# Patient Record
Sex: MALE | Race: BLACK OR AFRICAN AMERICAN | NOT HISPANIC OR LATINO | Employment: OTHER | URBAN - METROPOLITAN AREA
[De-identification: names, ages, dates, MRNs, and addresses within clinical notes are randomized per-mention and may not be internally consistent; named-entity substitution may affect disease eponyms.]

---

## 2017-01-04 ENCOUNTER — OFFICE VISIT (OUTPATIENT)
Dept: FAMILY MEDICINE | Facility: CLINIC | Age: 57
End: 2017-01-04
Payer: MEDICARE

## 2017-01-04 VITALS
OXYGEN SATURATION: 99 % | HEIGHT: 70 IN | DIASTOLIC BLOOD PRESSURE: 80 MMHG | SYSTOLIC BLOOD PRESSURE: 132 MMHG | HEART RATE: 68 BPM | WEIGHT: 166.69 LBS | TEMPERATURE: 99 F | BODY MASS INDEX: 23.86 KG/M2

## 2017-01-04 DIAGNOSIS — K21.9 GASTROESOPHAGEAL REFLUX DISEASE, ESOPHAGITIS PRESENCE NOT SPECIFIED: ICD-10-CM

## 2017-01-04 DIAGNOSIS — N18.6 ESRD ON HEMODIALYSIS: ICD-10-CM

## 2017-01-04 DIAGNOSIS — I70.0 ATHEROSCLEROSIS OF AORTA: ICD-10-CM

## 2017-01-04 DIAGNOSIS — J98.8 CONGESTION OF UPPER AIRWAY: ICD-10-CM

## 2017-01-04 DIAGNOSIS — C61 PROSTATE CANCER: ICD-10-CM

## 2017-01-04 DIAGNOSIS — E78.5 HYPERLIPIDEMIA LDL GOAL <100: Chronic | ICD-10-CM

## 2017-01-04 DIAGNOSIS — N25.81 SECONDARY RENAL HYPERPARATHYROIDISM: ICD-10-CM

## 2017-01-04 DIAGNOSIS — Z99.2 ESRD ON HEMODIALYSIS: ICD-10-CM

## 2017-01-04 DIAGNOSIS — I50.22 CHRONIC SYSTOLIC CONGESTIVE HEART FAILURE: ICD-10-CM

## 2017-01-04 DIAGNOSIS — I10 ESSENTIAL HYPERTENSION: Chronic | ICD-10-CM

## 2017-01-04 DIAGNOSIS — G40.909 SEIZURE DISORDER: Chronic | ICD-10-CM

## 2017-01-04 PROCEDURE — 1159F MED LIST DOCD IN RCRD: CPT | Mod: S$GLB,,, | Performed by: INTERNAL MEDICINE

## 2017-01-04 PROCEDURE — 99214 OFFICE O/P EST MOD 30 MIN: CPT | Mod: S$GLB,,, | Performed by: INTERNAL MEDICINE

## 2017-01-04 PROCEDURE — 99999 PR PBB SHADOW E&M-EST. PATIENT-LVL III: CPT | Mod: PBBFAC,,, | Performed by: INTERNAL MEDICINE

## 2017-01-04 PROCEDURE — 4010F ACE/ARB THERAPY RXD/TAKEN: CPT | Mod: S$GLB,,, | Performed by: INTERNAL MEDICINE

## 2017-01-04 PROCEDURE — 3046F HEMOGLOBIN A1C LEVEL >9.0%: CPT | Mod: S$GLB,,, | Performed by: INTERNAL MEDICINE

## 2017-01-04 RX ORDER — PANTOPRAZOLE SODIUM 40 MG/1
40 TABLET, DELAYED RELEASE ORAL DAILY
Qty: 30 TABLET | Refills: 5 | Status: SHIPPED | OUTPATIENT
Start: 2017-01-04 | End: 2017-05-29 | Stop reason: SDUPTHER

## 2017-01-04 NOTE — MR AVS SNAPSHOT
[facility]  [address]  [place] [address]  Phone: [phone]  Fax: [phone]                  Kodi Ibanez   2017 3:00 PM   Office Visit    Description:  Male : 1960   Provider:  Melonie Elias MD   Department:  [facility]           Reason for Visit     Cough     Follow-up           Diagnoses this Visit        Comments    Uncontrolled type 2 diabetes mellitus with proliferative retinopathy without macular edema, with long-term current use of insulin    -  Primary     Type 2 diabetes, uncontrolled, with neuropathy         Uncontrolled type 2 diabetes mellitus with end-stage renal disease         ESRD on hemodialysis         Seizure disorder         Hyperlipidemia LDL goal <100         Essential hypertension         Chronic systolic congestive heart failure         Secondary renal hyperparathyroidism         Atherosclerosis of aorta         Prostate cancer         Congestion of upper airway         Gastroesophageal reflux disease, esophagitis presence not specified                To Do List           Future Appointments        Provider Department Dept Phone    2017 11:15 AM LISA Catherine MD [facility] [phone]    3/9/2017 2:45 PM LISA Catherine MD [facility] [phone]      Goals (5 Years of Data)              16    HEMOGLOBIN A1C < 7.0   9.7  8.2  9.5    Related Problems    Uncontrolled type 2 diabetes mellitus with end-stage renal disease      Follow-Up and Disposition     Return in about 3 months (around 2017), or if symptoms worsen or fail to improve, for follow up chronic medical conditions..       These Medications        Disp Refills Start End    pantoprazole (PROTONIX) 40 MG tablet 30 tablet 5 2017     Take 1 tablet (40 mg total) by mouth once daily. - Oral    Pharmacy: [id number] - [place], [place] - [address] Ph #:  [phone]         [facility]     [facility] On Call Nurse Care Line - 24/7 Assistance  Registered nurses in the [facility] provide clinical advisement, health education, appointment booking, and other advisory services.  Call for this free service at [phone].             Medications           Message regarding Medications     Verify the changes and/or additions to your medication regime listed below are the same as discussed with your clinician today.  If any of these changes or additions are incorrect, please notify your healthcare provider.        CHANGE how you are taking these medications     Start Taking Instead of    pantoprazole (PROTONIX) 40 MG tablet pantoprazole (PROTONIX) 40 MG tablet    Dosage:  Take 1 tablet (40 mg total) by mouth once daily. Dosage:  TAKE 1 TABLET BY MOUTH DAILY    Reason for Change:  Reorder            Verify that the below list of medications is an accurate representation of the medications you are currently taking.  If none reported, the list may be blank. If incorrect, please contact your healthcare provider. Carry this list with you in case of emergency.           Current Medications     alcohol swabs PadM Apply 1 each topically as needed.    amlodipine (NORVASC) 10 MG tablet Take 1 tablet (10 mg total) by mouth once daily.    aspirin 81 MG chewable tablet Take 81 mg by mouth. 1 Tablet, Chewable Oral Every day    atorvastatin (LIPITOR) 40 MG tablet TAKE 1 TABLET BY MOUTH EVERY DAY    blood glucose control, normal Soln 1 drop by Misc.(Non-Drug; Combo Route) route as directed.    blood sugar diagnostic (TRUE METRIX GLUCOSE TEST STRIP) Strp 1 strip by Misc.(Non-Drug; Combo Route) route 2 (two) times daily.    carvedilol (COREG) 25 MG tablet TAKE 1 TABLET BY MOUTH TWICE DAILY WITH MEALS    divalproex (DEPAKOTE) 250 MG 24 hr tablet Take 1,000 mg by mouth once daily.     furosemide (LASIX) 40 MG tablet TAKE 1 TABLET BY MOUTH EVERY DAY    gabapentin (NEURONTIN)  "100 MG capsule TAKE ONE CAPSULE BY MOUTH DAILY    insulin aspart (NOVOLOG FLEXPEN) 100 unit/mL InPn pen Inject 5-10 units with meals and hs depending on blood sugar    insulin glargine (LANTUS SOLOSTAR) 100 unit/mL (3 mL) InPn pen INJECT 20 UNITS UNDER THE SKIN NIGHTLY    insulin glargine (LANTUS SOLOSTAR) 100 unit/mL (3 mL) InPn pen 15 or 30 units every night depending on blood sugar    lancets (TRUEPLUS LANCETS) 33 gauge Misc 1 lancet by Misc.(Non-Drug; Combo Route) route 2 (two) times daily.    levetiracetam (KEPPRA) 500 MG Tab Take 1 tablet (500 mg total) by mouth 2 (two) times daily.    losartan (COZAAR) 50 MG tablet TAKE 1 TABLET BY MOUTH EVERY DAY    multivitamin (THERAGRAN) per tablet Take 1 tablet by mouth once daily. ALIVE       yybth-dawep-utnisxy-pramoxine 3.5-500-10,000 mg-unit-unit/g Oint     NOVOFINE 30 30 x 1/3 " Ndle USE AS DIRECTED WITH LANTUS SOLARSTAR    omega-3 acid ethyl esters (LOVAZA) 1 gram capsule Take 2 capsules (2 g total) by mouth 2 (two) times daily.    pantoprazole (PROTONIX) 40 MG tablet Take 1 tablet (40 mg total) by mouth once daily.    pen needle, diabetic 29 gauge x 1/2" Ndle 1 each by Misc.(Non-Drug; Combo Route) route every evening.    promethazine (PHENERGAN) 25 MG tablet Take 1 tablet (25 mg total) by mouth every 6 (six) hours as needed for Nausea.    promethazine (PHENERGAN) 6.25 mg/5 mL syrup TAKE 20 MLS BY MOUTH EVERY 6 HOURS AS NEEDED FOR NAUSEA    RENVELA 800 mg Tab TAKE 1 TABLET BY MOUTH THREE TIMES DAILY WITH MEALS    TRUE METRIX AIR GLUCOSE METER kit     nitroGLYCERIN (NITROSTAT) 0.4 MG SL tablet Place 1 tablet (0.4 mg total) under the tongue every 5 (five) minutes as needed for Chest pain.           Clinical Reference Information           Vital Signs - Last Recorded  Most recent update: 1/4/2017  3:22 PM by Ivan Holder MA    BP Pulse Temp Ht Wt SpO2    132/80 68 98.5 °F (36.9 °C) (Oral) 5' 10" (1.778 m) 75.6 kg (166 lb 10.7 oz) 99%    BMI                23.91 " kg/m2          Blood Pressure          Most Recent Value    BP  132/80      Allergies as of 1/4/2017     Reglan [Metoclopramide Hcl]    Lorazepam      Immunizations Administered on Date of Encounter - 1/4/2017     None      Maintenance Dialysis History     Start End Type Comments Center    1/8/1888  [place]  [facility]            Current Dialysis Center Information     [facility] [address] Phone #:  [phone]    Contact:  N/A DAVID COX  [id number] Fax #:  [phone]

## 2017-01-04 NOTE — PROGRESS NOTES
HISTORY OF PRESENT ILLNESS:  Kodi Ibanez is a 56 y.o. male who presents to the clinic today for Cough (2 weeks) and Follow-up  .  The patient presents to clinic today for follow-up of his type 2 diabetes mellitus, located by retinopathy, neuropathy, and end-stage renal disease, hypertension/CHF, and hyperlipidemia.  He denies cardiac chest pain or shortness of breath.  He is mostly sedentary.  He has fair to poor dietary habits.  His blood sugars tend to trend in the upper 100s to low 200s.  His last hemoglobin A1c in October of last year was 8.6.  He denies any problems with low blood sugars.  He has prostate cancer for which she has opted for watchful waiting, but he may reconsider and see urology in the near future for rediscussion of his treatment options.  He is mostly asymptomatic.  He has reflux for which she needs a refill medication.  Symptoms well controlled.  Primary complaint today is persistent cough.  He is a cough about 2 or 3 weeks now.  No fever.  Cough sounds wet.  His wife has been giving him Robitussin-DM expectorant but no other prescription medication.  He has a lot of nasal congestion.  He is unable to cough up anything.  Discharge has been clear.      PAST MEDICAL HISTORY:  Past Medical History   Diagnosis Date    Atherosclerosis of aorta      noted on VIKTORIA 2011    Blood transfusion     CHF (congestive heart failure)     Chronic kidney disease     Clotting disorder     Coronary artery disease     Decreased appetite     Diabetic retinopathy     DM (diabetes mellitus), type 2 with renal complications     ESRD on hemodialysis      TUESDAY/ THURSDAY/SATURDAY    Hyperlipidemia     Hyperparathyroidism, unspecified     Hypertension     Myocardial infarction     Prostate cancer     Seizures     Stroke      PT HAS SHAKES        PAST SURGICAL HISTORY:  Past Surgical History   Procedure Laterality Date    Coronary artery bypass graft  2009     bypass    Left hernandez avf  1/11/2012     Rotator cuff repair  right, 2005    Retinal laser      Cataract extraction, bilateral      Vascular surgery      Eye surgery      Cardiac surgery       CABG    Cataract extraction       ou       SOCIAL HISTORY:  Social History     Social History    Marital status:      Spouse name: N/A    Number of children: N/A    Years of education: N/A     Occupational History    Not on file.     Social History Main Topics    Smoking status: Never Smoker    Smokeless tobacco: Never Used    Alcohol use No    Drug use: No    Sexual activity: Not Currently     Other Topics Concern    Not on file     Social History Narrative       FAMILY HISTORY:  Family History   Problem Relation Age of Onset    Diabetes Mother     Glaucoma Mother     Cancer Mother     Cataracts Mother     Heart disease Father     Cancer Paternal Grandmother     Heart disease Maternal Grandmother     No Known Problems Sister     Prostate cancer Brother     No Known Problems Maternal Grandfather     No Known Problems Paternal Grandfather     No Known Problems Sister     No Known Problems Sister     Hypertension Brother     Amblyopia Neg Hx     Blindness Neg Hx     Macular degeneration Neg Hx     Retinal detachment Neg Hx     Strabismus Neg Hx     Stroke Neg Hx     Thyroid disease Neg Hx     Anesthesia problems Neg Hx     Clotting disorder Neg Hx      problems Neg Hx     Kidney cancer Neg Hx     Kidney disease Neg Hx     Malignant hyperthermia Neg Hx     Sickle cell trait Neg Hx     Lupus Neg Hx     Urolithiasis Neg Hx     Sudden death Neg Hx        ALLERGIES AND MEDICATIONS: updated and reviewed.  Review of patient's allergies indicates:   Allergen Reactions    Reglan [metoclopramide hcl] Diarrhea    Lorazepam      Other reaction(s): heavy sedation     Medication List with Changes/Refills   Current Medications    ALCOHOL SWABS PADM    Apply 1 each topically as needed.    AMLODIPINE (NORVASC) 10 MG TABLET     "Take 1 tablet (10 mg total) by mouth once daily.    ASPIRIN 81 MG CHEWABLE TABLET    Take 81 mg by mouth. 1 Tablet, Chewable Oral Every day    ATORVASTATIN (LIPITOR) 40 MG TABLET    TAKE 1 TABLET BY MOUTH EVERY DAY    BLOOD GLUCOSE CONTROL, NORMAL SOLN    1 drop by Misc.(Non-Drug; Combo Route) route as directed.    BLOOD SUGAR DIAGNOSTIC (TRUE METRIX GLUCOSE TEST STRIP) STRP    1 strip by Misc.(Non-Drug; Combo Route) route 2 (two) times daily.    CARVEDILOL (COREG) 25 MG TABLET    TAKE 1 TABLET BY MOUTH TWICE DAILY WITH MEALS    DIVALPROEX (DEPAKOTE) 250 MG 24 HR TABLET    Take 1,000 mg by mouth once daily.     FUROSEMIDE (LASIX) 40 MG TABLET    TAKE 1 TABLET BY MOUTH EVERY DAY    GABAPENTIN (NEURONTIN) 100 MG CAPSULE    TAKE ONE CAPSULE BY MOUTH DAILY    INSULIN ASPART (NOVOLOG FLEXPEN) 100 UNIT/ML INPN PEN    Inject 5-10 units with meals and hs depending on blood sugar    INSULIN GLARGINE (LANTUS SOLOSTAR) 100 UNIT/ML (3 ML) INPN PEN    INJECT 20 UNITS UNDER THE SKIN NIGHTLY    INSULIN GLARGINE (LANTUS SOLOSTAR) 100 UNIT/ML (3 ML) INPN PEN    15 or 30 units every night depending on blood sugar    LANCETS (TRUEPLUS LANCETS) 33 GAUGE MISC    1 lancet by Misc.(Non-Drug; Combo Route) route 2 (two) times daily.    LEVETIRACETAM (KEPPRA) 500 MG TAB    Take 1 tablet (500 mg total) by mouth 2 (two) times daily.    LOSARTAN (COZAAR) 50 MG TABLET    TAKE 1 TABLET BY MOUTH EVERY DAY    MULTIVITAMIN (THERAGRAN) PER TABLET    Take 1 tablet by mouth once daily. ALIVE       CMPJP-DBBVV-DNFIHIE-PRAMOXINE 3.5-500-10,000 MG-UNIT-UNIT/G OINT        NITROGLYCERIN (NITROSTAT) 0.4 MG SL TABLET    Place 1 tablet (0.4 mg total) under the tongue every 5 (five) minutes as needed for Chest pain.    NOVOFINE 30 30 X 1/3 " NDLE    USE AS DIRECTED WITH LANTUS SOLARSTAR    OMEGA-3 ACID ETHYL ESTERS (LOVAZA) 1 GRAM CAPSULE    Take 2 capsules (2 g total) by mouth 2 (two) times daily.    PEN NEEDLE, DIABETIC 29 GAUGE X 1/2" NDLE    1 each by " "Misc.(Non-Drug; Combo Route) route every evening.    PROMETHAZINE (PHENERGAN) 25 MG TABLET    Take 1 tablet (25 mg total) by mouth every 6 (six) hours as needed for Nausea.    PROMETHAZINE (PHENERGAN) 6.25 MG/5 ML SYRUP    TAKE 20 MLS BY MOUTH EVERY 6 HOURS AS NEEDED FOR NAUSEA    RENVELA 800 MG TAB    TAKE 1 TABLET BY MOUTH THREE TIMES DAILY WITH MEALS    TRUE METRIX AIR GLUCOSE METER KIT       Changed and/or Refilled Medications    Modified Medication Previous Medication    PANTOPRAZOLE (PROTONIX) 40 MG TABLET pantoprazole (PROTONIX) 40 MG tablet       Take 1 tablet (40 mg total) by mouth once daily.    TAKE 1 TABLET BY MOUTH DAILY            REVIEW OF SYSTEMS:  General ROS: negative for - chills, fever or malaise  Psychological ROS: negative for - obsessive thoughts or suicidal ideation  Ophthalmic ROS: negative for - eye pain  ENT ROS: negative for - epistaxis or oral lesions  Allergy and Immunology ROS: negative for - hives  Hematological and Lymphatic ROS: negative for - swollen lymph nodes  Endocrine ROS: negative for - polydipsia/polyuria or temperature intolerance  Respiratory ROS: negative for - hemoptysis, sputum changes or wheezing  Cardiovascular ROS: negative for - chest pain or palpitations  Gastrointestinal ROS: no abdominal pain, change in bowel habits, or black or bloody stools  Dermatological ROS: negative for mole changes  Musculoskeletal ROS: negative for - joint swelling or muscle pain  Neurological ROS: no TIA or stroke symptoms  Genito-Urinary ROS: no dysuria, trouble voiding, or hematuria        PHYSICAL EXAM:  Vitals:    01/04/17 1522   BP: 132/80   Pulse: 68   Temp: 98.5 °F (36.9 °C)     Weight: 75.6 kg (166 lb 10.7 oz)   Height: 5' 10" (177.8 cm)   Body mass index is 23.91 kg/(m^2).    Physical Examination: General appearance - alert, well appearing, and in no distress and normal appearing weight  Mental status - alert, oriented to person, place, and time, quiet but answers questions " appropriately  Eyes - pupils equal and reactive, extraocular eye movements intact, sclera anicteric  Ears - bilateral TM's and external ear canals normal  Nose - normal nontender sinuses, mucosal congestion, mucosal erythema and clear rhinorrhea  Mouth - mucous membranes moist, pharynx normal without lesions  Neck - supple, no significant adenopathy, carotids upstroke normal bilaterally, no bruits, thyroid exam: thyroid is normal in size without nodules or tenderness  Lymphatics - no palpable lymphadenopathy  Chest - clear to auscultation, no wheezes, rales or rhonchi, symmetric air entry  Heart - normal rate and regular rhythm, no gallops noted  Neurological - alert, oriented, normal speech, no focal findings or movement disorder noted, cranial nerves II through XII intact  Musculoskeletal - no muscular tenderness noted  Extremities - no pedal edema noted  Skin - normal coloration and turgor, no rashes, no suspicious skin lesions noted       ASSESSMENT AND PLAN:  1. Uncontrolled type 2 diabetes mellitus with proliferative retinopathy without macular edema, with long-term current use of insulin/2. Type 2 diabetes, uncontrolled, with neuropathy/3. Uncontrolled type 2 diabetes mellitus with end-stage renal disease/4. ESRD on hemodialysis  Diabetes is not at goal, but difficult this patient is unable to cook himself meals and he cannot give himself insulin shots.  Last A1c 8.6.  Continue current regimen.  He is up-to-date on his eye exam.  Neuropathy pain control.  Continue dialysis.    5. Seizure disorder  The current medical regimen is effective;  continue present plan and medications.     6. Hyperlipidemia LDL goal <100  We discussed low fat diet and regular exercise.The current medical regimen is effective;  continue present plan and medications.      7. Essential hypertension/8. Chronic systolic congestive heart failure  Discussed sodium restriction, maintaining ideal body weight and regular exercise program as  physiologic means to achieve blood pressure control. The patient will strive towards this. The current medical regimen is effective;  continue present plan and medications. Recommended patient to check home readings to monitor and see me for followup as scheduled or sooner as needed. Patient was educated that both decongestant and anti-inflammatory medication may raise blood pressure.     9. Secondary renal hyperparathyroidism  Stable. Asymptomatic. Observe.     10. Atherosclerosis of aorta  Patient with Atherosclerosis of the Aorta.  Stable/asymptomatic. Currently stable on lipid lowering medication and b/p monitoring.     11. Prostate cancer  Mostly asymptomatic.  Watchful waiting.  He will follow-up with urology in the near future for further evaluation and treatment.    12. Congestion of upper airway  I discussed the need to address his congestion.  They will add antihistamine at bedtime and saline nasal rinse.  They may continue with Robitussin-DM as needed.  No need for antibiotics at this time.  His wife will call or bring the patient back to the clinic if symptoms worsen or persist.    13. Gastroesophageal reflux disease, esophagitis presence not specified  Symptoms controlled. Reflux precautions discussed (eliminate tobacco if a smoker; minimize caffeine, chocolate and red/white peppermint intake; avoid heavy and spicy meals; don't lay down within 2-3 hours after eating). Medication as needed. Patient asked to take medication breaks, if possible - discussed chronic use can limit calcium absorption, increases the risk for intestinal infections, and can cause kidney damage.    - pantoprazole (PROTONIX) 40 MG tablet; Take 1 tablet (40 mg total) by mouth once daily.  Dispense: 30 tablet; Refill: 5          Return in about 3 months (around 4/4/2017), or if symptoms worsen or fail to improve, for follow up chronic medical conditions.. or sooner as needed.

## 2017-01-05 ENCOUNTER — TELEPHONE (OUTPATIENT)
Dept: VASCULAR SURGERY | Facility: CLINIC | Age: 57
End: 2017-01-05

## 2017-01-05 NOTE — TELEPHONE ENCOUNTER
Pt called to schedule her  Kodi Ibanez a appt with Dr. Mazariegos. No Ans/Left message for pt wife to give the clinic a call. Appt was made in Feb. On a Friday as she requested  and mailed out .

## 2017-01-12 ENCOUNTER — OFFICE VISIT (OUTPATIENT)
Dept: UROLOGY | Facility: CLINIC | Age: 57
End: 2017-01-12
Payer: MEDICARE

## 2017-01-12 VITALS
BODY MASS INDEX: 23.21 KG/M2 | HEART RATE: 72 BPM | HEIGHT: 71 IN | DIASTOLIC BLOOD PRESSURE: 70 MMHG | SYSTOLIC BLOOD PRESSURE: 138 MMHG | WEIGHT: 165.81 LBS

## 2017-01-12 DIAGNOSIS — Z86.73 HISTORY OF STROKE: ICD-10-CM

## 2017-01-12 DIAGNOSIS — C61 PROSTATE CANCER: Primary | ICD-10-CM

## 2017-01-12 DIAGNOSIS — R53.1 WEAKNESS: Primary | ICD-10-CM

## 2017-01-12 LAB
BILIRUB SERPL-MCNC: NORMAL MG/DL
BLOOD URINE, POC: POSITIVE
COLOR, POC UA: YELLOW
GLUCOSE UR QL STRIP: POSITIVE
KETONES UR QL STRIP: NORMAL
LEUKOCYTE ESTERASE URINE, POC: NORMAL
NITRITE, POC UA: NORMAL
PH, POC UA: 8
PROTEIN, POC: NORMAL
SPECIFIC GRAVITY, POC UA: 1000
UROBILINOGEN, POC UA: NORMAL

## 2017-01-12 PROCEDURE — 81001 URINALYSIS AUTO W/SCOPE: CPT | Mod: S$GLB,,, | Performed by: UROLOGY

## 2017-01-12 PROCEDURE — 99214 OFFICE O/P EST MOD 30 MIN: CPT | Mod: 25,S$GLB,, | Performed by: UROLOGY

## 2017-01-12 PROCEDURE — 1159F MED LIST DOCD IN RCRD: CPT | Mod: S$GLB,,, | Performed by: UROLOGY

## 2017-01-12 PROCEDURE — 99999 PR PBB SHADOW E&M-EST. PATIENT-LVL III: CPT | Mod: PBBFAC,,, | Performed by: UROLOGY

## 2017-01-12 NOTE — PROGRESS NOTES
Subjective:       Patient ID: Kodi Ibanez is a 56 y.o. male who was last seen in this office 10/3/2016    Chief Complaint:   Chief Complaint   Patient presents with    Elevated PSA     3 month f/u     Prostate Cancer       Prostate Cancer    He underwent a prostate needle biopsy on 9/19/2016.  His biopsy was indicated due to: Elevated PSA.  Afterwards he experienced: Gross Hematuria.  These symptoms have resolved.  His PSA prior to biopsy was 7.8.  His prostate size was 50 grams.  The ultrasound did not show a median lobe.  He currently does have erectile dysfunction.  His pathology showed: Prostate Cancer.    We discussed his diagnosis at his last visit.  We settled on active surveillance.  He did not get another PSA.    ACTIVE MEDICAL ISSUES:  Patient Active Problem List   Diagnosis    ESRD on hemodialysis    History of stroke    Cognitive deficits as late effect of cerebrovascular disease    Congestive heart failure    Type 2 diabetes, uncontrolled, with retinopathy    Secondary renal hyperparathyroidism    Other extrapyramidal disease and abnormal movement disorder    Mononeuritis of lower limb, unspecified    Vascular dementia with depressed mood    Type 2 diabetes, uncontrolled, with neuropathy    CAD (coronary artery disease)    Seizure disorder    Hyperlipidemia LDL goal <100    Anemia of chronic disease    Hypertension    Uncontrolled type 2 diabetes mellitus with end-stage renal disease    History of MI (myocardial infarction)    Long-term insulin use    Atherosclerosis of aorta    S/P CABG (coronary artery bypass graft)    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  Review of patient's allergies indicates:   Allergen Reactions    Reglan  "[metoclopramide hcl] Diarrhea    Lorazepam      Other reaction(s): heavy sedation     Current Outpatient Prescriptions   Medication Sig    alcohol swabs PadM Apply 1 each topically as needed.    amlodipine (NORVASC) 10 MG tablet Take 1 tablet (10 mg total) by mouth once daily.    aspirin 81 MG chewable tablet Take 81 mg by mouth. 1 Tablet, Chewable Oral Every day    atorvastatin (LIPITOR) 40 MG tablet TAKE 1 TABLET BY MOUTH EVERY DAY    blood glucose control, normal Soln 1 drop by Misc.(Non-Drug; Combo Route) route as directed.    blood sugar diagnostic (TRUE METRIX GLUCOSE TEST STRIP) Strp 1 strip by Misc.(Non-Drug; Combo Route) route 2 (two) times daily.    carvedilol (COREG) 25 MG tablet TAKE 1 TABLET BY MOUTH TWICE DAILY WITH MEALS    divalproex (DEPAKOTE) 250 MG 24 hr tablet Take 1,000 mg by mouth once daily.     furosemide (LASIX) 40 MG tablet TAKE 1 TABLET BY MOUTH EVERY DAY    gabapentin (NEURONTIN) 100 MG capsule TAKE ONE CAPSULE BY MOUTH DAILY    insulin aspart (NOVOLOG FLEXPEN) 100 unit/mL InPn pen Inject 5-10 units with meals and hs depending on blood sugar    insulin glargine (LANTUS SOLOSTAR) 100 unit/mL (3 mL) InPn pen INJECT 20 UNITS UNDER THE SKIN NIGHTLY    insulin glargine (LANTUS SOLOSTAR) 100 unit/mL (3 mL) InPn pen 15 or 30 units every night depending on blood sugar    lancets (TRUEPLUS LANCETS) 33 gauge Misc 1 lancet by Misc.(Non-Drug; Combo Route) route 2 (two) times daily.    levetiracetam (KEPPRA) 500 MG Tab Take 1 tablet (500 mg total) by mouth 2 (two) times daily.    losartan (COZAAR) 50 MG tablet TAKE 1 TABLET BY MOUTH EVERY DAY    multivitamin (THERAGRAN) per tablet Take 1 tablet by mouth once daily. ALIVE       tsuqd-sdqrf-cryfpgn-pramoxine 3.5-500-10,000 mg-unit-unit/g Oint     NOVOFINE 30 30 x 1/3 " Ndle USE AS DIRECTED WITH LANTUS SOLARSTAR    omega-3 acid ethyl esters (LOVAZA) 1 gram capsule Take 2 capsules (2 g total) by mouth 2 (two) times daily.    " "pantoprazole (PROTONIX) 40 MG tablet Take 1 tablet (40 mg total) by mouth once daily.    pen needle, diabetic 29 gauge x 1/2" Ndle 1 each by Misc.(Non-Drug; Combo Route) route every evening.    promethazine (PHENERGAN) 25 MG tablet Take 1 tablet (25 mg total) by mouth every 6 (six) hours as needed for Nausea.    promethazine (PHENERGAN) 6.25 mg/5 mL syrup TAKE 20 MLS BY MOUTH EVERY 6 HOURS AS NEEDED FOR NAUSEA    RENVELA 800 mg Tab TAKE 1 TABLET BY MOUTH THREE TIMES DAILY WITH MEALS    TRUE METRIX AIR GLUCOSE METER kit     nitroGLYCERIN (NITROSTAT) 0.4 MG SL tablet Place 1 tablet (0.4 mg total) under the tongue every 5 (five) minutes as needed for Chest pain.     No current facility-administered medications for this visit.        Review of Systems   Constitutional: Negative for activity change, fatigue, fever and unexpected weight change.   HENT: Negative for congestion.    Eyes: Negative for redness.   Respiratory: Negative for chest tightness and shortness of breath.    Cardiovascular: Negative for chest pain and leg swelling.   Gastrointestinal: Negative for abdominal pain, constipation, diarrhea, nausea and vomiting.   Genitourinary: Negative for dysuria, flank pain, frequency, hematuria, penile pain, penile swelling, scrotal swelling, testicular pain and urgency.   Musculoskeletal: Negative for arthralgias and back pain.   Neurological: Negative for dizziness and light-headedness.   Psychiatric/Behavioral: Negative for behavioral problems and confusion. The patient is not nervous/anxious.    All other systems reviewed and are negative.      Objective:      Vitals:    01/12/17 1128   BP: 138/70   Pulse: 72   Weight: 75.2 kg (165 lb 12.6 oz)   Height: 5' 11" (1.803 m)     Physical Exam   Nursing note and vitals reviewed.  Constitutional: He is oriented to person, place, and time. He appears well-developed and well-nourished.   HENT:   Head: Normocephalic.   Eyes: Conjunctivae are normal.   Neck: Normal " range of motion. Neck supple. No tracheal deviation present. No thyromegaly present.   Cardiovascular: Normal rate and normal heart sounds.    Pulmonary/Chest: Effort normal and breath sounds normal. No respiratory distress. He has no wheezes.   Abdominal: Soft. Bowel sounds are normal. There is no hepatosplenomegaly. There is no tenderness. There is no rebound and no CVA tenderness. No hernia.   Musculoskeletal: Normal range of motion. He exhibits no edema or tenderness.   Lymphadenopathy:     He has no cervical adenopathy.   Neurological: He is alert and oriented to person, place, and time.   Skin: Skin is warm and dry. No rash noted. No erythema.     Psychiatric: He has a normal mood and affect. His behavior is normal. Judgment and thought content normal.       Urine dipstick shows negative for all components.  Micro exam: negative for WBC's or RBC's.    Assessment:       1. Prostate cancer          Plan:       1. Prostate cancer  He is going to get a PSA soon and in 3 months.  He will need an anniversary biopsy in the Fall 2017.    He is going to talk to Dr. Elias about getting a walker.    - POCT urinalysis, dipstick or tablet reag  - Prostate Specific Antigen, Diagnostic; Future  - Prostate Specific Antigen, Diagnostic; Future            Return in about 3 months (around 4/12/2017) for Review PSA.

## 2017-01-12 NOTE — MR AVS SNAPSHOT
[facility] - Urology  [address]  [place] [address]  Phone: [phone]                  Kodi Ibanez   2017 11:15 AM   Office Visit    Description:  Male : 1960   Provider:  LISA Catherine MD   Department:  [facility] Urolog           Reason for Visit     Elevated PSA     Prostate Cancer           Diagnoses this Visit        Comments    Prostate cancer    -  Primary            To Do List           Future Appointments        Provider Department Dept Phone    2/3/2017 9:00 AM VASCULAR, LAB [facility] - Vascular Laboratory [phone]    2/3/2017 9:45 AM MD [facility] Ag[facility] III [facility] - Vascular Surgery [phone]    3/9/2017 2:45 PM LISA Catherine MD [facility] Urology [phone]      Goals (5 Years of Data)              16    HEMOGLOBIN A1C < 7.0   9.7  8.2  9.5    Related Problems    Uncontrolled type 2 diabetes mellitus with end-stage renal disease      Follow-Up and Disposition     Return in about 3 months (around 2017) for Review PSA.      [facility] On Call     [facility] On Call Nurse Care Line -  Assistance  Registered nurses in the [facility] On Call Center provide clinical advisement, health education, appointment booking, and other advisory services.  Call for this free service at [phone].             Medications           Message regarding Medications     Verify the changes and/or additions to your medication regime listed below are the same as discussed with your clinician today.  If any of these changes or additions are incorrect, please notify your healthcare provider.             Verify that the below list of medications is an accurate representation of the medications you are currently taking.  If none reported, the list may be blank. If incorrect, please contact your healthcare provider. Carry this list with you in case of emergency.           Current Medications     alcohol swabs PadM Apply 1 each topically as  "needed.    amlodipine (NORVASC) 10 MG tablet Take 1 tablet (10 mg total) by mouth once daily.    aspirin 81 MG chewable tablet Take 81 mg by mouth. 1 Tablet, Chewable Oral Every day    atorvastatin (LIPITOR) 40 MG tablet TAKE 1 TABLET BY MOUTH EVERY DAY    blood glucose control, normal Soln 1 drop by Misc.(Non-Drug; Combo Route) route as directed.    blood sugar diagnostic (TRUE METRIX GLUCOSE TEST STRIP) Strp 1 strip by Misc.(Non-Drug; Combo Route) route 2 (two) times daily.    carvedilol (COREG) 25 MG tablet TAKE 1 TABLET BY MOUTH TWICE DAILY WITH MEALS    divalproex (DEPAKOTE) 250 MG 24 hr tablet Take 1,000 mg by mouth once daily.     furosemide (LASIX) 40 MG tablet TAKE 1 TABLET BY MOUTH EVERY DAY    gabapentin (NEURONTIN) 100 MG capsule TAKE ONE CAPSULE BY MOUTH DAILY    insulin aspart (NOVOLOG FLEXPEN) 100 unit/mL InPn pen Inject 5-10 units with meals and hs depending on blood sugar    insulin glargine (LANTUS SOLOSTAR) 100 unit/mL (3 mL) InPn pen INJECT 20 UNITS UNDER THE SKIN NIGHTLY    insulin glargine (LANTUS SOLOSTAR) 100 unit/mL (3 mL) InPn pen 15 or 30 units every night depending on blood sugar    lancets (TRUEPLUS LANCETS) 33 gauge Misc 1 lancet by Misc.(Non-Drug; Combo Route) route 2 (two) times daily.    levetiracetam (KEPPRA) 500 MG Tab Take 1 tablet (500 mg total) by mouth 2 (two) times daily.    losartan (COZAAR) 50 MG tablet TAKE 1 TABLET BY MOUTH EVERY DAY    multivitamin (THERAGRAN) per tablet Take 1 tablet by mouth once daily. ALIVE       nxwka-owhlo-pvwnemf-pramoxine 3.5-500-10,000 mg-unit-unit/g Oint     NOVOFINE 30 30 x 1/3 " Ndle USE AS DIRECTED WITH LANTUS SOLARSTAR    omega-3 acid ethyl esters (LOVAZA) 1 gram capsule Take 2 capsules (2 g total) by mouth 2 (two) times daily.    pantoprazole (PROTONIX) 40 MG tablet Take 1 tablet (40 mg total) by mouth once daily.    pen needle, diabetic 29 gauge x 1/2" Ndle 1 each by Misc.(Non-Drug; Combo Route) route every evening.    promethazine " "(PHENERGAN) 25 MG tablet Take 1 tablet (25 mg total) by mouth every 6 (six) hours as needed for Nausea.    promethazine (PHENERGAN) 6.25 mg/5 mL syrup TAKE 20 MLS BY MOUTH EVERY 6 HOURS AS NEEDED FOR NAUSEA    RENVELA 800 mg Tab TAKE 1 TABLET BY MOUTH THREE TIMES DAILY WITH MEALS    TRUE METRIX AIR GLUCOSE METER kit     nitroGLYCERIN (NITROSTAT) 0.4 MG SL tablet Place 1 tablet (0.4 mg total) under the tongue every 5 (five) minutes as needed for Chest pain.           Clinical Reference Information           Vital Signs - Last Recorded  Most recent update: 1/12/2017 11:28 AM by Florinda Alicia MA    BP Pulse Ht Wt BMI    138/70 72 5' 11" (1.803 m) 75.2 kg (165 lb 12.6 oz) 23.12 kg/m2      Blood Pressure          Most Recent Value    BP  138/70      Allergies as of 1/12/2017     Reglan [Metoclopramide Hcl]    Lorazepam      Immunizations Administered on Date of Encounter - 1/12/2017     None      Orders Placed During Today's Visit      Normal Orders This Visit    POCT urinalysis, dipstick or tablet reag     Future Labs/Procedures Expected by Expires    Prostate Specific Antigen, Diagnostic  1/16/2017 3/13/2018    Prostate Specific Antigen, Diagnostic  4/11/2017 1/12/2018      Maintenance Dialysis History     Start End Type Comments Center    1/8/1888  [place]  [facility]            Current Dialysis Center Information     [facility] [address] Phone #:  [phone]    Contact:  N/A DAVID COX  [address] Fax #:  [phone]            "

## 2017-01-31 RX ORDER — AMLODIPINE BESYLATE 10 MG/1
TABLET ORAL
Qty: 90 TABLET | Refills: 0 | Status: SHIPPED | OUTPATIENT
Start: 2017-01-31 | End: 2017-04-29 | Stop reason: SDUPTHER

## 2017-01-31 RX ORDER — ATORVASTATIN CALCIUM 40 MG/1
TABLET, FILM COATED ORAL
Qty: 90 TABLET | Refills: 0 | Status: SHIPPED | OUTPATIENT
Start: 2017-01-31 | End: 2017-05-29 | Stop reason: SDUPTHER

## 2017-02-03 ENCOUNTER — OFFICE VISIT (OUTPATIENT)
Dept: VASCULAR SURGERY | Facility: CLINIC | Age: 57
End: 2017-02-03
Payer: MEDICARE

## 2017-02-03 ENCOUNTER — HOSPITAL ENCOUNTER (OUTPATIENT)
Dept: VASCULAR SURGERY | Facility: CLINIC | Age: 57
Discharge: HOME | End: 2017-02-03
Attending: SURGERY
Payer: MEDICARE

## 2017-02-03 VITALS
DIASTOLIC BLOOD PRESSURE: 67 MMHG | TEMPERATURE: 98 F | WEIGHT: 165 LBS | HEART RATE: 78 BPM | BODY MASS INDEX: 23.62 KG/M2 | HEIGHT: 70 IN | SYSTOLIC BLOOD PRESSURE: 111 MMHG

## 2017-02-03 DIAGNOSIS — N18.6 END STAGE RENAL DISEASE: ICD-10-CM

## 2017-02-03 DIAGNOSIS — T82.858D STENOSIS OF ARTERIOVENOUS DIALYSIS FISTULA, SUBSEQUENT ENCOUNTER: ICD-10-CM

## 2017-02-03 DIAGNOSIS — Z99.2 ESRD ON DIALYSIS: ICD-10-CM

## 2017-02-03 DIAGNOSIS — N18.6 ESRD ON DIALYSIS: ICD-10-CM

## 2017-02-03 DIAGNOSIS — N18.6 ESRD (END STAGE RENAL DISEASE) ON DIALYSIS: ICD-10-CM

## 2017-02-03 DIAGNOSIS — Z01.818 PRE-OP EVALUATION: Primary | ICD-10-CM

## 2017-02-03 DIAGNOSIS — Z99.2 ESRD (END STAGE RENAL DISEASE) ON DIALYSIS: ICD-10-CM

## 2017-02-03 PROBLEM — T82.858A STENOSIS OF ARTERIOVENOUS DIALYSIS FISTULA: Status: ACTIVE | Noted: 2017-02-03

## 2017-02-03 PROCEDURE — 93990 DOPPLER FLOW TESTING: CPT | Mod: S$GLB,,, | Performed by: SURGERY

## 2017-02-03 PROCEDURE — 99214 OFFICE O/P EST MOD 30 MIN: CPT | Mod: S$GLB,,, | Performed by: SURGERY

## 2017-02-03 PROCEDURE — 99999 PR PBB SHADOW E&M-EST. PATIENT-LVL III: CPT | Mod: PBBFAC,,, | Performed by: SURGERY

## 2017-02-03 NOTE — MR AVS SNAPSHOT
[facility] - Vascular Surgery  [address]  [facility] [address]  Phone: [phone]  Fax: [phone]                  Kodi Ibanez   2/3/2017 9:45 AM   Office Visit    Description:  Male : 1960   Provider:  DAVE Mazariegos III, MD   Department:  [facility]           Reason for Visit     Follow-up           Diagnoses this Visit        Comments    Stenosis of arteriovenous dialysis fistula, subsequent encounter                To Do List           Future Appointments        Provider Department Dept Phone    2017 11:15 AM LISA Catherine MD [facility] Urology [phone]      Goals (5 Years of Data)              16    HEMOGLOBIN A1C < 7.0   9.7  8.2  9.5    Related Problems    Uncontrolled type 2 diabetes mellitus with end-stage renal disease      [facility] On Call     [facility] On Call Nurse Care Line - 24/ Assistance  Registered nurses in the [facility] On Call Center provide clinical advisement, health education, appointment booking, and other advisory services.  Call for this free service at [phone].             Medications           Message regarding Medications     Verify the changes and/or additions to your medication regime listed below are the same as discussed with your clinician today.  If any of these changes or additions are incorrect, please notify your healthcare provider.             Verify that the below list of medications is an accurate representation of the medications you are currently taking.  If none reported, the list may be blank. If incorrect, please contact your healthcare provider. Carry this list with you in case of emergency.           Current Medications     alcohol swabs PadM Apply 1 each topically as needed.    amlodipine (NORVASC) 10 MG tablet TAKE 1 TABLET BY MOUTH EVERY DAY    aspirin 81 MG chewable tablet Take 81 mg by mouth. 1 Tablet, Chewable Oral Every day    atorvastatin (LIPITOR) 40 MG tablet TAKE 1  "TABLET BY MOUTH EVERY DAY    blood glucose control, normal Soln 1 drop by Misc.(Non-Drug; Combo Route) route as directed.    blood sugar diagnostic (TRUE METRIX GLUCOSE TEST STRIP) Strp 1 strip by Misc.(Non-Drug; Combo Route) route 2 (two) times daily.    carvedilol (COREG) 25 MG tablet TAKE 1 TABLET BY MOUTH TWICE DAILY WITH MEALS    divalproex (DEPAKOTE) 250 MG 24 hr tablet Take 1,000 mg by mouth once daily.     furosemide (LASIX) 40 MG tablet TAKE 1 TABLET BY MOUTH EVERY DAY    gabapentin (NEURONTIN) 100 MG capsule TAKE ONE CAPSULE BY MOUTH DAILY    insulin aspart (NOVOLOG FLEXPEN) 100 unit/mL InPn pen Inject 5-10 units with meals and hs depending on blood sugar    insulin glargine (LANTUS SOLOSTAR) 100 unit/mL (3 mL) InPn pen INJECT 20 UNITS UNDER THE SKIN NIGHTLY    insulin glargine (LANTUS SOLOSTAR) 100 unit/mL (3 mL) InPn pen 15 or 30 units every night depending on blood sugar    lancets (TRUEPLUS LANCETS) 33 gauge Misc 1 lancet by Misc.(Non-Drug; Combo Route) route 2 (two) times daily.    levetiracetam (KEPPRA) 500 MG Tab Take 1 tablet (500 mg total) by mouth 2 (two) times daily.    losartan (COZAAR) 50 MG tablet TAKE 1 TABLET BY MOUTH EVERY DAY    multivitamin (THERAGRAN) per tablet Take 1 tablet by mouth once daily. ALIVE       sikoy-ahsge-psrkpjq-pramoxine 3.5-500-10,000 mg-unit-unit/g Oint     nitroGLYCERIN (NITROSTAT) 0.4 MG SL tablet Place 1 tablet (0.4 mg total) under the tongue every 5 (five) minutes as needed for Chest pain.    NOVOFINE 30 30 x 1/3 " Ndle USE AS DIRECTED WITH LANTUS SOLARSTAR    omega-3 acid ethyl esters (LOVAZA) 1 gram capsule Take 2 capsules (2 g total) by mouth 2 (two) times daily.    pantoprazole (PROTONIX) 40 MG tablet Take 1 tablet (40 mg total) by mouth once daily.    pen needle, diabetic 29 gauge x 1/2" Ndle 1 each by Misc.(Non-Drug; Combo Route) route every evening.    promethazine (PHENERGAN) 25 MG tablet Take 1 tablet (25 mg total) by mouth every 6 (six) hours as needed " "for Nausea.    promethazine (PHENERGAN) 6.25 mg/5 mL syrup TAKE 20 MLS BY MOUTH EVERY 6 HOURS AS NEEDED FOR NAUSEA    RENVELA 800 mg Tab TAKE 1 TABLET BY MOUTH THREE TIMES DAILY WITH MEALS    TRUE METRIX AIR GLUCOSE METER kit            Clinical Reference Information           Your Vitals Were     BP Pulse Temp Height Weight BMI    111/67 (BP Location: Right arm, Patient Position: Sitting, BP Method: Automatic) 78 97.8 °F (36.6 °C) (Oral) 5' 10" (1.778 m) 74.8 kg (165 lb) 23.68 kg/m2      Blood Pressure          Most Recent Value    BP  111/67      Allergies as of 2/3/2017     Reglan [Metoclopramide Hcl]    Lorazepam      Immunizations Administered on Date of Encounter - 2/3/2017     None      Maintenance Dialysis History     Start End Type Comments Center    1/8/1888  [place]  [facility]            Current Dialysis Center Information     [facility] [address] Phone #:  [phone]    Contact:  N/A DAVID COX  [address] Fax #:  [phone]            Language Assistance Services     ATTENTION: Language assistance services are available, free of charge. Please call [phone].      ATENCIÓN: Si habla español, tiene a sandoval disposición servicios gratuitos de asistencia lingüística. Llame al [phone].     BRANDON Ý: N?u b?n nói Ti?ng Vi?t, có các d?ch v? h? tr? ngôn ng? mi?n phí dành cho b?n. G?i s? [phone].         [facility] complies with applicable Federal civil rights laws and does not discriminate on the basis of race, color, national origin, age, disability, or sex.        "

## 2017-02-03 NOTE — PROGRESS NOTES
See my prior note; review of systems, family history and social history are   unchanged.    HISTORY OF PRESENT ILLNESS:  A 57-year-old male with end-stage renal disease,   well known to me, status post:  1.  Stent placement, left AV fistula (10 x 40 [place]), 08/03/2016.  2.  Prior angioplasty, left AV fistula, 03/21/2016, 05/12/2014, and September 2013.  3.  Original left brachiocephalic fistula creation, 04/01/2013.    He now returns with several weeks of increased bleeding from this fistula.    PAST MEDICAL HISTORY:  1.  End-stage renal disease, dialyzing Tuesday, Thursday, Saturday.  2.  History of congestive heart failure.  3.  Diabetes mellitus.  4.  Hyperlipidemia.  5.  Hyperparathyroidism.  6.  Hypertension.  7.  History of seizures.  8.  History of stroke.    PAST SURGICAL HISTORY:  1.  CABG.  2.  Right brachiobasilic fistula as above.    FAMILY HISTORY:  Hypertension and heart disease.    SOCIAL HISTORY:  Nonsmoker.    MEDICATIONS:  Include aspirin and a statin.  See Epic for full list.    ALLERGIES:  Reglan and lorazepam.    REVIEW OF SYSTEMS:  Denies postprandial pain or DVT.  All other systems include eyes, ENT, respiratory, musculoskeletal, breast,   psychiatric, lymph, allergy and immune are negative.    PHYSICAL EXAMINATION:  VITAL SIGNS:  See nursing note.  GENERAL:  He is in no acute distress.  RESPIRATORY:  Normal effort.  Clear to auscultation.  CARDIAC:  Regular rate and rhythm, nondisplaced PMI, no murmur.  EXTREMITIES:  Left arm shows a brachiobasilic fistula with moderate pulsatility   in the first half with transition to a pure thrill at the axilla.    IMAGING:  Duplex of the fistula shows significant velocity shift in the middle   AV fistula with a velocity of 457, flow volume is 1.2 L compared to 1.3 prior.    His prior fistulograms were personally reviewed.    ASSESSMENT:  Recurrent mid fistula stenosis causing increased bleeding.    Intervention is warranted.    PLAN:  1.  Left  fistulogram and intervention, 02/06/2017.  2.  Cath Lab case.    The patient understands the risks and rationale of procedure and wishes to   proceed.      DES  dd: 02/03/2017 09:58:30 (CST)  td: 02/03/2017 10:23:20 (CST)  Doc ID   #[id number]  Job ID #[id number]    CC:

## 2017-03-03 ENCOUNTER — TELEPHONE (OUTPATIENT)
Dept: VASCULAR SURGERY | Facility: CLINIC | Age: 57
End: 2017-03-03

## 2017-03-06 ENCOUNTER — HOSPITAL ENCOUNTER (OUTPATIENT)
Facility: HOSPITAL | Age: 57
Discharge: HOME | End: 2017-03-06
Attending: SURGERY | Admitting: SURGERY
Payer: MEDICARE

## 2017-03-06 VITALS
TEMPERATURE: 98 F | DIASTOLIC BLOOD PRESSURE: 67 MMHG | OXYGEN SATURATION: 93 % | RESPIRATION RATE: 18 BRPM | BODY MASS INDEX: 24.05 KG/M2 | WEIGHT: 168 LBS | SYSTOLIC BLOOD PRESSURE: 119 MMHG | HEART RATE: 77 BPM | HEIGHT: 70 IN

## 2017-03-06 DIAGNOSIS — T82.590A DIALYSIS AV FISTULA MALFUNCTION: Primary | ICD-10-CM

## 2017-03-06 DIAGNOSIS — N18.6 ESRD ON HEMODIALYSIS: ICD-10-CM

## 2017-03-06 DIAGNOSIS — Z01.818 ENCOUNTER FOR OTHER PREPROCEDURAL EXAMINATION: ICD-10-CM

## 2017-03-06 DIAGNOSIS — Z99.2 ESRD ON HEMODIALYSIS: ICD-10-CM

## 2017-03-06 LAB — POCT GLUCOSE: 137 MG/DL (ref 70–110)

## 2017-03-06 PROCEDURE — C1894 INTRO/SHEATH, NON-LASER: HCPCS

## 2017-03-06 PROCEDURE — 99152 MOD SED SAME PHYS/QHP 5/>YRS: CPT | Mod: ,,, | Performed by: SURGERY

## 2017-03-06 PROCEDURE — 99153 MOD SED SAME PHYS/QHP EA: CPT

## 2017-03-06 PROCEDURE — 36902 INTRO CATH DIALYSIS CIRCUIT: CPT | Mod: ,,, | Performed by: SURGERY

## 2017-03-06 PROCEDURE — 82962 GLUCOSE BLOOD TEST: CPT

## 2017-03-06 PROCEDURE — 63600175 PHARM REV CODE 636 W HCPCS

## 2017-03-06 PROCEDURE — 25000003 PHARM REV CODE 250

## 2017-03-06 PROCEDURE — 36907 BALO ANGIOP CTR DIALYSIS SEG: CPT | Mod: ,,, | Performed by: SURGERY

## 2017-03-06 RX ORDER — HYDROCODONE BITARTRATE AND ACETAMINOPHEN 5; 325 MG/1; MG/1
1 TABLET ORAL EVERY 4 HOURS PRN
Status: DISCONTINUED | OUTPATIENT
Start: 2017-03-06 | End: 2017-03-06 | Stop reason: HOSPADM

## 2017-03-06 NOTE — BRIEF OP NOTE
[facility]  Brief Operative Note     SUMMARY     Surgery Date: 3/6/2017     Surgeon(s) and Role:     * DAVE Mazariegos III, MD - Primary    Assisting Surgeon: None    Pre-op Diagnosis: stenosis, AVG    Post-op Diagnosis:  same, + subclavian vein stenosis    PROCEDURES:    1. PTA, L AVF (10x40 [place])  2. PTA, L subclavian vein (12x40 [place])  3. L fistulagram  2. Conscious sedation    Anesthesia: Monitor Anesthesia Care    Description of the findings of the procedure: 50% subclavian stenosis, ~10% residual; 75% mid fistula stenosis (seen on balloon waist), no residual    Findings/Key Components: as above    Estimated Blood Loss: <3cc         Specimens:   Specimen     None          Discharge Note    SUMMARY     Admit Date: 3/6/2017    Discharge Date and Time: 3/6/17    Hospital Course (synopsis of major diagnoses, care, treatment, and services provided during the course of the hospital stay): successful outpatient procedure    Final Diagnosis: stenosis, AVF    Disposition: home    Follow Up/Patient Instructions: Diet: renal  Act: ad fatmata  FU: 1 week with AVF duplex     Medications: pre-op

## 2017-03-06 NOTE — PLAN OF CARE
Problem: Patient Care Overview  Goal: Plan of Care Review  Outcome: Ongoing (interventions implemented as appropriate)  Received report from BASHIR Parada. Pt received from Cath lab via stretcher. Patient  AAOx3. VSS, no distress noted. Resp even and unlabored. Left upper arm dressing C/D/I. +pulses, No active bleeding. No hematoma noted. +thrill and bruit.  Post procedure protocol reviewed with patient and patient's family. Understanding verbalized. Family members at bedside. Nurse call bell within reach. Will continue to monitor per post procedure protocol.

## 2017-03-06 NOTE — NURSING
Pt and spouse verbalized an understanding of discharge instructions including diet, s/s to notify md, post procedure care, and follow up. Left upper arm dressing clean, dry and intact. +pulse, no hematoma. +thrill and bruit.  Pt refused wheel chair and ambulated off unit.

## 2017-03-06 NOTE — H&P
HISTORY OF PRESENT ILLNESS: A 57-year-old male with end-stage renal disease,   well known to me, status post:  1. Stent placement, left AV fistula (10 x 40 Bard), 08/03/2016.  2. Prior angioplasty, left AV fistula, 03/21/2016, 05/12/2014, and September 2013.  3. Original left brachiocephalic fistula creation, 04/01/2013.     He now returns with several weeks of increased bleeding from this fistula.      PAST MEDICAL HISTORY:  1. End-stage renal disease, dialyzing Tuesday, Thursday, Saturday.  2. History of congestive heart failure.  3. Diabetes mellitus.  4. Hyperlipidemia.  5. Hyperparathyroidism.  6. Hypertension.  7. History of seizures.  8. History of stroke.     PAST SURGICAL HISTORY:  1. CABG.  2. Right brachiobasilic fistula as above.   MEDICATIONS: Include aspirin. See EPIC for full list.     FAMILY HISTORY: Hypertension and heart disease.     SOCIAL HISTORY: Nonsmoker.     MEDICATIONS: Include aspirin and a statin. See Epic for full list.     REVIEW OF SYSTEMS: Denies postprandial pain or DVT.  All other systems include eyes, ENT, respiratory, musculoskeletal, breast,   psychiatric, lymph, allergy and immune are negative.     PHYSICAL EXAMINATION:  VITAL SIGNS: See nursing note.  GENERAL: He is in no acute distress.  RESPIRATORY: Normal effort. Clear to auscultation.  CARDIAC: Regular rate and rhythm, nondisplaced PMI, no murmur.  EXTREMITIES: Left arm shows a brachiobasilic fistula with moderate pulsatility   in the first half with transition to a pure thrill at the axilla.     IMAGING: Duplex of the fistula shows significant velocity shift in the middle   AV fistula with a velocity of 457, flow volume is 1.2 L compared to 1.3 prior.     His prior fistulograms were personally reviewed.     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   Intervention is warranted.     PLAN:  1. Left fistulogram and intervention, 03/06/2017.  2. Cath Lab case.

## 2017-03-06 NOTE — DISCHARGE SUMMARY
[facility]  Discharge Note  Short Stay    Admit Date: 3/6/2017    Discharge Date and Time: 3/6/2017 10:28 AM     Attending Physician: LISA Mazariegos    Discharge Provider: Blu Lozano    Diagnoses:  Active Hospital Problems    Diagnosis  POA    *Dialysis AV fistula malfunction [T82.590A]  Yes      Resolved Hospital Problems    Diagnosis Date Resolved POA   No resolved problems to display.       Discharged Condition: good    Hospital Course: Patient was admitted for an outpatient procedure and tolerated the procedure well with no complications.    Final Diagnoses: Same as principal problem.    Disposition: Home or Self Care    Follow up/Patient Instructions:    Medications:  Reconciled Home Medications:   Discharge Medication List as of 3/6/2017  9:56 AM      CONTINUE these medications which have NOT CHANGED    Details   alcohol swabs PadM Apply 1 each topically as needed., Starting 4/15/2016, Until Discontinued, Normal      amlodipine (NORVASC) 10 MG tablet TAKE 1 TABLET BY MOUTH EVERY DAY, Normal      aspirin 81 MG chewable tablet Take 81 mg by mouth. 1 Tablet, Chewable Oral Every day, Until Discontinued, Historical Med      atorvastatin (LIPITOR) 40 MG tablet TAKE 1 TABLET BY MOUTH EVERY DAY, Normal      blood glucose control, normal Soln 1 drop by Misc.(Non-Drug; Combo Route) route as directed., Starting 4/15/2016, Until Discontinued, Normal      blood sugar diagnostic (TRUE METRIX GLUCOSE TEST STRIP) Strp 1 strip by Misc.(Non-Drug; Combo Route) route 2 (two) times daily., Starting 9/19/2016, Until Discontinued, Normal      carvedilol (COREG) 25 MG tablet TAKE 1 TABLET BY MOUTH TWICE DAILY WITH MEALS, Normal      divalproex (DEPAKOTE) 250 MG 24 hr tablet Take 1,000 mg by mouth once daily. , Starting 10/23/2012, Until Discontinued, Historical Med      furosemide (LASIX) 40 MG tablet TAKE 1 TABLET BY MOUTH EVERY DAY, Normal      gabapentin (NEURONTIN) 100 MG capsule TAKE ONE CAPSULE BY MOUTH  "DAILY, Normal      insulin aspart (NOVOLOG FLEXPEN) 100 unit/mL InPn pen Inject 5-10 units with meals and hs depending on blood sugar, Normal      !! insulin glargine (LANTUS SOLOSTAR) 100 unit/mL (3 mL) InPn pen INJECT 20 UNITS UNDER THE SKIN NIGHTLY, Normal      !! insulin glargine (LANTUS SOLOSTAR) 100 unit/mL (3 mL) InPn pen 15 or 30 units every night depending on blood sugar, Normal      lancets (TRUEPLUS LANCETS) 33 gauge Misc 1 lancet by Misc.(Non-Drug; Combo Route) route 2 (two) times daily., Starting 9/19/2016, Until Discontinued, Normal      levetiracetam (KEPPRA) 500 MG Tab Take 1 tablet (500 mg total) by mouth 2 (two) times daily., Starting 4/15/2016, Until Discontinued, Normal      losartan (COZAAR) 50 MG tablet TAKE 1 TABLET BY MOUTH EVERY DAY, Normal      multivitamin (THERAGRAN) per tablet Take 1 tablet by mouth once daily. ALIVE   , Until Discontinued, Historical Med      aykir-kfchw-uesozav-pramoxine 3.5-500-10,000 mg-unit-unit/g Oint Starting 3/18/2016, Until Discontinued, Historical Med      nitroGLYCERIN (NITROSTAT) 0.4 MG SL tablet Place 1 tablet (0.4 mg total) under the tongue every 5 (five) minutes as needed for Chest pain., Starting 8/4/2014, Until Tue 8/4/15, Normal      NOVOFINE 30 30 x 1/3 " Ndle USE AS DIRECTED WITH LANTUS SOLARSTAR, Normal      omega-3 acid ethyl esters (LOVAZA) 1 gram capsule Take 2 capsules (2 g total) by mouth 2 (two) times daily., Starting 7/20/2016, Until Thu 7/20/17, Normal      pantoprazole (PROTONIX) 40 MG tablet Take 1 tablet (40 mg total) by mouth once daily., Starting 1/4/2017, Until Discontinued, Normal      pen needle, diabetic 29 gauge x 1/2" Ndle 1 each by Misc.(Non-Drug; Combo Route) route every evening., Starting 4/15/2016, Until Discontinued, Normal      promethazine (PHENERGAN) 25 MG tablet Take 1 tablet (25 mg total) by mouth every 6 (six) hours as needed for Nausea., Starting 4/15/2016, Until Discontinued, Print      promethazine (PHENERGAN) 6.25 " mg/5 mL syrup TAKE 20 MLS BY MOUTH EVERY 6 HOURS AS NEEDED FOR NAUSEA, Normal      RENVELA 800 mg Tab TAKE 1 TABLET BY MOUTH THREE TIMES DAILY WITH MEALS, Normal      TRUE METRIX AIR GLUCOSE METER kit Historical Med       !! - Potential duplicate medications found. Please discuss with provider.          Discharge Procedure Orders  VAS  Hemodialysis Access   Standing Status: Future  Standing Exp. Date: 03/06/18     Diet general     Call MD for:  persistent dizziness or light-headedness     Call MD for:  hives     Call MD for:  redness, tenderness, or signs of infection (pain, swelling, redness, odor or green/yellow discharge around incision site)     Call MD for:  difficulty breathing, headache or visual disturbances     Call MD for:  severe uncontrolled pain     Call MD for:  temperature >100.4     Call MD for:  persistent nausea and vomiting     Remove dressing in 24 hours       Follow-up Information     Follow up with DAVE Mazariegos Iii, MD In 1 week.    Specialty:  Vascular Surgery    Contact information:    [address]  [facility] [id number]  [phone]            Discharge Procedure Orders (must include Diet, Follow-up, Activity):    Discharge Procedure Orders (must include Diet, Follow-up, Activity)  VAS  Hemodialysis Access   Standing Status: Future  Standing Exp. Date: 03/06/18     Diet general     Call MD for:  persistent dizziness or light-headedness     Call MD for:  hives     Call MD for:  redness, tenderness, or signs of infection (pain, swelling, redness, odor or green/yellow discharge around incision site)     Call MD for:  difficulty breathing, headache or visual disturbances     Call MD for:  severe uncontrolled pain     Call MD for:  temperature >100.4     Call MD for:  persistent nausea and vomiting     Remove dressing in 24 hours

## 2017-03-06 NOTE — IP AVS SNAPSHOT
[facility]  [address]  [facility] [address]  Phone: [phone]           Patient Discharge Instructions     Our goal is to set you up for success. This packet includes information on your condition, medications, and your home care. It will help you to care for yourself so you don't get sicker and need to go back to the hospital.     Please ask your nurse if you have any questions.        There are many details to remember when preparing to leave the hospital. Here is what you will need to do:    1. Take your medicine. If you are prescribed medications, review your Medication List in the following pages. You may have new medications to  at the pharmacy and others that you'll need to stop taking. Review the instructions for how and when to take your medications. Talk with your doctor or nurses if you are unsure of what to do.     2. Go to your follow-up appointments. Specific follow-up information is listed in the following pages. Your may be contacted by a transition nurse or clinical provider about future appointments. Be sure we have all of the phone numbers to reach you, if needed. Please contact your provider's office if you are unable to make an appointment.     3. Watch for warning signs. Your doctor or nurse will give you detailed warning signs to watch for and when to call for assistance. These instructions may also include educational information about your condition. If you experience any of warning signs to your health, call your doctor.               [facility]  Unless otherwise directed by your provider, please contact [facility], our nurse care line that is available for 24/7 assistance.     [phone] (toll-free)    Registered nurses in the [facility] provide clinical advisement, health education, appointment booking, and other advisory services.                    ** Verify the list of medication(s) below is accurate and up  to date. Carry this with you in case of emergency. If your medications have changed, please notify your healthcare provider.             Medication List      CONTINUE taking these medications        Additional Info                      alcohol swabs Padm   Quantity:  100 each   Refills:  3   Dose:  1 each    Instructions:  Apply 1 each topically as needed.     Begin Date    AM    Noon    PM    Bedtime       amlodipine 10 MG tablet   Commonly known as:  NORVASC   Quantity:  90 tablet   Refills:  0    Instructions:  TAKE 1 TABLET BY MOUTH EVERY DAY     Begin Date    AM    Noon    PM    Bedtime       atorvastatin 40 MG tablet   Commonly known as:  LIPITOR   Quantity:  90 tablet   Refills:  0    Instructions:  TAKE 1 TABLET BY MOUTH EVERY DAY     Begin Date    AM    Noon    PM    Bedtime       blood glucose control, normal Soln   Quantity:  1 each   Refills:  1   Dose:  1 drop    Instructions:  1 drop by Misc.(Non-Drug; Combo Route) route as directed.     Begin Date    AM    Noon    PM    Bedtime       blood sugar diagnostic Strp   Commonly known as:  TRUE METRIX GLUCOSE TEST STRIP   Quantity:  100 strip   Refills:  3   Dose:  1 strip    Instructions:  1 strip by Misc.(Non-Drug; Combo Route) route 2 (two) times daily.     Begin Date    AM    Noon    PM    Bedtime       carvedilol 25 MG tablet   Commonly known as:  COREG   Quantity:  180 tablet   Refills:  0    Instructions:  TAKE 1 TABLET BY MOUTH TWICE DAILY WITH MEALS     Begin Date    AM    Noon    PM    Bedtime       divalproex  MG 24 hr tablet   Commonly known as:  DEPAKOTE ER   Refills:  0   Dose:  1000 mg    Instructions:  Take 1,000 mg by mouth once daily.     Begin Date    AM    Noon    PM    Bedtime       furosemide 40 MG tablet   Commonly known as:  LASIX   Quantity:  90 tablet   Refills:  0    Instructions:  TAKE 1 TABLET BY MOUTH EVERY DAY     Begin Date    AM    Noon    PM    Bedtime       gabapentin 100 MG capsule   Commonly known as:  NEURONTIN    Quantity:  90 capsule   Refills:  0    Instructions:  TAKE ONE CAPSULE BY MOUTH DAILY     Begin Date    AM    Noon    PM    Bedtime       insulin aspart 100 unit/mL Inpn pen   Commonly known as:  NOVOLOG FLEXPEN   Quantity:  15 mL   Refills:  3    Instructions:  Inject 5-10 units with meals and hs depending on blood sugar     Begin Date    AM    Noon    PM    Bedtime       * insulin glargine 100 unit/mL (3 mL) Inpn pen   Commonly known as:  LANTUS SOLOSTAR   Quantity:  15 mL   Refills:  0    Instructions:  INJECT 20 UNITS UNDER THE SKIN NIGHTLY     Begin Date    AM    Noon    PM    Bedtime       * insulin glargine 100 unit/mL (3 mL) Inpn pen   Commonly known as:  LANTUS SOLOSTAR   Quantity:  15 mL   Refills:  3    Instructions:  15 or 30 units every night depending on blood sugar     Begin Date    AM    Noon    PM    Bedtime       lancets 33 gauge Misc   Commonly known as:  TRUEPLUS LANCETS   Quantity:  100 each   Refills:  3   Dose:  1 lancet    Instructions:  1 lancet by Misc.(Non-Drug; Combo Route) route 2 (two) times daily.     Begin Date    AM    Noon    PM    Bedtime       levetiracetam 500 MG Tab   Commonly known as:  KEPPRA   Quantity:  180 tablet   Refills:  0   Dose:  500 mg    Instructions:  Take 1 tablet (500 mg total) by mouth 2 (two) times daily.     Begin Date    AM    Noon    PM    Bedtime       losartan 50 MG tablet   Commonly known as:  COZAAR   Quantity:  90 tablet   Refills:  0    Instructions:  TAKE 1 TABLET BY MOUTH EVERY DAY     Begin Date    AM    Noon    PM    Bedtime       multivitamin per tablet   Commonly known as:  THERAGRAN   Refills:  0   Dose:  1 tablet    Instructions:  Take 1 tablet by mouth once daily. ALIVE     Begin Date    AM    Noon    PM    Bedtime       gkrcz-ifvik-kzsrtey-pramoxine 3.5-500-10,000 mg-unit-unit/g Oint   Refills:  0      Begin Date    AM    Noon    PM    Bedtime       nitroGLYCERIN 0.4 MG SL tablet   Commonly known as:  NITROSTAT   Quantity:  30 tablet  "  Refills:  0   Dose:  0.4 mg    Instructions:  Place 1 tablet (0.4 mg total) under the tongue every 5 (five) minutes as needed for Chest pain.     Begin Date    AM    Noon    PM    Bedtime       NOVOFINE 30 30 gauge x 1/3" Ndle   Quantity:  100 each   Refills:  3   Generic drug:  pen needle, diabetic    Instructions:  USE AS DIRECTED WITH LANSHIRAZ GREENESTAR     Begin Date    AM    Noon    PM    Bedtime       pen needle, diabetic 29 gauge x 1/2" Ndle   Quantity:  100 each   Refills:  3   Dose:  1 each    Instructions:  1 each by Misc.(Non-Drug; Combo Route) route every evening.     Begin Date    AM    Noon    PM    Bedtime       omega-3 acid ethyl esters 1 gram capsule   Commonly known as:  LOVAZA   Quantity:  360 capsule   Refills:  3   Dose:  2 g    Instructions:  Take 2 capsules (2 g total) by mouth 2 (two) times daily.     Begin Date    AM    Noon    PM    Bedtime       pantoprazole 40 MG tablet   Commonly known as:  PROTONIX   Quantity:  30 tablet   Refills:  5   Dose:  40 mg    Instructions:  Take 1 tablet (40 mg total) by mouth once daily.     Begin Date    AM    Noon    PM    Bedtime       * promethazine 6.25 mg/5 mL syrup   Commonly known as:  PHENERGAN   Quantity:  118 mL   Refills:  0    Instructions:  TAKE 20 MLS BY MOUTH EVERY 6 HOURS AS NEEDED FOR NAUSEA     Begin Date    AM    Noon    PM    Bedtime       * promethazine 25 MG tablet   Commonly known as:  PHENERGAN   Quantity:  30 tablet   Refills:  0   Dose:  25 mg    Instructions:  Take 1 tablet (25 mg total) by mouth every 6 (six) hours as needed for Nausea.     Begin Date    AM    Noon    PM    Bedtime       RENVELA 800 mg Tab   Quantity:  100 tablet   Refills:  0   Generic drug:  sevelamer carbonate    Instructions:  TAKE 1 TABLET BY MOUTH THREE TIMES DAILY WITH MEALS     Begin Date    AM    Noon    PM    Bedtime       TRUE METRIX AIR GLUCOSE METER kit   Refills:  0   Generic drug:  blood-glucose meter      Begin Date    AM    Noon    PM    Bedtime "       * Notice:  This list has 4 medication(s) that are the same as other medications prescribed for you. Read the directions carefully, and ask your doctor or other care provider to review them with you.      ASK your doctor about these medications        Additional Info                      aspirin 81 MG Chew   Refills:  0   Dose:  81 mg    Instructions:  Take 81 mg by mouth. 1 Tablet, Chewable Oral Every day     Begin Date    AM    Noon    PM    Bedtime                  Please bring to all follow up appointments:    1. A copy of your discharge instructions.  2. All medicines you are currently taking in their original bottles.  3. Identification and insurance card.    Please arrive 15 minutes ahead of scheduled appointment time.    Please call 24 hours in advance if you must reschedule your appointment and/or time.        Your Scheduled Appointments     Apr 13, 2017 11:15 AM CDT   Established Patient Visit with LISA Catherine MD   [facility] [address] [facility] [address]  [address]  [facility] [address] [phone]              Follow-up Information     Follow up with DAVE Mazariegos Iii, MD In 1 week.    Specialty:  Vascular Surgery    Contact information:    [facility] [address] [phone]          Discharge Instructions     Future Orders    Call MD for:  difficulty breathing, headache or visual disturbances     Call MD for:  hives     Call MD for:  persistent dizziness or light-headedness     Call MD for:  persistent nausea and vomiting     Call MD for:  redness, tenderness, or signs of infection (pain, swelling, redness, odor or green/yellow discharge around incision site)     Call MD for:  severe uncontrolled pain     Call MD for:  temperature >100.4     Diet general     Questions:    Total calories:      Fat restriction, if any:      Protein restriction, if any:      Na restriction, if any:      Fluid restriction:      Additional restrictions:      Remove  "dressing in 24 hours     VAS US Hemodialysis Access         Primary Diagnosis     Your primary diagnosis was:  Dialysis Av Fistula Malfunction      Admission Information     Date & Time Provider Department CSN    3/6/2017  6:49 AM DAVE M[facility]iegos III, MD [facility] [id number]      Care Providers     Provider Role Specialty Primary office phone    DAVE Mazariegos III, MD Attending Provider Vascular Surgery [phone]      Your Vitals Were     BP Pulse Temp Resp Height Weight    119/67 (BP Location: Right arm, Patient Position: Lying, BP Method: Automatic) 77 97.5 °F (36.4 °C) (Oral) 18 5' 10" (1.778 m) 76.2 kg (168 lb)    SpO2 BMI             93% 24.11 kg/m2         Recent Lab Values        6/7/2011 10/13/2011 1/17/2012 6/20/2012 3/20/2013 8/26/2013 8/4/2014 7/11/2016     11:49 AM  5:03 AM  3:00 AM  6:38 AM  8:28 AM 10:12 AM 12:14 PM  7:09 AM    A1C 13.1 (H) 10.7 (H) 7.4 (H) 9.0 (H) 8.8 (H) 9.5 (H) 8.2 (H) 9.7 (H)    Comment for A1C at  7:09 AM on 7/11/2016:  According to ADA guidelines, hemoglobin A1C <7.0% represents  optimal control in non-pregnant diabetic patients.  Different  metrics may apply to specific populations.   Standards of Medical Care in Diabetes - 2016.  For the purpose of screening for the presence of diabetes:  <5.7%     Consistent with the absence of diabetes  5.7-6.4%  Consistent with increasing risk for diabetes   (prediabetes)  >or=6.5%  Consistent with diabetes  Currently no consensus exists for use of hemoglobin A1C  for diagnosis of diabetes for children.        Allergies as of 3/6/2017        Reactions    Reglan [Metoclopramide Hcl] Diarrhea    Lorazepam     Other reaction(s): heavy sedation      Advance Directives     An advance directive is a document which, in the event you are no longer able to make decisions for yourself, tells your healthcare team what kind of treatment you do or do not want to receive, or who you would like to make those decisions for you.  If " you do not currently have an advance directive, [facility] encourages you to create one.  For more information call:  [phone]), [phone] ([phone]),  or log on to [email or web address].        Language Assistance Services     ATTENTION: Language assistance services are available, free of charge. Please call [phone].      ATENCIÓN: Si habla español, tiene a sandoval disposición servicios gratuitos de asistencia lingüística. Llame al [phone].     CHÚ Ý: N?u b?n nói Ti?ng Vi?t, có các d?ch v? h? tr? ngôn ng? mi?n phí dành cho b?n. G?i s? [phone].        Heart Failure Education       Heart Failure: Being Active  You have a condition called heart failure. Being active doesnt mean that you have to wear yourself out. Even a little movement each day helps to strengthen your heart. If you cant get out to exercise, you can do simple stretching and strengthening exercises at home. These are good ways to keep you well-conditioned and prevent you and your heart from becoming excessively weak.    Ideas to get you started  · Add a little movement to things you do now. Walk to mail letters. Park your car at the far end of the parking lot and walk to the store. Walk up a flight of stairs instead of taking the elevator.  · Choose activities you enjoy. You might walk, swim, or ride an exercise bike. Things like gardening and washing the car count, too. Other possibilities include: washing dishes, walking the dog, walking around the mall, and doing aerobic activities with friends.  · Join a group exercise program at a [facility] or [facility], a senior center, or a community center. Or look into a hospital cardiac rehabilitation program. Ask your doctor if you qualify.  Tips to keep you going  · Get up and get dressed each day. Go to a coffee shop and read a newspaper or go somewhere that you'll be in the presence of other active people. Youll feel more like being active.  · Make a plan. Choose one or  more activities that you enjoy and that you can easily do. Then plan to do at least one each day. You might write your plan on a calendar.  · Go with a friend or a group if you like company. This can help you feel supported and stay motivated, too.  · Plan social events that you enjoy. This will keep you mentally engaged as well as physically motivated to do things you find pleasure in.  For your safety  · Talk with your healthcare provider before starting an exercise program.  · Exercise indoors when its too hot or too cold outside, or when the air quality is poor. Try walking at a shopping mall.  · Wear socks and sturdy shoes to maintain your balance and prevent falls.  · Start slowly. Do a few minutes several times a day at first. Increase your time and speed little by little.  · Stop and rest whenever you feel tired or get short of breath.  · Dont push yourself on days when you dont feel well.  Date Last Reviewed: 3/20/2016  © 8097-4623 [employer]. [address] [place] [address]. All rights reserved. This information is not intended as a substitute for professional medical care. Always follow your healthcare professional's instructions.              Heart Failure: Evaluating Your Heart  You have a condition called heart failure. To evaluate your condition, your doctor will examine you, ask questions, and do some tests. Along with looking for signs of heart failure, the doctor looks for any other health problems that may have led to heart failure. The results of your evaluation will help your doctor form a treatment plan.  Health history and physical exam  Your visit will start with a health history. Tell the doctor about any symptoms youve noticed and about all medicines you take. Then youll have a physical exam. This includes listening to your heartbeat and breathing. Youll also be checked for swelling (edema) in your legs and neck. When you have fluid buildup or fluid in the  lungs, it may be called congestive heart failure.  Diagnosing heart failure     During an echocardiogram, sound waves bounce off the heart. These are converted into a picture on the screen.   The following may be done to help your doctor form a diagnosis:  · X-rays show the size and shape of your heart. These pictures can also show fluid in your lungs.  · An electrocardiogram (ECG or EKG) shows the pattern of your heartbeat. Small pads (electrodes) are placed on your chest, arms, and legs. Wires connect the pads to the ECG machine, which records your hearts electrical signals. This can give the doctor information about heart function.  · An echocardiogram uses ultrasound waves to show the structure and movement of your heart muscle. This shows how well the heart pumps. It also shows the thickness of the heart walls, and if the heart is enlarged. It is one of the most useful, non-invasive tests as it provides information about the heart's general function. This helps your doctor make treatment decisions.  · Lab tests evaluate small amounts of blood or urine for signs of problems. A BNP lab test can help diagnose and evaluate heart failure. BNP stands for B-type natriuretic peptide. The ventricles secrete more BNP when heart failure worsens. Lab tests can also provide information about metabolic dysfunction or heart dysfunction.  Your treatment plan  Based on the results of your evaluation and tests, your doctor will develop a treatment plan. This plan is designed to relieve some of your heart failure symptoms and help make you more comfortable. Your treatment plan may include:  · Medicine to help your heart work better and improve your quality of life  · Changes in what you eat and drink to help prevent fluid from backing up in your body  · Daily monitoring of your weight and heart failure symptoms to see how well your treatment plan is working  · Exercise to help you stay healthy  · Help with quitting  smoking  · Emotional and psychological support to help adjust to the changes  · Referrals to other specialists to make sure you are being treated comprehensively  Date Last Reviewed: 3/21/2016  © 7326-4910 [employer]. [address] [place] [address], [place] [address]. All rights reserved. This information is not intended as a substitute for professional medical care. Always follow your healthcare professional's instructions.              Heart Failure: Making Changes to Your Diet  You have a condition called heart failure. When you have heart failure, excess fluid is more likely to build up in your body because your heart isn't working well. This makes the heart work harder to pump blood. Fluid buildup causes symptoms such as shortness of breath and swelling (edema). This is often referred to as congestive heart failure or CHF. Controlling the amount of salt (sodium) you eat may help stop fluid from building up. Your doctor may also tell you to reduce the amount of fluid you drink.  Reading food labels    Your healthcare provider will tell you how much sodium you can eat each day. Read food labels to keep track. Keep in mind that certain foods are high in salt. These include canned, frozen, and processed foods. Check the amount of sodium in each serving. Watch out for high-sodium ingredients. These include MSG (monosodium glutamate), baking soda, and sodium phosphate.   Eating less salt  Give yourself time to get used to eating less salt. It may take a little while. Here are some tips to help:  · Take the saltshaker off the table. Replace it with salt-free herb mixes and spices.  · Eat fresh or plain frozen vegetables. These have much less salt than canned vegetables.  · Choose low-sodium snacks like sodium-free pretzels, crackers, or air-popped popcorn.  · Dont add salt to your food when youre cooking. Instead, season your foods with pepper, lemon, garlic, or onion.  · When you eat out, ask that your food  be cooked without added salt.  · Avoid eating fried foods as these often have a great deal of salt.  If youre told to limit fluids  You may need to limit how much fluid you have to help prevent swelling. This includes anything that is liquid at room temperature, such as ice cream and soup. If your doctor tells you to limit fluid, try these tips:  · Measure drinks in a measuring cup before you drink them. This will help you meet daily goals.  · Chill drinks to make them more refreshing.  · Suck on frozen lemon wedges to quench thirst.  · Only drink when youre thirsty.  · Chew sugarless gum or suck on hard candy to keep your mouth moist.  · Weigh yourself daily to know if your body's fluid content is rising.  My sodium goal  Your healthcare provider may give you a sodium goal to meet each day. This includes sodium found in food as well as salt that you add. My goal is to eat no more than ___________ mg of sodium per day.     When to call your doctor  Call your doctor right away if you have any symptoms of worsening heart failure. These can include:  · Sudden weight gain  · Increased swelling of your legs or ankles  · Trouble breathing when youre resting or at night  · Increase in the number of pillows you have to sleep on  · Chest pain, pressure, discomfort, or pain in the jaw, neck, or back   Date Last Reviewed: 3/21/2016  © 3666-5696 [employer]. [address] [place] [address], [place] [address]. All rights reserved. This information is not intended as a substitute for professional medical care. Always follow your healthcare professional's instructions.              Heart Failure: Medicines to Help Your Heart    You have a condition called heart failure (also known as congestive heart failure, or CHF). Your doctor will likely prescribe medicines for heart failure and any underlying health problems you have. Most heart failure patients take one or more types of medicinen. Your healthcare provider will work  to find the combination of medicines that works best for you.  Heart failure medicines  Here are the most common heart failure medicines:  · ACE inhibitors lower blood pressure and decrease strain on the heart. This makes it easier for the heart to pump. Angiotensin receptor blockers have similar effects. These are prescribed for some patients instead of ACE inhibitors.  · Beta-blockers relieve stress on the heart. They also improve symptoms. They may also improve the heart's pumping action over time.  · Diuretics (also called water pills) help rid your body of excess water. This can help rid your body of swelling (edema). Having less fluid to pump means your heart doesnt have to work as hard. Some diuretics make your body lose a mineral called potassium. Your doctor will tell you if you need to take supplements or eat more foods high in potassium.  · Digoxin helps your heart pump with more strength. This helps your heart pump more blood with each beat. So, more oxygen-rich blood travels to the rest of the body.  · Aldosterone antagonists help alter hormones and decrease strain on the heart.  · Hydralazine and nitrates are two separate medicines used together to treat heart failure. They may come in one combination pill. They lower blood pressure and decrease how hard the heart has to pump.  Medicines for related conditions  Controlling other heart problems helps keep heart failure under control, too. Depending on other heart problems you have, medicines may be prescribed to:  · Lower blood pressure (antihypertensives).  · Lower cholesterol levels (statins).  · Prevent blood clots (anticoagulants or aspirin).  · Keep the heartbeat steady (antiarrhythmics).  Date Last Reviewed: 3/5/2016  © 7718-2676 [employer]. [address] [place] [address], [place] [address]. All rights reserved. This information is not intended as a substitute for professional medical care. Always follow your healthcare professional's  instructions.              Heart Failure: Procedures That May Help    The heart is a muscle that pumps oxygen-rich blood to all parts of the body. When you have heart failure, the heart is not able to pump as well as it should. Blood and fluid may back up into the lungs (congestive heart failure), and some parts of the body dont get enough oxygen-rich blood to work normally. These problems lead to the symptoms of heart failure.     Certain procedures may help the heart pump better in some cases of heart failure. Some procedures are done to treat health problems that may have caused the heart failure such as coronary artery disease or heart rhythm problems. For more serious heart failure, other options are available.  Treating artery and valve problems  If you have coronary artery disease or valve disease, procedures may be done to improve blood flow. This helps the heart pump better, which can improve heart failure symptoms. First, your doctor may do a cardiac catheterization to help detect clogged blood vessels or valve damage. During this procedure, a  thin tube (catheter) in inserted into a blood vessel and guided to the heart. There a dye is injected and a special type of X-ray (angiogram) is taken of the blood vessels. Procedures to open a blocked artery or fix damaged valves can also be done using catheterization.  · Angioplasty uses a balloon-tipped instrument at the end of the catheter. The balloon is inflated to widen the narrowed artery. In many cases, a stent is expanded to further support the narrowed artery. A stent is a metal mesh tube.  · Valve surgery repairs or replacement of faulty valves can also be done during catheterization so blood can flow properly through the chambers of the heart.  Bypass surgery is another option to help treat blocked arteries. It uses a healthy blood vessel from elsewhere in the body. The healthy blood vessel is attached above and below the blocked area so that blood can  flow around the blocked artery.  Treating heart rhythm problems  A device may be placed in the chest to help a weak heart maintain a healthy, heartbeat so the heart can pump more effectively:  · Pacemaker. A pacemaker is an implanted device that regulates your heartbeat electronically. It monitors your heart's rhythm and generates a painless electric impulse that helps the heart beat in a regular rhythm. A pacemaker is programmed to meet your specific heart rhythm needs.  · Biventricular pacing/cardiac resynchronization therapy. A type of pacemaker that paces both pumping chambers of the heart at the same time to coordinate contractions and to improve the heart's function. Some people with heart failure are candidates for this therapy.  · Implantable cardioverter defibrillator. A device similar to a pacemaker that senses when the heart is beating too fast and delivers an electrical shock to convert the fast rhythm to a normal rhythm. This can be a life saving device.  In severe cases  In more serious cases of heart failure when other treatments no longer work, other options may include:  · Ventricular assist devices (VADs). These are mechanical devices used to take over the pumping function for one or both of the heart's ventricles, or pumping chambers. A VAD may be necessary when heart failure progresses to the point that medicines and other treatments no longer help. In some cases, a VAD may be used as a bridge to transplant.  · Heart transplant. This is replacing the diseased heart with a healthy one from a donor. This is an option for a few people who are very sick. A heart transplant is very serious and not an option for all patients. Your doctor can tell you more.  Date Last Reviewed: 3/20/2016  © 3077-9444 [employer]. [address] [place] [address], [place] [address]. All rights reserved. This information is not intended as a substitute for professional medical care. Always follow your healthcare  professional's instructions.              Heart Failure: Tracking Your Weight  You have a condition called heart failure. When you have heart failure, a sudden weight gain or a steady rise in weight is a warning sign that your body is retaining too much water and salt. This could mean your heart failure is getting worse. If left untreated, it can cause problems for your lungs and result in shortness of breath. Weighing yourself each day is the best way to know if youre retaining water. If your weight goes up quickly, call your doctor. You will be given instructions on how to get rid of the excess water. You will likely need medicines and to avoid salt. This will help your heart work better.  Call your doctor if you gain more than 2 pounds in 1 day, more than 5 pounds in 1 week, or whatever weight gain you were told to report by your doctor. This is often a sign of worsening heart failure and needs to be evaluated and treated. Your doctor will tell you what to do next.   Tips for weighing yourself    · Weigh yourself at the same time each morning, wearing the same clothes. Weigh yourself after urinating and before eating.  · Use the same scale each day. Make sure the numbers are easy to read. Put the scale on a flat, hard surface -- not on a rug or carpet.  · Do not stop weighing yourself. If you forget one day, weigh again the next morning.  How to use your weight chart  · Keep your weight chart near the scale. Write your weight on the chart as soon as you get off the scale.  · Fill in the month and the start date on the chart. Then write down your weight each day. Your chart will look like this:    · If you miss a day, leave the space blank. Weigh yourself the next day and write your weight in the next space.  · Take your weight chart with you when you go to see your doctor.  Date Last Reviewed: 3/20/2016  © 7495-9445 [employer]. [address] [place] [address], [place] [address]. All rights reserved. This  information is not intended as a substitute for professional medical care. Always follow your healthcare professional's instructions.              Heart Failure: Warning Signs of a Flare-Up  You have a condition called heart failure. Once you have heart failure, flare-ups can happen. Below are signs that can mean your heart failure is getting worse. If you notice any of these warning signs, call your healthcare provider.  Swelling    · Your feet, ankles, or lower legs get puffier.  · You notice skin changes on your lower legs.  · Your shoes feel too tight.  · Your clothes are tighter in the waist.  · You have trouble getting rings on or off your fingers.  Shortness of breath  · You have to breathe harder even when youre doing your normal activities or when youre resting.  · You are short of breath walking up stairs or even short distances.  · You wake up at night short of breath or coughing.  · You need to use more pillows or sit up to sleep.  · You wake up tired or restless.  Other warning signs  · You feel weaker, dizzy, or more tired.  · You have chest pain or changes in your heartbeat.  · You have a cough that wont go away.  · You cant remember things or dont feel like eating.  Tracking your weight  Gaining weight is often the first warning sign that heart failure is getting worse. Gaining even a few pounds can be a sign that your body is retaining excess water and salt. Weighing yourself each day in the morning after you urinate and before you eat, is the best way to know if you're retaining water. Get a scale that is easy to read and make sure you wear the same clothes and use the same scale every time you weigh. Your healthcare provider will show you how to track your weight. Call your doctor if you gain more than 2 pounds in 1 day, 5 pounds in 1 week, or whatever weight gain you were told to report by your doctor. This is often a sign of worsening heart failure and needs to be evaluated and treated  before it compromises your breathing. Your doctor will tell you what to do next.    Date Last Reviewed: 3/15/2016  © 2889-6659 [employer]. [address] [place] [address], [place] [address]. All rights reserved. This information is not intended as a substitute for professional medical care. Always follow your healthcare professional's instructions.              Chronic [place] Disease Education             Diabetes Discharge Instructions                                    [facility] complies with applicable Federal civil rights laws and does not discriminate on the basis of race, color, national origin, age, disability, or sex.

## 2017-03-13 RX ORDER — FUROSEMIDE 40 MG/1
TABLET ORAL
Qty: 90 TABLET | Refills: 0 | Status: SHIPPED | OUTPATIENT
Start: 2017-03-13 | End: 2017-05-29 | Stop reason: SDUPTHER

## 2017-03-31 ENCOUNTER — OFFICE VISIT (OUTPATIENT)
Dept: VASCULAR SURGERY | Facility: CLINIC | Age: 57
End: 2017-03-31
Payer: MEDICARE

## 2017-03-31 ENCOUNTER — HOSPITAL ENCOUNTER (OUTPATIENT)
Dept: VASCULAR SURGERY | Facility: CLINIC | Age: 57
Discharge: HOME | End: 2017-03-31
Attending: STUDENT IN AN ORGANIZED HEALTH CARE EDUCATION/TRAINING PROGRAM
Payer: MEDICARE

## 2017-03-31 VITALS
HEART RATE: 95 BPM | HEIGHT: 70 IN | SYSTOLIC BLOOD PRESSURE: 101 MMHG | TEMPERATURE: 98 F | WEIGHT: 158 LBS | DIASTOLIC BLOOD PRESSURE: 60 MMHG | BODY MASS INDEX: 22.62 KG/M2

## 2017-03-31 DIAGNOSIS — Z99.2 ESRD (END STAGE RENAL DISEASE) ON DIALYSIS: ICD-10-CM

## 2017-03-31 DIAGNOSIS — T82.858D STENOSIS OF ARTERIOVENOUS DIALYSIS FISTULA, SUBSEQUENT ENCOUNTER: Primary | ICD-10-CM

## 2017-03-31 DIAGNOSIS — Z99.2 ESRD (END STAGE RENAL DISEASE) ON DIALYSIS: Primary | ICD-10-CM

## 2017-03-31 DIAGNOSIS — T82.590A DIALYSIS AV FISTULA MALFUNCTION: ICD-10-CM

## 2017-03-31 DIAGNOSIS — N18.6 ESRD (END STAGE RENAL DISEASE) ON DIALYSIS: ICD-10-CM

## 2017-03-31 DIAGNOSIS — N18.6 ESRD (END STAGE RENAL DISEASE) ON DIALYSIS: Primary | ICD-10-CM

## 2017-03-31 PROCEDURE — 1160F RVW MEDS BY RX/DR IN RCRD: CPT | Mod: S$GLB,,, | Performed by: SURGERY

## 2017-03-31 PROCEDURE — 93990 DOPPLER FLOW TESTING: CPT | Mod: S$GLB,,, | Performed by: SURGERY

## 2017-03-31 PROCEDURE — 99213 OFFICE O/P EST LOW 20 MIN: CPT | Mod: S$GLB,,, | Performed by: SURGERY

## 2017-03-31 NOTE — PROGRESS NOTES
See my prior note; review of systems, family history and social history are   unchanged.    HISTORY OF PRESENT ILLNESS:  A 57-year-old male with end-stage renal disease,   well known to me, status post:  1.  Angioplasty, left AV fistula (10 x 40 [place] and angioplasty of left   subclavian vein 12 x 40 [place]), 03/06/2017.  2.  Stent placement, left AV fistula (10 x 40 [place]) 08/03/2016.  3.  Prior angioplasty of this fistula, 03/20/2016, 05/20/2014 and 09/20/2013.  4.  Original left brachiobasilic AV fistula creation, 04/01/2013.    He now returns.  He was having increased bleeding, which has now resolved.    PHYSICAL EXAMINATION:  VITAL SIGNS:  See nursing note.  EXTREMITIES:  Left arm shows a nice thrill with no significant pulsatility in   the brachiobasilic fistula.  There is no skin thinning or ulceration.    IMAGING:  Duplex of the fistula shows resolution of the mid stenosis with flow   volume of 1.9 liters, increased from 1.2 prior.    ASSESSMENT:  Successful intervention of mid fistula and central venous stenosis.    PLAN:  Follow up in four months with surveillance duplex, sooner if clinically   indicated.      DEEPTI/HN  dd: 03/31/2017 09:17:36 (CDT)  td: 04/01/2017 02:02:20 (CDT)  Doc ID   #[id number]  Job ID #[id number]    CC:

## 2017-04-04 ENCOUNTER — TELEPHONE (OUTPATIENT)
Dept: UROLOGY | Facility: CLINIC | Age: 57
End: 2017-04-04

## 2017-04-04 NOTE — TELEPHONE ENCOUNTER
----- Message from Jessica Davison sent at 4/4/2017 10:25 AM CDT -----  Contact: spouse - Bibi   Refused to discuss the reason for phone call . She states she has multiple questions & topics to discuss .       [phone]    LL

## 2017-04-04 NOTE — TELEPHONE ENCOUNTER
JUST FYI: Patient wife asked to cancel that patient's apt d/t the costs associated with the co-pay- wife advised that it was ok to cancel if nothing in patient's health has changed- but patient needed to get PSA done is we can monitor- then we could schedule the biopsy accordingly. Wife agreed to those terms and plan to get lab work on Monday.

## 2017-04-10 ENCOUNTER — TELEPHONE (OUTPATIENT)
Dept: UROLOGY | Facility: CLINIC | Age: 57
End: 2017-04-10

## 2017-04-10 ENCOUNTER — LAB VISIT (OUTPATIENT)
Dept: LAB | Facility: HOSPITAL | Age: 57
End: 2017-04-10
Attending: UROLOGY
Payer: MEDICARE

## 2017-04-10 DIAGNOSIS — C61 PROSTATE CANCER: ICD-10-CM

## 2017-04-10 DIAGNOSIS — C61 PROSTATE CANCER: Primary | ICD-10-CM

## 2017-04-10 LAB — COMPLEXED PSA SERPL-MCNC: 8.1 NG/ML

## 2017-04-10 PROCEDURE — 84153 ASSAY OF PSA TOTAL: CPT

## 2017-04-10 PROCEDURE — 36415 COLL VENOUS BLD VENIPUNCTURE: CPT | Mod: PO

## 2017-04-10 NOTE — TELEPHONE ENCOUNTER
----- Message from Silvia Hernadez sent at 4/10/2017 12:57 PM CDT -----  Contact: [phone]  Pt wanted to let the provider know his blood work is done Please call pt at your earliest convenience.  Thanks !

## 2017-04-11 NOTE — TELEPHONE ENCOUNTER
PSA of 8.1 a tad higher than last PSA done- next psa in 3 months- if that is the same or higher biopsy needs to be scheduled. Left VM for patient to call back-

## 2017-04-11 NOTE — TELEPHONE ENCOUNTER
Not right now.  He needs another PSA in 3 months.  He will need a biopsy in the Fall if his next PSA is about the same.

## 2017-04-11 NOTE — TELEPHONE ENCOUNTER
Please advise on most recent blood work and if biopsy is needed to be scheduled at this time. Thank you

## 2017-05-01 RX ORDER — CARVEDILOL 25 MG/1
TABLET ORAL
Qty: 180 TABLET | Refills: 0 | Status: SHIPPED | OUTPATIENT
Start: 2017-05-01 | End: 2017-05-29 | Stop reason: SDUPTHER

## 2017-05-01 RX ORDER — AMLODIPINE BESYLATE 10 MG/1
TABLET ORAL
Qty: 90 TABLET | Refills: 0 | Status: SHIPPED | OUTPATIENT
Start: 2017-05-01 | End: 2017-05-29 | Stop reason: SDUPTHER

## 2017-05-01 RX ORDER — LOSARTAN POTASSIUM 50 MG/1
TABLET ORAL
Qty: 90 TABLET | Refills: 0 | Status: SHIPPED | OUTPATIENT
Start: 2017-05-01 | End: 2017-05-29 | Stop reason: SDUPTHER

## 2017-05-23 RX ORDER — INSULIN GLARGINE 100 [IU]/ML
INJECTION, SOLUTION SUBCUTANEOUS
Qty: 15 ML | Refills: 0 | Status: SHIPPED | OUTPATIENT
Start: 2017-05-23 | End: 2017-05-29 | Stop reason: SDUPTHER

## 2017-05-23 RX ORDER — PEN NEEDLE, DIABETIC 29 G X1/2"
NEEDLE, DISPOSABLE MISCELLANEOUS
Qty: 90 EACH | Refills: 3 | Status: SHIPPED | OUTPATIENT
Start: 2017-05-23 | End: 2019-01-01 | Stop reason: SDUPTHER

## 2017-05-29 ENCOUNTER — OFFICE VISIT (OUTPATIENT)
Dept: FAMILY MEDICINE | Facility: CLINIC | Age: 57
End: 2017-05-29
Payer: MEDICARE

## 2017-05-29 ENCOUNTER — LAB VISIT (OUTPATIENT)
Dept: LAB | Facility: HOSPITAL | Age: 57
End: 2017-05-29
Attending: INTERNAL MEDICINE
Payer: MEDICARE

## 2017-05-29 VITALS
WEIGHT: 167 LBS | TEMPERATURE: 99 F | HEIGHT: 70 IN | SYSTOLIC BLOOD PRESSURE: 115 MMHG | BODY MASS INDEX: 23.91 KG/M2 | DIASTOLIC BLOOD PRESSURE: 65 MMHG

## 2017-05-29 DIAGNOSIS — Z79.4 UNCONTROLLED TYPE 2 DIABETES MELLITUS WITH PROLIFERATIVE RETINOPATHY WITHOUT MACULAR EDEMA, WITH LONG-TERM CURRENT USE OF INSULIN, UNSPECIFIED LATERALITY: Primary | ICD-10-CM

## 2017-05-29 DIAGNOSIS — N25.81 SECONDARY RENAL HYPERPARATHYROIDISM: ICD-10-CM

## 2017-05-29 DIAGNOSIS — Z99.2 ESRD ON HEMODIALYSIS: ICD-10-CM

## 2017-05-29 DIAGNOSIS — E11.65 UNCONTROLLED TYPE 2 DIABETES MELLITUS WITH PROLIFERATIVE RETINOPATHY WITHOUT MACULAR EDEMA, WITH LONG-TERM CURRENT USE OF INSULIN, UNSPECIFIED LATERALITY: Primary | ICD-10-CM

## 2017-05-29 DIAGNOSIS — E11.65 UNCONTROLLED TYPE 2 DIABETES MELLITUS WITH PROLIFERATIVE RETINOPATHY WITHOUT MACULAR EDEMA, WITH LONG-TERM CURRENT USE OF INSULIN, UNSPECIFIED LATERALITY: ICD-10-CM

## 2017-05-29 DIAGNOSIS — E11.3599 UNCONTROLLED TYPE 2 DIABETES MELLITUS WITH PROLIFERATIVE RETINOPATHY WITHOUT MACULAR EDEMA, WITH LONG-TERM CURRENT USE OF INSULIN, UNSPECIFIED LATERALITY: ICD-10-CM

## 2017-05-29 DIAGNOSIS — I70.0 ATHEROSCLEROSIS OF AORTA: ICD-10-CM

## 2017-05-29 DIAGNOSIS — I10 ESSENTIAL HYPERTENSION: Chronic | ICD-10-CM

## 2017-05-29 DIAGNOSIS — N18.6 ESRD ON HEMODIALYSIS: ICD-10-CM

## 2017-05-29 DIAGNOSIS — E78.5 HYPERLIPIDEMIA LDL GOAL <100: Chronic | ICD-10-CM

## 2017-05-29 DIAGNOSIS — Z79.4 UNCONTROLLED TYPE 2 DIABETES MELLITUS WITH PROLIFERATIVE RETINOPATHY WITHOUT MACULAR EDEMA, WITH LONG-TERM CURRENT USE OF INSULIN, UNSPECIFIED LATERALITY: ICD-10-CM

## 2017-05-29 DIAGNOSIS — K21.9 GASTROESOPHAGEAL REFLUX DISEASE, ESOPHAGITIS PRESENCE NOT SPECIFIED: ICD-10-CM

## 2017-05-29 DIAGNOSIS — Z79.4 LONG-TERM INSULIN USE: ICD-10-CM

## 2017-05-29 DIAGNOSIS — I50.22 CHRONIC SYSTOLIC CONGESTIVE HEART FAILURE: ICD-10-CM

## 2017-05-29 DIAGNOSIS — E11.3599 UNCONTROLLED TYPE 2 DIABETES MELLITUS WITH PROLIFERATIVE RETINOPATHY WITHOUT MACULAR EDEMA, WITH LONG-TERM CURRENT USE OF INSULIN, UNSPECIFIED LATERALITY: Primary | ICD-10-CM

## 2017-05-29 DIAGNOSIS — G40.909 SEIZURE DISORDER: Chronic | ICD-10-CM

## 2017-05-29 PROBLEM — T82.590A DIALYSIS AV FISTULA MALFUNCTION: Status: RESOLVED | Noted: 2017-03-06 | Resolved: 2017-05-29

## 2017-05-29 PROBLEM — T82.858A STENOSIS OF ARTERIOVENOUS DIALYSIS FISTULA: Status: RESOLVED | Noted: 2017-02-03 | Resolved: 2017-05-29

## 2017-05-29 LAB
CHOLEST/HDLC SERPL: 5 {RATIO}
HDL/CHOLESTEROL RATIO: 20.1 %
HDLC SERPL-MCNC: 134 MG/DL
HDLC SERPL-MCNC: 27 MG/DL
LDLC SERPL CALC-MCNC: 65.2 MG/DL
NONHDLC SERPL-MCNC: 107 MG/DL
TRIGL SERPL-MCNC: 209 MG/DL

## 2017-05-29 PROCEDURE — 99215 OFFICE O/P EST HI 40 MIN: CPT | Mod: S$GLB,,, | Performed by: INTERNAL MEDICINE

## 2017-05-29 PROCEDURE — 36415 COLL VENOUS BLD VENIPUNCTURE: CPT | Mod: PO

## 2017-05-29 PROCEDURE — 83036 HEMOGLOBIN GLYCOSYLATED A1C: CPT

## 2017-05-29 PROCEDURE — 99999 PR PBB SHADOW E&M-EST. PATIENT-LVL III: CPT | Mod: PBBFAC,,, | Performed by: INTERNAL MEDICINE

## 2017-05-29 PROCEDURE — 3046F HEMOGLOBIN A1C LEVEL >9.0%: CPT | Mod: S$GLB,,, | Performed by: INTERNAL MEDICINE

## 2017-05-29 PROCEDURE — 4010F ACE/ARB THERAPY RXD/TAKEN: CPT | Mod: S$GLB,,, | Performed by: INTERNAL MEDICINE

## 2017-05-29 PROCEDURE — 80061 LIPID PANEL: CPT

## 2017-05-29 RX ORDER — FUROSEMIDE 40 MG/1
40 TABLET ORAL DAILY
Qty: 90 TABLET | Refills: 1 | Status: SHIPPED | OUTPATIENT
Start: 2017-05-29 | End: 2018-11-06 | Stop reason: SDUPTHER

## 2017-05-29 RX ORDER — ATORVASTATIN CALCIUM 40 MG/1
40 TABLET, FILM COATED ORAL DAILY
Qty: 90 TABLET | Refills: 1 | Status: SHIPPED | OUTPATIENT
Start: 2017-05-29 | End: 2018-06-26 | Stop reason: SDUPTHER

## 2017-05-29 RX ORDER — CARVEDILOL 25 MG/1
25 TABLET ORAL 2 TIMES DAILY WITH MEALS
Qty: 180 TABLET | Refills: 1 | Status: SHIPPED | OUTPATIENT
Start: 2017-05-29 | End: 2018-09-08 | Stop reason: SDUPTHER

## 2017-05-29 RX ORDER — LEVETIRACETAM 500 MG/1
500 TABLET ORAL 2 TIMES DAILY
Qty: 180 TABLET | Refills: 1 | Status: SHIPPED | OUTPATIENT
Start: 2017-05-29 | End: 2018-12-13 | Stop reason: ALTCHOICE

## 2017-05-29 RX ORDER — AMLODIPINE BESYLATE 10 MG/1
10 TABLET ORAL DAILY
Qty: 90 TABLET | Refills: 1 | Status: SHIPPED | OUTPATIENT
Start: 2017-05-29 | End: 2018-05-08 | Stop reason: SDUPTHER

## 2017-05-29 RX ORDER — LOSARTAN POTASSIUM 50 MG/1
50 TABLET ORAL DAILY
Qty: 90 TABLET | Refills: 1 | Status: SHIPPED | OUTPATIENT
Start: 2017-05-29 | End: 2019-02-02 | Stop reason: SDUPTHER

## 2017-05-29 RX ORDER — INSULIN GLARGINE 100 [IU]/ML
INJECTION, SOLUTION SUBCUTANEOUS
Qty: 15 ML | Refills: 0 | Status: SHIPPED | OUTPATIENT
Start: 2017-05-29 | End: 2017-12-06 | Stop reason: SDUPTHER

## 2017-05-29 RX ORDER — PANTOPRAZOLE SODIUM 40 MG/1
40 TABLET, DELAYED RELEASE ORAL DAILY PRN
Qty: 90 TABLET | Refills: 0 | Status: SHIPPED | OUTPATIENT
Start: 2017-05-29 | End: 2018-01-14 | Stop reason: SDUPTHER

## 2017-05-29 RX ORDER — GABAPENTIN 100 MG/1
100 CAPSULE ORAL DAILY
Qty: 90 CAPSULE | Refills: 0 | Status: SHIPPED | OUTPATIENT
Start: 2017-05-29 | End: 2019-04-29

## 2017-05-29 RX ORDER — PROMETHAZINE HYDROCHLORIDE 25 MG/1
25 TABLET ORAL EVERY 6 HOURS PRN
Qty: 30 TABLET | Refills: 0 | Status: CANCELLED | OUTPATIENT
Start: 2017-05-29

## 2017-05-29 NOTE — PROGRESS NOTES
HISTORY OF PRESENT ILLNESS:  Kodi Ibanez is a 57 y.o. male who presents to the clinic today for Hypertension and Follow-up  .  The patient presents to clinic today for follow-up of his type 2 diabetes mellitus complicated by retinopathy, neuropathy, and end-stage renal disease on insulin, as well as his hypertension and hyperlipidemia.  He has poor overall dietary habits.  He is currently on insulin once daily.  He takes a Lantus shots every night anywhere from 15-25 units depending on his blood sugar results.  His blood sugars tend to be 170 to mid 200s.  Family reports that at blood sugars less than 150 he gets very shaky.  He has dialysis Tuesday Thursday and Saturday.  His last A1c was 9.0.  He is mostly sedentary.  He no longer exercises.  He denies cardiac chest pain or shortness of breath at this time.  No significant lower extremity edema.  He has not had any recent seizure activity.  He denies any significant problems with heartburn or reflux.      PAST MEDICAL HISTORY:  Past Medical History:   Diagnosis Date    Atherosclerosis of aorta     noted on VIKTORIA 2011    Blood transfusion     CHF (congestive heart failure)     Chronic kidney disease     Clotting disorder     Coronary artery disease     Decreased appetite     Diabetic retinopathy     DM (diabetes mellitus), type 2 with renal complications     ESRD on hemodialysis     TUESDAY/ THURSDAY/SATURDAY    Hyperlipidemia     Hyperparathyroidism, unspecified     Hypertension     Myocardial infarction     Prostate cancer     Seizures     Stroke     PT HAS SHAKES        PAST SURGICAL HISTORY:  Past Surgical History:   Procedure Laterality Date    CARDIAC SURGERY      CABG    CATARACT EXTRACTION      ou    CATARACT EXTRACTION, BILATERAL      CORONARY ARTERY BYPASS GRAFT  2009    bypass    EYE SURGERY      Left Guera MAHONEYF  1/11/2012    retinal laser      ROTATOR CUFF REPAIR  right, 2005    VASCULAR SURGERY         SOCIAL HISTORY:  Social  "History     Social History    Marital status:      Spouse name: N/A    Number of children: N/A    Years of education: N/A     Occupational History    Not on file.     Social History Main Topics    Smoking status: Never Smoker    Smokeless tobacco: Never Used    Alcohol use No    Drug use: No    Sexual activity: Not Currently     Other Topics Concern    Not on file     Social History Narrative    No narrative on file       FAMILY HISTORY:  Family History   Problem Relation Age of Onset    Diabetes Mother     Glaucoma Mother     Cancer Mother     Cataracts Mother     Heart disease Father     Cancer Paternal Grandmother     Heart disease Maternal Grandmother     No Known Problems Sister     Prostate cancer Brother     No Known Problems Maternal Grandfather     No Known Problems Paternal Grandfather     No Known Problems Sister     No Known Problems Sister     Hypertension Brother     Amblyopia Neg Hx     Blindness Neg Hx     Macular degeneration Neg Hx     Retinal detachment Neg Hx     Strabismus Neg Hx     Stroke Neg Hx     Thyroid disease Neg Hx     Anesthesia problems Neg Hx     Clotting disorder Neg Hx      problems Neg Hx     Kidney cancer Neg Hx     Kidney disease Neg Hx     Malignant hyperthermia Neg Hx     Sickle cell trait Neg Hx     Lupus Neg Hx     Urolithiasis Neg Hx     Sudden death Neg Hx        ALLERGIES AND MEDICATIONS: updated and reviewed.  Review of patient's allergies indicates:   Allergen Reactions    Reglan [metoclopramide hcl] Diarrhea    Lorazepam      Other reaction(s): heavy sedation     Medication List with Changes/Refills   Current Medications    ASPIRIN 81 MG CHEWABLE TABLET    Take 81 mg by mouth. 1 Tablet, Chewable Oral Every day    BD INSULIN PEN NEEDLE UF ORIG 29 GAUGE X 1/2" NDLE    USE  1 EVERY EVENING    BLOOD GLUCOSE CONTROL, NORMAL SOLN    1 drop by Misc.(Non-Drug; Combo Route) route as directed.    BLOOD SUGAR DIAGNOSTIC (TRUE " "METRIX GLUCOSE TEST STRIP) STRP    1 strip by Misc.(Non-Drug; Combo Route) route 2 (two) times daily.    DIVALPROEX (DEPAKOTE) 250 MG 24 HR TABLET    Take 1,000 mg by mouth once daily.     INSULIN ASPART (NOVOLOG FLEXPEN) 100 UNIT/ML INPN PEN    Inject 5-10 units with meals and hs depending on blood sugar    LANCETS (TRUEPLUS LANCETS) 33 GAUGE MISC    1 lancet by Misc.(Non-Drug; Combo Route) route 2 (two) times daily.    MULTIVITAMIN (THERAGRAN) PER TABLET    Take 1 tablet by mouth once daily. ALIVE       WELTM-OMBKM-IHCFHBZ-PRAMOXINE 3.5-500-10,000 MG-UNIT-UNIT/G OINT        NITROGLYCERIN (NITROSTAT) 0.4 MG SL TABLET    Place 1 tablet (0.4 mg total) under the tongue every 5 (five) minutes as needed for Chest pain.    NOVOFINE 30 30 X 1/3 " NDLE    USE AS DIRECTED WITH SARA HA    PROMETHAZINE (PHENERGAN) 25 MG TABLET    Take 1 tablet (25 mg total) by mouth every 6 (six) hours as needed for Nausea.    PROMETHAZINE (PHENERGAN) 6.25 MG/5 ML SYRUP    TAKE 20 MLS BY MOUTH EVERY 6 HOURS AS NEEDED FOR NAUSEA    RENVELA 800 MG TAB    TAKE 1 TABLET BY MOUTH THREE TIMES DAILY WITH MEALS    TRUE METRIX AIR GLUCOSE METER KIT       Changed and/or Refilled Medications    Modified Medication Previous Medication    AMLODIPINE (NORVASC) 10 MG TABLET amlodipine (NORVASC) 10 MG tablet       Take 1 tablet (10 mg total) by mouth once daily.    TAKE 1 TABLET BY MOUTH EVERY DAY    ATORVASTATIN (LIPITOR) 40 MG TABLET atorvastatin (LIPITOR) 40 MG tablet       Take 1 tablet (40 mg total) by mouth once daily.    TAKE 1 TABLET BY MOUTH EVERY DAY    CARVEDILOL (COREG) 25 MG TABLET carvedilol (COREG) 25 MG tablet       Take 1 tablet (25 mg total) by mouth 2 (two) times daily with meals.    TAKE 1 TABLET BY MOUTH TWICE DAILY WITH MEALS    FUROSEMIDE (LASIX) 40 MG TABLET furosemide (LASIX) 40 MG tablet       Take 1 tablet (40 mg total) by mouth once daily.    TAKE 1 TABLET BY MOUTH EVERY DAY    GABAPENTIN (NEURONTIN) 100 MG CAPSULE " gabapentin (NEURONTIN) 100 MG capsule       Take 1 capsule (100 mg total) by mouth once daily.    TAKE ONE CAPSULE BY MOUTH DAILY    INSULIN GLARGINE (LANTUS SOLOSTAR) 100 UNIT/ML (3 ML) INPN PEN LANTUS SOLOSTAR 100 unit/mL (3 mL) InPn pen       INJECT 20 UNITS UNDER THE SKIN NIGHTLY    INJECT 20 UNITS UNDER THE SKIN NIGHTLY    LEVETIRACETAM (KEPPRA) 500 MG TAB levetiracetam (KEPPRA) 500 MG Tab       Take 1 tablet (500 mg total) by mouth 2 (two) times daily.    Take 1 tablet (500 mg total) by mouth 2 (two) times daily.    LOSARTAN (COZAAR) 50 MG TABLET losartan (COZAAR) 50 MG tablet       Take 1 tablet (50 mg total) by mouth once daily.    TAKE 1 TABLET BY MOUTH EVERY DAY    PANTOPRAZOLE (PROTONIX) 40 MG TABLET pantoprazole (PROTONIX) 40 MG tablet       Take 1 tablet (40 mg total) by mouth daily as needed (heartburn).    Take 1 tablet (40 mg total) by mouth once daily.   Discontinued Medications    ALCOHOL SWABS PADM    Apply 1 each topically as needed.    INSULIN GLARGINE (LANTUS SOLOSTAR) 100 UNIT/ML (3 ML) INPN PEN    15 or 30 units every night depending on blood sugar    OMEGA-3 ACID ETHYL ESTERS (LOVAZA) 1 GRAM CAPSULE    Take 2 capsules (2 g total) by mouth 2 (two) times daily.            REVIEW OF SYSTEMS:  General ROS: positive for  - chronic fatigue  negative for - chills or fever  Psychological ROS: positive for - memory difficulties  negative for - suicidal ideation  Ophthalmic ROS: positive for - decreased vision  negative for - eye pain  ENT ROS: negative for - epistaxis, oral lesions or sore throat  Allergy and Immunology ROS: negative for - hives  Hematological and Lymphatic ROS: negative for - swollen lymph nodes  Endocrine ROS: negative for - polydipsia/polyuria or temperature intolerance  Respiratory ROS: negative for - hemoptysis, sputum changes or wheezing  Cardiovascular ROS: negative for - chest pain or palpitations  Gastrointestinal ROS: no abdominal pain, change in bowel habits, or black or  "bloody stools  Dermatological ROS: negative for mole changes and rash  Musculoskeletal ROS: negative for - joint swelling or muscle pain  Neurological ROS: no TIA or stroke symptoms  Genito-Urinary ROS: no dysuria, trouble voiding, or hematuria        PHYSICAL EXAM:  Vitals:    05/29/17 1451   BP: 115/65   Temp:      Weight: 75.8 kg (167 lb)   Height: 5' 10" (177.8 cm)   Body mass index is 23.96 kg/m².    Physical Examination: General appearance - alert, well appearing, and in no distress and normal appearing weight  Mental status - alert, oriented to person, place only;  Normal behavior, speech, dress, motor activity; poor memory; poor insight  Eyes - pupils equal and reactive, extraocular eye movements intact, sclera anicteric  Mouth - mucous membranes moist, pharynx normal without lesions  Neck - supple, no significant adenopathy, carotids upstroke normal bilaterally, no bruits, thyroid exam: thyroid is normal in size without nodules or tenderness  Lymphatics - no palpable lymphadenopathy  Chest - clear to auscultation, no wheezes, rales or rhonchi, symmetric air entry  Heart - normal rate and regular rhythm, no gallops noted  Neurological - alert, oriented, normal speech, no focal findings or movement disorder noted, cranial nerves II through XII intact  Musculoskeletal - no muscular tenderness noted, Mild-Moderate osteoarthritic changes noted to both knee joints. No joint effusions noted. He walked with a cane.  Extremities - no pedal edema noted  Skin - normal coloration and turgor, no rashes, no suspicious skin lesions noted       ASSESSMENT AND PLAN:  1. Uncontrolled type 2 diabetes mellitus with proliferative retinopathy without macular edema, with long-term current use of insulin, unspecified laterality/2. Type 2 diabetes, uncontrolled, with neuropathy/3. Uncontrolled type 2 diabetes mellitus with end-stage renal disease/4. Long-term insulin use  I discussed the need for improved dietary habits and likely " some changes in his diabetic medication.  It will be helpful for him to see endocrinology.  Family refuses to pay the high co-pay needed to see a specialist.  I have asked the family to perhaps start giving 10 units of insulin at night and 5 units in the morning.  They will adjust insulin to prevent blood sugars greater than 200.  I will check blood work and address results accordingly.  We will likely need to follow-up with him in 3 months or sooner as needed.  - losartan (COZAAR) 50 MG tablet; Take 1 tablet (50 mg total) by mouth once daily.  Dispense: 90 tablet; Refill: 1  - insulin glargine (LANTUS SOLOSTAR) 100 unit/mL (3 mL) InPn pen; INJECT 20 UNITS UNDER THE SKIN NIGHTLY  Dispense: 15 mL; Refill: 0  - Hemoglobin A1c; Future  - gabapentin (NEURONTIN) 100 MG capsule; Take 1 capsule (100 mg total) by mouth once daily.  Dispense: 90 capsule; Refill: 0    5. Essential hypertension  Discussed sodium restriction, maintaining ideal body weight and regular exercise program as physiologic means to achieve blood pressure control. The patient will strive towards this. The current medical regimen is effective;  continue present plan and medications. Recommended patient to check home readings to monitor and see me for followup as scheduled or sooner as needed. Patient was educated that both decongestant and anti-inflammatory medication may raise blood pressure.   - carvedilol (COREG) 25 MG tablet; Take 1 tablet (25 mg total) by mouth 2 (two) times daily with meals.  Dispense: 180 tablet; Refill: 1  - amlodipine (NORVASC) 10 MG tablet; Take 1 tablet (10 mg total) by mouth once daily.  Dispense: 90 tablet; Refill: 1    6. Chronic systolic congestive heart failure  The current medical regimen is effective;  continue present plan and medications.   - furosemide (LASIX) 40 MG tablet; Take 1 tablet (40 mg total) by mouth once daily.  Dispense: 90 tablet; Refill: 1    7. Hyperlipidemia LDL goal <100  We discussed low fat diet  and regular exercise.The current medical regimen is effective;  continue present plan and medications.    - atorvastatin (LIPITOR) 40 MG tablet; Take 1 tablet (40 mg total) by mouth once daily.  Dispense: 90 tablet; Refill: 1  - Lipid panel; Future    8. Gastroesophageal reflux disease, esophagitis presence not specified  Symptoms controlled. Reflux precautions discussed (eliminate tobacco if a smoker; minimize caffeine, chocolate and red/white peppermint intake; avoid heavy and spicy meals; don't lay down within 2-3 hours after eating). Medication as needed. Patient asked to take medication breaks, if possible - discussed chronic use can limit calcium absorption, increases the risk for intestinal infections, and can cause kidney damage.    - pantoprazole (PROTONIX) 40 MG tablet; Take 1 tablet (40 mg total) by mouth daily as needed (heartburn).  Dispense: 90 tablet; Refill: 0    9. Seizure disorder  The current medical regimen is effective;  continue present plan and medications.   - levetiracetam (KEPPRA) 500 MG Tab; Take 1 tablet (500 mg total) by mouth 2 (two) times daily.  Dispense: 180 tablet; Refill: 1    10. ESRD on hemodialysis  Stable. Continue 3x/wk dialysis. Followed by nephrology.     11. Secondary renal hyperparathyroidism  Stable. Asymptomatic. Observe.     12. Atherosclerosis of aorta  Patient with Atherosclerosis of the Aorta.  Stable/asymptomatic. Currently stable on lipid lowering medication and b/p monitoring.           Return in about 6 months (around 11/29/2017), or if symptoms worsen or fail to improve, for annual exam. or sooner as needed.

## 2017-05-30 LAB
ESTIMATED AVG GLUCOSE: 180 MG/DL
HBA1C MFR BLD HPLC: 7.9 %

## 2017-08-11 ENCOUNTER — HOSPITAL ENCOUNTER (OUTPATIENT)
Dept: VASCULAR SURGERY | Facility: CLINIC | Age: 57
Discharge: HOME | End: 2017-08-11
Attending: SURGERY
Payer: MEDICARE

## 2017-08-11 ENCOUNTER — OFFICE VISIT (OUTPATIENT)
Dept: VASCULAR SURGERY | Facility: CLINIC | Age: 57
End: 2017-08-11
Attending: SURGERY
Payer: MEDICARE

## 2017-08-11 VITALS
DIASTOLIC BLOOD PRESSURE: 84 MMHG | HEART RATE: 94 BPM | HEIGHT: 70 IN | BODY MASS INDEX: 22.95 KG/M2 | WEIGHT: 160.31 LBS | TEMPERATURE: 99 F | SYSTOLIC BLOOD PRESSURE: 141 MMHG

## 2017-08-11 DIAGNOSIS — N18.6 ESRD (END STAGE RENAL DISEASE) ON DIALYSIS: ICD-10-CM

## 2017-08-11 DIAGNOSIS — Z99.2 ESRD (END STAGE RENAL DISEASE) ON DIALYSIS: ICD-10-CM

## 2017-08-11 DIAGNOSIS — T82.858D STENOSIS OF ARTERIOVENOUS DIALYSIS FISTULA, SUBSEQUENT ENCOUNTER: ICD-10-CM

## 2017-08-11 DIAGNOSIS — Z01.818 PRE-OP EVALUATION: Primary | ICD-10-CM

## 2017-08-11 DIAGNOSIS — T82.858D AV FISTULA STENOSIS, SUBSEQUENT ENCOUNTER: Primary | ICD-10-CM

## 2017-08-11 DIAGNOSIS — Z99.2 ESRD (END STAGE RENAL DISEASE) ON DIALYSIS: Primary | ICD-10-CM

## 2017-08-11 DIAGNOSIS — N18.6 ESRD (END STAGE RENAL DISEASE) ON DIALYSIS: Primary | ICD-10-CM

## 2017-08-11 DIAGNOSIS — T82.858A AV FISTULA STENOSIS: ICD-10-CM

## 2017-08-11 PROCEDURE — 99999 PR PBB SHADOW E&M-EST. PATIENT-LVL III: CPT | Mod: PBBFAC,,, | Performed by: SURGERY

## 2017-08-11 PROCEDURE — 93990 DOPPLER FLOW TESTING: CPT | Mod: S$GLB,,, | Performed by: SURGERY

## 2017-08-11 PROCEDURE — 99214 OFFICE O/P EST MOD 30 MIN: CPT | Mod: S$GLB,,, | Performed by: SURGERY

## 2017-08-11 PROCEDURE — 3008F BODY MASS INDEX DOCD: CPT | Mod: S$GLB,,, | Performed by: SURGERY

## 2017-08-11 RX ORDER — LIDOCAINE HYDROCHLORIDE 10 MG/ML
1 INJECTION, SOLUTION EPIDURAL; INFILTRATION; INTRACAUDAL; PERINEURAL ONCE
Status: CANCELLED | OUTPATIENT
Start: 2017-08-11 | End: 2017-08-11

## 2017-08-11 NOTE — PROGRESS NOTES
See my prior note; review of systems, family history and social history are   unchanged.    HISTORY OF PRESENT ILLNESS:  A 57-year-old male with end-stage renal disease,   well known to me, status post:  1.  Angioplasty, left AV fistula (10 x 40 [place]) and left subclavian vein 12 x   40 [place], 03/06/2017.  2.  Stent placement, left AV fistula (10 x 40 [place]), 08/03/2016.  3.  Prior angioplasty of this fistula in 03/20/2016, 2014 and 2013.  4.  Original left brachiobasilic AV fistula creation, 04/01/2013.    He now returns with increased bleeding from the fistula.    PAST MEDICAL HISTORY:  1.  End-stage renal disease, dialyzing Tuesday, Thursday, Saturday.  2.  History of congestive heart failure.  3.  Diabetes mellitus.  4.  Hyperlipidemia.  5.  Hyperparathyroidism.  6.  Hypertension.  7.  History of myocardial infarction.    PAST SURGICAL HISTORY:  1.  CABG in 2009.  2.  AV fistula as above.  3.  Eye surgery.    FAMILY HISTORY:  Positive for hypertension.    SOCIAL HISTORY:  He is a nonsmoker.    MEDICATIONS:  Include aspirin and a statin.  See EPIC for full list.    ALLERGIES:  Lorazepam and Reglan.    REVIEW OF SYSTEMS:  Denies postprandial pain or DVT.  All other systems including eyes, ENT, , musculoskeletal, breast, psychiatric,   lymph, allergy and immune are negative.    PHYSICAL EXAMINATION:  VITAL SIGNS:  See nursing note.  GENERAL:  No acute distress.  LUNGS:  Clear to auscultation.  Normal effort.  CARDIOVASCULAR:  Regular rate and rhythm, nondisplaced PMI, no murmur.  EXTREMITIES:  Left arm shows brachiobasilic fistula, which has reasonably good   thrill but moderate pulsatility, which is unchanged from prior.    IMAGING:  Duplex of the fistula shows velocity elevation of 630 with a focal   narrowing within the distal stent.  Flow volume is maintained at 1.9 liters.    His prior fistulograms were personally re-reviewed.    ASSESSMENT:  Recurrent in-stent stenosis, left AV fistula causing increased    bleeding.  Intervention is warranted.    PLAN:  1.  Left fistulogram and probable covered stent placement, 08/21/2017.  2.  Cath Lab case.    The patient understands the risks and rationale of the procedure and wishes to   proceed.            /luis [id number] daysi(s)        DEEPTI/CYLDE  dd: 08/11/2017 08:53:12 (CDT)  td: 08/11/2017 09:14:04 (CDT)  Doc ID   #[id number]  Job ID #[id number]    CC:

## 2017-08-18 ENCOUNTER — TELEPHONE (OUTPATIENT)
Dept: VASCULAR SURGERY | Facility: CLINIC | Age: 57
End: 2017-08-18

## 2017-08-18 NOTE — TELEPHONE ENCOUNTER
Call patient no answer left message with time of arrival 0600am for surgery on 8/21/2017 with a callback number [phone].

## 2017-08-21 ENCOUNTER — TELEPHONE (OUTPATIENT)
Dept: VASCULAR SURGERY | Facility: CLINIC | Age: 57
End: 2017-08-21

## 2017-08-21 ENCOUNTER — SURGERY (OUTPATIENT)
Age: 57
End: 2017-08-21

## 2017-08-21 ENCOUNTER — HOSPITAL ENCOUNTER (OUTPATIENT)
Facility: HOSPITAL | Age: 57
Discharge: HOME | End: 2017-08-21
Attending: SURGERY | Admitting: SURGERY
Payer: MEDICARE

## 2017-08-21 VITALS
BODY MASS INDEX: 22.9 KG/M2 | RESPIRATION RATE: 16 BRPM | OXYGEN SATURATION: 97 % | TEMPERATURE: 97 F | SYSTOLIC BLOOD PRESSURE: 131 MMHG | DIASTOLIC BLOOD PRESSURE: 74 MMHG | HEIGHT: 70 IN | HEART RATE: 92 BPM | WEIGHT: 160 LBS

## 2017-08-21 DIAGNOSIS — N18.6 END STAGE RENAL DISEASE: ICD-10-CM

## 2017-08-21 DIAGNOSIS — T82.858A AV FISTULA STENOSIS: ICD-10-CM

## 2017-08-21 DIAGNOSIS — Z99.2 DEPENDENCE ON RENAL DIALYSIS: ICD-10-CM

## 2017-08-21 DIAGNOSIS — T82.858D AV FISTULA STENOSIS, SUBSEQUENT ENCOUNTER: ICD-10-CM

## 2017-08-21 LAB
PERIPHERAL STENT: YES
POCT GLUCOSE: 114 MG/DL (ref 70–110)
POCT GLUCOSE: 170 MG/DL (ref 70–110)

## 2017-08-21 PROCEDURE — 63600175 PHARM REV CODE 636 W HCPCS

## 2017-08-21 PROCEDURE — 63600175 PHARM REV CODE 636 W HCPCS: Performed by: STUDENT IN AN ORGANIZED HEALTH CARE EDUCATION/TRAINING PROGRAM

## 2017-08-21 PROCEDURE — 99152 MOD SED SAME PHYS/QHP 5/>YRS: CPT | Mod: ,,, | Performed by: SURGERY

## 2017-08-21 PROCEDURE — C1894 INTRO/SHEATH, NON-LASER: HCPCS

## 2017-08-21 PROCEDURE — 25000003 PHARM REV CODE 250

## 2017-08-21 PROCEDURE — 36907 BALO ANGIOP CTR DIALYSIS SEG: CPT | Mod: ,,, | Performed by: SURGERY

## 2017-08-21 PROCEDURE — 82962 GLUCOSE BLOOD TEST: CPT

## 2017-08-21 PROCEDURE — 36903 INTRO CATH DIALYSIS CIRCUIT: CPT | Mod: ,,, | Performed by: SURGERY

## 2017-08-21 RX ORDER — CEFAZOLIN SODIUM 2 G/50ML
2 SOLUTION INTRAVENOUS
Status: DISCONTINUED | OUTPATIENT
Start: 2017-08-21 | End: 2017-08-21 | Stop reason: HOSPADM

## 2017-08-21 RX ORDER — LIDOCAINE HYDROCHLORIDE 10 MG/ML
1 INJECTION, SOLUTION EPIDURAL; INFILTRATION; INTRACAUDAL; PERINEURAL ONCE
Status: DISCONTINUED | OUTPATIENT
Start: 2017-08-21 | End: 2017-08-21 | Stop reason: HOSPADM

## 2017-08-21 RX ORDER — ONDANSETRON 2 MG/ML
4 INJECTION INTRAMUSCULAR; INTRAVENOUS ONCE
Status: COMPLETED | OUTPATIENT
Start: 2017-08-21 | End: 2017-08-21

## 2017-08-21 RX ORDER — HYDROCODONE BITARTRATE AND ACETAMINOPHEN 5; 325 MG/1; MG/1
1 TABLET ORAL EVERY 4 HOURS PRN
Status: DISCONTINUED | OUTPATIENT
Start: 2017-08-21 | End: 2017-08-21 | Stop reason: HOSPADM

## 2017-08-21 RX ADMIN — ONDANSETRON 4 MG: 2 INJECTION INTRAMUSCULAR; INTRAVENOUS at 09:08

## 2017-08-21 NOTE — INTERVAL H&P NOTE
The patient has been examined and the H&P has been reviewed:    I concur with the findings and no changes have occurred since H&P was written.    Anesthesia/Surgery risks, benefits and alternative options discussed and understood by patient/family.          Active Hospital Problems    Diagnosis  POA    AV fistula stenosis [T82.857L]  Yes      Resolved Hospital Problems    Diagnosis Date Resolved POA   No resolved problems to display.

## 2017-08-21 NOTE — BRIEF OP NOTE
[facility]  Brief Operative Note     SUMMARY     Surgery Date: 8/21/2017     Surgeon(s) and Role:     * DAVE Mazariegos III, MD - Primary    Assisting Surgeon: CHELO White MD    Pre-op Diagnosis:  Stenosis, AVF    Post-op Diagnosis:   Same + central stenosis    PROCEDURES:    1. Covered stent placement, L AVF (10x50 [place])  2.  PTA, L subclavian vein (12x40)  3. COnscious Sedation  4. Fistulagram    Anesthesia: RN IV Sedation    Description of the findings of the procedure: 75% recurrent edge ISR mid fistula, no residual after stent    Findings/Key Components: as aobe    Estimated Blood Loss: <3cc       Specimens:   Specimen (12h ago through future)    None          Discharge Note    SUMMARY     Admit Date: 8/21/2017    Discharge Date and Time: 8/21/17    Hospital Course (synopsis of major diagnoses, care, treatment, and services provided during the course of the hospital stay): successful outpatient procedure    Final Diagnosis: Post-Op Diagnosis Codes:     Stenosis, AVF    Disposition: home    Follow Up/Patient Instructions: Diet: renal  Act: ad fatmata  FU:  4 days with AVF duplex     Medications: pre-op

## 2017-08-21 NOTE — PROGRESS NOTES
Pt experienced some nausea, notified Dr. Castañeda.  He came to bedside and ordered 4mg of IV zofran.  Zofran given.  Will continue to monitor.

## 2017-08-21 NOTE — PLAN OF CARE
Problem: Patient Care Overview  Goal: Plan of Care Review  Outcome: Ongoing (interventions implemented as appropriate)  Received report from BASHIR Gaston. Patient s/p left arm fistulagram, AAOx3. VSS, no c/o pain or discomfort at this time, resp even and unlabored. Left upper arm gauze/tegaderm dressing is CDI. Pulses 2+.  No active bleeding. No hematoma noted. + thrill/bruit.  Post procedure protocol reviewed with patient and patient's family. Understanding verbalized. Family members at bedside. Nurse call bell within reach. Will continue to monitor per post procedure protocol.

## 2017-08-21 NOTE — PROGRESS NOTES
Pt is AAOx3 and in no apparent distress.  Fistula site is c/d/i without redness or swelling. Provided a copy of discharge instructions.  Teaching performed.  Pt verbalized understanding and denied any questions.  Provided pt with prescriptions.  PIV d/c catheter tip intact.  2x2 applied and no active bleeding noted. Provided pt with wheelchair and family member is escorting him out for transport home.

## 2017-08-21 NOTE — TELEPHONE ENCOUNTER
----- Message from Jone Retana sent at 8/21/2017 10:51 AM CDT -----  Contact: Yaima//Wife  Caller states that (s)he needs to speak with nurse in ref to scheduling an appt for the pt to have sutures removed//please call back at [phone]//thank you

## 2017-08-21 NOTE — H&P (VIEW-ONLY)
See my prior note; review of systems, family history and social history are   unchanged.    HISTORY OF PRESENT ILLNESS:  A 57-year-old male with end-stage renal disease,   well known to me, status post:  1.  Angioplasty, left AV fistula (10 x 40 [place]) and left subclavian vein 12 x   40 [place], 03/06/2017.  2.  Stent placement, left AV fistula (10 x 40 [place]), 08/03/2016.  3.  Prior angioplasty of this fistula in 03/20/2016, 2014 and 2013.  4.  Original left brachiobasilic AV fistula creation, 04/01/2013.    He now returns with increased bleeding from the fistula.    PAST MEDICAL HISTORY:  1.  End-stage renal disease, dialyzing Tuesday, Thursday, Saturday.  2.  History of congestive heart failure.  3.  Diabetes mellitus.  4.  Hyperlipidemia.  5.  Hyperparathyroidism.  6.  Hypertension.  7.  History of myocardial infarction.    PAST SURGICAL HISTORY:  1.  CABG in 2009.  2.  AV fistula as above.  3.  Eye surgery.    FAMILY HISTORY:  Positive for hypertension.    SOCIAL HISTORY:  He is a nonsmoker.    MEDICATIONS:  Include aspirin and a statin.  See EPIC for full list.    ALLERGIES:  Lorazepam and Reglan.    REVIEW OF SYSTEMS:  Denies postprandial pain or DVT.  All other systems including eyes, ENT, , musculoskeletal, breast, psychiatric,   lymph, allergy and immune are negative.    PHYSICAL EXAMINATION:  VITAL SIGNS:  See nursing note.  GENERAL:  No acute distress.  LUNGS:  Clear to auscultation.  Normal effort.  CARDIOVASCULAR:  Regular rate and rhythm, nondisplaced PMI, no murmur.  EXTREMITIES:  Left arm shows brachiobasilic fistula, which has reasonably good   thrill but moderate pulsatility, which is unchanged from prior.    IMAGING:  Duplex of the fistula shows velocity elevation of 630 with a focal   narrowing within the distal stent.  Flow volume is maintained at 1.9 liters.    His prior fistulograms were personally re-reviewed.    ASSESSMENT:  Recurrent in-stent stenosis, left AV fistula causing increased    bleeding.  Intervention is warranted.    PLAN:  1.  Left fistulogram and probable covered stent placement, 08/21/2017.  2.  Cath Lab case.    The patient understands the risks and rationale of the procedure and wishes to   proceed.            /luis [id number] daysi(s)        DEEPTI/CLYDE  dd: 08/11/2017 08:53:12 (CDT)  td: 08/11/2017 09:14:04 (CDT)  Doc ID   #[id number]  Job ID #[id number]    CC:

## 2017-08-21 NOTE — OP NOTE
DATE OF PROCEDURE:  08/21/2017.    PREOPERATIVE DIAGNOSIS:  Stenosis, left AV fistula.    POSTOPERATIVE DIAGNOSES:  Stenosis, left AV fistula, plus central venous   stenosis.    PROCEDURES:  1.  Covered stent placement, left AV fistula (10 x 50 Viabahn).  2.  Angioplasty, left subclavian vein (12 x 40).  3.  Fistulogram.  4.  Conscious sedation.    SURGEON:  LISA Mazariegos III, M.D.    ASSISTANT:  Kiran White M.D. (RES).    ANESTHESIA:  RN-directed sedation and local infiltration.    INDICATIONS:  A 57-year-old male with end-stage renal disease with increased   bleeding and high-grade recurrent stenosis of this fistula by duplex.    PROCEDURE IN DETAIL:  The patient was brought in the Cath Lab and placed in the   supine position.  After sterile prep and drape and infiltration with 1%   lidocaine, the left brachiobasilic fistula was accessed with a micropuncture   set.  Fistulogram was then performed which showed a 75+ % stenosis at the edge   of the previous bare-metal stent in the mid fistula, which had been   angioplastied prior.  There was also approximately 50% stenosis of the   subclavian vein with some collateral formation proximal to it, suggesting that   this was hemodynamically significant.    A stiff-angled Glidewire was placed and a 10-Kiswahili sheath placed.  A 12 x 40   [place] balloon was brought in the field, prepped, placed over the subclavian vein   stenosis, and then inflated.  There was significant wasting.  It then fully   effaced at 18 atmospheres.  This was held for 2 minutes.  Completion fistulogram   revealed improvement in the stenosis, but not complete resolution.    Attention was then directed towards the mid fistula stenosis.  This has been   angioplastied in the past with a 10 x 40 Valliant balloon.  Because of the   recurrent nature of this stenosis, it was decided to treat this with a covered   stent in hopes of more durable result.    A 10 x 50 Viabahn covered stent was brought in the  field, prepped, placed over   this area, and deployed without difficulty.  This was post-deployed angioplasty   with a 10 x 40 Beckham balloon.  Note there was severe wasting in the area of   stenosis that then fully effaced.  Final completion fistulogram revealed   resolution of the stenosis and brisk flow.  Clinically, the fistula had   excellent thrill with no significant pulsatility.    All catheters and guidewires were removed and hemostasis was achieved with a   Monocryl suture.    I continuously monitored the patient's cardiopulmonary functions throughout the   case.  See nursing notes for dosing of Versed and fentanyl.  Total conscious   sedation time was 35 minutes.      DES  dd: 08/21/2017 08:41:12 (CDT)  td: 08/21/2017 10:08:40 (CDT)  Doc ID   #[id number]  Job ID #[id number]    CC:

## 2017-08-21 NOTE — PROGRESS NOTES
Pt states he is feeling better and is no longer nauseated.  He is ready to go home.  Paged Dr. Castañeda

## 2017-08-25 ENCOUNTER — HOSPITAL ENCOUNTER (OUTPATIENT)
Dept: VASCULAR SURGERY | Facility: CLINIC | Age: 57
Discharge: HOME | End: 2017-08-25
Attending: STUDENT IN AN ORGANIZED HEALTH CARE EDUCATION/TRAINING PROGRAM
Payer: MEDICARE

## 2017-08-25 ENCOUNTER — OFFICE VISIT (OUTPATIENT)
Dept: VASCULAR SURGERY | Facility: CLINIC | Age: 57
End: 2017-08-25
Payer: MEDICARE

## 2017-08-25 VITALS
WEIGHT: 160 LBS | SYSTOLIC BLOOD PRESSURE: 122 MMHG | BODY MASS INDEX: 22.9 KG/M2 | HEART RATE: 80 BPM | TEMPERATURE: 97 F | HEIGHT: 70 IN | DIASTOLIC BLOOD PRESSURE: 68 MMHG

## 2017-08-25 DIAGNOSIS — T82.858A AV FISTULA STENOSIS: ICD-10-CM

## 2017-08-25 DIAGNOSIS — N18.6 ESRD (END STAGE RENAL DISEASE) ON DIALYSIS: ICD-10-CM

## 2017-08-25 DIAGNOSIS — Z99.2 ESRD ON HEMODIALYSIS: Primary | ICD-10-CM

## 2017-08-25 DIAGNOSIS — T82.858D STENOSIS OF ARTERIOVENOUS DIALYSIS FISTULA, SUBSEQUENT ENCOUNTER: ICD-10-CM

## 2017-08-25 DIAGNOSIS — N18.6 ESRD ON HEMODIALYSIS: Primary | ICD-10-CM

## 2017-08-25 DIAGNOSIS — Z99.2 ESRD (END STAGE RENAL DISEASE) ON DIALYSIS: ICD-10-CM

## 2017-08-25 PROCEDURE — 93990 DOPPLER FLOW TESTING: CPT | Mod: S$GLB,,, | Performed by: SURGERY

## 2017-08-25 PROCEDURE — 99999 PR PBB SHADOW E&M-EST. PATIENT-LVL III: CPT | Mod: PBBFAC,,, | Performed by: SURGERY

## 2017-08-25 PROCEDURE — 99213 OFFICE O/P EST LOW 20 MIN: CPT | Mod: S$GLB,,, | Performed by: SURGERY

## 2017-08-25 PROCEDURE — 3008F BODY MASS INDEX DOCD: CPT | Mod: S$GLB,,, | Performed by: SURGERY

## 2017-08-25 NOTE — PROGRESS NOTES
HISTORY OF PRESENT ILLNESS:  Kodi Ibanez 57 y.o. male with end-stage renal disease, well known to me, s/p covered stent placement Left AVF (10 x 50 Viabahn), PTA Left Subclavian vein (12 x 40) on 8/21/17.  Pt. Dialyzes T, H, S using the Left arm AVF.  He reports he no longer has prolonged bleeding following dialysis.  He denies left hand weakness or numbness.      He is s/p:  1. Covered stent placement Left AVF (10 x 50 Viabahn), PTA Left Subclavian vein (12 x 40), 8/21/17  2.  Angioplasty, left AV fistula (10 x 40 Schuyler) and left subclavian vein 12 x 40 [place], 03/06/2017.  3.  Stent placement, left AV fistula (10 x 40 Bard), 08/03/2016.  4.  Prior angioplasty of this fistula in 03/20/2016, 2014 and 2013.  5.  Original left brachiobasilic AV fistula creation, 04/01/2013.      Past Medical History:   Diagnosis Date    Atherosclerosis of aorta     noted on VIKTORIA 2011    Blood transfusion     CHF (congestive heart failure)     Chronic kidney disease     Clotting disorder     Coronary artery disease     Decreased appetite     Diabetic retinopathy     DM (diabetes mellitus), type 2 with renal complications     ESRD on hemodialysis     TUESDAY/ THURSDAY/SATURDAY    Hyperlipidemia     Hyperparathyroidism, unspecified     Hypertension     Myocardial infarction     Prostate cancer     Seizures     Stroke     PT HAS SHAKES        Past Surgical History:   Procedure Laterality Date    CARDIAC SURGERY      CABG    CATARACT EXTRACTION      ou    CATARACT EXTRACTION, BILATERAL      CORONARY ARTERY BYPASS GRAFT  2009    bypass    EYE SURGERY      Left Guera AVF  1/11/2012    retinal laser      ROTATOR CUFF REPAIR  right, 2005    VASCULAR SURGERY         Family History   Problem Relation Age of Onset    Diabetes Mother     Glaucoma Mother     Cancer Mother     Cataracts Mother     Heart disease Father     Cancer Paternal Grandmother     Heart disease Maternal Grandmother     No Known Problems  "Sister     Prostate cancer Brother     No Known Problems Maternal Grandfather     No Known Problems Paternal Grandfather     No Known Problems Sister     No Known Problems Sister     Hypertension Brother     Amblyopia Neg Hx     Blindness Neg Hx     Macular degeneration Neg Hx     Retinal detachment Neg Hx     Strabismus Neg Hx     Stroke Neg Hx     Thyroid disease Neg Hx     Anesthesia problems Neg Hx     Clotting disorder Neg Hx      problems Neg Hx     Kidney cancer Neg Hx     Kidney disease Neg Hx     Malignant hyperthermia Neg Hx     Sickle cell trait Neg Hx     Lupus Neg Hx     Urolithiasis Neg Hx     Sudden death Neg Hx        Social History     Social History    Marital status:      Spouse name: N/A    Number of children: N/A    Years of education: N/A     Occupational History    Not on file.     Social History Main Topics    Smoking status: Never Smoker    Smokeless tobacco: Never Used    Alcohol use No    Drug use: No    Sexual activity: Not Currently     Other Topics Concern    Not on file     Social History Narrative    No narrative on file       Current Outpatient Prescriptions   Medication Sig Dispense Refill    amlodipine (NORVASC) 10 MG tablet Take 1 tablet (10 mg total) by mouth once daily. 90 tablet 1    aspirin 81 MG chewable tablet Take 81 mg by mouth. 1 Tablet, Chewable Oral Every day      atorvastatin (LIPITOR) 40 MG tablet Take 1 tablet (40 mg total) by mouth once daily. 90 tablet 1    BD INSULIN PEN NEEDLE UF ORIG 29 gauge x 1/2" Ndle USE  1 EVERY EVENING 90 each 3    blood glucose control, normal Soln 1 drop by Misc.(Non-Drug; Combo Route) route as directed. 1 each 1    blood sugar diagnostic (TRUE METRIX GLUCOSE TEST STRIP) Strp 1 strip by Misc.(Non-Drug; Combo Route) route 2 (two) times daily. 100 strip 3    carvedilol (COREG) 25 MG tablet Take 1 tablet (25 mg total) by mouth 2 (two) times daily with meals. 180 tablet 1    divalproex " "(DEPAKOTE) 250 MG 24 hr tablet Take 1,000 mg by mouth once daily.       furosemide (LASIX) 40 MG tablet Take 1 tablet (40 mg total) by mouth once daily. 90 tablet 1    gabapentin (NEURONTIN) 100 MG capsule Take 1 capsule (100 mg total) by mouth once daily. 90 capsule 0    insulin aspart (NOVOLOG FLEXPEN) 100 unit/mL InPn pen Inject 5-10 units with meals and hs depending on blood sugar 15 mL 3    insulin glargine (LANTUS SOLOSTAR) 100 unit/mL (3 mL) InPn pen INJECT 20 UNITS UNDER THE SKIN NIGHTLY 15 mL 0    lancets (TRUEPLUS LANCETS) 33 gauge Misc 1 lancet by Misc.(Non-Drug; Combo Route) route 2 (two) times daily. 100 each 3    levetiracetam (KEPPRA) 500 MG Tab Take 1 tablet (500 mg total) by mouth 2 (two) times daily. 180 tablet 1    losartan (COZAAR) 50 MG tablet Take 1 tablet (50 mg total) by mouth once daily. 90 tablet 1    multivitamin (THERAGRAN) per tablet Take 1 tablet by mouth once daily. ALIVE         orcnl-lbqlw-hqytphu-pramoxine 3.5-500-10,000 mg-unit-unit/g Oint       NOVOFINE 30 30 x 1/3 " Ndle USE AS DIRECTED WITH LANTUS SOLARSTAR 100 each 3    pantoprazole (PROTONIX) 40 MG tablet Take 1 tablet (40 mg total) by mouth daily as needed (heartburn). 90 tablet 0    promethazine (PHENERGAN) 25 MG tablet Take 1 tablet (25 mg total) by mouth every 6 (six) hours as needed for Nausea. 30 tablet 0    promethazine (PHENERGAN) 6.25 mg/5 mL syrup TAKE 20 MLS BY MOUTH EVERY 6 HOURS AS NEEDED FOR NAUSEA 118 mL 0    RENVELA 800 mg Tab TAKE 1 TABLET BY MOUTH THREE TIMES DAILY WITH MEALS 100 tablet 0    TRUE METRIX AIR GLUCOSE METER kit       nitroGLYCERIN (NITROSTAT) 0.4 MG SL tablet Place 1 tablet (0.4 mg total) under the tongue every 5 (five) minutes as needed for Chest pain. 30 tablet 0     No current facility-administered medications for this visit.      MEDICATIONS:  Include aspirin and a statin.  See EPIC for full list.    ALLERGIES:  Lorazepam and Reglan.    REVIEW OF SYSTEMS:  Denies postprandial " pain or DVT.All other systems including eyes, ENT, , musculoskeletal, breast, psychiatric, lymph, allergy and immune are negative.    PHYSICAL EXAMINATION:  Vitals:    08/25/17 1055   BP: 122/68   Pulse: 80   Temp: 97.2 °F (36.2 °C)       GENERAL:  No acute distress.  LUNGS:  Clear to auscultation.  Normal effort.  CARDIOVASCULAR:  Regular rate and rhythm, nondisplaced PMI, no murmur.  EXTREMITIES:  Left arm shows brachiobasilic fistula, which has excellent thrill with mild pulsatility.  No left arm swelling.  Bilateral radial pulses 2+.    IMAGING:   Flow volume: 3777 ml/min (prior 1.9 liters).                         PSV: 337 cm/s  Duplex reveals patent left brachiobasilic AV fistula and distal stent with no evidence of a hemodynamically significant stenosis.      1. ESRD on hemodialysis  [facility]       ASSESSMENT:  57 y.o. with ESRD on HD with well functioning left arm brachiobasilic fistula    PLAN:  1.  F/u in 6 months with HD duplex or sooner if needed      KARIS Loya, APRN, FNP-BC  Nurse Practitioner  Vascular and Endovascular Surgery      Vascular Staff    I have personally taken the history and examined this patient and agree with the resident's note as stated above    LISA Mazariegos III, MD, FACS  Professor and Chief, Vascular and Endovascular Surgery

## 2017-10-10 ENCOUNTER — OFFICE VISIT (OUTPATIENT)
Dept: UROLOGY | Facility: CLINIC | Age: 57
End: 2017-10-10
Payer: MEDICARE

## 2017-10-10 ENCOUNTER — LAB VISIT (OUTPATIENT)
Dept: LAB | Facility: HOSPITAL | Age: 57
End: 2017-10-10
Attending: UROLOGY
Payer: MEDICARE

## 2017-10-10 VITALS
WEIGHT: 151 LBS | DIASTOLIC BLOOD PRESSURE: 87 MMHG | RESPIRATION RATE: 10 BRPM | HEART RATE: 94 BPM | HEIGHT: 70 IN | BODY MASS INDEX: 21.62 KG/M2 | SYSTOLIC BLOOD PRESSURE: 172 MMHG

## 2017-10-10 DIAGNOSIS — C61 PROSTATE CANCER: ICD-10-CM

## 2017-10-10 DIAGNOSIS — E11.3411 SEVERE NONPROLIFERATIVE DIABETIC RETINOPATHY OF RIGHT EYE, WITH MACULAR EDEMA, ASSOCIATED WITH TYPE 2 DIABETES MELLITUS: Primary | ICD-10-CM

## 2017-10-10 DIAGNOSIS — C61 PROSTATE CANCER: Primary | ICD-10-CM

## 2017-10-10 LAB — COMPLEXED PSA SERPL-MCNC: 8.9 NG/ML

## 2017-10-10 PROCEDURE — 36415 COLL VENOUS BLD VENIPUNCTURE: CPT

## 2017-10-10 PROCEDURE — 99999 PR PBB SHADOW E&M-EST. PATIENT-LVL IV: CPT | Mod: PBBFAC,,, | Performed by: UROLOGY

## 2017-10-10 PROCEDURE — 99214 OFFICE O/P EST MOD 30 MIN: CPT | Mod: S$GLB,,, | Performed by: UROLOGY

## 2017-10-10 PROCEDURE — 84153 ASSAY OF PSA TOTAL: CPT

## 2017-10-10 NOTE — LETTER
October 10, 2017        Melonie Elias MD  [address]  [place] [address]             [facility]  [address]  [place] [address]  Phone: [phone]   Patient: Kodi Ibanez   MR Number: [id number]   YOB: 1960   Date of Visit: 10/10/2017       Dear Dr. Elias:    Thank you for referring Kodi Ibanez to me for evaluation. Attached you will find relevant portions of my assessment and plan of care.    If you have questions, please do not hesitate to call me. I look forward to following Kodi Ibanez along with you.    Sincerely,      Fermin Collins MD            CC  No Recipients    Enclosure

## 2017-10-10 NOTE — PROGRESS NOTES
Clinic Note  10/10/2017      Subjective:         Chief Complaint:   HPI  Kodi Ibanez is a 57 y.o. male with a history of prostate cancer. Diagnosed 1 year ago by Dr. Catherine. Has been on surveillance.  Last PSA 4/10/17 was 8.1. Here with his Erin.  Patient has multiple medical comorbidities- dialysis, CHF, MI,stroke x 2.    PSAi- 7.8  Volume- 50 ccs Bx (9/19/2016)- Trish 6 right apex 1/1 20%. Overall 1/12 cores positive  MORE score- 2  Low risk disease      Lab Results   Component Value Date    PSA 7.8 (H) 07/11/2016    PSA 6.1 (H) 08/04/2014    PSA 5.13 (H) 03/20/2013    PSA 4.41 (H) 06/27/2012    PSA 4.5 (H) 06/07/2011    PSA 1.3 06/12/2008    PSA 0.8 04/20/2007    PSADIAG 8.1 (H) 04/10/2017      Past Medical History:   Diagnosis Date    Atherosclerosis of aorta     noted on VIKTORIA 2011    Blood transfusion     CHF (congestive heart failure)     Chronic kidney disease     Clotting disorder     Coronary artery disease     Decreased appetite     Diabetic retinopathy     DM (diabetes mellitus), type 2 with renal complications     ESRD on hemodialysis     TUESDAY/ THURSDAY/SATURDAY    Hyperlipidemia     Hyperparathyroidism, unspecified     Hypertension     Myocardial infarction     Prostate cancer     Seizures     Stroke     PT HAS SHAKES      Family History   Problem Relation Age of Onset    Diabetes Mother     Glaucoma Mother     Cancer Mother     Cataracts Mother     Heart disease Father     Cancer Paternal Grandmother     Heart disease Maternal Grandmother     No Known Problems Sister     Prostate cancer Brother     No Known Problems Maternal Grandfather     No Known Problems Paternal Grandfather     No Known Problems Sister     No Known Problems Sister     Hypertension Brother     Amblyopia Neg Hx     Blindness Neg Hx     Macular degeneration Neg Hx     Retinal detachment Neg Hx     Strabismus Neg Hx     Stroke Neg Hx     Thyroid disease Neg Hx     Anesthesia problems  Neg Hx     Clotting disorder Neg Hx      problems Neg Hx     Kidney cancer Neg Hx     Kidney disease Neg Hx     Malignant hyperthermia Neg Hx     Sickle cell trait Neg Hx     Lupus Neg Hx     Urolithiasis Neg Hx     Sudden death Neg Hx      Social History     Social History    Marital status:      Spouse name: N/A    Number of children: N/A    Years of education: N/A     Occupational History    Not on file.     Social History Main Topics    Smoking status: Never Smoker    Smokeless tobacco: Never Used    Alcohol use No    Drug use: No    Sexual activity: Not Currently     Other Topics Concern    Not on file     Social History Narrative    No narrative on file     Past Surgical History:   Procedure Laterality Date    CARDIAC SURGERY      CABG    CATARACT EXTRACTION      ou    CATARACT EXTRACTION, BILATERAL      CORONARY ARTERY BYPASS GRAFT  2009    bypass    EYE SURGERY      Left Guera AVF  1/11/2012    retinal laser      ROTATOR CUFF REPAIR  right, 2005    VASCULAR SURGERY       Patient Active Problem List   Diagnosis    ESRD on hemodialysis    History of stroke    Cognitive deficits as late effect of cerebrovascular disease    Congestive heart failure    Type 2 diabetes, uncontrolled, with retinopathy    Secondary renal hyperparathyroidism    Other extrapyramidal disease and abnormal movement disorder    Mononeuritis of lower limb, unspecified    Vascular dementia with depressed mood    Type 2 diabetes, uncontrolled, with neuropathy    CAD (coronary artery disease)    Seizure disorder    Hyperlipidemia LDL goal <100    Anemia of chronic disease    Hypertension    Uncontrolled type 2 diabetes mellitus with end-stage renal disease    History of MI (myocardial infarction)    Long-term insulin use    Atherosclerosis of aorta    S/P CABG (coronary artery bypass graft)    Severe nonproliferative diabetic retinopathy of right eye, with macular edema, associated  "with type 2 diabetes mellitus    Hypertensive retinopathy of both eyes    Prostate cancer    Stenosis of arteriovenous dialysis fistula    ESRD (end stage renal disease) on dialysis    AV fistula stenosis     Review of Systems   Constitutional: Positive for appetite change. Negative for chills, fatigue, fever and unexpected weight change.        Poor appetite since May.   HENT: Negative for nosebleeds.    Respiratory: Negative for shortness of breath and wheezing.    Cardiovascular: Negative for chest pain, palpitations and leg swelling.   Gastrointestinal: Positive for diarrhea. Negative for abdominal distention, abdominal pain, constipation, nausea and vomiting.   Genitourinary: Negative for dysuria and hematuria.   Musculoskeletal: Negative for arthralgias and back pain.   Skin: Negative for pallor.   Neurological: Negative for dizziness, seizures and syncope.   Hematological: Negative for adenopathy.   Psychiatric/Behavioral: Negative for dysphoric mood.         Objective:      There were no vitals taken for this visit.  Estimated body mass index is 22.96 kg/m² as calculated from the following:    Height as of 8/25/17: 5' 10" (1.778 m).    Weight as of 8/25/17: 72.6 kg (160 lb).  Physical Exam   Constitutional: He is oriented to person, place, and time. He appears well-developed and well-nourished. No distress.   HENT:   Head: Atraumatic.   Neck: No tracheal deviation present.   Cardiovascular: Normal rate.    Pulmonary/Chest: Effort normal. No respiratory distress. He has no wheezes.   Abdominal: Soft. Bowel sounds are normal. He exhibits no distension and no mass. There is no tenderness. There is no rebound and no guarding.   Genitourinary:   Genitourinary Comments: Rectal deferred due to diarrhea.   Neurological: He is alert and oriented to person, place, and time.   Skin: Skin is warm and dry. He is not diaphoretic.     Psychiatric: He has a normal mood and affect. His behavior is normal. Judgment and " thought content normal.         Assessment and Plan:           Problem List Items Addressed This Visit     Prostate cancer - Primary    Overview     - 9/2016: followed by urology, Dr. Hall  - opted for watchful waiting            Other Visit Diagnoses    None.         Follow up:     PSA today. Discussed that watchful waiting would be an appropriate course with his othe rmedical issues.  6 months with PSA.  Fermin Collins

## 2017-10-17 ENCOUNTER — TELEPHONE (OUTPATIENT)
Dept: UROLOGY | Facility: CLINIC | Age: 57
End: 2017-10-17

## 2017-10-17 NOTE — TELEPHONE ENCOUNTER
I spoke with patient's wife, who wants to know what 's opinion on his rise in psa level, I advised patient's wife that I would send a message to  for his advise. Wife agreed.

## 2017-10-27 ENCOUNTER — TELEPHONE (OUTPATIENT)
Dept: FAMILY MEDICINE | Facility: CLINIC | Age: 57
End: 2017-10-27

## 2017-10-27 DIAGNOSIS — R62.7 FAILURE TO THRIVE IN ADULT: Primary | ICD-10-CM

## 2017-10-27 NOTE — TELEPHONE ENCOUNTER
Spoke with patient's wife - she reports that the patient is not wanting to eat or drink much.  He is having diarrhea.  The wife was at work and did not have much time to talk.  I advised her that I do not think appetite stimulants are great idea.  We discussed that many patients with end-stage renal disease have poor appetites.  All we can do is encouraged him to eat and drink as much as they can.  I offered to make an appointment to further evaluate his diarrhea.  She declined any office visit at this time.  I will try to message the urologist to discuss possible appetite stimulants.

## 2017-10-27 NOTE — TELEPHONE ENCOUNTER
----- Message from Mia Alicia sent at 10/24/2017 12:53 PM CDT -----  Contact: WIFE  [phone]  Wife is requesting to speak to you regarding pt , pt will not drink the shakes you were talking about. Pls call [phone]. Thanks......Tessie

## 2017-10-30 RX ORDER — MEGESTROL ACETATE 20 MG/1
20 TABLET ORAL DAILY
Qty: 30 TABLET | Refills: 0 | Status: SHIPPED | OUTPATIENT
Start: 2017-10-30 | End: 2017-11-13 | Stop reason: SDUPTHER

## 2017-10-30 NOTE — TELEPHONE ENCOUNTER
Please call patient's wife: Dr. Collins is ok to try low dose megace to see if it will help with appetite. Not sure if his insurance will cover, but will send Rx to pharmacy. Please have her call me in 2-3 weeks to let me know if it is helping or not.

## 2017-10-31 NOTE — TELEPHONE ENCOUNTER
Spoke w/patient's spouse informed of message and medication sent to pharmacy. Verbalized understanding.

## 2017-11-06 DIAGNOSIS — N18.6 ESRD ON HEMODIALYSIS: Primary | ICD-10-CM

## 2017-11-06 DIAGNOSIS — Z99.2 ESRD ON HEMODIALYSIS: Primary | ICD-10-CM

## 2017-11-06 RX ORDER — SEVELAMER CARBONATE 800 MG/1
800 TABLET, FILM COATED ORAL
Qty: 100 TABLET | Refills: 0 | Status: SHIPPED | OUTPATIENT
Start: 2017-11-06 | End: 2017-11-13

## 2017-11-06 NOTE — TELEPHONE ENCOUNTER
----- Message from Cirilo Patterson sent at 11/6/2017  8:58 AM CST -----  Contact: [facility]OUSE [phone]  Calling to speak with nurse to get refill on renvela 800 mg . Shauna Spangler /Stephanie

## 2017-11-10 NOTE — PROGRESS NOTES
This note was created by combination of typed  and Dragon dictation.  Transcription errors may be present.  If there are any questions, please contact me.    Assessment & Plan  ESRD on hemodialysis - renvela expensive.  Will rx phos lo, see if that is cheaper.  Copy nephrology on this note.  -     calcium acetate (PHOSLO) 667 mg capsule; Take 1 capsule (667 mg total) by mouth 3 (three) times daily with meals.  Dispense: 90 capsule; Refill: 11    Failure to thrive in adult - finds the megace helpful, ordered refills today.  -     megestrol (MEGACE) 20 MG Tab; Take 1 tablet (20 mg total) by mouth once daily.  Dispense: 30 tablet; Refill: 5    Uncontrolled type 2 diabetes mellitus with proliferative retinopathy without macular edema, with long-term current use of insulin, unspecified laterality - unable to stay for labs, return for a1c.  Has been dosing insulin just at night, depending on glc.  Checking evening glc not AM glc, asked them to start checking AM glucose.  -     Hemoglobin A1c; Future; Expected date: 11/13/2017        Medications Discontinued During This Encounter   Medication Reason    sevelamer carbonate (RENVELA) 800 mg Tab     megestrol (MEGACE) 20 MG Tab Reorder       Follow-up: No Follow-up on file. return for a1c.      =================================================================      Chief Complaint   Patient presents with    Medication Refill       HPI  Kodi is a 57 y.o. male, last appointment with this clinic was 5/29/2017.    Pt of Dr. Elias.  Followed for ESRD/HD; DM2 with retinopathy severe; HTN; hyperlipidemia; GERD; seizure disorder; hyperPTH; aortic atherosclerosis  5/2017 a1c 7.9  5/2017 plan was insulin 10 units nighttime, 5 units AM  Started on megace.    Here with family member/care giver.  Not taking novolog.  Care giver out all day and does not want to risk hypoglycemia. Currently taking lantus at night only when sugar allows it.  B/c of low appetite.  However with  the megace - eating more regularly.  The sugars in the 150 range, at night. Pre-bedtime.  No fasting glucose readings and recommended that they start checking.      renvela is expensive. Needs alternative to that.    Needs megace refill.    Patient Care Team:  Melonie Elias MD as PCP - General (Internal Medicine)  [facility] as Dialysis Nurse  [facility] as Post Acute Care Provider  LISA Catherine MD as Consulting Physician (Urology)  Tyrone Levy MD as Consulting Physician (Nephrology)  Karen Rosen DPM as Consulting Physician (Podiatry)  LACEY Mix MD as Consulting Physician (Ophthalmology)    Patient Active Problem List    Diagnosis Date Noted    Uncontrolled type 2 diabetes mellitus with both eyes affected by proliferative retinopathy and macular edema, with long-term current use of insulin 11/13/2017    Uncontrolled type 2 diabetes mellitus with right eye affected by severe nonproliferative retinopathy and macular edema, with long-term current use of insulin 11/13/2017    AV fistula stenosis 08/21/2017    Stenosis of arteriovenous dialysis fistula 02/03/2017    ESRD (end stage renal disease) on dialysis     Prostate cancer      - 9/2016: followed by urology, Dr. Hall  - opted for watchful waiting       Hypertensive retinopathy of both eyes 09/16/2016    S/P CABG (coronary artery bypass graft) 06/04/2015 2008 EJ      Long-term insulin use 05/15/2015    Atherosclerosis of aorta      noted on VIKTORIA 2011      Uncontrolled type 2 diabetes mellitus with end-stage renal disease 08/04/2014    History of MI (myocardial infarction) 08/04/2014 2008  - followed by cardiology, Dr. Mccoy      Seizure disorder 10/17/2013     Partial complex  Started 2011 prior to CVA  Followed by neurology, Dr. Jacinto      Hyperlipidemia LDL goal <100 10/17/2013    Anemia of chronic disease 10/17/2013    Hypertension 10/17/2013    CAD (coronary artery disease)  08/30/2013    Type 2 diabetes, uncontrolled, with neuropathy 11/06/2012    ESRD on hemodialysis 07/13/2012     Dialysis on Tu, Th, Sa  [facility]  AV graft care by Dr. Garduno      Cognitive deficits as late effect of cerebrovascular disease 06/26/2012    Type 2 diabetes, uncontrolled, with retinopathy 06/18/2012    Secondary renal hyperparathyroidism 06/18/2012    Other extrapyramidal disease and abnormal movement disorder 06/18/2012    Mononeuritis of lower limb, unspecified 04/24/2012    Vascular dementia with depressed mood 01/15/2012    Congestive heart failure 11/17/2011     Followed by cardiology, Dr. Mccoy      History of stroke 11/12/2011 9/2011, 10/2011         PAST MEDICAL HISTORY:  Past Medical History:   Diagnosis Date    Allergy     Atherosclerosis of aorta     noted on VIKTORIA 2011    Blood transfusion     Cataracts, bilateral     CHF (congestive heart failure)     Chronic kidney disease     Clotting disorder     Coronary artery disease     Decreased appetite     Diabetic retinopathy     DM (diabetes mellitus), type 2 with renal complications     ESRD on hemodialysis     TUESDAY/ THURSDAY/SATURDAY    Hyperlipidemia     Hyperparathyroidism, unspecified     Hypertension     Myocardial infarction     Prostate cancer     Seizures     Stroke 2011    PT HAS SHAKES        PAST SURGICAL HISTORY:  Past Surgical History:   Procedure Laterality Date    CARDIAC SURGERY      CABG    CATARACT EXTRACTION      ou    CATARACT EXTRACTION, BILATERAL      cataracts      CORONARY ARTERY BYPASS GRAFT  2009    bypass    EYE SURGERY      Left Geura AVF  1/11/2012    PROSTATE BIOPSY      positive    retinal laser      ROTATOR CUFF REPAIR  right, 2005    VASCULAR SURGERY         SOCIAL HISTORY:  Social History     Social History    Marital status:      Spouse name: N/A    Number of children: N/A    Years of education: N/A     Occupational History    Not on file.  "    Social History Main Topics    Smoking status: Never Smoker    Smokeless tobacco: Never Used    Alcohol use No    Drug use: No    Sexual activity: Not Currently     Other Topics Concern    Not on file     Social History Narrative    No narrative on file       ALLERGIES AND MEDICATIONS: updated and reviewed.  Review of patient's allergies indicates:   Allergen Reactions    Reglan [metoclopramide hcl] Diarrhea    Lorazepam      Other reaction(s): heavy sedation     Current Outpatient Prescriptions   Medication Sig Dispense Refill    amlodipine (NORVASC) 10 MG tablet Take 1 tablet (10 mg total) by mouth once daily. 90 tablet 1    atorvastatin (LIPITOR) 40 MG tablet Take 1 tablet (40 mg total) by mouth once daily. 90 tablet 1    BD INSULIN PEN NEEDLE UF ORIG 29 gauge x 1/2" Ndle USE  1 EVERY EVENING 90 each 3    blood glucose control, normal Soln 1 drop by Misc.(Non-Drug; Combo Route) route as directed. 1 each 1    blood sugar diagnostic (TRUE METRIX GLUCOSE TEST STRIP) Strp 1 strip by Misc.(Non-Drug; Combo Route) route 2 (two) times daily. 100 strip 3    carvedilol (COREG) 25 MG tablet Take 1 tablet (25 mg total) by mouth 2 (two) times daily with meals. 180 tablet 1    furosemide (LASIX) 40 MG tablet Take 1 tablet (40 mg total) by mouth once daily. 90 tablet 1    gabapentin (NEURONTIN) 100 MG capsule Take 1 capsule (100 mg total) by mouth once daily. 90 capsule 0    insulin aspart (NOVOLOG FLEXPEN) 100 unit/mL InPn pen Inject 5-10 units with meals and hs depending on blood sugar 15 mL 3    insulin glargine (LANTUS SOLOSTAR) 100 unit/mL (3 mL) InPn pen INJECT 20 UNITS UNDER THE SKIN NIGHTLY 15 mL 0    lancets (TRUEPLUS LANCETS) 33 gauge Misc 1 lancet by Misc.(Non-Drug; Combo Route) route 2 (two) times daily. 100 each 3    levetiracetam (KEPPRA) 500 MG Tab Take 1 tablet (500 mg total) by mouth 2 (two) times daily. 180 tablet 1    losartan (COZAAR) 50 MG tablet Take 1 tablet (50 mg total) by mouth " "once daily. 90 tablet 1    megestrol (MEGACE) 20 MG Tab Take 1 tablet (20 mg total) by mouth once daily. 30 tablet 0    NOVOFINE 30 30 x 1/3 " Ndle USE AS DIRECTED WITH LANTUS SOLARSTAR 100 each 3    pantoprazole (PROTONIX) 40 MG tablet Take 1 tablet (40 mg total) by mouth daily as needed (heartburn). 90 tablet 0    promethazine (PHENERGAN) 25 MG tablet Take 1 tablet (25 mg total) by mouth every 6 (six) hours as needed for Nausea. 30 tablet 0    promethazine (PHENERGAN) 6.25 mg/5 mL syrup TAKE 20 MLS BY MOUTH EVERY 6 HOURS AS NEEDED FOR NAUSEA 118 mL 0    sevelamer carbonate (RENVELA) 800 mg Tab Take 1 tablet (800 mg total) by mouth 3 (three) times daily with meals. 100 tablet 0    TRUE METRIX AIR GLUCOSE METER kit       aspirin 81 MG chewable tablet Take 81 mg by mouth. 1 Tablet, Chewable Oral Every day      nitroGLYCERIN (NITROSTAT) 0.4 MG SL tablet Place 1 tablet (0.4 mg total) under the tongue every 5 (five) minutes as needed for Chest pain. 30 tablet 0     No current facility-administered medications for this visit.        Review of Systems   Constitutional: Negative for chills and fever.   Respiratory: Negative for shortness of breath.    Cardiovascular: Negative for chest pain and palpitations.       Physical Exam   Vitals:    11/13/17 1339   BP: 101/63   Pulse: 82   Temp: 98.6 °F (37 °C)   SpO2: 96%   Weight: 70.8 kg (156 lb 3.1 oz)   Height: 5' 10" (1.778 m)    Body mass index is 22.41 kg/m².  Weight: 70.8 kg (156 lb 3.1 oz)   Height: 5' 10" (177.8 cm)     Physical Exam   Constitutional: He appears well-developed and well-nourished. No distress.   Eyes: EOM are normal.   Cardiovascular: Normal rate, regular rhythm and normal heart sounds.    No murmur heard.  Pulmonary/Chest: Effort normal and breath sounds normal.   Musculoskeletal: Normal range of motion.   Neurological: He is alert. Coordination normal.   Skin: Skin is warm and dry.   Psychiatric: He has a normal mood and affect. His behavior is " normal. Thought content normal.

## 2017-11-13 ENCOUNTER — INITIAL CONSULT (OUTPATIENT)
Dept: OPHTHALMOLOGY | Facility: CLINIC | Age: 57
End: 2017-11-13
Payer: MEDICARE

## 2017-11-13 ENCOUNTER — OFFICE VISIT (OUTPATIENT)
Dept: FAMILY MEDICINE | Facility: CLINIC | Age: 57
End: 2017-11-13
Payer: MEDICARE

## 2017-11-13 VITALS
TEMPERATURE: 99 F | HEART RATE: 82 BPM | OXYGEN SATURATION: 96 % | DIASTOLIC BLOOD PRESSURE: 63 MMHG | SYSTOLIC BLOOD PRESSURE: 101 MMHG | WEIGHT: 156.19 LBS | HEIGHT: 70 IN | BODY MASS INDEX: 22.36 KG/M2

## 2017-11-13 DIAGNOSIS — Z99.2 ESRD ON HEMODIALYSIS: Primary | ICD-10-CM

## 2017-11-13 DIAGNOSIS — E11.3599 UNCONTROLLED TYPE 2 DIABETES MELLITUS WITH PROLIFERATIVE RETINOPATHY WITHOUT MACULAR EDEMA, WITH LONG-TERM CURRENT USE OF INSULIN, UNSPECIFIED LATERALITY: ICD-10-CM

## 2017-11-13 DIAGNOSIS — R62.7 FAILURE TO THRIVE IN ADULT: ICD-10-CM

## 2017-11-13 DIAGNOSIS — E11.65 UNCONTROLLED TYPE 2 DIABETES MELLITUS WITH PROLIFERATIVE RETINOPATHY WITHOUT MACULAR EDEMA, WITH LONG-TERM CURRENT USE OF INSULIN, UNSPECIFIED LATERALITY: ICD-10-CM

## 2017-11-13 DIAGNOSIS — H35.033 HYPERTENSIVE RETINOPATHY OF BOTH EYES: ICD-10-CM

## 2017-11-13 DIAGNOSIS — N18.6 ESRD ON HEMODIALYSIS: Primary | ICD-10-CM

## 2017-11-13 DIAGNOSIS — Z79.4 UNCONTROLLED TYPE 2 DIABETES MELLITUS WITH PROLIFERATIVE RETINOPATHY WITHOUT MACULAR EDEMA, WITH LONG-TERM CURRENT USE OF INSULIN, UNSPECIFIED LATERALITY: ICD-10-CM

## 2017-11-13 PROCEDURE — 99999 PR PBB SHADOW E&M-EST. PATIENT-LVL III: CPT | Mod: PBBFAC,,, | Performed by: INTERNAL MEDICINE

## 2017-11-13 PROCEDURE — 99214 OFFICE O/P EST MOD 30 MIN: CPT | Mod: S$GLB,,, | Performed by: INTERNAL MEDICINE

## 2017-11-13 PROCEDURE — 99999 PR PBB SHADOW E&M-EST. PATIENT-LVL III: CPT | Mod: PBBFAC,,, | Performed by: OPHTHALMOLOGY

## 2017-11-13 PROCEDURE — 92014 COMPRE OPH EXAM EST PT 1/>: CPT | Mod: S$GLB,,, | Performed by: OPHTHALMOLOGY

## 2017-11-13 PROCEDURE — 92226 PR SPECIAL EYE EXAM, SUBSEQUENT: CPT | Mod: 50,S$GLB,, | Performed by: OPHTHALMOLOGY

## 2017-11-13 RX ORDER — CALCIUM ACETATE 667 MG/1
667 CAPSULE ORAL
Qty: 90 CAPSULE | Refills: 11 | Status: SHIPPED | OUTPATIENT
Start: 2017-11-13 | End: 2019-02-04

## 2017-11-13 RX ORDER — MEGESTROL ACETATE 20 MG/1
20 TABLET ORAL DAILY
Qty: 30 TABLET | Refills: 5 | Status: SHIPPED | OUTPATIENT
Start: 2017-11-13 | End: 2018-01-15 | Stop reason: SDUPTHER

## 2017-11-13 NOTE — Clinical Note
MARC saw this pt today - renvela expensive, changed to phos betina.  Unless there is a better alternative?  Thanks, Eduard

## 2017-11-13 NOTE — LETTER
November 13, 2017      Melonie Elias MD  [address]  [place] [address]           [facility]  [address]  [place] [address]  Phone: [phone]  Fax: [phone]          Patient: Kodi Ibanez   MR Number: [id number]   YOB: 1960   Date of Visit: 11/13/2017       Dear Dr. Melonie Elias:    Thank you for referring Kodi Ibanez to me for evaluation. Attached you will find relevant portions of my assessment and plan of care.    If you have questions, please do not hesitate to call me. I look forward to following Kodi Ibanez along with you.    Sincerely,    LACEY Mix MD    Enclosure  CC:  No Recipients    If you would like to receive this communication electronically, please contact [email or web address] or [phone] to request more information on [employer] Link access.    For providers and/or their staff who would like to refer a patient to [facility], please contact us through our one-stop-shop provider referral line, [facility], at [phone].    If you feel you have received this communication in error or would no longer like to receive these types of communications, please e-mail [email or web address]

## 2017-11-13 NOTE — PROGRESS NOTES
HPI     Diabetic Eye Exam    Additional comments: 1 yr chk           Comments   DLS: 9/16/2016    HPI     Diabetic Eye Exam    Additional comments: Retina follow up           Comments   DLS: 12/4/13 -Padmini. Pt's daughter would like to be seen by Dr. Mix.  Pt seen on 8/10/16 -Dr. Fitzgerald, he suggested an immediate visit with retina.    LBS. x2days ago 150    OCT - No ME OU  Focal OU    A/P    1. Severe NPDR OD  2. PDR OS  S/P PRP OS 7/7/11; 10/2/13   Uncontrolled T2 on insulin    3.DME OU   S/P focal laser OD(6/07; 5/07); OS (2006)    S/P IVK ID (12/08)   Stable    4. HTN Ret OU    5. PCIOL OU      1 yr oct

## 2017-12-04 ENCOUNTER — LAB VISIT (OUTPATIENT)
Dept: LAB | Facility: HOSPITAL | Age: 57
End: 2017-12-04
Attending: INTERNAL MEDICINE
Payer: MEDICARE

## 2017-12-04 DIAGNOSIS — Z79.4 UNCONTROLLED TYPE 2 DIABETES MELLITUS WITH PROLIFERATIVE RETINOPATHY WITHOUT MACULAR EDEMA, WITH LONG-TERM CURRENT USE OF INSULIN, UNSPECIFIED LATERALITY: ICD-10-CM

## 2017-12-04 DIAGNOSIS — E11.3599 UNCONTROLLED TYPE 2 DIABETES MELLITUS WITH PROLIFERATIVE RETINOPATHY WITHOUT MACULAR EDEMA, WITH LONG-TERM CURRENT USE OF INSULIN, UNSPECIFIED LATERALITY: ICD-10-CM

## 2017-12-04 DIAGNOSIS — E11.65 UNCONTROLLED TYPE 2 DIABETES MELLITUS WITH PROLIFERATIVE RETINOPATHY WITHOUT MACULAR EDEMA, WITH LONG-TERM CURRENT USE OF INSULIN, UNSPECIFIED LATERALITY: ICD-10-CM

## 2017-12-04 LAB
ESTIMATED AVG GLUCOSE: 174 MG/DL
HBA1C MFR BLD HPLC: 7.7 %

## 2017-12-04 PROCEDURE — 36415 COLL VENOUS BLD VENIPUNCTURE: CPT | Mod: PO

## 2017-12-04 PROCEDURE — 83036 HEMOGLOBIN GLYCOSYLATED A1C: CPT

## 2017-12-06 ENCOUNTER — OFFICE VISIT (OUTPATIENT)
Dept: PODIATRY | Facility: CLINIC | Age: 57
End: 2017-12-06
Payer: MEDICARE

## 2017-12-06 VITALS
HEART RATE: 87 BPM | DIASTOLIC BLOOD PRESSURE: 74 MMHG | SYSTOLIC BLOOD PRESSURE: 135 MMHG | WEIGHT: 156 LBS | HEIGHT: 70 IN | BODY MASS INDEX: 22.33 KG/M2

## 2017-12-06 DIAGNOSIS — L60.1 ONYCHOLYSIS OF TOENAIL: ICD-10-CM

## 2017-12-06 DIAGNOSIS — M20.5X2 HALLUX LIMITUS, ACQUIRED, LEFT: ICD-10-CM

## 2017-12-06 DIAGNOSIS — Z79.4 UNCONTROLLED TYPE 2 DIABETES MELLITUS WITH PROLIFERATIVE RETINOPATHY WITHOUT MACULAR EDEMA, WITH LONG-TERM CURRENT USE OF INSULIN, UNSPECIFIED LATERALITY: ICD-10-CM

## 2017-12-06 DIAGNOSIS — B35.1 ONYCHOMYCOSIS DUE TO DERMATOPHYTE: ICD-10-CM

## 2017-12-06 DIAGNOSIS — L84 PRE-ULCERATIVE CALLUSES: ICD-10-CM

## 2017-12-06 DIAGNOSIS — I96: ICD-10-CM

## 2017-12-06 DIAGNOSIS — E11.42 DIABETIC POLYNEUROPATHY ASSOCIATED WITH TYPE 2 DIABETES MELLITUS: Primary | ICD-10-CM

## 2017-12-06 DIAGNOSIS — E11.3599 UNCONTROLLED TYPE 2 DIABETES MELLITUS WITH PROLIFERATIVE RETINOPATHY WITHOUT MACULAR EDEMA, WITH LONG-TERM CURRENT USE OF INSULIN, UNSPECIFIED LATERALITY: ICD-10-CM

## 2017-12-06 DIAGNOSIS — N18.6 ESRD ON HEMODIALYSIS: ICD-10-CM

## 2017-12-06 DIAGNOSIS — E11.65 UNCONTROLLED TYPE 2 DIABETES MELLITUS WITH PROLIFERATIVE RETINOPATHY WITHOUT MACULAR EDEMA, WITH LONG-TERM CURRENT USE OF INSULIN, UNSPECIFIED LATERALITY: ICD-10-CM

## 2017-12-06 DIAGNOSIS — M20.5X1 HALLUX LIMITUS, ACQUIRED, RIGHT: ICD-10-CM

## 2017-12-06 DIAGNOSIS — Z99.2 ESRD ON HEMODIALYSIS: ICD-10-CM

## 2017-12-06 DIAGNOSIS — M20.30 HALLUX MALLEUS, UNSPECIFIED LATERALITY: ICD-10-CM

## 2017-12-06 PROCEDURE — 99214 OFFICE O/P EST MOD 30 MIN: CPT | Mod: S$GLB,,, | Performed by: PODIATRIST

## 2017-12-06 PROCEDURE — 99999 PR PBB SHADOW E&M-EST. PATIENT-LVL III: CPT | Mod: PBBFAC,,, | Performed by: PODIATRIST

## 2017-12-06 RX ORDER — SEVELAMER CARBONATE 800 MG/1
TABLET, FILM COATED ORAL
COMMUNITY
Start: 2017-11-09 | End: 2018-05-31 | Stop reason: SDUPTHER

## 2017-12-06 RX ORDER — INSULIN GLARGINE 100 [IU]/ML
INJECTION, SOLUTION SUBCUTANEOUS
Qty: 15 ML | Refills: 0 | Status: SHIPPED | OUTPATIENT
Start: 2017-12-06 | End: 2019-01-08 | Stop reason: SDUPTHER

## 2017-12-06 NOTE — PATIENT INSTRUCTIONS
Wound care Instructions:   · Once a day beginning in 24 hours::   ¨ Clean the toe separate from your body with warm running water and antibacterial soap such as dial for five minutes.  ¨ Clean any remaining crust away with soap and water using a cotton-tipped applicator.  ¨ DRY COMPLETELY  ¨ Apply betadine to the toe.  ¨ Cover with a breathable bandage until there is no more drainage or open flesh.  · Change the dressing daily, or whenever it becomes wet or dirty.  · If you were prescribed antibiotics, take them as directed until they are all gone.  · Wear comfortable shoes with a lot of toe room, or open-toe sandals, while your toe is healing.  · You may use acetaminophen or ibuprofen to control pain, unless another medicine was prescribed. If you have chronic liver or kidney disease or ever had a stomach ulcer or GI bleeding, talk with your doctor before using these medicines.      When to seek medical advice  Call your health care provider right away if any of the following occur:  · Increasing redness, pain or swelling of the toe  · Red streaks in the skin leading away from the wound  · Continued pus or fluid drainage for more than 24 hours  · Fever of 100.4º F (38º C) or higher, or as directed by your health care provider    Recommend lotions: eucerin, aquaphor, A&D ointment, gold bond for diabetics, sween; urea 40 with aloe (found on amazon.com)    Shoe recommendations: (try 6pm.com, [email or web address] , [email or web address], or [email or web address] for discounted prices) you can visit DSW shoes in [employer] as well    Asics (GT 2000 or gel foundations), new balance, saucony (stabil c3),  Vigil (GTS or Beast or transcend), vionic, propet (tennis shoe)    sofft brand, clarks, crocs, aerosoles, naturalizers, SAS, ecco, iza, kendy banegas, rockports (dress shoes)    Vionic, burkenstocks, fitflops, propet (sandals)    Nike comfort thong sandals, crocs, propet (house shoes)    Nail Home remedy:  Vicks Vapor rub to nails for easier  managability    Occasional soaks for 15-20 mins in luke warm water with 1 cup of listerine and 1 cup of apple cider vinegar are ok You may add several drops of oil of oregano or tea tree oil as well      Diabetes: Inspecting Your Feet  Diabetes increases your chances of developing foot problems. So inspect your feet every day. This helps you find small skin irritations before they become serious infections. If you have trouble seeing the bottoms of your feet, use a mirror or ask a family member or friend to help.     Pressure spots on the bottom of the foot are common areas where problems develop.   How to check your feet  Below are tips to help you look for foot problems. Try to check your feet at the same time each day, such as when you get out of bed in the morning:  · Check the top of each foot. The tops of toes, back of the heel, and outer edge of the foot can get a lot of rubbing from poor-fitting shoes.  · Check the bottom of each foot. Daily wear and tear often leads to problems at pressure spots.  · Check the toes and nails. Fungal infections often occur between toes. Toenail problems can also be a sign of fungal infections or lead to breaks in the skin.  · Check your shoes, too. Loose objects inside a shoe can injure the foot. Use your hand to feel inside your shoes for things like rolanda, loose stitching, or rough areas that could irritate your skin.  Warning signs  Look for any color changes in the foot. Redness with streaks can signal a severe infection, which needs immediate medical attention. Tell your doctor right away if you have any of these problems:  · Swelling, sometimes with color changes, may be a sign of poor blood flow or infection. Symptoms include tenderness and an increase in the size of your foot.  · Warm or hot areas on your feet may be signs of infection. A foot that is cold may not be getting enough blood.  · Sensations such as burning, tingling, or pins and needles can be signs of  a problem. Also check for areas that may be numb.  · Hot spots are caused by friction or pressure. Look for hot spots in areas that get a lot of rubbing. Hot spots can turn into blisters, calluses, or sores.  · Cracks and sores are caused by dry or irritated skin. They are a sign that the skin is breaking down, which can lead to infection.  · Toenail problems to watch for include nails growing into the skin (ingrown toenail) and causing redness or pain. Thick, yellow, or discolored nails can signal a fungal infection.  · Drainage and odor can develop from untreated sores and ulcers. Call your doctor right away if you notice white or yellow drainage, bleeding, or unpleasant odor.   © 1475-3072 [employer]. [address] [place] [address]. All rights reserved. This information is not intended as a substitute for professional medical care. Always follow your healthcare professional's instructions.        Step-by-Step:  Inspecting Your Feet (Diabetes)    Date Last Reviewed: 10/1/2016  © 7688-6664 "[employer]. [address] [place] [address]. All rights reserved. This information is not intended as a substitute for professional medical care. Always follow your healthcare professional's instructions.

## 2017-12-07 NOTE — PROGRESS NOTES
a1c higher than ideal but high risk for hypoglycemia - low appetite, skips meals, no one else at home to monitor him.  No change in regimen.  Discussed with pt's PCP and she concurs.

## 2017-12-11 ENCOUNTER — HOSPITAL ENCOUNTER (OUTPATIENT)
Dept: CARDIOLOGY | Facility: HOSPITAL | Age: 57
Discharge: HOME | End: 2017-12-11
Attending: PODIATRIST
Payer: MEDICARE

## 2017-12-11 DIAGNOSIS — L60.1 ONYCHOLYSIS OF TOENAIL: ICD-10-CM

## 2017-12-11 DIAGNOSIS — E11.42 DIABETIC POLYNEUROPATHY ASSOCIATED WITH TYPE 2 DIABETES MELLITUS: ICD-10-CM

## 2017-12-11 DIAGNOSIS — I96: ICD-10-CM

## 2017-12-11 LAB — VASCULAR ANKLE BRACHIAL INDEX (ABI) RIGHT: 0.8 (ref 0.9–1.2)

## 2017-12-11 PROCEDURE — 93922 UPR/L XTREMITY ART 2 LEVELS: CPT | Mod: 26,,, | Performed by: INTERNAL MEDICINE

## 2017-12-11 PROCEDURE — 93922 UPR/L XTREMITY ART 2 LEVELS: CPT

## 2017-12-12 ENCOUNTER — TELEPHONE (OUTPATIENT)
Dept: PODIATRY | Facility: CLINIC | Age: 57
End: 2017-12-12

## 2017-12-12 NOTE — TELEPHONE ENCOUNTER
----- Message from Karen Rosen DPM sent at 12/12/2017 11:07 AM CST -----  Please inform patient of DAX results:    The right ankle brachial index was 0.80 which suggests mild right lower extremity arterial disease.   The left ankle brachial index was 1.14 which is normal.     However The right toe pressure is 0.32. Which indicates significant decreased blood flow to the toes of the right foot    Give patient the option of being seen by a blood , I believe he has a vascular doctor already

## 2017-12-18 ENCOUNTER — TELEPHONE (OUTPATIENT)
Dept: PODIATRY | Facility: CLINIC | Age: 57
End: 2017-12-18

## 2017-12-19 ENCOUNTER — TELEPHONE (OUTPATIENT)
Dept: PODIATRY | Facility: CLINIC | Age: 57
End: 2017-12-19

## 2018-01-05 ENCOUNTER — TELEPHONE (OUTPATIENT)
Dept: PODIATRY | Facility: CLINIC | Age: 58
End: 2018-01-05

## 2018-01-05 NOTE — TELEPHONE ENCOUNTER
----- Message from Silvia Hernadez sent at 1/5/2018  2:22 PM CST -----  Contact: Yaima (wife)[phone]  Pt needs to be scheduled to have vascular labs done on shavon morel Please call  at your earliest convenience.  Thanks !

## 2018-01-05 NOTE — TELEPHONE ENCOUNTER
----- Message from Sharon Schmidt sent at 1/5/2018  3:06 PM CST -----  Contact: Wife  Pt's wife returned call. Wife can be reached @ [phone].

## 2018-01-08 ENCOUNTER — TELEPHONE (OUTPATIENT)
Dept: PODIATRY | Facility: CLINIC | Age: 58
End: 2018-01-08

## 2018-01-08 DIAGNOSIS — I73.9 PVD (PERIPHERAL VASCULAR DISEASE): Primary | ICD-10-CM

## 2018-01-08 NOTE — TELEPHONE ENCOUNTER
----- Message from Chata Borjas sent at 1/8/2018  9:14 AM CST -----  Contact: spouse  Pt wife calling to see if pt has been rescheduled for Vascular Labs. Please call [phone]

## 2018-01-14 DIAGNOSIS — K21.9 GASTROESOPHAGEAL REFLUX DISEASE, ESOPHAGITIS PRESENCE NOT SPECIFIED: ICD-10-CM

## 2018-01-14 RX ORDER — PANTOPRAZOLE SODIUM 40 MG/1
40 TABLET, DELAYED RELEASE ORAL DAILY PRN
Qty: 30 TABLET | Refills: 0 | Status: SHIPPED | OUTPATIENT
Start: 2018-01-14 | End: 2018-01-14 | Stop reason: SDUPTHER

## 2018-01-14 RX ORDER — PANTOPRAZOLE SODIUM 40 MG/1
40 TABLET, DELAYED RELEASE ORAL DAILY PRN
Qty: 90 TABLET | Refills: 0 | Status: SHIPPED | OUTPATIENT
Start: 2018-01-14 | End: 2018-05-21 | Stop reason: SDUPTHER

## 2018-01-15 DIAGNOSIS — R62.7 FAILURE TO THRIVE IN ADULT: ICD-10-CM

## 2018-01-15 RX ORDER — MEGESTROL ACETATE 20 MG/1
TABLET ORAL
Qty: 30 TABLET | Refills: 0 | Status: SHIPPED | OUTPATIENT
Start: 2018-01-15 | End: 2019-05-14 | Stop reason: SDUPTHER

## 2018-01-17 RX ORDER — INSULIN ASPART 100 [IU]/ML
INJECTION, SOLUTION INTRAVENOUS; SUBCUTANEOUS
Qty: 15 ML | Refills: 0 | Status: SHIPPED | OUTPATIENT
Start: 2018-01-17 | End: 2019-01-05 | Stop reason: SDUPTHER

## 2018-02-02 ENCOUNTER — HOSPITAL ENCOUNTER (OUTPATIENT)
Dept: VASCULAR SURGERY | Facility: CLINIC | Age: 58
Discharge: HOME | End: 2018-02-02
Attending: SURGERY
Payer: MEDICARE

## 2018-02-02 ENCOUNTER — OFFICE VISIT (OUTPATIENT)
Dept: VASCULAR SURGERY | Facility: CLINIC | Age: 58
End: 2018-02-02
Payer: MEDICARE

## 2018-02-02 VITALS
HEIGHT: 70 IN | WEIGHT: 155.63 LBS | TEMPERATURE: 98 F | SYSTOLIC BLOOD PRESSURE: 172 MMHG | BODY MASS INDEX: 22.28 KG/M2 | DIASTOLIC BLOOD PRESSURE: 88 MMHG | HEART RATE: 101 BPM

## 2018-02-02 DIAGNOSIS — Z01.818 PREOP EXAMINATION: Primary | ICD-10-CM

## 2018-02-02 DIAGNOSIS — I73.9 PVD (PERIPHERAL VASCULAR DISEASE): Primary | ICD-10-CM

## 2018-02-02 DIAGNOSIS — I73.9 PVD (PERIPHERAL VASCULAR DISEASE): ICD-10-CM

## 2018-02-02 PROCEDURE — 99999 PR PBB SHADOW E&M-EST. PATIENT-LVL IV: CPT | Mod: PBBFAC,,, | Performed by: SURGERY

## 2018-02-02 PROCEDURE — 99214 OFFICE O/P EST MOD 30 MIN: CPT | Mod: S$GLB,,, | Performed by: SURGERY

## 2018-02-02 PROCEDURE — 99499 UNLISTED E&M SERVICE: CPT | Mod: S$GLB,,, | Performed by: SURGERY

## 2018-02-02 PROCEDURE — 93923 UPR/LXTR ART STDY 3+ LVLS: CPT | Mod: S$GLB,,, | Performed by: SURGERY

## 2018-02-02 PROCEDURE — 3008F BODY MASS INDEX DOCD: CPT | Mod: S$GLB,,, | Performed by: SURGERY

## 2018-02-02 NOTE — PROGRESS NOTES
Kodi Ibnaez .  02/02/2018    HPI:  Patient is a 57 y.o. male with a h/o end-stage renal disease, s/p covered stent placement Left AVF (10 x 50 Viabahn), PTA Left Subclavian vein (12 x 40) on 8/21/17.  Pt. Dialyzes T, H, S using the Left arm AVF.    He has been doing well from HD access standpoint. He is here today referred by his podiatrist for evaluation of his bilateral 2nd toe discoloration which was concerning for gangrene.     Patient's wife, who has been in charge of his foot care, has been noticing his bilateral 2nd toe discoloration at the tip since December. He denies other ulcers, ischemic rest pain, or even claudication. He has been wearing diabetic shoes when ambulating.    He is s/p:  1. Covered stent placement Left AVF (10 x 50 Viabahn), PTA Left Subclavian vein (12 x 40), 8/21/17  2.  Angioplasty, left AV fistula (10 x 40 [place]) and left subclavian vein 12 x 40 [place], 03/06/2017.  3.  Stent placement, left AV fistula (10 x 40 [place]), 08/03/2016.  4.  Prior angioplasty of this fistula in 03/20/2016, 2014 and 2013.  5.  Original left brachiobasilic AV fistula creation, 04/01/2013.       Past Medical History:   Diagnosis Date    Allergy     Atherosclerosis of aorta     noted on VIKTORIA 2011    Blood transfusion     Cataracts, bilateral     CHF (congestive heart failure)     Chronic kidney disease     Clotting disorder     Coronary artery disease     Decreased appetite     Diabetic retinopathy     DM (diabetes mellitus), type 2 with renal complications     ESRD on hemodialysis     TUESDAY/ THURSDAY/SATURDAY    Hyperlipidemia     Hyperparathyroidism, unspecified     Hypertension     Myocardial infarction     Prostate cancer     Seizures     Stroke 2011    PT HAS SHAKES      Past Surgical History:   Procedure Laterality Date    CARDIAC SURGERY      CABG    CATARACT EXTRACTION      ou    CATARACT EXTRACTION, BILATERAL      cataracts      CORONARY ARTERY BYPASS GRAFT  2009    bypass     EYE SURGERY      Left Guera AVF  1/11/2012    PROSTATE BIOPSY      positive    retinal laser      ROTATOR CUFF REPAIR  right, 2005    VASCULAR SURGERY       Family History   Problem Relation Age of Onset    Diabetes Mother     Glaucoma Mother     Cancer Mother     Cataracts Mother     Heart disease Father     Cancer Paternal Grandmother     Heart disease Maternal Grandmother     No Known Problems Sister     Prostate cancer Brother     No Known Problems Maternal Grandfather     No Known Problems Paternal Grandfather     No Known Problems Sister     No Known Problems Sister     Hypertension Brother     Cancer Paternal Uncle     Amblyopia Neg Hx     Blindness Neg Hx     Macular degeneration Neg Hx     Retinal detachment Neg Hx     Strabismus Neg Hx     Stroke Neg Hx     Thyroid disease Neg Hx     Anesthesia problems Neg Hx     Clotting disorder Neg Hx      problems Neg Hx     Kidney cancer Neg Hx     Kidney disease Neg Hx     Malignant hyperthermia Neg Hx     Sickle cell trait Neg Hx     Lupus Neg Hx     Urolithiasis Neg Hx     Sudden death Neg Hx      Social History     Social History    Marital status:      Spouse name: N/A    Number of children: N/A    Years of education: N/A     Occupational History    Not on file.     Social History Main Topics    Smoking status: Never Smoker    Smokeless tobacco: Never Used    Alcohol use No    Drug use: No    Sexual activity: Not Currently     Other Topics Concern    Not on file     Social History Narrative    No narrative on file     Review of patient's allergies indicates:   Allergen Reactions    Reglan [metoclopramide hcl] Diarrhea    Lorazepam      Other reaction(s): heavy sedation       Current Outpatient Prescriptions:     amlodipine (NORVASC) 10 MG tablet, Take 1 tablet (10 mg total) by mouth once daily., Disp: 90 tablet, Rfl: 1    aspirin 81 MG chewable tablet, Take 81 mg by mouth. 1 Tablet, Chewable Oral  "Every day, Disp: , Rfl:     atorvastatin (LIPITOR) 40 MG tablet, Take 1 tablet (40 mg total) by mouth once daily., Disp: 90 tablet, Rfl: 1    BD INSULIN PEN NEEDLE UF ORIG 29 gauge x 1/2" Ndle, USE  1 EVERY EVENING, Disp: 90 each, Rfl: 3    blood glucose control, normal Soln, 1 drop by Misc.(Non-Drug; Combo Route) route as directed., Disp: 1 each, Rfl: 1    blood sugar diagnostic (TRUE METRIX GLUCOSE TEST STRIP) Strp, 1 strip by Misc.(Non-Drug; Combo Route) route 2 (two) times daily., Disp: 100 strip, Rfl: 3    calcium acetate (PHOSLO) 667 mg capsule, Take 1 capsule (667 mg total) by mouth 3 (three) times daily with meals., Disp: 90 capsule, Rfl: 11    carvedilol (COREG) 25 MG tablet, Take 1 tablet (25 mg total) by mouth 2 (two) times daily with meals., Disp: 180 tablet, Rfl: 1    furosemide (LASIX) 40 MG tablet, Take 1 tablet (40 mg total) by mouth once daily., Disp: 90 tablet, Rfl: 1    gabapentin (NEURONTIN) 100 MG capsule, Take 1 capsule (100 mg total) by mouth once daily., Disp: 90 capsule, Rfl: 0    insulin glargine (LANTUS SOLOSTAR) 100 unit/mL (3 mL) InPn pen, INJECT 20 UNITS UNDER THE SKIN NIGHTLY, Disp: 15 mL, Rfl: 0    lancets (TRUEPLUS LANCETS) 33 gauge Misc, 1 lancet by Misc.(Non-Drug; Combo Route) route 2 (two) times daily., Disp: 100 each, Rfl: 3    levetiracetam (KEPPRA) 500 MG Tab, Take 1 tablet (500 mg total) by mouth 2 (two) times daily., Disp: 180 tablet, Rfl: 1    losartan (COZAAR) 50 MG tablet, Take 1 tablet (50 mg total) by mouth once daily., Disp: 90 tablet, Rfl: 1    megestrol (MEGACE) 20 MG Tab, TAKE 1 TABLET(20 MG) BY MOUTH EVERY DAY, Disp: 30 tablet, Rfl: 0    NOVOFINE 30 30 x 1/3 " Ndle, USE AS DIRECTED WITH LANTUS SOLARSTAR, Disp: 100 each, Rfl: 3    NOVOLOG FLEXPEN 100 unit/mL InPn pen, INJECT 5 TO 10 UNITS WITH MEALS AND AT BEDTIME DEPENDING ON BLOOD SUGAR, Disp: 15 mL, Rfl: 0    pantoprazole (PROTONIX) 40 MG tablet, Take 1 tablet (40 mg total) by mouth daily as needed " (heartburn). Take only as needed., Disp: 90 tablet, Rfl: 0    promethazine (PHENERGAN) 25 MG tablet, Take 1 tablet (25 mg total) by mouth every 6 (six) hours as needed for Nausea., Disp: 30 tablet, Rfl: 0    promethazine (PHENERGAN) 6.25 mg/5 mL syrup, TAKE 20 MLS BY MOUTH EVERY 6 HOURS AS NEEDED FOR NAUSEA, Disp: 118 mL, Rfl: 0    RENVELA 800 mg Tab, , Disp: , Rfl:     TRUE METRIX AIR GLUCOSE METER kit, , Disp: , Rfl:     nitroGLYCERIN (NITROSTAT) 0.4 MG SL tablet, Place 1 tablet (0.4 mg total) under the tongue every 5 (five) minutes as needed for Chest pain., Disp: 30 tablet, Rfl: 0    REVIEW OF SYSTEMS:  General: negative;   ENT: negative;   Allergy and Immunology: negative;   Hematological and Lymphatic: Negative;   Endocrine: negative;   Respiratory: no cough, shortness of breath, or wheezing;   Cardiovascular: no chest pain or dyspnea on exertion;   Gastrointestinal: no abdominal pain/back, change in bowel habits, or bloody stools; Genito-Urinary: no dysuria, trouble voiding, or hematuria;   Musculoskeletal: negative  Neurological: no TIA or stroke symptoms;   Psychiatric: no nervousness, anxiety or depression.    PHYSICAL EXAM:      Pulse: 101  Temp: 98.4 °F (36.9 °C)      General appearance:  Alert, well-appearing, and in no distress.  Oriented to person, place, and time   Neurological:  Normal speech, no focal findings noted; CN II - XII grossly intact           Musculoskeletal: Digits/nail without cyanosis/clubbing.  Normal muscle strength/tone.                 Neck: Supple, no significant adenopathy; thyroid is not enlarged      No carotid bruit can be auscultated                Chest:  Clear to auscultation, no wheezes, rales or rhonchi, symmetric air entry      No use of accessory muscles             Cardiac: Normal rate and regular rhythm, S1 and S2 normal; PMI non-displaced          Abdomen: Soft, nontender, nondistended, no masses or organomegaly      No rebound tenderness noted; bowel sounds  normal      Pulsatile aortic mass is not palpable.      No groin adenopathy      Extremities:   2+ femoral pulses bilaterally      Left DP palpable.       Symmetrical bilateral distal tip of 2nd toe discoloration likely from footwear. No evidence of gangrene.      Left BVT with great thrill and minimal pulsatility, one area of pseudoaneurysm with no overlying skin changes.     LAB RESULTS:  Lab Results   Component Value Date    K 4.9 08/11/2017    K 4.6 02/03/2017    K 4.7 07/22/2016    CREATININE 6.4 (H) 08/11/2017    CREATININE 7.1 (H) 02/03/2017    CREATININE 6.4 (H) 07/22/2016     Lab Results   Component Value Date    WBC 5.96 08/11/2017    WBC 6.03 02/03/2017    WBC 6.07 07/22/2016    HCT 37.4 (L) 08/11/2017    HCT 35.4 (L) 02/03/2017    HCT 38.1 (L) 07/22/2016     08/11/2017     02/03/2017     07/22/2016     Lab Results   Component Value Date    HGBA1C 7.7 (H) 12/04/2017    HGBA1C 7.9 (H) 05/29/2017    HGBA1C 9.7 (H) 07/11/2016       IMAGING:  Results:  Lower Extremities Segmental Pressure [mmHg]:                    Right             Left  _______________________________________________________________  Brachial          180                 Low Thigh         167               300  Calf              300               204  Posterior Tibial  165               223  Dorsalis Pedis    127               230  DAX (Post. Tib.)  0.92              1.24  DAX (Dors. Ped.)  0.71              1.28    The presence of artifactually elevated pressures in arteries of the lower extremity renders all pressure measurements unreliable due to suspected medial calcinosis.    Report Summary:  Impression:   Right: Segmental pressures are unreliable due to medial calcinosis. PVR waveforms suggest mild peripheral arterial occlusive disease.   Left: Segmental pressures are unreliable due to medial calcinosis. PVR waveforms suggest no evidence of significant peripheral arterial occlusive disease.    IMP/PLAN:  57 y.o.  male with  h/o end-stage renal disease, s/p covered stent placement Left AVF (10 x 50 Viabahn), PTA Left Subclavian vein (12 x 40) on 8/21/17.  Pt. Dialyzes T, H, S using the Left arm AVF.    He has been doing well from HD access standpoint. He is here today referred by his podiatrist for evaluation of his bilateral 2nd toe discoloration which was concerning for gangrene.    This does not appear to be from vascular insuffiency. His ABIs are promising. Con't local wound care.   His AV fistula is working great and has great thrill with minimal pulsatility.     RTC in 6 mo for surveillance of his Left BVT.     Jan Mojica MD  Vascular/Endovascular Surgery Fellow    I have personally taken the history and examined this patient and agree with the resident's note as stated above    LISA Mazariegos III, MD, FACS  Professor and Chief, Vascular and Endovascular Surgery

## 2018-02-05 NOTE — PROGRESS NOTES
Patient Kodi Ibanez , MRN [id number], was dependent on dialysis (ICD10 Z99.2) at the time of this visit on 2/2/18. This addendum is made to the medical record on 02/05/2018.

## 2018-02-19 ENCOUNTER — OFFICE VISIT (OUTPATIENT)
Dept: PODIATRY | Facility: CLINIC | Age: 58
End: 2018-02-19
Payer: MEDICARE

## 2018-02-19 ENCOUNTER — TELEPHONE (OUTPATIENT)
Dept: PODIATRY | Facility: CLINIC | Age: 58
End: 2018-02-19

## 2018-02-19 VITALS
SYSTOLIC BLOOD PRESSURE: 172 MMHG | WEIGHT: 155 LBS | DIASTOLIC BLOOD PRESSURE: 89 MMHG | HEIGHT: 70 IN | HEART RATE: 93 BPM | BODY MASS INDEX: 22.19 KG/M2

## 2018-02-19 DIAGNOSIS — M20.5X1 HALLUX LIMITUS, ACQUIRED, RIGHT: ICD-10-CM

## 2018-02-19 DIAGNOSIS — L84 PRE-ULCERATIVE CALLUSES: ICD-10-CM

## 2018-02-19 DIAGNOSIS — B35.1 ONYCHOMYCOSIS DUE TO DERMATOPHYTE: ICD-10-CM

## 2018-02-19 DIAGNOSIS — Z99.2 ESRD ON HEMODIALYSIS: ICD-10-CM

## 2018-02-19 DIAGNOSIS — M20.5X2 HALLUX LIMITUS, ACQUIRED, LEFT: ICD-10-CM

## 2018-02-19 DIAGNOSIS — E11.42 DIABETIC POLYNEUROPATHY ASSOCIATED WITH TYPE 2 DIABETES MELLITUS: Primary | ICD-10-CM

## 2018-02-19 DIAGNOSIS — M20.30 HALLUX MALLEUS, UNSPECIFIED LATERALITY: ICD-10-CM

## 2018-02-19 DIAGNOSIS — N18.6 ESRD ON HEMODIALYSIS: ICD-10-CM

## 2018-02-19 PROCEDURE — 99999 PR PBB SHADOW E&M-EST. PATIENT-LVL III: CPT | Mod: PBBFAC,,, | Performed by: PODIATRIST

## 2018-02-19 PROCEDURE — 3008F BODY MASS INDEX DOCD: CPT | Mod: S$GLB,,, | Performed by: PODIATRIST

## 2018-02-19 PROCEDURE — 99214 OFFICE O/P EST MOD 30 MIN: CPT | Mod: S$GLB,,, | Performed by: PODIATRIST

## 2018-02-19 NOTE — TELEPHONE ENCOUNTER
----- Message from Cirilo Patterson sent at 2/19/2018 11:13 AM CST -----  Contact: roselyn [phone]  Calling TO get orders for diabetic shoes . [facility] [phone]

## 2018-02-19 NOTE — PATIENT INSTRUCTIONS
Recommend lotions: eucerin, eucerin for diabetics, aquaphor, A&D ointment, gold bond for diabetics, sween, [place]'s Bees all purpose baby ointment,  urea 40 with aloe (found on amazon.com)    Shoe recommendations: (try 6pm.com, zappos.com , nordstromrack.S[employer] Science, or shoes[employer] for discounted prices) you can visit DSW shoes in [place]  or [employer] [facility] in the [facility] (there are also several shoe brand outlets in the [facility])    Asics (GT 2000 or gel foundations), new balance stability type shoes, saucony (stabil c3),  Vigil (GTS or Beast or transcend), vionic, propet (tennis shoe)    sofft brand, clarks, crocs, aerosoles, naturalizers, SAS, ecco, born, kendy banegas, rockports (dress shoes)    Vionic, burkenstocks, fitflops, propet (sandals)  Nike comfort thong sandals, crocs, propet (house shoes)    Nail Home remedy:  Vicks Vapor rub to nails for easier managability    Occasional soaks for 15-20 mins in luke warm water with 1 cup of listerine and 1 cup of apple cider vinegar are ok You may add several drops of oil of oregano or tea tree oil as well        Diabetes: Inspecting Your Feet  Diabetes increases your chances of developing foot problems. So inspect your feet every day. This helps you find small skin irritations before they become serious infections. If you have trouble seeing the bottoms of your feet, use a mirror or ask a family member or friend to help.     Pressure spots on the bottom of the foot are common areas where problems develop.   How to check your feet  Below are tips to help you look for foot problems. Try to check your feet at the same time each day, such as when you get out of bed in the morning:  · Check the top of each foot. The tops of toes, back of the heel, and outer edge of the foot can get a lot of rubbing from poor-fitting shoes.  · Check the bottom of each foot. Daily wear and tear often leads to problems at pressure spots.  · Check the toes and nails. Fungal infections often occur  between toes. Toenail problems can also be a sign of fungal infections or lead to breaks in the skin.  · Check your shoes, too. Loose objects inside a shoe can injure the foot. Use your hand to feel inside your shoes for things like rolanda, loose stitching, or rough areas that could irritate your skin.  Warning signs  Look for any color changes in the foot. Redness with streaks can signal a severe infection, which needs immediate medical attention. Tell your doctor right away if you have any of these problems:  · Swelling, sometimes with color changes, may be a sign of poor blood flow or infection. Symptoms include tenderness and an increase in the size of your foot.  · Warm or hot areas on your feet may be signs of infection. A foot that is cold may not be getting enough blood.  · Sensations such as burning, tingling, or pins and needles can be signs of a problem. Also check for areas that may be numb.  · Hot spots are caused by friction or pressure. Look for hot spots in areas that get a lot of rubbing. Hot spots can turn into blisters, calluses, or sores.  · Cracks and sores are caused by dry or irritated skin. They are a sign that the skin is breaking down, which can lead to infection.  · Toenail problems to watch for include nails growing into the skin (ingrown toenail) and causing redness or pain. Thick, yellow, or discolored nails can signal a fungal infection.  · Drainage and odor can develop from untreated sores and ulcers. Call your doctor right away if you notice white or yellow drainage, bleeding, or unpleasant odor.   © 0029-7676 [employer]. [address] [place] [address]. All rights reserved. This information is not intended as a substitute for professional medical care. Always follow your healthcare professional's instructions.        Step-by-Step:  Inspecting Your Feet (Diabetes)    Date Last Reviewed: 10/1/2016  © 9154-0152 [employer]. [address] [place] [address]  Road, [place] [address]. All rights reserved. This information is not intended as a substitute for professional medical care. Always follow your healthcare professional's instructions.

## 2018-02-19 NOTE — PROGRESS NOTES
"Subjective:      Patient ID: Kodi Ibanez Sr. is a 58 y.o. male.    Chief Complaint: Foot Problem (pcp Dr. Elias 11/13/17)    Kodi is a 58 y.o. male who presents to the clinic for evaluation and treatment of high risk feet. Kodi has a past medical history of Allergy; Atherosclerosis of aorta; Blood transfusion; Cataracts, bilateral; CHF (congestive heart failure); Chronic kidney disease; Clotting disorder; Coronary artery disease; Decreased appetite; Diabetic retinopathy; DM (diabetes mellitus), type 2 with renal complications; ESRD on hemodialysis; Hyperlipidemia; Hyperparathyroidism, unspecified; Hypertension; Myocardial infarction; Prostate cancer; Seizures; and Stroke (2011). The patient and his wife present to discuss hyperpigmentation to the left digit and patients risk for "gangrene". Following our last encounter patient was seen vascular and informed he has adequate perfusion. This patient has documented high risk feet requiring routine maintenance secondary to diabetes mellitis and those secondary complications of diabetes, as mentioned.    PCP: Melonie Elias MD    Date Last Seen by PCP:   Chief Complaint   Patient presents with    Foot Problem     pcp Dr. Elias 11/13/17        Current shoe gear:  Rx diabetic extra depth shoes and custom accommodative insoles    History of Trauma: negative  Sign of Infection: none    Hemoglobin A1C   Date Value Ref Range Status   12/04/2017 7.7 (H) 4.0 - 5.6 % Final     Comment:     According to ADA guidelines, hemoglobin A1c <7.0% represents  optimal control in non-pregnant diabetic patients. Different  metrics may apply to specific patient populations.   Standards of Medical Care in Diabetes-2016.  For the purpose of screening for the presence of diabetes:  <5.7%     Consistent with the absence of diabetes  5.7-6.4%  Consistent with increasing risk for diabetes   (prediabetes)  >or=6.5%  Consistent with diabetes  Currently, no consensus exists for use of " hemoglobin A1c  for diagnosis of diabetes for children.  This Hemoglobin A1c assay has significant interference with fetal   hemoglobin   (HbF). The results are invalid for patients with abnormal amounts of   HbF,   including those with known Hereditary Persistence   of Fetal Hemoglobin. Heterozygous hemoglobin variants (HbAS, HbAC,   HbAD, HbAE, HbA2) do not significantly interfere with this assay;   however, presence of multiple variants in a sample may impact the %   interference.     05/29/2017 7.9 (H) 4.5 - 6.2 % Final     Comment:     According to ADA guidelines, hemoglobin A1C <7.0% represents  optimal control in non-pregnant diabetic patients.  Different  metrics may apply to specific populations.   Standards of Medical Care in Diabetes - 2016.  For the purpose of screening for the presence of diabetes:  <5.7%     Consistent with the absence of diabetes  5.7-6.4%  Consistent with increasing risk for diabetes   (prediabetes)  >or=6.5%  Consistent with diabetes  Currently no consensus exists for use of hemoglobin A1C  for diagnosis of diabetes for children.     07/11/2016 9.7 (H) 4.5 - 6.2 % Final     Comment:     According to ADA guidelines, hemoglobin A1C <7.0% represents  optimal control in non-pregnant diabetic patients.  Different  metrics may apply to specific populations.   Standards of Medical Care in Diabetes - 2016.  For the purpose of screening for the presence of diabetes:  <5.7%     Consistent with the absence of diabetes  5.7-6.4%  Consistent with increasing risk for diabetes   (prediabetes)  >or=6.5%  Consistent with diabetes  Currently no consensus exists for use of hemoglobin A1C  for diagnosis of diabetes for children.       Patient Active Problem List   Diagnosis    ESRD on hemodialysis    History of stroke    Cognitive deficits as late effect of cerebrovascular disease    Congestive heart failure    Type 2 diabetes, uncontrolled, with retinopathy    Secondary renal hyperparathyroidism  "   Other extrapyramidal disease and abnormal movement disorder    Mononeuritis of lower limb, unspecified    Vascular dementia with depressed mood    Type 2 diabetes, uncontrolled, with neuropathy    CAD (coronary artery disease)    Seizure disorder    Hyperlipidemia LDL goal <100    Anemia of chronic disease    Hypertension    Uncontrolled type 2 diabetes mellitus with end-stage renal disease    History of MI (myocardial infarction)    Long-term insulin use    Atherosclerosis of aorta    S/P CABG (coronary artery bypass graft)    Hypertensive retinopathy of both eyes    Prostate cancer    Stenosis of arteriovenous dialysis fistula    ESRD (end stage renal disease) on dialysis    AV fistula stenosis    Uncontrolled type 2 diabetes mellitus with both eyes affected by proliferative retinopathy and macular edema, with long-term current use of insulin    Uncontrolled type 2 diabetes mellitus with right eye affected by severe nonproliferative retinopathy and macular edema, with long-term current use of insulin    PVD (peripheral vascular disease)     Current Outpatient Prescriptions on File Prior to Visit   Medication Sig Dispense Refill    amlodipine (NORVASC) 10 MG tablet Take 1 tablet (10 mg total) by mouth once daily. 90 tablet 1    aspirin 81 MG chewable tablet Take 81 mg by mouth. 1 Tablet, Chewable Oral Every day      atorvastatin (LIPITOR) 40 MG tablet Take 1 tablet (40 mg total) by mouth once daily. 90 tablet 1    BD INSULIN PEN NEEDLE UF ORIG 29 gauge x 1/2" Ndle USE  1 EVERY EVENING 90 each 3    blood glucose control, normal Soln 1 drop by Misc.(Non-Drug; Combo Route) route as directed. 1 each 1    blood sugar diagnostic (TRUE METRIX GLUCOSE TEST STRIP) Strp 1 strip by Misc.(Non-Drug; Combo Route) route 2 (two) times daily. 100 strip 3    calcium acetate (PHOSLO) 667 mg capsule Take 1 capsule (667 mg total) by mouth 3 (three) times daily with meals. 90 capsule 11    carvedilol " "(COREG) 25 MG tablet Take 1 tablet (25 mg total) by mouth 2 (two) times daily with meals. 180 tablet 1    furosemide (LASIX) 40 MG tablet Take 1 tablet (40 mg total) by mouth once daily. 90 tablet 1    gabapentin (NEURONTIN) 100 MG capsule Take 1 capsule (100 mg total) by mouth once daily. 90 capsule 0    insulin glargine (LANTUS SOLOSTAR) 100 unit/mL (3 mL) InPn pen INJECT 20 UNITS UNDER THE SKIN NIGHTLY 15 mL 0    lancets (TRUEPLUS LANCETS) 33 gauge Misc 1 lancet by Misc.(Non-Drug; Combo Route) route 2 (two) times daily. 100 each 3    levetiracetam (KEPPRA) 500 MG Tab Take 1 tablet (500 mg total) by mouth 2 (two) times daily. 180 tablet 1    losartan (COZAAR) 50 MG tablet Take 1 tablet (50 mg total) by mouth once daily. 90 tablet 1    megestrol (MEGACE) 20 MG Tab TAKE 1 TABLET(20 MG) BY MOUTH EVERY DAY 30 tablet 0    nitroGLYCERIN (NITROSTAT) 0.4 MG SL tablet Place 1 tablet (0.4 mg total) under the tongue every 5 (five) minutes as needed for Chest pain. 30 tablet 0    NOVOFINE 30 30 x 1/3 " Ndle USE AS DIRECTED WITH LANTUS SOLARSTAR 100 each 3    NOVOLOG FLEXPEN 100 unit/mL InPn pen INJECT 5 TO 10 UNITS WITH MEALS AND AT BEDTIME DEPENDING ON BLOOD SUGAR 15 mL 0    pantoprazole (PROTONIX) 40 MG tablet Take 1 tablet (40 mg total) by mouth daily as needed (heartburn). Take only as needed. 90 tablet 0    promethazine (PHENERGAN) 25 MG tablet Take 1 tablet (25 mg total) by mouth every 6 (six) hours as needed for Nausea. 30 tablet 0    promethazine (PHENERGAN) 6.25 mg/5 mL syrup TAKE 20 MLS BY MOUTH EVERY 6 HOURS AS NEEDED FOR NAUSEA 118 mL 0    RENVELA 800 mg Tab       TRUE METRIX AIR GLUCOSE METER kit        No current facility-administered medications on file prior to visit.      Allergies   Allergen Reactions    Reglan [Metoclopramide Hcl] Diarrhea    Lorazepam      Other reaction(s): heavy sedation     Past Surgical History:   Procedure Laterality Date    CARDIAC SURGERY      CABG    CATARACT " EXTRACTION      ou    CATARACT EXTRACTION, BILATERAL      cataracts      CORONARY ARTERY BYPASS GRAFT  2009    bypass    EYE SURGERY      Left Guera AVF  1/11/2012    PROSTATE BIOPSY      positive    retinal laser      ROTATOR CUFF REPAIR  right, 2005    VASCULAR SURGERY       Family History   Problem Relation Age of Onset    Diabetes Mother     Glaucoma Mother     Cancer Mother     Cataracts Mother     Heart disease Father     Cancer Paternal Grandmother     Heart disease Maternal Grandmother     No Known Problems Sister     Prostate cancer Brother     No Known Problems Maternal Grandfather     No Known Problems Paternal Grandfather     No Known Problems Sister     No Known Problems Sister     Hypertension Brother     Cancer Paternal Uncle     Amblyopia Neg Hx     Blindness Neg Hx     Macular degeneration Neg Hx     Retinal detachment Neg Hx     Strabismus Neg Hx     Stroke Neg Hx     Thyroid disease Neg Hx     Anesthesia problems Neg Hx     Clotting disorder Neg Hx      problems Neg Hx     Kidney cancer Neg Hx     Kidney disease Neg Hx     Malignant hyperthermia Neg Hx     Sickle cell trait Neg Hx     Lupus Neg Hx     Urolithiasis Neg Hx     Sudden death Neg Hx      Social History     Social History    Marital status:      Spouse name: N/A    Number of children: N/A    Years of education: N/A     Occupational History    Not on file.     Social History Main Topics    Smoking status: Never Smoker    Smokeless tobacco: Never Used    Alcohol use No    Drug use: No    Sexual activity: Not Currently     Other Topics Concern    Not on file     Social History Narrative    No narrative on file       Review of Systems   Constitution: Negative for chills and fever.   Cardiovascular: Negative for leg swelling.   Respiratory: Negative for shortness of breath.    Skin: Positive for nail changes and poor wound healing.   Musculoskeletal: Negative for joint pain and  "joint swelling.   Gastrointestinal: Negative for nausea and vomiting.        Liver disease   Genitourinary:        Kidney disease   Neurological: Positive for focal weakness (history of stroke), numbness (in feet) and paresthesias (in feet).   Psychiatric/Behavioral: The patient is nervous/anxious.          Objective:       Vitals:    02/19/18 0937   BP: (!) 172/89   Pulse: 93   Weight: 70.3 kg (155 lb)   Height: 5' 10" (1.778 m)   PainSc: 0-No pain       Physical Exam   Constitutional: He is oriented to person, place, and time. He appears well-developed and well-nourished. No distress.   Cardiovascular: Normal rate and intact distal pulses.    Dorsalis pedis and posterior tibial pulses are palpable Bilaterally. Toes are cool to touch. Feet are warm proximally.There is decreased digital hair. Skin is atrophic, slightly hyperpigmented, and mildly edematous.     Musculoskeletal: Normal range of motion. He exhibits edema (mild). He exhibits no tenderness.   Decreased stride, station of gait.  apropulsive toe off.  Increased angle and base of gait.    Patient has hammertoes of digits 1-5 bilateral partially reducible without symptom today.    Decreased first MPJ range of motion both weightbearing and nonweightbearing, no crepitus observed the first MP joint, + dorsal flag sign. Mild  bunion deformity is observed .     Neurological: He is alert and oriented to person, place, and time. He exhibits normal muscle tone.   [place] 5.07 monofilamant testing is absent Ralf feet. Sharp/dull sensation absent Bilaterally. Light touch absent Bilaterally.     Skin: Skin is warm, dry and intact. No abrasion, no burn, no laceration, no petechiae and no rash noted. He is not diaphoretic. No pallor.   Hyperpigmentation to bilateral 2nd digit    All nails neatly trimmed    Toenails 1-5 bilaterally  thickened by 2-3 mm, discolored/yellowed, dystrophic, brittle with subungual debris.   Psychiatric: He has a normal mood and affect. " His behavior is normal. Judgment and thought content normal.   Nursing note and vitals reviewed.    The right ankle brachial index was 0.80 which suggests mild right lower extremity arterial disease.   The left ankle brachial index was 1.14 which is normal.   The right TBI is 0.32.   The left TBI is 0.65.     Assessment:       Encounter Diagnoses   Name Primary?    Diabetic polyneuropathy associated with type 2 diabetes mellitus Yes    ESRD on hemodialysis     Pre-ulcerative calluses     Hallux malleus, unspecified laterality     Hallux limitus, acquired, left     Hallux limitus, acquired, right     Onychomycosis due to dermatophyte    Ulcer well healed without signs of infection. This patient has documented high risk feet requiring routine maintenance secondary to diabetes mellitis and those secondary complications of diabetes, as mentioned.        Plan:       Kodi was seen today for foot problem.    Diagnoses and all orders for this visit:    Diabetic polyneuropathy associated with type 2 diabetes mellitus    ESRD on hemodialysis    Pre-ulcerative calluses    Hallux malleus, unspecified laterality    Hallux limitus, acquired, left    Hallux limitus, acquired, right    Onychomycosis due to dermatophyte    - Patient was given written and verbal instructions regarding foot condition.      ~ 50% of this encounter spent discussing condition and answering patient and wife concerns    - Shoe inspection. Diabetic Foot Education. Patient reminded of the importance of good nutrition and blood sugar control to help prevent podiatric complications of diabetes. Patient instructed on proper foot hygeine. We discussed wearing proper shoe gear, daily foot inspections, never walking without protective shoe gear, never putting sharp instruments to feet    Discussed TBI in detail and how it is an indices for healing potential. Currently no signs of gangrene    Healing cycle, skin integrity, ways to care for skin.Counseled  patient on the effects of  PVD  high blood glucose on healing. He verbalizes understanding that it can increase the chances of delayed healing and this prolonged exposure leads to infection or progression of infection which subsequently can result in loss of limb.    Adequate vitamin supplementation, protein intake, and hydration - discussed with patient    Follow-up: 3-5 months but should call [facility] immediately if any signs of infection, such as fever, chills, sweats, increased redness or pain.    Short-term goals include maintaining good offloading and minimizing bioburden, promoting granulation and epithelialization to healing.  Long-term goals include keeping the wound healed by good offloading and medical management under the direction of internist.    Procedures

## 2018-02-21 ENCOUNTER — TELEPHONE (OUTPATIENT)
Dept: PODIATRY | Facility: CLINIC | Age: 58
End: 2018-02-21

## 2018-02-21 NOTE — TELEPHONE ENCOUNTER
Fax received requesting last office notes to be faxed to [phone]    Records faxed as requested. Also sent via EPIC

## 2018-04-05 ENCOUNTER — TELEPHONE (OUTPATIENT)
Dept: UROLOGY | Facility: CLINIC | Age: 58
End: 2018-04-05

## 2018-04-09 ENCOUNTER — LAB VISIT (OUTPATIENT)
Dept: LAB | Facility: HOSPITAL | Age: 58
End: 2018-04-09
Attending: UROLOGY
Payer: MEDICARE

## 2018-04-09 ENCOUNTER — TELEPHONE (OUTPATIENT)
Dept: UROLOGY | Facility: CLINIC | Age: 58
End: 2018-04-09

## 2018-04-09 DIAGNOSIS — C61 PROSTATE CANCER: ICD-10-CM

## 2018-04-09 LAB — COMPLEXED PSA SERPL-MCNC: 7.6 NG/ML

## 2018-04-09 PROCEDURE — 36415 COLL VENOUS BLD VENIPUNCTURE: CPT | Mod: PO

## 2018-04-09 PROCEDURE — 84153 ASSAY OF PSA TOTAL: CPT

## 2018-04-09 NOTE — TELEPHONE ENCOUNTER
----- Message from Holly Prajapati RN sent at 4/6/2018  3:10 PM CDT -----  Contact: Pt      ----- Message -----  From: Jone Hernadez MA  Sent: 4/6/2018   1:35 PM  To: Karina GUTIERREZ Staff    Pt called and would like a call back from the nurse Rad.      Pt can be reached at [phone].    Thanks

## 2018-04-10 ENCOUNTER — OFFICE VISIT (OUTPATIENT)
Dept: UROLOGY | Facility: CLINIC | Age: 58
End: 2018-04-10
Payer: MEDICARE

## 2018-04-10 VITALS
HEART RATE: 91 BPM | DIASTOLIC BLOOD PRESSURE: 76 MMHG | HEIGHT: 70 IN | BODY MASS INDEX: 21.76 KG/M2 | RESPIRATION RATE: 15 BRPM | SYSTOLIC BLOOD PRESSURE: 147 MMHG | WEIGHT: 152 LBS

## 2018-04-10 DIAGNOSIS — C61 PROSTATE CANCER: Primary | ICD-10-CM

## 2018-04-10 PROCEDURE — 99999 PR PBB SHADOW E&M-EST. PATIENT-LVL III: CPT | Mod: PBBFAC,,, | Performed by: UROLOGY

## 2018-04-10 PROCEDURE — 99214 OFFICE O/P EST MOD 30 MIN: CPT | Mod: S$GLB,,, | Performed by: UROLOGY

## 2018-04-10 NOTE — PROGRESS NOTES
Clinic Note  4/10/2018      Subjective:         Chief Complaint:   HPI  Kodi Ibanez Sr. is a 58 y.o. male with a history of prostate cancer. Diagnosed 1 year ago by Dr. Catherine. Has been on surveillance.  Last PSA 4/10/17 was 8.1. Here with his [place].  Patient has multiple medical comorbidities- dialysis, CHF, MI,stroke x 2.  40 years.     PSAi- 7.8  Volume- 50 ccs Bx (9/19/2016)- Trish 6 right apex 1/1 20%. Overall 1/12 cores positive  MORE score- 2  Low risk disease      Lab Results   Component Value Date    PSA 7.8 (H) 07/11/2016    PSA 6.1 (H) 08/04/2014    PSA 5.13 (H) 03/20/2013    PSA 4.41 (H) 06/27/2012    PSA 4.5 (H) 06/07/2011    PSA 1.3 06/12/2008    PSA 0.8 04/20/2007    PSADIAG 7.6 (H) 04/09/2018    PSADIAG 8.9 (H) 10/10/2017    PSADIAG 8.1 (H) 04/10/2017      Past Medical History:   Diagnosis Date    Allergy     Atherosclerosis of aorta     noted on VIKTORIA 2011    Blood transfusion     Cataracts, bilateral     CHF (congestive heart failure)     Chronic kidney disease     Clotting disorder     Coronary artery disease     Decreased appetite     Diabetic retinopathy     DM (diabetes mellitus), type 2 with renal complications     ESRD on hemodialysis     TUESDAY/ THURSDAY/SATURDAY    Hyperlipidemia     Hyperparathyroidism, unspecified     Hypertension     Myocardial infarction     Prostate cancer     Seizures     Stroke 2011    PT HAS SHAKES      Family History   Problem Relation Age of Onset    Diabetes Mother     Glaucoma Mother     Cancer Mother     Cataracts Mother     Heart disease Father     Cancer Paternal Grandmother     Heart disease Maternal Grandmother     No Known Problems Sister     Prostate cancer Brother     No Known Problems Maternal Grandfather     No Known Problems Paternal Grandfather     No Known Problems Sister     No Known Problems Sister     Hypertension Brother     Cancer Paternal Uncle     Amblyopia Neg Hx     Blindness Neg Hx      Macular degeneration Neg Hx     Retinal detachment Neg Hx     Strabismus Neg Hx     Stroke Neg Hx     Thyroid disease Neg Hx     Anesthesia problems Neg Hx     Clotting disorder Neg Hx      problems Neg Hx     Kidney cancer Neg Hx     Kidney disease Neg Hx     Malignant hyperthermia Neg Hx     Sickle cell trait Neg Hx     Lupus Neg Hx     Urolithiasis Neg Hx     Sudden death Neg Hx      Social History     Social History    Marital status:      Spouse name: N/A    Number of children: N/A    Years of education: N/A     Occupational History    Not on file.     Social History Main Topics    Smoking status: Never Smoker    Smokeless tobacco: Never Used    Alcohol use No    Drug use: No    Sexual activity: Not Currently     Other Topics Concern    Not on file     Social History Narrative    No narrative on file     Past Surgical History:   Procedure Laterality Date    CARDIAC SURGERY      CABG    CATARACT EXTRACTION      ou    CATARACT EXTRACTION, BILATERAL      cataracts      CORONARY ARTERY BYPASS GRAFT  2009    bypass    EYE SURGERY      Left Guera AVF  1/11/2012    PROSTATE BIOPSY      positive    retinal laser      ROTATOR CUFF REPAIR  right, 2005    VASCULAR SURGERY       Patient Active Problem List   Diagnosis    ESRD on hemodialysis    History of stroke    Cognitive deficits as late effect of cerebrovascular disease    Congestive heart failure    Type 2 diabetes, uncontrolled, with retinopathy    Secondary renal hyperparathyroidism    Other extrapyramidal disease and abnormal movement disorder    Mononeuritis of lower limb, unspecified    Vascular dementia with depressed mood    Type 2 diabetes, uncontrolled, with neuropathy    CAD (coronary artery disease)    Seizure disorder    Hyperlipidemia LDL goal <100    Anemia of chronic disease    Hypertension    Uncontrolled type 2 diabetes mellitus with end-stage renal disease    History of MI (myocardial  "infarction)    Long-term insulin use    Atherosclerosis of aorta    S/P CABG (coronary artery bypass graft)    Hypertensive retinopathy of both eyes    Prostate cancer    Stenosis of arteriovenous dialysis fistula    ESRD (end stage renal disease) on dialysis    AV fistula stenosis    Uncontrolled type 2 diabetes mellitus with both eyes affected by proliferative retinopathy and macular edema, with long-term current use of insulin    Uncontrolled type 2 diabetes mellitus with right eye affected by severe nonproliferative retinopathy and macular edema, with long-term current use of insulin    PVD (peripheral vascular disease)     Review of Systems   Constitutional: Negative for appetite change, chills, fatigue, fever and unexpected weight change.   HENT: Negative for nosebleeds.    Respiratory: Negative for shortness of breath and wheezing.    Cardiovascular: Negative for chest pain, palpitations and leg swelling.   Gastrointestinal: Negative for abdominal distention, abdominal pain, constipation, diarrhea, nausea and vomiting.   Genitourinary: Negative for dysuria, hematuria and nocturia.   Musculoskeletal: Negative for arthralgias and back pain.   Skin: Negative for pallor.   Neurological: Negative for dizziness, seizures and syncope.   Hematological: Negative for adenopathy.   Psychiatric/Behavioral: Negative for dysphoric mood.         Objective:      There were no vitals taken for this visit.  Estimated body mass index is 22.24 kg/m² as calculated from the following:    Height as of 2/19/18: 5' 10" (1.778 m).    Weight as of 2/19/18: 70.3 kg (155 lb).  Physical Exam   Constitutional: He is oriented to person, place, and time. He appears well-developed and well-nourished. No distress.   HENT:   Head: Atraumatic.   Neck: No tracheal deviation present.   Cardiovascular: Normal rate.    Pulmonary/Chest: Effort normal. No respiratory distress. He has no wheezes.   Abdominal: Soft. Bowel sounds are normal. " He exhibits no distension and no mass. There is no tenderness. There is no rebound and no guarding.   Genitourinary: Rectum normal and prostate normal. Rectal exam shows no external hemorrhoid, no internal hemorrhoid, no fissure, no mass and no tenderness.   Neurological: He is alert and oriented to person, place, and time.   Skin: Skin is warm and dry. He is not diaphoretic.     Psychiatric: He has a normal mood and affect. His behavior is normal. Judgment and thought content normal.         Assessment and Plan:           Problem List Items Addressed This Visit     Prostate cancer - Primary    Overview     - 9/2016: followed by urology, Dr. Hall  - opted for watchful waiting                Follow up:     6 months with PSA.  Fermin Collins

## 2018-04-10 NOTE — LETTER
April 10, 2018        Melonie Elias MD  [address]  [place] [address]             [facility]  [address]  [place] [address]  Phone: [phone]   Patient: Kodi Ibanez Sr.   MR Number: [id number]   YOB: 1960   Date of Visit: 4/10/2018       Dear Dr. Elias:    Thank you for referring Kodi Ibanez to me for evaluation. Attached you will find relevant portions of my assessment and plan of care.    If you have questions, please do not hesitate to call me. I look forward to following Kodi Ibanez along with you.    Sincerely,      Fermin Collins MD            CC  No Recipients    Enclosure

## 2018-05-08 DIAGNOSIS — I10 ESSENTIAL HYPERTENSION: Chronic | ICD-10-CM

## 2018-05-08 RX ORDER — AMLODIPINE BESYLATE 10 MG/1
TABLET ORAL
Qty: 90 TABLET | Refills: 0 | Status: SHIPPED | OUTPATIENT
Start: 2018-05-08 | End: 2018-09-27 | Stop reason: SDUPTHER

## 2018-05-21 DIAGNOSIS — K21.9 GASTROESOPHAGEAL REFLUX DISEASE, ESOPHAGITIS PRESENCE NOT SPECIFIED: ICD-10-CM

## 2018-05-21 RX ORDER — PANTOPRAZOLE SODIUM 40 MG/1
TABLET, DELAYED RELEASE ORAL
Qty: 90 TABLET | Refills: 0 | Status: SHIPPED | OUTPATIENT
Start: 2018-05-21 | End: 2018-09-10 | Stop reason: SDUPTHER

## 2018-05-31 RX ORDER — SEVELAMER CARBONATE 800 MG/1
800 TABLET, FILM COATED ORAL
Qty: 200 TABLET | Refills: 11 | Status: SHIPPED | OUTPATIENT
Start: 2018-05-31 | End: 2019-01-01

## 2018-06-18 ENCOUNTER — TELEPHONE (OUTPATIENT)
Dept: FAMILY MEDICINE | Facility: CLINIC | Age: 58
End: 2018-06-18

## 2018-06-18 DIAGNOSIS — E11.9 DIABETES MELLITUS WITHOUT COMPLICATION: ICD-10-CM

## 2018-06-18 DIAGNOSIS — I10 HYPERTENSION, ESSENTIAL: Primary | ICD-10-CM

## 2018-06-18 DIAGNOSIS — E78.5 HYPERLIPIDEMIA, UNSPECIFIED HYPERLIPIDEMIA TYPE: ICD-10-CM

## 2018-06-18 NOTE — TELEPHONE ENCOUNTER
Spoke w/ patient, overdue Health Maintenance was discussed. An appointment was scheduled for fasting blood work before office visit on 06/25/18. Fasting instructions were given.    Zoster can not be given in the office secondary to the insurance. The patient will receive the vaccine at the local pharmacy.    PS: I have ordered a cmp, hemoglobin a1c, urine microalbumin, and lipid panel. Please order any additional labs and I will link them to the appointment.    Thanks,    Windy

## 2018-06-25 ENCOUNTER — OFFICE VISIT (OUTPATIENT)
Dept: FAMILY MEDICINE | Facility: CLINIC | Age: 58
End: 2018-06-25
Payer: MEDICARE

## 2018-06-25 ENCOUNTER — LAB VISIT (OUTPATIENT)
Dept: LAB | Facility: HOSPITAL | Age: 58
End: 2018-06-25
Attending: INTERNAL MEDICINE
Payer: MEDICARE

## 2018-06-25 ENCOUNTER — TELEPHONE (OUTPATIENT)
Dept: FAMILY MEDICINE | Facility: CLINIC | Age: 58
End: 2018-06-25

## 2018-06-25 VITALS
WEIGHT: 163.81 LBS | OXYGEN SATURATION: 98 % | TEMPERATURE: 99 F | HEART RATE: 78 BPM | DIASTOLIC BLOOD PRESSURE: 80 MMHG | HEIGHT: 70 IN | SYSTOLIC BLOOD PRESSURE: 160 MMHG | BODY MASS INDEX: 23.45 KG/M2

## 2018-06-25 DIAGNOSIS — Z99.2 ESRD ON HEMODIALYSIS: ICD-10-CM

## 2018-06-25 DIAGNOSIS — I50.9 CONGESTIVE HEART FAILURE, UNSPECIFIED HF CHRONICITY, UNSPECIFIED HEART FAILURE TYPE: ICD-10-CM

## 2018-06-25 DIAGNOSIS — Z79.4 LONG-TERM INSULIN USE: ICD-10-CM

## 2018-06-25 DIAGNOSIS — E11.9 DIABETES MELLITUS WITHOUT COMPLICATION: ICD-10-CM

## 2018-06-25 DIAGNOSIS — N18.6 ESRD ON HEMODIALYSIS: ICD-10-CM

## 2018-06-25 DIAGNOSIS — E78.5 HYPERLIPIDEMIA, UNSPECIFIED HYPERLIPIDEMIA TYPE: ICD-10-CM

## 2018-06-25 DIAGNOSIS — G40.909 SEIZURE DISORDER: Chronic | ICD-10-CM

## 2018-06-25 DIAGNOSIS — I10 ESSENTIAL HYPERTENSION: Chronic | ICD-10-CM

## 2018-06-25 DIAGNOSIS — Z00.00 ROUTINE MEDICAL EXAM: Primary | ICD-10-CM

## 2018-06-25 DIAGNOSIS — Z71.89 ADVANCED DIRECTIVES, COUNSELING/DISCUSSION: ICD-10-CM

## 2018-06-25 DIAGNOSIS — I70.0 ATHEROSCLEROSIS OF AORTA: ICD-10-CM

## 2018-06-25 DIAGNOSIS — E78.5 HYPERLIPIDEMIA LDL GOAL <100: Chronic | ICD-10-CM

## 2018-06-25 DIAGNOSIS — C61 PROSTATE CANCER: ICD-10-CM

## 2018-06-25 DIAGNOSIS — R26.81 GAIT INSTABILITY: ICD-10-CM

## 2018-06-25 DIAGNOSIS — N25.81 SECONDARY RENAL HYPERPARATHYROIDISM: ICD-10-CM

## 2018-06-25 DIAGNOSIS — R63.0 POOR APPETITE: ICD-10-CM

## 2018-06-25 DIAGNOSIS — I10 HYPERTENSION, ESSENTIAL: ICD-10-CM

## 2018-06-25 PROBLEM — T82.858A STENOSIS OF ARTERIOVENOUS DIALYSIS FISTULA: Status: RESOLVED | Noted: 2017-02-03 | Resolved: 2018-06-25

## 2018-06-25 PROBLEM — T82.858A AV FISTULA STENOSIS: Status: RESOLVED | Noted: 2017-08-21 | Resolved: 2018-06-25

## 2018-06-25 LAB
ALBUMIN SERPL BCP-MCNC: 3.3 G/DL
ALP SERPL-CCNC: 132 U/L
ALT SERPL W/O P-5'-P-CCNC: 38 U/L
ANION GAP SERPL CALC-SCNC: 13 MMOL/L
AST SERPL-CCNC: 21 U/L
BASOPHILS # BLD AUTO: 0.04 K/UL
BASOPHILS NFR BLD: 0.6 %
BILIRUB SERPL-MCNC: 0.4 MG/DL
BUN SERPL-MCNC: 36 MG/DL
CALCIUM SERPL-MCNC: 9.5 MG/DL
CHLORIDE SERPL-SCNC: 104 MMOL/L
CHOLEST SERPL-MCNC: 239 MG/DL
CHOLEST/HDLC SERPL: 6.3 {RATIO}
CO2 SERPL-SCNC: 25 MMOL/L
CREAT SERPL-MCNC: 8.5 MG/DL
DIFFERENTIAL METHOD: ABNORMAL
EOSINOPHIL # BLD AUTO: 0.2 K/UL
EOSINOPHIL NFR BLD: 2.8 %
ERYTHROCYTE [DISTWIDTH] IN BLOOD BY AUTOMATED COUNT: 16.6 %
EST. GFR  (AFRICAN AMERICAN): 7.1 ML/MIN/1.73 M^2
EST. GFR  (NON AFRICAN AMERICAN): 6.1 ML/MIN/1.73 M^2
ESTIMATED AVG GLUCOSE: 232 MG/DL
GLUCOSE SERPL-MCNC: 188 MG/DL
HBA1C MFR BLD HPLC: 9.7 %
HCT VFR BLD AUTO: 36.7 %
HDLC SERPL-MCNC: 38 MG/DL
HDLC SERPL: 15.9 %
HGB BLD-MCNC: 11.5 G/DL
IMM GRANULOCYTES # BLD AUTO: 0.07 K/UL
IMM GRANULOCYTES NFR BLD AUTO: 1 %
LDLC SERPL CALC-MCNC: 165.2 MG/DL
LYMPHOCYTES # BLD AUTO: 1.3 K/UL
LYMPHOCYTES NFR BLD: 19 %
MCH RBC QN AUTO: 28.7 PG
MCHC RBC AUTO-ENTMCNC: 31.3 G/DL
MCV RBC AUTO: 92 FL
MONOCYTES # BLD AUTO: 0.6 K/UL
MONOCYTES NFR BLD: 9.2 %
NEUTROPHILS # BLD AUTO: 4.6 K/UL
NEUTROPHILS NFR BLD: 67.4 %
NONHDLC SERPL-MCNC: 201 MG/DL
NRBC BLD-RTO: 0 /100 WBC
PLATELET # BLD AUTO: 161 K/UL
PMV BLD AUTO: 12.4 FL
POTASSIUM SERPL-SCNC: 4.9 MMOL/L
PROT SERPL-MCNC: 7.5 G/DL
RBC # BLD AUTO: 4.01 M/UL
SODIUM SERPL-SCNC: 142 MMOL/L
TRIGL SERPL-MCNC: 179 MG/DL
WBC # BLD AUTO: 6.84 K/UL

## 2018-06-25 PROCEDURE — 99396 PREV VISIT EST AGE 40-64: CPT | Mod: S$GLB,,, | Performed by: INTERNAL MEDICINE

## 2018-06-25 PROCEDURE — 80061 LIPID PANEL: CPT

## 2018-06-25 PROCEDURE — 83036 HEMOGLOBIN GLYCOSYLATED A1C: CPT

## 2018-06-25 PROCEDURE — 99999 PR PBB SHADOW E&M-EST. PATIENT-LVL III: CPT | Mod: PBBFAC,,, | Performed by: INTERNAL MEDICINE

## 2018-06-25 PROCEDURE — 85025 COMPLETE CBC W/AUTO DIFF WBC: CPT

## 2018-06-25 PROCEDURE — 3046F HEMOGLOBIN A1C LEVEL >9.0%: CPT | Mod: CPTII,S$GLB,, | Performed by: INTERNAL MEDICINE

## 2018-06-25 PROCEDURE — 36415 COLL VENOUS BLD VENIPUNCTURE: CPT | Mod: PO

## 2018-06-25 PROCEDURE — 80053 COMPREHEN METABOLIC PANEL: CPT

## 2018-06-25 NOTE — PROGRESS NOTES
HISTORY OF PRESENT ILLNESS:  Kodi Ibanez Sr. is a 58 y.o. male who presents to the clinic today for a routine medical physical exam. His last physical exam was approximately 1 years(s) ago.      PAST MEDICAL HISTORY:  Past Medical History:   Diagnosis Date    Allergy     Atherosclerosis of aorta     noted on VIKTORIA 2011    Blood transfusion     Cataracts, bilateral     CHF (congestive heart failure)     Chronic kidney disease     Clotting disorder     Coronary artery disease     Decreased appetite     Diabetic retinopathy     DM (diabetes mellitus), type 2 with renal complications     ESRD on hemodialysis     TUESDAY/ THURSDAY/SATURDAY    Hyperlipidemia     Hyperparathyroidism, unspecified     Hypertension     Myocardial infarction     Prostate cancer     Seizures     Stroke 2011    PT HAS SHAKES        PAST SURGICAL HISTORY:  Past Surgical History:   Procedure Laterality Date    CARDIAC SURGERY      CABG    CATARACT EXTRACTION      ou    CATARACT EXTRACTION, BILATERAL      cataracts      CORONARY ARTERY BYPASS GRAFT  2009    bypass    EYE SURGERY      Left Guera AVF  1/11/2012    PROSTATE BIOPSY      positive    retinal laser      ROTATOR CUFF REPAIR  right, 2005    VASCULAR SURGERY         SOCIAL HISTORY:  Social History     Social History    Marital status:      Spouse name: N/A    Number of children: N/A    Years of education: N/A     Occupational History    Not on file.     Social History Main Topics    Smoking status: Never Smoker    Smokeless tobacco: Never Used    Alcohol use No    Drug use: No    Sexual activity: Not Currently     Other Topics Concern    Not on file     Social History Narrative    No narrative on file       FAMILY HISTORY:  Family History   Problem Relation Age of Onset    Diabetes Mother     Glaucoma Mother     Cancer Mother     Cataracts Mother     Heart disease Father     Cancer Paternal Grandmother     Heart disease Maternal  "Grandmother     No Known Problems Sister     Prostate cancer Brother     No Known Problems Maternal Grandfather     No Known Problems Paternal Grandfather     No Known Problems Sister     No Known Problems Sister     Hypertension Brother     Cancer Paternal Uncle     Amblyopia Neg Hx     Blindness Neg Hx     Macular degeneration Neg Hx     Retinal detachment Neg Hx     Strabismus Neg Hx     Stroke Neg Hx     Thyroid disease Neg Hx     Anesthesia problems Neg Hx     Clotting disorder Neg Hx      problems Neg Hx     Kidney cancer Neg Hx     Kidney disease Neg Hx     Malignant hyperthermia Neg Hx     Sickle cell trait Neg Hx     Lupus Neg Hx     Urolithiasis Neg Hx     Sudden death Neg Hx        ALLERGIES AND MEDICATIONS: updated and reviewed.  Review of patient's allergies indicates:   Allergen Reactions    Reglan [metoclopramide hcl] Diarrhea    Lorazepam      Other reaction(s): heavy sedation     Medication List with Changes/Refills   Current Medications    AMLODIPINE (NORVASC) 10 MG TABLET    TAKE 1 TABLET(10 MG) BY MOUTH EVERY DAY    ASPIRIN 81 MG CHEWABLE TABLET    Take 81 mg by mouth. 1 Tablet, Chewable Oral Every day    ATORVASTATIN (LIPITOR) 40 MG TABLET    Take 1 tablet (40 mg total) by mouth once daily.    BD INSULIN PEN NEEDLE UF ORIG 29 GAUGE X 1/2" NDLE    USE  1 EVERY EVENING    BLOOD GLUCOSE CONTROL, NORMAL SOLN    1 drop by Misc.(Non-Drug; Combo Route) route as directed.    BLOOD SUGAR DIAGNOSTIC (TRUE METRIX GLUCOSE TEST STRIP) STRP    1 strip by Misc.(Non-Drug; Combo Route) route 2 (two) times daily.    CALCIUM ACETATE (PHOSLO) 667 MG CAPSULE    Take 1 capsule (667 mg total) by mouth 3 (three) times daily with meals.    CARVEDILOL (COREG) 25 MG TABLET    Take 1 tablet (25 mg total) by mouth 2 (two) times daily with meals.    FUROSEMIDE (LASIX) 40 MG TABLET    Take 1 tablet (40 mg total) by mouth once daily.    GABAPENTIN (NEURONTIN) 100 MG CAPSULE    Take 1 capsule (100 " "mg total) by mouth once daily.    INSULIN GLARGINE (LANTUS SOLOSTAR) 100 UNIT/ML (3 ML) INPN PEN    INJECT 20 UNITS UNDER THE SKIN NIGHTLY    LANCETS (TRUEPLUS LANCETS) 33 GAUGE MISC    1 lancet by Misc.(Non-Drug; Combo Route) route 2 (two) times daily.    LEVETIRACETAM (KEPPRA) 500 MG TAB    Take 1 tablet (500 mg total) by mouth 2 (two) times daily.    LOSARTAN (COZAAR) 50 MG TABLET    Take 1 tablet (50 mg total) by mouth once daily.    MEGESTROL (MEGACE) 20 MG TAB    TAKE 1 TABLET(20 MG) BY MOUTH EVERY DAY    NITROGLYCERIN (NITROSTAT) 0.4 MG SL TABLET    Place 1 tablet (0.4 mg total) under the tongue every 5 (five) minutes as needed for Chest pain.    NOVOFINE 30 30 X 1/3 " NDLE    USE AS DIRECTED WITH LANTUS SOLARSTAR    NOVOLOG FLEXPEN 100 UNIT/ML INPN PEN    INJECT 5 TO 10 UNITS WITH MEALS AND AT BEDTIME DEPENDING ON BLOOD SUGAR    PANTOPRAZOLE (PROTONIX) 40 MG TABLET    TAKE 1 TABLET BY MOUTH DAILY AS NEEDED FOR HEARTBURN. TAKE ONLY AS NEEDED    PROMETHAZINE (PHENERGAN) 25 MG TABLET    Take 1 tablet (25 mg total) by mouth every 6 (six) hours as needed for Nausea.    RENVELA 800 MG TAB    Take 1 tablet (800 mg total) by mouth 3 (three) times daily with meals.    TRUE METRIX AIR GLUCOSE METER KIT       Discontinued Medications    PROMETHAZINE (PHENERGAN) 6.25 MG/5 ML SYRUP    TAKE 20 MLS BY MOUTH EVERY 6 HOURS AS NEEDED FOR NAUSEA         CARE TEAM:  Patient Care Team:  Melonie Elias MD as PCP - General (Internal Medicine)  [facility] as Dialysis Nurse  [facility] as Post Acute Care Provider  LISA Catherine MD as Consulting Physician (Urology)  Tyrone Levy MD as Consulting Physician (Nephrology)  Karen Rosen DPM as Consulting Physician (Podiatry)  LACEY Mix MD as Consulting Physician (Ophthalmology)           SCREENING HISTORY:  Health Maintenance       Date Due Completion Date    Colonoscopy 10/28/2009 10/28/2008    Zoster Vaccine 02/03/2018 ---    Lipid Panel " 05/29/2018 5/29/2017    Hemoglobin A1c 06/04/2018 12/4/2017    Override on 10/11/2016: Done (8.6 per dialysis)    Override on 7/14/2015: Done (done at dialysis - 9.7)    Override on 1/1/2014: Done (HgbA1c done at dialysis = 8.2)    Influenza Vaccine 08/01/2018 10/5/2017    Override on 2/10/2014: Declined    Eye Exam 11/13/2018 11/13/2017    Foot Exam 12/06/2018 12/6/2017 (Done)    Override on 12/6/2017: Done    Override on 5/29/2017: Done    Override on 7/5/2016: Done (neuropathy noted)    Override on 12/4/2015: Done    Override on 12/11/2014: Done    PROSTATE-SPECIFIC ANTIGEN 04/09/2019 4/9/2018    Override on 1/13/2015: Done (done at dialysis - 6.2)    Pneumococcal PPSV23 (High Risk) (2) 08/04/2019 8/4/2014    TETANUS VACCINE 07/05/2026 7/5/2016            REVIEW OF SYSTEMS:   The patient reports poor dietary habits. He generally has a poor appetite - currently only eating bologna sandwiches.  The patient does not exercise regularly - he is very sedentary.  Review of Systems   Constitutional: Positive for fatigue (- chronic). Negative for chills, fever and unexpected weight change.        - has an overall poor appetite     HENT: Negative for congestion, dental problem, ear discharge, ear pain, nosebleeds, sinus pain, sore throat and trouble swallowing.    Eyes: Negative for pain, discharge, itching and visual disturbance.   Respiratory: Negative for cough, chest tightness, shortness of breath, wheezing and stridor.    Cardiovascular: Negative for chest pain, palpitations and leg swelling.   Gastrointestinal: Positive for diarrhea (- he has to have a bowel movement after every meal and sometimes in between; he eats a low fiber diet; he does not want any testing done). Negative for abdominal distention, abdominal pain, blood in stool, nausea and vomiting.   Endocrine: Negative for cold intolerance, heat intolerance, polydipsia and polyuria.        - he has not yet received his diabetic shoes that podiatry ordered  "for him in January   Genitourinary: Negative for difficulty urinating, dysuria, flank pain, frequency, hematuria and testicular pain.   Musculoskeletal: Negative for back pain, joint swelling and myalgias.   Skin: Negative for rash.   Allergic/Immunologic: Negative for food allergies.   Neurological: Positive for weakness (- generalized; he walks with a cane and then also still wants to hold on to the walls.). Negative for dizziness, tremors, seizures, syncope, numbness and headaches.   Hematological: Negative for adenopathy. Does not bruise/bleed easily.   Psychiatric/Behavioral: Positive for dysphoric mood. Negative for behavioral problems and sleep disturbance. The patient is not nervous/anxious.            PHYSICAL EXAM:  Vitals:    06/25/18 1343   BP: (!) 160/80   Pulse: 78   Temp: 98.5 °F (36.9 °C)     Weight: 74.3 kg (163 lb 12.8 oz)   Height: 5' 10" (177.8 cm)   Body mass index is 23.5 kg/m².    Physical Examination: General appearance - in no distress, normal appearing weight and chronically ill appearing  Mental status - oriented to person, place; drowsy  Eyes - pupils equal and reactive, extraocular eye movements intact, sclera anicteric  Mouth - mucous membranes moist, pharynx normal without lesions  Neck - supple, no significant adenopathy, carotids upstroke normal bilaterally, no bruits, thyroid exam: thyroid is normal in size without nodules or tenderness  Lymphatics - no palpable lymphadenopathy  Chest - clear to auscultation, no wheezes, rales or rhonchi, symmetric air entry; LUE AV graft  Heart - normal rate and regular rhythm, no gallops noted  Abdomen - soft, nontender, nondistended, no masses or organomegaly   Male - not examined  Back exam - full range of motion, no tenderness, palpable spasm or pain on motion  Neurological - alert, oriented, normal speech, no focal findings or movement disorder noted, cranial nerves II through XII intact  Musculoskeletal - no muscular tenderness noted, Mild " osteoarthritic changes noted to both knee joints. No joint effusions noted. Decreased overall muscle mass. Unsteady gait even with cane.  Extremities - no pedal edema noted  Skin - normal coloration and turgor, no rashes, no suspicious skin lesions noted       ASSESSMENT AND PLAN:  1. Routine medical exam  Counseled on age appropriate medical preventative services including age appropriate cancer screenings, age appropriate eye and dental exams, over all nutritional health, need for a consistent exercise regimen, and an over all push towards maintaining a vigorous and active lifestyle.  Counseled on age appropriate vaccines and discussed upcoming health care needs based on age/gender. Discussed good sleep hygiene and stress management.     2. Type 2 diabetes, uncontrolled, with neuropathy/3. Uncontrolled type 2 diabetes mellitus with both eyes affected by proliferative retinopathy and macular edema, with long-term current use of insulin/4. Uncontrolled type 2 diabetes mellitus with end-stage renal disease/5. Long-term insulin use  Diabetes currently is not controlled for age and comorbid conditions. We discussed diabetic diet and regular exercise. We discussed home blood sugar monitoring, if appropriate. He will do better with diabetic diet and adjust insulin as needed. He is up to date on his eye exam. Neuropathy pain controlled.      6. ESRD (end stage renal disease) on hemodialysis  Continue dialysis. Followed by nephrology.    7. Essential hypertension/8. Congestive heart failure, unspecified HF chronicity, unspecified heart failure type  BP not controlled today - generally very labile. Usually ok around dialysis. Followed by cardiology.    9. Hyperlipidemia LDL goal <100  We discussed low fat diet and regular exercise.The current medical regimen is effective;  continue present plan and medications.      10. Secondary renal hyperparathyroidism  Stable. Asymptomatic. Observe.     11. Atherosclerosis of  aorta  Patient with Atherosclerosis of the Aorta.  Stable/asymptomatic. Currently stable on lipid lowering medication and b/p monitoring.     12. Prostate cancer  Stable. Followed by urology.    13. Seizure disorder  The current medical regimen is effective;  continue present plan and medications.     14. Poor appetite  I reviewed weight chart for the last few years.  Weight fluctuates, but is overall stable.  I do not think he is a good candidate for either Periactin or Megace.  I encouraged healthier dietary habits.  Observe for now.    15. Gait instability  We will get him a rolling walker for increased stability with ambulation.  - WALKER FOR HOME USE     I had a discussion today with the patient and spouse regarding advanced directives.  Advanced directives were discussed due to patient's age and/or chronic medical conditions. Prognosis based on current condition: poor. Paperwork was given to complete living will and medical POA. [place] was also discussed and paperwork given to review and complete in the near future. Approximately 10 minute(s) spent on counseling/discussion regarding end of life decision making.       Follow-up in about 6 months (around 12/25/2018), or if symptoms worsen or fail to improve, for follow up chronic medical conditions.. or sooner as needed.

## 2018-06-25 NOTE — TELEPHONE ENCOUNTER
Please call patient/wife - his lab results show diabetes is suddenly very uncontrolled (A1c - 9.7) and his cholesterol is now very elevated (LDL - 165 (should be less than 100)). Has he been taking his lipitor?

## 2018-06-26 RX ORDER — ATORVASTATIN CALCIUM 40 MG/1
40 TABLET, FILM COATED ORAL DAILY
Qty: 90 TABLET | Refills: 1 | Status: SHIPPED | OUTPATIENT
Start: 2018-06-26 | End: 2019-06-04 | Stop reason: SDUPTHER

## 2018-06-26 NOTE — TELEPHONE ENCOUNTER
Spoke with patients wife, informed of rx sent to pharmacy. States she tries to adjust his diet and get him exercise but he doesn't want to. States that if he does not like the food he will not eat it. States she would rather him have something that's bad for him then for him not to eat at all. States she doesn't wany his BS to drop to low.  States she will continue to try but can not force him.

## 2018-06-26 NOTE — TELEPHONE ENCOUNTER
Spoke with patient's wife and advised of below. States that patient does have a sliding scale for insulin and will use it to administer insulin according to readings and diet

## 2018-06-26 NOTE — TELEPHONE ENCOUNTER
Spoke with pts wife,Informed of results. States pt is not taking Liptor at this time. States she didn't relieze that he was out of it. Pt wife is requesting Liptor sent to [facility].

## 2018-07-17 ENCOUNTER — TELEPHONE (OUTPATIENT)
Dept: FAMILY MEDICINE | Facility: CLINIC | Age: 58
End: 2018-07-17

## 2018-07-17 NOTE — TELEPHONE ENCOUNTER
----- Message from Jigna Franco sent at 7/17/2018 12:47 PM CDT -----  Contact: Mrs. Ibanez/ Wife/ [phone]  Mrs. Ibanez calling in regards to status of pt's rollater. Please call to advise. Thank you.

## 2018-07-17 NOTE — TELEPHONE ENCOUNTER
Spoke with the pt's, wife.  I informed her that I will refax the Walker order.  Patient verbalized understandings.

## 2018-09-08 DIAGNOSIS — I10 ESSENTIAL HYPERTENSION: Chronic | ICD-10-CM

## 2018-09-09 RX ORDER — CARVEDILOL 25 MG/1
TABLET ORAL
Qty: 180 TABLET | Refills: 0 | Status: SHIPPED | OUTPATIENT
Start: 2018-09-09 | End: 2019-05-04 | Stop reason: SDUPTHER

## 2018-09-10 DIAGNOSIS — K21.9 GASTROESOPHAGEAL REFLUX DISEASE, ESOPHAGITIS PRESENCE NOT SPECIFIED: ICD-10-CM

## 2018-09-10 RX ORDER — PANTOPRAZOLE SODIUM 40 MG/1
TABLET, DELAYED RELEASE ORAL
Qty: 90 TABLET | Refills: 0 | Status: SHIPPED | OUTPATIENT
Start: 2018-09-10 | End: 2019-01-01

## 2018-09-27 DIAGNOSIS — I10 ESSENTIAL HYPERTENSION: Chronic | ICD-10-CM

## 2018-09-27 RX ORDER — AMLODIPINE BESYLATE 10 MG/1
TABLET ORAL
Qty: 90 TABLET | Refills: 0 | Status: ON HOLD | OUTPATIENT
Start: 2018-09-27 | End: 2019-01-01 | Stop reason: HOSPADM

## 2018-11-06 DIAGNOSIS — I50.22 CHRONIC SYSTOLIC CONGESTIVE HEART FAILURE: ICD-10-CM

## 2018-11-06 RX ORDER — FUROSEMIDE 40 MG/1
TABLET ORAL
Qty: 90 TABLET | Refills: 0 | Status: SHIPPED | OUTPATIENT
Start: 2018-11-06 | End: 2019-05-04 | Stop reason: SDUPTHER

## 2018-11-06 NOTE — TELEPHONE ENCOUNTER
Spoke with the wife and schedule an appt.  Mailed an appt reminder.  Patient verbalized understandings.

## 2018-11-16 ENCOUNTER — TELEPHONE (OUTPATIENT)
Dept: FAMILY MEDICINE | Facility: CLINIC | Age: 58
End: 2018-11-16

## 2018-11-16 NOTE — TELEPHONE ENCOUNTER
----- Message from Jigna Munguia sent at 11/15/2018  2:40 PM CST -----  Contact: pt's wife cole [phone]  Can the clinic reply in [facility]: no      Please refill the medication(s) listed below. The patient can be reached at this phone number (_____) once it is called into the pharmacy.      Medication #1lancets, strips and meter - needs PA. Wife trying to follow up on this      Medication #2      Preferred [facility]

## 2018-11-20 RX ORDER — LANCETS 33 GAUGE
1 EACH MISCELLANEOUS 2 TIMES DAILY
Qty: 100 EACH | Refills: 3 | Status: SHIPPED | OUTPATIENT
Start: 2018-11-20 | End: 2019-02-04 | Stop reason: ALTCHOICE

## 2018-12-13 ENCOUNTER — TELEPHONE (OUTPATIENT)
Dept: OPHTHALMOLOGY | Facility: CLINIC | Age: 58
End: 2018-12-13

## 2018-12-13 ENCOUNTER — OFFICE VISIT (OUTPATIENT)
Dept: FAMILY MEDICINE | Facility: CLINIC | Age: 58
End: 2018-12-13
Payer: MEDICARE

## 2018-12-13 VITALS
TEMPERATURE: 99 F | BODY MASS INDEX: 22.77 KG/M2 | DIASTOLIC BLOOD PRESSURE: 78 MMHG | HEIGHT: 70 IN | OXYGEN SATURATION: 98 % | HEART RATE: 104 BPM | SYSTOLIC BLOOD PRESSURE: 134 MMHG | WEIGHT: 159.06 LBS

## 2018-12-13 DIAGNOSIS — Z79.4 LONG-TERM INSULIN USE: ICD-10-CM

## 2018-12-13 DIAGNOSIS — Z86.73 HISTORY OF STROKE: ICD-10-CM

## 2018-12-13 DIAGNOSIS — I50.9 CONGESTIVE HEART FAILURE, UNSPECIFIED HF CHRONICITY, UNSPECIFIED HEART FAILURE TYPE: ICD-10-CM

## 2018-12-13 DIAGNOSIS — E78.5 HYPERLIPIDEMIA LDL GOAL <100: Chronic | ICD-10-CM

## 2018-12-13 DIAGNOSIS — Z99.2 ESRD ON HEMODIALYSIS: ICD-10-CM

## 2018-12-13 DIAGNOSIS — I25.83 CORONARY ARTERY DISEASE DUE TO LIPID RICH PLAQUE: ICD-10-CM

## 2018-12-13 DIAGNOSIS — I69.919 COGNITIVE DEFICITS AS LATE EFFECT OF CEREBROVASCULAR DISEASE: ICD-10-CM

## 2018-12-13 DIAGNOSIS — G40.909 SEIZURE DISORDER: Chronic | ICD-10-CM

## 2018-12-13 DIAGNOSIS — Z95.1 S/P CABG (CORONARY ARTERY BYPASS GRAFT): ICD-10-CM

## 2018-12-13 DIAGNOSIS — F01.53 VASCULAR DEMENTIA WITH DEPRESSED MOOD: ICD-10-CM

## 2018-12-13 DIAGNOSIS — D63.8 ANEMIA OF CHRONIC DISEASE: Primary | Chronic | ICD-10-CM

## 2018-12-13 DIAGNOSIS — I25.10 CORONARY ARTERY DISEASE DUE TO LIPID RICH PLAQUE: ICD-10-CM

## 2018-12-13 DIAGNOSIS — I70.0 ATHEROSCLEROSIS OF AORTA: ICD-10-CM

## 2018-12-13 DIAGNOSIS — C61 PROSTATE CANCER: ICD-10-CM

## 2018-12-13 DIAGNOSIS — I25.2 HISTORY OF MI (MYOCARDIAL INFARCTION): ICD-10-CM

## 2018-12-13 DIAGNOSIS — H35.033 HYPERTENSIVE RETINOPATHY OF BOTH EYES: ICD-10-CM

## 2018-12-13 DIAGNOSIS — I10 ESSENTIAL HYPERTENSION: Chronic | ICD-10-CM

## 2018-12-13 DIAGNOSIS — N18.6 ESRD ON HEMODIALYSIS: ICD-10-CM

## 2018-12-13 DIAGNOSIS — R11.0 NAUSEA: ICD-10-CM

## 2018-12-13 PROCEDURE — 99214 OFFICE O/P EST MOD 30 MIN: CPT | Mod: S$GLB,,, | Performed by: NURSE PRACTITIONER

## 2018-12-13 PROCEDURE — 3008F BODY MASS INDEX DOCD: CPT | Mod: CPTII,S$GLB,, | Performed by: NURSE PRACTITIONER

## 2018-12-13 PROCEDURE — 99999 PR PBB SHADOW E&M-EST. PATIENT-LVL V: CPT | Mod: PBBFAC,,, | Performed by: NURSE PRACTITIONER

## 2018-12-13 PROCEDURE — 3046F HEMOGLOBIN A1C LEVEL >9.0%: CPT | Mod: CPTII,S$GLB,, | Performed by: NURSE PRACTITIONER

## 2018-12-13 RX ORDER — PROMETHAZINE HYDROCHLORIDE 25 MG/1
25 TABLET ORAL EVERY 6 HOURS PRN
Qty: 30 TABLET | Refills: 0 | Status: SHIPPED | OUTPATIENT
Start: 2018-12-13 | End: 2020-01-01 | Stop reason: SDUPTHER

## 2018-12-13 NOTE — TELEPHONE ENCOUNTER
----- Message from Evelyn Granados sent at 12/13/2018 12:03 PM CST -----  Contact: Gracie Ibanez Sr   Pt spouse Ms Ibanez would speak with  nurse about the appointment  Jan .2019 ,pt can be reached at [phone] please thank you.

## 2018-12-13 NOTE — PROGRESS NOTES
Subjective:       Patient ID: Kodi Ibanez Sr. is a 58 y.o. male.    Chief Complaint: Diabetes and Medication Refill    58-year-old male presents to the clinic today with his wife for checkup on his diabetes.  States he has good dietary habits.  He does do a lot of walking.  He is compliant with all of his medications.  He denies any headaches, dizziness, or blurred vision.  He denies any cardiac chest pain, heart palpitations, shortness breath, or swelling to lower extremities.  His blood sugars run anywhere from 200-300.  His wife states that after the 1st of the year he she will schedule an appointment for an eye exam with his ophthalmologist.  She said that she will call his GI doctor Dr. Sarmiento in February to check on when his colonoscopy is due.  She requests a refill on Phenergan tablets.  He is on dialysis Tuesdays,  Thursdays, and Saturdays.  He will return on Saturday at 9:15 a.m. to get his blood work.  She will discuss the shingles vaccine with her pharmacist. He received his flu vaccine at the dialysis center.       Past Medical History:   Diagnosis Date    Allergy     Atherosclerosis of aorta     noted on VIKTORIA 2011    Blood transfusion     Cataracts, bilateral     CHF (congestive heart failure)     Chronic kidney disease     Clotting disorder     Coronary artery disease     Decreased appetite     Diabetic retinopathy     DM (diabetes mellitus), type 2 with renal complications     ESRD on hemodialysis     TUESDAY/ THURSDAY/SATURDAY    Hyperlipidemia     Hyperparathyroidism, unspecified     Hypertension     Myocardial infarction     Prostate cancer     Seizures     Stroke 2011    PT HAS SHAKES      Past Surgical History:   Procedure Laterality Date    CARDIAC SURGERY      CABG    CATARACT EXTRACTION      ou    CATARACT EXTRACTION, BILATERAL      cataracts      CORONARY ARTERY BYPASS GRAFT  2009    bypass    EYE SURGERY      FISTULOGRAM Left 8/21/2017    Performed by DAVE POTTS  Yair RODRIGUEZ MD at [facility] CATH LAB    FISTULOGRAM Left 8/3/2016    Performed by DAVE Mazariegos III, MD at [facility] CATH LAB    FISTULOGRAM Left 8/13/2014    Performed by DAVE Mazariegos III, MD at [facility] OR 2ND FLR    FISTULOGRAM Left 10/9/2013    Performed by DAVE Mazariegos III, MD at [facility] OR 2ND FLR    FISTULOGRAM, possible intervention - LUE Left 3/21/2016    Performed by DAVE Mazariegos III, MD at [facility] CATH LAB    Left Guera AVF  1/11/2012    PROSTATE BIOPSY      positive    retinal laser      ROTATOR CUFF REPAIR  right, 2005    TRANSPOSITION, VEIN Left 4/1/2013    Performed by DAVE Mazariegos III, MD at [facility] OR [address] [place] [address]    VASCULAR SURGERY        reports that  has never smoked. he has never used smokeless tobacco. He reports that he does not drink alcohol or use drugs.  Review of Systems   Constitutional: Negative for chills, fatigue and fever.   HENT: Negative for congestion, ear discharge, ear pain, postnasal drip, rhinorrhea, sinus pressure, sinus pain and sore throat.    Eyes: Negative for pain, discharge and itching.   Respiratory: Negative for cough, shortness of breath and wheezing.    Cardiovascular: Negative for chest pain, palpitations and leg swelling.   Gastrointestinal: Negative for abdominal pain, blood in stool, constipation, diarrhea, nausea and vomiting.   Musculoskeletal: Negative for back pain, gait problem, neck pain and neck stiffness.   Skin: Negative for rash.   Neurological: Negative for dizziness, light-headedness and headaches.       Objective:      Physical Exam   Constitutional: He is oriented to person, place, and time. He appears well-developed and well-nourished. No distress.   Eyes: Conjunctivae and EOM are normal. Pupils are equal, round, and reactive to light. Right eye exhibits no discharge. Left eye exhibits no discharge. No scleral icterus.   Neck: Normal range of motion. Neck supple. No JVD present.   Cardiovascular: Normal rate, regular rhythm and normal heart  sounds.   No murmur heard.  Pulmonary/Chest: Effort normal and breath sounds normal. No respiratory distress. He has no wheezes. He has no rales.   Abdominal: Soft. Bowel sounds are normal. There is no tenderness.   Musculoskeletal: Normal range of motion. He exhibits no edema.   Protective Sensation (w/ 10 gram monofilament):  Right: Decreased  Left: Intact    Visual Inspection:  Dry Skin -  Bilateral    Pedal Pulses:   Right: Diminshed  Left: Diminshed    Posterior tibialis:   Right:Diminshed  Left: Diminshed     Neurological: He is alert and oriented to person, place, and time.   Skin: Skin is warm and dry. He is not diaphoretic.   Psychiatric: He has a normal mood and affect.       Assessment:       1. Anemia of chronic disease    2. Atherosclerosis of aorta    3. Coronary artery disease due to lipid rich plaque    4. Cognitive deficits as late effect of cerebrovascular disease    5. Congestive heart failure, unspecified HF chronicity, unspecified heart failure type    6. ESRD on hemodialysis    7. History of MI (myocardial infarction)    8. History of stroke    9. Hyperlipidemia LDL goal <100    10. Essential hypertension    11. Hypertensive retinopathy of both eyes    12. Long-term insulin use    13. Prostate cancer    14. S/P CABG (coronary artery bypass graft)    15. Seizure disorder    16. Type 2 diabetes, uncontrolled, with neuropathy    17. Uncontrolled type 2 diabetes mellitus with both eyes affected by proliferative retinopathy and macular edema, with long-term current use of insulin    18. Uncontrolled type 2 diabetes mellitus with end-stage renal disease    19. Vascular dementia with depressed mood    20. Nausea        Plan:       .Anemia of chronic disease  -     CBC auto differential; Future; Expected date: 12/13/2018    Atherosclerosis of aorta  - Stable / Asymptomatic is on blood pressure and cholesterol lowering medications    Coronary artery disease due to lipid rich plaque  - followed by  cardiology Dr. Mccoy    Cognitive deficits as late effect of cerebrovascular disease  - stable able to answer simple questions without any difficulty     Congestive heart failure, unspecified HF chronicity, unspecified heart failure type  - The current medical regimen is effective;  continue present plan and medications.    ESRD on hemodialysis  - on dialysis Tuesdays, Thursdays and Saturdays    History of MI (myocardial infarction)  - Stable / Asymptomatic is on blood pressure and cholesterol lowering medications    History of stroke  - stable     Hyperlipidemia LDL goal <100  -     Comprehensive metabolic panel; Future; Expected date: 12/13/2018  -     Lipid panel; Future; Expected date: 12/13/2018  - The current medical regimen is effective;  continue present plan and medications.    Essential hypertension  - The current medical regimen is effective;  continue present plan and medications.    Hypertensive retinopathy of both eyes  -followed by Ophthalmology     Long-term insulin use  -continue to monitor blood sugars    Prostate cancer  - followed by Urology    S/P CABG (coronary artery bypass graft)  - followed by Cardiology    Seizure disorder  - wife stopped his Keppra due was making him too sleepy no recent seizure    Type 2 diabetes, uncontrolled, with neuropathy  - continue current medication will adjust accordingly await lab results    Uncontrolled type 2 diabetes mellitus with both eyes affected by proliferative retinopathy and macular edema, with long-term current use of insulin  -     Comprehensive metabolic panel; Future; Expected date: 12/13/2018  -     Hemoglobin A1c; Future; Expected date: 12/13/2018  -     Microalbumin/creatinine urine ratio; Future; Expected date: 12/13/2018    Uncontrolled type 2 diabetes mellitus with end-stage renal disease  - continue current medications for now    Vascular dementia with depressed mood  - followed by neurology    Nausea  -     promethazine (PHENERGAN) 25 MG  tablet; Take 1 tablet (25 mg total) by mouth every 6 (six) hours as needed for Nausea.  Dispense: 30 tablet; Refill: 0

## 2018-12-13 NOTE — TELEPHONE ENCOUNTER
Pt wife just wanted to make sure co pay is 50 she is aware if that is the specialist co pay that it is correct

## 2018-12-13 NOTE — PATIENT INSTRUCTIONS
Schedule eye exam after the 1st of the year  Call Dr. Torsten FERMIN and discuss whether he needs a colonoscopy or not   Continue all current medications  Labs on Saturday at 9:15 am

## 2018-12-15 ENCOUNTER — LAB VISIT (OUTPATIENT)
Dept: LAB | Facility: HOSPITAL | Age: 58
End: 2018-12-15
Attending: NURSE PRACTITIONER
Payer: MEDICARE

## 2018-12-15 LAB
ALBUMIN/CREAT UR: 356.2 UG/MG
CREAT UR-MCNC: 73 MG/DL
MICROALBUMIN UR DL<=1MG/L-MCNC: 260 UG/ML

## 2018-12-15 PROCEDURE — 82043 UR ALBUMIN QUANTITATIVE: CPT

## 2018-12-17 ENCOUNTER — TELEPHONE (OUTPATIENT)
Dept: FAMILY MEDICINE | Facility: CLINIC | Age: 58
End: 2018-12-17

## 2018-12-18 NOTE — TELEPHONE ENCOUNTER
I spoke with the patient's wife and explained that her potassium was low and that I was going to fax a copy of his labs to where he get dialysis so they can adjust his potassium tomorrow. He receives dialysis at [facility]. Their number is [phone] and fax is [phone]. I will have my staff fax a copy of his CMP to the center. I also explained that his cholesterol was very high and not at goal She said she felt like the Lipitor made him feel sluggish but she would try him on it again. I explained that his diabetes was very poorly controlled and his HgB A1C was 10.1 and I was going to refer him to Elizabeth Mckeon NP for further evaluation. She said that was fine because his endocrinologist retired. Patient wife verbalized understanding of above.

## 2019-01-01 ENCOUNTER — OFFICE VISIT (OUTPATIENT)
Dept: FAMILY MEDICINE | Facility: CLINIC | Age: 59
End: 2019-01-01
Payer: MEDICARE

## 2019-01-01 ENCOUNTER — HOSPITAL ENCOUNTER (OUTPATIENT)
Facility: HOSPITAL | Age: 59
Discharge: HOME | End: 2019-12-17
Attending: EMERGENCY MEDICINE | Admitting: HOSPITALIST
Payer: MEDICARE

## 2019-01-01 ENCOUNTER — LAB VISIT (OUTPATIENT)
Dept: LAB | Facility: HOSPITAL | Age: 59
End: 2019-01-01
Attending: INTERNAL MEDICINE
Payer: MEDICARE

## 2019-01-01 ENCOUNTER — OFFICE VISIT (OUTPATIENT)
Dept: CARDIOLOGY | Facility: CLINIC | Age: 59
End: 2019-01-01
Payer: MEDICARE

## 2019-01-01 ENCOUNTER — TELEPHONE (OUTPATIENT)
Dept: FAMILY MEDICINE | Facility: CLINIC | Age: 59
End: 2019-01-01

## 2019-01-01 ENCOUNTER — TELEPHONE (OUTPATIENT)
Dept: ELECTROPHYSIOLOGY | Facility: CLINIC | Age: 59
End: 2019-01-01

## 2019-01-01 ENCOUNTER — PATIENT OUTREACH (OUTPATIENT)
Dept: ADMINISTRATIVE | Facility: OTHER | Age: 59
End: 2019-01-01

## 2019-01-01 ENCOUNTER — HOSPITAL ENCOUNTER (OUTPATIENT)
Facility: HOSPITAL | Age: 59
Discharge: HOME | End: 2019-07-17
Attending: EMERGENCY MEDICINE | Admitting: INTERNAL MEDICINE
Payer: MEDICARE

## 2019-01-01 VITALS
RESPIRATION RATE: 18 BRPM | SYSTOLIC BLOOD PRESSURE: 156 MMHG | TEMPERATURE: 99 F | WEIGHT: 151.88 LBS | HEART RATE: 94 BPM | HEIGHT: 71 IN | DIASTOLIC BLOOD PRESSURE: 73 MMHG | OXYGEN SATURATION: 99 % | BODY MASS INDEX: 21.26 KG/M2

## 2019-01-01 VITALS
TEMPERATURE: 99 F | BODY MASS INDEX: 20.62 KG/M2 | WEIGHT: 144 LBS | SYSTOLIC BLOOD PRESSURE: 120 MMHG | HEIGHT: 70 IN | OXYGEN SATURATION: 99 % | DIASTOLIC BLOOD PRESSURE: 60 MMHG | HEART RATE: 88 BPM

## 2019-01-01 VITALS
OXYGEN SATURATION: 98 % | RESPIRATION RATE: 16 BRPM | SYSTOLIC BLOOD PRESSURE: 136 MMHG | BODY MASS INDEX: 20.57 KG/M2 | DIASTOLIC BLOOD PRESSURE: 80 MMHG | WEIGHT: 147.5 LBS | HEART RATE: 84 BPM

## 2019-01-01 VITALS
BODY MASS INDEX: 21.1 KG/M2 | OXYGEN SATURATION: 99 % | HEART RATE: 96 BPM | TEMPERATURE: 98 F | HEIGHT: 70 IN | WEIGHT: 147.38 LBS | DIASTOLIC BLOOD PRESSURE: 70 MMHG | SYSTOLIC BLOOD PRESSURE: 120 MMHG

## 2019-01-01 VITALS
HEART RATE: 84 BPM | HEIGHT: 70 IN | WEIGHT: 140 LBS | TEMPERATURE: 99 F | RESPIRATION RATE: 18 BRPM | DIASTOLIC BLOOD PRESSURE: 79 MMHG | OXYGEN SATURATION: 99 % | SYSTOLIC BLOOD PRESSURE: 133 MMHG | BODY MASS INDEX: 20.04 KG/M2

## 2019-01-01 DIAGNOSIS — E78.5 HYPERLIPIDEMIA LDL GOAL <100: Chronic | ICD-10-CM

## 2019-01-01 DIAGNOSIS — I69.919 COGNITIVE DEFICITS AS LATE EFFECT OF CEREBROVASCULAR DISEASE: Chronic | ICD-10-CM

## 2019-01-01 DIAGNOSIS — I10 ESSENTIAL HYPERTENSION: Chronic | ICD-10-CM

## 2019-01-01 DIAGNOSIS — I25.10 CORONARY ARTERY DISEASE INVOLVING NATIVE CORONARY ARTERY OF NATIVE HEART WITHOUT ANGINA PECTORIS: Chronic | ICD-10-CM

## 2019-01-01 DIAGNOSIS — I73.9 PVD (PERIPHERAL VASCULAR DISEASE): Chronic | ICD-10-CM

## 2019-01-01 DIAGNOSIS — F01.53 VASCULAR DEMENTIA WITH DEPRESSED MOOD: ICD-10-CM

## 2019-01-01 DIAGNOSIS — I50.9 CONGESTIVE HEART FAILURE: Chronic | ICD-10-CM

## 2019-01-01 DIAGNOSIS — Z12.11 COLON CANCER SCREENING: ICD-10-CM

## 2019-01-01 DIAGNOSIS — I50.9 CHRONIC CONGESTIVE HEART FAILURE, UNSPECIFIED HEART FAILURE TYPE: Chronic | ICD-10-CM

## 2019-01-01 DIAGNOSIS — Z95.1 S/P CABG (CORONARY ARTERY BYPASS GRAFT): ICD-10-CM

## 2019-01-01 DIAGNOSIS — Z79.4 LONG-TERM INSULIN USE: ICD-10-CM

## 2019-01-01 DIAGNOSIS — I70.0 ATHEROSCLEROSIS OF AORTA: ICD-10-CM

## 2019-01-01 DIAGNOSIS — D63.8 ANEMIA OF CHRONIC DISEASE: Chronic | ICD-10-CM

## 2019-01-01 DIAGNOSIS — R07.9 CHEST PAIN: Primary | ICD-10-CM

## 2019-01-01 DIAGNOSIS — Z85.46 HISTORY OF PROSTATE CANCER: ICD-10-CM

## 2019-01-01 DIAGNOSIS — H35.033 HYPERTENSIVE RETINOPATHY OF BOTH EYES: ICD-10-CM

## 2019-01-01 DIAGNOSIS — Z00.00 ROUTINE MEDICAL EXAM: Primary | ICD-10-CM

## 2019-01-01 DIAGNOSIS — G40.909 SEIZURE DISORDER: Chronic | ICD-10-CM

## 2019-01-01 DIAGNOSIS — Z86.73 HISTORY OF STROKE: Chronic | ICD-10-CM

## 2019-01-01 DIAGNOSIS — Z99.89 DEPENDENT ON WALKER FOR AMBULATION: Chronic | ICD-10-CM

## 2019-01-01 DIAGNOSIS — N18.6 ESRD ON HEMODIALYSIS: ICD-10-CM

## 2019-01-01 DIAGNOSIS — Z95.1 S/P CABG (CORONARY ARTERY BYPASS GRAFT): Chronic | ICD-10-CM

## 2019-01-01 DIAGNOSIS — I25.119 CORONARY ARTERY DISEASE INVOLVING NATIVE CORONARY ARTERY OF NATIVE HEART WITH ANGINA PECTORIS: Chronic | ICD-10-CM

## 2019-01-01 DIAGNOSIS — I25.2 HISTORY OF MI (MYOCARDIAL INFARCTION): Chronic | ICD-10-CM

## 2019-01-01 DIAGNOSIS — I47.29 NSVT (NONSUSTAINED VENTRICULAR TACHYCARDIA): ICD-10-CM

## 2019-01-01 DIAGNOSIS — Z23 NEED FOR SHINGLES VACCINE: ICD-10-CM

## 2019-01-01 DIAGNOSIS — Z99.2 ESRD ON HEMODIALYSIS: ICD-10-CM

## 2019-01-01 DIAGNOSIS — I49.8 OTHER SPECIFIED CARDIAC ARRHYTHMIAS: Primary | ICD-10-CM

## 2019-01-01 DIAGNOSIS — I21.4 NSTEMI (NON-ST ELEVATED MYOCARDIAL INFARCTION): ICD-10-CM

## 2019-01-01 DIAGNOSIS — R62.7 FAILURE TO THRIVE IN ADULT: ICD-10-CM

## 2019-01-01 DIAGNOSIS — I10 ESSENTIAL HYPERTENSION: Primary | Chronic | ICD-10-CM

## 2019-01-01 DIAGNOSIS — C61 PROSTATE CANCER: ICD-10-CM

## 2019-01-01 DIAGNOSIS — I10 HYPERTENSION: Chronic | ICD-10-CM

## 2019-01-01 DIAGNOSIS — I47.10 SVT (SUPRAVENTRICULAR TACHYCARDIA): ICD-10-CM

## 2019-01-01 DIAGNOSIS — Z23 NEED FOR STREPTOCOCCUS PNEUMONIAE VACCINATION: ICD-10-CM

## 2019-01-01 DIAGNOSIS — R00.0 TACHYCARDIA: ICD-10-CM

## 2019-01-01 DIAGNOSIS — N25.81 SECONDARY RENAL HYPERPARATHYROIDISM: ICD-10-CM

## 2019-01-01 DIAGNOSIS — Z12.5 SCREENING FOR PROSTATE CANCER: ICD-10-CM

## 2019-01-01 DIAGNOSIS — G57.90 MONONEURITIS OF LOWER EXTREMITY, UNSPECIFIED LATERALITY: ICD-10-CM

## 2019-01-01 DIAGNOSIS — D63.8 ANEMIA OF CHRONIC DISEASE: Primary | Chronic | ICD-10-CM

## 2019-01-01 DIAGNOSIS — I25.10 CAD (CORONARY ARTERY DISEASE): Chronic | ICD-10-CM

## 2019-01-01 DIAGNOSIS — R79.89 ELEVATED TROPONIN: Primary | ICD-10-CM

## 2019-01-01 DIAGNOSIS — Z23 FLU VACCINE NEED: ICD-10-CM

## 2019-01-01 LAB
ALBUMIN SERPL BCP-MCNC: 2.9 G/DL (ref 3.5–5.2)
ALBUMIN SERPL BCP-MCNC: 3 G/DL (ref 3.5–5.2)
ALBUMIN SERPL BCP-MCNC: 3.4 G/DL (ref 3.5–5.2)
ALBUMIN SERPL BCP-MCNC: 3.7 G/DL (ref 3.5–5.2)
ALLENS TEST: ABNORMAL
ALP SERPL-CCNC: 116 U/L (ref 55–135)
ALP SERPL-CCNC: 129 U/L (ref 55–135)
ALP SERPL-CCNC: 150 U/L (ref 55–135)
ALP SERPL-CCNC: 192 U/L (ref 55–135)
ALT SERPL W/O P-5'-P-CCNC: 26 U/L (ref 10–44)
ALT SERPL W/O P-5'-P-CCNC: 32 U/L (ref 10–44)
ALT SERPL W/O P-5'-P-CCNC: 36 U/L (ref 10–44)
ALT SERPL W/O P-5'-P-CCNC: 43 U/L (ref 10–44)
ANION GAP SERPL CALC-SCNC: 11 MMOL/L (ref 8–16)
ANION GAP SERPL CALC-SCNC: 12 MMOL/L (ref 8–16)
ANION GAP SERPL CALC-SCNC: 13 MMOL/L (ref 8–16)
ANION GAP SERPL CALC-SCNC: 14 MMOL/L (ref 8–16)
ANION GAP SERPL CALC-SCNC: 14 MMOL/L (ref 8–16)
ANION GAP SERPL CALC-SCNC: 17 MMOL/L (ref 8–16)
ANION GAP SERPL CALC-SCNC: 8 MMOL/L (ref 8–16)
AORTIC ROOT ANNULUS: 3.63 CM
AORTIC ROOT ANNULUS: 3.77 CM
AORTIC VALVE CUSP SEPERATION: 2.08 CM
AORTIC VALVE CUSP SEPERATION: 2.16 CM
ASCENDING AORTA: 2.88 CM
ASCENDING AORTA: 3.17 CM
AST SERPL-CCNC: 18 U/L (ref 10–40)
AST SERPL-CCNC: 20 U/L (ref 10–40)
AST SERPL-CCNC: 22 U/L (ref 10–40)
AST SERPL-CCNC: 28 U/L (ref 10–40)
AV INDEX (PROSTH): 0.81
AV INDEX (PROSTH): 0.84
AV MEAN GRADIENT: 3 MMHG
AV MEAN GRADIENT: 4 MMHG
AV PEAK GRADIENT: 6 MMHG
AV PEAK GRADIENT: 6 MMHG
AV VALVE AREA: 3.03 CM2
AV VALVE AREA: 3.11 CM2
AV VELOCITY RATIO: 0.79
AV VELOCITY RATIO: 0.86
BASOPHILS # BLD AUTO: 0.01 K/UL (ref 0–0.2)
BASOPHILS # BLD AUTO: 0.02 K/UL (ref 0–0.2)
BASOPHILS NFR BLD: 0.2 % (ref 0–1.9)
BASOPHILS NFR BLD: 0.3 % (ref 0–1.9)
BASOPHILS NFR BLD: 0.4 % (ref 0–1.9)
BASOPHILS NFR BLD: 0.4 % (ref 0–1.9)
BASOPHILS NFR BLD: 0.5 % (ref 0–1.9)
BILIRUB SERPL-MCNC: 0.4 MG/DL (ref 0.1–1)
BILIRUB SERPL-MCNC: 0.6 MG/DL (ref 0.1–1)
BILIRUB SERPL-MCNC: 0.6 MG/DL (ref 0.1–1)
BILIRUB SERPL-MCNC: 0.7 MG/DL (ref 0.1–1)
BNP SERPL-MCNC: 289 PG/ML (ref 0–99)
BNP SERPL-MCNC: 617 PG/ML (ref 0–99)
BSA FOR ECHO PROCEDURE: 1.83 M2
BSA FOR ECHO PROCEDURE: 2.01 M2
BUN SERPL-MCNC: 23 MG/DL (ref 8–23)
BUN SERPL-MCNC: 26 MG/DL (ref 8–23)
BUN SERPL-MCNC: 35 MG/DL (ref 8–23)
BUN SERPL-MCNC: 43 MG/DL (ref 6–30)
BUN SERPL-MCNC: 47 MG/DL (ref 8–23)
BUN SERPL-MCNC: 48 MG/DL (ref 8–23)
BUN SERPL-MCNC: 56 MG/DL (ref 8–23)
CALCIUM SERPL-MCNC: 8.3 MG/DL (ref 8.7–10.5)
CALCIUM SERPL-MCNC: 8.4 MG/DL (ref 8.7–10.5)
CALCIUM SERPL-MCNC: 8.5 MG/DL (ref 8.7–10.5)
CALCIUM SERPL-MCNC: 8.8 MG/DL (ref 8.7–10.5)
CALCIUM SERPL-MCNC: 9.2 MG/DL (ref 8.7–10.5)
CALCIUM SERPL-MCNC: 9.7 MG/DL (ref 8.7–10.5)
CHLORIDE SERPL-SCNC: 100 MMOL/L (ref 95–110)
CHLORIDE SERPL-SCNC: 102 MMOL/L (ref 95–110)
CHLORIDE SERPL-SCNC: 102 MMOL/L (ref 95–110)
CHLORIDE SERPL-SCNC: 103 MMOL/L (ref 95–110)
CHLORIDE SERPL-SCNC: 104 MMOL/L (ref 95–110)
CO2 SERPL-SCNC: 18 MMOL/L (ref 23–29)
CO2 SERPL-SCNC: 22 MMOL/L (ref 23–29)
CO2 SERPL-SCNC: 22 MMOL/L (ref 23–29)
CO2 SERPL-SCNC: 23 MMOL/L (ref 23–29)
CO2 SERPL-SCNC: 24 MMOL/L (ref 23–29)
CO2 SERPL-SCNC: 28 MMOL/L (ref 23–29)
COMPLEXED PSA SERPL-MCNC: 6.9 NG/ML (ref 0–4)
CREAT SERPL-MCNC: 10.2 MG/DL (ref 0.5–1.4)
CREAT SERPL-MCNC: 5.2 MG/DL (ref 0.5–1.4)
CREAT SERPL-MCNC: 6.2 MG/DL (ref 0.5–1.4)
CREAT SERPL-MCNC: 7.4 MG/DL (ref 0.5–1.4)
CREAT SERPL-MCNC: 8.3 MG/DL (ref 0.5–1.4)
CREAT SERPL-MCNC: 8.6 MG/DL (ref 0.5–1.4)
CREAT SERPL-MCNC: 8.9 MG/DL (ref 0.5–1.4)
CV ECHO LV RWT: 0.61 CM
CV ECHO LV RWT: 0.64 CM
DIFFERENTIAL METHOD: ABNORMAL
DOP CALC AO PEAK VEL: 1.19 M/S
DOP CALC AO PEAK VEL: 1.25 M/S
DOP CALC AO VTI: 19.88 CM
DOP CALC AO VTI: 28.53 CM
DOP CALC LVOT AREA: 3.6 CM2
DOP CALC LVOT AREA: 3.8 CM2
DOP CALC LVOT DIAMETER: 2.14 CM
DOP CALC LVOT DIAMETER: 2.21 CM
DOP CALC LVOT PEAK VEL: 0.94 M/S
DOP CALC LVOT PEAK VEL: 1.07 M/S
DOP CALC LVOT STROKE VOLUME: 61.88 CM3
DOP CALC LVOT STROKE VOLUME: 86.39 CM3
DOP CALCLVOT PEAK VEL VTI: 16.14 CM
DOP CALCLVOT PEAK VEL VTI: 24.03 CM
E WAVE DECELERATION TIME: 151.18 MSEC
E WAVE DECELERATION TIME: 167.61 MSEC
E/A RATIO: 0.74
E/A RATIO: 0.75
E/E' RATIO: 13.14 M/S
ECHO LV POSTERIOR WALL: 1.36 CM (ref 0.6–1.1)
ECHO LV POSTERIOR WALL: 1.43 CM (ref 0.6–1.1)
EOSINOPHIL # BLD AUTO: 0.1 K/UL (ref 0–0.5)
EOSINOPHIL # BLD AUTO: 0.2 K/UL (ref 0–0.5)
EOSINOPHIL # BLD AUTO: 0.2 K/UL (ref 0–0.5)
EOSINOPHIL NFR BLD: 2 % (ref 0–8)
EOSINOPHIL NFR BLD: 2.7 % (ref 0–8)
EOSINOPHIL NFR BLD: 2.9 % (ref 0–8)
EOSINOPHIL NFR BLD: 3.1 % (ref 0–8)
EOSINOPHIL NFR BLD: 3.7 % (ref 0–8)
ERYTHROCYTE [DISTWIDTH] IN BLOOD BY AUTOMATED COUNT: 15.4 % (ref 11.5–14.5)
ERYTHROCYTE [DISTWIDTH] IN BLOOD BY AUTOMATED COUNT: 15.6 % (ref 11.5–14.5)
ERYTHROCYTE [DISTWIDTH] IN BLOOD BY AUTOMATED COUNT: 15.8 % (ref 11.5–14.5)
ERYTHROCYTE [DISTWIDTH] IN BLOOD BY AUTOMATED COUNT: 17.2 % (ref 11.5–14.5)
ERYTHROCYTE [DISTWIDTH] IN BLOOD BY AUTOMATED COUNT: 17.2 % (ref 11.5–14.5)
EST. GFR  (AFRICAN AMERICAN): 10 ML/MIN/1.73 M^2
EST. GFR  (AFRICAN AMERICAN): 13 ML/MIN/1.73 M^2
EST. GFR  (AFRICAN AMERICAN): 6 ML/MIN/1.73 M^2
EST. GFR  (AFRICAN AMERICAN): 7 ML/MIN/1.73 M^2
EST. GFR  (AFRICAN AMERICAN): 7 ML/MIN/1.73 M^2
EST. GFR  (AFRICAN AMERICAN): 8 ML/MIN/1.73 M^2
EST. GFR  (NON AFRICAN AMERICAN): 11 ML/MIN/1.73 M^2
EST. GFR  (NON AFRICAN AMERICAN): 5 ML/MIN/1.73 M^2
EST. GFR  (NON AFRICAN AMERICAN): 6 ML/MIN/1.73 M^2
EST. GFR  (NON AFRICAN AMERICAN): 6 ML/MIN/1.73 M^2
EST. GFR  (NON AFRICAN AMERICAN): 7 ML/MIN/1.73 M^2
EST. GFR  (NON AFRICAN AMERICAN): 9 ML/MIN/1.73 M^2
ESTIMATED AVG GLUCOSE: 283 MG/DL (ref 68–131)
ESTIMATED AVG GLUCOSE: 315 MG/DL (ref 68–131)
FRACTIONAL SHORTENING: 24 % (ref 28–44)
FRACTIONAL SHORTENING: 31 % (ref 28–44)
GLUCOSE SERPL-MCNC: 189 MG/DL (ref 70–110)
GLUCOSE SERPL-MCNC: 200 MG/DL (ref 70–110)
GLUCOSE SERPL-MCNC: 212 MG/DL (ref 70–110)
GLUCOSE SERPL-MCNC: 215 MG/DL (ref 70–110)
GLUCOSE SERPL-MCNC: 251 MG/DL (ref 70–110)
GLUCOSE SERPL-MCNC: 279 MG/DL (ref 70–110)
GLUCOSE SERPL-MCNC: 369 MG/DL (ref 70–110)
HAV IGM SERPL QL IA: NEGATIVE
HBA1C MFR BLD HPLC: 11.5 % (ref 4–5.6)
HBA1C MFR BLD HPLC: 12.6 % (ref 4–5.6)
HBV CORE IGM SERPL QL IA: NEGATIVE
HBV SURFACE AB SER QL IA: POSITIVE
HBV SURFACE AB SERPL IA-ACNC: NORMAL MIU/ML
HBV SURFACE AG SERPL QL IA: NEGATIVE
HCT VFR BLD AUTO: 28.5 % (ref 40–54)
HCT VFR BLD AUTO: 28.7 % (ref 40–54)
HCT VFR BLD AUTO: 28.9 % (ref 40–54)
HCT VFR BLD AUTO: 31.4 % (ref 40–54)
HCT VFR BLD AUTO: 35.4 % (ref 40–54)
HCT VFR BLD CALC: 35 %PCV (ref 36–54)
HCV AB SERPL QL IA: NEGATIVE
HGB BLD-MCNC: 10.2 G/DL (ref 14–18)
HGB BLD-MCNC: 11.3 G/DL (ref 14–18)
HGB BLD-MCNC: 9.4 G/DL (ref 14–18)
IMM GRANULOCYTES # BLD AUTO: 0.01 K/UL (ref 0–0.04)
IMM GRANULOCYTES # BLD AUTO: 0.01 K/UL (ref 0–0.04)
IMM GRANULOCYTES # BLD AUTO: 0.02 K/UL (ref 0–0.04)
IMM GRANULOCYTES NFR BLD AUTO: 0.2 % (ref 0–0.5)
IMM GRANULOCYTES NFR BLD AUTO: 0.2 % (ref 0–0.5)
IMM GRANULOCYTES NFR BLD AUTO: 0.3 % (ref 0–0.5)
INR PPP: 1 (ref 0.8–1.2)
INTERVENTRICULAR SEPTUM: 1.42 CM (ref 0.6–1.1)
INTERVENTRICULAR SEPTUM: 1.56 CM (ref 0.6–1.1)
IVRT: 0.08 MSEC
IVRT: 0.16 MSEC
LA MAJOR: 4.48 CM
LA MAJOR: 5.58 CM
LA MINOR: 5.4 CM
LA MINOR: 5.71 CM
LA WIDTH: 3.92 CM
LA WIDTH: 4.27 CM
LEFT ATRIUM SIZE: 3.77 CM
LEFT ATRIUM SIZE: 4.25 CM
LEFT ATRIUM VOLUME INDEX: 34.3 ML/M2
LEFT ATRIUM VOLUME INDEX: 47.1 ML/M2
LEFT ATRIUM VOLUME: 61.52 CM3
LEFT ATRIUM VOLUME: 87.06 CM3
LEFT INTERNAL DIMENSION IN SYSTOLE: 3.21 CM (ref 2.1–4)
LEFT INTERNAL DIMENSION IN SYSTOLE: 3.24 CM (ref 2.1–4)
LEFT VENTRICLE DIASTOLIC VOLUME INDEX: 45.82 ML/M2
LEFT VENTRICLE DIASTOLIC VOLUME INDEX: 54.44 ML/M2
LEFT VENTRICLE DIASTOLIC VOLUME: 100.73 ML
LEFT VENTRICLE DIASTOLIC VOLUME: 82.19 ML
LEFT VENTRICLE MASS INDEX: 127 G/M2
LEFT VENTRICLE MASS INDEX: 157 G/M2
LEFT VENTRICLE SYSTOLIC VOLUME INDEX: 22.4 ML/M2
LEFT VENTRICLE SYSTOLIC VOLUME INDEX: 23.5 ML/M2
LEFT VENTRICLE SYSTOLIC VOLUME: 41.43 ML
LEFT VENTRICLE SYSTOLIC VOLUME: 42.24 ML
LEFT VENTRICULAR INTERNAL DIMENSION IN DIASTOLE: 4.28 CM (ref 3.5–6)
LEFT VENTRICULAR INTERNAL DIMENSION IN DIASTOLE: 4.67 CM (ref 3.5–6)
LEFT VENTRICULAR MASS: 228.15 G
LEFT VENTRICULAR MASS: 289.81 G
LV LATERAL E/E' RATIO: 9.2 M/S
LV SEPTAL E/E' RATIO: 23 M/S
LYMPHOCYTES # BLD AUTO: 0.8 K/UL (ref 1–4.8)
LYMPHOCYTES # BLD AUTO: 0.9 K/UL (ref 1–4.8)
LYMPHOCYTES # BLD AUTO: 0.9 K/UL (ref 1–4.8)
LYMPHOCYTES # BLD AUTO: 1.1 K/UL (ref 1–4.8)
LYMPHOCYTES # BLD AUTO: 1.1 K/UL (ref 1–4.8)
LYMPHOCYTES NFR BLD: 15.7 % (ref 18–48)
LYMPHOCYTES NFR BLD: 16 % (ref 18–48)
LYMPHOCYTES NFR BLD: 20 % (ref 18–48)
LYMPHOCYTES NFR BLD: 21.2 % (ref 18–48)
LYMPHOCYTES NFR BLD: 26.5 % (ref 18–48)
MAGNESIUM SERPL-MCNC: 1.9 MG/DL (ref 1.6–2.6)
MAGNESIUM SERPL-MCNC: 2.3 MG/DL (ref 1.6–2.6)
MCH RBC QN AUTO: 28.4 PG (ref 27–31)
MCH RBC QN AUTO: 29.2 PG (ref 27–31)
MCH RBC QN AUTO: 29.4 PG (ref 27–31)
MCH RBC QN AUTO: 29.7 PG (ref 27–31)
MCH RBC QN AUTO: 30 PG (ref 27–31)
MCHC RBC AUTO-ENTMCNC: 31.9 G/DL (ref 32–36)
MCHC RBC AUTO-ENTMCNC: 32.5 G/DL (ref 32–36)
MCHC RBC AUTO-ENTMCNC: 32.5 G/DL (ref 32–36)
MCHC RBC AUTO-ENTMCNC: 32.8 G/DL (ref 32–36)
MCHC RBC AUTO-ENTMCNC: 33 G/DL (ref 32–36)
MCV RBC AUTO: 89 FL (ref 82–98)
MCV RBC AUTO: 89 FL (ref 82–98)
MCV RBC AUTO: 91 FL (ref 82–98)
MCV RBC AUTO: 91 FL (ref 82–98)
MCV RBC AUTO: 92 FL (ref 82–98)
MONOCYTES # BLD AUTO: 0.5 K/UL (ref 0.3–1)
MONOCYTES # BLD AUTO: 0.5 K/UL (ref 0.3–1)
MONOCYTES # BLD AUTO: 0.6 K/UL (ref 0.3–1)
MONOCYTES # BLD AUTO: 0.6 K/UL (ref 0.3–1)
MONOCYTES # BLD AUTO: 0.7 K/UL (ref 0.3–1)
MONOCYTES NFR BLD: 11.1 % (ref 4–15)
MONOCYTES NFR BLD: 11.6 % (ref 4–15)
MONOCYTES NFR BLD: 12.5 % (ref 4–15)
MONOCYTES NFR BLD: 12.9 % (ref 4–15)
MONOCYTES NFR BLD: 8.6 % (ref 4–15)
MV PEAK A VEL: 1.01 M/S
MV PEAK A VEL: 1.23 M/S
MV PEAK E VEL: 0.75 M/S
MV PEAK E VEL: 0.92 M/S
NEUTROPHILS # BLD AUTO: 2.4 K/UL (ref 1.8–7.7)
NEUTROPHILS # BLD AUTO: 3 K/UL (ref 1.8–7.7)
NEUTROPHILS # BLD AUTO: 3.4 K/UL (ref 1.8–7.7)
NEUTROPHILS # BLD AUTO: 3.6 K/UL (ref 1.8–7.7)
NEUTROPHILS # BLD AUTO: 4.3 K/UL (ref 1.8–7.7)
NEUTROPHILS NFR BLD: 57.2 % (ref 38–73)
NEUTROPHILS NFR BLD: 63.8 % (ref 38–73)
NEUTROPHILS NFR BLD: 64.6 % (ref 38–73)
NEUTROPHILS NFR BLD: 68.9 % (ref 38–73)
NEUTROPHILS NFR BLD: 73.1 % (ref 38–73)
NRBC BLD-RTO: 0 /100 WBC
PHOSPHATE SERPL-MCNC: 3 MG/DL (ref 2.7–4.5)
PISA TR MAX VEL: 1.73 M/S
PISA TR MAX VEL: 1.78 M/S
PLATELET # BLD AUTO: 145 K/UL (ref 150–350)
PLATELET # BLD AUTO: 149 K/UL (ref 150–350)
PLATELET # BLD AUTO: 150 K/UL (ref 150–350)
PLATELET # BLD AUTO: 172 K/UL (ref 150–350)
PLATELET # BLD AUTO: ABNORMAL K/UL (ref 150–350)
PMV BLD AUTO: 10.6 FL (ref 9.2–12.9)
PMV BLD AUTO: 10.9 FL (ref 9.2–12.9)
PMV BLD AUTO: 10.9 FL (ref 9.2–12.9)
PMV BLD AUTO: 11.4 FL (ref 9.2–12.9)
PMV BLD AUTO: ABNORMAL FL (ref 9.2–12.9)
POC ACTIVATED CLOTTING TIME K: 235 SEC (ref 74–137)
POC IONIZED CALCIUM: 1.18 MMOL/L (ref 1.06–1.42)
POC TCO2 (MEASURED): 27 MMOL/L (ref 23–29)
POCT GLUCOSE: 143 MG/DL (ref 70–110)
POCT GLUCOSE: 152 MG/DL (ref 70–110)
POCT GLUCOSE: 166 MG/DL (ref 70–110)
POCT GLUCOSE: 215 MG/DL (ref 70–110)
POCT GLUCOSE: 221 MG/DL (ref 70–110)
POCT GLUCOSE: 226 MG/DL (ref 70–110)
POCT GLUCOSE: 238 MG/DL (ref 70–110)
POCT GLUCOSE: 251 MG/DL (ref 70–110)
POCT GLUCOSE: 278 MG/DL (ref 70–110)
POCT GLUCOSE: 291 MG/DL (ref 70–110)
POCT GLUCOSE: 299 MG/DL (ref 70–110)
POCT GLUCOSE: 305 MG/DL (ref 70–110)
POCT GLUCOSE: 315 MG/DL (ref 70–110)
POCT GLUCOSE: 330 MG/DL (ref 70–110)
POCT GLUCOSE: 341 MG/DL (ref 70–110)
POCT GLUCOSE: 353 MG/DL (ref 70–110)
POCT GLUCOSE: 378 MG/DL (ref 70–110)
POCT GLUCOSE: 413 MG/DL (ref 70–110)
POCT GLUCOSE: 429 MG/DL (ref 70–110)
POCT GLUCOSE: 482 MG/DL (ref 70–110)
POTASSIUM BLD-SCNC: 4.5 MMOL/L (ref 3.5–5.1)
POTASSIUM SERPL-SCNC: 4 MMOL/L (ref 3.5–5.1)
POTASSIUM SERPL-SCNC: 4.1 MMOL/L (ref 3.5–5.1)
POTASSIUM SERPL-SCNC: 4.4 MMOL/L (ref 3.5–5.1)
POTASSIUM SERPL-SCNC: 4.5 MMOL/L (ref 3.5–5.1)
POTASSIUM SERPL-SCNC: 4.7 MMOL/L (ref 3.5–5.1)
POTASSIUM SERPL-SCNC: 5 MMOL/L (ref 3.5–5.1)
PROT SERPL-MCNC: 6.4 G/DL (ref 6–8.4)
PROT SERPL-MCNC: 6.6 G/DL (ref 6–8.4)
PROT SERPL-MCNC: 7.3 G/DL (ref 6–8.4)
PROT SERPL-MCNC: 8.1 G/DL (ref 6–8.4)
PROTHROMBIN TIME: 10.1 SEC (ref 9–12.5)
PULM VEIN S/D RATIO: 1.47
PV PEAK D VEL: 0.43 M/S
PV PEAK S VEL: 0.63 M/S
PV PEAK VELOCITY: 0.71 CM/S
PV PEAK VELOCITY: 0.86 CM/S
RA MAJOR: 4.6 CM
RA MAJOR: 5.71 CM
RA PRESSURE: 3 MMHG
RA PRESSURE: 3 MMHG
RA WIDTH: 3.15 CM
RA WIDTH: 4.6 CM
RBC # BLD AUTO: 3.13 M/UL (ref 4.6–6.2)
RBC # BLD AUTO: 3.17 M/UL (ref 4.6–6.2)
RBC # BLD AUTO: 3.22 M/UL (ref 4.6–6.2)
RBC # BLD AUTO: 3.47 M/UL (ref 4.6–6.2)
RBC # BLD AUTO: 3.98 M/UL (ref 4.6–6.2)
RIGHT VENTRICULAR END-DIASTOLIC DIMENSION: 3.12 CM
RIGHT VENTRICULAR END-DIASTOLIC DIMENSION: 3.27 CM
RV TISSUE DOPPLER FREE WALL SYSTOLIC VELOCITY 1 (APICAL 4 CHAMBER VIEW): 5.56 CM/S
RV TISSUE DOPPLER FREE WALL SYSTOLIC VELOCITY 1 (APICAL 4 CHAMBER VIEW): 8.55 CM/S
SAMPLE: ABNORMAL
SAMPLE: ABNORMAL
SINUS: 3.58 CM
SINUS: 3.87 CM
SITE: ABNORMAL
SODIUM BLD-SCNC: 139 MMOL/L (ref 136–145)
SODIUM SERPL-SCNC: 134 MMOL/L (ref 136–145)
SODIUM SERPL-SCNC: 135 MMOL/L (ref 136–145)
SODIUM SERPL-SCNC: 136 MMOL/L (ref 136–145)
SODIUM SERPL-SCNC: 138 MMOL/L (ref 136–145)
SODIUM SERPL-SCNC: 138 MMOL/L (ref 136–145)
SODIUM SERPL-SCNC: 139 MMOL/L (ref 136–145)
STJ: 2.87 CM
STJ: 2.88 CM
TDI LATERAL: 0.1 M/S
TDI SEPTAL: 0.04 M/S
TDI: 0.07 M/S
TR MAX PG: 12 MMHG
TR MAX PG: 13 MMHG
TRICUSPID ANNULAR PLANE SYSTOLIC EXCURSION: 0.98 CM
TROPONIN I SERPL DL<=0.01 NG/ML-MCNC: 0.03 NG/ML (ref 0–0.03)
TROPONIN I SERPL DL<=0.01 NG/ML-MCNC: 0.03 NG/ML (ref 0–0.03)
TROPONIN I SERPL DL<=0.01 NG/ML-MCNC: 0.04 NG/ML (ref 0–0.03)
TROPONIN I SERPL DL<=0.01 NG/ML-MCNC: 0.04 NG/ML (ref 0–0.03)
TROPONIN I SERPL DL<=0.01 NG/ML-MCNC: 0.05 NG/ML (ref 0–0.03)
TV REST PULMONARY ARTERY PRESSURE: 15 MMHG
TV REST PULMONARY ARTERY PRESSURE: 16 MMHG
WBC # BLD AUTO: 4.26 K/UL (ref 3.9–12.7)
WBC # BLD AUTO: 4.59 K/UL (ref 3.9–12.7)
WBC # BLD AUTO: 5.18 K/UL (ref 3.9–12.7)
WBC # BLD AUTO: 5.28 K/UL (ref 3.9–12.7)
WBC # BLD AUTO: 5.94 K/UL (ref 3.9–12.7)

## 2019-01-01 PROCEDURE — 99396 PR PREVENTIVE VISIT,EST,40-64: ICD-10-PCS | Mod: S$GLB,,, | Performed by: INTERNAL MEDICINE

## 2019-01-01 PROCEDURE — 25000003 PHARM REV CODE 250: Performed by: EMERGENCY MEDICINE

## 2019-01-01 PROCEDURE — 3046F HEMOGLOBIN A1C LEVEL >9.0%: CPT | Mod: CPTII,S$GLB,, | Performed by: NURSE PRACTITIONER

## 2019-01-01 PROCEDURE — 25000003 PHARM REV CODE 250: Performed by: PHYSICIAN ASSISTANT

## 2019-01-01 PROCEDURE — G0378 HOSPITAL OBSERVATION PER HR: HCPCS

## 2019-01-01 PROCEDURE — 63600175 PHARM REV CODE 636 W HCPCS: Performed by: INTERNAL MEDICINE

## 2019-01-01 PROCEDURE — 25000003 PHARM REV CODE 250: Performed by: INTERNAL MEDICINE

## 2019-01-01 PROCEDURE — C1874 STENT, COATED/COV W/DEL SYS: HCPCS | Performed by: INTERNAL MEDICINE

## 2019-01-01 PROCEDURE — 25000003 PHARM REV CODE 250: Performed by: HOSPITALIST

## 2019-01-01 PROCEDURE — 99153 MOD SED SAME PHYS/QHP EA: CPT | Performed by: INTERNAL MEDICINE

## 2019-01-01 PROCEDURE — 99999 PR PBB SHADOW E&M-EST. PATIENT-LVL III: CPT | Mod: PBBFAC,,, | Performed by: INTERNAL MEDICINE

## 2019-01-01 PROCEDURE — 63600175 PHARM REV CODE 636 W HCPCS: Performed by: NURSE PRACTITIONER

## 2019-01-01 PROCEDURE — 83036 HEMOGLOBIN GLYCOSYLATED A1C: CPT

## 2019-01-01 PROCEDURE — 93459 L HRT ART/GRFT ANGIO: CPT | Performed by: INTERNAL MEDICINE

## 2019-01-01 PROCEDURE — 27201423 OPTIME MED/SURG SUP & DEVICES STERILE SUPPLY: Performed by: INTERNAL MEDICINE

## 2019-01-01 PROCEDURE — 80048 BASIC METABOLIC PNL TOTAL CA: CPT

## 2019-01-01 PROCEDURE — 93010 ELECTROCARDIOGRAM REPORT: CPT | Mod: ,,, | Performed by: INTERNAL MEDICINE

## 2019-01-01 PROCEDURE — 80074 ACUTE HEPATITIS PANEL: CPT

## 2019-01-01 PROCEDURE — 99226 PR SUBSEQUENT OBSERVATION CARE,LEVEL III: CPT | Mod: 25,,, | Performed by: INTERNAL MEDICINE

## 2019-01-01 PROCEDURE — 99214 OFFICE O/P EST MOD 30 MIN: CPT | Mod: S$GLB,,, | Performed by: NURSE PRACTITIONER

## 2019-01-01 PROCEDURE — 99214 PR OFFICE/OUTPT VISIT, EST, LEVL IV, 30-39 MIN: ICD-10-PCS | Mod: S$GLB,,, | Performed by: NURSE PRACTITIONER

## 2019-01-01 PROCEDURE — 92928 PR STENT: ICD-10-PCS | Mod: LC,,, | Performed by: INTERNAL MEDICINE

## 2019-01-01 PROCEDURE — 99999 PR PBB SHADOW E&M-EST. PATIENT-LVL V: CPT | Mod: PBBFAC,,, | Performed by: NURSE PRACTITIONER

## 2019-01-01 PROCEDURE — 36415 COLL VENOUS BLD VENIPUNCTURE: CPT

## 2019-01-01 PROCEDURE — C1725 CATH, TRANSLUMIN NON-LASER: HCPCS | Performed by: INTERNAL MEDICINE

## 2019-01-01 PROCEDURE — 99152 MOD SED SAME PHYS/QHP 5/>YRS: CPT | Mod: ,,, | Performed by: INTERNAL MEDICINE

## 2019-01-01 PROCEDURE — 63600175 PHARM REV CODE 636 W HCPCS: Performed by: HOSPITALIST

## 2019-01-01 PROCEDURE — 99999 PR PBB SHADOW E&M-EST. PATIENT-LVL V: ICD-10-PCS | Mod: PBBFAC,,, | Performed by: NURSE PRACTITIONER

## 2019-01-01 PROCEDURE — 3008F PR BODY MASS INDEX (BMI) DOCUMENTED: ICD-10-PCS | Mod: CPTII,S$GLB,, | Performed by: INTERNAL MEDICINE

## 2019-01-01 PROCEDURE — 93010 EKG 12-LEAD: ICD-10-PCS | Mod: ,,, | Performed by: INTERNAL MEDICINE

## 2019-01-01 PROCEDURE — 84484 ASSAY OF TROPONIN QUANT: CPT | Mod: 91

## 2019-01-01 PROCEDURE — G0257 UNSCHED DIALYSIS ESRD PT HOS: HCPCS

## 2019-01-01 PROCEDURE — 99220 PR INITIAL OBSERVATION CARE,LEVL III: ICD-10-PCS | Mod: ,,, | Performed by: INTERNAL MEDICINE

## 2019-01-01 PROCEDURE — 84153 ASSAY OF PSA TOTAL: CPT

## 2019-01-01 PROCEDURE — 85347 COAGULATION TIME ACTIVATED: CPT

## 2019-01-01 PROCEDURE — 85025 COMPLETE CBC W/AUTO DIFF WBC: CPT

## 2019-01-01 PROCEDURE — 99900035 HC TECH TIME PER 15 MIN (STAT)

## 2019-01-01 PROCEDURE — 92978 PR IVUS, CORONARY, 1ST VESSEL: ICD-10-PCS | Mod: 26,,, | Performed by: INTERNAL MEDICINE

## 2019-01-01 PROCEDURE — 84484 ASSAY OF TROPONIN QUANT: CPT

## 2019-01-01 PROCEDURE — 96372 THER/PROPH/DIAG INJ SC/IM: CPT

## 2019-01-01 PROCEDURE — C9399 UNCLASSIFIED DRUGS OR BIOLOG: HCPCS | Performed by: NURSE PRACTITIONER

## 2019-01-01 PROCEDURE — 93005 ELECTROCARDIOGRAM TRACING: CPT

## 2019-01-01 PROCEDURE — 85347 COAGULATION TIME ACTIVATED: CPT | Performed by: INTERNAL MEDICINE

## 2019-01-01 PROCEDURE — 99214 OFFICE O/P EST MOD 30 MIN: CPT | Mod: S$GLB,,, | Performed by: INTERNAL MEDICINE

## 2019-01-01 PROCEDURE — 94761 N-INVAS EAR/PLS OXIMETRY MLT: CPT

## 2019-01-01 PROCEDURE — 83735 ASSAY OF MAGNESIUM: CPT

## 2019-01-01 PROCEDURE — C1894 INTRO/SHEATH, NON-LASER: HCPCS | Performed by: INTERNAL MEDICINE

## 2019-01-01 PROCEDURE — 99214 PR OFFICE/OUTPT VISIT, EST, LEVL IV, 30-39 MIN: ICD-10-PCS | Mod: 25,GT,, | Performed by: INTERNAL MEDICINE

## 2019-01-01 PROCEDURE — 80053 COMPREHEN METABOLIC PANEL: CPT

## 2019-01-01 PROCEDURE — 99999 PR PBB SHADOW E&M-EST. PATIENT-LVL III: ICD-10-PCS | Mod: PBBFAC,,, | Performed by: INTERNAL MEDICINE

## 2019-01-01 PROCEDURE — 84132 ASSAY OF SERUM POTASSIUM: CPT

## 2019-01-01 PROCEDURE — 83880 ASSAY OF NATRIURETIC PEPTIDE: CPT

## 2019-01-01 PROCEDURE — 3046F HEMOGLOBIN A1C LEVEL >9.0%: CPT | Mod: CPTII,S$GLB,, | Performed by: INTERNAL MEDICINE

## 2019-01-01 PROCEDURE — C1769 GUIDE WIRE: HCPCS | Performed by: INTERNAL MEDICINE

## 2019-01-01 PROCEDURE — 99220 PR INITIAL OBSERVATION CARE,LEVL III: CPT | Mod: ,,, | Performed by: INTERNAL MEDICINE

## 2019-01-01 PROCEDURE — C1887 CATHETER, GUIDING: HCPCS | Performed by: INTERNAL MEDICINE

## 2019-01-01 PROCEDURE — 99396 PREV VISIT EST AGE 40-64: CPT | Mod: S$GLB,,, | Performed by: INTERNAL MEDICINE

## 2019-01-01 PROCEDURE — 3008F PR BODY MASS INDEX (BMI) DOCUMENTED: ICD-10-PCS | Mod: CPTII,S$GLB,, | Performed by: NURSE PRACTITIONER

## 2019-01-01 PROCEDURE — 92978 ENDOLUMINL IVUS OCT C 1ST: CPT | Performed by: INTERNAL MEDICINE

## 2019-01-01 PROCEDURE — C9600 PERC DRUG-EL COR STENT SING: HCPCS | Mod: LC | Performed by: INTERNAL MEDICINE

## 2019-01-01 PROCEDURE — 99291 CRITICAL CARE FIRST HOUR: CPT | Mod: 25

## 2019-01-01 PROCEDURE — 99285 EMERGENCY DEPT VISIT HI MDM: CPT | Mod: 25

## 2019-01-01 PROCEDURE — 92928 PRQ TCAT PLMT NTRAC ST 1 LES: CPT | Mod: LC,,, | Performed by: INTERNAL MEDICINE

## 2019-01-01 PROCEDURE — 85014 HEMATOCRIT: CPT | Mod: 59

## 2019-01-01 PROCEDURE — 63600175 PHARM REV CODE 636 W HCPCS: Performed by: PHYSICIAN ASSISTANT

## 2019-01-01 PROCEDURE — 25000003 PHARM REV CODE 250: Performed by: NURSE PRACTITIONER

## 2019-01-01 PROCEDURE — 99226 PR SUBSEQUENT OBSERVATION CARE,LEVEL III: ICD-10-PCS | Mod: 25,,, | Performed by: INTERNAL MEDICINE

## 2019-01-01 PROCEDURE — 99214 PR OFFICE/OUTPT VISIT, EST, LEVL IV, 30-39 MIN: ICD-10-PCS | Mod: S$GLB,,, | Performed by: INTERNAL MEDICINE

## 2019-01-01 PROCEDURE — G0009 PNEUMOCOCCAL POLYSACCHARIDE VACCINE 23-VALENT =>2YO SQ IM: ICD-10-PCS | Mod: S$GLB,,, | Performed by: INTERNAL MEDICINE

## 2019-01-01 PROCEDURE — G0009 ADMIN PNEUMOCOCCAL VACCINE: HCPCS | Mod: S$GLB,,, | Performed by: INTERNAL MEDICINE

## 2019-01-01 PROCEDURE — 25500020 PHARM REV CODE 255: Performed by: INTERNAL MEDICINE

## 2019-01-01 PROCEDURE — 84295 ASSAY OF SERUM SODIUM: CPT

## 2019-01-01 PROCEDURE — 93010 EKG 12-LEAD: ICD-10-PCS | Mod: 76,,, | Performed by: INTERNAL MEDICINE

## 2019-01-01 PROCEDURE — 96361 HYDRATE IV INFUSION ADD-ON: CPT

## 2019-01-01 PROCEDURE — 3008F BODY MASS INDEX DOCD: CPT | Mod: CPTII,S$GLB,, | Performed by: NURSE PRACTITIONER

## 2019-01-01 PROCEDURE — 93459 L HRT ART/GRFT ANGIO: CPT | Mod: 26,59,51, | Performed by: INTERNAL MEDICINE

## 2019-01-01 PROCEDURE — 96374 THER/PROPH/DIAG INJ IV PUSH: CPT | Mod: 59

## 2019-01-01 PROCEDURE — 99214 OFFICE O/P EST MOD 30 MIN: CPT | Mod: 25,GT,, | Performed by: INTERNAL MEDICINE

## 2019-01-01 PROCEDURE — C1760 CLOSURE DEV, VASC: HCPCS | Performed by: INTERNAL MEDICINE

## 2019-01-01 PROCEDURE — 99152 PR MOD CONSCIOUS SEDATION, SAME PHYS, 5+ YRS, FIRST 15 MIN: ICD-10-PCS | Mod: ,,, | Performed by: INTERNAL MEDICINE

## 2019-01-01 PROCEDURE — 82565 ASSAY OF CREATININE: CPT | Mod: 59

## 2019-01-01 PROCEDURE — 85610 PROTHROMBIN TIME: CPT

## 2019-01-01 PROCEDURE — 82330 ASSAY OF CALCIUM: CPT

## 2019-01-01 PROCEDURE — 93010 ELECTROCARDIOGRAM REPORT: CPT | Mod: 76,,, | Performed by: INTERNAL MEDICINE

## 2019-01-01 PROCEDURE — 99152 MOD SED SAME PHYS/QHP 5/>YRS: CPT | Performed by: INTERNAL MEDICINE

## 2019-01-01 PROCEDURE — 63600175 PHARM REV CODE 636 W HCPCS: Performed by: EMERGENCY MEDICINE

## 2019-01-01 PROCEDURE — 96372 THER/PROPH/DIAG INJ SC/IM: CPT | Mod: 59

## 2019-01-01 PROCEDURE — 90732 PPSV23 VACC 2 YRS+ SUBQ/IM: CPT | Mod: S$GLB,,, | Performed by: INTERNAL MEDICINE

## 2019-01-01 PROCEDURE — 93459: ICD-10-PCS | Mod: 26,59,51, | Performed by: INTERNAL MEDICINE

## 2019-01-01 PROCEDURE — 86706 HEP B SURFACE ANTIBODY: CPT

## 2019-01-01 PROCEDURE — 3046F PR MOST RECENT HEMOGLOBIN A1C LEVEL > 9.0%: ICD-10-PCS | Mod: CPTII,S$GLB,, | Performed by: INTERNAL MEDICINE

## 2019-01-01 PROCEDURE — 3046F PR MOST RECENT HEMOGLOBIN A1C LEVEL > 9.0%: ICD-10-PCS | Mod: CPTII,S$GLB,, | Performed by: NURSE PRACTITIONER

## 2019-01-01 PROCEDURE — 90732 PNEUMOCOCCAL POLYSACCHARIDE VACCINE 23-VALENT =>2YO SQ IM: ICD-10-PCS | Mod: S$GLB,,, | Performed by: INTERNAL MEDICINE

## 2019-01-01 PROCEDURE — 36415 COLL VENOUS BLD VENIPUNCTURE: CPT | Mod: PO

## 2019-01-01 PROCEDURE — 92978 ENDOLUMINL IVUS OCT C 1ST: CPT | Mod: 26,,, | Performed by: INTERNAL MEDICINE

## 2019-01-01 PROCEDURE — 37799 UNLISTED PX VASCULAR SURGERY: CPT

## 2019-01-01 PROCEDURE — 84100 ASSAY OF PHOSPHORUS: CPT

## 2019-01-01 PROCEDURE — 3008F BODY MASS INDEX DOCD: CPT | Mod: CPTII,S$GLB,, | Performed by: INTERNAL MEDICINE

## 2019-01-01 DEVICE — STENT RONYX30026UX RESOLUTE ONYX 3.00X26
Type: IMPLANTABLE DEVICE | Site: CORONARY | Status: FUNCTIONAL
Brand: RESOLUTE ONYX™

## 2019-01-01 RX ORDER — INSULIN ASPART 100 [IU]/ML
0-5 INJECTION, SOLUTION INTRAVENOUS; SUBCUTANEOUS
Status: DISCONTINUED | OUTPATIENT
Start: 2019-01-01 | End: 2019-01-01 | Stop reason: HOSPADM

## 2019-01-01 RX ORDER — DILTIAZEM HYDROCHLORIDE 180 MG/1
180 CAPSULE, COATED, EXTENDED RELEASE ORAL DAILY
Status: DISCONTINUED | OUTPATIENT
Start: 2019-01-01 | End: 2019-01-01 | Stop reason: HOSPADM

## 2019-01-01 RX ORDER — ACETAMINOPHEN 325 MG/1
650 TABLET ORAL EVERY 6 HOURS PRN
Status: DISCONTINUED | OUTPATIENT
Start: 2019-01-01 | End: 2019-01-01 | Stop reason: HOSPADM

## 2019-01-01 RX ORDER — IBUPROFEN 200 MG
16 TABLET ORAL
Status: DISCONTINUED | OUTPATIENT
Start: 2019-01-01 | End: 2019-01-01 | Stop reason: HOSPADM

## 2019-01-01 RX ORDER — INSULIN GLARGINE 100 [IU]/ML
INJECTION, SOLUTION SUBCUTANEOUS
Qty: 15 ML | Refills: 0 | Status: SHIPPED | OUTPATIENT
Start: 2019-01-01 | End: 2020-01-01 | Stop reason: SDUPTHER

## 2019-01-01 RX ORDER — ONDANSETRON 2 MG/ML
4 INJECTION INTRAMUSCULAR; INTRAVENOUS EVERY 8 HOURS PRN
Status: DISCONTINUED | OUTPATIENT
Start: 2019-01-01 | End: 2019-01-01 | Stop reason: HOSPADM

## 2019-01-01 RX ORDER — SODIUM CHLORIDE 0.9 % (FLUSH) 0.9 %
10 SYRINGE (ML) INJECTION
Status: DISCONTINUED | OUTPATIENT
Start: 2019-01-01 | End: 2019-01-01 | Stop reason: HOSPADM

## 2019-01-01 RX ORDER — MEGESTROL ACETATE 20 MG/1
20 TABLET ORAL DAILY
Qty: 90 TABLET | Refills: 1 | Status: SHIPPED | OUTPATIENT
Start: 2019-01-01

## 2019-01-01 RX ORDER — LIDOCAINE HYDROCHLORIDE AND EPINEPHRINE 20; 10 MG/ML; UG/ML
INJECTION, SOLUTION INFILTRATION; PERINEURAL
Status: DISCONTINUED | OUTPATIENT
Start: 2019-01-01 | End: 2019-01-01 | Stop reason: HOSPADM

## 2019-01-01 RX ORDER — DILTIAZEM HYDROCHLORIDE 180 MG/1
180 CAPSULE, COATED, EXTENDED RELEASE ORAL DAILY
Qty: 30 CAPSULE | Refills: 2 | Status: SHIPPED | OUTPATIENT
Start: 2019-01-01 | End: 2019-01-01 | Stop reason: SDUPTHER

## 2019-01-01 RX ORDER — AMLODIPINE BESYLATE 5 MG/1
10 TABLET ORAL DAILY
Status: DISCONTINUED | OUTPATIENT
Start: 2019-01-01 | End: 2019-01-01

## 2019-01-01 RX ORDER — LOSARTAN POTASSIUM 100 MG/1
100 TABLET ORAL DAILY
Qty: 90 TABLET | Refills: 1 | Status: ON HOLD | OUTPATIENT
Start: 2019-01-01 | End: 2020-01-01 | Stop reason: HOSPADM

## 2019-01-01 RX ORDER — INSULIN ASPART 100 [IU]/ML
3 INJECTION, SOLUTION INTRAVENOUS; SUBCUTANEOUS
Status: DISCONTINUED | OUTPATIENT
Start: 2019-01-01 | End: 2019-01-01

## 2019-01-01 RX ORDER — INSULIN ASPART 100 [IU]/ML
5 INJECTION, SOLUTION INTRAVENOUS; SUBCUTANEOUS
Status: DISCONTINUED | OUTPATIENT
Start: 2019-01-01 | End: 2019-01-01 | Stop reason: HOSPADM

## 2019-01-01 RX ORDER — HYDROXYZINE PAMOATE 25 MG/1
25 CAPSULE ORAL EVERY 8 HOURS PRN
Status: DISCONTINUED | OUTPATIENT
Start: 2019-01-01 | End: 2019-01-01 | Stop reason: HOSPADM

## 2019-01-01 RX ORDER — ASPIRIN 81 MG/1
81 TABLET ORAL DAILY
Status: DISCONTINUED | OUTPATIENT
Start: 2019-01-01 | End: 2019-01-01 | Stop reason: HOSPADM

## 2019-01-01 RX ORDER — SODIUM CHLORIDE 9 MG/ML
500 INJECTION, SOLUTION INTRAVENOUS
Status: COMPLETED | OUTPATIENT
Start: 2019-01-01 | End: 2019-01-01

## 2019-01-01 RX ORDER — HEPARIN SODIUM 1000 [USP'U]/ML
INJECTION, SOLUTION INTRAVENOUS; SUBCUTANEOUS
Status: DISCONTINUED | OUTPATIENT
Start: 2019-01-01 | End: 2019-01-01 | Stop reason: HOSPADM

## 2019-01-01 RX ORDER — GLUCAGON 1 MG
1 KIT INJECTION
Status: DISCONTINUED | OUTPATIENT
Start: 2019-01-01 | End: 2019-01-01 | Stop reason: HOSPADM

## 2019-01-01 RX ORDER — ONDANSETRON 2 MG/ML
4 INJECTION INTRAMUSCULAR; INTRAVENOUS EVERY 12 HOURS PRN
Status: DISCONTINUED | OUTPATIENT
Start: 2019-01-01 | End: 2019-01-01 | Stop reason: HOSPADM

## 2019-01-01 RX ORDER — CARVEDILOL 6.25 MG/1
25 TABLET ORAL 2 TIMES DAILY WITH MEALS
Status: DISCONTINUED | OUTPATIENT
Start: 2019-01-01 | End: 2019-01-01

## 2019-01-01 RX ORDER — ONDANSETRON 2 MG/ML
4 INJECTION INTRAMUSCULAR; INTRAVENOUS EVERY 6 HOURS PRN
Status: DISCONTINUED | OUTPATIENT
Start: 2019-01-01 | End: 2019-01-01 | Stop reason: HOSPADM

## 2019-01-01 RX ORDER — CEFAZOLIN SODIUM 1 G/3ML
INJECTION, POWDER, FOR SOLUTION INTRAMUSCULAR; INTRAVENOUS
Status: DISCONTINUED | OUTPATIENT
Start: 2019-01-01 | End: 2019-01-01 | Stop reason: HOSPADM

## 2019-01-01 RX ORDER — IODIXANOL 320 MG/ML
INJECTION, SOLUTION INTRAVASCULAR
Status: DISCONTINUED | OUTPATIENT
Start: 2019-01-01 | End: 2019-01-01 | Stop reason: HOSPADM

## 2019-01-01 RX ORDER — CLOPIDOGREL BISULFATE 75 MG/1
75 TABLET ORAL DAILY
Qty: 30 TABLET | Refills: 11 | Status: SHIPPED | OUTPATIENT
Start: 2019-01-01 | End: 2020-12-17

## 2019-01-01 RX ORDER — CARVEDILOL 25 MG/1
TABLET ORAL
Qty: 180 TABLET | Refills: 1 | Status: ON HOLD | OUTPATIENT
Start: 2019-01-01 | End: 2020-01-01 | Stop reason: HOSPADM

## 2019-01-01 RX ORDER — ADENOSINE 3 MG/ML
6 INJECTION, SOLUTION INTRAVENOUS
Status: COMPLETED | OUTPATIENT
Start: 2019-01-01 | End: 2019-01-01

## 2019-01-01 RX ORDER — CLOPIDOGREL BISULFATE 75 MG/1
75 TABLET ORAL DAILY
Status: DISCONTINUED | OUTPATIENT
Start: 2019-01-01 | End: 2019-01-01 | Stop reason: HOSPADM

## 2019-01-01 RX ORDER — LOSARTAN POTASSIUM 25 MG/1
100 TABLET ORAL DAILY
Status: DISCONTINUED | OUTPATIENT
Start: 2019-01-01 | End: 2019-01-01 | Stop reason: HOSPADM

## 2019-01-01 RX ORDER — DILTIAZEM HYDROCHLORIDE 180 MG/1
180 CAPSULE, COATED, EXTENDED RELEASE ORAL DAILY
Qty: 90 CAPSULE | Refills: 1 | Status: SHIPPED | OUTPATIENT
Start: 2019-01-01 | End: 2020-01-01 | Stop reason: SDUPTHER

## 2019-01-01 RX ORDER — ATORVASTATIN CALCIUM 40 MG/1
TABLET, FILM COATED ORAL
Qty: 90 TABLET | Refills: 1 | Status: ON HOLD | OUTPATIENT
Start: 2019-01-01 | End: 2020-01-01 | Stop reason: HOSPADM

## 2019-01-01 RX ORDER — INSULIN ASPART 100 [IU]/ML
5 INJECTION, SOLUTION INTRAVENOUS; SUBCUTANEOUS ONCE
Status: COMPLETED | OUTPATIENT
Start: 2019-01-01 | End: 2019-01-01

## 2019-01-01 RX ORDER — HEPARIN SODIUM 5000 [USP'U]/ML
5000 INJECTION, SOLUTION INTRAVENOUS; SUBCUTANEOUS EVERY 8 HOURS
Status: DISCONTINUED | OUTPATIENT
Start: 2019-01-01 | End: 2019-01-01 | Stop reason: HOSPADM

## 2019-01-01 RX ORDER — CARVEDILOL 6.25 MG/1
25 TABLET ORAL 2 TIMES DAILY WITH MEALS
Status: DISCONTINUED | OUTPATIENT
Start: 2019-01-01 | End: 2019-01-01 | Stop reason: HOSPADM

## 2019-01-01 RX ORDER — IBUPROFEN 200 MG
24 TABLET ORAL
Status: DISCONTINUED | OUTPATIENT
Start: 2019-01-01 | End: 2019-01-01 | Stop reason: HOSPADM

## 2019-01-01 RX ORDER — FENTANYL CITRATE 50 UG/ML
INJECTION, SOLUTION INTRAMUSCULAR; INTRAVENOUS
Status: DISCONTINUED | OUTPATIENT
Start: 2019-01-01 | End: 2019-01-01 | Stop reason: HOSPADM

## 2019-01-01 RX ORDER — SODIUM CHLORIDE 9 MG/ML
INJECTION, SOLUTION INTRAVENOUS
Status: DISCONTINUED | OUTPATIENT
Start: 2019-01-01 | End: 2019-01-01 | Stop reason: HOSPADM

## 2019-01-01 RX ORDER — ACETAMINOPHEN 325 MG/1
650 TABLET ORAL EVERY 4 HOURS PRN
Status: DISCONTINUED | OUTPATIENT
Start: 2019-01-01 | End: 2019-01-01 | Stop reason: HOSPADM

## 2019-01-01 RX ORDER — DILTIAZEM HYDROCHLORIDE 180 MG/1
180 CAPSULE, COATED, EXTENDED RELEASE ORAL DAILY
Qty: 30 CAPSULE | Refills: 11 | Status: SHIPPED | OUTPATIENT
Start: 2019-01-01 | End: 2019-01-01 | Stop reason: SDUPTHER

## 2019-01-01 RX ORDER — SODIUM CHLORIDE 9 MG/ML
INJECTION, SOLUTION INTRAVENOUS CONTINUOUS
Status: DISCONTINUED | OUTPATIENT
Start: 2019-01-01 | End: 2019-01-01

## 2019-01-01 RX ORDER — ACETAMINOPHEN 325 MG/1
650 TABLET ORAL EVERY 4 HOURS PRN
Status: DISCONTINUED | OUTPATIENT
Start: 2019-01-01 | End: 2019-01-01

## 2019-01-01 RX ORDER — FUROSEMIDE 40 MG/1
TABLET ORAL
Qty: 90 TABLET | Refills: 1 | Status: ON HOLD | OUTPATIENT
Start: 2019-01-01 | End: 2020-01-01 | Stop reason: HOSPADM

## 2019-01-01 RX ORDER — FUROSEMIDE 40 MG/1
40 TABLET ORAL DAILY
Status: DISCONTINUED | OUTPATIENT
Start: 2019-01-01 | End: 2019-01-01 | Stop reason: HOSPADM

## 2019-01-01 RX ORDER — ACETAMINOPHEN 325 MG/1
650 TABLET ORAL EVERY 8 HOURS PRN
Status: DISCONTINUED | OUTPATIENT
Start: 2019-01-01 | End: 2019-01-01 | Stop reason: HOSPADM

## 2019-01-01 RX ORDER — SODIUM CHLORIDE 9 MG/ML
INJECTION, SOLUTION INTRAVENOUS ONCE
Status: DISCONTINUED | OUTPATIENT
Start: 2019-01-01 | End: 2019-01-01 | Stop reason: HOSPADM

## 2019-01-01 RX ORDER — PEN NEEDLE, DIABETIC 29 G X1/2"
NEEDLE, DISPOSABLE MISCELLANEOUS
Qty: 90 EACH | Refills: 3 | Status: SHIPPED | OUTPATIENT
Start: 2019-01-01 | End: 2020-01-01 | Stop reason: SDUPTHER

## 2019-01-01 RX ORDER — MUPIROCIN 20 MG/G
OINTMENT TOPICAL 2 TIMES DAILY
Status: DISCONTINUED | OUTPATIENT
Start: 2019-01-01 | End: 2019-01-01 | Stop reason: HOSPADM

## 2019-01-01 RX ORDER — CLOPIDOGREL 300 MG/1
300 TABLET, FILM COATED ORAL ONCE
Status: COMPLETED | OUTPATIENT
Start: 2019-01-01 | End: 2019-01-01

## 2019-01-01 RX ORDER — ATORVASTATIN CALCIUM 40 MG/1
40 TABLET, FILM COATED ORAL DAILY
Status: DISCONTINUED | OUTPATIENT
Start: 2019-01-01 | End: 2019-01-01 | Stop reason: HOSPADM

## 2019-01-01 RX ORDER — CARVEDILOL 6.25 MG/1
25 TABLET ORAL 2 TIMES DAILY
Status: DISCONTINUED | OUTPATIENT
Start: 2019-01-01 | End: 2019-01-01 | Stop reason: HOSPADM

## 2019-01-01 RX ORDER — MIDAZOLAM HYDROCHLORIDE 1 MG/ML
INJECTION, SOLUTION INTRAMUSCULAR; INTRAVENOUS
Status: DISCONTINUED | OUTPATIENT
Start: 2019-01-01 | End: 2019-01-01 | Stop reason: HOSPADM

## 2019-01-01 RX ORDER — LIDOCAINE HYDROCHLORIDE 10 MG/ML
INJECTION, SOLUTION EPIDURAL; INFILTRATION; INTRACAUDAL; PERINEURAL
Status: DISCONTINUED | OUTPATIENT
Start: 2019-01-01 | End: 2019-01-01 | Stop reason: HOSPADM

## 2019-01-01 RX ORDER — ASPIRIN 325 MG
325 TABLET ORAL
Status: COMPLETED | OUTPATIENT
Start: 2019-01-01 | End: 2019-01-01

## 2019-01-01 RX ADMIN — SODIUM CHLORIDE 250 ML: 0.9 INJECTION, SOLUTION INTRAVENOUS at 08:07

## 2019-01-01 RX ADMIN — LOSARTAN POTASSIUM 100 MG: 25 TABLET ORAL at 04:12

## 2019-01-01 RX ADMIN — INSULIN DETEMIR 10 UNITS: 100 INJECTION, SOLUTION SUBCUTANEOUS at 05:12

## 2019-01-01 RX ADMIN — CLOPIDOGREL BISULFATE 75 MG: 75 TABLET ORAL at 09:12

## 2019-01-01 RX ADMIN — INSULIN ASPART 5 UNITS: 100 INJECTION, SOLUTION INTRAVENOUS; SUBCUTANEOUS at 12:12

## 2019-01-01 RX ADMIN — ATORVASTATIN CALCIUM 40 MG: 40 TABLET, FILM COATED ORAL at 09:12

## 2019-01-01 RX ADMIN — LOSARTAN POTASSIUM 100 MG: 25 TABLET ORAL at 10:12

## 2019-01-01 RX ADMIN — INSULIN ASPART 3 UNITS: 100 INJECTION, SOLUTION INTRAVENOUS; SUBCUTANEOUS at 05:12

## 2019-01-01 RX ADMIN — CLOPIDOGREL BISULFATE 300 MG: 300 TABLET, FILM COATED ORAL at 11:12

## 2019-01-01 RX ADMIN — CARVEDILOL 25 MG: 6.25 TABLET, FILM COATED ORAL at 09:12

## 2019-01-01 RX ADMIN — HEPARIN SODIUM 5000 UNITS: 5000 INJECTION, SOLUTION INTRAVENOUS; SUBCUTANEOUS at 06:07

## 2019-01-01 RX ADMIN — INSULIN ASPART 5 UNITS: 100 INJECTION, SOLUTION INTRAVENOUS; SUBCUTANEOUS at 06:12

## 2019-01-01 RX ADMIN — INSULIN ASPART 4 UNITS: 100 INJECTION, SOLUTION INTRAVENOUS; SUBCUTANEOUS at 05:07

## 2019-01-01 RX ADMIN — ASPIRIN 81 MG: 81 TABLET, COATED ORAL at 10:12

## 2019-01-01 RX ADMIN — DILTIAZEM HYDROCHLORIDE 180 MG: 180 CAPSULE, COATED, EXTENDED RELEASE ORAL at 09:12

## 2019-01-01 RX ADMIN — ATORVASTATIN CALCIUM 40 MG: 40 TABLET, FILM COATED ORAL at 09:07

## 2019-01-01 RX ADMIN — CARVEDILOL 25 MG: 6.25 TABLET, FILM COATED ORAL at 06:12

## 2019-01-01 RX ADMIN — DILTIAZEM HYDROCHLORIDE 180 MG: 180 CAPSULE, COATED, EXTENDED RELEASE ORAL at 03:07

## 2019-01-01 RX ADMIN — INSULIN ASPART 5 UNITS: 100 INJECTION, SOLUTION INTRAVENOUS; SUBCUTANEOUS at 05:12

## 2019-01-01 RX ADMIN — CARVEDILOL 25 MG: 6.25 TABLET, FILM COATED ORAL at 10:12

## 2019-01-01 RX ADMIN — INSULIN ASPART 5 UNITS: 100 INJECTION, SOLUTION INTRAVENOUS; SUBCUTANEOUS at 09:12

## 2019-01-01 RX ADMIN — ASPIRIN 81 MG: 81 TABLET, COATED ORAL at 09:12

## 2019-01-01 RX ADMIN — INSULIN ASPART 3 UNITS: 100 INJECTION, SOLUTION INTRAVENOUS; SUBCUTANEOUS at 10:12

## 2019-01-01 RX ADMIN — LOSARTAN POTASSIUM 100 MG: 25 TABLET ORAL at 09:12

## 2019-01-01 RX ADMIN — MUPIROCIN: 20 OINTMENT TOPICAL at 09:12

## 2019-01-01 RX ADMIN — ACETAMINOPHEN 650 MG: 325 TABLET ORAL at 01:12

## 2019-01-01 RX ADMIN — FUROSEMIDE 40 MG: 40 TABLET ORAL at 09:12

## 2019-01-01 RX ADMIN — DILTIAZEM HYDROCHLORIDE 180 MG: 180 CAPSULE, COATED, EXTENDED RELEASE ORAL at 09:07

## 2019-01-01 RX ADMIN — CLOPIDOGREL BISULFATE 75 MG: 75 TABLET ORAL at 10:12

## 2019-01-01 RX ADMIN — INSULIN ASPART 1 UNITS: 100 INJECTION, SOLUTION INTRAVENOUS; SUBCUTANEOUS at 12:12

## 2019-01-01 RX ADMIN — CARVEDILOL 25 MG: 6.25 TABLET, FILM COATED ORAL at 10:07

## 2019-01-01 RX ADMIN — INSULIN ASPART 4 UNITS: 100 INJECTION, SOLUTION INTRAVENOUS; SUBCUTANEOUS at 09:12

## 2019-01-01 RX ADMIN — CARVEDILOL 25 MG: 6.25 TABLET, FILM COATED ORAL at 05:12

## 2019-01-01 RX ADMIN — ASPIRIN 325 MG ORAL TABLET 325 MG: 325 PILL ORAL at 07:12

## 2019-01-01 RX ADMIN — INSULIN ASPART 2 UNITS: 100 INJECTION, SOLUTION INTRAVENOUS; SUBCUTANEOUS at 09:12

## 2019-01-01 RX ADMIN — INSULIN ASPART 2 UNITS: 100 INJECTION, SOLUTION INTRAVENOUS; SUBCUTANEOUS at 10:12

## 2019-01-01 RX ADMIN — CARVEDILOL 25 MG: 6.25 TABLET, FILM COATED ORAL at 09:07

## 2019-01-01 RX ADMIN — DILTIAZEM HYDROCHLORIDE 180 MG: 180 CAPSULE, COATED, EXTENDED RELEASE ORAL at 10:12

## 2019-01-01 RX ADMIN — HEPARIN SODIUM 5000 UNITS: 5000 INJECTION, SOLUTION INTRAVENOUS; SUBCUTANEOUS at 03:07

## 2019-01-01 RX ADMIN — HEPARIN SODIUM 5000 UNITS: 5000 INJECTION, SOLUTION INTRAVENOUS; SUBCUTANEOUS at 10:07

## 2019-01-01 RX ADMIN — FUROSEMIDE 40 MG: 40 TABLET ORAL at 11:12

## 2019-01-01 RX ADMIN — INSULIN ASPART 3 UNITS: 100 INJECTION, SOLUTION INTRAVENOUS; SUBCUTANEOUS at 06:12

## 2019-01-01 RX ADMIN — ADENOSINE 6 MG: 3 INJECTION, SOLUTION INTRAVENOUS at 10:07

## 2019-01-01 RX ADMIN — INSULIN ASPART 4 UNITS: 100 INJECTION, SOLUTION INTRAVENOUS; SUBCUTANEOUS at 12:12

## 2019-01-01 RX ADMIN — ATORVASTATIN CALCIUM 40 MG: 40 TABLET, FILM COATED ORAL at 10:12

## 2019-01-01 RX ADMIN — FUROSEMIDE 40 MG: 40 TABLET ORAL at 10:12

## 2019-01-01 RX ADMIN — INSULIN ASPART 3 UNITS: 100 INJECTION, SOLUTION INTRAVENOUS; SUBCUTANEOUS at 12:07

## 2019-01-01 RX ADMIN — CARVEDILOL 25 MG: 6.25 TABLET, FILM COATED ORAL at 04:12

## 2019-01-01 RX ADMIN — ATORVASTATIN CALCIUM 40 MG: 40 TABLET, FILM COATED ORAL at 11:12

## 2019-01-01 RX ADMIN — CARVEDILOL 25 MG: 6.25 TABLET, FILM COATED ORAL at 11:12

## 2019-01-01 RX ADMIN — DILTIAZEM HYDROCHLORIDE 180 MG: 180 CAPSULE, COATED, EXTENDED RELEASE ORAL at 11:12

## 2019-01-01 RX ADMIN — LOSARTAN POTASSIUM 100 MG: 25 TABLET ORAL at 11:12

## 2019-01-01 RX ADMIN — LOSARTAN POTASSIUM 100 MG: 25 TABLET, FILM COATED ORAL at 09:07

## 2019-01-01 RX ADMIN — INSULIN DETEMIR 10 UNITS: 100 INJECTION, SOLUTION SUBCUTANEOUS at 08:12

## 2019-01-07 RX ORDER — INSULIN ASPART 100 [IU]/ML
INJECTION, SOLUTION INTRAVENOUS; SUBCUTANEOUS
Qty: 15 ML | Refills: 0 | Status: SHIPPED | OUTPATIENT
Start: 2019-01-07 | End: 2020-01-01 | Stop reason: SDUPTHER

## 2019-01-08 RX ORDER — INSULIN GLARGINE 100 [IU]/ML
INJECTION, SOLUTION SUBCUTANEOUS
Qty: 15 ML | Refills: 0 | Status: SHIPPED | OUTPATIENT
Start: 2019-01-08 | End: 2019-03-27 | Stop reason: SDUPTHER

## 2019-01-30 ENCOUNTER — HOSPITAL ENCOUNTER (EMERGENCY)
Facility: HOSPITAL | Age: 59
Discharge: HOME | End: 2019-01-30
Attending: EMERGENCY MEDICINE
Payer: MEDICARE

## 2019-01-30 VITALS
SYSTOLIC BLOOD PRESSURE: 183 MMHG | BODY MASS INDEX: 22.12 KG/M2 | TEMPERATURE: 99 F | HEART RATE: 91 BPM | RESPIRATION RATE: 16 BRPM | OXYGEN SATURATION: 100 % | DIASTOLIC BLOOD PRESSURE: 88 MMHG | HEIGHT: 71 IN | WEIGHT: 158 LBS

## 2019-01-30 DIAGNOSIS — M62.830 BACK SPASM: Primary | ICD-10-CM

## 2019-01-30 DIAGNOSIS — I10 HYPERTENSION, UNSPECIFIED TYPE: ICD-10-CM

## 2019-01-30 DIAGNOSIS — R10.9 FLANK PAIN: ICD-10-CM

## 2019-01-30 DIAGNOSIS — M54.9 BACK PAIN: ICD-10-CM

## 2019-01-30 LAB
GLUCOSE SERPL-MCNC: 233 MG/DL (ref 70–110)
POCT GLUCOSE: 233 MG/DL (ref 70–110)

## 2019-01-30 PROCEDURE — 82962 GLUCOSE BLOOD TEST: CPT

## 2019-01-30 PROCEDURE — 99284 EMERGENCY DEPT VISIT MOD MDM: CPT | Mod: 25

## 2019-01-30 PROCEDURE — 25000003 PHARM REV CODE 250: Performed by: EMERGENCY MEDICINE

## 2019-01-30 RX ORDER — ASCORBIC ACID 500 MG
500 TABLET ORAL NIGHTLY
Status: ON HOLD | COMMUNITY
End: 2020-01-01 | Stop reason: HOSPADM

## 2019-01-30 RX ORDER — CYCLOBENZAPRINE HCL 10 MG
10 TABLET ORAL
Status: COMPLETED | OUTPATIENT
Start: 2019-01-30 | End: 2019-01-30

## 2019-01-30 RX ORDER — CYCLOBENZAPRINE HCL 10 MG
10 TABLET ORAL 3 TIMES DAILY PRN
Qty: 30 TABLET | Refills: 0 | Status: SHIPPED | OUTPATIENT
Start: 2019-01-30 | End: 2019-02-04

## 2019-01-30 RX ADMIN — CYCLOBENZAPRINE HYDROCHLORIDE 10 MG: 10 TABLET, FILM COATED ORAL at 07:01

## 2019-01-30 NOTE — ED NOTES
Pt informed of need for urine. Urinal provided and pt instructed to call with call light once urine provided

## 2019-01-30 NOTE — ED TRIAGE NOTES
Pt arrived to ED via personal transport for thoracic and lumbar back pain that radiates to the abdomen x 4 days. Pt denies CP, urinary symptoms. He does report infrequent cough recently.

## 2019-01-30 NOTE — ED PROVIDER NOTES
Encounter Date: 1/30/2019       History     Chief Complaint   Patient presents with    Flank Pain     Reports having right sided flank Pain since Sunday. last dialysis was Tuesday. Denies Fever     58 y.o. male Past Medical History:  No date: Allergy  No date: Atherosclerosis of aorta      Comment:  noted on VIKTORIA 2011  No date: Blood transfusion  No date: Cataracts, bilateral  No date: CHF (congestive heart failure)  No date: Chronic kidney disease  No date: Clotting disorder  No date: Coronary artery disease  No date: Decreased appetite  No date: Diabetic retinopathy  No date: DM (diabetes mellitus), type 2 with renal complications  No date: ESRD on hemodialysis      Comment:  TUESDAY/ THURSDAY/SATURDAY  No date: Hyperlipidemia  No date: Hyperparathyroidism, unspecified  No date: Hypertension  No date: Myocardial infarction  No date: Prostate cancer  No date: Seizures  2011: Stroke      Comment:  PT HAS SHAKES      Poor historian due to prior stroke/medical conditions. On dialysis T, TH, Sat, here for evaluation of R flank pain for the last few days. Improves with movement, no cough, does have hx of prostate cancer. Denies weakness in legs. Pt does not really make urine.          Review of patient's allergies indicates:   Allergen Reactions    Reglan [metoclopramide hcl] Diarrhea    Lorazepam      Other reaction(s): heavy sedation     Past Medical History:   Diagnosis Date    Allergy     Atherosclerosis of aorta     noted on VIKTORIA 2011    Blood transfusion     Cataracts, bilateral     CHF (congestive heart failure)     Chronic kidney disease     Clotting disorder     Coronary artery disease     Decreased appetite     Diabetic retinopathy     DM (diabetes mellitus), type 2 with renal complications     ESRD on hemodialysis     TUESDAY/ THURSDAY/SATURDAY    Hyperlipidemia     Hyperparathyroidism, unspecified     Hypertension     Myocardial infarction     Prostate cancer     Seizures     Stroke 2011     PT HAS SHAKES      Past Surgical History:   Procedure Laterality Date    CARDIAC SURGERY      CABG    CATARACT EXTRACTION      ou    CATARACT EXTRACTION, BILATERAL      cataracts      CORONARY ARTERY BYPASS GRAFT  2009    bypass    EYE SURGERY      FISTULOGRAM Left 8/21/2017    [facility], [facility] CATH LAB    FISTULOGRAM Left 8/3/2016    [facility] CATH LAB    FISTULOGRAM Left 8/13/2014    [facility] at [facility] OR 2ND FLR    FISTULOGRAM Left 10/9/2013    [facility] OR 2ND FLR    FISTULOGRAM, possible intervention - LUE Left 3/21/2016    Per[facility] CATH LAB    Left Guera AVF  1/11/2012    PROSTATE BIOPSY      positive    retinal laser      ROTATOR CUFF REPAIR  right, 2005    TRANSPOSITION, VEIN Left 4/1/2013    Performed by [facility] OR 2ND FLR    VASCULAR SURGERY       Family History   Problem Relation Age of Onset    Diabetes Mother     Glaucoma Mother     Cancer Mother     Cataracts Mother     Heart disease Father     Cancer Paternal Grandmother     Heart disease Maternal Grandmother     No Known Problems Sister     Prostate cancer Brother     No Known Problems Maternal Grandfather     No Known Problems Paternal Grandfather     No Known Problems Sister     No Known Problems Sister     Hypertension Brother     Cancer Paternal Uncle     Amblyopia Neg Hx     Blindness Neg Hx     Macular degeneration Neg Hx     Retinal detachment Neg Hx     Strabismus Neg Hx     Stroke Neg Hx     Thyroid disease Neg Hx     Anesthesia problems Neg Hx     Clotting disorder Neg Hx      problems Neg Hx     Kidney cancer Neg Hx     Kidney disease Neg Hx     Malignant hyperthermia Neg Hx     Sickle cell trait Neg Hx     Lupus Neg Hx     Urolithiasis Neg Hx     Sudden death Neg Hx      Social History     Tobacco Use     Smoking status: Never Smoker    Smokeless tobacco: Never Used   Substance Use Topics    Alcohol use: No    Drug use: No     Review of Systems   Unable to perform ROS: Patient nonverbal   All other systems reviewed and are negative.  Wife notes pt gets very confused due to prior cva and is unreliable historian    Physical Exam     Initial Vitals [01/30/19 0703]   BP Pulse Resp Temp SpO2   (!) 183/88 93 18 98.5 °F (36.9 °C) 99 %      MAP       --         Physical Exam    Nursing note and vitals reviewed.  Constitutional: He appears well-developed and well-nourished.   HENT:   Head: Normocephalic and atraumatic.   Eyes: EOM are normal. Pupils are equal, round, and reactive to light.   Cardiovascular: Normal rate, regular rhythm and normal heart sounds.   Pulmonary/Chest: Effort normal and breath sounds normal. No respiratory distress. He has no wheezes. He has no rales.   Abdominal: He exhibits no distension.   Musculoskeletal: He exhibits no edema or tenderness.   Neurological: He is alert and oriented to person, place, and time. No cranial nerve deficit.   Skin: Skin is warm and dry.   Psychiatric: He has a normal mood and affect.     back: no midline ttp on any spinal body on neck/back  +reproducible R flank spasm    ED Course   Procedures  Labs Reviewed   URINALYSIS, REFLEX TO URINE CULTURE   POCT GLUCOSE MONITORING CONTINUOUS          Imaging Results    None          Medical Decision Making:   Initial Assessment:   Pt here for R flank pain. Given hx of cancer have ordered xray t/l spine, cxr. Pt does make urine, have ordered UA to eval for blood/infection though does not seem to be stone/uti. Will write for flexeril to cover MSK causes.                    Labs Reviewed   POCT GLUCOSE MONITORING CONTINUOUS       X-Ray Thoracic Spine AP Lateral   Final Result      No evidence for acute fracture, bone destruction, or subluxation.      Mild degenerative changes.         Electronically signed by: Stevan Reid  MD   Date:    01/30/2019   Time:    08:21      X-Ray Lumbar Spine Ap And Lateral   Final Result      Four lumbar type vertebra present.      Mild degenerative changes.      No evidence for acute fracture, bone destruction, or subluxation.         Electronically signed by: Stevan Reid MD   Date:    01/30/2019   Time:    08:19      X-Ray Chest AP Portable   Final Result      Surgical changes.      No acute chest disease identified.         Electronically signed by: Stevan Reid MD   Date:    01/30/2019   Time:    08:16           will discharge with flexeril, advised tylenol for added analgesia.  Clinical Impression:   The primary encounter diagnosis was Back spasm. Diagnoses of Flank pain and Back pain were also pertinent to this visit.                             Corrina Cordero MD  01/30/19 0830       Corrina Cordero MD  01/30/19 7848

## 2019-01-30 NOTE — DISCHARGE INSTRUCTIONS

## 2019-02-03 RX ORDER — LOSARTAN POTASSIUM 50 MG/1
TABLET ORAL
Qty: 90 TABLET | Refills: 0 | Status: SHIPPED | OUTPATIENT
Start: 2019-02-03 | End: 2019-03-20 | Stop reason: SDUPTHER

## 2019-02-04 ENCOUNTER — TELEPHONE (OUTPATIENT)
Dept: ENDOCRINOLOGY | Facility: CLINIC | Age: 59
End: 2019-02-04

## 2019-02-04 ENCOUNTER — OFFICE VISIT (OUTPATIENT)
Dept: ENDOCRINOLOGY | Facility: CLINIC | Age: 59
End: 2019-02-04
Payer: MEDICARE

## 2019-02-04 VITALS
HEIGHT: 71 IN | SYSTOLIC BLOOD PRESSURE: 183 MMHG | DIASTOLIC BLOOD PRESSURE: 92 MMHG | HEART RATE: 101 BPM | WEIGHT: 161.81 LBS | BODY MASS INDEX: 22.65 KG/M2

## 2019-02-04 DIAGNOSIS — E11.649 HYPOGLYCEMIA ASSOCIATED WITH DIABETES: ICD-10-CM

## 2019-02-04 DIAGNOSIS — N18.6 ESRD (END STAGE RENAL DISEASE) ON DIALYSIS: ICD-10-CM

## 2019-02-04 DIAGNOSIS — Z99.2 ESRD (END STAGE RENAL DISEASE) ON DIALYSIS: ICD-10-CM

## 2019-02-04 PROCEDURE — 99999 PR PBB SHADOW E&M-EST. PATIENT-LVL V: CPT | Mod: PBBFAC,,, | Performed by: NURSE PRACTITIONER

## 2019-02-04 PROCEDURE — 3008F PR BODY MASS INDEX (BMI) DOCUMENTED: ICD-10-PCS | Mod: CPTII,S$GLB,, | Performed by: NURSE PRACTITIONER

## 2019-02-04 PROCEDURE — 99214 OFFICE O/P EST MOD 30 MIN: CPT | Mod: S$GLB,,, | Performed by: NURSE PRACTITIONER

## 2019-02-04 PROCEDURE — 3046F PR MOST RECENT HEMOGLOBIN A1C LEVEL > 9.0%: ICD-10-PCS | Mod: CPTII,S$GLB,, | Performed by: NURSE PRACTITIONER

## 2019-02-04 PROCEDURE — 99999 PR PBB SHADOW E&M-EST. PATIENT-LVL V: ICD-10-PCS | Mod: PBBFAC,,, | Performed by: NURSE PRACTITIONER

## 2019-02-04 PROCEDURE — 3008F BODY MASS INDEX DOCD: CPT | Mod: CPTII,S$GLB,, | Performed by: NURSE PRACTITIONER

## 2019-02-04 PROCEDURE — 3046F HEMOGLOBIN A1C LEVEL >9.0%: CPT | Mod: CPTII,S$GLB,, | Performed by: NURSE PRACTITIONER

## 2019-02-04 PROCEDURE — 99214 PR OFFICE/OUTPT VISIT, EST, LEVL IV, 30-39 MIN: ICD-10-PCS | Mod: S$GLB,,, | Performed by: NURSE PRACTITIONER

## 2019-02-04 RX ORDER — LANCETS
EACH MISCELLANEOUS
Qty: 200 EACH | Refills: 3 | Status: SHIPPED | OUTPATIENT
Start: 2019-02-04 | End: 2020-01-01 | Stop reason: CLARIF

## 2019-02-04 RX ORDER — INSULIN PUMP SYRINGE, 3 ML
EACH MISCELLANEOUS
Qty: 1 EACH | Refills: 0 | Status: SHIPPED | OUTPATIENT
Start: 2019-02-04 | End: 2020-01-01

## 2019-02-04 NOTE — LETTER
February 4, 2019      Maddy Silva, FNP-C  [address]  [facility] [address]           [facility]  [address] [facility], [address]  [facility] [address]  Phone: [phone]  Fax: [phone]          Patient: Kodi Ibanez Sr.   MR Number: [id number]   YOB: 1960   Date of Visit: 2/4/2019       Dear Maddy Silva:    Thank you for referring Kodi Ibanez to me for evaluation. Attached you will find relevant portions of my assessment and plan of care.    If you have questions, please do not hesitate to call me. I look forward to following Kodi Ibanez along with you.    Sincerely,    Elizabeth Fleming, NATASHA    Enclosure  CC:  No Recipients    If you would like to receive this communication electronically, please contact [email or web address] or [phone] to request more information on [facility] [employer] [facility] access.    For providers and/or their staff who would like to refer a patient to [facility], please contact us through our one-stop-shop provider referral line, [facility] [place], at [phone].    If you feel you have received this communication in error or would no longer like to receive these types of communications, please e-mail [email or web address]

## 2019-02-04 NOTE — TELEPHONE ENCOUNTER
----- Message from Sen Hansen sent at 2/4/2019  1:33 PM CST -----  Contact: Mrs. Ibanez [phone]  The patient's wife is calling to speak to the staff. She wants to know can she come  the patient's glucose meter from the appointment on today. Please call at your earliest convenience.

## 2019-02-04 NOTE — PROGRESS NOTES
CC: This 59 y.o. Black or  male  is here for evaluation of  T2DM along with comorbidities indicated in the Visit Diagnosis section of this encounter.    HPI: Kodi Ibanez Sr. was diagnosed with T2DM in his 40s. His wife states that patient was noncompliant and did not take good care of his health.  He has ESRD reportedly related to uncontrolled DM.     New to Endocrine. He is on HD on T/Th/Sat. Referred by Maddy Silva NP.   He arrives with his wife Erin, who is heavily involved with his care. She is the primary historian as patient does not talk much s/p stroke. She is an EMT.     She has had trouble getting blood sample from his glucometer. She doses Lantus based off of his glucose readings - anywhere from 0-30 units at night. She skips Lantus if she cannot get a BG reading - so, he hasn't really been taking any insulin at all since he hasn't been able to get BG readings.     BG < 150 - 0 units  150-200 - 10 units   200-250 ~12 units   300-250  ~ 20 units   > 350 - ~ 30 units     His BG dropped to 24 after injecting 20 units of Novolog (his BG lloyd to 400s). This was a month ago. Required EMS resuscitation.     He has not really been taking Novolog either d/t hypoglycemia risk. His wife will inject novolog once a day at most.       PMHX: seizures (last episode was 2014), CAD s/p CABG, stroke 2011    LAST DIABETES EDUCATION: years ago     HOSPITALIZED FOR DIABETES  -  Yes - for hyperglycemia in the 1990s.    PRESCRIBED DIABETES MEDICATIONS: Lantus Solostar as above      DM COMPLICATIONS: nephropathy and cardiovascular disease    SELF MONITORING BLOOD GLUCOSE: Checks blood glucose at home - as above   She brings the true metrix meter into clinic today and did not have an issue getting a blood sample. Glucose in clinic was 232, fasting.   HYPOGLYCEMIC EPISODES: patient denies symptoms at BG < 100.      CURRENT DIET: drinks peach soda (about 40 oz/day). Eats 3 meals/day. Breakfast is cheerios  "with almond milk or grits/sausage/eggs.       SOCIAL: he is a [employer]       BP (!) 183/92 (BP Location: Right arm, Patient Position: Sitting, BP Method: Large (Automatic))   Pulse 101   Ht 5' 11" (1.803 m)   Wt 73.4 kg (161 lb 13.1 oz)   BMI 22.57 kg/m²       ROS:   CONSTITUTIONAL: Appetite good, denies fatigue  SKIN: No rash or pruritis   EYES: No visual disturbances  RESPIRATORY: No shortness of breath or cough  CARDIAC: No chest pain or palpitations  GI: + nausea; no vomiting, or diarrhea  : No urinary frequency or dysuria   MS: + back pain; uses rolling walker.   NEURO: No paresthesias; poor short term memory   OTHER: no night sweats       PHYSICAL EXAM:  GENERAL: Well developed, well nourished. No acute distress.   PSYCH: AAOx3, appropriate mood and affect, conversant, well-groomed. Judgement and insight good. Appears chronically ill and older than age.   NEURO: Cranial nerves grossly intact. Speech clear, no tremor.   NECK: Trachea midline, no thyromegaly or lymphadenopathy.   CHEST: Respirations even and unlabored. CTA bilaterally.  CARDIOVASCULAR: Regular rate and rhythm. No bruits. No murmur. No edema.   ABDOMEN: Soft, non-tender, non-distended. Bowel sounds present.   MS: Gait steady. No clubbing.   SKIN: Normal skin turgor. Skin warm and dry. No areas of breakdown. No acanthosis nigricans.        Hemoglobin A1C   Date Value Ref Range Status   12/15/2018 10.5 (H) 4.0 - 5.6 % Final     Comment:     ADA Screening Guidelines:  5.7-6.4%  Consistent with prediabetes  >or=6.5%  Consistent with diabetes  High levels of fetal hemoglobin interfere with the HbA1C  assay. Heterozygous hemoglobin variants (HbS, HgC, etc)do  not significantly interfere with this assay.   However, presence of multiple variants may affect accuracy.     06/25/2018 9.7 (H) 4.0 - 5.6 % Final     Comment:     ADA Screening Guidelines:  5.7-6.4%  Consistent with prediabetes  >or=6.5%  Consistent with diabetes  High levels of " fetal hemoglobin interfere with the HbA1C  assay. Heterozygous hemoglobin variants (HbS, HgC, etc)do  not significantly interfere with this assay.   However, presence of multiple variants may affect accuracy.     12/04/2017 7.7 (H) 4.0 - 5.6 % Final     Comment:     According to ADA guidelines, hemoglobin A1c <7.0% represents  optimal control in non-pregnant diabetic patients. Different  metrics may apply to specific patient populations.   Standards of Medical Care in Diabetes-2016.  For the purpose of screening for the presence of diabetes:  <5.7%     Consistent with the absence of diabetes  5.7-6.4%  Consistent with increasing risk for diabetes   (prediabetes)  >or=6.5%  Consistent with diabetes  Currently, no consensus exists for use of hemoglobin A1c  for diagnosis of diabetes for children.  This Hemoglobin A1c assay has significant interference with fetal   hemoglobin   (HbF). The results are invalid for patients with abnormal amounts of   HbF,   including those with known Hereditary Persistence   of Fetal Hemoglobin. Heterozygous hemoglobin variants (HbAS, HbAC,   HbAD, HbAE, HbA2) do not significantly interfere with this assay;   however, presence of multiple variants in a sample may impact the %   interference.             Chemistry        Component Value Date/Time     12/15/2018 0935    K 3.3 (L) 12/15/2018 0935    CL 94 (L) 12/15/2018 0935    CO2 30 (H) 12/15/2018 0935    BUN 12 12/15/2018 0935    CREATININE 3.8 (H) 12/15/2018 0935     (H) 12/15/2018 0935        Component Value Date/Time    CALCIUM 8.9 12/15/2018 0935    ALKPHOS 131 12/15/2018 0935    AST 52 (H) 12/15/2018 0935    ALT 83 (H) 12/15/2018 0935    BILITOT 0.7 12/15/2018 0935    ESTGFRAFRICA 18.7 (A) 12/15/2018 0935    EGFRNONAA 16.2 (A) 12/15/2018 0935          Lab Results   Component Value Date    LDLCALC 181.0 (H) 12/15/2018        Ref. Range 12/15/2018 09:35   Cholesterol Latest Ref Range: 120 - 199 mg/dL 263 (H)   HDL Latest  Ref Range: 40 - 75 mg/dL 40   HDL/Chol Ratio Latest Ref Range: 20.0 - 50.0 % 15.2 (L)   LDL Cholesterol Latest Ref Range: 63.0 - 159.0 mg/dL 181.0 (H)   Non-HDL Cholesterol Latest Units: mg/dL 223   Total Cholesterol/HDL Ratio Latest Ref Range: 2.0 - 5.0  6.6 (H)   Triglycerides Latest Ref Range: 30 - 150 mg/dL 210 (H)     Lab Results   Component Value Date    MICALBCREAT 356.2 (H) 12/15/2018             STANDARDS of CARE:        ASA:               Last eye exam:       ASSESSMENT and PLAN:    A1C GOAL:     1. Uncontrolled type 2 diabetes mellitus with both eyes affected by proliferative retinopathy and macular edema, with long-term current use of insulin  See Diabetes Educator/Registered Dietician for Medical Nutrition Therapy.     Avoid beverages with sugar.  -- emphasized importance of this.     Inject Lantus 14 units daily. Do not increase/decrease dose based on bedtime readings. lantus should not be taken on sliding scale. Explained long acting MOA of lantus.     Use Novolog with following ss - Use on premeal readings: 180-230=+1, 231-280=+2, 281-330=+3, 331-380=+4, over 380=+5 units    Test blood glucose 2x/day. Send a log in 1-2 weeks.     rtc in 5 mo.        2. ESRD (end stage renal disease) on dialysis  Avoid hypoglycemia.      3. Hypoglycemia associated with diabetes  See low dose sliding scale as above. Will likely need more lantus but we will start slow and titrate according to BG logs.        Orders Placed This Encounter   Procedures    Ambulatory Referral to Diabetes Education     Referral Priority:   Routine     Referral Type:   Consultation     Referral Reason:   Specialty Services Required     Requested Specialty:   Endocrinology     Number of Visits Requested:   1     Expiration Date:   2/4/2020        Follow-up in about 5 months (around 7/4/2019).     Thank you very much for allowing me to participate in Kodi Ibanez Sr.'s care.

## 2019-02-04 NOTE — TELEPHONE ENCOUNTER
Called patient and informed her we no longer had any True Metrix glucometer samples. Patient verbalized understanding.

## 2019-02-13 ENCOUNTER — TELEPHONE (OUTPATIENT)
Dept: ENDOCRINOLOGY | Facility: CLINIC | Age: 59
End: 2019-02-13

## 2019-02-13 NOTE — TELEPHONE ENCOUNTER
Spoke to pt's wife Erin, who was able to obtain a new [employer] glucose meter. She has not been able to test BGs/inject insulin as advised for her  bc she has URI. She states his BG did rise one day to 409 and she injected for him 20 units. Has appt next Monday with diabetes education.     Advised her to test bg 2x/day, inject 14 units lantus and Novolog per ss, drop off log at education visit for further review, and await further instructions. She voiced understanding.

## 2019-02-18 ENCOUNTER — CLINICAL SUPPORT (OUTPATIENT)
Dept: DIABETES | Facility: CLINIC | Age: 59
End: 2019-02-18
Payer: MEDICARE

## 2019-02-18 VITALS — BODY MASS INDEX: 22.32 KG/M2 | WEIGHT: 160 LBS

## 2019-02-18 PROCEDURE — G0108 PR DIAB MANAGE TRN  PER INDIV: ICD-10-PCS | Mod: S$GLB,,, | Performed by: DIETITIAN, REGISTERED

## 2019-02-18 PROCEDURE — G0108 DIAB MANAGE TRN  PER INDIV: HCPCS | Mod: S$GLB,,, | Performed by: DIETITIAN, REGISTERED

## 2019-02-18 NOTE — PROGRESS NOTES
Diabetes Education  Author: Vani Heart RD  Date: 2/18/2019    Diabetes Care Management Summary  Pt visit today focused on attempting to  make pt and wife aware of CHO effect on DM control. reviewed dietary guidelines for CKD Stage 5 and HTN. Discussed meal planning with CHO, Na+, phosphorous restrictions and use of HBV proteins.  Pt was not verbal during instruction but indicated through head motions that he would not consume sugar free or DIET products and disliked many of the non-carb veg recommended. Discussion also included MOA of both rapid and basal insulin and importance of regular BG checks  Diabetes Education Record Assessment/Progress: Initial  Current Diabetes Risk Level: Moderate     Last A1c:   Lab Results   Component Value Date    HGBA1C 10.5 (H) 12/15/2018     Last visit with Diabetes Educator: : 02/18/2019  Diabetes Type  Diabetes Type : Type II  Diabetes History  Diabetes Diagnosis: >10 years  Current Treatment: Diet, Insulin  Reviewed Problem List with Patient: Yes    Health Maintenance was reviewed today with patient. Discussed with patient importance of routine eye exams, foot exams/foot care, blood work (i.e.: A1c, microalbumin, and lipid), dental visits, yearly flu vaccine, and pneumonia vaccine as indicated by PCP. Patient verbalized understanding.     Health Maintenance Topics with due status: Not Due       Topic Last Completion Date    TETANUS VACCINE 07/05/2016    Pneumococcal Vaccine (Highest Risk) 10/10/2016    PROSTATE-SPECIFIC ANTIGEN 04/09/2018    Foot Exam 12/13/2018    Lipid Panel 12/15/2018    Hemoglobin A1c 12/15/2018     Health Maintenance Due   Topic Date Due    Colonoscopy  10/28/2009    Zoster Vaccine  02/03/2018    Eye Exam  11/13/2018     Nutrition  Meal Planning: skipping meals, 3 meals per day; information provided by pt's wife; pt non verbal  What type of sweetener do you use?: sugar  What type of beverages do you drink?: water, regular soda/tea  Meal Plan 24 Hour  Recall - Breakfast: grits, water  Meal Plan 24 Hour Recall - Lunch: sometimes skips  Meal Plan 24 Hour Recall - Dinner: starch, small amt meat, little vegetables  Meal Plan 24 Hour Recall - Snack: fruit    Monitoring   Monitoring: Other  Self Monitoring : none at present b/c wife ill and not able to check  Blood Glucose Logs: No    Exercise   Exercise Type: none    Current Diabetes Treatment   Current Treatment: Diet, Insulin    Social History  Preferred Learning Method: Face to Face  Primary Support: Spouse  Smoking Status: Never a Smoker  Alcohol Use: Never  Barriers to Change: Functional limitation  Learning Challenges : Other   Readiness to Learn : Non Acceptance  Cultural Influences: No    Diabetes Education Assessment/Progress  Diabetes Disease Process (diabetes disease process and treatment options): Individual Session, Written Materials Provided, Instructed, Discussion, Comprehends Key Points  Nutrition (Incorporating nutritional management into one's lifestyle): Individual Session, Written Materials Provided, Instructed, Discussion, Needs Review  -Pt's wife states pt often skips meals, does not like many vegetables , will not consume any sugar free beverages if sees word DIET on packaging or use AS.   -discussed modifying food choices to be CHO controlled and also to be low in phosphorus and sodium  -Pt sees RD at dialysis unit often and she provides him w nutrition advice as well; wt stable  -wife states pt will drink crystal light so will try to use this as an alternative to water  -due to pt's dislike or unwillingness to try different foods, dietary compliance not anticipated.    Physical Activity (incorporating physical activity into one's lifestyle): Individual Session, Written Materials Provided, Discussion  Medications (states correct name, dose, onset, peak, duration, side effects & timing of meds): Individual Session, Written Materials Provided, Instructed, Discussion, Needs Review, Needs  Instruction  -Pt's wife states she continues to give pt 20+ units of lantus if BG elevated and seldom gives pt novolog due to fear of hypo.If Novolog is injected , pt's wife stated dose also around 20 units thus not using the ss as instructed    -Reviewed MAO of both basal and rapid insulin. Advised pt to follow instructions from NP of only taking 14 units of lantus at night and to keep Novolog to 5-10 units at meals containing carbs    Monitoring (monitoring blood glucose/other parameters & using results): Individual Session, Written Materials Provided, Instructed, Discussion, Needs Review  -pt's wife checks Bg for pt but has been ill for the past month and thus has not been checking pt's BG on a regular basis and no logs provided.  --Reviewed A1c goal of 7 % and BGgoals of ;   discussed BG readings and how to use data in DM self management; rec'd continue SMBG 2x daily, fasting and alternating times; keep log/ copy log sheet provided.    Acute Complications (preventing, detecting, and treating acute complications): Individual Session, Written Materials Provided, Instructed, Discussion, Needs Review  -Pt's wife states she treats BG of 150 as low BG and adds lots of sugar in diet/beverages to assist w BG  -Discussed preventing and troubleshooting hyperglycemia and treating hypoglycemia with regard to correct snacks if -150 and if BG lower, using simple CHO (BG less 100 )    Chronic Complications (preventing, detecting, and treating chronic complications): Individual Session, Written Materials Provided, Discussion  Clinical (diabetes, other pertinent medical history, and relevant comorbidities reviewed during visit): Discussion, Individual Session  Cognitive (knowledge of self-management skills, functional health literacy): Individual Session, Written Materials Provided, Discussion  Psychosocial (emotional response to diabetes): Discussion  Diabetes Distress and Support Systems: Discussion  Behavioral  (readiness for change, lifestyle practices, self-care behaviors): Discussion    Goals  Patient has selected/evaluated goals during today's session: No(pt not interested in discussing goals)  Diabetes Care Plan/Intervention  Education Plan/Intervention: Individual Follow-Up DSMT(contact info provided)    Diabetes Meal Plan  Restrictions: Restricted Carbohydrate, Low Potassium, Low Sodium, Other  Carbohydrate Per Meal: 30-45g  Carbohydrate Per Snack : 15-20g    Today's Self-Management Care Plan was developed with the patient's input and is based on barriers identified during today's assessment.    The long and short-term goals in the care plan were written with the patient/caregiver's input. The patient has agreed to work toward these goals to improve his overall diabetes control.      The patient received a copy of today's self-management plan and verbalized understanding of the care plan, goals, and all of today's instructions.      The patient was encouraged to communicate with his physician and care team regarding his condition(s) and treatment.  I provided the patient with my contact information today and encouraged him to contact me via phone or patient portal as needed.     Education Units of Time   Time Spent: 60 min

## 2019-02-19 ENCOUNTER — TELEPHONE (OUTPATIENT)
Dept: FAMILY MEDICINE | Facility: CLINIC | Age: 59
End: 2019-02-19

## 2019-02-19 DIAGNOSIS — R10.9 FLANK PAIN: Primary | ICD-10-CM

## 2019-02-19 NOTE — TELEPHONE ENCOUNTER
Patients wife is requesting a refill for her . He was at the ER on 1/30/2019 [facility]. The medication is flexeril generic brand. 10mg 3 times per day.

## 2019-02-19 NOTE — TELEPHONE ENCOUNTER
Patient's wife was informed of charted recommendation. Wife said that patient has 8 tablets of the Flexeril and she would rather get referral to pain management before he runs out of his medication.

## 2019-02-19 NOTE — TELEPHONE ENCOUNTER
Please advise wife that this medication is bad for the liver. He has already taken 30 pills in 20 days. I am concerned about him taking any more of this medication without further evaluation. Consider him to see an orthopaedist or our pain doctor for further evaluation of the cause of his pain so we can treat him appropriately.

## 2019-02-26 NOTE — OP NOTE
DATE OF PROCEDURE:  03/06/2017    PREOPERATIVE DIAGNOSIS:  Left AV fistula stenosis.    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    PROCEDURES:  1.  Angioplasty, left AV fistula (10 x 40 [place]).  2.  Angioplasty, left subclavian vein stenosis (12 x 40 [place]).  3.  Left fistulogram.  4.  Conscious sedation.    SURGEON:  LISA Mazariegos III, M.D.    ANESTHESIA:  Monitored sedation and local infiltration.    INDICATIONS:  A 57-year-old male with end-stage renal disease, well known to me   with increased bleeding and recurrent AV fistula stenosis by duplex.    PROCEDURE IN DETAIL:  The patient was brought in the Cath Lab and placed in   supine position.  After a sterile prep and drape and infiltration of 1%   lidocaine, micropuncture set was used to access the brachiobasilic fistula near   the arterial anastomosis.  Fistulogram was then performed showing approximately   50% stent edge stenosis in the mid fistula and also an approximately 50% distal   subclavian vein stenosis with retrograde collateral flow of more proximal vessel   suggesting this is hemodynamically significant.    A 7-Swedish sheath was placed and a stiff angled Glidewire placed through the   lesions.  A 12 x 40 [place] balloon was brought in the field, prepped and   placed in the subclavian vein and then inflated to 12 atmospheres and held for   90 seconds.  Completion fistulogram showed resolution of the stenosis and most   importantly resolution of the retrograde filling of the more proximal venous   branches confirming successful treatment of this hemodynamically significant   stenosis.    Attention was then directed towards the mid fistula stenosis.  A 10 x 40 [place]   balloon was brought in the field, prepped, placed over this area and then   inflated.  Of note, there was quite severe waisting at the proximal end of the   stent suggesting that this stenosis was actually much greater than the 50% seen    angiographically.  This did fully efface at 15 atmospheres of pressure and was   held for 2 minutes.  Final completion fistulogram showed complete resolution of   stenosis and brisk flow.    All catheters and guidewires were removed and hemostasis was achieved with a   Monocryl suture.    Dr. Mazariegos monitored the conscious sedation throughout the procedure.  See   nursing notes for dosing of fentanyl and Versed.  Total conscious sedation time   was 28 minutes.      DES  dd: 03/06/2017 09:45:29 (CST)  td: 03/06/2017 12:06:21 (CST)  Doc ID   #[id number]  Job ID #[id number]    CC:    no concerns

## 2019-03-08 ENCOUNTER — TELEPHONE (OUTPATIENT)
Dept: PAIN MEDICINE | Facility: CLINIC | Age: 59
End: 2019-03-08

## 2019-03-08 NOTE — TELEPHONE ENCOUNTER
Message left reminding patient of Pain Management appointment scheduled for Monday at 1.15p with Dr. Sanches.  Location information also included.

## 2019-03-11 ENCOUNTER — OFFICE VISIT (OUTPATIENT)
Dept: PAIN MEDICINE | Facility: CLINIC | Age: 59
End: 2019-03-11
Payer: MEDICARE

## 2019-03-11 ENCOUNTER — TELEPHONE (OUTPATIENT)
Dept: PAIN MEDICINE | Facility: CLINIC | Age: 59
End: 2019-03-11

## 2019-03-11 VITALS
HEART RATE: 100 BPM | RESPIRATION RATE: 18 BRPM | HEIGHT: 71 IN | BODY MASS INDEX: 21.95 KG/M2 | WEIGHT: 156.81 LBS | SYSTOLIC BLOOD PRESSURE: 215 MMHG | OXYGEN SATURATION: 99 % | DIASTOLIC BLOOD PRESSURE: 102 MMHG

## 2019-03-11 DIAGNOSIS — I10 HYPERTENSION, UNSPECIFIED TYPE: Chronic | ICD-10-CM

## 2019-03-11 DIAGNOSIS — Z86.73 HISTORY OF STROKE: ICD-10-CM

## 2019-03-11 DIAGNOSIS — I25.2 HISTORY OF MI (MYOCARDIAL INFARCTION): Primary | ICD-10-CM

## 2019-03-11 DIAGNOSIS — Z99.2 ESRD ON HEMODIALYSIS: ICD-10-CM

## 2019-03-11 DIAGNOSIS — N18.6 ESRD ON HEMODIALYSIS: ICD-10-CM

## 2019-03-11 PROCEDURE — 99499 NO LOS: ICD-10-PCS | Mod: S$GLB,,, | Performed by: PAIN MEDICINE

## 2019-03-11 PROCEDURE — 99499 UNLISTED E&M SERVICE: CPT | Mod: S$GLB,,, | Performed by: PAIN MEDICINE

## 2019-03-11 PROCEDURE — 99999 PR PBB SHADOW E&M-EST. PATIENT-LVL III: ICD-10-PCS | Mod: PBBFAC,,, | Performed by: PAIN MEDICINE

## 2019-03-11 PROCEDURE — 99999 PR PBB SHADOW E&M-EST. PATIENT-LVL III: CPT | Mod: PBBFAC,,, | Performed by: PAIN MEDICINE

## 2019-03-11 NOTE — LETTER
March 11, 2019      Melonie Elias MD  [address] [facility]  [place] [address]           [facility]  [address] [facility]  [place] [address]  Phone: [phone]  Fax: [phone]          Patient: Kodi Ibanez Sr.   MR Number: [id number]   YOB: 1960   Date of Visit: 3/11/2019       Dear Dr. Melonie Elias:    Thank you for referring Kodi Ibanez to me for evaluation. Attached you will find relevant portions of my assessment and plan of care.    If you have questions, please do not hesitate to call me. I look forward to following Kodi Ibanez along with you.    Sincerely,    Sincere Sanches Jr., MD    Enclosure  CC:  No Recipients    If you would like to receive this communication electronically, please contact [email or web address] or [phone] to request more information on [facility] access.    For providers and/or their staff who would like to refer a patient to [facility], please contact us through our one-stop-shop provider referral line, [facility], at [phone].    If you feel you have received this communication in error or would no longer like to receive these types of communications, please e-mail [email or web address]

## 2019-03-11 NOTE — PROGRESS NOTES
59-year-old male with history of hypertension, myocardial infarction, CVA, ESRD on hemodialysis here for evaluation of right flank pain. While room in the patient was noted that his blood pressure was 215/102.  This reading was confirmed with a repeat manual blood pressure reading.  Patient last underwent hemodialysis 2 days ago and is scheduled to undergo hemodialysis again tomorrow.  It is uncommon for his blood pressure to be elevated to this degree.  Patient was advised to present to the emergency department that his blood pressure can be evaluated and treated appropriately.  His appointment was rescheduled to a later date and we will discuss this pain more at that time. Today we did discuss that continued use of Flexeril is not advised given decreased liver function.  We also discussed that NSAIDs are not advisable given chronic renal failure.  I will notify his primary care physician about his elevated blood pressure.

## 2019-03-11 NOTE — TELEPHONE ENCOUNTER
Rescheduled appointment w/ Dr. Sanches @ Pain Management        ----- Message from Jigna Munguia sent at 3/11/2019  3:47 PM CDT -----  Contact: darby wife jessika [phone]  Name of Who is Calling: darby wife jesiska [phone]      What is the request in detail:pt's wife calling to f/u. Pt bp160/90      Can the clinic reply by [facility]: no      What Number to Call Back if not in [facility]: darby wife jessika [phone]

## 2019-03-12 ENCOUNTER — HOSPITAL ENCOUNTER (INPATIENT)
Facility: HOSPITAL | Age: 59
LOS: 1 days | Discharge: HOME | DRG: 280 | End: 2019-03-13
Attending: EMERGENCY MEDICINE | Admitting: INTERNAL MEDICINE
Payer: MEDICARE

## 2019-03-12 DIAGNOSIS — I25.10 CAD (CORONARY ARTERY DISEASE): ICD-10-CM

## 2019-03-12 DIAGNOSIS — R00.0 TACHYCARDIA: ICD-10-CM

## 2019-03-12 DIAGNOSIS — R79.89 TROPONIN I ABOVE REFERENCE RANGE: ICD-10-CM

## 2019-03-12 DIAGNOSIS — I16.1 HYPERTENSIVE EMERGENCY: ICD-10-CM

## 2019-03-12 DIAGNOSIS — I10 HTN (HYPERTENSION): ICD-10-CM

## 2019-03-12 DIAGNOSIS — I21.4 NSTEMI (NON-ST ELEVATED MYOCARDIAL INFARCTION): Primary | ICD-10-CM

## 2019-03-12 PROBLEM — I47.9 PAROXYSMAL TACHYCARDIA: Status: ACTIVE | Noted: 2019-03-12

## 2019-03-12 LAB
ALBUMIN SERPL BCP-MCNC: 3.5 G/DL
ALP SERPL-CCNC: 227 U/L
ALT SERPL W/O P-5'-P-CCNC: 72 U/L
ANION GAP SERPL CALC-SCNC: 10 MMOL/L
AORTIC ROOT ANNULUS: 3.64 CM
AORTIC VALVE CUSP SEPERATION: 2.34 CM
ASCENDING AORTA: 2.95 CM
AST SERPL-CCNC: 47 U/L
AV INDEX (PROSTH): 0.75
AV MEAN GRADIENT: 2.81 MMHG
AV PEAK GRADIENT: 4.41 MMHG
AV VALVE AREA: 2.42 CM2
AV VELOCITY RATIO: 0.75
BASOPHILS # BLD AUTO: 0.02 K/UL
BASOPHILS NFR BLD: 0.5 %
BILIRUB SERPL-MCNC: 0.6 MG/DL
BNP SERPL-MCNC: 814 PG/ML
BSA FOR ECHO PROCEDURE: 1.88 M2
BUN SERPL-MCNC: 39 MG/DL
CALCIUM SERPL-MCNC: 9.5 MG/DL
CHLORIDE SERPL-SCNC: 99 MMOL/L
CHOLEST SERPL-MCNC: 158 MG/DL
CHOLEST/HDLC SERPL: 4.3 {RATIO}
CO2 SERPL-SCNC: 30 MMOL/L
CREAT SERPL-MCNC: 9.8 MG/DL
CV ECHO LV RWT: 0.47 CM
DIFFERENTIAL METHOD: ABNORMAL
DOP CALC AO PEAK VEL: 1.05 M/S
DOP CALC AO VTI: 22.45 CM
DOP CALC LVOT AREA: 3.23 CM2
DOP CALC LVOT DIAMETER: 2.03 CM
DOP CALC LVOT PEAK VEL: 0.79 M/S
DOP CALC LVOT STROKE VOLUME: 54.38 CM3
DOP CALCLVOT PEAK VEL VTI: 16.81 CM
E WAVE DECELERATION TIME: 199.22 MSEC
E/A RATIO: 1.18
E/E' RATIO: 14.71
ECHO LV POSTERIOR WALL: 1.17 CM (ref 0.6–1.1)
EOSINOPHIL # BLD AUTO: 0.1 K/UL
EOSINOPHIL NFR BLD: 3.2 %
ERYTHROCYTE [DISTWIDTH] IN BLOOD BY AUTOMATED COUNT: 14.7 %
EST. GFR  (AFRICAN AMERICAN): 6 ML/MIN/1.73 M^2
EST. GFR  (NON AFRICAN AMERICAN): 5 ML/MIN/1.73 M^2
FRACTIONAL SHORTENING: 21 % (ref 28–44)
GLUCOSE SERPL-MCNC: 246 MG/DL
HCT VFR BLD AUTO: 35.6 %
HDLC SERPL-MCNC: 37 MG/DL
HDLC SERPL: 23.4 %
HGB BLD-MCNC: 11.4 G/DL
INTERVENTRICULAR SEPTUM: 1.16 CM (ref 0.6–1.1)
IVRT: 0.11 MSEC
LA MAJOR: 5.3 CM
LA MINOR: 5.23 CM
LA WIDTH: 4.31 CM
LDLC SERPL CALC-MCNC: 97.4 MG/DL
LEFT ATRIUM SIZE: 4.07 CM
LEFT ATRIUM VOLUME INDEX: 41.4 ML/M2
LEFT ATRIUM VOLUME: 78.5 CM3
LEFT INTERNAL DIMENSION IN SYSTOLE: 3.92 CM (ref 2.1–4)
LEFT VENTRICLE DIASTOLIC VOLUME INDEX: 61.74 ML/M2
LEFT VENTRICLE DIASTOLIC VOLUME: 117.16 ML
LEFT VENTRICLE MASS INDEX: 117.4 G/M2
LEFT VENTRICLE SYSTOLIC VOLUME INDEX: 35.1 ML/M2
LEFT VENTRICLE SYSTOLIC VOLUME: 66.55 ML
LEFT VENTRICULAR INTERNAL DIMENSION IN DIASTOLE: 4.98 CM (ref 3.5–6)
LEFT VENTRICULAR MASS: 222.84 G
LV LATERAL E/E' RATIO: 11.44
LV SEPTAL E/E' RATIO: 20.6
LYMPHOCYTES # BLD AUTO: 1 K/UL
LYMPHOCYTES NFR BLD: 22.5 %
MCH RBC QN AUTO: 29.7 PG
MCHC RBC AUTO-ENTMCNC: 32 G/DL
MCV RBC AUTO: 93 FL
MONOCYTES # BLD AUTO: 0.5 K/UL
MONOCYTES NFR BLD: 10.8 %
MV PEAK A VEL: 0.87 M/S
MV PEAK E VEL: 1.03 M/S
NEUTROPHILS # BLD AUTO: 2.8 K/UL
NEUTROPHILS NFR BLD: 63 %
NONHDLC SERPL-MCNC: 121 MG/DL
PISA TR MAX VEL: 2.37 M/S
PLATELET # BLD AUTO: 135 K/UL
PMV BLD AUTO: 11.2 FL
POCT GLUCOSE: 209 MG/DL (ref 70–110)
POCT GLUCOSE: 241 MG/DL (ref 70–110)
POTASSIUM SERPL-SCNC: 4.7 MMOL/L
PROT SERPL-MCNC: 7.7 G/DL
PULM VEIN S/D RATIO: 1.1
PV PEAK D VEL: 0.5 M/S
PV PEAK S VEL: 0.55 M/S
PV PEAK VELOCITY: 0.56 CM/S
RA MAJOR: 5.01 CM
RA PRESSURE: 3 MMHG
RA WIDTH: 3.94 CM
RBC # BLD AUTO: 3.84 M/UL
RIGHT VENTRICULAR END-DIASTOLIC DIMENSION: 3.71 CM
RV TISSUE DOPPLER FREE WALL SYSTOLIC VELOCITY 1 (APICAL 4 CHAMBER VIEW): 6.94 M/S
SINUS: 3.78 CM
SODIUM SERPL-SCNC: 139 MMOL/L
STJ: 2.78 CM
TDI LATERAL: 0.09
TDI SEPTAL: 0.05
TDI: 0.07
TR MAX PG: 22.47 MMHG
TRICUSPID ANNULAR PLANE SYSTOLIC EXCURSION: 0.98 CM
TRIGL SERPL-MCNC: 118 MG/DL
TROPONIN I SERPL DL<=0.01 NG/ML-MCNC: 0.11 NG/ML
TROPONIN I SERPL DL<=0.01 NG/ML-MCNC: 0.13 NG/ML
TROPONIN I SERPL DL<=0.01 NG/ML-MCNC: 0.15 NG/ML
TROPONIN I SERPL DL<=0.01 NG/ML-MCNC: 0.15 NG/ML
TV REST PULMONARY ARTERY PRESSURE: 25 MMHG
WBC # BLD AUTO: 4.44 K/UL

## 2019-03-12 PROCEDURE — 93010 ELECTROCARDIOGRAM REPORT: CPT | Mod: ,,, | Performed by: INTERNAL MEDICINE

## 2019-03-12 PROCEDURE — 85025 COMPLETE CBC W/AUTO DIFF WBC: CPT

## 2019-03-12 PROCEDURE — 99223 1ST HOSP IP/OBS HIGH 75: CPT | Mod: ,,, | Performed by: INTERNAL MEDICINE

## 2019-03-12 PROCEDURE — 25000003 PHARM REV CODE 250: Performed by: EMERGENCY MEDICINE

## 2019-03-12 PROCEDURE — 21400001 HC TELEMETRY ROOM

## 2019-03-12 PROCEDURE — 36415 COLL VENOUS BLD VENIPUNCTURE: CPT

## 2019-03-12 PROCEDURE — 63600175 PHARM REV CODE 636 W HCPCS: Performed by: EMERGENCY MEDICINE

## 2019-03-12 PROCEDURE — 80053 COMPREHEN METABOLIC PANEL: CPT

## 2019-03-12 PROCEDURE — 99285 EMERGENCY DEPT VISIT HI MDM: CPT

## 2019-03-12 PROCEDURE — 99223 PR INITIAL HOSPITAL CARE,LEVL III: ICD-10-PCS | Mod: ,,, | Performed by: INTERNAL MEDICINE

## 2019-03-12 PROCEDURE — 80061 LIPID PANEL: CPT

## 2019-03-12 PROCEDURE — 83880 ASSAY OF NATRIURETIC PEPTIDE: CPT

## 2019-03-12 PROCEDURE — 93010 EKG 12-LEAD: ICD-10-PCS | Mod: ,,, | Performed by: INTERNAL MEDICINE

## 2019-03-12 PROCEDURE — 86706 HEP B SURFACE ANTIBODY: CPT

## 2019-03-12 PROCEDURE — 80100016 HC MAINTENANCE HEMODIALYSIS

## 2019-03-12 PROCEDURE — 93005 ELECTROCARDIOGRAM TRACING: CPT

## 2019-03-12 PROCEDURE — 84484 ASSAY OF TROPONIN QUANT: CPT | Mod: 91

## 2019-03-12 RX ORDER — GLUCAGON 1 MG
1 KIT INJECTION
Status: DISCONTINUED | OUTPATIENT
Start: 2019-03-12 | End: 2019-03-13 | Stop reason: HOSPADM

## 2019-03-12 RX ORDER — ASPIRIN 325 MG
325 TABLET ORAL
Status: COMPLETED | OUTPATIENT
Start: 2019-03-12 | End: 2019-03-12

## 2019-03-12 RX ORDER — ASPIRIN 325 MG
325 TABLET ORAL DAILY
Status: DISCONTINUED | OUTPATIENT
Start: 2019-03-12 | End: 2019-03-13 | Stop reason: HOSPADM

## 2019-03-12 RX ORDER — SODIUM CHLORIDE 9 MG/ML
INJECTION, SOLUTION INTRAVENOUS
Status: DISCONTINUED | OUTPATIENT
Start: 2019-03-12 | End: 2019-03-13 | Stop reason: HOSPADM

## 2019-03-12 RX ORDER — CARVEDILOL 6.25 MG/1
25 TABLET ORAL
Status: COMPLETED | OUTPATIENT
Start: 2019-03-12 | End: 2019-03-12

## 2019-03-12 RX ORDER — SODIUM CHLORIDE 0.9 % (FLUSH) 0.9 %
3 SYRINGE (ML) INJECTION
Status: DISCONTINUED | OUTPATIENT
Start: 2019-03-12 | End: 2019-03-13 | Stop reason: HOSPADM

## 2019-03-12 RX ORDER — HYDRALAZINE HYDROCHLORIDE 20 MG/ML
10 INJECTION INTRAMUSCULAR; INTRAVENOUS
Status: DISCONTINUED | OUTPATIENT
Start: 2019-03-12 | End: 2019-03-12

## 2019-03-12 RX ORDER — SODIUM CHLORIDE 9 MG/ML
INJECTION, SOLUTION INTRAVENOUS ONCE
Status: DISCONTINUED | OUTPATIENT
Start: 2019-03-12 | End: 2019-03-13 | Stop reason: HOSPADM

## 2019-03-12 RX ORDER — INSULIN ASPART 100 [IU]/ML
0-5 INJECTION, SOLUTION INTRAVENOUS; SUBCUTANEOUS
Status: DISCONTINUED | OUTPATIENT
Start: 2019-03-12 | End: 2019-03-13 | Stop reason: HOSPADM

## 2019-03-12 RX ORDER — ONDANSETRON 2 MG/ML
4 INJECTION INTRAMUSCULAR; INTRAVENOUS EVERY 8 HOURS PRN
Status: DISCONTINUED | OUTPATIENT
Start: 2019-03-12 | End: 2019-03-13 | Stop reason: HOSPADM

## 2019-03-12 RX ORDER — ACETAMINOPHEN 325 MG/1
650 TABLET ORAL EVERY 4 HOURS PRN
Status: DISCONTINUED | OUTPATIENT
Start: 2019-03-12 | End: 2019-03-13 | Stop reason: HOSPADM

## 2019-03-12 RX ORDER — IBUPROFEN 200 MG
16 TABLET ORAL
Status: DISCONTINUED | OUTPATIENT
Start: 2019-03-12 | End: 2019-03-13 | Stop reason: HOSPADM

## 2019-03-12 RX ORDER — ATORVASTATIN CALCIUM 40 MG/1
80 TABLET, FILM COATED ORAL DAILY
Status: DISCONTINUED | OUTPATIENT
Start: 2019-03-12 | End: 2019-03-13 | Stop reason: HOSPADM

## 2019-03-12 RX ORDER — IBUPROFEN 200 MG
24 TABLET ORAL
Status: DISCONTINUED | OUTPATIENT
Start: 2019-03-12 | End: 2019-03-13 | Stop reason: HOSPADM

## 2019-03-12 RX ORDER — HYDROCODONE BITARTRATE AND ACETAMINOPHEN 5; 325 MG/1; MG/1
1 TABLET ORAL EVERY 4 HOURS PRN
Status: DISCONTINUED | OUTPATIENT
Start: 2019-03-12 | End: 2019-03-13 | Stop reason: HOSPADM

## 2019-03-12 RX ADMIN — ATORVASTATIN CALCIUM 80 MG: 40 TABLET, FILM COATED ORAL at 12:03

## 2019-03-12 RX ADMIN — INSULIN ASPART 1 UNITS: 100 INJECTION, SOLUTION INTRAVENOUS; SUBCUTANEOUS at 10:03

## 2019-03-12 RX ADMIN — CARVEDILOL 25 MG: 6.25 TABLET, FILM COATED ORAL at 07:03

## 2019-03-12 RX ADMIN — ASPIRIN 325 MG ORAL TABLET 325 MG: 325 PILL ORAL at 08:03

## 2019-03-12 NOTE — NURSING
Patient arrived to unit from Dialysis. Noted bleeding at left upper arm dialysis port. Held manual pressure, notified BASHIR Cordero of bleed. Consuelo at bedside to access site. Bleeding stopped. Site secured with tape and gauze.    Heart rate noted to increase to 148 Sinus Tach, Sustained approximately 2 minutes and returned to 70 BPM. Patient asymptomatic. Notified MD Prado, new orders given to obtain a STAT EKG if patient has another episode of tachycardia. Will continue to monitor.

## 2019-03-12 NOTE — HPI
Mr. Ibanez is a 60 yo man with ESRD on HD, CAD s/p CABG 4/2009 at [facility], HTN, IDDM (A1c 10.5%), HLD, anemia of chronic disease, h/o CVA, and vascular dementia who presented to the ER after his dialysis unit found him to have a blood pressure of 220/110 and heart rate in the 160s.  The patient had not taken his anti-hypertensives yet that morning because he was going to HD.  This level of hypertension and tachycardia was reported to be abnormal for him however so he was sent to the ER.  The patient denies symptoms of chest pain, shortness of breath, nausea, diaphoresis, headache, or palpitations.  His only complaint is chronic right scapular pain.  He was found to have an elevated troponin to 0.151.  EKG without any ST elevations.  He was admitted for ACS evaluation.  Cardiology consulted as well as nephrology for regularly scheduled HD.

## 2019-03-12 NOTE — ASSESSMENT & PLAN NOTE
Stable.  No evidence of bleeding.  No need for transfusion at this time.  Monitor during admission.

## 2019-03-12 NOTE — ASSESSMENT & PLAN NOTE
Patient presented from his dialysis center with hypertension and was found to have an elevated troponin.  No other evidence of end organ dysfunction.  EKG not consistent with acute ischemia.  Patient denies chest pain or anginal equivalents.  He was admitted to trend troponin due to concern for ACS given his medical history.  Troponin remains stagnant.  Echo with EF 50%, G2DD, but no noted wall motion abnormality.  Appreciate cardiology recs.  NST in the morning if troponin remains stagnant.

## 2019-03-12 NOTE — ED TRIAGE NOTES
Pt presents to ED with wife with c/o hypertension and tachycardia. She reports this morning she brought him to dialysis and about 1 hour into his session HR was 140s and bp 200/120s and was told by dialysis staff to come to ED. Pt denies CP or SOB but reports upper and lower back pain which wife reports is chronic issue. No acute distress is noted. Will continue to be monitored. Family at bedside. Will continue to be monitored.

## 2019-03-12 NOTE — PROGRESS NOTES
[facility]  Cardiology  Progress Note    Patient Name: Kodi Ibanez Sr.  MRN: [id number]  Admission Date: 3/12/2019  Hospital Length of Stay: 0 days  Code Status: Full Code   Attending Physician: Lisy Prado MD   Primary Care Physician: Melonie Elias MD  Expected Discharge Date:   Principal Problem:<principal problem not specified>    Subjective:       CARDIOVASCULAR CONSULTATION      Consult Requested By: Lisy Prado MD  PCP: Melonie Elias MD  Primary Cardiologist: Dr Britt     Reason for Consult: Elevated Troponins    Admitting Diagnosis:  HTN (hypertension) [I10]  NSTEMI (non-ST elevated myocardial infarction) [I21.4]  Troponin I above reference range [R74.8]      SUBJECTIVE:     History of Present Illness:   This 59 y.o male who has a PMH of CAD S/P CABG,  CHF, ESRD-HD, Seizures, old MI, HTN, HLD, DM, PVDand CVA presents to the ED for an evaluation for an elevated heart rate and elevated blood pressure that was noticed at 0515 today.  Patient reports being dropped off at dialysis and reports being informed he could not receive dialysis treatment due to a blood pressure of 170/104.  Patient currently only reports of right upper back pain, which he reports being 3 months ago.  Patient denies chest pain, extremity numbness, extremity weakness, fever, chills, nausea, emesis, diarrhea, abdominal pain, headache, or any other associated symptoms.  Patient's spouse reports the patient having a blood pressure reading of 215/104 yesterday while at a doctor's appointment.  His spouse reports them arriving home yesterday to take his blood pressure medication and reports a repeat blood pressure of 140/90.  Patient has not received any hypertension medications this morning.  No prior tx.  No alleviating factors.    Patient states that he was in clinic yesterday and it was noted that his BP was high and he was tachycardic. He denied feeling any episodes of chest pain at rest or on  exertion, orthopnea, PND or dyspnea. He denies any claudication. States that he walks on the treadmill every week for about a mile and denies any symptoms of chest pain or R[place] while on the treadmill.         Past Medical History:   Diagnosis Date    Allergy     Atherosclerosis of aorta     noted on VIKTORIA 2011    Blood transfusion     Cataracts, bilateral     CHF (congestive heart failure)     Chronic kidney disease     Clotting disorder     Coronary artery disease     Decreased appetite     Diabetic retinopathy     DM (diabetes mellitus), type 2 with renal complications     ESRD on hemodialysis     TUESDAY/ THURSDAY/SATURDAY    Hyperlipidemia     Hyperparathyroidism, unspecified     Hypertension     Myocardial infarction     Prostate cancer     Seizures     Stroke 2011    PT HAS SHAKES      Past Surgical History:   Procedure Laterality Date    CARDIAC SURGERY      CABG    CATARACT EXTRACTION      ou    CATARACT EXTRACTION, BILATERAL      cataracts      CORONARY ARTERY BYPASS GRAFT  2009    bypass    EYE SURGERY      FISTULOGRAM Left 8/21/2017    Performed by [facility]os III, [facility] at [facility] CATH LAB    FISTULOGRAM Left 8/3/2016    Per[facility], [facility] at [facility] CATH LAB    FISTULOGRAM Left 8/13/2014    [facility] OR [facility]    FISTULOGRAM Left 10/9/2013    [facility]    FISTULOGRAM, possible intervention - LUE Left 3/21/2016    Performed by [facility] III, [facility] at [facility] CATH LAB    Left Guera AVF  1/11/2012    PROSTATE BIOPSY      positive    retinal laser      ROTATOR CUFF REPAIR  right, 2005    TRANSPOSITION, VEIN Left 4/1/2013    Performed by [facility] III, [facility] at [facility] OR [address] [place] [address]    VASCULAR SURGERY         Medications Prior to Admission   Medication Sig Dispense Refill Last Dose    amLODIPine (NORVASC) 10 MG tablet TAKE 1 TABLET(10 MG) BY MOUTH EVERY DAY 90 tablet 0 3/11/2019     "ascorbic acid, vitamin C, (VITAMIN C) 500 MG tablet Take 500 mg by mouth once daily.   3/11/2019    atorvastatin (LIPITOR) 40 MG tablet Take 1 tablet (40 mg total) by mouth once daily. 90 tablet 1 3/11/2019    carvedilol (COREG) 25 MG tablet TAKE 1 TABLET(25 MG) BY MOUTH TWICE DAILY WITH MEALS 180 tablet 0 3/11/2019    cyclobenzaprine HCl (FLEXERIL ORAL) Take by mouth.   3/11/2019    furosemide (LASIX) 40 MG tablet TAKE 1 TABLET(40 MG) BY MOUTH EVERY DAY 90 tablet 0 3/11/2019    insulin (LANTUS SOLOSTAR U-100 INSULIN) glargine 100 units/mL (3mL) SubQ pen INJECT 20 UNITS UNDER THE SKIN NIGHTLY 15 mL 0 3/11/2019    losartan (COZAAR) 50 MG tablet TAKE 1 TABLET(50 MG) BY MOUTH EVERY DAY 90 tablet 0 3/11/2019    megestrol (MEGACE) 20 MG Tab TAKE 1 TABLET(20 MG) BY MOUTH EVERY DAY 30 tablet 0 Past Month    megestrol acetate (MEGACE ORAL) Take by mouth.   Past Month    NOVOLOG FLEXPEN U-100 INSULIN 100 unit/mL InPn pen INJECT 5 TO 10 UNITS WITH MEALS AND AT BEDTIME DEPENDING ON BLOOD SUGAR 15 mL 0 3/11/2019    pantoprazole (PROTONIX) 40 MG tablet TAKE 1 TABLET BY MOUTH DAILY AS NEEDED FOR HEARTBURN. TAKE ONLY AS NEEDED 90 tablet 0 3/11/2019    RENVELA 800 mg Tab Take 1 tablet (800 mg total) by mouth 3 (three) times daily with meals. 200 tablet 11 Past Month    BD INSULIN PEN NEEDLE UF ORIG 29 gauge x 1/2" Ndle USE  1 EVERY EVENING 90 each 3 Unknown    blood glucose control, normal Soln 1 drop by Misc.(Non-Drug; Combo Route) route as directed. 1 each 1 Unknown    blood sugar diagnostic Strp To check BG 2 times daily, to use with accuchek casa meter 200 each 3 Unknown    blood-glucose meter kit To check BG 2 times daily, to use with accuchek casa meter 1 each 0 Unknown    gabapentin (NEURONTIN) 100 MG capsule Take 1 capsule (100 mg total) by mouth once daily. 90 capsule 0 More than a month    glucagon, human recombinant, (GLUCAGON EMERGENCY KIT, HUMAN,) 1 mg SolR Inject 1 mg into the muscle as needed. 2 " "each 0 Unknown    lancets Misc To check BG 2 times daily, to use with accuchek casa meter 200 each 3 Unknown    NOVOFINE 30 30 x 1/3 " Ndle USE AS DIRECTED WITH LANTUS SOLARSTAR 100 each 3 Unknown    promethazine (PHENERGAN) 25 MG tablet Take 1 tablet (25 mg total) by mouth every 6 (six) hours as needed for Nausea. 30 tablet 0 Unknown       Current Medications:   sodium chloride 0.9%   Intravenous Once    aspirin  325 mg Oral Daily    atorvastatin  80 mg Oral Daily       sodium chloride 0.9%, acetaminophen, dextrose 50%, dextrose 50%, glucagon (human recombinant), glucose, glucose, HYDROcodone-acetaminophen, insulin aspart U-100, ondansetron, sodium chloride 0.9%    Family History   Problem Relation Age of Onset    Diabetes Mother     Glaucoma Mother     Cancer Mother     Cataracts Mother     Heart disease Father     Cancer Paternal Grandmother     Heart disease Maternal Grandmother     No Known Problems Sister     Prostate cancer Brother     No Known Problems Maternal Grandfather     No Known Problems Paternal Grandfather     No Known Problems Sister     No Known Problems Sister     Hypertension Brother     Cancer Paternal Uncle     Amblyopia Neg Hx     Blindness Neg Hx     Macular degeneration Neg Hx     Retinal detachment Neg Hx     Strabismus Neg Hx     Stroke Neg Hx     Thyroid disease Neg Hx     Anesthesia problems Neg Hx     Clotting disorder Neg Hx      problems Neg Hx     Kidney cancer Neg Hx     Kidney disease Neg Hx     Malignant hyperthermia Neg Hx     Sickle cell trait Neg Hx     Lupus Neg Hx     Urolithiasis Neg Hx     Sudden death Neg Hx        Social History     Tobacco Use    Smoking status: Never Smoker    Smokeless tobacco: Never Used   Substance Use Topics    Alcohol use: No    Drug use: No       Review of patient's allergies indicates:   Allergen Reactions    Reglan [metoclopramide hcl] Diarrhea    Lorazepam      Other reaction(s): heavy sedation    "     Review of Systems   All other systems reviewed and are negative.        OBJECTIVE:     Vital Signs Range (Last 24H):  Temp:  [98 °F (36.7 °C)-98.1 °F (36.7 °C)]   Pulse:  []   Resp:  [12-19]   BP: (122-215)/()   SpO2:  [96 %-100 %]     Body mass index is 21.76 kg/m².    Physical Exam   Constitutional: He is oriented to person, place, and time and well-developed, well-nourished, and in no distress. Vital signs are normal.   HENT:   Head: Normocephalic.   Eyes: Conjunctivae and EOM are normal. Pupils are equal, round, and reactive to light.   Neck: Normal range of motion. Neck supple.   Cardiovascular: Normal rate, regular rhythm and normal heart sounds.   Pedal pulses are diminished   Pulmonary/Chest: Effort normal and breath sounds normal.   Abdominal: Soft. Bowel sounds are normal.   Musculoskeletal: Normal range of motion.   Neurological: He is alert and oriented to person, place, and time.   Skin: Skin is warm.   Psychiatric: Mood, memory, affect and judgment normal.   Nursing note and vitals reviewed.      Laboratory:  CBC:  Recent Labs   Lab 06/25/18  0757 12/15/18  0935 03/12/19  0706   WHITE BLOOD CELL COUNT 6.84 7.06 4.44   HEMOGLOBIN 11.5 L 10.8 L 11.4 L   HEMATOCRIT 36.7 L 33.6 L 35.6 L   PLATELETS 161 168 135 L       CHEMISTRIES:  Recent Labs   Lab 06/25/18  0757 12/15/18  0935 03/12/19  0706   GLUCOSE 188 H 173 H 246 H   SODIUM 142 138 139   POTASSIUM 4.9 3.3 L 4.7   BUN BLD 36 H 12 39 H   CREATININE 8.5 H 3.8 H 9.8 H   EGFR IF  7.1 A 18.7 A 6 A   EGFR IF NON- 6.1 A 16.2 A 5 A   CALCIUM 9.5 8.9 9.5       CARDIAC BIOMARKERS:  Recent Labs   Lab 03/12/19  0706 03/12/19  1004   TROPONIN I 0.151 H 0.146 H       COAGS:        LIPIDS/LFTS:  Recent Labs   Lab 06/25/18  0757 12/15/18  0935 03/12/19  0706 03/12/19  1004   CHOLESTEROL 239 H 263 H  --  158   TRIGLYCERIDES 179 H 210 H  --  118   HDL 38 L 40  --  37 L   LDL CHOLESTEROL 165.2 H 181.0 H  --  97.4   NON-HDL  CHOLESTEROL 201 223  --  121   AST 21 52 H 47 H  --    ALT 38 83 H 72 H  --            Diagnostic Results:  ECG (personally reviewed tracings): NSR, LAE, Prior sander-septal infarct.    Chest X-Ray (personally reviewed image(s)): There are surgical changes with sternal wires and numerous mediastinal clips present.  The heart and mediastinal structures are unremarkable.  Pulmonary vasculature is within normal limits.  The lungs are free of focal consolidations.  There is no evidence for pneumothorax or large pleural effusions.  Bony structures appear intact.        Cardiac testinD ECHO done today (images personally reviewed)    · Low normal left ventricular systolic function. The estimated ejection fraction is 50%  · Concentric left ventricular hypertrophy.  · Grade II (moderate) left ventricular diastolic dysfunction consistent with pseudonormalization.  · Elevated left atrial pressure.  · Mild left atrial enlargement.  · Mild-to-moderate mitral regurgitation.    NUCLEAR PET IN 2013     NORMAL MYOCARDIAL PERFUSION PET STRESS TEST  1. The perfusion scan is free of evidence for myocardial ischemia or injury.   2. Resting wall motion is physiologic. Stress wall motion is physiologic.   3. LV function is normal at rest and stress.  (normal is >= 51%)  4. The ventricular volumes are normal at rest and stress.   5. The extracardiac distribution of radioactivity is normal.   6. There was no previous study available to compare.          Assessment and Plan:     Brief HPI: Patient with CAD s/p CABG and multiple risk factors, presented with mildly elevated troponins.     NSTEMI (non-ST elevated myocardial infarction)    Patient with mildly elevated troponins. Patient is on HD and has known CAD with CABG in the past. His PET scan was negative for ischemia in 2013. He denies any symptoms of chest pain or tightness. His troponin elevation is likely secondary to his elevated BP and tachycardia. However, considering his  multiple risk factors and known history od CAD, it is reasonable to rule out large areas of ischemia with a pharmacological nuclear stress test in the am if his troponins do not elevate any further and the clinical status remains stable.   F/U troponins q 6 hour x 3  Continue aspirin, beta blockers and statins.      CAD (coronary artery disease)    S/P CABG. NST as noted above.      Hypertension    Please resume and continue his outpatient medicines for HTN.      Congestive heart failure    ECHO today demonstrates low normal LVEF.      Anemia of chronic disease    Secondary to ESRD     History of stroke           Hyperlipidemia LDL goal <100    Continue Lipitor.     Type 2 diabetes, uncontrolled, with neuropathy    Being managed by primary team     PVD (peripheral vascular disease)    PVD being managed by vascular surgery.         VTE Risk Mitigation (From admission, onward)        Ordered     IP VTE HIGH RISK PATIENT  Once      03/12/19 1120     Place sequential compression device  Until discontinued      03/12/19 1120          David Villegas MD  Cardiology  [facility]

## 2019-03-12 NOTE — ASSESSMENT & PLAN NOTE
Patient with mildly elevated troponins. Patient is on HD and has known CAD with CABG in the past. His PET scan was negative for ischemia in 2013. He denies any symptoms of chest pain or tightness. His troponin elevation is likely secondary to his elevated BP and tachycardia. However, considering his multiple risk factors and known history od CAD, it is reasonable to rule out large areas of ischemia with a pharmacological nuclear stress test in the am if his troponins do not elevate any further and the clinical status remains stable.   F/U troponins q 6 hour x 3  Continue aspirin, beta blockers and statins.

## 2019-03-12 NOTE — ED PROVIDER NOTES
Encounter Date: 3/12/2019    SCRIBE #1 NOTE: I, Maru Thakkar, am scribing for, and in the presence of,  Ugo Deshpande MD. I have scribed the following portions of the note - Other sections scribed: HPI, ROS, PE.       History     Chief Complaint   Patient presents with    Tachycardia     Sent from dialysis clinic for HTN and tachycardia. Pt denies chest pain, sob, palpitations     CC: Tachycardia    HPI: This 59 y.o male who has CHF, ESRD-HD, Seizures, old MI, HTN, HLD, CAD, DM, and CVA presents to the ED for an evaluation for an elevated heart rate and elevated blood pressure that was noticed at 0515 today.  Patient reports being dropped off at dialysis and reports being informed he could not receive dialysis treatment due to a blood pressure of 170/104.  Patient currently only reports of right upper back pain, which he reports being 3 months ago.  Patient denies chest pain, extremity numbness, extremity weakness, fever, chills, nausea, emesis, diarrhea, abdominal pain, headache, or any other associated symptoms.  Patient's spouse reports the patient having a blood pressure reading of 215/104 yesterday while at a doctor's appointment.  His spouse reports them arriving home yesterday to take his blood pressure medication and reports a repeat blood pressure of 140/90.  Patient has not received any hypertension medications this morning.  No prior tx.  No alleviating factors.      The history is provided by the patient. No  was used.     Review of patient's allergies indicates:   Allergen Reactions    Reglan [metoclopramide hcl] Diarrhea    Lorazepam      Other reaction(s): heavy sedation     Past Medical History:   Diagnosis Date    Allergy     Atherosclerosis of aorta     noted on VIKTORIA 2011    Blood transfusion     Cataracts, bilateral     CHF (congestive heart failure)     Chronic kidney disease     Clotting disorder     Coronary artery disease     Decreased appetite     Diabetic  retinopathy     DM (diabetes mellitus), type 2 with renal complications     ESRD on hemodialysis     TUESDAY/ THURSDAY/SATURDAY    Hyperlipidemia     Hyperparathyroidism, unspecified     Hypertension     Myocardial infarction     Prostate cancer     Seizures     Stroke 2011    PT HAS SHAKES      Past Surgical History:   Procedure Laterality Date    CARDIAC SURGERY      CABG    CATARACT EXTRACTION      ou    CATARACT EXTRACTION, BILATERAL      cataracts      CORONARY ARTERY BYPASS GRAFT  2009    bypass    EYE SURGERY      FISTULOGRAM Left 8/21/2017    [facility] CATH LAB    FISTULOGRAM Left 8/3/2016    Performed by [facility] at [facility] CATH LAB    FISTULOGRAM Left 8/13/2014    Performed by [facility] at [facility] OR [facility]    FISTULOGRAM Left 10/9/2013    Performed by [facility] at [facility] OR [facility]    FISTULOGRAM, possible intervention - LUE Left 3/21/2016    Performed by [facility] at [facility] CATH LAB    Left Guera AVF  1/11/2012    PROSTATE BIOPSY      positive    retinal laser      ROTATOR CUFF REPAIR  right, 2005    TRANSPOSITION, VEIN Left 4/1/2013    [facility]    VASCULAR SURGERY       Family History   Problem Relation Age of Onset    Diabetes Mother     Glaucoma Mother     Cancer Mother     Cataracts Mother     Heart disease Father     Cancer Paternal Grandmother     Heart disease Maternal Grandmother     No Known Problems Sister     Prostate cancer Brother     No Known Problems Maternal Grandfather     No Known Problems Paternal Grandfather     No Known Problems Sister     No Known Problems Sister     Hypertension Brother     Cancer Paternal Uncle     Amblyopia Neg Hx     Blindness Neg Hx     Macular degeneration Neg Hx     Retinal detachment Neg Hx     Strabismus Neg Hx     Stroke Neg Hx     Thyroid disease Neg Hx     Anesthesia problems  Neg Hx     Clotting disorder Neg Hx      problems Neg Hx     Kidney cancer Neg Hx     Kidney disease Neg Hx     Malignant hyperthermia Neg Hx     Sickle cell trait Neg Hx     Lupus Neg Hx     Urolithiasis Neg Hx     Sudden death Neg Hx      Social History     Tobacco Use    Smoking status: Never Smoker    Smokeless tobacco: Never Used   Substance Use Topics    Alcohol use: No    Drug use: No     Review of Systems   Constitutional: Negative for chills and fever.   HENT: Negative for congestion, ear pain, rhinorrhea and sore throat.    Eyes: Negative for pain.   Respiratory: Negative for cough and shortness of breath.    Cardiovascular: Negative for chest pain.   Gastrointestinal: Negative for abdominal pain, diarrhea, nausea and vomiting.   Genitourinary: Negative for difficulty urinating, dysuria, frequency, hematuria and urgency.   Musculoskeletal: Positive for back pain. Negative for neck pain.   Skin: Negative for rash.   Neurological: Negative for dizziness, weakness, light-headedness, numbness and headaches.       Physical Exam     Initial Vitals [03/12/19 0612]   BP Pulse Resp Temp SpO2   (!) 176/110 (!) 140 17 98 °F (36.7 °C) 98 %      MAP       --         Physical Exam    Nursing note and vitals reviewed.  Constitutional: He is not diaphoretic. No distress.   HENT:   Head: Normocephalic and atraumatic.   Mouth/Throat: Oropharynx is clear and moist.   Eyes: Conjunctivae and EOM are normal. Pupils are equal, round, and reactive to light. No scleral icterus.   Neck: Normal range of motion. Neck supple. JVD (mild) present.   Cardiovascular: Normal rate, regular rhythm and intact distal pulses.   Pulmonary/Chest: Breath sounds normal. No stridor. No respiratory distress.   Abdominal: Soft. Bowel sounds are normal. He exhibits no distension. There is no tenderness.   Musculoskeletal: Normal range of motion. He exhibits no edema or tenderness.   Patient has a left upper extremity fistula with a  positive thrill.  Patient has right paraspinal tenderness.  No midline tenderness.     Neurological: He is alert and oriented to person, place, and time. He has normal strength. No cranial nerve deficit.   Skin: Skin is warm and dry. No rash noted.   Psychiatric: He has a normal mood and affect.         ED Course   Procedures  Labs Reviewed   CBC W/ AUTO DIFFERENTIAL - Abnormal; Notable for the following components:       Result Value    RBC 3.84 (*)     Hemoglobin 11.4 (*)     Hematocrit 35.6 (*)     RDW 14.7 (*)     Platelets 135 (*)     All other components within normal limits   COMPREHENSIVE METABOLIC PANEL - Abnormal; Notable for the following components:    CO2 30 (*)     Glucose 246 (*)     BUN, Bld 39 (*)     Creatinine 9.8 (*)     Alkaline Phosphatase 227 (*)     AST 47 (*)     ALT 72 (*)     eGFR if  6 (*)     eGFR if non  5 (*)     All other components within normal limits   TROPONIN I - Abnormal; Notable for the following components:    Troponin I 0.151 (*)     All other components within normal limits   B-TYPE NATRIURETIC PEPTIDE - Abnormal; Notable for the following components:     (*)     All other components within normal limits   TROPONIN I     EKG Readings: (Independently Interpreted)   Initial Reading: No STEMI. Rhythm: Normal Sinus Rhythm. Heart Rate: 84. Ectopy: No Ectopy. ST Segments: Normal ST Segments. T Waves Flipped: III and AVF.     ECG Results          EKG 12-lead (In process)  Result time 03/12/19 07:41:49    In process by Interface, Lab In [facility] (03/12/19 07:41:49)                 Narrative:    Test Reason : I10,    Vent. Rate : 084 BPM     Atrial Rate : 084 BPM     P-R Int : 144 ms          QRS Dur : 082 ms      QT Int : 388 ms       P-R-T Axes : 054 -56 021 degrees     QTc Int : 458 ms    Normal sinus rhythm  Possible Left atrial enlargement  Left axis deviation  Anteroseptal infarct (cited on or before 17-OCT-2013)  Abnormal ECG  When  compared with ECG of 04-JUN-2015 13:57,  Significant changes have occurred    Referred By: [email or web address]   SELF           Confirmed By:                   In process by Interface, Lab In [facility] (03/12/19 07:40:01)                 Narrative:    Test Reason : I10,    Vent. Rate : 084 BPM     Atrial Rate : 084 BPM     P-R Int : 144 ms          QRS Dur : 082 ms      QT Int : 388 ms       P-R-T Axes : 054 -56 021 degrees     QTc Int : 458 ms    Normal sinus rhythm  Possible Left atrial enlargement  Left axis deviation  Anteroseptal infarct (cited on or before 17-OCT-2013)  Abnormal ECG  When compared with ECG of 04-JUN-2015 13:57,  Significant changes have occurred    Referred By: AAAREFERR   SELF           Confirmed By:                             Imaging Results          X-Ray Chest AP Portable (In process)                  Medical Decision Making:   History:   Old Medical Records: I decided to obtain old medical records.  Old Records Summarized: records from clinic visits.       <> Summary of Records: Seen implant clinic yesterday, was noted to be hypertensive  Differential Diagnosis:   End-stage renal disease on dialysis  Dietary noncompliance  Medication noncompliance  Electrolyte abnormality  CHF  ACS  Independently Interpreted Test(s):   I have ordered and independently interpreted X-rays - see prior notes.  Clinical Tests:   Lab Tests: Ordered and Reviewed  Radiological Study: Ordered and Reviewed  Medical Tests: Ordered and Reviewed  ED Management:  Patient is afebrile and in no acute distress at time history and physical.  EKG is comparable to prior.  Patient family member reports compliance with blood pressure medication regiment.  Patient family member deny dietary changes or increased salt intake recently.  Patient complains of right-sided back pain that is not radiating and not exertional.  Labs consistent with end-stage renal disease on dialysis.  Potassium is within normal limits. Troponin significantly  elevated at 0.151.  Past troponins have been within normal ranges.  Patient's family member reports that patient did not have any complaints of pain when he and his past heart attack.  Patient given aspirin emergency department he is to be admitted to Medicine for further management of NSTEMI.    I spent a total of 30 minutes caring for and overseeing the critical care of this patient. Critical care time was spent treating or preventing major adverse outcome resulting from NSTEMI.  Patient was specifically observed and treated with EKG, interpretation of EKG, antihypertensive, lab testing, interpretation lab testing, followed by discussion with hospitalist and admission to the hospital.      This chart was completed using dictation software, as a result there may be some typographical errors.             Scribe Attestation:   Scribe #1: I performed the above scribed service and the documentation accurately describes the services I performed. I attest to the accuracy of the note.    Attending Attestation:           Physician Attestation for Scribe:  Physician Attestation Statement for Scribe #1: I, Ugo Deshpande MD, reviewed documentation, as scribed by Maru Thakkar in my presence, and it is both accurate and complete.                    Clinical Impression:       ICD-10-CM ICD-9-CM   1. NSTEMI (non-ST elevated myocardial infarction) I21.4 410.70   2. HTN (hypertension) I10 401.9   3. Troponin I above reference range R74.8 790.6         Disposition:   Disposition: Admitted  Condition: Stable                        Ugo Deshpande MD  03/12/19 0941

## 2019-03-12 NOTE — PLAN OF CARE
Problem: Fall Injury Risk  Goal: Absence of Fall and Fall-Related Injury  Outcome: Ongoing (interventions implemented as appropriate)  Intervention: Identify and Manage Contributors to Fall Injury Risk   03/12/19 1805   Manage Acute Allergic Reaction   Medication Review/Management medications reviewed   Identify and Manage Contributors to Fall Injury Risk   Self-Care Promotion independence encouraged;BADL personal routines maintained     Intervention: Promote Injury-Free Environment   03/12/19 1805   Optimize [place] and Functional Mobility   Environmental Safety Modification assistive device/personal items within reach;clutter free environment maintained;lighting adjusted;room near unit station;room organization consistent   Optimize Balance and Safe Activity   Safety Promotion/Fall Prevention assistive device/personal item within reach;Fall Risk reviewed with patient/family;room near unit station;lighting adjusted;nonskid shoes/socks when out of bed;instructed to call staff for mobility

## 2019-03-12 NOTE — ASSESSMENT & PLAN NOTE
Resumed home meds with improvement in blood pressure.  Suspect an element of non-compliance given multiple elevated readings recently.  Continue to monitor during admission.  Encourage the patient and his wife to continue home monitoring and follow up in clinic.

## 2019-03-12 NOTE — ASSESSMENT & PLAN NOTE
Patient noted to be tachycardic at his HD center.  Monitor on telemetry.  Given his home dose of carvedilol in the ER and no further issues with tachycardia.  Consider Holter monitor or event monitor to evaluate for paroxysmal A fib with his cardiologist after discharge.

## 2019-03-12 NOTE — SUBJECTIVE & OBJECTIVE
Past Medical History:   Diagnosis Date    Allergy     Atherosclerosis of aorta     noted on VIKTORIA 2011    Blood transfusion     Cataracts, bilateral     CHF (congestive heart failure)     Chronic kidney disease     Clotting disorder     Coronary artery disease     Decreased appetite     Diabetic retinopathy     DM (diabetes mellitus), type 2 with renal complications     ESRD on hemodialysis     TUESDAY/ THURSDAY/SATURDAY    Hyperlipidemia     Hyperparathyroidism, unspecified     Hypertension     Myocardial infarction     Prostate cancer     Seizures     Stroke 2011    PT HAS SHAKES        Past Surgical History:   Procedure Laterality Date    CARDIAC SURGERY      CABG    CATARACT EXTRACTION      ou    CATARACT EXTRACTION, BILATERAL      cataracts      CORONARY ARTERY BYPASS GRAFT  2009    bypass    EYE SURGERY      FISTULOGRAM Left 8/21/2017    [facility]formed [facility] CATH LAB    FISTULOGRAM Left 8/3/2016    Performed by [facility], [facility] at [facility] CATH LAB    FISTULOGRAM Left 8/13/2014    [facility]    FISTULOGRAM Left 10/9/2013    [facility]    FISTULOGRAM, possible intervention - LUE Left 3/21/2016    Performed by [facility] III, [facility] at [facility] CATH LAB    Left Guera AVF  1/11/2012    PROSTATE BIOPSY      positive    retinal laser      ROTATOR CUFF REPAIR  right, 2005    TRANSPOSITION, VEIN Left 4/1/2013    Performed by DAVE [facility]os III, MD at [facility] OR [address] [place] [address]    VASCULAR SURGERY         Review of patient's allergies indicates:   Allergen Reactions    Reglan [metoclopramide hcl] Diarrhea    Lorazepam      Other reaction(s): heavy sedation       No current facility-administered medications on file prior to encounter.      Current Outpatient Medications on File Prior to Encounter   Medication Sig    amLODIPine (NORVASC) 10 MG tablet TAKE 1 TABLET(10 MG) BY MOUTH  "EVERY DAY    ascorbic acid, vitamin C, (VITAMIN C) 500 MG tablet Take 500 mg by mouth once daily.    atorvastatin (LIPITOR) 40 MG tablet Take 1 tablet (40 mg total) by mouth once daily.    carvedilol (COREG) 25 MG tablet TAKE 1 TABLET(25 MG) BY MOUTH TWICE DAILY WITH MEALS    cyclobenzaprine HCl (FLEXERIL ORAL) Take by mouth.    furosemide (LASIX) 40 MG tablet TAKE 1 TABLET(40 MG) BY MOUTH EVERY DAY    insulin (LANTUS SOLOSTAR U-100 INSULIN) glargine 100 units/mL (3mL) SubQ pen INJECT 20 UNITS UNDER THE SKIN NIGHTLY    losartan (COZAAR) 50 MG tablet TAKE 1 TABLET(50 MG) BY MOUTH EVERY DAY    megestrol (MEGACE) 20 MG Tab TAKE 1 TABLET(20 MG) BY MOUTH EVERY DAY    megestrol acetate (MEGACE ORAL) Take by mouth.    NOVOLOG FLEXPEN U-100 INSULIN 100 unit/mL InPn pen INJECT 5 TO 10 UNITS WITH MEALS AND AT BEDTIME DEPENDING ON BLOOD SUGAR    pantoprazole (PROTONIX) 40 MG tablet TAKE 1 TABLET BY MOUTH DAILY AS NEEDED FOR HEARTBURN. TAKE ONLY AS NEEDED    RENVELA 800 mg Tab Take 1 tablet (800 mg total) by mouth 3 (three) times daily with meals.    BD INSULIN PEN NEEDLE UF ORIG 29 gauge x 1/2" Ndle USE  1 EVERY EVENING    blood glucose control, normal Soln 1 drop by Misc.(Non-Drug; Combo Route) route as directed.    blood sugar diagnostic Strp To check BG 2 times daily, to use with accuchek casa meter    blood-glucose meter kit To check BG 2 times daily, to use with accuchek casa meter    gabapentin (NEURONTIN) 100 MG capsule Take 1 capsule (100 mg total) by mouth once daily.    glucagon, human recombinant, (GLUCAGON EMERGENCY KIT, HUMAN,) 1 mg SolR Inject 1 mg into the muscle as needed.    lancets Misc To check BG 2 times daily, to use with accuchek casa meter    NOVOFINE 30 30 x 1/3 " Ndle USE AS DIRECTED WITH LANTUS SOLARSTAR    promethazine (PHENERGAN) 25 MG tablet Take 1 tablet (25 mg total) by mouth every 6 (six) hours as needed for Nausea.     Family History     Problem Relation (Age of Onset) "    Cancer Mother, Paternal Grandmother, Paternal Uncle    Cataracts Mother    Diabetes Mother    Glaucoma Mother    Heart disease Father, Maternal Grandmother    Hypertension Brother    No Known Problems Sister, Maternal Grandfather, Paternal Grandfather, Sister, Sister    Prostate cancer Brother        Tobacco Use    Smoking status: Never Smoker    Smokeless tobacco: Never Used   Substance and Sexual Activity    Alcohol use: No    Drug use: No    Sexual activity: Not Currently     Review of Systems   Constitutional: Negative for chills and fever.   HENT: Negative for congestion and rhinorrhea.    Eyes: Negative for photophobia and visual disturbance.   Respiratory: Negative for cough and shortness of breath.    Cardiovascular: Negative for chest pain and leg swelling.   Gastrointestinal: Negative for abdominal pain, constipation, diarrhea and nausea.   Genitourinary: Negative for difficulty urinating and dysuria.   Musculoskeletal: Positive for back pain. Negative for gait problem and joint swelling.   Neurological: Negative for dizziness and light-headedness.   Psychiatric/Behavioral: Negative for confusion and dysphoric mood.     Objective:     Vital Signs (Most Recent):  Temp: 98.1 °F (36.7 °C) (03/12/19 1004)  Pulse: 73 (03/12/19 1003)  Resp: 12 (03/12/19 1003)  BP: (!) 150/83 (03/12/19 1111)  SpO2: 96 % (03/12/19 1003) Vital Signs (24h Range):  Temp:  [98 °F (36.7 °C)-98.1 °F (36.7 °C)] 98.1 °F (36.7 °C)  Pulse:  [] 73  Resp:  [12-19] 12  SpO2:  [96 %-100 %] 96 %  BP: (122-181)/() 150/83     Weight: 70.8 kg (156 lb)  Body mass index is 21.76 kg/m².    Physical Exam   Constitutional: He is oriented to person, place, and time. He appears well-developed and well-nourished. No distress.   HENT:   Right Ear: External ear normal.   Left Ear: External ear normal.   Nose: Nose normal.   Eyes: EOM are normal. Pupils are equal, round, and reactive to light. No scleral icterus.   Neck: Normal range of  motion. Neck supple. No thyromegaly present.   Cardiovascular: Normal rate and normal heart sounds. Exam reveals no friction rub.   No murmur heard.  Pulmonary/Chest: Effort normal and breath sounds normal. No respiratory distress. He has no wheezes. He has no rales.   Abdominal: Soft. Bowel sounds are normal. He exhibits no mass. There is no tenderness.   Musculoskeletal: Normal range of motion. He exhibits no edema or deformity.   LUE with AV fistula, palpable thrill   Neurological: He is alert and oriented to person, place, and time. No cranial nerve deficit.   Skin: Skin is warm and dry. No pallor.   Psychiatric: He has a normal mood and affect. His behavior is normal. Thought content normal.         CRANIAL NERVES     CN III, IV, VI   Pupils are equal, round, and reactive to light.  Extraocular motions are normal.        Significant Labs: All pertinent labs within the past 24 hours have been reviewed.    Significant Imaging: I have reviewed and interpreted all pertinent imaging results/findings within the past 24 hours.

## 2019-03-12 NOTE — NURSING
Patient arrived to unit via wheelchair. No complaints, no acute distress noted. Vitals and telemetry monitoring in progress.

## 2019-03-12 NOTE — NURSING
Patient transported to Dialysis via bed, No complaints, no acute distress noted. Will monitor upon return to unit.

## 2019-03-12 NOTE — HPI
This 59 y.o male who has a PMH of CAD S/P CABG,  CHF, ESRD-HD, Seizures, old MI, HTN, HLD, DM, PVDand CVA presents to the ED for an evaluation for an elevated heart rate and elevated blood pressure that was noticed at 0515 today.  Patient reports being dropped off at dialysis and reports being informed he could not receive dialysis treatment due to a blood pressure of 170/104.  Patient currently only reports of right upper back pain, which he reports being 3 months ago.  Patient denies chest pain, extremity numbness, extremity weakness, fever, chills, nausea, emesis, diarrhea, abdominal pain, headache, or any other associated symptoms.  Patient's spouse reports the patient having a blood pressure reading of 215/104 yesterday while at a doctor's appointment.  His spouse reports them arriving home yesterday to take his blood pressure medication and reports a repeat blood pressure of 140/90.  Patient has not received any hypertension medications this morning.  No prior tx.  No alleviating factors.     Patient states that he was in clinic yesterday and it was noted that his BP was high and he was tachycardic. He denied feeling any episodes of chest pain at rest or on exertion, orthopnea, PND or dyspnea. He denies any claudication. States that he walks on the treadmill every week for about a mile and denies any symptoms of chest pain or [place] while on the treadmill.

## 2019-03-12 NOTE — CONSULTS
[facility]  Cardiology  Consult Note    Patient Name: Kodi Ibanez Sr.  MRN: [id number]  Admission Date: 3/12/2019  Hospital Length of Stay: 0 days  Code Status: Full Code   Attending Provider: Lisy Prado MD   Consulting Provider: David Villegas MD  Primary Care Physician: Melonie Elias MD  Principal Problem:Hypertensive emergency    Patient information was obtained from patient and ER records.     Inpatient consult to Cardiology  Consult performed by: David Villegas MD  Consult ordered by: Ugo Deshpande MD        Subjective:     Chief Complaint:  High BP, elevated troponins     HPI:   This 59 y.o male who has a PMH of CAD S/P CABG,  CHF, ESRD-HD, Seizures, old MI, HTN, HLD, DM, PVDand CVA presents to the ED for an evaluation for an elevated heart rate and elevated blood pressure that was noticed at 0515 today.  Patient reports being dropped off at dialysis and reports being informed he could not receive dialysis treatment due to a blood pressure of 170/104.  Patient currently only reports of right upper back pain, which he reports being 3 months ago.  Patient denies chest pain, extremity numbness, extremity weakness, fever, chills, nausea, emesis, diarrhea, abdominal pain, headache, or any other associated symptoms.  Patient's spouse reports the patient having a blood pressure reading of 215/104 yesterday while at a doctor's appointment.  His spouse reports them arriving home yesterday to take his blood pressure medication and reports a repeat blood pressure of 140/90.  Patient has not received any hypertension medications this morning.  No prior tx.  No alleviating factors.     Patient states that he was in clinic yesterday and it was noted that his BP was high and he was tachycardic. He denied feeling any episodes of chest pain at rest or on exertion, orthopnea, PND or dyspnea. He denies any claudication. States that he walks on the treadmill every week for about a mile and denies  any symptoms of chest pain or [place] while on the treadmill.            Past Medical History:   Diagnosis Date    Allergy     Atherosclerosis of aorta     noted on VIKTORIA 2011    Blood transfusion     Cataracts, bilateral     CHF (congestive heart failure)     Chronic kidney disease     Clotting disorder     Coronary artery disease     Decreased appetite     Diabetic retinopathy     DM (diabetes mellitus), type 2 with renal complications     ESRD on hemodialysis     TUESDAY/ THURSDAY/SATURDAY    Hyperlipidemia     Hyperparathyroidism, unspecified     Hypertension     Myocardial infarction     Prostate cancer     Seizures     Stroke 2011    PT HAS SHAKES        Past Surgical History:   Procedure Laterality Date    CARDIAC SURGERY      CABG    CATARACT EXTRACTION      ou    CATARACT EXTRACTION, BILATERAL      cataracts      CORONARY ARTERY BYPASS GRAFT  2009    bypass    EYE SURGERY      FISTULOGRAM Left 8/21/2017    [facility]formed [facility], [facility] CATH LAB    FISTULOGRAM Left 8/3/2016    Performed by [facility], [facility] at [facility] CATH LAB    FISTULOGRAM Left 8/13/2014    Performed by [facility] OR [facility]    FISTULOGRAM Left 10/9/2013    [facility]    FISTULOGRAM, possible intervention - LUE Left 3/21/2016    Performed [facility], [facility] at [facility] CATH LAB    Left Guera AVF  1/11/2012    PROSTATE BIOPSY      positive    retinal laser      ROTATOR CUFF REPAIR  right, 2005    TRANSPOSITION, VEIN Left 4/1/2013    Performed by DAVE [facility]os III, MD at [facility] OR [address] [place] [address]    VASCULAR SURGERY         Review of patient's allergies indicates:   Allergen Reactions    Reglan [metoclopramide hcl] Diarrhea    Lorazepam      Other reaction(s): heavy sedation       No current facility-administered medications on file prior to encounter.      Current Outpatient Medications on File Prior to Encounter   Medication Sig  "   amLODIPine (NORVASC) 10 MG tablet TAKE 1 TABLET(10 MG) BY MOUTH EVERY DAY    ascorbic acid, vitamin C, (VITAMIN C) 500 MG tablet Take 500 mg by mouth once daily.    atorvastatin (LIPITOR) 40 MG tablet Take 1 tablet (40 mg total) by mouth once daily.    carvedilol (COREG) 25 MG tablet TAKE 1 TABLET(25 MG) BY MOUTH TWICE DAILY WITH MEALS    cyclobenzaprine HCl (FLEXERIL ORAL) Take by mouth.    furosemide (LASIX) 40 MG tablet TAKE 1 TABLET(40 MG) BY MOUTH EVERY DAY    insulin (LANTUS SOLOSTAR U-100 INSULIN) glargine 100 units/mL (3mL) SubQ pen INJECT 20 UNITS UNDER THE SKIN NIGHTLY    losartan (COZAAR) 50 MG tablet TAKE 1 TABLET(50 MG) BY MOUTH EVERY DAY    megestrol (MEGACE) 20 MG Tab TAKE 1 TABLET(20 MG) BY MOUTH EVERY DAY    megestrol acetate (MEGACE ORAL) Take by mouth.    NOVOLOG FLEXPEN U-100 INSULIN 100 unit/mL InPn pen INJECT 5 TO 10 UNITS WITH MEALS AND AT BEDTIME DEPENDING ON BLOOD SUGAR    pantoprazole (PROTONIX) 40 MG tablet TAKE 1 TABLET BY MOUTH DAILY AS NEEDED FOR HEARTBURN. TAKE ONLY AS NEEDED    RENVELA 800 mg Tab Take 1 tablet (800 mg total) by mouth 3 (three) times daily with meals.    BD INSULIN PEN NEEDLE UF ORIG 29 gauge x 1/2" Ndle USE  1 EVERY EVENING    blood glucose control, normal Soln 1 drop by Misc.(Non-Drug; Combo Route) route as directed.    blood sugar diagnostic Strp To check BG 2 times daily, to use with accuchek casa meter    blood-glucose meter kit To check BG 2 times daily, to use with accuchek casa meter    gabapentin (NEURONTIN) 100 MG capsule Take 1 capsule (100 mg total) by mouth once daily.    glucagon, human recombinant, (GLUCAGON EMERGENCY KIT, HUMAN,) 1 mg SolR Inject 1 mg into the muscle as needed.    lancets Misc To check BG 2 times daily, to use with accuchek casa meter    NOVOFINE 30 30 x 1/3 " Ndle USE AS DIRECTED WITH LANTUS SOLARSTAR    promethazine (PHENERGAN) 25 MG tablet Take 1 tablet (25 mg total) by mouth every 6 (six) hours as needed " for Nausea.     Family History     Problem Relation (Age of Onset)    Cancer Mother, Paternal Grandmother, Paternal Uncle    Cataracts Mother    Diabetes Mother    Glaucoma Mother    Heart disease Father, Maternal Grandmother    Hypertension Brother    No Known Problems Sister, Maternal Grandfather, Paternal Grandfather, Sister, Sister    Prostate cancer Brother        Tobacco Use    Smoking status: Never Smoker    Smokeless tobacco: Never Used   Substance and Sexual Activity    Alcohol use: No    Drug use: No    Sexual activity: Not Currently     Review of Systems   All other systems reviewed and are negative.    Objective:     Vital Signs (Most Recent):  Temp: 98 °F (36.7 °C) (03/12/19 1111)  Pulse: 75 (03/12/19 1111)  Resp: 16 (03/12/19 1111)  BP: (!) 150/83 (03/12/19 1111)  SpO2: 98 % (03/12/19 1111) Vital Signs (24h Range):  Temp:  [98 °F (36.7 °C)-98.1 °F (36.7 °C)] 98 °F (36.7 °C)  Pulse:  [] 75  Resp:  [12-19] 16  SpO2:  [96 %-100 %] 98 %  BP: (122-181)/() 150/83     Weight: 70.8 kg (156 lb)  Body mass index is 21.76 kg/m².    SpO2: 98 %  O2 Device (Oxygen Therapy): room air    No intake or output data in the 24 hours ending 03/12/19 1506    Lines/Drains/Airways     Drain                 Hemodialysis AV Fistula 04/01/17 0800 710 days          Peripheral Intravenous Line                 Peripheral IV - Single Lumen 03/12/19 0705 Right Forearm less than 1 day                Physical Exam   Constitutional: He is oriented to person, place, and time. He appears well-developed and well-nourished.   HENT:   Head: Normocephalic.   Eyes: Conjunctivae are normal. Pupils are equal, round, and reactive to light.   Neck: Normal range of motion. Neck supple.   Cardiovascular: Normal rate, regular rhythm and normal heart sounds. Exam reveals no decreased pulses.   AV fistula noted     Pulmonary/Chest: Effort normal and breath sounds normal.   Abdominal: Soft. Bowel sounds are normal.   Neurological: He is  alert and oriented to person, place, and time.   Skin: Skin is warm.   Vitals reviewed.      Significant Labs:   CMP   Recent Labs   Lab 19  0706      K 4.7   CL 99   CO2 30*   *   BUN 39*   CREATININE 9.8*   CALCIUM 9.5   PROT 7.7   ALBUMIN 3.5   BILITOT 0.6   ALKPHOS 227*   AST 47*   ALT 72*   ANIONGAP 10   ESTGFRAFRICA 6*   EGFRNONAA 5*   , CBC   Recent Labs   Lab 19  0706   WBC 4.44   HGB 11.4*   HCT 35.6*   *   , INR No results for input(s): INR, PROTIME in the last 48 hours., Lipid Panel   Recent Labs   Lab 19  1004   CHOL 158   HDL 37*   LDLCALC 97.4   TRIG 118   CHOLHDL 23.4   , Troponin   Recent Labs   Lab 19  0706 19  1004   TROPONINI 0.151* 0.146*    and All pertinent lab results from the last 24 hours have been reviewed.    Diagnostic Results:  ECG (personally reviewed tracings): NSR, LAE, Prior sander-septal infarct.     Chest X-Ray (personally reviewed image(s)): There are surgical changes with sternal wires and numerous mediastinal clips present.  The heart and mediastinal structures are unremarkable.  Pulmonary vasculature is within normal limits.  The lungs are free of focal consolidations.  There is no evidence for pneumothorax or large pleural effusions.  Bony structures appear intact.           Cardiac testinD ECHO done today (images personally reviewed)     · Low normal left ventricular systolic function. The estimated ejection fraction is 50%  · Concentric left ventricular hypertrophy.  · Grade II (moderate) left ventricular diastolic dysfunction consistent with pseudonormalization.  · Elevated left atrial pressure.  · Mild left atrial enlargement.  · Mild-to-moderate mitral regurgitation.     NUCLEAR PET IN 2013      NORMAL MYOCARDIAL PERFUSION PET STRESS TEST  1. The perfusion scan is free of evidence for myocardial ischemia or injury.   2. Resting wall motion is physiologic. Stress wall motion is physiologic.   3. LV function is  normal at rest and stress.  (normal is >= 51%)  4. The ventricular volumes are normal at rest and stress.   5. The extracardiac distribution of radioactivity is normal.   6. There was no previous study available to compare.        Assessment and Plan:     NSTEMI (non-ST elevated myocardial infarction)    Patient with mildly elevated troponins. Patient is on HD and has known CAD with CABG in the past. His PET scan was negative for ischemia in 2013. He denies any symptoms of chest pain or tightness. His troponin elevation is likely secondary to his elevated BP and tachycardia. However, considering his multiple risk factors and known history od CAD, it is reasonable to rule out large areas of ischemia with a pharmacological nuclear stress test in the am if his troponins do not elevate any further and the clinical status remains stable.   F/U troponins q 6 hour x 3  Continue aspirin, beta blockers and statins.      CAD (coronary artery disease)    S/P CABG. NST as noted above.      Hypertension    Please resume and continue his outpatient medicines for HTN.      Congestive heart failure    ECHO today demonstrates low normal LVEF.      Anemia of chronic disease    Secondary to ESRD     History of stroke           Hyperlipidemia LDL goal <100    Continue Lipitor.     Type 2 diabetes, uncontrolled, with neuropathy    Being managed by primary team     PVD (peripheral vascular disease)    PVD being managed by vascular surgery.         VTE Risk Mitigation (From admission, onward)        Ordered     IP VTE HIGH RISK PATIENT  Once      03/12/19 1120     Place sequential compression device  Until discontinued      03/12/19 1120          Thank you for your consult. I will follow-up with patient. Please contact us if you have any additional questions.    David Villegas MD  Cardiology   [facility]

## 2019-03-12 NOTE — SUBJECTIVE & OBJECTIVE
Past Medical History:   Diagnosis Date    Allergy     Atherosclerosis of aorta     noted on VIKTORIA 2011    Blood transfusion     Cataracts, bilateral     CHF (congestive heart failure)     Chronic kidney disease     Clotting disorder     Coronary artery disease     Decreased appetite     Diabetic retinopathy     DM (diabetes mellitus), type 2 with renal complications     ESRD on hemodialysis     TUESDAY/ THURSDAY/SATURDAY    Hyperlipidemia     Hyperparathyroidism, unspecified     Hypertension     Myocardial infarction     Prostate cancer     Seizures     Stroke 2011    PT HAS SHAKES        Past Surgical History:   Procedure Laterality Date    CARDIAC SURGERY      CABG    CATARACT EXTRACTION      ou    CATARACT EXTRACTION, BILATERAL      cataracts      CORONARY ARTERY BYPASS GRAFT  2009    bypass    EYE SURGERY      FISTULOGRAM Left 8/21/2017    [facility] CATH LAB    FISTULOGRAM Left 8/3/2016    Per[facility], [facility] at [facility] CATH LAB    FISTULOGRAM Left 8/13/2014    Performed by [facility] III, [facility] at [facility] OR [facility]    FISTULOGRAM Left 10/9/2013    [facility]    FISTULOGRAM, possible intervention - LUE Left 3/21/2016    Performed by [facility] III, [facility] at [facility] CATH LAB    Left Guera AVF  1/11/2012    PROSTATE BIOPSY      positive    retinal laser      ROTATOR CUFF REPAIR  right, 2005    TRANSPOSITION, VEIN Left 4/1/2013    Performed by [facility] III, MD at [facility] OR [address] [place] [address]    VASCULAR SURGERY         Review of patient's allergies indicates:   Allergen Reactions    Reglan [metoclopramide hcl] Diarrhea    Lorazepam      Other reaction(s): heavy sedation       No current facility-administered medications on file prior to encounter.      Current Outpatient Medications on File Prior to Encounter   Medication Sig    amLODIPine (NORVASC) 10 MG tablet TAKE 1 TABLET(10 MG) BY MOUTH  "EVERY DAY    ascorbic acid, vitamin C, (VITAMIN C) 500 MG tablet Take 500 mg by mouth once daily.    atorvastatin (LIPITOR) 40 MG tablet Take 1 tablet (40 mg total) by mouth once daily.    carvedilol (COREG) 25 MG tablet TAKE 1 TABLET(25 MG) BY MOUTH TWICE DAILY WITH MEALS    cyclobenzaprine HCl (FLEXERIL ORAL) Take by mouth.    furosemide (LASIX) 40 MG tablet TAKE 1 TABLET(40 MG) BY MOUTH EVERY DAY    insulin (LANTUS SOLOSTAR U-100 INSULIN) glargine 100 units/mL (3mL) SubQ pen INJECT 20 UNITS UNDER THE SKIN NIGHTLY    losartan (COZAAR) 50 MG tablet TAKE 1 TABLET(50 MG) BY MOUTH EVERY DAY    megestrol (MEGACE) 20 MG Tab TAKE 1 TABLET(20 MG) BY MOUTH EVERY DAY    megestrol acetate (MEGACE ORAL) Take by mouth.    NOVOLOG FLEXPEN U-100 INSULIN 100 unit/mL InPn pen INJECT 5 TO 10 UNITS WITH MEALS AND AT BEDTIME DEPENDING ON BLOOD SUGAR    pantoprazole (PROTONIX) 40 MG tablet TAKE 1 TABLET BY MOUTH DAILY AS NEEDED FOR HEARTBURN. TAKE ONLY AS NEEDED    RENVELA 800 mg Tab Take 1 tablet (800 mg total) by mouth 3 (three) times daily with meals.    BD INSULIN PEN NEEDLE UF ORIG 29 gauge x 1/2" Ndle USE  1 EVERY EVENING    blood glucose control, normal Soln 1 drop by Misc.(Non-Drug; Combo Route) route as directed.    blood sugar diagnostic Strp To check BG 2 times daily, to use with accuchek casa meter    blood-glucose meter kit To check BG 2 times daily, to use with accuchek casa meter    gabapentin (NEURONTIN) 100 MG capsule Take 1 capsule (100 mg total) by mouth once daily.    glucagon, human recombinant, (GLUCAGON EMERGENCY KIT, HUMAN,) 1 mg SolR Inject 1 mg into the muscle as needed.    lancets Misc To check BG 2 times daily, to use with accuchek casa meter    NOVOFINE 30 30 x 1/3 " Ndle USE AS DIRECTED WITH LANTUS SOLARSTAR    promethazine (PHENERGAN) 25 MG tablet Take 1 tablet (25 mg total) by mouth every 6 (six) hours as needed for Nausea.     Family History     Problem Relation (Age of Onset) "    Cancer Mother, Paternal Grandmother, Paternal Uncle    Cataracts Mother    Diabetes Mother    Glaucoma Mother    Heart disease Father, Maternal Grandmother    Hypertension Brother    No Known Problems Sister, Maternal Grandfather, Paternal Grandfather, Sister, Sister    Prostate cancer Brother        Tobacco Use    Smoking status: Never Smoker    Smokeless tobacco: Never Used   Substance and Sexual Activity    Alcohol use: No    Drug use: No    Sexual activity: Not Currently     Review of Systems   All other systems reviewed and are negative.    Objective:     Vital Signs (Most Recent):  Temp: 98 °F (36.7 °C) (03/12/19 1111)  Pulse: 75 (03/12/19 1111)  Resp: 16 (03/12/19 1111)  BP: (!) 150/83 (03/12/19 1111)  SpO2: 98 % (03/12/19 1111) Vital Signs (24h Range):  Temp:  [98 °F (36.7 °C)-98.1 °F (36.7 °C)] 98 °F (36.7 °C)  Pulse:  [] 75  Resp:  [12-19] 16  SpO2:  [96 %-100 %] 98 %  BP: (122-181)/() 150/83     Weight: 70.8 kg (156 lb)  Body mass index is 21.76 kg/m².    SpO2: 98 %  O2 Device (Oxygen Therapy): room air    No intake or output data in the 24 hours ending 03/12/19 1506    Lines/Drains/Airways     Drain                 Hemodialysis AV Fistula 04/01/17 0800 710 days          Peripheral Intravenous Line                 Peripheral IV - Single Lumen 03/12/19 0705 Right Forearm less than 1 day                Physical Exam   Constitutional: He is oriented to person, place, and time. He appears well-developed and well-nourished.   HENT:   Head: Normocephalic.   Eyes: Conjunctivae are normal. Pupils are equal, round, and reactive to light.   Neck: Normal range of motion. Neck supple.   Cardiovascular: Normal rate, regular rhythm and normal heart sounds. Exam reveals no decreased pulses.   AV fistula noted     Pulmonary/Chest: Effort normal and breath sounds normal.   Abdominal: Soft. Bowel sounds are normal.   Neurological: He is alert and oriented to person, place, and time.   Skin: Skin is  warm.   Vitals reviewed.      Significant Labs:   CMP   Recent Labs   Lab 19  0706      K 4.7   CL 99   CO2 30*   *   BUN 39*   CREATININE 9.8*   CALCIUM 9.5   PROT 7.7   ALBUMIN 3.5   BILITOT 0.6   ALKPHOS 227*   AST 47*   ALT 72*   ANIONGAP 10   ESTGFRAFRICA 6*   EGFRNONAA 5*   , CBC   Recent Labs   Lab 19  0706   WBC 4.44   HGB 11.4*   HCT 35.6*   *   , INR No results for input(s): INR, PROTIME in the last 48 hours., Lipid Panel   Recent Labs   Lab 19  1004   CHOL 158   HDL 37*   LDLCALC 97.4   TRIG 118   CHOLHDL 23.4   , Troponin   Recent Labs   Lab 19  0706 19  1004   TROPONINI 0.151* 0.146*    and All pertinent lab results from the last 24 hours have been reviewed.    Diagnostic Results:  ECG (personally reviewed tracings): NSR, LAE, Prior sander-septal infarct.     Chest X-Ray (personally reviewed image(s)): There are surgical changes with sternal wires and numerous mediastinal clips present.  The heart and mediastinal structures are unremarkable.  Pulmonary vasculature is within normal limits.  The lungs are free of focal consolidations.  There is no evidence for pneumothorax or large pleural effusions.  Bony structures appear intact.           Cardiac testinD ECHO done today (images personally reviewed)     · Low normal left ventricular systolic function. The estimated ejection fraction is 50%  · Concentric left ventricular hypertrophy.  · Grade II (moderate) left ventricular diastolic dysfunction consistent with pseudonormalization.  · Elevated left atrial pressure.  · Mild left atrial enlargement.  · Mild-to-moderate mitral regurgitation.     NUCLEAR PET IN 2013      NORMAL MYOCARDIAL PERFUSION PET STRESS TEST  1. The perfusion scan is free of evidence for myocardial ischemia or injury.   2. Resting wall motion is physiologic. Stress wall motion is physiologic.   3. LV function is normal at rest and stress.  (normal is >= 51%)  4. The  ventricular volumes are normal at rest and stress.   5. The extracardiac distribution of radioactivity is normal.   6. There was no previous study available to compare.

## 2019-03-12 NOTE — CONSULTS
Date of Admit: 3/12/2019  Length of Stay: 0   Days  Requesting consult: MD Jeramy  Date of Consult: 3/12/2019    Reason for Consultation     Due for dialysis, admitted for elevated troponin    Subjective:      History of Present Illness:  Kodi Ibanez Sr. is a 59 y.o. year old male with a past medical history significant for esrd presented to his hd unit this am with tachycardia. Pt is seen by  at [facility].  Pt notes no prior history of heart arrhythmias.  Pt noted with elevated troponin      Past Medical History:  Past Medical History:   Diagnosis Date    Allergy     Atherosclerosis of aorta     noted on VIKTORIA 2011    Blood transfusion     Cataracts, bilateral     CHF (congestive heart failure)     Chronic kidney disease     Clotting disorder     Coronary artery disease     Decreased appetite     Diabetic retinopathy     DM (diabetes mellitus), type 2 with renal complications     ESRD on hemodialysis     TUESDAY/ THURSDAY/SATURDAY    Hyperlipidemia     Hyperparathyroidism, unspecified     Hypertension     Myocardial infarction     Prostate cancer     Seizures     Stroke 2011    PT HAS SHAKES        Past Surgical History:  Past Surgical History:   Procedure Laterality Date    CARDIAC SURGERY      CABG    CATARACT EXTRACTION      ou    CATARACT EXTRACTION, BILATERAL      cataracts      CORONARY ARTERY BYPASS GRAFT  2009    bypass    EYE SURGERY      FISTULOGRAM Left 8/21/2017    Performed by [facility], [facility] at [facility] CATH LAB    FISTULOGRAM Left 8/3/2016    Performed by [facility], [facility] at [facility] CATH LAB    FISTULOGRAM Left 8/13/2014    Performed by [facility] III, MD at [facility] OR [facility]    FISTULOGRAM Left 10/9/2013    Performed by [facility], [facility] at [facility] OR [facility]    FISTULOGRAM, possible intervention - LUE Left 3/21/2016    Performed by [facility]os III, MD at [facility] CATH LAB    Left Guera AVF  1/11/2012    PROSTATE BIOPSY      positive  "   retinal laser      ROTATOR CUFF REPAIR  right, 2005    TRANSPOSITION, VEIN Left 4/1/2013    Per[facility] by [facility] at [facility] OR [address] [place] [address]    VASCULAR SURGERY         Allergies:  Review of patient's allergies indicates:   Allergen Reactions    Reglan [metoclopramide hcl] Diarrhea    Lorazepam      Other reaction(s): heavy sedation       Home Medications:    No current facility-administered medications on file prior to encounter.      Current Outpatient Medications on File Prior to Encounter   Medication Sig Dispense Refill    amLODIPine (NORVASC) 10 MG tablet TAKE 1 TABLET(10 MG) BY MOUTH EVERY DAY 90 tablet 0    ascorbic acid, vitamin C, (VITAMIN C) 500 MG tablet Take 500 mg by mouth once daily.      atorvastatin (LIPITOR) 40 MG tablet Take 1 tablet (40 mg total) by mouth once daily. 90 tablet 1    carvedilol (COREG) 25 MG tablet TAKE 1 TABLET(25 MG) BY MOUTH TWICE DAILY WITH MEALS 180 tablet 0    cyclobenzaprine HCl (FLEXERIL ORAL) Take by mouth.      furosemide (LASIX) 40 MG tablet TAKE 1 TABLET(40 MG) BY MOUTH EVERY DAY 90 tablet 0    insulin (LANTUS SOLOSTAR U-100 INSULIN) glargine 100 units/mL (3mL) SubQ pen INJECT 20 UNITS UNDER THE SKIN NIGHTLY 15 mL 0    losartan (COZAAR) 50 MG tablet TAKE 1 TABLET(50 MG) BY MOUTH EVERY DAY 90 tablet 0    megestrol (MEGACE) 20 MG Tab TAKE 1 TABLET(20 MG) BY MOUTH EVERY DAY 30 tablet 0    megestrol acetate (MEGACE ORAL) Take by mouth.      NOVOLOG FLEXPEN U-100 INSULIN 100 unit/mL InPn pen INJECT 5 TO 10 UNITS WITH MEALS AND AT BEDTIME DEPENDING ON BLOOD SUGAR 15 mL 0    pantoprazole (PROTONIX) 40 MG tablet TAKE 1 TABLET BY MOUTH DAILY AS NEEDED FOR HEARTBURN. TAKE ONLY AS NEEDED 90 tablet 0    RENVELA 800 mg Tab Take 1 tablet (800 mg total) by mouth 3 (three) times daily with meals. 200 tablet 11    BD INSULIN PEN NEEDLE UF ORIG 29 gauge x 1/2" Ndle USE  1 EVERY EVENING 90 each 3    blood glucose control, normal Soln 1 drop by " "Misc.(Non-Drug; Combo Route) route as directed. 1 each 1    blood sugar diagnostic Strp To check BG 2 times daily, to use with accuchek casa meter 200 each 3    blood-glucose meter kit To check BG 2 times daily, to use with accuchek casa meter 1 each 0    gabapentin (NEURONTIN) 100 MG capsule Take 1 capsule (100 mg total) by mouth once daily. 90 capsule 0    glucagon, human recombinant, (GLUCAGON EMERGENCY KIT, HUMAN,) 1 mg SolR Inject 1 mg into the muscle as needed. 2 each 0    lancets Misc To check BG 2 times daily, to use with accuchek casa meter 200 each 3    NOVOFINE 30 30 x 1/3 " Ndle USE AS DIRECTED WITH LANTUS SOLARSTAR 100 each 3    promethazine (PHENERGAN) 25 MG tablet Take 1 tablet (25 mg total) by mouth every 6 (six) hours as needed for Nausea. 30 tablet 0       Family History:  Family History   Problem Relation Age of Onset    Diabetes Mother     Glaucoma Mother     Cancer Mother     Cataracts Mother     Heart disease Father     Cancer Paternal Grandmother     Heart disease Maternal Grandmother     No Known Problems Sister     Prostate cancer Brother     No Known Problems Maternal Grandfather     No Known Problems Paternal Grandfather     No Known Problems Sister     No Known Problems Sister     Hypertension Brother     Cancer Paternal Uncle     Amblyopia Neg Hx     Blindness Neg Hx     Macular degeneration Neg Hx     Retinal detachment Neg Hx     Strabismus Neg Hx     Stroke Neg Hx     Thyroid disease Neg Hx     Anesthesia problems Neg Hx     Clotting disorder Neg Hx      problems Neg Hx     Kidney cancer Neg Hx     Kidney disease Neg Hx     Malignant hyperthermia Neg Hx     Sickle cell trait Neg Hx     Lupus Neg Hx     Urolithiasis Neg Hx     Sudden death Neg Hx        Social History:  Social History     Tobacco Use    Smoking status: Never Smoker    Smokeless tobacco: Never Used   Substance Use Topics    Alcohol use: No    Drug use: No       Review of " "Systems:    Constitutional: Denies fevers, chills, night sweats, anorexia, unexplained weight loss, malaise, fatigue  HEENT: n/a  Respiratory:  Denies shortness of breath, cough  Cardiovascular:  Denies chest pain, palpitations, syncope  Gastrointestinal:  Denies dysphagia, nausea, vomiting, diarrhea, constipation, abdominal pain, melena, BRBPR, hematemesis, constipation, ascites  Genitourinary:  Denies dysuria, discharge, incontinence, hematuria, polyuria  Musculoskeletal:  Denies pain, stiffness, decreased ROM  Neurological: Denies any changes in sensation, weakness, seizures, headache,  parasthesias, confusion   Skin: Denies rash, jaundice, pruritis, wounds, lesions       Objective:     Scheduled Meds:   sodium chloride 0.9%   Intravenous Once    aspirin  325 mg Oral Daily    atorvastatin  80 mg Oral Daily     Continuous Infusions:  PRN Meds:.sodium chloride 0.9%, acetaminophen, dextrose 50%, dextrose 50%, glucagon (human recombinant), glucose, glucose, HYDROcodone-acetaminophen, insulin aspart U-100, ondansetron, sodium chloride 0.9%      Physical Examination:    Vitals: BP (!) 150/83   Pulse 73   Temp 98.1 °F (36.7 °C) (Oral)   Resp 12   Ht 5' 11" (1.803 m)   Wt 70.8 kg (156 lb)   SpO2 96%   BMI 21.76 kg/m²    No intake/output data recorded.  No intake/output data recorded.      General: No acute distress   Head: Normocephalic, atraumatic, mmm  Eyes: No jaundice  Neck: Supple  Cardiovascular: s1s2 regular  Chest: CTAB  Abdomen: Soft, nondistended, nontender  Extremities: No edema of legs, avf of arm  Neurologic: AOx3    Laboratory:  Recent Results (from the past 24 hour(s))   CBC auto differential    Collection Time: 03/12/19  7:06 AM   Result Value Ref Range    WBC 4.44 3.90 - 12.70 K/uL    RBC 3.84 (L) 4.60 - 6.20 M/uL    Hemoglobin 11.4 (L) 14.0 - 18.0 g/dL    Hematocrit 35.6 (L) 40.0 - 54.0 %    MCV 93 82 - 98 fL    MCH 29.7 27.0 - 31.0 pg    MCHC 32.0 32.0 - 36.0 g/dL    RDW 14.7 (H) 11.5 - 14.5 " %    Platelets 135 (L) 150 - 350 K/uL    MPV 11.2 9.2 - 12.9 fL    Gran # (ANC) 2.8 1.8 - 7.7 K/uL    Lymph # 1.0 1.0 - 4.8 K/uL    Mono # 0.5 0.3 - 1.0 K/uL    Eos # 0.1 0.0 - 0.5 K/uL    Baso # 0.02 0.00 - 0.20 K/uL    Gran% 63.0 38.0 - 73.0 %    Lymph% 22.5 18.0 - 48.0 %    Mono% 10.8 4.0 - 15.0 %    Eosinophil% 3.2 0.0 - 8.0 %    Basophil% 0.5 0.0 - 1.9 %    Differential Method Automated    Comprehensive metabolic panel    Collection Time: 03/12/19  7:06 AM   Result Value Ref Range    Sodium 139 136 - 145 mmol/L    Potassium 4.7 3.5 - 5.1 mmol/L    Chloride 99 95 - 110 mmol/L    CO2 30 (H) 23 - 29 mmol/L    Glucose 246 (H) 70 - 110 mg/dL    BUN, Bld 39 (H) 8 - 23 mg/dL    Creatinine 9.8 (H) 0.5 - 1.4 mg/dL    Calcium 9.5 8.7 - 10.5 mg/dL    Total Protein 7.7 6.0 - 8.4 g/dL    Albumin 3.5 3.5 - 5.2 g/dL    Total Bilirubin 0.6 0.1 - 1.0 mg/dL    Alkaline Phosphatase 227 (H) 55 - 135 U/L    AST 47 (H) 10 - 40 U/L    ALT 72 (H) 10 - 44 U/L    Anion Gap 10 8 - 16 mmol/L    eGFR if African American 6 (A) >60 mL/min/1.73 m^2    eGFR if non African American 5 (A) >60 mL/min/1.73 m^2   Troponin I #1    Collection Time: 03/12/19  7:06 AM   Result Value Ref Range    Troponin I 0.151 (H) 0.000 - 0.026 ng/mL   B-Type natriuretic peptide (BNP)    Collection Time: 03/12/19  7:06 AM   Result Value Ref Range     (H) 0 - 99 pg/mL   Troponin I #2    Collection Time: 03/12/19 10:04 AM   Result Value Ref Range    Troponin I 0.146 (H) 0.000 - 0.026 ng/mL   Lipid panel - if not done in ED    Collection Time: 03/12/19 10:04 AM   Result Value Ref Range    Cholesterol 158 120 - 199 mg/dL    Triglycerides 118 30 - 150 mg/dL    HDL 37 (L) 40 - 75 mg/dL    LDL Cholesterol 97.4 63.0 - 159.0 mg/dL    HDL/Chol Ratio 23.4 20.0 - 50.0 %    Total Cholesterol/HDL Ratio 4.3 2.0 - 5.0    Non-HDL Cholesterol 121 mg/dL   Transthoracic echo (TTE) complete (Cupid Only)    Collection Time: 03/12/19 11:40 AM   Result Value Ref Range    BSA 1.88 m2     TDI SEPTAL 0.05     LV LATERAL E/E' RATIO 11.44     LV SEPTAL E/E' RATIO 20.60     LA WIDTH 4.31 cm    AORTIC VALVE CUSP SEPERATION 2.34 cm    TDI LATERAL 0.09     PV PEAK VELOCITY 0.56 cm/s    LVIDD 4.98 3.5 - 6.0 cm    IVS 1.16 (A) 0.6 - 1.1 cm    PW 1.17 (A) 0.6 - 1.1 cm    Ao root annulus 3.64 cm    LVIDS 3.92 2.1 - 4.0 cm    FS 21 28 - 44 %    LA volume 78.50 cm3    Sinus 3.78 cm    STJ 2.78 cm    Ascending aorta 2.95 cm    LV mass 222.84 g    LA size 4.07 cm    RVDD 3.71 cm    TAPSE 0.98 cm    RV S' 6.94 m/s    Left Ventricle Relative Wall Thickness 0.47 cm    AV mean gradient 2.81 mmHg    AV valve area 2.42 cm2    AV Velocity Ratio 0.75     AV index (prosthetic) 0.75     E/A ratio 1.18     Mean e' 0.07     E wave decelartion time 199.22 msec    IVRT 0.11 msec    Pulm vein S/D ratio 1.10     LVOT diameter 2.03 cm    LVOT area 3.23 cm2    LVOT peak tee [id number] m/s    LVOT peak VTI 16.81 cm    Ao peak tee 1.05 m/s    Ao VTI 22.45 cm    LVOT stroke volume 54.38 cm3    AV peak gradient 4.41 mmHg    E/E' ratio 14.71     MV Peak E Tee 1.03 m/s    TR Max Tee 2.37 m/s    MV Peak A Tee 0.87 m/s    PV Peak S Tee 0.55 m/s    PV Peak D Tee 0.50 m/s    LV Systolic Volume 66.55 mL    LV Systolic Volume Index 35.1 mL/m2    LV Diastolic Volume 117.16 mL    LV Diastolic Volume Index 61.74 mL/m2    LA Volume Index 41.4 mL/m2    LV Mass Index 117.4 g/m2    RA Major Axis 5.01 cm    Left Atrium Minor Axis 5.23 cm    Left Atrium Major Axis 5.30 cm    Triscuspid Valve Regurgitation Peak Gradient 22.47 mmHg    RA Width 3.94 cm    Right Atrial Pressure (from IVC) 3 mmHg    TV rest pulmonary artery pressure 25 mmHg   POCT glucose    Collection Time: 03/12/19 11:42 AM   Result Value Ref Range    POCT Glucose 209 (H) 70 - 110 mg/dL           Diagnostic Tests:     cxr-no pna         Assessment/Plan:     Kodi Ibanez  is a 59 y.o. male admitted with:  1.ESRD. Plan resume hd today.  2.elevated troponin.  Negative climb. Plan hd  today.  3.tachycardia. Better. No afib. Following on tele. Ck tsh.  4.HTN. uf to edw.  5.2nd hyperparathyroidism. Ck phos.  Lisa Bowers

## 2019-03-12 NOTE — NURSING
Patient arrived back to unit via wheelchair, No complaints, no acute distress noted. MD Bowers at bedside. Notified Dialysis call center.     Spoke with [facility] states she will be here within the hour.

## 2019-03-12 NOTE — SUBJECTIVE & OBJECTIVE
CARDIOVASCULAR CONSULTATION      Consult Requested By: Lisy Prado MD  PCP: Melonie Elias MD  Primary Cardiologist: Dr Britt     Reason for Consult: Elevated Troponins    Admitting Diagnosis:  HTN (hypertension) [I10]  NSTEMI (non-ST elevated myocardial infarction) [I21.4]  Troponin I above reference range [R74.8]      SUBJECTIVE:     History of Present Illness:   This 59 y.o male who has a PMH of CAD S/P CABG,  CHF, ESRD-HD, Seizures, old MI, HTN, HLD, DM, PVDand CVA presents to the ED for an evaluation for an elevated heart rate and elevated blood pressure that was noticed at 0515 today.  Patient reports being dropped off at dialysis and reports being informed he could not receive dialysis treatment due to a blood pressure of 170/104.  Patient currently only reports of right upper back pain, which he reports being 3 months ago.  Patient denies chest pain, extremity numbness, extremity weakness, fever, chills, nausea, emesis, diarrhea, abdominal pain, headache, or any other associated symptoms.  Patient's spouse reports the patient having a blood pressure reading of 215/104 yesterday while at a doctor's appointment.  His spouse reports them arriving home yesterday to take his blood pressure medication and reports a repeat blood pressure of 140/90.  Patient has not received any hypertension medications this morning.  No prior tx.  No alleviating factors.    Patient states that he was in clinic yesterday and it was noted that his BP was high and he was tachycardic. He denied feeling any episodes of chest pain at rest or on exertion, orthopnea, PND or dyspnea. He denies any claudication. States that he walks on the treadmill every week for about a mile and denies any symptoms of chest pain or R[place] while on the treadmill.         Past Medical History:   Diagnosis Date    Allergy     Atherosclerosis of aorta     noted on VIKTORIA 2011    Blood transfusion     Cataracts, bilateral     CHF (congestive heart  failure)     Chronic kidney disease     Clotting disorder     Coronary artery disease     Decreased appetite     Diabetic retinopathy     DM (diabetes mellitus), type 2 with renal complications     ESRD on hemodialysis     TUESDAY/ THURSDAY/SATURDAY    Hyperlipidemia     Hyperparathyroidism, unspecified     Hypertension     Myocardial infarction     Prostate cancer     Seizures     Stroke 2011    PT HAS SHAKES      Past Surgical History:   Procedure Laterality Date    CARDIAC SURGERY      CABG    CATARACT EXTRACTION      ou    CATARACT EXTRACTION, BILATERAL      cataracts      CORONARY ARTERY BYPASS GRAFT  2009    bypass    EYE SURGERY      FISTULOGRAM Left 8/21/2017    Performed by [facility] III, MD at [facility] CATH LAB    FISTULOGRAM Left 8/3/2016    Performed by [facility] III, MD at [facility] CATH LAB    FISTULOGRAM Left 8/13/2014    Performed by [facility]iegos III, [facility] at [facility] OR [facility]    FISTULOGRAM Left 10/9/2013    [facility]    FISTULOGRAM, possible intervention - LUE Left 3/21/2016    Performed by [facility] III, MD at [facility] CATH LAB    Left Guera AVF  1/11/2012    PROSTATE BIOPSY      positive    retinal laser      ROTATOR CUFF REPAIR  right, 2005    TRANSPOSITION, VEIN Left 4/1/2013    Performed by DAVE [facility]os III, MD at [facility] OR [address] [place] [address]    VASCULAR SURGERY         Medications Prior to Admission   Medication Sig Dispense Refill Last Dose    amLODIPine (NORVASC) 10 MG tablet TAKE 1 TABLET(10 MG) BY MOUTH EVERY DAY 90 tablet 0 3/11/2019    ascorbic acid, vitamin C, (VITAMIN C) 500 MG tablet Take 500 mg by mouth once daily.   3/11/2019    atorvastatin (LIPITOR) 40 MG tablet Take 1 tablet (40 mg total) by mouth once daily. 90 tablet 1 3/11/2019    carvedilol (COREG) 25 MG tablet TAKE 1 TABLET(25 MG) BY MOUTH TWICE DAILY WITH MEALS 180 tablet 0 3/11/2019    cyclobenzaprine HCl (FLEXERIL ORAL) Take by mouth.    "3/11/2019    furosemide (LASIX) 40 MG tablet TAKE 1 TABLET(40 MG) BY MOUTH EVERY DAY 90 tablet 0 3/11/2019    insulin (LANTUS SOLOSTAR U-100 INSULIN) glargine 100 units/mL (3mL) SubQ pen INJECT 20 UNITS UNDER THE SKIN NIGHTLY 15 mL 0 3/11/2019    losartan (COZAAR) 50 MG tablet TAKE 1 TABLET(50 MG) BY MOUTH EVERY DAY 90 tablet 0 3/11/2019    megestrol (MEGACE) 20 MG Tab TAKE 1 TABLET(20 MG) BY MOUTH EVERY DAY 30 tablet 0 Past Month    megestrol acetate (MEGACE ORAL) Take by mouth.   Past Month    NOVOLOG FLEXPEN U-100 INSULIN 100 unit/mL InPn pen INJECT 5 TO 10 UNITS WITH MEALS AND AT BEDTIME DEPENDING ON BLOOD SUGAR 15 mL 0 3/11/2019    pantoprazole (PROTONIX) 40 MG tablet TAKE 1 TABLET BY MOUTH DAILY AS NEEDED FOR HEARTBURN. TAKE ONLY AS NEEDED 90 tablet 0 3/11/2019    RENVELA 800 mg Tab Take 1 tablet (800 mg total) by mouth 3 (three) times daily with meals. 200 tablet 11 Past Month    BD INSULIN PEN NEEDLE UF ORIG 29 gauge x 1/2" Ndle USE  1 EVERY EVENING 90 each 3 Unknown    blood glucose control, normal Soln 1 drop by Misc.(Non-Drug; Combo Route) route as directed. 1 each 1 Unknown    blood sugar diagnostic Strp To check BG 2 times daily, to use with accuchek casa meter 200 each 3 Unknown    blood-glucose meter kit To check BG 2 times daily, to use with accuchek casa meter 1 each 0 Unknown    gabapentin (NEURONTIN) 100 MG capsule Take 1 capsule (100 mg total) by mouth once daily. 90 capsule 0 More than a month    glucagon, human recombinant, (GLUCAGON EMERGENCY KIT, HUMAN,) 1 mg SolR Inject 1 mg into the muscle as needed. 2 each 0 Unknown    lancets Misc To check BG 2 times daily, to use with accuchek casa meter 200 each 3 Unknown    NOVOFINE 30 30 x 1/3 " Ndle USE AS DIRECTED WITH LANTUS SOLARSTAR 100 each 3 Unknown    promethazine (PHENERGAN) 25 MG tablet Take 1 tablet (25 mg total) by mouth every 6 (six) hours as needed for Nausea. 30 tablet 0 Unknown       Current Medications:   sodium " chloride 0.9%   Intravenous Once    aspirin  325 mg Oral Daily    atorvastatin  80 mg Oral Daily       sodium chloride 0.9%, acetaminophen, dextrose 50%, dextrose 50%, glucagon (human recombinant), glucose, glucose, HYDROcodone-acetaminophen, insulin aspart U-100, ondansetron, sodium chloride 0.9%    Family History   Problem Relation Age of Onset    Diabetes Mother     Glaucoma Mother     Cancer Mother     Cataracts Mother     Heart disease Father     Cancer Paternal Grandmother     Heart disease Maternal Grandmother     No Known Problems Sister     Prostate cancer Brother     No Known Problems Maternal Grandfather     No Known Problems Paternal Grandfather     No Known Problems Sister     No Known Problems Sister     Hypertension Brother     Cancer Paternal Uncle     Amblyopia Neg Hx     Blindness Neg Hx     Macular degeneration Neg Hx     Retinal detachment Neg Hx     Strabismus Neg Hx     Stroke Neg Hx     Thyroid disease Neg Hx     Anesthesia problems Neg Hx     Clotting disorder Neg Hx      problems Neg Hx     Kidney cancer Neg Hx     Kidney disease Neg Hx     Malignant hyperthermia Neg Hx     Sickle cell trait Neg Hx     Lupus Neg Hx     Urolithiasis Neg Hx     Sudden death Neg Hx        Social History     Tobacco Use    Smoking status: Never Smoker    Smokeless tobacco: Never Used   Substance Use Topics    Alcohol use: No    Drug use: No       Review of patient's allergies indicates:   Allergen Reactions    Reglan [metoclopramide hcl] Diarrhea    Lorazepam      Other reaction(s): heavy sedation        Review of Systems   All other systems reviewed and are negative.        OBJECTIVE:     Vital Signs Range (Last 24H):  Temp:  [98 °F (36.7 °C)-98.1 °F (36.7 °C)]   Pulse:  []   Resp:  [12-19]   BP: (122-215)/()   SpO2:  [96 %-100 %]     Body mass index is 21.76 kg/m².    Physical Exam   Constitutional: He is oriented to person, place, and time and  well-developed, well-nourished, and in no distress. Vital signs are normal.   HENT:   Head: Normocephalic.   Eyes: Conjunctivae and EOM are normal. Pupils are equal, round, and reactive to light.   Neck: Normal range of motion. Neck supple.   Cardiovascular: Normal rate, regular rhythm and normal heart sounds.   Pedal pulses are diminished   Pulmonary/Chest: Effort normal and breath sounds normal.   Abdominal: Soft. Bowel sounds are normal.   Musculoskeletal: Normal range of motion.   Neurological: He is alert and oriented to person, place, and time.   Skin: Skin is warm.   Psychiatric: Mood, memory, affect and judgment normal.   Nursing note and vitals reviewed.      Laboratory:  CBC:  Recent Labs   Lab 06/25/18  0757 12/15/18  0935 03/12/19  0706   WHITE BLOOD CELL COUNT 6.84 7.06 4.44   HEMOGLOBIN 11.5 L 10.8 L 11.4 L   HEMATOCRIT 36.7 L 33.6 L 35.6 L   PLATELETS 161 168 135 L       CHEMISTRIES:  Recent Labs   Lab 06/25/18  0757 12/15/18  0935 03/12/19  0706   GLUCOSE 188 H 173 H 246 H   SODIUM 142 138 139   POTASSIUM 4.9 3.3 L 4.7   BUN BLD 36 H 12 39 H   CREATININE 8.5 H 3.8 H 9.8 H   EGFR IF  7.1 A 18.7 A 6 A   EGFR IF NON- 6.1 A 16.2 A 5 A   CALCIUM 9.5 8.9 9.5       CARDIAC BIOMARKERS:  Recent Labs   Lab 03/12/19  0706 03/12/19  1004   TROPONIN I 0.151 H 0.146 H       COAGS:        LIPIDS/LFTS:  Recent Labs   Lab 06/25/18 0757 12/15/18  0935 03/12/19  0706 03/12/19  1004   CHOLESTEROL 239 H 263 H  --  158   TRIGLYCERIDES 179 H 210 H  --  118   HDL 38 L 40  --  37 L   LDL CHOLESTEROL 165.2 H 181.0 H  --  97.4   NON-HDL CHOLESTEROL 201 223  --  121   AST 21 52 H 47 H  --    ALT 38 83 H 72 H  --            Diagnostic Results:  ECG (personally reviewed tracings): NSR, LAE, Prior sander-septal infarct.    Chest X-Ray (personally reviewed image(s)): There are surgical changes with sternal wires and numerous mediastinal clips present.  The heart and mediastinal structures are  unremarkable.  Pulmonary vasculature is within normal limits.  The lungs are free of focal consolidations.  There is no evidence for pneumothorax or large pleural effusions.  Bony structures appear intact.        Cardiac testinD ECHO done today (images personally reviewed)    · Low normal left ventricular systolic function. The estimated ejection fraction is 50%  · Concentric left ventricular hypertrophy.  · Grade II (moderate) left ventricular diastolic dysfunction consistent with pseudonormalization.  · Elevated left atrial pressure.  · Mild left atrial enlargement.  · Mild-to-moderate mitral regurgitation.    NUCLEAR PET IN      NORMAL MYOCARDIAL PERFUSION PET STRESS TEST  1. The perfusion scan is free of evidence for myocardial ischemia or injury.   2. Resting wall motion is physiologic. Stress wall motion is physiologic.   3. LV function is normal at rest and stress.  (normal is >= 51%)  4. The ventricular volumes are normal at rest and stress.   5. The extracardiac distribution of radioactivity is normal.   6. There was no previous study available to compare.

## 2019-03-12 NOTE — H&P
[facility] Hospital Medicine  History & Physical    Patient Name: Kodi Ibanez Sr.  MRN: [id number]  Admission Date: 3/12/2019  Attending Physician: Lisy Prado MD  Primary Care Provider: Melonie Elias MD         Patient information was obtained from patient, past medical records and ER records.     Subjective:     Principal Problem:Hypertensive emergency    Chief Complaint:   Chief Complaint   Patient presents with    Tachycardia     Sent from dialysis clinic for HTN and tachycardia. Pt denies chest pain, sob, palpitations        HPI: Mr. Ibanez is a 60 yo man with ESRD on HD, CAD s/p CABG 4/2009 at [facility], HTN, IDDM (A1c 10.5%), HLD, anemia of chronic disease, h/o CVA, and vascular dementia who presented to the ER after his dialysis unit found him to have a blood pressure of 220/110 and heart rate in the 160s.  The patient had not taken his anti-hypertensives yet that morning because he was going to HD.  This level of hypertension and tachycardia was reported to be abnormal for him however so he was sent to the ER.  The patient denies symptoms of chest pain, shortness of breath, nausea, diaphoresis, headache, or palpitations.  His only complaint is chronic right scapular pain.  He was found to have an elevated troponin to 0.151.  EKG without any ST elevations.  He was admitted for ACS evaluation.  Cardiology consulted as well as nephrology for regularly scheduled HD.    Past Medical History:   Diagnosis Date    Allergy     Atherosclerosis of aorta     noted on VIKTORIA 2011    Blood transfusion     Cataracts, bilateral     CHF (congestive heart failure)     Chronic kidney disease     Clotting disorder     Coronary artery disease     Decreased appetite     Diabetic retinopathy     DM (diabetes mellitus), type 2 with renal complications     ESRD on hemodialysis     TUESDAY/ THURSDAY/SATURDAY    Hyperlipidemia     Hyperparathyroidism, unspecified     Hypertension      Myocardial infarction     Prostate cancer     Seizures     Stroke 2011    PT HAS SHAKES        Past Surgical History:   Procedure Laterality Date    CARDIAC SURGERY      CABG    CATARACT EXTRACTION      ou    CATARACT EXTRACTION, BILATERAL      cataracts      CORONARY ARTERY BYPASS GRAFT  2009    bypass    EYE SURGERY      FISTULOGRAM Left 8/21/2017    Performed by D[facility]iegos III, [facility] at [facility] CATH LAB    FISTULOGRAM Left 8/3/2016    Performed by [facility] III, [facility] at [facility] CATH LAB    FISTULOGRAM Left 8/13/2014    Performed by [facility] at [facility] OR [facility] F[facility]    FISTULOGRAM Left 10/9/2013    Performed by DAVE [facility]os III, MD at [facility] OR [address] [place] [address]    FISTULOGRAM, possible intervention - LUE Left 3/21/2016    Performed by [facility] III, MD at [facility] CATH LAB    Left Guera AVF  1/11/2012    PROSTATE BIOPSY      positive    retinal laser      ROTATOR CUFF REPAIR  right, 2005    TRANSPOSITION, VEIN Left 4/1/2013    Performed by DAVE M[facility]os III, MD at [facility] OR [address] [place] [address]    VASCULAR SURGERY         Review of patient's allergies indicates:   Allergen Reactions    Reglan [metoclopramide hcl] Diarrhea    Lorazepam      Other reaction(s): heavy sedation       No current facility-administered medications on file prior to encounter.      Current Outpatient Medications on File Prior to Encounter   Medication Sig    amLODIPine (NORVASC) 10 MG tablet TAKE 1 TABLET(10 MG) BY MOUTH EVERY DAY    ascorbic acid, vitamin C, (VITAMIN C) 500 MG tablet Take 500 mg by mouth once daily.    atorvastatin (LIPITOR) 40 MG tablet Take 1 tablet (40 mg total) by mouth once daily.    carvedilol (COREG) 25 MG tablet TAKE 1 TABLET(25 MG) BY MOUTH TWICE DAILY WITH MEALS    cyclobenzaprine HCl (FLEXERIL ORAL) Take by mouth.    furosemide (LASIX) 40 MG tablet TAKE 1 TABLET(40 MG) BY MOUTH EVERY DAY    insulin (LANTUS SOLOSTAR U-100 INSULIN) glargine 100 units/mL (3mL) SubQ pen INJECT 20 UNITS UNDER THE  "SKIN NIGHTLY    losartan (COZAAR) 50 MG tablet TAKE 1 TABLET(50 MG) BY MOUTH EVERY DAY    megestrol (MEGACE) 20 MG Tab TAKE 1 TABLET(20 MG) BY MOUTH EVERY DAY    megestrol acetate (MEGACE ORAL) Take by mouth.    NOVOLOG FLEXPEN U-100 INSULIN 100 unit/mL InPn pen INJECT 5 TO 10 UNITS WITH MEALS AND AT BEDTIME DEPENDING ON BLOOD SUGAR    pantoprazole (PROTONIX) 40 MG tablet TAKE 1 TABLET BY MOUTH DAILY AS NEEDED FOR HEARTBURN. TAKE ONLY AS NEEDED    RENVELA 800 mg Tab Take 1 tablet (800 mg total) by mouth 3 (three) times daily with meals.    BD INSULIN PEN NEEDLE UF ORIG 29 gauge x 1/2" Ndle USE  1 EVERY EVENING    blood glucose control, normal Soln 1 drop by Misc.(Non-Drug; Combo Route) route as directed.    blood sugar diagnostic Strp To check BG 2 times daily, to use with accuchek casa meter    blood-glucose meter kit To check BG 2 times daily, to use with accuchek casa meter    gabapentin (NEURONTIN) 100 MG capsule Take 1 capsule (100 mg total) by mouth once daily.    glucagon, human recombinant, (GLUCAGON EMERGENCY KIT, HUMAN,) 1 mg SolR Inject 1 mg into the muscle as needed.    lancets Misc To check BG 2 times daily, to use with accuchek casa meter    NOVOFINE 30 30 x 1/3 " Ndle USE AS DIRECTED WITH LANTUS SOLARSTAR    promethazine (PHENERGAN) 25 MG tablet Take 1 tablet (25 mg total) by mouth every 6 (six) hours as needed for Nausea.     Family History     Problem Relation (Age of Onset)    Cancer Mother, Paternal Grandmother, Paternal Uncle    Cataracts Mother    Diabetes Mother    Glaucoma Mother    Heart disease Father, Maternal Grandmother    Hypertension Brother    No Known Problems Sister, Maternal Grandfather, Paternal Grandfather, Sister, Sister    Prostate cancer Brother        Tobacco Use    Smoking status: Never Smoker    Smokeless tobacco: Never Used   Substance and Sexual Activity    Alcohol use: No    Drug use: No    Sexual activity: Not Currently     Review of Systems "   Constitutional: Negative for chills and fever.   HENT: Negative for congestion and rhinorrhea.    Eyes: Negative for photophobia and visual disturbance.   Respiratory: Negative for cough and shortness of breath.    Cardiovascular: Negative for chest pain and leg swelling.   Gastrointestinal: Negative for abdominal pain, constipation, diarrhea and nausea.   Genitourinary: Negative for difficulty urinating and dysuria.   Musculoskeletal: Positive for back pain. Negative for gait problem and joint swelling.   Neurological: Negative for dizziness and light-headedness.   Psychiatric/Behavioral: Negative for confusion and dysphoric mood.     Objective:     Vital Signs (Most Recent):  Temp: 98.1 °F (36.7 °C) (03/12/19 1004)  Pulse: 73 (03/12/19 1003)  Resp: 12 (03/12/19 1003)  BP: (!) 150/83 (03/12/19 1111)  SpO2: 96 % (03/12/19 1003) Vital Signs (24h Range):  Temp:  [98 °F (36.7 °C)-98.1 °F (36.7 °C)] 98.1 °F (36.7 °C)  Pulse:  [] 73  Resp:  [12-19] 12  SpO2:  [96 %-100 %] 96 %  BP: (122-181)/() 150/83     Weight: 70.8 kg (156 lb)  Body mass index is 21.76 kg/m².    Physical Exam   Constitutional: He is oriented to person, place, and time. He appears well-developed and well-nourished. No distress.   HENT:   Right Ear: External ear normal.   Left Ear: External ear normal.   Nose: Nose normal.   Eyes: EOM are normal. Pupils are equal, round, and reactive to light. No scleral icterus.   Neck: Normal range of motion. Neck supple. No thyromegaly present.   Cardiovascular: Normal rate and normal heart sounds. Exam reveals no friction rub.   No murmur heard.  Pulmonary/Chest: Effort normal and breath sounds normal. No respiratory distress. He has no wheezes. He has no rales.   Abdominal: Soft. Bowel sounds are normal. He exhibits no mass. There is no tenderness.   Musculoskeletal: Normal range of motion. He exhibits no edema or deformity.   LUE with AV fistula, palpable thrill   Neurological: He is alert and  oriented to person, place, and time. No cranial nerve deficit.   Skin: Skin is warm and dry. No pallor.   Psychiatric: He has a normal mood and affect. His behavior is normal. Thought content normal.         CRANIAL NERVES     CN III, IV, VI   Pupils are equal, round, and reactive to light.  Extraocular motions are normal.        Significant Labs: All pertinent labs within the past 24 hours have been reviewed.    Significant Imaging: I have reviewed and interpreted all pertinent imaging results/findings within the past 24 hours.    Assessment/Plan:     * Hypertensive emergency    Patient presented from his dialysis center with hypertension and was found to have an elevated troponin.  No other evidence of end organ dysfunction.  EKG not consistent with acute ischemia.  Patient denies chest pain or anginal equivalents.  He was admitted to trend troponin due to concern for ACS given his medical history.  Troponin remains stagnant.  Echo with EF 50%, G2DD, but no noted wall motion abnormality.  Appreciate cardiology recs.  NST in the morning if troponin remains stagnant.     Paroxysmal tachycardia    Patient noted to be tachycardic at his HD center.  Monitor on telemetry.  Given his home dose of carvedilol in the ER and no further issues with tachycardia.  Consider Holter monitor or event monitor to evaluate for paroxysmal A fib with his cardiologist after discharge.     History of MI (myocardial infarction)    Followed by Dr. Mccoy.  See HTN emergency.     Hypertension    Resumed home meds with improvement in blood pressure.  Suspect an element of non-compliance given multiple elevated readings recently.  Continue to monitor during admission.  Encourage the patient and his wife to continue home monitoring and follow up in clinic.     Anemia of chronic disease    Stable.  No evidence of bleeding.  No need for transfusion at this time.  Monitor during admission.     Hyperlipidemia LDL goal <100    Cont home meds.      CAD (coronary artery disease)    See HTN emergency.  Cont home meds.  Is followed by Dr. Mccyo.     Congestive heart failure    EF 50% with G2DD.  No evidence of acute decompensation.  Cont home meds.     Cognitive deficits as late effect of cerebrovascular disease    No acute issue.     History of stroke    No acute issue.         VTE Risk Mitigation (From admission, onward)        Ordered     IP VTE HIGH RISK PATIENT  Once      03/12/19 1120     Place sequential compression device  Until discontinued      03/12/19 1120             Lisy Prado MD  Department of Hospital Medicine   [facility]

## 2019-03-13 VITALS
SYSTOLIC BLOOD PRESSURE: 152 MMHG | HEART RATE: 79 BPM | HEIGHT: 71 IN | RESPIRATION RATE: 17 BRPM | OXYGEN SATURATION: 99 % | BODY MASS INDEX: 21.73 KG/M2 | DIASTOLIC BLOOD PRESSURE: 77 MMHG | WEIGHT: 155.19 LBS | TEMPERATURE: 98 F

## 2019-03-13 LAB
CV STRESS BASE HR: 79 BPM
DIASTOLIC BLOOD PRESSURE: 77 MMHG
NUC STRESS EJECTION FRACTION: 61 %
OHS CV CPX 85 PERCENT MAX PREDICTED HEART RATE MALE: 135
OHS CV CPX MAX PREDICTED HEART RATE: 159
OHS CV CPX PATIENT IS FEMALE: 0
OHS CV CPX PATIENT IS MALE: 1
OHS CV CPX PEAK DIASTOLIC BLOOD PRESSURE: 60 MMHG
OHS CV CPX PEAK HEAR RATE: 90 BPM
OHS CV CPX PEAK RATE PRESSURE PRODUCT: NORMAL
OHS CV CPX PEAK SYSTOLIC BLOOD PRESSURE: 114 MMHG
OHS CV CPX PERCENT MAX PREDICTED HEART RATE ACHIEVED: 57
OHS CV CPX RATE PRESSURE PRODUCT PRESENTING: NORMAL
PHOSPHATE SERPL-MCNC: 2.8 MG/DL
POCT GLUCOSE: 124 MG/DL (ref 70–110)
POCT GLUCOSE: 172 MG/DL (ref 70–110)
SYSTOLIC BLOOD PRESSURE: 146 MMHG
TSH SERPL DL<=0.005 MIU/L-ACNC: 2.13 UIU/ML

## 2019-03-13 PROCEDURE — 99232 PR SUBSEQUENT HOSPITAL CARE,LEVL II: ICD-10-PCS | Mod: 25,,, | Performed by: INTERNAL MEDICINE

## 2019-03-13 PROCEDURE — 36415 COLL VENOUS BLD VENIPUNCTURE: CPT

## 2019-03-13 PROCEDURE — 87340 HEPATITIS B SURFACE AG IA: CPT

## 2019-03-13 PROCEDURE — 84100 ASSAY OF PHOSPHORUS: CPT

## 2019-03-13 PROCEDURE — 25000003 PHARM REV CODE 250: Performed by: EMERGENCY MEDICINE

## 2019-03-13 PROCEDURE — 63600175 PHARM REV CODE 636 W HCPCS: Performed by: INTERNAL MEDICINE

## 2019-03-13 PROCEDURE — 25000003 PHARM REV CODE 250: Performed by: INTERNAL MEDICINE

## 2019-03-13 PROCEDURE — 84443 ASSAY THYROID STIM HORMONE: CPT

## 2019-03-13 PROCEDURE — 99232 SBSQ HOSP IP/OBS MODERATE 35: CPT | Mod: 25,,, | Performed by: INTERNAL MEDICINE

## 2019-03-13 RX ORDER — LOSARTAN POTASSIUM 25 MG/1
50 TABLET ORAL DAILY
Status: DISCONTINUED | OUTPATIENT
Start: 2019-03-13 | End: 2019-03-13 | Stop reason: HOSPADM

## 2019-03-13 RX ORDER — REGADENOSON 0.08 MG/ML
0.4 INJECTION, SOLUTION INTRAVENOUS ONCE
Status: COMPLETED | OUTPATIENT
Start: 2019-03-13 | End: 2019-03-13

## 2019-03-13 RX ORDER — AMLODIPINE BESYLATE 5 MG/1
10 TABLET ORAL DAILY
Status: DISCONTINUED | OUTPATIENT
Start: 2019-03-13 | End: 2019-03-13 | Stop reason: HOSPADM

## 2019-03-13 RX ORDER — CARVEDILOL 6.25 MG/1
25 TABLET ORAL 2 TIMES DAILY
Status: DISCONTINUED | OUTPATIENT
Start: 2019-03-13 | End: 2019-03-13 | Stop reason: HOSPADM

## 2019-03-13 RX ADMIN — ATORVASTATIN CALCIUM 80 MG: 40 TABLET, FILM COATED ORAL at 09:03

## 2019-03-13 RX ADMIN — CARVEDILOL 25 MG: 6.25 TABLET, FILM COATED ORAL at 09:03

## 2019-03-13 RX ADMIN — ASPIRIN 325 MG ORAL TABLET 325 MG: 325 PILL ORAL at 09:03

## 2019-03-13 RX ADMIN — AMLODIPINE BESYLATE 10 MG: 5 TABLET ORAL at 09:03

## 2019-03-13 RX ADMIN — HYDROCODONE BITARTRATE AND ACETAMINOPHEN 1 TABLET: 5; 325 TABLET ORAL at 09:03

## 2019-03-13 RX ADMIN — REGADENOSON 0.4 MG: 0.08 INJECTION, SOLUTION INTRAVENOUS at 11:03

## 2019-03-13 RX ADMIN — LOSARTAN POTASSIUM 50 MG: 25 TABLET, FILM COATED ORAL at 09:03

## 2019-03-13 NOTE — NURSING
Patient returned to room from NM via . Patient awake, alert, oriented. Tele monitoring in progress. No apparent distress noted at this time. Diet order placed, kitchen notified.

## 2019-03-13 NOTE — SUBJECTIVE & OBJECTIVE
Interval History:  Patient underwent a nuclear stress test today which did not reveal any large areas of ischemia.  Overnight patient has continued to be asymptomatic and denies any chest pains, orthopnea, dyspnea, PND or swelling of feet.    Review of Systems   Constitution: Negative.   HENT: Negative.    Eyes: Negative.    Cardiovascular: Negative.    Respiratory: Negative.    Endocrine: Negative.    Hematologic/Lymphatic: Negative.    Skin: Negative.    Musculoskeletal: Negative.    Gastrointestinal: Negative.    Genitourinary: Negative.    Neurological: Negative.    Psychiatric/Behavioral: Negative.    Allergic/Immunologic: Negative.    All other systems reviewed and are negative.    Objective:     Vital Signs (Most Recent):  Temp: 98.1 °F (36.7 °C) (03/13/19 1217)  Pulse: 79 (03/13/19 1217)  Resp: 17 (03/13/19 1217)  BP: (!) 152/77 (03/13/19 1217)  SpO2: 99 % (03/13/19 1217) Vital Signs (24h Range):  Temp:  [97.8 °F (36.6 °C)-98.4 °F (36.9 °C)] 98.1 °F (36.7 °C)  Pulse:  [79-87] 79  Resp:  [17-19] 17  SpO2:  [98 %-100 %] 99 %  BP: (126-188)/(65-91) 152/77     Weight: 70.4 kg (155 lb 3.3 oz)  Body mass index is 21.65 kg/m².     SpO2: 99 %  O2 Device (Oxygen Therapy): room air      Intake/Output Summary (Last 24 hours) at 3/13/2019 1538  Last data filed at 3/12/2019 1930  Gross per 24 hour   Intake 620 ml   Output 1500 ml   Net -880 ml       Lines/Drains/Airways     Drain                 Hemodialysis AV Fistula 04/01/17 0800 711 days          Peripheral Intravenous Line                 Peripheral IV - Single Lumen 03/12/19 0705 Right Forearm 1 day                Physical Exam   Constitutional: He is oriented to person, place, and time. He appears well-developed and well-nourished.   HENT:   Head: Normocephalic.   Eyes: Conjunctivae are normal. Pupils are equal, round, and reactive to light.   Neck: Normal range of motion. Neck supple.   Cardiovascular: Normal rate, regular rhythm and normal heart sounds. Exam  reveals no decreased pulses.   AV fistula noted     Pulmonary/Chest: Effort normal and breath sounds normal.   Abdominal: Soft. Bowel sounds are normal.   Neurological: He is alert and oriented to person, place, and time.   Skin: Skin is warm.   Nursing note and vitals reviewed.      Significant Labs:   BMP:   Recent Labs   Lab 03/12/19  0706   *      K 4.7   CL 99   CO2 30*   BUN 39*   CREATININE 9.8*   CALCIUM 9.5   , CMP   Recent Labs   Lab 03/12/19  0706      K 4.7   CL 99   CO2 30*   *   BUN 39*   CREATININE 9.8*   CALCIUM 9.5   PROT 7.7   ALBUMIN 3.5   BILITOT 0.6   ALKPHOS 227*   AST 47*   ALT 72*   ANIONGAP 10   ESTGFRAFRICA 6*   EGFRNONAA 5*   , CBC   Recent Labs   Lab 03/12/19  0706   WBC 4.44   HGB 11.4*   HCT 35.6*   *   , Lipid Panel   Recent Labs   Lab 03/12/19  1004   CHOL 158   HDL 37*   LDLCALC 97.4   TRIG 118   CHOLHDL 23.4    and All pertinent lab results from the last 24 hours have been reviewed.    Significant Imaging:     EKG: Normal sinus rhythm  Possible Left atrial enlargement  Left axis deviation  Anteroseptal infarct (cited on or before 17-OCT-2013)  Abnormal ECG    NUCLEAR STRESS TEST: MAR 13 2019    · Normal myocardial perfusion scan  · There were no arrhythmias during stress.  · There was no ST segment deviation noted during stress.  · The patient reported dizziness, nausea and light headedness during the stress test.  · The perfusion scan is free of evidence from myocardial ischemia or injury.  · LVEF 61% post stress  · The EKG portion of this study is negative for myocardial ischemia.    2D ECHO MARCH 2019    · Low normal left ventricular systolic function. The estimated ejection fraction is 50%  · Concentric left ventricular hypertrophy.  · Grade II (moderate) left ventricular diastolic dysfunction consistent with pseudonormalization.  · Elevated left atrial pressure.  · Mild left atrial enlargement.  · Mild-to-moderate mitral  regurgitation.

## 2019-03-13 NOTE — ASSESSMENT & PLAN NOTE
Patient with mildly elevated troponins.  HIS NUCLEAR STRESS TEST DID NOT REVEAL ANY SIGNIFICANT ISCHEMIA.  I will follow up with the patient in the cardiology clinic.  We will also do an event monitor on the patient as an outpatient to rule out any significant tachyarrhythmias.  Continue aspirin, beta blockers and statins.

## 2019-03-13 NOTE — PROGRESS NOTES
WRITTEN HEALTHCARE DISCHARGE INFORMATION      Things that YOU are responsible for to Manage Your Care At Home:  1. Getting your prescriptions filled.  2. Taking you medications as directed. DO NOT MISS ANY DOSES!  3. Going to your follow-up doctor appointments. This is important because it allows the doctor to monitor your progress and to determine if any changes need to be made to your treatment plan.     If you are unable to make your follow up appointments, please call the number listed and reschedule this appointment.      After discharge, if you need assistance, you can call [facility] for 24/7 assistance at [phone]     If you are experience any signs or symptoms, Call your doctor or Call 911 and come to your nearest Emergency Room.     Thank you for choosing [facility] and allowing us to care for you.        You should receive a call from [facility] within 48-72 hours to help manage your care after discharge. Please try to make sure that you answer your phone for this important phone call.     Follow-up Information     Melonie Elias MD On 3/27/2019.    Specialties:  Internal Medicine, Pediatrics  Why:  Appointment scheduled for Wednesday March 27st at 10am.  can follow up with NP/PA. Would recommend BP assessment. Also consider loop recorder as patient with intermittent bouts of tachycardia.  Contact information:  [address] [facility]  [place] [address] [phone]             David Villegas MD On 3/20/2019.    Specialties:  Cardiology, INTERVENTIONAL CARDIOLOGY  Why:  Appointment scheduled for Wednesday March 20th at 1pm  Contact information:  [address]  [address]  [place] [address]  [phone]

## 2019-03-13 NOTE — NURSING
Patient to NM via wc. Patient awake, alert, oriented with mild back discomfort at this time( prn for pain given). No apparent distress noted.

## 2019-03-13 NOTE — PROGRESS NOTES
[facility]  Cardiology  Progress Note    Patient Name: Kodi Ibanez Sr.  MRN: [id number]  Admission Date: 3/12/2019  Hospital Length of Stay: 1 days  Code Status: Full Code   Attending Physician: Lisy Prado MD   Primary Care Physician: Melonie Elias MD  Expected Discharge Date: 3/13/2019  Principal Problem:Hypertensive emergency    Subjective:     Hospital Course:   3/12: Admitted for troponemia       Interval History:  Patient underwent a nuclear stress test today which did not reveal any large areas of ischemia.  Overnight patient has continued to be asymptomatic and denies any chest pains, orthopnea, dyspnea, PND or swelling of feet.    Review of Systems   Constitution: Negative.   HENT: Negative.    Eyes: Negative.    Cardiovascular: Negative.    Respiratory: Negative.    Endocrine: Negative.    Hematologic/Lymphatic: Negative.    Skin: Negative.    Musculoskeletal: Negative.    Gastrointestinal: Negative.    Genitourinary: Negative.    Neurological: Negative.    Psychiatric/Behavioral: Negative.    Allergic/Immunologic: Negative.    All other systems reviewed and are negative.    Objective:     Vital Signs (Most Recent):  Temp: 98.1 °F (36.7 °C) (03/13/19 1217)  Pulse: 79 (03/13/19 1217)  Resp: 17 (03/13/19 1217)  BP: (!) 152/77 (03/13/19 1217)  SpO2: 99 % (03/13/19 1217) Vital Signs (24h Range):  Temp:  [97.8 °F (36.6 °C)-98.4 °F (36.9 °C)] 98.1 °F (36.7 °C)  Pulse:  [79-87] 79  Resp:  [17-19] 17  SpO2:  [98 %-100 %] 99 %  BP: (126-188)/(65-91) 152/77     Weight: 70.4 kg (155 lb 3.3 oz)  Body mass index is 21.65 kg/m².     SpO2: 99 %  O2 Device (Oxygen Therapy): room air      Intake/Output Summary (Last 24 hours) at 3/13/2019 1538  Last data filed at 3/12/2019 1930  Gross per 24 hour   Intake 620 ml   Output 1500 ml   Net -880 ml       Lines/Drains/Airways     Drain                 Hemodialysis AV Fistula 04/01/17 0800 711 days          Peripheral Intravenous Line                  Peripheral IV - Single Lumen 03/12/19 0705 Right Forearm 1 day                Physical Exam   Constitutional: He is oriented to person, place, and time. He appears well-developed and well-nourished.   HENT:   Head: Normocephalic.   Eyes: Conjunctivae are normal. Pupils are equal, round, and reactive to light.   Neck: Normal range of motion. Neck supple.   Cardiovascular: Normal rate, regular rhythm and normal heart sounds. Exam reveals no decreased pulses.   AV fistula noted     Pulmonary/Chest: Effort normal and breath sounds normal.   Abdominal: Soft. Bowel sounds are normal.   Neurological: He is alert and oriented to person, place, and time.   Skin: Skin is warm.   Nursing note and vitals reviewed.      Significant Labs:   BMP:   Recent Labs   Lab 03/12/19  0706   *      K 4.7   CL 99   CO2 30*   BUN 39*   CREATININE 9.8*   CALCIUM 9.5   , CMP   Recent Labs   Lab 03/12/19  0706      K 4.7   CL 99   CO2 30*   *   BUN 39*   CREATININE 9.8*   CALCIUM 9.5   PROT 7.7   ALBUMIN 3.5   BILITOT 0.6   ALKPHOS 227*   AST 47*   ALT 72*   ANIONGAP 10   ESTGFRAFRICA 6*   EGFRNONAA 5*   , CBC   Recent Labs   Lab 03/12/19  0706   WBC 4.44   HGB 11.4*   HCT 35.6*   *   , Lipid Panel   Recent Labs   Lab 03/12/19  1004   CHOL 158   HDL 37*   LDLCALC 97.4   TRIG 118   CHOLHDL 23.4    and All pertinent lab results from the last 24 hours have been reviewed.    Significant Imaging:     EKG: Normal sinus rhythm  Possible Left atrial enlargement  Left axis deviation  Anteroseptal infarct (cited on or before 17-OCT-2013)  Abnormal ECG    NUCLEAR STRESS TEST: MAR 13 2019    · Normal myocardial perfusion scan  · There were no arrhythmias during stress.  · There was no ST segment deviation noted during stress.  · The patient reported dizziness, nausea and light headedness during the stress test.  · The perfusion scan is free of evidence from myocardial ischemia or injury.  · LVEF 61% post stress  · The EKG  portion of this study is negative for myocardial ischemia.    2D ECHO MARCH 2019    · Low normal left ventricular systolic function. The estimated ejection fraction is 50%  · Concentric left ventricular hypertrophy.  · Grade II (moderate) left ventricular diastolic dysfunction consistent with pseudonormalization.  · Elevated left atrial pressure.  · Mild left atrial enlargement.  · Mild-to-moderate mitral regurgitation.              Assessment and Plan:     Brief HPI:  Patient came in with mild troponin elevation.  Nuclear stress test done earlier today did not reveal any significant perfusion abnormality.    NSTEMI (non-ST elevated myocardial infarction)    Patient with mildly elevated troponins.  HIS NUCLEAR STRESS TEST DID NOT REVEAL ANY SIGNIFICANT ISCHEMIA.  I will follow up with the patient in the cardiology clinic.  We will also do an event monitor on the patient as an outpatient to rule out any significant tachyarrhythmias.  Continue aspirin, beta blockers and statins.      CAD (coronary artery disease)    S/P CABG. NST as noted above.      Hypertension    Please resume and continue his outpatient medicines for HTN.      Congestive heart failure    ECHO  demonstrates low normal LVEF.      Anemia of chronic disease    Secondary to ESRD     History of stroke           Hyperlipidemia LDL goal <100    Continue Lipitor.     Type 2 diabetes, uncontrolled, with neuropathy    Being managed by primary team     PVD (peripheral vascular disease)    PVD being managed by vascular surgery.         VTE Risk Mitigation (From admission, onward)        Ordered     IP VTE HIGH RISK PATIENT  Once      03/12/19 1120     Place sequential compression device  Until discontinued      03/12/19 1120        F/U in cardiology clinic in 1-2 weeks    David Villegas MD  Cardiology  [facility]

## 2019-03-13 NOTE — PLAN OF CARE
Problem: Adult Inpatient Plan of Care  Goal: Absence of Hospital-Acquired Illness or Injury    Intervention: Identify and Manage Fall Risk   03/13/19 0600   Optimize Balance and Safe Activity   Safety Promotion/Fall Prevention assistive device/personal item within reach;side rails raised x 2

## 2019-03-13 NOTE — NURSING
Discharge instructions given to patient at bedside. Patient verbalized understanding of instructions. Patient states willingness to comply. Saline lock removed. Tele monitoring removed. Transport requested (wife in garage

## 2019-03-13 NOTE — NURSING
Report given to JUDITH Vasquez. Patient sitting up in chair, no complaints, no acute distress noted. Site remains clean, dry and intact. Call light in reach.   12 hour chart check completed.

## 2019-03-13 NOTE — NURSING
Patient escorted by transport to family vehicle for discharge home. Patient accompanied by spouse. No apparent distress noted.

## 2019-03-13 NOTE — PLAN OF CARE
"   03/13/19 1535   Final Note   Assessment Type Final Discharge Note   Anticipated Discharge Disposition Home   Hospital Follow Up  Appt(s) scheduled? Yes   Discharge plans and expectations educations in teach back method with documentation complete? Yes   Right Care Referral Info   Post Acute Recommendation No Care   pts nurse Amina notified that all CM needs have been addressed and can d/c from CM standpoint    EDUCATION:  educational information on NSTEMI.  Information reviewed and placed in :My Healthcare Packet" to be brought home for to use as resource after discharge.  Information included:  signs and symptoms to look for and call the doctor if experiencing, and symptoms that may indicate a medical emergency: CALL 911.      All questions answered.  Teach back method used.           "

## 2019-03-13 NOTE — PLAN OF CARE
03/13/19 1445   Discharge Assessment   Assessment Type Discharge Planning Assessment   Assessment information obtained from? Medical Record   Prior to hospitilization cognitive status: Alert/Oriented   Prior to hospitalization functional status: Independent   Current cognitive status: Alert/Oriented   Current Functional Status: Independent   Facility Arrived From: dialysis   Lives With spouse   Able to Return to Prior Arrangements yes   Is patient able to care for self after discharge? Yes   Who are your caregiver(s) and their phone number(s)? [place] [phone]   Patient's perception of discharge disposition home or selfcare   Readmission Within the Last 30 Days no previous admission in last 30 days   Patient currently being followed by outpatient case management? No   Patient currently receives any other outside agency services? No   Equipment Currently Used at Home cane, straight   Do you have any problems affording any of your prescribed medications? No   Is the patient taking medications as prescribed? yes   Does the patient have transportation home? Yes   Transportation Anticipated family or friend will provide   Does the patient receive services at the [facility]mad[facility] Clinic? No   Discharge Plan A Home with family   Discharge Plan B Home with family   DME Needed Upon Discharge  (TBD)   Patient/Family in Agreement with Plan yes     [facility] [address] [place], [place] [address] - [address] AT [facility]  [address]  [place] [address]  Phone: [phone] Fax: [phone]    [facility] - [place], [place] - [address] [facility]  [address]  [facility] [address]  Phone: [phone] Fax: [phone]

## 2019-03-13 NOTE — DISCHARGE SUMMARY
[facility] Hospital Medicine  Discharge Summary      Patient Name: Kodi Ibanez Sr.  MRN: [id number]  Admission Date: 3/12/2019  Hospital Length of Stay: 1 days  Discharge Date and Time:  03/13/2019  Attending Physician: Lisy Prado MD   Discharging Provider: Lisy Prado MD  Primary Care Provider: Melonie Elias MD      HPI:   Mr. Ibanez is a 60 yo man with ESRD on HD, CAD s/p CABG 4/2009 at [facility], HTN, IDDM (A1c 10.5%), HLD, anemia of chronic disease, h/o CVA, and vascular dementia who presented to the ER after his dialysis unit found him to have a blood pressure of 220/110 and heart rate in the 160s.  The patient had not taken his anti-hypertensives yet that morning because he was going to HD.  This level of hypertension and tachycardia was reported to be abnormal for him however so he was sent to the ER.  The patient denies symptoms of chest pain, shortness of breath, nausea, diaphoresis, headache, or palpitations.  His only complaint is chronic right scapular pain.  He was found to have an elevated troponin to 0.151.  EKG without any ST elevations.  He was admitted for ACS evaluation.  Cardiology consulted as well as nephrology for regularly scheduled HD.    Consults:   Consults (From admission, onward)        Status Ordering Provider     Inpatient consult to Cardiology  Once     Provider:  Tim Aguilar MD    Completed HERVE BURK.     Inpatient consult to Nephrology  Once     Provider:  Lisa Bowers MD    Completed HERVE BURK.          * Hypertensive emergency    Patient presented from his dialysis center with hypertension and was found to have an elevated troponin.  No other evidence of end organ dysfunction.  EKG not consistent with acute ischemia.  Patient denies chest pain or anginal equivalents.  He was admitted to trend troponin due to concern for ACS given his medical history.  Troponin remains stagnant.  Echo with EF 50%, G2DD, but no noted wall motion  abnormality.  Appreciate cardiology recs.  NST with no inducible ischemia.      Paroxysmal tachycardia    Patient noted to be tachycardic at his HD center.  Monitored on telemetry.  Given his home dose of carvedilol in the ER.  He did have one other episode of tachycardia noted on telemetry during hospitalization.  It was brief and EKG was unable to be obtained during the episode, but he did NOT appear to be in A fib during the episode.  Consider Holter monitor or event monitor to evaluate for paroxysmal A fib with his cardiologist after discharge.     NSTEMI (non-ST elevated myocardial infarction)    As above.     PVD (peripheral vascular disease)    No acute.      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     S/P CABG (coronary artery bypass graft)    As above.     History of MI (myocardial infarction)    Followed by Dr. Mccoy.  See HTN emergency.     Hypertension    Resumed home meds with improvement in blood pressure.  Suspect an element of non-compliance given multiple elevated readings recently.  Continue to monitor during admission.  Encourage the patient and his wife to continue home monitoring and follow up in clinic.     Anemia of chronic disease    Stable.  No evidence of bleeding.  No need for transfusion at this time.     Hyperlipidemia LDL goal <100    Cont home meds.     CAD (coronary artery disease)    See HTN emergency.  Cont home meds.  Is followed by Dr. Mccoy.     Type 2 diabetes, uncontrolled, with neuropathy    Continue home meds on discharge. Follow up with PCP.     Congestive heart failure    EF 50% with G2DD.  No evidence of acute decompensation.  Cont home meds.     Cognitive deficits as late effect of cerebrovascular disease    No acute issue.     History of stroke    No acute issue.         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, with long-term current use of insulin [E11.3513, E11.65, Z79.4] 11/13/2017 Yes     Chronic    S/P CABG (coronary artery bypass graft) [Z95.1] 06/04/2015 Not Applicable     Chronic    History of MI (myocardial infarction) [I25.2] 08/04/2014 Not Applicable     Chronic    Hyperlipidemia LDL goal <100 [E78.5] 10/17/2013 Yes     Chronic    Hypertension [I10] 10/17/2013 Yes     Chronic    Anemia of chronic disease [D63.8] 10/17/2013 Yes     Chronic    CAD (coronary artery disease) [I25.10] 08/30/2013 Yes     Chronic    Type 2 diabetes, uncontrolled, with neuropathy [E11.40, E11.65] 11/06/2012 Yes     Chronic    Cognitive deficits as late effect of cerebrovascular disease [I69.919] 06/26/2012 Not Applicable     Chronic    Congestive heart failure [I50.9] 11/17/2011 Yes     Chronic    History of stroke [Z86.73] 11/12/2011 Not Applicable     Chronic      Problems Resolved During this Admission:       Discharged Condition: stable    Disposition: Home or Self Care    Follow Up:  Follow-up Information     Melonie Elias MD In 1 week.    Specialties:  Internal Medicine, Pediatrics  Why:  can follow up with NP/PA. Would recommend BP assessment. Also consider loop recorder as patient with intermittent bouts of tachycardia.  Contact information:  [address] [place] [address] [phone]             David Villegas MD.    Specialties:  Cardiology, INTERVENTIONAL CARDIOLOGY  Contact information:  [address]  [address]  [place] [address] [phone]                 Patient Instructions:      Diet renal     Diet Cardiac     Notify your  "health care provider if you experience any of the following:  increased confusion or weakness     Notify your health care provider if you experience any of the following:  persistent dizziness, light-headedness, or visual disturbances     Notify your health care provider if you experience any of the following:  worsening rash     Notify your health care provider if you experience any of the following:  severe persistent headache     Notify your health care provider if you experience any of the following:  difficulty breathing or increased cough     Notify your health care provider if you experience any of the following:  redness, tenderness, or signs of infection (pain, swelling, redness, odor or green/yellow discharge around incision site)     Notify your health care provider if you experience any of the following:  severe uncontrolled pain     Notify your health care provider if you experience any of the following:  persistent nausea and vomiting or diarrhea     Notify your health care provider if you experience any of the following:  temperature >100.4     Activity as tolerated       Medications:  Reconciled Home Medications:      Medication List      CONTINUE taking these medications    amLODIPine 10 MG tablet  Commonly known as:  NORVASC  TAKE 1 TABLET(10 MG) BY MOUTH EVERY DAY     atorvastatin 40 MG tablet  Commonly known as:  LIPITOR  Take 1 tablet (40 mg total) by mouth once daily.     BD ULTRA-FINE ORIG PEN NEEDLE 29 gauge x 1/2" Ndle  Generic drug:  pen needle, diabetic  USE  1 EVERY EVENING     blood glucose control, normal Soln  1 drop by Misc.(Non-Drug; Combo Route) route as directed.     blood sugar diagnostic Strp  To check BG 2 times daily, to use with accuchek casa meter     blood-glucose meter kit  To check BG 2 times daily, to use with accuchek casa meter     carvedilol 25 MG tablet  Commonly known as:  COREG  TAKE 1 TABLET(25 MG) BY MOUTH TWICE DAILY WITH MEALS     FLEXERIL ORAL  Take by " "mouth.     furosemide 40 MG tablet  Commonly known as:  LASIX  TAKE 1 TABLET(40 MG) BY MOUTH EVERY DAY     gabapentin 100 MG capsule  Commonly known as:  NEURONTIN  Take 1 capsule (100 mg total) by mouth once daily.     glucagon (human recombinant) 1 mg Solr  Commonly known as:  GLUCAGON EMERGENCY KIT (HUMAN)  Inject 1 mg into the muscle as needed.     insulin glargine 100 units/mL (3mL) SubQ pen  Commonly known as:  LANTUS SOLOSTAR U-100 INSULIN  INJECT 20 UNITS UNDER THE SKIN NIGHTLY     lancets Misc  To check BG 2 times daily, to use with accuchek casa meter     losartan 50 MG tablet  Commonly known as:  COZAAR  TAKE 1 TABLET(50 MG) BY MOUTH EVERY DAY     * MEGACE ORAL  Take by mouth.     * megestrol 20 MG Tab  Commonly known as:  MEGACE  TAKE 1 TABLET(20 MG) BY MOUTH EVERY DAY     NOVOFINE 30 30 gauge x 1/3" Ndle  Generic drug:  pen needle, diabetic  USE AS DIRECTED WITH LANTUS SOLARSTAR     NovoLOG Flexpen U-100 Insulin 100 unit/mL Inpn pen  Generic drug:  insulin aspart U-100  INJECT 5 TO 10 UNITS WITH MEALS AND AT BEDTIME DEPENDING ON BLOOD SUGAR     pantoprazole 40 MG tablet  Commonly known as:  PROTONIX  TAKE 1 TABLET BY MOUTH DAILY AS NEEDED FOR HEARTBURN. TAKE ONLY AS NEEDED     promethazine 25 MG tablet  Commonly known as:  PHENERGAN  Take 1 tablet (25 mg total) by mouth every 6 (six) hours as needed for Nausea.     RENVELA 800 mg Tab  Generic drug:  sevelamer carbonate  Take 1 tablet (800 mg total) by mouth 3 (three) times daily with meals.     VITAMIN C 500 MG tablet  Generic drug:  ascorbic acid (vitamin C)  Take 500 mg by mouth once daily.         * This list has 2 medication(s) that are the same as other medications prescribed for you. Read the directions carefully, and ask your doctor or other care provider to review them with you.                Indwelling Lines/Drains at time of discharge:   Lines/Drains/Airways     Drain                 Hemodialysis AV Fistula 04/01/17 0800 711 days          "       Time spent on the discharge of patient: 35 minutes  Patient was seen and examined on the date of discharge and determined to be suitable for discharge.         Lisy Prado MD  Department of Hospital Medicine  [facility]

## 2019-03-13 NOTE — ASSESSMENT & PLAN NOTE
Patient presented from his dialysis center with hypertension and was found to have an elevated troponin.  No other evidence of end organ dysfunction.  EKG not consistent with acute ischemia.  Patient denies chest pain or anginal equivalents.  He was admitted to trend troponin due to concern for ACS given his medical history.  Troponin remains stagnant.  Echo with EF 50%, G2DD, but no noted wall motion abnormality.  Appreciate cardiology recs.  NST with no inducible ischemia.

## 2019-03-13 NOTE — ASSESSMENT & PLAN NOTE
Patient noted to be tachycardic at his HD center.  Monitored on telemetry.  Given his home dose of carvedilol in the ER.  He did have one other episode of tachycardia noted on telemetry during hospitalization.  It was brief and EKG was unable to be obtained during the episode, but he did NOT appear to be in A fib during the episode.  Consider Holter monitor or event monitor to evaluate for paroxysmal A fib with his cardiologist after discharge.

## 2019-03-14 ENCOUNTER — PATIENT OUTREACH (OUTPATIENT)
Dept: ADMINISTRATIVE | Facility: CLINIC | Age: 59
End: 2019-03-14

## 2019-03-14 LAB
HBV SURFACE AB SER QL IA: POSITIVE
HBV SURFACE AB SERPL IA-ACNC: NORMAL MIU/ML
HBV SURFACE AG SERPL QL IA: NEGATIVE

## 2019-03-15 NOTE — PATIENT INSTRUCTIONS
Sepsis  Sepsis occurs when your body responds to bacteremia - the presence of bacteria in your bloodstream. Sepsis can be deadly. Blood pressure may drop and the lungs and kidneys may start to fail. Emergency care for sepsis is crucial.  Risk Factors  Those most at risk for sepsis are:  Infants or older adults  People who have an illness such as cancer, AIDS, or diabetes  People being treated with chemotherapy medications or radiation  People who have had a transplant  People with an infection such as pneumonia, meningitis, or a urinary tract infection  When to Go to the Emergency Department (ED)  Sepsis is a medical emergency. Go to the nearest emergency department if a fever is present with any of these symptoms:  Chills and shaking  Fever or low body temperature (hypothermia)  Rapid heartbeat and breathing; shortness of breath  Nausea or vomiting  Confusion, dizziness  Skin rash  Decreased urination  What to Expect in the ED  Blood and urine tests are done to look for the presence of bacteria.  A blood culture may be done. In this test, a blood sample is sent to a lab, where its placed in a special container. Any bacteria in the blood should grow in 24 hours.  X-rays may be taken or other imaging tests may be done.  A person with sepsis will be admitted to the hospital and treated with antibiotics. Treatment may also include oxygen and intravenous fluids.  © 2111-0148 [employer] \[employer] [place] [facility]\"", [address] [place] [address], [place] [address]. All rights reserved. This information is not intended as a substitute for professional medical care. Always follow your healthcare professional's instructions.

## 2019-03-20 ENCOUNTER — OFFICE VISIT (OUTPATIENT)
Dept: CARDIOLOGY | Facility: CLINIC | Age: 59
End: 2019-03-20
Payer: MEDICARE

## 2019-03-20 VITALS
HEART RATE: 71 BPM | WEIGHT: 158.75 LBS | SYSTOLIC BLOOD PRESSURE: 134 MMHG | HEIGHT: 71 IN | OXYGEN SATURATION: 97 % | BODY MASS INDEX: 22.23 KG/M2 | DIASTOLIC BLOOD PRESSURE: 66 MMHG

## 2019-03-20 DIAGNOSIS — Z99.2 ESRD ON HEMODIALYSIS: ICD-10-CM

## 2019-03-20 DIAGNOSIS — N18.6 ESRD ON HEMODIALYSIS: ICD-10-CM

## 2019-03-20 DIAGNOSIS — I25.10 CORONARY ARTERY DISEASE INVOLVING NATIVE CORONARY ARTERY OF NATIVE HEART WITHOUT ANGINA PECTORIS: Chronic | ICD-10-CM

## 2019-03-20 DIAGNOSIS — I73.9 PVD (PERIPHERAL VASCULAR DISEASE): Chronic | ICD-10-CM

## 2019-03-20 DIAGNOSIS — I47.9 PAROXYSMAL TACHYCARDIA: ICD-10-CM

## 2019-03-20 DIAGNOSIS — Z95.1 S/P CABG (CORONARY ARTERY BYPASS GRAFT): Chronic | ICD-10-CM

## 2019-03-20 DIAGNOSIS — I10 ESSENTIAL HYPERTENSION: Chronic | ICD-10-CM

## 2019-03-20 DIAGNOSIS — I21.4 NSTEMI (NON-ST ELEVATED MYOCARDIAL INFARCTION): Chronic | ICD-10-CM

## 2019-03-20 DIAGNOSIS — Z86.73 HISTORY OF STROKE: Primary | Chronic | ICD-10-CM

## 2019-03-20 DIAGNOSIS — E78.5 HYPERLIPIDEMIA LDL GOAL <100: Chronic | ICD-10-CM

## 2019-03-20 PROCEDURE — 3046F PR MOST RECENT HEMOGLOBIN A1C LEVEL > 9.0%: ICD-10-PCS | Mod: CPTII,S$GLB,, | Performed by: INTERNAL MEDICINE

## 2019-03-20 PROCEDURE — 99999 PR PBB SHADOW E&M-EST. PATIENT-LVL III: ICD-10-PCS | Mod: PBBFAC,,, | Performed by: INTERNAL MEDICINE

## 2019-03-20 PROCEDURE — 3008F BODY MASS INDEX DOCD: CPT | Mod: CPTII,S$GLB,, | Performed by: INTERNAL MEDICINE

## 2019-03-20 PROCEDURE — 99214 PR OFFICE/OUTPT VISIT, EST, LEVL IV, 30-39 MIN: ICD-10-PCS | Mod: S$GLB,,, | Performed by: INTERNAL MEDICINE

## 2019-03-20 PROCEDURE — 99214 OFFICE O/P EST MOD 30 MIN: CPT | Mod: S$GLB,,, | Performed by: INTERNAL MEDICINE

## 2019-03-20 PROCEDURE — 3046F HEMOGLOBIN A1C LEVEL >9.0%: CPT | Mod: CPTII,S$GLB,, | Performed by: INTERNAL MEDICINE

## 2019-03-20 PROCEDURE — 99999 PR PBB SHADOW E&M-EST. PATIENT-LVL III: CPT | Mod: PBBFAC,,, | Performed by: INTERNAL MEDICINE

## 2019-03-20 PROCEDURE — 3008F PR BODY MASS INDEX (BMI) DOCUMENTED: ICD-10-PCS | Mod: CPTII,S$GLB,, | Performed by: INTERNAL MEDICINE

## 2019-03-20 RX ORDER — LOSARTAN POTASSIUM 100 MG/1
100 TABLET ORAL DAILY
Qty: 90 TABLET | Refills: 3 | Status: SHIPPED | OUTPATIENT
Start: 2019-03-20 | End: 2019-05-14 | Stop reason: SDUPTHER

## 2019-03-20 NOTE — PROGRESS NOTES
Subjective:    Patient ID:  Kodi Ibanez Sr. is a 59 y.o. male who presents for follow-up of Congestive Heart Failure      HPI     Saw Dr Villegas during admission 3/12/19    This 59 y.o male who has a PMH of CAD S/P CABG,  CHF, ESRD-HD, Seizures, old MI, HTN, HLD, DM, PVDand CVA presents to the ED for an evaluation for an elevated heart rate and elevated blood pressure that was noticed at 0515 today.  Patient reports being dropped off at dialysis and reports being informed he could not receive dialysis treatment due to a blood pressure of 170/104.  Patient currently only reports of right upper back pain, which he reports being 3 months ago.  Patient denies chest pain, extremity numbness, extremity weakness, fever, chills, nausea, emesis, diarrhea, abdominal pain, headache, or any other associated symptoms.  Patient's spouse reports the patient having a blood pressure reading of 215/104 yesterday while at a doctor's appointment.  His spouse reports them arriving home yesterday to take his blood pressure medication and reports a repeat blood pressure of 140/90.  Patient has not received any hypertension medications this morning.  No prior tx.  No alleviating factors.     Patient states that he was in clinic yesterday and it was noted that his BP was high and he was tachycardic. He denied feeling any episodes of chest pain at rest or on exertion, orthopnea, PND or dyspnea. He denies any claudication. States that he walks on the treadmill every week for about a mile and denies any symptoms of chest pain or [place] while on the treadmill.     Stress test 3/13/19  · Normal myocardial perfusion scan  · There were no arrhythmias during stress.  · There was no ST segment deviation noted during stress.  · The patient reported dizziness, nausea and light headedness during the stress test.  · The perfusion scan is free of evidence from myocardial ischemia or injury.  · LVEF 61% post stress  · The EKG portion of this study is negative  for myocardial ischemia.      Echo 3/12/19  · Low normal left ventricular systolic function. The estimated ejection fraction is 50%  · Concentric left ventricular hypertrophy.  · Grade II (moderate) left ventricular diastolic dysfunction consistent with pseudonormalization.  · Elevated left atrial pressure.  · Mild left atrial enlargement.  · Mild-to-moderate mitral regurgitation.     NUCLEAR PET IN 2013      NORMAL MYOCARDIAL PERFUSION PET STRESS TEST  1. The perfusion scan is free of evidence for myocardial ischemia or injury.   2. Resting wall motion is physiologic. Stress wall motion is physiologic.   3. LV function is normal at rest and stress.  (normal is >= 51%)  4. The ventricular volumes are normal at rest and stress.   5. The extracardiac distribution of radioactivity is normal.   6. There was no previous study available to compare.     Denies CP or SOB since discharge  BP still running high at HD          Review of Systems   Constitution: Negative for decreased appetite.   HENT: Negative for ear discharge.    Eyes: Negative for blurred vision.   Endocrine: Negative for polyphagia.   Skin: Negative for nail changes.   Genitourinary: Negative for bladder incontinence.   Neurological: Negative for aphonia.   Psychiatric/Behavioral: Negative for hallucinations.   Allergic/Immunologic: Negative for hives.        Objective:    Physical Exam   Constitutional: He is oriented to person, place, and time. He appears well-developed and well-nourished.   HENT:   Head: Normocephalic and atraumatic.   Eyes: Conjunctivae are normal. Pupils are equal, round, and reactive to light.   Neck: Normal range of motion. Neck supple.   Cardiovascular: Normal rate, normal heart sounds and intact distal pulses.   Pulmonary/Chest: Effort normal and breath sounds normal.   Abdominal: Soft. Bowel sounds are normal.   Musculoskeletal: Normal range of motion.   Neurological: He is alert and oriented to person, place, and time.   Skin:  Skin is warm and dry.         Assessment:       1. History of stroke    2. 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 Coronary artery disease involving native coronary artery of native heart without angina pectoris    4. S/P CABG (coronary artery bypass graft)    5. PVD (peripheral vascular disease)    6. NSTEMI (non-ST elevated myocardial infarction)    7. Essential hypertension    8. Hyperlipidemia LDL goal <100    9. Paroxysmal tachycardia    10. ESRD on hemodialysis         Plan:        Increase losartan 100 qd  OV 3 months with Dr Villegas

## 2019-03-22 ENCOUNTER — TELEPHONE (OUTPATIENT)
Dept: FAMILY MEDICINE | Facility: CLINIC | Age: 59
End: 2019-03-22

## 2019-03-22 DIAGNOSIS — R10.9 FLANK PAIN: Primary | ICD-10-CM

## 2019-03-22 NOTE — TELEPHONE ENCOUNTER
Pt needs a new referral for pain management. Previous referral has been closed which prevents scheduling.  Please advise.

## 2019-03-22 NOTE — TELEPHONE ENCOUNTER
----- Message from Jigna Munguia sent at 3/22/2019 10:25 AM CDT -----  Contact: pt  Name of Who is Calling: pt      What is the request in detail: pt is requesting a referral to pain management dr montes. The referral in the system is closed. Please call pt      Can the clinic reply by [facility]: no      What Number to Call Back if not in [facility]: [phone]

## 2019-03-25 NOTE — TELEPHONE ENCOUNTER
Message left asking  Or Mrs. Ibanez to call the clinic to get rescheduled with Dr. Sanches- phone number included.

## 2019-03-27 ENCOUNTER — OFFICE VISIT (OUTPATIENT)
Dept: FAMILY MEDICINE | Facility: CLINIC | Age: 59
End: 2019-03-27
Payer: MEDICARE

## 2019-03-27 ENCOUNTER — LAB VISIT (OUTPATIENT)
Dept: LAB | Facility: HOSPITAL | Age: 59
End: 2019-03-27
Payer: MEDICARE

## 2019-03-27 VITALS
OXYGEN SATURATION: 97 % | SYSTOLIC BLOOD PRESSURE: 110 MMHG | TEMPERATURE: 98 F | HEIGHT: 71 IN | WEIGHT: 154.75 LBS | BODY MASS INDEX: 21.66 KG/M2 | DIASTOLIC BLOOD PRESSURE: 62 MMHG | HEART RATE: 70 BPM

## 2019-03-27 DIAGNOSIS — I70.0 ATHEROSCLEROSIS OF AORTA: ICD-10-CM

## 2019-03-27 DIAGNOSIS — C61 PROSTATE CANCER: ICD-10-CM

## 2019-03-27 DIAGNOSIS — I73.9 PVD (PERIPHERAL VASCULAR DISEASE): Chronic | ICD-10-CM

## 2019-03-27 DIAGNOSIS — Z99.2 ESRD ON HEMODIALYSIS: ICD-10-CM

## 2019-03-27 DIAGNOSIS — I10 ESSENTIAL HYPERTENSION: Chronic | ICD-10-CM

## 2019-03-27 DIAGNOSIS — I50.9 CONGESTIVE HEART FAILURE, UNSPECIFIED HF CHRONICITY, UNSPECIFIED HEART FAILURE TYPE: Chronic | ICD-10-CM

## 2019-03-27 DIAGNOSIS — Z79.4 LONG-TERM INSULIN USE: ICD-10-CM

## 2019-03-27 DIAGNOSIS — G40.909 SEIZURE DISORDER: Chronic | ICD-10-CM

## 2019-03-27 DIAGNOSIS — N18.6 ESRD ON HEMODIALYSIS: ICD-10-CM

## 2019-03-27 DIAGNOSIS — Z99.89 DEPENDENT ON WALKER FOR AMBULATION: ICD-10-CM

## 2019-03-27 DIAGNOSIS — E78.5 HYPERLIPIDEMIA LDL GOAL <100: Chronic | ICD-10-CM

## 2019-03-27 DIAGNOSIS — N25.81 SECONDARY RENAL HYPERPARATHYROIDISM: ICD-10-CM

## 2019-03-27 PROBLEM — I16.1 HYPERTENSIVE EMERGENCY: Status: RESOLVED | Noted: 2019-03-12 | Resolved: 2019-03-27

## 2019-03-27 PROBLEM — I21.4 NSTEMI (NON-ST ELEVATED MYOCARDIAL INFARCTION): Chronic | Status: RESOLVED | Noted: 2019-03-12 | Resolved: 2019-03-27

## 2019-03-27 LAB
ESTIMATED AVG GLUCOSE: 217 MG/DL (ref 68–131)
HBA1C MFR BLD HPLC: 9.2 % (ref 4–5.6)

## 2019-03-27 PROCEDURE — 99999 PR PBB SHADOW E&M-EST. PATIENT-LVL III: ICD-10-PCS | Mod: PBBFAC,,, | Performed by: INTERNAL MEDICINE

## 2019-03-27 PROCEDURE — 99214 OFFICE O/P EST MOD 30 MIN: CPT | Mod: S$GLB,,, | Performed by: INTERNAL MEDICINE

## 2019-03-27 PROCEDURE — 83036 HEMOGLOBIN GLYCOSYLATED A1C: CPT

## 2019-03-27 PROCEDURE — 36415 COLL VENOUS BLD VENIPUNCTURE: CPT | Mod: PO

## 2019-03-27 PROCEDURE — 99999 PR PBB SHADOW E&M-EST. PATIENT-LVL III: CPT | Mod: PBBFAC,,, | Performed by: INTERNAL MEDICINE

## 2019-03-27 PROCEDURE — 99214 PR OFFICE/OUTPT VISIT, EST, LEVL IV, 30-39 MIN: ICD-10-PCS | Mod: S$GLB,,, | Performed by: INTERNAL MEDICINE

## 2019-03-27 RX ORDER — INSULIN GLARGINE 100 [IU]/ML
INJECTION, SOLUTION SUBCUTANEOUS
Qty: 15 ML | Refills: 0 | Status: SHIPPED | OUTPATIENT
Start: 2019-03-27 | End: 2019-01-01

## 2019-03-27 NOTE — PROGRESS NOTES
HISTORY OF PRESENT ILLNESS:  Kodi Ibanez Sr. is a 59 y.o. male who presents to the clinic today for Hospital Follow Up (HTN follow up)  .   Transitional Care Note    Family and/or Caretaker present at visit?  Yes.  Diagnostic tests reviewed/disposition: No diagnosic tests pending after this hospitalization.  Disease/illness education: yes  Home health/community services discussion/referrals: Patient does not have home health established from hospital visit.  They do not need home health.  If needed, we will set up home health for the patient.   Establishment or re-establishment of referral orders for community resources: No other necessary community resources.   Discussion with other health care providers: No discussion with other health care providers necessary.        The patient had recently called for referral to pain management for back pain.  While at their office he had significantly elevated blood pressure.  He was referred to the emergency room.  He was found to have possible MI and hypertensive emergency.  He was treated.  Blood pressures since have been well controlled.  He has followed up with Cardiology as an outpatient.  He still has some back pain and is scheduled for follow-up with the pain specialist in the near future.    The patient has type 2 diabetes mellitus which has been very uncontrolled in the past complicated by neuropathy, retinopathy, and end-stage renal disease on hemodialysis.  He has hypertension and CHF as well as hyperlipidemia, seizure disorder, and peripheral vascular disease. He also has prostate cancer which is followed by Urology with watchful waiting.  His wife reports that she has significantly cut out blood sugars in his diet.  His blood sugars recently have been 130-150.  Unfortunately, she advises me that in the near future they will no longer be able to afford his insulin medication because it is too expensive.    PAST MEDICAL HISTORY:  Past Medical History:   Diagnosis  Date    Allergy     Atherosclerosis of aorta     noted on VIKTORIA 2011    Blood transfusion     Cataracts, bilateral     CHF (congestive heart failure)     Chronic kidney disease     Clotting disorder     Coronary artery disease     Decreased appetite     Diabetic retinopathy     DM (diabetes mellitus), type 2 with renal complications     ESRD on hemodialysis     TUESDAY/ THURSDAY/SATURDAY    Hyperlipidemia     Hyperparathyroidism, unspecified     Hypertension     Myocardial infarction     Prostate cancer     Seizures     Stroke 2011    PT HAS SHAKES        PAST SURGICAL HISTORY:  Past Surgical History:   Procedure Laterality Date    CARDIAC SURGERY      CABG    CATARACT EXTRACTION      ou    CATARACT EXTRACTION, BILATERAL      cataracts      CORONARY ARTERY BYPASS GRAFT  2009    bypass    EYE SURGERY      FISTULOGRAM Left 8/21/2017    [facility] at [facility] CATH LAB    FISTULOGRAM Left 8/3/2016    [facility] at [facility] CATH LAB    FISTULOGRAM Left 8/13/2014    [facility] OR [facility] F[facility]    FISTULOGRAM Left 10/9/2013    Performed by [facility], [facility] at [facility] OR 2ND FLR    FISTULOGRAM, possible intervention - LUE Left 3/21/2016    Per[facility] by [facility], [facility] at [facility] CATH LAB    Left Guera AVF  1/11/2012    PROSTATE BIOPSY      positive    retinal laser      ROTATOR CUFF REPAIR  right, 2005    TRANSPOSITION, VEIN Left 4/1/2013    Performed by D[facility] III, [facility] at [facility] OR 2ND [facility]    VASCULAR SURGERY         SOCIAL HISTORY:  Social History     Socioeconomic History    Marital status:      Spouse name: Not on file    Number of children: Not on file    Years of education: Not on file    Highest education level: Not on file   Occupational History    Not on file   Social Needs    Financial resource strain: Not on file    Food insecurity:     Worry: Not on file     Inability: Not on  file    Transportation needs:     Medical: Not on file     Non-medical: Not on file   Tobacco Use    Smoking status: Never Smoker    Smokeless tobacco: Never Used   Substance and Sexual Activity    Alcohol use: No    Drug use: No    Sexual activity: Not Currently   Lifestyle    Physical activity:     Days per week: Not on file     Minutes per session: Not on file    Stress: Not on file   Relationships    Social connections:     Talks on phone: Not on file     Gets together: Not on file     Attends [employer] service: Not on file     Active member of club or organization: Not on file     Attends meetings of clubs or organizations: Not on file     Relationship status: Not on file    Intimate partner violence:     Fear of current or ex partner: Not on file     Emotionally abused: Not on file     Physically abused: Not on file     Forced sexual activity: Not on file   Other Topics Concern    Not on file   Social History Narrative    Not on file       FAMILY HISTORY:  Family History   Problem Relation Age of Onset    Diabetes Mother     Glaucoma Mother     Cancer Mother     Cataracts Mother     Heart disease Father     Cancer Paternal Grandmother     Heart disease Maternal Grandmother     No Known Problems Sister     Prostate cancer Brother     No Known Problems Maternal Grandfather     No Known Problems Paternal Grandfather     No Known Problems Sister     No Known Problems Sister     Hypertension Brother     Cancer Paternal Uncle     Amblyopia Neg Hx     Blindness Neg Hx     Macular degeneration Neg Hx     Retinal detachment Neg Hx     Strabismus Neg Hx     Stroke Neg Hx     Thyroid disease Neg Hx     Anesthesia problems Neg Hx     Clotting disorder Neg Hx      problems Neg Hx     Kidney cancer Neg Hx     Kidney disease Neg Hx     Malignant hyperthermia Neg Hx     Sickle cell trait Neg Hx     Lupus Neg Hx     Urolithiasis Neg Hx     Sudden death Neg Hx        ALLERGIES AND  "MEDICATIONS: updated and reviewed.  Review of patient's allergies indicates:   Allergen Reactions    Reglan [metoclopramide hcl] Diarrhea    Lorazepam      Other reaction(s): heavy sedation     Medication List with Changes/Refills   Current Medications    AMLODIPINE (NORVASC) 10 MG TABLET    TAKE 1 TABLET(10 MG) BY MOUTH EVERY DAY    ASCORBIC ACID, VITAMIN C, (VITAMIN C) 500 MG TABLET    Take 500 mg by mouth once daily.    ATORVASTATIN (LIPITOR) 40 MG TABLET    Take 1 tablet (40 mg total) by mouth once daily.    BD INSULIN PEN NEEDLE UF ORIG 29 GAUGE X 1/2" NDLE    USE  1 EVERY EVENING    BLOOD GLUCOSE CONTROL, NORMAL SOLN    1 drop by Misc.(Non-Drug; Combo Route) route as directed.    BLOOD SUGAR DIAGNOSTIC STRP    To check BG 2 times daily, to use with accuchek casa meter    BLOOD-GLUCOSE METER KIT    To check BG 2 times daily, to use with accuchek casa meter    CARVEDILOL (COREG) 25 MG TABLET    TAKE 1 TABLET(25 MG) BY MOUTH TWICE DAILY WITH MEALS    CYCLOBENZAPRINE HCL (FLEXERIL ORAL)    Take by mouth.    FUROSEMIDE (LASIX) 40 MG TABLET    TAKE 1 TABLET(40 MG) BY MOUTH EVERY DAY    GABAPENTIN (NEURONTIN) 100 MG CAPSULE    Take 1 capsule (100 mg total) by mouth once daily.    GLUCAGON, HUMAN RECOMBINANT, (GLUCAGON EMERGENCY KIT, HUMAN,) 1 MG SOLR    Inject 1 mg into the muscle as needed.    LANCETS MISC    To check BG 2 times daily, to use with accuchek casa meter    LOSARTAN (COZAAR) 100 MG TABLET    Take 1 tablet (100 mg total) by mouth once daily.    MEGESTROL (MEGACE) 20 MG TAB    TAKE 1 TABLET(20 MG) BY MOUTH EVERY DAY    MEGESTROL ACETATE (MEGACE ORAL)    Take by mouth.    NOVOFINE 30 30 X 1/3 " NDLE    USE AS DIRECTED WITH [employer]    NOVOLOG FLEXPEN U-100 INSULIN 100 UNIT/ML INPN PEN    INJECT 5 TO 10 UNITS WITH MEALS AND AT BEDTIME DEPENDING ON BLOOD SUGAR    PANTOPRAZOLE (PROTONIX) 40 MG TABLET    TAKE 1 TABLET BY MOUTH DAILY AS NEEDED FOR HEARTBURN. TAKE ONLY AS NEEDED    PROMETHAZINE " (PHENERGAN) 25 MG TABLET    Take 1 tablet (25 mg total) by mouth every 6 (six) hours as needed for Nausea.    RENVELA 800 MG TAB    Take 1 tablet (800 mg total) by mouth 3 (three) times daily with meals.   Changed and/or Refilled Medications    Modified Medication Previous Medication    INSULIN (LANTUS SOLOSTAR U-100 INSULIN) GLARGINE 100 UNITS/ML (3ML) SUBQ PEN insulin (LANTUS SOLOSTAR U-100 INSULIN) glargine 100 units/mL (3mL) SubQ pen       INJECT 20 UNITS UNDER THE SKIN NIGHTLY    INJECT 20 UNITS UNDER THE SKIN NIGHTLY          CARE TEAM:  Patient Care Team:  Melonie Elias MD as PCP - General (Internal Medicine)  [facility] as Dialysis Nurse  [facility] as Post Acute Care Provider  LISA Catherine MD as Consulting Physician (Urology)  Tyrone Levy MD as Consulting Physician (Nephrology)  Karen Rosen DPM as Consulting Physician (Podiatry)  LACEY Mix MD as Consulting Physician (Ophthalmology)         REVIEW OF SYSTEMS:  Review of Systems   Constitutional: Positive for fatigue. Negative for chills, fever and unexpected weight change.   HENT: Negative for congestion, ear pain, sore throat and voice change.    Eyes: Negative for photophobia, pain, discharge and visual disturbance.   Respiratory: Negative for cough, shortness of breath and wheezing.    Cardiovascular: Negative for chest pain, palpitations and leg swelling.   Gastrointestinal: Negative for abdominal pain, blood in stool, constipation, diarrhea, nausea and vomiting.   Genitourinary: Negative for dysuria and frequency.   Musculoskeletal: Negative for gait problem, joint swelling and neck stiffness.   Skin: Negative for color change and rash.   Neurological: Positive for weakness (- mild; generalized, chronic). Negative for headaches.   Hematological: Negative for adenopathy. Does not bruise/bleed easily.   Psychiatric/Behavioral: Negative for behavioral problems and dysphoric mood. The patient is not  "nervous/anxious.          PHYSICAL EXAM:  Vitals:    03/27/19 1016   BP: 110/62   Pulse:    Temp:      Weight: 70.2 kg (154 lb 12.2 oz)   Height: 5' 11" (180.3 cm)   Body mass index is 21.59 kg/m².    Physical Examination: General appearance - alert, well appearing, and in no distress and normal appearing weight  Mental status - alert, oriented to person, place; fair memory; normal mood, behavior, speech, dress, motor activity, and fair insight  Eyes - pupils equal and reactive, extraocular eye movements intact, sclera anicteric  Mouth - mucous membranes moist, pharynx normal without lesions  Lymphatics - no palpable lymphadenopathy  Chest - clear to auscultation, no wheezes, rales or rhonchi, symmetric air entry  Heart - normal rate and regular rhythm, no gallops noted  Neurological - alert, oriented, normal speech, no focal findings or movement disorder noted, cranial nerves II through XII intact  Musculoskeletal - no muscular tenderness noted, Mild-Moderate osteoarthritic changes noted to both knee joints. No joint effusions noted. Generalized decreased muscle mass noted. He walked with a rolling walker.  Extremities - no pedal edema noted  Skin - normal coloration and turgor, no rashes, no suspicious skin lesions noted       ASSESSMENT AND PLAN:  1. Type 2 diabetes, uncontrolled, with neuropathy/2. Uncontrolled type 2 diabetes mellitus with both eyes affected by proliferative retinopathy and macular edema, with long-term current use of insulin/3. Uncontrolled type 2 diabetes mellitus with end-stage renal disease/4. Long-term insulin use    Diabetes currently is not controlled for age and comorbid conditions. We discussed diabetic diet and regular exercise. We discussed home blood sugar monitoring, if appropriate. Followed by endocrinology. and His wife reports blood sugars much improved since making improvements in dietary habits. I will send a prescription for his insulin to the [facility] to " see if they can help the family get his insulin going forward as his wife reports it is getting to be too expensive.  Diabetic complications addressed: Neuropathy pain controlled.  Patient was counseled on the need for yearly diabetic retinopathy exam and yearly diabetic foot exam.  He is overdue for his eye exam but his wife reports that they do not have money for co-pay at this time.  - Hemoglobin A1c; Future  - insulin (LANTUS SOLOSTAR U-100 INSULIN) glargine 100 units/mL (3mL) SubQ pen; INJECT 20 UNITS UNDER THE SKIN NIGHTLY  Dispense: 15 mL; Refill: 0    5. Essential hypertension/6. Congestive heart failure, unspecified HF chronicity, unspecified heart failure type  Discussed sodium restriction, maintaining ideal body weight and regular exercise program as physiologic means to achieve blood pressure control. The patient will strive towards this. The current medical regimen is effective;  continue present plan and medications. Recommended patient to check home readings to monitor and see me for followup as scheduled or sooner as needed. Patient was educated that both decongestant and anti-inflammatory medication may raise blood pressure.  Followed by cardiology.    7. Hyperlipidemia LDL goal <100  We discussed low fat diet and regular exercise.The current medical regimen is effective;  continue present plan and medications.      8. Seizure disorder  The current medical regimen is effective;  continue present plan and medications.     9. PVD (peripheral vascular disease)  Stable. Observe.     10. ESRD on hemodialysis/11. Secondary renal hyperparathyroidism  Continue dialysis. Followed by nephrology.    12. Atherosclerosis of aorta  Patient with Atherosclerosis of the Aorta.  Stable/asymptomatic. Currently stable on lipid lowering medication and b/p monitoring.     13. Prostate cancer  Followed by urology.    14. Dependent on walker for ambulation             Follow up in about 6 months (around 9/27/2019), or if  symptoms worsen or fail to improve, for annual exam. or sooner as needed.

## 2019-03-29 ENCOUNTER — TELEPHONE (OUTPATIENT)
Dept: FAMILY MEDICINE | Facility: CLINIC | Age: 59
End: 2019-03-29

## 2019-03-29 NOTE — TELEPHONE ENCOUNTER
Spoke with pt informed of information.  States she spoke with [facility] and will purchase medication on the April 4th.

## 2019-03-29 NOTE — TELEPHONE ENCOUNTER
----- Message from Melonie Elias MD sent at 3/28/2019  2:56 PM CDT -----  Please advise patient/wife that diabetes has improved some but A1c not at goal (down to 9.2; goal <8). Continue current regimen. She needs to let me know by next week if she is not contacted by [facility] regarding the insulin.

## 2019-04-25 DIAGNOSIS — Z12.11 ENCOUNTER FOR FIT (FECAL IMMUNOCHEMICAL TEST) SCREENING: Primary | ICD-10-CM

## 2019-04-29 ENCOUNTER — OFFICE VISIT (OUTPATIENT)
Dept: PAIN MEDICINE | Facility: CLINIC | Age: 59
End: 2019-04-29
Payer: MEDICARE

## 2019-04-29 VITALS
BODY MASS INDEX: 21.72 KG/M2 | RESPIRATION RATE: 18 BRPM | HEART RATE: 76 BPM | SYSTOLIC BLOOD PRESSURE: 168 MMHG | DIASTOLIC BLOOD PRESSURE: 88 MMHG | WEIGHT: 155.13 LBS | OXYGEN SATURATION: 100 % | HEIGHT: 71 IN

## 2019-04-29 DIAGNOSIS — R10.9 RIGHT FLANK PAIN: Primary | ICD-10-CM

## 2019-04-29 PROCEDURE — 99999 PR PBB SHADOW E&M-EST. PATIENT-LVL III: CPT | Mod: PBBFAC,,, | Performed by: PAIN MEDICINE

## 2019-04-29 PROCEDURE — 3008F BODY MASS INDEX DOCD: CPT | Mod: CPTII,S$GLB,, | Performed by: PAIN MEDICINE

## 2019-04-29 PROCEDURE — 99214 OFFICE O/P EST MOD 30 MIN: CPT | Mod: S$GLB,,, | Performed by: PAIN MEDICINE

## 2019-04-29 PROCEDURE — 99999 PR PBB SHADOW E&M-EST. PATIENT-LVL III: ICD-10-PCS | Mod: PBBFAC,,, | Performed by: PAIN MEDICINE

## 2019-04-29 PROCEDURE — 99214 PR OFFICE/OUTPT VISIT, EST, LEVL IV, 30-39 MIN: ICD-10-PCS | Mod: S$GLB,,, | Performed by: PAIN MEDICINE

## 2019-04-29 PROCEDURE — 3008F PR BODY MASS INDEX (BMI) DOCUMENTED: ICD-10-PCS | Mod: CPTII,S$GLB,, | Performed by: PAIN MEDICINE

## 2019-04-29 NOTE — PROGRESS NOTES
Subjective:     Patient ID: Kodi Ibanez Sr. is a 59 y.o. male    Chief Complaint: Back Pain (patient experiences flank pain- patient experiences lower back pain- patient experiences sharp pain- treatment w/ medication)      Referred by: Self, Aaareferral      HPI:    Initial Encounter (4/29/19):  Kodi Ibanez Sr. is a 59 y.o. male who presents today with chronic intermittent right flank pain. Today, patient states that his pain is not very bothersome and that he has not had any significant pain recently.  He is here mainly to discuss potential medication options indicates that he has pain in the future.  Patient does have significant comorbid medical conditions including end-stage renal disease on dialysis.  He is also noted to have elevated liver enzymes on most recent chemistry panels.   This pain is described in detail below.    Physical Therapy:  Not applicable    Non-pharmacologic Treatment:  Rest helps         · TENS?  Not applicable    Pain Medications:         · Currently taking:  Flexeril p.r.n.    · Has tried in the past:  Gabapentin, NSAIDs, Tylenol    · Has not tried:  Opioids, TCAs, SNRIs, topical creams    Blood thinners:  None    Interventional Therapies:  None    Relevant Surgeries:  None    Affecting sleep?  No    Affecting daily activities? no    Depressive symptoms? no          · SI/HI? No    Work status:Disabled    Pain Scores:    Best:       0/10  Worst:     1/10  Usually:   0/10  Today:    1/10    Review of Systems   Constitutional: Negative for activity change, appetite change, chills, fatigue, fever and unexpected weight change.   HENT: Negative for hearing loss.    Eyes: Negative for visual disturbance.   Respiratory: Negative for chest tightness and shortness of breath.    Cardiovascular: Negative for chest pain.   Gastrointestinal: Negative for abdominal pain, constipation, diarrhea, nausea and vomiting.   Genitourinary: Negative for difficulty urinating.   Musculoskeletal: Positive for  gait problem. Negative for back pain and neck pain.   Skin: Negative for rash.   Neurological: Positive for weakness. Negative for dizziness, light-headedness, numbness and headaches.   Psychiatric/Behavioral: Negative for hallucinations, sleep disturbance and suicidal ideas. The patient is not nervous/anxious.        Past Medical History:   Diagnosis Date    Allergy     Atherosclerosis of aorta     noted on VIKTORIA 2011    Blood transfusion     Cataracts, bilateral     CHF (congestive heart failure)     Chronic kidney disease     Clotting disorder     Coronary artery disease     Decreased appetite     Diabetic retinopathy     DM (diabetes mellitus), type 2 with renal complications     ESRD on hemodialysis     TUESDAY/ THURSDAY/SATURDAY    Hyperlipidemia     Hyperparathyroidism, unspecified     Hypertension     Myocardial infarction     Prostate cancer     Seizures     Stroke 2011    PT HAS SHAKES        Past Surgical History:   Procedure Laterality Date    CARDIAC SURGERY      CABG    CATARACT EXTRACTION      ou    CATARACT EXTRACTION, BILATERAL      cataracts      CORONARY ARTERY BYPASS GRAFT  2009    bypass    EYE SURGERY      FISTULOGRAM Left 8/21/2017    Performed [facility], [facility] at [facility] CATH LAB    FISTULOGRAM Left 8/3/2016    [facility] CATH [facility]    FISTULOGRAM Left 8/13/2014    Performed [facility] OR 2ND [facility]    FISTULOGRAM Left 10/9/2013    Performed [facility] OR 2ND [facility]    FISTULOGRAM, possible intervention - LUE Left 3/21/2016    [facility] CATH LAB    Left Guera AVF  1/11/2012    PROSTATE BIOPSY      positive    retinal laser      ROTATOR CUFF REPAIR  right, 2005    TRANSPOSITION, VEIN Left 4/1/2013    [facility] OR 2ND [facility]    VASCULAR SURGERY         Social History     Socioeconomic History    Marital status:  "     Spouse name: Not on file    Number of children: Not on file    Years of education: Not on file    Highest education level: Not on file   Occupational History    Not on file   Social Needs    Financial resource strain: Not on file    Food insecurity:     Worry: Not on file     Inability: Not on file    Transportation needs:     Medical: Not on file     Non-medical: Not on file   Tobacco Use    Smoking status: Never Smoker    Smokeless tobacco: Never Used   Substance and Sexual Activity    Alcohol use: No    Drug use: No    Sexual activity: Not Currently   Lifestyle    Physical activity:     Days per week: Not on file     Minutes per session: Not on file    Stress: Not on file   Relationships    Social connections:     Talks on phone: Not on file     Gets together: Not on file     Attends Worship service: Not on file     Active member of club or organization: Not on file     Attends meetings of clubs or organizations: Not on file     Relationship status: Not on file   Other Topics Concern    Not on file   Social History Narrative    Not on file       Review of patient's allergies indicates:   Allergen Reactions    Reglan [metoclopramide hcl] Diarrhea    Lorazepam      Other reaction(s): heavy sedation       Current Outpatient Medications on File Prior to Visit   Medication Sig Dispense Refill    amLODIPine (NORVASC) 10 MG tablet TAKE 1 TABLET(10 MG) BY MOUTH EVERY DAY 90 tablet 0    ascorbic acid, vitamin C, (VITAMIN C) 500 MG tablet Take 500 mg by mouth once daily.      atorvastatin (LIPITOR) 40 MG tablet Take 1 tablet (40 mg total) by mouth once daily. 90 tablet 1    BD INSULIN PEN NEEDLE UF ORIG 29 gauge x 1/2" Ndle USE  1 EVERY EVENING 90 each 3    blood glucose control, normal Soln 1 drop by Misc.(Non-Drug; Combo Route) route as directed. 1 each 1    blood sugar diagnostic Strp To check BG 2 times daily, to use with accuchek casa meter 200 each 3    blood-glucose meter kit " "To check BG 2 times daily, to use with accuchek casa meter 1 each 0    carvedilol (COREG) 25 MG tablet TAKE 1 TABLET(25 MG) BY MOUTH TWICE DAILY WITH MEALS 180 tablet 0    cyclobenzaprine HCl (FLEXERIL ORAL) Take by mouth.      furosemide (LASIX) 40 MG tablet TAKE 1 TABLET(40 MG) BY MOUTH EVERY DAY 90 tablet 0    glucagon, human recombinant, (GLUCAGON EMERGENCY KIT, HUMAN,) 1 mg SolR Inject 1 mg into the muscle as needed. 2 each 0    insulin (LANTUS SOLOSTAR U-100 INSULIN) glargine 100 units/mL (3mL) SubQ pen INJECT 20 UNITS UNDER THE SKIN NIGHTLY 15 mL 0    lancets Misc To check BG 2 times daily, to use with accuchek casa meter 200 each 3    losartan (COZAAR) 100 MG tablet Take 1 tablet (100 mg total) by mouth once daily. 90 tablet 3    megestrol (MEGACE) 20 MG Tab TAKE 1 TABLET(20 MG) BY MOUTH EVERY DAY 30 tablet 0    megestrol acetate (MEGACE ORAL) Take by mouth.      NOVOFINE 30 30 x 1/3 " Ndle USE AS DIRECTED WITH LANTUS SOLARSTAR 100 each 3    NOVOLOG FLEXPEN U-100 INSULIN 100 unit/mL InPn pen INJECT 5 TO 10 UNITS WITH MEALS AND AT BEDTIME DEPENDING ON BLOOD SUGAR 15 mL 0    pantoprazole (PROTONIX) 40 MG tablet TAKE 1 TABLET BY MOUTH DAILY AS NEEDED FOR HEARTBURN. TAKE ONLY AS NEEDED 90 tablet 0    promethazine (PHENERGAN) 25 MG tablet Take 1 tablet (25 mg total) by mouth every 6 (six) hours as needed for Nausea. 30 tablet 0    RENVELA 800 mg Tab Take 1 tablet (800 mg total) by mouth 3 (three) times daily with meals. 200 tablet 11    [DISCONTINUED] gabapentin (NEURONTIN) 100 MG capsule Take 1 capsule (100 mg total) by mouth once daily. 90 capsule 0     No current facility-administered medications on file prior to visit.        Objective:      BP (!) 194/92   Pulse 76   Resp 18   Ht 5' 11" (1.803 m)   Wt 70.4 kg (155 lb 1.6 oz)   SpO2 100%   BMI 21.63 kg/m²     Exam:  GEN:  Well developed, well nourished.  No acute distress.   HEENT:  No trauma.  Mucous membranes moist.  Nares patent " bilaterally.  PSYCH: Normal affect. Thought content appropriate.  CHEST:  Breathing symmetric.  No audible wheezing.  ABD: Soft, non-distended.  SKIN:  Warm, pink, dry.  No rash on exposed areas.    EXT:  No cyanosis, clubbing, or edema.  No color change or changes in nail or hair growth.  NEURO/MUSCULOSKELETAL:  Fully alert, oriented, and appropriate. Speech normal christie. No cranial nerve deficits.   Gait:  Uses rolling walker for ambulation.  No focal motor deficits.       Imaging:  Narrative     EXAMINATION:  XR THORACIC SPINE AP LATERAL    CLINICAL HISTORY:  Dorsalgia, unspecified    TECHNIQUE:  AP and lateral views of the thoracic spine were performed.    COMPARISON:  None.    FINDINGS:  Posterior vertebral alignment is satisfactory.  Vertebral body heights are well maintained.  There is no evidence for acute fracture or bone destruction.  Mild degenerative changes are identified.  No abnormal paraspinal masses are identified.   Impression       No evidence for acute fracture, bone destruction, or subluxation.    Mild degenerative changes.      Electronically signed by: Stevan Reid MD  Date: 01/30/2019  Time: 08:21       Narrative     EXAMINATION:  XR LUMBAR SPINE AP AND LATERAL    CLINICAL HISTORY:  back pain;Dorsalgia, unspecified    TECHNIQUE:  AP, lateral and spot images were performed of the lumbar spine.    COMPARISON:  None    FINDINGS:  There are 4 non rib-bearing lumbar type vertebra present.  Posterior vertebral alignment is satisfactory.  Vertebral body heights are well maintained.  There are mild degenerative changes with small anterior osteophytes present.  Mild facet arthropathy is also noted at the lumbosacral junction.  No abnormal paraspinal masses are identified.  Sacroiliac joints appear grossly unremarkable.  Atherosclerotic calcification is present within the abdominal aorta.   Impression       Four lumbar type vertebra present.    Mild degenerative changes.    No evidence for acute  fracture, bone destruction, or subluxation.      Electronically signed by: Stevan Reid MD  Date: 01/30/2019  Time: 08:19           Assessment:       Encounter Diagnosis   Name Primary?    Right flank pain Yes         Plan:       Kodi was seen today for back pain.    Diagnoses and all orders for this visit:    Right flank pain        Kodi Ibanez Sr. is a 59 y.o. male with chronic intermittent right flank pain. This pain is not very bothersome at this time.  Patient here mainly to discuss potential medication options for the future in case the pain returns..    1.  Pertinent imaging studies reviewed by me. Imaging results were discussed with patient.  2.  Okay to take Tylenol as needed for pain.  Patient does have slightly elevated liver enzymes, but I do not feel as though infrequent use of Tylenol as likely to impact this.  3.  We discussed that he should avoid the use of NSAIDs given end-stage renal disease.  4.  Okay to continue Flexeril 10 mg as needed for pain. We did discuss that he should use this sparingly as possible.  Patient has caregiver expressed that he does not like taking pain medications and takes his medication very infrequently.  5.  Okay to utilize topical medications as these are not likely to have systemic effects that may lead to end-organ damage.  6.  During today's visit blood pressure was noted to be elevated at 192/94.  Manual recheck of blood pressure showed a blood pressure 160s over 80s.  7.  Return to clinic as needed.

## 2019-05-04 DIAGNOSIS — I10 ESSENTIAL HYPERTENSION: Chronic | ICD-10-CM

## 2019-05-04 DIAGNOSIS — I50.22 CHRONIC SYSTOLIC CONGESTIVE HEART FAILURE: ICD-10-CM

## 2019-05-06 RX ORDER — CARVEDILOL 25 MG/1
TABLET ORAL
Qty: 180 TABLET | Refills: 1 | Status: SHIPPED | OUTPATIENT
Start: 2019-05-06 | End: 2019-01-01 | Stop reason: SDUPTHER

## 2019-05-06 RX ORDER — FUROSEMIDE 40 MG/1
TABLET ORAL
Qty: 90 TABLET | Refills: 1 | Status: SHIPPED | OUTPATIENT
Start: 2019-05-06 | End: 2019-01-01 | Stop reason: SDUPTHER

## 2019-05-14 ENCOUNTER — OFFICE VISIT (OUTPATIENT)
Dept: FAMILY MEDICINE | Facility: CLINIC | Age: 59
End: 2019-05-14
Payer: MEDICARE

## 2019-05-14 VITALS
BODY MASS INDEX: 20.91 KG/M2 | HEIGHT: 71 IN | WEIGHT: 149.38 LBS | TEMPERATURE: 99 F | HEART RATE: 87 BPM | SYSTOLIC BLOOD PRESSURE: 140 MMHG | DIASTOLIC BLOOD PRESSURE: 60 MMHG | OXYGEN SATURATION: 99 %

## 2019-05-14 DIAGNOSIS — J40 BRONCHITIS: ICD-10-CM

## 2019-05-14 DIAGNOSIS — J32.9 SINUSITIS, UNSPECIFIED CHRONICITY, UNSPECIFIED LOCATION: Primary | ICD-10-CM

## 2019-05-14 DIAGNOSIS — R62.7 FAILURE TO THRIVE IN ADULT: ICD-10-CM

## 2019-05-14 PROCEDURE — 3008F PR BODY MASS INDEX (BMI) DOCUMENTED: ICD-10-PCS | Mod: CPTII,S$GLB,, | Performed by: NURSE PRACTITIONER

## 2019-05-14 PROCEDURE — 99214 PR OFFICE/OUTPT VISIT, EST, LEVL IV, 30-39 MIN: ICD-10-PCS | Mod: S$GLB,,, | Performed by: NURSE PRACTITIONER

## 2019-05-14 PROCEDURE — 99999 PR PBB SHADOW E&M-EST. PATIENT-LVL V: CPT | Mod: PBBFAC,,, | Performed by: NURSE PRACTITIONER

## 2019-05-14 PROCEDURE — 99214 OFFICE O/P EST MOD 30 MIN: CPT | Mod: S$GLB,,, | Performed by: NURSE PRACTITIONER

## 2019-05-14 PROCEDURE — 3046F PR MOST RECENT HEMOGLOBIN A1C LEVEL > 9.0%: ICD-10-PCS | Mod: CPTII,S$GLB,, | Performed by: NURSE PRACTITIONER

## 2019-05-14 PROCEDURE — 3046F HEMOGLOBIN A1C LEVEL >9.0%: CPT | Mod: CPTII,S$GLB,, | Performed by: NURSE PRACTITIONER

## 2019-05-14 PROCEDURE — 99999 PR PBB SHADOW E&M-EST. PATIENT-LVL V: ICD-10-PCS | Mod: PBBFAC,,, | Performed by: NURSE PRACTITIONER

## 2019-05-14 PROCEDURE — 3008F BODY MASS INDEX DOCD: CPT | Mod: CPTII,S$GLB,, | Performed by: NURSE PRACTITIONER

## 2019-05-14 RX ORDER — LOSARTAN POTASSIUM 100 MG/1
100 TABLET ORAL DAILY
Qty: 90 TABLET | Refills: 3 | Status: SHIPPED | OUTPATIENT
Start: 2019-05-14 | End: 2019-01-01 | Stop reason: SDUPTHER

## 2019-05-14 RX ORDER — AMOXICILLIN 875 MG/1
875 TABLET, FILM COATED ORAL EVERY 12 HOURS
Qty: 20 TABLET | Refills: 0 | Status: SHIPPED | OUTPATIENT
Start: 2019-05-14 | End: 2019-01-01

## 2019-05-14 RX ORDER — MEGESTROL ACETATE 20 MG/1
20 TABLET ORAL DAILY
Qty: 90 TABLET | Refills: 3 | Status: SHIPPED | OUTPATIENT
Start: 2019-05-14 | End: 2019-01-01 | Stop reason: SDUPTHER

## 2019-05-14 RX ORDER — BENZONATATE 200 MG/1
200 CAPSULE ORAL 3 TIMES DAILY PRN
Qty: 30 CAPSULE | Refills: 0 | Status: SHIPPED | OUTPATIENT
Start: 2019-05-14 | End: 2019-05-24

## 2019-05-14 NOTE — PATIENT INSTRUCTIONS
Amoxil 875 mg one twice a day for the next 10 days  Tessalon 200 mg one every 8 hours as needed for cough  Ok to continue Mucinex   Corcidin HPB for congestion   Mail back fit kit  Make appointment with eye doctor when able too

## 2019-05-14 NOTE — PROGRESS NOTES
Subjective:       Patient ID: Kodi Ibanez Sr. is a 59 y.o. male.    Chief Complaint: Cough (6 days); Chest Congestion (6 days); and Nasal Congestion (6 days)    59-year-old male presents to the clinic today with his wife with complaint of low-grade fever, chills, sinus congestion, coughing, chest congestion, and decreased appetite x6 days.  He denies any sore throat, ear pain, wheezing, shortness of breath, abdominal pain, nausea, vomiting, or diarrhea.  He denies any headaches, dizziness, or blurred vision.  He denies any cardiac chest pain, heart palpitations, shortness of breath, or swelling to lower extremities.  He had dialysis today.  His wife says his blood sugars have been running good because he has not been eating very good the last week.    Past Medical History:   Diagnosis Date    Allergy     Atherosclerosis of aorta     noted on VIKTORIA 2011    Blood transfusion     Cataracts, bilateral     CHF (congestive heart failure)     Chronic kidney disease     Clotting disorder     Coronary artery disease     Decreased appetite     Diabetic retinopathy     DM (diabetes mellitus), type 2 with renal complications     ESRD on hemodialysis     TUESDAY/ THURSDAY/SATURDAY    Hyperlipidemia     Hyperparathyroidism, unspecified     Hypertension     Myocardial infarction     Prostate cancer     Seizures     Stroke 2011    PT HAS SHAKES      Past Surgical History:   Procedure Laterality Date    CARDIAC SURGERY      CABG    CATARACT EXTRACTION      ou    CATARACT EXTRACTION, BILATERAL      cataracts      CORONARY ARTERY BYPASS GRAFT  2009    bypass    EYE SURGERY      FISTULOGRAM Left 8/21/2017    [facility]    FISTULOGRAM Left 8/3/2016    Per[facility] CATH LAB    FISTULOGRAM Left 8/13/2014    [facility] at [facility] OR 2ND FLR    FISTULOGRAM Left 10/9/2013    [facility]formed [facility] at  [facility] OR 2ND FLR    FISTULOGRAM, possible intervention - LUE Left 3/21/2016    [facility] at [facility] CATH LAB    Left Guera AVF  1/11/2012    PROSTATE BIOPSY      positive    retinal laser      ROTATOR CUFF REPAIR  right, 2005    TRANSPOSITION, VEIN Left 4/1/2013    Performed [facility] at [facility] OR 2ND [facility]    VASCULAR SURGERY        reports that he has never smoked. He has never used smokeless tobacco. He reports that he does not drink alcohol or use drugs.  Review of Systems   Constitutional: Positive for appetite change, chills and fever. Negative for fatigue.   HENT: Positive for congestion. Negative for ear discharge, ear pain, nosebleeds, postnasal drip, rhinorrhea, sinus pressure, sneezing and sore throat.    Respiratory: Positive for cough. Negative for shortness of breath and wheezing.    Cardiovascular: Negative for chest pain, palpitations and leg swelling.   Gastrointestinal: Negative for abdominal pain, constipation, diarrhea, nausea and vomiting.   Musculoskeletal: Positive for gait problem. Negative for back pain and neck pain.        Uses rolator    Skin: Negative for color change and rash.   Neurological: Negative for dizziness, light-headedness and headaches.       Objective:      Physical Exam    Assessment:       1. Sinusitis, unspecified chronicity, unspecified location    2. Bronchitis    3. Failure to thrive in adult    4. Uncontrolled type 2 diabetes mellitus with proliferative retinopathy without macular edema, with long-term current use of insulin        Plan:         Sinusitis, unspecified chronicity, unspecified location  -     amoxicillin (AMOXIL) 875 MG tablet; Take 1 tablet (875 mg total) by mouth every 12 (twelve) hours.  Dispense: 20 tablet; Refill: 0    Bronchitis  -     benzonatate (TESSALON) 200 MG capsule; Take 1 capsule (200 mg total) by mouth 3 (three) times daily as needed for Cough.  Dispense: 30 capsule; Refill: 0    Failure to  thrive in adult  -     megestrol (MEGACE) 20 MG Tab; Take 1 tablet (20 mg total) by mouth once daily.  Dispense: 90 tablet; Refill: 3    Uncontrolled type 2 diabetes mellitus with proliferative retinopathy without macular edema, with long-term current use of insulin  -     losartan (COZAAR) 100 MG tablet; Take 1 tablet (100 mg total) by mouth once daily.  Dispense: 90 tablet; Refill: 3

## 2019-06-04 DIAGNOSIS — E78.5 HYPERLIPIDEMIA LDL GOAL <100: Chronic | ICD-10-CM

## 2019-06-04 RX ORDER — ATORVASTATIN CALCIUM 40 MG/1
TABLET, FILM COATED ORAL
Qty: 90 TABLET | Refills: 1 | Status: SHIPPED | OUTPATIENT
Start: 2019-06-04 | End: 2019-01-01 | Stop reason: SDUPTHER

## 2019-07-16 PROBLEM — R00.0 TACHYCARDIA: Status: ACTIVE | Noted: 2019-01-01

## 2019-07-16 PROBLEM — Z99.89 DEPENDENT ON WALKER FOR AMBULATION: Chronic | Status: ACTIVE | Noted: 2019-03-27

## 2019-07-16 PROBLEM — R79.89 ELEVATED TROPONIN: Status: ACTIVE | Noted: 2019-01-01

## 2019-07-16 PROBLEM — I47.10 SVT (SUPRAVENTRICULAR TACHYCARDIA): Status: ACTIVE | Noted: 2019-01-01

## 2019-07-16 NOTE — ED PROVIDER NOTES
Encounter Date: 7/16/2019    SCRIBE #1 NOTE: I, Hector Mason, am scribing for, and in the presence of,  Ugo Deshpande MD. I have scribed the following portions of the note - Other sections scribed: HPI, ROS, PE, DD.       History     Chief Complaint   Patient presents with    Tachycardia     Patient presents to the ER with wife via personal vehicle. Patient presents with tachycardia. report being at dialysis and was told his heart rate was elevated. Patient denies chest pain, n/v/d.      CC: Tachycardia     HPI: This 59 y.o M presents to the ED accompanied by his wife c/o tachycardia this AM. The pt was seen at dialysis this AM, where he was told that he was tachycardic. The pt was last dialyzed 7/14 (Sun), but usually receives dialysis (Tues, Thurs, Sat). The pt was last seen in this ED and admitted into the hospital 3/12 for a NSTEMI after presenting with tachycardia. He has not taken any of his Rx'ed medications this AM. He denies fever, chills, diaphoresis, nausea, emesis, diarrhea, abdominal pain, chest pain, palpitations, dysuria and hematuria. No prior tx.      PMHx: Atherosclerosis of aorta, Blood transfusion, Cataracts, bilateral, CHF, CKD, Clotting disorder, CAD, Decreased appetite, Diabetic retinopathy, DM type 2 with renal complications, ESRD on hemodialysis, HLD, Hyperparathyroidism, unspecified, HTN, MI, Prostate cancer, Seizures, and Stroke.        Review of patient's allergies indicates:   Allergen Reactions    Reglan [metoclopramide hcl] Diarrhea    Lorazepam      Other reaction(s): heavy sedation     Past Medical History:   Diagnosis Date    Allergy     Atherosclerosis of aorta     noted on VIKTORIA 2011    Blood transfusion     Cataracts, bilateral     CHF (congestive heart failure)     Chronic kidney disease     Clotting disorder     Coronary artery disease     Decreased appetite     Diabetic retinopathy     DM (diabetes mellitus), type 2 with renal complications     ESRD on  hemodialysis     TUESDAY/ THURSDAY/SATURDAY    Hyperlipidemia     Hyperparathyroidism, unspecified     Hypertension     Myocardial infarction     Prostate cancer     Seizures     Stroke 2011    PT HAS SHAKES      Past Surgical History:   Procedure Laterality Date    CARDIAC SURGERY      CABG    CATARACT EXTRACTION      ou    CATARACT EXTRACTION, BILATERAL      cataracts      CORONARY ARTERY BYPASS GRAFT  2009    bypass    EYE SURGERY      FISTULOGRAM Left 8/21/2017    [facility]    FISTULOGRAM Left 8/3/2016    Per[facility] CATH LAB    FISTULOGRAM Left 8/13/2014    Per[facility] by [facility] OR 2ND FLR    FISTULOGRAM Left 10/9/2013    Per[facility] OR 2ND FLR    FISTULOGRAM, possible intervention - LUE Left 3/21/2016    Per[facility] CATH LAB    Left Guera AVF  1/11/2012    PROSTATE BIOPSY      positive    retinal laser      ROTATOR CUFF REPAIR  right, 2005    TRANSPOSITION, VEIN Left 4/1/2013    Performed by [facility] OR [facility] FLR    VASCULAR SURGERY       Family History   Problem Relation Age of Onset    Diabetes Mother     Glaucoma Mother     Cancer Mother     Cataracts Mother     Heart disease Father     Cancer Paternal Grandmother     Heart disease Maternal Grandmother     No Known Problems Sister     Prostate cancer Brother     No Known Problems Maternal Grandfather     No Known Problems Paternal Grandfather     No Known Problems Sister     No Known Problems Sister     Hypertension Brother     Cancer Paternal Uncle     Amblyopia Neg Hx     Blindness Neg Hx     Macular degeneration Neg Hx     Retinal detachment Neg Hx     Strabismus Neg Hx     Stroke Neg Hx     Thyroid disease Neg Hx     Anesthesia problems Neg Hx     Clotting disorder Neg Hx      problems Neg Hx     Kidney cancer Neg Hx      Kidney disease Neg Hx     Malignant hyperthermia Neg Hx     Sickle cell trait Neg Hx     Lupus Neg Hx     Urolithiasis Neg Hx     Sudden death Neg Hx      Social History     Tobacco Use    Smoking status: Never Smoker    Smokeless tobacco: Never Used   Substance Use Topics    Alcohol use: No    Drug use: No     Review of Systems   Constitutional: Negative for chills, diaphoresis and fever.   HENT: Negative for rhinorrhea and sore throat.    Eyes: Negative for redness.   Respiratory: Negative for cough and shortness of breath.    Cardiovascular: Negative for chest pain and palpitations.   Gastrointestinal: Negative for abdominal pain, diarrhea, nausea and vomiting.   Genitourinary: Negative for dysuria.   Musculoskeletal: Negative for back pain.   Skin: Negative for color change and rash.   Neurological: Negative for syncope and weakness.   Psychiatric/Behavioral: The patient is not nervous/anxious.        Physical Exam     Initial Vitals [07/16/19 0634]   BP Pulse Resp Temp SpO2   (!) 171/98 (!) 144 18 98.9 °F (37.2 °C) 100 %      MAP       --         Physical Exam    Nursing note and vitals reviewed.  Constitutional: He is not diaphoretic. No distress.   HENT:   Head: Normocephalic and atraumatic.   Mouth/Throat: Oropharynx is clear and moist.   Eyes: EOM are normal. Pupils are equal, round, and reactive to light. No scleral icterus.   Neck: Normal range of motion. Neck supple. No JVD present.   Cardiovascular: Regular rhythm and intact distal pulses. Tachycardia present.    Pulmonary/Chest: Breath sounds normal. No stridor. No respiratory distress.   Abdominal: Soft. Bowel sounds are normal. He exhibits no distension. There is no tenderness.   Musculoskeletal: Normal range of motion. He exhibits no edema or tenderness.   Left upper extremity AV fistula with palpable thrill   Neurological: He is alert and oriented to person, place, and time. He has normal strength. No cranial nerve deficit or sensory  deficit. GCS score is 15. GCS eye subscore is 4. GCS verbal subscore is 5. GCS motor subscore is 6.   Skin: Skin is warm and dry. No rash noted.   Psychiatric: He has a normal mood and affect.         ED Course   Procedures  Labs Reviewed   CBC W/ AUTO DIFFERENTIAL - Abnormal; Notable for the following components:       Result Value    RBC 3.98 (*)     Hemoglobin 11.3 (*)     Hematocrit 35.4 (*)     Mean Corpuscular Hemoglobin Conc 31.9 (*)     RDW 17.2 (*)     All other components within normal limits   COMPREHENSIVE METABOLIC PANEL - Abnormal; Notable for the following components:    Glucose 212 (*)     BUN, Bld 48 (*)     Creatinine 8.3 (*)     Alkaline Phosphatase 192 (*)     eGFR if  7 (*)     eGFR if non  6 (*)     All other components within normal limits   TROPONIN I - Abnormal; Notable for the following components:    Troponin I 0.040 (*)     All other components within normal limits   B-TYPE NATRIURETIC PEPTIDE - Abnormal; Notable for the following components:     (*)     All other components within normal limits   TROPONIN I - Abnormal; Notable for the following components:    Troponin I 0.043 (*)     All other components within normal limits   ISTAT PROCEDURE - Abnormal; Notable for the following components:    POC Glucose 200 (*)     POC BUN 43 (*)     POC Creatinine 8.6 (*)     POC Anion Gap 17 (*)     POC Hematocrit 35 (*)     All other components within normal limits   PROTIME-INR   MAGNESIUM   ISTAT CHEM8     EKG Readings: (Independently Interpreted)   Initial Reading: No STEMI. Rhythm: Sinus Tachycardia. Heart Rate: 144. Ectopy: No Ectopy. Conduction: Normal. ST Segments: Normal ST Segments. T Waves: Normal.     ECG Results          EKG 12-lead (In process)  Result time 07/16/19 11:30:43    In process by Interface, Lab In [facility] (07/16/19 11:30:43)                 Narrative:    Test Reason : R00.0,    Vent. Rate : 082 BPM     Atrial Rate : 082 BPM     P-R Int  : 154 ms          QRS Dur : 080 ms      QT Int : 370 ms       P-R-T Axes : 049 015 059 degrees     QTc Int : 432 ms    Normal sinus rhythm  Low voltage QRS  Cannot rule out Anteroseptal infarct (cited on or before 16-JUL-2019)  Abnormal ECG  When compared with ECG of 16-JUL-2019 10:48,  Significant changes have occurred    Referred By: [email or web address]   SELF           Confirmed By:                   In process by Interface, Lab In [facility] (07/16/19 11:25:16)                 Narrative:    Test Reason : R00.0,    Vent. Rate : 082 BPM     Atrial Rate : 082 BPM     P-R Int : 154 ms          QRS Dur : 080 ms      QT Int : 370 ms       P-R-T Axes : 049 015 059 degrees     QTc Int : 432 ms    Normal sinus rhythm  Low voltage QRS  Cannot rule out Anteroseptal infarct (cited on or before 16-JUL-2019)  Abnormal ECG  When compared with ECG of 16-JUL-2019 10:48,  Significant changes have occurred    Referred By: AAAREFERR   SELF           Confirmed By:                              EKG 12-lead (In process)  Result time 07/16/19 11:30:41    In process by Interface, Lab In [facility] (07/16/19 11:30:41)                 Narrative:    Test Reason : R00.0,    Vent. Rate : 141 BPM     Atrial Rate : 141 BPM     P-R Int : 150 ms          QRS Dur : 080 ms      QT Int : 274 ms       P-R-T Axes : 054 026 048 degrees     QTc Int : 419 ms    Sinus tachycardia  Low voltage QRS  Cannot rule out Anteroseptal infarct (cited on or before 16-JUL-2019)  Abnormal ECG  When compared with ECG of 16-JUL-2019 10:10,  Significant changes have occurred    Referred By: AAAREFERR   SELF           Confirmed By:                   In process by Interface, Lab In [facility] (07/16/19 11:25:23)                 Narrative:    Test Reason : R00.0,    Vent. Rate : 141 BPM     Atrial Rate : 141 BPM     P-R Int : 150 ms          QRS Dur : 080 ms      QT Int : 274 ms       P-R-T Axes : 054 026 048 degrees     QTc Int : 419 ms    Sinus tachycardia  Low voltage QRS  Cannot rule out  Anteroseptal infarct (cited on or before 16-JUL-2019)  Abnormal ECG  When compared with ECG of 16-JUL-2019 10:10,  Significant changes have occurred    Referred By: AAAREFERR   SELF           Confirmed By:                              Rhythm strip (In process)  Result time 07/16/19 11:30:41    In process by Interface, Lab In [facility] (07/16/19 11:30:41)                 Narrative:    Test Reason : R00.0,    Vent. Rate : 145 BPM     Atrial Rate : 182 BPM     P-R Int : 000 ms          QRS Dur : 078 ms      QT Int : 344 ms       P-R-T Axes : 000 176 040 degrees     QTc Int : 534 ms    Supraventricular tachycardia  Right axis deviation  Low voltage QRS  Cannot rule out Anteroseptal infarct (cited on or before 16-JUL-2019)  Abnormal ECG  When compared with ECG of 13-MAR-2019 11:04,  Significant changes have occurred    Referred By: AAAREFERR   SELF           Confirmed By:                   In process by Interface, Lab In [facility] (07/16/19 11:26:32)                 Narrative:    Test Reason : R00.0,    Vent. Rate : 145 BPM     Atrial Rate : 182 BPM     P-R Int : 000 ms          QRS Dur : 078 ms      QT Int : 344 ms       P-R-T Axes : 000 176 040 degrees     QTc Int : 534 ms    Supraventricular tachycardia  Right axis deviation  Low voltage QRS  Cannot rule out Anteroseptal infarct (cited on or before 16-JUL-2019)  Abnormal ECG  When compared with ECG of 16-JUL-2019 06:42,  Significant changes have occurred    Referred By: AAAREFERR   SELF           Confirmed By:                             Imaging Results          X-Ray Chest AP Portable (Final result)  Result time 07/16/19 09:12:22    Final result by Moncho Gray MD (07/16/19 09:12:22)                 Impression:      No acute cardiopulmonary processes.      Electronically signed by: Moncho Gray MD  Date:    07/16/2019  Time:    09:12             Narrative:    EXAMINATION:  XR CHEST AP PORTABLE    CLINICAL HISTORY:  tachycardia;    TECHNIQUE:  Single frontal view of the  chest was performed.    COMPARISON:  Chest 03/12/2019    FINDINGS:  Again postoperative changes from median sternotomy for cardiac surgery remain without significant change.  Heart size is within normal limits.  Lungs are well inflated.  There is no lobar consolidations or pneumothorax or pulmonary vascular congestion or pleural effusions.  There are cardiac monitoring leads over the chest.    There is a vascular stent along the medial aspect of the left arm.                              X-Rays:   Independently Interpreted Readings:   Chest X-Ray: No infiltrates.  No acute abnormalities.     Medical Decision Making:   History:   Old Medical Records: I decided to obtain old medical records.  Old Records Summarized: records from previous admission(s).       <> Summary of Records: Past admission for NSTEMI  Differential Diagnosis:   Electrolyte abnormality, occult infection, arrhythmia, ACS, fluid overload, CHF exacerbation, need for dialysis  Independently Interpreted Test(s):   I have ordered and independently interpreted X-rays - see prior notes.  I have ordered and independently interpreted EKG Reading(s) - see prior notes  Clinical Tests:   Lab Tests: Ordered and Reviewed  Radiological Study: Ordered and Reviewed  Medical Tests: Ordered and Reviewed  ED Management:  Patient is afebrile and in no acute distress at time history and physical.  EKG appears to be sinus tachycardia.  Patient is not hypotensive, tachypneic or hypoxic.  I-STAT Chem 8 with normal potassium.  Labs notable for mild elevation in BNP.  Chest x-ray does not appear to to show pulmonary edema. Patient remained tachycardic emergency department.  Patient given 500 cc fluid bolus due to dialysis test.  Patient has had no change in heart rate.  Attempted vagal maneuvers patient has had no change in heart rate.  Patient is without significant electrolyte abnormality.  Troponin is mildly elevated at 0.04.    1030h: Case discussed with Cardiology  (Lamont Britt MD) who recommended a trial of adenosine to see if there is any flutter. Starting anio drip if there is flutter. Otherwise, administer beta blocker and admit pt.    1100h:  The patient administered adenosine 6 mg IV after which heart rate decreased into the 80s.  Repeat EKG demonstrates sinus rhythm rate of 82 beats per minute. Patient reports no change in symptoms. Patient is to be placed in observation for serial troponins, cardiac monitoring, cardiology evaluation    I spent a total of 60 minutes caring for and overseeing the critical care of this patient. Critical care time was spent treating or preventing major adverse outcome resulting from tachycardia.  Patient was specifically observed and treated with history and physical examination, i-STAT Chem 8, EKG, interpretation of EKG, chest x-ray, interpretation of chest x-ray, IV hydration, reassessment, cardiology consultation, adenosine, reassess, discussion of findings and plan of care with patient and family followed by discussion with hospitalist and placement in observation  Other:   I have discussed this case with another health care provider.       <> Summary of the Discussion: I have discussed the patient's presentation and workup with the hospitalist who agrees with placing the patient in the hospital.  I have placed orders for the hospitalist.     This chart was completed using dictation software, as a result there may be some transcription errors.             Scribe Attestation:   Scribe #1: I performed the above scribed service and the documentation accurately describes the services I performed. I attest to the accuracy of the note.    Attending Attestation:           Physician Attestation for Scribe:  Physician Attestation Statement for Scribe #1: I, Ugo Deshpande MD, reviewed documentation, as scribed by Hector Mason in my presence, and it is both accurate and complete.                    Clinical Impression:       ICD-10-CM  ICD-9-CM   1. Elevated troponin R74.8 790.6   2. Tachycardia R00.0 785.0         Disposition:   Disposition: Placed in Observation  Condition: Fair                        Ugo Deshpande MD  07/16/19 1136       Ugo Deshpande MD  07/16/19 1248

## 2019-07-16 NOTE — ED TRIAGE NOTES
Patient presents with tachycardia HR's 140's report being at dialysis and was told his heart rate was elevated. Patient denies chest pain, n/v/d

## 2019-07-16 NOTE — HOSPITAL COURSE
Mr. Ibanez was placed in observation for further evaluation treatment of the presenting with tachycardia.  He was found to be tachycardic and outpatient hemodialysis appointment this morning and was not dialyzed but rather directed to the ED.  Tachycardic in ED that did not respond to small IV fluid bolus and vagal maneuvers.  Received 6 mg adenosine x1 with resolution.  He was seen and evaluated by Cardiology who recommend changing amlodipine to diltiazem and referring for outpatient electro physiology evaluation.  Since he missed dialysis today, Nephrology was also consulted and wrote orders for dialysis.  He was dialyzed without issue.  All findings and plan discussed with patient, all questions answered, he verbalizes understanding and agreement.  Discharged home in stable condition to follow up with electrophysiology.

## 2019-07-16 NOTE — ASSESSMENT & PLAN NOTE
Suspect demand ischemia from tachycardia and not ACS. Repeat echo. Will not repeat ischemic evaluation at this time

## 2019-07-16 NOTE — ASSESSMENT & PLAN NOTE
Patient H/H stable and consistent with baseline. No evidence acute bleeding or indication for transfusion at this time.

## 2019-07-16 NOTE — ASSESSMENT & PLAN NOTE
Mild elevation with so far flat trend in setting of ESRD, no complaint of chest pain, suspect demand from tachycardia and impaired renal clearance, cardiology consulted, appreciate recs.

## 2019-07-16 NOTE — DISCHARGE INSTRUCTIONS
Start taking cardizem its antiarrhythmic medication to help control heart rate and rhythm . Follow up with cardiology soon as possible . Take your medications do not skip does. Stop taking norvasc.    Complete medications as ordered  Follow all discharge instructions.  Please schedule follow up appointments as necessary      When to Call Your Doctor  Call your doctor immediately if you have any of the following:  · Severe headache  · Severe dizziness, or fainting  · Nausea or vomiting  · Fast heartbeat (higher than 100 beats per minute)  · Fever or chills  · Swollen ankles  · Weakness  · Chest Pain attacks that last longer, occur more often, or are more severe than in the past

## 2019-07-16 NOTE — HPI
59 y.o. male with CAD, ESRD on HD (TTS), hypertension, hyperlipidemia, chronic heart failure, seizures, DM 2, impaired mobility ambulates with a cane, and history of CVA presents via personal vehicle from outpatient dialysis treatment where he was told that he had an elevated heart rate and should go to the emergency room.  He denies fever, chills, SOB, chest pain, orthopnea, PND, lower extremity edema, palpitations, dizziness, syncope, or any other associated complaints.  States he has been taking his carvedilol only at night, not b.i.d..  In the ED, EKG appeared to be sinus tachycardia.  Initially received a 500 mL normal saline bolus and vagal maneuvers without improvement.  Cardiology recommended trial of adenosine.  One 6 mg dose resolve the tachycardia.  Routine labs revealed a minimal elevation of troponin and BNP.  He did not receive outpatient dialysis this morning.  Placed in observation for hemodialysis and cardiac monitoring.

## 2019-07-16 NOTE — NURSING
1307- patient arrived from emergency room by stretcher assisted to bed assessment completed plan of care reviewed with patient and plan of care for today written down on the board . Patient responses are slow his affect is flat . Reviewed medications he takes from home . Bed alarm on call light in reach head of bed up rails up x 3 . Telemetry box 0249 placed on patient , scoping sinus rhythm 83 .       1719 - 1713 tele monitor na reports patient had 12 beats of vt on monitor I went to his room he was eating supper meal denies feeling short of breathing or palpitations  , I reported findings on monitor strip to ted rocha np  No new orders received . Patient has orders to go to dialysis , both consults have been notified .       1742- patient scoping sinus rhythm on monitor now going by bed to dialysis I have already given report to August CAMEJO in dialysis full report on patient prior to patients arrival to [facility] . Continue to monitor. Patient ate all of supper meal and covered him with novolog insulin per low correction dose for his blood sugar of 305 mg per dl .

## 2019-07-16 NOTE — ASSESSMENT & PLAN NOTE
Resolved with 6 mg adenosine x1, continue carvedilol and monitor on tele, cardiology consult, appreciate recs.

## 2019-07-16 NOTE — H&P
[facility] Medicine  History & Physical    Patient Name: Kodi Ibanez Sr.  MRN: [id number]  Admission Date: 7/16/2019  Attending Physician: Faustino Quiñones MD   Primary Care Provider: Melonie Elias MD         Patient information was obtained from patient, past medical records and ER records.     Subjective:     Principal Problem:Tachycardia    Chief Complaint:   Chief Complaint   Patient presents with    Tachycardia     Patient presents to the ER with wife via personal vehicle. Patient presents with tachycardia. report being at dialysis and was told his heart rate was elevated. Patient denies chest pain, n/v/d.         HPI: 59 y.o. male with CAD, ESRD on HD (TTS), hypertension, hyperlipidemia, chronic heart failure, seizures, DM 2, impaired mobility ambulates with a cane, and history of CVA presents via personal vehicle from outpatient dialysis treatment where he was told that he had an elevated heart rate and should go to the emergency room.  He denies fever, chills, SOB, chest pain, orthopnea, PND, lower extremity edema, palpitations, dizziness, syncope, or any other associated complaints.  States he has been taking his carvedilol only at night, not b.i.d..  In the ED, EKG appeared to be sinus tachycardia.  Initially received a 500 mL normal saline bolus and vagal maneuvers without improvement.  Cardiology recommended trial of adenosine.  One 6 mg dose resolve the tachycardia.  Routine labs revealed a minimal elevation of troponin and BNP.  He did not receive outpatient dialysis this morning.  Placed in observation for hemodialysis and cardiac monitoring.    Past Medical History:   Diagnosis Date    Allergy     Ambulates with cane     Atherosclerosis of aorta     noted on VIKTORIA 2011    Blood transfusion     Cataracts, bilateral     CHF (congestive heart failure)     Chronic kidney disease     Clotting disorder     Coronary artery disease     Decreased appetite      "Diabetic retinopathy     DM (diabetes mellitus), type 2 with renal complications     ESRD on hemodialysis     TUESDAY/ THURSDAY/SATURDAY    Hyperlipidemia     Hyperparathyroidism, unspecified     Hypertension     Myocardial infarction     Prostate cancer 2016    7/16/19:  wife states "he doesn't have it anymore"    Seizures     Stroke 2011    PT HAS SHAKES     Unsteady gait     Uses roller walker        Past Surgical History:   Procedure Laterality Date    CARDIAC SURGERY      CABG    CATARACT EXTRACTION      ou    CATARACT EXTRACTION, BILATERAL      cataracts      CORONARY ARTERY BYPASS GRAFT  2009    bypass    EYE SURGERY      FISTULOGRAM Left 8/21/2017    [facility] CATH [facility]    FISTULOGRAM Left 8/3/2016    [facility] CATH [facility]    FISTULOGRAM Left 8/13/2014    [facility]    FISTULOGRAM Left 10/9/2013    [facility]    FISTULOGRAM, possible intervention - LUE Left 3/21/2016    [facility] at [facility] CATH LAB    Left Guera AVF  1/11/2012    PROSTATE BIOPSY      positive    retinal laser      ROTATOR CUFF REPAIR  right, 2005    TRANSPOSITION, VEIN Left 4/1/2013    Performed by [facility], [facility] at [facility] OR [address] [place] [address]    VASCULAR SURGERY         Review of patient's allergies indicates:   Allergen Reactions    Reglan [metoclopramide hcl] Diarrhea    Lorazepam      Other reaction(s): heavy sedation       No current facility-administered medications on file prior to encounter.      Current Outpatient Medications on File Prior to Encounter   Medication Sig    amLODIPine (NORVASC) 10 MG tablet TAKE 1 TABLET(10 MG) BY MOUTH EVERY DAY    ascorbic acid, vitamin C, (VITAMIN C) 500 MG tablet Take 500 mg by mouth once daily.    atorvastatin (LIPITOR) 40 MG tablet TAKE 1 TABLET(40 MG) BY MOUTH EVERY DAY    carvedilol " "(COREG) 25 MG tablet TAKE 1 TABLET(25 MG) BY MOUTH TWICE DAILY WITH MEALS    furosemide (LASIX) 40 MG tablet TAKE 1 TABLET(40 MG) BY MOUTH EVERY DAY    megestrol (MEGACE) 20 MG Tab Take 1 tablet (20 mg total) by mouth once daily.    promethazine (PHENERGAN) 25 MG tablet Take 1 tablet (25 mg total) by mouth every 6 (six) hours as needed for Nausea.    BD INSULIN PEN NEEDLE UF ORIG 29 gauge x 1/2" Ndle USE  1 EVERY EVENING    blood glucose control, normal Soln 1 drop by Misc.(Non-Drug; Combo Route) route as directed.    blood sugar diagnostic Strp To check BG 2 times daily, to use with accuchek casa meter    blood-glucose meter kit To check BG 2 times daily, to use with accuchek casa meter    insulin (LANTUS SOLOSTAR U-100 INSULIN) glargine 100 units/mL (3mL) SubQ pen INJECT 20 UNITS UNDER THE SKIN NIGHTLY    lancets [facility] To check BG 2 times daily, to use with accuchek casa meter    losartan (COZAAR) 100 MG tablet Take 1 tablet (100 mg total) by mouth once daily.    NOVOFINE 30 30 x 1/3 " Ndle USE AS DIRECTED WITH LANTUS SOLARSTAR    NOVOLOG FLEXPEN U-100 INSULIN 100 unit/mL InPn pen INJECT 5 TO 10 UNITS WITH MEALS AND AT BEDTIME DEPENDING ON BLOOD SUGAR    [DISCONTINUED] amoxicillin (AMOXIL) 875 MG tablet Take 1 tablet (875 mg total) by mouth every 12 (twelve) hours.    [DISCONTINUED] pantoprazole (PROTONIX) 40 MG tablet TAKE 1 TABLET BY MOUTH DAILY AS NEEDED FOR HEARTBURN. TAKE ONLY AS NEEDED    [DISCONTINUED] RENVELA 800 mg Tab Take 1 tablet (800 mg total) by mouth 3 (three) times daily with meals.     Family History     Problem Relation (Age of Onset)    Cancer Mother, Paternal Grandmother, Paternal Uncle    Cataracts Mother    Diabetes Mother    Glaucoma Mother    Heart disease Father, Maternal Grandmother    Hypertension Brother    No Known Problems Sister, Maternal Grandfather, Paternal Grandfather, Sister, Sister    Prostate cancer Brother        Tobacco Use    Smoking status: Never Smoker "    Smokeless tobacco: Never Used   Substance and Sexual Activity    Alcohol use: No    Drug use: No    Sexual activity: Not Currently     Review of Systems   Constitutional: Negative for chills and fever.   Eyes: Negative for photophobia and visual disturbance.   Respiratory: Negative for cough and shortness of breath.    Cardiovascular: Negative for chest pain, palpitations and leg swelling.        Elevated heart rate at dialysis   Gastrointestinal: Negative for abdominal pain, diarrhea, nausea and vomiting.   Genitourinary: Negative for frequency, hematuria and urgency.   Skin: Negative for pallor, rash and wound.   Neurological: Negative for light-headedness and headaches.   Psychiatric/Behavioral: Negative for confusion and decreased concentration.     Objective:     Vital Signs (Most Recent):  Temp: 98.9 °F (37.2 °C) (07/16/19 0634)  Pulse: 80 (07/16/19 1115)  Resp: 14 (07/16/19 0646)  BP: 121/61 (07/16/19 1056)  SpO2: 100 % (07/16/19 1115) Vital Signs (24h Range):  Temp:  [98.9 °F (37.2 °C)] 98.9 °F (37.2 °C)  Pulse:  [] 80  Resp:  [14-18] 14  SpO2:  [96 %-100 %] 100 %  BP: (103-177)/() 121/61     Weight: 82.1 kg (181 lb)  Body mass index is 25.97 kg/m².    Physical Exam   Constitutional: He is oriented to person, place, and time. He appears well-developed and well-nourished. No distress.   HENT:   Head: Normocephalic and atraumatic.   Right Ear: External ear normal.   Left Ear: External ear normal.   Nose: Nose normal.   Mouth/Throat: Oropharynx is clear and moist.   Eyes: Pupils are equal, round, and reactive to light. Conjunctivae and EOM are normal.   Neck: Normal range of motion. Neck supple.   Cardiovascular: Normal rate, regular rhythm and intact distal pulses.   Pulmonary/Chest: Effort normal and breath sounds normal. No respiratory distress. He has no wheezes. He has no rales.   Abdominal: Soft. Bowel sounds are normal. He exhibits no distension. There is no tenderness.   No palpable  hepatomegaly or splenomegaly   Musculoskeletal: Normal range of motion. He exhibits no edema or tenderness.   Neurological: He is alert and oriented to person, place, and time.   Skin: Skin is warm and dry.   Psychiatric: He has a normal mood and affect. Thought content normal.   Nursing note and vitals reviewed.        CRANIAL NERVES     CN III, IV, VI   Pupils are equal, round, and reactive to light.  Extraocular motions are normal.        Significant Labs: All pertinent labs within the past 24 hours have been reviewed.    Significant Imaging: I have reviewed all pertinent imaging results/findings within the past 24 hours.    Assessment/Plan:     * Tachycardia  Resolved with 6 mg adenosine x1, continue carvedilol and monitor on tele, cardiology consult, appreciate recs.    Elevated troponin  Mild elevation with so far flat trend in setting of ESRD, no complaint of chest pain, suspect demand from tachycardia and impaired renal clearance, cardiology consulted, appreciate recs.    Dependent on walker for ambulation  No acute issue    Uncontrolled type 2 diabetes mellitus with both eyes affected by proliferative retinopathy and macular edema, with long-term current use of insulin  Last HgbA1c   Lab Results   Component Value Date    HGBA1C 9.2 (H) 03/27/2019     Hold oral antihyperglycemics while inpatient  PRN sliding scale insulin  ACHS glucose monitoring   ADA diet     Hypertension  Well controlled, continue home medications and monitor blood pressure, adjust as needed.    Anemia of chronic disease  Patient H/H stable and consistent with baseline. No evidence acute bleeding or indication for transfusion at this time.      Hyperlipidemia LDL goal <100  Continue statin    CAD (coronary artery disease)  Obtained serial markers, continue home regimen, cardiology consult    Congestive heart failure  No evidence of acute decompensation or fluid overload, continue home regimen.    Cognitive deficits as late effect of  cerebrovascular disease  At baseline, no acute issue    History of stroke  Continue statin      VTE Risk Mitigation (From admission, onward)        Ordered     heparin (porcine) injection 5,000 Units  Every 8 hours      07/16/19 1328     IP VTE HIGH RISK PATIENT  Once      07/16/19 1328     Place sequential compression device  Until discontinued      07/16/19 1328        Jesus Orr Jr., APRN, AGAFranciscan Children[facility]  Hospitalist - Department of Hospital Medicine  [facility] [address] [place] [id number]  Office #: [phone]; Pager #: [phone]

## 2019-07-16 NOTE — ASSESSMENT & PLAN NOTE
Broke with adenosine. On good dose of coreg. Change norvasc to cardizem. Would recommend out patient EP evaluation for SVT ablation. Will f/u prn

## 2019-07-16 NOTE — CONSULTS
"                                    Renal Consult    Date of Admission:  7/16/2019  6:37 AM        Chief Complaint:   Chief Complaint   Patient presents with    Tachycardia     Patient presents to the ER with wife via personal vehicle. Patient presents with tachycardia. report being at dialysis and was told his heart rate was elevated. Patient denies chest pain, n/v/d.        HPI: 59 y.o. male with a PMHx. Significant for: CAD, ESRD on HD (TTS) followed by Dr. Levy at [facility],  hypertension, hyperlipidemia, chronic heart failure, seizures, DM 2, impaired mobility and history of CVA.  Pte. presents to the ED today from the outpatient dialysis clinic due to an elevated heart rate.  He denies fever, chills, SOB, chest pain, orthopnea, PND, lower extremity edema, palpitations, dizziness, syncope, or any other associated complaints.  States he has been taking his carvedilol only at night, not b.i.d..  In the ED, EKG appeared to be sinus tachycardia.   Resolved following 6mg of denosine.   Pte. did not receive outpatient dialysis this morning therefore I am being Consulted to provide in-pte. HD.    PMH:  Past Medical History:   Diagnosis Date    Allergy     Ambulates with cane     Atherosclerosis of aorta     noted on VIKTORIA 2011    Blood transfusion     Cataracts, bilateral     CHF (congestive heart failure)     Chronic kidney disease     Clotting disorder     Coronary artery disease     Decreased appetite     Diabetic retinopathy     DM (diabetes mellitus), type 2 with renal complications     ESRD on hemodialysis     TUESDAY/ THURSDAY/SATURDAY    Hyperlipidemia     Hyperparathyroidism, unspecified     Hypertension     Myocardial infarction     Prostate cancer 2016 7/16/19:  wife states "he doesn't have it anymore"    Seizures     Stroke 2011    PT HAS SHAKES     Unsteady gait     Uses roller walker        PSH:  Past Surgical History:   Procedure Laterality Date    CARDIAC SURGERY      CABG    " "CATARACT EXTRACTION      ou    CATARACT EXTRACTION, BILATERAL      cataracts      CORONARY ARTERY BYPASS GRAFT  2009    bypass    EYE SURGERY      FISTULOGRAM Left 8/21/2017    [facility] LAB    FISTULOGRAM Left 8/3/2016    [facility]    FISTULOGRAM Left 8/13/2014    Per[facility]    FISTULOGRAM Left 10/9/2013    Performed [facility]    FISTULOGRAM, possible intervention - LUE Left 3/21/2016    [facility] CATH LAB    Left Guera AVF  1/11/2012    PROSTATE BIOPSY      positive    retinal laser      ROTATOR CUFF REPAIR  right, 2005    TRANSPOSITION, VEIN Left 4/1/2013    Performed by [facility] at [facility] OR [address] [place] [address]    VASCULAR SURGERY         Allergies:  Review of patient's allergies indicates:   Allergen Reactions    Reglan [metoclopramide hcl] Diarrhea    Lorazepam      Other reaction(s): heavy sedation       No current facility-administered medications on file prior to encounter.      Current Outpatient Medications on File Prior to Encounter   Medication Sig Dispense Refill    amLODIPine (NORVASC) 10 MG tablet TAKE 1 TABLET(10 MG) BY MOUTH EVERY DAY 90 tablet 0    ascorbic acid, vitamin C, (VITAMIN C) 500 MG tablet Take 500 mg by mouth every evening.       atorvastatin (LIPITOR) 40 MG tablet TAKE 1 TABLET(40 MG) BY MOUTH EVERY DAY 90 tablet 1    BD INSULIN PEN NEEDLE UF ORIG 29 gauge x 1/2" Ndle USE  1 EVERY EVENING 90 each 3    blood sugar diagnostic Strp To check BG 2 times daily, to use with accuchek casa meter 200 each 3    blood-glucose meter kit To check BG 2 times daily, to use with accuchek casa meter 1 each 0    carvedilol (COREG) 25 MG tablet TAKE 1 TABLET(25 MG) BY MOUTH TWICE DAILY WITH MEALS 180 tablet 1    furosemide (LASIX) 40 MG tablet TAKE 1 TABLET(40 MG) BY MOUTH EVERY " "DAY 90 tablet 1    insulin (LANTUS SOLOSTAR U-100 INSULIN) glargine 100 units/mL (3mL) SubQ pen INJECT 20 UNITS UNDER THE SKIN NIGHTLY (Patient taking differently: INJECT 20 UNITS UNDER THE SKIN NIGHTLY) 15 mL 0    lancets Misc To check BG 2 times daily, to use with accuchek casa meter 200 each 3    losartan (COZAAR) 100 MG tablet Take 1 tablet (100 mg total) by mouth once daily. 90 tablet 3    megestrol (MEGACE) 20 MG Tab Take 1 tablet (20 mg total) by mouth once daily. 90 tablet 3    NOVOFINE 30 30 x 1/3 " Ndle USE AS DIRECTED WITH LANTUS SOLARSTAR 100 each 3    NOVOLOG FLEXPEN U-100 INSULIN 100 unit/mL InPn pen INJECT 5 TO 10 UNITS WITH MEALS AND AT BEDTIME DEPENDING ON BLOOD SUGAR 15 mL 0    promethazine (PHENERGAN) 25 MG tablet Take 1 tablet (25 mg total) by mouth every 6 (six) hours as needed for Nausea. 30 tablet 0    blood glucose control, normal Soln 1 drop by Misc.(Non-Drug; Combo Route) route as directed. 1 each 1    [DISCONTINUED] amoxicillin (AMOXIL) 875 MG tablet Take 1 tablet (875 mg total) by mouth every 12 (twelve) hours. 20 tablet 0    [DISCONTINUED] pantoprazole (PROTONIX) 40 MG tablet TAKE 1 TABLET BY MOUTH DAILY AS NEEDED FOR HEARTBURN. TAKE ONLY AS NEEDED 90 tablet 0    [DISCONTINUED] RENVELA 800 mg Tab Take 1 tablet (800 mg total) by mouth 3 (three) times daily with meals. 200 tablet 11       Medications:  Current Facility-Administered Medications   Medication Dose Route Frequency Provider Last Rate Last Dose    0.9%  NaCl infusion   Intravenous PRN Filippo Hendrix MD        acetaminophen tablet 650 mg  650 mg Oral Q6H PRN Jesus Orr Jr., NP        [START ON 7/17/2019] atorvastatin tablet 40 mg  40 mg Oral Daily Jesus Orr Jr., NP        carvedilol tablet 25 mg  25 mg Oral BID Jesus Orr Jr., NP        dextrose 50% injection 12.5 g  12.5 g Intravenous PRN Ugo Deshpande MD        dextrose 50% injection 25 g  25 g Intravenous PRN Ugo Deshpande MD     "    diltiaZEM 24 hr capsule 180 mg  180 mg Oral Daily Lamont Britt MD   180 mg at 07/16/19 1542    glucagon (human recombinant) injection 1 mg  1 mg Intramuscular PRN Ugo Deshpande MD        glucose chewable tablet 16 g  16 g Oral PRN Ugo Deshpande MD        glucose chewable tablet 24 g  24 g Oral PRN Ugo Deshpande MD        heparin (porcine) injection 5,000 Units  5,000 Units Subcutaneous Q8H Jesus Orr Jr., NP   5,000 Units at 07/16/19 1511    hydrOXYzine pamoate capsule 25 mg  25 mg Oral Q8H PRN Jesus Orr Jr., NP        insulin aspart U-100 pen 0-5 Units  0-5 Units Subcutaneous QID (AC + HS) PRN Ugo Deshpande MD        [START ON 7/17/2019] losartan tablet 100 mg  100 mg Oral Daily Jesus Orr Jr., NP        ondansetron injection 4 mg  4 mg Intravenous Q6H PRN Jesus Orr Jr., NP        sodium chloride 0.9% flush 10 mL  10 mL Intravenous PRN Ugo Deshpande MD           FamHx:  Family History   Problem Relation Age of Onset    Diabetes Mother     Glaucoma Mother     Cancer Mother     Cataracts Mother     Heart disease Father     Cancer Paternal Grandmother     Heart disease Maternal Grandmother     No Known Problems Sister     Prostate cancer Brother     No Known Problems Maternal Grandfather     No Known Problems Paternal Grandfather     No Known Problems Sister     No Known Problems Sister     Hypertension Brother     Cancer Paternal Uncle     Amblyopia Neg Hx     Blindness Neg Hx     Macular degeneration Neg Hx     Retinal detachment Neg Hx     Strabismus Neg Hx     Stroke Neg Hx     Thyroid disease Neg Hx     Anesthesia problems Neg Hx     Clotting disorder Neg Hx      problems Neg Hx     Kidney cancer Neg Hx     Kidney disease Neg Hx     Malignant hyperthermia Neg Hx     Sickle cell trait Neg Hx     Lupus Neg Hx     Urolithiasis Neg Hx     Sudden death Neg Hx        SocHx:  Social History     Socioeconomic History    Marital status:       Spouse name: Not on file    Number of children: Not on file    Years of education: Not on file    Highest education level: Not on file   Occupational History    Not on file   Social Needs    Financial resource strain: Not on file    Food insecurity:     Worry: Not on file     Inability: Not on file    Transportation needs:     Medical: Not on file     Non-medical: Not on file   Tobacco Use    Smoking status: Never Smoker    Smokeless tobacco: Never Used   Substance and Sexual Activity    Alcohol use: No    Drug use: No    Sexual activity: Not Currently   Lifestyle    Physical activity:     Days per week: Not on file     Minutes per session: Not on file    Stress: Not on file   Relationships    Social connections:     Talks on phone: Not on file     Gets together: Not on file     Attends [employer] service: Not on file     Active member of club or organization: Not on file     Attends meetings of clubs or organizations: Not on file     Relationship status: Not on file   Other Topics Concern    Not on file   Social History Narrative    Not on file           Review of Systems: see H&P       Physical Exam:  Vitals:   Vitals:    07/16/19 1513   BP: (!) 159/74   Pulse: 88   Resp: 18   Temp: 98.1 °F (36.7 °C)       No intake/output data recorded.  No intake/output data recorded.    Pte. Under the bed covers.    Laboratories:    Recent Labs   Lab 07/16/19 0719 07/16/19 0728   WBC 5.28  --    RBC 3.98*  --    HGB 11.3*  --    HCT 35.4* 35*     --    MCV 89  --    MCH 28.4  --    MCHC 31.9*  --        Recent Labs   Lab 07/16/19 0719   CALCIUM 9.7   PROT 8.1      K 4.5   CO2 24      BUN 48*   CREATININE 8.3*   ALKPHOS 192*   ALT 43   AST 28   BILITOT 0.7       No results for input(s): COLORU, CLARITYU, SPECGRAV, PHUR, PROTEINUA, GLUCOSEU, BLOODU, WBCU, RBCU, BACTERIA, MUCUS in the last 24 hours.    Invalid input(s):  BILIRUBINCON    Microbiology Results (last 7 days)     ** No  results found for the last 168 hours. **            Diagnostic Tests:  X-Ray Chest AP Portable [[id number]] Resulted: 07/16/19 0912   Order Status: Completed      Impression:       No acute cardiopulmonary processes.             Assessment:    60 y/o AAM with ESRD on HD (TTS) admitted with:    - s/p SVT  - DM-2 with hyperglycemia  - Stable anemia of CKD        Plan:    - Dialysis today  - No need for Epogen at present Hgb level  - Renal ADA diet  - Glycemic control per admitting  - B-blocker dosing per Cardiology  - Will f/u on Dialysis days only if still admitted beyond tomorrow

## 2019-07-16 NOTE — SUBJECTIVE & OBJECTIVE
"Past Medical History:   Diagnosis Date    Allergy     Ambulates with cane     Atherosclerosis of aorta     noted on VIKTORIA 2011    Blood transfusion     Cataracts, bilateral     CHF (congestive heart failure)     Chronic kidney disease     Clotting disorder     Coronary artery disease     Decreased appetite     Diabetic retinopathy     DM (diabetes mellitus), type 2 with renal complications     ESRD on hemodialysis     TUESDAY/ THURSDAY/SATURDAY    Hyperlipidemia     Hyperparathyroidism, unspecified     Hypertension     Myocardial infarction     Prostate cancer 2016 7/16/19:  wife states "he doesn't have it anymore"    Seizures     Stroke 2011    PT HAS SHAKES     Unsteady gait     Uses roller walker        Past Surgical History:   Procedure Laterality Date    CARDIAC SURGERY      CABG    CATARACT EXTRACTION      ou    CATARACT EXTRACTION, BILATERAL      cataracts      CORONARY ARTERY BYPASS GRAFT  2009    bypass    EYE SURGERY      FISTULOGRAM Left 8/21/2017    [facility], [facility] CATH LAB    FISTULOGRAM Left 8/3/2016    [facility] CATH [facility]    FISTULOGRAM Left 8/13/2014    [facility] OR [facility] F[facility]    FISTULOGRAM Left 10/9/2013    [facility]    FISTULOGRAM, possible intervention - LUE Left 3/21/2016    [facility] CATH LAB    Left Guera AVF  1/11/2012    PROSTATE BIOPSY      positive    retinal laser      ROTATOR CUFF REPAIR  right, 2005    TRANSPOSITION, VEIN Left 4/1/2013    [facility] at [facility] OR [address] [place] [address]    VASCULAR SURGERY         Review of patient's allergies indicates:   Allergen Reactions    Reglan [metoclopramide hcl] Diarrhea    Lorazepam      Other reaction(s): heavy sedation       No current facility-administered medications on file prior to encounter.      Current " "Outpatient Medications on File Prior to Encounter   Medication Sig    amLODIPine (NORVASC) 10 MG tablet TAKE 1 TABLET(10 MG) BY MOUTH EVERY DAY    ascorbic acid, vitamin C, (VITAMIN C) 500 MG tablet Take 500 mg by mouth once daily.    atorvastatin (LIPITOR) 40 MG tablet TAKE 1 TABLET(40 MG) BY MOUTH EVERY DAY    carvedilol (COREG) 25 MG tablet TAKE 1 TABLET(25 MG) BY MOUTH TWICE DAILY WITH MEALS    furosemide (LASIX) 40 MG tablet TAKE 1 TABLET(40 MG) BY MOUTH EVERY DAY    megestrol (MEGACE) 20 MG Tab Take 1 tablet (20 mg total) by mouth once daily.    promethazine (PHENERGAN) 25 MG tablet Take 1 tablet (25 mg total) by mouth every 6 (six) hours as needed for Nausea.    BD INSULIN PEN NEEDLE UF ORIG 29 gauge x 1/2" Ndle USE  1 EVERY EVENING    blood glucose control, normal Soln 1 drop by Misc.(Non-Drug; Combo Route) route as directed.    blood sugar diagnostic Strp To check BG 2 times daily, to use with accuchek casa meter    blood-glucose meter kit To check BG 2 times daily, to use with accuchek casa meter    insulin (LANTUS SOLOSTAR U-100 INSULIN) glargine 100 units/mL (3mL) SubQ pen INJECT 20 UNITS UNDER THE SKIN NIGHTLY    lancets [facility] [place] [facility] To check BG 2 times daily, to use with accuchek casa meter    losartan (COZAAR) 100 MG tablet Take 1 tablet (100 mg total) by mouth once daily.    NOVOFINE 30 30 x 1/3 " Ndle USE AS DIRECTED WITH LANTUS SOLARSTAR    NOVOLOG FLEXPEN U-100 INSULIN 100 unit/mL InPn pen INJECT 5 TO 10 UNITS WITH MEALS AND AT BEDTIME DEPENDING ON BLOOD SUGAR    [DISCONTINUED] amoxicillin (AMOXIL) 875 MG tablet Take 1 tablet (875 mg total) by mouth every 12 (twelve) hours.    [DISCONTINUED] pantoprazole (PROTONIX) 40 MG tablet TAKE 1 TABLET BY MOUTH DAILY AS NEEDED FOR HEARTBURN. TAKE ONLY AS NEEDED    [DISCONTINUED] RENVELA 800 mg Tab Take 1 tablet (800 mg total) by mouth 3 (three) times daily with meals.     Family History     Problem Relation (Age of Onset)    Cancer Mother, " Paternal Grandmother, Paternal Uncle    Cataracts Mother    Diabetes Mother    Glaucoma Mother    Heart disease Father, Maternal Grandmother    Hypertension Brother    No Known Problems Sister, Maternal Grandfather, Paternal Grandfather, Sister, Sister    Prostate cancer Brother        Tobacco Use    Smoking status: Never Smoker    Smokeless tobacco: Never Used   Substance and Sexual Activity    Alcohol use: No    Drug use: No    Sexual activity: Not Currently     Review of Systems   Constitutional: Negative for chills and fever.   Eyes: Negative for photophobia and visual disturbance.   Respiratory: Negative for cough and shortness of breath.    Cardiovascular: Negative for chest pain, palpitations and leg swelling.        Elevated heart rate at dialysis   Gastrointestinal: Negative for abdominal pain, diarrhea, nausea and vomiting.   Genitourinary: Negative for frequency, hematuria and urgency.   Skin: Negative for pallor, rash and wound.   Neurological: Negative for light-headedness and headaches.   Psychiatric/Behavioral: Negative for confusion and decreased concentration.     Objective:     Vital Signs (Most Recent):  Temp: 98.9 °F (37.2 °C) (07/16/19 0634)  Pulse: 80 (07/16/19 1115)  Resp: 14 (07/16/19 0646)  BP: 121/61 (07/16/19 1056)  SpO2: 100 % (07/16/19 1115) Vital Signs (24h Range):  Temp:  [98.9 °F (37.2 °C)] 98.9 °F (37.2 °C)  Pulse:  [] 80  Resp:  [14-18] 14  SpO2:  [96 %-100 %] 100 %  BP: (103-177)/() 121/61     Weight: 82.1 kg (181 lb)  Body mass index is 25.97 kg/m².    Physical Exam   Constitutional: He is oriented to person, place, and time. He appears well-developed and well-nourished. No distress.   HENT:   Head: Normocephalic and atraumatic.   Right Ear: External ear normal.   Left Ear: External ear normal.   Nose: Nose normal.   Mouth/Throat: Oropharynx is clear and moist.   Eyes: Pupils are equal, round, and reactive to light. Conjunctivae and EOM are normal.   Neck: Normal  range of motion. Neck supple.   Cardiovascular: Normal rate, regular rhythm and intact distal pulses.   Pulmonary/Chest: Effort normal and breath sounds normal. No respiratory distress. He has no wheezes. He has no rales.   Abdominal: Soft. Bowel sounds are normal. He exhibits no distension. There is no tenderness.   No palpable hepatomegaly or splenomegaly   Musculoskeletal: Normal range of motion. He exhibits no edema or tenderness.   Neurological: He is alert and oriented to person, place, and time.   Skin: Skin is warm and dry.   Psychiatric: He has a normal mood and affect. Thought content normal.   Nursing note and vitals reviewed.        CRANIAL NERVES     CN III, IV, VI   Pupils are equal, round, and reactive to light.  Extraocular motions are normal.        Significant Labs: All pertinent labs within the past 24 hours have been reviewed.    Significant Imaging: I have reviewed all pertinent imaging results/findings within the past 24 hours.

## 2019-07-16 NOTE — HPI
HPI: 59 y.o. male with CAD, ESRD on HD (TTS), hypertension, hyperlipidemia, chronic heart failure, seizures, DM 2, impaired mobility ambulates with a cane, and history of CVA presents via personal vehicle from outpatient dialysis treatment where he was told that he had an elevated heart rate and should go to the emergency room.  He denies fever, chills, SOB, chest pain, orthopnea, PND, lower extremity edema, palpitations, dizziness, syncope, or any other associated complaints.  States he has been taking his carvedilol only at night, not b.i.d..  In the ED, EKG appeared to be sinus tachycardia.  Initially received a 500 mL normal saline bolus and vagal maneuvers without improvement.  Cardiology recommended trial of adenosine.  One 6 mg dose resolve the tachycardia.  Routine labs revealed a minimal elevation of troponin and BNP.  He did not receive outpatient dialysis this morning.  Placed in observation for hemodialysis and cardiac monitoring.    Initially tachycardic 140 - EKG showed SVT - this broke to NSR after 6mg IV adenosine  EKG NSR PRWP  Denies CP or SOB  Troponin 0.04    Saw Dr Villegas during admission 3/12/19     This 59 y.o male who has a PMH of CAD S/P CABG,  CHF, ESRD-HD, Seizures, old MI, HTN, HLD, DM, PVDand CVA presents to the ED for an evaluation for an elevated heart rate and elevated blood pressure that was noticed at 0515 today.  Patient reports being dropped off at dialysis and reports being informed he could not receive dialysis treatment due to a blood pressure of 170/104.  Patient currently only reports of right upper back pain, which he reports being 3 months ago.  Patient denies chest pain, extremity numbness, extremity weakness, fever, chills, nausea, emesis, diarrhea, abdominal pain, headache, or any other associated symptoms.  Patient's spouse reports the patient having a blood pressure reading of 215/104 yesterday while at a doctor's appointment.  His spouse reports them arriving home  yesterday to take his blood pressure medication and reports a repeat blood pressure of 140/90.  Patient has not received any hypertension medications this morning.  No prior tx.  No alleviating factors.     Patient states that he was in clinic yesterday and it was noted that his BP was high and he was tachycardic. He denied feeling any episodes of chest pain at rest or on exertion, orthopnea, PND or dyspnea. He denies any claudication. States that he walks on the treadmill every week for about a mile and denies any symptoms of chest pain or R[place] while on the treadmill.      Stress test 3/13/19  · Normal myocardial perfusion scan  · There were no arrhythmias during stress.  · There was no ST segment deviation noted during stress.  · The patient reported dizziness, nausea and light headedness during the stress test.  · The perfusion scan is free of evidence from myocardial ischemia or injury.  · LVEF 61% post stress  · The EKG portion of this study is negative for myocardial ischemia.      Echo 3/12/19  · Low normal left ventricular systolic function. The estimated ejection fraction is 50%  · Concentric left ventricular hypertrophy.  · Grade II (moderate) left ventricular diastolic dysfunction consistent with pseudonormalization.  · Elevated left atrial pressure.  · Mild left atrial enlargement.  · Mild-to-moderate mitral regurgitation.     NUCLEAR PET IN 2013      NORMAL MYOCARDIAL PERFUSION PET STRESS TEST  1. The perfusion scan is free of evidence for myocardial ischemia or injury.   2. Resting wall motion is physiologic. Stress wall motion is physiologic.   3. LV function is normal at rest and stress.  (normal is >= 51%)  4. The ventricular volumes are normal at rest and stress.   5. The extracardiac distribution of radioactivity is normal.   6. There was no previous study available to compare.

## 2019-07-16 NOTE — CONSULTS
[facility]  Cardiology  Consult Note    Patient Name: Kodi Ibanez Sr.  MRN: [id number]  Admission Date: 7/16/2019  Hospital Length of Stay: 0 days  Code Status: Full Code   Attending Provider: Faustino Quiñones MD   Consulting Provider: Lamont Britt MD  Primary Care Physician: Melonie Elias MD  Principal Problem:Tachycardia    Patient information was obtained from patient and ER records.     Consults  Subjective:     Chief Complaint:  SVT     HPI: 59 y.o. male with CAD, ESRD on HD (TTS), hypertension, hyperlipidemia, chronic heart failure, seizures, DM 2, impaired mobility ambulates with a cane, and history of CVA presents via personal vehicle from outpatient dialysis treatment where he was told that he had an elevated heart rate and should go to the emergency room.  He denies fever, chills, SOB, chest pain, orthopnea, PND, lower extremity edema, palpitations, dizziness, syncope, or any other associated complaints.  States he has been taking his carvedilol only at night, not b.i.d..  In the ED, EKG appeared to be sinus tachycardia.  Initially received a 500 mL normal saline bolus and vagal maneuvers without improvement.  Cardiology recommended trial of adenosine.  One 6 mg dose resolve the tachycardia.  Routine labs revealed a minimal elevation of troponin and BNP.  He did not receive outpatient dialysis this morning.  Placed in observation for hemodialysis and cardiac monitoring.    Initially tachycardic 140 - EKG showed SVT - this broke to NSR after 6mg IV adenosine  EKG NSR PRWP  Denies CP or SOB  Troponin 0.04    Saw Dr Villegas during admission 3/12/19     This 59 y.o male who has a PMH of CAD S/P CABG,  CHF, ESRD-HD, Seizures, old MI, HTN, HLD, DM, PVDand CVA presents to the ED for an evaluation for an elevated heart rate and elevated blood pressure that was noticed at 0515 today.  Patient reports being dropped off at dialysis and reports being informed he could not receive dialysis  treatment due to a blood pressure of 170/104.  Patient currently only reports of right upper back pain, which he reports being 3 months ago.  Patient denies chest pain, extremity numbness, extremity weakness, fever, chills, nausea, emesis, diarrhea, abdominal pain, headache, or any other associated symptoms.  Patient's spouse reports the patient having a blood pressure reading of 215/104 yesterday while at a doctor's appointment.  His spouse reports them arriving home yesterday to take his blood pressure medication and reports a repeat blood pressure of 140/90.  Patient has not received any hypertension medications this morning.  No prior tx.  No alleviating factors.     Patient states that he was in clinic yesterday and it was noted that his BP was high and he was tachycardic. He denied feeling any episodes of chest pain at rest or on exertion, orthopnea, PND or dyspnea. He denies any claudication. States that he walks on the treadmill every week for about a mile and denies any symptoms of chest pain or [place] while on the treadmill.      Stress test 3/13/19  · Normal myocardial perfusion scan  · There were no arrhythmias during stress.  · There was no ST segment deviation noted during stress.  · The patient reported dizziness, nausea and light headedness during the stress test.  · The perfusion scan is free of evidence from myocardial ischemia or injury.  · LVEF 61% post stress  · The EKG portion of this study is negative for myocardial ischemia.      Echo 3/12/19  · Low normal left ventricular systolic function. The estimated ejection fraction is 50%  · Concentric left ventricular hypertrophy.  · Grade II (moderate) left ventricular diastolic dysfunction consistent with pseudonormalization.  · Elevated left atrial pressure.  · Mild left atrial enlargement.  · Mild-to-moderate mitral regurgitation.     NUCLEAR PET IN 2013      NORMAL MYOCARDIAL PERFUSION PET STRESS TEST  1. The perfusion scan is free of evidence for  "myocardial ischemia or injury.   2. Resting wall motion is physiologic. Stress wall motion is physiologic.   3. LV function is normal at rest and stress.  (normal is >= 51%)  4. The ventricular volumes are normal at rest and stress.   5. The extracardiac distribution of radioactivity is normal.   6. There was no previous study available to compare.    Past Medical History:   Diagnosis Date    Allergy     Ambulates with cane     Atherosclerosis of aorta     noted on VIKTORIA 2011    Blood transfusion     Cataracts, bilateral     CHF (congestive heart failure)     Chronic kidney disease     Clotting disorder     Coronary artery disease     Decreased appetite     Diabetic retinopathy     DM (diabetes mellitus), type 2 with renal complications     ESRD on hemodialysis     TUESDAY/ THURSDAY/SATURDAY    Hyperlipidemia     Hyperparathyroidism, unspecified     Hypertension     Myocardial infarction     Prostate cancer 2016 7/16/19:  wife states "he doesn't have it anymore"    Seizures     Stroke 2011    PT HAS SHAKES     Unsteady gait     Uses roller walker        Past Surgical History:   Procedure Laterality Date    CARDIAC SURGERY      CABG    CATARACT EXTRACTION      ou    CATARACT EXTRACTION, BILATERAL      cataracts      CORONARY ARTERY BYPASS GRAFT  2009    bypass    EYE SURGERY      FISTULOGRAM Left 8/21/2017    [facility] CATH LAB    FISTULOGRAM Left 8/3/2016    [facility] CATH LAB    FISTULOGRAM Left 8/13/2014    Performed by [facility] III, [facility] at [facility] OR 2ND FLR    FISTULOGRAM Left 10/9/2013    Performed [facility] at [facility] OR 2ND FLR    FISTULOGRAM, possible intervention - LUE Left 3/21/2016    [facility] CATH LAB    Left Guera AVF  1/11/2012    PROSTATE BIOPSY      positive    retinal laser      ROTATOR CUFF REPAIR  right, 2005    TRANSPOSITION, VEIN Left " "4/1/2013    [facility] OR [address] [place] [address]    VASCULAR SURGERY         Review of patient's allergies indicates:   Allergen Reactions    Reglan [metoclopramide hcl] Diarrhea    Lorazepam      Other reaction(s): heavy sedation       No current facility-administered medications on file prior to encounter.      Current Outpatient Medications on File Prior to Encounter   Medication Sig    amLODIPine (NORVASC) 10 MG tablet TAKE 1 TABLET(10 MG) BY MOUTH EVERY DAY    ascorbic acid, vitamin C, (VITAMIN C) 500 MG tablet Take 500 mg by mouth every evening.     atorvastatin (LIPITOR) 40 MG tablet TAKE 1 TABLET(40 MG) BY MOUTH EVERY DAY    BD INSULIN PEN NEEDLE UF ORIG 29 gauge x 1/2" Ndle USE  1 EVERY EVENING    blood sugar diagnostic Strp To check BG 2 times daily, to use with accuchek casa meter    blood-glucose meter kit To check BG 2 times daily, to use with accuchek casa meter    carvedilol (COREG) 25 MG tablet TAKE 1 TABLET(25 MG) BY MOUTH TWICE DAILY WITH MEALS    furosemide (LASIX) 40 MG tablet TAKE 1 TABLET(40 MG) BY MOUTH EVERY DAY    insulin (LANTUS SOLOSTAR U-100 INSULIN) glargine 100 units/mL (3mL) SubQ pen INJECT 20 UNITS UNDER THE SKIN NIGHTLY (Patient taking differently: INJECT 20 UNITS UNDER THE SKIN NIGHTLY)    lancets Misc To check BG 2 times daily, to use with accuchek casa meter    losartan (COZAAR) 100 MG tablet Take 1 tablet (100 mg total) by mouth once daily.    megestrol (MEGACE) 20 MG Tab Take 1 tablet (20 mg total) by mouth once daily.    NOVOFINE 30 30 x 1/3 " Ndle USE AS DIRECTED WITH LANTUS SOLARSTAR    NOVOLOG FLEXPEN U-100 INSULIN 100 unit/mL InPn pen INJECT 5 TO 10 UNITS WITH MEALS AND AT BEDTIME DEPENDING ON BLOOD SUGAR    promethazine (PHENERGAN) 25 MG tablet Take 1 tablet (25 mg total) by mouth every 6 (six) hours as needed for Nausea.    blood glucose control, normal Soln 1 drop by Misc.(Non-Drug; Combo Route) route as directed.    " [DISCONTINUED] amoxicillin (AMOXIL) 875 MG tablet Take 1 tablet (875 mg total) by mouth every 12 (twelve) hours.    [DISCONTINUED] pantoprazole (PROTONIX) 40 MG tablet TAKE 1 TABLET BY MOUTH DAILY AS NEEDED FOR HEARTBURN. TAKE ONLY AS NEEDED    [DISCONTINUED] RENVELA 800 mg Tab Take 1 tablet (800 mg total) by mouth 3 (three) times daily with meals.     Family History     Problem Relation (Age of Onset)    Cancer Mother, Paternal Grandmother, Paternal Uncle    Cataracts Mother    Diabetes Mother    Glaucoma Mother    Heart disease Father, Maternal Grandmother    Hypertension Brother    No Known Problems Sister, Maternal Grandfather, Paternal Grandfather, Sister, Sister    Prostate cancer Brother        Tobacco Use    Smoking status: Never Smoker    Smokeless tobacco: Never Used   Substance and Sexual Activity    Alcohol use: No    Drug use: No    Sexual activity: Not Currently     Review of Systems   Constitution: Negative for decreased appetite.   HENT: Negative for ear discharge.    Eyes: Negative for blurred vision.   Endocrine: Negative for polyphagia.   Skin: Negative for nail changes.   Genitourinary: Negative for bladder incontinence.   Neurological: Negative for aphonia.   Psychiatric/Behavioral: Negative for hallucinations.   Allergic/Immunologic: Negative for hives.     Objective:     Vital Signs (Most Recent):  Temp: 98.1 °F (36.7 °C) (07/16/19 1513)  Pulse: 88 (07/16/19 1513)  Resp: 18 (07/16/19 1513)  BP: (!) 159/74 (07/16/19 1513)  SpO2: 98 % (07/16/19 1513) Vital Signs (24h Range):  Temp:  [98.1 °F (36.7 °C)-98.9 °F (37.2 °C)] 98.1 °F (36.7 °C)  Pulse:  [] 88  Resp:  [14-18] 18  SpO2:  [96 %-100 %] 98 %  BP: (103-177)/() 159/74     Weight: 82.1 kg (181 lb)  Body mass index is 25.97 kg/m².    SpO2: 98 %  O2 Device (Oxygen Therapy): room air    No intake or output data in the 24 hours ending 07/16/19 1520    Lines/Drains/Airways     Drain                 Hemodialysis AV Fistula  04/01/17 0800 836 days          Peripheral Intravenous Line                 Peripheral IV - Single Lumen 07/16/19 0725 20 G Right Hand less than 1 day                Physical Exam   Constitutional: He is oriented to person, place, and time. He appears well-developed and well-nourished.   HENT:   Head: Normocephalic and atraumatic.   Eyes: Pupils are equal, round, and reactive to light. Conjunctivae are normal.   Neck: Normal range of motion. Neck supple.   Cardiovascular: Normal rate, normal heart sounds and intact distal pulses.   Pulmonary/Chest: Effort normal and breath sounds normal.   Abdominal: Soft. Bowel sounds are normal.   Musculoskeletal: Normal range of motion.   Neurological: He is alert and oriented to person, place, and time.   Skin: Skin is warm and dry.       Significant Labs: All pertinent lab results from the last 24 hours have been reviewed.    Significant Imaging: Echocardiogram: 2D echo with color flow doppler: No results found for this or any previous visit.    Assessment and Plan:     SVT (supraventricular tachycardia)  Broke with adenosine. On good dose of coreg. Change norvasc to cardizem. Would recommend out patient EP evaluation for SVT ablation. Will f/u prn    Elevated troponin  Suspect demand ischemia from tachycardia and not ACS. Repeat echo. Will not repeat ischemic evaluation at this time    S/P CABG (coronary artery bypass graft)  Denies CP    Hypertension  stable    Hyperlipidemia LDL goal <100  On statin    Type 2 diabetes, uncontrolled, with neuropathy  Per primary    ESRD on hemodialysis  Per renal        VTE Risk Mitigation (From admission, onward)        Ordered     heparin (porcine) injection 5,000 Units  Every 8 hours      07/16/19 1328     IP VTE HIGH RISK PATIENT  Once      07/16/19 1328     Place sequential compression device  Until discontinued      07/16/19 1328          Thank you for your consult. I will sign off. Please contact us if you have any additional  questions.    Lamont Britt MD  Cardiology   [facility]

## 2019-07-16 NOTE — SUBJECTIVE & OBJECTIVE
"Past Medical History:   Diagnosis Date    Allergy     Ambulates with cane     Atherosclerosis of aorta     noted on VIKTORIA 2011    Blood transfusion     Cataracts, bilateral     CHF (congestive heart failure)     Chronic kidney disease     Clotting disorder     Coronary artery disease     Decreased appetite     Diabetic retinopathy     DM (diabetes mellitus), type 2 with renal complications     ESRD on hemodialysis     TUESDAY/ THURSDAY/SATURDAY    Hyperlipidemia     Hyperparathyroidism, unspecified     Hypertension     Myocardial infarction     Prostate cancer 2016 7/16/19:  wife states "he doesn't have it anymore"    Seizures     Stroke 2011    PT HAS SHAKES     Unsteady gait     Uses roller walker        Past Surgical History:   Procedure Laterality Date    CARDIAC SURGERY      CABG    CATARACT EXTRACTION      ou    CATARACT EXTRACTION, BILATERAL      cataracts      CORONARY ARTERY BYPASS GRAFT  2009    bypass    EYE SURGERY      FISTULOGRAM Left 8/21/2017    Performed by D[facility]os III, [facility] at [facility] CATH LAB    FISTULOGRAM Left 8/3/2016    Per[facility]os III, [facility] at [facility] CATH LAB    FISTULOGRAM Left 8/13/2014    Performed by DAVE M[facility]os III, [facility] at [facility] OR [facility]    FISTULOGRAM Left 10/9/2013    [facility]    FISTULOGRAM, possible intervention - LUE Left 3/21/2016    Performed by D[facility]os III, [facility] at [facility] CATH LAB    Left Guera AVF  1/11/2012    PROSTATE BIOPSY      positive    retinal laser      ROTATOR CUFF REPAIR  right, 2005    TRANSPOSITION, VEIN Left 4/1/2013    Performed by DAVE Mazariegos III, MD at [facility] OR [address] [place] [address]    VASCULAR SURGERY         Review of patient's allergies indicates:   Allergen Reactions    Reglan [metoclopramide hcl] Diarrhea    Lorazepam      Other reaction(s): heavy sedation       No current facility-administered medications on file prior to encounter.      Current " "Outpatient Medications on File Prior to Encounter   Medication Sig    amLODIPine (NORVASC) 10 MG tablet TAKE 1 TABLET(10 MG) BY MOUTH EVERY DAY    ascorbic acid, vitamin C, (VITAMIN C) 500 MG tablet Take 500 mg by mouth every evening.     atorvastatin (LIPITOR) 40 MG tablet TAKE 1 TABLET(40 MG) BY MOUTH EVERY DAY    BD INSULIN PEN NEEDLE UF ORIG 29 gauge x 1/2" Ndle USE  1 EVERY EVENING    blood sugar diagnostic Strp To check BG 2 times daily, to use with accuchek casa meter    blood-glucose meter kit To check BG 2 times daily, to use with accuchek casa meter    carvedilol (COREG) 25 MG tablet TAKE 1 TABLET(25 MG) BY MOUTH TWICE DAILY WITH MEALS    furosemide (LASIX) 40 MG tablet TAKE 1 TABLET(40 MG) BY MOUTH EVERY DAY    insulin (LANTUS SOLOSTAR U-100 INSULIN) glargine 100 units/mL (3mL) SubQ pen INJECT 20 UNITS UNDER THE SKIN NIGHTLY (Patient taking differently: INJECT 20 UNITS UNDER THE SKIN NIGHTLY)    lancets Misc To check BG 2 times daily, to use with accuchek casa meter    losartan (COZAAR) 100 MG tablet Take 1 tablet (100 mg total) by mouth once daily.    megestrol (MEGACE) 20 MG Tab Take 1 tablet (20 mg total) by mouth once daily.    NOVOFINE 30 30 x 1/3 " Ndle USE AS DIRECTED WITH LANTUS SOLARSTAR    NOVOLOG FLEXPEN U-100 INSULIN 100 unit/mL InPn pen INJECT 5 TO 10 UNITS WITH MEALS AND AT BEDTIME DEPENDING ON BLOOD SUGAR    promethazine (PHENERGAN) 25 MG tablet Take 1 tablet (25 mg total) by mouth every 6 (six) hours as needed for Nausea.    blood glucose control, normal Soln 1 drop by Misc.(Non-Drug; Combo Route) route as directed.    [DISCONTINUED] amoxicillin (AMOXIL) 875 MG tablet Take 1 tablet (875 mg total) by mouth every 12 (twelve) hours.    [DISCONTINUED] pantoprazole (PROTONIX) 40 MG tablet TAKE 1 TABLET BY MOUTH DAILY AS NEEDED FOR HEARTBURN. TAKE ONLY AS NEEDED    [DISCONTINUED] RENVELA 800 mg Tab Take 1 tablet (800 mg total) by mouth 3 (three) times daily with meals. "     Family History     Problem Relation (Age of Onset)    Cancer Mother, Paternal Grandmother, Paternal Uncle    Cataracts Mother    Diabetes Mother    Glaucoma Mother    Heart disease Father, Maternal Grandmother    Hypertension Brother    No Known Problems Sister, Maternal Grandfather, Paternal Grandfather, Sister, Sister    Prostate cancer Brother        Tobacco Use    Smoking status: Never Smoker    Smokeless tobacco: Never Used   Substance and Sexual Activity    Alcohol use: No    Drug use: No    Sexual activity: Not Currently     Review of Systems   Constitution: Negative for decreased appetite.   HENT: Negative for ear discharge.    Eyes: Negative for blurred vision.   Endocrine: Negative for polyphagia.   Skin: Negative for nail changes.   Genitourinary: Negative for bladder incontinence.   Neurological: Negative for aphonia.   Psychiatric/Behavioral: Negative for hallucinations.   Allergic/Immunologic: Negative for hives.     Objective:     Vital Signs (Most Recent):  Temp: 98.1 °F (36.7 °C) (07/16/19 1513)  Pulse: 88 (07/16/19 1513)  Resp: 18 (07/16/19 1513)  BP: (!) 159/74 (07/16/19 1513)  SpO2: 98 % (07/16/19 1513) Vital Signs (24h Range):  Temp:  [98.1 °F (36.7 °C)-98.9 °F (37.2 °C)] 98.1 °F (36.7 °C)  Pulse:  [] 88  Resp:  [14-18] 18  SpO2:  [96 %-100 %] 98 %  BP: (103-177)/() 159/74     Weight: 82.1 kg (181 lb)  Body mass index is 25.97 kg/m².    SpO2: 98 %  O2 Device (Oxygen Therapy): room air    No intake or output data in the 24 hours ending 07/16/19 1520    Lines/Drains/Airways     Drain                 Hemodialysis AV Fistula 04/01/17 0800 836 days          Peripheral Intravenous Line                 Peripheral IV - Single Lumen 07/16/19 0725 20 G Right Hand less than 1 day                Physical Exam   Constitutional: He is oriented to person, place, and time. He appears well-developed and well-nourished.   HENT:   Head: Normocephalic and atraumatic.   Eyes: Pupils are equal,  round, and reactive to light. Conjunctivae are normal.   Neck: Normal range of motion. Neck supple.   Cardiovascular: Normal rate, normal heart sounds and intact distal pulses.   Pulmonary/Chest: Effort normal and breath sounds normal.   Abdominal: Soft. Bowel sounds are normal.   Musculoskeletal: Normal range of motion.   Neurological: He is alert and oriented to person, place, and time.   Skin: Skin is warm and dry.       Significant Labs: All pertinent lab results from the last 24 hours have been reviewed.    Significant Imaging: Echocardiogram: 2D echo with color flow doppler: No results found for this or any previous visit.

## 2019-07-16 NOTE — ASSESSMENT & PLAN NOTE
Last HgbA1c   Lab Results   Component Value Date    HGBA1C 9.2 (H) 03/27/2019     Hold oral antihyperglycemics while inpatient  PRN sliding scale insulin  ACHS glucose monitoring   ADA diet

## 2019-07-17 PROBLEM — I47.29 NSVT (NONSUSTAINED VENTRICULAR TACHYCARDIA): Status: ACTIVE | Noted: 2019-01-01

## 2019-07-17 NOTE — NURSING
19:00 Received report from BASHIR Maldonado. Patient is still in dialysis. Plan for D/C after Dialysis and ECHO.      19:40 Called Jesus to review Discharge plan since patient did not have ECHO yet, to stay for the night till he have ECHO.

## 2019-07-17 NOTE — PROGRESS NOTES
[facility]  Cardiology  Progress Note    Patient Name: Kodi Ibanez Sr.  MRN: [id number]  Admission Date: 7/16/2019  Hospital Length of Stay: 0 days  Code Status: Full Code   Attending Physician: Rahul Santoyo MD   Primary Care Physician: Melonie Elias MD  Expected Discharge Date: 7/16/2019  Principal Problem:Tachycardia    Subjective:     Hospital Course:   7-17 Several short 10-12 beat runs of VT - asymptomatic. No further SVT    Echo pending    Interval History:     Review of Systems   Constitution: Negative for decreased appetite.   HENT: Negative for ear discharge.    Eyes: Negative for blurred vision.   Endocrine: Negative for polyphagia.   Skin: Negative for nail changes.   Genitourinary: Negative for bladder incontinence.   Neurological: Negative for aphonia.   Psychiatric/Behavioral: Negative for hallucinations.   Allergic/Immunologic: Negative for hives.     Objective:     Vital Signs (Most Recent):  Temp: 99.1 °F (37.3 °C) (07/17/19 0718)  Pulse: 87 (07/17/19 0718)  Resp: 18 (07/17/19 0718)  BP: (!) 118/58 (07/17/19 0718)  SpO2: 98 % (07/17/19 0718) Vital Signs (24h Range):  Temp:  [97.9 °F (36.6 °C)-99.5 °F (37.5 °C)] 99.1 °F (37.3 °C)  Pulse:  [] 87  Resp:  [18] 18  SpO2:  [97 %-100 %] 98 %  BP: (103-159)/(58-99) 118/58     Weight: 63.5 kg (139 lb 15.9 oz)  Body mass index is 20.09 kg/m².     SpO2: 98 %  O2 Device (Oxygen Therapy): room air      Intake/Output Summary (Last 24 hours) at 7/17/2019 1029  Last data filed at 7/17/2019 0500  Gross per 24 hour   Intake 1163 ml   Output 2903 ml   Net -1740 ml       Lines/Drains/Airways     Drain                 Hemodialysis AV Fistula 04/01/17 0800 Left upper arm 837 days          Peripheral Intravenous Line                 Peripheral IV - Single Lumen 07/16/19 0725 20 G Right Hand 1 day                Physical Exam   Constitutional: He is oriented to person, place, and time. He appears well-developed and well-nourished.    HENT:   Head: Normocephalic and atraumatic.   Eyes: Pupils are equal, round, and reactive to light. Conjunctivae are normal.   Neck: Normal range of motion. Neck supple.   Cardiovascular: Normal rate, normal heart sounds and intact distal pulses.   Pulmonary/Chest: Effort normal and breath sounds normal.   Abdominal: Soft. Bowel sounds are normal.   Musculoskeletal: Normal range of motion.   Neurological: He is alert and oriented to person, place, and time.   Skin: Skin is warm and dry.       Significant Labs: All pertinent lab results from the last 24 hours have been reviewed.    Significant Imaging: Echocardiogram: 2D echo with color flow doppler: No results found for this or any previous visit.    Assessment and Plan:     Brief HPI:     NSVT (nonsustained ventricular tachycardia)  Asymptomatic - out patient EP evaluation. Ok for d/c today if echo stable    SVT (supraventricular tachycardia)  Broke with adenosine. On good dose of coreg. Change norvasc to cardizem. Would recommend out patient EP evaluation for SVT ablation. Will f/u prn    Elevated troponin  Suspect demand ischemia from tachycardia and not ACS. Repeat echo. Will not repeat ischemic evaluation at this time    S/P CABG (coronary artery bypass graft)  Denies CP    Hypertension  stable    Hyperlipidemia LDL goal <100  On statin    Type 2 diabetes, uncontrolled, with neuropathy  Per primary    ESRD on hemodialysis  Per renal        VTE Risk Mitigation (From admission, onward)        Ordered     heparin (porcine) injection 5,000 Units  Every 8 hours      07/16/19 1328     IP VTE HIGH RISK PATIENT  Once      07/16/19 1328     Place sequential compression device  Until discontinued      07/16/19 1328          Lamont Britt MD  Cardiology  [facility]

## 2019-07-17 NOTE — PLAN OF CARE
Problem: Fall Injury Risk  Goal: Absence of Fall and Fall-Related Injury  Outcome: Ongoing (interventions implemented as appropriate)  Intervention: Identify and Manage Contributors to Fall Injury Risk     07/17/19 0432   Manage Acute Allergic Reaction   Medication Review/Management medications reviewed   Identify and Manage Contributors to Fall Injury Risk   Self-Care Promotion independence encouraged;BADL personal objects within reach     Intervention: Promote Injury-Free Environment     07/17/19 0432   Optimize Divide and Functional Mobility   Environmental Safety Modification assistive device/personal items within reach;clutter free environment maintained;lighting adjusted;mobility aid in reach;mattress on floor;room near unit station;room organization consistent   Optimize Balance and Safe Activity   Safety Promotion/Fall Prevention assistive device/personal item within reach;bed alarm set;side rails raised x 2;instructed to call staff for mobility

## 2019-07-17 NOTE — PLAN OF CARE
07/17/19 1744   Discharge Assessment   Assessment Type Discharge Planning Assessment   Confirmed/corrected address and phone number on facesheet? Yes   Assessment information obtained from? Patient   Expected Length of Stay (days)   (discharged)   Communicated expected length of stay with patient/caregiver yes   Prior to hospitilization cognitive status: Alert/Oriented   Prior to hospitalization functional status: Independent   Current cognitive status: Alert/Oriented   Current Functional Status: Independent   Lives With spouse   Able to Return to Prior Arrangements yes   Is patient able to care for self after discharge? Yes   Patient's perception of discharge disposition home or selfcare   Readmission Within the Last 30 Days no previous admission in last 30 days   Patient currently being followed by outpatient case management? No   Patient currently receives any other outside agency services? No   Do you have any problems affording any of your prescribed medications? No   Is the patient taking medications as prescribed? yes   Does the patient have transportation home? Yes   Transportation Anticipated family or friend will provide   Does the patient receive services at the [facility]madin Clinic? No   Discharge Plan A Home with family   DME Needed Upon Discharge  none   Patient/Family in Agreement with Plan yes

## 2019-07-17 NOTE — NURSING
Patient had 2 episode of VTACH, 3:38:36 12 beats and 3:38:51 22 beats, asymptomatic. /72, latest HR 88BPM SR. Dr. Villalta informed with no further order.

## 2019-07-17 NOTE — SUBJECTIVE & OBJECTIVE
Interval History:     Review of Systems   Constitution: Negative for decreased appetite.   HENT: Negative for ear discharge.    Eyes: Negative for blurred vision.   Endocrine: Negative for polyphagia.   Skin: Negative for nail changes.   Genitourinary: Negative for bladder incontinence.   Neurological: Negative for aphonia.   Psychiatric/Behavioral: Negative for hallucinations.   Allergic/Immunologic: Negative for hives.     Objective:     Vital Signs (Most Recent):  Temp: 99.1 °F (37.3 °C) (07/17/19 0718)  Pulse: 87 (07/17/19 0718)  Resp: 18 (07/17/19 0718)  BP: (!) 118/58 (07/17/19 0718)  SpO2: 98 % (07/17/19 0718) Vital Signs (24h Range):  Temp:  [97.9 °F (36.6 °C)-99.5 °F (37.5 °C)] 99.1 °F (37.3 °C)  Pulse:  [] 87  Resp:  [18] 18  SpO2:  [97 %-100 %] 98 %  BP: (103-159)/(58-99) 118/58     Weight: 63.5 kg (139 lb 15.9 oz)  Body mass index is 20.09 kg/m².     SpO2: 98 %  O2 Device (Oxygen Therapy): room air      Intake/Output Summary (Last 24 hours) at 7/17/2019 1029  Last data filed at 7/17/2019 0500  Gross per 24 hour   Intake 1163 ml   Output 2903 ml   Net -1740 ml       Lines/Drains/Airways     Drain                 Hemodialysis AV Fistula 04/01/17 0800 Left upper arm 837 days          Peripheral Intravenous Line                 Peripheral IV - Single Lumen 07/16/19 0725 20 G Right Hand 1 day                Physical Exam   Constitutional: He is oriented to person, place, and time. He appears well-developed and well-nourished.   HENT:   Head: Normocephalic and atraumatic.   Eyes: Pupils are equal, round, and reactive to light. Conjunctivae are normal.   Neck: Normal range of motion. Neck supple.   Cardiovascular: Normal rate, normal heart sounds and intact distal pulses.   Pulmonary/Chest: Effort normal and breath sounds normal.   Abdominal: Soft. Bowel sounds are normal.   Musculoskeletal: Normal range of motion.   Neurological: He is alert and oriented to person, place, and time.   Skin: Skin is warm  and dry.       Significant Labs: All pertinent lab results from the last 24 hours have been reviewed.    Significant Imaging: Echocardiogram: 2D echo with color flow doppler: No results found for this or any previous visit.

## 2019-07-17 NOTE — NURSING
22:30 Patient returned from Dialysis. Patient alert and oriented. No pain. No sign of distress. Vital signs done - stable. Nights meds given. [place] given as requested. IV line intact over Rt FA - saline locked.  Fistula over Left arm - bruits felt. Informed patient wife called earlier. Call bell given on his reach, instructed to call for assistance all the time. Bed alarm on. Bed on lowest position. Safety maintained.

## 2019-07-17 NOTE — PROGRESS NOTES
[facility] CASE MANAGEMENT                  WRITTEN DISCHARGE INFORMATION      APPOINTMENTS AND RESOURCES TO HELP YOU MANAGE YOUR CARE AT HOME BASED ON YOUR PREFERENCES:  (If an appointment is not scheduled for you when you leave the hospital, call your doctor to schedule a follow up visit within a week)    Follow-up Information     Zane Guillen MD On 7/19/2019.    Specialties:  Electrophysiology, Cardiology  Why:  @4:00pm, for Cardiology follow up  Contact information:  [address]  [facility] [address]  [phone]                   Healthy Living Instructions to HELP MANAGE YOUR CARE AT HOME:  Things You are responsible for:  1.    Getting your prescriptions filled   2.    Taking your medications as directed, DO NOT MISS ANY DOSES!  3.    Following the diet and exercise recommended by your doctor  4.    Going to your follow-up doctor appointment. This is important because it allows the doctor to monitor your progress and determine if any changes need to made to your treatment plan.  5. If you have any questions about MANAGING YOUR CARE AT HOME Call the Nurse Care Line for 24/7 Assistance [phone]       Please answer any calls you may receive from [facility]. We want to continue to support you as you manage your healthcare needs. [facility] is happy to have the opportunity to serve you.      Thank you for choosing [facility] for your healthcare needs!  Your [facility] Case Management Team,

## 2019-07-17 NOTE — NURSING
Report given to BSAHIR Wheat. Patient alert. No complaints of anything. On Room air. On continuous cardiac monitoring.

## 2019-07-17 NOTE — PLAN OF CARE
Problem: Device-Related Complication Risk (Hemodialysis)  Goal: Safe, Effective Therapy Delivery  Outcome: Ongoing (interventions implemented as appropriate)  Intervention: Optimize Device Care and Function     07/16/19 2022   Optimize Blood Flow   Circuit Management air detection alarms on;circuit line warming device in use;tubing repositioned;tubing/circuit/filter adjusted;other (see comments)   Manage Acute Allergic Reaction   Medication Review/Management medications reviewed;high risk medications identified         Comments: 3.5  Hour hd tx in progress as ordered.

## 2019-07-17 NOTE — PLAN OF CARE
07/17/19 1749   Final Note   Assessment Type Final Discharge Note   Anticipated Discharge Disposition Home   Hospital Follow Up  Appt(s) scheduled? Yes   Discharge plans and expectations educations in teach back method with documentation complete? Yes   Right Care Referral Info   Post Acute Recommendation No Care

## 2019-07-17 NOTE — NURSING
1902 report on patient given to bhupinder abdul rn on patients progress and updated handoff report sheet . Patient remains in dialysis no changes on telemetry .

## 2019-07-17 NOTE — PLAN OF CARE
"   07/17/19 1025   Discharge Assessment   Assessment Type Discharge Planning Assessment   TN to patient's room to complete DC needs assessment, patient off unit.   TN to follow up at a later time.    EDUCATION:  Discussed educational information on arrhythmias .  Information included:  signs and symptoms to look for and call the doctor if experiencing, and symptoms that may indicate a medical emergency: CALL 911.      All questions answered.  Teach back method used.    Patient stated, "I will call 911 if I cant cath my breath or I have CP".  Also discussed:  Things You are responsible For To Manage Your Care At Home:  1.    Getting your prescriptions filled   2.    Taking your medications as directed, DO NOT MISS ANY DOSES!  3.    Going to your follow-up doctor appointment. This is important because it  allow the doctor to monitor your progress and determine if  any changes need to made to your treatment plan.  Call [facility] number for new or repeated problems / symptoms   Patient agreed to all above           "

## 2019-07-17 NOTE — TELEPHONE ENCOUNTER
Spoke w/ pts wife regarding pts appt on 7/19. She informed me that they would not have copay to make appt, so she will call back when they have it to pay to make appt.   [facility]

## 2019-07-18 NOTE — DISCHARGE SUMMARY
[facility] Hospital Medicine  Discharge Summary      Patient Name: Kodi Ibanez Sr.  MRN: [id number]  Admission Date: 7/16/2019  Hospital Length of Stay: 0 days  Discharge Date and Time:  07/18/2019 6:17 PM  Attending Physician: No att. providers found   Discharging Provider: DARRIAN Alvarez  Primary Care Provider: Melonie Elias MD      HPI:   59 y.o. male with CAD, ESRD on HD (TTS), hypertension, hyperlipidemia, chronic heart failure, seizures, DM 2, impaired mobility ambulates with a cane, and history of CVA presents via personal vehicle from outpatient dialysis treatment where he was told that he had an elevated heart rate and should go to the emergency room.  He denies fever, chills, SOB, chest pain, orthopnea, PND, lower extremity edema, palpitations, dizziness, syncope, or any other associated complaints.  States he has been taking his carvedilol only at night, not b.i.d..  In the ED, EKG appeared to be sinus tachycardia.  Initially received a 500 mL normal saline bolus and vagal maneuvers without improvement.  Cardiology recommended trial of adenosine.  One 6 mg dose resolve the tachycardia.  Routine labs revealed a minimal elevation of troponin and BNP.  He did not receive outpatient dialysis this morning.  Placed in observation for hemodialysis and cardiac monitoring.    * No surgery found *      Hospital Course:   Mr. Ibanez was placed in observation for further evaluation treatment of the presenting with tachycardia.  He was found to be tachycardic and outpatient hemodialysis appointment this morning and was not dialyzed but rather directed to the ED.  Tachycardic in ED that did not respond to small IV fluid bolus and vagal maneuvers.  Received 6 mg adenosine x1 with resolution.  He was seen and evaluated by Cardiology who recommend changing amlodipine to diltiazem and referring for outpatient electro physiology evaluation.  Since he missed dialysis today, Nephrology was also  consulted and wrote orders for dialysis.  He was dialyzed without issue.  All findings and plan discussed with patient, all questions answered, he verbalizes understanding and agreement.  Discharged home in stable condition to follow up with electrophysiology.     Consults:     No new Assessment & Plan notes have been filed under this hospital service since the last note was generated.  Service: Hospital Medicine    Final Active Diagnoses:    Diagnosis Date Noted POA    PRINCIPAL PROBLEM:  Tachycardia [R00.0] 07/16/2019 Yes    NSVT (nonsustained ventricular tachycardia) [I47.2] 07/17/2019 Yes    Elevated troponin [R74.8] 07/16/2019 Yes    SVT (supraventricular tachycardia) [I47.1] 07/16/2019 Yes    Dependent on walker for ambulation [Z99.89] 03/27/2019 Not Applicable     Chronic    Uncontrolled type 2 diabetes mellitus with both eyes affected by proliferative retinopathy and macular edema, with long-term current use of insulin [E11.3513, E11.65, Z79.4] 11/13/2017 Yes     Chronic    S/P CABG (coronary artery bypass graft) [Z95.1] 06/04/2015 Not Applicable     Chronic    Hyperlipidemia LDL goal <100 [E78.5] 10/17/2013 Yes     Chronic    Anemia of chronic disease [D63.8] 10/17/2013 Yes     Chronic    Hypertension [I10] 10/17/2013 Yes     Chronic    CAD (coronary artery disease) [I25.10] 08/30/2013 Yes     Chronic    Type 2 diabetes, uncontrolled, with neuropathy [E11.40, E11.65] 11/06/2012 Yes     Chronic    ESRD on hemodialysis [N18.6, Z99.2] 07/13/2012 Not Applicable    Cognitive deficits as late effect of cerebrovascular disease [I69.919] 06/26/2012 Not Applicable     Chronic    Congestive heart failure [I50.9] 11/17/2011 Yes     Chronic    History of stroke [Z86.73] 11/12/2011 Not Applicable     Chronic      Problems Resolved During this Admission:       Discharged Condition: stable    Disposition: Home or Self Care    Follow Up:  Follow-up Information     Zane Guillen MD On 7/19/2019.     Specialties:  Electrophysiology, Cardiology  Why:  @4:00pm, for Cardiology follow up  Contact information:  Jessica Brink  [facility] [id number]  [phone]                 Patient Instructions:      Diet diabetic     Diet Cardiac     Diet renal     Diet Cardiac     Notify your health care provider if you experience any of the following:  temperature >100.4     Notify your health care provider if you experience any of the following:  persistent nausea and vomiting or diarrhea     Notify your health care provider if you experience any of the following:  difficulty breathing or increased cough     Notify your health care provider if you experience any of the following:  persistent dizziness, light-headedness, or visual disturbances     Notify your health care provider if you experience any of the following:  increased confusion or weakness     Activity as tolerated     Activity as tolerated       Significant Diagnostic Studies: Labs:   CMP   Recent Labs   Lab 07/17/19  0455      K 4.4      CO2 28   *   BUN 23   CREATININE 5.2*   CALCIUM 8.8   PROT 6.6   ALBUMIN 3.0*   BILITOT 0.6   ALKPHOS 116   AST 22   ALT 32   ANIONGAP 8   ESTGFRAFRICA 13*   EGFRNONAA 11*   , CBC   Recent Labs   Lab 07/17/19  0455   WBC 4.26   HGB 9.4*   HCT 28.5*   *   , INR   Lab Results   Component Value Date    INR 1.0 07/16/2019    INR 1.0 10/17/2013    INR 1.0 10/17/2013   , Lipid Panel   Lab Results   Component Value Date    CHOL 158 03/12/2019    HDL 37 (L) 03/12/2019    LDLCALC 97.4 03/12/2019    TRIG 118 03/12/2019    CHOLHDL 23.4 03/12/2019   , Troponin   Recent Labs   Lab 07/16/19 2117   TROPONINI 0.034*    and A1C:   Recent Labs   Lab 03/27/19  1100 07/16/19  1529   HGBA1C 9.2* 11.5*       Pending Diagnostic Studies:     Procedure Component Value Units Date/Time    Hepatitis B Surface Antibody, Qual/Quant [[id number]] Collected:  07/16/19 2117    Order Status:  Sent Lab Status:  In process Updated:   "07/16/19 2125    Specimen:  Blood          Medications:  Reconciled Home Medications:      Medication List      START taking these medications    diltiaZEM 180 MG 24 hr capsule  Commonly known as:  CARDIZEM CD  Take 1 capsule (180 mg total) by mouth once daily.        CHANGE how you take these medications    LANTUS SOLOSTAR U-100 INSULIN glargine 100 units/mL (3mL) SubQ pen  Generic drug:  insulin  INJECT 20 UNITS UNDER THE SKIN NIGHTLY  What changed:  additional instructions        CONTINUE taking these medications    atorvastatin 40 MG tablet  Commonly known as:  LIPITOR  TAKE 1 TABLET(40 MG) BY MOUTH EVERY DAY     BD ULTRA-FINE ORIG PEN NEEDLE 29 gauge x 1/2" Ndle  Generic drug:  pen needle, diabetic  USE  1 EVERY EVENING     blood glucose control, normal Soln  1 drop by Misc.(Non-Drug; Combo Route) route as directed.     blood sugar diagnostic Strp  To check BG 2 times daily, to use with accuchek casa meter     blood-glucose meter kit  To check BG 2 times daily, to use with accuchek casa meter     carvedilol 25 MG tablet  Commonly known as:  COREG  TAKE 1 TABLET(25 MG) BY MOUTH TWICE DAILY WITH MEALS     furosemide 40 MG tablet  Commonly known as:  LASIX  TAKE 1 TABLET(40 MG) BY MOUTH EVERY DAY     lancets Misc  To check BG 2 times daily, to use with accuchek casa meter     losartan 100 MG tablet  Commonly known as:  COZAAR  Take 1 tablet (100 mg total) by mouth once daily.     megestrol 20 MG Tab  Commonly known as:  MEGACE  Take 1 tablet (20 mg total) by mouth once daily.     NOVOFINE 30 30 gauge x 1/3" Ndle  Generic drug:  pen needle, diabetic  USE AS DIRECTED WITH LANTUS SOLARSTAR     NovoLOG Flexpen U-100 Insulin 100 unit/mL (3 mL) Inpn pen  Generic drug:  insulin aspart U-100  INJECT 5 TO 10 UNITS WITH MEALS AND AT BEDTIME DEPENDING ON BLOOD SUGAR     promethazine 25 MG tablet  Commonly known as:  PHENERGAN  Take 1 tablet (25 mg total) by mouth every 6 (six) hours as needed for Nausea.     VITAMIN C 500 " MG tablet  Generic drug:  ascorbic acid (vitamin C)  Take 500 mg by mouth every evening.        STOP taking these medications    amLODIPine 10 MG tablet  Commonly known as:  NORVASC     amoxicillin 875 MG tablet  Commonly known as:  AMOXIL     pantoprazole 40 MG tablet  Commonly known as:  PROTONIX     RENVELA 800 mg Tab  Generic drug:  sevelamer carbonate            Indwelling Lines/Drains at time of discharge:   Lines/Drains/Airways     Drain                 Hemodialysis AV Fistula 04/01/17 0800 Left upper arm 838 days                Time spent on the discharge of patient: 35 minutes  Patient was seen and examined on the date of discharge and determined to be suitable for discharge.         CAIN Delgadillo, FNP-C  Hospitalist - Department of Hospital Medicine  [address] [place] [address]  Office [phone]; Pager [phone]

## 2019-07-29 NOTE — TELEPHONE ENCOUNTER
----- Message from Sen Hansen sent at 7/29/2019  3:04 PM CDT -----  Contact: Kodi [phone]  Type: RX Refill Request    Who Called: Kodi    Refill or New Rx:Refill     RX Name and Strength:diltiaZEM (CARDIZEM CD) 180 MG 24 hr capsule    Is this a 30 day or 90 day RX:30 day    Preferred Pharmacy with phone number:[id number] [place], [place] - [address] [facility]    Would the patient rather a call back or a response via [facility]? Call back    Best Call Back Number:[phone]

## 2019-08-07 PROBLEM — Z85.46 HISTORY OF PROSTATE CANCER: Status: ACTIVE | Noted: 2019-01-01

## 2019-08-07 NOTE — PROGRESS NOTES
Subjective:       Patient ID: Kodi Ibanez Sr. is a 59 y.o. male.    Chief Complaint: Follow-up (hospital)    59-year-old male presents to the clinic today with his wife for hospital follow-up.  He was placed in observation for evaluation treatment of presenting with tachycardia to the emergency room.  He was found to be tachycardia at outpatient hemodialysis appointment on the morning of 07/16/2019 was not dialyzed rather directed to the emergency department.  Tachycardia in the emergency department the did not respond to small IV fluid bolus and vagal maneuvers.  He received 6 mg of adenosine x1 with resolution.  He was seen and evaluated by cardiologist who recommended amlodipine be changed to Diltiazem referring for outpatient electrophysiology evaluation.  Since he missed dialysis on 07/16/2019, nephrology was also consult in wrote orders for dialysis.  He was dialyzed without any issues.  All findings and plans discussed with the patient all questions were answered and he and his wife verbalized understanding of agreement was discharged home in stable condition to follow up with electrophysiology.  Presently, he denies any chest pain, heart palpitations, shortness of breath, or swelling to lower extremities.  His wife states his heart rate has been running in the 80s.  She was not happy with cardiologist and does not have the money to pay the co-payment so she is going to hold off for seeing cardiologist at this time.  His wife states his home blood pressure earlier today was 120/60.  He denies any headaches, dizziness, blurred vision, or weakness.    Past Medical History:   Diagnosis Date    Allergy     Ambulates with cane     Atherosclerosis of aorta     noted on VIKTORIA 2011    Blood transfusion     Cataracts, bilateral     CHF (congestive heart failure)     Chronic kidney disease     Clotting disorder     Coronary artery disease     Decreased appetite     Diabetic retinopathy     DM (diabetes  "mellitus), type 2 with renal complications     ESRD on hemodialysis     TUESDAY/ THURSDAY/SATURDAY    Hyperlipidemia     Hyperparathyroidism, unspecified     Hypertension     Myocardial infarction     Prostate cancer 2016    7/16/19:  wife states "he doesn't have it anymore"    Seizures     Stroke 2011    PT HAS SHAKES     Unsteady gait     Uses roller walker      Past Surgical History:   Procedure Laterality Date    CARDIAC SURGERY      CABG    CATARACT EXTRACTION      ou    CATARACT EXTRACTION, BILATERAL      cataracts      CORONARY ARTERY BYPASS GRAFT  2009    bypass    EYE SURGERY      FISTULOGRAM Left 8/21/2017    [facility], [facility] CATH [facility]    FISTULOGRAM Left 8/3/2016    [facility] C[facility]    FISTULOGRAM Left 8/13/2014    [facility] OR [facility]    FISTULOGRAM Left 10/9/2013    [facility]    FISTULOGRAM, possible intervention - LUE Left 3/21/2016    Per[facility], [facility] CATH [facility]    Left Guera AVF  1/11/2012    PROSTATE BIOPSY      positive    retinal laser      ROTATOR CUFF REPAIR  right, 2005    TRANSPOSITION, VEIN Left 4/1/2013    Performed by [facility] III, [facility] at [facility] OR [address] [place] [address]    VASCULAR SURGERY        reports that he has never smoked. He has never used smokeless tobacco. He reports that he does not drink alcohol or use drugs.  Review of Systems   Respiratory: Negative for cough, shortness of breath and wheezing.    Cardiovascular: Negative for chest pain, palpitations and leg swelling.   Gastrointestinal: Negative for abdominal pain, diarrhea, nausea and vomiting.   Musculoskeletal: Positive for gait problem.        Uses rolator    Neurological: Negative for dizziness, light-headedness and headaches.       Objective:      Physical Exam   Constitutional: No distress.   Thin pleasant male in NAD  "   Cardiovascular: Normal rate, regular rhythm and normal heart sounds. Exam reveals no gallop and no friction rub.   No murmur heard.  Pulmonary/Chest: Effort normal and breath sounds normal. No respiratory distress. He has no wheezes. He has no rales.   Abdominal: Soft. Bowel sounds are normal. There is no tenderness.   Musculoskeletal: Normal range of motion. He exhibits no edema.   Neurological: He is alert.   Skin: Skin is warm and dry. He is not diaphoretic.       Assessment:       1. Anemia of chronic disease    2. Atherosclerosis of aorta    3. Coronary artery disease involving native coronary artery of native heart without angina pectoris    4. Chronic congestive heart failure, unspecified heart failure type    5. Dependent on walker for ambulation    6. ESRD on hemodialysis    7. History of MI (myocardial infarction)    8. History of stroke    9. Hyperlipidemia LDL goal <100    10. Essential hypertension    11. Hypertensive retinopathy of both eyes    12. NSVT (nonsustained ventricular tachycardia)    13. History of prostate cancer    14. Uncontrolled type 2 diabetes mellitus with both eyes affected by proliferative retinopathy and macular edema, with long-term current use of insulin    15. Type 2 diabetes, uncontrolled, with neuropathy    16. SVT (supraventricular tachycardia)    17. Seizure disorder        Plan:         Anemia of chronic disease  - stable continue to monitor     Atherosclerosis of aorta  - Stable / Asymptomatic is on blood pressure and cholesterol lowering medications    Coronary artery disease involving native coronary artery of native heart without angina pectoris  - Stable / Asymptomatic is on blood pressure and cholesterol lowering medications    Chronic congestive heart failure, unspecified heart failure type  - The current medical regimen is effective;  continue present plan and medications.    Dependent on walker for ambulation  - uses Rolator    ESRD on hemodialysis  -has dialysis  Tuesdays, Thursdays, and Saturdays    History of MI (myocardial infarction)  - Stable / Asymptomatic is on blood pressure and cholesterol lowering medications    History of stroke  - some cognitive deficits as late effect of stroke    Hyperlipidemia LDL goal <100  - The current medical regimen is effective;  continue present plan and medications.    Essential hypertension  - The current medical regimen is effective;  continue present plan and medications.    Hypertensive retinopathy of both eyes  - will call and schedule a appt with     NSVT (nonsustained ventricular tachycardia)  - on Diltiazem     History of prostate cancer  - followed by urology    Uncontrolled type 2 diabetes mellitus with both eyes affected by proliferative retinopathy and macular edema, with long-term current use of insulin  - continue current medications    Type 2 diabetes, uncontrolled, with neuropathy  - The current medical regimen is effective;  continue present plan and medications.    SVT (supraventricular tachycardia)  - on Diltiazem    Seizure disorder  - last seizure 2014 so stopped his medication 2016

## 2019-08-07 NOTE — PATIENT INSTRUCTIONS
Schedule appt for his eye exam  Continue all current medications  Continue to monitor heart rate and blood pressure closely  Watch blood sugars closely  Bring back advance directives   Follow up with Dr. Elias 10/29

## 2019-10-29 PROBLEM — I47.9 PAROXYSMAL TACHYCARDIA: Status: RESOLVED | Noted: 2019-03-12 | Resolved: 2019-01-01

## 2019-10-29 PROBLEM — R00.0 TACHYCARDIA: Status: RESOLVED | Noted: 2019-01-01 | Resolved: 2019-01-01

## 2019-10-29 PROBLEM — Z85.46 HISTORY OF PROSTATE CANCER: Status: RESOLVED | Noted: 2019-01-01 | Resolved: 2019-01-01

## 2019-10-29 PROBLEM — R79.89 ELEVATED TROPONIN: Status: RESOLVED | Noted: 2019-01-01 | Resolved: 2019-01-01

## 2019-10-30 NOTE — PROGRESS NOTES
"HISTORY OF PRESENT ILLNESS:  Kodi Ibanez Sr. is a 59 y.o. male who presents to the clinic today for a routine medical physical exam. His last physical exam was approximately 1 years(s) ago.  Presents to the clinic today with his wife.  She is the primary historian.      PAST MEDICAL HISTORY:  Past Medical History:   Diagnosis Date    Allergy     Ambulates with cane     Atherosclerosis of aorta     noted on VIKTORIA 2011    Blood transfusion     Cataracts, bilateral     CHF (congestive heart failure)     Chronic kidney disease     Clotting disorder     Coronary artery disease     Decreased appetite     Diabetic retinopathy     DM (diabetes mellitus), type 2 with renal complications     ESRD on hemodialysis     TUESDAY/ THURSDAY/SATURDAY    Hyperlipidemia     Hyperparathyroidism, unspecified     Hypertension     Myocardial infarction     Prostate cancer 2016 7/16/19:  wife states "he doesn't have it anymore"    Seizures     Stroke 2011    PT HAS SHAKES     Unsteady gait     Uses roller walker        PAST SURGICAL HISTORY:  Past Surgical History:   Procedure Laterality Date    CARDIAC SURGERY      CABG    CATARACT EXTRACTION      ou    CATARACT EXTRACTION, BILATERAL      cataracts      CORONARY ARTERY BYPASS GRAFT  2009    bypass    EYE SURGERY      Left Guera AVF  1/11/2012    PROSTATE BIOPSY      positive    retinal laser      ROTATOR CUFF REPAIR  right, 2005    VASCULAR SURGERY         SOCIAL HISTORY:  Social History     Socioeconomic History    Marital status:      Spouse name: Not on file    Number of children: Not on file    Years of education: Not on file    Highest education level: Not on file   Occupational History    Not on file   Social Needs    Financial resource strain: Not on file    Food insecurity:     Worry: Not on file     Inability: Not on file    Transportation needs:     Medical: Not on file     Non-medical: Not on file   Tobacco Use    Smoking " status: Never Smoker    Smokeless tobacco: Never Used   Substance and Sexual Activity    Alcohol use: No    Drug use: No    Sexual activity: Not Currently   Lifestyle    Physical activity:     Days per week: Not on file     Minutes per session: Not on file    Stress: Not on file   Relationships    Social connections:     Talks on phone: Not on file     Gets together: Not on file     Attends Latter day service: Not on file     Active member of club or organization: Not on file     Attends meetings of clubs or organizations: Not on file     Relationship status: Not on file   Other Topics Concern    Not on file   Social History Narrative    Not on file       FAMILY HISTORY:  Family History   Problem Relation Age of Onset    Diabetes Mother     Glaucoma Mother     Cancer Mother     Cataracts Mother     Heart disease Father     Cancer Paternal Grandmother     Heart disease Maternal Grandmother     No Known Problems Sister     Prostate cancer Brother     No Known Problems Maternal Grandfather     No Known Problems Paternal Grandfather     No Known Problems Sister     No Known Problems Sister     Hypertension Brother     Cancer Paternal Uncle     Amblyopia Neg Hx     Blindness Neg Hx     Macular degeneration Neg Hx     Retinal detachment Neg Hx     Strabismus Neg Hx     Stroke Neg Hx     Thyroid disease Neg Hx     Anesthesia problems Neg Hx     Clotting disorder Neg Hx      problems Neg Hx     Kidney cancer Neg Hx     Kidney disease Neg Hx     Malignant hyperthermia Neg Hx     Sickle cell trait Neg Hx     Lupus Neg Hx     Urolithiasis Neg Hx     Sudden death Neg Hx        ALLERGIES AND MEDICATIONS: updated and reviewed.  Review of patient's allergies indicates:   Allergen Reactions    Reglan [metoclopramide hcl] Diarrhea    Lorazepam      Other reaction(s): heavy sedation     Medication List with Changes/Refills   Current Medications    ASCORBIC ACID, VITAMIN C, (VITAMIN C) 500  "MG TABLET    Take 500 mg by mouth every evening.     BD INSULIN PEN NEEDLE UF ORIG 29 GAUGE X 1/2" NDLE    USE  1 EVERY EVENING    BLOOD GLUCOSE CONTROL, NORMAL SOLN    1 drop by Misc.(Non-Drug; Combo Route) route as directed.    BLOOD SUGAR DIAGNOSTIC STRP    To check BG 2 times daily, to use with accuchek casa meter    BLOOD-GLUCOSE METER KIT    To check BG 2 times daily, to use with accuchek casa meter    INSULIN (LANTUS SOLOSTAR U-100 INSULIN) GLARGINE 100 UNITS/ML (3ML) SUBQ PEN    INJECT 20 UNITS UNDER THE SKIN NIGHTLY    LANCETS [facility]    To check BG 2 times daily, to use with accuchek casa meter    NOVOFINE 30 30 X 1/3 " NDLE    USE AS DIRECTED WITH LANTUS SOLARSTAR    NOVOLOG FLEXPEN U-100 INSULIN 100 UNIT/ML INPN PEN    INJECT 5 TO 10 UNITS WITH MEALS AND AT BEDTIME DEPENDING ON BLOOD SUGAR    PROMETHAZINE (PHENERGAN) 25 MG TABLET    Take 1 tablet (25 mg total) by mouth every 6 (six) hours as needed for Nausea.   Changed and/or Refilled Medications    Modified Medication Previous Medication    ATORVASTATIN (LIPITOR) 40 MG TABLET atorvastatin (LIPITOR) 40 MG tablet       TAKE 1 TABLET(40 MG) BY MOUTH EVERY DAY    TAKE 1 TABLET(40 MG) BY MOUTH EVERY DAY    CARVEDILOL (COREG) 25 MG TABLET carvedilol (COREG) 25 MG tablet       TAKE 1 TABLET(25 MG) BY MOUTH TWICE DAILY WITH MEALS    TAKE 1 TABLET(25 MG) BY MOUTH TWICE DAILY WITH MEALS    DILTIAZEM (CARDIZEM CD) 180 MG 24 HR CAPSULE diltiaZEM (CARDIZEM CD) 180 MG 24 hr capsule       Take 1 capsule (180 mg total) by mouth once daily.    Take 1 capsule (180 mg total) by mouth once daily.    FUROSEMIDE (LASIX) 40 MG TABLET furosemide (LASIX) 40 MG tablet       TAKE 1 TABLET(40 MG) BY MOUTH EVERY DAY    TAKE 1 TABLET(40 MG) BY MOUTH EVERY DAY    LOSARTAN (COZAAR) 100 MG TABLET losartan (COZAAR) 100 MG tablet       Take 1 tablet (100 mg total) by mouth once daily.    Take 1 tablet (100 mg total) by mouth once daily.    MEGESTROL (MEGACE) 20 MG TAB megestrol (MEGACE) " 20 MG Tab       Take 1 tablet (20 mg total) by mouth once daily.    Take 1 tablet (20 mg total) by mouth once daily.         CARE TEAM:  Patient Care Team:  Melonie Elias MD as PCP - General (Internal Medicine)  [facility] as Dialysis Nurse  [facility] as [facility] Provider  LISA Catherine MD as Consulting Physician (Urology)  Tyrone Levy MD as Consulting Physician (Nephrology)  Karen Rosen DPM as Consulting Physician (Podiatry)  LACEY Mix MD as Consulting Physician (Ophthalmology)           SCREENING HISTORY:  Health Maintenance       Date Due Completion Date    Shingles Vaccine (1 of 2) 02/03/2008 ---    Colonoscopy 10/28/2009 10/28/2008    Eye Exam 11/13/2018 11/13/2017    PROSTATE-SPECIFIC ANTIGEN 04/09/2019 4/9/2018    Override on 1/13/2015: Done (done at dialysis - 6.2)    Pneumococcal Vaccine (Highest Risk) (3 of 3 - PPSV23) 08/04/2019 10/10/2016    Hemoglobin A1c 10/16/2019 7/16/2019    Override on 10/11/2016: Done (8.6 per dialysis)    Override on 7/14/2015: Done (done at dialysis - 9.7)    Override on 1/1/2014: Done (HgbA1c done at dialysis = 8.2)    Foot Exam 12/13/2019 12/13/2018    Override on 12/6/2017: Done    Override on 5/29/2017: Done    Override on 7/5/2016: Done (neuropathy noted)    Override on 12/4/2015: Done    Override on 12/11/2014: Done    Lipid Panel 03/12/2020 3/12/2019    TETANUS VACCINE 07/05/2026 7/5/2016            REVIEW OF SYSTEMS:   The patient reports: fair dietary habits. His wife allows him to eat what he wants; he is a picky eater; he is on megace for appetite.  The patient reports : that they do not exercise.  Review of Systems   Constitutional: Positive for fatigue. Negative for chills and fever.   HENT: Negative for congestion, ear pain, sore throat and voice change.    Eyes: Negative for photophobia, pain, discharge and visual disturbance.   Respiratory: Negative for cough, shortness of breath and wheezing.   "  Cardiovascular: Negative for chest pain, palpitations and leg swelling.   Gastrointestinal: Negative for abdominal pain, blood in stool, constipation, diarrhea, nausea and vomiting.   Musculoskeletal: Negative for gait problem, joint swelling and neck stiffness.   Skin: Negative for color change and rash.   Neurological: Positive for weakness (- generalized; stable). Negative for seizures and headaches.   Hematological: Negative for adenopathy. Does not bruise/bleed easily.   Psychiatric/Behavioral: Positive for dysphoric mood. Negative for behavioral problems. The patient is not nervous/anxious.         He has fair to poor memory.             PHYSICAL EXAM:  Vitals:    10/30/19 1351   BP: 120/70   Pulse:    Temp:      Weight: 66.8 kg (147 lb 6 oz)   Height: 5' 10" (177.8 cm)   Body mass index is 21.15 kg/m².    General appearance - thin, chronically ill appearing; in no distress  Psychiatric - alert, oriented to person and place only, normal behavior, speech, dress, motor activity, quiet  Eyes - pupils equal and reactive, extraocular eye movements intact, sclera anicteric  Mouth - mucous membranes moist, pharynx normal without lesions  Neck - supple, no significant adenopathy, carotids upstroke normal bilaterally, no bruits, thyroid exam: thyroid is normal in size without nodules or tenderness  Lymphatics - no palpable cervical lymphadenopathy  Chest - clear to auscultation, no wheezes, rales or rhonchi, symmetric air entry  Heart - normal rate and regular rhythm, no gallops noted, systolic murmur 2-3/6   Abdomen - soft, nontender, nondistended, no masses or organomegaly   Male - not examined  Back exam - mild limit in range of motion noted on exam, no significant pain with motion noted during exam, in depth exam deferred  Neurological - alert, normal speech, no focal findings or movement disorder noted, cranial nerves II through XII intact  Musculoskeletal - patient noted to have Mild osteoarthritic changes to " both knee joints. No joint effusions noted., no muscular tenderness noted; overall poor muscle mass; weak with ambulation; walked with a rolling walker  Extremities - no pedal edema noted  Skin - normal coloration, no suspicious skin lesions  Diabetic Foot Exam -   Protective Sensation (w/ 10 gram monofilament):  Right: Intact  Left: Intact    Visual Inspection:  flat feet with mild onychomycosis and dry skin    Pedal Pulses:   Right: Diminshed  Left: Diminshed    Posterior tibialis:   Right:Diminshed  Left: Diminshed        Labs:  Pre-visit Labs - not done        ASSESSMENT AND PLAN:  1. Routine medical exam  Counseled on age appropriate medical preventative services including age appropriate cancer screenings, age appropriate eye and dental exams, over all nutritional health, need for a consistent exercise regimen, and an over all push towards maintaining a vigorous and active lifestyle.  Counseled on age appropriate vaccines and discussed upcoming health care needs based on age/gender. Discussed good sleep hygiene and stress management.     2. Uncontrolled type 2 diabetes mellitus with both eyes affected by proliferative retinopathy and macular edema, with long-term current use of insulin/3. Type 2 diabetes, uncontrolled, with neuropathy/3. Type 2 diabetes, uncontrolled, with neuropathy/5. Long-term insulin use  Diabetes has not been well controlled.  His wife is allowing him to eat whatever he wants because of his overall poor appetite.  I asked her to adjust insulin as able to better control his blood sugars to prevent dehydration without causing low blood sugars.  Unfortunately, he has at home by himself often and cannot give himself insulin injections.  - Hemoglobin A1c; Future    6. Essential hypertension  Blood pressure fluctuates, but is usually well controlled.  Continue current regimen.  - losartan (COZAAR) 100 MG tablet; Take 1 tablet (100 mg total) by mouth once daily.  Dispense: 90 tablet; Refill:  1  - diltiaZEM (CARDIZEM CD) 180 MG 24 hr capsule; Take 1 capsule (180 mg total) by mouth once daily.  Dispense: 90 capsule; Refill: 1  - carvedilol (COREG) 25 MG tablet; TAKE 1 TABLET(25 MG) BY MOUTH TWICE DAILY WITH MEALS  Dispense: 180 tablet; Refill: 1    7. Chronic congestive heart failure, unspecified heart failure type  The current medical regimen is effective;  continue present plan and medications.   - furosemide (LASIX) 40 MG tablet; TAKE 1 TABLET(40 MG) BY MOUTH EVERY DAY  Dispense: 90 tablet; Refill: 1    8. Hyperlipidemia LDL goal <100  We discussed low fat diet and regular exercise.The current medical regimen is effective;  continue present plan and medications.    - atorvastatin (LIPITOR) 40 MG tablet; TAKE 1 TABLET(40 MG) BY MOUTH EVERY DAY  Dispense: 90 tablet; Refill: 1    9. Cognitive deficits as late effect of cerebrovascular disease  Stable. Observe.    10. Seizure disorder  Stable. Observe.     11. Anemia of chronic disease  Stable. Asymptomatic. Observe.     12. PVD (peripheral vascular disease)  Jax denies claudication symptoms.  Observe.    13. ESRD on hemodialysis  Continue dialysis.  Followed by Nephrology.    14. Secondary renal hyperparathyroidism  Stable. Asymptomatic. Observe.     15. Atherosclerosis of aorta  Patient with Atherosclerosis of the Aorta.  Stable/asymptomatic. Currently stable on lipid lowering medication and b/p monitoring.     16. Prostate cancer  Followed by Urology.  Stable.    17. Dependent on walker for ambulation      18. Flu vaccine need  Completed at dialysis. Chart updated.    19. Need for Streptococcus pneumoniae vaccination  Completed today.    20. Need for shingles vaccine  Patient was advised to get immunization at the pharmacy.     21. Colon cancer screening  Based on patient's current medical condition we have decided not to pursue colon cancer screening at this time.    22. Screening for prostate cancer    - PSA, Screening; Future    23. Failure to  thrive in adult  The current medical regimen is effective;  continue present plan and medications.  We discussed the risks and benefits of Megace for appetite treatment.  - megestrol (MEGACE) 20 MG Tab; Take 1 tablet (20 mg total) by mouth once daily.  Dispense: 90 tablet; Refill: 1           Follow up in about 6 months (around 4/30/2020) for follow up chronic medical conditions.. or sooner as needed.

## 2019-10-31 NOTE — TELEPHONE ENCOUNTER
----- Message from Selene Munoz sent at 10/31/2019  8:56 AM CDT -----  Contact: Self   Type: Patient Call Back    What is the request in detail: Pt calling to speak to a nurse regarding arm being sore after injection.     Can the clinic reply by [facility]? No    Would the patient rather a call back or a response via [facility]? Call back     Best call back number: [phone]

## 2019-10-31 NOTE — TELEPHONE ENCOUNTER
Spoke with patient's wife and she informed me that patient received the pneumonia vaccine and is complaining of arm soreness. Decline any warmth, redness, are tenderness to the arm. Informed her that arm soreness after vaccine is common. Patient can take Tylenol if allow are use warm compress for comfort. She informed me that he refuse these comfort measures.

## 2019-10-31 NOTE — TELEPHONE ENCOUNTER
Please call patient/wife:  The PSA level was elevated, but stable from previous.  He can follow up with Urology as per their recommendations.  His diabetes control has worsened significantly.  His A1c is now 12.6.  This is dangerously high and could lead to dehydration as well as diabetic ketoacidosis.  I know she is letting him eat whatever he wants, but we need to start minimizing starches and sugars.  I recommend we get him in with endocrinology to help adjust insulin.  Consult order placed.

## 2019-11-01 NOTE — TELEPHONE ENCOUNTER
----- Message from Peg Drummond sent at 11/1/2019  2:32 PM CDT -----  Contact: Yaima-Spouse  Type: Patient Call Back    Who called:Yaima    What is the request in detail:Yaima returned call to Ms Agosto. Please call    Can the clinic reply by [facility]?    Would the patient rather a call back or a response via [facility]? Call    Best call back number:[phone]

## 2019-11-01 NOTE — TELEPHONE ENCOUNTER
Please call patient/wife:  The PSA level was elevated, but stable from previous.  He can follow up with Urology as per their recommendations.  His diabetes control has worsened significantly.  His A1c is now 12.6.  This is dangerously high and could lead to dehydration as well as diabetic ketoacidosis.  I know she is letting him eat whatever he wants, but we need to start minimizing starches and sugars.  I recommend we get him in with endocrinology to help adjust insulin.  Consult order placed.      Spoke with the pt's wife and gave the recommendations.  Patient verbalized understandings.

## 2019-12-10 NOTE — TELEPHONE ENCOUNTER
"----- Message from Veena Nolascoaux sent at 12/10/2019 11:41 AM CST -----  Contact: Self  Type: RX Refill Request    Who Called: self    Have you contacted your pharmacy: no     Refill or New Rx: refill     RX Name and Strength: BD INSULIN PEN NEEDLE UF ORIG 29 gauge x 1/2" Ndle      Preferred Pharmacy with phone number: [id number] - [place], [place] - [address] AT [facility] [phone] (Phone)  [phone] (Fax)      Local or Mail Order: local     Ordering Provider: tabatha    Would the patient rather a call back or a response via [facility]? Call     Best Call Back Number: [phone]          "

## 2019-12-14 PROBLEM — R07.9 CHEST PAIN: Status: ACTIVE | Noted: 2019-01-01

## 2019-12-14 NOTE — SUBJECTIVE & OBJECTIVE
"Past Medical History:   Diagnosis Date    Allergy     Ambulates with cane     Atherosclerosis of aorta     noted on VIKTORIA 2011    Blood transfusion     Cataracts, bilateral     CHF (congestive heart failure)     Chronic kidney disease     Clotting disorder     Coronary artery disease     Decreased appetite     Diabetic retinopathy     DM (diabetes mellitus), type 2 with renal complications     ESRD on hemodialysis     TUESDAY/ THURSDAY/SATURDAY    Hyperlipidemia     Hyperparathyroidism, unspecified     Hypertension     Myocardial infarction     Prostate cancer 2016    7/16/19:  wife states "he doesn't have it anymore"    Seizures     Stroke 2011    PT HAS SHAKES     Unsteady gait     Uses roller walker        Past Surgical History:   Procedure Laterality Date    CARDIAC SURGERY      CABG    CATARACT EXTRACTION      ou    CATARACT EXTRACTION, BILATERAL      cataracts      CORONARY ARTERY BYPASS GRAFT  2009    bypass    EYE SURGERY      Left Guera AVF  1/11/2012    PROSTATE BIOPSY      positive    retinal laser      ROTATOR CUFF REPAIR  right, 2005    VASCULAR SURGERY         Review of patient's allergies indicates:   Allergen Reactions    Reglan [metoclopramide hcl] Diarrhea    Lorazepam      Other reaction(s): heavy sedation       No current facility-administered medications on file prior to encounter.      Current Outpatient Medications on File Prior to Encounter   Medication Sig    ascorbic acid, vitamin C, (VITAMIN C) 500 MG tablet Take 500 mg by mouth every evening.     atorvastatin (LIPITOR) 40 MG tablet TAKE 1 TABLET(40 MG) BY MOUTH EVERY DAY    blood glucose control, normal Soln 1 drop by Misc.(Non-Drug; Combo Route) route as directed.    blood sugar diagnostic Strp To check BG 2 times daily, to use with accuchek casa meter    blood-glucose meter kit To check BG 2 times daily, to use with accuchek casa meter    carvedilol (COREG) 25 MG tablet TAKE 1 TABLET(25 MG) BY " "MOUTH TWICE DAILY WITH MEALS    diltiaZEM (CARDIZEM CD) 180 MG 24 hr capsule Take 1 capsule (180 mg total) by mouth once daily.    furosemide (LASIX) 40 MG tablet TAKE 1 TABLET(40 MG) BY MOUTH EVERY DAY    insulin (LANTUS SOLOSTAR U-100 INSULIN) glargine 100 units/mL (3mL) SubQ pen INJECT 20 UNITS UNDER THE SKIN NIGHTLY    lancets Misc To check BG 2 times daily, to use with accuchek casa meter    losartan (COZAAR) 100 MG tablet Take 1 tablet (100 mg total) by mouth once daily.    megestrol (MEGACE) 20 MG Tab Take 1 tablet (20 mg total) by mouth once daily.    NOVOFINE 30 30 x 1/3 " Ndle USE AS DIRECTED WITH LANTUS SOLARSTAR    NOVOLOG FLEXPEN U-100 INSULIN 100 unit/mL InPn pen INJECT 5 TO 10 UNITS WITH MEALS AND AT BEDTIME DEPENDING ON BLOOD SUGAR    pen needle, diabetic (BD ULTRA-FINE ORIG PEN NEEDLE) 29 gauge x 1/2" Ndle USE  1 EVERY EVENING    promethazine (PHENERGAN) 25 MG tablet Take 1 tablet (25 mg total) by mouth every 6 (six) hours as needed for Nausea.     Family History     Problem Relation (Age of Onset)    Cancer Mother, Paternal Grandmother, Paternal Uncle    Cataracts Mother    Diabetes Mother    Glaucoma Mother    Heart disease Father, Maternal Grandmother    Hypertension Brother    No Known Problems Sister, Maternal Grandfather, Paternal Grandfather, Sister, Sister    Prostate cancer Brother        Tobacco Use    Smoking status: Never Smoker    Smokeless tobacco: Never Used   Substance and Sexual Activity    Alcohol use: No    Drug use: No    Sexual activity: Not Currently     Review of Systems   Constitution: Negative.   HENT: Negative.    Eyes: Negative.    Cardiovascular: Positive for chest pain.   Respiratory: Negative.    Endocrine: Negative.    Hematologic/Lymphatic: Negative.    Skin: Negative.    Musculoskeletal: Negative.    Gastrointestinal: Negative.    Genitourinary: Negative.    Neurological: Negative.    Psychiatric/Behavioral: Negative.    Allergic/Immunologic: " Negative.      Objective:     Vital Signs (Most Recent):  Temp: 98.9 °F (37.2 °C) (12/14/19 0809)  Pulse: 85 (12/14/19 1001)  Resp: (!) 21 (12/14/19 0942)  BP: 135/72 (12/14/19 1001)  SpO2: 100 % (12/14/19 1001) Vital Signs (24h Range):  Temp:  [98 °F (36.7 °C)-98.9 °F (37.2 °C)] 98.9 °F (37.2 °C)  Pulse:  [82-93] 85  Resp:  [16-22] 21  SpO2:  [99 %-100 %] 100 %  BP: (134-176)/(72-92) 135/72     Weight: 81.6 kg (180 lb)  Body mass index is 25.1 kg/m².    SpO2: 100 %  O2 Device (Oxygen Therapy): room air    No intake or output data in the 24 hours ending 12/14/19 1125    Lines/Drains/Airways     Drain                 Hemodialysis AV Fistula 04/01/17 0800 Left upper arm 987 days          Peripheral Intravenous Line                 Peripheral IV - Single Lumen 07/16/19 0725 20 G Right Hand 151 days         Peripheral IV - Single Lumen 12/14/19 0815 22 G Right Hand less than 1 day                Physical Exam   Constitutional: He is oriented to person, place, and time. He appears well-developed and well-nourished.   HENT:   Head: Normocephalic.   Eyes: Pupils are equal, round, and reactive to light. Conjunctivae are normal.   Neck: Normal range of motion. Neck supple.   Cardiovascular: Normal rate, regular rhythm and normal heart sounds.   Pulmonary/Chest: Effort normal and breath sounds normal.   Abdominal: Soft. Bowel sounds are normal.   Neurological: He is alert and oriented to person, place, and time.   Skin: Skin is warm.   Vitals reviewed.      Significant Labs:   BMP:   Recent Labs   Lab 12/14/19  0817   *      K 4.1      CO2 22*   BUN 26*   CREATININE 6.2*   CALCIUM 9.2   , CMP   Recent Labs   Lab 12/14/19  0817      K 4.1      CO2 22*   *   BUN 26*   CREATININE 6.2*   CALCIUM 9.2   PROT 7.3   ALBUMIN 3.4*   BILITOT 0.6   ALKPHOS 150*   AST 20   ALT 36   ANIONGAP 12   ESTGFRAFRICA 10*   EGFRNONAA 9*   , CBC   Recent Labs   Lab 12/14/19  0817   WBC 5.94   HGB 10.2*   HCT  31.4*      , INR No results for input(s): INR, PROTIME in the last 48 hours., Lipid Panel No results for input(s): CHOL, HDL, LDLCALC, TRIG, CHOLHDL in the last 48 hours., Troponin   Recent Labs   Lab 12/14/19  0817   TROPONINI 0.050*    and All pertinent lab results from the last 24 hours have been reviewed.    Significant Imaging: Echocardiogram:   Transthoracic echo (TTE) complete (Cupid Only):   Results for orders placed or performed during the hospital encounter of 07/16/19   Transthoracic echo (TTE) 2D with Color Flow   Result Value Ref Range    BSA 2.01 m2    LA WIDTH 3.92 cm    AORTIC VALVE CUSP SEPERATION 2.08 cm    PV PEAK VELOCITY 0.71 cm/s    LVIDD 4.28 3.5 - 6.0 cm    IVS 1.42 (A) 0.6 - 1.1 cm    PW 1.36 (A) 0.6 - 1.1 cm    Ao root annulus 3.63 cm    LVIDS 3.24 2.1 - 4.0 cm    FS 24 28 - 44 %    LA volume 61.52 cm3    Sinus 3.87 cm    STJ 2.87 cm    Ascending aorta 2.88 cm    LV mass 228.15 g    LA size 3.77 cm    RVDD 3.12 cm    RV S' 5.56 cm/s    Left Ventricle Relative Wall Thickness 0.64 cm    AV mean gradient 3 mmHg    AV valve area 3.11 cm2    AV Velocity Ratio 0.79     AV index (prosthetic) 0.81     E/A ratio 0.74     E wave decelartion time 151.18 msec    IVRT 0.08 msec    LVOT diameter 2.21 cm    LVOT area 3.8 cm2    LVOT peak tee 0.94 m/s    LVOT peak VTI 16.14 cm    Ao peak tee 1.19 m/s    Ao VTI 19.88 cm    LVOT stroke volume 61.88 cm3    AV peak gradient 6 mmHg    MV Peak E Tee 0.75 m/s    TR Max Tee 1.78 m/s    MV Peak A Tee 1.01 m/s    LV Systolic Volume 42.24 mL    LV Systolic Volume Index 23.5 mL/m2    LV Diastolic Volume 82.19 mL    LV Diastolic Volume Index 45.82 mL/m2    LA Volume Index 34.3 mL/m2    LV Mass Index 127 g/m2    RA Major Axis 4.60 cm    Left Atrium Minor Axis 5.40 cm    Left Atrium Major Axis 4.48 cm    Triscuspid Valve Regurgitation Peak Gradient 13 mmHg    RA Width 3.15 cm    Right Atrial Pressure (from IVC) 3 mmHg    TV rest pulmonary artery pressure 16 mmHg     Narrative    · Low normal left ventricular systolic function. The estimated ejection   fraction is 50%  · Concentric left ventricular hypertrophy.  · Normal LV diastolic function.  · Normal right ventricular systolic function.

## 2019-12-14 NOTE — CONSULTS
[facility]  Cardiology  Consult Note    Patient Name: Kodi Ibanez Sr.  MRN: [id number]  Admission Date: 12/14/2019  Hospital Length of Stay: 0 days  Code Status: Full Code   Attending Provider: Ania Barber MD   Consulting Provider: David Villegas MD  Primary Care Physician: Melonie Elias MD  Principal Problem:<principal problem not specified>    Patient information was obtained from patient and ER records.     Inpatient consult to Cardiology  Consult performed by: David Villegas MD  Consult ordered by: Sohail Chan PA-C        Subjective:     Chief Complaint:  Chest pain     HPI:   59-year-old male with complex medical history including end-stage renal disease presents to the ED for evaluation of an episode of chest pain this morning while at dialysis.  He explains he was about [facility] through his dialysis session when he experienced left-sided chest pain and palpitations.  He reports the pain lasted approximately 30 min and was relieved by 1 sublingual nitro given at dialysis center.  He currently denies chest pain or palpitations.  He denies experiencing shortness of breath, cough, fever, leg swelling or calf pain. He has been compliant with dialysis and medications.  Per EMS, patient's heart rate was 138 prior to arrival, and seemed to abruptly normalize in route.    I saw him in hospital a few months ago when he had presented with tachycardia and elevated blood pressure:    This 59 y.o male who has a PMH of CAD S/P CABG,  CHF, ESRD-HD, Seizures, old MI, HTN, HLD, DM, PVDand CVA presents to the ED for an evaluation for an elevated heart rate and elevated blood pressure that was noticed at 0515 today.  Patient reports being dropped off at dialysis and reports being informed he could not receive dialysis treatment due to a blood pressure of 170/104.  Patient currently only reports of right upper back pain, which he reports being 3 months ago.  Patient denies chest pain, extremity  numbness, extremity weakness, fever, chills, nausea, emesis, diarrhea, abdominal pain, headache, or any other associated symptoms.  Patient's spouse reports the patient having a blood pressure reading of 215/104 yesterday while at a doctor's appointment.  His spouse reports them arriving home yesterday to take his blood pressure medication and reports a repeat blood pressure of 140/90.  Patient has not received any hypertension medications this morning.  No prior tx.  No alleviating factors.     Patient states that he was in clinic yesterday and it was noted that his BP was high and he was tachycardic. He denied feeling any episodes of chest pain at rest or on exertion, orthopnea, PND or dyspnea. He denies any claudication. States that he walks on the treadmill every week for about a mile and denies any symptoms of chest pain or [place] while on the treadmill.     Subsequently he was seen by Dr. Britt in the hospital when he had presented with supraventricular tachycardia.    2D ECHO 2019:    · Low normal left ventricular systolic function. The estimated ejection fraction is 50%  · Concentric left ventricular hypertrophy.  · Normal LV diastolic function.  · Normal right ventricular systolic function.    STRESS TEST 07/2019:    · Normal myocardial perfusion scan  · There were no arrhythmias during stress.  · There was no ST segment deviation noted during stress.  · The patient reported dizziness, nausea and light headedness during the stress test.  · The perfusion scan is free of evidence from myocardial ischemia or injury.  · LVEF 61% post stress  · The EKG portion of this study is negative for myocardial ischemia.      EKG was personally reviewed and demonstrated normal sinus rhythm with septal infarct.      States that was sitting in dialysis and had chest pressure and tightness.  Resolved on its own.  Felt like severe pressure.  Had CABG done in 2008 at [facility].  Does not remember what grafts he had  "placed.    Past Medical History:   Diagnosis Date    Allergy     Ambulates with cane     Atherosclerosis of aorta     noted on VIKTORIA 2011    Blood transfusion     Cataracts, bilateral     CHF (congestive heart failure)     Chronic kidney disease     Clotting disorder     Coronary artery disease     Decreased appetite     Diabetic retinopathy     DM (diabetes mellitus), type 2 with renal complications     ESRD on hemodialysis     TUESDAY/ THURSDAY/SATURDAY    Hyperlipidemia     Hyperparathyroidism, unspecified     Hypertension     Myocardial infarction     Prostate cancer 2016 7/16/19:  wife states "he doesn't have it anymore"    Seizures     Stroke 2011    PT HAS SHAKES     Unsteady gait     Uses roller walker        Past Surgical History:   Procedure Laterality Date    CARDIAC SURGERY      CABG    CATARACT EXTRACTION      ou    CATARACT EXTRACTION, BILATERAL      cataracts      CORONARY ARTERY BYPASS GRAFT  2009    bypass    EYE SURGERY      Left Guera AVF  1/11/2012    PROSTATE BIOPSY      positive    retinal laser      ROTATOR CUFF REPAIR  right, 2005    VASCULAR SURGERY         Review of patient's allergies indicates:   Allergen Reactions    Reglan [metoclopramide hcl] Diarrhea    Lorazepam      Other reaction(s): heavy sedation       No current facility-administered medications on file prior to encounter.      Current Outpatient Medications on File Prior to Encounter   Medication Sig    ascorbic acid, vitamin C, (VITAMIN C) 500 MG tablet Take 500 mg by mouth every evening.     atorvastatin (LIPITOR) 40 MG tablet TAKE 1 TABLET(40 MG) BY MOUTH EVERY DAY    blood glucose control, normal Soln 1 drop by Misc.(Non-Drug; Combo Route) route as directed.    blood sugar diagnostic Strp To check BG 2 times daily, to use with accuchek casa meter    blood-glucose meter kit To check BG 2 times daily, to use with accuchek casa meter    carvedilol (COREG) 25 MG tablet TAKE 1 " "TABLET(25 MG) BY MOUTH TWICE DAILY WITH MEALS    diltiaZEM (CARDIZEM CD) 180 MG 24 hr capsule Take 1 capsule (180 mg total) by mouth once daily.    furosemide (LASIX) 40 MG tablet TAKE 1 TABLET(40 MG) BY MOUTH EVERY DAY    insulin (LANTUS SOLOSTAR U-100 INSULIN) glargine 100 units/mL (3mL) SubQ pen INJECT 20 UNITS UNDER THE SKIN NIGHTLY    lancets Misc To check BG 2 times daily, to use with accuchek casa meter    losartan (COZAAR) 100 MG tablet Take 1 tablet (100 mg total) by mouth once daily.    megestrol (MEGACE) 20 MG Tab Take 1 tablet (20 mg total) by mouth once daily.    NOVOFINE 30 30 x 1/3 " Ndle USE AS DIRECTED WITH LANTUS SOLARSTAR    NOVOLOG FLEXPEN U-100 INSULIN 100 unit/mL InPn pen INJECT 5 TO 10 UNITS WITH MEALS AND AT BEDTIME DEPENDING ON BLOOD SUGAR    pen needle, diabetic (BD ULTRA-FINE ORIG PEN NEEDLE) 29 gauge x 1/2" Ndle USE  1 EVERY EVENING    promethazine (PHENERGAN) 25 MG tablet Take 1 tablet (25 mg total) by mouth every 6 (six) hours as needed for Nausea.     Family History     Problem Relation (Age of Onset)    Cancer Mother, Paternal Grandmother, Paternal Uncle    Cataracts Mother    Diabetes Mother    Glaucoma Mother    Heart disease Father, Maternal Grandmother    Hypertension Brother    No Known Problems Sister, Maternal Grandfather, Paternal Grandfather, Sister, Sister    Prostate cancer Brother        Tobacco Use    Smoking status: Never Smoker    Smokeless tobacco: Never Used   Substance and Sexual Activity    Alcohol use: No    Drug use: No    Sexual activity: Not Currently     Review of Systems   Constitution: Negative.   HENT: Negative.    Eyes: Negative.    Cardiovascular: Positive for chest pain.   Respiratory: Negative.    Endocrine: Negative.    Hematologic/Lymphatic: Negative.    Skin: Negative.    Musculoskeletal: Negative.    Gastrointestinal: Negative.    Genitourinary: Negative.    Neurological: Negative.    Psychiatric/Behavioral: Negative.  "   Allergic/Immunologic: Negative.      Objective:     Vital Signs (Most Recent):  Temp: 98.9 °F (37.2 °C) (12/14/19 0809)  Pulse: 85 (12/14/19 1001)  Resp: (!) 21 (12/14/19 0942)  BP: 135/72 (12/14/19 1001)  SpO2: 100 % (12/14/19 1001) Vital Signs (24h Range):  Temp:  [98 °F (36.7 °C)-98.9 °F (37.2 °C)] 98.9 °F (37.2 °C)  Pulse:  [82-93] 85  Resp:  [16-22] 21  SpO2:  [99 %-100 %] 100 %  BP: (134-176)/(72-92) 135/72     Weight: 81.6 kg (180 lb)  Body mass index is 25.1 kg/m².    SpO2: 100 %  O2 Device (Oxygen Therapy): room air    No intake or output data in the 24 hours ending 12/14/19 1125    Lines/Drains/Airways     Drain                 Hemodialysis AV Fistula 04/01/17 0800 Left upper arm 987 days          Peripheral Intravenous Line                 Peripheral IV - Single Lumen 07/16/19 0725 20 G Right Hand 151 days         Peripheral IV - Single Lumen 12/14/19 0815 22 G Right Hand less than 1 day                Physical Exam   Constitutional: He is oriented to person, place, and time. He appears well-developed and well-nourished.   HENT:   Head: Normocephalic.   Eyes: Pupils are equal, round, and reactive to light. Conjunctivae are normal.   Neck: Normal range of motion. Neck supple.   Cardiovascular: Normal rate, regular rhythm and normal heart sounds.   Pulmonary/Chest: Effort normal and breath sounds normal.   Abdominal: Soft. Bowel sounds are normal.   Neurological: He is alert and oriented to person, place, and time.   Skin: Skin is warm.   Vitals reviewed.      Significant Labs:   BMP:   Recent Labs   Lab 12/14/19  0817   *      K 4.1      CO2 22*   BUN 26*   CREATININE 6.2*   CALCIUM 9.2   , CMP   Recent Labs   Lab 12/14/19  0817      K 4.1      CO2 22*   *   BUN 26*   CREATININE 6.2*   CALCIUM 9.2   PROT 7.3   ALBUMIN 3.4*   BILITOT 0.6   ALKPHOS 150*   AST 20   ALT 36   ANIONGAP 12   ESTGFRAFRICA 10*   EGFRNONAA 9*   , CBC   Recent Labs   Lab 12/14/19  0817   WBC  5.94   HGB 10.2*   HCT 31.4*      , INR No results for input(s): INR, PROTIME in the last 48 hours., Lipid Panel No results for input(s): CHOL, HDL, LDLCALC, TRIG, CHOLHDL in the last 48 hours., Troponin   Recent Labs   Lab 12/14/19  0817   TROPONINI 0.050*    and All pertinent lab results from the last 24 hours have been reviewed.    Significant Imaging: Echocardiogram:   Transthoracic echo (TTE) complete (Cupid Only):   Results for orders placed or performed during the hospital encounter of 07/16/19   Transthoracic echo (TTE) 2D with Color Flow   Result Value Ref Range    BSA 2.01 m2    LA WIDTH 3.92 cm    AORTIC VALVE CUSP SEPERATION 2.08 cm    PV PEAK VELOCITY 0.71 cm/s    LVIDD 4.28 3.5 - 6.0 cm    IVS 1.42 (A) 0.6 - 1.1 cm    PW 1.36 (A) 0.6 - 1.1 cm    Ao root annulus 3.63 cm    LVIDS 3.24 2.1 - 4.0 cm    FS 24 28 - 44 %    LA volume 61.52 cm3    Sinus 3.87 cm    STJ 2.87 cm    Ascending aorta 2.88 cm    LV mass 228.15 g    LA size 3.77 cm    RVDD 3.12 cm    RV S' 5.56 cm/s    Left Ventricle Relative Wall Thickness 0.64 cm    AV mean gradient 3 mmHg    AV valve area 3.11 cm2    AV Velocity Ratio 0.79     AV index (prosthetic) 0.81     E/A ratio 0.74     E wave decelartion time 151.18 msec    IVRT 0.08 msec    LVOT diameter 2.21 cm    LVOT area 3.8 cm2    LVOT peak tee 0.94 m/s    LVOT peak VTI 16.14 cm    Ao peak tee 1.19 m/s    Ao VTI 19.88 cm    LVOT stroke volume 61.88 cm3    AV peak gradient 6 mmHg    MV Peak E Tee 0.75 m/s    TR Max Tee 1.78 m/s    MV Peak A Tee 1.01 m/s    LV Systolic Volume 42.24 mL    LV Systolic Volume Index 23.5 mL/m2    LV Diastolic Volume 82.19 mL    LV Diastolic Volume Index 45.82 mL/m2    LA Volume Index 34.3 mL/m2    LV Mass Index 127 g/m2    RA Major Axis 4.60 cm    Left Atrium Minor Axis 5.40 cm    Left Atrium Major Axis 4.48 cm    Triscuspid Valve Regurgitation Peak Gradient 13 mmHg    RA Width 3.15 cm    Right Atrial Pressure (from IVC) 3 mmHg    TV rest pulmonary  artery pressure 16 mmHg    Narrative    · Low normal left ventricular systolic function. The estimated ejection   fraction is 50%  · Concentric left ventricular hypertrophy.  · Normal LV diastolic function.  · Normal right ventricular systolic function.        Assessment and Plan:     CAD (coronary artery disease)  Status post CABG at [facility].  Unknown grafts.    S/P CABG (coronary artery bypass graft)        PVD (peripheral vascular disease)  Being managed by vascular surgery.    Chest pain  Patient with chest tightness.  States similar to the chest tightness he had prior to his CABG.  Troponins minimally elevated at 0.05.  First set.  Trend troponins.  Recent stress test earlier this year did not show any significant ischemia.  Further ischemic workup to be determined by clinical course.  Continue aspirin Plavix statins.    SVT (supraventricular tachycardia)  Monitor on tele monitoring.        VTE Risk Mitigation (From admission, onward)         Ordered     IP VTE HIGH RISK PATIENT  Once      12/14/19 1055     Place sequential compression device  Until discontinued      12/14/19 1055     Place BILLIE hose  Until discontinued      12/14/19 1055                Thank you for your consult. I will follow-up with patient. Please contact us if you have any additional questions.    David Villegas MD  Cardiology   [facility]

## 2019-12-14 NOTE — CONSULTS
"                                         Renal Consult    Date of Admission:  12/14/2019  7:26 AM        Chief Complaint:   Chief Complaint   Patient presents with    Chest Pain     EMS reports chest pain starting today during dialysis after 30 minutes. one nitro given at dialysis. patient is pain free at this time. denies SOB.       HPI: 59 y.o. male with a PMHx. Significant for: CAD, ESRD on HD (TTS) followed by Dr. Levy at [facility],  hypertension, hyperlipidemia, chronic heart failure, seizures, DM 2, impaired mobility and history of CVA.  Pte. presents to the ED today for evaluation of an episode of chest pain this morning while at dialysis.  Reportedly Pte. was about prison through his scheduled dialysis session earlier today when he experienced left-sided chest pain and palpitations.  He reports the pain lasted approximately 30 min and was relieved by 1 sublingual nitro given at dialysis center.  He currently denies chest pain or palpitations.  He denies experiencing shortness of breath, cough, fever, leg swelling or calf pain. He has been compliant with dialysis and medications.  Per EMS, patient's heart rate was 138 prior to arrival, and seemed to abruptly normalize in route.       PMH:  Past Medical History:   Diagnosis Date    Allergy     Ambulates with cane     Atherosclerosis of aorta     noted on VIKTORIA 2011    Blood transfusion     Cataracts, bilateral     CHF (congestive heart failure)     Chronic kidney disease     Clotting disorder     Coronary artery disease     Decreased appetite     Diabetic retinopathy     DM (diabetes mellitus), type 2 with renal complications     ESRD on hemodialysis     TUESDAY/ THURSDAY/SATURDAY    Hyperlipidemia     Hyperparathyroidism, unspecified     Hypertension     Myocardial infarction     Prostate cancer 2016 7/16/19:  wife states "he doesn't have it anymore"    Seizures     Stroke 2011    PT HAS SHAKES     Unsteady gait     Uses roller walker  "       PSH:  Past Surgical History:   Procedure Laterality Date    CARDIAC SURGERY      CABG    CATARACT EXTRACTION      ou    CATARACT EXTRACTION, BILATERAL      cataracts      CORONARY ARTERY BYPASS GRAFT  2009    bypass    EYE SURGERY      Left Guera AVF  1/11/2012    PROSTATE BIOPSY      positive    retinal laser      ROTATOR CUFF REPAIR  right, 2005    VASCULAR SURGERY         Allergies:  Review of patient's allergies indicates:   Allergen Reactions    Reglan [metoclopramide hcl] Diarrhea    Lorazepam      Other reaction(s): heavy sedation       No current facility-administered medications on file prior to encounter.      Current Outpatient Medications on File Prior to Encounter   Medication Sig Dispense Refill    ascorbic acid, vitamin C, (VITAMIN C) 500 MG tablet Take 500 mg by mouth every evening.       atorvastatin (LIPITOR) 40 MG tablet TAKE 1 TABLET(40 MG) BY MOUTH EVERY DAY 90 tablet 1    blood glucose control, normal Soln 1 drop by Misc.(Non-Drug; Combo Route) route as directed. 1 each 1    blood sugar diagnostic Strp To check BG 2 times daily, to use with accuchek casa meter 200 each 3    blood-glucose meter kit To check BG 2 times daily, to use with accuchek casa meter 1 each 0    carvedilol (COREG) 25 MG tablet TAKE 1 TABLET(25 MG) BY MOUTH TWICE DAILY WITH MEALS 180 tablet 1    diltiaZEM (CARDIZEM CD) 180 MG 24 hr capsule Take 1 capsule (180 mg total) by mouth once daily. 90 capsule 1    furosemide (LASIX) 40 MG tablet TAKE 1 TABLET(40 MG) BY MOUTH EVERY DAY 90 tablet 1    insulin (LANTUS SOLOSTAR U-100 INSULIN) glargine 100 units/mL (3mL) SubQ pen INJECT 20 UNITS UNDER THE SKIN NIGHTLY 15 mL 0    lancets Misc To check BG 2 times daily, to use with accuchek casa meter 200 each 3    losartan (COZAAR) 100 MG tablet Take 1 tablet (100 mg total) by mouth once daily. 90 tablet 1    megestrol (MEGACE) 20 MG Tab Take 1 tablet (20 mg total) by mouth once daily. 90 tablet 1     "NOVOFINE 30 30 x 1/3 " Ndle USE AS DIRECTED WITH LANTUS SOLARSTAR 100 each 3    NOVOLOG FLEXPEN U-100 INSULIN 100 unit/mL InPn pen INJECT 5 TO 10 UNITS WITH MEALS AND AT BEDTIME DEPENDING ON BLOOD SUGAR 15 mL 0    pen needle, diabetic (BD ULTRA-FINE ORIG PEN NEEDLE) 29 gauge x 1/2" Ndle USE  1 EVERY EVENING 90 each 3    promethazine (PHENERGAN) 25 MG tablet Take 1 tablet (25 mg total) by mouth every 6 (six) hours as needed for Nausea. 30 tablet 0       Medications:  Current Facility-Administered Medications   Medication Dose Route Frequency Provider Last Rate Last Dose    acetaminophen tablet 650 mg  650 mg Oral Q8H PRN Sohail Chan PA-C        atorvastatin tablet 40 mg  40 mg Oral Daily Sohail Chan PA-C   40 mg at 12/14/19 1157    carvedilol tablet 25 mg  25 mg Oral BID WM Sohail Chan PA-C   25 mg at 12/14/19 1156    [START ON 12/15/2019] clopidogrel tablet 75 mg  75 mg Oral Daily David Villegas MD        dextrose 50% injection 12.5 g  12.5 g Intravenous PRN [facility] Dean Bernadette, NP        dextrose 50% injection 25 g  25 g Intravenous PRN [facility] Fleming, NATASHA        diltiaZEM 24 hr capsule 180 mg  180 mg Oral Daily Sohail Chan PA-C   180 mg at 12/14/19 1157    furosemide tablet 40 mg  40 mg Oral Daily PRATIK CuevasC   40 mg at 12/14/19 1157    glucagon (human recombinant) injection 1 mg  1 mg Intramuscular PRN Surekhaerlinda Murilloen, NP        glucose chewable tablet 16 g  16 g Oral PRN [facility] Dean Fleming, NP        glucose chewable tablet 24 g  24 g Oral PRN [facility] Dean Fleming, NP        insulin aspart U-100 pen 0-5 Units  0-5 Units Subcutaneous QID (AC + HS) PRN Surekha Dean Fleming, NP        losartan tablet 100 mg  100 mg Oral Daily Sohail Chan PA-C   100 mg at 12/14/19 1157    ondansetron injection 4 mg  4 mg Intravenous Q8H PRN Sohail Chan PA-C        sodium chloride 0.9% flush 10 mL  10 mL Intravenous PRN Sohail Chan PA-C           FamHx:  Family History   Problem Relation Age " of Onset    Diabetes Mother     Glaucoma Mother     Cancer Mother     Cataracts Mother     Heart disease Father     Cancer Paternal Grandmother     Heart disease Maternal Grandmother     No Known Problems Sister     Prostate cancer Brother     No Known Problems Maternal Grandfather     No Known Problems Paternal Grandfather     No Known Problems Sister     No Known Problems Sister     Hypertension Brother     Cancer Paternal Uncle     Amblyopia Neg Hx     Blindness Neg Hx     Macular degeneration Neg Hx     Retinal detachment Neg Hx     Strabismus Neg Hx     Stroke Neg Hx     Thyroid disease Neg Hx     Anesthesia problems Neg Hx     Clotting disorder Neg Hx      problems Neg Hx     Kidney cancer Neg Hx     Kidney disease Neg Hx     Malignant hyperthermia Neg Hx     Sickle cell trait Neg Hx     Lupus Neg Hx     Urolithiasis Neg Hx     Sudden death Neg Hx        SocHx:  Social History     Socioeconomic History    Marital status:      Spouse name: Not on file    Number of children: Not on file    Years of education: Not on file    Highest education level: Not on file   Occupational History    Not on file   Social Needs    Financial resource strain: Not on file    Food insecurity:     Worry: Not on file     Inability: Not on file    Transportation needs:     Medical: Not on file     Non-medical: Not on file   Tobacco Use    Smoking status: Never Smoker    Smokeless tobacco: Never Used   Substance and Sexual Activity    Alcohol use: No    Drug use: No    Sexual activity: Not Currently   Lifestyle    Physical activity:     Days per week: Not on file     Minutes per session: Not on file    Stress: Not on file   Relationships    Social connections:     Talks on phone: Not on file     Gets together: Not on file     Attends Buddhist service: Not on file     Active member of club or organization: Not on file     Attends meetings of clubs or organizations: Not on file      Relationship status: Not on file   Other Topics Concern    Not on file   Social History Narrative    Not on file           Review of Systems: see H&P       Physical Exam:  Vitals:   Vitals:    12/14/19 1139   BP: (!) 166/81   Pulse: 86   Resp: 18   Temp: 98.2 °F (36.8 °C)       No intake/output data recorded.  No intake/output data recorded.    General: A&Ox3. No apparent distress.   Neck: supple no JVD  Lungs: CTA bilateral  Heart: RRR, S1-S2  Abdomen: Soft. NT.  : n/a  Ext: No edema  Skin: The skin is warm and dry. No jaundice.  Neurologic: Awake and alert, oriented x 3, no focal deficits      Laboratories:    Recent Labs   Lab 12/14/19  0817   WBC 5.94   RBC 3.47*   HGB 10.2*   HCT 31.4*      MCV 91   MCH 29.4   MCHC 32.5       Recent Labs   Lab 12/14/19  0817   CALCIUM 9.2   PROT 7.3      K 4.1   CO2 22*      BUN 26*   CREATININE 6.2*   ALKPHOS 150*   ALT 36   AST 20   BILITOT 0.6       No results for input(s): COLORU, CLARITYU, SPECGRAV, PHUR, PROTEINUA, GLUCOSEU, BLOODU, WBCU, RBCU, BACTERIA, MUCUS in the last 24 hours.    Invalid input(s):  BILIRUBINCON    Microbiology Results (last 7 days)     ** No results found for the last 168 hours. **            Diagnostic Tests:    CXR:  FINDINGS:  Cardiac silhouette and mediastinal structures are stable.  Lungs demonstrate no acute opacity.  Nasi structures unchanged.    Assessment:    58 y/o male with ESRD on HD followed by Dr. Levy admitted to [facility] due to:    - s/p CP  - Hx. CAD s/p CABG at [facility]  - PVD  - Hx. Recent SVT  - Anemia of CKD  - Hx. DM-2  - Hx. LVH          Plan:    - Based on Labs and CXR no need to resume Dialysis today  - f/u BMP and Dialyze in a.m. PRN  - Epogen Q TTS  - Renal ADA diet  - Cardiology following  - Other problems per admitting

## 2019-12-14 NOTE — SUBJECTIVE & OBJECTIVE
"Past Medical History:   Diagnosis Date    Allergy     Ambulates with cane     Atherosclerosis of aorta     noted on VIKTORIA 2011    Blood transfusion     Cataracts, bilateral     CHF (congestive heart failure)     Chronic kidney disease     Clotting disorder     Coronary artery disease     Decreased appetite     Diabetic retinopathy     DM (diabetes mellitus), type 2 with renal complications     ESRD on hemodialysis     TUESDAY/ THURSDAY/SATURDAY    Hyperlipidemia     Hyperparathyroidism, unspecified     Hypertension     Myocardial infarction     Prostate cancer 2016    7/16/19:  wife states "he doesn't have it anymore"    Seizures     Stroke 2011    PT HAS SHAKES     Unsteady gait     Uses roller walker        Past Surgical History:   Procedure Laterality Date    CARDIAC SURGERY      CABG    CATARACT EXTRACTION      ou    CATARACT EXTRACTION, BILATERAL      cataracts      CORONARY ARTERY BYPASS GRAFT  2009    bypass    EYE SURGERY      Left Guera AVF  1/11/2012    PROSTATE BIOPSY      positive    retinal laser      ROTATOR CUFF REPAIR  right, 2005    VASCULAR SURGERY         Review of patient's allergies indicates:   Allergen Reactions    Reglan [metoclopramide hcl] Diarrhea    Lorazepam      Other reaction(s): heavy sedation       No current facility-administered medications on file prior to encounter.      Current Outpatient Medications on File Prior to Encounter   Medication Sig    ascorbic acid, vitamin C, (VITAMIN C) 500 MG tablet Take 500 mg by mouth every evening.     atorvastatin (LIPITOR) 40 MG tablet TAKE 1 TABLET(40 MG) BY MOUTH EVERY DAY    blood glucose control, normal Soln 1 drop by Misc.(Non-Drug; Combo Route) route as directed.    blood sugar diagnostic Strp To check BG 2 times daily, to use with accuchek casa meter    blood-glucose meter kit To check BG 2 times daily, to use with accuchek casa meter    carvedilol (COREG) 25 MG tablet TAKE 1 TABLET(25 MG) BY " "MOUTH TWICE DAILY WITH MEALS    diltiaZEM (CARDIZEM CD) 180 MG 24 hr capsule Take 1 capsule (180 mg total) by mouth once daily.    furosemide (LASIX) 40 MG tablet TAKE 1 TABLET(40 MG) BY MOUTH EVERY DAY    insulin (LANTUS SOLOSTAR U-100 INSULIN) glargine 100 units/mL (3mL) SubQ pen INJECT 20 UNITS UNDER THE SKIN NIGHTLY    lancets Misc To check BG 2 times daily, to use with accuchek casa meter    losartan (COZAAR) 100 MG tablet Take 1 tablet (100 mg total) by mouth once daily.    megestrol (MEGACE) 20 MG Tab Take 1 tablet (20 mg total) by mouth once daily.    NOVOFINE 30 30 x 1/3 " Ndle USE AS DIRECTED WITH LANTUS SOLARSTAR    NOVOLOG FLEXPEN U-100 INSULIN 100 unit/mL InPn pen INJECT 5 TO 10 UNITS WITH MEALS AND AT BEDTIME DEPENDING ON BLOOD SUGAR    pen needle, diabetic (BD ULTRA-FINE ORIG PEN NEEDLE) 29 gauge x 1/2" Ndle USE  1 EVERY EVENING    promethazine (PHENERGAN) 25 MG tablet Take 1 tablet (25 mg total) by mouth every 6 (six) hours as needed for Nausea.     Family History     Problem Relation (Age of Onset)    Cancer Mother, Paternal Grandmother, Paternal Uncle    Cataracts Mother    Diabetes Mother    Glaucoma Mother    Heart disease Father, Maternal Grandmother    Hypertension Brother    No Known Problems Sister, Maternal Grandfather, Paternal Grandfather, Sister, Sister    Prostate cancer Brother        Tobacco Use    Smoking status: Never Smoker    Smokeless tobacco: Never Used   Substance and Sexual Activity    Alcohol use: No    Drug use: No    Sexual activity: Not Currently     Review of Systems   Constitutional: Positive for activity change, fatigue and fever.   HENT: Negative.    Eyes: Negative.    Respiratory: Positive for chest tightness. Negative for shortness of breath.    Cardiovascular: Negative.    Gastrointestinal: Negative.    Endocrine: Negative.    Genitourinary:        Patient reports 2 cups of residual urine.    Musculoskeletal: Positive for arthralgias and gait problem. "   Neurological: Positive for weakness (right side weakness from previous stroke). Negative for dizziness, tremors, syncope, facial asymmetry, speech difficulty, light-headedness and numbness.   Hematological: Negative.    Psychiatric/Behavioral: Negative.      Objective:     Vital Signs (Most Recent):  Temp: 98.2 °F (36.8 °C) (12/14/19 1139)  Pulse: 86 (12/14/19 1139)  Resp: 18 (12/14/19 1139)  BP: (!) 166/81 (12/14/19 1139)  SpO2: 100 % (12/14/19 1139) Vital Signs (24h Range):  Temp:  [98 °F (36.7 °C)-98.9 °F (37.2 °C)] 98.2 °F (36.8 °C)  Pulse:  [82-93] 86  Resp:  [16-22] 18  SpO2:  [99 %-100 %] 100 %  BP: (134-176)/(72-92) 166/81     Weight: 81.6 kg (180 lb)  Body mass index is 25.1 kg/m².    Physical Exam   Constitutional: He is oriented to person, place, and time. He appears well-developed and well-nourished. No distress.   HENT:   Head: Atraumatic.   Eyes: EOM are normal.   Neck: Neck supple.   Cardiovascular: Normal rate and regular rhythm.   Pulmonary/Chest: Effort normal and breath sounds normal. No respiratory distress.   Abdominal: Soft. Bowel sounds are normal. He exhibits no distension.   Musculoskeletal: Normal range of motion. He exhibits no edema.   Neurological: He is alert and oriented to person, place, and time. No sensory deficit.   Right side weakness from previous stroke   Skin: Skin is warm.   LUE AVF good bruit and thrill. Did not remove dressing however intact.    Psychiatric: He has a normal mood and affect.        Significant Labs: All pertinent labs within the past 24 hours have been reviewed.    Significant Imaging: I have reviewed and interpreted all pertinent imaging results/findings within the past 24 hours.

## 2019-12-14 NOTE — Clinical Note
179 ml injected throughout the case. 121 mL total wasted during the case. 300 mL total used in the case.

## 2019-12-14 NOTE — NURSING
Received patient from ER to room via WC. Patient accompanied by transport and son. Pt stood and took a few steps to the bed. Evaluated general patient appearance and condition. Admit assessment initiated. Tele monitoring initiated. Saline lock at R hand Intact. No CP at this time, pt asking for coffee. No apparent distress noted at this time.

## 2019-12-14 NOTE — HPI
"Mr. Ibanez is a 60 yo male with significant history hypertension, hyperlipidemia, hx seizure 5 yrs ago?, diabetes type 2, stroke in 2011 with residual right-sided weakness, MI status post CABG in 2008 at [facility], short term memory lost and history of SVT in July 2019 cardioverted with adenosine in ED at that time who presents to hospital after experienced left chest pain "punching pain" after approximated 1.5 hrs into his 3.5 hemodialysis treatment. Chest pain resolved with nitro SL at dialysis unit. Patient denies nausea, vomiting or diaphoresis. Patient reports similar symptoms in July while on dialysis. Heart rate was noted to be in the 140 during episode but reports pre HD tx normal heart rate. Heart rate came down to normal sinus rhythm after EMS went over a bump en route to hospital.     EKG showed NSR 90. Troponin 0.050 (slightly bump from baseline). CXR clear.   "

## 2019-12-14 NOTE — ASSESSMENT & PLAN NOTE
ESRD on HD TTS x5 yrs via ROSALIO AVF at [facility] under direction of Dr. Levy.   Last HD today 1.5 hrs into 3.5 hr HD tx  Lytes and acid base okay  And no signs of volume excess.   Lab Results   Component Value Date     (H) 08/16/2011    CALCIUM 9.2 12/14/2019    PHOS 3.0 07/17/2019   Nephrology consult

## 2019-12-14 NOTE — ED PROVIDER NOTES
"Encounter Date: 12/14/2019       History     Chief Complaint   Patient presents with    Chest Pain     EMS reports chest pain starting today during dialysis after 30 minutes. one nitro given at dialysis. patient is pain free at this time. denies SOB.     59-year-old male with complex medical history including end-stage renal disease presents to the ED for evaluation of an episode of chest pain this morning while at dialysis.  He explains he was about prison through his dialysis session when he experienced left-sided chest pain and palpitations.  He reports the pain lasted approximately 30 min and was relieved by 1 sublingual nitro given at dialysis center.  He currently denies chest pain or palpitations.  He denies experiencing shortness of breath, cough, fever, leg swelling or calf pain. He has been compliant with dialysis and medications.  Per EMS, patient's heart rate was 138 prior to arrival, and seemed to abruptly normalize in route.        Review of patient's allergies indicates:   Allergen Reactions    Reglan [metoclopramide hcl] Diarrhea    Lorazepam      Other reaction(s): heavy sedation     Past Medical History:   Diagnosis Date    Allergy     Ambulates with cane     Atherosclerosis of aorta     noted on VIKTORIA 2011    Blood transfusion     Cataracts, bilateral     CHF (congestive heart failure)     Chronic kidney disease     Clotting disorder     Coronary artery disease     Decreased appetite     Diabetic retinopathy     DM (diabetes mellitus), type 2 with renal complications     ESRD on hemodialysis     TUESDAY/ THURSDAY/SATURDAY    Hyperlipidemia     Hyperparathyroidism, unspecified     Hypertension     Myocardial infarction     Prostate cancer 2016    7/16/19:  wife states "he doesn't have it anymore"    Seizures     Stroke 2011    PT HAS SHAKES     Unsteady gait     Uses roller walker      Past Surgical History:   Procedure Laterality Date    CARDIAC SURGERY      CABG    " CATARACT EXTRACTION      ou    CATARACT EXTRACTION, BILATERAL      cataracts      CORONARY ARTERY BYPASS GRAFT  2009    bypass    EYE SURGERY      Left Guera AVF  1/11/2012    PROSTATE BIOPSY      positive    retinal laser      ROTATOR CUFF REPAIR  right, 2005    VASCULAR SURGERY       Family History   Problem Relation Age of Onset    Diabetes Mother     Glaucoma Mother     Cancer Mother     Cataracts Mother     Heart disease Father     Cancer Paternal Grandmother     Heart disease Maternal Grandmother     No Known Problems Sister     Prostate cancer Brother     No Known Problems Maternal Grandfather     No Known Problems Paternal Grandfather     No Known Problems Sister     No Known Problems Sister     Hypertension Brother     Cancer Paternal Uncle     Amblyopia Neg Hx     Blindness Neg Hx     Macular degeneration Neg Hx     Retinal detachment Neg Hx     Strabismus Neg Hx     Stroke Neg Hx     Thyroid disease Neg Hx     Anesthesia problems Neg Hx     Clotting disorder Neg Hx      problems Neg Hx     Kidney cancer Neg Hx     Kidney disease Neg Hx     Malignant hyperthermia Neg Hx     Sickle cell trait Neg Hx     Lupus Neg Hx     Urolithiasis Neg Hx     Sudden death Neg Hx      Social History     Tobacco Use    Smoking status: Never Smoker    Smokeless tobacco: Never Used   Substance Use Topics    Alcohol use: No    Drug use: No     Review of Systems   Constitutional: Negative for chills and fever.   HENT: Negative for sore throat.    Respiratory: Negative for cough and shortness of breath.    Cardiovascular: Positive for chest pain and palpitations. Negative for leg swelling.   Gastrointestinal: Negative for abdominal pain and nausea.   Musculoskeletal: Negative for back pain and neck pain.   Skin: Negative for rash.   Neurological: Negative for syncope, weakness, numbness and headaches.       Physical Exam     Initial Vitals [12/14/19 0731]   BP Pulse Resp Temp SpO2    (!) 176/85 92 16 98 °F (36.7 °C) 100 %      MAP       --         Physical Exam    Vitals reviewed.  Constitutional: He appears well-developed and well-nourished. He is not diaphoretic. No distress.   HENT:   Head: Normocephalic and atraumatic.   Right Ear: External ear normal.   Left Ear: External ear normal.   Nose: Nose normal.   Eyes: Conjunctivae are normal. No scleral icterus.   Neck: Normal range of motion. Neck supple. No JVD present.   Cardiovascular: Normal rate, regular rhythm, normal heart sounds and intact distal pulses.      No systolic murmur is present.  Pulmonary/Chest: Breath sounds normal. No respiratory distress. He has no wheezes. He has no rhonchi. He has no rales. He exhibits no tenderness.   Abdominal: Soft. He exhibits no distension. There is no tenderness.   Musculoskeletal: Normal range of motion. He exhibits no edema or tenderness.   Neurological: He is alert and oriented to person, place, and time.   Skin: Skin is warm and dry. No rash noted. No erythema.         ED Course   Procedures  Labs Reviewed   CBC W/ AUTO DIFFERENTIAL - Abnormal; Notable for the following components:       Result Value    RBC 3.47 (*)     Hemoglobin 10.2 (*)     Hematocrit 31.4 (*)     RDW 15.4 (*)     Lymph # 0.9 (*)     Gran% 73.1 (*)     Lymph% 15.7 (*)     All other components within normal limits   COMPREHENSIVE METABOLIC PANEL - Abnormal; Notable for the following components:    CO2 22 (*)     Glucose 279 (*)     BUN, Bld 26 (*)     Creatinine 6.2 (*)     Albumin 3.4 (*)     Alkaline Phosphatase 150 (*)     eGFR if  10 (*)     eGFR if non  9 (*)     All other components within normal limits   TROPONIN I - Abnormal; Notable for the following components:    Troponin I 0.050 (*)     All other components within normal limits   B-TYPE NATRIURETIC PEPTIDE - Abnormal; Notable for the following components:     (*)     All other components within normal limits     EKG  Readings: (Independently Interpreted)   Initial Reading: No STEMI. Previous EKG: Compared with most recent EKG Previous EKG Date: 7/16/2019. Rhythm: Normal Sinus Rhythm. Heart Rate: 90. Ectopy: No Ectopy. Axis: Normal.   .  Subtle change in R-wave progression since July       Imaging Results          X-Ray Chest AP Portable (Final result)  Result time 12/14/19 08:47:50    Final result by Tex Olivo MD (12/14/19 08:47:50)                 Impression:      No acute process      Electronically signed by: Tex Olivo MD  Date:    12/14/2019  Time:    08:47             Narrative:    EXAMINATION:  XR CHEST AP PORTABLE    CLINICAL HISTORY:  Chest pain, unspecified    TECHNIQUE:  Single frontal view of the chest was performed.    COMPARISON:  07/16/2019    FINDINGS:  Cardiac silhouette and mediastinal structures are stable.  Lungs demonstrate no acute opacity.  Nasi structures unchanged.                                 Medical Decision Making:   ED Management:  59-year-old male presents for evaluation of episode of chest pain relieved by nitro while at dialysis today.  EMS reported heart rate of 138, patient with normal sinus rhythm with a normal rate throughout ED course.  EKG without evidence of acute ischemia, however, subtle R-wave progression changes since July.  Patient currently asymptomatic.  Troponin 0.05, which is just slightly above his baseline.  Chest x-ray without infiltrate or pneumothorax. Low suspicion for pericarditis, dissection, PE.  Will place in observation to trend troponins, with inpatient consult Cardiology.  Case discussed with ED attending Dr. Deshpande.  Contacted nurse practitioner Surekha on observation unit who will see patient.  Admit orders placed on behalf of hospital medicine.  Patient is amenable to observation unit.    Additional MDM:   Heart Score:    History:          Moderately suspicious.  ECG:             Normal  Age:               45-65 years  Risk factors: >= 3  risk factors or history of atherosclerotic disease  Troponin:       Less than or equal to normal limit  Final Score: 4               Attending Attestation:             Attending ED Notes:   I have personally evaluated this patient and discussed the case with the midlevel provider; agree with the assessment and plan.    59-year-old male with history of CAD and end-stage renal disease on dialysis presents for evaluation of left-sided chest pain beginning during dialysis today.  He is afebrile and in no acute distress at time history and physical he is resting comfortably in stretcher.  He has symmetrical distal pulses. He has a benign abdominal exam.  Labs notable for troponin elevated at 0.05.  Patient has a heart score for.  Given his comorbidities he is to be placed in observation to rule out ACS.  This chart was completed using dictation software, as a result there may be some transcription errors.                          Clinical Impression:       ICD-10-CM ICD-9-CM   1. Chest pain R07.9 786.50         Disposition:   Disposition: Placed in Observation  Condition: Ceci Chan PA-C  12/14/19 0941       Ugo Deshpande MD  12/14/19 1014

## 2019-12-14 NOTE — ED TRIAGE NOTES
Pt arrived to ED via EMS for chest pain beginning this morning while at dialysis. Pt denies SOB, N/V. He is AAO x 4 and in no apparent distress at this time.

## 2019-12-14 NOTE — ASSESSMENT & PLAN NOTE
Lab Results   Component Value Date    HGBA1C 12.6 (H) 10/30/2019   SSI  And add prandal basal accordingly

## 2019-12-14 NOTE — HPI
59-year-old male with complex medical history including end-stage renal disease presents to the ED for evaluation of an episode of chest pain this morning while at dialysis.  He explains he was about jail through his dialysis session when he experienced left-sided chest pain and palpitations.  He reports the pain lasted approximately 30 min and was relieved by 1 sublingual nitro given at dialysis center.  He currently denies chest pain or palpitations.  He denies experiencing shortness of breath, cough, fever, leg swelling or calf pain. He has been compliant with dialysis and medications.  Per EMS, patient's heart rate was 138 prior to arrival, and seemed to abruptly normalize in route.    I saw him in hospital a few months ago when he had presented with tachycardia and elevated blood pressure:    This 59 y.o male who has a PMH of CAD S/P CABG,  CHF, ESRD-HD, Seizures, old MI, HTN, HLD, DM, PVDand CVA presents to the ED for an evaluation for an elevated heart rate and elevated blood pressure that was noticed at 0515 today.  Patient reports being dropped off at dialysis and reports being informed he could not receive dialysis treatment due to a blood pressure of 170/104.  Patient currently only reports of right upper back pain, which he reports being 3 months ago.  Patient denies chest pain, extremity numbness, extremity weakness, fever, chills, nausea, emesis, diarrhea, abdominal pain, headache, or any other associated symptoms.  Patient's spouse reports the patient having a blood pressure reading of 215/104 yesterday while at a doctor's appointment.  His spouse reports them arriving home yesterday to take his blood pressure medication and reports a repeat blood pressure of 140/90.  Patient has not received any hypertension medications this morning.  No prior tx.  No alleviating factors.     Patient states that he was in clinic yesterday and it was noted that his BP was high and he was tachycardic. He denied  feeling any episodes of chest pain at rest or on exertion, orthopnea, PND or dyspnea. He denies any claudication. States that he walks on the treadmill every week for about a mile and denies any symptoms of chest pain or [place] while on the treadmill.     Subsequently he was seen by Dr. Britt in the hospital when he had presented with supraventricular tachycardia.    2D ECHO 2019:    · Low normal left ventricular systolic function. The estimated ejection fraction is 50%  · Concentric left ventricular hypertrophy.  · Normal LV diastolic function.  · Normal right ventricular systolic function.    STRESS TEST 07/2019:    · Normal myocardial perfusion scan  · There were no arrhythmias during stress.  · There was no ST segment deviation noted during stress.  · The patient reported dizziness, nausea and light headedness during the stress test.  · The perfusion scan is free of evidence from myocardial ischemia or injury.  · LVEF 61% post stress  · The EKG portion of this study is negative for myocardial ischemia.      EKG was personally reviewed and demonstrated normal sinus rhythm with septal infarct.      States that was sitting in dialysis and had chest pressure and tightness.  Resolved on its own.  Felt like severe pressure.  Had CABG done in 2008 at [facility].  Does not remember what grafts he had placed.

## 2019-12-14 NOTE — ASSESSMENT & PLAN NOTE
Patient with chest tightness.  States similar to the chest tightness he had prior to his CABG.  Troponins minimally elevated at 0.05.  First set.  Trend troponins.  Recent stress test earlier this year did not show any significant ischemia.  Further ischemic workup to be determined by clinical course.  Continue aspirin Plavix statins.

## 2019-12-14 NOTE — H&P
"[facility] Hospital Medicine  History & Physical    Patient Name: Kodi Ibanze Sr.  MRN: [id number]  Admission Date: 12/14/2019  Attending Physician: Ania Barber MD   Primary Care Provider: Melonie Elias MD         Patient information was obtained from patient and ER records.     Subjective:     Principal Problem:Chest pain    Chief Complaint:   Chief Complaint   Patient presents with    Chest Pain     EMS reports chest pain starting today during dialysis after 30 minutes. one nitro given at dialysis. patient is pain free at this time. denies SOB.        HPI: Mr. Ibanez is a 58 yo male with significant history hypertension, hyperlipidemia, hx seizure 5 yrs ago?, diabetes type 2, stroke in 2011 with residual right-sided weakness, MI status post CABG in 2008 at [facility], short term memory lost and history of SVT in July 2019 cardioverted with adenosine in ED at that time who presents to hospital after experienced left chest pain "punching pain" after approximated 1.5 hrs into his 3.5 hemodialysis treatment. Chest pain resolved with nitro SL at dialysis unit. Patient denies nausea, vomiting or diaphoresis. Patient reports similar symptoms in July while on dialysis. Heart rate was noted to be in the 140 during episode but reports pre HD tx normal heart rate. Heart rate came down to normal sinus rhythm after EMS went over a bump en route to hospital.     EKG showed NSR 90. Troponin 0.050 (slightly bump from baseline). CXR clear.     Past Medical History:   Diagnosis Date    Allergy     Ambulates with cane     Atherosclerosis of aorta     noted on VIKTORIA 2011    Blood transfusion     Cataracts, bilateral     CHF (congestive heart failure)     Chronic kidney disease     Clotting disorder     Coronary artery disease     Decreased appetite     Diabetic retinopathy     DM (diabetes mellitus), type 2 with renal complications     ESRD on hemodialysis     TUESDAY/ " "THURSDAY/SATURDAY    Hyperlipidemia     Hyperparathyroidism, unspecified     Hypertension     Myocardial infarction     Prostate cancer 2016    7/16/19:  wife states "he doesn't have it anymore"    Seizures     Stroke 2011    PT HAS SHAKES     Unsteady gait     Uses roller walker        Past Surgical History:   Procedure Laterality Date    CARDIAC SURGERY      CABG    CATARACT EXTRACTION      ou    CATARACT EXTRACTION, BILATERAL      cataracts      CORONARY ARTERY BYPASS GRAFT  2009    bypass    EYE SURGERY      Left Guera AVF  1/11/2012    PROSTATE BIOPSY      positive    retinal laser      ROTATOR CUFF REPAIR  right, 2005    VASCULAR SURGERY         Review of patient's allergies indicates:   Allergen Reactions    Reglan [metoclopramide hcl] Diarrhea    Lorazepam      Other reaction(s): heavy sedation       No current facility-administered medications on file prior to encounter.      Current Outpatient Medications on File Prior to Encounter   Medication Sig    ascorbic acid, vitamin C, (VITAMIN C) 500 MG tablet Take 500 mg by mouth every evening.     atorvastatin (LIPITOR) 40 MG tablet TAKE 1 TABLET(40 MG) BY MOUTH EVERY DAY    blood glucose control, normal Soln 1 drop by Misc.(Non-Drug; Combo Route) route as directed.    blood sugar diagnostic Strp To check BG 2 times daily, to use with accuchek casa meter    blood-glucose meter kit To check BG 2 times daily, to use with accuchek casa meter    carvedilol (COREG) 25 MG tablet TAKE 1 TABLET(25 MG) BY MOUTH TWICE DAILY WITH MEALS    diltiaZEM (CARDIZEM CD) 180 MG 24 hr capsule Take 1 capsule (180 mg total) by mouth once daily.    furosemide (LASIX) 40 MG tablet TAKE 1 TABLET(40 MG) BY MOUTH EVERY DAY    insulin (LANTUS SOLOSTAR U-100 INSULIN) glargine 100 units/mL (3mL) SubQ pen INJECT 20 UNITS UNDER THE SKIN NIGHTLY    lancets [facility] To check BG 2 times daily, to use with accuchek casa meter    losartan (COZAAR) 100 MG tablet Take " "1 tablet (100 mg total) by mouth once daily.    megestrol (MEGACE) 20 MG Tab Take 1 tablet (20 mg total) by mouth once daily.    NOVOFINE 30 30 x 1/3 " Ndle USE AS DIRECTED WITH SARA BURNSAR    NOVOLOG FLEXPEN U-100 INSULIN 100 unit/mL InPn pen INJECT 5 TO 10 UNITS WITH MEALS AND AT BEDTIME DEPENDING ON BLOOD SUGAR    pen needle, diabetic (BD ULTRA-FINE ORIG PEN NEEDLE) 29 gauge x 1/2" Ndle USE  1 EVERY EVENING    promethazine (PHENERGAN) 25 MG tablet Take 1 tablet (25 mg total) by mouth every 6 (six) hours as needed for Nausea.     Family History     Problem Relation (Age of Onset)    Cancer Mother, Paternal Grandmother, Paternal Uncle    Cataracts Mother    Diabetes Mother    Glaucoma Mother    Heart disease Father, Maternal Grandmother    Hypertension Brother    No Known Problems Sister, Maternal Grandfather, Paternal Grandfather, Sister, Sister    Prostate cancer Brother        Tobacco Use    Smoking status: Never Smoker    Smokeless tobacco: Never Used   Substance and Sexual Activity    Alcohol use: No    Drug use: No    Sexual activity: Not Currently     Review of Systems   Constitutional: Positive for activity change, fatigue and fever.   HENT: Negative.    Eyes: Negative.    Respiratory: Positive for chest tightness. Negative for shortness of breath.    Cardiovascular: Negative.    Gastrointestinal: Negative.    Endocrine: Negative.    Genitourinary:        Patient reports 2 cups of residual urine.    Musculoskeletal: Positive for arthralgias and gait problem.   Neurological: Positive for weakness (right side weakness from previous stroke). Negative for dizziness, tremors, syncope, facial asymmetry, speech difficulty, light-headedness and numbness.   Hematological: Negative.    Psychiatric/Behavioral: Negative.      Objective:     Vital Signs (Most Recent):  Temp: 98.2 °F (36.8 °C) (12/14/19 1139)  Pulse: 86 (12/14/19 1139)  Resp: 18 (12/14/19 1139)  BP: (!) 166/81 (12/14/19 1139)  SpO2: 100 % " "(12/14/19 9309) Vital Signs (24h Range):  Temp:  [98 °F (36.7 °C)-98.9 °F (37.2 °C)] 98.2 °F (36.8 °C)  Pulse:  [82-93] 86  Resp:  [16-22] 18  SpO2:  [99 %-100 %] 100 %  BP: (134-176)/(72-92) 166/81     Weight: 81.6 kg (180 lb)  Body mass index is 25.1 kg/m².    Physical Exam   Constitutional: He is oriented to person, place, and time. He appears well-developed and well-nourished. No distress.   HENT:   Head: Atraumatic.   Eyes: EOM are normal.   Neck: Neck supple.   Cardiovascular: Normal rate and regular rhythm.   Pulmonary/Chest: Effort normal and breath sounds normal. No respiratory distress.   Abdominal: Soft. Bowel sounds are normal. He exhibits no distension.   Musculoskeletal: Normal range of motion. He exhibits no edema.   Neurological: He is alert and oriented to person, place, and time. No sensory deficit.   Right side weakness from previous stroke   Skin: Skin is warm.   LUE AVF good bruit and thrill. Did not remove dressing however intact.    Psychiatric: He has a normal mood and affect.        Significant Labs: All pertinent labs within the past 24 hours have been reviewed.    Significant Imaging: I have reviewed and interpreted all pertinent imaging results/findings within the past 24 hours.    Assessment/Plan:     * Chest pain  Hx of MI sp CABG at 2008 at [facility] and did not have any symptoms at that time.  Previous symptoms in July 2019 and found SVT then cardioverted with adenosine at that time  Per ED staff, chest pain resolved after nitro given at HD unit despite still have tachycardia "140". Tachycardia HR "138" that converted after NSR what sound like vagal when EMS went over a bump.   EKG NSR 90. Bump troponin 0.050.   Cardiology following-trend troponin, monitor tele, continue BB, CCB, ARBS, ASA (this is new), Statin.         SVT (supraventricular tachycardia)  Currently NSR.   See above #1.       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       Hypertension  Uncontrolled. Continue home CCC, BB, ARBS. Hydralazine prn.       Seizure disorder  Currently not on AED. Last seizure 5 yrs ago.       CAD (coronary artery disease)  See above #1.       Cognitive deficits as late effect of cerebrovascular disease  Short term memory . Stable.       History of stroke  CVA 2011 x 2 with right sided residual deficit  Start ASA. Continue statin.       ESRD on hemodialysis  ESRD on HD TTS x5 yrs via ROSALIO AVF at [facility] under direction of Dr. Levy.   Last HD today 1.5 hrs into 3.5 hr HD tx  Lytes and acid base okay  And no signs of volume excess.   Lab Results   Component Value Date     (H) 08/16/2011    CALCIUM 9.2 12/14/2019    PHOS 3.0 07/17/2019   Nephrology consult              VTE Risk Mitigation (From admission, onward)         Ordered     IP VTE HIGH RISK PATIENT  Once      12/14/19 1055     Place sequential compression device  Until discontinued      12/14/19 1055     Place BILLIE hose  Until discontinued      12/14/19 1055                   Surekha Fleming NP  Department of Hospital Medicine   [facility]

## 2019-12-14 NOTE — ASSESSMENT & PLAN NOTE
"Hx of MI sp CABG at 2008 at [facility] and did not have any symptoms at that time.  Previous symptoms in July 2019 and found SVT then cardioverted with adenosine at that time  Per ED staff, chest pain resolved after nitro given at HD unit despite still have tachycardia "140". Tachycardia HR "138" that converted after NSR what sound like vagal when EMS went over a bump.   EKG NSR 90. Bump troponin 0.050.   Cardiology following-trend troponin, monitor tele, continue BB, CCB, ARBS, ASA (this is new), Statin.       "

## 2019-12-15 NOTE — PLAN OF CARE
Problem: Fall Injury Risk  Goal: Absence of Fall and Fall-Related Injury  Outcome: Ongoing, Progressing     Problem: Adult Inpatient Plan of Care  Goal: Plan of Care Review  Outcome: Ongoing, Progressing  Goal: Patient-Specific Goal (Individualization)  Outcome: Ongoing, Progressing  Goal: Absence of Hospital-Acquired Illness or Injury  Outcome: Ongoing, Progressing  Goal: Optimal Comfort and Wellbeing  Outcome: Ongoing, Progressing  Goal: Readiness for Transition of Care  Outcome: Ongoing, Progressing  Goal: Rounds/Family Conference  Outcome: Ongoing, Progressing     Problem: Diabetes Comorbidity  Goal: Blood Glucose Level Within Desired Range  Outcome: Ongoing, Progressing  Intervention: Maintain Glycemic Control  Flowsheets (Taken 12/14/2019 2001)  Glycemic Management: blood glucose monitoring; supplemental insulin given     Problem: Pain (Acute Coronary Syndrome)  Goal: Absence of Cardiac-Related Pain  Outcome: Ongoing, Progressing  Intervention: Manage Cardiac Pain Symptoms  Flowsheets (Taken 12/14/2019 2001)  Chest Pain Intervention: cardiac biomarkers drawn; cardiac monitoring continued; activity minimized   No falls this shift, pt with no CP today. Troponins trend, cardiology consulted along with nephrology. 2Decho ordered also.

## 2019-12-15 NOTE — PROGRESS NOTES
Renal Progress Note    Date of Admission:  12/14/2019  7:26 AM    Length of Stay: 0  Days    Subjective: no more CP no SOB    Objective:    Current Facility-Administered Medications   Medication    acetaminophen tablet 650 mg    aspirin EC tablet 81 mg    atorvastatin tablet 40 mg    carvedilol tablet 25 mg    clopidogrel tablet 75 mg    dextrose 50% injection 12.5 g    dextrose 50% injection 25 g    diltiaZEM 24 hr capsule 180 mg    furosemide tablet 40 mg    glucagon (human recombinant) injection 1 mg    glucose chewable tablet 16 g    glucose chewable tablet 24 g    insulin aspart U-100 pen 0-5 Units    losartan tablet 100 mg    ondansetron injection 4 mg    sodium chloride 0.9% flush 10 mL       Vitals:    12/14/19 2050 12/14/19 2301 12/15/19 0415 12/15/19 0739   BP: (!) 168/68 135/66 121/69 139/77   BP Location: Right arm Right arm Right arm Right arm   Patient Position: Lying Lying Lying Lying   Pulse: 84 81 80 80   Resp: 18 18 17 16   Temp: 98 °F (36.7 °C) 98.4 °F (36.9 °C) 98.5 °F (36.9 °C) 98.7 °F (37.1 °C)   TempSrc: Oral Oral Oral Oral   SpO2: 99% 98% 96% 99%   Weight:       Height:           I/O last 3 completed shifts:  In: 480 [P.O.:480]  Out: -   No intake/output data recorded.      Physical Exam:    NAD  Neck: supple  Heart: RRR   Lungs: Unlabored breathing  Abdomen: n/a  : n/a  Extremities:  n/a  Neurologic: Awake, alert, oriented x 3      Laboratories:    No results for input(s): WBC, RBC, HGB, HCT, PLT, MCV, MCH, MCHC in the last 24 hours.    Recent Labs   Lab 12/15/19  0451   CALCIUM 8.5*      K 4.0   CO2 23      BUN 35*   CREATININE 7.4*       No results for input(s): COLORU, CLARITYU, SPECGRAV, PHUR, PROTEINUA, GLUCOSEU, BLOODU, WBCU, RBCU, BACTERIA, MUCUS in the last 24 hours.    Invalid input(s):  BILIRUBINCON    Microbiology Results (last 7 days)     ** No results found for the last 168 hours. **            Diagnostic Tests:  n/a      Assessment:       60 y/o male with ESRD on HD followed by Dr. Levy admitted to [facility] due to:     - s/p CP  - Hx. CAD s/p CABG at [facility]  - Hx. PVD  - Hx. Recent SVT  - Anemia of CKD  - Hx. DM-2  - Hx. LVH              Plan:     - Based on today's Labs no need for Dialysis  - f/u BMP and Dialyze in a.m. PRN  - Epogen Q TTS  - Renal ADA diet  - Cardiology following  - Other problems per admitting    Home??

## 2019-12-15 NOTE — SUBJECTIVE & OBJECTIVE
Interval History: Denies chest pain     Review of Systems   Constitutional: Positive for activity change, fatigue and fever.   HENT: Negative.    Eyes: Negative.    Respiratory: Positive for chest tightness. Negative for shortness of breath.    Cardiovascular: Negative.    Gastrointestinal: Negative.    Endocrine: Negative.    Genitourinary:        Patient reports 2 cups of residual urine.    Musculoskeletal: Positive for arthralgias and gait problem.   Neurological: Positive for weakness (right side weakness from previous stroke). Negative for dizziness, tremors, syncope, facial asymmetry, speech difficulty, light-headedness and numbness.   Hematological: Negative.    Psychiatric/Behavioral: Negative.      Objective:     Vital Signs (Most Recent):  Temp: 98.5 °F (36.9 °C) (12/15/19 1132)  Pulse: 88 (12/15/19 1132)  Resp: 18 (12/15/19 1132)  BP: (!) 150/76 (12/15/19 1132)  SpO2: 100 % (12/15/19 1132) Vital Signs (24h Range):  Temp:  [98 °F (36.7 °C)-98.7 °F (37.1 °C)] 98.5 °F (36.9 °C)  Pulse:  [80-88] 88  Resp:  [16-18] 18  SpO2:  [96 %-100 %] 100 %  BP: (114-168)/(66-77) 150/76     Weight: 66.7 kg (147 lb)  Body mass index is 20.5 kg/m².    Intake/Output Summary (Last 24 hours) at 12/15/2019 1521  Last data filed at 12/15/2019 0330  Gross per 24 hour   Intake 480 ml   Output --   Net 480 ml      Physical Exam   Constitutional: He is oriented to person, place, and time. He appears well-developed and well-nourished. No distress.   HENT:   Head: Atraumatic.   Eyes: EOM are normal.   Neck: Neck supple.   Cardiovascular: Normal rate and regular rhythm.   Pulmonary/Chest: Effort normal and breath sounds normal. No respiratory distress.   Abdominal: Soft. Bowel sounds are normal. He exhibits no distension.   Musculoskeletal: Normal range of motion. He exhibits no edema.   Neurological: He is alert and oriented to person, place, and time. No sensory deficit.   Right side weakness from previous stroke   Skin: Skin is warm.    FELTON LANDIN good bruit and thrill.   Psychiatric: He has a normal mood and affect.       Significant Labs: All pertinent labs within the past 24 hours have been reviewed.    Significant Imaging: EKG: I have reviewed all pertinent results/findings within the past 24 hours and my personal findings are:   I have reviewed and interpreted all pertinent imaging results/findings within the past 24 hours.

## 2019-12-15 NOTE — PROGRESS NOTES
[facility]  Cardiology  Progress Note    Patient Name: Kodi Ibanez Sr.  MRN: [id number]  Admission Date: 12/14/2019  Hospital Length of Stay: 0 days  Code Status: Full Code   Attending Physician: Ania Barber MD   Primary Care Physician: Melonie Elias MD  Expected Discharge Date:   Principal Problem:Chest pain    Subjective:       Interval History:  No episodes of chest pain overnight.    Review of Systems   Constitution: Negative.   HENT: Negative.    Eyes: Negative.    Cardiovascular: Negative.    Respiratory: Negative.    Endocrine: Negative.    Hematologic/Lymphatic: Negative.    Skin: Negative.    Musculoskeletal: Negative.    Gastrointestinal: Negative.    Genitourinary: Negative.    Neurological: Negative.    Psychiatric/Behavioral: Negative.    Allergic/Immunologic: Negative.      Objective:     Vital Signs (Most Recent):  Temp: 98.5 °F (36.9 °C) (12/15/19 1132)  Pulse: 88 (12/15/19 1132)  Resp: 18 (12/15/19 1132)  BP: (!) 150/76 (12/15/19 1132)  SpO2: 100 % (12/15/19 1132) Vital Signs (24h Range):  Temp:  [98 °F (36.7 °C)-98.7 °F (37.1 °C)] 98.5 °F (36.9 °C)  Pulse:  [80-88] 88  Resp:  [16-18] 18  SpO2:  [96 %-100 %] 100 %  BP: (114-168)/(66-77) 150/76     Weight: 66.7 kg (147 lb)  Body mass index is 20.5 kg/m².     SpO2: 100 %  O2 Device (Oxygen Therapy): room air      Intake/Output Summary (Last 24 hours) at 12/15/2019 1430  Last data filed at 12/15/2019 0330  Gross per 24 hour   Intake 480 ml   Output --   Net 480 ml       Lines/Drains/Airways     Drain                 Hemodialysis AV Fistula 04/01/17 0800 Left upper arm 988 days          Peripheral Intravenous Line                 Peripheral IV - Single Lumen 07/16/19 0725 20 G Right Hand 152 days         Peripheral IV - Single Lumen 12/14/19 0815 22 G Right Hand 1 day                Physical Exam   Constitutional: He is oriented to person, place, and time. He appears well-developed and well-nourished.   HENT:   Head:  Normocephalic.   Eyes: Pupils are equal, round, and reactive to light. Conjunctivae are normal.   Neck: Normal range of motion. Neck supple.   Cardiovascular: Normal rate, regular rhythm and normal heart sounds.   Pulmonary/Chest: Effort normal and breath sounds normal.   Abdominal: Soft. Bowel sounds are normal.   Neurological: He is alert and oriented to person, place, and time.   Skin: Skin is warm.   Vitals reviewed.      Significant Labs:   BMP:   Recent Labs   Lab 12/14/19  0817 12/15/19  0451   * 215*    138   K 4.1 4.0    104   CO2 22* 23   BUN 26* 35*   CREATININE 6.2* 7.4*   CALCIUM 9.2 8.5*   , CMP   Recent Labs   Lab 12/14/19  0817 12/15/19  0451    138   K 4.1 4.0    104   CO2 22* 23   * 215*   BUN 26* 35*   CREATININE 6.2* 7.4*   CALCIUM 9.2 8.5*   PROT 7.3  --    ALBUMIN 3.4*  --    BILITOT 0.6  --    ALKPHOS 150*  --    AST 20  --    ALT 36  --    ANIONGAP 12 11   ESTGFRAFRICA 10* 8*   EGFRNONAA 9* 7*   , CBC   Recent Labs   Lab 12/14/19  0817   WBC 5.94   HGB 10.2*   HCT 31.4*      , INR No results for input(s): INR, PROTIME in the last 48 hours., Lipid Panel No results for input(s): CHOL, HDL, LDLCALC, TRIG, CHOLHDL in the last 48 hours., Troponin   Recent Labs   Lab 12/14/19  0817 12/14/19  1414 12/14/19 2043   TROPONINI 0.050* 0.050* 0.051*    and All pertinent lab results from the last 24 hours have been reviewed.    Significant Imaging: Echocardiogram:   Transthoracic echo (TTE) complete (Cupid Only):   Results for orders placed or performed during the hospital encounter of 12/14/19   Echo Color Flow Doppler? Yes   Result Value Ref Range    BSA 1.83 m2    TDI SEPTAL 0.04 m/s    LV LATERAL E/E' RATIO 9.20 m/s    LV SEPTAL E/E' RATIO 23.00 m/s    LA WIDTH 4.27 cm    AORTIC VALVE CUSP SEPERATION 2.16 cm    TDI LATERAL 0.10 m/s    PV PEAK VELOCITY 0.86 cm/s    LVIDD 4.67 3.5 - 6.0 cm    IVS 1.56 (A) 0.6 - 1.1 cm    PW 1.43 (A) 0.6 - 1.1 cm    Ao root  annulus 3.77 cm    LVIDS 3.21 2.1 - 4.0 cm    FS 31 28 - 44 %    LA volume 87.06 cm3    Sinus 3.58 cm    STJ 2.88 cm    Ascending aorta 3.17 cm    LV mass 289.81 g    LA size 4.25 cm    RVDD 3.27 cm    TAPSE 0.98 cm    RV S' 8.55 cm/s    Left Ventricle Relative Wall Thickness 0.61 cm    AV mean gradient 4 mmHg    AV valve area 3.03 cm2    AV Velocity Ratio 0.86     AV index (prosthetic) 0.84     E/A ratio 0.75     Mean e' 0.07 m/s    E wave decelartion time 167.61 msec    IVRT 0.16 msec    Pulm vein S/D ratio 1.47     LVOT diameter 2.14 cm    LVOT area 3.6 cm2    LVOT peak tee 1.07 m/s    LVOT peak VTI 24.03 cm    Ao peak tee 1.25 m/s    Ao VTI 28.53 cm    LVOT stroke volume 86.39 cm3    AV peak gradient 6 mmHg    E/E' ratio 13.14 m/s    MV Peak E Tee 0.92 m/s    TR Max Tee 1.73 m/s    MV Peak A Tee 1.23 m/s    PV Peak S Tee 0.63 m/s    PV Peak D Tee 0.43 m/s    LV Systolic Volume 41.43 mL    LV Systolic Volume Index 22.4 mL/m2    LV Diastolic Volume 100.73 mL    LV Diastolic Volume Index 54.44 mL/m2    LA Volume Index 47.1 mL/m2    LV Mass Index 157 g/m2    RA Major Axis 5.71 cm    Left Atrium Minor Axis 5.71 cm    Left Atrium Major Axis 5.58 cm    Triscuspid Valve Regurgitation Peak Gradient 12 mmHg    RA Width 4.60 cm     Assessment and Plan:         * Chest pain  Patient with chest tightness.  States similar to the chest tightness he had prior to his CABG.  Troponins minimally elevated at 0.05.  First set.  Trend troponins.  Recent stress test earlier this year did not show any significant ischemia.  Further ischemic workup to be determined by clinical course.  Continue aspirin Plavix statins.      12/15/2019:  TROPONINS TRENDED.  FLAT AND MINIMALLY elevated.  Had a detailed discussion with the patient and his wife.  Has had multiple episodes of chest pains.  Discussed various options including continued medical management, versus angiogram versus stress testing.  Patient had a stress test earlier this year  which did not show significant ischemia.  After detailed discussion decided to proceed with a coronary angiogram.    Risks, benefits and alternatives of the catheterization procedure were discussed with the patient.The risks of coronary angiography include but are not limited to: bleeding, infection, death, heart attack, arrhythmia, kidney injury or failure, potential need for dialysis, allergic reactions, stroke, need for emergency surgery, hematoma, pseudoaneurysm etc.  Should stenting be indicated, the patient has agreed to dual anti-platelet therapy for 1-consecutive year with a drug-eluting stent and a minimum of 1-month with the use of a bare metal stent. Additionally, pt is aware that non-compliance is likely to result in stent clotting with heart attack, heart failure, and/or death  The risks of moderate sedation include hypotension, respiratory depression, arrhythmias, bronchospasm, and death. Informed consent was obtained and the  patient is agreeable to proceed with the procedure. Consent was placed on the chart.      CAD (coronary artery disease)  Status post CABG at [facility].  Unknown grafts.    S/P CABG (coronary artery bypass graft)        PVD (peripheral vascular disease)  Being managed by vascular surgery.    SVT (supraventricular tachycardia)  Monitor on tele monitoring.        VTE Risk Mitigation (From admission, onward)         Ordered     IP VTE HIGH RISK PATIENT  Once      12/14/19 1055     Place sequential compression device  Until discontinued      12/14/19 1055     Place BILLIE hose  Until discontinued      12/14/19 1055                David Villegas MD  Cardiology  [facility]

## 2019-12-15 NOTE — SUBJECTIVE & OBJECTIVE
Interval History:  No episodes of chest pain overnight.    Review of Systems   Constitution: Negative.   HENT: Negative.    Eyes: Negative.    Cardiovascular: Negative.    Respiratory: Negative.    Endocrine: Negative.    Hematologic/Lymphatic: Negative.    Skin: Negative.    Musculoskeletal: Negative.    Gastrointestinal: Negative.    Genitourinary: Negative.    Neurological: Negative.    Psychiatric/Behavioral: Negative.    Allergic/Immunologic: Negative.      Objective:     Vital Signs (Most Recent):  Temp: 98.5 °F (36.9 °C) (12/15/19 1132)  Pulse: 88 (12/15/19 1132)  Resp: 18 (12/15/19 1132)  BP: (!) 150/76 (12/15/19 1132)  SpO2: 100 % (12/15/19 1132) Vital Signs (24h Range):  Temp:  [98 °F (36.7 °C)-98.7 °F (37.1 °C)] 98.5 °F (36.9 °C)  Pulse:  [80-88] 88  Resp:  [16-18] 18  SpO2:  [96 %-100 %] 100 %  BP: (114-168)/(66-77) 150/76     Weight: 66.7 kg (147 lb)  Body mass index is 20.5 kg/m².     SpO2: 100 %  O2 Device (Oxygen Therapy): room air      Intake/Output Summary (Last 24 hours) at 12/15/2019 1430  Last data filed at 12/15/2019 0330  Gross per 24 hour   Intake 480 ml   Output --   Net 480 ml       Lines/Drains/Airways     Drain                 Hemodialysis AV Fistula 04/01/17 0800 Left upper arm 988 days          Peripheral Intravenous Line                 Peripheral IV - Single Lumen 07/16/19 0725 20 G Right Hand 152 days         Peripheral IV - Single Lumen 12/14/19 0815 22 G Right Hand 1 day                Physical Exam   Constitutional: He is oriented to person, place, and time. He appears well-developed and well-nourished.   HENT:   Head: Normocephalic.   Eyes: Pupils are equal, round, and reactive to light. Conjunctivae are normal.   Neck: Normal range of motion. Neck supple.   Cardiovascular: Normal rate, regular rhythm and normal heart sounds.   Pulmonary/Chest: Effort normal and breath sounds normal.   Abdominal: Soft. Bowel sounds are normal.   Neurological: He is alert and oriented to person,  place, and time.   Skin: Skin is warm.   Vitals reviewed.      Significant Labs:   BMP:   Recent Labs   Lab 12/14/19  0817 12/15/19  0451   * 215*    138   K 4.1 4.0    104   CO2 22* 23   BUN 26* 35*   CREATININE 6.2* 7.4*   CALCIUM 9.2 8.5*   , CMP   Recent Labs   Lab 12/14/19  0817 12/15/19  0451    138   K 4.1 4.0    104   CO2 22* 23   * 215*   BUN 26* 35*   CREATININE 6.2* 7.4*   CALCIUM 9.2 8.5*   PROT 7.3  --    ALBUMIN 3.4*  --    BILITOT 0.6  --    ALKPHOS 150*  --    AST 20  --    ALT 36  --    ANIONGAP 12 11   ESTGFRAFRICA 10* 8*   EGFRNONAA 9* 7*   , CBC   Recent Labs   Lab 12/14/19  0817   WBC 5.94   HGB 10.2*   HCT 31.4*      , INR No results for input(s): INR, PROTIME in the last 48 hours., Lipid Panel No results for input(s): CHOL, HDL, LDLCALC, TRIG, CHOLHDL in the last 48 hours., Troponin   Recent Labs   Lab 12/14/19  0817 12/14/19  1414 12/14/19  2043   TROPONINI 0.050* 0.050* 0.051*    and All pertinent lab results from the last 24 hours have been reviewed.    Significant Imaging: Echocardiogram:   Transthoracic echo (TTE) complete (Cupid Only):   Results for orders placed or performed during the hospital encounter of 12/14/19   Echo Color Flow Doppler? Yes   Result Value Ref Range    BSA 1.83 m2    TDI SEPTAL 0.04 m/s    LV LATERAL E/E' RATIO 9.20 m/s    LV SEPTAL E/E' RATIO 23.00 m/s    LA WIDTH 4.27 cm    AORTIC VALVE CUSP SEPERATION 2.16 cm    TDI LATERAL 0.10 m/s    PV PEAK VELOCITY 0.86 cm/s    LVIDD 4.67 3.5 - 6.0 cm    IVS 1.56 (A) 0.6 - 1.1 cm    PW 1.43 (A) 0.6 - 1.1 cm    Ao root annulus 3.77 cm    LVIDS 3.21 2.1 - 4.0 cm    FS 31 28 - 44 %    LA volume 87.06 cm3    Sinus 3.58 cm    STJ 2.88 cm    Ascending aorta 3.17 cm    LV mass 289.81 g    LA size 4.25 cm    RVDD 3.27 cm    TAPSE 0.98 cm    RV S' 8.55 cm/s    Left Ventricle Relative Wall Thickness 0.61 cm    AV mean gradient 4 mmHg    AV valve area 3.03 cm2    AV Velocity Ratio 0.86      AV index (prosthetic) 0.84     E/A ratio 0.75     Mean e' 0.07 m/s    E wave decelartion time 167.61 msec    IVRT 0.16 msec    Pulm vein S/D ratio 1.47     LVOT diameter 2.14 cm    LVOT area 3.6 cm2    LVOT peak tee 1.07 m/s    LVOT peak VTI 24.03 cm    Ao peak ete 1.25 m/s    Ao VTI 28.53 cm    LVOT stroke volume 86.39 cm3    AV peak gradient 6 mmHg    E/E' ratio 13.14 m/s    MV Peak E Tee 0.92 m/s    TR Max Tee 1.73 m/s    MV Peak A Tee 1.23 m/s    PV Peak S Tee 0.63 m/s    PV Peak D Tee 0.43 m/s    LV Systolic Volume 41.43 mL    LV Systolic Volume Index 22.4 mL/m2    LV Diastolic Volume 100.73 mL    LV Diastolic Volume Index 54.44 mL/m2    LA Volume Index 47.1 mL/m2    LV Mass Index 157 g/m2    RA Major Axis 5.71 cm    Left Atrium Minor Axis 5.71 cm    Left Atrium Major Axis 5.58 cm    Triscuspid Valve Regurgitation Peak Gradient 12 mmHg    RA Width 4.60 cm

## 2019-12-15 NOTE — ASSESSMENT & PLAN NOTE
Lab Results   Component Value Date    HGBA1C 12.6 (H) 10/30/2019   Add prandal basal and SSI.

## 2019-12-15 NOTE — ASSESSMENT & PLAN NOTE
Patient with chest tightness.  States similar to the chest tightness he had prior to his CABG.  Troponins minimally elevated at 0.05.  First set.  Trend troponins.  Recent stress test earlier this year did not show any significant ischemia.  Further ischemic workup to be determined by clinical course.  Continue aspirin Plavix statins.      12/15/2019:  TROPONINS TRENDED.  FLAT AND MINIMALLY elevated.  Had a detailed discussion with the patient and his wife.  Has had multiple episodes of chest pains.  Discussed various options including continued medical management, versus angiogram versus stress testing.  Patient had a stress test earlier this year which did not show significant ischemia.  After detailed discussion decided to proceed with a coronary angiogram.    Risks, benefits and alternatives of the catheterization procedure were discussed with the patient.The risks of coronary angiography include but are not limited to: bleeding, infection, death, heart attack, arrhythmia, kidney injury or failure, potential need for dialysis, allergic reactions, stroke, need for emergency surgery, hematoma, pseudoaneurysm etc.  Should stenting be indicated, the patient has agreed to dual anti-platelet therapy for 1-consecutive year with a drug-eluting stent and a minimum of 1-month with the use of a bare metal stent. Additionally, pt is aware that non-compliance is likely to result in stent clotting with heart attack, heart failure, and/or death  The risks of moderate sedation include hypotension, respiratory depression, arrhythmias, bronchospasm, and death. Informed consent was obtained and the  patient is agreeable to proceed with the procedure. Consent was placed on the chart.

## 2019-12-15 NOTE — PLAN OF CARE
[id number] - [place] - [address] [facility] [address]  [address]  [place] [address]  Phone: [phone] Fax: [phone]    [facility] - [place], [place] - [address]  [address]  [facility] [address]  Phone: [phone] Fax: [phone]    [facility]  [address]  [place] [address]  Phone: [phone] Fax: [phone]       12/15/19 1550   Discharge Assessment   Assessment Type Discharge Planning Assessment   Confirmed/corrected address and phone number on facesheet? Yes   Assessment information obtained from? Patient   Expected Length of Stay (days) 2   Communicated expected length of stay with patient/caregiver yes   Prior to hospitilization cognitive status: Alert/Oriented   Prior to hospitalization functional status: Independent   Current cognitive status: Alert/Oriented   Current Functional Status: Independent   Lives With spouse   Able to Return to Prior Arrangements yes   Is patient able to care for self after discharge? Yes   Patient's perception of discharge disposition long-term acute care facility (LTAC)   Readmission Within the Last 30 Days no previous admission in last 30 days   Patient currently being followed by outpatient case management? No   Patient currently receives any other outside agency services? No   Equipment Currently Used at Home rollator;cane, straight   Do you have any problems affording any of your prescribed medications? No   Is the patient taking medications as prescribed? yes   Does the patient have transportation home? Yes   Transportation Anticipated agency   Does the patient receive services at the [facility]? No   Discharge Plan A Home with family   DME Needed Upon Discharge  none   Patient/Family in Agreement with Plan yes

## 2019-12-15 NOTE — ASSESSMENT & PLAN NOTE
ESRD on HD TTS x5 yrs via ROSALIO AVF at [facility] under direction of Dr. Levy.   Last HD today 1.5 hrs into 3.5 hr HD tx  Lytes and acid base okay  And no signs of volume excess.   Lab Results   Component Value Date     (H) 08/16/2011    CALCIUM 8.5 (L) 12/15/2019    PHOS 3.0 07/17/2019   Nephrology following-TTS

## 2019-12-15 NOTE — PROGRESS NOTES
"[facility] Hospital Medicine  Progress Note    Patient Name: Kodi Ibanez Sr.  MRN: [id number]  Patient Class: OP- Observation   Admission Date: 12/14/2019  Length of Stay: 0 days  Attending Physician: Ania Barber MD  Primary Care Provider: Melonie Elias MD        Subjective:     Principal Problem:Chest pain        HPI:  Mr. Ibanez is a 60 yo male with significant history hypertension, hyperlipidemia, hx seizure 5 yrs ago?, diabetes type 2, stroke in 2011 with residual right-sided weakness, MI status post CABG in 2008 at [facility], short term memory lost and history of SVT in July 2019 cardioverted with adenosine in ED at that time who presents to hospital after experienced left chest pain "punching pain" after approximated 1.5 hrs into his 3.5 hemodialysis treatment. Chest pain resolved with nitro SL at dialysis unit. Patient denies nausea, vomiting or diaphoresis. Patient reports similar symptoms in July while on dialysis. Heart rate was noted to be in the 140 during episode but reports pre HD tx normal heart rate. Heart rate came down to normal sinus rhythm after EMS went over a bump en route to hospital.     EKG showed NSR 90. Troponin 0.050 (slightly bump from baseline). CXR clear.     Overview/Hospital Course:  No notes on file    Interval History: Denies chest pain     Review of Systems   Constitutional: Positive for activity change, fatigue and fever.   HENT: Negative.    Eyes: Negative.    Respiratory: Positive for chest tightness. Negative for shortness of breath.    Cardiovascular: Negative.    Gastrointestinal: Negative.    Endocrine: Negative.    Genitourinary:        Patient reports 2 cups of residual urine.    Musculoskeletal: Positive for arthralgias and gait problem.   Neurological: Positive for weakness (right side weakness from previous stroke). Negative for dizziness, tremors, syncope, facial asymmetry, speech difficulty, light-headedness and " numbness.   Hematological: Negative.    Psychiatric/Behavioral: Negative.      Objective:     Vital Signs (Most Recent):  Temp: 98.5 °F (36.9 °C) (12/15/19 1132)  Pulse: 88 (12/15/19 1132)  Resp: 18 (12/15/19 1132)  BP: (!) 150/76 (12/15/19 1132)  SpO2: 100 % (12/15/19 1132) Vital Signs (24h Range):  Temp:  [98 °F (36.7 °C)-98.7 °F (37.1 °C)] 98.5 °F (36.9 °C)  Pulse:  [80-88] 88  Resp:  [16-18] 18  SpO2:  [96 %-100 %] 100 %  BP: (114-168)/(66-77) 150/76     Weight: 66.7 kg (147 lb)  Body mass index is 20.5 kg/m².    Intake/Output Summary (Last 24 hours) at 12/15/2019 1521  Last data filed at 12/15/2019 0330  Gross per 24 hour   Intake 480 ml   Output --   Net 480 ml      Physical Exam   Constitutional: He is oriented to person, place, and time. He appears well-developed and well-nourished. No distress.   HENT:   Head: Atraumatic.   Eyes: EOM are normal.   Neck: Neck supple.   Cardiovascular: Normal rate and regular rhythm.   Pulmonary/Chest: Effort normal and breath sounds normal. No respiratory distress.   Abdominal: Soft. Bowel sounds are normal. He exhibits no distension.   Musculoskeletal: Normal range of motion. He exhibits no edema.   Neurological: He is alert and oriented to person, place, and time. No sensory deficit.   Right side weakness from previous stroke   Skin: Skin is warm.   LUE AVF good bruit and thrill.   Psychiatric: He has a normal mood and affect.       Significant Labs: All pertinent labs within the past 24 hours have been reviewed.    Significant Imaging: EKG: I have reviewed all pertinent results/findings within the past 24 hours and my personal findings are:   I have reviewed and interpreted all pertinent imaging results/findings within the past 24 hours.      Assessment/Plan:      * Chest pain  Hx of MI sp CABG at 2008 at [facility] and did not have any symptoms at that time.  Previous symptoms in July 2019 and found SVT then cardioverted with adenosine at that time  Per ED staff, chest pain  "resolved after nitro given at HD unit despite still have tachycardia "140". Tachycardia HR "138" that converted after NSR what sound like vagal when EMS went over a bump.   EKG NSR 90. Bump troponin 0.050.   Cardiology following-trend troponin flat pattern, monitor tele, continue BB, CCB, ARBS, ASA (this is new), Statin.     12/15/19   Obtain [facility] records  Continue plavix, ASA (both new), statin, BB, ARBS, CCB  Plan for [facility] 12/16/19      SVT (supraventricular tachycardia)  Currently NSR.   See above #1.       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         S/P CABG (coronary artery bypass graft)  See above #1.       Hypertension  Uncontrolled. Continue home CCC, BB, ARBS. Hydralazine prn.       Seizure disorder  Currently not on AED. Last seizure 5 yrs ago.       CAD (coronary artery disease)  See above #1.       Cognitive deficits as late effect of cerebrovascular disease  Short term memory . Stable.       History of stroke  CVA 2011 x 2 with right sided residual deficit  Start ASA. Continue statin.       ESRD on hemodialysis  ESRD on HD TTS x5 yrs via ROSALIO AVF at [facility] under direction of Dr. Levy.   Last HD today 1.5 hrs into 3.5 hr HD tx  Lytes and acid base okay  And no signs of volume excess.   Lab Results   Component Value Date     (H) 08/16/2011    CALCIUM 8.5 (L) 12/15/2019    PHOS 3.0 07/17/2019   Nephrology following-TTS             VTE Risk Mitigation (From admission, onward)         Ordered     IP VTE HIGH RISK PATIENT  Once      12/14/19 1055     Place sequential compression device  Until discontinued      12/14/19 1055     Place BILLIE hose  Until discontinued      12/14/19 1055                      Surekha Fleming NP  Department of Hospital " Medicine   [facility]

## 2019-12-15 NOTE — PLAN OF CARE
Problem: Fall Injury Risk  Goal: Absence of Fall and Fall-Related Injury  Intervention: Identify and Manage Contributors to Fall Injury Risk  Flowsheets (Taken 12/15/2019 0405)  Self-Care Promotion: independence encouraged  Medication Review/Management: medications reviewed  Intervention: Promote Injury-Free Environment  Flowsheets (Taken 12/15/2019 0405)  Safety Promotion/Fall Prevention: bed alarm set; medications reviewed; lighting adjusted; nonskid shoes/socks when out of bed; side rails raised x 2  Environmental Safety Modification: clutter free environment maintained; lighting adjusted; room organization consistent; room near unit station     Problem: Adult Inpatient Plan of Care  Goal: Plan of Care Review  Flowsheets (Taken 12/15/2019 0405)  Plan of Care Reviewed With: patient; spouse  Goal: Absence of Hospital-Acquired Illness or Injury  Intervention: Identify and Manage Fall Risk  Flowsheets (Taken 12/15/2019 0405)  Safety Promotion/Fall Prevention: bed alarm set; medications reviewed; lighting adjusted; nonskid shoes/socks when out of bed; side rails raised x 2  Intervention: Prevent VTE (venous thromboembolism)  Flowsheets (Taken 12/15/2019 0405)  VTE Prevention/Management: ROM (active) performed  Goal: Optimal Comfort and Wellbeing  Intervention: Provide Person-Centered Care  Flowsheets (Taken 12/15/2019 0405)  Trust Relationship/Rapport: care explained; questions answered; questions encouraged; thoughts/feelings acknowledged     Problem: Diabetes Comorbidity  Goal: Blood Glucose Level Within Desired Range  Intervention: Maintain Glycemic Control  Flowsheets (Taken 12/15/2019 0405)  Glycemic Management: blood glucose monitoring; supplemental insulin given     Problem: Pain (Acute Coronary Syndrome)  Goal: Absence of Cardiac-Related Pain  Intervention: Manage Cardiac Pain Symptoms  Flowsheets (Taken 12/15/2019 0405)  Chest Pain Intervention: cardiac biomarkers drawn; cardiac monitoring continued;  cardiac monitor placed; 12-lead ECG obtained     Patient remained free of injury throughout the shift. Vital signs remained WNL. Complaints of back pain noted and treated with prn tylenol with relief noted. Cardiology and Nephrology consulted. Plan to trend troponins, monitor and manage pain, and await further recommendations from the consulted providers. Patient and spouse updated on plan of care and verbalized understanding. Call light in reach and patient instructed to inform the nurse if anything is needed. Patient stable and will continue to be monitored.

## 2019-12-15 NOTE — ASSESSMENT & PLAN NOTE
"Hx of MI sp CABG at 2008 at [facility] and did not have any symptoms at that time.  Previous symptoms in July 2019 and found SVT then cardioverted with adenosine at that time  Per ED staff, chest pain resolved after nitro given at HD unit despite still have tachycardia "140". Tachycardia HR "138" that converted after NSR what sound like vagal when EMS went over a bump.   EKG NSR 90. Bump troponin 0.050.   Cardiology following-trend troponin flat pattern, monitor tele, continue BB, CCB, ARBS, ASA (this is new), Statin.     12/15/19   Obtain [facility] records  Continue plavix, ASA (both new), statin, BB, ARBS, CCB  Plan for [facility] 12/16/19    "

## 2019-12-16 NOTE — NURSING
Patient arrived to floor in stable condition from cath lab. Visualized patient and assessed patient's overall condition and appearance. No complaints. NAD noted. Frequent checks and VS in progress. Right groin Angiogram site, clean, dry, intact, no redness, swelling or hematoma. Will continue to monitor.

## 2019-12-16 NOTE — ASSESSMENT & PLAN NOTE
"Hx of MI sp CABG at 2008 at [facility] and did not have any symptoms at that time.  Previous symptoms in July 2019 and found SVT then cardioverted with adenosine at that time  Per ED staff, chest pain resolved after nitro given at HD unit despite still have tachycardia "140". Tachycardia HR "138" that converted after NSR what sound like vagal when EMS went over a bump.   EKG NSR 90. Bump troponin 0.050.   Cardiology following-trend troponin flat pattern, monitor tele, continue BB, CCB, ARBS, ASA (this is new), Statin.     12/15/19   Obtain [facility] records  Continue plavix, ASA (both new), statin, BB, ARBS, CCB  Plan for [facility] 12/16/19 12/16/19  No chest pain   [facility] showed severe stenosis in proximal to mid circumflex intervened with drug-eluting stent x1.  Continue ASA, plavix (new), statin    [facility] 12/16/2019  Lad:  Proximal chronic total occlusion.  Fills via LIMA to LAD.  No stenosis in LIMA to LAD.  No significant stenosis in LAD after LIMA touchdown.  Circumflex:  High OM1 is a small 1.5 mm vessel with proximal 70% stenosis.  Mid circumflex has a severe 80-85% stenosis.  OM3 is a small 1.5 to 2 mm vessel with severe 70% stenosis proximally.  RCA chronic total occlusion proximally.  Distal RCA fills via collaterals from LAD.   Grafts:  SVG to RCA is occluded  LIMA to LAD is patent with no stenosis    "

## 2019-12-16 NOTE — PLAN OF CARE
Problem: Adult Inpatient Plan of Care  Goal: Plan of Care Review  Flowsheets (Taken 12/16/2019 0358)  Plan of Care Reviewed With: patient     Patient remained free of injury throughout the shift. Vital signs remained WNL. No complaints of pain or signs of respiratory distress noted. Patients blood glucose noted elevated in which CELSO Reilly notified and new orders placed. Cardiology and Nephrology consulted. Plan to maintain npo status after midnight for LHC in the am. Patient and spouse updated on plan of care and verbalized understanding. Call light in reach and bed alarm maintained for patient safety.

## 2019-12-16 NOTE — PROGRESS NOTES
"[facility] Hospital Medicine  Progress Note    Patient Name: Kodi Ibanez Sr.  MRN: [id number]  Patient Class: OP- Observation   Admission Date: 12/14/2019  Length of Stay: 0 days  Attending Physician: Ania Barber MD  Primary Care Provider: Melonie Elias MD        Subjective:     Principal Problem:Chest pain        HPI:  Mr. Ibanez is a 60 yo male with significant history hypertension, hyperlipidemia, hx seizure 5 yrs ago?, diabetes type 2, stroke in 2011 with residual right-sided weakness, MI status post CABG in 2008 at [facility], short term memory lost and history of SVT in July 2019 cardioverted with adenosine in ED at that time who presents to hospital after experienced left chest pain "punching pain" after approximated 1.5 hrs into his 3.5 hemodialysis treatment. Chest pain resolved with nitro SL at dialysis unit. Patient denies nausea, vomiting or diaphoresis. Patient reports similar symptoms in July while on dialysis. Heart rate was noted to be in the 140 during episode but reports pre HD tx normal heart rate. Heart rate came down to normal sinus rhythm after EMS went over a bump en route to hospital.     EKG showed NSR 90. Troponin 0.050 (slightly bump from baseline). CXR clear.     Overview/Hospital Course:  Mr. Ibanez is a 60 yo male who was placed in observation for unstable angina.  No further chest pain during observation stay. On 12/16/19, [facility] showed severe stenosis in proximal to mid circumflex intervened with drug-eluting stent x1.  Continue ASA, plavix (new), statin. Hopefully home after HD tomorrow.       [facility] 12/16/2019  Lad:  Proximal chronic total occlusion.  Fills via LIMA to LAD.  No stenosis in LIMA to LAD.  No significant stenosis in LAD after LIMA touchdown.  Circumflex:  High OM1 is a small 1.5 mm vessel with proximal 70% stenosis.  Mid circumflex has a severe 80-85% stenosis.  OM3 is a small 1.5 to 2 mm vessel with severe 70% stenosis " proximally.  RCA chronic total occlusion proximally.  Distal RCA fills via collaterals from LAD.   Grafts:  SVG to RCA is occluded  LIMA to LAD is patent with no stenosis             Interval History: no chest pain. Wants to go home    Review of Systems   Constitutional: Positive for activity change. Negative for fatigue and fever.   HENT: Negative.    Eyes: Negative.    Respiratory: Negative for chest tightness and shortness of breath.    Cardiovascular: Negative.    Gastrointestinal: Negative.    Endocrine: Negative.    Genitourinary:        Patient reports 2 cups of residual urine.    Musculoskeletal: Positive for arthralgias and gait problem.   Neurological: Positive for weakness (right side weakness from previous stroke). Negative for dizziness, tremors, syncope, facial asymmetry, speech difficulty, light-headedness and numbness.   Hematological: Negative.    Psychiatric/Behavioral: Negative.      Objective:     Vital Signs (Most Recent):  Temp: 97.8 °F (36.6 °C) (12/16/19 1635)  Pulse: 74 (12/16/19 1635)  Resp: 16 (12/16/19 1635)  BP: 132/68 (12/16/19 1635)  SpO2: 100 % (12/16/19 1635) Vital Signs (24h Range):  Temp:  [97.8 °F (36.6 °C)-99.2 °F (37.3 °C)] 97.8 °F (36.6 °C)  Pulse:  [74-93] 74  Resp:  [16-18] 16  SpO2:  [94 %-100 %] 100 %  BP: (132-158)/(63-77) 132/68     Weight: 70.1 kg (154 lb 8.7 oz)  Body mass index is 21.55 kg/m².    Intake/Output Summary (Last 24 hours) at 12/16/2019 1653  Last data filed at 12/15/2019 2336  Gross per 24 hour   Intake 480 ml   Output --   Net 480 ml      Physical Exam   Constitutional: He is oriented to person, place, and time. He appears well-developed and well-nourished. No distress.   HENT:   Head: Atraumatic.   Eyes: EOM are normal.   Neck: Neck supple.   Cardiovascular: Normal rate and regular rhythm.   Pulmonary/Chest: Effort normal and breath sounds normal. No respiratory distress.   Abdominal: Soft. Bowel sounds are normal. He exhibits no distension.  "  Musculoskeletal: Normal range of motion. He exhibits no edema.   Neurological: He is alert and oriented to person, place, and time. No sensory deficit.   Right side weakness from previous stroke   Skin: Skin is warm.   LUE AVF good bruit and thrill.   Psychiatric: He has a normal mood and affect.       Significant Labs: All pertinent labs within the past 24 hours have been reviewed.    Significant Imaging: I have reviewed and interpreted all pertinent imaging results/findings within the past 24 hours.      Assessment/Plan:      * Chest pain  Hx of MI sp CABG at 2008 at [facility] and did not have any symptoms at that time.  Previous symptoms in July 2019 and found SVT then cardioverted with adenosine at that time  Per ED staff, chest pain resolved after nitro given at HD unit despite still have tachycardia "140". Tachycardia HR "138" that converted after NSR what sound like vagal when EMS went over a bump.   EKG NSR 90. Bump troponin 0.050.   Cardiology following-trend troponin flat pattern, monitor tele, continue BB, CCB, ARBS, ASA (this is new), Statin.     12/15/19   Obtain [facility] records  Continue plavix, ASA (both new), statin, BB, ARBS, CCB  Plan for [facility] 12/16/19 12/16/19  No chest pain   [facility] showed severe stenosis in proximal to mid circumflex intervened with drug-eluting stent x1.  Continue ASA, plavix (new), statin    [facility] 12/16/2019  Lad:  Proximal chronic total occlusion.  Fills via LIMA to LAD.  No stenosis in LIMA to LAD.  No significant stenosis in LAD after LIMA touchdown.  Circumflex:  High OM1 is a small 1.5 mm vessel with proximal 70% stenosis.  Mid circumflex has a severe 80-85% stenosis.  OM3 is a small 1.5 to 2 mm vessel with severe 70% stenosis proximally.  RCA chronic total occlusion proximally.  Distal RCA fills via collaterals from LAD.   Grafts:  SVG to RCA is occluded  LIMA to LAD is patent with no stenosis      SVT (supraventricular tachycardia)  Currently NSR.   See above #1.       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         S/P CABG (coronary artery bypass graft)  See above #1.       Hypertension  Uncontrolled. Continue home CCC, BB, ARBS. Hydralazine prn.       Seizure disorder  Currently not on AED. Last seizure 5 yrs ago.       CAD (coronary artery disease)  See above #1.       Cognitive deficits as late effect of cerebrovascular disease  Short term memory . Stable.       History of stroke  CVA 2011 x 2 with right sided residual deficit  Start ASA. Continue statin.       ESRD on hemodialysis  ESRD on HD TTS x5 yrs via ROSALIO AVF at [facility] under direction of Dr. Levy.   Last HD today 1.5 hrs into 3.5 hr HD tx  Lytes and acid base okay  And no signs of volume excess.   Lab Results   Component Value Date     (H) 08/16/2011    CALCIUM 8.4 (L) 12/16/2019    PHOS 3.0 07/17/2019   Nephrology following-TTS -Plan for HD tomorrow 12/17/19            VTE Risk Mitigation (From admission, onward)         Ordered     IP VTE HIGH RISK PATIENT  Once      12/14/19 1055     Place sequential compression device  Until discontinued      12/14/19 1055     Place BILLIE hose  Until discontinued      12/14/19 1055                      Surekha Fleming NP  Department of Hospital Medicine   [facility]

## 2019-12-16 NOTE — SUBJECTIVE & OBJECTIVE
Interval History: no chest pain. Wants to go home    Review of Systems   Constitutional: Positive for activity change. Negative for fatigue and fever.   HENT: Negative.    Eyes: Negative.    Respiratory: Negative for chest tightness and shortness of breath.    Cardiovascular: Negative.    Gastrointestinal: Negative.    Endocrine: Negative.    Genitourinary:        Patient reports 2 cups of residual urine.    Musculoskeletal: Positive for arthralgias and gait problem.   Neurological: Positive for weakness (right side weakness from previous stroke). Negative for dizziness, tremors, syncope, facial asymmetry, speech difficulty, light-headedness and numbness.   Hematological: Negative.    Psychiatric/Behavioral: Negative.      Objective:     Vital Signs (Most Recent):  Temp: 97.8 °F (36.6 °C) (12/16/19 1635)  Pulse: 74 (12/16/19 1635)  Resp: 16 (12/16/19 1635)  BP: 132/68 (12/16/19 1635)  SpO2: 100 % (12/16/19 1635) Vital Signs (24h Range):  Temp:  [97.8 °F (36.6 °C)-99.2 °F (37.3 °C)] 97.8 °F (36.6 °C)  Pulse:  [74-93] 74  Resp:  [16-18] 16  SpO2:  [94 %-100 %] 100 %  BP: (132-158)/(63-77) 132/68     Weight: 70.1 kg (154 lb 8.7 oz)  Body mass index is 21.55 kg/m².    Intake/Output Summary (Last 24 hours) at 12/16/2019 1653  Last data filed at 12/15/2019 2336  Gross per 24 hour   Intake 480 ml   Output --   Net 480 ml      Physical Exam   Constitutional: He is oriented to person, place, and time. He appears well-developed and well-nourished. No distress.   HENT:   Head: Atraumatic.   Eyes: EOM are normal.   Neck: Neck supple.   Cardiovascular: Normal rate and regular rhythm.   Pulmonary/Chest: Effort normal and breath sounds normal. No respiratory distress.   Abdominal: Soft. Bowel sounds are normal. He exhibits no distension.   Musculoskeletal: Normal range of motion. He exhibits no edema.   Neurological: He is alert and oriented to person, place, and time. No sensory deficit.   Right side weakness from previous stroke    Skin: Skin is warm.   LUE AVF good bruit and thrill.   Psychiatric: He has a normal mood and affect.       Significant Labs: All pertinent labs within the past 24 hours have been reviewed.    Significant Imaging: I have reviewed and interpreted all pertinent imaging results/findings within the past 24 hours.

## 2019-12-16 NOTE — PROGRESS NOTES
WRITTEN HEALTHCARE DISCHARGE INFORMATION      Things that YOU are RESPONSIBLE for to Manage Your Care At Home:     1. Getting your prescriptions filled.  2. Taking you medications as directed. DO NOT MISS ANY DOSES!  3. Going to your follow-up doctor appointments. This is important because it allows the doctor to monitor your progress and to determine if any changes need to be made to your treatment plan.     If you are unable to make your follow up appointments, please call the number listed and reschedule this appointment.      ____________HELP AT HOME____________________     Experiencing any SIGNS or SYMPTOMS: YOU CAN     Schedule a same day appopintment with your Primary Care Doctor or  you can call [facility] On Call Nurse Care Line for 24/7 assistance at [phone]     If you are experience any signs or symptoms that have become severe, Call 911 and come to your nearest Emergency Room.     Thank you for choosing [facility] and allowing us to care for you.   From your care management team:      You should receive a call from [facility] within 48-72 hours to help manage your care after discharge. Please try to make sure that you answer your phone for this important phone call.    Follow-up Information     David Villegas MD On 12/27/2019.    Specialties:  Cardiology, INTERVENTIONAL CARDIOLOGY  Why:  Appointment scheduled for December 27th at 10:40am  Contact information:  [address]  [address]  [place] [address]  [phone]             Melonie Elias MD On 1/20/2020.    Specialties:  Internal Medicine, Pediatrics  Why:  Appointment scheduled for January 20th at 1:20pm  Contact information:  [address] [place] [address]  [phone]

## 2019-12-16 NOTE — HOSPITAL COURSE
Mr. Ibanez is a 58 yo male who was placed in observation for unstable angina.  No further chest pain during observation stay. On 12/16/19, [facility] showed severe stenosis in proximal to mid circumflex intervened with drug-eluting stent x1.  Continue ASA, plavix (new), statin. He was monitored overnight and remained stable. He will discharge after dialysis today. Rolling walker. Post cath instructions given.    [facility] 12/16/2019  Lad:  Proximal chronic total occlusion.  Fills via LIMA to LAD.  No stenosis in LIMA to LAD.  No significant stenosis in LAD after LIMA touchdown.  Circumflex:  High OM1 is a small 1.5 mm vessel with proximal 70% stenosis.  Mid circumflex has a severe 80-85% stenosis.  OM3 is a small 1.5 to 2 mm vessel with severe 70% stenosis proximally.  RCA chronic total occlusion proximally.  Distal RCA fills via collaterals from LAD.   Grafts:  SVG to RCA is occluded  LIMA to LAD is patent with no stenosis

## 2019-12-16 NOTE — PLAN OF CARE
Problem: Fall Injury Risk  Goal: Absence of Fall and Fall-Related Injury  Outcome: Ongoing, Progressing  Intervention: Promote Injury-Free Environment  Flowsheets (Taken 12/15/2019 2017)  Safety Promotion/Fall Prevention: assistive device/personal item within reach; bed alarm set; Fall Risk reviewed with patient/family; medications reviewed; muscle strengthening facilitated; nonskid shoes/socks when out of bed; side rails raised x 2; instructed to call staff for mobility  Environmental Safety Modification: assistive device/personal items within reach; clutter free environment maintained; lighting adjusted; room organization consistent     Problem: Adult Inpatient Plan of Care  Goal: Plan of Care Review  Outcome: Ongoing, Progressing  Goal: Patient-Specific Goal (Individualization)  Outcome: Ongoing, Progressing  Goal: Absence of Hospital-Acquired Illness or Injury  Outcome: Ongoing, Progressing  Goal: Optimal Comfort and Wellbeing  Outcome: Ongoing, Progressing  Goal: Readiness for Transition of Care  Outcome: Ongoing, Progressing  Goal: Rounds/Family Conference  Outcome: Ongoing, Progressing     Problem: Diabetes Comorbidity  Goal: Blood Glucose Level Within Desired Range  Outcome: Ongoing, Progressing  Intervention: Maintain Glycemic Control  Flowsheets (Taken 12/15/2019 2017)  Glycemic Management: blood glucose monitoring; carbohydrate replacement provided; supplemental insulin given     Problem: Pain (Acute Coronary Syndrome)  Goal: Absence of Cardiac-Related Pain  Outcome: Ongoing, Progressing  Intervention: Manage Cardiac Pain Symptoms  Flowsheets (Taken 12/15/2019 2017)  Chest Pain Intervention: cardiac monitoring continued; activity minimized   No falls or new events this shift, no c/o cp today. BG elevated, scheduled novolog and levemir added. Plans for angiogram tomorrow 12/16, consent completed with MD at bedside with pt and wife. Dialysis possible for tomorrow.

## 2019-12-16 NOTE — PLAN OF CARE
Problem: Fall Injury Risk  Goal: Absence of Fall and Fall-Related Injury  Outcome: Ongoing, Progressing     Problem: Adult Inpatient Plan of Care  Goal: Plan of Care Review  Outcome: Ongoing, Progressing  Goal: Patient-Specific Goal (Individualization)  Outcome: Ongoing, Progressing  Goal: Absence of Hospital-Acquired Illness or Injury  Outcome: Ongoing, Progressing  Goal: Optimal Comfort and Wellbeing  Outcome: Ongoing, Progressing  Goal: Readiness for Transition of Care  Outcome: Ongoing, Progressing  Goal: Rounds/Family Conference  Outcome: Ongoing, Progressing     Problem: Diabetes Comorbidity  Goal: Blood Glucose Level Within Desired Range  Outcome: Ongoing, Progressing     Problem: Pain (Acute Coronary Syndrome)  Goal: Absence of Cardiac-Related Pain  Outcome: Ongoing, Progressing

## 2019-12-16 NOTE — NURSING
Report received from Amina CRUMP RN. Patient care assumed. Patient is AAOx4 and observed lying in bed sleeping between care with cardiac monitoring in progress. Vital signs stable and found in flow sheets with complete patient assessment. Skin dry and intact with a ROSALIO fistula and 22g RH PIV noted saline locked. No complaints of pain and no signs of respiratory distress noted. Cardiology and Nephrology consulted. Plan for LHC in the am. Patient and spouse updated on plan of care and verbalized understanding. Call light in reach and bed alarm maintained for patient safety. Patient stable and will continue to be monitored.

## 2019-12-16 NOTE — NURSING
Report received from night nurse. Visualized patient and assessed patient's overall condition and appearance. Pt lying in bed with covers over head, respirations even and unlabored. NAD noted. Will continue to monitor.

## 2019-12-17 NOTE — PROGRESS NOTES
WRITTEN HEALTHCARE DISCHARGE INFORMATION      Things that YOU are RESPONSIBLE for to Manage Your Care At Home:     1. Getting your prescriptions filled.  2. Taking you medications as directed. DO NOT MISS ANY DOSES!  3. Going to your follow-up doctor appointments. This is important because it allows the doctor to monitor your progress and to determine if any changes need to be made to your treatment plan.     If you are unable to make your follow up appointments, please call the number listed and reschedule this appointment.      ____________HELP AT HOME____________________     Experiencing any SIGNS or SYMPTOMS: YOU CAN     Schedule a same day appopintment with your Primary Care Doctor or  you can call [facility] On Call Nurse Care Line for 24/7 assistance at [phone]     If you are experience any signs or symptoms that have become severe, Call 911 and come to your nearest Emergency Room.     Thank you for choosing [facility] and allowing us to care for you.   From your care management team:      You should receive a call from [facility] Discharge Department within 48-72 hours to help manage your care after discharge. Please try to make sure that you answer your phone for this important phone call.    Follow-up Information     David Villegas MD On 12/27/2019.    Specialties:  Cardiology, INTERVENTIONAL CARDIOLOGY  Why:  Appointment scheduled for December 27th at 10:40am  Contact information:  [address]  [address]  [place] [address]  [phone]             Melonie Elias MD On 1/20/2020.    Specialties:  Internal Medicine, Pediatrics  Why:  Appointment scheduled for January 20th at 1:20pm  Contact information:  [address] [facility], [place] [address]  [phone]

## 2019-12-17 NOTE — PROGRESS NOTES
Date of Admission:12/14/2019    SUBJECTIVE:denies cp on hd    Current Facility-Administered Medications   Medication    0.9%  NaCl infusion    0.9%  NaCl infusion    acetaminophen tablet 650 mg    acetaminophen tablet 650 mg    aspirin EC tablet 81 mg    atorvastatin tablet 40 mg    carvedilol tablet 25 mg    clopidogrel tablet 75 mg    dextrose 50% injection 12.5 g    dextrose 50% injection 25 g    diltiaZEM 24 hr capsule 180 mg    furosemide tablet 40 mg    glucagon (human recombinant) injection 1 mg    glucose chewable tablet 16 g    glucose chewable tablet 24 g    insulin aspart U-100 pen 0-5 Units    insulin aspart U-100 pen 5 Units    insulin detemir U-100 pen 15 Units    losartan tablet 100 mg    mupirocin 2 % ointment    ondansetron injection 4 mg    ondansetron injection 4 mg    sodium chloride 0.9% flush 10 mL       Wt Readings from Last 3 Encounters:   12/17/19 68.9 kg (151 lb 14.4 oz)   10/30/19 66.8 kg (147 lb 6 oz)   08/07/19 65.3 kg (144 lb)     Temp Readings from Last 3 Encounters:   12/17/19 99.3 °F (37.4 °C) (Oral)   10/30/19 98.1 °F (36.7 °C) (Oral)   08/07/19 98.7 °F (37.1 °C) (Oral)     BP Readings from Last 3 Encounters:   12/17/19 (!) 168/89   10/30/19 120/70   08/07/19 120/60     Pulse Readings from Last 3 Encounters:   12/17/19 92   10/30/19 96   08/07/19 88       Intake/Output Summary (Last 24 hours) at 12/17/2019 1150  Last data filed at 12/17/2019 0900  Gross per 24 hour   Intake 590 ml   Output 400 ml   Net 190 ml       PE:  GEN:wd male in nad  HEENT:ncat,eomi,mm  CVS:s1s2 regular  PULM:ctab  ABD:+bs,soft,nd  EXT:no leg edema   avf arm  NEURO:awake    Recent Labs   Lab 12/17/19  0458   *   *   K 5.0      CO2 18*   BUN 56*   CREATININE 10.2*   CALCIUM 8.3*       Lab Results   Component Value Date     (H) 08/16/2011    CALCIUM 8.3 (L) 12/17/2019    PHOS 3.0 07/17/2019       Recent Labs   Lab 12/17/19  0559   WBC 5.18   RBC 3.13*   HGB 9.4*    HCT 28.7*   PLT SEE COMMENT   MCV 92   MCH 30.0   MCHC 32.8         A/P:  1.esrd. Seen on hd. Cont tx TTS.  2.cad. S/p stent. Following. No cp now.   3.anemia 2nd to esrd. Resume micera at his unit.  4.htn. uf with hd.  5.2nd hyperparathyroidism. No binders notes.

## 2019-12-17 NOTE — DISCHARGE SUMMARY
"[facility] Hospital Medicine  Discharge Summary      Patient Name: Kodi Ibanez Sr.  MRN: [id number]  Admission Date: 12/14/2019  Hospital Length of Stay: 0 days  Discharge Date and Time:  12/17/2019 10:50 AM  Attending Physician: Deyanira Amezcua MD   Discharging Provider: DARRIAN Alvarez  Primary Care Provider: Melonie Elias MD      HPI:   Mr. Ibanez is a 58 yo male with significant history hypertension, hyperlipidemia, hx seizure 5 yrs ago?, diabetes type 2, stroke in 2011 with residual right-sided weakness, MI status post CABG in 2008 at [facility], short term memory lost and history of SVT in July 2019 cardioverted with adenosine in ED at that time who presents to hospital after experienced left chest pain "punching pain" after approximated 1.5 hrs into his 3.5 hemodialysis treatment. Chest pain resolved with nitro SL at dialysis unit. Patient denies nausea, vomiting or diaphoresis. Patient reports similar symptoms in July while on dialysis. Heart rate was noted to be in the 140 during episode but reports pre HD tx normal heart rate. Heart rate came down to normal sinus rhythm after EMS went over a bump en route to hospital.     EKG showed NSR 90. Troponin 0.050 (slightly bump from baseline). CXR clear.     Procedure(s) (LRB):  ANGIOGRAM, CORONARY, INCLUDING BYPASS GRAFT, WITH LEFT HEART CATHETERIZATION, rcfa, 5fr (N/A)  Ultrasound Guidance  Percutaneous coronary intervention (N/A)  Stent, Drug Eluting, Single Vessel, Coronary      Hospital Course:   Mr. Ibanez is a 58 yo male who was placed in observation for unstable angina.  No further chest pain during observation stay. On 12/16/19, [facility] showed severe stenosis in proximal to mid circumflex intervened with drug-eluting stent x1.  Continue ASA, plavix (new), statin. He was monitored overnight and remained stable. He will discharge after dialysis today. Rolling walker. Post cath instructions given.    [facility] " 12/16/2019  Lad:  Proximal chronic total occlusion.  Fills via LIMA to LAD.  No stenosis in LIMA to LAD.  No significant stenosis in LAD after LIMA touchdown.  Circumflex:  High OM1 is a small 1.5 mm vessel with proximal 70% stenosis.  Mid circumflex has a severe 80-85% stenosis.  OM3 is a small 1.5 to 2 mm vessel with severe 70% stenosis proximally.  RCA chronic total occlusion proximally.  Distal RCA fills via collaterals from LAD.   Grafts:  SVG to RCA is occluded  LIMA to LAD is patent with no stenosis              Consults:   Consults (From admission, onward)        Status Ordering Provider     Inpatient consult to Cardiology  Once     Provider:  David Mauricio MD    Completed JAQUELINE PACE     Inpatient consult to Nephrology  Once     Provider:  Filippo Hendrix MD    Acknowledged SAVANNAH DUNHAM     Inpatient consult to Social Work  Once     Provider:  (Not yet assigned)    Acknowledged DAVID MAURICIO     IP consult to case management  Once     Provider:  (Not yet assigned)    Acknowledged EL FERNANDES          No new Assessment & Plan notes have been filed under this hospital service since the last note was generated.  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, with long-term current use of insulin [E11.3513, E11.65, Z79.4] 11/13/2017 Yes     Chronic    S/P CABG (coronary artery bypass graft) [Z95.1] 06/04/2015 Not Applicable     Chronic    Hypertension [I10] 10/17/2013 Yes     Chronic    Seizure disorder [G40.909] 10/17/2013 Yes     Chronic    CAD (coronary artery disease) [I25.10] 08/30/2013 Yes     Chronic    Type 2 diabetes, uncontrolled, with neuropathy [E11.40, E11.65] 11/06/2012 Yes     Chronic    ESRD on  hemodialysis [N18.6, Z99.2] 07/13/2012 Not Applicable    Cognitive deficits as late effect of cerebrovascular disease [I69.919] 06/26/2012 Not Applicable     Chronic    History of stroke [Z86.73] 11/12/2011 Not Applicable     Chronic      Problems Resolved During this Admission:       Discharged Condition: stable    Disposition: Home or Self Care    Follow Up:  Follow-up Information     David Villegas MD On 12/27/2019.    Specialties:  Cardiology, INTERVENTIONAL CARDIOLOGY  Why:  Appointment scheduled for December 27th at 10:40am  Contact information:  [address]  [address]  [place] [address]  [phone]             Melonie Elias MD On 1/20/2020.    Specialties:  Internal Medicine, Pediatrics  Why:  Appointment scheduled for January 20th at 1:20pm  Contact information:  [address] [facility]  [place] [id number] [phone]                 Patient Instructions:      Diet Cardiac     Activity as tolerated       Significant Diagnostic Studies: Labs:   CMP   Recent Labs   Lab 12/16/19  0434 12/17/19  0458   * 134*   K 4.7 5.0    102   CO2 22* 18*   * 251*   BUN 47* 56*   CREATININE 8.9* 10.2*   CALCIUM 8.4* 8.3*   PROT  --  6.4   ALBUMIN  --  2.9*   BILITOT  --  0.4   ALKPHOS  --  129   AST  --  18   ALT  --  26   ANIONGAP 13 14   ESTGFRAFRICA 7* 6*   EGFRNONAA 6* 5*   , CBC   Recent Labs   Lab 12/16/19  0435 12/17/19  0559   WBC 4.59 5.18   HGB 9.4* 9.4*   HCT 28.9* 28.7*   * SEE COMMENT   , INR   Lab Results   Component Value Date    INR 1.0 07/16/2019    INR 1.0 10/17/2013    INR 1.0 10/17/2013   , Lipid Panel   Lab Results   Component Value Date    CHOL 158 03/12/2019    HDL 37 (L) 03/12/2019    LDLCALC 97.4 03/12/2019    TRIG 118 03/12/2019    CHOLHDL 23.4 03/12/2019   , Troponin   Recent Labs   Lab 12/14/19  2043   TROPONINI 0.051*    and A1C:   Recent Labs   Lab 07/16/19  1529 10/30/19  1455   HGBA1C 11.5* 12.6*       Pending Diagnostic Studies:     None        "  Medications:  Reconciled Home Medications:      Medication List      START taking these medications    clopidogrel 75 mg tablet  Commonly known as:  PLAVIX  Take 1 tablet (75 mg total) by mouth once daily.  Start taking on:  December 18, 2019     walker Misc  1 each by Misc.(Non-Drug; Combo Route) route continuous.        CONTINUE taking these medications    atorvastatin 40 MG tablet  Commonly known as:  LIPITOR  TAKE 1 TABLET(40 MG) BY MOUTH EVERY DAY     blood glucose control, normal Soln  1 drop by Misc.(Non-Drug; Combo Route) route as directed.     blood sugar diagnostic Strp  To check BG 2 times daily, to use with accuchek casa meter     blood-glucose meter kit  To check BG 2 times daily, to use with accuchek casa meter     carvedilol 25 MG tablet  Commonly known as:  COREG  TAKE 1 TABLET(25 MG) BY MOUTH TWICE DAILY WITH MEALS     diltiaZEM 180 MG 24 hr capsule  Commonly known as:  CARDIZEM CD  Take 1 capsule (180 mg total) by mouth once daily.     furosemide 40 MG tablet  Commonly known as:  LASIX  TAKE 1 TABLET(40 MG) BY MOUTH EVERY DAY     lancets Misc  To check BG 2 times daily, to use with accuchek casa meter     Lantus Solostar U-100 Insulin glargine 100 units/mL (3mL) SubQ pen  Generic drug:  insulin  INJECT 20 UNITS UNDER THE SKIN NIGHTLY     losartan 100 MG tablet  Commonly known as:  COZAAR  Take 1 tablet (100 mg total) by mouth once daily.     megestrol 20 MG Tab  Commonly known as:  MEGACE  Take 1 tablet (20 mg total) by mouth once daily.     NovoFine 30 30 gauge x 1/3" Ndle  Generic drug:  pen needle, diabetic  USE AS DIRECTED WITH LANSHIRAZ SOLARSTAR     NovoLOG Flexpen U-100 Insulin 100 unit/mL (3 mL) Inpn pen  Generic drug:  insulin aspart U-100  INJECT 5 TO 10 UNITS WITH MEALS AND AT BEDTIME DEPENDING ON BLOOD SUGAR     pen needle, diabetic 29 gauge x 1/2" Ndle  Commonly known as:  BD Ultra-Fine Orig Pen Needle  USE  1 EVERY EVENING     promethazine 25 MG tablet  Commonly known as:  " PHENERGAN  Take 1 tablet (25 mg total) by mouth every 6 (six) hours as needed for Nausea.     Vitamin C 500 MG tablet  Generic drug:  ascorbic acid (vitamin C)  Take 500 mg by mouth every evening.            Indwelling Lines/Drains at time of discharge:   Lines/Drains/Airways     Drain                 Hemodialysis AV Fistula 04/01/17 0800 Left upper arm 990 days                Time spent on the discharge of patient: 35 minutes  Patient was seen and examined on the date of discharge and determined to be suitable for discharge.       CAIN Delgadillo, FNP-C  Hospitalist - Department of Hospital Medicine  [address] [place] [address]  Office [phone]; Pager [phone]

## 2019-12-17 NOTE — NURSING
Presented to the Dialysis Acutes Dept for dialysis tx prior to d/c. Pt is noted awake , alert and in NAD , denies any c/c and/or somatic complaints at this time. Noted on room air and in control of his own airway. Will prepare for dialysis .

## 2019-12-17 NOTE — PLAN OF CARE
Problem: Adult Inpatient Plan of Care  Goal: Plan of Care Review  12/17/2019 0616 by Angela Ruiz RN  Outcome: Ongoing, Progressing  12/17/2019 0616 by Angela Ruiz RN  Flowsheets (Taken 12/17/2019 0616)  Plan of Care Reviewed With: patient; spouse  Goal: Patient-Specific Goal (Individualization)  Outcome: Ongoing, Progressing  Goal: Absence of Hospital-Acquired Illness or Injury  Outcome: Ongoing, Progressing  Goal: Optimal Comfort and Wellbeing  Outcome: Ongoing, Progressing  Goal: Readiness for Transition of Care  Outcome: Ongoing, Progressing  Goal: Rounds/Family Conference  Outcome: Ongoing, Progressing      "Requested Prescriptions   Pending Prescriptions Disp Refills     Multiple Vitamins-Minerals (RA ONE DAILY GUMMY VITES) CHEW  Last Written Prescription Date:  02/21/2018  Last Fill Quantity: 70 tablet,  # refills: 3   Last office visit: 5/1/2018 with prescribing provider:  Jeevan Obando MD    Future Office Visit:     70 tablet 3     Sig: Take 1 chew tab by mouth daily    Vitamin Supplements (Adult) Protocol Passed    10/4/2018 11:40 AM       Passed - High dose Vitamin D not ordered       Passed - Recent (12 mo) or future (30 days) visit within the authorizing provider's specialty    Patient had office visit in the last 12 months or has a visit in the next 30 days with authorizing provider or within the authorizing provider's specialty.  See \"Patient Info\" tab in inbasket, or \"Choose Columns\" in Meds & Orders section of the refill encounter.              "

## 2019-12-17 NOTE — NURSING
Patient dressing noted saturated with blood as patient is noted lying on his stomach. Nurse and wife continually instruct patient to lye flat on his back. Dressing changed and gauze and tegaderm reapplied.

## 2019-12-17 NOTE — NURSING
Pt's bedrest completed. Pt tolerated well. Pt's right groin site without redness, swelling, or hematoma, VS stable. No complaints. Will report off to night nurse.

## 2019-12-17 NOTE — NURSING
4 hour intermittent hemodialysis tx initiated, left UE AVF easily cannulated with 15 ga dialysis angio's x 2, good blood return, and easily flushed, all lines connected and secured. All dialysis tx pressures wnl. Will continue to monitor for changes and trends. Planned UF = 2.5L.

## 2019-12-17 NOTE — PLAN OF CARE
12/17/19 1310   Final Note   Assessment Type Final Discharge Note   Anticipated Discharge Disposition Home   Hospital Follow Up  Appt(s) scheduled? Yes   Discharge plans and expectations educations in teach back method with documentation complete? Yes   Right Care Referral Info   Post Acute Recommendation No Care   pts nurse Pallavi notified pt can d/c from CM standpoint.

## 2019-12-17 NOTE — DISCHARGE INSTRUCTIONS
Complete medications as ordered  Follow all discharge instructions.  Please schedule follow up appointments as necessary      When to Call Your Doctor  Call your doctor immediately if you have any of the following:  · Severe headache  · Severe dizziness, or fainting  · Nausea or vomiting  · Fast heartbeat (higher than 100 beats per minute)  · Fever or chills  · Swollen ankles  · Weakness  · Chest Pain attacks that last longer, occur more often, or are more severe than in the past     POST CATH    Take over-the-counter acetaminophen, as directed by your doctor to relieve any pain you experience at the tube insertion site. If over-the-counter pain-relief medications are inadequate, alert your doctor.    Resume taking any prescription or over-the-counter medications, excluding certain diabetes medications, as you normally would. Alert your doctor to all medications you take on a regular basis, and unless he directs you otherwise, it is safe to resume them after the catheterization.  Abstain from driving. You should not drive a car for at least 48 hours after your cardiac catheterization.     Avoid immersing the puncture site, such as during bathing or swimming, until you are completely healed. You may shower the day after the procedure, backwards to the site, Do not remove bandage, allow to fall off.    Abstain from pushing, pulling or lifting objects greater than 10 pounds for at least three days post-procedure. Also, avoid exercises or any other activities that require you to engage your abdominal muscles, hold your breath or bear down during this time    Continue your regular activities, such as work and other physical activities, approximately one week after cardiac catheterization, unless your doctor advises a longer rest period.

## 2019-12-17 NOTE — NURSING
Patient dressing noted saturated with blood as patient is noted lying on his stomach. Nurse and wife again continually instructing patient to lye flat on his back. Dressing changed and gauze and tegaderm reapplied. Silk tape placed over dressing for added pressure. Patient stable and will continue to be monitored.

## 2019-12-17 NOTE — NURSING
Dressing noted clean, dry, and intact with no drainage. Patient stable and will continue to be monitored.

## 2019-12-17 NOTE — NURSING
Patients dressing to angiogram site noted covered in blood. Dressing reinforced with gauze and tegaderm. Patient stable and instructed to lay on back for a little while in which patient noted twisting and turning in bed.

## 2019-12-25 NOTE — NURSING
Report received from Amina VÁZQUEZ RN. Patient care assumed. Patient is AAOx4 and observed lying in bed with cardiac monitoring in progress. Vital signs stable and found in flow sheets with complete patient assessment. Skin dry and intact with a 22g RH PIV noted saline locked and a ROSALIO fistula with present thrill and bruit. No complaints of pain and no signs of respiratory distress noted. Patient stable and will continue to be monitored.    Home

## 2019-12-27 NOTE — PROGRESS NOTES
CARDIOVASCULAR CONSULTATION    REASON FOR CONSULT:   Kodi Ibanez Sr. is a 59 y.o. male who presents for FOLLOW-UP AFTER RECENT REVASCULARIZATION OF HIS CIRCUMFLEX.      HISTORY OF PRESENT ILLNESS:     Patient is a pleasant 59-year-old man who was recently hospitalized because of chest pains during dialysis.  Troponins were minimally elevated.  Subsequent angiogram demonstrated severe stenosis in his circumflex.  It was also noted that his SVG to RCA was occluded and his LIMA to LAD was patent and supplied the LAD.  Lad was proximal .  See detailed cath report below.  He had severe stenosis in his circumflex and underwent PCI.  Here with his wife.  The wife states that she feels he has more and she as well as feels better after the PCI.  He has not had any further episodes of chest pain since the PCI.  Denies any orthopnea, PND, swelling of feet.  No complications from cardiac catheterization.        Cath 12/2019:    1.  Successful PCI of severe stenosis in proximal to mid circumflex with drug-eluting stent x1.  A 3 into 26 mm drug-eluting stent, resolute farhad was implanted with excellent angiographic results.  OMARI 3 flow post PCI.     Coronary anatomy:     Lad:  Proximal chronic total occlusion.  Fills via LIMA to LAD.  No stenosis in LIMA to LAD.  No significant stenosis in LAD after LIMA touchdown.  Circumflex:  High OM1 is a small 1.5 mm vessel with proximal 70% stenosis.  Mid circumflex has a severe 80-85% stenosis.  OM3 is a small 1.5 to 2 mm vessel with severe 70% stenosis proximally.  RCA chronic total occlusion proximally.  Distal RCA fills via collaterals from LAD.     Grafts:  SVG to RCA is occluded  LIMA to LAD is patent with no stenosis     Access and hemostasis:  Ultrasound-guided access of right common femoral artery was obtained.  After angioplasty, hemostasis was achieved with Perclose.     Assessment and plan     Aspirin 81 mg daily indefinitely  Plavix 75 mg daily uninterrupted for at least 1  year  Statins  Aggressive risk factor modification  Follow-up in Cardiology Clinic      ECHO 12/2019:    · Normal left ventricular systolic function. The estimated ejection fraction is 60%  · Moderate concentric left ventricular hypertrophy.  · Moderate left atrial enlargement.  · Indeterminate left ventricular diastolic function.  · Normal right ventricular systolic function.  · Mild right atrial enlargement.  · Mild aortic regurgitation.  · Mild mitral regurgitation.  · Mild tricuspid regurgitation.  · Normal central venous pressure (3 mm Hg).  · The estimated PA systolic pressure is 15 mm Hg  · Septal wall has abnormal motion consistent with post-operative status.        CARDIOLOGY CONSULT, HOSPITALIZATION FOR CHEST PAIN / NSTEMI 12/2019:    HPI:   59-year-old male with complex medical history including end-stage renal disease presents to the ED for evaluation of an episode of chest pain this morning while at dialysis.  He explains he was about prison through his dialysis session when he experienced left-sided chest pain and palpitations.  He reports the pain lasted approximately 30 min and was relieved by 1 sublingual nitro given at dialysis center.  He currently denies chest pain or palpitations.  He denies experiencing shortness of breath, cough, fever, leg swelling or calf pain. He has been compliant with dialysis and medications.  Per EMS, patient's heart rate was 138 prior to arrival, and seemed to abruptly normalize in route.     I saw him in hospital a few months ago when he had presented with tachycardia and elevated blood pressure:     This 59 y.o male who has a PMH of CAD S/P CABG,  CHF, ESRD-HD, Seizures, old MI, HTN, HLD, DM, PVDand CVA presents to the ED for an evaluation for an elevated heart rate and elevated blood pressure that was noticed at 0515 today.  Patient reports being dropped off at dialysis and reports being informed he could not receive dialysis treatment due to a blood pressure of  170/104.  Patient currently only reports of right upper back pain, which he reports being 3 months ago.  Patient denies chest pain, extremity numbness, extremity weakness, fever, chills, nausea, emesis, diarrhea, abdominal pain, headache, or any other associated symptoms.  Patient's spouse reports the patient having a blood pressure reading of 215/104 yesterday while at a doctor's appointment.  His spouse reports them arriving home yesterday to take his blood pressure medication and reports a repeat blood pressure of 140/90.  Patient has not received any hypertension medications this morning.  No prior tx.  No alleviating factors.     Patient states that he was in clinic yesterday and it was noted that his BP was high and he was tachycardic. He denied feeling any episodes of chest pain at rest or on exertion, orthopnea, PND or dyspnea. He denies any claudication. States that he walks on the treadmill every week for about a mile and denies any symptoms of chest pain or [place] while on the treadmill.      Subsequently he was seen by Dr. Britt in the hospital when he had presented with supraventricular tachycardia.     2D ECHO 2019:     · Low normal left ventricular systolic function. The estimated ejection fraction is 50%  · Concentric left ventricular hypertrophy.  · Normal LV diastolic function.  · Normal right ventricular systolic function.     STRESS TEST 07/2019:     · Normal myocardial perfusion scan  · There were no arrhythmias during stress.  · There was no ST segment deviation noted during stress.  · The patient reported dizziness, nausea and light headedness during the stress test.  · The perfusion scan is free of evidence from myocardial ischemia or injury.  · LVEF 61% post stress  · The EKG portion of this study is negative for myocardial ischemia.        EKG was personally reviewed and demonstrated normal sinus rhythm with septal infarct.        States that was sitting in dialysis and had chest pressure and  "tightness.  Resolved on its own.  Felt like severe pressure.  Had CABG done in 2008 at [facility].  Does not remember what grafts he had placed.    PAST MEDICAL HISTORY:     Past Medical History:   Diagnosis Date    Allergy     Ambulates with cane     Atherosclerosis of aorta     noted on VIKTORIA 2011    Blood transfusion     Cataracts, bilateral     CHF (congestive heart failure)     Chronic kidney disease     Clotting disorder     Coronary artery disease     Decreased appetite     Diabetic retinopathy     DM (diabetes mellitus), type 2 with renal complications     ESRD on hemodialysis     TUESDAY/ THURSDAY/SATURDAY    Hyperlipidemia     Hyperparathyroidism, unspecified     Hypertension     Myocardial infarction     Prostate cancer 2016 7/16/19:  wife states "he doesn't have it anymore"    Retinopathy due to secondary diabetes     Seizures     Stroke 2011    PT HAS SHAKES     Unsteady gait     Uses roller walker        PAST SURGICAL HISTORY:     Past Surgical History:   Procedure Laterality Date    CARDIAC SURGERY      CABG    CATARACT EXTRACTION      ou    CATARACT EXTRACTION, BILATERAL      cataracts      CORONARY ANGIOGRAPHY INCLUDING BYPASS GRAFTS WITH CATHETERIZATION OF LEFT HEART N/A 12/16/2019    Procedure: ANGIOGRAM, CORONARY, INCLUDING BYPASS GRAFT, WITH LEFT HEART CATHETERIZATION, rcfa, 5fr;  Surgeon: David Villegas MD;  Location: [facility] CATH LAB;  Service: Cardiology;  Laterality: N/A;    CORONARY ARTERY BYPASS GRAFT  2009    bypass    EYE SURGERY      Left Guera AVF  1/11/2012    PROSTATE BIOPSY      positive    retinal laser      ROTATOR CUFF REPAIR  right, 2005    ULTRASOUND GUIDANCE  12/16/2019    Procedure: Ultrasound Guidance;  Surgeon: David Villegas MD;  Location: [facility] CATH LAB;  Service: Cardiology;;    VASCULAR SURGERY         ALLERGIES AND MEDICATION:     Review of patient's allergies indicates:   Allergen Reactions    Reglan [metoclopramide " "hcl] Diarrhea    Lorazepam      Other reaction(s): heavy sedation        Medication List           Accurate as of December 27, 2019 10:41 AM. If you have any questions, ask your nurse or doctor.               CONTINUE taking these medications    atorvastatin 40 MG tablet  Commonly known as:  LIPITOR  TAKE 1 TABLET(40 MG) BY MOUTH EVERY DAY     blood glucose control, normal Soln  1 drop by Misc.(Non-Drug; Combo Route) route as directed.     blood sugar diagnostic Strp  To check BG 2 times daily, to use with accuchek casa meter     blood-glucose meter kit  To check BG 2 times daily, to use with accuchek casa meter     carvedilol 25 MG tablet  Commonly known as:  COREG  TAKE 1 TABLET(25 MG) BY MOUTH TWICE DAILY WITH MEALS     clopidogrel 75 mg tablet  Commonly known as:  PLAVIX  Take 1 tablet (75 mg total) by mouth once daily.     diltiaZEM 180 MG 24 hr capsule  Commonly known as:  CARDIZEM CD  Take 1 capsule (180 mg total) by mouth once daily.     furosemide 40 MG tablet  Commonly known as:  LASIX  TAKE 1 TABLET(40 MG) BY MOUTH EVERY DAY     lancets Misc  To check BG 2 times daily, to use with accuchek casa meter     Lantus Solostar U-100 Insulin glargine 100 units/mL (3mL) SubQ pen  Generic drug:  insulin  INJECT 20 UNITS UNDER THE SKIN NIGHTLY     losartan 100 MG tablet  Commonly known as:  COZAAR  Take 1 tablet (100 mg total) by mouth once daily.     megestrol 20 MG Tab  Commonly known as:  MEGACE  Take 1 tablet (20 mg total) by mouth once daily.     NovoFine 30 30 gauge x 1/3" Ndle  Generic drug:  pen needle, diabetic  USE AS DIRECTED WITH LANTUS SOLARSTAR     NovoLOG Flexpen U-100 Insulin 100 unit/mL (3 mL) Inpn pen  Generic drug:  insulin aspart U-100  INJECT 5 TO 10 UNITS WITH MEALS AND AT BEDTIME DEPENDING ON BLOOD SUGAR     pen needle, diabetic 29 gauge x 1/2" Ndle  Commonly known as:  BD Ultra-Fine Orig Pen Needle  USE  1 EVERY EVENING     promethazine 25 MG tablet  Commonly known as:  PHENERGAN  Take 1 " tablet (25 mg total) by mouth every 6 (six) hours as needed for Nausea.     Vitamin C 500 MG tablet  Generic drug:  ascorbic acid (vitamin C)     walker Misc  1 each by Misc.(Non-Drug; Combo Route) route continuous.            SOCIAL HISTORY:     Social History     Socioeconomic History    Marital status:      Spouse name: Not on file    Number of children: Not on file    Years of education: Not on file    Highest education level: Not on file   Occupational History    Not on file   Social Needs    Financial resource strain: Not on file    Food insecurity:     Worry: Not on file     Inability: Not on file    Transportation needs:     Medical: Not on file     Non-medical: Not on file   Tobacco Use    Smoking status: Never Smoker    Smokeless tobacco: Never Used   Substance and Sexual Activity    Alcohol use: No    Drug use: No    Sexual activity: Not Currently   Lifestyle    Physical activity:     Days per week: Not on file     Minutes per session: Not on file    Stress: Not on file   Relationships    Social connections:     Talks on phone: Not on file     Gets together: Not on file     Attends [employer] service: Not on file     Active member of club or organization: Not on file     Attends meetings of clubs or organizations: Not on file     Relationship status: Not on file   Other Topics Concern    Not on file   Social History Narrative    Not on file       FAMILY HISTORY:     Family History   Problem Relation Age of Onset    Diabetes Mother     Glaucoma Mother     Cancer Mother     Cataracts Mother     Heart disease Father     Cancer Paternal Grandmother     Heart disease Maternal Grandmother     No Known Problems Sister     Prostate cancer Brother     No Known Problems Maternal Grandfather     No Known Problems Paternal Grandfather     No Known Problems Sister     No Known Problems Sister     Hypertension Brother     Cancer Paternal Uncle     Amblyopia Neg Hx     Blindness  Neg Hx     Macular degeneration Neg Hx     Retinal detachment Neg Hx     Strabismus Neg Hx     Stroke Neg Hx     Thyroid disease Neg Hx     Anesthesia problems Neg Hx     Clotting disorder Neg Hx      problems Neg Hx     Kidney cancer Neg Hx     Kidney disease Neg Hx     Malignant hyperthermia Neg Hx     Sickle cell trait Neg Hx     Lupus Neg Hx     Urolithiasis Neg Hx     Sudden death Neg Hx        REVIEW OF SYSTEMS:   Review of Systems   Constitution: Negative.   HENT: Negative.    Eyes: Negative.    Cardiovascular: Negative.    Respiratory: Negative.    Endocrine: Negative.    Hematologic/Lymphatic: Negative.    Skin: Negative.    Musculoskeletal: Negative.    Gastrointestinal: Negative.    Genitourinary: Negative.    Psychiatric/Behavioral: Negative.    Allergic/Immunologic: Negative.        A 10 point review of systems was performed and all the pertinent positives have been mentioned. Rest of review of systems was negative.        PHYSICAL EXAM:     Vitals:    12/27/19 1031   BP: 136/80   Pulse: 84   Resp: 16    Body mass index is 20.57 kg/m².  Weight: 66.9 kg (147 lb 7.8 oz)         Physical Exam   Constitutional: He is oriented to person, place, and time. He appears well-developed and well-nourished.   HENT:   Head: Normocephalic.   Eyes: Pupils are equal, round, and reactive to light. Conjunctivae are normal.   Neck: Normal range of motion. Neck supple.   Cardiovascular: Normal rate, regular rhythm and normal heart sounds.   Pulmonary/Chest: Effort normal and breath sounds normal.   Abdominal: Soft. Bowel sounds are normal.   Neurological: He is alert and oriented to person, place, and time.   Skin: Skin is warm.   Vitals reviewed.        DATA:     Laboratory:  CBC:  Recent Labs   Lab 12/14/19  0817 12/16/19  0435 12/17/19  0559   WBC 5.94 4.59 5.18   Hemoglobin 10.2 L 9.4 L 9.4 L   Hematocrit 31.4 L 28.9 L 28.7 L   Platelets 150 145 L SEE COMMENT       CHEMISTRIES:  Recent Labs   Lab  07/16/19  0719 07/17/19  0455  12/15/19  0451 12/16/19  0434 12/17/19  0458   Glucose 212 H 189 H   < > 215 H 369 H 251 H   Sodium 138 139   < > 138 135 L 134 L   Potassium 4.5 4.4   < > 4.0 4.7 5.0   BUN, Bld 48 H 23   < > 35 H 47 H 56 H   Creatinine 8.3 H 5.2 H   < > 7.4 H 8.9 H 10.2 H   eGFR if  7 A 13 A   < > 8 A 7 A 6 A   eGFR if non  6 A 11 A   < > 7 A 6 A 5 A   Calcium 9.7 8.8   < > 8.5 L 8.4 L 8.3 L   Magnesium 2.3 1.9  --   --   --   --     < > = values in this interval not displayed.       CARDIAC BIOMARKERS:  Recent Labs   Lab 12/14/19  0817 12/14/19  1414 12/14/19  2043   Troponin I 0.050 H 0.050 H 0.051 H       COAGS:  Recent Labs   Lab 07/16/19  0719   INR 1.0       LIPIDS/LFTS:  Recent Labs   Lab 06/25/18  0757 12/15/18  0935  03/12/19  1004  07/17/19  0455 12/14/19  0817 12/17/19  0458   Cholesterol 239 H 263 H  --  158  --   --   --   --    Triglycerides 179 H 210 H  --  118  --   --   --   --    HDL 38 L 40  --  37 L  --   --   --   --    LDL Cholesterol 165.2 H 181.0 H  --  97.4  --   --   --   --    Non-HDL Cholesterol 201 223  --  121  --   --   --   --    AST 21 52 H   < >  --    < > 22 20 18   ALT 38 83 H   < >  --    < > 32 36 26    < > = values in this interval not displayed.       Hemoglobin A1C   Date Value Ref Range Status   10/30/2019 12.6 (H) 4.0 - 5.6 % Final     Comment:     ADA Screening Guidelines:  5.7-6.4%  Consistent with prediabetes  >or=6.5%  Consistent with diabetes  High levels of fetal hemoglobin interfere with the HbA1C  assay. Heterozygous hemoglobin variants (HbS, HgC, etc)do  not significantly interfere with this assay.   However, presence of multiple variants may affect accuracy.     07/16/2019 11.5 (H) 4.0 - 5.6 % Final     Comment:     ADA Screening Guidelines:  5.7-6.4%  Consistent with prediabetes  >or=6.5%  Consistent with diabetes  High levels of fetal hemoglobin interfere with the HbA1C  assay. Heterozygous hemoglobin variants  (HbS, HgC, etc)do  not significantly interfere with this assay.   However, presence of multiple variants may affect accuracy.     03/27/2019 9.2 (H) 4.0 - 5.6 % Final     Comment:     ADA Screening Guidelines:  5.7-6.4%  Consistent with prediabetes  >or=6.5%  Consistent with diabetes  High levels of fetal hemoglobin interfere with the HbA1C  assay. Heterozygous hemoglobin variants (HbS, HgC, etc)do  not significantly interfere with this assay.   However, presence of multiple variants may affect accuracy.         TSH  Recent Labs   Lab 03/13/19  0532   TSH 2.133       The ASCVD Risk score (Ashutosh GUTIERREZ Jr., et al., 2013) failed to calculate for the following reasons:    The patient has a prior MI or stroke diagnosis           C      ASSESSMENT AND PLAN     Patient Active Problem List   Diagnosis    ESRD on hemodialysis    History of stroke    Cognitive deficits as late effect of cerebrovascular disease    Congestive heart failure    Secondary renal hyperparathyroidism    Other extrapyramidal disease and abnormal movement disorder    Mononeuritis of lower limb, unspecified    Vascular dementia with depressed mood    Type 2 diabetes, uncontrolled, with neuropathy    CAD (coronary artery disease)    Seizure disorder    Hyperlipidemia LDL goal <100    Anemia of chronic disease    Hypertension    Uncontrolled type 2 diabetes mellitus with end-stage renal disease    History of MI (myocardial infarction)    Long-term insulin use    Atherosclerosis of aorta    S/P CABG (coronary artery bypass graft)    Hypertensive retinopathy of both eyes    Prostate cancer    Uncontrolled type 2 diabetes mellitus with both eyes affected by proliferative retinopathy and macular edema, with long-term current use of insulin    PVD (peripheral vascular disease)    Dependent on walker for ambulation    SVT (supraventricular tachycardia)    NSVT (nonsustained ventricular tachycardia)    Chest pain       1.  Chest pain:   Resolved since PCI of circumflex.  Continue aspirin 81 mg daily indefinitely, Plavix 75 mg daily for at least 1 year from the date of PCI which was in December 2019.  Continue statins.    2.  Hypertension:  Well controlled on current therapy.    3.  Continue Lipitor    4.  No further episodes of palpitations.  If has recurrent palpitations, consider event monitoring.    5.  Peripheral vascular disease being managed by vascular surgery.          Thank you very much for involving me in the care of your patient.  Please do not hesitate to contact me if there are any questions.      David Vilelgas MD, FACC, [facility]  Interventional Cardiologist, [facility].           This note was dictated with the help of speech recognition software.  There might be un-intended errors and/or substitutions.

## 2020-01-01 ENCOUNTER — TELEPHONE (OUTPATIENT)
Dept: VASCULAR SURGERY | Facility: CLINIC | Age: 60
End: 2020-01-01

## 2020-01-01 ENCOUNTER — DOCUMENT SCAN (OUTPATIENT)
Dept: HOME HEALTH SERVICES | Facility: HOSPITAL | Age: 60
End: 2020-01-01
Payer: MEDICARE

## 2020-01-01 ENCOUNTER — PATIENT OUTREACH (OUTPATIENT)
Dept: ADMINISTRATIVE | Facility: OTHER | Age: 60
End: 2020-01-01

## 2020-01-01 ENCOUNTER — PATIENT OUTREACH (OUTPATIENT)
Dept: ADMINISTRATIVE | Facility: CLINIC | Age: 60
End: 2020-01-01

## 2020-01-01 ENCOUNTER — HOSPITAL ENCOUNTER (INPATIENT)
Facility: HOSPITAL | Age: 60
LOS: 5 days | Discharge: HOME HEALTH | DRG: 252 | End: 2020-03-03
Attending: EMERGENCY MEDICINE | Admitting: EMERGENCY MEDICINE
Payer: MEDICARE

## 2020-01-01 ENCOUNTER — PATIENT MESSAGE (OUTPATIENT)
Dept: ADMINISTRATIVE | Facility: OTHER | Age: 60
End: 2020-01-01

## 2020-01-01 ENCOUNTER — TELEPHONE (OUTPATIENT)
Dept: FAMILY MEDICINE | Facility: CLINIC | Age: 60
End: 2020-01-01

## 2020-01-01 ENCOUNTER — OFFICE VISIT (OUTPATIENT)
Dept: INFECTIOUS DISEASES | Facility: CLINIC | Age: 60
End: 2020-01-01
Payer: MEDICARE

## 2020-01-01 ENCOUNTER — NURSE TRIAGE (OUTPATIENT)
Dept: ADMINISTRATIVE | Facility: CLINIC | Age: 60
End: 2020-01-01

## 2020-01-01 ENCOUNTER — ANESTHESIA (OUTPATIENT)
Dept: SURGERY | Facility: HOSPITAL | Age: 60
DRG: 314 | End: 2020-01-01
Payer: MEDICARE

## 2020-01-01 ENCOUNTER — ANESTHESIA (OUTPATIENT)
Dept: SURGERY | Facility: HOSPITAL | Age: 60
DRG: 264 | End: 2020-01-01
Payer: MEDICARE

## 2020-01-01 ENCOUNTER — ANESTHESIA EVENT (OUTPATIENT)
Dept: SURGERY | Facility: HOSPITAL | Age: 60
DRG: 314 | End: 2020-01-01
Payer: MEDICARE

## 2020-01-01 ENCOUNTER — HOSPITAL ENCOUNTER (INPATIENT)
Facility: HOSPITAL | Age: 60
LOS: 1 days | Discharge: HOME HEALTH | DRG: 264 | End: 2020-04-10
Attending: EMERGENCY MEDICINE | Admitting: HOSPITALIST
Payer: MEDICARE

## 2020-01-01 ENCOUNTER — PATIENT OUTREACH (OUTPATIENT)
Dept: ADMINISTRATIVE | Facility: HOSPITAL | Age: 60
End: 2020-01-01

## 2020-01-01 ENCOUNTER — HOSPITAL ENCOUNTER (EMERGENCY)
Facility: HOSPITAL | Age: 60
Discharge: HOME | End: 2020-03-05
Attending: EMERGENCY MEDICINE
Payer: MEDICARE

## 2020-01-01 ENCOUNTER — ANESTHESIA (OUTPATIENT)
Dept: SURGERY | Facility: HOSPITAL | Age: 60
DRG: 252 | End: 2020-01-01
Payer: MEDICARE

## 2020-01-01 ENCOUNTER — HOSPITAL ENCOUNTER (INPATIENT)
Facility: HOSPITAL | Age: 60
LOS: 3 days | Discharge: HOME HEALTH | DRG: 250 | End: 2020-05-02
Attending: EMERGENCY MEDICINE | Admitting: EMERGENCY MEDICINE
Payer: MEDICARE

## 2020-01-01 ENCOUNTER — HOSPITAL ENCOUNTER (INPATIENT)
Facility: HOSPITAL | Age: 60
LOS: 19 days | Discharge: HOSPICE | DRG: 314 | End: 2020-06-01
Attending: EMERGENCY MEDICINE | Admitting: INTERNAL MEDICINE
Payer: MEDICARE

## 2020-01-01 ENCOUNTER — EXTERNAL HOME HEALTH (OUTPATIENT)
Dept: HOME HEALTH SERVICES | Facility: HOSPITAL | Age: 60
End: 2020-01-01
Payer: MEDICARE

## 2020-01-01 ENCOUNTER — OFFICE VISIT (OUTPATIENT)
Dept: FAMILY MEDICINE | Facility: CLINIC | Age: 60
End: 2020-01-01
Payer: MEDICARE

## 2020-01-01 ENCOUNTER — TELEPHONE (OUTPATIENT)
Dept: INFECTIOUS DISEASES | Facility: CLINIC | Age: 60
End: 2020-01-01

## 2020-01-01 ENCOUNTER — TELEPHONE (OUTPATIENT)
Dept: PHARMACY | Facility: AMBULARY SURGERY CENTER | Age: 60
End: 2020-01-01

## 2020-01-01 ENCOUNTER — ANESTHESIA EVENT (OUTPATIENT)
Dept: SURGERY | Facility: HOSPITAL | Age: 60
DRG: 264 | End: 2020-01-01
Payer: MEDICARE

## 2020-01-01 ENCOUNTER — ANESTHESIA EVENT (OUTPATIENT)
Dept: SURGERY | Facility: HOSPITAL | Age: 60
DRG: 252 | End: 2020-01-01
Payer: MEDICARE

## 2020-01-01 ENCOUNTER — OFFICE VISIT (OUTPATIENT)
Dept: VASCULAR SURGERY | Facility: CLINIC | Age: 60
End: 2020-01-01
Payer: MEDICARE

## 2020-01-01 ENCOUNTER — DOCUMENT SCAN (OUTPATIENT)
Dept: HOME HEALTH SERVICES | Facility: HOSPITAL | Age: 60
End: 2020-01-01

## 2020-01-01 VITALS
OXYGEN SATURATION: 99 % | TEMPERATURE: 98 F | BODY MASS INDEX: 20.86 KG/M2 | SYSTOLIC BLOOD PRESSURE: 81 MMHG | WEIGHT: 149 LBS | HEIGHT: 71 IN | HEART RATE: 75 BPM | DIASTOLIC BLOOD PRESSURE: 51 MMHG | RESPIRATION RATE: 16 BRPM

## 2020-01-01 VITALS
DIASTOLIC BLOOD PRESSURE: 86 MMHG | OXYGEN SATURATION: 100 % | TEMPERATURE: 100 F | SYSTOLIC BLOOD PRESSURE: 180 MMHG | HEART RATE: 99 BPM | BODY MASS INDEX: 20 KG/M2 | WEIGHT: 142.88 LBS | RESPIRATION RATE: 17 BRPM | HEIGHT: 71 IN

## 2020-01-01 VITALS
HEIGHT: 71 IN | SYSTOLIC BLOOD PRESSURE: 91 MMHG | TEMPERATURE: 99 F | BODY MASS INDEX: 19.6 KG/M2 | DIASTOLIC BLOOD PRESSURE: 72 MMHG | WEIGHT: 140 LBS | RESPIRATION RATE: 17 BRPM | OXYGEN SATURATION: 98 % | HEART RATE: 106 BPM

## 2020-01-01 VITALS
TEMPERATURE: 98 F | DIASTOLIC BLOOD PRESSURE: 70 MMHG | OXYGEN SATURATION: 99 % | HEART RATE: 92 BPM | SYSTOLIC BLOOD PRESSURE: 172 MMHG | HEIGHT: 71 IN | BODY MASS INDEX: 20.65 KG/M2 | WEIGHT: 147.5 LBS

## 2020-01-01 VITALS
HEIGHT: 70 IN | TEMPERATURE: 99 F | WEIGHT: 142 LBS | HEIGHT: 70 IN | OXYGEN SATURATION: 97 % | DIASTOLIC BLOOD PRESSURE: 86 MMHG | BODY MASS INDEX: 21.24 KG/M2 | HEART RATE: 84 BPM | SYSTOLIC BLOOD PRESSURE: 148 MMHG | DIASTOLIC BLOOD PRESSURE: 86 MMHG | BODY MASS INDEX: 20.33 KG/M2 | WEIGHT: 148.38 LBS | SYSTOLIC BLOOD PRESSURE: 156 MMHG | RESPIRATION RATE: 19 BRPM

## 2020-01-01 VITALS — SYSTOLIC BLOOD PRESSURE: 116 MMHG | BODY MASS INDEX: 19.53 KG/M2 | HEIGHT: 71 IN | DIASTOLIC BLOOD PRESSURE: 80 MMHG

## 2020-01-01 VITALS
HEIGHT: 71 IN | TEMPERATURE: 98 F | SYSTOLIC BLOOD PRESSURE: 123 MMHG | RESPIRATION RATE: 16 BRPM | HEART RATE: 85 BPM | DIASTOLIC BLOOD PRESSURE: 70 MMHG | OXYGEN SATURATION: 97 % | BODY MASS INDEX: 20.77 KG/M2 | WEIGHT: 148.38 LBS

## 2020-01-01 VITALS
DIASTOLIC BLOOD PRESSURE: 72 MMHG | SYSTOLIC BLOOD PRESSURE: 116 MMHG | HEIGHT: 70 IN | BODY MASS INDEX: 21.24 KG/M2 | WEIGHT: 148.38 LBS

## 2020-01-01 VITALS
RESPIRATION RATE: 14 BRPM | WEIGHT: 140 LBS | TEMPERATURE: 99 F | OXYGEN SATURATION: 97 % | SYSTOLIC BLOOD PRESSURE: 132 MMHG | HEART RATE: 87 BPM | BODY MASS INDEX: 19.6 KG/M2 | HEIGHT: 71 IN | DIASTOLIC BLOOD PRESSURE: 84 MMHG

## 2020-01-01 DIAGNOSIS — E78.5 HYPERLIPIDEMIA LDL GOAL <100: Chronic | ICD-10-CM

## 2020-01-01 DIAGNOSIS — I10 ESSENTIAL HYPERTENSION: ICD-10-CM

## 2020-01-01 DIAGNOSIS — R57.9 SHOCK: ICD-10-CM

## 2020-01-01 DIAGNOSIS — N18.6 ESRD ON HEMODIALYSIS: ICD-10-CM

## 2020-01-01 DIAGNOSIS — I49.9 ARRHYTHMIA: ICD-10-CM

## 2020-01-01 DIAGNOSIS — R06.83 SNORING: ICD-10-CM

## 2020-01-01 DIAGNOSIS — C61 PROSTATE CANCER: ICD-10-CM

## 2020-01-01 DIAGNOSIS — I70.0 ATHEROSCLEROSIS OF AORTA: ICD-10-CM

## 2020-01-01 DIAGNOSIS — T82.7XXS INFECTION OF ARTERIOVENOUS FISTULA, SEQUELA: Primary | ICD-10-CM

## 2020-01-01 DIAGNOSIS — N39.0 URINARY TRACT INFECTION WITHOUT HEMATURIA, SITE UNSPECIFIED: ICD-10-CM

## 2020-01-01 DIAGNOSIS — T82.9XXA COMPLICATION OF ARTERIOVENOUS DIALYSIS FISTULA: ICD-10-CM

## 2020-01-01 DIAGNOSIS — Z99.2 ESRD ON HEMODIALYSIS: ICD-10-CM

## 2020-01-01 DIAGNOSIS — R73.9 HYPERGLYCEMIA: ICD-10-CM

## 2020-01-01 DIAGNOSIS — N25.81 SECONDARY RENAL HYPERPARATHYROIDISM: ICD-10-CM

## 2020-01-01 DIAGNOSIS — I95.9 HYPOTENSION, UNSPECIFIED HYPOTENSION TYPE: ICD-10-CM

## 2020-01-01 DIAGNOSIS — T82.7XXS INFECTION OF ARTERIOVENOUS FISTULA, SEQUELA: ICD-10-CM

## 2020-01-01 DIAGNOSIS — Z95.1 S/P CABG (CORONARY ARTERY BYPASS GRAFT): Chronic | ICD-10-CM

## 2020-01-01 DIAGNOSIS — Z78.9 PROBLEM WITH VASCULAR ACCESS: ICD-10-CM

## 2020-01-01 DIAGNOSIS — I15.0 RENOVASCULAR HYPERTENSION: ICD-10-CM

## 2020-01-01 DIAGNOSIS — Z86.73 HISTORY OF STROKE: Chronic | ICD-10-CM

## 2020-01-01 DIAGNOSIS — T82.898S PROBLEM WITH DIALYSIS ACCESS, SEQUELA: Primary | ICD-10-CM

## 2020-01-01 DIAGNOSIS — Z51.81 ENCOUNTER FOR MEDICATION MONITORING: ICD-10-CM

## 2020-01-01 DIAGNOSIS — D63.1 ANEMIA IN ESRD (END-STAGE RENAL DISEASE): ICD-10-CM

## 2020-01-01 DIAGNOSIS — I50.9 CHRONIC CONGESTIVE HEART FAILURE, UNSPECIFIED HEART FAILURE TYPE: Chronic | ICD-10-CM

## 2020-01-01 DIAGNOSIS — N18.6 ANEMIA IN ESRD (END-STAGE RENAL DISEASE): ICD-10-CM

## 2020-01-01 DIAGNOSIS — Z79.4 LONG-TERM INSULIN USE: ICD-10-CM

## 2020-01-01 DIAGNOSIS — T82.9XXA COMPLICATION OF ARTERIOVENOUS DIALYSIS FISTULA, INITIAL ENCOUNTER: ICD-10-CM

## 2020-01-01 DIAGNOSIS — N18.6 ESRD (END STAGE RENAL DISEASE): ICD-10-CM

## 2020-01-01 DIAGNOSIS — E87.5 HYPERKALEMIA: ICD-10-CM

## 2020-01-01 DIAGNOSIS — I50.32 CHRONIC DIASTOLIC CONGESTIVE HEART FAILURE: ICD-10-CM

## 2020-01-01 DIAGNOSIS — R07.9 CHEST PAIN: ICD-10-CM

## 2020-01-01 DIAGNOSIS — R53.81 DEBILITY: ICD-10-CM

## 2020-01-01 DIAGNOSIS — I25.2 HISTORY OF MI (MYOCARDIAL INFARCTION): Chronic | ICD-10-CM

## 2020-01-01 DIAGNOSIS — I25.119 CORONARY ARTERY DISEASE INVOLVING NATIVE CORONARY ARTERY OF NATIVE HEART WITH ANGINA PECTORIS: Chronic | ICD-10-CM

## 2020-01-01 DIAGNOSIS — I42.9 CARDIOMYOPATHY, UNSPECIFIED TYPE: ICD-10-CM

## 2020-01-01 DIAGNOSIS — T82.7XXD INFECTION OF ARTERIOVENOUS FISTULA, SUBSEQUENT ENCOUNTER: Primary | ICD-10-CM

## 2020-01-01 DIAGNOSIS — Z99.89 DEPENDENT ON WALKER FOR AMBULATION: Chronic | ICD-10-CM

## 2020-01-01 DIAGNOSIS — R62.7 FAILURE TO THRIVE IN ADULT: ICD-10-CM

## 2020-01-01 DIAGNOSIS — I69.919 COGNITIVE DEFICITS AS LATE EFFECT OF CEREBROVASCULAR DISEASE: Chronic | ICD-10-CM

## 2020-01-01 DIAGNOSIS — Z99.2 ESRD ON HEMODIALYSIS: Primary | ICD-10-CM

## 2020-01-01 DIAGNOSIS — T82.7XXA INFECTION OF PROSTHETIC VASCULAR GRAFT, INITIAL ENCOUNTER: ICD-10-CM

## 2020-01-01 DIAGNOSIS — I25.2 HISTORY OF MI (MYOCARDIAL INFARCTION): ICD-10-CM

## 2020-01-01 DIAGNOSIS — D63.8 ANEMIA OF CHRONIC DISEASE: Chronic | ICD-10-CM

## 2020-01-01 DIAGNOSIS — F01.53 VASCULAR DEMENTIA WITH DEPRESSED MOOD: ICD-10-CM

## 2020-01-01 DIAGNOSIS — H35.033 HYPERTENSIVE RETINOPATHY OF BOTH EYES: ICD-10-CM

## 2020-01-01 DIAGNOSIS — R53.1 WEAKNESS: ICD-10-CM

## 2020-01-01 DIAGNOSIS — I47.10 SVT (SUPRAVENTRICULAR TACHYCARDIA): ICD-10-CM

## 2020-01-01 DIAGNOSIS — R00.0 TACHYCARDIA: ICD-10-CM

## 2020-01-01 DIAGNOSIS — J96.01 ACUTE RESPIRATORY FAILURE WITH HYPOXIA: ICD-10-CM

## 2020-01-01 DIAGNOSIS — D72.829 LEUKOCYTOSIS, UNSPECIFIED TYPE: ICD-10-CM

## 2020-01-01 DIAGNOSIS — R94.31 PROLONGED QT INTERVAL: ICD-10-CM

## 2020-01-01 DIAGNOSIS — I21.4 NSTEMI (NON-ST ELEVATED MYOCARDIAL INFARCTION): Primary | ICD-10-CM

## 2020-01-01 DIAGNOSIS — Z99.2 ESRD (END STAGE RENAL DISEASE) ON DIALYSIS: ICD-10-CM

## 2020-01-01 DIAGNOSIS — Z09 HOSPITAL DISCHARGE FOLLOW-UP: Primary | ICD-10-CM

## 2020-01-01 DIAGNOSIS — T82.7XXA INFECTION OF ARTERIOVENOUS FISTULA, INITIAL ENCOUNTER: Primary | ICD-10-CM

## 2020-01-01 DIAGNOSIS — G40.909 SEIZURE DISORDER: Chronic | ICD-10-CM

## 2020-01-01 DIAGNOSIS — I10 HYPERTENSION, UNSPECIFIED TYPE: ICD-10-CM

## 2020-01-01 DIAGNOSIS — G57.90 MONONEURITIS OF LOWER EXTREMITY, UNSPECIFIED LATERALITY: ICD-10-CM

## 2020-01-01 DIAGNOSIS — R00.0 SINUS TACHYCARDIA: ICD-10-CM

## 2020-01-01 DIAGNOSIS — I73.9 PVD (PERIPHERAL VASCULAR DISEASE): Primary | Chronic | ICD-10-CM

## 2020-01-01 DIAGNOSIS — I73.9 PVD (PERIPHERAL VASCULAR DISEASE): Chronic | ICD-10-CM

## 2020-01-01 DIAGNOSIS — R07.89 CHEST WALL PAIN: Primary | ICD-10-CM

## 2020-01-01 DIAGNOSIS — I47.29 NSVT (NONSUSTAINED VENTRICULAR TACHYCARDIA): ICD-10-CM

## 2020-01-01 DIAGNOSIS — N18.6 ESRD ON HEMODIALYSIS: Primary | ICD-10-CM

## 2020-01-01 DIAGNOSIS — T82.7XXA INFECTION OF PROSTHETIC VASCULAR GRAFT, INITIAL ENCOUNTER: Primary | ICD-10-CM

## 2020-01-01 DIAGNOSIS — I50.30 (HFPEF) HEART FAILURE WITH PRESERVED EJECTION FRACTION: ICD-10-CM

## 2020-01-01 DIAGNOSIS — Z45.2 ENCOUNTER FOR CENTRAL LINE PLACEMENT: ICD-10-CM

## 2020-01-01 DIAGNOSIS — I16.0 HYPERTENSIVE URGENCY: ICD-10-CM

## 2020-01-01 DIAGNOSIS — A41.9 SEPSIS: ICD-10-CM

## 2020-01-01 DIAGNOSIS — R41.82 ALTERED MENTAL STATUS, UNSPECIFIED ALTERED MENTAL STATUS TYPE: ICD-10-CM

## 2020-01-01 DIAGNOSIS — R00.0 TACHYCARDIA, UNSPECIFIED: ICD-10-CM

## 2020-01-01 DIAGNOSIS — I47.9 PAROXYSMAL TACHYCARDIA: ICD-10-CM

## 2020-01-01 DIAGNOSIS — I10 ESSENTIAL HYPERTENSION: Chronic | ICD-10-CM

## 2020-01-01 DIAGNOSIS — A41.9 SEPSIS, DUE TO UNSPECIFIED ORGANISM, UNSPECIFIED WHETHER ACUTE ORGAN DYSFUNCTION PRESENT: Primary | ICD-10-CM

## 2020-01-01 DIAGNOSIS — R41.82 ALTERED MENTAL STATUS: ICD-10-CM

## 2020-01-01 DIAGNOSIS — G93.41 ENCEPHALOPATHY, METABOLIC: ICD-10-CM

## 2020-01-01 DIAGNOSIS — R11.0 NAUSEA: ICD-10-CM

## 2020-01-01 DIAGNOSIS — I48.0 PAROXYSMAL A-FIB: ICD-10-CM

## 2020-01-01 DIAGNOSIS — N18.6 ESRD (END STAGE RENAL DISEASE) ON DIALYSIS: ICD-10-CM

## 2020-01-01 LAB
ADENOVIRUS: NOT DETECTED
ALBUMIN SERPL BCP-MCNC: 1.4 G/DL (ref 3.5–5.2)
ALBUMIN SERPL BCP-MCNC: 1.5 G/DL (ref 3.5–5.2)
ALBUMIN SERPL BCP-MCNC: 1.6 G/DL (ref 3.5–5.2)
ALBUMIN SERPL BCP-MCNC: 1.6 G/DL (ref 3.5–5.2)
ALBUMIN SERPL BCP-MCNC: 1.7 G/DL (ref 3.5–5.2)
ALBUMIN SERPL BCP-MCNC: 1.8 G/DL (ref 3.5–5.2)
ALBUMIN SERPL BCP-MCNC: 1.8 G/DL (ref 3.5–5.2)
ALBUMIN SERPL BCP-MCNC: 1.9 G/DL (ref 3.5–5.2)
ALBUMIN SERPL BCP-MCNC: 2 G/DL (ref 3.5–5.2)
ALBUMIN SERPL BCP-MCNC: 2.1 G/DL (ref 3.5–5.2)
ALBUMIN SERPL BCP-MCNC: 2.7 G/DL (ref 3.5–5.2)
ALBUMIN SERPL BCP-MCNC: 2.7 G/DL (ref 3.5–5.2)
ALBUMIN SERPL BCP-MCNC: 2.8 G/DL (ref 3.5–5.2)
ALBUMIN SERPL BCP-MCNC: 3 G/DL (ref 3.5–5.2)
ALBUMIN SERPL BCP-MCNC: 3 G/DL (ref 3.5–5.2)
ALBUMIN SERPL BCP-MCNC: 3.5 G/DL (ref 3.5–5.2)
ALLENS TEST: ABNORMAL
ALP SERPL-CCNC: 113 U/L (ref 55–135)
ALP SERPL-CCNC: 114 U/L (ref 55–135)
ALP SERPL-CCNC: 123 U/L (ref 55–135)
ALP SERPL-CCNC: 140 U/L (ref 55–135)
ALP SERPL-CCNC: 155 U/L (ref 55–135)
ALP SERPL-CCNC: 219 U/L (ref 55–135)
ALP SERPL-CCNC: 74 U/L (ref 55–135)
ALT SERPL W/O P-5'-P-CCNC: 17 U/L (ref 10–44)
ALT SERPL W/O P-5'-P-CCNC: 21 U/L (ref 10–44)
ALT SERPL W/O P-5'-P-CCNC: 25 U/L (ref 10–44)
ALT SERPL W/O P-5'-P-CCNC: 28 U/L (ref 10–44)
ALT SERPL W/O P-5'-P-CCNC: 5 U/L (ref 10–44)
ALT SERPL W/O P-5'-P-CCNC: <5 U/L (ref 10–44)
ALT SERPL W/O P-5'-P-CCNC: <5 U/L (ref 10–44)
ANION GAP SERPL CALC-SCNC: 10 MMOL/L (ref 8–16)
ANION GAP SERPL CALC-SCNC: 11 MMOL/L (ref 8–16)
ANION GAP SERPL CALC-SCNC: 12 MMOL/L (ref 8–16)
ANION GAP SERPL CALC-SCNC: 13 MMOL/L (ref 8–16)
ANION GAP SERPL CALC-SCNC: 14 MMOL/L (ref 8–16)
ANION GAP SERPL CALC-SCNC: 15 MMOL/L (ref 8–16)
ANION GAP SERPL CALC-SCNC: 16 MMOL/L (ref 8–16)
ANION GAP SERPL CALC-SCNC: 17 MMOL/L (ref 8–16)
ANION GAP SERPL CALC-SCNC: 19 MMOL/L (ref 8–16)
ANION GAP SERPL CALC-SCNC: 23 MMOL/L (ref 8–16)
ANION GAP SERPL CALC-SCNC: 8 MMOL/L (ref 8–16)
ANION GAP SERPL CALC-SCNC: 9 MMOL/L (ref 8–16)
ANISOCYTOSIS BLD QL SMEAR: SLIGHT
AORTIC ROOT ANNULUS: 3.81 CM
AORTIC VALVE CUSP SEPERATION: 1.89 CM
APTT BLDCRRT: 26.1 SEC (ref 21–32)
APTT BLDCRRT: 26.8 SEC (ref 21–32)
APTT BLDCRRT: 27.5 SEC (ref 21–32)
APTT BLDCRRT: 30 SEC (ref 21–32)
APTT BLDCRRT: 32.7 SEC (ref 21–32)
APTT BLDCRRT: 39.2 SEC (ref 21–32)
APTT BLDCRRT: 40.8 SEC (ref 21–32)
APTT BLDCRRT: 41.4 SEC (ref 21–32)
APTT BLDCRRT: 47.6 SEC (ref 21–32)
APTT BLDCRRT: 48.8 SEC (ref 21–32)
APTT BLDCRRT: 48.8 SEC (ref 21–32)
APTT BLDCRRT: 49.1 SEC (ref 21–32)
APTT BLDCRRT: 49.1 SEC (ref 21–32)
APTT BLDCRRT: 49.3 SEC (ref 21–32)
APTT BLDCRRT: 52 SEC (ref 21–32)
APTT BLDCRRT: 52.4 SEC (ref 21–32)
APTT BLDCRRT: 55 SEC (ref 21–32)
APTT BLDCRRT: 75.3 SEC (ref 21–32)
APTT BLDCRRT: 78.3 SEC (ref 21–32)
APTT BLDCRRT: 78.7 SEC (ref 21–32)
APTT BLDCRRT: >150 SEC (ref 21–32)
APTT BLDCRRT: >150 SEC (ref 21–32)
ASCENDING AORTA: 3.02 CM
AST SERPL-CCNC: 23 U/L (ref 10–40)
AST SERPL-CCNC: 26 U/L (ref 10–40)
AST SERPL-CCNC: 26 U/L (ref 10–40)
AST SERPL-CCNC: 37 U/L (ref 10–40)
AST SERPL-CCNC: 38 U/L (ref 10–40)
AST SERPL-CCNC: 43 U/L (ref 10–40)
AST SERPL-CCNC: 47 U/L (ref 10–40)
AV INDEX (PROSTH): 0.53
AV INDEX (PROSTH): 0.79
AV MEAN GRADIENT: 2 MMHG
AV MEAN GRADIENT: 3 MMHG
AV PEAK GRADIENT: 3 MMHG
AV PEAK GRADIENT: 4 MMHG
AV VALVE AREA: 2.15 CM2
AV VALVE AREA: 3.33 CM2
AV VELOCITY RATIO: 0.7
AV VELOCITY RATIO: 0.75
B-OH-BUTYR BLD STRIP-SCNC: 0.2 MMOL/L (ref 0–0.5)
B-OH-BUTYR BLD STRIP-SCNC: 0.7 MMOL/L (ref 0–0.5)
BACTERIA #/AREA URNS AUTO: ABNORMAL /HPF
BACTERIA #/AREA URNS HPF: ABNORMAL /HPF
BACTERIA BLD CULT: ABNORMAL
BACTERIA BLD CULT: NORMAL
BACTERIA SPEC AEROBE CULT: ABNORMAL
BACTERIA SPEC AEROBE CULT: NORMAL
BACTERIA SPEC ANAEROBE CULT: ABNORMAL
BACTERIA UR CULT: NORMAL
BASOPHILS # BLD AUTO: 0.01 K/UL (ref 0–0.2)
BASOPHILS # BLD AUTO: 0.02 K/UL (ref 0–0.2)
BASOPHILS # BLD AUTO: 0.03 K/UL (ref 0–0.2)
BASOPHILS # BLD AUTO: 0.04 K/UL (ref 0–0.2)
BASOPHILS # BLD AUTO: 0.05 K/UL (ref 0–0.2)
BASOPHILS # BLD AUTO: 0.06 K/UL (ref 0–0.2)
BASOPHILS # BLD AUTO: 0.06 K/UL (ref 0–0.2)
BASOPHILS NFR BLD: 0 % (ref 0–1.9)
BASOPHILS NFR BLD: 0.1 % (ref 0–1.9)
BASOPHILS NFR BLD: 0.2 % (ref 0–1.9)
BASOPHILS NFR BLD: 0.3 % (ref 0–1.9)
BASOPHILS NFR BLD: 0.4 % (ref 0–1.9)
BASOPHILS NFR BLD: 0.5 % (ref 0–1.9)
BASOPHILS NFR BLD: 0.6 % (ref 0–1.9)
BASOPHILS NFR BLD: 0.7 % (ref 0–1.9)
BASOPHILS NFR BLD: 0.8 % (ref 0–1.9)
BASOPHILS NFR BLD: 0.8 % (ref 0–1.9)
BILIRUB DIRECT SERPL-MCNC: 0.3 MG/DL (ref 0.1–0.3)
BILIRUB SERPL-MCNC: 0.2 MG/DL (ref 0.1–1)
BILIRUB SERPL-MCNC: 0.3 MG/DL (ref 0.1–1)
BILIRUB SERPL-MCNC: 0.3 MG/DL (ref 0.1–1)
BILIRUB SERPL-MCNC: 0.4 MG/DL (ref 0.1–1)
BILIRUB SERPL-MCNC: 0.5 MG/DL (ref 0.1–1)
BILIRUB UR QL STRIP: NEGATIVE
BILIRUB UR QL STRIP: NEGATIVE
BNP SERPL-MCNC: 2368 PG/ML (ref 0–99)
BNP SERPL-MCNC: 4203 PG/ML (ref 0–99)
BNP SERPL-MCNC: 501 PG/ML (ref 0–99)
BNP SERPL-MCNC: 652 PG/ML (ref 0–99)
BORDETELLA PARAPERTUSSIS (IS1001): NOT DETECTED
BORDETELLA PERTUSSIS (PTXP): NOT DETECTED
BSA FOR ECHO PROCEDURE: 1.78 M2
BSA FOR ECHO PROCEDURE: 1.84 M2
BUN SERPL-MCNC: 11 MG/DL (ref 8–23)
BUN SERPL-MCNC: 13 MG/DL (ref 8–23)
BUN SERPL-MCNC: 15 MG/DL (ref 8–23)
BUN SERPL-MCNC: 16 MG/DL (ref 8–23)
BUN SERPL-MCNC: 16 MG/DL (ref 8–23)
BUN SERPL-MCNC: 17 MG/DL (ref 8–23)
BUN SERPL-MCNC: 19 MG/DL (ref 8–23)
BUN SERPL-MCNC: 19 MG/DL (ref 8–23)
BUN SERPL-MCNC: 20 MG/DL (ref 8–23)
BUN SERPL-MCNC: 21 MG/DL (ref 8–23)
BUN SERPL-MCNC: 22 MG/DL (ref 8–23)
BUN SERPL-MCNC: 23 MG/DL (ref 8–23)
BUN SERPL-MCNC: 24 MG/DL (ref 8–23)
BUN SERPL-MCNC: 25 MG/DL (ref 8–23)
BUN SERPL-MCNC: 25 MG/DL (ref 8–23)
BUN SERPL-MCNC: 26 MG/DL (ref 8–23)
BUN SERPL-MCNC: 27 MG/DL (ref 8–23)
BUN SERPL-MCNC: 27 MG/DL (ref 8–23)
BUN SERPL-MCNC: 28 MG/DL (ref 8–23)
BUN SERPL-MCNC: 30 MG/DL (ref 8–23)
BUN SERPL-MCNC: 31 MG/DL (ref 8–23)
BUN SERPL-MCNC: 32 MG/DL (ref 8–23)
BUN SERPL-MCNC: 33 MG/DL (ref 8–23)
BUN SERPL-MCNC: 34 MG/DL (ref 8–23)
BUN SERPL-MCNC: 35 MG/DL (ref 8–23)
BUN SERPL-MCNC: 36 MG/DL (ref 8–23)
BUN SERPL-MCNC: 37 MG/DL (ref 8–23)
BUN SERPL-MCNC: 38 MG/DL (ref 8–23)
BUN SERPL-MCNC: 38 MG/DL (ref 8–23)
BUN SERPL-MCNC: 39 MG/DL (ref 8–23)
BUN SERPL-MCNC: 40 MG/DL (ref 8–23)
BUN SERPL-MCNC: 40 MG/DL (ref 8–23)
BUN SERPL-MCNC: 41 MG/DL (ref 6–30)
BUN SERPL-MCNC: 41 MG/DL (ref 8–23)
BUN SERPL-MCNC: 44 MG/DL (ref 8–23)
BUN SERPL-MCNC: 44 MG/DL (ref 8–23)
BUN SERPL-MCNC: 46 MG/DL (ref 8–23)
BUN SERPL-MCNC: 47 MG/DL (ref 8–23)
BUN SERPL-MCNC: 49 MG/DL (ref 8–23)
BUN SERPL-MCNC: 49 MG/DL (ref 8–23)
BUN SERPL-MCNC: 52 MG/DL (ref 8–23)
BUN SERPL-MCNC: 53 MG/DL (ref 8–23)
BUN SERPL-MCNC: 53 MG/DL (ref 8–23)
BUN SERPL-MCNC: 54 MG/DL (ref 8–23)
BURR CELLS BLD QL SMEAR: ABNORMAL
BURR CELLS BLD QL SMEAR: ABNORMAL
C DIFF GDH STL QL: NEGATIVE
C DIFF TOX A+B STL QL IA: NEGATIVE
CALCIUM SERPL-MCNC: 6.7 MG/DL (ref 8.7–10.5)
CALCIUM SERPL-MCNC: 6.9 MG/DL (ref 8.7–10.5)
CALCIUM SERPL-MCNC: 7.2 MG/DL (ref 8.7–10.5)
CALCIUM SERPL-MCNC: 7.2 MG/DL (ref 8.7–10.5)
CALCIUM SERPL-MCNC: 7.3 MG/DL (ref 8.7–10.5)
CALCIUM SERPL-MCNC: 7.4 MG/DL (ref 8.7–10.5)
CALCIUM SERPL-MCNC: 7.5 MG/DL (ref 8.7–10.5)
CALCIUM SERPL-MCNC: 7.6 MG/DL (ref 8.7–10.5)
CALCIUM SERPL-MCNC: 7.7 MG/DL (ref 8.7–10.5)
CALCIUM SERPL-MCNC: 7.8 MG/DL (ref 8.7–10.5)
CALCIUM SERPL-MCNC: 7.9 MG/DL (ref 8.7–10.5)
CALCIUM SERPL-MCNC: 8 MG/DL (ref 8.7–10.5)
CALCIUM SERPL-MCNC: 8.1 MG/DL (ref 8.7–10.5)
CALCIUM SERPL-MCNC: 8.3 MG/DL (ref 8.7–10.5)
CALCIUM SERPL-MCNC: 8.4 MG/DL (ref 8.7–10.5)
CALCIUM SERPL-MCNC: 8.5 MG/DL (ref 8.7–10.5)
CALCIUM SERPL-MCNC: 8.8 MG/DL (ref 8.7–10.5)
CALCIUM SERPL-MCNC: 8.9 MG/DL (ref 8.7–10.5)
CALCIUM SERPL-MCNC: 9.1 MG/DL (ref 8.7–10.5)
CALCIUM SERPL-MCNC: 9.2 MG/DL (ref 8.7–10.5)
CALCIUM SERPL-MCNC: 9.2 MG/DL (ref 8.7–10.5)
CALCIUM SERPL-MCNC: 9.4 MG/DL (ref 8.7–10.5)
CALCIUM SERPL-MCNC: 9.5 MG/DL (ref 8.7–10.5)
CALCIUM SERPL-MCNC: 9.7 MG/DL (ref 8.7–10.5)
CHLAMYDIA PNEUMONIAE: NOT DETECTED
CHLORIDE SERPL-SCNC: 100 MMOL/L (ref 95–110)
CHLORIDE SERPL-SCNC: 100 MMOL/L (ref 95–110)
CHLORIDE SERPL-SCNC: 101 MMOL/L (ref 95–110)
CHLORIDE SERPL-SCNC: 102 MMOL/L (ref 95–110)
CHLORIDE SERPL-SCNC: 103 MMOL/L (ref 95–110)
CHLORIDE SERPL-SCNC: 104 MMOL/L (ref 95–110)
CHLORIDE SERPL-SCNC: 105 MMOL/L (ref 95–110)
CHLORIDE SERPL-SCNC: 106 MMOL/L (ref 95–110)
CHLORIDE SERPL-SCNC: 107 MMOL/L (ref 95–110)
CHLORIDE SERPL-SCNC: 109 MMOL/L (ref 95–110)
CHLORIDE SERPL-SCNC: 98 MMOL/L (ref 95–110)
CHLORIDE SERPL-SCNC: 99 MMOL/L (ref 95–110)
CLARITY UR REFRACT.AUTO: ABNORMAL
CLARITY UR: ABNORMAL
CO2 SERPL-SCNC: 12 MMOL/L (ref 23–29)
CO2 SERPL-SCNC: 14 MMOL/L (ref 23–29)
CO2 SERPL-SCNC: 15 MMOL/L (ref 23–29)
CO2 SERPL-SCNC: 15 MMOL/L (ref 23–29)
CO2 SERPL-SCNC: 16 MMOL/L (ref 23–29)
CO2 SERPL-SCNC: 17 MMOL/L (ref 23–29)
CO2 SERPL-SCNC: 18 MMOL/L (ref 23–29)
CO2 SERPL-SCNC: 19 MMOL/L (ref 23–29)
CO2 SERPL-SCNC: 20 MMOL/L (ref 23–29)
CO2 SERPL-SCNC: 21 MMOL/L (ref 23–29)
CO2 SERPL-SCNC: 22 MMOL/L (ref 23–29)
CO2 SERPL-SCNC: 23 MMOL/L (ref 23–29)
CO2 SERPL-SCNC: 24 MMOL/L (ref 23–29)
CO2 SERPL-SCNC: 25 MMOL/L (ref 23–29)
CO2 SERPL-SCNC: 26 MMOL/L (ref 23–29)
CO2 SERPL-SCNC: 27 MMOL/L (ref 23–29)
CO2 SERPL-SCNC: 27 MMOL/L (ref 23–29)
CO2 SERPL-SCNC: 28 MMOL/L (ref 23–29)
COLOR UR AUTO: ABNORMAL
COLOR UR: YELLOW
CORONAVIRUS 229E, COMMON COLD VIRUS: NOT DETECTED
CORONAVIRUS HKU1, COMMON COLD VIRUS: NOT DETECTED
CORONAVIRUS NL63, COMMON COLD VIRUS: NOT DETECTED
CORONAVIRUS OC43, COMMON COLD VIRUS: NOT DETECTED
CREAT SERPL-MCNC: 11 MG/DL (ref 0.5–1.4)
CREAT SERPL-MCNC: 2.4 MG/DL (ref 0.5–1.4)
CREAT SERPL-MCNC: 2.7 MG/DL (ref 0.5–1.4)
CREAT SERPL-MCNC: 3.2 MG/DL (ref 0.5–1.4)
CREAT SERPL-MCNC: 3.2 MG/DL (ref 0.5–1.4)
CREAT SERPL-MCNC: 3.4 MG/DL (ref 0.5–1.4)
CREAT SERPL-MCNC: 3.6 MG/DL (ref 0.5–1.4)
CREAT SERPL-MCNC: 3.8 MG/DL (ref 0.5–1.4)
CREAT SERPL-MCNC: 3.8 MG/DL (ref 0.5–1.4)
CREAT SERPL-MCNC: 3.9 MG/DL (ref 0.5–1.4)
CREAT SERPL-MCNC: 4 MG/DL (ref 0.5–1.4)
CREAT SERPL-MCNC: 4.1 MG/DL (ref 0.5–1.4)
CREAT SERPL-MCNC: 4.2 MG/DL (ref 0.5–1.4)
CREAT SERPL-MCNC: 4.4 MG/DL (ref 0.5–1.4)
CREAT SERPL-MCNC: 4.6 MG/DL (ref 0.5–1.4)
CREAT SERPL-MCNC: 4.6 MG/DL (ref 0.5–1.4)
CREAT SERPL-MCNC: 4.7 MG/DL (ref 0.5–1.4)
CREAT SERPL-MCNC: 4.9 MG/DL (ref 0.5–1.4)
CREAT SERPL-MCNC: 4.9 MG/DL (ref 0.5–1.4)
CREAT SERPL-MCNC: 5 MG/DL (ref 0.5–1.4)
CREAT SERPL-MCNC: 5 MG/DL (ref 0.5–1.4)
CREAT SERPL-MCNC: 5.2 MG/DL (ref 0.5–1.4)
CREAT SERPL-MCNC: 5.2 MG/DL (ref 0.5–1.4)
CREAT SERPL-MCNC: 5.3 MG/DL (ref 0.5–1.4)
CREAT SERPL-MCNC: 5.4 MG/DL (ref 0.5–1.4)
CREAT SERPL-MCNC: 5.4 MG/DL (ref 0.5–1.4)
CREAT SERPL-MCNC: 5.5 MG/DL (ref 0.5–1.4)
CREAT SERPL-MCNC: 5.6 MG/DL (ref 0.5–1.4)
CREAT SERPL-MCNC: 5.9 MG/DL (ref 0.5–1.4)
CREAT SERPL-MCNC: 6 MG/DL (ref 0.5–1.4)
CREAT SERPL-MCNC: 6.2 MG/DL (ref 0.5–1.4)
CREAT SERPL-MCNC: 6.2 MG/DL (ref 0.5–1.4)
CREAT SERPL-MCNC: 6.4 MG/DL (ref 0.5–1.4)
CREAT SERPL-MCNC: 6.5 MG/DL (ref 0.5–1.4)
CREAT SERPL-MCNC: 6.6 MG/DL (ref 0.5–1.4)
CREAT SERPL-MCNC: 6.7 MG/DL (ref 0.5–1.4)
CREAT SERPL-MCNC: 6.7 MG/DL (ref 0.5–1.4)
CREAT SERPL-MCNC: 7 MG/DL (ref 0.5–1.4)
CREAT SERPL-MCNC: 7.1 MG/DL (ref 0.5–1.4)
CREAT SERPL-MCNC: 7.2 MG/DL (ref 0.5–1.4)
CREAT SERPL-MCNC: 7.4 MG/DL (ref 0.5–1.4)
CREAT SERPL-MCNC: 7.4 MG/DL (ref 0.5–1.4)
CREAT SERPL-MCNC: 7.5 MG/DL (ref 0.5–1.4)
CREAT SERPL-MCNC: 8.3 MG/DL (ref 0.5–1.4)
CREAT SERPL-MCNC: 8.6 MG/DL (ref 0.5–1.4)
CREAT SERPL-MCNC: 9.9 MG/DL (ref 0.5–1.4)
CRP SERPL-MCNC: 119.03 MG/L (ref 0–3.19)
CRP SERPL-MCNC: 74 MG/L (ref 0–8.2)
CV ECHO LV RWT: 0.4 CM
CV ECHO LV RWT: 0.51 CM
DELSYS: ABNORMAL
DIFFERENTIAL METHOD: ABNORMAL
DOP CALC AO PEAK VEL: 0.93 M/S
DOP CALC AO PEAK VEL: 1.05 M/S
DOP CALC AO VTI: 17.23 CM
DOP CALC AO VTI: 19.57 CM
DOP CALC LVOT AREA: 4 CM2
DOP CALC LVOT AREA: 4.2 CM2
DOP CALC LVOT DIAMETER: 2.27 CM
DOP CALC LVOT DIAMETER: 2.32 CM
DOP CALC LVOT PEAK VEL: 0.65 M/S
DOP CALC LVOT PEAK VEL: 0.79 M/S
DOP CALC LVOT STROKE VOLUME: 37.09 CM3
DOP CALC LVOT STROKE VOLUME: 65.24 CM3
DOP CALCLVOT PEAK VEL VTI: 15.44 CM
DOP CALCLVOT PEAK VEL VTI: 9.17 CM
E WAVE DECELERATION TIME: 201.13 MSEC
E WAVE DECELERATION TIME: 228.83 MSEC
E/A RATIO: 0.51
E/A RATIO: 0.85
E/E' RATIO: 12.57 M/S
ECHO LV POSTERIOR WALL: 1 CM (ref 0.6–1.1)
ECHO LV POSTERIOR WALL: 1.2 CM (ref 0.6–1.1)
EOSINOPHIL # BLD AUTO: 0 K/UL (ref 0–0.5)
EOSINOPHIL # BLD AUTO: 0.1 K/UL (ref 0–0.5)
EOSINOPHIL # BLD AUTO: 0.3 K/UL (ref 0–0.5)
EOSINOPHIL NFR BLD: 0 % (ref 0–8)
EOSINOPHIL NFR BLD: 0.1 % (ref 0–8)
EOSINOPHIL NFR BLD: 0.2 % (ref 0–8)
EOSINOPHIL NFR BLD: 0.2 % (ref 0–8)
EOSINOPHIL NFR BLD: 0.3 % (ref 0–8)
EOSINOPHIL NFR BLD: 0.5 % (ref 0–8)
EOSINOPHIL NFR BLD: 0.6 % (ref 0–8)
EOSINOPHIL NFR BLD: 0.7 % (ref 0–8)
EOSINOPHIL NFR BLD: 0.7 % (ref 0–8)
EOSINOPHIL NFR BLD: 0.8 % (ref 0–8)
EOSINOPHIL NFR BLD: 0.8 % (ref 0–8)
EOSINOPHIL NFR BLD: 0.9 % (ref 0–8)
EOSINOPHIL NFR BLD: 0.9 % (ref 0–8)
EOSINOPHIL NFR BLD: 1 % (ref 0–8)
EOSINOPHIL NFR BLD: 1.4 % (ref 0–8)
EOSINOPHIL NFR BLD: 1.5 % (ref 0–8)
EOSINOPHIL NFR BLD: 1.9 % (ref 0–8)
EOSINOPHIL NFR BLD: 2 % (ref 0–8)
EOSINOPHIL NFR BLD: 2.1 % (ref 0–8)
EOSINOPHIL NFR BLD: 2.2 % (ref 0–8)
EOSINOPHIL NFR BLD: 2.4 % (ref 0–8)
EOSINOPHIL NFR BLD: 2.7 % (ref 0–8)
EOSINOPHIL NFR BLD: 3 % (ref 0–8)
EOSINOPHIL NFR BLD: 3.2 % (ref 0–8)
EOSINOPHIL NFR BLD: 3.4 % (ref 0–8)
EOSINOPHIL NFR BLD: 3.8 % (ref 0–8)
EP: 7
ERYTHROCYTE [DISTWIDTH] IN BLOOD BY AUTOMATED COUNT: 15.2 % (ref 11.5–14.5)
ERYTHROCYTE [DISTWIDTH] IN BLOOD BY AUTOMATED COUNT: 15.4 % (ref 11.5–14.5)
ERYTHROCYTE [DISTWIDTH] IN BLOOD BY AUTOMATED COUNT: 15.5 % (ref 11.5–14.5)
ERYTHROCYTE [DISTWIDTH] IN BLOOD BY AUTOMATED COUNT: 15.6 % (ref 11.5–14.5)
ERYTHROCYTE [DISTWIDTH] IN BLOOD BY AUTOMATED COUNT: 15.6 % (ref 11.5–14.5)
ERYTHROCYTE [DISTWIDTH] IN BLOOD BY AUTOMATED COUNT: 15.8 % (ref 11.5–14.5)
ERYTHROCYTE [DISTWIDTH] IN BLOOD BY AUTOMATED COUNT: 15.9 % (ref 11.5–14.5)
ERYTHROCYTE [DISTWIDTH] IN BLOOD BY AUTOMATED COUNT: 16 % (ref 11.5–14.5)
ERYTHROCYTE [DISTWIDTH] IN BLOOD BY AUTOMATED COUNT: 16 % (ref 11.5–14.5)
ERYTHROCYTE [DISTWIDTH] IN BLOOD BY AUTOMATED COUNT: 16.1 % (ref 11.5–14.5)
ERYTHROCYTE [DISTWIDTH] IN BLOOD BY AUTOMATED COUNT: 16.6 % (ref 11.5–14.5)
ERYTHROCYTE [DISTWIDTH] IN BLOOD BY AUTOMATED COUNT: 16.8 % (ref 11.5–14.5)
ERYTHROCYTE [DISTWIDTH] IN BLOOD BY AUTOMATED COUNT: 16.9 % (ref 11.5–14.5)
ERYTHROCYTE [DISTWIDTH] IN BLOOD BY AUTOMATED COUNT: 16.9 % (ref 11.5–14.5)
ERYTHROCYTE [DISTWIDTH] IN BLOOD BY AUTOMATED COUNT: 17 % (ref 11.5–14.5)
ERYTHROCYTE [DISTWIDTH] IN BLOOD BY AUTOMATED COUNT: 17.1 % (ref 11.5–14.5)
ERYTHROCYTE [DISTWIDTH] IN BLOOD BY AUTOMATED COUNT: 17.1 % (ref 11.5–14.5)
ERYTHROCYTE [DISTWIDTH] IN BLOOD BY AUTOMATED COUNT: 17.2 % (ref 11.5–14.5)
ERYTHROCYTE [DISTWIDTH] IN BLOOD BY AUTOMATED COUNT: 17.3 % (ref 11.5–14.5)
ERYTHROCYTE [DISTWIDTH] IN BLOOD BY AUTOMATED COUNT: 17.4 % (ref 11.5–14.5)
ERYTHROCYTE [DISTWIDTH] IN BLOOD BY AUTOMATED COUNT: 17.6 % (ref 11.5–14.5)
ERYTHROCYTE [DISTWIDTH] IN BLOOD BY AUTOMATED COUNT: 18.8 % (ref 11.5–14.5)
ERYTHROCYTE [SEDIMENTATION RATE] IN BLOOD BY WESTERGREN METHOD: 16 MM/H
ERYTHROCYTE [SEDIMENTATION RATE] IN BLOOD BY WESTERGREN METHOD: 18 MM/H
ERYTHROCYTE [SEDIMENTATION RATE] IN BLOOD BY WESTERGREN METHOD: 18 MM/H
ERYTHROCYTE [SEDIMENTATION RATE] IN BLOOD BY WESTERGREN METHOD: 19 MM/H
ERYTHROCYTE [SEDIMENTATION RATE] IN BLOOD BY WESTERGREN METHOD: >120 MM/HR (ref 0–23)
ERYTHROCYTE [SEDIMENTATION RATE] IN BLOOD BY WESTERGREN METHOD: >120 MM/HR (ref 0–23)
EST. GFR  (AFRICAN AMERICAN): 10.2 ML/MIN/1.73 M^2
EST. GFR  (AFRICAN AMERICAN): 10.2 ML/MIN/1.73 M^2
EST. GFR  (AFRICAN AMERICAN): 10.6 ML/MIN/1.73 M^2
EST. GFR  (AFRICAN AMERICAN): 11 ML/MIN/1.73 M^2
EST. GFR  (AFRICAN AMERICAN): 11.6 ML/MIN/1.73 M^2
EST. GFR  (AFRICAN AMERICAN): 11.8 ML/MIN/1.73 M^2
EST. GFR  (AFRICAN AMERICAN): 12.1 ML/MIN/1.73 M^2
EST. GFR  (AFRICAN AMERICAN): 12.1 ML/MIN/1.73 M^2
EST. GFR  (AFRICAN AMERICAN): 12.4 ML/MIN/1.73 M^2
EST. GFR  (AFRICAN AMERICAN): 12.6 ML/MIN/1.73 M^2
EST. GFR  (AFRICAN AMERICAN): 12.6 ML/MIN/1.73 M^2
EST. GFR  (AFRICAN AMERICAN): 13.3 ML/MIN/1.73 M^2
EST. GFR  (AFRICAN AMERICAN): 13.3 ML/MIN/1.73 M^2
EST. GFR  (AFRICAN AMERICAN): 13.6 ML/MIN/1.73 M^2
EST. GFR  (AFRICAN AMERICAN): 13.6 ML/MIN/1.73 M^2
EST. GFR  (AFRICAN AMERICAN): 14.3 ML/MIN/1.73 M^2
EST. GFR  (AFRICAN AMERICAN): 14.7 ML/MIN/1.73 M^2
EST. GFR  (AFRICAN AMERICAN): 15 ML/MIN/1.73 M^2
EST. GFR  (AFRICAN AMERICAN): 15.5 ML/MIN/1.73 M^2
EST. GFR  (AFRICAN AMERICAN): 16 ML/MIN/1.73 M^2
EST. GFR  (AFRICAN AMERICAN): 16.4 ML/MIN/1.73 M^2
EST. GFR  (AFRICAN AMERICAN): 16.9 ML/MIN/1.73 M^2
EST. GFR  (AFRICAN AMERICAN): 17.4 ML/MIN/1.73 M^2
EST. GFR  (AFRICAN AMERICAN): 17.9 ML/MIN/1.73 M^2
EST. GFR  (AFRICAN AMERICAN): 18.5 ML/MIN/1.73 M^2
EST. GFR  (AFRICAN AMERICAN): 18.5 ML/MIN/1.73 M^2
EST. GFR  (AFRICAN AMERICAN): 19.7 ML/MIN/1.73 M^2
EST. GFR  (AFRICAN AMERICAN): 21 ML/MIN/1.73 M^2
EST. GFR  (AFRICAN AMERICAN): 22.8 ML/MIN/1.73 M^2
EST. GFR  (AFRICAN AMERICAN): 22.8 ML/MIN/1.73 M^2
EST. GFR  (AFRICAN AMERICAN): 27.9 ML/MIN/1.73 M^2
EST. GFR  (AFRICAN AMERICAN): 32.2 ML/MIN/1.73 M^2
EST. GFR  (AFRICAN AMERICAN): 5 ML/MIN/1.73 M^2
EST. GFR  (AFRICAN AMERICAN): 6 ML/MIN/1.73 M^2
EST. GFR  (AFRICAN AMERICAN): 7 ML/MIN/1.73 M^2
EST. GFR  (AFRICAN AMERICAN): 7 ML/MIN/1.73 M^2
EST. GFR  (AFRICAN AMERICAN): 8 ML/MIN/1.73 M^2
EST. GFR  (AFRICAN AMERICAN): 8.1 ML/MIN/1.73 M^2
EST. GFR  (AFRICAN AMERICAN): 8.3 ML/MIN/1.73 M^2
EST. GFR  (AFRICAN AMERICAN): 8.7 ML/MIN/1.73 M^2
EST. GFR  (AFRICAN AMERICAN): 9 ML/MIN/1.73 M^2
EST. GFR  (AFRICAN AMERICAN): 9.3 ML/MIN/1.73 M^2
EST. GFR  (AFRICAN AMERICAN): 9.5 ML/MIN/1.73 M^2
EST. GFR  (AFRICAN AMERICAN): 9.8 ML/MIN/1.73 M^2
EST. GFR  (NON AFRICAN AMERICAN): 10 ML/MIN/1.73 M^2
EST. GFR  (NON AFRICAN AMERICAN): 10.2 ML/MIN/1.73 M^2
EST. GFR  (NON AFRICAN AMERICAN): 10.5 ML/MIN/1.73 M^2
EST. GFR  (NON AFRICAN AMERICAN): 10.5 ML/MIN/1.73 M^2
EST. GFR  (NON AFRICAN AMERICAN): 10.7 ML/MIN/1.73 M^2
EST. GFR  (NON AFRICAN AMERICAN): 10.9 ML/MIN/1.73 M^2
EST. GFR  (NON AFRICAN AMERICAN): 10.9 ML/MIN/1.73 M^2
EST. GFR  (NON AFRICAN AMERICAN): 11.5 ML/MIN/1.73 M^2
EST. GFR  (NON AFRICAN AMERICAN): 11.5 ML/MIN/1.73 M^2
EST. GFR  (NON AFRICAN AMERICAN): 11.8 ML/MIN/1.73 M^2
EST. GFR  (NON AFRICAN AMERICAN): 11.8 ML/MIN/1.73 M^2
EST. GFR  (NON AFRICAN AMERICAN): 12.4 ML/MIN/1.73 M^2
EST. GFR  (NON AFRICAN AMERICAN): 12.7 ML/MIN/1.73 M^2
EST. GFR  (NON AFRICAN AMERICAN): 13 ML/MIN/1.73 M^2
EST. GFR  (NON AFRICAN AMERICAN): 13.4 ML/MIN/1.73 M^2
EST. GFR  (NON AFRICAN AMERICAN): 14 ML/MIN/1.73 M^2
EST. GFR  (NON AFRICAN AMERICAN): 14.2 ML/MIN/1.73 M^2
EST. GFR  (NON AFRICAN AMERICAN): 14.6 ML/MIN/1.73 M^2
EST. GFR  (NON AFRICAN AMERICAN): 15 ML/MIN/1.73 M^2
EST. GFR  (NON AFRICAN AMERICAN): 15.5 ML/MIN/1.73 M^2
EST. GFR  (NON AFRICAN AMERICAN): 16 ML/MIN/1.73 M^2
EST. GFR  (NON AFRICAN AMERICAN): 16 ML/MIN/1.73 M^2
EST. GFR  (NON AFRICAN AMERICAN): 17.1 ML/MIN/1.73 M^2
EST. GFR  (NON AFRICAN AMERICAN): 18 ML/MIN/1.73 M^2
EST. GFR  (NON AFRICAN AMERICAN): 19.7 ML/MIN/1.73 M^2
EST. GFR  (NON AFRICAN AMERICAN): 19.7 ML/MIN/1.73 M^2
EST. GFR  (NON AFRICAN AMERICAN): 24.2 ML/MIN/1.73 M^2
EST. GFR  (NON AFRICAN AMERICAN): 27.9 ML/MIN/1.73 M^2
EST. GFR  (NON AFRICAN AMERICAN): 4 ML/MIN/1.73 M^2
EST. GFR  (NON AFRICAN AMERICAN): 5 ML/MIN/1.73 M^2
EST. GFR  (NON AFRICAN AMERICAN): 6 ML/MIN/1.73 M^2
EST. GFR  (NON AFRICAN AMERICAN): 6 ML/MIN/1.73 M^2
EST. GFR  (NON AFRICAN AMERICAN): 7 ML/MIN/1.73 M^2
EST. GFR  (NON AFRICAN AMERICAN): 7.1 ML/MIN/1.73 M^2
EST. GFR  (NON AFRICAN AMERICAN): 7.5 ML/MIN/1.73 M^2
EST. GFR  (NON AFRICAN AMERICAN): 8 ML/MIN/1.73 M^2
EST. GFR  (NON AFRICAN AMERICAN): 8 ML/MIN/1.73 M^2
EST. GFR  (NON AFRICAN AMERICAN): 8.1 ML/MIN/1.73 M^2
EST. GFR  (NON AFRICAN AMERICAN): 8.2 ML/MIN/1.73 M^2
EST. GFR  (NON AFRICAN AMERICAN): 8.5 ML/MIN/1.73 M^2
EST. GFR  (NON AFRICAN AMERICAN): 8.8 ML/MIN/1.73 M^2
EST. GFR  (NON AFRICAN AMERICAN): 8.8 ML/MIN/1.73 M^2
EST. GFR  (NON AFRICAN AMERICAN): 9 ML/MIN/1.73 M^2
EST. GFR  (NON AFRICAN AMERICAN): 9.2 ML/MIN/1.73 M^2
ESTIMATED AVG GLUCOSE: 249 MG/DL (ref 68–131)
ESTIMATED AVG GLUCOSE: 249 MG/DL (ref 68–131)
FERRITIN SERPL-MCNC: 1230 NG/ML (ref 20–300)
FINAL PATHOLOGIC DIAGNOSIS: NORMAL
FIO2: 21
FIO2: 50
FLOW: 6
FLUBV RNA NPH QL NAA+NON-PROBE: NOT DETECTED
FRACTIONAL SHORTENING: 17 % (ref 28–44)
FRACTIONAL SHORTENING: 20 % (ref 28–44)
FUNGAL SUSC PNL ISLT: ABNORMAL
FUNGUS SPEC CULT: NORMAL
FUNGUS SPEC CULT: NORMAL
GIANT PLATELETS BLD QL SMEAR: PRESENT
GIANT PLATELETS BLD QL SMEAR: PRESENT
GLUCOSE SERPL-MCNC: 103 MG/DL (ref 70–110)
GLUCOSE SERPL-MCNC: 111 MG/DL (ref 70–110)
GLUCOSE SERPL-MCNC: 112 MG/DL (ref 70–110)
GLUCOSE SERPL-MCNC: 146 MG/DL (ref 70–110)
GLUCOSE SERPL-MCNC: 147 MG/DL (ref 70–110)
GLUCOSE SERPL-MCNC: 149 MG/DL (ref 70–110)
GLUCOSE SERPL-MCNC: 155 MG/DL (ref 70–110)
GLUCOSE SERPL-MCNC: 159 MG/DL (ref 70–110)
GLUCOSE SERPL-MCNC: 181 MG/DL (ref 70–110)
GLUCOSE SERPL-MCNC: 184 MG/DL (ref 70–110)
GLUCOSE SERPL-MCNC: 189 MG/DL (ref 70–110)
GLUCOSE SERPL-MCNC: 191 MG/DL (ref 70–110)
GLUCOSE SERPL-MCNC: 195 MG/DL (ref 70–110)
GLUCOSE SERPL-MCNC: 195 MG/DL (ref 70–110)
GLUCOSE SERPL-MCNC: 197 MG/DL (ref 70–110)
GLUCOSE SERPL-MCNC: 197 MG/DL (ref 70–110)
GLUCOSE SERPL-MCNC: 199 MG/DL (ref 70–110)
GLUCOSE SERPL-MCNC: 201 MG/DL (ref 70–110)
GLUCOSE SERPL-MCNC: 208 MG/DL (ref 70–110)
GLUCOSE SERPL-MCNC: 218 MG/DL (ref 70–110)
GLUCOSE SERPL-MCNC: 219 MG/DL (ref 70–110)
GLUCOSE SERPL-MCNC: 221 MG/DL (ref 70–110)
GLUCOSE SERPL-MCNC: 221 MG/DL (ref 70–110)
GLUCOSE SERPL-MCNC: 222 MG/DL (ref 70–110)
GLUCOSE SERPL-MCNC: 224 MG/DL (ref 70–110)
GLUCOSE SERPL-MCNC: 239 MG/DL (ref 70–110)
GLUCOSE SERPL-MCNC: 244 MG/DL (ref 70–110)
GLUCOSE SERPL-MCNC: 249 MG/DL (ref 70–110)
GLUCOSE SERPL-MCNC: 256 MG/DL (ref 70–110)
GLUCOSE SERPL-MCNC: 257 MG/DL (ref 70–110)
GLUCOSE SERPL-MCNC: 266 MG/DL (ref 70–110)
GLUCOSE SERPL-MCNC: 271 MG/DL (ref 70–110)
GLUCOSE SERPL-MCNC: 278 MG/DL (ref 70–110)
GLUCOSE SERPL-MCNC: 280 MG/DL (ref 70–110)
GLUCOSE SERPL-MCNC: 283 MG/DL (ref 70–110)
GLUCOSE SERPL-MCNC: 289 MG/DL (ref 70–110)
GLUCOSE SERPL-MCNC: 296 MG/DL (ref 70–110)
GLUCOSE SERPL-MCNC: 307 MG/DL (ref 70–110)
GLUCOSE SERPL-MCNC: 316 MG/DL (ref 70–110)
GLUCOSE SERPL-MCNC: 319 MG/DL (ref 70–110)
GLUCOSE SERPL-MCNC: 337 MG/DL (ref 70–110)
GLUCOSE SERPL-MCNC: 344 MG/DL (ref 70–110)
GLUCOSE SERPL-MCNC: 354 MG/DL (ref 70–110)
GLUCOSE SERPL-MCNC: 383 MG/DL (ref 70–110)
GLUCOSE SERPL-MCNC: 388 MG/DL (ref 70–110)
GLUCOSE SERPL-MCNC: 392 MG/DL (ref 70–110)
GLUCOSE SERPL-MCNC: 402 MG/DL (ref 70–110)
GLUCOSE SERPL-MCNC: 41 MG/DL (ref 70–110)
GLUCOSE SERPL-MCNC: 435 MG/DL (ref 70–110)
GLUCOSE SERPL-MCNC: 473 MG/DL (ref 70–110)
GLUCOSE SERPL-MCNC: 488 MG/DL (ref 70–110)
GLUCOSE SERPL-MCNC: 506 MG/DL (ref 70–110)
GLUCOSE SERPL-MCNC: 516 MG/DL (ref 70–110)
GLUCOSE SERPL-MCNC: 576 MG/DL (ref 70–110)
GLUCOSE SERPL-MCNC: 666 MG/DL (ref 70–110)
GLUCOSE SERPL-MCNC: 697 MG/DL (ref 70–110)
GLUCOSE SERPL-MCNC: 74 MG/DL (ref 70–110)
GLUCOSE SERPL-MCNC: 87 MG/DL (ref 70–110)
GLUCOSE SERPL-MCNC: 90 MG/DL (ref 70–110)
GLUCOSE UR QL STRIP: ABNORMAL
GLUCOSE UR QL STRIP: ABNORMAL
GROSS: NORMAL
HAV IGM SERPL QL IA: NEGATIVE
HBA1C MFR BLD HPLC: 10.3 % (ref 4–5.6)
HBA1C MFR BLD HPLC: 10.3 % (ref 4–5.6)
HBA1C MFR BLD: 11.7 % (ref 4–5.6)
HBV CORE IGM SERPL QL IA: NEGATIVE
HBV SURFACE AG SERPL QL IA: NEGATIVE
HCO3 UR-SCNC: 11.9 MMOL/L (ref 24–28)
HCO3 UR-SCNC: 17 MMOL/L (ref 24–28)
HCO3 UR-SCNC: 19.1 MMOL/L (ref 24–28)
HCO3 UR-SCNC: 22.8 MMOL/L (ref 24–28)
HCO3 UR-SCNC: 23.1 MMOL/L (ref 24–28)
HCT VFR BLD AUTO: 17.3 % (ref 40–54)
HCT VFR BLD AUTO: 17.8 % (ref 40–54)
HCT VFR BLD AUTO: 18.7 % (ref 40–54)
HCT VFR BLD AUTO: 19.6 % (ref 40–54)
HCT VFR BLD AUTO: 20.7 % (ref 40–54)
HCT VFR BLD AUTO: 21.4 % (ref 40–54)
HCT VFR BLD AUTO: 22.5 % (ref 40–54)
HCT VFR BLD AUTO: 23.2 % (ref 40–54)
HCT VFR BLD AUTO: 23.3 % (ref 40–54)
HCT VFR BLD AUTO: 23.7 % (ref 40–54)
HCT VFR BLD AUTO: 24.2 % (ref 40–54)
HCT VFR BLD AUTO: 24.7 % (ref 40–54)
HCT VFR BLD AUTO: 24.9 % (ref 40–54)
HCT VFR BLD AUTO: 25 % (ref 40–54)
HCT VFR BLD AUTO: 25.6 % (ref 40–54)
HCT VFR BLD AUTO: 26.3 % (ref 40–54)
HCT VFR BLD AUTO: 26.6 % (ref 40–54)
HCT VFR BLD AUTO: 26.9 % (ref 40–54)
HCT VFR BLD AUTO: 28.3 % (ref 40–54)
HCT VFR BLD AUTO: 28.5 % (ref 40–54)
HCT VFR BLD AUTO: 28.9 % (ref 40–54)
HCT VFR BLD AUTO: 29.2 % (ref 40–54)
HCT VFR BLD AUTO: 29.8 % (ref 40–54)
HCT VFR BLD AUTO: 30 % (ref 40–54)
HCT VFR BLD AUTO: 30.1 % (ref 40–54)
HCT VFR BLD AUTO: 30.5 % (ref 40–54)
HCT VFR BLD AUTO: 31.2 % (ref 40–54)
HCT VFR BLD AUTO: 31.2 % (ref 40–54)
HCT VFR BLD AUTO: 32.5 % (ref 40–54)
HCT VFR BLD AUTO: 33 % (ref 40–54)
HCT VFR BLD AUTO: 34.1 % (ref 40–54)
HCT VFR BLD AUTO: 34.8 % (ref 40–54)
HCT VFR BLD AUTO: 38.1 % (ref 40–54)
HCT VFR BLD AUTO: 42.7 % (ref 40–54)
HCT VFR BLD AUTO: 45 % (ref 40–54)
HCT VFR BLD CALC: 29 %PCV (ref 36–54)
HCV AB SERPL QL IA: NEGATIVE
HGB BLD-MCNC: 10.3 G/DL (ref 14–18)
HGB BLD-MCNC: 10.5 G/DL (ref 14–18)
HGB BLD-MCNC: 10.7 G/DL (ref 14–18)
HGB BLD-MCNC: 10.9 G/DL (ref 14–18)
HGB BLD-MCNC: 11.9 G/DL (ref 14–18)
HGB BLD-MCNC: 14 G/DL (ref 14–18)
HGB BLD-MCNC: 14.7 G/DL (ref 14–18)
HGB BLD-MCNC: 5.3 G/DL (ref 14–18)
HGB BLD-MCNC: 5.5 G/DL (ref 14–18)
HGB BLD-MCNC: 5.6 G/DL (ref 14–18)
HGB BLD-MCNC: 6 G/DL (ref 14–18)
HGB BLD-MCNC: 6.2 G/DL (ref 14–18)
HGB BLD-MCNC: 6.5 G/DL (ref 14–18)
HGB BLD-MCNC: 7 G/DL (ref 14–18)
HGB BLD-MCNC: 7.3 G/DL (ref 14–18)
HGB BLD-MCNC: 7.4 G/DL (ref 14–18)
HGB BLD-MCNC: 7.5 G/DL (ref 14–18)
HGB BLD-MCNC: 7.7 G/DL (ref 14–18)
HGB BLD-MCNC: 7.8 G/DL (ref 14–18)
HGB BLD-MCNC: 7.9 G/DL (ref 14–18)
HGB BLD-MCNC: 7.9 G/DL (ref 14–18)
HGB BLD-MCNC: 8 G/DL (ref 14–18)
HGB BLD-MCNC: 8.2 G/DL (ref 14–18)
HGB BLD-MCNC: 8.7 G/DL (ref 14–18)
HGB BLD-MCNC: 8.9 G/DL (ref 14–18)
HGB BLD-MCNC: 9.1 G/DL (ref 14–18)
HGB BLD-MCNC: 9.2 G/DL (ref 14–18)
HGB BLD-MCNC: 9.4 G/DL (ref 14–18)
HGB BLD-MCNC: 9.4 G/DL (ref 14–18)
HGB BLD-MCNC: 9.5 G/DL (ref 14–18)
HGB BLD-MCNC: 9.6 G/DL (ref 14–18)
HGB UR QL STRIP: ABNORMAL
HGB UR QL STRIP: ABNORMAL
HPIV1 RNA NPH QL NAA+NON-PROBE: NOT DETECTED
HPIV2 RNA NPH QL NAA+NON-PROBE: NOT DETECTED
HPIV3 RNA NPH QL NAA+NON-PROBE: NOT DETECTED
HPIV4 RNA NPH QL NAA+NON-PROBE: NOT DETECTED
HUMAN METAPNEUMOVIRUS: NOT DETECTED
HYALINE CASTS #/AREA URNS LPF: 0 /LPF
HYALINE CASTS UR QL AUTO: 0 /LPF
HYPOCHROMIA BLD QL SMEAR: ABNORMAL
IMM GRANULOCYTES # BLD AUTO: 0.01 K/UL (ref 0–0.04)
IMM GRANULOCYTES # BLD AUTO: 0.02 K/UL (ref 0–0.04)
IMM GRANULOCYTES # BLD AUTO: 0.02 K/UL (ref 0–0.04)
IMM GRANULOCYTES # BLD AUTO: 0.03 K/UL (ref 0–0.04)
IMM GRANULOCYTES # BLD AUTO: 0.03 K/UL (ref 0–0.04)
IMM GRANULOCYTES # BLD AUTO: 0.04 K/UL (ref 0–0.04)
IMM GRANULOCYTES # BLD AUTO: 0.05 K/UL (ref 0–0.04)
IMM GRANULOCYTES # BLD AUTO: 0.06 K/UL (ref 0–0.04)
IMM GRANULOCYTES # BLD AUTO: 0.07 K/UL (ref 0–0.04)
IMM GRANULOCYTES # BLD AUTO: 0.08 K/UL (ref 0–0.04)
IMM GRANULOCYTES # BLD AUTO: 0.09 K/UL (ref 0–0.04)
IMM GRANULOCYTES # BLD AUTO: 0.1 K/UL (ref 0–0.04)
IMM GRANULOCYTES # BLD AUTO: 0.1 K/UL (ref 0–0.04)
IMM GRANULOCYTES # BLD AUTO: 0.11 K/UL (ref 0–0.04)
IMM GRANULOCYTES # BLD AUTO: 0.11 K/UL (ref 0–0.04)
IMM GRANULOCYTES # BLD AUTO: 0.17 K/UL (ref 0–0.04)
IMM GRANULOCYTES # BLD AUTO: 0.25 K/UL (ref 0–0.04)
IMM GRANULOCYTES # BLD AUTO: 0.28 K/UL (ref 0–0.04)
IMM GRANULOCYTES # BLD AUTO: ABNORMAL K/UL (ref 0–0.04)
IMM GRANULOCYTES NFR BLD AUTO: 0.1 % (ref 0–0.5)
IMM GRANULOCYTES NFR BLD AUTO: 0.2 % (ref 0–0.5)
IMM GRANULOCYTES NFR BLD AUTO: 0.3 % (ref 0–0.5)
IMM GRANULOCYTES NFR BLD AUTO: 0.4 % (ref 0–0.5)
IMM GRANULOCYTES NFR BLD AUTO: 0.4 % (ref 0–0.5)
IMM GRANULOCYTES NFR BLD AUTO: 0.5 % (ref 0–0.5)
IMM GRANULOCYTES NFR BLD AUTO: 0.6 % (ref 0–0.5)
IMM GRANULOCYTES NFR BLD AUTO: 0.7 % (ref 0–0.5)
IMM GRANULOCYTES NFR BLD AUTO: 0.8 % (ref 0–0.5)
IMM GRANULOCYTES NFR BLD AUTO: 0.9 % (ref 0–0.5)
IMM GRANULOCYTES NFR BLD AUTO: 1.1 % (ref 0–0.5)
IMM GRANULOCYTES NFR BLD AUTO: 1.3 % (ref 0–0.5)
IMM GRANULOCYTES NFR BLD AUTO: 1.7 % (ref 0–0.5)
IMM GRANULOCYTES NFR BLD AUTO: 1.9 % (ref 0–0.5)
IMM GRANULOCYTES NFR BLD AUTO: 2.1 % (ref 0–0.5)
IMM GRANULOCYTES NFR BLD AUTO: ABNORMAL % (ref 0–0.5)
INFLUENZA A (SUBTYPES H1,H1-2009,H3): NOT DETECTED
INR PPP: 0.9 (ref 0.8–1.2)
INR PPP: 0.9 (ref 0.8–1.2)
INR PPP: 1 (ref 0.8–1.2)
INR PPP: 1.2 (ref 0.8–1.2)
INR PPP: 1.3 (ref 0.8–1.2)
INTERVENTRICULAR SEPTUM: 1 CM (ref 0.6–1.1)
INTERVENTRICULAR SEPTUM: 1.28 CM (ref 0.6–1.1)
IP: 12
IRON SERPL-MCNC: 30 UG/DL (ref 45–160)
IVRT: 117.65 MSEC
KETONES UR QL STRIP: NEGATIVE
KETONES UR QL STRIP: NEGATIVE
LA MAJOR: 4.83 CM
LA MAJOR: 4.9 CM
LA MINOR: 4.29 CM
LA MINOR: 5.58 CM
LA WIDTH: 3.86 CM
LA WIDTH: 4 CM
LACTATE SERPL-SCNC: 1 MMOL/L (ref 0.5–2.2)
LACTATE SERPL-SCNC: 1.3 MMOL/L (ref 0.5–2.2)
LACTATE SERPL-SCNC: 1.8 MMOL/L (ref 0.5–2.2)
LACTATE SERPL-SCNC: 2.1 MMOL/L (ref 0.5–2.2)
LACTATE SERPL-SCNC: 2.7 MMOL/L (ref 0.5–2.2)
LACTATE SERPL-SCNC: 2.7 MMOL/L (ref 0.5–2.2)
LACTATE SERPL-SCNC: 3.1 MMOL/L (ref 0.5–2.2)
LACTATE SERPL-SCNC: 3.4 MMOL/L (ref 0.5–2.2)
LACTATE SERPL-SCNC: 3.6 MMOL/L (ref 0.5–2.2)
LEFT ATRIUM SIZE: 2.92 CM
LEFT ATRIUM SIZE: 3.12 CM
LEFT ATRIUM VOLUME INDEX: 25 ML/M2
LEFT ATRIUM VOLUME INDEX: 28.6 ML/M2
LEFT ATRIUM VOLUME: 46.52 CM3
LEFT ATRIUM VOLUME: 51.8 CM3
LEFT INTERNAL DIMENSION IN SYSTOLE: 3.88 CM (ref 2.1–4)
LEFT INTERNAL DIMENSION IN SYSTOLE: 4 CM (ref 2.1–4)
LEFT VENTRICLE DIASTOLIC VOLUME INDEX: 46.81 ML/M2
LEFT VENTRICLE DIASTOLIC VOLUME INDEX: 56.08 ML/M2
LEFT VENTRICLE DIASTOLIC VOLUME: 101.63 ML
LEFT VENTRICLE DIASTOLIC VOLUME: 87.11 ML
LEFT VENTRICLE MASS INDEX: 122 G/M2
LEFT VENTRICLE MASS INDEX: 98 G/M2
LEFT VENTRICLE SYSTOLIC VOLUME INDEX: 22.3 ML/M2
LEFT VENTRICLE SYSTOLIC VOLUME INDEX: 35.8 ML/M2
LEFT VENTRICLE SYSTOLIC VOLUME: 41.44 ML
LEFT VENTRICLE SYSTOLIC VOLUME: 64.94 ML
LEFT VENTRICULAR INTERNAL DIMENSION IN DIASTOLE: 4.69 CM (ref 3.5–6)
LEFT VENTRICULAR INTERNAL DIMENSION IN DIASTOLE: 5 CM (ref 3.5–6)
LEFT VENTRICULAR MASS: 181.98 G
LEFT VENTRICULAR MASS: 221.45 G
LEUKOCYTE ESTERASE UR QL STRIP: ABNORMAL
LEUKOCYTE ESTERASE UR QL STRIP: ABNORMAL
LV LATERAL E/E' RATIO: 9.78 M/S
LV SEPTAL E/E' RATIO: 17.6 M/S
LYMPHOCYTES # BLD AUTO: 0.2 K/UL (ref 1–4.8)
LYMPHOCYTES # BLD AUTO: 0.3 K/UL (ref 1–4.8)
LYMPHOCYTES # BLD AUTO: 0.4 K/UL (ref 1–4.8)
LYMPHOCYTES # BLD AUTO: 0.4 K/UL (ref 1–4.8)
LYMPHOCYTES # BLD AUTO: 0.5 K/UL (ref 1–4.8)
LYMPHOCYTES # BLD AUTO: 0.6 K/UL (ref 1–4.8)
LYMPHOCYTES # BLD AUTO: 0.7 K/UL (ref 1–4.8)
LYMPHOCYTES # BLD AUTO: 0.8 K/UL (ref 1–4.8)
LYMPHOCYTES # BLD AUTO: 0.9 K/UL (ref 1–4.8)
LYMPHOCYTES # BLD AUTO: 1 K/UL (ref 1–4.8)
LYMPHOCYTES # BLD AUTO: 1.1 K/UL (ref 1–4.8)
LYMPHOCYTES # BLD AUTO: 1.1 K/UL (ref 1–4.8)
LYMPHOCYTES # BLD AUTO: 1.2 K/UL (ref 1–4.8)
LYMPHOCYTES NFR BLD: 10.3 % (ref 18–48)
LYMPHOCYTES NFR BLD: 10.4 % (ref 18–48)
LYMPHOCYTES NFR BLD: 10.6 % (ref 18–48)
LYMPHOCYTES NFR BLD: 10.8 % (ref 18–48)
LYMPHOCYTES NFR BLD: 11.6 % (ref 18–48)
LYMPHOCYTES NFR BLD: 11.8 % (ref 18–48)
LYMPHOCYTES NFR BLD: 12.3 % (ref 18–48)
LYMPHOCYTES NFR BLD: 12.3 % (ref 18–48)
LYMPHOCYTES NFR BLD: 12.4 % (ref 18–48)
LYMPHOCYTES NFR BLD: 12.4 % (ref 18–48)
LYMPHOCYTES NFR BLD: 13.7 % (ref 18–48)
LYMPHOCYTES NFR BLD: 14.5 % (ref 18–48)
LYMPHOCYTES NFR BLD: 15.5 % (ref 18–48)
LYMPHOCYTES NFR BLD: 16.1 % (ref 18–48)
LYMPHOCYTES NFR BLD: 16.4 % (ref 18–48)
LYMPHOCYTES NFR BLD: 2.1 % (ref 18–48)
LYMPHOCYTES NFR BLD: 21 % (ref 18–48)
LYMPHOCYTES NFR BLD: 3.9 % (ref 18–48)
LYMPHOCYTES NFR BLD: 3.9 % (ref 18–48)
LYMPHOCYTES NFR BLD: 4 % (ref 18–48)
LYMPHOCYTES NFR BLD: 4.4 % (ref 18–48)
LYMPHOCYTES NFR BLD: 4.6 % (ref 18–48)
LYMPHOCYTES NFR BLD: 4.7 % (ref 18–48)
LYMPHOCYTES NFR BLD: 5 % (ref 18–48)
LYMPHOCYTES NFR BLD: 5.6 % (ref 18–48)
LYMPHOCYTES NFR BLD: 5.6 % (ref 18–48)
LYMPHOCYTES NFR BLD: 6 % (ref 18–48)
LYMPHOCYTES NFR BLD: 6.3 % (ref 18–48)
LYMPHOCYTES NFR BLD: 6.3 % (ref 18–48)
LYMPHOCYTES NFR BLD: 6.9 % (ref 18–48)
LYMPHOCYTES NFR BLD: 7.1 % (ref 18–48)
LYMPHOCYTES NFR BLD: 7.3 % (ref 18–48)
LYMPHOCYTES NFR BLD: 7.6 % (ref 18–48)
LYMPHOCYTES NFR BLD: 9.7 % (ref 18–48)
LYMPHOCYTES NFR BLD: 9.9 % (ref 18–48)
MAGNESIUM SERPL-MCNC: 1.9 MG/DL (ref 1.6–2.6)
MAGNESIUM SERPL-MCNC: 2 MG/DL (ref 1.6–2.6)
MAGNESIUM SERPL-MCNC: 2.1 MG/DL (ref 1.6–2.6)
MAGNESIUM SERPL-MCNC: 2.2 MG/DL (ref 1.6–2.6)
MAGNESIUM SERPL-MCNC: 2.3 MG/DL (ref 1.6–2.6)
MAGNESIUM SERPL-MCNC: 2.3 MG/DL (ref 1.6–2.6)
MAGNESIUM SERPL-MCNC: 2.4 MG/DL (ref 1.6–2.6)
MAGNESIUM SERPL-MCNC: 2.4 MG/DL (ref 1.6–2.6)
MAGNESIUM SERPL-MCNC: 2.6 MG/DL (ref 1.6–2.6)
MAGNESIUM SERPL-MCNC: 2.7 MG/DL (ref 1.6–2.6)
MCH RBC QN AUTO: 26.5 PG (ref 27–31)
MCH RBC QN AUTO: 26.6 PG (ref 27–31)
MCH RBC QN AUTO: 26.7 PG (ref 27–31)
MCH RBC QN AUTO: 26.7 PG (ref 27–31)
MCH RBC QN AUTO: 27 PG (ref 27–31)
MCH RBC QN AUTO: 27 PG (ref 27–31)
MCH RBC QN AUTO: 27.1 PG (ref 27–31)
MCH RBC QN AUTO: 27.1 PG (ref 27–31)
MCH RBC QN AUTO: 27.2 PG (ref 27–31)
MCH RBC QN AUTO: 27.3 PG (ref 27–31)
MCH RBC QN AUTO: 27.5 PG (ref 27–31)
MCH RBC QN AUTO: 27.5 PG (ref 27–31)
MCH RBC QN AUTO: 27.7 PG (ref 27–31)
MCH RBC QN AUTO: 27.8 PG (ref 27–31)
MCH RBC QN AUTO: 27.9 PG (ref 27–31)
MCH RBC QN AUTO: 27.9 PG (ref 27–31)
MCH RBC QN AUTO: 28 PG (ref 27–31)
MCH RBC QN AUTO: 28.1 PG (ref 27–31)
MCH RBC QN AUTO: 28.4 PG (ref 27–31)
MCH RBC QN AUTO: 28.5 PG (ref 27–31)
MCH RBC QN AUTO: 28.7 PG (ref 27–31)
MCH RBC QN AUTO: 28.9 PG (ref 27–31)
MCH RBC QN AUTO: 29 PG (ref 27–31)
MCH RBC QN AUTO: 29.1 PG (ref 27–31)
MCH RBC QN AUTO: 29.2 PG (ref 27–31)
MCH RBC QN AUTO: 29.3 PG (ref 27–31)
MCH RBC QN AUTO: 29.4 PG (ref 27–31)
MCH RBC QN AUTO: 29.4 PG (ref 27–31)
MCHC RBC AUTO-ENTMCNC: 29 G/DL (ref 32–36)
MCHC RBC AUTO-ENTMCNC: 29.9 G/DL (ref 32–36)
MCHC RBC AUTO-ENTMCNC: 30.4 G/DL (ref 32–36)
MCHC RBC AUTO-ENTMCNC: 30.6 G/DL (ref 32–36)
MCHC RBC AUTO-ENTMCNC: 30.6 G/DL (ref 32–36)
MCHC RBC AUTO-ENTMCNC: 30.7 G/DL (ref 32–36)
MCHC RBC AUTO-ENTMCNC: 30.8 G/DL (ref 32–36)
MCHC RBC AUTO-ENTMCNC: 30.9 G/DL (ref 32–36)
MCHC RBC AUTO-ENTMCNC: 30.9 G/DL (ref 32–36)
MCHC RBC AUTO-ENTMCNC: 31.1 G/DL (ref 32–36)
MCHC RBC AUTO-ENTMCNC: 31.2 G/DL (ref 32–36)
MCHC RBC AUTO-ENTMCNC: 31.3 G/DL (ref 32–36)
MCHC RBC AUTO-ENTMCNC: 31.4 G/DL (ref 32–36)
MCHC RBC AUTO-ENTMCNC: 31.5 G/DL (ref 32–36)
MCHC RBC AUTO-ENTMCNC: 31.5 G/DL (ref 32–36)
MCHC RBC AUTO-ENTMCNC: 31.6 G/DL (ref 32–36)
MCHC RBC AUTO-ENTMCNC: 31.7 G/DL (ref 32–36)
MCHC RBC AUTO-ENTMCNC: 31.7 G/DL (ref 32–36)
MCHC RBC AUTO-ENTMCNC: 31.8 G/DL (ref 32–36)
MCHC RBC AUTO-ENTMCNC: 31.8 G/DL (ref 32–36)
MCHC RBC AUTO-ENTMCNC: 31.9 G/DL (ref 32–36)
MCHC RBC AUTO-ENTMCNC: 31.9 G/DL (ref 32–36)
MCHC RBC AUTO-ENTMCNC: 32 G/DL (ref 32–36)
MCHC RBC AUTO-ENTMCNC: 32.3 G/DL (ref 32–36)
MCHC RBC AUTO-ENTMCNC: 32.5 G/DL (ref 32–36)
MCHC RBC AUTO-ENTMCNC: 32.7 G/DL (ref 32–36)
MCHC RBC AUTO-ENTMCNC: 32.7 G/DL (ref 32–36)
MCHC RBC AUTO-ENTMCNC: 32.8 G/DL (ref 32–36)
MCHC RBC AUTO-ENTMCNC: 33.2 G/DL (ref 32–36)
MCV RBC AUTO: 84 FL (ref 82–98)
MCV RBC AUTO: 84 FL (ref 82–98)
MCV RBC AUTO: 85 FL (ref 82–98)
MCV RBC AUTO: 86 FL (ref 82–98)
MCV RBC AUTO: 87 FL (ref 82–98)
MCV RBC AUTO: 88 FL (ref 82–98)
MCV RBC AUTO: 88 FL (ref 82–98)
MCV RBC AUTO: 89 FL (ref 82–98)
MCV RBC AUTO: 90 FL (ref 82–98)
MCV RBC AUTO: 91 FL (ref 82–98)
MCV RBC AUTO: 92 FL (ref 82–98)
MCV RBC AUTO: 93 FL (ref 82–98)
MCV RBC AUTO: 93 FL (ref 82–98)
MCV RBC AUTO: 94 FL (ref 82–98)
MCV RBC AUTO: 95 FL (ref 82–98)
METAMYELOCYTES NFR BLD MANUAL: 1 %
MICROSCOPIC COMMENT: ABNORMAL
MICROSCOPIC COMMENT: ABNORMAL
MIN VOL: 17.1
MODE: ABNORMAL
MONOCYTES # BLD AUTO: 0.4 K/UL (ref 0.3–1)
MONOCYTES # BLD AUTO: 0.5 K/UL (ref 0.3–1)
MONOCYTES # BLD AUTO: 0.6 K/UL (ref 0.3–1)
MONOCYTES # BLD AUTO: 0.7 K/UL (ref 0.3–1)
MONOCYTES # BLD AUTO: 0.7 K/UL (ref 0.3–1)
MONOCYTES # BLD AUTO: 0.8 K/UL (ref 0.3–1)
MONOCYTES # BLD AUTO: 0.9 K/UL (ref 0.3–1)
MONOCYTES # BLD AUTO: 1 K/UL (ref 0.3–1)
MONOCYTES # BLD AUTO: 1.1 K/UL (ref 0.3–1)
MONOCYTES # BLD AUTO: 1.2 K/UL (ref 0.3–1)
MONOCYTES # BLD AUTO: 1.3 K/UL (ref 0.3–1)
MONOCYTES # BLD AUTO: 1.5 K/UL (ref 0.3–1)
MONOCYTES NFR BLD: 10 % (ref 4–15)
MONOCYTES NFR BLD: 10 % (ref 4–15)
MONOCYTES NFR BLD: 10.1 % (ref 4–15)
MONOCYTES NFR BLD: 10.4 % (ref 4–15)
MONOCYTES NFR BLD: 10.4 % (ref 4–15)
MONOCYTES NFR BLD: 10.8 % (ref 4–15)
MONOCYTES NFR BLD: 11 % (ref 4–15)
MONOCYTES NFR BLD: 11.2 % (ref 4–15)
MONOCYTES NFR BLD: 11.2 % (ref 4–15)
MONOCYTES NFR BLD: 11.5 % (ref 4–15)
MONOCYTES NFR BLD: 11.7 % (ref 4–15)
MONOCYTES NFR BLD: 12.2 % (ref 4–15)
MONOCYTES NFR BLD: 13.1 % (ref 4–15)
MONOCYTES NFR BLD: 3.6 % (ref 4–15)
MONOCYTES NFR BLD: 3.6 % (ref 4–15)
MONOCYTES NFR BLD: 3.7 % (ref 4–15)
MONOCYTES NFR BLD: 5.2 % (ref 4–15)
MONOCYTES NFR BLD: 5.8 % (ref 4–15)
MONOCYTES NFR BLD: 6.5 % (ref 4–15)
MONOCYTES NFR BLD: 7.4 % (ref 4–15)
MONOCYTES NFR BLD: 8.1 % (ref 4–15)
MONOCYTES NFR BLD: 8.3 % (ref 4–15)
MONOCYTES NFR BLD: 8.5 % (ref 4–15)
MONOCYTES NFR BLD: 8.6 % (ref 4–15)
MONOCYTES NFR BLD: 8.9 % (ref 4–15)
MONOCYTES NFR BLD: 9 % (ref 4–15)
MONOCYTES NFR BLD: 9.1 % (ref 4–15)
MONOCYTES NFR BLD: 9.2 % (ref 4–15)
MONOCYTES NFR BLD: 9.9 % (ref 4–15)
MV PEAK A VEL: 1.04 M/S
MV PEAK A VEL: 1.08 M/S
MV PEAK E VEL: 0.55 M/S
MV PEAK E VEL: 0.88 M/S
MYCOPLASMA PNEUMONIAE: NOT DETECTED
NEUTROPHILS # BLD AUTO: 10.4 K/UL (ref 1.8–7.7)
NEUTROPHILS # BLD AUTO: 10.6 K/UL (ref 1.8–7.7)
NEUTROPHILS # BLD AUTO: 10.6 K/UL (ref 1.8–7.7)
NEUTROPHILS # BLD AUTO: 11.7 K/UL (ref 1.8–7.7)
NEUTROPHILS # BLD AUTO: 11.7 K/UL (ref 1.8–7.7)
NEUTROPHILS # BLD AUTO: 12 K/UL (ref 1.8–7.7)
NEUTROPHILS # BLD AUTO: 13.4 K/UL (ref 1.8–7.7)
NEUTROPHILS # BLD AUTO: 13.8 K/UL (ref 1.8–7.7)
NEUTROPHILS # BLD AUTO: 2.6 K/UL (ref 1.8–7.7)
NEUTROPHILS # BLD AUTO: 3.1 K/UL (ref 1.8–7.7)
NEUTROPHILS # BLD AUTO: 3.2 K/UL (ref 1.8–7.7)
NEUTROPHILS # BLD AUTO: 3.3 K/UL (ref 1.8–7.7)
NEUTROPHILS # BLD AUTO: 3.3 K/UL (ref 1.8–7.7)
NEUTROPHILS # BLD AUTO: 3.4 K/UL (ref 1.8–7.7)
NEUTROPHILS # BLD AUTO: 4 K/UL (ref 1.8–7.7)
NEUTROPHILS # BLD AUTO: 4.5 K/UL (ref 1.8–7.7)
NEUTROPHILS # BLD AUTO: 4.8 K/UL (ref 1.8–7.7)
NEUTROPHILS # BLD AUTO: 5 K/UL (ref 1.8–7.7)
NEUTROPHILS # BLD AUTO: 5.1 K/UL (ref 1.8–7.7)
NEUTROPHILS # BLD AUTO: 5.4 K/UL (ref 1.8–7.7)
NEUTROPHILS # BLD AUTO: 5.9 K/UL (ref 1.8–7.7)
NEUTROPHILS # BLD AUTO: 6 K/UL (ref 1.8–7.7)
NEUTROPHILS # BLD AUTO: 6.2 K/UL (ref 1.8–7.7)
NEUTROPHILS # BLD AUTO: 6.3 K/UL (ref 1.8–7.7)
NEUTROPHILS # BLD AUTO: 7 K/UL (ref 1.8–7.7)
NEUTROPHILS # BLD AUTO: 7.1 K/UL (ref 1.8–7.7)
NEUTROPHILS # BLD AUTO: 7.4 K/UL (ref 1.8–7.7)
NEUTROPHILS # BLD AUTO: 7.4 K/UL (ref 1.8–7.7)
NEUTROPHILS # BLD AUTO: 7.7 K/UL (ref 1.8–7.7)
NEUTROPHILS # BLD AUTO: 8 K/UL (ref 1.8–7.7)
NEUTROPHILS # BLD AUTO: 8.9 K/UL (ref 1.8–7.7)
NEUTROPHILS # BLD AUTO: 9.3 K/UL (ref 1.8–7.7)
NEUTROPHILS # BLD AUTO: 9.8 K/UL (ref 1.8–7.7)
NEUTROPHILS # BLD AUTO: 9.9 K/UL (ref 1.8–7.7)
NEUTROPHILS NFR BLD: 67.7 % (ref 38–73)
NEUTROPHILS NFR BLD: 68.2 % (ref 38–73)
NEUTROPHILS NFR BLD: 69.1 % (ref 38–73)
NEUTROPHILS NFR BLD: 69.5 % (ref 38–73)
NEUTROPHILS NFR BLD: 70.1 % (ref 38–73)
NEUTROPHILS NFR BLD: 71 % (ref 38–73)
NEUTROPHILS NFR BLD: 71.5 % (ref 38–73)
NEUTROPHILS NFR BLD: 74 % (ref 38–73)
NEUTROPHILS NFR BLD: 74.9 % (ref 38–73)
NEUTROPHILS NFR BLD: 75 % (ref 38–73)
NEUTROPHILS NFR BLD: 75.8 % (ref 38–73)
NEUTROPHILS NFR BLD: 76.4 % (ref 38–73)
NEUTROPHILS NFR BLD: 76.4 % (ref 38–73)
NEUTROPHILS NFR BLD: 77.4 % (ref 38–73)
NEUTROPHILS NFR BLD: 77.6 % (ref 38–73)
NEUTROPHILS NFR BLD: 77.9 % (ref 38–73)
NEUTROPHILS NFR BLD: 78.5 % (ref 38–73)
NEUTROPHILS NFR BLD: 78.8 % (ref 38–73)
NEUTROPHILS NFR BLD: 79 % (ref 38–73)
NEUTROPHILS NFR BLD: 80 % (ref 38–73)
NEUTROPHILS NFR BLD: 80.1 % (ref 38–73)
NEUTROPHILS NFR BLD: 80.3 % (ref 38–73)
NEUTROPHILS NFR BLD: 81.8 % (ref 38–73)
NEUTROPHILS NFR BLD: 82.5 % (ref 38–73)
NEUTROPHILS NFR BLD: 82.6 % (ref 38–73)
NEUTROPHILS NFR BLD: 83.9 % (ref 38–73)
NEUTROPHILS NFR BLD: 86.4 % (ref 38–73)
NEUTROPHILS NFR BLD: 87.2 % (ref 38–73)
NEUTROPHILS NFR BLD: 88.2 % (ref 38–73)
NEUTROPHILS NFR BLD: 88.8 % (ref 38–73)
NEUTROPHILS NFR BLD: 89.5 % (ref 38–73)
NEUTROPHILS NFR BLD: 89.8 % (ref 38–73)
NEUTROPHILS NFR BLD: 89.9 % (ref 38–73)
NEUTROPHILS NFR BLD: 89.9 % (ref 38–73)
NEUTROPHILS NFR BLD: 90.6 % (ref 38–73)
NEUTS BAND NFR BLD MANUAL: 2 %
NITRITE UR QL STRIP: NEGATIVE
NITRITE UR QL STRIP: NEGATIVE
NON-SQ EPI CELLS #/AREA URNS AUTO: 1 /HPF
NRBC BLD-RTO: 0 /100 WBC
OVALOCYTES BLD QL SMEAR: ABNORMAL
PCO2 BLDA: 27.9 MMHG (ref 35–45)
PCO2 BLDA: 30.5 MMHG (ref 35–45)
PCO2 BLDA: 32.4 MMHG (ref 35–45)
PCO2 BLDA: 34.5 MMHG (ref 35–45)
PCO2 BLDA: 36.4 MMHG (ref 35–45)
PH SMN: 7.17 [PH] (ref 7.35–7.45)
PH SMN: 7.3 [PH] (ref 7.35–7.45)
PH SMN: 7.33 [PH] (ref 7.35–7.45)
PH SMN: 7.49 [PH] (ref 7.35–7.45)
PH SMN: 7.52 [PH] (ref 7.35–7.45)
PH UR STRIP: 6 [PH] (ref 5–8)
PH UR STRIP: 7 [PH] (ref 5–8)
PHOSPHATE SERPL-MCNC: 1.6 MG/DL (ref 2.7–4.5)
PHOSPHATE SERPL-MCNC: 2.2 MG/DL (ref 2.7–4.5)
PHOSPHATE SERPL-MCNC: 2.5 MG/DL (ref 2.7–4.5)
PHOSPHATE SERPL-MCNC: 2.7 MG/DL (ref 2.7–4.5)
PHOSPHATE SERPL-MCNC: 2.9 MG/DL (ref 2.7–4.5)
PHOSPHATE SERPL-MCNC: 2.9 MG/DL (ref 2.7–4.5)
PHOSPHATE SERPL-MCNC: 3.1 MG/DL (ref 2.7–4.5)
PHOSPHATE SERPL-MCNC: 3.1 MG/DL (ref 2.7–4.5)
PHOSPHATE SERPL-MCNC: 3.2 MG/DL (ref 2.7–4.5)
PHOSPHATE SERPL-MCNC: 3.3 MG/DL (ref 2.7–4.5)
PHOSPHATE SERPL-MCNC: 3.7 MG/DL (ref 2.7–4.5)
PHOSPHATE SERPL-MCNC: 3.8 MG/DL (ref 2.7–4.5)
PHOSPHATE SERPL-MCNC: 4.2 MG/DL (ref 2.7–4.5)
PHOSPHATE SERPL-MCNC: 4.4 MG/DL (ref 2.7–4.5)
PHOSPHATE SERPL-MCNC: 4.4 MG/DL (ref 2.7–4.5)
PHOSPHATE SERPL-MCNC: 4.5 MG/DL (ref 2.7–4.5)
PHOSPHATE SERPL-MCNC: 4.6 MG/DL (ref 2.7–4.5)
PHOSPHATE SERPL-MCNC: 4.8 MG/DL (ref 2.7–4.5)
PHOSPHATE SERPL-MCNC: 4.9 MG/DL (ref 2.7–4.5)
PHOSPHATE SERPL-MCNC: 4.9 MG/DL (ref 2.7–4.5)
PHOSPHATE SERPL-MCNC: 5 MG/DL (ref 2.7–4.5)
PHOSPHATE SERPL-MCNC: 5.1 MG/DL (ref 2.7–4.5)
PHOSPHATE SERPL-MCNC: 5.2 MG/DL (ref 2.7–4.5)
PHOSPHATE SERPL-MCNC: 5.4 MG/DL (ref 2.7–4.5)
PHOSPHATE SERPL-MCNC: 5.5 MG/DL (ref 2.7–4.5)
PHOSPHATE SERPL-MCNC: 5.9 MG/DL (ref 2.7–4.5)
PHOSPHATE SERPL-MCNC: 6.1 MG/DL (ref 2.7–4.5)
PHOSPHATE SERPL-MCNC: 7.6 MG/DL (ref 2.7–4.5)
PISA TR MAX VEL: 1.62 M/S
PISA TR MAX VEL: 2.41 M/S
PLATELET # BLD AUTO: 142 K/UL (ref 150–350)
PLATELET # BLD AUTO: 148 K/UL (ref 150–350)
PLATELET # BLD AUTO: 149 K/UL (ref 150–350)
PLATELET # BLD AUTO: 151 K/UL (ref 150–350)
PLATELET # BLD AUTO: 152 K/UL (ref 150–350)
PLATELET # BLD AUTO: 155 K/UL (ref 150–350)
PLATELET # BLD AUTO: 156 K/UL (ref 150–350)
PLATELET # BLD AUTO: 157 K/UL (ref 150–350)
PLATELET # BLD AUTO: 163 K/UL (ref 150–350)
PLATELET # BLD AUTO: 164 K/UL (ref 150–350)
PLATELET # BLD AUTO: 167 K/UL (ref 150–350)
PLATELET # BLD AUTO: 168 K/UL (ref 150–350)
PLATELET # BLD AUTO: 168 K/UL (ref 150–350)
PLATELET # BLD AUTO: 172 K/UL (ref 150–350)
PLATELET # BLD AUTO: 183 K/UL (ref 150–350)
PLATELET # BLD AUTO: 192 K/UL (ref 150–350)
PLATELET # BLD AUTO: 201 K/UL (ref 150–350)
PLATELET # BLD AUTO: 212 K/UL (ref 150–350)
PLATELET # BLD AUTO: 229 K/UL (ref 150–350)
PLATELET # BLD AUTO: 229 K/UL (ref 150–350)
PLATELET # BLD AUTO: 231 K/UL (ref 150–350)
PLATELET # BLD AUTO: 239 K/UL (ref 150–350)
PLATELET # BLD AUTO: 242 K/UL (ref 150–350)
PLATELET # BLD AUTO: 253 K/UL (ref 150–350)
PLATELET # BLD AUTO: 253 K/UL (ref 150–350)
PLATELET # BLD AUTO: 255 K/UL (ref 150–350)
PLATELET # BLD AUTO: 259 K/UL (ref 150–350)
PLATELET # BLD AUTO: 264 K/UL (ref 150–350)
PLATELET # BLD AUTO: 268 K/UL (ref 150–350)
PLATELET # BLD AUTO: 278 K/UL (ref 150–350)
PLATELET # BLD AUTO: 280 K/UL (ref 150–350)
PLATELET # BLD AUTO: 290 K/UL (ref 150–350)
PLATELET # BLD AUTO: 315 K/UL (ref 150–350)
PLATELET # BLD AUTO: 65 K/UL (ref 150–350)
PLATELET # BLD AUTO: 69 K/UL (ref 150–350)
PLATELET BLD QL SMEAR: ABNORMAL
PMV BLD AUTO: 10.6 FL (ref 9.2–12.9)
PMV BLD AUTO: 10.6 FL (ref 9.2–12.9)
PMV BLD AUTO: 10.8 FL (ref 9.2–12.9)
PMV BLD AUTO: 10.9 FL (ref 9.2–12.9)
PMV BLD AUTO: 11 FL (ref 9.2–12.9)
PMV BLD AUTO: 11.1 FL (ref 9.2–12.9)
PMV BLD AUTO: 11.4 FL (ref 9.2–12.9)
PMV BLD AUTO: 11.5 FL (ref 9.2–12.9)
PMV BLD AUTO: 11.6 FL (ref 9.2–12.9)
PMV BLD AUTO: 11.7 FL (ref 9.2–12.9)
PMV BLD AUTO: 11.8 FL (ref 9.2–12.9)
PMV BLD AUTO: 11.8 FL (ref 9.2–12.9)
PMV BLD AUTO: 11.9 FL (ref 9.2–12.9)
PMV BLD AUTO: 12.1 FL (ref 9.2–12.9)
PMV BLD AUTO: 12.2 FL (ref 9.2–12.9)
PMV BLD AUTO: 12.2 FL (ref 9.2–12.9)
PMV BLD AUTO: 12.3 FL (ref 9.2–12.9)
PMV BLD AUTO: 12.4 FL (ref 9.2–12.9)
PMV BLD AUTO: 12.4 FL (ref 9.2–12.9)
PMV BLD AUTO: 12.5 FL (ref 9.2–12.9)
PMV BLD AUTO: 12.9 FL (ref 9.2–12.9)
PO2 BLDA: 120 MMHG (ref 80–100)
PO2 BLDA: 21 MMHG (ref 40–60)
PO2 BLDA: 26 MMHG (ref 40–60)
PO2 BLDA: 28 MMHG (ref 40–60)
PO2 BLDA: 62 MMHG (ref 80–100)
POC ACTIVATED CLOTTING TIME K: 142 SEC (ref 74–137)
POC ACTIVATED CLOTTING TIME K: 153 SEC (ref 74–137)
POC ACTIVATED CLOTTING TIME K: 169 SEC (ref 74–137)
POC ACTIVATED CLOTTING TIME K: 197 SEC (ref 74–137)
POC ACTIVATED CLOTTING TIME K: 219 SEC (ref 74–137)
POC ACTIVATED CLOTTING TIME K: 263 SEC (ref 74–137)
POC ACTIVATED CLOTTING TIME K: 301 SEC (ref 74–137)
POC BE: -17 MMOL/L
POC BE: -7 MMOL/L
POC BE: -9 MMOL/L
POC BE: 0 MMOL/L
POC BE: 0 MMOL/L
POC IONIZED CALCIUM: 0.88 MMOL/L (ref 1.06–1.42)
POC SATURATED O2: 24 % (ref 95–100)
POC SATURATED O2: 45 % (ref 95–100)
POC SATURATED O2: 46 % (ref 95–100)
POC SATURATED O2: 94 % (ref 95–100)
POC SATURATED O2: 99 % (ref 95–100)
POC TCO2 (MEASURED): 15 MMOL/L (ref 23–29)
POC TCO2: 13 MMOL/L (ref 24–29)
POC TCO2: 18 MMOL/L (ref 24–29)
POC TCO2: 20 MMOL/L (ref 24–29)
POC TCO2: 24 MMOL/L (ref 23–27)
POC TCO2: 24 MMOL/L (ref 23–27)
POCT GLUCOSE: 101 MG/DL (ref 70–110)
POCT GLUCOSE: 101 MG/DL (ref 70–110)
POCT GLUCOSE: 102 MG/DL (ref 70–110)
POCT GLUCOSE: 103 MG/DL (ref 70–110)
POCT GLUCOSE: 104 MG/DL (ref 70–110)
POCT GLUCOSE: 107 MG/DL (ref 70–110)
POCT GLUCOSE: 110 MG/DL (ref 70–110)
POCT GLUCOSE: 112 MG/DL (ref 70–110)
POCT GLUCOSE: 116 MG/DL (ref 70–110)
POCT GLUCOSE: 118 MG/DL (ref 70–110)
POCT GLUCOSE: 118 MG/DL (ref 70–110)
POCT GLUCOSE: 119 MG/DL (ref 70–110)
POCT GLUCOSE: 119 MG/DL (ref 70–110)
POCT GLUCOSE: 121 MG/DL (ref 70–110)
POCT GLUCOSE: 123 MG/DL (ref 70–110)
POCT GLUCOSE: 125 MG/DL (ref 70–110)
POCT GLUCOSE: 129 MG/DL (ref 70–110)
POCT GLUCOSE: 130 MG/DL (ref 70–110)
POCT GLUCOSE: 133 MG/DL (ref 70–110)
POCT GLUCOSE: 133 MG/DL (ref 70–110)
POCT GLUCOSE: 136 MG/DL (ref 70–110)
POCT GLUCOSE: 139 MG/DL (ref 70–110)
POCT GLUCOSE: 141 MG/DL (ref 70–110)
POCT GLUCOSE: 144 MG/DL (ref 70–110)
POCT GLUCOSE: 144 MG/DL (ref 70–110)
POCT GLUCOSE: 151 MG/DL (ref 70–110)
POCT GLUCOSE: 151 MG/DL (ref 70–110)
POCT GLUCOSE: 152 MG/DL (ref 70–110)
POCT GLUCOSE: 154 MG/DL (ref 70–110)
POCT GLUCOSE: 154 MG/DL (ref 70–110)
POCT GLUCOSE: 157 MG/DL (ref 70–110)
POCT GLUCOSE: 157 MG/DL (ref 70–110)
POCT GLUCOSE: 160 MG/DL (ref 70–110)
POCT GLUCOSE: 164 MG/DL (ref 70–110)
POCT GLUCOSE: 172 MG/DL (ref 70–110)
POCT GLUCOSE: 178 MG/DL (ref 70–110)
POCT GLUCOSE: 179 MG/DL (ref 70–110)
POCT GLUCOSE: 179 MG/DL (ref 70–110)
POCT GLUCOSE: 181 MG/DL (ref 70–110)
POCT GLUCOSE: 183 MG/DL (ref 70–110)
POCT GLUCOSE: 185 MG/DL (ref 70–110)
POCT GLUCOSE: 186 MG/DL (ref 70–110)
POCT GLUCOSE: 186 MG/DL (ref 70–110)
POCT GLUCOSE: 188 MG/DL (ref 70–110)
POCT GLUCOSE: 188 MG/DL (ref 70–110)
POCT GLUCOSE: 195 MG/DL (ref 70–110)
POCT GLUCOSE: 196 MG/DL (ref 70–110)
POCT GLUCOSE: 198 MG/DL (ref 70–110)
POCT GLUCOSE: 202 MG/DL (ref 70–110)
POCT GLUCOSE: 205 MG/DL (ref 70–110)
POCT GLUCOSE: 205 MG/DL (ref 70–110)
POCT GLUCOSE: 207 MG/DL (ref 70–110)
POCT GLUCOSE: 207 MG/DL (ref 70–110)
POCT GLUCOSE: 210 MG/DL (ref 70–110)
POCT GLUCOSE: 215 MG/DL (ref 70–110)
POCT GLUCOSE: 216 MG/DL (ref 70–110)
POCT GLUCOSE: 218 MG/DL (ref 70–110)
POCT GLUCOSE: 218 MG/DL (ref 70–110)
POCT GLUCOSE: 219 MG/DL (ref 70–110)
POCT GLUCOSE: 220 MG/DL (ref 70–110)
POCT GLUCOSE: 221 MG/DL (ref 70–110)
POCT GLUCOSE: 225 MG/DL (ref 70–110)
POCT GLUCOSE: 225 MG/DL (ref 70–110)
POCT GLUCOSE: 226 MG/DL (ref 70–110)
POCT GLUCOSE: 226 MG/DL (ref 70–110)
POCT GLUCOSE: 228 MG/DL (ref 70–110)
POCT GLUCOSE: 231 MG/DL (ref 70–110)
POCT GLUCOSE: 233 MG/DL (ref 70–110)
POCT GLUCOSE: 235 MG/DL (ref 70–110)
POCT GLUCOSE: 236 MG/DL (ref 70–110)
POCT GLUCOSE: 239 MG/DL (ref 70–110)
POCT GLUCOSE: 241 MG/DL (ref 70–110)
POCT GLUCOSE: 242 MG/DL (ref 70–110)
POCT GLUCOSE: 243 MG/DL (ref 70–110)
POCT GLUCOSE: 244 MG/DL (ref 70–110)
POCT GLUCOSE: 250 MG/DL (ref 70–110)
POCT GLUCOSE: 250 MG/DL (ref 70–110)
POCT GLUCOSE: 252 MG/DL (ref 70–110)
POCT GLUCOSE: 255 MG/DL (ref 70–110)
POCT GLUCOSE: 256 MG/DL (ref 70–110)
POCT GLUCOSE: 257 MG/DL (ref 70–110)
POCT GLUCOSE: 257 MG/DL (ref 70–110)
POCT GLUCOSE: 261 MG/DL (ref 70–110)
POCT GLUCOSE: 262 MG/DL (ref 70–110)
POCT GLUCOSE: 262 MG/DL (ref 70–110)
POCT GLUCOSE: 266 MG/DL (ref 70–110)
POCT GLUCOSE: 274 MG/DL (ref 70–110)
POCT GLUCOSE: 276 MG/DL (ref 70–110)
POCT GLUCOSE: 280 MG/DL (ref 70–110)
POCT GLUCOSE: 283 MG/DL (ref 70–110)
POCT GLUCOSE: 288 MG/DL (ref 70–110)
POCT GLUCOSE: 290 MG/DL (ref 70–110)
POCT GLUCOSE: 294 MG/DL (ref 70–110)
POCT GLUCOSE: 295 MG/DL (ref 70–110)
POCT GLUCOSE: 298 MG/DL (ref 70–110)
POCT GLUCOSE: 300 MG/DL (ref 70–110)
POCT GLUCOSE: 304 MG/DL (ref 70–110)
POCT GLUCOSE: 307 MG/DL (ref 70–110)
POCT GLUCOSE: 308 MG/DL (ref 70–110)
POCT GLUCOSE: 311 MG/DL (ref 70–110)
POCT GLUCOSE: 312 MG/DL (ref 70–110)
POCT GLUCOSE: 318 MG/DL (ref 70–110)
POCT GLUCOSE: 318 MG/DL (ref 70–110)
POCT GLUCOSE: 320 MG/DL (ref 70–110)
POCT GLUCOSE: 324 MG/DL (ref 70–110)
POCT GLUCOSE: 329 MG/DL (ref 70–110)
POCT GLUCOSE: 329 MG/DL (ref 70–110)
POCT GLUCOSE: 330 MG/DL (ref 70–110)
POCT GLUCOSE: 331 MG/DL (ref 70–110)
POCT GLUCOSE: 333 MG/DL (ref 70–110)
POCT GLUCOSE: 334 MG/DL (ref 70–110)
POCT GLUCOSE: 341 MG/DL (ref 70–110)
POCT GLUCOSE: 348 MG/DL (ref 70–110)
POCT GLUCOSE: 348 MG/DL (ref 70–110)
POCT GLUCOSE: 350 MG/DL (ref 70–110)
POCT GLUCOSE: 359 MG/DL (ref 70–110)
POCT GLUCOSE: 359 MG/DL (ref 70–110)
POCT GLUCOSE: 360 MG/DL (ref 70–110)
POCT GLUCOSE: 360 MG/DL (ref 70–110)
POCT GLUCOSE: 361 MG/DL (ref 70–110)
POCT GLUCOSE: 362 MG/DL (ref 70–110)
POCT GLUCOSE: 37 MG/DL (ref 70–110)
POCT GLUCOSE: 374 MG/DL (ref 70–110)
POCT GLUCOSE: 376 MG/DL (ref 70–110)
POCT GLUCOSE: 378 MG/DL (ref 70–110)
POCT GLUCOSE: 385 MG/DL (ref 70–110)
POCT GLUCOSE: 402 MG/DL (ref 70–110)
POCT GLUCOSE: 425 MG/DL (ref 70–110)
POCT GLUCOSE: 43 MG/DL (ref 70–110)
POCT GLUCOSE: 43 MG/DL (ref 70–110)
POCT GLUCOSE: 445 MG/DL (ref 70–110)
POCT GLUCOSE: 449 MG/DL (ref 70–110)
POCT GLUCOSE: 453 MG/DL (ref 70–110)
POCT GLUCOSE: 460 MG/DL (ref 70–110)
POCT GLUCOSE: 47 MG/DL (ref 70–110)
POCT GLUCOSE: 473 MG/DL (ref 70–110)
POCT GLUCOSE: 474 MG/DL (ref 70–110)
POCT GLUCOSE: 484 MG/DL (ref 70–110)
POCT GLUCOSE: 495 MG/DL (ref 70–110)
POCT GLUCOSE: 52 MG/DL (ref 70–110)
POCT GLUCOSE: 52 MG/DL (ref 70–110)
POCT GLUCOSE: 53 MG/DL (ref 70–110)
POCT GLUCOSE: 56 MG/DL (ref 70–110)
POCT GLUCOSE: 82 MG/DL (ref 70–110)
POCT GLUCOSE: 82 MG/DL (ref 70–110)
POCT GLUCOSE: 83 MG/DL (ref 70–110)
POCT GLUCOSE: 84 MG/DL (ref 70–110)
POCT GLUCOSE: 85 MG/DL (ref 70–110)
POCT GLUCOSE: 88 MG/DL (ref 70–110)
POCT GLUCOSE: 89 MG/DL (ref 70–110)
POCT GLUCOSE: 90 MG/DL (ref 70–110)
POCT GLUCOSE: <20 MG/DL (ref 70–110)
POCT GLUCOSE: >500 MG/DL (ref 70–110)
POIKILOCYTOSIS BLD QL SMEAR: SLIGHT
POLYCHROMASIA BLD QL SMEAR: ABNORMAL
POTASSIUM BLD-SCNC: 3.9 MMOL/L (ref 3.5–5.1)
POTASSIUM SERPL-SCNC: 2.9 MMOL/L (ref 3.5–5.1)
POTASSIUM SERPL-SCNC: 3.3 MMOL/L (ref 3.5–5.1)
POTASSIUM SERPL-SCNC: 3.6 MMOL/L (ref 3.5–5.1)
POTASSIUM SERPL-SCNC: 3.6 MMOL/L (ref 3.5–5.1)
POTASSIUM SERPL-SCNC: 3.7 MMOL/L (ref 3.5–5.1)
POTASSIUM SERPL-SCNC: 3.8 MMOL/L (ref 3.5–5.1)
POTASSIUM SERPL-SCNC: 3.9 MMOL/L (ref 3.5–5.1)
POTASSIUM SERPL-SCNC: 4 MMOL/L (ref 3.5–5.1)
POTASSIUM SERPL-SCNC: 4 MMOL/L (ref 3.5–5.1)
POTASSIUM SERPL-SCNC: 4.1 MMOL/L (ref 3.5–5.1)
POTASSIUM SERPL-SCNC: 4.2 MMOL/L (ref 3.5–5.1)
POTASSIUM SERPL-SCNC: 4.3 MMOL/L (ref 3.5–5.1)
POTASSIUM SERPL-SCNC: 4.4 MMOL/L (ref 3.5–5.1)
POTASSIUM SERPL-SCNC: 4.5 MMOL/L (ref 3.5–5.1)
POTASSIUM SERPL-SCNC: 4.5 MMOL/L (ref 3.5–5.1)
POTASSIUM SERPL-SCNC: 4.6 MMOL/L (ref 3.5–5.1)
POTASSIUM SERPL-SCNC: 4.7 MMOL/L (ref 3.5–5.1)
POTASSIUM SERPL-SCNC: 4.8 MMOL/L (ref 3.5–5.1)
POTASSIUM SERPL-SCNC: 4.8 MMOL/L (ref 3.5–5.1)
POTASSIUM SERPL-SCNC: 4.9 MMOL/L (ref 3.5–5.1)
POTASSIUM SERPL-SCNC: 4.9 MMOL/L (ref 3.5–5.1)
POTASSIUM SERPL-SCNC: 5 MMOL/L (ref 3.5–5.1)
POTASSIUM SERPL-SCNC: 5.1 MMOL/L (ref 3.5–5.1)
POTASSIUM SERPL-SCNC: 5.2 MMOL/L (ref 3.5–5.1)
POTASSIUM SERPL-SCNC: 5.5 MMOL/L (ref 3.5–5.1)
POTASSIUM SERPL-SCNC: 5.6 MMOL/L (ref 3.5–5.1)
POTASSIUM SERPL-SCNC: 5.7 MMOL/L (ref 3.5–5.1)
POTASSIUM SERPL-SCNC: 5.7 MMOL/L (ref 3.5–5.1)
POTASSIUM SERPL-SCNC: 5.8 MMOL/L (ref 3.5–5.1)
POTASSIUM SERPL-SCNC: 6.8 MMOL/L (ref 3.5–5.1)
PROCALCITONIN SERPL IA-MCNC: 10.98 NG/ML
PROT SERPL-MCNC: 5 G/DL (ref 6–8.4)
PROT SERPL-MCNC: 5.8 G/DL (ref 6–8.4)
PROT SERPL-MCNC: 6.5 G/DL (ref 6–8.4)
PROT SERPL-MCNC: 6.6 G/DL (ref 6–8.4)
PROT SERPL-MCNC: 6.8 G/DL (ref 6–8.4)
PROT SERPL-MCNC: 8 G/DL (ref 6–8.4)
PROT SERPL-MCNC: 8.7 G/DL (ref 6–8.4)
PROT UR QL STRIP: ABNORMAL
PROT UR QL STRIP: ABNORMAL
PROTHROMBIN TIME: 10.1 SEC (ref 9–12.5)
PROTHROMBIN TIME: 10.3 SEC (ref 9–12.5)
PROTHROMBIN TIME: 11.1 SEC (ref 9–12.5)
PROTHROMBIN TIME: 11.8 SEC (ref 9–12.5)
PROTHROMBIN TIME: 13.2 SEC (ref 9–12.5)
PULM VEIN S/D RATIO: 1
PULM VEIN S/D RATIO: 2.27
PV PEAK D VEL: 0.25 M/S
PV PEAK D VEL: 0.3 M/S
PV PEAK S VEL: 0.25 M/S
PV PEAK S VEL: 0.68 M/S
PV PEAK VELOCITY: 0.6 CM/S
RA MAJOR: 3.18 CM
RA MAJOR: 4.87 CM
RA PRESSURE: 3 MMHG
RA PRESSURE: 3 MMHG
RA WIDTH: 3.06 CM
RA WIDTH: 3.33 CM
RBC # BLD AUTO: 1.95 M/UL (ref 4.6–6.2)
RBC # BLD AUTO: 2.04 M/UL (ref 4.6–6.2)
RBC # BLD AUTO: 2.1 M/UL (ref 4.6–6.2)
RBC # BLD AUTO: 2.25 M/UL (ref 4.6–6.2)
RBC # BLD AUTO: 2.34 M/UL (ref 4.6–6.2)
RBC # BLD AUTO: 2.38 M/UL (ref 4.6–6.2)
RBC # BLD AUTO: 2.57 M/UL (ref 4.6–6.2)
RBC # BLD AUTO: 2.66 M/UL (ref 4.6–6.2)
RBC # BLD AUTO: 2.68 M/UL (ref 4.6–6.2)
RBC # BLD AUTO: 2.73 M/UL (ref 4.6–6.2)
RBC # BLD AUTO: 2.73 M/UL (ref 4.6–6.2)
RBC # BLD AUTO: 2.78 M/UL (ref 4.6–6.2)
RBC # BLD AUTO: 2.88 M/UL (ref 4.6–6.2)
RBC # BLD AUTO: 2.89 M/UL (ref 4.6–6.2)
RBC # BLD AUTO: 2.91 M/UL (ref 4.6–6.2)
RBC # BLD AUTO: 2.92 M/UL (ref 4.6–6.2)
RBC # BLD AUTO: 2.97 M/UL (ref 4.6–6.2)
RBC # BLD AUTO: 3.11 M/UL (ref 4.6–6.2)
RBC # BLD AUTO: 3.15 M/UL (ref 4.6–6.2)
RBC # BLD AUTO: 3.2 M/UL (ref 4.6–6.2)
RBC # BLD AUTO: 3.22 M/UL (ref 4.6–6.2)
RBC # BLD AUTO: 3.27 M/UL (ref 4.6–6.2)
RBC # BLD AUTO: 3.29 M/UL (ref 4.6–6.2)
RBC # BLD AUTO: 3.32 M/UL (ref 4.6–6.2)
RBC # BLD AUTO: 3.34 M/UL (ref 4.6–6.2)
RBC # BLD AUTO: 3.35 M/UL (ref 4.6–6.2)
RBC # BLD AUTO: 3.44 M/UL (ref 4.6–6.2)
RBC # BLD AUTO: 3.44 M/UL (ref 4.6–6.2)
RBC # BLD AUTO: 3.55 M/UL (ref 4.6–6.2)
RBC # BLD AUTO: 3.57 M/UL (ref 4.6–6.2)
RBC # BLD AUTO: 3.76 M/UL (ref 4.6–6.2)
RBC # BLD AUTO: 3.77 M/UL (ref 4.6–6.2)
RBC # BLD AUTO: 4.19 M/UL (ref 4.6–6.2)
RBC # BLD AUTO: 4.81 M/UL (ref 4.6–6.2)
RBC # BLD AUTO: 5 M/UL (ref 4.6–6.2)
RBC #/AREA URNS AUTO: >100 /HPF (ref 0–4)
RBC #/AREA URNS HPF: 5 /HPF (ref 0–4)
RESPIRATORY INFECTION PANEL SOURCE: NORMAL
RIGHT VENTRICULAR END-DIASTOLIC DIMENSION: 2.48 CM
RIGHT VENTRICULAR END-DIASTOLIC DIMENSION: 3.45 CM
RSV RNA NPH QL NAA+NON-PROBE: NOT DETECTED
RV TISSUE DOPPLER FREE WALL SYSTOLIC VELOCITY 1 (APICAL 4 CHAMBER VIEW): 7.01 CM/S
RV TISSUE DOPPLER FREE WALL SYSTOLIC VELOCITY 1 (APICAL 4 CHAMBER VIEW): 7.14 CM/S
RV+EV RNA NPH QL NAA+NON-PROBE: NOT DETECTED
SAMPLE: ABNORMAL
SARS-COV-2 RDRP RESP QL NAA+PROBE: NEGATIVE
SATURATED IRON: 25 % (ref 20–50)
SCHISTOCYTES BLD QL SMEAR: ABNORMAL
SINUS: 3.88 CM
SINUS: 3.97 CM
SITE: ABNORMAL
SODIUM BLD-SCNC: 131 MMOL/L (ref 136–145)
SODIUM SERPL-SCNC: 131 MMOL/L (ref 136–145)
SODIUM SERPL-SCNC: 132 MMOL/L (ref 136–145)
SODIUM SERPL-SCNC: 133 MMOL/L (ref 136–145)
SODIUM SERPL-SCNC: 134 MMOL/L (ref 136–145)
SODIUM SERPL-SCNC: 135 MMOL/L (ref 136–145)
SODIUM SERPL-SCNC: 136 MMOL/L (ref 136–145)
SODIUM SERPL-SCNC: 137 MMOL/L (ref 136–145)
SODIUM SERPL-SCNC: 138 MMOL/L (ref 136–145)
SODIUM SERPL-SCNC: 139 MMOL/L (ref 136–145)
SODIUM SERPL-SCNC: 140 MMOL/L (ref 136–145)
SODIUM SERPL-SCNC: 142 MMOL/L (ref 136–145)
SP GR UR STRIP: 1.01 (ref 1–1.03)
SP GR UR STRIP: 1.01 (ref 1–1.03)
SP02: 96
SP02: 99
SPECIMEN SOURCE AND ORGANISM ID: ABNORMAL
SPONT RATE: 25
SQUAMOUS #/AREA URNS AUTO: 6 /HPF
STJ: 2.87 CM
STJ: 3.31 CM
TARGETS BLD QL SMEAR: ABNORMAL
TDI LATERAL: 0.09 M/S
TDI SEPTAL: 0.05 M/S
TDI: 0.07 M/S
TOTAL IRON BINDING CAPACITY: 118 UG/DL (ref 250–450)
TOXIC GRANULES BLD QL SMEAR: PRESENT
TR MAX PG: 10 MMHG
TR MAX PG: 23 MMHG
TRANSFERRIN SERPL-MCNC: 80 MG/DL (ref 200–375)
TRICUSPID ANNULAR PLANE SYSTOLIC EXCURSION: 1.04 CM
TRICUSPID ANNULAR PLANE SYSTOLIC EXCURSION: 1.49 CM
TROPONIN I SERPL DL<=0.01 NG/ML-MCNC: 0.04 NG/ML (ref 0–0.03)
TROPONIN I SERPL DL<=0.01 NG/ML-MCNC: 0.05 NG/ML (ref 0–0.03)
TROPONIN I SERPL DL<=0.01 NG/ML-MCNC: 0.21 NG/ML (ref 0–0.03)
TROPONIN I SERPL DL<=0.01 NG/ML-MCNC: 0.38 NG/ML (ref 0–0.03)
TROPONIN I SERPL DL<=0.01 NG/ML-MCNC: 0.42 NG/ML (ref 0–0.03)
TROPONIN I SERPL DL<=0.01 NG/ML-MCNC: 0.48 NG/ML (ref 0–0.03)
TROPONIN I SERPL DL<=0.01 NG/ML-MCNC: 18.34 NG/ML (ref 0–0.03)
TROPONIN I SERPL DL<=0.01 NG/ML-MCNC: 19.64 NG/ML (ref 0–0.03)
TROPONIN I SERPL DL<=0.01 NG/ML-MCNC: 25.77 NG/ML (ref 0–0.03)
TSH SERPL DL<=0.005 MIU/L-ACNC: 3.02 UIU/ML (ref 0.4–4)
TV REST PULMONARY ARTERY PRESSURE: 13 MMHG
TV REST PULMONARY ARTERY PRESSURE: 26 MMHG
URN SPEC COLLECT METH UR: ABNORMAL
URN SPEC COLLECT METH UR: ABNORMAL
UROBILINOGEN UR STRIP-ACNC: NEGATIVE EU/DL
VANCOMYCIN SERPL-MCNC: 11.5 UG/ML
VANCOMYCIN SERPL-MCNC: 12.7 UG/ML
VANCOMYCIN SERPL-MCNC: 15.4 UG/ML
VANCOMYCIN SERPL-MCNC: 16.6 UG/ML
VANCOMYCIN SERPL-MCNC: 17.3 UG/ML
VANCOMYCIN SERPL-MCNC: 18.8 UG/ML
VANCOMYCIN SERPL-MCNC: 21 UG/ML
VANCOMYCIN SERPL-MCNC: 21.3 UG/ML
VANCOMYCIN SERPL-MCNC: 25.8 UG/ML
WBC # BLD AUTO: 10.75 K/UL (ref 3.9–12.7)
WBC # BLD AUTO: 10.96 K/UL (ref 3.9–12.7)
WBC # BLD AUTO: 11.25 K/UL (ref 3.9–12.7)
WBC # BLD AUTO: 12.11 K/UL (ref 3.9–12.7)
WBC # BLD AUTO: 12.64 K/UL (ref 3.9–12.7)
WBC # BLD AUTO: 13.03 K/UL (ref 3.9–12.7)
WBC # BLD AUTO: 13.03 K/UL (ref 3.9–12.7)
WBC # BLD AUTO: 13.1 K/UL (ref 3.9–12.7)
WBC # BLD AUTO: 13.18 K/UL (ref 3.9–12.7)
WBC # BLD AUTO: 13.25 K/UL (ref 3.9–12.7)
WBC # BLD AUTO: 13.63 K/UL (ref 3.9–12.7)
WBC # BLD AUTO: 15.06 K/UL (ref 3.9–12.7)
WBC # BLD AUTO: 15.85 K/UL (ref 3.9–12.7)
WBC # BLD AUTO: 3.65 K/UL (ref 3.9–12.7)
WBC # BLD AUTO: 4.06 K/UL (ref 3.9–12.7)
WBC # BLD AUTO: 4.42 K/UL (ref 3.9–12.7)
WBC # BLD AUTO: 4.71 K/UL (ref 3.9–12.7)
WBC # BLD AUTO: 4.89 K/UL (ref 3.9–12.7)
WBC # BLD AUTO: 4.91 K/UL (ref 3.9–12.7)
WBC # BLD AUTO: 5.36 K/UL (ref 3.9–12.7)
WBC # BLD AUTO: 5.88 K/UL (ref 3.9–12.7)
WBC # BLD AUTO: 6.17 K/UL (ref 3.9–12.7)
WBC # BLD AUTO: 6.27 K/UL (ref 3.9–12.7)
WBC # BLD AUTO: 7.39 K/UL (ref 3.9–12.7)
WBC # BLD AUTO: 7.65 K/UL (ref 3.9–12.7)
WBC # BLD AUTO: 7.79 K/UL (ref 3.9–12.7)
WBC # BLD AUTO: 7.85 K/UL (ref 3.9–12.7)
WBC # BLD AUTO: 8.13 K/UL (ref 3.9–12.7)
WBC # BLD AUTO: 8.17 K/UL (ref 3.9–12.7)
WBC # BLD AUTO: 8.24 K/UL (ref 3.9–12.7)
WBC # BLD AUTO: 8.31 K/UL (ref 3.9–12.7)
WBC # BLD AUTO: 8.61 K/UL (ref 3.9–12.7)
WBC # BLD AUTO: 9.12 K/UL (ref 3.9–12.7)
WBC # BLD AUTO: 9.73 K/UL (ref 3.9–12.7)
WBC # BLD AUTO: 9.97 K/UL (ref 3.9–12.7)
WBC #/AREA URNS AUTO: >100 /HPF (ref 0–5)
WBC #/AREA URNS HPF: 100 /HPF (ref 0–5)
YEAST UR QL AUTO: ABNORMAL
YEAST URNS QL MICRO: ABNORMAL

## 2020-01-01 PROCEDURE — 20000000 HC ICU ROOM: Mod: HCNC

## 2020-01-01 PROCEDURE — 25000003 PHARM REV CODE 250: Mod: HCNC | Performed by: NURSE PRACTITIONER

## 2020-01-01 PROCEDURE — 87040 BLOOD CULTURE FOR BACTERIA: CPT | Mod: 59,HCNC

## 2020-01-01 PROCEDURE — 25000003 PHARM REV CODE 250: Mod: HCNC | Performed by: INTERNAL MEDICINE

## 2020-01-01 PROCEDURE — 97530 THERAPEUTIC ACTIVITIES: CPT | Mod: HCNC

## 2020-01-01 PROCEDURE — 99024 POSTOP FOLLOW-UP VISIT: CPT | Mod: HCNC,,, | Performed by: SURGERY

## 2020-01-01 PROCEDURE — 63600175 PHARM REV CODE 636 W HCPCS: Mod: HCNC | Performed by: STUDENT IN AN ORGANIZED HEALTH CARE EDUCATION/TRAINING PROGRAM

## 2020-01-01 PROCEDURE — 93005 ELECTROCARDIOGRAM TRACING: CPT | Mod: HCNC

## 2020-01-01 PROCEDURE — 84484 ASSAY OF TROPONIN QUANT: CPT | Mod: HCNC

## 2020-01-01 PROCEDURE — 83605 ASSAY OF LACTIC ACID: CPT | Mod: HCNC

## 2020-01-01 PROCEDURE — 99232 SBSQ HOSP IP/OBS MODERATE 35: CPT | Mod: HCNC,GC,, | Performed by: INTERNAL MEDICINE

## 2020-01-01 PROCEDURE — 36415 COLL VENOUS BLD VENIPUNCTURE: CPT | Mod: HCNC

## 2020-01-01 PROCEDURE — 80202 ASSAY OF VANCOMYCIN: CPT | Mod: HCNC

## 2020-01-01 PROCEDURE — 99900035 HC TECH TIME PER 15 MIN (STAT): Mod: HCNC

## 2020-01-01 PROCEDURE — 82962 GLUCOSE BLOOD TEST: CPT | Mod: HCNC

## 2020-01-01 PROCEDURE — 71000033 HC RECOVERY, INTIAL HOUR: Mod: HCNC | Performed by: SURGERY

## 2020-01-01 PROCEDURE — 99233 SBSQ HOSP IP/OBS HIGH 50: CPT | Mod: HCNC,,, | Performed by: INTERNAL MEDICINE

## 2020-01-01 PROCEDURE — C1894 INTRO/SHEATH, NON-LASER: HCPCS | Mod: HCNC | Performed by: SURGERY

## 2020-01-01 PROCEDURE — 83735 ASSAY OF MAGNESIUM: CPT | Mod: HCNC

## 2020-01-01 PROCEDURE — 99024 POSTOP FOLLOW-UP VISIT: CPT | Mod: HCNC,S$GLB,, | Performed by: SURGERY

## 2020-01-01 PROCEDURE — 85025 COMPLETE CBC W/AUTO DIFF WBC: CPT | Mod: HCNC

## 2020-01-01 PROCEDURE — 27201423 OPTIME MED/SURG SUP & DEVICES STERILE SUPPLY: Mod: HCNC | Performed by: INTERNAL MEDICINE

## 2020-01-01 PROCEDURE — 99223 PR INITIAL HOSPITAL CARE,LEVL III: ICD-10-PCS | Mod: HCNC,,, | Performed by: EMERGENCY MEDICINE

## 2020-01-01 PROCEDURE — 80053 COMPREHEN METABOLIC PANEL: CPT | Mod: HCNC

## 2020-01-01 PROCEDURE — 85610 PROTHROMBIN TIME: CPT | Mod: HCNC

## 2020-01-01 PROCEDURE — 90945 DIALYSIS ONE EVALUATION: CPT | Mod: HCNC

## 2020-01-01 PROCEDURE — 63600175 PHARM REV CODE 636 W HCPCS: Mod: HCNC | Performed by: NURSE ANESTHETIST, CERTIFIED REGISTERED

## 2020-01-01 PROCEDURE — 63600175 PHARM REV CODE 636 W HCPCS: Mod: HCNC | Performed by: INTERNAL MEDICINE

## 2020-01-01 PROCEDURE — 87075 CULTR BACTERIA EXCEPT BLOOD: CPT | Mod: 59,HCNC

## 2020-01-01 PROCEDURE — 85652 RBC SED RATE AUTOMATED: CPT | Mod: HCNC

## 2020-01-01 PROCEDURE — 25000003 PHARM REV CODE 250: Mod: HCNC | Performed by: STUDENT IN AN ORGANIZED HEALTH CARE EDUCATION/TRAINING PROGRAM

## 2020-01-01 PROCEDURE — 25000003 PHARM REV CODE 250: Mod: HCNC | Performed by: HOSPITALIST

## 2020-01-01 PROCEDURE — 93010 ELECTROCARDIOGRAM REPORT: CPT | Mod: HCNC,,, | Performed by: INTERNAL MEDICINE

## 2020-01-01 PROCEDURE — 25000003 PHARM REV CODE 250: Mod: HCNC | Performed by: EMERGENCY MEDICINE

## 2020-01-01 PROCEDURE — 99233 PR SUBSEQUENT HOSPITAL CARE,LEVL III: ICD-10-PCS | Mod: HCNC,,, | Performed by: INTERNAL MEDICINE

## 2020-01-01 PROCEDURE — 99223 PR INITIAL HOSPITAL CARE,LEVL III: ICD-10-PCS | Mod: HCNC,,, | Performed by: INTERNAL MEDICINE

## 2020-01-01 PROCEDURE — 96375 TX/PRO/DX INJ NEW DRUG ADDON: CPT | Mod: HCNC

## 2020-01-01 PROCEDURE — C9399 UNCLASSIFIED DRUGS OR BIOLOG: HCPCS | Mod: HCNC | Performed by: HOSPITALIST

## 2020-01-01 PROCEDURE — 80048 BASIC METABOLIC PNL TOTAL CA: CPT | Mod: HCNC

## 2020-01-01 PROCEDURE — 84484 ASSAY OF TROPONIN QUANT: CPT | Mod: 91,HCNC

## 2020-01-01 PROCEDURE — 99285 EMERGENCY DEPT VISIT HI MDM: CPT | Mod: 25,HCNC

## 2020-01-01 PROCEDURE — 83036 HEMOGLOBIN GLYCOSYLATED A1C: CPT | Mod: HCNC

## 2020-01-01 PROCEDURE — 84100 ASSAY OF PHOSPHORUS: CPT | Mod: HCNC

## 2020-01-01 PROCEDURE — 83880 ASSAY OF NATRIURETIC PEPTIDE: CPT | Mod: HCNC

## 2020-01-01 PROCEDURE — 92920 PRQ TRLUML C ANGIOP 1ART&/BR: CPT | Mod: LC,HCNC | Performed by: INTERNAL MEDICINE

## 2020-01-01 PROCEDURE — 99495 TCM SERVICES (MODERATE COMPLEXITY): ICD-10-PCS | Mod: HCNC,S$GLB,, | Performed by: NURSE PRACTITIONER

## 2020-01-01 PROCEDURE — 96365 THER/PROPH/DIAG IV INF INIT: CPT | Mod: HCNC

## 2020-01-01 PROCEDURE — 97165 OT EVAL LOW COMPLEX 30 MIN: CPT | Mod: HCNC

## 2020-01-01 PROCEDURE — 11000001 HC ACUTE MED/SURG PRIVATE ROOM: Mod: HCNC

## 2020-01-01 PROCEDURE — 97535 SELF CARE MNGMENT TRAINING: CPT | Mod: HCNC

## 2020-01-01 PROCEDURE — 21400001 HC TELEMETRY ROOM: Mod: HCNC

## 2020-01-01 PROCEDURE — 93010 EKG 12-LEAD: ICD-10-PCS | Mod: HCNC,,, | Performed by: INTERNAL MEDICINE

## 2020-01-01 PROCEDURE — 99214 PR OFFICE/OUTPT VISIT, EST, LEVL IV, 30-39 MIN: ICD-10-PCS | Mod: S$GLB,,, | Performed by: INTERNAL MEDICINE

## 2020-01-01 PROCEDURE — 63600175 PHARM REV CODE 636 W HCPCS: Mod: HCNC | Performed by: EMERGENCY MEDICINE

## 2020-01-01 PROCEDURE — 63600175 PHARM REV CODE 636 W HCPCS: Mod: HCNC | Performed by: PEDIATRICS

## 2020-01-01 PROCEDURE — 87040 BLOOD CULTURE FOR BACTERIA: CPT | Mod: HCNC

## 2020-01-01 PROCEDURE — G0180 PR HOME HEALTH MD CERTIFICATION: ICD-10-PCS | Mod: ,,, | Performed by: HOSPITALIST

## 2020-01-01 PROCEDURE — 3046F PR MOST RECENT HEMOGLOBIN A1C LEVEL > 9.0%: ICD-10-PCS | Mod: CPTII,S$GLB,, | Performed by: INTERNAL MEDICINE

## 2020-01-01 PROCEDURE — 80048 BASIC METABOLIC PNL TOTAL CA: CPT | Mod: 91,HCNC

## 2020-01-01 PROCEDURE — 80069 RENAL FUNCTION PANEL: CPT | Mod: 91,HCNC

## 2020-01-01 PROCEDURE — 20600001 HC STEP DOWN PRIVATE ROOM: Mod: HCNC

## 2020-01-01 PROCEDURE — 83735 ASSAY OF MAGNESIUM: CPT | Mod: 91,HCNC

## 2020-01-01 PROCEDURE — 63600175 PHARM REV CODE 636 W HCPCS: Mod: HCNC | Performed by: NURSE PRACTITIONER

## 2020-01-01 PROCEDURE — 99499 RISK ADDL DX/OHS AUDIT: ICD-10-PCS | Mod: S$GLB,,, | Performed by: SURGERY

## 2020-01-01 PROCEDURE — U0002 COVID-19 LAB TEST NON-CDC: HCPCS | Mod: HCNC

## 2020-01-01 PROCEDURE — 27201423 OPTIME MED/SURG SUP & DEVICES STERILE SUPPLY: Mod: HCNC | Performed by: SURGERY

## 2020-01-01 PROCEDURE — 63600175 PHARM REV CODE 636 W HCPCS: Mod: HCNC | Performed by: ANESTHESIOLOGY

## 2020-01-01 PROCEDURE — 99285 PR EMERGENCY DEPT VISIT,LEVEL V: ICD-10-PCS | Mod: HCNC,,, | Performed by: EMERGENCY MEDICINE

## 2020-01-01 PROCEDURE — 84132 ASSAY OF SERUM POTASSIUM: CPT | Mod: HCNC

## 2020-01-01 PROCEDURE — 97161 PT EVAL LOW COMPLEX 20 MIN: CPT | Mod: HCNC

## 2020-01-01 PROCEDURE — C9399 UNCLASSIFIED DRUGS OR BIOLOG: HCPCS | Mod: HCNC | Performed by: STUDENT IN AN ORGANIZED HEALTH CARE EDUCATION/TRAINING PROGRAM

## 2020-01-01 PROCEDURE — 85730 THROMBOPLASTIN TIME PARTIAL: CPT | Mod: HCNC

## 2020-01-01 PROCEDURE — 99232 PR SUBSEQUENT HOSPITAL CARE,LEVL II: ICD-10-PCS | Mod: HCNC,,, | Performed by: INTERNAL MEDICINE

## 2020-01-01 PROCEDURE — 88304 PR  SURG PATH,LEVEL III: ICD-10-PCS | Mod: 26,HCNC,, | Performed by: PATHOLOGY

## 2020-01-01 PROCEDURE — 87102 FUNGUS ISOLATION CULTURE: CPT | Mod: HCNC

## 2020-01-01 PROCEDURE — D9220A PRA ANESTHESIA: Mod: HCNC,ANES,, | Performed by: ANESTHESIOLOGY

## 2020-01-01 PROCEDURE — 63600175 PHARM REV CODE 636 W HCPCS: Mod: HCNC | Performed by: HOSPITALIST

## 2020-01-01 PROCEDURE — 82962 GLUCOSE BLOOD TEST: CPT | Mod: HCNC | Performed by: SURGERY

## 2020-01-01 PROCEDURE — 99495 TRANSJ CARE MGMT MOD F2F 14D: CPT | Mod: HCNC,S$GLB,, | Performed by: NURSE PRACTITIONER

## 2020-01-01 PROCEDURE — 3046F HEMOGLOBIN A1C LEVEL >9.0%: CPT | Mod: CPTII,S$GLB,, | Performed by: INTERNAL MEDICINE

## 2020-01-01 PROCEDURE — 99204 PR OFFICE/OUTPT VISIT, NEW, LEVL IV, 45-59 MIN: ICD-10-PCS | Mod: HCNC,95,, | Performed by: PHYSICIAN ASSISTANT

## 2020-01-01 PROCEDURE — C9399 UNCLASSIFIED DRUGS OR BIOLOG: HCPCS | Mod: HCNC | Performed by: INTERNAL MEDICINE

## 2020-01-01 PROCEDURE — 63700000 PHARM REV CODE 250 ALT 637 W/O HCPCS: Mod: HCNC | Performed by: INTERNAL MEDICINE

## 2020-01-01 PROCEDURE — 90935 PR HEMODIALYSIS, ONE EVALUATION: ICD-10-PCS | Mod: HCNC,,, | Performed by: NURSE PRACTITIONER

## 2020-01-01 PROCEDURE — 88304 TISSUE EXAM BY PATHOLOGIST: CPT | Mod: HCNC | Performed by: PATHOLOGY

## 2020-01-01 PROCEDURE — 99291 CRITICAL CARE FIRST HOUR: CPT | Mod: HCNC,,, | Performed by: INTERNAL MEDICINE

## 2020-01-01 PROCEDURE — 80069 RENAL FUNCTION PANEL: CPT | Mod: HCNC

## 2020-01-01 PROCEDURE — 94761 N-INVAS EAR/PLS OXIMETRY MLT: CPT | Mod: HCNC

## 2020-01-01 PROCEDURE — 37000008 HC ANESTHESIA 1ST 15 MINUTES: Mod: HCNC | Performed by: SURGERY

## 2020-01-01 PROCEDURE — 99233 PR SUBSEQUENT HOSPITAL CARE,LEVL III: ICD-10-PCS | Mod: HCNC,,, | Performed by: STUDENT IN AN ORGANIZED HEALTH CARE EDUCATION/TRAINING PROGRAM

## 2020-01-01 PROCEDURE — 80100016 HC MAINTENANCE HEMODIALYSIS: Mod: HCNC

## 2020-01-01 PROCEDURE — 99222 PR INITIAL HOSPITAL CARE,LEVL II: ICD-10-PCS | Mod: HCNC,GC,, | Performed by: INTERNAL MEDICINE

## 2020-01-01 PROCEDURE — 37607 LIG/BANDING ANGIOACS AV FSTL: CPT | Mod: HCNC,,, | Performed by: SURGERY

## 2020-01-01 PROCEDURE — 99499 UNLISTED E&M SERVICE: CPT | Mod: HCNC,S$GLB,, | Performed by: NURSE PRACTITIONER

## 2020-01-01 PROCEDURE — D9220A PRA ANESTHESIA: ICD-10-PCS | Mod: HCNC,ANES,, | Performed by: ANESTHESIOLOGY

## 2020-01-01 PROCEDURE — C1753 CATH, INTRAVAS ULTRASOUND: HCPCS | Mod: HCNC | Performed by: INTERNAL MEDICINE

## 2020-01-01 PROCEDURE — 96366 THER/PROPH/DIAG IV INF ADDON: CPT | Mod: HCNC

## 2020-01-01 PROCEDURE — 99499 RISK ADDL DX/OHS AUDIT: ICD-10-PCS | Mod: S$GLB,,, | Performed by: NURSE PRACTITIONER

## 2020-01-01 PROCEDURE — 99232 SBSQ HOSP IP/OBS MODERATE 35: CPT | Mod: HCNC,,, | Performed by: INTERNAL MEDICINE

## 2020-01-01 PROCEDURE — 94660 CPAP INITIATION&MGMT: CPT | Mod: HCNC

## 2020-01-01 PROCEDURE — 85730 THROMBOPLASTIN TIME PARTIAL: CPT | Mod: 91,HCNC

## 2020-01-01 PROCEDURE — 99232 PR SUBSEQUENT HOSPITAL CARE,LEVL II: ICD-10-PCS | Mod: HCNC,GC,, | Performed by: INTERNAL MEDICINE

## 2020-01-01 PROCEDURE — 37000009 HC ANESTHESIA EA ADD 15 MINS: Mod: HCNC | Performed by: SURGERY

## 2020-01-01 PROCEDURE — 80100014 HC HEMODIALYSIS 1:1: Mod: HCNC

## 2020-01-01 PROCEDURE — 99291 CRITICAL CARE FIRST HOUR: CPT | Mod: HCNC,,, | Performed by: PHYSICIAN ASSISTANT

## 2020-01-01 PROCEDURE — 99024 PR POST-OP FOLLOW-UP VISIT: ICD-10-PCS | Mod: HCNC,S$GLB,, | Performed by: SURGERY

## 2020-01-01 PROCEDURE — 99499 UNLISTED E&M SERVICE: CPT | Mod: 95,,, | Performed by: PHYSICIAN ASSISTANT

## 2020-01-01 PROCEDURE — 99232 PR SUBSEQUENT HOSPITAL CARE,LEVL II: ICD-10-PCS | Mod: HCNC,,, | Performed by: NURSE PRACTITIONER

## 2020-01-01 PROCEDURE — 99222 1ST HOSP IP/OBS MODERATE 55: CPT | Mod: HCNC,GC,, | Performed by: INTERNAL MEDICINE

## 2020-01-01 PROCEDURE — 25000003 PHARM REV CODE 250: Mod: HCNC | Performed by: GENERAL PRACTICE

## 2020-01-01 PROCEDURE — 25000003 PHARM REV CODE 250: Mod: HCNC | Performed by: PHYSICIAN ASSISTANT

## 2020-01-01 PROCEDURE — 93458 PR CATH PLACE/CORON ANGIO, IMG SUPER/INTERP,W LEFT HEART VENTRICULOGRAPHY: ICD-10-PCS | Mod: 26,59,51,HCNC | Performed by: INTERNAL MEDICINE

## 2020-01-01 PROCEDURE — 25000003 PHARM REV CODE 250: Mod: HCNC | Performed by: SURGERY

## 2020-01-01 PROCEDURE — 36556 INSERT NON-TUNNEL CV CATH: CPT | Mod: HCNC

## 2020-01-01 PROCEDURE — 87086 URINE CULTURE/COLONY COUNT: CPT | Mod: HCNC

## 2020-01-01 PROCEDURE — 88305 TISSUE EXAM BY PATHOLOGIST: CPT | Mod: HCNC | Performed by: PATHOLOGY

## 2020-01-01 PROCEDURE — 99223 1ST HOSP IP/OBS HIGH 75: CPT | Mod: HCNC,,, | Performed by: INTERNAL MEDICINE

## 2020-01-01 PROCEDURE — 3008F PR BODY MASS INDEX (BMI) DOCUMENTED: ICD-10-PCS | Mod: CPTII,S$GLB,, | Performed by: INTERNAL MEDICINE

## 2020-01-01 PROCEDURE — 25000003 PHARM REV CODE 250: Mod: HCNC

## 2020-01-01 PROCEDURE — 87070 CULTURE OTHR SPECIMN AEROBIC: CPT | Mod: 59,HCNC

## 2020-01-01 PROCEDURE — 87206 SMEAR FLUORESCENT/ACID STAI: CPT | Mod: 91,HCNC

## 2020-01-01 PROCEDURE — 25000003 PHARM REV CODE 250: Mod: HCNC | Performed by: GENERAL ACUTE CARE HOSPITAL

## 2020-01-01 PROCEDURE — D9220A PRA ANESTHESIA: ICD-10-PCS | Mod: HCNC,CRNA,, | Performed by: NURSE ANESTHETIST, CERTIFIED REGISTERED

## 2020-01-01 PROCEDURE — 85025 COMPLETE CBC W/AUTO DIFF WBC: CPT | Mod: 91,HCNC

## 2020-01-01 PROCEDURE — 84145 PROCALCITONIN (PCT): CPT | Mod: HCNC

## 2020-01-01 PROCEDURE — 99152 MOD SED SAME PHYS/QHP 5/>YRS: CPT | Mod: HCNC | Performed by: INTERNAL MEDICINE

## 2020-01-01 PROCEDURE — 92610 EVALUATE SWALLOWING FUNCTION: CPT | Mod: HCNC

## 2020-01-01 PROCEDURE — 99291 PR CRITICAL CARE, E/M 30-74 MINUTES: ICD-10-PCS | Mod: HCNC,GC,, | Performed by: INTERNAL MEDICINE

## 2020-01-01 PROCEDURE — 99999 PR PBB SHADOW E&M-EST. PATIENT-LVL V: CPT | Mod: PBBFAC,HCNC,, | Performed by: NURSE PRACTITIONER

## 2020-01-01 PROCEDURE — 85027 COMPLETE CBC AUTOMATED: CPT | Mod: HCNC

## 2020-01-01 PROCEDURE — 97112 NEUROMUSCULAR REEDUCATION: CPT | Mod: HCNC

## 2020-01-01 PROCEDURE — 90935 HEMODIALYSIS ONE EVALUATION: CPT | Mod: HCNC,,, | Performed by: NURSE PRACTITIONER

## 2020-01-01 PROCEDURE — 85347 COAGULATION TIME ACTIVATED: CPT | Mod: HCNC

## 2020-01-01 PROCEDURE — 99024 PR POST-OP FOLLOW-UP VISIT: ICD-10-PCS | Mod: HCNC,,, | Performed by: SURGERY

## 2020-01-01 PROCEDURE — 99999 PR PBB SHADOW E&M-EST. PATIENT-LVL V: ICD-10-PCS | Mod: PBBFAC,HCNC,, | Performed by: NURSE PRACTITIONER

## 2020-01-01 PROCEDURE — 99238 HOSP IP/OBS DSCHRG MGMT 30/<: CPT | Mod: HCNC,GC,, | Performed by: HOSPITALIST

## 2020-01-01 PROCEDURE — 99291 PR CRITICAL CARE, E/M 30-74 MINUTES: ICD-10-PCS | Mod: HCNC,,, | Performed by: PHYSICIAN ASSISTANT

## 2020-01-01 PROCEDURE — 27000221 HC OXYGEN, UP TO 24 HOURS: Mod: HCNC

## 2020-01-01 PROCEDURE — 36000 PLACE NEEDLE IN VEIN: CPT | Mod: HCNC

## 2020-01-01 PROCEDURE — 63600175 PHARM REV CODE 636 W HCPCS: Mod: TB,HCNC | Performed by: NURSE PRACTITIONER

## 2020-01-01 PROCEDURE — 99291 CRITICAL CARE FIRST HOUR: CPT | Mod: HCNC,,, | Performed by: NURSE PRACTITIONER

## 2020-01-01 PROCEDURE — 96361 HYDRATE IV INFUSION ADD-ON: CPT | Mod: HCNC

## 2020-01-01 PROCEDURE — 99291 CRITICAL CARE FIRST HOUR: CPT | Mod: HCNC,25,, | Performed by: EMERGENCY MEDICINE

## 2020-01-01 PROCEDURE — 82728 ASSAY OF FERRITIN: CPT | Mod: HCNC

## 2020-01-01 PROCEDURE — 63600175 PHARM REV CODE 636 W HCPCS: Mod: HCNC | Performed by: PHYSICIAN ASSISTANT

## 2020-01-01 PROCEDURE — 99214 OFFICE O/P EST MOD 30 MIN: CPT | Mod: HCNC,95,, | Performed by: PHYSICIAN ASSISTANT

## 2020-01-01 PROCEDURE — 25000003 PHARM REV CODE 250: Mod: HCNC | Performed by: FAMILY MEDICINE

## 2020-01-01 PROCEDURE — S0030 INJECTION, METRONIDAZOLE: HCPCS | Mod: HCNC | Performed by: EMERGENCY MEDICINE

## 2020-01-01 PROCEDURE — 25000242 PHARM REV CODE 250 ALT 637 W/ HCPCS: Mod: HCNC | Performed by: HOSPITALIST

## 2020-01-01 PROCEDURE — 63600175 PHARM REV CODE 636 W HCPCS: Mod: HCNC | Performed by: SURGERY

## 2020-01-01 PROCEDURE — 36556 CENTRAL LINE: ICD-10-PCS | Mod: HCNC,,, | Performed by: ANESTHESIOLOGY

## 2020-01-01 PROCEDURE — 82962 GLUCOSE BLOOD TEST: CPT | Mod: 91,HCNC

## 2020-01-01 PROCEDURE — 35903 EXCISION GRAFT EXTREMITY: CPT | Mod: 78,HCNC,, | Performed by: SURGERY

## 2020-01-01 PROCEDURE — 99223 PR INITIAL HOSPITAL CARE,LEVL III: ICD-10-PCS | Mod: HCNC,,, | Performed by: SURGERY

## 2020-01-01 PROCEDURE — 97530 THERAPEUTIC ACTIVITIES: CPT | Mod: HCNC,CQ

## 2020-01-01 PROCEDURE — 87449 NOS EACH ORGANISM AG IA: CPT | Mod: HCNC

## 2020-01-01 PROCEDURE — 99232 SBSQ HOSP IP/OBS MODERATE 35: CPT | Mod: HCNC,,, | Performed by: NURSE PRACTITIONER

## 2020-01-01 PROCEDURE — 99499 RISK ADDL DX/OHS AUDIT: ICD-10-PCS | Mod: 95,,, | Performed by: PHYSICIAN ASSISTANT

## 2020-01-01 PROCEDURE — 81001 URINALYSIS AUTO W/SCOPE: CPT | Mod: HCNC

## 2020-01-01 PROCEDURE — 82010 KETONE BODYS QUAN: CPT | Mod: HCNC

## 2020-01-01 PROCEDURE — C1725 CATH, TRANSLUMIN NON-LASER: HCPCS | Mod: HCNC | Performed by: INTERNAL MEDICINE

## 2020-01-01 PROCEDURE — 99152 MOD SED SAME PHYS/QHP 5/>YRS: CPT | Mod: HCNC,,, | Performed by: INTERNAL MEDICINE

## 2020-01-01 PROCEDURE — 25000003 PHARM REV CODE 250: Mod: HCNC | Performed by: NURSE ANESTHETIST, CERTIFIED REGISTERED

## 2020-01-01 PROCEDURE — 86140 C-REACTIVE PROTEIN: CPT | Mod: HCNC

## 2020-01-01 PROCEDURE — 93458 L HRT ARTERY/VENTRICLE ANGIO: CPT | Mod: 26,59,51,HCNC | Performed by: INTERNAL MEDICINE

## 2020-01-01 PROCEDURE — 99999 PR PBB SHADOW E&M-EST. PATIENT-LVL III: CPT | Mod: PBBFAC,HCNC,, | Performed by: SURGERY

## 2020-01-01 PROCEDURE — 36000707: Mod: HCNC | Performed by: SURGERY

## 2020-01-01 PROCEDURE — 92920 PR PTCA: ICD-10-PCS | Mod: LC,HCNC,, | Performed by: INTERNAL MEDICINE

## 2020-01-01 PROCEDURE — 99291 PR CRITICAL CARE, E/M 30-74 MINUTES: ICD-10-PCS | Mod: HCNC,,, | Performed by: INTERNAL MEDICINE

## 2020-01-01 PROCEDURE — 83605 ASSAY OF LACTIC ACID: CPT | Mod: 91,HCNC

## 2020-01-01 PROCEDURE — 99499 UNLISTED E&M SERVICE: CPT | Mod: S$GLB,,, | Performed by: SURGERY

## 2020-01-01 PROCEDURE — 84100 ASSAY OF PHOSPHORUS: CPT | Mod: 91,HCNC

## 2020-01-01 PROCEDURE — 37607 PR LIGATN ANGIOACCESS AV FISTULA: ICD-10-PCS | Mod: HCNC,,, | Performed by: SURGERY

## 2020-01-01 PROCEDURE — C1769 GUIDE WIRE: HCPCS | Mod: HCNC | Performed by: INTERNAL MEDICINE

## 2020-01-01 PROCEDURE — 63600175 PHARM REV CODE 636 W HCPCS: Performed by: STUDENT IN AN ORGANIZED HEALTH CARE EDUCATION/TRAINING PROGRAM

## 2020-01-01 PROCEDURE — 82803 BLOOD GASES ANY COMBINATION: CPT | Mod: HCNC

## 2020-01-01 PROCEDURE — 88305 TISSUE EXAM BY PATHOLOGIST: CPT | Mod: 26,HCNC,, | Performed by: PATHOLOGY

## 2020-01-01 PROCEDURE — 36000706: Mod: HCNC | Performed by: SURGERY

## 2020-01-01 PROCEDURE — 99999 PR PBB SHADOW E&M-EST. PATIENT-LVL III: ICD-10-PCS | Mod: PBBFAC,,, | Performed by: INTERNAL MEDICINE

## 2020-01-01 PROCEDURE — 99291 PR CRITICAL CARE, E/M 30-74 MINUTES: ICD-10-PCS | Mod: HCNC,25,, | Performed by: EMERGENCY MEDICINE

## 2020-01-01 PROCEDURE — 86141 C-REACTIVE PROTEIN HS: CPT | Mod: HCNC

## 2020-01-01 PROCEDURE — 80074 ACUTE HEPATITIS PANEL: CPT | Mod: HCNC

## 2020-01-01 PROCEDURE — 99233 SBSQ HOSP IP/OBS HIGH 50: CPT | Mod: HCNC,,, | Performed by: STUDENT IN AN ORGANIZED HEALTH CARE EDUCATION/TRAINING PROGRAM

## 2020-01-01 PROCEDURE — 27000190 HC CPAP FULL FACE MASK W/VALVE: Mod: HCNC

## 2020-01-01 PROCEDURE — C9399 UNCLASSIFIED DRUGS OR BIOLOG: HCPCS | Mod: HCNC | Performed by: PHYSICIAN ASSISTANT

## 2020-01-01 PROCEDURE — 99238 PR HOSPITAL DISCHARGE DAY,<30 MIN: ICD-10-PCS | Mod: HCNC,GC,, | Performed by: HOSPITALIST

## 2020-01-01 PROCEDURE — 99291 PR CRITICAL CARE, E/M 30-74 MINUTES: ICD-10-PCS | Mod: HCNC,,, | Performed by: NURSE PRACTITIONER

## 2020-01-01 PROCEDURE — 99499 NO LOS: ICD-10-PCS | Mod: HCNC,,, | Performed by: SURGERY

## 2020-01-01 PROCEDURE — 87106 FUNGI IDENTIFICATION YEAST: CPT | Mod: 59,HCNC

## 2020-01-01 PROCEDURE — 63600175 PHARM REV CODE 636 W HCPCS: Mod: HCNC

## 2020-01-01 PROCEDURE — 99153 MOD SED SAME PHYS/QHP EA: CPT | Mod: HCNC | Performed by: SURGERY

## 2020-01-01 PROCEDURE — C1894 INTRO/SHEATH, NON-LASER: HCPCS | Mod: HCNC | Performed by: INTERNAL MEDICINE

## 2020-01-01 PROCEDURE — 96372 THER/PROPH/DIAG INJ SC/IM: CPT | Mod: HCNC

## 2020-01-01 PROCEDURE — 99152 PR MOD CONSCIOUS SEDATION, SAME PHYS, 5+ YRS, FIRST 15 MIN: ICD-10-PCS | Mod: HCNC,,, | Performed by: INTERNAL MEDICINE

## 2020-01-01 PROCEDURE — 99232 SBSQ HOSP IP/OBS MODERATE 35: CPT | Mod: HCNC,,, | Performed by: STUDENT IN AN ORGANIZED HEALTH CARE EDUCATION/TRAINING PROGRAM

## 2020-01-01 PROCEDURE — 99999 PR PBB SHADOW E&M-EST. PATIENT-LVL III: ICD-10-PCS | Mod: PBBFAC,HCNC,, | Performed by: SURGERY

## 2020-01-01 PROCEDURE — 94799 UNLISTED PULMONARY SVC/PX: CPT | Mod: HCNC

## 2020-01-01 PROCEDURE — 94640 AIRWAY INHALATION TREATMENT: CPT | Mod: HCNC

## 2020-01-01 PROCEDURE — C9399 UNCLASSIFIED DRUGS OR BIOLOG: HCPCS | Mod: HCNC | Performed by: NURSE PRACTITIONER

## 2020-01-01 PROCEDURE — 83540 ASSAY OF IRON: CPT | Mod: HCNC

## 2020-01-01 PROCEDURE — 99499 UNLISTED E&M SERVICE: CPT | Mod: HCNC,,, | Performed by: SURGERY

## 2020-01-01 PROCEDURE — 99214 PR OFFICE/OUTPT VISIT, EST, LEVL IV, 30-39 MIN: ICD-10-PCS | Mod: HCNC,95,, | Performed by: PHYSICIAN ASSISTANT

## 2020-01-01 PROCEDURE — 63600175 PHARM REV CODE 636 W HCPCS: Mod: HCNC | Performed by: GENERAL ACUTE CARE HOSPITAL

## 2020-01-01 PROCEDURE — 93010 ELECTROCARDIOGRAM REPORT: CPT | Mod: 76,HCNC,, | Performed by: INTERNAL MEDICINE

## 2020-01-01 PROCEDURE — 36600 WITHDRAWAL OF ARTERIAL BLOOD: CPT | Mod: HCNC

## 2020-01-01 PROCEDURE — 71000015 HC POSTOP RECOV 1ST HR: Mod: HCNC | Performed by: SURGERY

## 2020-01-01 PROCEDURE — 99223 1ST HOSP IP/OBS HIGH 75: CPT | Mod: HCNC,,, | Performed by: SURGERY

## 2020-01-01 PROCEDURE — G0180 MD CERTIFICATION HHA PATIENT: HCPCS | Mod: ,,, | Performed by: HOSPITALIST

## 2020-01-01 PROCEDURE — 87116 MYCOBACTERIA CULTURE: CPT | Mod: HCNC

## 2020-01-01 PROCEDURE — 96374 THER/PROPH/DIAG INJ IV PUSH: CPT | Mod: HCNC

## 2020-01-01 PROCEDURE — 87076 CULTURE ANAEROBE IDENT EACH: CPT | Mod: HCNC

## 2020-01-01 PROCEDURE — 96367 TX/PROPH/DG ADDL SEQ IV INF: CPT | Mod: HCNC

## 2020-01-01 PROCEDURE — 97803 MED NUTRITION INDIV SUBSEQ: CPT | Mod: HCNC

## 2020-01-01 PROCEDURE — 99223 1ST HOSP IP/OBS HIGH 75: CPT | Mod: AI,HCNC,GC, | Performed by: HOSPITALIST

## 2020-01-01 PROCEDURE — 84443 ASSAY THYROID STIM HORMONE: CPT | Mod: HCNC

## 2020-01-01 PROCEDURE — 71000016 HC POSTOP RECOV ADDL HR: Mod: HCNC | Performed by: SURGERY

## 2020-01-01 PROCEDURE — 87106 FUNGI IDENTIFICATION YEAST: CPT | Mod: HCNC

## 2020-01-01 PROCEDURE — 99291 CRITICAL CARE FIRST HOUR: CPT | Mod: HCNC,GC,, | Performed by: INTERNAL MEDICINE

## 2020-01-01 PROCEDURE — 80076 HEPATIC FUNCTION PANEL: CPT | Mod: HCNC

## 2020-01-01 PROCEDURE — 36589 PR REMOVAL TUNNELED CV CATH W/O SUBQ PORT OR PUMP: ICD-10-PCS | Mod: 58,HCNC,, | Performed by: SURGERY

## 2020-01-01 PROCEDURE — 99152 MOD SED SAME PHYS/QHP 5/>YRS: CPT | Mod: HCNC | Performed by: SURGERY

## 2020-01-01 PROCEDURE — 63600175 PHARM REV CODE 636 W HCPCS: Mod: HCNC | Performed by: RADIOLOGY

## 2020-01-01 PROCEDURE — 88305 TISSUE EXAM BY PATHOLOGIST: ICD-10-PCS | Mod: 26,HCNC,, | Performed by: PATHOLOGY

## 2020-01-01 PROCEDURE — 87324 CLOSTRIDIUM AG IA: CPT | Mod: HCNC

## 2020-01-01 PROCEDURE — C1887 CATHETER, GUIDING: HCPCS | Mod: HCNC | Performed by: INTERNAL MEDICINE

## 2020-01-01 PROCEDURE — 99239 HOSP IP/OBS DSCHRG MGMT >30: CPT | Mod: HCNC,,, | Performed by: STUDENT IN AN ORGANIZED HEALTH CARE EDUCATION/TRAINING PROGRAM

## 2020-01-01 PROCEDURE — 63600175 PHARM REV CODE 636 W HCPCS: Mod: HCNC | Performed by: GENERAL PRACTICE

## 2020-01-01 PROCEDURE — 99999 PR PBB SHADOW E&M-EST. PATIENT-LVL III: CPT | Mod: PBBFAC,,, | Performed by: INTERNAL MEDICINE

## 2020-01-01 PROCEDURE — 85007 BL SMEAR W/DIFF WBC COUNT: CPT | Mod: HCNC

## 2020-01-01 PROCEDURE — 87186 SC STD MICRODIL/AGAR DIL: CPT | Mod: HCNC

## 2020-01-01 PROCEDURE — 99153 MOD SED SAME PHYS/QHP EA: CPT | Mod: HCNC | Performed by: INTERNAL MEDICINE

## 2020-01-01 PROCEDURE — 92978 ENDOLUMINL IVUS OCT C 1ST: CPT | Mod: 26,LC,HCNC, | Performed by: INTERNAL MEDICINE

## 2020-01-01 PROCEDURE — 36589 REMOVAL TUNNELED CV CATH: CPT | Mod: 58,HCNC,, | Performed by: SURGERY

## 2020-01-01 PROCEDURE — 99214 OFFICE O/P EST MOD 30 MIN: CPT | Mod: S$GLB,,, | Performed by: INTERNAL MEDICINE

## 2020-01-01 PROCEDURE — 36556 INSERT NON-TUNNEL CV CATH: CPT | Mod: HCNC,,, | Performed by: ANESTHESIOLOGY

## 2020-01-01 PROCEDURE — C9399 UNCLASSIFIED DRUGS OR BIOLOG: HCPCS | Performed by: STUDENT IN AN ORGANIZED HEALTH CARE EDUCATION/TRAINING PROGRAM

## 2020-01-01 PROCEDURE — 25000003 PHARM REV CODE 250: Performed by: STUDENT IN AN ORGANIZED HEALTH CARE EDUCATION/TRAINING PROGRAM

## 2020-01-01 PROCEDURE — 99204 OFFICE O/P NEW MOD 45 MIN: CPT | Mod: HCNC,95,, | Performed by: PHYSICIAN ASSISTANT

## 2020-01-01 PROCEDURE — 81000 URINALYSIS NONAUTO W/SCOPE: CPT | Mod: HCNC

## 2020-01-01 PROCEDURE — 99239 PR HOSPITAL DISCHARGE DAY,>30 MIN: ICD-10-PCS | Mod: HCNC,,, | Performed by: STUDENT IN AN ORGANIZED HEALTH CARE EDUCATION/TRAINING PROGRAM

## 2020-01-01 PROCEDURE — 99291 CRITICAL CARE FIRST HOUR: CPT | Mod: 25,HCNC

## 2020-01-01 PROCEDURE — 88304 TISSUE EXAM BY PATHOLOGIST: CPT | Mod: 26,HCNC,, | Performed by: PATHOLOGY

## 2020-01-01 PROCEDURE — 99285 EMERGENCY DEPT VISIT HI MDM: CPT | Mod: HCNC,,, | Performed by: EMERGENCY MEDICINE

## 2020-01-01 PROCEDURE — 93458 L HRT ARTERY/VENTRICLE ANGIO: CPT | Mod: 59,HCNC | Performed by: INTERNAL MEDICINE

## 2020-01-01 PROCEDURE — 99232 PR SUBSEQUENT HOSPITAL CARE,LEVL II: ICD-10-PCS | Mod: HCNC,,, | Performed by: STUDENT IN AN ORGANIZED HEALTH CARE EDUCATION/TRAINING PROGRAM

## 2020-01-01 PROCEDURE — D9220A PRA ANESTHESIA: Mod: HCNC,CRNA,, | Performed by: NURSE ANESTHETIST, CERTIFIED REGISTERED

## 2020-01-01 PROCEDURE — 99223 1ST HOSP IP/OBS HIGH 75: CPT | Mod: HCNC,,, | Performed by: EMERGENCY MEDICINE

## 2020-01-01 PROCEDURE — 35903 PR EXCISION, INFEC GRAFT, EXTREMITY: ICD-10-PCS | Mod: 78,HCNC,, | Performed by: SURGERY

## 2020-01-01 PROCEDURE — 85347 COAGULATION TIME ACTIVATED: CPT | Mod: HCNC | Performed by: INTERNAL MEDICINE

## 2020-01-01 PROCEDURE — 31500 INSERT EMERGENCY AIRWAY: CPT | Mod: HCNC,,, | Performed by: EMERGENCY MEDICINE

## 2020-01-01 PROCEDURE — 92978 ENDOLUMINL IVUS OCT C 1ST: CPT | Mod: LC,HCNC | Performed by: INTERNAL MEDICINE

## 2020-01-01 PROCEDURE — 3008F BODY MASS INDEX DOCD: CPT | Mod: CPTII,S$GLB,, | Performed by: INTERNAL MEDICINE

## 2020-01-01 PROCEDURE — 31500 PR INSERT, EMERGENCY ENDOTRACH AIRWAY: ICD-10-PCS | Mod: HCNC,,, | Performed by: EMERGENCY MEDICINE

## 2020-01-01 PROCEDURE — 92920 PRQ TRLUML C ANGIOP 1ART&/BR: CPT | Mod: LC,HCNC,, | Performed by: INTERNAL MEDICINE

## 2020-01-01 PROCEDURE — 92978 PR IVUS, CORONARY, 1ST VESSEL: ICD-10-PCS | Mod: 26,LC,HCNC, | Performed by: INTERNAL MEDICINE

## 2020-01-01 PROCEDURE — 25500020 PHARM REV CODE 255: Mod: HCNC | Performed by: INTERNAL MEDICINE

## 2020-01-01 PROCEDURE — 99223 PR INITIAL HOSPITAL CARE,LEVL III: ICD-10-PCS | Mod: AI,HCNC,GC, | Performed by: HOSPITALIST

## 2020-01-01 PROCEDURE — 25000242 PHARM REV CODE 250 ALT 637 W/ HCPCS: Mod: HCNC | Performed by: EMERGENCY MEDICINE

## 2020-01-01 PROCEDURE — 87798 DETECT AGENT NOS DNA AMP: CPT | Mod: HCNC

## 2020-01-01 PROCEDURE — 36000 PLACE NEEDLE IN VEIN: CPT | Mod: HCNC | Performed by: STUDENT IN AN ORGANIZED HEALTH CARE EDUCATION/TRAINING PROGRAM

## 2020-01-01 RX ORDER — ACETAMINOPHEN 325 MG/1
650 TABLET ORAL EVERY 6 HOURS PRN
Refills: 0 | COMMUNITY
Start: 2020-01-01

## 2020-01-01 RX ORDER — SEVELAMER CARBONATE 800 MG/1
1600 TABLET, FILM COATED ORAL
Qty: 540 TABLET | Refills: 1 | Status: SHIPPED | OUTPATIENT
Start: 2020-01-01 | End: 2020-01-01 | Stop reason: SDUPTHER

## 2020-01-01 RX ORDER — HYDROCODONE BITARTRATE AND ACETAMINOPHEN 5; 325 MG/1; MG/1
1 TABLET ORAL EVERY 6 HOURS PRN
Status: DISCONTINUED | OUTPATIENT
Start: 2020-01-01 | End: 2020-01-01 | Stop reason: HOSPADM

## 2020-01-01 RX ORDER — MIDAZOLAM HYDROCHLORIDE 1 MG/ML
INJECTION, SOLUTION INTRAMUSCULAR; INTRAVENOUS
Status: DISCONTINUED | OUTPATIENT
Start: 2020-01-01 | End: 2020-01-01 | Stop reason: HOSPADM

## 2020-01-01 RX ORDER — METRONIDAZOLE 500 MG/1
500 TABLET ORAL 3 TIMES DAILY
Qty: 84 TABLET | Refills: 0 | Status: ON HOLD | OUTPATIENT
Start: 2020-01-01 | End: 2020-01-01 | Stop reason: HOSPADM

## 2020-01-01 RX ORDER — MIDODRINE HYDROCHLORIDE 5 MG/1
5 TABLET ORAL 3 TIMES DAILY
Qty: 90 TABLET | Refills: 11 | Status: SHIPPED | OUTPATIENT
Start: 2020-01-01 | End: 2021-06-01

## 2020-01-01 RX ORDER — SODIUM CHLORIDE 9 MG/ML
INJECTION, SOLUTION INTRAVENOUS ONCE
Status: DISCONTINUED | OUTPATIENT
Start: 2020-01-01 | End: 2020-01-01

## 2020-01-01 RX ORDER — SODIUM CHLORIDE 0.9 % (FLUSH) 0.9 %
10 SYRINGE (ML) INJECTION
Status: DISCONTINUED | OUTPATIENT
Start: 2020-01-01 | End: 2020-01-01 | Stop reason: HOSPADM

## 2020-01-01 RX ORDER — INSULIN ASPART 100 [IU]/ML
0-5 INJECTION, SOLUTION INTRAVENOUS; SUBCUTANEOUS EVERY 6 HOURS PRN
Status: DISCONTINUED | OUTPATIENT
Start: 2020-01-01 | End: 2020-01-01

## 2020-01-01 RX ORDER — INSULIN GLARGINE 100 [IU]/ML
INJECTION, SOLUTION SUBCUTANEOUS
Qty: 15 ML | Refills: 0 | Status: ON HOLD | OUTPATIENT
Start: 2020-01-01 | End: 2020-01-01 | Stop reason: HOSPADM

## 2020-01-01 RX ORDER — HEPARIN SODIUM,PORCINE/D5W 25000/250
12 INTRAVENOUS SOLUTION INTRAVENOUS CONTINUOUS
Status: DISCONTINUED | OUTPATIENT
Start: 2020-01-01 | End: 2020-01-01

## 2020-01-01 RX ORDER — METRONIDAZOLE 500 MG/100ML
500 INJECTION, SOLUTION INTRAVENOUS
Status: DISCONTINUED | OUTPATIENT
Start: 2020-01-01 | End: 2020-01-01

## 2020-01-01 RX ORDER — SODIUM CHLORIDE 9 MG/ML
INJECTION, SOLUTION INTRAVENOUS ONCE
Status: COMPLETED | OUTPATIENT
Start: 2020-01-01 | End: 2020-01-01

## 2020-01-01 RX ORDER — SEVELAMER CARBONATE 800 MG/1
1600 TABLET, FILM COATED ORAL
Status: DISCONTINUED | OUTPATIENT
Start: 2020-01-01 | End: 2020-01-01 | Stop reason: HOSPADM

## 2020-01-01 RX ORDER — NOREPINEPHRINE BITARTRATE/D5W 4MG/250ML
3 PLASTIC BAG, INJECTION (ML) INTRAVENOUS CONTINUOUS
Status: DISCONTINUED | OUTPATIENT
Start: 2020-01-01 | End: 2020-01-01

## 2020-01-01 RX ORDER — ACETAMINOPHEN 500 MG
1000 TABLET ORAL EVERY 8 HOURS PRN
Qty: 30 TABLET | Refills: 0 | Status: ON HOLD | OUTPATIENT
Start: 2020-01-01 | End: 2020-01-01 | Stop reason: HOSPADM

## 2020-01-01 RX ORDER — CARVEDILOL 12.5 MG/1
12.5 TABLET ORAL 2 TIMES DAILY
Qty: 180 TABLET | Refills: 0 | Status: ON HOLD | OUTPATIENT
Start: 2020-01-01 | End: 2020-01-01 | Stop reason: HOSPADM

## 2020-01-01 RX ORDER — MIDODRINE HYDROCHLORIDE 2.5 MG/1
5 TABLET ORAL 3 TIMES DAILY
Status: DISCONTINUED | OUTPATIENT
Start: 2020-01-01 | End: 2020-01-01 | Stop reason: HOSPADM

## 2020-01-01 RX ORDER — IODIXANOL 320 MG/ML
INJECTION, SOLUTION INTRAVASCULAR
Status: DISCONTINUED | OUTPATIENT
Start: 2020-01-01 | End: 2020-01-01 | Stop reason: HOSPADM

## 2020-01-01 RX ORDER — FENTANYL CITRATE 50 UG/ML
INJECTION, SOLUTION INTRAMUSCULAR; INTRAVENOUS
Status: DISCONTINUED | OUTPATIENT
Start: 2020-01-01 | End: 2020-01-01

## 2020-01-01 RX ORDER — NOREPINEPHRINE BITARTRATE/D5W 4MG/250ML
0.02 PLASTIC BAG, INJECTION (ML) INTRAVENOUS CONTINUOUS
Status: DISCONTINUED | OUTPATIENT
Start: 2020-01-01 | End: 2020-01-01

## 2020-01-01 RX ORDER — IBUPROFEN 200 MG
24 TABLET ORAL
Status: DISCONTINUED | OUTPATIENT
Start: 2020-01-01 | End: 2020-01-01 | Stop reason: HOSPADM

## 2020-01-01 RX ORDER — LIDOCAINE HYDROCHLORIDE 20 MG/ML
INJECTION, SOLUTION EPIDURAL; INFILTRATION; INTRACAUDAL; PERINEURAL
Status: DISCONTINUED | OUTPATIENT
Start: 2020-01-01 | End: 2020-01-01 | Stop reason: HOSPADM

## 2020-01-01 RX ORDER — SODIUM CHLORIDE 9 MG/ML
INJECTION, SOLUTION INTRAVENOUS
Status: DISCONTINUED | OUTPATIENT
Start: 2020-01-01 | End: 2020-01-01

## 2020-01-01 RX ORDER — GLUCAGON 1 MG
1 KIT INJECTION
Status: DISCONTINUED | OUTPATIENT
Start: 2020-01-01 | End: 2020-01-01 | Stop reason: HOSPADM

## 2020-01-01 RX ORDER — ETOMIDATE 2 MG/ML
INJECTION INTRAVENOUS
Status: DISCONTINUED | OUTPATIENT
Start: 2020-01-01 | End: 2020-01-01

## 2020-01-01 RX ORDER — CLOPIDOGREL BISULFATE 75 MG/1
75 TABLET ORAL
Status: COMPLETED | OUTPATIENT
Start: 2020-01-01 | End: 2020-01-01

## 2020-01-01 RX ORDER — DILTIAZEM HYDROCHLORIDE 5 MG/ML
5 INJECTION INTRAVENOUS ONCE
Status: DISCONTINUED | OUTPATIENT
Start: 2020-01-01 | End: 2020-01-01

## 2020-01-01 RX ORDER — FUROSEMIDE 40 MG/1
40 TABLET ORAL DAILY
Status: DISCONTINUED | OUTPATIENT
Start: 2020-01-01 | End: 2020-01-01

## 2020-01-01 RX ORDER — METOPROLOL TARTRATE 1 MG/ML
5 INJECTION, SOLUTION INTRAVENOUS ONCE
Status: COMPLETED | OUTPATIENT
Start: 2020-01-01 | End: 2020-01-01

## 2020-01-01 RX ORDER — ACETAMINOPHEN 325 MG/1
650 TABLET ORAL EVERY 8 HOURS PRN
Status: DISCONTINUED | OUTPATIENT
Start: 2020-01-01 | End: 2020-01-01 | Stop reason: HOSPADM

## 2020-01-01 RX ORDER — METOPROLOL TARTRATE 1 MG/ML
5 INJECTION, SOLUTION INTRAVENOUS ONCE
Status: DISCONTINUED | OUTPATIENT
Start: 2020-01-01 | End: 2020-01-01

## 2020-01-01 RX ORDER — METOPROLOL TARTRATE 50 MG/1
50 TABLET ORAL 2 TIMES DAILY
Status: DISCONTINUED | OUTPATIENT
Start: 2020-01-01 | End: 2020-01-01 | Stop reason: HOSPADM

## 2020-01-01 RX ORDER — METOPROLOL TARTRATE 25 MG/1
25 TABLET, FILM COATED ORAL 2 TIMES DAILY
Status: DISCONTINUED | OUTPATIENT
Start: 2020-01-01 | End: 2020-01-01

## 2020-01-01 RX ORDER — DILTIAZEM HYDROCHLORIDE 120 MG/1
240 CAPSULE, COATED, EXTENDED RELEASE ORAL DAILY
Status: DISCONTINUED | OUTPATIENT
Start: 2020-01-01 | End: 2020-01-01

## 2020-01-01 RX ORDER — SODIUM CHLORIDE 9 MG/ML
INJECTION, SOLUTION INTRAVENOUS CONTINUOUS PRN
Status: DISCONTINUED | OUTPATIENT
Start: 2020-01-01 | End: 2020-01-01

## 2020-01-01 RX ORDER — LABETALOL HYDROCHLORIDE 5 MG/ML
INJECTION, SOLUTION INTRAVENOUS
Status: DISCONTINUED | OUTPATIENT
Start: 2020-01-01 | End: 2020-01-01

## 2020-01-01 RX ORDER — INSULIN ASPART 100 [IU]/ML
10 INJECTION, SOLUTION INTRAVENOUS; SUBCUTANEOUS 2 TIMES DAILY WITH MEALS
Qty: 15 ML | Refills: 1 | Status: SHIPPED | OUTPATIENT
Start: 2020-01-01

## 2020-01-01 RX ORDER — IBUPROFEN 200 MG
24 TABLET ORAL
Status: DISCONTINUED | OUTPATIENT
Start: 2020-01-01 | End: 2020-01-01

## 2020-01-01 RX ORDER — CEFTRIAXONE 1 G/1
1 INJECTION, POWDER, FOR SOLUTION INTRAMUSCULAR; INTRAVENOUS
Status: COMPLETED | OUTPATIENT
Start: 2020-01-01 | End: 2020-01-01

## 2020-01-01 RX ORDER — CARVEDILOL 6.25 MG/1
25 TABLET ORAL 2 TIMES DAILY WITH MEALS
Status: DISCONTINUED | OUTPATIENT
Start: 2020-01-01 | End: 2020-01-01 | Stop reason: HOSPADM

## 2020-01-01 RX ORDER — IBUPROFEN 200 MG
16 TABLET ORAL
Status: DISCONTINUED | OUTPATIENT
Start: 2020-01-01 | End: 2020-01-01 | Stop reason: HOSPADM

## 2020-01-01 RX ORDER — ALBUTEROL SULFATE 2.5 MG/.5ML
10 SOLUTION RESPIRATORY (INHALATION) ONCE
Status: COMPLETED | OUTPATIENT
Start: 2020-01-01 | End: 2020-01-01

## 2020-01-01 RX ORDER — ASPIRIN 325 MG
325 TABLET ORAL
Status: COMPLETED | OUTPATIENT
Start: 2020-01-01 | End: 2020-01-01

## 2020-01-01 RX ORDER — DILTIAZEM HYDROCHLORIDE 180 MG/1
180 CAPSULE, COATED, EXTENDED RELEASE ORAL DAILY
Status: DISCONTINUED | OUTPATIENT
Start: 2020-01-01 | End: 2020-01-01

## 2020-01-01 RX ORDER — HEPARIN SODIUM 1000 [USP'U]/ML
INJECTION, SOLUTION INTRAVENOUS; SUBCUTANEOUS
Status: DISCONTINUED | OUTPATIENT
Start: 2020-01-01 | End: 2020-01-01 | Stop reason: HOSPADM

## 2020-01-01 RX ORDER — PEN NEEDLE, DIABETIC 29 G X1/2"
NEEDLE, DISPOSABLE MISCELLANEOUS
Qty: 90 EACH | Refills: 3 | Status: SHIPPED | OUTPATIENT
Start: 2020-01-01 | End: 2020-01-01 | Stop reason: CLARIF

## 2020-01-01 RX ORDER — FLUCONAZOLE 200 MG/1
200 TABLET ORAL DAILY
Qty: 30 TABLET | Refills: 0 | Status: SHIPPED | OUTPATIENT
Start: 2020-01-01 | End: 2020-07-01

## 2020-01-01 RX ORDER — VANCOMYCIN HCL IN 5 % DEXTROSE 1G/250ML
15 PLASTIC BAG, INJECTION (ML) INTRAVENOUS
Status: COMPLETED | OUTPATIENT
Start: 2020-01-01 | End: 2020-01-01

## 2020-01-01 RX ORDER — ALBUTEROL SULFATE 2.5 MG/.5ML
2.5 SOLUTION RESPIRATORY (INHALATION)
Status: COMPLETED | OUTPATIENT
Start: 2020-01-01 | End: 2020-01-01

## 2020-01-01 RX ORDER — INSULIN ASPART 100 [IU]/ML
8 INJECTION, SOLUTION INTRAVENOUS; SUBCUTANEOUS
Status: DISCONTINUED | OUTPATIENT
Start: 2020-01-01 | End: 2020-01-01 | Stop reason: HOSPADM

## 2020-01-01 RX ORDER — AMOXICILLIN AND CLAVULANATE POTASSIUM 500; 125 MG/1; MG/1
1 TABLET, FILM COATED ORAL NIGHTLY
Qty: 5 TABLET | Refills: 0 | Status: SHIPPED | OUTPATIENT
Start: 2020-01-01 | End: 2020-01-01 | Stop reason: SDUPTHER

## 2020-01-01 RX ORDER — HYDROCODONE BITARTRATE AND ACETAMINOPHEN 500; 5 MG/1; MG/1
TABLET ORAL CONTINUOUS
Status: ACTIVE | OUTPATIENT
Start: 2020-01-01 | End: 2020-01-01

## 2020-01-01 RX ORDER — ATORVASTATIN CALCIUM 20 MG/1
40 TABLET, FILM COATED ORAL DAILY
Status: DISCONTINUED | OUTPATIENT
Start: 2020-01-01 | End: 2020-01-01 | Stop reason: HOSPADM

## 2020-01-01 RX ORDER — SODIUM CHLORIDE 9 MG/ML
INJECTION, SOLUTION INTRAVENOUS CONTINUOUS
Status: DISCONTINUED | OUTPATIENT
Start: 2020-01-01 | End: 2020-01-01

## 2020-01-01 RX ORDER — FLUCONAZOLE 200 MG/1
800 TABLET ORAL ONCE
Status: COMPLETED | OUTPATIENT
Start: 2020-01-01 | End: 2020-01-01

## 2020-01-01 RX ORDER — ATORVASTATIN CALCIUM 40 MG/1
40 TABLET, FILM COATED ORAL NIGHTLY
Status: DISCONTINUED | OUTPATIENT
Start: 2020-01-01 | End: 2020-01-01

## 2020-01-01 RX ORDER — LIDOCAINE HYDROCHLORIDE 10 MG/ML
INJECTION, SOLUTION EPIDURAL; INFILTRATION; INTRACAUDAL; PERINEURAL
Status: DISCONTINUED | OUTPATIENT
Start: 2020-01-01 | End: 2020-01-01 | Stop reason: HOSPADM

## 2020-01-01 RX ORDER — METOPROLOL TARTRATE 50 MG/1
50 TABLET ORAL 2 TIMES DAILY
Qty: 60 TABLET | Refills: 11 | Status: SHIPPED | OUTPATIENT
Start: 2020-01-01 | End: 2021-06-01

## 2020-01-01 RX ORDER — CEFEPIME HYDROCHLORIDE 1 G/1
1 INJECTION, POWDER, FOR SOLUTION INTRAMUSCULAR; INTRAVENOUS
Status: DISCONTINUED | OUTPATIENT
Start: 2020-01-01 | End: 2020-01-01

## 2020-01-01 RX ORDER — AMOXICILLIN AND CLAVULANATE POTASSIUM 500; 125 MG/1; MG/1
1 TABLET, FILM COATED ORAL NIGHTLY
Qty: 8 TABLET | Refills: 0 | Status: SHIPPED | OUTPATIENT
Start: 2020-01-01 | End: 2020-01-01

## 2020-01-01 RX ORDER — GLUCAGON 1 MG
1 KIT INJECTION
Status: CANCELLED | OUTPATIENT
Start: 2020-01-01

## 2020-01-01 RX ORDER — METOPROLOL TARTRATE 1 MG/ML
10 INJECTION, SOLUTION INTRAVENOUS ONCE
Status: COMPLETED | OUTPATIENT
Start: 2020-01-01 | End: 2020-01-01

## 2020-01-01 RX ORDER — LOSARTAN POTASSIUM 25 MG/1
100 TABLET ORAL ONCE
Status: COMPLETED | OUTPATIENT
Start: 2020-01-01 | End: 2020-01-01

## 2020-01-01 RX ORDER — PHENYLEPHRINE HYDROCHLORIDE 10 MG/ML
INJECTION INTRAVENOUS
Status: DISCONTINUED | OUTPATIENT
Start: 2020-01-01 | End: 2020-01-01

## 2020-01-01 RX ORDER — ASPIRIN 81 MG/1
81 TABLET ORAL DAILY
Status: DISCONTINUED | OUTPATIENT
Start: 2020-01-01 | End: 2020-01-01 | Stop reason: HOSPADM

## 2020-01-01 RX ORDER — HYDROXYZINE HYDROCHLORIDE 25 MG/1
25 TABLET, FILM COATED ORAL 3 TIMES DAILY PRN
Status: DISCONTINUED | OUTPATIENT
Start: 2020-01-01 | End: 2020-01-01 | Stop reason: HOSPADM

## 2020-01-01 RX ORDER — DILTIAZEM HYDROCHLORIDE 180 MG/1
180 CAPSULE, COATED, EXTENDED RELEASE ORAL DAILY
Status: DISCONTINUED | OUTPATIENT
Start: 2020-01-01 | End: 2020-01-01 | Stop reason: HOSPADM

## 2020-01-01 RX ORDER — ACETAMINOPHEN 500 MG
1000 TABLET ORAL
Status: DISCONTINUED | OUTPATIENT
Start: 2020-01-01 | End: 2020-01-01 | Stop reason: HOSPADM

## 2020-01-01 RX ORDER — SEVELAMER CARBONATE 800 MG/1
1600 TABLET, FILM COATED ORAL
Qty: 540 TABLET | Refills: 0 | Status: SHIPPED | OUTPATIENT
Start: 2020-01-01 | End: 2020-01-01

## 2020-01-01 RX ORDER — INSULIN ASPART 100 [IU]/ML
10 INJECTION, SOLUTION INTRAVENOUS; SUBCUTANEOUS 2 TIMES DAILY WITH MEALS
Qty: 15 ML | Refills: 1 | Status: SHIPPED | OUTPATIENT
Start: 2020-01-01 | End: 2020-01-01 | Stop reason: SDUPTHER

## 2020-01-01 RX ORDER — HEPARIN SODIUM 5000 [USP'U]/ML
INJECTION, SOLUTION INTRAVENOUS; SUBCUTANEOUS CODE/TRAUMA/SEDATION MEDICATION
Status: COMPLETED | OUTPATIENT
Start: 2020-01-01 | End: 2020-01-01

## 2020-01-01 RX ORDER — DILTIAZEM HYDROCHLORIDE 30 MG/1
30 TABLET, FILM COATED ORAL EVERY 6 HOURS
Status: DISCONTINUED | OUTPATIENT
Start: 2020-01-01 | End: 2020-01-01

## 2020-01-01 RX ORDER — FLUCONAZOLE 200 MG/1
200 TABLET ORAL DAILY
Status: DISCONTINUED | OUTPATIENT
Start: 2020-01-01 | End: 2020-01-01 | Stop reason: HOSPADM

## 2020-01-01 RX ORDER — ASPIRIN 81 MG/1
81 TABLET ORAL DAILY
Qty: 90 TABLET | Refills: 3 | OUTPATIENT
Start: 2020-01-01 | End: 2021-05-02

## 2020-01-01 RX ORDER — ATORVASTATIN CALCIUM 40 MG/1
40 TABLET, FILM COATED ORAL DAILY
Status: DISCONTINUED | OUTPATIENT
Start: 2020-01-01 | End: 2020-01-01 | Stop reason: HOSPADM

## 2020-01-01 RX ORDER — INSULIN ASPART 100 [IU]/ML
1-10 INJECTION, SOLUTION INTRAVENOUS; SUBCUTANEOUS EVERY 6 HOURS PRN
Status: DISCONTINUED | OUTPATIENT
Start: 2020-01-01 | End: 2020-01-01

## 2020-01-01 RX ORDER — FENTANYL CITRATE 50 UG/ML
INJECTION, SOLUTION INTRAMUSCULAR; INTRAVENOUS CODE/TRAUMA/SEDATION MEDICATION
Status: COMPLETED | OUTPATIENT
Start: 2020-01-01 | End: 2020-01-01

## 2020-01-01 RX ORDER — CLOPIDOGREL BISULFATE 75 MG/1
75 TABLET ORAL DAILY
Status: DISCONTINUED | OUTPATIENT
Start: 2020-01-01 | End: 2020-01-01 | Stop reason: HOSPADM

## 2020-01-01 RX ORDER — DILTIAZEM HYDROCHLORIDE 5 MG/ML
15 INJECTION INTRAVENOUS ONCE
Status: COMPLETED | OUTPATIENT
Start: 2020-01-01 | End: 2020-01-01

## 2020-01-01 RX ORDER — ONDANSETRON 2 MG/ML
4 INJECTION INTRAMUSCULAR; INTRAVENOUS EVERY 8 HOURS PRN
Status: DISCONTINUED | OUTPATIENT
Start: 2020-01-01 | End: 2020-01-01

## 2020-01-01 RX ORDER — INSULIN ASPART 100 [IU]/ML
1-10 INJECTION, SOLUTION INTRAVENOUS; SUBCUTANEOUS
Status: DISCONTINUED | OUTPATIENT
Start: 2020-01-01 | End: 2020-01-01 | Stop reason: ALTCHOICE

## 2020-01-01 RX ORDER — INSULIN ASPART 100 [IU]/ML
5 INJECTION, SOLUTION INTRAVENOUS; SUBCUTANEOUS
Status: DISCONTINUED | OUTPATIENT
Start: 2020-01-01 | End: 2020-01-01

## 2020-01-01 RX ORDER — INSULIN GLARGINE 100 [IU]/ML
INJECTION, SOLUTION SUBCUTANEOUS
Qty: 15 ML | Refills: 0 | Status: SHIPPED | OUTPATIENT
Start: 2020-01-01 | End: 2020-01-01 | Stop reason: SDUPTHER

## 2020-01-01 RX ORDER — CEFAZOLIN SODIUM 1 G/3ML
INJECTION, POWDER, FOR SOLUTION INTRAMUSCULAR; INTRAVENOUS
Status: DISCONTINUED | OUTPATIENT
Start: 2020-01-01 | End: 2020-01-01 | Stop reason: HOSPADM

## 2020-01-01 RX ORDER — DILTIAZEM HCL 1 MG/ML
10 INJECTION, SOLUTION INTRAVENOUS CONTINUOUS
Status: DISCONTINUED | OUTPATIENT
Start: 2020-01-01 | End: 2020-01-01

## 2020-01-01 RX ORDER — CARVEDILOL 12.5 MG/1
12.5 TABLET ORAL 2 TIMES DAILY
Status: DISCONTINUED | OUTPATIENT
Start: 2020-01-01 | End: 2020-01-01

## 2020-01-01 RX ORDER — GLYCOPYRROLATE 0.2 MG/ML
INJECTION INTRAMUSCULAR; INTRAVENOUS
Status: DISCONTINUED | OUTPATIENT
Start: 2020-01-01 | End: 2020-01-01

## 2020-01-01 RX ORDER — METRONIDAZOLE 250 MG/1
500 TABLET ORAL EVERY 8 HOURS
Status: DISCONTINUED | OUTPATIENT
Start: 2020-01-01 | End: 2020-01-01 | Stop reason: HOSPADM

## 2020-01-01 RX ORDER — HEPARIN SODIUM 5000 [USP'U]/ML
5000 INJECTION, SOLUTION INTRAVENOUS; SUBCUTANEOUS EVERY 8 HOURS
Status: DISCONTINUED | OUTPATIENT
Start: 2020-01-01 | End: 2020-01-01 | Stop reason: HOSPADM

## 2020-01-01 RX ORDER — PANTOPRAZOLE SODIUM 40 MG/1
40 TABLET, DELAYED RELEASE ORAL DAILY
Status: DISCONTINUED | OUTPATIENT
Start: 2020-01-01 | End: 2020-01-01

## 2020-01-01 RX ORDER — HEPARIN SODIUM 5000 [USP'U]/ML
5000 INJECTION, SOLUTION INTRAVENOUS; SUBCUTANEOUS
Status: DISCONTINUED | OUTPATIENT
Start: 2020-01-01 | End: 2020-01-01 | Stop reason: HOSPADM

## 2020-01-01 RX ORDER — FENTANYL CITRATE 50 UG/ML
INJECTION, SOLUTION INTRAMUSCULAR; INTRAVENOUS
Status: DISCONTINUED | OUTPATIENT
Start: 2020-01-01 | End: 2020-01-01 | Stop reason: HOSPADM

## 2020-01-01 RX ORDER — DILTIAZEM HCL 1 MG/ML
5 INJECTION, SOLUTION INTRAVENOUS CONTINUOUS
Status: DISCONTINUED | OUTPATIENT
Start: 2020-01-01 | End: 2020-01-01

## 2020-01-01 RX ORDER — LIDOCAINE HYDROCHLORIDE AND EPINEPHRINE 20; 10 MG/ML; UG/ML
20 INJECTION, SOLUTION INFILTRATION; PERINEURAL ONCE
Status: DISCONTINUED | OUTPATIENT
Start: 2020-01-01 | End: 2020-01-01

## 2020-01-01 RX ORDER — HYDRALAZINE HYDROCHLORIDE 20 MG/ML
10 INJECTION INTRAMUSCULAR; INTRAVENOUS
Status: COMPLETED | OUTPATIENT
Start: 2020-01-01 | End: 2020-01-01

## 2020-01-01 RX ORDER — HEPARIN SODIUM 5000 [USP'U]/ML
5000 INJECTION, SOLUTION INTRAVENOUS; SUBCUTANEOUS EVERY 12 HOURS
Status: DISCONTINUED | OUTPATIENT
Start: 2020-01-01 | End: 2020-01-01

## 2020-01-01 RX ORDER — HEPARIN SODIUM 5000 [USP'U]/ML
5000 INJECTION, SOLUTION INTRAVENOUS; SUBCUTANEOUS EVERY 8 HOURS
Status: DISCONTINUED | OUTPATIENT
Start: 2020-01-01 | End: 2020-01-01

## 2020-01-01 RX ORDER — INSULIN ASPART 100 [IU]/ML
2 INJECTION, SOLUTION INTRAVENOUS; SUBCUTANEOUS
Status: DISCONTINUED | OUTPATIENT
Start: 2020-01-01 | End: 2020-01-01

## 2020-01-01 RX ORDER — ACETAMINOPHEN 325 MG/1
650 TABLET ORAL EVERY 6 HOURS PRN
Status: DISCONTINUED | OUTPATIENT
Start: 2020-01-01 | End: 2020-01-01 | Stop reason: HOSPADM

## 2020-01-01 RX ORDER — HYDROXYZINE HYDROCHLORIDE 25 MG/1
25 TABLET, FILM COATED ORAL 3 TIMES DAILY PRN
Status: DISCONTINUED | OUTPATIENT
Start: 2020-01-01 | End: 2020-01-01

## 2020-01-01 RX ORDER — ONDANSETRON 2 MG/ML
4 INJECTION INTRAMUSCULAR; INTRAVENOUS EVERY 12 HOURS PRN
Status: DISCONTINUED | OUTPATIENT
Start: 2020-01-01 | End: 2020-01-01

## 2020-01-01 RX ORDER — PROPOFOL 10 MG/ML
VIAL (ML) INTRAVENOUS CONTINUOUS PRN
Status: DISCONTINUED | OUTPATIENT
Start: 2020-01-01 | End: 2020-01-01

## 2020-01-01 RX ORDER — NALOXONE HCL 0.4 MG/ML
0.4 VIAL (ML) INJECTION ONCE
Status: COMPLETED | OUTPATIENT
Start: 2020-01-01 | End: 2020-01-01

## 2020-01-01 RX ORDER — ATORVASTATIN CALCIUM 40 MG/1
80 TABLET, FILM COATED ORAL NIGHTLY
Status: DISCONTINUED | OUTPATIENT
Start: 2020-01-01 | End: 2020-01-01 | Stop reason: HOSPADM

## 2020-01-01 RX ORDER — PROMETHAZINE HYDROCHLORIDE 25 MG/1
25 TABLET ORAL EVERY 6 HOURS PRN
Qty: 30 TABLET | Refills: 0 | Status: ON HOLD | OUTPATIENT
Start: 2020-01-01 | End: 2020-01-01 | Stop reason: HOSPADM

## 2020-01-01 RX ORDER — INSULIN ASPART 100 [IU]/ML
1-10 INJECTION, SOLUTION INTRAVENOUS; SUBCUTANEOUS EVERY 4 HOURS PRN
Status: DISCONTINUED | OUTPATIENT
Start: 2020-01-01 | End: 2020-01-01

## 2020-01-01 RX ORDER — LOSARTAN POTASSIUM 50 MG/1
100 TABLET ORAL DAILY
Status: DISCONTINUED | OUTPATIENT
Start: 2020-01-01 | End: 2020-01-01 | Stop reason: HOSPADM

## 2020-01-01 RX ORDER — LOPERAMIDE HYDROCHLORIDE 2 MG/1
2 CAPSULE ORAL ONCE AS NEEDED
Status: COMPLETED | OUTPATIENT
Start: 2020-01-01 | End: 2020-01-01

## 2020-01-01 RX ORDER — DILTIAZEM HYDROCHLORIDE 30 MG/1
30 TABLET, FILM COATED ORAL EVERY 6 HOURS
Status: DISCONTINUED | OUTPATIENT
Start: 2020-01-01 | End: 2020-01-01 | Stop reason: HOSPADM

## 2020-01-01 RX ORDER — DILTIAZEM HYDROCHLORIDE 30 MG/1
60 TABLET, FILM COATED ORAL EVERY 6 HOURS
Status: DISCONTINUED | OUTPATIENT
Start: 2020-01-01 | End: 2020-01-01

## 2020-01-01 RX ORDER — VANCOMYCIN HCL IN 5 % DEXTROSE 1G/250ML
1000 PLASTIC BAG, INJECTION (ML) INTRAVENOUS ONCE
Status: COMPLETED | OUTPATIENT
Start: 2020-01-01 | End: 2020-01-01

## 2020-01-01 RX ORDER — CARVEDILOL 25 MG/1
25 TABLET ORAL 2 TIMES DAILY
Status: DISCONTINUED | OUTPATIENT
Start: 2020-01-01 | End: 2020-01-01 | Stop reason: HOSPADM

## 2020-01-01 RX ORDER — CLINDAMYCIN HYDROCHLORIDE 150 MG/1
450 CAPSULE ORAL EVERY 8 HOURS
Qty: 72 CAPSULE | Refills: 0 | Status: SHIPPED | OUTPATIENT
Start: 2020-01-01 | End: 2020-01-01

## 2020-01-01 RX ORDER — ONDANSETRON 2 MG/ML
8 INJECTION INTRAMUSCULAR; INTRAVENOUS EVERY 8 HOURS PRN
Status: DISCONTINUED | OUTPATIENT
Start: 2020-01-01 | End: 2020-01-01 | Stop reason: HOSPADM

## 2020-01-01 RX ORDER — CARVEDILOL 12.5 MG/1
25 TABLET ORAL 2 TIMES DAILY
Status: DISCONTINUED | OUTPATIENT
Start: 2020-01-01 | End: 2020-01-01

## 2020-01-01 RX ORDER — POTASSIUM CHLORIDE 29.8 MG/ML
40 INJECTION INTRAVENOUS ONCE
Status: COMPLETED | OUTPATIENT
Start: 2020-01-01 | End: 2020-01-01

## 2020-01-01 RX ORDER — METOPROLOL TARTRATE 1 MG/ML
INJECTION, SOLUTION INTRAVENOUS
Status: COMPLETED
Start: 2020-01-01 | End: 2020-01-01

## 2020-01-01 RX ORDER — INSULIN GLARGINE 100 [IU]/ML
20 INJECTION, SOLUTION SUBCUTANEOUS NIGHTLY
Qty: 18 ML | Refills: 0 | Status: SHIPPED | OUTPATIENT
Start: 2020-01-01 | End: 2020-07-31

## 2020-01-01 RX ORDER — SEVELAMER CARBONATE 800 MG/1
1600 TABLET, FILM COATED ORAL
Qty: 540 TABLET | Refills: 1 | Status: SHIPPED | OUTPATIENT
Start: 2020-01-01 | End: 2020-08-04

## 2020-01-01 RX ORDER — INSULIN GLARGINE 100 [IU]/ML
7 INJECTION, SOLUTION SUBCUTANEOUS NIGHTLY
Qty: 15 ML | Refills: 3 | Status: ON HOLD | OUTPATIENT
Start: 2020-01-01 | End: 2020-01-01 | Stop reason: SDUPTHER

## 2020-01-01 RX ORDER — CEFAZOLIN SODIUM 1 G/3ML
INJECTION, POWDER, FOR SOLUTION INTRAMUSCULAR; INTRAVENOUS
Status: DISCONTINUED | OUTPATIENT
Start: 2020-01-01 | End: 2020-01-01

## 2020-01-01 RX ORDER — ONDANSETRON 2 MG/ML
4 INJECTION INTRAMUSCULAR; INTRAVENOUS EVERY 8 HOURS PRN
Status: DISCONTINUED | OUTPATIENT
Start: 2020-01-01 | End: 2020-01-01 | Stop reason: HOSPADM

## 2020-01-01 RX ORDER — LOSARTAN POTASSIUM 25 MG/1
100 TABLET ORAL DAILY
Status: DISCONTINUED | OUTPATIENT
Start: 2020-01-01 | End: 2020-01-01 | Stop reason: HOSPADM

## 2020-01-01 RX ORDER — SEVELAMER CARBONATE 800 MG/1
800 TABLET, FILM COATED ORAL
Status: DISCONTINUED | OUTPATIENT
Start: 2020-01-01 | End: 2020-01-01 | Stop reason: HOSPADM

## 2020-01-01 RX ORDER — ASPIRIN 81 MG/1
81 TABLET ORAL DAILY
Refills: 0 | COMMUNITY
Start: 2020-01-01 | End: 2021-05-03

## 2020-01-01 RX ORDER — DILTIAZEM HYDROCHLORIDE 5 MG/ML
15 INJECTION INTRAVENOUS ONCE
Status: DISCONTINUED | OUTPATIENT
Start: 2020-01-01 | End: 2020-01-01

## 2020-01-01 RX ORDER — CEFTRIAXONE 1 G/1
1 INJECTION, POWDER, FOR SOLUTION INTRAMUSCULAR; INTRAVENOUS
Status: DISCONTINUED | OUTPATIENT
Start: 2020-01-01 | End: 2020-01-01

## 2020-01-01 RX ORDER — CLOPIDOGREL 300 MG/1
TABLET, FILM COATED ORAL
Status: DISCONTINUED | OUTPATIENT
Start: 2020-01-01 | End: 2020-01-01 | Stop reason: HOSPADM

## 2020-01-01 RX ORDER — POTASSIUM CHLORIDE 20 MEQ/1
80 TABLET, EXTENDED RELEASE ORAL ONCE
Status: DISCONTINUED | OUTPATIENT
Start: 2020-01-01 | End: 2020-01-01

## 2020-01-01 RX ORDER — PHENYLEPHRINE HCL IN 0.9% NACL 1 MG/10 ML
SYRINGE (ML) INTRAVENOUS
Status: DISCONTINUED | OUTPATIENT
Start: 2020-01-01 | End: 2020-01-01 | Stop reason: HOSPADM

## 2020-01-01 RX ORDER — LOSARTAN POTASSIUM 50 MG/1
100 TABLET ORAL DAILY
Status: DISCONTINUED | OUTPATIENT
Start: 2020-01-01 | End: 2020-01-01

## 2020-01-01 RX ORDER — LEVOFLOXACIN 750 MG/1
750 TABLET ORAL DAILY
Status: DISCONTINUED | OUTPATIENT
Start: 2020-01-01 | End: 2020-01-01

## 2020-01-01 RX ORDER — CARVEDILOL 25 MG/1
25 TABLET ORAL 2 TIMES DAILY
Status: DISCONTINUED | OUTPATIENT
Start: 2020-01-01 | End: 2020-01-01

## 2020-01-01 RX ORDER — MEGESTROL ACETATE 20 MG/1
20 TABLET ORAL DAILY
Status: DISCONTINUED | OUTPATIENT
Start: 2020-01-01 | End: 2020-01-01 | Stop reason: HOSPADM

## 2020-01-01 RX ORDER — LOSARTAN POTASSIUM 25 MG/1
25 TABLET ORAL DAILY
Qty: 90 TABLET | Refills: 3 | OUTPATIENT
Start: 2020-01-01 | End: 2021-05-01

## 2020-01-01 RX ORDER — MAGNESIUM SULFATE HEPTAHYDRATE 40 MG/ML
2 INJECTION, SOLUTION INTRAVENOUS ONCE
Status: DISCONTINUED | OUTPATIENT
Start: 2020-01-01 | End: 2020-01-01

## 2020-01-01 RX ORDER — POTASSIUM CHLORIDE 20 MEQ/1
40 TABLET, EXTENDED RELEASE ORAL ONCE
Status: COMPLETED | OUTPATIENT
Start: 2020-01-01 | End: 2020-01-01

## 2020-01-01 RX ORDER — MIDAZOLAM HYDROCHLORIDE 1 MG/ML
INJECTION INTRAMUSCULAR; INTRAVENOUS
Status: DISCONTINUED | OUTPATIENT
Start: 2020-01-01 | End: 2020-01-01

## 2020-01-01 RX ORDER — ATORVASTATIN CALCIUM 80 MG/1
80 TABLET, FILM COATED ORAL NIGHTLY
Qty: 90 TABLET | Refills: 0 | Status: ON HOLD | OUTPATIENT
Start: 2020-01-01 | End: 2020-01-01 | Stop reason: HOSPADM

## 2020-01-01 RX ORDER — ACETAMINOPHEN 500 MG
1000 TABLET ORAL ONCE
Status: COMPLETED | OUTPATIENT
Start: 2020-01-01 | End: 2020-01-01

## 2020-01-01 RX ORDER — INSULIN ASPART 100 [IU]/ML
3 INJECTION, SOLUTION INTRAVENOUS; SUBCUTANEOUS
Status: DISCONTINUED | OUTPATIENT
Start: 2020-01-01 | End: 2020-01-01 | Stop reason: HOSPADM

## 2020-01-01 RX ORDER — BACITRACIN 50000 [IU]/1
INJECTION, POWDER, FOR SOLUTION INTRAMUSCULAR
Status: DISCONTINUED | OUTPATIENT
Start: 2020-01-01 | End: 2020-01-01 | Stop reason: HOSPADM

## 2020-01-01 RX ORDER — CARVEDILOL 12.5 MG/1
12.5 TABLET ORAL 2 TIMES DAILY
Status: DISCONTINUED | OUTPATIENT
Start: 2020-01-01 | End: 2020-01-01 | Stop reason: HOSPADM

## 2020-01-01 RX ORDER — INSULIN ASPART 100 [IU]/ML
0-5 INJECTION, SOLUTION INTRAVENOUS; SUBCUTANEOUS
Status: DISCONTINUED | OUTPATIENT
Start: 2020-01-01 | End: 2020-01-01 | Stop reason: HOSPADM

## 2020-01-01 RX ORDER — MORPHINE SULFATE 10 MG/ML
3 INJECTION INTRAMUSCULAR; INTRAVENOUS; SUBCUTANEOUS
Status: DISCONTINUED | OUTPATIENT
Start: 2020-01-01 | End: 2020-01-01

## 2020-01-01 RX ORDER — ACETAMINOPHEN 500 MG
1000 TABLET ORAL
Status: COMPLETED | OUTPATIENT
Start: 2020-01-01 | End: 2020-01-01

## 2020-01-01 RX ORDER — ATORVASTATIN CALCIUM 20 MG/1
80 TABLET, FILM COATED ORAL NIGHTLY
Status: DISCONTINUED | OUTPATIENT
Start: 2020-01-01 | End: 2020-01-01 | Stop reason: HOSPADM

## 2020-01-01 RX ORDER — HYDROCODONE BITARTRATE AND ACETAMINOPHEN 5; 325 MG/1; MG/1
1 TABLET ORAL EVERY 4 HOURS PRN
Status: DISCONTINUED | OUTPATIENT
Start: 2020-01-01 | End: 2020-01-01

## 2020-01-01 RX ORDER — DILTIAZEM HYDROCHLORIDE 5 MG/ML
10 INJECTION INTRAVENOUS ONCE
Status: COMPLETED | OUTPATIENT
Start: 2020-01-01 | End: 2020-01-01

## 2020-01-01 RX ORDER — INSULIN ASPART 100 [IU]/ML
10 INJECTION, SOLUTION INTRAVENOUS; SUBCUTANEOUS ONCE
Status: COMPLETED | OUTPATIENT
Start: 2020-01-01 | End: 2020-01-01

## 2020-01-01 RX ORDER — MAGNESIUM SULFATE HEPTAHYDRATE 40 MG/ML
2 INJECTION, SOLUTION INTRAVENOUS
Status: DISPENSED | OUTPATIENT
Start: 2020-01-01 | End: 2020-01-01

## 2020-01-01 RX ORDER — INSULIN ASPART 100 [IU]/ML
5 INJECTION, SOLUTION INTRAVENOUS; SUBCUTANEOUS ONCE
Status: COMPLETED | OUTPATIENT
Start: 2020-01-01 | End: 2020-01-01

## 2020-01-01 RX ORDER — HEPARIN SODIUM 5000 [USP'U]/ML
5000 INJECTION, SOLUTION INTRAVENOUS; SUBCUTANEOUS EVERY 12 HOURS
Status: DISCONTINUED | OUTPATIENT
Start: 2020-01-01 | End: 2020-01-01 | Stop reason: HOSPADM

## 2020-01-01 RX ORDER — HEPARIN SODIUM 200 [USP'U]/100ML
INJECTION, SOLUTION INTRAVENOUS
Status: DISCONTINUED | OUTPATIENT
Start: 2020-01-01 | End: 2020-01-01 | Stop reason: HOSPADM

## 2020-01-01 RX ORDER — NOREPINEPHRINE BITARTRATE/D5W 4MG/250ML
0.05 PLASTIC BAG, INJECTION (ML) INTRAVENOUS CONTINUOUS
Status: DISCONTINUED | OUTPATIENT
Start: 2020-01-01 | End: 2020-01-01

## 2020-01-01 RX ORDER — MIDAZOLAM HYDROCHLORIDE 1 MG/ML
INJECTION INTRAMUSCULAR; INTRAVENOUS CODE/TRAUMA/SEDATION MEDICATION
Status: COMPLETED | OUTPATIENT
Start: 2020-01-01 | End: 2020-01-01

## 2020-01-01 RX ORDER — CISATRACURIUM BESYLATE 10 MG/ML
INJECTION, SOLUTION INTRAVENOUS
Status: DISCONTINUED | OUTPATIENT
Start: 2020-01-01 | End: 2020-01-01

## 2020-01-01 RX ORDER — FUROSEMIDE 40 MG/1
40 TABLET ORAL DAILY
Status: DISCONTINUED | OUTPATIENT
Start: 2020-01-01 | End: 2020-01-01 | Stop reason: HOSPADM

## 2020-01-01 RX ORDER — DILTIAZEM HYDROCHLORIDE 180 MG/1
180 CAPSULE, COATED, EXTENDED RELEASE ORAL
Status: COMPLETED | OUTPATIENT
Start: 2020-01-01 | End: 2020-01-01

## 2020-01-01 RX ORDER — SERTRALINE HYDROCHLORIDE 25 MG/1
25 TABLET, FILM COATED ORAL DAILY
Status: DISCONTINUED | OUTPATIENT
Start: 2020-01-01 | End: 2020-01-01

## 2020-01-01 RX ORDER — HYDRALAZINE HYDROCHLORIDE 20 MG/ML
INJECTION INTRAMUSCULAR; INTRAVENOUS
Status: DISCONTINUED | OUTPATIENT
Start: 2020-01-01 | End: 2020-01-01

## 2020-01-01 RX ORDER — NEOSTIGMINE METHYLSULFATE 1 MG/ML
INJECTION, SOLUTION INTRAVENOUS
Status: DISCONTINUED | OUTPATIENT
Start: 2020-01-01 | End: 2020-01-01

## 2020-01-01 RX ORDER — ONDANSETRON 2 MG/ML
4 INJECTION INTRAMUSCULAR; INTRAVENOUS ONCE
Status: COMPLETED | OUTPATIENT
Start: 2020-01-01 | End: 2020-01-01

## 2020-01-01 RX ORDER — INSULIN GLARGINE 100 [IU]/ML
15 INJECTION, SOLUTION SUBCUTANEOUS NIGHTLY
Qty: 15 ML | Refills: 3 | Status: CANCELLED | OUTPATIENT
Start: 2020-01-01 | End: 2021-05-01

## 2020-01-01 RX ORDER — METOPROLOL TARTRATE 1 MG/ML
5 INJECTION, SOLUTION INTRAVENOUS EVERY 5 MIN PRN
Status: COMPLETED | OUTPATIENT
Start: 2020-01-01 | End: 2020-01-01

## 2020-01-01 RX ORDER — INSULIN ASPART 100 [IU]/ML
1-10 INJECTION, SOLUTION INTRAVENOUS; SUBCUTANEOUS
Status: DISCONTINUED | OUTPATIENT
Start: 2020-01-01 | End: 2020-01-01 | Stop reason: HOSPADM

## 2020-01-01 RX ORDER — INSULIN GLARGINE 100 [IU]/ML
10 INJECTION, SOLUTION SUBCUTANEOUS NIGHTLY
Qty: 1 BOX | Refills: 3 | Status: SHIPPED | OUTPATIENT
Start: 2020-01-01 | End: 2020-01-01 | Stop reason: SDUPTHER

## 2020-01-01 RX ORDER — INSULIN ASPART 100 [IU]/ML
6 INJECTION, SOLUTION INTRAVENOUS; SUBCUTANEOUS ONCE
Status: DISCONTINUED | OUTPATIENT
Start: 2020-01-01 | End: 2020-01-01

## 2020-01-01 RX ORDER — NALOXONE HCL 0.4 MG/ML
VIAL (ML) INJECTION
Status: COMPLETED
Start: 2020-01-01 | End: 2020-01-01

## 2020-01-01 RX ORDER — INSULIN ASPART 100 [IU]/ML
0-5 INJECTION, SOLUTION INTRAVENOUS; SUBCUTANEOUS EVERY 6 HOURS PRN
Status: CANCELLED | OUTPATIENT
Start: 2020-01-01

## 2020-01-01 RX ORDER — VANCOMYCIN HCL IN 5 % DEXTROSE 1G/250ML
15 PLASTIC BAG, INJECTION (ML) INTRAVENOUS ONCE
Status: COMPLETED | OUTPATIENT
Start: 2020-01-01 | End: 2020-01-01

## 2020-01-01 RX ORDER — GLUCAGON 1 MG
1 KIT INJECTION
Status: DISCONTINUED | OUTPATIENT
Start: 2020-01-01 | End: 2020-01-01 | Stop reason: ALTCHOICE

## 2020-01-01 RX ORDER — LIDOCAINE HCL/PF 100 MG/5ML
SYRINGE (ML) INTRAVENOUS
Status: DISCONTINUED | OUTPATIENT
Start: 2020-01-01 | End: 2020-01-01

## 2020-01-01 RX ORDER — DILTIAZEM HYDROCHLORIDE 180 MG/1
180 CAPSULE, COATED, EXTENDED RELEASE ORAL DAILY
Qty: 90 CAPSULE | Refills: 1 | Status: SHIPPED | OUTPATIENT
Start: 2020-01-01

## 2020-01-01 RX ORDER — INSULIN ASPART 100 [IU]/ML
3 INJECTION, SOLUTION INTRAVENOUS; SUBCUTANEOUS
Status: DISCONTINUED | OUTPATIENT
Start: 2020-01-01 | End: 2020-01-01

## 2020-01-01 RX ORDER — FLUCONAZOLE 200 MG/1
400 TABLET ORAL DAILY
Status: DISCONTINUED | OUTPATIENT
Start: 2020-01-01 | End: 2020-01-01

## 2020-01-01 RX ORDER — ACETAMINOPHEN 325 MG/1
650 TABLET ORAL EVERY 4 HOURS PRN
Status: DISCONTINUED | OUTPATIENT
Start: 2020-01-01 | End: 2020-01-01

## 2020-01-01 RX ORDER — LEVOFLOXACIN 750 MG/1
750 TABLET ORAL DAILY
Status: COMPLETED | OUTPATIENT
Start: 2020-01-01 | End: 2020-01-01

## 2020-01-01 RX ADMIN — METRONIDAZOLE 500 MG: 500 TABLET ORAL at 04:05

## 2020-01-01 RX ADMIN — CARVEDILOL 25 MG: 6.25 TABLET, FILM COATED ORAL at 09:02

## 2020-01-01 RX ADMIN — INSULIN ASPART 5 UNITS: 100 INJECTION, SOLUTION INTRAVENOUS; SUBCUTANEOUS at 08:03

## 2020-01-01 RX ADMIN — CARVEDILOL 12.5 MG: 12.5 TABLET, FILM COATED ORAL at 08:05

## 2020-01-01 RX ADMIN — INSULIN ASPART 3 UNITS: 100 INJECTION, SOLUTION INTRAVENOUS; SUBCUTANEOUS at 08:04

## 2020-01-01 RX ADMIN — PIPERACILLIN AND TAZOBACTAM 4.5 G: 4; .5 INJECTION, POWDER, FOR SOLUTION INTRAVENOUS at 12:05

## 2020-01-01 RX ADMIN — PANTOPRAZOLE SODIUM 40 MG: 40 TABLET, DELAYED RELEASE ORAL at 08:05

## 2020-01-01 RX ADMIN — CLOPIDOGREL BISULFATE 75 MG: 75 TABLET ORAL at 08:05

## 2020-01-01 RX ADMIN — INSULIN ASPART 1 UNITS: 100 INJECTION, SOLUTION INTRAVENOUS; SUBCUTANEOUS at 09:05

## 2020-01-01 RX ADMIN — INSULIN ASPART 3 UNITS: 100 INJECTION, SOLUTION INTRAVENOUS; SUBCUTANEOUS at 09:05

## 2020-01-01 RX ADMIN — METOPROLOL TARTRATE 25 MG: 25 TABLET, FILM COATED ORAL at 08:05

## 2020-01-01 RX ADMIN — EPOETIN ALFA-EPBX 4000 UNITS: 4000 INJECTION, SOLUTION INTRAVENOUS; SUBCUTANEOUS at 11:05

## 2020-01-01 RX ADMIN — SODIUM CHLORIDE 250 ML: 0.9 INJECTION, SOLUTION INTRAVENOUS at 11:05

## 2020-01-01 RX ADMIN — METRONIDAZOLE 500 MG: 500 INJECTION, SOLUTION INTRAVENOUS at 01:04

## 2020-01-01 RX ADMIN — INSULIN ASPART 1 UNITS: 100 INJECTION, SOLUTION INTRAVENOUS; SUBCUTANEOUS at 09:03

## 2020-01-01 RX ADMIN — ATORVASTATIN CALCIUM 80 MG: 20 TABLET, FILM COATED ORAL at 09:05

## 2020-01-01 RX ADMIN — INSULIN ASPART 2 UNITS: 100 INJECTION, SOLUTION INTRAVENOUS; SUBCUTANEOUS at 12:03

## 2020-01-01 RX ADMIN — CARVEDILOL 12.5 MG: 12.5 TABLET, FILM COATED ORAL at 07:05

## 2020-01-01 RX ADMIN — SEVELAMER CARBONATE 800 MG: 800 TABLET, FILM COATED ORAL at 08:05

## 2020-01-01 RX ADMIN — SEVELAMER CARBONATE 800 MG: 800 TABLET, FILM COATED ORAL at 09:05

## 2020-01-01 RX ADMIN — METOPROLOL TARTRATE 25 MG: 25 TABLET, FILM COATED ORAL at 09:05

## 2020-01-01 RX ADMIN — HEPARIN SODIUM 5000 UNITS: 5000 INJECTION, SOLUTION INTRAVENOUS; SUBCUTANEOUS at 10:03

## 2020-01-01 RX ADMIN — FLUCONAZOLE 200 MG: 200 TABLET ORAL at 02:05

## 2020-01-01 RX ADMIN — LIDOCAINE HYDROCHLORIDE 20 MG: 20 INJECTION, SOLUTION INTRAVENOUS at 10:04

## 2020-01-01 RX ADMIN — METOPROLOL TARTRATE 50 MG: 50 TABLET, FILM COATED ORAL at 09:05

## 2020-01-01 RX ADMIN — METOPROLOL TARTRATE 50 MG: 50 TABLET, FILM COATED ORAL at 06:05

## 2020-01-01 RX ADMIN — INSULIN ASPART 4 UNITS: 100 INJECTION, SOLUTION INTRAVENOUS; SUBCUTANEOUS at 11:05

## 2020-01-01 RX ADMIN — EPOETIN ALFA-EPBX 10000 UNITS: 10000 INJECTION, SOLUTION INTRAVENOUS; SUBCUTANEOUS at 09:02

## 2020-01-01 RX ADMIN — HEPARIN SODIUM 5000 UNITS: 5000 INJECTION, SOLUTION INTRAVENOUS; SUBCUTANEOUS at 01:02

## 2020-01-01 RX ADMIN — DILTIAZEM HYDROCHLORIDE 60 MG: 60 TABLET, FILM COATED ORAL at 05:05

## 2020-01-01 RX ADMIN — INSULIN ASPART 8 UNITS: 100 INJECTION, SOLUTION INTRAVENOUS; SUBCUTANEOUS at 04:03

## 2020-01-01 RX ADMIN — SEVELAMER CARBONATE 1600 MG: 800 TABLET, FILM COATED ORAL at 11:05

## 2020-01-01 RX ADMIN — HEPARIN SODIUM 5000 UNITS: 5000 INJECTION, SOLUTION INTRAVENOUS; SUBCUTANEOUS at 02:03

## 2020-01-01 RX ADMIN — FLUCONAZOLE 200 MG: 200 TABLET ORAL at 03:06

## 2020-01-01 RX ADMIN — DILTIAZEM HYDROCHLORIDE 60 MG: 60 TABLET, FILM COATED ORAL at 07:05

## 2020-01-01 RX ADMIN — PIPERACILLIN AND TAZOBACTAM 4.5 G: 4; .5 INJECTION, POWDER, FOR SOLUTION INTRAVENOUS at 01:05

## 2020-01-01 RX ADMIN — INSULIN ASPART 2 UNITS: 100 INJECTION, SOLUTION INTRAVENOUS; SUBCUTANEOUS at 08:05

## 2020-01-01 RX ADMIN — SEVELAMER CARBONATE 800 MG: 800 TABLET, FILM COATED ORAL at 12:05

## 2020-01-01 RX ADMIN — INSULIN ASPART 2 UNITS: 100 INJECTION, SOLUTION INTRAVENOUS; SUBCUTANEOUS at 11:05

## 2020-01-01 RX ADMIN — INSULIN ASPART 10 UNITS: 100 INJECTION, SOLUTION INTRAVENOUS; SUBCUTANEOUS at 06:05

## 2020-01-01 RX ADMIN — INSULIN ASPART 3 UNITS: 100 INJECTION, SOLUTION INTRAVENOUS; SUBCUTANEOUS at 06:05

## 2020-01-01 RX ADMIN — ACETAMINOPHEN 650 MG: 325 TABLET ORAL at 01:02

## 2020-01-01 RX ADMIN — Medication 16 G: at 11:05

## 2020-01-01 RX ADMIN — HYDRALAZINE HYDROCHLORIDE 5 MG: 20 INJECTION INTRAMUSCULAR; INTRAVENOUS at 08:02

## 2020-01-01 RX ADMIN — METRONIDAZOLE 500 MG: 500 INJECTION, SOLUTION INTRAVENOUS at 06:04

## 2020-01-01 RX ADMIN — INSULIN ASPART 5 UNITS: 100 INJECTION, SOLUTION INTRAVENOUS; SUBCUTANEOUS at 03:05

## 2020-01-01 RX ADMIN — HEPARIN SODIUM AND DEXTROSE 12 UNITS/KG/HR: 10000; 5 INJECTION INTRAVENOUS at 12:05

## 2020-01-01 RX ADMIN — DILTIAZEM HYDROCHLORIDE 60 MG: 60 TABLET, FILM COATED ORAL at 02:05

## 2020-01-01 RX ADMIN — DILTIAZEM HYDROCHLORIDE 180 MG: 180 CAPSULE, COATED, EXTENDED RELEASE ORAL at 09:02

## 2020-01-01 RX ADMIN — ATORVASTATIN CALCIUM 40 MG: 20 TABLET, FILM COATED ORAL at 08:04

## 2020-01-01 RX ADMIN — INSULIN ASPART 2 UNITS: 100 INJECTION, SOLUTION INTRAVENOUS; SUBCUTANEOUS at 12:05

## 2020-01-01 RX ADMIN — DILTIAZEM HYDROCHLORIDE 30 MG: 30 TABLET, FILM COATED ORAL at 05:05

## 2020-01-01 RX ADMIN — SODIUM CHLORIDE 500 ML: 0.9 INJECTION, SOLUTION INTRAVENOUS at 06:04

## 2020-01-01 RX ADMIN — CISATRACURIUM BESYLATE 4 MG: 10 INJECTION INTRAVENOUS at 07:02

## 2020-01-01 RX ADMIN — ATORVASTATIN CALCIUM 80 MG: 20 TABLET, FILM COATED ORAL at 10:05

## 2020-01-01 RX ADMIN — INSULIN DETEMIR 10 UNITS: 100 INJECTION, SOLUTION SUBCUTANEOUS at 09:05

## 2020-01-01 RX ADMIN — PIPERACILLIN SODIUM,TAZOBACTAM SODIUM 4.5 G: 4; .5 INJECTION, POWDER, FOR SOLUTION INTRAVENOUS at 08:05

## 2020-01-01 RX ADMIN — SODIUM CHLORIDE 250 ML: 0.9 INJECTION, SOLUTION INTRAVENOUS at 02:05

## 2020-01-01 RX ADMIN — MICAFUNGIN SODIUM 100 MG: 20 INJECTION, POWDER, LYOPHILIZED, FOR SOLUTION INTRAVENOUS at 08:05

## 2020-01-01 RX ADMIN — ATORVASTATIN CALCIUM 80 MG: 40 TABLET, FILM COATED ORAL at 10:05

## 2020-01-01 RX ADMIN — INSULIN ASPART 5 UNITS: 100 INJECTION, SOLUTION INTRAVENOUS; SUBCUTANEOUS at 10:05

## 2020-01-01 RX ADMIN — SODIUM CHLORIDE 250 ML: 9 INJECTION, SOLUTION INTRAVENOUS at 11:05

## 2020-01-01 RX ADMIN — METOPROLOL TARTRATE 10 MG: 1 INJECTION, SOLUTION INTRAVENOUS at 05:04

## 2020-01-01 RX ADMIN — SEVELAMER CARBONATE 800 MG: 800 TABLET, FILM COATED ORAL at 02:05

## 2020-01-01 RX ADMIN — FLUCONAZOLE 200 MG: 200 TABLET ORAL at 03:05

## 2020-01-01 RX ADMIN — INSULIN ASPART 4 UNITS: 100 INJECTION, SOLUTION INTRAVENOUS; SUBCUTANEOUS at 04:03

## 2020-01-01 RX ADMIN — INSULIN ASPART 1 UNITS: 100 INJECTION, SOLUTION INTRAVENOUS; SUBCUTANEOUS at 02:04

## 2020-01-01 RX ADMIN — INSULIN ASPART 8 UNITS: 100 INJECTION, SOLUTION INTRAVENOUS; SUBCUTANEOUS at 08:03

## 2020-01-01 RX ADMIN — SEVELAMER CARBONATE 800 MG: 800 TABLET, FILM COATED ORAL at 11:05

## 2020-01-01 RX ADMIN — LEVOFLOXACIN 750 MG: 750 TABLET, FILM COATED ORAL at 05:05

## 2020-01-01 RX ADMIN — DILTIAZEM HYDROCHLORIDE 60 MG: 60 TABLET, FILM COATED ORAL at 01:05

## 2020-01-01 RX ADMIN — INSULIN HUMAN 10 UNITS: 100 INJECTION, SOLUTION PARENTERAL at 04:02

## 2020-01-01 RX ADMIN — LOSARTAN POTASSIUM 100 MG: 50 TABLET ORAL at 08:05

## 2020-01-01 RX ADMIN — INSULIN DETEMIR 7 UNITS: 100 INJECTION, SOLUTION SUBCUTANEOUS at 02:05

## 2020-01-01 RX ADMIN — HEPARIN SODIUM 5000 UNITS: 5000 INJECTION, SOLUTION INTRAVENOUS; SUBCUTANEOUS at 06:05

## 2020-01-01 RX ADMIN — CLOPIDOGREL BISULFATE 75 MG: 75 TABLET ORAL at 02:05

## 2020-01-01 RX ADMIN — HEPARIN SODIUM 5000 UNITS: 5000 INJECTION INTRAVENOUS; SUBCUTANEOUS at 01:05

## 2020-01-01 RX ADMIN — CLOPIDOGREL BISULFATE 75 MG: 75 TABLET ORAL at 08:04

## 2020-01-01 RX ADMIN — SEVELAMER CARBONATE 800 MG: 800 TABLET, FILM COATED ORAL at 05:05

## 2020-01-01 RX ADMIN — DILTIAZEM HYDROCHLORIDE 60 MG: 60 TABLET, FILM COATED ORAL at 11:05

## 2020-01-01 RX ADMIN — MIDODRINE HYDROCHLORIDE 5 MG: 2.5 TABLET ORAL at 10:06

## 2020-01-01 RX ADMIN — DEXTROSE 125 ML: 10 SOLUTION INTRAVENOUS at 09:05

## 2020-01-01 RX ADMIN — CLOPIDOGREL BISULFATE 75 MG: 75 TABLET ORAL at 08:03

## 2020-01-01 RX ADMIN — HEPARIN SODIUM 5000 UNITS: 5000 INJECTION, SOLUTION INTRAVENOUS; SUBCUTANEOUS at 08:04

## 2020-01-01 RX ADMIN — METOPROLOL TARTRATE 25 MG: 25 TABLET, FILM COATED ORAL at 10:05

## 2020-01-01 RX ADMIN — INSULIN DETEMIR 15 UNITS: 100 INJECTION, SOLUTION SUBCUTANEOUS at 10:05

## 2020-01-01 RX ADMIN — CARVEDILOL 25 MG: 25 TABLET, FILM COATED ORAL at 08:04

## 2020-01-01 RX ADMIN — INSULIN ASPART 10 UNITS: 100 INJECTION, SOLUTION INTRAVENOUS; SUBCUTANEOUS at 12:05

## 2020-01-01 RX ADMIN — PHENYLEPHRINE HYDROCHLORIDE 200 MCG: 10 INJECTION INTRAVENOUS at 07:02

## 2020-01-01 RX ADMIN — HEPARIN SODIUM 5000 UNITS: 5000 INJECTION INTRAVENOUS; SUBCUTANEOUS at 09:05

## 2020-01-01 RX ADMIN — METOPROLOL TARTRATE 5 MG: 1 INJECTION, SOLUTION INTRAVENOUS at 04:05

## 2020-01-01 RX ADMIN — ASPIRIN 81 MG: 81 TABLET, COATED ORAL at 08:05

## 2020-01-01 RX ADMIN — APIXABAN 5 MG: 5 TABLET, FILM COATED ORAL at 09:05

## 2020-01-01 RX ADMIN — INSULIN ASPART 2 UNITS: 100 INJECTION, SOLUTION INTRAVENOUS; SUBCUTANEOUS at 09:05

## 2020-01-01 RX ADMIN — PHENYLEPHRINE HYDROCHLORIDE 100 MCG: 10 INJECTION INTRAVENOUS at 11:04

## 2020-01-01 RX ADMIN — HEPARIN SODIUM 5000 UNITS: 5000 INJECTION INTRAVENOUS; SUBCUTANEOUS at 08:05

## 2020-01-01 RX ADMIN — CLOPIDOGREL BISULFATE 75 MG: 75 TABLET ORAL at 09:02

## 2020-01-01 RX ADMIN — INSULIN ASPART 1 UNITS: 100 INJECTION, SOLUTION INTRAVENOUS; SUBCUTANEOUS at 11:05

## 2020-01-01 RX ADMIN — POTASSIUM CHLORIDE 40 MEQ: 1500 TABLET, EXTENDED RELEASE ORAL at 01:05

## 2020-01-01 RX ADMIN — DILTIAZEM HYDROCHLORIDE 10 MG: 5 INJECTION INTRAVENOUS at 04:04

## 2020-01-01 RX ADMIN — FUROSEMIDE 40 MG: 40 TABLET ORAL at 08:04

## 2020-01-01 RX ADMIN — MEGESTROL ACETATE 20 MG: 20 TABLET ORAL at 02:03

## 2020-01-01 RX ADMIN — INSULIN ASPART 3 UNITS: 100 INJECTION, SOLUTION INTRAVENOUS; SUBCUTANEOUS at 07:05

## 2020-01-01 RX ADMIN — Medication 0.14 MCG/KG/MIN: at 02:05

## 2020-01-01 RX ADMIN — PIPERACILLIN AND TAZOBACTAM 4.5 G: 4; .5 INJECTION, POWDER, LYOPHILIZED, FOR SOLUTION INTRAVENOUS; PARENTERAL at 12:05

## 2020-01-01 RX ADMIN — DILTIAZEM HYDROCHLORIDE 60 MG: 60 TABLET, FILM COATED ORAL at 06:05

## 2020-01-01 RX ADMIN — CLOPIDOGREL BISULFATE 75 MG: 75 TABLET ORAL at 10:03

## 2020-01-01 RX ADMIN — ETOMIDATE 12 MG: 2 INJECTION, SOLUTION INTRAVENOUS at 05:02

## 2020-01-01 RX ADMIN — INSULIN ASPART 10 UNITS: 100 INJECTION, SOLUTION INTRAVENOUS; SUBCUTANEOUS at 05:02

## 2020-01-01 RX ADMIN — DILTIAZEM HYDROCHLORIDE 12.5 MG/HR: 5 INJECTION, SOLUTION INTRAVENOUS at 08:05

## 2020-01-01 RX ADMIN — INSULIN ASPART 1 UNITS: 100 INJECTION, SOLUTION INTRAVENOUS; SUBCUTANEOUS at 10:05

## 2020-01-01 RX ADMIN — HEPARIN SODIUM 5000 UNITS: 5000 INJECTION, SOLUTION INTRAVENOUS; SUBCUTANEOUS at 09:03

## 2020-01-01 RX ADMIN — INSULIN ASPART 2 UNITS: 100 INJECTION, SOLUTION INTRAVENOUS; SUBCUTANEOUS at 05:02

## 2020-01-01 RX ADMIN — PHENYLEPHRINE HYDROCHLORIDE 200 MCG: 10 INJECTION INTRAVENOUS at 06:02

## 2020-01-01 RX ADMIN — ATORVASTATIN CALCIUM 40 MG: 40 TABLET, FILM COATED ORAL at 09:02

## 2020-01-01 RX ADMIN — INSULIN ASPART 6 UNITS: 100 INJECTION, SOLUTION INTRAVENOUS; SUBCUTANEOUS at 08:02

## 2020-01-01 RX ADMIN — INSULIN ASPART 3 UNITS: 100 INJECTION, SOLUTION INTRAVENOUS; SUBCUTANEOUS at 11:05

## 2020-01-01 RX ADMIN — DILTIAZEM HYDROCHLORIDE 30 MG: 30 TABLET, FILM COATED ORAL at 11:05

## 2020-01-01 RX ADMIN — INSULIN ASPART 4 UNITS: 100 INJECTION, SOLUTION INTRAVENOUS; SUBCUTANEOUS at 02:03

## 2020-01-01 RX ADMIN — INSULIN ASPART 2 UNITS: 100 INJECTION, SOLUTION INTRAVENOUS; SUBCUTANEOUS at 04:05

## 2020-01-01 RX ADMIN — DILTIAZEM HYDROCHLORIDE 60 MG: 60 TABLET, FILM COATED ORAL at 12:05

## 2020-01-01 RX ADMIN — INSULIN DETEMIR 15 UNITS: 100 INJECTION, SOLUTION SUBCUTANEOUS at 08:05

## 2020-01-01 RX ADMIN — INSULIN ASPART 3 UNITS: 100 INJECTION, SOLUTION INTRAVENOUS; SUBCUTANEOUS at 12:05

## 2020-01-01 RX ADMIN — CEFAZOLIN SODIUM 2 G: 1 POWDER, FOR SOLUTION INTRAMUSCULAR; INTRAVENOUS at 07:02

## 2020-01-01 RX ADMIN — HEPARIN SODIUM 5000 UNITS: 5000 INJECTION, SOLUTION INTRAVENOUS; SUBCUTANEOUS at 02:02

## 2020-01-01 RX ADMIN — DILTIAZEM HYDROCHLORIDE 60 MG: 60 TABLET, FILM COATED ORAL at 08:05

## 2020-01-01 RX ADMIN — PHENYLEPHRINE HYDROCHLORIDE 100 MCG: 10 INJECTION INTRAVENOUS at 10:04

## 2020-01-01 RX ADMIN — METOROPROLOL TARTRATE 5 MG: 5 INJECTION, SOLUTION INTRAVENOUS at 04:05

## 2020-01-01 RX ADMIN — INSULIN DETEMIR 10 UNITS: 100 INJECTION, SOLUTION SUBCUTANEOUS at 10:05

## 2020-01-01 RX ADMIN — HEPARIN SODIUM 5000 UNITS: 5000 INJECTION, SOLUTION INTRAVENOUS; SUBCUTANEOUS at 01:04

## 2020-01-01 RX ADMIN — PHENYLEPHRINE HYDROCHLORIDE 200 MCG: 10 INJECTION INTRAVENOUS at 10:04

## 2020-01-01 RX ADMIN — HYDROXYZINE HYDROCHLORIDE 25 MG: 25 TABLET, FILM COATED ORAL at 09:05

## 2020-01-01 RX ADMIN — ASPIRIN 81 MG: 81 TABLET, COATED ORAL at 12:05

## 2020-01-01 RX ADMIN — MIDAZOLAM HYDROCHLORIDE 2 MG: 1 INJECTION, SOLUTION INTRAMUSCULAR; INTRAVENOUS at 09:04

## 2020-01-01 RX ADMIN — INSULIN ASPART 4 UNITS: 100 INJECTION, SOLUTION INTRAVENOUS; SUBCUTANEOUS at 12:06

## 2020-01-01 RX ADMIN — INSULIN ASPART 6 UNITS: 100 INJECTION, SOLUTION INTRAVENOUS; SUBCUTANEOUS at 11:02

## 2020-01-01 RX ADMIN — APIXABAN 5 MG: 5 TABLET, FILM COATED ORAL at 08:05

## 2020-01-01 RX ADMIN — CLOPIDOGREL BISULFATE 75 MG: 75 TABLET ORAL at 09:05

## 2020-01-01 RX ADMIN — INSULIN ASPART 6 UNITS: 100 INJECTION, SOLUTION INTRAVENOUS; SUBCUTANEOUS at 05:05

## 2020-01-01 RX ADMIN — FENTANYL CITRATE 25 MCG: 50 INJECTION, SOLUTION INTRAMUSCULAR; INTRAVENOUS at 10:04

## 2020-01-01 RX ADMIN — DILTIAZEM HYDROCHLORIDE 5 MG/HR: 5 INJECTION, SOLUTION INTRAVENOUS at 06:05

## 2020-01-01 RX ADMIN — INSULIN ASPART 2 UNITS: 100 INJECTION, SOLUTION INTRAVENOUS; SUBCUTANEOUS at 02:05

## 2020-01-01 RX ADMIN — PIPERACILLIN AND TAZOBACTAM 4.5 G: 4; .5 INJECTION, POWDER, LYOPHILIZED, FOR SOLUTION INTRAVENOUS; PARENTERAL at 07:05

## 2020-01-01 RX ADMIN — INSULIN ASPART 8 UNITS: 100 INJECTION, SOLUTION INTRAVENOUS; SUBCUTANEOUS at 08:05

## 2020-01-01 RX ADMIN — INSULIN HUMAN 10 UNITS: 100 INJECTION, SOLUTION PARENTERAL at 02:04

## 2020-01-01 RX ADMIN — CLOPIDOGREL BISULFATE 75 MG: 75 TABLET ORAL at 02:03

## 2020-01-01 RX ADMIN — HEPARIN SODIUM 5000 UNITS: 5000 INJECTION INTRAVENOUS; SUBCUTANEOUS at 06:05

## 2020-01-01 RX ADMIN — INSULIN HUMAN 6 UNITS: 100 INJECTION, SOLUTION PARENTERAL at 05:04

## 2020-01-01 RX ADMIN — SODIUM PHOSPHATE, MONOBASIC, MONOHYDRATE 20.01 MMOL: 276; 142 INJECTION, SOLUTION INTRAVENOUS at 01:05

## 2020-01-01 RX ADMIN — INSULIN ASPART 3 UNITS: 100 INJECTION, SOLUTION INTRAVENOUS; SUBCUTANEOUS at 08:06

## 2020-01-01 RX ADMIN — CEFTRIAXONE SODIUM 1 G: 1 INJECTION, POWDER, FOR SOLUTION INTRAMUSCULAR; INTRAVENOUS at 03:04

## 2020-01-01 RX ADMIN — SEVELAMER CARBONATE 800 MG: 800 TABLET, FILM COATED ORAL at 06:05

## 2020-01-01 RX ADMIN — VANCOMYCIN HYDROCHLORIDE 500 MG: 500 INJECTION, POWDER, LYOPHILIZED, FOR SOLUTION INTRAVENOUS at 08:05

## 2020-01-01 RX ADMIN — SEVELAMER CARBONATE 800 MG: 800 TABLET, FILM COATED ORAL at 04:05

## 2020-01-01 RX ADMIN — INSULIN ASPART 2 UNITS: 100 INJECTION, SOLUTION INTRAVENOUS; SUBCUTANEOUS at 05:05

## 2020-01-01 RX ADMIN — PHENYLEPHRINE HYDROCHLORIDE 20 MCG/MIN: 10 INJECTION INTRAVENOUS at 07:02

## 2020-01-01 RX ADMIN — FENTANYL CITRATE 25 MCG: 50 INJECTION INTRAMUSCULAR; INTRAVENOUS at 09:02

## 2020-01-01 RX ADMIN — DEXTROSE MONOHYDRATE 250 ML: 10 INJECTION, SOLUTION INTRAVENOUS at 04:05

## 2020-01-01 RX ADMIN — VANCOMYCIN HYDROCHLORIDE 1000 MG: 1 INJECTION, POWDER, LYOPHILIZED, FOR SOLUTION INTRAVENOUS at 01:05

## 2020-01-01 RX ADMIN — PROPOFOL 15 MCG/KG/MIN: 10 INJECTION, EMULSION INTRAVENOUS at 10:04

## 2020-01-01 RX ADMIN — INSULIN ASPART 4 UNITS: 100 INJECTION, SOLUTION INTRAVENOUS; SUBCUTANEOUS at 05:05

## 2020-01-01 RX ADMIN — INSULIN ASPART 8 UNITS: 100 INJECTION, SOLUTION INTRAVENOUS; SUBCUTANEOUS at 12:03

## 2020-01-01 RX ADMIN — DILTIAZEM HYDROCHLORIDE 180 MG: 180 CAPSULE, COATED, EXTENDED RELEASE ORAL at 08:03

## 2020-01-01 RX ADMIN — HEPARIN SODIUM 5000 UNITS: 5000 INJECTION INTRAVENOUS; SUBCUTANEOUS at 04:05

## 2020-01-01 RX ADMIN — HEPARIN SODIUM 12 UNITS/KG/HR: 10000 INJECTION, SOLUTION INTRAVENOUS at 10:05

## 2020-01-01 RX ADMIN — NALOXONE HYDROCHLORIDE 0.4 MG: 0.4 INJECTION, SOLUTION INTRAMUSCULAR; INTRAVENOUS; SUBCUTANEOUS at 04:05

## 2020-01-01 RX ADMIN — METOPROLOL TARTRATE 25 MG: 25 TABLET, FILM COATED ORAL at 12:05

## 2020-01-01 RX ADMIN — HEPARIN SODIUM 5000 UNITS: 5000 INJECTION INTRAVENOUS; SUBCUTANEOUS at 10:05

## 2020-01-01 RX ADMIN — SODIUM CHLORIDE 3 G: 9 INJECTION, SOLUTION INTRAVENOUS at 11:05

## 2020-01-01 RX ADMIN — INSULIN ASPART 3 UNITS: 100 INJECTION, SOLUTION INTRAVENOUS; SUBCUTANEOUS at 04:05

## 2020-01-01 RX ADMIN — CLOPIDOGREL BISULFATE 75 MG: 75 TABLET ORAL at 06:05

## 2020-01-01 RX ADMIN — SODIUM POLYSTYRENE SULFONATE 30 G: 15 SUSPENSION ORAL; RECTAL at 10:02

## 2020-01-01 RX ADMIN — INSULIN ASPART 5 UNITS: 100 INJECTION, SOLUTION INTRAVENOUS; SUBCUTANEOUS at 03:02

## 2020-01-01 RX ADMIN — INSULIN ASPART 8 UNITS: 100 INJECTION, SOLUTION INTRAVENOUS; SUBCUTANEOUS at 06:05

## 2020-01-01 RX ADMIN — SODIUM POLYSTYRENE SULFONATE 30 G: 15 SUSPENSION ORAL; RECTAL at 01:06

## 2020-01-01 RX ADMIN — CLOPIDOGREL BISULFATE 75 MG: 75 TABLET ORAL at 07:04

## 2020-01-01 RX ADMIN — FLUCONAZOLE 200 MG: 200 TABLET ORAL at 05:05

## 2020-01-01 RX ADMIN — ATORVASTATIN CALCIUM 80 MG: 40 TABLET, FILM COATED ORAL at 08:04

## 2020-01-01 RX ADMIN — VANCOMYCIN HYDROCHLORIDE 1000 MG: 1 INJECTION, POWDER, LYOPHILIZED, FOR SOLUTION INTRAVENOUS at 05:05

## 2020-01-01 RX ADMIN — INSULIN ASPART 3 UNITS: 100 INJECTION, SOLUTION INTRAVENOUS; SUBCUTANEOUS at 01:05

## 2020-01-01 RX ADMIN — INSULIN DETEMIR 5 UNITS: 100 INJECTION, SOLUTION SUBCUTANEOUS at 08:05

## 2020-01-01 RX ADMIN — CLOPIDOGREL BISULFATE 75 MG: 75 TABLET ORAL at 12:05

## 2020-01-01 RX ADMIN — INSULIN ASPART 3 UNITS: 100 INJECTION, SOLUTION INTRAVENOUS; SUBCUTANEOUS at 03:05

## 2020-01-01 RX ADMIN — INSULIN DETEMIR 10 UNITS: 100 INJECTION, SOLUTION SUBCUTANEOUS at 08:05

## 2020-01-01 RX ADMIN — DILTIAZEM HYDROCHLORIDE 15 MG: 5 INJECTION INTRAVENOUS at 05:05

## 2020-01-01 RX ADMIN — SERTRALINE HYDROCHLORIDE 25 MG: 25 TABLET ORAL at 08:05

## 2020-01-01 RX ADMIN — INSULIN ASPART 2 UNITS: 100 INJECTION, SOLUTION INTRAVENOUS; SUBCUTANEOUS at 06:05

## 2020-01-01 RX ADMIN — CARVEDILOL 12.5 MG: 12.5 TABLET, FILM COATED ORAL at 11:05

## 2020-01-01 RX ADMIN — MIDAZOLAM HYDROCHLORIDE 0.5 MG: 1 INJECTION, SOLUTION INTRAMUSCULAR; INTRAVENOUS at 01:03

## 2020-01-01 RX ADMIN — CISATRACURIUM BESYLATE 14 MG: 10 INJECTION INTRAVENOUS at 05:02

## 2020-01-01 RX ADMIN — INSULIN ASPART 1 UNITS: 100 INJECTION, SOLUTION INTRAVENOUS; SUBCUTANEOUS at 12:05

## 2020-01-01 RX ADMIN — ONDANSETRON HYDROCHLORIDE 4 MG: 2 SOLUTION INTRAMUSCULAR; INTRAVENOUS at 08:02

## 2020-01-01 RX ADMIN — HEPARIN SODIUM 5000 UNITS: 5000 INJECTION, SOLUTION INTRAVENOUS; SUBCUTANEOUS at 08:03

## 2020-01-01 RX ADMIN — CEFEPIME 1 G: 1 INJECTION, POWDER, FOR SOLUTION INTRAMUSCULAR; INTRAVENOUS at 09:05

## 2020-01-01 RX ADMIN — INSULIN ASPART 5 UNITS: 100 INJECTION, SOLUTION INTRAVENOUS; SUBCUTANEOUS at 08:02

## 2020-01-01 RX ADMIN — METOPROLOL TARTRATE 50 MG: 50 TABLET, FILM COATED ORAL at 08:05

## 2020-01-01 RX ADMIN — SODIUM CHLORIDE: 0.9 INJECTION, SOLUTION INTRAVENOUS at 10:05

## 2020-01-01 RX ADMIN — ASPIRIN 81 MG: 81 TABLET, COATED ORAL at 09:05

## 2020-01-01 RX ADMIN — METRONIDAZOLE 500 MG: 500 TABLET ORAL at 10:05

## 2020-01-01 RX ADMIN — PIPERACILLIN AND TAZOBACTAM 4.5 G: 4; .5 INJECTION, POWDER, LYOPHILIZED, FOR SOLUTION INTRAVENOUS; PARENTERAL at 06:05

## 2020-01-01 RX ADMIN — CLOPIDOGREL BISULFATE 75 MG: 75 TABLET ORAL at 12:02

## 2020-01-01 RX ADMIN — SODIUM CHLORIDE 1000 ML: 0.9 INJECTION, SOLUTION INTRAVENOUS at 04:05

## 2020-01-01 RX ADMIN — DILTIAZEM HYDROCHLORIDE 30 MG: 30 TABLET, FILM COATED ORAL at 06:05

## 2020-01-01 RX ADMIN — ACETAMINOPHEN 1000 MG: 500 TABLET ORAL at 06:05

## 2020-01-01 RX ADMIN — SODIUM CHLORIDE: 0.9 INJECTION, SOLUTION INTRAVENOUS at 08:02

## 2020-01-01 RX ADMIN — METRONIDAZOLE 500 MG: 500 INJECTION, SOLUTION INTRAVENOUS at 08:04

## 2020-01-01 RX ADMIN — INSULIN ASPART 4 UNITS: 100 INJECTION, SOLUTION INTRAVENOUS; SUBCUTANEOUS at 10:05

## 2020-01-01 RX ADMIN — CARVEDILOL 25 MG: 6.25 TABLET, FILM COATED ORAL at 02:03

## 2020-01-01 RX ADMIN — HEPARIN SODIUM 5000 UNITS: 5000 INJECTION, SOLUTION INTRAVENOUS; SUBCUTANEOUS at 09:02

## 2020-01-01 RX ADMIN — INSULIN ASPART 4 UNITS: 100 INJECTION, SOLUTION INTRAVENOUS; SUBCUTANEOUS at 04:05

## 2020-01-01 RX ADMIN — METOPROLOL TARTRATE 5 MG: 5 INJECTION INTRAVENOUS at 04:04

## 2020-01-01 RX ADMIN — DILTIAZEM HYDROCHLORIDE 30 MG: 30 TABLET, FILM COATED ORAL at 12:05

## 2020-01-01 RX ADMIN — VASOPRESSIN 0.04 UNITS/MIN: 20 INJECTION INTRAVENOUS at 08:05

## 2020-01-01 RX ADMIN — ONDANSETRON 4 MG: 2 INJECTION, SOLUTION INTRAMUSCULAR; INTRAVENOUS at 08:05

## 2020-01-01 RX ADMIN — SODIUM CHLORIDE 500 ML: 0.9 INJECTION, SOLUTION INTRAVENOUS at 08:04

## 2020-01-01 RX ADMIN — MEGESTROL ACETATE 20 MG: 20 TABLET ORAL at 03:02

## 2020-01-01 RX ADMIN — FENTANYL CITRATE 25 MCG: 50 INJECTION, SOLUTION INTRAMUSCULAR; INTRAVENOUS at 01:03

## 2020-01-01 RX ADMIN — INSULIN DETEMIR 15 UNITS: 100 INJECTION, SOLUTION SUBCUTANEOUS at 08:02

## 2020-01-01 RX ADMIN — EPOETIN ALFA-EPBX 10000 UNITS: 10000 INJECTION, SOLUTION INTRAVENOUS; SUBCUTANEOUS at 09:05

## 2020-01-01 RX ADMIN — CARVEDILOL 12.5 MG: 12.5 TABLET, FILM COATED ORAL at 09:04

## 2020-01-01 RX ADMIN — INSULIN HUMAN 4 UNITS: 100 INJECTION, SOLUTION PARENTERAL at 07:04

## 2020-01-01 RX ADMIN — HEPARIN SODIUM 12 UNITS/KG/HR: 10000 INJECTION, SOLUTION INTRAVENOUS at 09:05

## 2020-01-01 RX ADMIN — MIDODRINE HYDROCHLORIDE 5 MG: 2.5 TABLET ORAL at 03:06

## 2020-01-01 RX ADMIN — CLOPIDOGREL BISULFATE 75 MG: 75 TABLET ORAL at 09:04

## 2020-01-01 RX ADMIN — FENTANYL CITRATE 75 MCG: 50 INJECTION INTRAMUSCULAR; INTRAVENOUS at 05:02

## 2020-01-01 RX ADMIN — HEPARIN SODIUM 5000 UNITS: 5000 INJECTION, SOLUTION INTRAVENOUS; SUBCUTANEOUS at 08:02

## 2020-01-01 RX ADMIN — EPOETIN ALFA-EPBX 10000 UNITS: 10000 INJECTION, SOLUTION INTRAVENOUS; SUBCUTANEOUS at 11:05

## 2020-01-01 RX ADMIN — METOPROLOL TARTRATE 25 MG: 25 TABLET, FILM COATED ORAL at 02:05

## 2020-01-01 RX ADMIN — ATORVASTATIN CALCIUM 40 MG: 40 TABLET, FILM COATED ORAL at 02:03

## 2020-01-01 RX ADMIN — INSULIN ASPART 4 UNITS: 100 INJECTION, SOLUTION INTRAVENOUS; SUBCUTANEOUS at 11:02

## 2020-01-01 RX ADMIN — HEPARIN SODIUM AND DEXTROSE 8 UNITS/KG/HR: 10000; 5 INJECTION INTRAVENOUS at 12:05

## 2020-01-01 RX ADMIN — DILTIAZEM HYDROCHLORIDE 60 MG: 60 TABLET, FILM COATED ORAL at 09:05

## 2020-01-01 RX ADMIN — CARVEDILOL 25 MG: 12.5 TABLET, FILM COATED ORAL at 04:02

## 2020-01-01 RX ADMIN — METRONIDAZOLE 500 MG: 500 TABLET ORAL at 06:05

## 2020-01-01 RX ADMIN — SODIUM CHLORIDE 10 UNITS/HR: 9 INJECTION, SOLUTION INTRAVENOUS at 09:05

## 2020-01-01 RX ADMIN — CARVEDILOL 25 MG: 6.25 TABLET, FILM COATED ORAL at 09:03

## 2020-01-01 RX ADMIN — PHENYLEPHRINE HYDROCHLORIDE 50 MCG: 10 INJECTION INTRAVENOUS at 05:02

## 2020-01-01 RX ADMIN — METOPROLOL TARTRATE 5 MG: 5 INJECTION INTRAVENOUS at 10:05

## 2020-01-01 RX ADMIN — MEGESTROL ACETATE 20 MG: 20 TABLET ORAL at 08:03

## 2020-01-01 RX ADMIN — LOSARTAN POTASSIUM 100 MG: 50 TABLET ORAL at 03:05

## 2020-01-01 RX ADMIN — LOSARTAN POTASSIUM 100 MG: 25 TABLET ORAL at 02:02

## 2020-01-01 RX ADMIN — SODIUM CHLORIDE 500 ML: 0.9 INJECTION, SOLUTION INTRAVENOUS at 03:05

## 2020-01-01 RX ADMIN — SEVELAMER CARBONATE 1600 MG: 800 TABLET, FILM COATED ORAL at 07:05

## 2020-01-01 RX ADMIN — VANCOMYCIN HYDROCHLORIDE 1000 MG: 1 INJECTION, POWDER, LYOPHILIZED, FOR SOLUTION INTRAVENOUS at 02:02

## 2020-01-01 RX ADMIN — INSULIN ASPART 6 UNITS: 100 INJECTION, SOLUTION INTRAVENOUS; SUBCUTANEOUS at 06:05

## 2020-01-01 RX ADMIN — INSULIN ASPART 4 UNITS: 100 INJECTION, SOLUTION INTRAVENOUS; SUBCUTANEOUS at 09:05

## 2020-01-01 RX ADMIN — FLUCONAZOLE 800 MG: 200 TABLET ORAL at 05:05

## 2020-01-01 RX ADMIN — METRONIDAZOLE 500 MG: 500 INJECTION, SOLUTION INTRAVENOUS at 03:05

## 2020-01-01 RX ADMIN — CLOPIDOGREL BISULFATE 75 MG: 75 TABLET ORAL at 08:06

## 2020-01-01 RX ADMIN — METOPROLOL TARTRATE 50 MG: 50 TABLET, FILM COATED ORAL at 08:06

## 2020-01-01 RX ADMIN — METOPROLOL TARTRATE 5 MG: 5 INJECTION INTRAVENOUS at 03:05

## 2020-01-01 RX ADMIN — ASPIRIN 81 MG: 81 TABLET, COATED ORAL at 08:06

## 2020-01-01 RX ADMIN — HEPARIN SODIUM 5000 UNITS: 5000 INJECTION INTRAVENOUS; SUBCUTANEOUS at 02:05

## 2020-01-01 RX ADMIN — HYDRALAZINE HYDROCHLORIDE 10 MG: 20 INJECTION INTRAMUSCULAR; INTRAVENOUS at 04:02

## 2020-01-01 RX ADMIN — CARVEDILOL 25 MG: 6.25 TABLET, FILM COATED ORAL at 04:03

## 2020-01-01 RX ADMIN — VANCOMYCIN HYDROCHLORIDE 1250 MG: 1.25 INJECTION, POWDER, LYOPHILIZED, FOR SOLUTION INTRAVENOUS at 08:04

## 2020-01-01 RX ADMIN — INSULIN ASPART 4 UNITS: 100 INJECTION, SOLUTION INTRAVENOUS; SUBCUTANEOUS at 08:03

## 2020-01-01 RX ADMIN — DILTIAZEM HYDROCHLORIDE 180 MG: 180 CAPSULE, COATED, EXTENDED RELEASE ORAL at 12:05

## 2020-01-01 RX ADMIN — HYDRALAZINE HYDROCHLORIDE 5 MG: 20 INJECTION INTRAMUSCULAR; INTRAVENOUS at 09:02

## 2020-01-01 RX ADMIN — MEPIVACAINE HYDROCHLORIDE 30 ML: 15 INJECTION, SOLUTION EPIDURAL; INFILTRATION at 10:04

## 2020-01-01 RX ADMIN — ASPIRIN 81 MG: 81 TABLET, COATED ORAL at 02:05

## 2020-01-01 RX ADMIN — SODIUM CHLORIDE 500 ML: 0.9 INJECTION, SOLUTION INTRAVENOUS at 11:05

## 2020-01-01 RX ADMIN — VASOPRESSIN 0.04 UNITS/MIN: 20 INJECTION INTRAVENOUS at 04:05

## 2020-01-01 RX ADMIN — ASPIRIN 325 MG ORAL TABLET 325 MG: 325 PILL ORAL at 07:04

## 2020-01-01 RX ADMIN — SODIUM CHLORIDE 500 ML: 0.9 INJECTION, SOLUTION INTRAVENOUS at 12:05

## 2020-01-01 RX ADMIN — HEPARIN SODIUM 5000 UNITS: 5000 INJECTION, SOLUTION INTRAVENOUS; SUBCUTANEOUS at 04:04

## 2020-01-01 RX ADMIN — METOPROLOL TARTRATE 5 MG: 5 INJECTION, SOLUTION INTRAVENOUS at 02:05

## 2020-01-01 RX ADMIN — GLYCOPYRROLATE 0.6 MG: 0.2 INJECTION, SOLUTION INTRAMUSCULAR; INTRAVENOUS at 08:02

## 2020-01-01 RX ADMIN — INSULIN ASPART 3 UNITS: 100 INJECTION, SOLUTION INTRAVENOUS; SUBCUTANEOUS at 05:05

## 2020-01-01 RX ADMIN — SODIUM CHLORIDE, SODIUM LACTATE, POTASSIUM CHLORIDE, AND CALCIUM CHLORIDE 1000 ML: .6; .31; .03; .02 INJECTION, SOLUTION INTRAVENOUS at 07:05

## 2020-01-01 RX ADMIN — HEPARIN SODIUM AND DEXTROSE 12 UNITS/KG/HR: 10000; 5 INJECTION INTRAVENOUS at 09:04

## 2020-01-01 RX ADMIN — MAGNESIUM SULFATE IN WATER 2 G: 40 INJECTION, SOLUTION INTRAVENOUS at 04:05

## 2020-01-01 RX ADMIN — ATORVASTATIN CALCIUM 80 MG: 20 TABLET, FILM COATED ORAL at 08:05

## 2020-01-01 RX ADMIN — INSULIN ASPART 8 UNITS: 100 INJECTION, SOLUTION INTRAVENOUS; SUBCUTANEOUS at 09:05

## 2020-01-01 RX ADMIN — LIDOCAINE HYDROCHLORIDE 75 MG: 20 INJECTION, SOLUTION INTRAVENOUS at 05:02

## 2020-01-01 RX ADMIN — INSULIN DETEMIR 7 UNITS: 100 INJECTION, SOLUTION SUBCUTANEOUS at 08:04

## 2020-01-01 RX ADMIN — ASPIRIN 81 MG: 81 TABLET, COATED ORAL at 09:04

## 2020-01-01 RX ADMIN — DILTIAZEM HYDROCHLORIDE 180 MG: 180 CAPSULE, COATED, EXTENDED RELEASE ORAL at 04:02

## 2020-01-01 RX ADMIN — SODIUM CHLORIDE 250 ML: 9 INJECTION, SOLUTION INTRAVENOUS at 10:05

## 2020-01-01 RX ADMIN — APIXABAN 5 MG: 5 TABLET, FILM COATED ORAL at 12:05

## 2020-01-01 RX ADMIN — SODIUM CHLORIDE: 0.9 INJECTION, SOLUTION INTRAVENOUS at 10:04

## 2020-01-01 RX ADMIN — CEFAZOLIN 2 G: 330 INJECTION, POWDER, FOR SOLUTION INTRAMUSCULAR; INTRAVENOUS at 10:04

## 2020-01-01 RX ADMIN — DILTIAZEM HYDROCHLORIDE 180 MG: 180 CAPSULE, COATED, EXTENDED RELEASE ORAL at 02:03

## 2020-01-01 RX ADMIN — INSULIN ASPART 6 UNITS: 100 INJECTION, SOLUTION INTRAVENOUS; SUBCUTANEOUS at 12:05

## 2020-01-01 RX ADMIN — SODIUM CHLORIDE 350 ML: 0.9 INJECTION, SOLUTION INTRAVENOUS at 09:05

## 2020-01-01 RX ADMIN — INSULIN ASPART 8 UNITS: 100 INJECTION, SOLUTION INTRAVENOUS; SUBCUTANEOUS at 02:03

## 2020-01-01 RX ADMIN — SODIUM CHLORIDE 500 ML: 0.9 INJECTION, SOLUTION INTRAVENOUS at 10:05

## 2020-01-01 RX ADMIN — ACETAMINOPHEN 1000 MG: 500 TABLET ORAL at 12:05

## 2020-01-01 RX ADMIN — INSULIN ASPART 10 UNITS: 100 INJECTION, SOLUTION INTRAVENOUS; SUBCUTANEOUS at 08:05

## 2020-01-01 RX ADMIN — PIPERACILLIN SODIUM,TAZOBACTAM SODIUM 4.5 G: 4; .5 INJECTION, POWDER, FOR SOLUTION INTRAVENOUS at 12:05

## 2020-01-01 RX ADMIN — INSULIN ASPART 5 UNITS: 100 INJECTION, SOLUTION INTRAVENOUS; SUBCUTANEOUS at 01:05

## 2020-01-01 RX ADMIN — CARVEDILOL 25 MG: 25 TABLET, FILM COATED ORAL at 09:05

## 2020-01-01 RX ADMIN — SODIUM CHLORIDE 18 UNITS/HR: 9 INJECTION, SOLUTION INTRAVENOUS at 01:05

## 2020-01-01 RX ADMIN — ALBUTEROL SULFATE 2.5 MG: 2.5 SOLUTION RESPIRATORY (INHALATION) at 04:02

## 2020-01-01 RX ADMIN — Medication 0.05 MCG/KG/MIN: at 02:05

## 2020-01-01 RX ADMIN — INSULIN ASPART 3 UNITS: 100 INJECTION, SOLUTION INTRAVENOUS; SUBCUTANEOUS at 09:02

## 2020-01-01 RX ADMIN — DEXTROSE MONOHYDRATE 250 ML: 10 INJECTION, SOLUTION INTRAVENOUS at 12:05

## 2020-01-01 RX ADMIN — CARVEDILOL 25 MG: 6.25 TABLET, FILM COATED ORAL at 08:03

## 2020-01-01 RX ADMIN — DILTIAZEM HYDROCHLORIDE 180 MG: 180 CAPSULE, COATED, EXTENDED RELEASE ORAL at 10:02

## 2020-01-01 RX ADMIN — PANTOPRAZOLE SODIUM 40 MG: 40 TABLET, DELAYED RELEASE ORAL at 09:04

## 2020-01-01 RX ADMIN — FENTANYL CITRATE 25 MCG: 50 INJECTION INTRAMUSCULAR; INTRAVENOUS at 08:02

## 2020-01-01 RX ADMIN — ALBUTEROL SULFATE 10 MG: 2.5 SOLUTION RESPIRATORY (INHALATION) at 08:02

## 2020-01-01 RX ADMIN — SODIUM CHLORIDE: 0.9 INJECTION, SOLUTION INTRAVENOUS at 05:02

## 2020-01-01 RX ADMIN — LOPERAMIDE HYDROCHLORIDE 2 MG: 2 CAPSULE ORAL at 01:05

## 2020-01-01 RX ADMIN — SODIUM CHLORIDE: 0.9 INJECTION, SOLUTION INTRAVENOUS at 01:05

## 2020-01-01 RX ADMIN — FLUCONAZOLE 200 MG: 200 TABLET ORAL at 06:05

## 2020-01-01 RX ADMIN — CARVEDILOL 12.5 MG: 12.5 TABLET, FILM COATED ORAL at 08:04

## 2020-01-01 RX ADMIN — METRONIDAZOLE 500 MG: 500 TABLET ORAL at 01:05

## 2020-01-01 RX ADMIN — DEXTROSE MONOHYDRATE 250 ML: 10 INJECTION, SOLUTION INTRAVENOUS at 09:05

## 2020-01-01 RX ADMIN — APIXABAN 5 MG: 5 TABLET, FILM COATED ORAL at 02:05

## 2020-01-01 RX ADMIN — INSULIN ASPART 6 UNITS: 100 INJECTION, SOLUTION INTRAVENOUS; SUBCUTANEOUS at 04:05

## 2020-01-01 RX ADMIN — FENTANYL CITRATE 50 MCG: 50 INJECTION INTRAMUSCULAR; INTRAVENOUS at 08:02

## 2020-01-01 RX ADMIN — LOSARTAN POTASSIUM 100 MG: 25 TABLET ORAL at 04:05

## 2020-01-01 RX ADMIN — INSULIN DETEMIR 8 UNITS: 100 INJECTION, SOLUTION SUBCUTANEOUS at 09:05

## 2020-01-01 RX ADMIN — LABETALOL HYDROCHLORIDE 5 MG: 5 INJECTION, SOLUTION INTRAVENOUS at 08:02

## 2020-01-01 RX ADMIN — INSULIN DETEMIR 15 UNITS: 100 INJECTION, SOLUTION SUBCUTANEOUS at 08:04

## 2020-01-01 RX ADMIN — NEOSTIGMINE METHYLSULFATE 3 MG: 1 INJECTION INTRAVENOUS at 08:02

## 2020-01-01 RX ADMIN — INSULIN ASPART 4 UNITS: 100 INJECTION, SOLUTION INTRAVENOUS; SUBCUTANEOUS at 08:05

## 2020-01-01 RX ADMIN — INSULIN DETEMIR 25 UNITS: 100 INJECTION, SOLUTION SUBCUTANEOUS at 08:03

## 2020-01-01 RX ADMIN — HEPARIN SODIUM 10000 UNITS: 5000 INJECTION, SOLUTION INTRAVENOUS; SUBCUTANEOUS at 01:03

## 2020-01-01 RX ADMIN — INSULIN ASPART 2 UNITS: 100 INJECTION, SOLUTION INTRAVENOUS; SUBCUTANEOUS at 10:05

## 2020-01-01 RX ADMIN — NOREPINEPHRINE BITARTRATE 0.18 MCG/KG/MIN: 1 INJECTION, SOLUTION, CONCENTRATE INTRAVENOUS at 03:05

## 2020-01-01 RX ADMIN — SEVELAMER CARBONATE 800 MG: 800 TABLET, FILM COATED ORAL at 01:05

## 2020-01-01 RX ADMIN — POTASSIUM CHLORIDE 40 MEQ: 29.8 INJECTION, SOLUTION INTRAVENOUS at 04:05

## 2020-01-01 RX ADMIN — CARVEDILOL 12.5 MG: 12.5 TABLET, FILM COATED ORAL at 10:05

## 2020-01-01 RX ADMIN — APIXABAN 5 MG: 5 TABLET, FILM COATED ORAL at 11:05

## 2020-01-01 RX ADMIN — INSULIN HUMAN 4 UNITS: 100 INJECTION, SOLUTION PARENTERAL at 02:05

## 2020-01-01 RX ADMIN — PROMETHAZINE HYDROCHLORIDE 6.25 MG: 25 INJECTION INTRAMUSCULAR; INTRAVENOUS at 10:02

## 2020-01-01 RX ADMIN — LOSARTAN POTASSIUM 100 MG: 50 TABLET, FILM COATED ORAL at 09:04

## 2020-01-01 RX ADMIN — FENTANYL CITRATE 25 MCG: 50 INJECTION INTRAMUSCULAR; INTRAVENOUS at 06:02

## 2020-01-01 RX ADMIN — Medication 0.32 MCG/KG/MIN: at 08:05

## 2020-01-01 RX ADMIN — LOSARTAN POTASSIUM 100 MG: 50 TABLET ORAL at 02:05

## 2020-01-01 RX ADMIN — Medication 0.4 MG: at 04:05

## 2020-01-01 RX ADMIN — ATORVASTATIN CALCIUM 40 MG: 40 TABLET, FILM COATED ORAL at 08:03

## 2020-01-01 RX ADMIN — MEGESTROL ACETATE 20 MG: 20 TABLET ORAL at 11:02

## 2020-01-01 RX ADMIN — PIPERACILLIN AND TAZOBACTAM 4.5 G: 4; .5 INJECTION, POWDER, LYOPHILIZED, FOR SOLUTION INTRAVENOUS; PARENTERAL at 11:05

## 2020-01-01 RX ADMIN — ONDANSETRON HYDROCHLORIDE 8 MG: 2 SOLUTION INTRAMUSCULAR; INTRAVENOUS at 09:02

## 2020-01-01 RX ADMIN — CEFTRIAXONE 1 G: 1 INJECTION, SOLUTION INTRAVENOUS at 09:04

## 2020-01-01 RX ADMIN — METOPROLOL TARTRATE 50 MG: 50 TABLET, FILM COATED ORAL at 10:05

## 2020-01-01 RX ADMIN — DEXTROSE MONOHYDRATE 500 MG: 50 INJECTION, SOLUTION INTRAVENOUS at 01:05

## 2020-01-01 RX ADMIN — SEVELAMER CARBONATE 800 MG: 800 TABLET, FILM COATED ORAL at 08:06

## 2020-01-01 RX ADMIN — DILTIAZEM HYDROCHLORIDE 180 MG: 180 CAPSULE, COATED, EXTENDED RELEASE ORAL at 08:04

## 2020-01-01 RX ADMIN — LOSARTAN POTASSIUM 100 MG: 25 TABLET ORAL at 08:05

## 2020-01-01 RX ADMIN — SEVELAMER CARBONATE 1600 MG: 800 TABLET, FILM COATED ORAL at 04:05

## 2020-01-20 PROBLEM — R07.9 CHEST PAIN: Status: RESOLVED | Noted: 2019-01-01 | Resolved: 2020-01-01

## 2020-01-20 NOTE — PROGRESS NOTES
"HISTORY OF PRESENT ILLNESS:  Kodi Ibanez Sr. is a 59 y.o. male who presents to the clinic today for Hospital Follow Up and Medication Refill  .   The patient is brought to the clinic today by his wife for follow-up of his type 2 diabetes mellitus complicated by neuropathy/retinopathy/end-stage renal disease on hemodialysis, hypertension/coronary artery disease/congestive heart failure, hyperlipidemia, and prostate cancer.  His wife reports that he was having a lot of problems with blood pressure and heart rate.  Last December he was taken to the emergency room during 1 of his dialysis sessions.  Cardiology ended up putting a stent in him.  His wife reports that he is it doing much better since then.  He seems to be more awake and alert and eating better.  She feels he is a little bit more socially interactive.  She does report that she is no longer giving him losartan as it is causing his blood pressures to go too low.  She is compliant with the blood thinners.  He had a drug-eluting stent placed.  No seizure activity since his last office visit with me.  Cognitive decline is stable.  He denies symptoms of claudication.  No bone pain. He has intermittent nausea for which he takes Phenergan as needed.      PAST MEDICAL HISTORY:  Past Medical History:   Diagnosis Date    Allergy     Ambulates with cane     Atherosclerosis of aorta     noted on VIKTORIA 2011    Blood transfusion     Cataracts, bilateral     CHF (congestive heart failure)     Chronic kidney disease     Clotting disorder     Coronary artery disease     Decreased appetite     Diabetic retinopathy     DM (diabetes mellitus), type 2 with renal complications     ESRD on hemodialysis     TUESDAY/ THURSDAY/SATURDAY    Hyperlipidemia     Hyperparathyroidism, unspecified     Hypertension     Myocardial infarction     Prostate cancer 2016 7/16/19:  wife states "he doesn't have it anymore"    Retinopathy due to secondary diabetes     Seizures  "    Stroke 2011    PT HAS SHAKES     Unsteady gait     Uses roller walker        PAST SURGICAL HISTORY:  Past Surgical History:   Procedure Laterality Date    CARDIAC SURGERY      CABG    CATARACT EXTRACTION      ou    CATARACT EXTRACTION, BILATERAL      cataracts      CORONARY ANGIOGRAPHY INCLUDING BYPASS GRAFTS WITH CATHETERIZATION OF LEFT HEART N/A 12/16/2019    Procedure: ANGIOGRAM, CORONARY, INCLUDING BYPASS GRAFT, WITH LEFT HEART CATHETERIZATION, rcfa, 5fr;  Surgeon: David Villegas MD;  Location: [facility] CATH LAB;  Service: Cardiology;  Laterality: N/A;    CORONARY ARTERY BYPASS GRAFT  2009    bypass    EYE SURGERY      Left Guera AVF  1/11/2012    PROSTATE BIOPSY      positive    retinal laser      ROTATOR CUFF REPAIR  right, 2005    ULTRASOUND GUIDANCE  12/16/2019    Procedure: Ultrasound Guidance;  Surgeon: David Villegas MD;  Location: [facility] CATH LAB;  Service: Cardiology;;    VASCULAR SURGERY         SOCIAL HISTORY:  Social History     Socioeconomic History    Marital status:      Spouse name: Not on file    Number of children: Not on file    Years of education: Not on file    Highest education level: Not on file   Occupational History    Not on file   Social Needs    Financial resource strain: Not on file    Food insecurity:     Worry: Not on file     Inability: Not on file    Transportation needs:     Medical: Not on file     Non-medical: Not on file   Tobacco Use    Smoking status: Never Smoker    Smokeless tobacco: Never Used   Substance and Sexual Activity    Alcohol use: No    Drug use: No    Sexual activity: Not Currently   Lifestyle    Physical activity:     Days per week: Not on file     Minutes per session: Not on file    Stress: Not on file   Relationships    Social connections:     Talks on phone: Not on file     Gets together: Not on file     Attends Jainism service: Not on file     Active member of club or organization: Not on file     Attends meetings  of clubs or organizations: Not on file     Relationship status: Not on file   Other Topics Concern    Not on file   Social History Narrative    Not on file       FAMILY HISTORY:  Family History   Problem Relation Age of Onset    Diabetes Mother     Glaucoma Mother     Cancer Mother     Cataracts Mother     Heart disease Father     Cancer Paternal Grandmother     Heart disease Maternal Grandmother     No Known Problems Sister     Prostate cancer Brother     No Known Problems Maternal Grandfather     No Known Problems Paternal Grandfather     No Known Problems Sister     No Known Problems Sister     Hypertension Brother     Cancer Paternal Uncle     Amblyopia Neg Hx     Blindness Neg Hx     Macular degeneration Neg Hx     Retinal detachment Neg Hx     Strabismus Neg Hx     Stroke Neg Hx     Thyroid disease Neg Hx     Anesthesia problems Neg Hx     Clotting disorder Neg Hx      problems Neg Hx     Kidney cancer Neg Hx     Kidney disease Neg Hx     Malignant hyperthermia Neg Hx     Sickle cell trait Neg Hx     Lupus Neg Hx     Urolithiasis Neg Hx     Sudden death Neg Hx        ALLERGIES AND MEDICATIONS: updated and reviewed.  Review of patient's allergies indicates:   Allergen Reactions    Reglan [metoclopramide hcl] Diarrhea    Lorazepam      Other reaction(s): heavy sedation     Medication List with Changes/Refills   Current Medications    ASCORBIC ACID, VITAMIN C, (VITAMIN C) 500 MG TABLET    Take 500 mg by mouth every evening.     ATORVASTATIN (LIPITOR) 40 MG TABLET    TAKE 1 TABLET(40 MG) BY MOUTH EVERY DAY    BLOOD GLUCOSE CONTROL, NORMAL SOLN    1 drop by Misc.(Non-Drug; Combo Route) route as directed.    BLOOD SUGAR DIAGNOSTIC STRP    To check BG 2 times daily, to use with accuchek casa meter    BLOOD-GLUCOSE METER KIT    To check BG 2 times daily, to use with accuchek casa meter    CARVEDILOL (COREG) 25 MG TABLET    TAKE 1 TABLET(25 MG) BY MOUTH TWICE DAILY WITH MEALS     "CLOPIDOGREL (PLAVIX) 75 MG TABLET    Take 1 tablet (75 mg total) by mouth once daily.    FUROSEMIDE (LASIX) 40 MG TABLET    TAKE 1 TABLET(40 MG) BY MOUTH EVERY DAY    LANCETS MISC    To check BG 2 times daily, to use with accuchek casa meter    LOSARTAN (COZAAR) 100 MG TABLET    Take 1 tablet (100 mg total) by mouth once daily.    MEGESTROL (MEGACE) 20 MG TAB    Take 1 tablet (20 mg total) by mouth once daily.    NOVOFINE 30 30 X 1/3 " NDLE    USE AS DIRECTED WITH LANTUS SOLARSTAR    NOVOLOG FLEXPEN U-100 INSULIN 100 UNIT/ML INPN PEN    INJECT 5 TO 10 UNITS WITH MEALS AND AT BEDTIME DEPENDING ON BLOOD SUGAR    PEN NEEDLE, DIABETIC (BD ULTRA-FINE ORIG PEN NEEDLE) 29 GAUGE X 1/2" NDLE    USE  1 EVERY EVENING   Changed and/or Refilled Medications    Modified Medication Previous Medication    DILTIAZEM (CARDIZEM CD) 180 MG 24 HR CAPSULE diltiaZEM (CARDIZEM CD) 180 MG 24 hr capsule       Take 1 capsule (180 mg total) by mouth once daily.    Take 1 capsule (180 mg total) by mouth once daily.    INSULIN (LANTUS SOLOSTAR U-100 INSULIN) GLARGINE 100 UNITS/ML (3ML) SUBQ PEN insulin (LANTUS SOLOSTAR U-100 INSULIN) glargine 100 units/mL (3mL) SubQ pen       INJECT 20 UNITS UNDER THE SKIN NIGHTLY    INJECT 20 UNITS UNDER THE SKIN NIGHTLY    PROMETHAZINE (PHENERGAN) 25 MG TABLET promethazine (PHENERGAN) 25 MG tablet       Take 1 tablet (25 mg total) by mouth every 6 (six) hours as needed for Nausea.    Take 1 tablet (25 mg total) by mouth every 6 (six) hours as needed for Nausea.          CARE TEAM:  Patient Care Team:  Melonie Elias MD as PCP - General (Internal Medicine)  [facility] as Dialysis Nurse  [facility] as Post Acute Care Provider  LISA Catherine MD as Consulting Physician (Urology)  Tyrone Levy MD as Consulting Physician (Nephrology)  Karen Rosen DPM as Consulting Physician (Podiatry)  LACEY Mix MD as Consulting Physician (Ophthalmology)         REVIEW OF " "SYSTEMS:  Review of Systems   Constitutional: Positive for fatigue. Negative for chills, fever and unexpected weight change.   HENT: Negative for congestion, ear pain, sore throat and voice change.    Eyes: Negative for photophobia, pain, discharge and visual disturbance.   Respiratory: Negative for cough, shortness of breath and wheezing.    Cardiovascular: Negative for chest pain, palpitations and leg swelling.   Gastrointestinal: Negative for abdominal pain, blood in stool, constipation, diarrhea, nausea and vomiting.   Genitourinary: Negative for dysuria and frequency.   Musculoskeletal: Positive for arthralgias.   Skin: Negative for color change and rash.   Neurological: Positive for weakness (- chronic; generalized). Negative for seizures and headaches.   Hematological: Negative for adenopathy. Does not bruise/bleed easily.   Psychiatric/Behavioral:        - poor memory; little social interaction (baseline)         PHYSICAL EXAM:  Vitals:    01/20/20 1326   BP: (!) 172/70   Pulse: 92   Temp: 98.3 °F (36.8 °C)     Weight: 66.9 kg (147 lb 7.8 oz)   Height: 5' 11" (180.3 cm)   Body mass index is 20.57 kg/m².      General appearance - alert and in no distress, thin, chronically ill appearing, minimal social interaction  Psychiatric - normal dress; answered a few simple questions appropriately  Eyes - pupils equal and reactive, extraocular eye movements intact, sclera anicteric  Mouth - mucous membranes moist, pharynx normal without lesions  Neck - supple, no significant adenopathy, carotids upstroke normal bilaterally, no bruits, thyroid exam: thyroid is normal in size without nodules or tenderness  Lymphatics - no palpable cervical lymphadenopathy  Chest - clear to auscultation, no wheezes, rales or rhonchi, symmetric air entry  Heart - normal rate and regular rhythm, no gallops noted  Back exam - not examined  Neurological - cranial nerves II through XII intact, no focal findings  Musculoskeletal - patient noted " to have Mild-Moderate osteoarthritic changes to both knee joints. No joint effusions noted., no muscular tenderness noted, poor muscle mass; he walked with a rolling walker  Extremities - peripheral pulses normal, no pedal edema, no clubbing or cyanosis  Skin - normal coloration and turgor, no rashes, no suspicious skin lesions noted      Labs:  Lab Results   Component Value Date    HGBA1C 12.6 (H) 10/30/2019    HGBA1C 11.5 (H) 07/16/2019    HGBA1C 9.2 (H) 03/27/2019      Lab Results   Component Value Date    CHOL 158 03/12/2019    CHOL 263 (H) 12/15/2018    CHOL 239 (H) 06/25/2018     Lab Results   Component Value Date    LDLCALC 97.4 03/12/2019    LDLCALC 181.0 (H) 12/15/2018    LDLCALC 165.2 (H) 06/25/2018         ASSESSMENT AND PLAN:  1. Type 2 diabetes, uncontrolled, with neuropathy/2. Uncontrolled type 2 diabetes mellitus with both eyes affected by proliferative retinopathy and macular edema, with long-term current use of insulin/3. Uncontrolled type 2 diabetes mellitus with end-stage renal disease/4. Long-term insulin use  Diabetes is not controlled at this time for age and comorbid conditions. We discussed diabetic diet and regular exercise. We discussed home blood sugar monitoring, if appropriate - the patient should test three times per day with meals and as needed. Continue current medication regimen. and his wife will do better with diabetic diet and adjusting insulin for high sugars.  Diabetic complications addressed: Neuropathy pain controlled.  Patient was counseled on the need for yearly eye exam to screen for/monitor diabetic retinopathy and yearly diabetic foot exam.  - insulin (LANTUS SOLOSTAR U-100 INSULIN) glargine 100 units/mL (3mL) SubQ pen; INJECT 20 UNITS UNDER THE SKIN NIGHTLY  Dispense: 15 mL; Refill: 0    5. Essential hypertension  Hypertension overall under fair control for age and comorbid conditions.  - diltiaZEM (CARDIZEM CD) 180 MG 24 hr capsule; Take 1 capsule (180 mg total) by  mouth once daily.  Dispense: 90 capsule; Refill: 1    6. Coronary artery disease involving native coronary artery of native heart with angina pectoris/7. Chronic congestive heart failure, unspecified heart failure type/8. SVT (supraventricular tachycardia)  Doing okay since cardiac stenting in December.  We discussed the importance of taking blood thinners for at least a year.  Followed by Cardiology.    9. Hyperlipidemia LDL goal <100  We discussed low fat diet and regular exercise.The current medical regimen is effective;  continue present plan and medications.     10. Cognitive deficits as late effect of cerebrovascular disease  Stable. Observe.    11. Seizure disorder  Stable.    12. PVD (peripheral vascular disease)  Stable. Asymptomatic. Observe.    13. Atherosclerosis of aorta  Patient with Atherosclerosis of the Aorta.  Stable/asymptomatic. Currently stable on lipid lowering medication and b/p monitoring.    14. ESRD on hemodialysis  Continue dialysis. Stable.    15. Prostate cancer  Watchful waiting. Stable.    16. Secondary renal hyperparathyroidism  Stable. Asymptomatic. Observe.    17. Dependent on walker for ambulation  We discussed fall precautions.    18. Nausea  Stable. Medication as needed.  - promethazine (PHENERGAN) 25 MG tablet; Take 1 tablet (25 mg total) by mouth every 6 (six) hours as needed for Nausea.  Dispense: 30 tablet; Refill: 0           Follow up in about 6 months (around 7/20/2020), or if symptoms worsen or fail to improve, for annual exam. or sooner as needed.

## 2020-02-20 NOTE — TELEPHONE ENCOUNTER
----- Message from Beth Doll sent at 2/20/2020  9:04 AM CST -----  Contact: Leon (wife) @ [phone]  Calling to schedule a f/u appt.  Pls call.

## 2020-02-27 PROBLEM — I10 ESSENTIAL HYPERTENSION: Status: ACTIVE | Noted: 2020-01-01

## 2020-02-27 PROBLEM — E87.5 HYPERKALEMIA: Status: ACTIVE | Noted: 2020-01-01

## 2020-02-27 PROBLEM — T82.9XXA COMPLICATION OF ARTERIOVENOUS DIALYSIS FISTULA: Status: ACTIVE | Noted: 2020-01-01

## 2020-02-27 NOTE — CONSULTS
"Inpatient Radiology Pre-procedure Note    History of Present Illness:  Kodi Ibanez Sr. is a 60 y.o. male with pertinent PMHx of ESRD on HD T/TH/S via LUE brachio-basilic AVF with most recent completed HD session on 02/25/2020 who presents with prolonged bleeding from AVF access sites that has occurred with most recent 2 HD sessions suggesting central venous outflow stenosis.     Pt being evaluated by VascSurg for potential AVF revision necessitating need alternative long-term central venous access for continued HD in the interim.    New inpatient IR consult placed for image-guided placement of a THDC. Of note, pt presented with hyperkalemia with K = 6.8 on admission and now 5.5 this AM after medical management.      Admission H&P reviewed.  Past Medical History:   Diagnosis Date    Allergy     Ambulates with cane     Atherosclerosis of aorta     noted on VIKTORIA 2011    Blood transfusion     Cataracts, bilateral     CHF (congestive heart failure)     Chronic kidney disease     Clotting disorder     Coronary artery disease     Decreased appetite     Diabetic retinopathy     DM (diabetes mellitus), type 2 with renal complications     ESRD on hemodialysis     TUESDAY/ THURSDAY/SATURDAY    Hyperlipidemia     Hyperparathyroidism, unspecified     Hypertension     Myocardial infarction     Prostate cancer 2016 7/16/19:  wife states "he doesn't have it anymore"    Retinopathy due to secondary diabetes     Seizures     Stroke 2011    PT HAS SHAKES     Unsteady gait     Uses roller walker      Past Surgical History:   Procedure Laterality Date    CARDIAC SURGERY      CABG    CATARACT EXTRACTION      ou    CATARACT EXTRACTION, BILATERAL      cataracts      CORONARY ANGIOGRAPHY INCLUDING BYPASS GRAFTS WITH CATHETERIZATION OF LEFT HEART N/A 12/16/2019    Procedure: ANGIOGRAM, CORONARY, INCLUDING BYPASS GRAFT, WITH LEFT HEART CATHETERIZATION, rcfa, 5fr;  Surgeon: David Villegas MD;  Location: [facility]" CATH LAB;  Service: Cardiology;  Laterality: N/A;    CORONARY ARTERY BYPASS GRAFT  2009    bypass    EYE SURGERY      Left Guera AVF  1/11/2012    PROSTATE BIOPSY      positive    retinal laser      ROTATOR CUFF REPAIR  right, 2005    ULTRASOUND GUIDANCE  12/16/2019    Procedure: Ultrasound Guidance;  Surgeon: David Villegas MD;  Location: [facility] CATH LAB;  Service: Cardiology;;    VASCULAR SURGERY         Review of Systems:   As documented in primary team H&P    Home Meds:   Prior to Admission medications    Medication Sig Start Date End Date Taking? Authorizing Provider   ascorbic acid, vitamin C, (VITAMIN C) 500 MG tablet Take 500 mg by mouth every evening.     Historical Provider, MD   atorvastatin (LIPITOR) 40 MG tablet TAKE 1 TABLET(40 MG) BY MOUTH EVERY DAY 10/30/19   Melonie Elais MD   blood glucose control, normal Soln 1 drop by Misc.(Non-Drug; Combo Route) route as directed. 4/15/16   Melonie Elias MD   blood sugar diagnostic Strp To check BG 2 times daily, to use with accuchek casa meter 2/4/19   Elizabeth Fleming NP   blood-glucose meter kit To check BG 2 times daily, to use with accuchek casa meter 2/4/19 2/4/20  Elizabeth Fleming NP   carvedilol (COREG) 25 MG tablet TAKE 1 TABLET(25 MG) BY MOUTH TWICE DAILY WITH MEALS 10/30/19   Melonie Elias MD   clopidogrel (PLAVIX) 75 mg tablet Take 1 tablet (75 mg total) by mouth once daily. 12/18/19 12/17/20  DARRIAN Delgadillo   diltiaZEM (CARDIZEM CD) 180 MG 24 hr capsule Take 1 capsule (180 mg total) by mouth once daily. 1/20/20   Melonie Elias MD   furosemide (LASIX) 40 MG tablet TAKE 1 TABLET(40 MG) BY MOUTH EVERY DAY 10/30/19   Melonie Elias MD   insulin (LANTUS SOLOSTAR U-100 INSULIN) glargine 100 units/mL (3mL) SubQ pen INJECT 20 UNITS UNDER THE SKIN NIGHTLY 1/20/20   Melonie Elias MD   lancets [facility] To check BG 2 times daily, to use with accuchek casa meter 2/4/19   Elizabeth Fleming NP   losartan (COZAAR) 100 MG tablet Take 1  "tablet (100 mg total) by mouth once daily.  Patient not taking: Reported on 1/20/2020 10/30/19   Melonie Elias MD   megestrol (MEGACE) 20 MG Tab Take 1 tablet (20 mg total) by mouth once daily. 10/30/19   Melonie Elias MD   NOVOFINE 30 30 x 1/3 " Ndle USE AS DIRECTED WITH LANTUS SOLARSTAR 11/3/13   Wilma Roman MD   NOVOLOG FLEXPEN U-100 INSULIN 100 unit/mL InPn pen INJECT 5 TO 10 UNITS WITH MEALS AND AT BEDTIME DEPENDING ON BLOOD SUGAR 1/7/19   Andrew Null NP   pen needle, diabetic (BD ULTRA-FINE ORIG PEN NEEDLE) 29 gauge x 1/2" Ndle USE  1 EVERY EVENING 12/10/19   Melonie Elias MD   promethazine (PHENERGAN) 25 MG tablet Take 1 tablet (25 mg total) by mouth every 6 (six) hours as needed for Nausea. 1/20/20   Melonie Elias MD     Scheduled Meds:    atorvastatin  40 mg Oral QHS    carvediloL  25 mg Oral BID    insulin detemir U-100  20 Units Subcutaneous Daily    sodium polystyrene  30 g Oral Q4H     Continuous Infusions:   PRN Meds:dextrose 50%, dextrose 50%, glucagon (human recombinant), glucose, glucose, insulin aspart U-100, ondansetron, sodium chloride 0.9%     Anticoagulants/Antiplatelets: aspirin and Plavix    Allergies:   Review of patient's allergies indicates:   Allergen Reactions    Reglan [metoclopramide hcl] Diarrhea    Lorazepam      Other reaction(s): heavy sedation     Sedation Hx: have not been any systemic reactions    Labs:  Recent Labs   Lab 02/27/20 0315   INR 0.9       Recent Labs   Lab 02/27/20 0315   WBC 4.71   HGB 10.3*   HCT 32.5*   MCV 92         Recent Labs   Lab 02/27/20 0846         K 5.5*      CO2 22*   BUN 53*   CREATININE 8.6*   CALCIUM 9.2     Vitals:  Temp: 98.3 °F (36.8 °C) (02/27/20 0807)  Pulse: 87 (02/27/20 0838)  Resp: 18 (02/27/20 0838)  BP: 123/67 (02/27/20 0807)  SpO2: 97 % (02/27/20 0838)     A/P:   60 y.o. male with ESRD on HD via LUE brachio-basilic AVF with new onset of prolonged bleeding from AVF access " sites currently being evaluated by VascSurg for potential AVF revision necessitating need alternative long-term central venous access for continued HD in the interim.    1. Malfunctioning LUE AVF - Pt remains hyperkalemic with K = 5.5 but, still receiving medical management with new BMP pending. Pt can not safely receive sedation required for THDC placement in setting of hyperkalemia 2/2 arrhythmogenic state. BMP to be drawn now.     Will await results of new BMP. If K nml, will proceed with US and fluoroscopic-guided placement of a THDC under moderate conscious sedation. If pt remains hyperkalemic, will plan for placement of a NTHDC today with local anesthetic only to allow for HD and tentatively plan for THDC placement when K normalizes.  Will inpatient IR consult placed for image-guided placement of a THDC.    Thank you for considering IR for the care of your patient.     Elvis Ayon MD  Interventional Radiology    Addendum:  Repeat labs not yet drawn. Most recent K still elevated at 5.5. Will proceed with NTHDC placement with local anesthetic only and tentative THDC placement when pt K in normalized with HD.     Risks (including, but not limited to, pain, bleeding, infection, damage to nearby structures, failure to obtain sufficient material for a diagnosis, the need for additional procedures, and death), benefits, and alternatives were discussed with the patient. All questions were answered to the best of my abilities. The patient wishes to proceed with the procedure. Written informed consent was obtained.    Thank you for considering IR for the care of your patient.     Elvis Ayon MD  Interventional Radiology

## 2020-02-27 NOTE — SUBJECTIVE & OBJECTIVE
"Past Medical History:   Diagnosis Date    Allergy     Ambulates with cane     Atherosclerosis of aorta     noted on VIKTORIA 2011    Blood transfusion     Cataracts, bilateral     CHF (congestive heart failure)     Chronic kidney disease     Clotting disorder     Coronary artery disease     Decreased appetite     Diabetic retinopathy     DM (diabetes mellitus), type 2 with renal complications     ESRD on hemodialysis     TUESDAY/ THURSDAY/SATURDAY    Hyperlipidemia     Hyperparathyroidism, unspecified     Hypertension     Myocardial infarction     Prostate cancer 2016    7/16/19:  wife states "he doesn't have it anymore"    Retinopathy due to secondary diabetes     Seizures     Stroke 2011    PT HAS SHAKES     Unsteady gait     Uses roller walker        Past Surgical History:   Procedure Laterality Date    CARDIAC SURGERY      CABG    CATARACT EXTRACTION      ou    CATARACT EXTRACTION, BILATERAL      cataracts      CORONARY ANGIOGRAPHY INCLUDING BYPASS GRAFTS WITH CATHETERIZATION OF LEFT HEART N/A 12/16/2019    Procedure: ANGIOGRAM, CORONARY, INCLUDING BYPASS GRAFT, WITH LEFT HEART CATHETERIZATION, rcfa, 5fr;  Surgeon: David Villegas MD;  Location: [facility] CATH LAB;  Service: Cardiology;  Laterality: N/A;    CORONARY ARTERY BYPASS GRAFT  2009    bypass    EYE SURGERY      Left Guera AVF  1/11/2012    PROSTATE BIOPSY      positive    retinal laser      ROTATOR CUFF REPAIR  right, 2005    ULTRASOUND GUIDANCE  12/16/2019    Procedure: Ultrasound Guidance;  Surgeon: David Villegas MD;  Location: [facility] CATH LAB;  Service: Cardiology;;    VASCULAR SURGERY         Review of patient's allergies indicates:   Allergen Reactions    Reglan [metoclopramide hcl] Diarrhea    Lorazepam      Other reaction(s): heavy sedation       No current facility-administered medications on file prior to encounter.      Current Outpatient Medications on File Prior to Encounter   Medication Sig    ascorbic acid, " "vitamin C, (VITAMIN C) 500 MG tablet Take 500 mg by mouth every evening.     atorvastatin (LIPITOR) 40 MG tablet TAKE 1 TABLET(40 MG) BY MOUTH EVERY DAY    blood glucose control, normal Soln 1 drop by Misc.(Non-Drug; Combo Route) route as directed.    blood sugar diagnostic Strp To check BG 2 times daily, to use with accuchek casa meter    blood-glucose meter kit To check BG 2 times daily, to use with accuchek casa meter    carvedilol (COREG) 25 MG tablet TAKE 1 TABLET(25 MG) BY MOUTH TWICE DAILY WITH MEALS    clopidogrel (PLAVIX) 75 mg tablet Take 1 tablet (75 mg total) by mouth once daily.    diltiaZEM (CARDIZEM CD) 180 MG 24 hr capsule Take 1 capsule (180 mg total) by mouth once daily.    furosemide (LASIX) 40 MG tablet TAKE 1 TABLET(40 MG) BY MOUTH EVERY DAY    insulin (LANTUS SOLOSTAR U-100 INSULIN) glargine 100 units/mL (3mL) SubQ pen INJECT 20 UNITS UNDER THE SKIN NIGHTLY    lancets [facility] To check BG 2 times daily, to use with accuchek casa meter    losartan (COZAAR) 100 MG tablet Take 1 tablet (100 mg total) by mouth once daily. (Patient not taking: Reported on 1/20/2020)    megestrol (MEGACE) 20 MG Tab Take 1 tablet (20 mg total) by mouth once daily.    NOVOFINE 30 30 x 1/3 " Ndle USE AS DIRECTED WITH LANTUS SOLARSTAR    NOVOLOG FLEXPEN U-100 INSULIN 100 unit/mL InPn pen INJECT 5 TO 10 UNITS WITH MEALS AND AT BEDTIME DEPENDING ON BLOOD SUGAR    pen needle, diabetic (BD ULTRA-FINE ORIG PEN NEEDLE) 29 gauge x 1/2" Ndle USE  1 EVERY EVENING    promethazine (PHENERGAN) 25 MG tablet Take 1 tablet (25 mg total) by mouth every 6 (six) hours as needed for Nausea.     Family History     Problem Relation (Age of Onset)    Cancer Mother, Paternal Grandmother, Paternal Uncle    Cataracts Mother    Diabetes Mother    Glaucoma Mother    Heart disease Father, Maternal Grandmother    Hypertension Brother    No Known Problems Sister, Maternal Grandfather, Paternal Grandfather, Sister, Sister    Prostate " cancer Brother        Tobacco Use    Smoking status: Never Smoker    Smokeless tobacco: Never Used   Substance and Sexual Activity    Alcohol use: No    Drug use: No    Sexual activity: Not Currently     Review of Systems   Constitutional: Negative for activity change and appetite change.   HENT: Negative for dental problem.    Eyes: Negative for discharge and itching.   Respiratory: Negative for apnea and chest tightness.    Cardiovascular: Negative for chest pain and leg swelling.   Gastrointestinal: Negative for abdominal distention and abdominal pain.   Endocrine: Negative for cold intolerance and heat intolerance.   Genitourinary: Negative for difficulty urinating and dysuria.   Musculoskeletal: Negative for arthralgias.   Skin: Negative for color change and pallor.   Allergic/Immunologic: Negative for environmental allergies.   Neurological: Negative for dizziness and facial asymmetry.   Hematological: Negative for adenopathy. Does not bruise/bleed easily.   Psychiatric/Behavioral: Negative for agitation and behavioral problems.     Objective:     Vital Signs (Most Recent):  Temp: 98.4 °F (36.9 °C) (02/27/20 0540)  Pulse: 90 (02/27/20 0540)  Resp: 16 (02/27/20 0540)  BP: 114/63 (02/27/20 0540)  SpO2: 100 % (02/27/20 0540) Vital Signs (24h Range):  Temp:  [98.4 °F (36.9 °C)-98.6 °F (37 °C)] 98.4 °F (36.9 °C)  Pulse:  [73-97] 90  Resp:  [15-20] 16  SpO2:  [96 %-100 %] 100 %  BP: (114-203)/(63-95) 114/63     Weight: 64.8 kg (142 lb 13.7 oz)  Body mass index is 19.92 kg/m².    Physical Exam   Constitutional: No distress.   HENT:   Head: Atraumatic.   Eyes: Pupils are equal, round, and reactive to light.   Neck: Normal range of motion.   Cardiovascular: Normal rate and regular rhythm.   Pulmonary/Chest: Effort normal and breath sounds normal.   Abdominal: Soft. Bowel sounds are normal.   Musculoskeletal: Normal range of motion.   Left AV fistula,has been dressed,no sign of active bleeding   Neurological: He is  alert. No cranial nerve deficit. Coordination normal.   Skin: Skin is warm and dry.   Psychiatric: He has a normal mood and affect. His behavior is normal.         CRANIAL NERVES     CN III, IV, VI   Pupils are equal, round, and reactive to light.       Significant Labs:   BMP:   Recent Labs   Lab 02/27/20 0315   *      K 6.8*      CO2 24   BUN 52*   CREATININE 8.3*   CALCIUM 9.4     CBC:   Recent Labs   Lab 02/27/20 0315   WBC 4.71   HGB 10.3*   HCT 32.5*          Significant Imaging: reviewed.

## 2020-02-27 NOTE — ED NOTES
MD at bedside, bandaging to pt's left arm fistula removed with bleeding at site noted. Quick clot pad and tegaderm applied by MD.

## 2020-02-27 NOTE — ASSESSMENT & PLAN NOTE
-Bleeding LUE AVF with multiple interventions in past including central venous angioplasty, distal fistula stenting and mid fistula stenting; skin breakdown overlying area of bleeding without evidence of infection, bleeding controlled - would benefit from fistula revision with jump graft; will obtain HD US and review 2018 fistulogram to determine if jump graft can be performed distally vs new LUE AV access and current fistula ligation to prevent further bleeding

## 2020-02-27 NOTE — ASSESSMENT & PLAN NOTE
With bleeding on AV left graft,Bleeding is currently controlled.vascualr surgery is consulted,has stable HH,hold plavix,,ASA at this time.

## 2020-02-27 NOTE — ED TRIAGE NOTES
Pt presents to ED via POV with c/o bleeding at his dialysis graft. Pt was sleeping when pt's wife entered his room to give him his night medications when she saw him covered in blood and the sheets saturated in blood as well. She applied a pressure dressing and brought him to the ED. Pt attends dialysis Tuesday/Thursday/Saturday, last attended on Monday due to the holidays. Pt's wife states they have had difficulties with getting the graft to stop bleeding previously. Bleeding currently controlled, pt denies feeling dizzy/lightheaded and is in no distress; family at bedside.

## 2020-02-27 NOTE — ASSESSMENT & PLAN NOTE
Received albuterol,insulin,started also on Kayexalate,consulted nephrology for HD,denies chest pain and SOB,no peaked T wave on EKG,monitor.

## 2020-02-27 NOTE — SUBJECTIVE & OBJECTIVE
"Medications Prior to Admission   Medication Sig Dispense Refill Last Dose    ascorbic acid, vitamin C, (VITAMIN C) 500 MG tablet Take 500 mg by mouth every evening.    Taking    atorvastatin (LIPITOR) 40 MG tablet TAKE 1 TABLET(40 MG) BY MOUTH EVERY DAY 90 tablet 1 Taking    blood glucose control, normal Soln 1 drop by Misc.(Non-Drug; Combo Route) route as directed. 1 each 1 Taking    blood sugar diagnostic Strp To check BG 2 times daily, to use with accuchek casa meter 200 each 3 Taking    blood-glucose meter kit To check BG 2 times daily, to use with accuchek casa meter 1 each 0 Taking    carvedilol (COREG) 25 MG tablet TAKE 1 TABLET(25 MG) BY MOUTH TWICE DAILY WITH MEALS 180 tablet 1 Taking    clopidogrel (PLAVIX) 75 mg tablet Take 1 tablet (75 mg total) by mouth once daily. 30 tablet 11 Taking    diltiaZEM (CARDIZEM CD) 180 MG 24 hr capsule Take 1 capsule (180 mg total) by mouth once daily. 90 capsule 1     furosemide (LASIX) 40 MG tablet TAKE 1 TABLET(40 MG) BY MOUTH EVERY DAY 90 tablet 1 Taking    insulin (LANTUS SOLOSTAR U-100 INSULIN) glargine 100 units/mL (3mL) SubQ pen INJECT 20 UNITS UNDER THE SKIN NIGHTLY 15 mL 0     lancets [facility] To check BG 2 times daily, to use with accuchek casa meter 200 each 3 Taking    losartan (COZAAR) 100 MG tablet Take 1 tablet (100 mg total) by mouth once daily. (Patient not taking: Reported on 1/20/2020) 90 tablet 1 Not Taking    megestrol (MEGACE) 20 MG Tab Take 1 tablet (20 mg total) by mouth once daily. 90 tablet 1 Taking    NOVOFINE 30 30 x 1/3 " Ndle USE AS DIRECTED WITH LANTUS SOLARSTAR 100 each 3 Taking    NOVOLOG FLEXPEN U-100 INSULIN 100 unit/mL InPn pen INJECT 5 TO 10 UNITS WITH MEALS AND AT BEDTIME DEPENDING ON BLOOD SUGAR 15 mL 0 Taking    pen needle, diabetic (BD ULTRA-FINE ORIG PEN NEEDLE) 29 gauge x 1/2" Ndle USE  1 EVERY EVENING 90 each 3 Taking    promethazine (PHENERGAN) 25 MG tablet Take 1 tablet (25 mg total) by mouth every 6 (six) hours " "as needed for Nausea. 30 tablet 0        Review of patient's allergies indicates:   Allergen Reactions    Reglan [metoclopramide hcl] Diarrhea    Lorazepam      Other reaction(s): heavy sedation       Past Medical History:   Diagnosis Date    Allergy     Ambulates with cane     Atherosclerosis of aorta     noted on VIKTORIA 2011    Blood transfusion     Cataracts, bilateral     CHF (congestive heart failure)     Chronic kidney disease     Clotting disorder     Coronary artery disease     Decreased appetite     Diabetic retinopathy     DM (diabetes mellitus), type 2 with renal complications     ESRD on hemodialysis     TUESDAY/ THURSDAY/SATURDAY    Hyperlipidemia     Hyperparathyroidism, unspecified     Hypertension     Myocardial infarction     Prostate cancer 2016 7/16/19:  wife states "he doesn't have it anymore"    Retinopathy due to secondary diabetes     Seizures     Stroke 2011    PT HAS SHAKES     Unsteady gait     Uses roller walker      Past Surgical History:   Procedure Laterality Date    CARDIAC SURGERY      CABG    CATARACT EXTRACTION      ou    CATARACT EXTRACTION, BILATERAL      cataracts      CORONARY ANGIOGRAPHY INCLUDING BYPASS GRAFTS WITH CATHETERIZATION OF LEFT HEART N/A 12/16/2019    Procedure: ANGIOGRAM, CORONARY, INCLUDING BYPASS GRAFT, WITH LEFT HEART CATHETERIZATION, rcfa, 5fr;  Surgeon: David Villegas MD;  Location: [facility] CATH LAB;  Service: Cardiology;  Laterality: N/A;    CORONARY ARTERY BYPASS GRAFT  2009    bypass    EYE SURGERY      Left Guera AVF  1/11/2012    PROSTATE BIOPSY      positive    retinal laser      ROTATOR CUFF REPAIR  right, 2005    ULTRASOUND GUIDANCE  12/16/2019    Procedure: Ultrasound Guidance;  Surgeon: David Villegas MD;  Location: [facility] CATH LAB;  Service: Cardiology;;    VASCULAR SURGERY       Family History     Problem Relation (Age of Onset)    Cancer Mother, Paternal Grandmother, Paternal Uncle    Cataracts Mother    " Diabetes Mother    Glaucoma Mother    Heart disease Father, Maternal Grandmother    Hypertension Brother    No Known Problems Sister, Maternal Grandfather, Paternal Grandfather, Sister, Sister    Prostate cancer Brother        Tobacco Use    Smoking status: Never Smoker    Smokeless tobacco: Never Used   Substance and Sexual Activity    Alcohol use: No    Drug use: No    Sexual activity: Not Currently     Review of Systems   Constitutional: Negative for chills and fever.   HENT: Negative for congestion.    Eyes: Negative for visual disturbance.   Respiratory: Negative for chest tightness and shortness of breath.    Cardiovascular: Negative for chest pain.   Gastrointestinal: Negative for abdominal distention.   Endocrine: Negative for cold intolerance.   Genitourinary: Negative for flank pain.   Musculoskeletal: Negative for back pain.   Skin: Positive for wound. Negative for color change, pallor and rash.   Allergic/Immunologic: Negative for immunocompromised state.   Neurological: Negative for dizziness.   Hematological: Does not bruise/bleed easily.   Psychiatric/Behavioral: Negative for agitation.     Objective:     Vital Signs (Most Recent):  Temp: 98.3 °F (36.8 °C) (02/27/20 0807)  Pulse: 80 (02/27/20 0807)  Resp: 17 (02/27/20 0807)  BP: 123/67 (02/27/20 0807)  SpO2: 99 % (02/27/20 0807) Vital Signs (24h Range):  Temp:  [98.3 °F (36.8 °C)-98.6 °F (37 °C)] 98.3 °F (36.8 °C)  Pulse:  [73-97] 80  Resp:  [15-20] 17  SpO2:  [96 %-100 %] 99 %  BP: (114-203)/(63-95) 123/67     Weight: 64.8 kg (142 lb 13.7 oz)  Body mass index is 19.92 kg/m².    Physical Exam   Constitutional: He is oriented to person, place, and time. He appears well-developed and well-nourished. No distress.   HENT:   Head: Normocephalic and atraumatic.   Eyes: Conjunctivae are normal.   Neck: Neck supple.   Cardiovascular: Normal rate and regular rhythm.   Pulmonary/Chest: Effort normal. No respiratory distress. He has no wheezes.   Abdominal:  Soft. He exhibits no distension and no mass. There is no tenderness. There is no rebound and no guarding. No hernia.   Musculoskeletal: Normal range of motion. He exhibits no edema, tenderness or deformity.   Neurological: He is alert and oriented to person, place, and time. No sensory deficit.   Skin: Skin is warm and dry. Capillary refill takes less than 2 seconds. No rash noted. He is not diaphoretic. No erythema. No pallor.   Psychiatric: He has a normal mood and affect.   Vitals reviewed.      Significant Labs:  All pertinent labs from the last 24 hours have been reviewed.    Significant Diagnostics:  I have reviewed all pertinent imaging results/findings within the past 24 hours.

## 2020-02-27 NOTE — NURSING
Patient back in bed safely. Call light in reach. Family at the bedside. Wheels locked. Bed alarm set.

## 2020-02-27 NOTE — HPI
"          Stephane Lopez MD [facility]                         02/27/2020    HPI:  Kodi Ibanez Sr. is a 60 y.o. male with   Patient Active Problem List   Diagnosis    ESRD on hemodialysis    History of stroke    Cognitive deficits as late effect of cerebrovascular disease    Congestive heart failure    Secondary renal hyperparathyroidism    Other extrapyramidal disease and abnormal movement disorder    Mononeuritis of lower limb, unspecified    Vascular dementia with depressed mood    Type 2 diabetes, uncontrolled, with neuropathy    CAD (coronary artery disease)    Seizure disorder    Hyperlipidemia LDL goal <100    Anemia of chronic disease    Hypertension    Uncontrolled type 2 diabetes mellitus with end-stage renal disease    History of MI (myocardial infarction)    Long-term insulin use    Atherosclerosis of aorta    S/P CABG (coronary artery bypass graft)    Hypertensive retinopathy of both eyes    Prostate cancer    Uncontrolled type 2 diabetes mellitus with both eyes affected by proliferative retinopathy and macular edema, with long-term current use of insulin    PVD (peripheral vascular disease)    Dependent on walker for ambulation    SVT (supraventricular tachycardia)    NSVT (nonsustained ventricular tachycardia)    Complication of arteriovenous dialysis fistula    being managed by PCP and specialists who is here today for evaluation of bleeding LUE AVF.    Last seen in clinic 2/2018 by Dr. Mazariegos with below HPI:  "Patient is a 57 y.o. male with a h/o end-stage renal disease, s/p covered stent placement Left AVF (10 x 50 [place]), PTA Left Subclavian vein (12 x 40) on 8/21/17.  Pt. Dialyzes T, H, S using the Left arm AVF.    He has been doing well from HD access standpoint. He is here today referred by his podiatrist for evaluation of his bilateral 2nd toe discoloration which was concerning for gangrene.      Patient's wife, " "who has been in charge of his foot care, has been noticing his bilateral 2nd toe discoloration at the tip since December. He denies other ulcers, ischemic rest pain, or even claudication. He has been wearing diabetic shoes when ambulating.     He is s/p:  1. Covered stent placement Left AVF (10 x 50 [place]ahn), PTA Left Subclavian vein (12 x 40), 8/21/17  2.  Angioplasty, left AV fistula (10 x 40 [place]) and left subclavian vein 12 x 40 [place], 03/06/2017.  3.  Stent placement, left AV fistula (10 x 40 [place]), 08/03/2016.  4.  Prior angioplasty of this fistula in 03/20/2016, 2014 and 2013.  5.  Original left brachiobasilic AV fistula creation, 04/01/2013. "    Patient states location is LUE AVF occurring for 1 week, last 2 HD sessions.  Associated signs and symptoms include bleeding.  Denies pain.  Symptoms began a few HD sessions ago, pt states he last dialyzed 2/25/20 with prolonged bleeding; began to bleed last night at home, no HD yesterday per patient.  Alleviating factors include pressure.  Worsening factors include dialysis.    no MI  no Stroke  Tobacco use: denies    Past Medical History:   Diagnosis Date    Allergy     Ambulates with cane     Atherosclerosis of aorta     noted on VIKTORIA 2011    Blood transfusion     Cataracts, bilateral     CHF (congestive heart failure)     Chronic kidney disease     Clotting disorder     Coronary artery disease     Decreased appetite     Diabetic retinopathy     DM (diabetes mellitus), type 2 with renal complications     ESRD on hemodialysis     TUESDAY/ THURSDAY/SATURDAY    Hyperlipidemia     Hyperparathyroidism, unspecified     Hypertension     Myocardial infarction     Prostate cancer 2016 7/16/19:  wife states "he doesn't have it anymore"    Retinopathy due to secondary diabetes     Seizures     Stroke 2011    PT HAS SHAKES     Unsteady gait     Uses roller walker      Past Surgical History:   Procedure Laterality Date    CARDIAC SURGERY      CABG "    CATARACT EXTRACTION      ou    CATARACT EXTRACTION, BILATERAL      cataracts      CORONARY ANGIOGRAPHY INCLUDING BYPASS GRAFTS WITH CATHETERIZATION OF LEFT HEART N/A 12/16/2019    Procedure: ANGIOGRAM, CORONARY, INCLUDING BYPASS GRAFT, WITH LEFT HEART CATHETERIZATION, rcfa, 5fr;  Surgeon: David Villegas MD;  Location: [facility] CATH LAB;  Service: Cardiology;  Laterality: N/A;    CORONARY ARTERY BYPASS GRAFT  2009    bypass    EYE SURGERY      Left Guera AVF  1/11/2012    PROSTATE BIOPSY      positive    retinal laser      ROTATOR CUFF REPAIR  right, 2005    ULTRASOUND GUIDANCE  12/16/2019    Procedure: Ultrasound Guidance;  Surgeon: David Villegas MD;  Location: [facility] CATH LAB;  Service: Cardiology;;    VASCULAR SURGERY       Family History   Problem Relation Age of Onset    Diabetes Mother     Glaucoma Mother     Cancer Mother     Cataracts Mother     Heart disease Father     Cancer Paternal Grandmother     Heart disease Maternal Grandmother     No Known Problems Sister     Prostate cancer Brother     No Known Problems Maternal Grandfather     No Known Problems Paternal Grandfather     No Known Problems Sister     No Known Problems Sister     Hypertension Brother     Cancer Paternal Uncle     Amblyopia Neg Hx     Blindness Neg Hx     Macular degeneration Neg Hx     Retinal detachment Neg Hx     Strabismus Neg Hx     Stroke Neg Hx     Thyroid disease Neg Hx     Anesthesia problems Neg Hx     Clotting disorder Neg Hx      problems Neg Hx     Kidney cancer Neg Hx     Kidney disease Neg Hx     Malignant hyperthermia Neg Hx     Sickle cell trait Neg Hx     Lupus Neg Hx     Urolithiasis Neg Hx     Sudden death Neg Hx      Social History     Socioeconomic History    Marital status:      Spouse name: Not on file    Number of children: Not on file    Years of education: Not on file    Highest education level: Not on file   Occupational History    Not on file    Social Needs    Financial resource strain: Not on file    Food insecurity:     Worry: Not on file     Inability: Not on file    Transportation needs:     Medical: Not on file     Non-medical: Not on file   Tobacco Use    Smoking status: Never Smoker    Smokeless tobacco: Never Used   Substance and Sexual Activity    Alcohol use: No    Drug use: No    Sexual activity: Not Currently   Lifestyle    Physical activity:     Days per week: Not on file     Minutes per session: Not on file    Stress: Not on file   Relationships    Social connections:     Talks on phone: Not on file     Gets together: Not on file     Attends Synagogue service: Not on file     Active member of club or organization: Not on file     Attends meetings of clubs or organizations: Not on file     Relationship status: Not on file   Other Topics Concern    Not on file   Social History Narrative    Not on file       Current Facility-Administered Medications:     carvediloL tablet 25 mg, 25 mg, Oral, BID, Feliciano Benitez MD, 25 mg at 02/27/20 0418    ondansetron injection 4 mg, 4 mg, Intravenous, Q8H PRN, Feliciano Benitez MD    sodium chloride 0.9% flush 10 mL, 10 mL, Intravenous, PRN, Feliciano Benitez MD

## 2020-02-27 NOTE — CONSULTS
"60 y o AAM withj hx of DM and [facility] Dr. Levy admitted with bleeding from his AVF. Seen in the ER and hyperk was noted also seen by Dr. Lopez. A recommendation for a jump graft has been made and K repeat was better    Renal input requested to help with HD    Pt is awake alert oriented. Denies trauma or recent procedures on his avf.    Denies CNS ENT CP GI  RHEUM OR DERM SX  Past Medical History:   Diagnosis Date    Allergy     Ambulates with cane     Atherosclerosis of aorta     noted on VIKTORIA 2011    Blood transfusion     Cataracts, bilateral     CHF (congestive heart failure)     Chronic kidney disease     Clotting disorder     Coronary artery disease     Decreased appetite     Diabetic retinopathy     DM (diabetes mellitus), type 2 with renal complications     ESRD on hemodialysis     TUESDAY/ THURSDAY/SATURDAY    Hyperlipidemia     Hyperparathyroidism, unspecified     Hypertension     Myocardial infarction     Prostate cancer 2016 7/16/19:  wife states "he doesn't have it anymore"    Retinopathy due to secondary diabetes     Seizures     Stroke 2011    PT HAS SHAKES     Unsteady gait     Uses roller walker      Past Surgical History:   Procedure Laterality Date    CARDIAC SURGERY      CABG    CATARACT EXTRACTION      ou    CATARACT EXTRACTION, BILATERAL      cataracts      CORONARY ANGIOGRAPHY INCLUDING BYPASS GRAFTS WITH CATHETERIZATION OF LEFT HEART N/A 12/16/2019    Procedure: ANGIOGRAM, CORONARY, INCLUDING BYPASS GRAFT, WITH LEFT HEART CATHETERIZATION, rcfa, 5fr;  Surgeon: David Villegas MD;  Location: [facility] CATH LAB;  Service: Cardiology;  Laterality: N/A;    CORONARY ARTERY BYPASS GRAFT  2009    bypass    EYE SURGERY      Left Guera AVF  1/11/2012    PROSTATE BIOPSY      positive    retinal laser      ROTATOR CUFF REPAIR  right, 2005    ULTRASOUND GUIDANCE  12/16/2019    Procedure: Ultrasound Guidance;  Surgeon: David Villegas, " MD;  Location: [facility] CATH LAB;  Service: Cardiology;;    VASCULAR SURGERY       Social History     Socioeconomic History    Marital status:      Spouse name: Not on file    Number of children: Not on file    Years of education: Not on file    Highest education level: Not on file   Occupational History    Not on file   Social Needs    Financial resource strain: Not on file    Food insecurity:     Worry: Not on file     Inability: Not on file    Transportation needs:     Medical: Not on file     Non-medical: Not on file   Tobacco Use    Smoking status: Never Smoker    Smokeless tobacco: Never Used   Substance and Sexual Activity    Alcohol use: No    Drug use: No    Sexual activity: Not Currently   Lifestyle    Physical activity:     Days per week: Not on file     Minutes per session: Not on file    Stress: Not on file   Relationships    Social connections:     Talks on phone: Not on file     Gets together: Not on file     Attends [employer] service: Not on file     Active member of club or organization: Not on file     Attends meetings of clubs or organizations: Not on file     Relationship status: Not on file   Other Topics Concern    Not on file   Social History Narrative    Not on file     Family History   Problem Relation Age of Onset    Diabetes Mother     Glaucoma Mother     Cancer Mother     Cataracts Mother     Heart disease Father     Cancer Paternal Grandmother     Heart disease Maternal Grandmother     No Known Problems Sister     Prostate cancer Brother     No Known Problems Maternal Grandfather     No Known Problems Paternal Grandfather     No Known Problems Sister     No Known Problems Sister     Hypertension Brother     Cancer Paternal Uncle     Amblyopia Neg Hx     Blindness Neg Hx     Macular degeneration Neg Hx     Retinal detachment Neg Hx     Strabismus Neg Hx     Stroke Neg Hx     Thyroid disease Neg Hx     Anesthesia problems Neg Hx     Clotting  disorder Neg Hx      problems Neg Hx     Kidney cancer Neg Hx     Kidney disease Neg Hx     Malignant hyperthermia Neg Hx     Sickle cell trait Neg Hx     Lupus Neg Hx     Urolithiasis Neg Hx     Sudden death Neg Hx        Review of patient's allergies indicates:   Allergen Reactions    Reglan [metoclopramide hcl] Diarrhea    Lorazepam      Other reaction(s): heavy sedation       Current Facility-Administered Medications   Medication    atorvastatin tablet 40 mg    carvediloL tablet 25 mg    dextrose 50% injection 12.5 g    dextrose 50% injection 25 g    glucagon (human recombinant) injection 1 mg    glucose chewable tablet 16 g    glucose chewable tablet 24 g    insulin aspart U-100 pen 1-10 Units    ondansetron injection 4 mg    sodium chloride 0.9% flush 10 mL    sodium polystyrene 15 gram/60 mL suspension 30 g       LABS    Recent Results (from the past 24 hour(s))   CBC auto differential    Collection Time: 02/27/20  3:15 AM   Result Value Ref Range    WBC 4.71 3.90 - 12.70 K/uL    RBC 3.55 (L) 4.60 - 6.20 M/uL    Hemoglobin 10.3 (L) 14.0 - 18.0 g/dL    Hematocrit 32.5 (L) 40.0 - 54.0 %    Mean Corpuscular Volume 92 82 - 98 fL    Mean Corpuscular Hemoglobin 29.0 27.0 - 31.0 pg    Mean Corpuscular Hemoglobin Conc 31.7 (L) 32.0 - 36.0 g/dL    RDW 15.2 (H) 11.5 - 14.5 %    Platelets 167 150 - 350 K/uL    MPV 11.0 9.2 - 12.9 fL    Immature Granulocytes 0.2 0.0 - 0.5 %    Gran # (ANC) 3.2 1.8 - 7.7 K/uL    Immature Grans (Abs) 0.01 0.00 - 0.04 K/uL    Lymph # 1.0 1.0 - 4.8 K/uL    Mono # 0.4 0.3 - 1.0 K/uL    Eos # 0.1 0.0 - 0.5 K/uL    Baso # 0.02 0.00 - 0.20 K/uL    nRBC 0 0 /100 WBC    Gran% 68.2 38.0 - 73.0 %    Lymph% 21.0 18.0 - 48.0 %    Mono% 8.3 4.0 - 15.0 %    Eosinophil% 1.9 0.0 - 8.0 %    Basophil% 0.4 0.0 - 1.9 %    Differential Method Automated    Basic metabolic panel    Collection Time: 02/27/20  3:15 AM   Result Value Ref Range    Sodium 136 136 - 145 mmol/L    Potassium 6.8  (HH) 3.5 - 5.1 mmol/L    Chloride 101 95 - 110 mmol/L    CO2 24 23 - 29 mmol/L    Glucose 516 (HH) 70 - 110 mg/dL    BUN, Bld 52 (H) 8 - 23 mg/dL    Creatinine 8.3 (H) 0.5 - 1.4 mg/dL    Calcium 9.4 8.7 - 10.5 mg/dL    Anion Gap 11 8 - 16 mmol/L    eGFR if African American 7 (A) >60 mL/min/1.73 m^2    eGFR if non African American 6 (A) >60 mL/min/1.73 m^2   APTT    Collection Time: 02/27/20  3:15 AM   Result Value Ref Range    aPTT 26.8 21.0 - 32.0 sec   Protime-INR    Collection Time: 02/27/20  3:15 AM   Result Value Ref Range    Prothrombin Time 10.3 9.0 - 12.5 sec    INR 0.9 0.8 - 1.2   POCT glucose    Collection Time: 02/27/20  4:05 AM   Result Value Ref Range    POCT Glucose 473 (HH) 70 - 110 mg/dL   POCT Glucose, Hand-Held Device    Collection Time: 02/27/20  4:07 AM   Result Value Ref Range    POC Glucose 473 (A) 70 - 110 MG/DL   POCT glucose    Collection Time: 02/27/20  4:46 AM   Result Value Ref Range    POCT Glucose 402 (H) 70 - 110 mg/dL   POCT Glucose, Hand-Held Device    Collection Time: 02/27/20  4:47 AM   Result Value Ref Range    POC Glucose 402 (A) 70 - 110 MG/DL   Basic metabolic panel    Collection Time: 02/27/20  8:46 AM   Result Value Ref Range    Sodium 140 136 - 145 mmol/L    Potassium 5.5 (H) 3.5 - 5.1 mmol/L    Chloride 106 95 - 110 mmol/L    CO2 22 (L) 23 - 29 mmol/L    Glucose 103 70 - 110 mg/dL    BUN, Bld 53 (H) 8 - 23 mg/dL    Creatinine 8.6 (H) 0.5 - 1.4 mg/dL    Calcium 9.2 8.7 - 10.5 mg/dL    Anion Gap 12 8 - 16 mmol/L    eGFR if African American 7 (A) >60 mL/min/1.73 m^2    eGFR if non African American 6 (A) >60 mL/min/1.73 m^2   POCT glucose    Collection Time: 02/27/20 10:34 AM   Result Value Ref Range    POCT Glucose 103 70 - 110 mg/dL   ]    No intake/output data recorded.    Vitals:    02/27/20 0540 02/27/20 0807 02/27/20 0838 02/27/20 1159   BP: 114/63 123/67  130/60   Pulse: 90 80 87 82   Resp: 16 17 18 17   Temp: 98.4 °F (36.9 °C) 98.3 °F (36.8 °C)  98.4 °F (36.9 °C)    TempSrc: Oral Oral  Oral   SpO2: 100% 99% 97% 98%   Weight:       Height:           No Jvd, Thyromegaly or Lymphadenopathy  Lungs: Fairly clear anteriorly and laterally  Cor: RRR no G or rubs  Abd: Soft benign good bowel sounds non tender  Ext: No E C C    A)  ESRD on chronic HD TTS  Bleeding from AVF  HyperK  HTN  DM  2nd hyperpth  Anemia of ckd    P)  Renal Diet  Home meds  Protect access  HD TTS  EPO prn  Binders prn  Adjust all meds to the degree of renal fx  Close follow up I/O and weights  Maintain Hydration

## 2020-02-27 NOTE — HPI
60 years old male with history of ESRD,DM,HTN,hyperlipidemia,old CVA,vascualr dementia,presents for evaluation from bleeding from his dialysis graft that started while sleeping last night,.per ER record,  Patient's wife notes that he has had trouble controlling bleeding after dialysis on 2 visits in the last week and half.  His last dialysis was 2 days ago.  Patient was well prior to this.  Denies trauma. Per ER record,The wife states that she has been trying to get touch this vascular surgeon about her concerned about difficulty controlling the bleeding after his sessions.  Patient is not taking his antihypertensive medications this evening.  Patient denies any weakness or dizziness.  No shortness of breath.  No diaphoresis.  No near syncope.  No change in medications.  Patient takes Plavix and ASA for old CVA and CAD, He is due for dialysis today, His wife placed a occlusive dressing over the and wrapped it with tape before coming to the ER.  Bleeding is currently controlled.vascualr surgery is consulted,he has potassium of 6.8,EKG show no peacked T wave,deneis chest pain and SOB,he received albuterol and insulin in ER,nephrology is consulted for HD,he had blood sugar over 500,improved with IV insulin.

## 2020-02-27 NOTE — PROGRESS NOTES
LINKS immunization registry, Care Everywhere and Health Maintenance updated.  Chart reviewed for overdue Proactive [facility]coun[facility] health maintenance testing.   Patient called with croup, has started prednisone that they had in the house from a trip last year. Patient was calling to clarify doses. Reviewed with dad that we don't like start prednisone without assessing patient and should see someone next time. Dad expresses understanding.

## 2020-02-27 NOTE — BRIEF OP NOTE
Radiology Post-Procedure Note    Pre Op Diagnosis: ESRD requiring central venous access for urgent HD  Post Op Diagnosis: Same    Procedure: US and fluoroscopic-guided placement of a 15-cm REJV-approach NTHDC    Procedure performed by: Elvis Ayon MD    Written Informed Consent Obtained: Yes  Specimen Removed: NO  Estimated Blood Loss: Minimal    Findings:   Successful US and fluoroscopic-guided placement of a 15-cm REJV-approach NTHDC with local anesthetic only. Patient tolerated the procedure well. No immediate post-procedural complications noted.     Catheter ready for use immediately.    Thank you for considering IR for the care of your patient.     Elvis Ayon MD  Interventional Radiology

## 2020-02-27 NOTE — CONSULTS
"[facility]  Vascular Surgery  Consult Note    Inpatient consult to Vascular Surgery  Consult performed by: Stephane Lopez MD  Consult ordered by: Feliciano Benitez MD  Reason for consult: Bleeding AV fistula        Subjective:     Chief Complaint/Reason for Admission: Bleeding AV fistula    History of Present Illness:             Stephane Lopez MD [facility]                         02/27/2020    HPI:  Kodi Ibanez Sr. is a 60 y.o. male with   Patient Active Problem List   Diagnosis    ESRD on hemodialysis    History of stroke    Cognitive deficits as late effect of cerebrovascular disease    Congestive heart failure    Secondary renal hyperparathyroidism    Other extrapyramidal disease and abnormal movement disorder    Mononeuritis of lower limb, unspecified    Vascular dementia with depressed mood    Type 2 diabetes, uncontrolled, with neuropathy    CAD (coronary artery disease)    Seizure disorder    Hyperlipidemia LDL goal <100    Anemia of chronic disease    Hypertension    Uncontrolled type 2 diabetes mellitus with end-stage renal disease    History of MI (myocardial infarction)    Long-term insulin use    Atherosclerosis of aorta    S/P CABG (coronary artery bypass graft)    Hypertensive retinopathy of both eyes    Prostate cancer    Uncontrolled type 2 diabetes mellitus with both eyes affected by proliferative retinopathy and macular edema, with long-term current use of insulin    PVD (peripheral vascular disease)    Dependent on walker for ambulation    SVT (supraventricular tachycardia)    NSVT (nonsustained ventricular tachycardia)    Complication of arteriovenous dialysis fistula    being managed by PCP and specialists who is here today for evaluation of bleeding LUE AVF.    Last seen in clinic 2/2018 by Dr. Mazariegos with below HPI:  "Patient is a 57 y.o. male with a h/o end-stage renal disease, s/p " "covered stent placement Left AVF (10 x 50 [place]ahn), PTA Left Subclavian vein (12 x 40) on 8/21/17.  Pt. Dialyzes T, H, S using the Left arm AVF.    He has been doing well from HD access standpoint. He is here today referred by his podiatrist for evaluation of his bilateral 2nd toe discoloration which was concerning for gangrene.      Patient's wife, who has been in charge of his foot care, has been noticing his bilateral 2nd toe discoloration at the tip since December. He denies other ulcers, ischemic rest pain, or even claudication. He has been wearing diabetic shoes when ambulating.     He is s/p:  1. Covered stent placement Left AVF (10 x 50 Viabahn), PTA Left Subclavian vein (12 x 40), 8/21/17  2.  Angioplasty, left AV fistula (10 x 40 [place]) and left subclavian vein 12 x 40 [place], 03/06/2017.  3.  Stent placement, left AV fistula (10 x 40 [place]), 08/03/2016.  4.  Prior angioplasty of this fistula in 03/20/2016, 2014 and 2013.  5.  Original left brachiobasilic AV fistula creation, 04/01/2013.  "    Patient states location is LUE AVF occurring for 1 week, last 2 HD sessions.  Associated signs and symptoms include bleeding.  Denies pain.  Symptoms began a few HD sessions ago, pt states he last dialyzed 2/25/20 with prolonged bleeding; began to bleed last night at home, no HD yesterday per patient.  Alleviating factors include pressure.  Worsening factors include dialysis.    no MI  no Stroke  Tobacco use: denies    Past Medical History:   Diagnosis Date    Allergy     Ambulates with cane     Atherosclerosis of aorta     noted on VIKTORIA 2011    Blood transfusion     Cataracts, bilateral     CHF (congestive heart failure)     Chronic kidney disease     Clotting disorder     Coronary artery disease     Decreased appetite     Diabetic retinopathy     DM (diabetes mellitus), type 2 with renal complications     ESRD on hemodialysis     TUESDAY/ THURSDAY/SATURDAY    Hyperlipidemia     Hyperparathyroidism, " "unspecified     Hypertension     Myocardial infarction     Prostate cancer 2016    7/16/19:  wife states "he doesn't have it anymore"    Retinopathy due to secondary diabetes     Seizures     Stroke 2011    PT HAS SHAKES     Unsteady gait     Uses roller walker      Past Surgical History:   Procedure Laterality Date    CARDIAC SURGERY      CABG    CATARACT EXTRACTION      ou    CATARACT EXTRACTION, BILATERAL      cataracts      CORONARY ANGIOGRAPHY INCLUDING BYPASS GRAFTS WITH CATHETERIZATION OF LEFT HEART N/A 12/16/2019    Procedure: ANGIOGRAM, CORONARY, INCLUDING BYPASS GRAFT, WITH LEFT HEART CATHETERIZATION, rcfa, 5fr;  Surgeon: David Villegas MD;  Location: [facility] CATH LAB;  Service: Cardiology;  Laterality: N/A;    CORONARY ARTERY BYPASS GRAFT  2009    bypass    EYE SURGERY      Left Guera AVF  1/11/2012    PROSTATE BIOPSY      positive    retinal laser      ROTATOR CUFF REPAIR  right, 2005    ULTRASOUND GUIDANCE  12/16/2019    Procedure: Ultrasound Guidance;  Surgeon: David Villegas MD;  Location: [facility] CATH LAB;  Service: Cardiology;;    VASCULAR SURGERY       Family History   Problem Relation Age of Onset    Diabetes Mother     Glaucoma Mother     Cancer Mother     Cataracts Mother     Heart disease Father     Cancer Paternal Grandmother     Heart disease Maternal Grandmother     No Known Problems Sister     Prostate cancer Brother     No Known Problems Maternal Grandfather     No Known Problems Paternal Grandfather     No Known Problems Sister     No Known Problems Sister     Hypertension Brother     Cancer Paternal Uncle     Amblyopia Neg Hx     Blindness Neg Hx     Macular degeneration Neg Hx     Retinal detachment Neg Hx     Strabismus Neg Hx     Stroke Neg Hx     Thyroid disease Neg Hx     Anesthesia problems Neg Hx     Clotting disorder Neg Hx      problems Neg Hx     Kidney cancer Neg Hx     Kidney disease Neg Hx     Malignant hyperthermia Neg Hx     " Sickle cell trait Neg Hx     Lupus Neg Hx     Urolithiasis Neg Hx     Sudden death Neg Hx      Social History     Socioeconomic History    Marital status:      Spouse name: Not on file    Number of children: Not on file    Years of education: Not on file    Highest education level: Not on file   Occupational History    Not on file   Social Needs    Financial resource strain: Not on file    Food insecurity:     Worry: Not on file     Inability: Not on file    Transportation needs:     Medical: Not on file     Non-medical: Not on file   Tobacco Use    Smoking status: Never Smoker    Smokeless tobacco: Never Used   Substance and Sexual Activity    Alcohol use: No    Drug use: No    Sexual activity: Not Currently   Lifestyle    Physical activity:     Days per week: Not on file     Minutes per session: Not on file    Stress: Not on file   Relationships    Social connections:     Talks on phone: Not on file     Gets together: Not on file     Attends [employer] service: Not on file     Active member of club or organization: Not on file     Attends meetings of clubs or organizations: Not on file     Relationship status: Not on file   Other Topics Concern    Not on file   Social History Narrative    Not on file       Current Facility-Administered Medications:     carvediloL tablet 25 mg, 25 mg, Oral, BID, Feliciano Benitez MD, 25 mg at 02/27/20 0418    ondansetron injection 4 mg, 4 mg, Intravenous, Q8H PRN, Feliciano Benitez MD    sodium chloride 0.9% flush 10 mL, 10 mL, Intravenous, PRN, Feliciano Benitez MD      Medications Prior to Admission   Medication Sig Dispense Refill Last Dose    ascorbic acid, vitamin C, (VITAMIN C) 500 MG tablet Take 500 mg by mouth every evening.    Taking    atorvastatin (LIPITOR) 40 MG tablet TAKE 1 TABLET(40 MG) BY MOUTH EVERY DAY 90 tablet 1 Taking    blood glucose control, normal Soln 1 drop by Misc.(Non-Drug; Combo Route) route as directed. 1 each 1  "Taking    blood sugar diagnostic Strp To check BG 2 times daily, to use with accuchek casa meter 200 each 3 Taking    blood-glucose meter kit To check BG 2 times daily, to use with accuchek casa meter 1 each 0 Taking    carvedilol (COREG) 25 MG tablet TAKE 1 TABLET(25 MG) BY MOUTH TWICE DAILY WITH MEALS 180 tablet 1 Taking    clopidogrel (PLAVIX) 75 mg tablet Take 1 tablet (75 mg total) by mouth once daily. 30 tablet 11 Taking    diltiaZEM (CARDIZEM CD) 180 MG 24 hr capsule Take 1 capsule (180 mg total) by mouth once daily. 90 capsule 1     furosemide (LASIX) 40 MG tablet TAKE 1 TABLET(40 MG) BY MOUTH EVERY DAY 90 tablet 1 Taking    insulin (LANTUS SOLOSTAR U-100 INSULIN) glargine 100 units/mL (3mL) SubQ pen INJECT 20 UNITS UNDER THE SKIN NIGHTLY 15 mL 0     lancets Misc To check BG 2 times daily, to use with accuchek casa meter 200 each 3 Taking    losartan (COZAAR) 100 MG tablet Take 1 tablet (100 mg total) by mouth once daily. (Patient not taking: Reported on 1/20/2020) 90 tablet 1 Not Taking    megestrol (MEGACE) 20 MG Tab Take 1 tablet (20 mg total) by mouth once daily. 90 tablet 1 Taking    NOVOFINE 30 30 x 1/3 " Ndle USE AS DIRECTED WITH LANTUS SOLARSTAR 100 each 3 Taking    NOVOLOG FLEXPEN U-100 INSULIN 100 unit/mL InPn pen INJECT 5 TO 10 UNITS WITH MEALS AND AT BEDTIME DEPENDING ON BLOOD SUGAR 15 mL 0 Taking    pen needle, diabetic (BD ULTRA-FINE ORIG PEN NEEDLE) 29 gauge x 1/2" Ndle USE  1 EVERY EVENING 90 each 3 Taking    promethazine (PHENERGAN) 25 MG tablet Take 1 tablet (25 mg total) by mouth every 6 (six) hours as needed for Nausea. 30 tablet 0        Review of patient's allergies indicates:   Allergen Reactions    Reglan [metoclopramide hcl] Diarrhea    Lorazepam      Other reaction(s): heavy sedation       Past Medical History:   Diagnosis Date    Allergy     Ambulates with cane     Atherosclerosis of aorta     noted on VIKTORIA 2011    Blood transfusion     Cataracts, bilateral  " "   CHF (congestive heart failure)     Chronic kidney disease     Clotting disorder     Coronary artery disease     Decreased appetite     Diabetic retinopathy     DM (diabetes mellitus), type 2 with renal complications     ESRD on hemodialysis     TUESDAY/ THURSDAY/SATURDAY    Hyperlipidemia     Hyperparathyroidism, unspecified     Hypertension     Myocardial infarction     Prostate cancer 2016    7/16/19:  wife states "he doesn't have it anymore"    Retinopathy due to secondary diabetes     Seizures     Stroke 2011    PT HAS SHAKES     Unsteady gait     Uses roller walker      Past Surgical History:   Procedure Laterality Date    CARDIAC SURGERY      CABG    CATARACT EXTRACTION      ou    CATARACT EXTRACTION, BILATERAL      cataracts      CORONARY ANGIOGRAPHY INCLUDING BYPASS GRAFTS WITH CATHETERIZATION OF LEFT HEART N/A 12/16/2019    Procedure: ANGIOGRAM, CORONARY, INCLUDING BYPASS GRAFT, WITH LEFT HEART CATHETERIZATION, rcfa, 5fr;  Surgeon: David iVllegas MD;  Location: [facility] CATH LAB;  Service: Cardiology;  Laterality: N/A;    CORONARY ARTERY BYPASS GRAFT  2009    bypass    EYE SURGERY      Left Guera AVF  1/11/2012    PROSTATE BIOPSY      positive    retinal laser      ROTATOR CUFF REPAIR  right, 2005    ULTRASOUND GUIDANCE  12/16/2019    Procedure: Ultrasound Guidance;  Surgeon: David Villegas MD;  Location: [facility] CATH LAB;  Service: Cardiology;;    VASCULAR SURGERY       Family History     Problem Relation (Age of Onset)    Cancer Mother, Paternal Grandmother, Paternal Uncle    Cataracts Mother    Diabetes Mother    Glaucoma Mother    Heart disease Father, Maternal Grandmother    Hypertension Brother    No Known Problems Sister, Maternal Grandfather, Paternal Grandfather, Sister, Sister    Prostate cancer Brother        Tobacco Use    Smoking status: Never Smoker    Smokeless tobacco: Never Used   Substance and Sexual Activity    Alcohol use: No    Drug use: No    Sexual " activity: Not Currently     Review of Systems   Constitutional: Negative for chills and fever.   HENT: Negative for congestion.    Eyes: Negative for visual disturbance.   Respiratory: Negative for chest tightness and shortness of breath.    Cardiovascular: Negative for chest pain.   Gastrointestinal: Negative for abdominal distention.   Endocrine: Negative for cold intolerance.   Genitourinary: Negative for flank pain.   Musculoskeletal: Negative for back pain.   Skin: Positive for wound. Negative for color change, pallor and rash.   Allergic/Immunologic: Negative for immunocompromised state.   Neurological: Negative for dizziness.   Hematological: Does not bruise/bleed easily.   Psychiatric/Behavioral: Negative for agitation.     Objective:     Vital Signs (Most Recent):  Temp: 98.3 °F (36.8 °C) (02/27/20 0807)  Pulse: 80 (02/27/20 0807)  Resp: 17 (02/27/20 0807)  BP: 123/67 (02/27/20 0807)  SpO2: 99 % (02/27/20 0807) Vital Signs (24h Range):  Temp:  [98.3 °F (36.8 °C)-98.6 °F (37 °C)] 98.3 °F (36.8 °C)  Pulse:  [73-97] 80  Resp:  [15-20] 17  SpO2:  [96 %-100 %] 99 %  BP: (114-203)/(63-95) 123/67     Weight: 64.8 kg (142 lb 13.7 oz)  Body mass index is 19.92 kg/m².    Physical Exam   Constitutional: He is oriented to person, place, and time. He appears well-developed and well-nourished. No distress.   HENT:   Head: Normocephalic and atraumatic.   Eyes: Conjunctivae are normal.   Neck: Neck supple.   Cardiovascular: Normal rate and regular rhythm.   Pulmonary/Chest: Effort normal. No respiratory distress. He has no wheezes.   Abdominal: Soft. He exhibits no distension and no mass. There is no tenderness. There is no rebound and no guarding. No hernia.   Musculoskeletal: Normal range of motion. He exhibits no edema, tenderness or deformity.   Neurological: He is alert and oriented to person, place, and time. No sensory deficit.   Skin: Skin is warm and dry. Capillary refill takes less than 2 seconds. No rash noted.  He is not diaphoretic. No erythema. No pallor.   Psychiatric: He has a normal mood and affect.   Vitals reviewed.      Significant Labs:  All pertinent labs from the last 24 hours have been reviewed.    Significant Diagnostics:  I have reviewed all pertinent imaging results/findings within the past 24 hours.    Assessment/Plan:     * Complication of arteriovenous dialysis fistula  -Bleeding LUE AVF with multiple interventions in past including central venous angioplasty, distal fistula stenting and mid fistula stenting; skin breakdown overlying area of bleeding without evidence of infection, bleeding controlled - would benefit from fistula revision with jump graft; will obtain HD US and review 2018 fistulogram to determine if jump graft can be performed distally vs new LUE AV access and current fistula ligation to prevent further bleeding    Hyperkalemia  -recommend repeat K level - if persistently high, rec HD via catheter   -rec EKG        Thank you for your consult. I will follow-up with patient. Please contact us if you have any additional questions.    Stephane Lopez MD  Vascular Surgery  [facility]

## 2020-02-27 NOTE — ANESTHESIA PREPROCEDURE EVALUATION
02/27/2020    Pre-operative evaluation for Procedure(s) (LRB):  REVISION, AV FISTULA, LEFT UPPER EXTREMITY, (Left)    Kodi Ibanez SrVane is a 60 y.o. male     Patient Active Problem List   Diagnosis    ESRD on hemodialysis    History of stroke    Cognitive deficits as late effect of cerebrovascular disease    Congestive heart failure    Secondary renal hyperparathyroidism    Other extrapyramidal disease and abnormal movement disorder    Mononeuritis of lower limb, unspecified    Vascular dementia with depressed mood    Type 2 diabetes, uncontrolled, with neuropathy    CAD (coronary artery disease)    Seizure disorder    Hyperlipidemia LDL goal <100    Anemia of chronic disease    Hypertension    Uncontrolled type 2 diabetes mellitus with end-stage renal disease    History of MI (myocardial infarction)    Long-term insulin use    Atherosclerosis of aorta    S/P CABG (coronary artery bypass graft)    Hypertensive retinopathy of both eyes    Prostate cancer    Uncontrolled type 2 diabetes mellitus with both eyes affected by proliferative retinopathy and macular edema, with long-term current use of insulin    PVD (peripheral vascular disease)    Dependent on walker for ambulation    SVT (supraventricular tachycardia)    NSVT (nonsustained ventricular tachycardia)    Complication of arteriovenous dialysis fistula    Hyperkalemia    Essential hypertension       Review of patient's allergies indicates:   Allergen Reactions    Reglan [metoclopramide hcl] Diarrhea    Lorazepam      Other reaction(s): heavy sedation       No current facility-administered medications on file prior to encounter.      Current Outpatient Medications on File Prior to Encounter   Medication Sig Dispense Refill    ascorbic acid, vitamin C, (VITAMIN C) 500 MG tablet Take 500 mg by mouth every evening.       atorvastatin (LIPITOR) 40 MG tablet TAKE 1 TABLET(40 MG) BY MOUTH EVERY DAY 90 tablet 1    blood glucose  "control, normal Soln 1 drop by Misc.(Non-Drug; Combo Route) route as directed. 1 each 1    blood sugar diagnostic Strp To check BG 2 times daily, to use with accuchek casa meter 200 each 3    blood-glucose meter kit To check BG 2 times daily, to use with accuchek casa meter 1 each 0    carvedilol (COREG) 25 MG tablet TAKE 1 TABLET(25 MG) BY MOUTH TWICE DAILY WITH MEALS 180 tablet 1    clopidogrel (PLAVIX) 75 mg tablet Take 1 tablet (75 mg total) by mouth once daily. 30 tablet 11    diltiaZEM (CARDIZEM CD) 180 MG 24 hr capsule Take 1 capsule (180 mg total) by mouth once daily. 90 capsule 1    furosemide (LASIX) 40 MG tablet TAKE 1 TABLET(40 MG) BY MOUTH EVERY DAY 90 tablet 1    insulin (LANTUS SOLOSTAR U-100 INSULIN) glargine 100 units/mL (3mL) SubQ pen INJECT 20 UNITS UNDER THE SKIN NIGHTLY 15 mL 0    lancets Misc To check BG 2 times daily, to use with accuchek casa meter 200 each 3    losartan (COZAAR) 100 MG tablet Take 1 tablet (100 mg total) by mouth once daily. (Patient not taking: Reported on 1/20/2020) 90 tablet 1    megestrol (MEGACE) 20 MG Tab Take 1 tablet (20 mg total) by mouth once daily. 90 tablet 1    NOVOFINE 30 30 x 1/3 " Ndle USE AS DIRECTED WITH LANTUS SOLARSTAR 100 each 3    NOVOLOG FLEXPEN U-100 INSULIN 100 unit/mL InPn pen INJECT 5 TO 10 UNITS WITH MEALS AND AT BEDTIME DEPENDING ON BLOOD SUGAR 15 mL 0    pen needle, diabetic (BD ULTRA-FINE ORIG PEN NEEDLE) 29 gauge x 1/2" Ndle USE  1 EVERY EVENING 90 each 3    promethazine (PHENERGAN) 25 MG tablet Take 1 tablet (25 mg total) by mouth every 6 (six) hours as needed for Nausea. 30 tablet 0       VITALS  Vitals:    02/27/20 1647   BP: 137/84   Pulse: 88   Resp: 17   Temp: 36.8 °C (98.2 °F)       LABS  CBC:   Recent Labs     02/27/20  0315   WBC 4.71   RBC 3.55*   HGB 10.3*   HCT 32.5*      MCV 92   MCH 29.0   MCHC 31.7*       CMP:   Recent Labs     02/27/20  0315 02/27/20  0846    140   K 6.8* 5.5*    106   CO2 24 " 22*   BUN 52* 53*   CREATININE 8.3* 8.6*   * 103   PHOS  --  2.5*   CALCIUM 9.4 9.2       INR  Recent Labs     02/27/20  0315   INR 0.9   APTT 26.8       Anesthesia Evaluation    I have reviewed the Patient Summary Reports.     I have reviewed the Medications.     Review of Systems  Anesthesia Hx:  No problems with previous Anesthesia   Denies Personal Hx of Anesthesia complications.   Hematology/Oncology:  Hematology Normal   Oncology Normal     EENT/Dental:EENT/Dental Normal   Cardiovascular:   Hypertension, well controlled Past MI CAD  CABG/stent  CHF  Coronary Artery Disease: S/P Aorto-Coronary Bypass Graft Surgery (CABG): CABG was 2008 Hx of Myocardial Infarction  Congestive Heart Failure (CHF)  Hypertension, Essential Hypertension , Pt in Pre-HTN range, systolic 130 - 139 or diastolic 85 - 89, Well Controlled on Rx    Pulmonary:  Pulmonary Normal  Denies COPD.  Denies Asthma.  Denies Shortness of breath.  Denies Asthma.  Denies Chronic Obstructive Pulmonary Disease (COPD).    Renal/:   Chronic Renal Disease, ESRD, Dialysis  Kidney Function/Disease, Chronic Kidney Disease (CKD) , CKD Stage V (ESRD) (GFR <15 or on Dialysis)  Dialysis Dependent, T-TH-S , last dialysis was 8-12-14   Hepatic/GI:  Hepatic/GI Normal Denies Liver Disease.  Denies Esophageal / Stomach Disorders  Denies Liver Disease    Musculoskeletal:  Musculoskeletal Normal    Neurological:   CVA, residual symptoms Seizures, well controlled  Seizure Disorder , Most recent seizure occurred 1-6  months ago , frequency is infrequent  CVA - Cerebrovasular Accident , has had 1 stroke , residual deficits are residual deficit.    Endocrine:   Diabetes, poorly controlled, type 2 Denies Hypothyroidism.  Diabetes, Type 2 Diabetes , controlled by insulin. , most recent HgA1c value was 8.2  Denies Thyroid Disease    Dermatological:  Skin Normal    Psych:  Psychiatric Normal           Physical Exam  General:  Well nourished, Obesity     Airway/Jaw/Neck:  Airway Findings: Mouth Opening: Normal Tongue: Normal  General Airway Assessment: Adult  Mallampati: II  Jaw/Neck Findings:  Neck ROM: Normal ROM     Eyes/Ears/Nose:  Eyes/Ears/Nose Findings:    Dental:  Dental Findings:    Chest/Lungs:  Chest/Lungs Findings: Clear to auscultation, Normal Respiratory Rate     Heart/Vascular:  Heart Findings: Rate: Normal  Rhythm: Regular Rhythm  Heart Murmur  Systolic  Systolic Heart Murmur Description: L Upper Sternal Border  Systolic Heart Murmur Grade: Grade II        Mental Status:  Mental Status Findings:  Cooperative         Anesthesia Plan  Type of Anesthesia, risks & benefits discussed:  Anesthesia Type:  general  Patient's Preference: MAC  Intra-op Monitoring Plan: standard ASA monitors  Intra-op Monitoring Plan Comments:   Post Op Pain Control Plan: per primary service following discharge from PACU  Post Op Pain Control Plan Comments:   Induction:   IV  Beta Blocker:  Patient is on a Beta-Blocker and has received one dose within the past 24 hours (No further documentation required).       Informed Consent: Patient understands risks and agrees with Anesthesia plan.  Questions answered. Anesthesia consent signed with patient.  ASA Score: 4  emergent   Day of Surgery Review of History & Physical: I have interviewed and examined the patient. I have reviewed the patient's H&P dated:  There are no significant changes.  H&P update referred to the surgeon.     Anesthesia Plan Notes:     He understands and agrees with plan        Ready For Surgery From Anesthesia Perspective.

## 2020-02-27 NOTE — H&P
[facility] Medicine  History & Physical    Patient Name: Kodi Ibanez Sr.  MRN: [id number]  Admission Date: 2/27/2020  Attending Physician: Rahul Santoyo MD   Primary Care Provider: Melonie Elias MD         Patient information was obtained from patient and ER records.     Subjective:     Principal Problem:<principal problem not specified>    Chief Complaint:   Chief Complaint   Patient presents with    Bleeding from dialysis shunt     patients wife reports that she woke up to find her  in a pile of blood bleeding from his shunt to left side. reports hx of same        HPI: 60 years old male with history of ESRD,DM,HTN,hyperlipidemia,old CVA,vascualr dementia,presents for evaluation from bleeding from his dialysis graft that started while sleeping last night,.per ER record,  Patient's wife notes that he has had trouble controlling bleeding after dialysis on 2 visits in the last week and half.  His last dialysis was 2 days ago.  Patient was well prior to this.  Denies trauma. Per ER record,The wife states that she has been trying to get touch this vascular surgeon about her concerned about difficulty controlling the bleeding after his sessions.  Patient is not taking his antihypertensive medications this evening.  Patient denies any weakness or dizziness.  No shortness of breath.  No diaphoresis.  No near syncope.  No change in medications.  Patient takes Plavix and ASA for old CVA and CAD, He is due for dialysis today, His wife placed a occlusive dressing over the and wrapped it with tape before coming to the ER.  Bleeding is currently controlled.vascualr surgery is consulted,he has potassium of 6.8,EKG show no peacked T wave,deneis chest pain and SOB,he received albuterol and insulin in ER,nephrology is consulted for HD,he had blood sugar over 500,improved with IV insulin.    Past Medical History:   Diagnosis Date    Allergy     Ambulates with cane     Atherosclerosis  "of aorta     noted on VIKTORIA 2011    Blood transfusion     Cataracts, bilateral     CHF (congestive heart failure)     Chronic kidney disease     Clotting disorder     Coronary artery disease     Decreased appetite     Diabetic retinopathy     DM (diabetes mellitus), type 2 with renal complications     ESRD on hemodialysis     TUESDAY/ THURSDAY/SATURDAY    Hyperlipidemia     Hyperparathyroidism, unspecified     Hypertension     Myocardial infarction     Prostate cancer 2016 7/16/19:  wife states "he doesn't have it anymore"    Retinopathy due to secondary diabetes     Seizures     Stroke 2011    PT HAS SHAKES     Unsteady gait     Uses roller walker        Past Surgical History:   Procedure Laterality Date    CARDIAC SURGERY      CABG    CATARACT EXTRACTION      ou    CATARACT EXTRACTION, BILATERAL      cataracts      CORONARY ANGIOGRAPHY INCLUDING BYPASS GRAFTS WITH CATHETERIZATION OF LEFT HEART N/A 12/16/2019    Procedure: ANGIOGRAM, CORONARY, INCLUDING BYPASS GRAFT, WITH LEFT HEART CATHETERIZATION, rcfa, 5fr;  Surgeon: David Villegas MD;  Location: [facility] CATH LAB;  Service: Cardiology;  Laterality: N/A;    CORONARY ARTERY BYPASS GRAFT  2009    bypass    EYE SURGERY      Left Guera AVF  1/11/2012    PROSTATE BIOPSY      positive    retinal laser      ROTATOR CUFF REPAIR  right, 2005    ULTRASOUND GUIDANCE  12/16/2019    Procedure: Ultrasound Guidance;  Surgeon: David Villegas MD;  Location: [facility] CATH LAB;  Service: Cardiology;;    VASCULAR SURGERY         Review of patient's allergies indicates:   Allergen Reactions    Reglan [metoclopramide hcl] Diarrhea    Lorazepam      Other reaction(s): heavy sedation       No current facility-administered medications on file prior to encounter.      Current Outpatient Medications on File Prior to Encounter   Medication Sig    ascorbic acid, vitamin C, (VITAMIN C) 500 MG tablet Take 500 mg by mouth every evening.     atorvastatin " "(LIPITOR) 40 MG tablet TAKE 1 TABLET(40 MG) BY MOUTH EVERY DAY    blood glucose control, normal Soln 1 drop by Misc.(Non-Drug; Combo Route) route as directed.    blood sugar diagnostic Strp To check BG 2 times daily, to use with accuchek casa meter    blood-glucose meter kit To check BG 2 times daily, to use with accuchek casa meter    carvedilol (COREG) 25 MG tablet TAKE 1 TABLET(25 MG) BY MOUTH TWICE DAILY WITH MEALS    clopidogrel (PLAVIX) 75 mg tablet Take 1 tablet (75 mg total) by mouth once daily.    diltiaZEM (CARDIZEM CD) 180 MG 24 hr capsule Take 1 capsule (180 mg total) by mouth once daily.    furosemide (LASIX) 40 MG tablet TAKE 1 TABLET(40 MG) BY MOUTH EVERY DAY    insulin (LANTUS SOLOSTAR U-100 INSULIN) glargine 100 units/mL (3mL) SubQ pen INJECT 20 UNITS UNDER THE SKIN NIGHTLY    lancets [facility] To check BG 2 times daily, to use with accuchek casa meter    losartan (COZAAR) 100 MG tablet Take 1 tablet (100 mg total) by mouth once daily. (Patient not taking: Reported on 1/20/2020)    megestrol (MEGACE) 20 MG Tab Take 1 tablet (20 mg total) by mouth once daily.    NOVOFINE 30 30 x 1/3 " Ndle USE AS DIRECTED WITH LANTUS SOLARSTAR    NOVOLOG FLEXPEN U-100 INSULIN 100 unit/mL InPn pen INJECT 5 TO 10 UNITS WITH MEALS AND AT BEDTIME DEPENDING ON BLOOD SUGAR    pen needle, diabetic (BD ULTRA-FINE ORIG PEN NEEDLE) 29 gauge x 1/2" Ndle USE  1 EVERY EVENING    promethazine (PHENERGAN) 25 MG tablet Take 1 tablet (25 mg total) by mouth every 6 (six) hours as needed for Nausea.     Family History     Problem Relation (Age of Onset)    Cancer Mother, Paternal Grandmother, Paternal Uncle    Cataracts Mother    Diabetes Mother    Glaucoma Mother    Heart disease Father, Maternal Grandmother    Hypertension Brother    No Known Problems Sister, Maternal Grandfather, Paternal Grandfather, Sister, Sister    Prostate cancer Brother        Tobacco Use    Smoking status: Never Smoker    Smokeless tobacco: " Never Used   Substance and Sexual Activity    Alcohol use: No    Drug use: No    Sexual activity: Not Currently     Review of Systems   Constitutional: Negative for activity change and appetite change.   HENT: Negative for dental problem.    Eyes: Negative for discharge and itching.   Respiratory: Negative for apnea and chest tightness.    Cardiovascular: Negative for chest pain and leg swelling.   Gastrointestinal: Negative for abdominal distention and abdominal pain.   Endocrine: Negative for cold intolerance and heat intolerance.   Genitourinary: Negative for difficulty urinating and dysuria.   Musculoskeletal: Negative for arthralgias.   Skin: Negative for color change and pallor.   Allergic/Immunologic: Negative for environmental allergies.   Neurological: Negative for dizziness and facial asymmetry.   Hematological: Negative for adenopathy. Does not bruise/bleed easily.   Psychiatric/Behavioral: Negative for agitation and behavioral problems.     Objective:     Vital Signs (Most Recent):  Temp: 98.4 °F (36.9 °C) (02/27/20 0540)  Pulse: 90 (02/27/20 0540)  Resp: 16 (02/27/20 0540)  BP: 114/63 (02/27/20 0540)  SpO2: 100 % (02/27/20 0540) Vital Signs (24h Range):  Temp:  [98.4 °F (36.9 °C)-98.6 °F (37 °C)] 98.4 °F (36.9 °C)  Pulse:  [73-97] 90  Resp:  [15-20] 16  SpO2:  [96 %-100 %] 100 %  BP: (114-203)/(63-95) 114/63     Weight: 64.8 kg (142 lb 13.7 oz)  Body mass index is 19.92 kg/m².    Physical Exam   Constitutional: No distress.   HENT:   Head: Atraumatic.   Eyes: Pupils are equal, round, and reactive to light.   Neck: Normal range of motion.   Cardiovascular: Normal rate and regular rhythm.   Pulmonary/Chest: Effort normal and breath sounds normal.   Abdominal: Soft. Bowel sounds are normal.   Musculoskeletal: Normal range of motion.   Left AV fistula,has been dressed,no sign of active bleeding   Neurological: He is alert. No cranial nerve deficit. Coordination normal.   Skin: Skin is warm and dry.    Psychiatric: He has a normal mood and affect. His behavior is normal.         CRANIAL NERVES     CN III, IV, VI   Pupils are equal, round, and reactive to light.       Significant Labs:   BMP:   Recent Labs   Lab 02/27/20  0315   *      K 6.8*      CO2 24   BUN 52*   CREATININE 8.3*   CALCIUM 9.4     CBC:   Recent Labs   Lab 02/27/20  0315   WBC 4.71   HGB 10.3*   HCT 32.5*          Significant Imaging: reviewed.    Assessment/Plan:     Essential hypertension  Resumed home medications,stable.      Hyperkalemia  Received albuterol,insulin,started also on Kayexalate,consulted nephrology for HD,denies chest pain and SOB,no peaked T wave on EKG,monitor.      Complication of arteriovenous dialysis fistula  With bleeding on AV left graft,Bleeding is currently controlled.vascualr surgery is consulted,has stable HH,hold plavix,,ASA at this time.      Prostate cancer  Follow up with Urology as out patient.      S/P CABG (coronary artery bypass graft)  ASA,plavix at this time.      Uncontrolled type 2 diabetes mellitus with end-stage renal disease  On SSI,basal insulin.      Anemia of chronic disease  Stable,will monitor.      Hyperlipidemia LDL goal <100  On statin.      Vascular dementia with depressed mood    Appear is on baseline.    Cognitive deficits as late effect of cerebrovascular disease  Appear to be on baseline,will monitor.      History of stroke  On statin at this time.      ESRD on hemodialysis  Nephrology is consulted for HD.        VTE Risk Mitigation (From admission, onward)         Ordered     IP VTE HIGH RISK PATIENT  Once      02/27/20 0540     Place BILLIE hose  Until discontinued      02/27/20 0540                   Rahul Santoyo MD  Department of Hospital Medicine   [facility]

## 2020-02-27 NOTE — ED PROVIDER NOTES
"Encounter Date: 2/27/2020       History   No chief complaint on file.    Patient presents for evaluation from bleeding from his dialysis graft that started while sleeping tonight.  Patient's wife notes that he has had trouble controlling bleeding after dialysis on 2 visits in the last week and half.  His last dialysis was 3 days ago.  Patient was well prior to this.  Denies trauma. The wife states that she has been trying to get touch this vascular surgeon about her concerned about difficulty controlling the bleeding after his sessions.  Patient is not taking his antihypertensive medications this evening.  Patient denies any weakness or dizziness.  No shortness of breath.  No diaphoresis.  No near syncope.  No change in medications.  Patient takes Plavix.  He is due for dialysis in the morning.  His wife placed a occlusive dressing over the and wrapped it with tape before coming to the ER.  Bleeding is currently controlled.        Review of patient's allergies indicates:   Allergen Reactions    Reglan [metoclopramide hcl] Diarrhea    Lorazepam      Other reaction(s): heavy sedation     Past Medical History:   Diagnosis Date    Allergy     Ambulates with cane     Atherosclerosis of aorta     noted on VIKTORIA 2011    Blood transfusion     Cataracts, bilateral     CHF (congestive heart failure)     Chronic kidney disease     Clotting disorder     Coronary artery disease     Decreased appetite     Diabetic retinopathy     DM (diabetes mellitus), type 2 with renal complications     ESRD on hemodialysis     TUESDAY/ THURSDAY/SATURDAY    Hyperlipidemia     Hyperparathyroidism, unspecified     Hypertension     Myocardial infarction     Prostate cancer 2016 7/16/19:  wife states "he doesn't have it anymore"    Retinopathy due to secondary diabetes     Seizures     Stroke 2011    PT HAS SHAKES     Unsteady gait     Uses roller walker      Past Surgical History:   Procedure Laterality Date    CARDIAC " SURGERY      CABG    CATARACT EXTRACTION      ou    CATARACT EXTRACTION, BILATERAL      cataracts      CORONARY ANGIOGRAPHY INCLUDING BYPASS GRAFTS WITH CATHETERIZATION OF LEFT HEART N/A 12/16/2019    Procedure: ANGIOGRAM, CORONARY, INCLUDING BYPASS GRAFT, WITH LEFT HEART CATHETERIZATION, rcfa, 5fr;  Surgeon: David Villegas MD;  Location: [facility] CATH LAB;  Service: Cardiology;  Laterality: N/A;    CORONARY ARTERY BYPASS GRAFT  2009    bypass    EYE SURGERY      Left Guera AVF  1/11/2012    PROSTATE BIOPSY      positive    retinal laser      ROTATOR CUFF REPAIR  right, 2005    ULTRASOUND GUIDANCE  12/16/2019    Procedure: Ultrasound Guidance;  Surgeon: David Villegas MD;  Location: [facility] CATH LAB;  Service: Cardiology;;    VASCULAR SURGERY       Family History   Problem Relation Age of Onset    Diabetes Mother     Glaucoma Mother     Cancer Mother     Cataracts Mother     Heart disease Father     Cancer Paternal Grandmother     Heart disease Maternal Grandmother     No Known Problems Sister     Prostate cancer Brother     No Known Problems Maternal Grandfather     No Known Problems Paternal Grandfather     No Known Problems Sister     No Known Problems Sister     Hypertension Brother     Cancer Paternal Uncle     Amblyopia Neg Hx     Blindness Neg Hx     Macular degeneration Neg Hx     Retinal detachment Neg Hx     Strabismus Neg Hx     Stroke Neg Hx     Thyroid disease Neg Hx     Anesthesia problems Neg Hx     Clotting disorder Neg Hx      problems Neg Hx     Kidney cancer Neg Hx     Kidney disease Neg Hx     Malignant hyperthermia Neg Hx     Sickle cell trait Neg Hx     Lupus Neg Hx     Urolithiasis Neg Hx     Sudden death Neg Hx      Social History     Tobacco Use    Smoking status: Never Smoker    Smokeless tobacco: Never Used   Substance Use Topics    Alcohol use: No    Drug use: No     Review of Systems   Constitutional: Negative for chills, diaphoresis and  fever.   HENT: Negative for nosebleeds.    Eyes: Negative.    Respiratory: Negative for cough and shortness of breath.    Cardiovascular: Negative for chest pain.   Gastrointestinal: Negative for blood in stool, diarrhea, nausea and vomiting.        NO melena or rectal bleeding   Genitourinary: Negative for hematuria.   Skin: Positive for wound. Negative for pallor and rash.   Neurological: Negative for dizziness, syncope, speech difficulty, weakness, light-headedness, numbness and headaches.        No numbness   Psychiatric/Behavioral: Negative for confusion.   All other systems reviewed and are negative.      Physical Exam     Initial Vitals   BP Pulse Resp Temp SpO2   -- -- -- -- --      MAP       --         Physical Exam    Nursing note and vitals reviewed.  Constitutional: He appears well-developed and well-nourished. He is not diaphoretic. No distress.   HENT:   Head: Normocephalic and atraumatic.   Nose: Nose normal.   Mouth/Throat: Oropharynx is clear and moist.   Eyes: Conjunctivae and EOM are normal. Pupils are equal, round, and reactive to light. Right eye exhibits no discharge. Left eye exhibits no discharge. No scleral icterus.   Neck: Neck supple. No tracheal deviation present.   Cardiovascular: Normal rate, regular rhythm and normal heart sounds.   No murmur heard.  Pulmonary/Chest: Breath sounds normal. No stridor. No respiratory distress. He has no wheezes. He has no rhonchi. He has no rales.   Abdominal: Soft. He exhibits no distension. There is no tenderness. There is no rebound and no guarding.   Musculoskeletal: Normal range of motion. He exhibits no edema or tenderness.   Dialysis fistula present in left upper arm with palpable thrill.  There is a small 4 mm ulcer the superior portion of the fistula with a small hematoma overlying it.  There is no active bleeding.  No signs of infection.   Neurological: He is alert and oriented to person, place, and time.   Skin: Skin is warm and dry. No rash  noted.   Psychiatric: He has a normal mood and affect. His behavior is normal. Judgment and thought content normal.         ED Course   Procedures  Labs Reviewed - No data to display  EKG Readings: (Independently Interpreted)   Initial Reading: No STEMI. Heart Rate: 78. Ectopy: No Ectopy. Conduction: Normal. ST Segments: Non-Specific ST Segment Depression. T Waves Flipped: V1 and V2. Axis: Normal. Q Waves: V1, V2, V6, AVL and I.   No peaked T-waves.  No significant change when compared to 12/14/2019.  No ischemic changes.       Imaging Results    None          Medical Decision Making:   Initial Assessment:   Patient presents for evaluation bleeding dialysis fistula.  No symptoms of significant blood loss.  Patient is markedly hypertensive.  Patient did not take his blood pressure medications tonight.  Provide his antihypertensives and reassess.  Quick clot bandage was placed over the small ulceration that is currently not actively bleeding.  Monitor and check CBC.    Differential Diagnosis:   Coagulopathy, fistula aneurysm, fistula infection, medication side effect  Clinical Tests:   Lab Tests: Ordered and Reviewed  The following lab test(s) were unremarkable: CBC, BMP, PT and PTT  Medical Tests: Ordered and Reviewed  ED Management:  0400:  H&H stable. Moderate hyperkalemia on lab work.  No EKG changes.  Severe elevation in blood sugar.  No evidence of DKA.  Will give insulin.  Will admit for cardiac monitoring and dialysis in the morning.  Consult vascular surgery concerning fistula complications.  Discussed plan with patient and family.                                 Clinical Impression:       ICD-10-CM ICD-9-CM   1. Complication of arteriovenous dialysis fistula, initial encounter T82.9XXA 996.73   2. Hypertensive urgency I16.0 401.9   3. Hyperkalemia E87.5 276.7   4. Hyperglycemia R73.9 790.29         Disposition:   Disposition: Admitted  Condition: Stable                        Feliciano Benitez MD  02/27/20  [id number]

## 2020-02-27 NOTE — NURSING
Patient arrived to unit with transport. Transferred to bed independently. Patient oriented to room and care setting. Orders reviewed with patient. Patient NPO as ordered. Tele monitor [id number] placed on patient. Safety measures in place. Bed in low locked position, bed alarm armed and audible with call light in reach. Will continue to monitor.

## 2020-02-28 PROBLEM — R53.81 DEBILITY: Status: ACTIVE | Noted: 2020-01-01

## 2020-02-28 PROBLEM — E87.5 HYPERKALEMIA: Status: RESOLVED | Noted: 2020-01-01 | Resolved: 2020-01-01

## 2020-02-28 NOTE — SUBJECTIVE & OBJECTIVE
Interval History: No new issues.     Review of Systems   Constitutional: Negative for activity change.   HENT: Negative for congestion.    Respiratory: Negative for chest tightness and shortness of breath.    Cardiovascular: Negative for chest pain.   Gastrointestinal: Negative for abdominal pain.   Genitourinary: Negative for difficulty urinating.   Musculoskeletal: Negative for arthralgias.   Psychiatric/Behavioral: Negative for agitation.     Objective:     Vital Signs (Most Recent):  Temp: 98.6 °F (37 °C) (02/28/20 0301)  Pulse: 108 (02/28/20 0700)  Resp: 20 (02/28/20 0700)  BP: 128/79 (02/28/20 0700)  SpO2: 97 % (02/28/20 0700) Vital Signs (24h Range):  Temp:  [98.1 °F (36.7 °C)-98.6 °F (37 °C)] 98.6 °F (37 °C)  Pulse:  [] 108  Resp:  [13-51] 20  SpO2:  [92 %-100 %] 97 %  BP: ()/(54-84) 128/79  Arterial Line BP: ()/(44-75) 144/59     Weight: 64.8 kg (142 lb 13.7 oz)  Body mass index is 19.92 kg/m².    Intake/Output Summary (Last 24 hours) at 2/28/2020 0935  Last data filed at 2/28/2020 0120  Gross per 24 hour   Intake 1000 ml   Output 2300 ml   Net -1300 ml      Physical Exam   Constitutional: He appears well-developed and well-nourished.   HENT:   Head: Normocephalic and atraumatic.   Cardiovascular: Normal rate and regular rhythm. Exam reveals no friction rub.   No murmur heard.  Pulmonary/Chest: Effort normal and breath sounds normal. No stridor. No respiratory distress. He has no wheezes.   Abdominal: Soft.   Neurological: He is alert.   Nursing note and vitals reviewed.      Significant Labs:   BMP:   Recent Labs   Lab 02/28/20  0322   *      K 5.2*      CO2 17*   BUN 22   CREATININE 4.6*   CALCIUM 9.2     CBC:   Recent Labs   Lab 02/27/20  0315 02/28/20  0322   WBC 4.71 6.17   HGB 10.3* 9.6*   HCT 32.5* 30.1*    148*       Significant Imaging:

## 2020-02-28 NOTE — HOSPITAL COURSE
The patient is a 59 yo M who is a T, TH, Sat dialysis patient- presents to the hospital with a CC of L AV graft bleeding. Vascular was consulted.  Patient went for a ligation of left upper extremity fistula, Closure of left upper extremity skin defect. The patient was sent to the ICU after due to labile BP requiring short course of pressor support.blood culture was done,no growth,recieved IV Abx, He was weaned off and then  has stable. BP. He was sent to the floor on 2/28.  PT/OT were consulted as the patient stated he walks at home with a walker.IR was consulted temporary sai was placed,at arrival he had Hyperkalemia,which is resolved with medical treatment and  HD,he remains stable on floor,no more bleeding on left arm was seen,he did well with PT,OT.  Consulted IR for perma cath placement.he had perma cath,nephrology did HD.  Patient was discharged home with HH and PCP and vascular surgery follow up.

## 2020-02-28 NOTE — PLAN OF CARE
Problem: Physical Therapy Goal  Goal: Physical Therapy Goal  Description  Goals to be met by: 3/6/2019     Patient will increase functional independence with mobility by performin. Supine to sit with Modified Beltrami  2. Sit to stand transfer with Modified Beltrami  3. Gait  x 100 feet with Supervision using Rolling Walker.   4. Lower extremity exercise program x10 reps per handout, with supervision     Outcome: Ongoing, Progressing   Initial PT evaluation completed.  Pt could benefit from skilled PT services 5-6x/wk in order to maximize function prior to D/C.   HHPT recommended, DMe in place.  Pt ok for OOB to chair and ambulate to bathroom with nursing staff

## 2020-02-28 NOTE — SUBJECTIVE & OBJECTIVE
"Medications Prior to Admission   Medication Sig Dispense Refill Last Dose    ascorbic acid, vitamin C, (VITAMIN C) 500 MG tablet Take 500 mg by mouth every evening.    Taking    atorvastatin (LIPITOR) 40 MG tablet TAKE 1 TABLET(40 MG) BY MOUTH EVERY DAY 90 tablet 1 Taking    blood glucose control, normal Soln 1 drop by Misc.(Non-Drug; Combo Route) route as directed. 1 each 1 Taking    blood sugar diagnostic Strp To check BG 2 times daily, to use with accuchek casa meter 200 each 3 Taking    blood-glucose meter kit To check BG 2 times daily, to use with accuchek casa meter 1 each 0 Taking    carvedilol (COREG) 25 MG tablet TAKE 1 TABLET(25 MG) BY MOUTH TWICE DAILY WITH MEALS 180 tablet 1 Taking    clopidogrel (PLAVIX) 75 mg tablet Take 1 tablet (75 mg total) by mouth once daily. 30 tablet 11 Taking    diltiaZEM (CARDIZEM CD) 180 MG 24 hr capsule Take 1 capsule (180 mg total) by mouth once daily. 90 capsule 1     furosemide (LASIX) 40 MG tablet TAKE 1 TABLET(40 MG) BY MOUTH EVERY DAY 90 tablet 1 Taking    insulin (LANTUS SOLOSTAR U-100 INSULIN) glargine 100 units/mL (3mL) SubQ pen INJECT 20 UNITS UNDER THE SKIN NIGHTLY 15 mL 0     lancets [facility] To check BG 2 times daily, to use with accuchek casa meter 200 each 3 Taking    losartan (COZAAR) 100 MG tablet Take 1 tablet (100 mg total) by mouth once daily. (Patient not taking: Reported on 1/20/2020) 90 tablet 1 Not Taking    megestrol (MEGACE) 20 MG Tab Take 1 tablet (20 mg total) by mouth once daily. 90 tablet 1 Taking    NOVOFINE 30 30 x 1/3 " Ndle USE AS DIRECTED WITH LANTUS SOLARSTAR 100 each 3 Taking    NOVOLOG FLEXPEN U-100 INSULIN 100 unit/mL InPn pen INJECT 5 TO 10 UNITS WITH MEALS AND AT BEDTIME DEPENDING ON BLOOD SUGAR 15 mL 0 Taking    pen needle, diabetic (BD ULTRA-FINE ORIG PEN NEEDLE) 29 gauge x 1/2" Ndle USE  1 EVERY EVENING 90 each 3 Taking    promethazine (PHENERGAN) 25 MG tablet Take 1 tablet (25 mg total) by mouth every 6 (six) hours " "as needed for Nausea. 30 tablet 0        Review of patient's allergies indicates:   Allergen Reactions    Reglan [metoclopramide hcl] Diarrhea    Lorazepam      Other reaction(s): heavy sedation       Past Medical History:   Diagnosis Date    Allergy     Ambulates with cane     Atherosclerosis of aorta     noted on VIKTORIA 2011    Blood transfusion     Cataracts, bilateral     CHF (congestive heart failure)     Chronic kidney disease     Clotting disorder     Coronary artery disease     Decreased appetite     Diabetic retinopathy     DM (diabetes mellitus), type 2 with renal complications     ESRD on hemodialysis     TUESDAY/ THURSDAY/SATURDAY    Hyperlipidemia     Hyperparathyroidism, unspecified     Hypertension     Myocardial infarction     Prostate cancer 2016 7/16/19:  wife states "he doesn't have it anymore"    Retinopathy due to secondary diabetes     Seizures     Stroke 2011    PT HAS SHAKES     Unsteady gait     Uses roller walker      Past Surgical History:   Procedure Laterality Date    CARDIAC SURGERY      CABG    CATARACT EXTRACTION      ou    CATARACT EXTRACTION, BILATERAL      cataracts      CORONARY ANGIOGRAPHY INCLUDING BYPASS GRAFTS WITH CATHETERIZATION OF LEFT HEART N/A 12/16/2019    Procedure: ANGIOGRAM, CORONARY, INCLUDING BYPASS GRAFT, WITH LEFT HEART CATHETERIZATION, rcfa, 5fr;  Surgeon: David Villegas MD;  Location: [facility] CATH LAB;  Service: Cardiology;  Laterality: N/A;    CORONARY ARTERY BYPASS GRAFT  2009    bypass    EYE SURGERY      Left Guera AVF  1/11/2012    PROSTATE BIOPSY      positive    retinal laser      ROTATOR CUFF REPAIR  right, 2005    ULTRASOUND GUIDANCE  12/16/2019    Procedure: Ultrasound Guidance;  Surgeon: David Villegas MD;  Location: [facility] CATH LAB;  Service: Cardiology;;    VASCULAR SURGERY       Family History     Problem Relation (Age of Onset)    Cancer Mother, Paternal Grandmother, Paternal Uncle    Cataracts Mother    " Diabetes Mother    Glaucoma Mother    Heart disease Father, Maternal Grandmother    Hypertension Brother    No Known Problems Sister, Maternal Grandfather, Paternal Grandfather, Sister, Sister    Prostate cancer Brother        Tobacco Use    Smoking status: Never Smoker    Smokeless tobacco: Never Used   Substance and Sexual Activity    Alcohol use: No    Drug use: No    Sexual activity: Not Currently     Review of Systems   Constitutional: Negative for chills and fever.   HENT: Negative for congestion.    Eyes: Negative for visual disturbance.   Respiratory: Negative for shortness of breath.    Cardiovascular: Negative for chest pain.   Gastrointestinal: Negative for abdominal distention.   Endocrine: Negative for cold intolerance.   Genitourinary: Negative for flank pain.   Musculoskeletal: Negative for back pain.   Skin: Positive for wound. Negative for color change, pallor and rash.   Allergic/Immunologic: Negative for immunocompromised state.   Neurological: Negative for dizziness and weakness.   Hematological: Does not bruise/bleed easily.   Psychiatric/Behavioral: Negative for agitation.     Objective:     Vital Signs (Most Recent):  Temp: 98.2 °F (36.8 °C) (02/27/20 1647)  Pulse: 88 (02/27/20 1647)  Resp: 17 (02/27/20 1647)  BP: 137/84 (02/27/20 1647)  SpO2: 96 % (02/27/20 1647) Vital Signs (24h Range):  Temp:  [98.2 °F (36.8 °C)-98.6 °F (37 °C)] 98.2 °F (36.8 °C)  Pulse:  [73-97] 88  Resp:  [15-20] 17  SpO2:  [96 %-100 %] 96 %  BP: (114-203)/(60-95) 137/84     Weight: 64.8 kg (142 lb 13.7 oz)  Body mass index is 19.92 kg/m².    Physical Exam   Constitutional: He is oriented to person, place, and time. He appears well-developed and well-nourished. No distress.   HENT:   Head: Normocephalic and atraumatic.   Eyes: Conjunctivae are normal.   Neck: Neck supple.   Cardiovascular: Normal rate.   Pulses:       Radial pulses are 2+ on the right side, and 2+ on the left side.   LUE AVF without further bleeding,  no infection   Pulmonary/Chest: Effort normal. No respiratory distress.   Abdominal: Soft. He exhibits no distension and no mass. There is no tenderness. There is no rebound and no guarding. No hernia.   Musculoskeletal: Normal range of motion. He exhibits no deformity.   Neurological: He is alert and oriented to person, place, and time. No sensory deficit.   Skin: Skin is warm and dry. Capillary refill takes less than 2 seconds. No rash noted. He is not diaphoretic. No erythema. No pallor.   Psychiatric: He has a normal mood and affect.   Vitals reviewed.      Significant Labs:  All pertinent labs from the last 24 hours have been reviewed.    Significant Diagnostics:  I have reviewed all pertinent imaging results/findings within the past 24 hours.

## 2020-02-28 NOTE — PT/OT/SLP EVAL
"Occupational Therapy   Evaluation    Name: Kodi Ibanez Sr.  MRN: [id number]  Admitting Diagnosis:  Complication of arteriovenous dialysis fistula 1 Day Post-Op    Recommendations:     Discharge Recommendations: home  Discharge Equipment Recommendations:  none  Barriers to discharge:  None    Assessment:     Kodi Ibanez Sr. is a 60 y.o. male with a medical diagnosis of Complication of arteriovenous dialysis fistula.  He presents with  independence for self-care and functional transfers. Performance deficits affecting function: weakness, impaired endurance, impaired self care skills, impaired cognition, decreased upper extremity function, decreased safety awareness, impaired cardiopulmonary response to activity.      Rehab Prognosis: Good; patient would benefit from acute skilled OT services to address these deficits and reach maximum level of function.       Plan:     Patient to be seen 3 x/week to address the above listed problems via self-care/home management, therapeutic activities, therapeutic exercises  · Plan of Care Expires: 20  · Plan of Care Reviewed with: patient    Subjective     Chief Complaint: "I'm sleepy, but I'll get up."  Patient/Family Comments/goals: to get better    Occupational Profile:  Living Environment: lives with his spouse in an apartment no concerns  Previous level of function: modified independent with a rollator  Roles and Routines: HD T, TH, Sat  Equipment Used at Home:  bath bench, walker, rolling, rollator, cane, straight  Assistance upon Discharge: from his spouse    Pain/Comfort:  · Pain Rating 1: 0/10    Patients cultural, spiritual, Jewish conflicts given the current situation: no    Objective:     Communicated with: nurse prior to session.  Patient found supine with blood pressure cuff, peripheral IV, oxygen, telemetry, pulse ox (continuous)(arterial line R wrist, dialysis line in his neck) upon OT entry to room.    General Precautions: Standard, fall "   Orthopedic Precautions:N/A   Braces: N/A     Occupational Performance:    Bed Mobility:    · Patient completed Rolling/Turning to Left with  stand by assistance  · Patient completed Scooting/Bridging with stand by assistance  · Patient completed Supine to Sit with stand by assistance    Functional Mobility/Transfers:  · Patient completed Sit <> Stand Transfer with contact guard assistance  with  rolling walker   · Functional Mobility: ambulated to the bedside chair using a RW with SBA and verbal cues for walker management /safety    Activities of Daily Living:  · Feeding:  independence bed level  · Grooming: modified independence bed level  · Upper Body Dressing: stand by assistance seated EOB    Cognitive/Visual Perceptual:  Cognitive/Psychosocial Skills:     -       Oriented to: Person, Place and Situation   -       Follows Commands/attention:Follows one-step commands  -       Communication: clear/fluent  -       Memory: Poor immediate recall  -       Safety awareness/insight to disability: impaired   -       Mood/Affect/Coping skills/emotional control: Flat affect    Physical Exam:  Balance:    -       sit balance good; standing balance fair plus  Postural examination/scapula alignment:    -       Rounded shoulders  Skin integrity: Visible skin intact  Upper Extremity Range of Motion:     -       Right Upper Extremity: WFL  -       Left Upper Extremity: WFL  Upper Extremity Strength:    -       Right Upper Extremity: WFL  -       Left Upper Extremity: WFL    AMPAC 6 Click ADL:  AMPAC Total Score: 18    Treatment & Education:  Evaluation   Education:    Patient left up in chair with all lines intact, call button in reach, nurse notified and tray table in front of her    GOALS:   Multidisciplinary Problems     Occupational Therapy Goals        Problem: Occupational Therapy Goal    Goal Priority Disciplines Outcome Interventions   Occupational Therapy Goal     OT, PT/OT Ongoing, Progressing    Description:  Goals  "to be met by: 3/6/2020     Patient will increase functional independence with ADLs by performing:    UE Dressing with [place].  LE Dressing with [place].  Grooming while standing at sink with Modified [place].  Toilet transfer to toilet with Stand-by Assistance.  Upper extremity exercise program per handout, with assistance as needed.                      History:     Past Medical History:   Diagnosis Date    Allergy     Ambulates with cane     Atherosclerosis of aorta     noted on VIKTORIA 2011    Blood transfusion     Cataracts, bilateral     CHF (congestive heart failure)     Chronic kidney disease     Clotting disorder     Coronary artery disease     Decreased appetite     Diabetic retinopathy     DM (diabetes mellitus), type 2 with renal complications     ESRD on hemodialysis     TUESDAY/ THURSDAY/SATURDAY    Hyperlipidemia     Hyperparathyroidism, unspecified     Hypertension     Myocardial infarction     Prostate cancer 2016 7/16/19:  wife states "he doesn't have it anymore"    Retinopathy due to secondary diabetes     Seizures     Stroke 2011    PT HAS SHAKES     Unsteady gait     Uses roller walker        Past Surgical History:   Procedure Laterality Date    CARDIAC SURGERY      CABG    CATARACT EXTRACTION      ou    CATARACT EXTRACTION, BILATERAL      cataracts      CORONARY ANGIOGRAPHY INCLUDING BYPASS GRAFTS WITH CATHETERIZATION OF LEFT HEART N/A 12/16/2019    Procedure: ANGIOGRAM, CORONARY, INCLUDING BYPASS GRAFT, WITH LEFT HEART CATHETERIZATION, rcfa, 5fr;  Surgeon: David Villegas MD;  Location: [facility] CATH LAB;  Service: Cardiology;  Laterality: N/A;    CORONARY ARTERY BYPASS GRAFT  2009    bypass    EYE SURGERY      Left Guera AVF  1/11/2012    PROSTATE BIOPSY      positive    retinal laser      REVISION OF ARTERIOVENOUS FISTULA Left 2/27/2020    Procedure: Ligation of left upper extremity fistula, Closure of left upper extremity skin defect x 2, " Intraoperative ultrasound ;  Surgeon: Stephane Lopez MD;  Location: [facility] OR;  Service: Vascular;  Laterality: Left;    ROTATOR CUFF REPAIR  right, 2005    ULTRASOUND GUIDANCE  12/16/2019    Procedure: Ultrasound Guidance;  Surgeon: David Villegas MD;  Location: [facility] CATH LAB;  Service: Cardiology;;    VASCULAR SURGERY         Time Tracking:     OT Date of Treatment: 02/28/20  OT Start Time: 1025  OT Stop Time: 1045  OT Total Time (min): 20 min    Billable Minutes:Evaluation 20 minutes with PT    YANY Wisdom, MS  2/28/2020

## 2020-02-28 NOTE — PLAN OF CARE
02/28/20 1540   Discharge Assessment   Assessment Type Discharge Planning Assessment   Confirmed/corrected address and phone number on facesheet? Yes   Assessment information obtained from? Medical Record   Prior to hospitilization cognitive status: Alert/Oriented   Prior to hospitalization functional status: Assistive Equipment   Current cognitive status: Alert/Oriented   Current Functional Status: Assistive Equipment   Facility Arrived From: home   Lives With spouse   Able to Return to Prior Arrangements yes   Is patient able to care for self after discharge? Unable to determine at this time (comments)   Who are your caregiver(s) and their phone number(s)? [place] [phone]   Patient's perception of discharge disposition home or selfcare   Readmission Within the Last 30 Days no previous admission in last 30 days   Patient currently being followed by outpatient case management? No   Patient currently receives any other outside agency services? No   Equipment Currently Used at Home cane, straight;rollator   Do you have any problems affording any of your prescribed medications? No   Is the patient taking medications as prescribed? yes   Does the patient have transportation home? Yes   Transportation Anticipated family or friend will provide   Dialysis Name and Scheduled days Tuesday/Thursday/Saturday    Does the patient receive services at the [facility]? No   Discharge Plan A Home with family;Home Health   Discharge Plan B Home with family   DME Needed Upon Discharge  none   Patient/Family in Agreement with Plan yes         [id number] - [place] - [address] AT [facility]  [address]  [place] [address]  Phone: [phone] Fax: [phone]    [facility] - [place], [place] - [address] [facility]  [address] [facility] [address]  Phone: [phone] Fax: [phone]    [facility]  [address] [facility] [address]  [place]  [place] [address]  Phone: [phone] Fax: [phone]

## 2020-02-28 NOTE — ASSESSMENT & PLAN NOTE
With bleeding on AV left graft,Bleeding is currently controlled.vascualr surgery is consulted,has stable HH,hold plavix,,ASA at this time.    S/p ligation of left upper extremity fistula, Closure of left upper extremity skin defect x 2 on 2/27.    Now has temporary dialysis cath

## 2020-02-28 NOTE — BRIEF OP NOTE
[facility]  Brief Operative Note    SUMMARY     Surgery Date: 2/27/2020     Surgeon(s) and Role:     * Stephane Lopez MD - Primary    Assisting Surgeon: None    Pre-op Diagnosis:  Complication of arteriovenous dialysis fistula, initial encounter [T82.9XXA]    Post-op Diagnosis:  Post-Op Diagnosis Codes:     * Complication of arteriovenous dialysis fistula, initial encounter [T82.9XXA]    Procedure:  1. Ligation of left upper extremity fistula  2. Closure of left upper extremity skin defect x 2  3. Intraoperative ultrasound    Anesthesia: General    Description of Procedure: ligation of proximal AVF    Description of the findings of the procedure: patent brachial, radial and ulnar artery at completion of case, triphasic signal of brachial artery and 2+ L radial artery after ligation    Estimated Blood Loss: <5 cc         Specimens:   Specimen (12h ago, onward)    None

## 2020-02-28 NOTE — PROGRESS NOTES
[facility] Hospital Medicine  Progress Note    Patient Name: Kodi Ibanez Sr.  MRN: [id number]  Patient Class: IP- Inpatient   Admission Date: 2/27/2020  Length of Stay: 1 days  Attending Physician: Faustino Quiñones MD  Primary Care Provider: Melonie Elias MD        Subjective:     Principal Problem:Complication of arteriovenous dialysis fistula        HPI:  Ligation of left upper extremity fistula, Closure of left upper extremity skin defect x 2,,.per ER record,  Patient's wife notes that he has had trouble controlling bleeding after dialysis on 2 visits in the last week and half.  His last dialysis was 2 days ago.  Patient was well prior to this.  Denies trauma. Per ER record,The wife states that she has been trying to get touch this vascular surgeon about her concerned about difficulty controlling the bleeding after his sessions.  Patient is not taking his antihypertensive medications this evening.  Patient denies any weakness or dizziness.  No shortness of breath.  No diaphoresis.  No near syncope.  No change in medications.  Patient takes Plavix and ASA for old CVA and CAD, He is due for dialysis today, His wife placed a occlusive dressing over the and wrapped it with tape before coming to the ER.  Bleeding is currently controlled.vascualr surgery is consulted,he has potassium of 6.8,EKG show no peacked T wave,deneis chest pain and SOB,he received albuterol and insulin in ER,nephrology is consulted for HD,he had blood sugar over 500,improved with IV insulin.    Overview/Hospital Course:  The patient is a 61 yo M who is a T, TH, Sat dialysis patient- presents to the hospital with a CC of L AV graft bleeding. Vascular was consulted.  Patient went for a ligation of left upper extremity fistula, Closure of left upper extremity skin defect. The patient was sent to the ICU after due to labile BP requiring short course of pressor support. He was weaned off and now has stable. BP. He was sent  to the floor on 2/28.  PT/OT were consulted as the patient stated he walks at home with a walker.     Interval History: No new issues.     Review of Systems   Constitutional: Negative for activity change.   HENT: Negative for congestion.    Respiratory: Negative for chest tightness and shortness of breath.    Cardiovascular: Negative for chest pain.   Gastrointestinal: Negative for abdominal pain.   Genitourinary: Negative for difficulty urinating.   Musculoskeletal: Negative for arthralgias.   Psychiatric/Behavioral: Negative for agitation.     Objective:     Vital Signs (Most Recent):  Temp: 98.6 °F (37 °C) (02/28/20 0301)  Pulse: 108 (02/28/20 0700)  Resp: 20 (02/28/20 0700)  BP: 128/79 (02/28/20 0700)  SpO2: 97 % (02/28/20 0700) Vital Signs (24h Range):  Temp:  [98.1 °F (36.7 °C)-98.6 °F (37 °C)] 98.6 °F (37 °C)  Pulse:  [] 108  Resp:  [13-51] 20  SpO2:  [92 %-100 %] 97 %  BP: ()/(54-84) 128/79  Arterial Line BP: ()/(44-75) 144/59     Weight: 64.8 kg (142 lb 13.7 oz)  Body mass index is 19.92 kg/m².    Intake/Output Summary (Last 24 hours) at 2/28/2020 0935  Last data filed at 2/28/2020 0120  Gross per 24 hour   Intake 1000 ml   Output 2300 ml   Net -1300 ml      Physical Exam   Constitutional: He appears well-developed and well-nourished.   HENT:   Head: Normocephalic and atraumatic.   Cardiovascular: Normal rate and regular rhythm. Exam reveals no friction rub.   No murmur heard.  Pulmonary/Chest: Effort normal and breath sounds normal. No stridor. No respiratory distress. He has no wheezes.   Abdominal: Soft.   Neurological: He is alert.   Nursing note and vitals reviewed.      Significant Labs:   BMP:   Recent Labs   Lab 02/28/20  0322   *      K 5.2*      CO2 17*   BUN 22   CREATININE 4.6*   CALCIUM 9.2     CBC:   Recent Labs   Lab 02/27/20  0315 02/28/20  0322   WBC 4.71 6.17   HGB 10.3* 9.6*   HCT 32.5* 30.1*    148*       Significant  Imaging:      Assessment/Plan:      * Complication of arteriovenous dialysis fistula  With bleeding on AV left graft,Bleeding is currently controlled.vascualr surgery is consulted,has stable HH,hold plavix,,ASA at this time.    S/p ligation of left upper extremity fistula, Closure of left upper extremity skin defect x 2 on 2/27.    Now has temporary dialysis cath      Essential hypertension  Resumed home medications,stable.      Prostate cancer  Follow up with Urology as out patient.      S/P CABG (coronary artery bypass graft)  ASA,plavix at this time.      Uncontrolled type 2 diabetes mellitus with end-stage renal disease  On SSI,basal insulin. Uncontrolled with hyperglycemia  Will order an A1c.       Anemia of chronic disease  Stable,will monitor.      Hyperlipidemia LDL goal <100  On statin.      Vascular dementia with depressed mood    Appear is on baseline.    Cognitive deficits as late effect of cerebrovascular disease  Appear to be on baseline,will monitor.      History of stroke  On statin at this time.      ESRD on hemodialysis  Nephrology is consulted for HD.    T,TH,Sat. Now has temporary line         VTE Risk Mitigation (From admission, onward)         Ordered     heparin (porcine) injection 5,000 Units  As needed (PRN)      02/28/20 0025     IP VTE HIGH RISK PATIENT  Once      02/27/20 0540     Place BILLIE hose  Until discontinued      02/27/20 0540                Critical care time spent on the evaluation and treatment of severe organ dysfunction, review of pertinent labs and imaging studies, discussions with consulting providers and discussions with patient/family: 40  Minutes.    Will transfer to floor. PT/OT. Monitor. HD tomorrow. Likely discharge after.       Faustino Reynoso MD  Department of Hospital Medicine   [facility]

## 2020-02-28 NOTE — PROGRESS NOTES
"Events noted    2/27/2020    Ligation of left upper extremity fistula, Closure of left upper extremity skin defect x 2, Intraoperative ultrasound  (Left)   1 Day Post-Op    Pt now with and HD cath    Awake alert oriented NAD    Denies CNS ENT CP GI  RHEUM OR DERM SX  Past Medical History:   Diagnosis Date    Allergy     Ambulates with cane     Atherosclerosis of aorta     noted on VIKTORIA 2011    Blood transfusion     Cataracts, bilateral     CHF (congestive heart failure)     Chronic kidney disease     Clotting disorder     Coronary artery disease     Decreased appetite     Diabetic retinopathy     DM (diabetes mellitus), type 2 with renal complications     ESRD on hemodialysis     TUESDAY/ THURSDAY/SATURDAY    Hyperlipidemia     Hyperparathyroidism, unspecified     Hypertension     Myocardial infarction     Prostate cancer 2016 7/16/19:  wife states "he doesn't have it anymore"    Retinopathy due to secondary diabetes     Seizures     Stroke 2011    PT HAS SHAKES     Unsteady gait     Uses roller walker      Review of patient's allergies indicates:   Allergen Reactions    Reglan [metoclopramide hcl] Diarrhea    Lorazepam      Other reaction(s): heavy sedation       Current Facility-Administered Medications   Medication    acetaminophen tablet 650 mg    atorvastatin tablet 40 mg    carvediloL tablet 25 mg    clopidogreL tablet 75 mg    dextrose 50% injection 12.5 g    diltiaZEM 24 hr capsule 180 mg    furosemide tablet 40 mg    glucagon (human recombinant) injection 1 mg    glucose chewable tablet 16 g    heparin (porcine) injection 5,000 Units    heparin (porcine) injection 5,000 Units    insulin aspart U-100 pen 1-10 Units    megestrol tablet 20 mg    ondansetron injection 8 mg    promethazine (PHENERGAN) 6.25 mg in dextrose 5 % 50 mL IVPB    sodium chloride 0.9% flush 10 mL    vancomycin in dextrose 5 % 1 gram/250 mL IVPB 1,000 mg       LABS    Recent Results (from the " past 24 hour(s))   Basic metabolic panel    Collection Time: 02/28/20  1:45 AM   Result Value Ref Range    Sodium 140 136 - 145 mmol/L    Potassium 4.3 3.5 - 5.1 mmol/L    Chloride 107 95 - 110 mmol/L    CO2 19 (L) 23 - 29 mmol/L    Glucose 159 (H) 70 - 110 mg/dL    BUN, Bld 16 8 - 23 mg/dL    Creatinine 3.4 (H) 0.5 - 1.4 mg/dL    Calcium 9.5 8.7 - 10.5 mg/dL    Anion Gap 14 8 - 16 mmol/L    eGFR if African American 21 (A) >60 mL/min/1.73 m^2    eGFR if non African American 18 (A) >60 mL/min/1.73 m^2   CBC auto differential    Collection Time: 02/28/20  3:22 AM   Result Value Ref Range    WBC 6.17 3.90 - 12.70 K/uL    RBC 3.32 (L) 4.60 - 6.20 M/uL    Hemoglobin 9.6 (L) 14.0 - 18.0 g/dL    Hematocrit 30.1 (L) 40.0 - 54.0 %    Mean Corpuscular Volume 91 82 - 98 fL    Mean Corpuscular Hemoglobin 28.9 27.0 - 31.0 pg    Mean Corpuscular Hemoglobin Conc 31.9 (L) 32.0 - 36.0 g/dL    RDW 15.6 (H) 11.5 - 14.5 %    Platelets 148 (L) 150 - 350 K/uL    MPV 10.9 9.2 - 12.9 fL    Immature Granulocytes 0.2 0.0 - 0.5 %    Gran # (ANC) 4.8 1.8 - 7.7 K/uL    Immature Grans (Abs) 0.01 0.00 - 0.04 K/uL    Lymph # 0.6 (L) 1.0 - 4.8 K/uL    Mono # 0.7 0.3 - 1.0 K/uL    Eos # 0.0 0.0 - 0.5 K/uL    Baso # 0.02 0.00 - 0.20 K/uL    nRBC 0 0 /100 WBC    Gran% 77.6 (H) 38.0 - 73.0 %    Lymph% 9.7 (L) 18.0 - 48.0 %    Mono% 11.7 4.0 - 15.0 %    Eosinophil% 0.5 0.0 - 8.0 %    Basophil% 0.3 0.0 - 1.9 %    Differential Method Automated    Basic metabolic panel    Collection Time: 02/28/20  3:22 AM   Result Value Ref Range    Sodium 139 136 - 145 mmol/L    Potassium 5.2 (H) 3.5 - 5.1 mmol/L    Chloride 107 95 - 110 mmol/L    CO2 17 (L) 23 - 29 mmol/L    Glucose 199 (H) 70 - 110 mg/dL    BUN, Bld 22 8 - 23 mg/dL    Creatinine 4.6 (H) 0.5 - 1.4 mg/dL    Calcium 9.2 8.7 - 10.5 mg/dL    Anion Gap 15 8 - 16 mmol/L    eGFR if African American 15 (A) >60 mL/min/1.73 m^2    eGFR if non African American 13 (A) >60 mL/min/1.73 m^2   POCT glucose     Collection Time: 02/28/20  5:47 AM   Result Value Ref Range    POCT Glucose 215 (H) 70 - 110 mg/dL   POCT glucose    Collection Time: 02/28/20 11:42 AM   Result Value Ref Range    POCT Glucose 298 (H) 70 - 110 mg/dL   ]    I/O last 3 completed shifts:  In: 1000 [I.V.:500; Other:500]  Out: 2300 [Other:2300]    Vitals:    02/28/20 1215 02/28/20 1230 02/28/20 1312 02/28/20 1329   BP: 112/61   139/83   Pulse: 105   99   Resp: 20   18   Temp:  100.3 °F (37.9 °C) (!) 100.8 °F (38.2 °C) (!) 100.8 °F (38.2 °C)   TempSrc:  Axillary     SpO2: 96%   97%   Weight:       Height:           No Jvd, Thyromegaly or Lymphadenopathy  Lungs: Fairly clear anteriorly and laterally  Cor: RRR no G or rubs  Abd: Soft benign good bowel sounds non tender  Ext: No E C C    A)    ESRD on chronic HD TTS  Ligated AVF  HyperK 5.2 today  HTN  DM  2nd hyperpth  Anemia of ckd     P)  Renal Diet  Home meds  Protect temp access  HD TTS  EPO prn  Binders prn  Adjust all meds to the degree of renal fx  Close follow up I/O and weights  Maintain Hydration

## 2020-02-28 NOTE — NURSING
Tylenol given for temp. 100.8. Lab is here to obtain specimen for blood culture. No sticks in left arm because AV Shunt

## 2020-02-28 NOTE — PROGRESS NOTES
[facility]  Vascular Surgery  Progress Note    Patient Name: Kodi Ibanez Sr.  MRN: [id number]  Admission Date: 2/27/2020  Primary Care Provider: Melonie Elias MD    Subjective:     Interval History:  Had a no acute events overnight.  Admitted to the ICU postoperatively for blood pressure control. No further bleeding episodes.  Patient without complaints todayy.    Post-Op Info:  Procedure(s) (LRB):  Ligation of left upper extremity fistula, Closure of left upper extremity skin defect x 2, Intraoperative ultrasound  (Left)   1 Day Post-Op     Medications Prior to Admission   Medication Sig Dispense Refill Last Dose    ascorbic acid, vitamin C, (VITAMIN C) 500 MG tablet Take 500 mg by mouth every evening.    Taking    atorvastatin (LIPITOR) 40 MG tablet TAKE 1 TABLET(40 MG) BY MOUTH EVERY DAY 90 tablet 1 Taking    blood glucose control, normal Soln 1 drop by Misc.(Non-Drug; Combo Route) route as directed. 1 each 1 Taking    blood sugar diagnostic Strp To check BG 2 times daily, to use with accuchek casa meter 200 each 3 Taking    blood-glucose meter kit To check BG 2 times daily, to use with accuchek casa meter 1 each 0 Taking    carvedilol (COREG) 25 MG tablet TAKE 1 TABLET(25 MG) BY MOUTH TWICE DAILY WITH MEALS 180 tablet 1 Taking    clopidogrel (PLAVIX) 75 mg tablet Take 1 tablet (75 mg total) by mouth once daily. 30 tablet 11 Taking    diltiaZEM (CARDIZEM CD) 180 MG 24 hr capsule Take 1 capsule (180 mg total) by mouth once daily. 90 capsule 1     furosemide (LASIX) 40 MG tablet TAKE 1 TABLET(40 MG) BY MOUTH EVERY DAY 90 tablet 1 Taking    insulin (LANTUS SOLOSTAR U-100 INSULIN) glargine 100 units/mL (3mL) SubQ pen INJECT 20 UNITS UNDER THE SKIN NIGHTLY 15 mL 0     lancets Misc To check BG 2 times daily, to use with accuchek casa meter 200 each 3 Taking    losartan (COZAAR) 100 MG tablet Take 1 tablet (100 mg total) by mouth once daily. (Patient not taking: Reported on 1/20/2020)  "90 tablet 1 Not Taking    megestrol (MEGACE) 20 MG Tab Take 1 tablet (20 mg total) by mouth once daily. 90 tablet 1 Taking    NOVOFINE 30 30 x 1/3 " Ndle USE AS DIRECTED WITH LANTUS SOLARSTAR 100 each 3 Taking    NOVOLOG FLEXPEN U-100 INSULIN 100 unit/mL InPn pen INJECT 5 TO 10 UNITS WITH MEALS AND AT BEDTIME DEPENDING ON BLOOD SUGAR 15 mL 0 Taking    pen needle, diabetic (BD ULTRA-FINE ORIG PEN NEEDLE) 29 gauge x 1/2" Ndle USE  1 EVERY EVENING 90 each 3 Taking    promethazine (PHENERGAN) 25 MG tablet Take 1 tablet (25 mg total) by mouth every 6 (six) hours as needed for Nausea. 30 tablet 0        Review of patient's allergies indicates:   Allergen Reactions    Reglan [metoclopramide hcl] Diarrhea    Lorazepam      Other reaction(s): heavy sedation       Past Medical History:   Diagnosis Date    Allergy     Ambulates with cane     Atherosclerosis of aorta     noted on VIKTORIA 2011    Blood transfusion     Cataracts, bilateral     CHF (congestive heart failure)     Chronic kidney disease     Clotting disorder     Coronary artery disease     Decreased appetite     Diabetic retinopathy     DM (diabetes mellitus), type 2 with renal complications     ESRD on hemodialysis     TUESDAY/ THURSDAY/SATURDAY    Hyperlipidemia     Hyperparathyroidism, unspecified     Hypertension     Myocardial infarction     Prostate cancer 2016 7/16/19:  wife states "he doesn't have it anymore"    Retinopathy due to secondary diabetes     Seizures     Stroke 2011    PT HAS SHAKES     Unsteady gait     Uses roller walker      Past Surgical History:   Procedure Laterality Date    CARDIAC SURGERY      CABG    CATARACT EXTRACTION      ou    CATARACT EXTRACTION, BILATERAL      cataracts      CORONARY ANGIOGRAPHY INCLUDING BYPASS GRAFTS WITH CATHETERIZATION OF LEFT HEART N/A 12/16/2019    Procedure: ANGIOGRAM, CORONARY, INCLUDING BYPASS GRAFT, WITH LEFT HEART CATHETERIZATION, rcfa, 5fr;  Surgeon: David Villegas MD; "  Location: [facility] CATH LAB;  Service: Cardiology;  Laterality: N/A;    CORONARY ARTERY BYPASS GRAFT  2009    bypass    EYE SURGERY      Left Guera AVF  1/11/2012    PROSTATE BIOPSY      positive    retinal laser      ROTATOR CUFF REPAIR  right, 2005    ULTRASOUND GUIDANCE  12/16/2019    Procedure: Ultrasound Guidance;  Surgeon: David Villegas MD;  Location: [facility] CATH LAB;  Service: Cardiology;;    VASCULAR SURGERY       Family History     Problem Relation (Age of Onset)    Cancer Mother, Paternal Grandmother, Paternal Uncle    Cataracts Mother    Diabetes Mother    Glaucoma Mother    Heart disease Father, Maternal Grandmother    Hypertension Brother    No Known Problems Sister, Maternal Grandfather, Paternal Grandfather, Sister, Sister    Prostate cancer Brother        Tobacco Use    Smoking status: Never Smoker    Smokeless tobacco: Never Used   Substance and Sexual Activity    Alcohol use: No    Drug use: No    Sexual activity: Not Currently     Review of Systems   Constitutional: Negative for chills and fever.   HENT: Negative for congestion.    Eyes: Negative for visual disturbance.   Respiratory: Negative for shortness of breath.    Cardiovascular: Negative for chest pain.   Gastrointestinal: Negative for abdominal distention.   Endocrine: Negative for cold intolerance.   Genitourinary: Negative for flank pain.   Musculoskeletal: Negative for back pain.   Skin: Positive for wound. Negative for color change, pallor and rash.   Allergic/Immunologic: Negative for immunocompromised state.   Neurological: Negative for dizziness and weakness.   Hematological: Does not bruise/bleed easily.   Psychiatric/Behavioral: Negative for agitation.     Objective:     Vital Signs (Most Recent):  Temp: 98.2 °F (36.8 °C) (02/27/20 1647)  Pulse: 88 (02/27/20 1647)  Resp: 17 (02/27/20 1647)  BP: 137/84 (02/27/20 1647)  SpO2: 96 % (02/27/20 1647) Vital Signs (24h Range):  Temp:  [98.2 °F (36.8 °C)-98.6 °F (37 °C)] 98.2  °F (36.8 °C)  Pulse:  [73-97] 88  Resp:  [15-20] 17  SpO2:  [96 %-100 %] 96 %  BP: (114-203)/(60-95) 137/84     Weight: 64.8 kg (142 lb 13.7 oz)  Body mass index is 19.92 kg/m².    Physical Exam   Constitutional: He is oriented to person, place, and time. He appears well-developed and well-nourished. No distress.   HENT:   Head: Normocephalic and atraumatic.   Eyes: Conjunctivae are normal.   Neck: Neck supple.   Cardiovascular: Normal rate.   Pulses:       Radial pulses are 2+ on the right side, and 2+ on the left side.   LUE AVF without further bleeding, no infection   Pulmonary/Chest: Effort normal. No respiratory distress.   Abdominal: Soft. He exhibits no distension and no mass. There is no tenderness. There is no rebound and no guarding. No hernia.   Musculoskeletal: Normal range of motion. He exhibits no deformity.   Neurological: He is alert and oriented to person, place, and time. No sensory deficit.   Skin: Skin is warm and dry. Capillary refill takes less than 2 seconds. No rash noted. He is not diaphoretic. No erythema. No pallor.   Psychiatric: He has a normal mood and affect.   Vitals reviewed.      Significant Labs:  All pertinent labs from the last 24 hours have been reviewed.    Significant Diagnostics:  I have reviewed all pertinent imaging results/findings within the past 24 hours.    Assessment/Plan:     * Complication of arteriovenous dialysis fistula  -Bleeding LUE AVF with multiple interventions in past including central venous angioplasty, distal fistula stenting and mid fistula stenting; skin breakdown overlying area of bleeding without evidence of infection s/p fistula ligation, unable to plicate/repair primarily due to stent involvement - cont local wound care to LUE incisions with dry dressings daily.  -Will plan for long term dialysis access placement once recovered from current clinical issues  -Recommend transition to tunneled HD catheter with IR for continued HD        Stephane ANDUJAR  MD Jessica  Vascular Surgery  [facility] [place]

## 2020-02-28 NOTE — CARE UPDATE
[facility]  ICU Multidisciplinary Bedside Rounds   SUMMARY     Date: 2/28/2020    Prehospitalization: Home  Admit Date / LOS : 2/27/2020/ 1 days    Diagnosis: Complication of arteriovenous dialysis fistula    Consults:        Active: IR, Nephro, PT and Vascular       Needed: N/A     Code Status: Full Code   Advanced Directive: <no information>    LDA: Art Line, PIV and Luis       Central Lines/Site/Justification:Patient Does Not Have Central Line       Urinary Cath/Order/Justification:Patient Does Not Have Urinary Catheter    Vasopressors/Infusions:        GOALS: Volume/ Hemodynamic: N/A                     RASS: N/A    CAM ICU: N/A  Pain Management: none       Pain Controlled: not applicable     Rhythm: NSR    Respiratory Device: Room Air                  Most Recent SBT/ SAT: N/A         VTE Prophylaxis: Pharm  Mobility: Ambulatory and A: Assist  Stress Ulcer Prophylaxis: No    Dietary: NPO  Tolerance: not applicable      Isolation: No active isolations    Restraints: No    Significant Dates:  Post Op Date: 02/27/20  Rescue Date: N/A  Imaging/ Diagnostics: N/A    Noteworthy Labs:  K 5.2, Cr 4.6    CBC/Anemia Labs: Coags:    Recent Labs   Lab 02/27/20  0315 02/28/20  0322   WBC 4.71 6.17   HGB 10.3* 9.6*   HCT 32.5* 30.1*    148*   MCV 92 91   RDW 15.2* 15.6*    Recent Labs   Lab 02/27/20  0315   INR 0.9   APTT 26.8        Chemistries:   Recent Labs   Lab 02/27/20  0846 02/28/20  0145 02/28/20  0322    140 139   K 5.5* 4.3 5.2*    107 107   CO2 22* 19* 17*   BUN 53* 16 22   CREATININE 8.6* 3.4* 4.6*   CALCIUM 9.2 9.5 9.2   PHOS 2.5*  --   --         Cardiac Enzymes: Ejection Fractions:    No results for input(s): CPK, CPKMB, MB, TROPONINI in the last 72 hours. Nuc Stress EF   Date Value Ref Range Status   03/13/2019 61 % Final        POCT Glucose: HbA1c:    Recent Labs   Lab 02/27/20  0405 02/27/20  0446 02/27/20  1034   POCTGLUCOSE 473* 402* 103    Hemoglobin A1C   Date  Value Ref Range Status   01/16/2020 11.7 (H) 4.0 - 5.6 % Final     Comment:     [facility]        Needs from Care Team: Diet     ICU LOS 8h  Level of Care: OK to Transfer

## 2020-02-28 NOTE — OP NOTE
02/27/2020    Indications: Kodi Ibanez Sr. is a 60 y.o. male with chronic renal insufficiency and need for long-term dialysis access.    Pre-operative Diagnosis:    1. ESRD on HD  2. Bleeding left upper extremity arteriovenous fistula   3. HTN  4. HLD  5. DM    Post-operative Diagnosis: Same    Operation:   1. Ligation of left upper extremity fistula  2. Closure of left upper extremity skin defect x 2  3. Intraoperative ultrasound    Surgeon: Stephane Lopez MD    Assistants: None    Anesthesia: General    Findings:  No infection, patent brachial, radial and ulnar artery at completion of case, triphasic signal of brachial artery and 2+ L radial artery after ligation    Estimate Blood Loss:  Less than 10mL            Specimens: None               Complications:  None; patient tolerated the procedure well.           Disposition: ICU           Attending Attestation: I was present and scrubbed for the entire procedure.    Procedure Details   The patient was seen in the Holding Room. The risks, benefits, complications, treatment options, and expected outcomes were discussed with the patient. The patient concurred with the proposed plan, giving informed consent.  The site of surgery properly noted/marked. The patient was taken to the Operating Room, identified correctly and the procedure verified. A Time Out was held and the above information confirmed.    The arm was prepped and draped in a sterile fashion. A skin incision was made between the two proximal pseudoaneurysms with a scalpel. Our this was deepened with electrocautery and Metzenbaum scissors.  The fistula was exposed in the brachial artery was identified with ultrasound and Doppler to ensure no injury. There was dense amount of inflammatory tissue around the fistula.  With the right angle was able to encircle this and placed 2 0 silk ties around the fistula.  Prior to tying down fully I confirmed a 2+ radial pulse and perfusion ultrasound Doppler of the  brachial artery, radial and ulnar arteries.  I then ligated the fistula with 2 silk ties.  Left upper extremity perfusion was confirmed again.  There was some old residual blood bleeding from the proximal skin defect although this ceased with clot formation in the distal pseudoaneurysm.  No flow is identified on ultrasound in the stent for outflow vein. The skin defects were closed with figure-of-eight Monocryl sutures.  The distal incision was then closed in two layers, subcutaneous tissue with 2-0 Monocryl and skin with 4-0 Monocryl. Dermabond was applied.  Sterile dressings were applied to both areas the patient was woken from anesthesia without complications.  The patient was transferred to the ICU for further close monitoring due to his labile blood pressure requiring vasopressors intraoperatively.    Complications: None

## 2020-02-28 NOTE — ASSESSMENT & PLAN NOTE
-Bleeding LUE AVF with multiple interventions in past including central venous angioplasty, distal fistula stenting and mid fistula stenting; skin breakdown overlying area of bleeding without evidence of infection s/p fistula ligation, unable to plicate/repair primarily due to stent involvement - cont local wound care to LUE incisions with dry dressings daily.  -Will plan for long term dialysis access placement once recovered from current clinical issues  -Recommend transition to tunneled HD catheter with IR for continued HD

## 2020-02-28 NOTE — PROGRESS NOTES
0700 MD Lopez at bedside to change pt's surgical drsg.  1030 pt's bp labile after coreg and cardizem admin and up in chair. sbp 107. Notified MD Quiñones. New orders received to hold today's dose of lasix.   1200 Spoke with MD Lopez, verified orders for plavix, MD states ok to admin per primary MD orders.  1220 Report called to [facility].   1230 notified MD Quiñones of pts temp 100.3 axillary. New orders received. Vancomycin 1 gm IVPB x1 dose. Opted out of pharmacy dosing. MD aware of pt's medical history and labs. Blood cultures x2, and tylenol 650 mg q6h prn for temp or mild pain. Orders read back and verified.  1258 pt transferred to [facility]. Updated nurse receiving pt, Mireille, of new orders. Pt's wife jessika updated on transfer and room number.  1300 Called and updated MD Lopez, informed of pt's temp and new orders received from primary team. New orders received for IS at bedside. Read back and verified.

## 2020-02-28 NOTE — PLAN OF CARE
Plan of care reviewed. No falls/injuries this shift. Skin intact. Drsg to right arm c/d/I. Febrile, vanc x1 dose , blood cultures, IS, prn tylenol, pt oob with assistance to chair x1 today. Transferring to [facility]. Diet restarted. Blood sugar monitored. BP monitored, improved. Dialysis yesterday.

## 2020-02-28 NOTE — TRANSFER OF CARE
"Anesthesia Transfer of Care Note    Patient: Kodi Ibanez Sr.    Procedure(s) Performed: Procedure(s) (LRB):  Ligation of left upper extremity fistula, Closure of left upper extremity skin defect x 2, Intraoperative ultrasound  (Left)    Patient location: ICU    Anesthesia Type: general    Transport from OR: Transported from OR on 2-3 L/min O2 by NC with adequate spontaneous ventilation    Post pain: adequate analgesia    Post assessment: no apparent anesthetic complications and tolerated procedure well    Post vital signs: stable    Level of consciousness: awake and alert    Nausea/Vomiting: no nausea/vomiting    Complications: none    Transfer of care protocol was followed      Last vitals:   Visit Vitals  BP (!) 148/72 (BP Location: Right arm, Patient Position: Lying)   Pulse 78   Temp 36.7 °C (98.1 °F) (Oral)   Resp 14   Ht 5' 11" (1.803 m)   Wt 64.8 kg (142 lb 13.7 oz)   SpO2 100%   BMI 19.92 kg/m²     "

## 2020-02-28 NOTE — TELEPHONE ENCOUNTER
Sent message to Dr. Lopez.    ----- Message from Elizabeth Babb sent at 2/28/2020 11:15 AM CST -----  Contact: Clary with [facility]  Type: Patient Call Back    Who called: Clary with [facility]    What is the request in detail: Clary states that she would like to know if she can restart the patients clopidogrel (PLAVIX) 75 mg tablet.   Please advise.    Can the clinic reply by [facility]? No    Best call back number: [phone]    Additional Information: N/A

## 2020-02-28 NOTE — CARE UPDATE
[facility]  ICU Multidisciplinary Bedside Rounds     UPDATE     Date: 2/28/2020      Plan of care reviewed with the following, Nurse, Charge Nurse, Physician, Pulm CC and Resp. Therapist.       Needs/ Goals for the day: pt currently npo. Pt's bp labile yesterday during procedure, transferred to icu post op.no pressors while in icu. Temp this am 99.7. Potassium 5.2. Pt received dialysis yesterday evening.      Level of Care: OK to Transfer

## 2020-02-28 NOTE — PLAN OF CARE
Problem: Occupational Therapy Goal  Goal: Occupational Therapy Goal  Description  Goals to be met by: 3/6/2020     Patient will increase functional independence with ADLs by performing:    UE Dressing with [place].  LE Dressing with [place].  Grooming while standing at sink with Modified [place].  Toilet transfer to toilet with Stand-by Assistance.  Upper extremity exercise program per handout, with assistance as needed.     Outcome: Ongoing, Progressing    Patient tolerated evaluation well, good participation.  Patient will benefit from skilled OT services to address the above mentioned goals. YANY Wisdom, MS

## 2020-02-28 NOTE — CARE UPDATE
Transfer Summary:    The patient is a 59 yo M who is a T, TH, Sat dialysis patient- presents to the hospital with a CC of L AV graft bleeding. Vascular was consulted.  Patient went for a ligation of left upper extremity fistula, Closure of left upper extremity skin defect. The patient was sent to the ICU after due to labile BP requiring short course of pressor support. He was weaned off and now has stable. BP. He was sent to the floor on 2/28.  PT/OT were consulted as the patient stated he walks at home with a walker. Does not currently have H/H but may benefit on discharge.      Monitor over night. Follow any further vasc recs. PT/OT recs.  Consider discharge to home after dialysis on 2/29. Patient had temporary dialysis access placed.     Addendum 1:50pm  Patient had a low grade temp.  Given one dose of vancomycin and blood cultures ordered.  Also- PT/OT rec: Home with H/H.

## 2020-02-28 NOTE — PT/OT/SLP EVAL
Physical Therapy Evaluation    Patient Name:  Kodi Ibanez Sr.   MRN:  [id number]    Recommendations:     Discharge Recommendations:  home health PT   Discharge Equipment Recommendations: none   Barriers to discharge: Pt in ICU at present time and only ambulated a few feet    Assessment:     Kodi Ibanez Sr. is a 60 y.o. male admitted with a medical diagnosis of Complication of arteriovenous dialysis fistula.  He presents with the following impairments/functional limitations:  weakness, gait instability, impaired cardiopulmonary response to activity, impaired endurance, impaired balance, impaired coordination, impaired self care skills, impaired functional mobilty .    Rehab Prognosis: Good; patient would benefit from acute skilled PT services to address these deficits and reach maximum level of function.    Recent Surgery: Procedure(s) (LRB):  Ligation of left upper extremity fistula, Closure of left upper extremity skin defect x 2, Intraoperative ultrasound  (Left) 1 Day Post-Op    Plan:     During this hospitalization, patient to be seen (5-6x/wk) to address the identified rehab impairments via gait training, therapeutic activities, therapeutic exercises and progress toward the following goals:    · Plan of Care Expires:  03/06/20    Subjective     Chief Complaint: no c/o  Patient/Family Comments/goals: I'm fine  Pain/Comfort:  · Pain Rating 1: 0/10    Patients cultural, spiritual, Oriental orthodox conflicts given the current situation: no    Living Environment:  Pt lives with his spouse in an apt with no concerns   Prior to admission, patients level of function was Mod I for ambulation with rollator.  Equipment used at home: bath bench, rollator.  DME owned (not currently used): rolling walker and single point cane.  Upon discharge, patient will have assistance from Spouse.    Objective:     Communicated with nsg prior to session.  Patient found HOB elevated with arterial line(ICU monitoring)  upon PT entry to  room.    General Precautions: Standard, fall   Orthopedic Precautions:N/A   Braces: N/A     Exams:  · Cognitive Exam:  Patient is oriented to Person and Place  · Gross Motor Coordination:  mildly impaired 2/2 gen weakness and deconditioning  · Postural Exam:  Patient presented with the following abnormalities:    · -       Rounded shoulders  · -       Forward head  · Sensation:    · -       Intact  light/touch B LE's  · Skin Integrity/Edema:      · -       Skin integrity: Visible skin intact  · RLE ROM: WFL  · RLE Strength: WFL  · LLE ROM: WFL  · LLE Strength: WFL    Functional Mobility:  · Bed Mobility:     · Scooting: stand by assistance  · Supine to Sit: stand by assistance  · Transfers:     · Sit to Stand:  contact guard assistance with rolling walker  · Gait: 5' with RW and SBA with Vc for walker management/safety  · Balance: Fair+ sit, Fair+ stand      Therapeutic Activities and Exercises:   eval only    AM-PAC 6 CLICK MOBILITY  Total Score:18     Patient left up in chair with all lines intact, call button in reach and nsg notified.    GOALS:   Multidisciplinary Problems     Physical Therapy Goals        Problem: Physical Therapy Goal    Goal Priority Disciplines Outcome Goal Variances Interventions   Physical Therapy Goal     PT, PT/OT Ongoing, Progressing     Description:  Goals to be met by: 3/6/2019     Patient will increase functional independence with mobility by performin. Supine to sit with Modified [place]  2. Sit to stand transfer with Modified [place]  3. Gait  x 100 feet with Supervision using Rolling Walker.   4. Lower extremity exercise program x10 reps per handout, with supervision                      History:     Past Medical History:   Diagnosis Date    Allergy     Ambulates with cane     Atherosclerosis of aorta     noted on VIKTORIA     Blood transfusion     Cataracts, bilateral     CHF (congestive heart failure)     Chronic kidney disease     Clotting disorder      "Coronary artery disease     Decreased appetite     Diabetic retinopathy     DM (diabetes mellitus), type 2 with renal complications     ESRD on hemodialysis     TUESDAY/ THURSDAY/SATURDAY    Hyperlipidemia     Hyperparathyroidism, unspecified     Hypertension     Myocardial infarction     Prostate cancer 2016    7/16/19:  wife states "he doesn't have it anymore"    Retinopathy due to secondary diabetes     Seizures     Stroke 2011    PT HAS SHAKES     Unsteady gait     Uses roller walker        Past Surgical History:   Procedure Laterality Date    CARDIAC SURGERY      CABG    CATARACT EXTRACTION      ou    CATARACT EXTRACTION, BILATERAL      cataracts      CORONARY ANGIOGRAPHY INCLUDING BYPASS GRAFTS WITH CATHETERIZATION OF LEFT HEART N/A 12/16/2019    Procedure: ANGIOGRAM, CORONARY, INCLUDING BYPASS GRAFT, WITH LEFT HEART CATHETERIZATION, rcfa, 5fr;  Surgeon: David Villegas MD;  Location: [facility] CATH LAB;  Service: Cardiology;  Laterality: N/A;    CORONARY ARTERY BYPASS GRAFT  2009    bypass    EYE SURGERY      Left Guera AVF  1/11/2012    PROSTATE BIOPSY      positive    retinal laser      REVISION OF ARTERIOVENOUS FISTULA Left 2/27/2020    Procedure: Ligation of left upper extremity fistula, Closure of left upper extremity skin defect x 2, Intraoperative ultrasound ;  Surgeon: Stephane Lopez MD;  Location: [facility] OR;  Service: Vascular;  Laterality: Left;    ROTATOR CUFF REPAIR  right, 2005    ULTRASOUND GUIDANCE  12/16/2019    Procedure: Ultrasound Guidance;  Surgeon: David Villegas MD;  Location: [facility] CATH LAB;  Service: Cardiology;;    VASCULAR SURGERY         Time Tracking:     PT Received On: 02/28/20  PT Start Time: 1025     PT Stop Time: 1045  PT Total Time (min): 20 min     Billable Minutes: Evaluation 20 OT present      Anamika Gibbs, PT  02/28/2020  "

## 2020-02-28 NOTE — PLAN OF CARE
Pt transferred to ICU s/p AVF revision.  Pt with BP labile and needing dialysis.  A-line in place to right radial, zeroed and leveled.  Denies pain/discomfort.  Nausea noted; given PRN anti-emetic per orders and relief noted.  Dialyzed 3 hours with 2300 mL removed.  Anuric; BM in OR per reporting nurse.  POC reviewed with pt.  Expressed understanding.

## 2020-02-28 NOTE — NURSING
Report received and patient care assumed. Arrived on unit via bed.Denies pain or discomfort. Patient is oriented X4.

## 2020-02-29 NOTE — PROGRESS NOTES
[facility] Hospital Medicine  Progress Note    Patient Name: Kodi Ibanez Sr.  MRN: [id number]  Patient Class: IP- Inpatient   Admission Date: 2/27/2020  Length of Stay: 2 days  Attending Physician: Rahul Santoyo MD  Primary Care Provider: Melonie Elias MD        Subjective:     Principal Problem:Complication of arteriovenous dialysis fistula        HPI:  Ligation of left upper extremity fistula, Closure of left upper extremity skin defect x 2,,.per ER record,  Patient's wife notes that he has had trouble controlling bleeding after dialysis on 2 visits in the last week and half.  His last dialysis was 2 days ago.  Patient was well prior to this.  Denies trauma. Per ER record,The wife states that she has been trying to get touch this vascular surgeon about her concerned about difficulty controlling the bleeding after his sessions.  Patient is not taking his antihypertensive medications this evening.  Patient denies any weakness or dizziness.  No shortness of breath.  No diaphoresis.  No near syncope.  No change in medications.  Patient takes Plavix and ASA for old CVA and CAD, He is due for dialysis today, His wife placed a occlusive dressing over the and wrapped it with tape before coming to the ER.  Bleeding is currently controlled.vascualr surgery is consulted,he has potassium of 6.8,EKG show no peacked T wave,deneis chest pain and SOB,he received albuterol and insulin in ER,nephrology is consulted for HD,he had blood sugar over 500,improved with IV insulin.    Overview/Hospital Course:  The patient is a 59 yo M who is a T, TH, Sat dialysis patient- presents to the hospital with a CC of L AV graft bleeding. Vascular was consulted.  Patient went for a ligation of left upper extremity fistula, Closure of left upper extremity skin defect. The patient was sent to the ICU after due to labile BP requiring short course of pressor support. He was weaned off and now has stable. BP. He was sent  to the floor on 2/28.  PT/OT were consulted as the patient stated he walks at home with a walker.   Will have HD today.    Interval History: No new issues.     Review of Systems   Constitutional: Negative for activity change.   HENT: Negative for congestion.    Respiratory: Negative for chest tightness and shortness of breath.    Cardiovascular: Negative for chest pain.   Gastrointestinal: Negative for abdominal pain.   Genitourinary: Negative for difficulty urinating.   Musculoskeletal: Negative for arthralgias.   Psychiatric/Behavioral: Negative for agitation.     Objective:     Vital Signs (Most Recent):  Temp: 98.6 °F (37 °C) (02/29/20 0739)  Pulse: 87 (02/29/20 0739)  Resp: 17 (02/29/20 0739)  BP: (!) 147/82 (02/29/20 0739)  SpO2: 99 % (02/29/20 0739) Vital Signs (24h Range):  Temp:  [98.3 °F (36.8 °C)-100.8 °F (38.2 °C)] 98.6 °F (37 °C)  Pulse:  [] 87  Resp:  [14-21] 17  SpO2:  [96 %-99 %] 99 %  BP: ()/(55-94) 147/82     Weight: 64.8 kg (142 lb 13.7 oz)  Body mass index is 19.92 kg/m².    Intake/Output Summary (Last 24 hours) at 2/29/2020 1002  Last data filed at 2/29/2020 0820  Gross per 24 hour   Intake 980 ml   Output --   Net 980 ml      Physical Exam   Constitutional: He appears well-developed and well-nourished.   HENT:   Head: Normocephalic and atraumatic.   Cardiovascular: Normal rate and regular rhythm. Exam reveals no friction rub.   No murmur heard.  Pulmonary/Chest: Effort normal and breath sounds normal. No stridor. No respiratory distress. He has no wheezes.   Abdominal: Soft.   Neurological: He is alert.   Nursing note and vitals reviewed.      Significant Labs:   BMP:   Recent Labs   Lab 02/29/20  0408   *   *   K 4.6      CO2 19*   BUN 36*   CREATININE 7.0*   CALCIUM 8.4*     CBC:   Recent Labs   Lab 02/28/20  0322 02/29/20  0408   WBC 6.17 4.42   HGB 9.6* 14.7   HCT 30.1* 45.0   * 69*       Significant Imaging:      Assessment/Plan:      * Complication of  arteriovenous dialysis fistula  With bleeding on AV left graft,Bleeding is currently controlled.vascualr surgery is consulted,has stable HH,hold plavix,,ASA at this time.    S/p ligation of left upper extremity fistula, Closure of left upper extremity skin defect x 2 on 2/27.    Now has temporary dialysis cath,  Will have HD today.      Essential hypertension  Resumed home medications,stable.      Prostate cancer  Follow up with Urology as out patient.      S/P CABG (coronary artery bypass graft)  ASA,plavix at this time.      Uncontrolled type 2 diabetes mellitus with end-stage renal disease  On SSI,basal insulin. Uncontrolled with hyperglycemia  Will order an A1c.       Anemia of chronic disease  Stable,will monitor.      Hyperlipidemia LDL goal <100  On statin.      Vascular dementia with depressed mood    Appear is on baseline.    Cognitive deficits as late effect of cerebrovascular disease  Appear to be on baseline,will monitor.      History of stroke  On statin at this time.      ESRD on hemodialysis  Nephrology is consulted for HD.    T,TH,Sat. Now has temporary line         VTE Risk Mitigation (From admission, onward)         Ordered     heparin (porcine) injection 5,000 Units  Every 12 hours      02/28/20 1305     heparin (porcine) injection 5,000 Units  As needed (PRN)      02/28/20 0025     IP VTE HIGH RISK PATIENT  Once      02/27/20 0540                      Rahul Santoyo MD  Department of Hospital Medicine   [facility]

## 2020-02-29 NOTE — PROGRESS NOTES
"Awake alert oriented NAD     Seen while on HD, which he is tolerating hell    Denies CNS ENT CP GI  RHEUM OR DERM SX  Past Medical History:   Diagnosis Date    Allergy     Ambulates with cane     Atherosclerosis of aorta     noted on VIKTORIA 2011    Blood transfusion     Cataracts, bilateral     CHF (congestive heart failure)     Chronic kidney disease     Clotting disorder     Coronary artery disease     Decreased appetite     Diabetic retinopathy     DM (diabetes mellitus), type 2 with renal complications     ESRD on hemodialysis     TUESDAY/ THURSDAY/SATURDAY    Hyperlipidemia     Hyperparathyroidism, unspecified     Hypertension     Myocardial infarction     Prostate cancer 2016 7/16/19:  wife states "he doesn't have it anymore"    Retinopathy due to secondary diabetes     Seizures     Stroke 2011    PT HAS SHAKES     Unsteady gait     Uses roller walker      Review of patient's allergies indicates:   Allergen Reactions    Reglan [metoclopramide hcl] Diarrhea    Lorazepam      Other reaction(s): heavy sedation       Current Facility-Administered Medications   Medication    acetaminophen tablet 650 mg    atorvastatin tablet 40 mg    carvediloL tablet 25 mg    clopidogreL tablet 75 mg    dextrose 50% injection 12.5 g    diltiaZEM 24 hr capsule 180 mg    epoetin megan-epbx injection 10,000 Units    glucagon (human recombinant) injection 1 mg    glucose chewable tablet 16 g    heparin (porcine) injection 5,000 Units    heparin (porcine) injection 5,000 Units    insulin aspart U-100 pen 1-10 Units    insulin aspart U-100 pen 5 Units    insulin detemir U-100 pen 15 Units    megestrol tablet 20 mg    ondansetron injection 8 mg    promethazine (PHENERGAN) 6.25 mg in dextrose 5 % 50 mL IVPB    sodium chloride 0.9% flush 10 mL       LABS    Recent Results (from the past 24 hour(s))   Comprehensive metabolic panel    Collection Time: 02/28/20  6:50 PM   Result Value Ref Range    " Sodium 135 (L) 136 - 145 mmol/L    Potassium 4.6 3.5 - 5.1 mmol/L    Chloride 102 95 - 110 mmol/L    CO2 17 (L) 23 - 29 mmol/L    Glucose 506 (HH) 70 - 110 mg/dL    BUN, Bld 32 (H) 8 - 23 mg/dL    Creatinine 6.7 (H) 0.5 - 1.4 mg/dL    Calcium 8.5 (L) 8.7 - 10.5 mg/dL    Total Protein 6.8 6.0 - 8.4 g/dL    Albumin 3.0 (L) 3.5 - 5.2 g/dL    Total Bilirubin 0.4 0.1 - 1.0 mg/dL    Alkaline Phosphatase 123 55 - 135 U/L    AST 23 10 - 40 U/L    ALT 28 10 - 44 U/L    Anion Gap 16 8 - 16 mmol/L    eGFR if African American 9 (A) >60 mL/min/1.73 m^2    eGFR if non African American 8 (A) >60 mL/min/1.73 m^2   POCT glucose    Collection Time: 02/28/20 11:19 PM   Result Value Ref Range    POCT Glucose 334 (H) 70 - 110 mg/dL   CBC auto differential    Collection Time: 02/29/20  4:08 AM   Result Value Ref Range    WBC 4.42 3.90 - 12.70 K/uL    RBC 5.00 4.60 - 6.20 M/uL    Hemoglobin 14.7 14.0 - 18.0 g/dL    Hematocrit 45.0 40.0 - 54.0 %    Mean Corpuscular Volume 90 82 - 98 fL    Mean Corpuscular Hemoglobin 29.4 27.0 - 31.0 pg    Mean Corpuscular Hemoglobin Conc 32.7 32.0 - 36.0 g/dL    RDW 15.6 (H) 11.5 - 14.5 %    Platelets 69 (L) 150 - 350 K/uL    MPV 11.9 9.2 - 12.9 fL    Immature Granulocytes 0.9 (H) 0.0 - 0.5 %    Gran # (ANC) 3.3 1.8 - 7.7 K/uL    Immature Grans (Abs) 0.04 0.00 - 0.04 K/uL    Lymph # 0.5 (L) 1.0 - 4.8 K/uL    Mono # 0.5 0.3 - 1.0 K/uL    Eos # 0.1 0.0 - 0.5 K/uL    Baso # 0.02 0.00 - 0.20 K/uL    nRBC 0 0 /100 WBC    Gran% 75.0 (H) 38.0 - 73.0 %    Lymph% 11.8 (L) 18.0 - 48.0 %    Mono% 10.4 4.0 - 15.0 %    Eosinophil% 1.4 0.0 - 8.0 %    Basophil% 0.5 0.0 - 1.9 %    Differential Method Automated    Basic metabolic panel    Collection Time: 02/29/20  4:08 AM   Result Value Ref Range    Sodium 135 (L) 136 - 145 mmol/L    Potassium 4.6 3.5 - 5.1 mmol/L    Chloride 104 95 - 110 mmol/L    CO2 19 (L) 23 - 29 mmol/L    Glucose 354 (H) 70 - 110 mg/dL    BUN, Bld 36 (H) 8 - 23 mg/dL    Creatinine 7.0 (H) 0.5 - 1.4  mg/dL    Calcium 8.4 (L) 8.7 - 10.5 mg/dL    Anion Gap 12 8 - 16 mmol/L    eGFR if African American 9 (A) >60 mL/min/1.73 m^2    eGFR if non African American 8 (A) >60 mL/min/1.73 m^2   POCT glucose    Collection Time: 02/29/20  7:41 AM   Result Value Ref Range    POCT Glucose 290 (H) 70 - 110 mg/dL   ]    I/O last 3 completed shifts:  In: 1690 [P.O.:440; I.V.:750; Other:500]  Out: 2300 [Other:2300]    Vitals:    02/29/20 1102 02/29/20 1130 02/29/20 1200 02/29/20 1230   BP: (!) 149/98 (!) 153/102 (!) 177/112 (!) 130/99   Pulse: 90 96 98 104   Resp:       Temp:       TempSrc:       SpO2:       Weight:       Height:           No Jvd, Thyromegaly or Lymphadenopathy  Lungs: Fairly clear anteriorly and laterally  Cor: RRR no G or rubs  Abd: Soft benign good bowel sounds non tender  Ext: No E C C    A)    ESRD on chronic HD TTS  Ligated AVF  HyperK   HTN  DM  2nd hyperpth  Anemia of ckd     P)  Renal Diet  Home meds  Protect temp access  HD TTS  EPO prn  Binders prn  Adjust all meds to the degree of renal fx  Close follow up I/O and weights  Maintain Hydration

## 2020-02-29 NOTE — PLAN OF CARE
Problem: Fall Injury Risk  Goal: Absence of Fall and Fall-Related Injury  Outcome: Ongoing, Progressing  Intervention: Identify and Manage Contributors to Fall Injury Risk  Flowsheets (Taken 2/29/2020 0352)  Self-Care Promotion: independence encouraged; BADL personal objects within reach; BADL personal routines maintained  Medication Review/Management: medications reviewed  Intervention: Promote Injury-Free Environment  Flowsheets (Taken 2/29/2020 0352)  Safety Promotion/Fall Prevention: assistive device/personal item within reach; bed alarm set; Fall Risk reviewed with patient/family; medications reviewed; nonskid shoes/socks when out of bed; lighting adjusted; side rails raised x 2; instructed to call staff for mobility  Environmental Safety Modification: assistive device/personal items within reach; clutter free environment maintained; lighting adjusted; room near unit station; room organization consistent     Problem: Adult Inpatient Plan of Care  Goal: Plan of Care Review  Outcome: Ongoing, Progressing  Flowsheets (Taken 2/29/2020 0352)  Plan of Care Reviewed With: patient

## 2020-02-29 NOTE — ASSESSMENT & PLAN NOTE
With bleeding on AV left graft,Bleeding is currently controlled.vascualr surgery is consulted,has stable HH,hold plavix,,ASA at this time.    S/p ligation of left upper extremity fistula, Closure of left upper extremity skin defect x 2 on 2/27.    Now has temporary dialysis cath,  Will have HD today.

## 2020-02-29 NOTE — SUBJECTIVE & OBJECTIVE
Interval History: No new issues.     Review of Systems   Constitutional: Negative for activity change.   HENT: Negative for congestion.    Respiratory: Negative for chest tightness and shortness of breath.    Cardiovascular: Negative for chest pain.   Gastrointestinal: Negative for abdominal pain.   Genitourinary: Negative for difficulty urinating.   Musculoskeletal: Negative for arthralgias.   Psychiatric/Behavioral: Negative for agitation.     Objective:     Vital Signs (Most Recent):  Temp: 98.6 °F (37 °C) (02/29/20 0739)  Pulse: 87 (02/29/20 0739)  Resp: 17 (02/29/20 0739)  BP: (!) 147/82 (02/29/20 0739)  SpO2: 99 % (02/29/20 0739) Vital Signs (24h Range):  Temp:  [98.3 °F (36.8 °C)-100.8 °F (38.2 °C)] 98.6 °F (37 °C)  Pulse:  [] 87  Resp:  [14-21] 17  SpO2:  [96 %-99 %] 99 %  BP: ()/(55-94) 147/82     Weight: 64.8 kg (142 lb 13.7 oz)  Body mass index is 19.92 kg/m².    Intake/Output Summary (Last 24 hours) at 2/29/2020 1002  Last data filed at 2/29/2020 0820  Gross per 24 hour   Intake 980 ml   Output --   Net 980 ml      Physical Exam   Constitutional: He appears well-developed and well-nourished.   HENT:   Head: Normocephalic and atraumatic.   Cardiovascular: Normal rate and regular rhythm. Exam reveals no friction rub.   No murmur heard.  Pulmonary/Chest: Effort normal and breath sounds normal. No stridor. No respiratory distress. He has no wheezes.   Abdominal: Soft.   Neurological: He is alert.   Nursing note and vitals reviewed.      Significant Labs:   BMP:   Recent Labs   Lab 02/29/20  0408   *   *   K 4.6      CO2 19*   BUN 36*   CREATININE 7.0*   CALCIUM 8.4*     CBC:   Recent Labs   Lab 02/28/20  0322 02/29/20  0408   WBC 6.17 4.42   HGB 9.6* 14.7   HCT 30.1* 45.0   * 69*       Significant Imaging:

## 2020-02-29 NOTE — PROGRESS NOTES
Recommend continued dressing until 3/1/20. Keep arm clean and dry. Transition to tunneled catheter by IR. No further vascular surgical intervention at this time. Follow up with me in 1-2 weeks for wound check and evaluation for long term HD access with vein mapping.

## 2020-02-29 NOTE — PLAN OF CARE
Awaiting results from stat CMP. Post insulin 10 units aspart insulin. Will continues to monitoring hyperglycemia management. Denies pain awake alert oriented x 4

## 2020-03-01 NOTE — PROGRESS NOTES
"Basically does not move from his bed    Awake alert oriented NAD    Denies CNS ENT CP GI  RHEUM OR DERM SX  Past Medical History:   Diagnosis Date    Allergy     Ambulates with cane     Atherosclerosis of aorta     noted on VIKTORIA 2011    Blood transfusion     Cataracts, bilateral     CHF (congestive heart failure)     Chronic kidney disease     Clotting disorder     Coronary artery disease     Decreased appetite     Diabetic retinopathy     DM (diabetes mellitus), type 2 with renal complications     ESRD on hemodialysis     TUESDAY/ THURSDAY/SATURDAY    Hyperlipidemia     Hyperparathyroidism, unspecified     Hypertension     Myocardial infarction     Prostate cancer 2016 7/16/19:  wife states "he doesn't have it anymore"    Retinopathy due to secondary diabetes     Seizures     Stroke 2011    PT HAS SHAKES     Unsteady gait     Uses roller walker      Review of patient's allergies indicates:   Allergen Reactions    Reglan [metoclopramide hcl] Diarrhea    Lorazepam      Other reaction(s): heavy sedation       Current Facility-Administered Medications   Medication    acetaminophen tablet 650 mg    atorvastatin tablet 40 mg    carvediloL tablet 25 mg    clopidogreL tablet 75 mg    dextrose 50% injection 12.5 g    diltiaZEM 24 hr capsule 180 mg    epoetin megan-epbx injection 10,000 Units    glucagon (human recombinant) injection 1 mg    glucose chewable tablet 16 g    heparin (porcine) injection 5,000 Units    heparin (porcine) injection 5,000 Units    insulin aspart U-100 pen 1-10 Units    insulin aspart U-100 pen 8 Units    insulin detemir U-100 pen 25 Units    megestrol tablet 20 mg    ondansetron injection 8 mg    promethazine (PHENERGAN) 6.25 mg in dextrose 5 % 50 mL IVPB    sodium chloride 0.9% flush 10 mL       LABS    Recent Results (from the past 24 hour(s))   POCT glucose    Collection Time: 02/29/20  3:31 PM   Result Value Ref Range    POCT Glucose 110 70 - 110 " mg/dL   POCT glucose    Collection Time: 02/29/20  7:23 PM   Result Value Ref Range    POCT Glucose 257 (H) 70 - 110 mg/dL   Basic metabolic panel    Collection Time: 03/01/20  4:11 AM   Result Value Ref Range    Sodium 140 136 - 145 mmol/L    Potassium 4.3 3.5 - 5.1 mmol/L    Chloride 102 95 - 110 mmol/L    CO2 27 23 - 29 mmol/L    Glucose 337 (H) 70 - 110 mg/dL    BUN, Bld 16 8 - 23 mg/dL    Creatinine 4.2 (H) 0.5 - 1.4 mg/dL    Calcium 8.8 8.7 - 10.5 mg/dL    Anion Gap 11 8 - 16 mmol/L    eGFR if African American 16 (A) >60 mL/min/1.73 m^2    eGFR if non African American 14 (A) >60 mL/min/1.73 m^2   CBC auto differential    Collection Time: 03/01/20  5:58 AM   Result Value Ref Range    WBC 3.65 (L) 3.90 - 12.70 K/uL    RBC 4.81 4.60 - 6.20 M/uL    Hemoglobin 14.0 14.0 - 18.0 g/dL    Hematocrit 42.7 40.0 - 54.0 %    Mean Corpuscular Volume 89 82 - 98 fL    Mean Corpuscular Hemoglobin 29.1 27.0 - 31.0 pg    Mean Corpuscular Hemoglobin Conc 32.8 32.0 - 36.0 g/dL    RDW 15.5 (H) 11.5 - 14.5 %    Platelets 65 (L) 150 - 350 K/uL    MPV 11.5 9.2 - 12.9 fL    Immature Granulocytes 1.1 (H) 0.0 - 0.5 %    Gran # (ANC) 2.6 1.8 - 7.7 K/uL    Immature Grans (Abs) 0.04 0.00 - 0.04 K/uL    Lymph # 0.5 (L) 1.0 - 4.8 K/uL    Mono # 0.4 0.3 - 1.0 K/uL    Eos # 0.1 0.0 - 0.5 K/uL    Baso # 0.02 0.00 - 0.20 K/uL    nRBC 0 0 /100 WBC    Gran% 71.0 38.0 - 73.0 %    Lymph% 13.7 (L) 18.0 - 48.0 %    Mono% 11.5 4.0 - 15.0 %    Eosinophil% 2.2 0.0 - 8.0 %    Basophil% 0.5 0.0 - 1.9 %    Differential Method Automated    POCT glucose    Collection Time: 03/01/20  8:09 AM   Result Value Ref Range    POCT Glucose 341 (H) 70 - 110 mg/dL   POCT glucose    Collection Time: 03/01/20 12:05 PM   Result Value Ref Range    POCT Glucose 188 (H) 70 - 110 mg/dL   ]    I/O last 3 completed shifts:  In: 1480 [P.O.:480; I.V.:500; Other:500]  Out: 1601 [Other:1601]    Vitals:    03/01/20 0811 03/01/20 0819 03/01/20 0849 03/01/20 1204   BP: (!) 192/98  (!)  179/91 (!) 152/82   Pulse: 87  86 86   Resp: 18   18   Temp: 97.4 °F (36.3 °C)   97.6 °F (36.4 °C)   TempSrc: Oral   Axillary   SpO2: 97% 97%  99%   Weight:       Height:           No Jvd, Thyromegaly or Lymphadenopathy  Lungs: Fairly clear anteriorly and laterally  Cor: RRR no G or rubs  Abd: Soft benign good bowel sounds non tender  Ext: No E C C    A)    ESRD on chronic HD TTS  Ligated AVF HD cath in  HyperK   HTN  DM  2nd hyperpth  Anemia of ckd     P)  Renal Diet  Home meds  Protect temp access  HD TTS  EPO prn  Binders prn  Adjust all meds to the degree of renal fx  Close follow up I/O and weights  Maintain Hydration

## 2020-03-01 NOTE — NURSING
Spoke with Dr. Villalta - torin IV metoprolol at this time, okay to give meds Patient refused on day shift.

## 2020-03-01 NOTE — PLAN OF CARE
03/01/20 1217   Medicare Message   Important Message from Medicare regarding Discharge Appeal Rights Given to patient/caregiver;Signed/date by patient/caregiver;Explained to patient/caregiver   Date IMM was signed 03/01/20   Time IMM was signed 1114

## 2020-03-01 NOTE — PROGRESS NOTES
[facility] Hospital Medicine  Progress Note    Patient Name: Kodi Ibanez Sr.  MRN: [id number]  Patient Class: IP- Inpatient   Admission Date: 2/27/2020  Length of Stay: 3 days  Attending Physician: Rahul Santoyo MD  Primary Care Provider: Melonie Elias MD        Subjective:     Principal Problem:Complication of arteriovenous dialysis fistula        HPI:  Ligation of left upper extremity fistula, Closure of left upper extremity skin defect x 2,,.per ER record,  Patient's wife notes that he has had trouble controlling bleeding after dialysis on 2 visits in the last week and half.  His last dialysis was 2 days ago.  Patient was well prior to this.  Denies trauma. Per ER record,The wife states that she has been trying to get touch this vascular surgeon about her concerned about difficulty controlling the bleeding after his sessions.  Patient is not taking his antihypertensive medications this evening.  Patient denies any weakness or dizziness.  No shortness of breath.  No diaphoresis.  No near syncope.  No change in medications.  Patient takes Plavix and ASA for old CVA and CAD, He is due for dialysis today, His wife placed a occlusive dressing over the and wrapped it with tape before coming to the ER.  Bleeding is currently controlled.vascualr surgery is consulted,he has potassium of 6.8,EKG show no peacked T wave,deneis chest pain and SOB,he received albuterol and insulin in ER,nephrology is consulted for HD,he had blood sugar over 500,improved with IV insulin.    Overview/Hospital Course:  The patient is a 59 yo M who is a T, TH, Sat dialysis patient- presents to the hospital with a CC of L AV graft bleeding. Vascular was consulted.  Patient went for a ligation of left upper extremity fistula, Closure of left upper extremity skin defect. The patient was sent to the ICU after due to labile BP requiring short course of pressor support. He was weaned off and now has stable. BP. He was sent  to the floor on 2/28.  PT/OT were consulted as the patient stated he walks at home with a walker.   Consulted IR for perma cath in AM,NPO past MN.    Interval History: No new issues.     Review of Systems   Constitutional: Negative for activity change.   HENT: Negative for congestion.    Respiratory: Negative for chest tightness and shortness of breath.    Cardiovascular: Negative for chest pain.   Gastrointestinal: Negative for abdominal pain.   Genitourinary: Negative for difficulty urinating.   Musculoskeletal: Negative for arthralgias.   Psychiatric/Behavioral: Negative for agitation.     Objective:     Vital Signs (Most Recent):  Temp: 97.4 °F (36.3 °C) (03/01/20 0811)  Pulse: 86 (03/01/20 0849)  Resp: 18 (03/01/20 0811)  BP: (!) 179/91 (03/01/20 0849)  SpO2: 97 % (03/01/20 0811) Vital Signs (24h Range):  Temp:  [97.4 °F (36.3 °C)-99.5 °F (37.5 °C)] 97.4 °F (36.3 °C)  Pulse:  [] 86  Resp:  [17-20] 18  SpO2:  [95 %-99 %] 97 %  BP: (102-192)/() 179/91     Weight: 64.8 kg (142 lb 13.7 oz)  Body mass index is 19.92 kg/m².    Intake/Output Summary (Last 24 hours) at 3/1/2020 0934  Last data filed at 3/1/2020 0824  Gross per 24 hour   Intake 980 ml   Output 1601 ml   Net -621 ml      Physical Exam   Constitutional: He appears well-developed and well-nourished.   HENT:   Head: Normocephalic and atraumatic.   Cardiovascular: Normal rate and regular rhythm. Exam reveals no friction rub.   No murmur heard.  Pulmonary/Chest: Effort normal and breath sounds normal. No stridor. No respiratory distress. He has no wheezes.   Abdominal: Soft.   Neurological: He is alert.   Nursing note and vitals reviewed.      Significant Labs:   BMP:   Recent Labs   Lab 03/01/20  0411   *      K 4.3      CO2 27   BUN 16   CREATININE 4.2*   CALCIUM 8.8     CBC:   Recent Labs   Lab 02/29/20  0408 03/01/20  0558   WBC 4.42 3.65*   HGB 14.7 14.0   HCT 45.0 42.7   PLT 69* 65*       Significant  Imaging:      Assessment/Plan:      * Complication of arteriovenous dialysis fistula  With bleeding on AV left graft,Bleeding is currently controlled.vascualr surgery is consulted,has stable HH,hold plavix,,ASA at this time.    S/p ligation of left upper extremity fistula, Closure of left upper extremity skin defect x 2 on 2/27.    Now has temporary dialysis cath,  Consulted IR for perma cath in AM,NPO past MN.      Essential hypertension  Resumed home medications,stable.      Prostate cancer  Follow up with Urology as out patient.      S/P CABG (coronary artery bypass graft)  ASA,plavix at this time.      Uncontrolled type 2 diabetes mellitus with end-stage renal disease  On SSI,basal insulin. Uncontrolled with hyperglycemia  Will order an A1c.       Anemia of chronic disease  Stable,will monitor.      Hyperlipidemia LDL goal <100  On statin.      Vascular dementia with depressed mood    Appear is on baseline.    Cognitive deficits as late effect of cerebrovascular disease  Appear to be on baseline,will monitor.      History of stroke  On statin at this time.      ESRD on hemodialysis  Nephrology is consulted for HD.    T,TH,Sat. Now has temporary line         VTE Risk Mitigation (From admission, onward)         Ordered     heparin (porcine) injection 5,000 Units  Every 12 hours      02/28/20 1305     heparin (porcine) injection 5,000 Units  As needed (PRN)      02/28/20 0025     IP VTE HIGH RISK PATIENT  Once      02/27/20 0540                      Rahul Santoyo MD  Department of Hospital Medicine   [facility]

## 2020-03-01 NOTE — SUBJECTIVE & OBJECTIVE
Interval History: No new issues.     Review of Systems   Constitutional: Negative for activity change.   HENT: Negative for congestion.    Respiratory: Negative for chest tightness and shortness of breath.    Cardiovascular: Negative for chest pain.   Gastrointestinal: Negative for abdominal pain.   Genitourinary: Negative for difficulty urinating.   Musculoskeletal: Negative for arthralgias.   Psychiatric/Behavioral: Negative for agitation.     Objective:     Vital Signs (Most Recent):  Temp: 97.4 °F (36.3 °C) (03/01/20 0811)  Pulse: 86 (03/01/20 0849)  Resp: 18 (03/01/20 0811)  BP: (!) 179/91 (03/01/20 0849)  SpO2: 97 % (03/01/20 0811) Vital Signs (24h Range):  Temp:  [97.4 °F (36.3 °C)-99.5 °F (37.5 °C)] 97.4 °F (36.3 °C)  Pulse:  [] 86  Resp:  [17-20] 18  SpO2:  [95 %-99 %] 97 %  BP: (102-192)/() 179/91     Weight: 64.8 kg (142 lb 13.7 oz)  Body mass index is 19.92 kg/m².    Intake/Output Summary (Last 24 hours) at 3/1/2020 0934  Last data filed at 3/1/2020 0824  Gross per 24 hour   Intake 980 ml   Output 1601 ml   Net -621 ml      Physical Exam   Constitutional: He appears well-developed and well-nourished.   HENT:   Head: Normocephalic and atraumatic.   Cardiovascular: Normal rate and regular rhythm. Exam reveals no friction rub.   No murmur heard.  Pulmonary/Chest: Effort normal and breath sounds normal. No stridor. No respiratory distress. He has no wheezes.   Abdominal: Soft.   Neurological: He is alert.   Nursing note and vitals reviewed.      Significant Labs:   BMP:   Recent Labs   Lab 03/01/20  0411   *      K 4.3      CO2 27   BUN 16   CREATININE 4.2*   CALCIUM 8.8     CBC:   Recent Labs   Lab 02/29/20  0408 03/01/20  0558   WBC 4.42 3.65*   HGB 14.7 14.0   HCT 45.0 42.7   PLT 69* 65*       Significant Imaging:

## 2020-03-01 NOTE — ASSESSMENT & PLAN NOTE
With bleeding on AV left graft,Bleeding is currently controlled.vascualr surgery is consulted,has stable HH,hold plavix,,ASA at this time.    S/p ligation of left upper extremity fistula, Closure of left upper extremity skin defect x 2 on 2/27.    Now has temporary dialysis cath,  Consulted IR for perma cath in AM,NPO past MN.

## 2020-03-01 NOTE — PLAN OF CARE
Problem: Fall Injury Risk  Goal: Absence of Fall and Fall-Related Injury  Outcome: Ongoing, Progressing  Intervention: Identify and Manage Contributors to Fall Injury Risk  Flowsheets (Taken 3/1/2020 0051)  Self-Care Promotion: independence encouraged; BADL personal objects within reach; BADL personal routines maintained  Medication Review/Management: medications reviewed  Intervention: Promote Injury-Free Environment  Flowsheets (Taken 3/1/2020 0051)  Safety Promotion/Fall Prevention: assistive device/personal item within reach; bed alarm set; Fall Risk reviewed with patient/family; lighting adjusted; medications reviewed; nonskid shoes/socks when out of bed; side rails raised x 2; instructed to call staff for mobility  Environmental Safety Modification: assistive device/personal items within reach; clutter free environment maintained; lighting adjusted; room organization consistent     Problem: Adult Inpatient Plan of Care  Goal: Plan of Care Review  Outcome: Ongoing, Progressing  Flowsheets (Taken 3/1/2020 0051)  Plan of Care Reviewed With: patient     Problem: Diabetes Comorbidity  Goal: Blood Glucose Level Within Desired Range  Outcome: Ongoing, Progressing  Intervention: Maintain Glycemic Control  Flowsheets (Taken 3/1/2020 0051)  Glycemic Management: blood glucose monitoring; supplemental insulin given

## 2020-03-01 NOTE — PT/OT/SLP PROGRESS
Physical Therapy      Patient Name:  Kodi Ibanez Sr.   MRN:  [id number]    Patient not seen today secondary to Patient unwilling to participate. Pt wrapped up under covers. Pt declined therapy saying he didn't feel like it today, and will not do it later either.  Will follow-up as able.    Anjel Gray, PTA

## 2020-03-02 NOTE — PT/OT/SLP PROGRESS
Physical Therapy      Patient Name:  Kodi Ibanez Sr.   MRN:  [id number]    Patient not seen today secondary to Patient unwilling to participate(Pt reports he just got back and doesn't feel like getting up). Pt educated on role of PT while in hospital.  Pt educated on importance of OOB mobility to improve function/endurance and required strong encouragement for participation.  However, pt continued to decline despite max education/encouragement. Will follow-up as able. Pt's nurse, Jeanie, notified.    Ruthy Ward, PTA

## 2020-03-02 NOTE — PROGRESS NOTES
[facility] Hospital Medicine  Progress Note    Patient Name: Kodi Ibanez Sr.  MRN: [id number]  Patient Class: IP- Inpatient   Admission Date: 2/27/2020  Length of Stay: 4 days  Attending Physician: Rahul Santoyo MD  Primary Care Provider: Melonie Elias MD        Subjective:     Principal Problem:Complication of arteriovenous dialysis fistula        HPI:  Ligation of left upper extremity fistula, Closure of left upper extremity skin defect x 2,,.per ER record,  Patient's wife notes that he has had trouble controlling bleeding after dialysis on 2 visits in the last week and half.  His last dialysis was 2 days ago.  Patient was well prior to this.  Denies trauma. Per ER record,The wife states that she has been trying to get touch this vascular surgeon about her concerned about difficulty controlling the bleeding after his sessions.  Patient is not taking his antihypertensive medications this evening.  Patient denies any weakness or dizziness.  No shortness of breath.  No diaphoresis.  No near syncope.  No change in medications.  Patient takes Plavix and ASA for old CVA and CAD, He is due for dialysis today, His wife placed a occlusive dressing over the and wrapped it with tape before coming to the ER.  Bleeding is currently controlled.vascualr surgery is consulted,he has potassium of 6.8,EKG show no peacked T wave,deneis chest pain and SOB,he received albuterol and insulin in ER,nephrology is consulted for HD,he had blood sugar over 500,improved with IV insulin.    Overview/Hospital Course:  The patient is a 61 yo M who is a T, TH, Sat dialysis patient- presents to the hospital with a CC of L AV graft bleeding. Vascular was consulted.  Patient went for a ligation of left upper extremity fistula, Closure of left upper extremity skin defect. The patient was sent to the ICU after due to labile BP requiring short course of pressor support. He was weaned off and now has stable. BP. He was sent  to the floor on 2/28.  PT/OT were consulted as the patient stated he walks at home with a walker.   Consulted IR for perma cath placement.    Interval History: No new issues.     Review of Systems   Constitutional: Negative for activity change.   HENT: Negative for congestion.    Respiratory: Negative for chest tightness and shortness of breath.    Cardiovascular: Negative for chest pain.   Gastrointestinal: Negative for abdominal pain.   Genitourinary: Negative for difficulty urinating.   Musculoskeletal: Negative for arthralgias.   Psychiatric/Behavioral: Negative for agitation.     Objective:     Vital Signs (Most Recent):  Temp: 98.4 °F (36.9 °C) (03/02/20 0741)  Pulse: 86 (03/02/20 0741)  Resp: 18 (03/02/20 0741)  BP: (!) 141/86 (03/02/20 0741)  SpO2: 99 % (03/02/20 0741) Vital Signs (24h Range):  Temp:  [97.6 °F (36.4 °C)-99 °F (37.2 °C)] 98.4 °F (36.9 °C)  Pulse:  [] 86  Resp:  [17-18] 18  SpO2:  [96 %-100 %] 99 %  BP: (110-152)/(57-86) 141/86     Weight: 64.8 kg (142 lb 13.7 oz)  Body mass index is 19.92 kg/m².    Intake/Output Summary (Last 24 hours) at 3/2/2020 0859  Last data filed at 3/1/2020 1520  Gross per 24 hour   Intake 480 ml   Output --   Net 480 ml      Physical Exam   Constitutional: He appears well-developed and well-nourished.   HENT:   Head: Normocephalic and atraumatic.   Cardiovascular: Normal rate and regular rhythm. Exam reveals no friction rub.   No murmur heard.  Pulmonary/Chest: Effort normal and breath sounds normal. No stridor. No respiratory distress. He has no wheezes.   Abdominal: Soft.   Neurological: He is alert.   Nursing note and vitals reviewed.      Significant Labs:   BMP:   Recent Labs   Lab 03/02/20  0526   *      K 3.6   CL 99   CO2 26   BUN 27*   CREATININE 5.9*   CALCIUM 8.5*     CBC:   Recent Labs   Lab 03/01/20  0558 03/02/20  0526   WBC 3.65* 4.91   HGB 14.0 8.7*   HCT 42.7 26.6*   PLT 65* 156       Significant Imaging:      Assessment/Plan:       * Complication of arteriovenous dialysis fistula  With bleeding on AV left graft,Bleeding is currently controlled.vascualr surgery is consulted,has stable HH,hold plavix,,ASA at this time.    S/p ligation of left upper extremity fistula, Closure of left upper extremity skin defect x 2 on 2/27.    Now has temporary dialysis cath,  Consulted IR for perma cath placement.      Essential hypertension  Resumed home medications,stable.      Prostate cancer  Follow up with Urology as out patient.      S/P CABG (coronary artery bypass graft)  ASA,plavix at this time.      Uncontrolled type 2 diabetes mellitus with end-stage renal disease  On SSI,basal insulin. Uncontrolled with hyperglycemia  Will order an A1c.       Anemia of chronic disease  Stable,will monitor.      Hyperlipidemia LDL goal <100  On statin.      Vascular dementia with depressed mood    Appear is on baseline.    Cognitive deficits as late effect of cerebrovascular disease  Appear to be on baseline,will monitor.      History of stroke  On statin at this time.      ESRD on hemodialysis  Nephrology is consulted for HD.    T,TH,Sat. Now has temporary line         VTE Risk Mitigation (From admission, onward)         Ordered     heparin (porcine) injection 5,000 Units  Every 12 hours      02/28/20 1305     heparin (porcine) injection 5,000 Units  As needed (PRN)      02/28/20 0025     IP VTE HIGH RISK PATIENT  Once      02/27/20 0540                      Rahul Santoyo MD  Department of Hospital Medicine   [facility]

## 2020-03-02 NOTE — PHYSICIAN QUERY
PT Name: Kodi Ibanez .  MR #: [id number]    Physician Query Form - Cause and Effect Relationship Clarification      CDS/: Cat Pearson               Contact information:[email or web address]    This form is a permanent document in the medical record.     Query Date: March 2, 2020    By submitting this query, we are merely seeking further clarification of documentation. Please utilize your independent clinical judgment when addressing the question(s) below.    The Medical record contains the following:  Supporting Clinical Findings   Location in record   Complication of arteriovenous dialysis fistula  With bleeding on AV left graft,Bleeding is currently controlled.vascualr surgery is consulted,has stable HH,hold plavix,,ASA at this time.                                                                                            Patient takes Plavix and ASA for old CVA and CAD                                                                                        H&P                                                                                                                                                                                                       Provider, please clarify if there is any correlation between Bleeding on AV left graft and Anticoagulant.           Are the conditions:      [  ] Due to or associated with each other    x ] Unrelated to each other   [  ] Other (Please Specify): _________________________   [  ] Clinically Undetermined

## 2020-03-02 NOTE — SEDATION DOCUMENTATION
Report called to BASHIR Ware. Tunnel cath placed to R EJ; nelson catheter removed per MD. Tolerated procedure well. Both ports heparin locked. Site with suture, gauze and tegaderm, CDI, no redness, swelling or hematoma noted. FELICITAS. CASSI. Transported to floor per RN and transport.

## 2020-03-02 NOTE — PROGRESS NOTES
"Awake alert oriented NAD    Denies CNS ENT CP GI  RHEUM OR DERM SX  Past Medical History:   Diagnosis Date    Allergy     Ambulates with cane     Atherosclerosis of aorta     noted on VIKTORIA 2011    Blood transfusion     Cataracts, bilateral     CHF (congestive heart failure)     Chronic kidney disease     Clotting disorder     Coronary artery disease     Decreased appetite     Diabetic retinopathy     DM (diabetes mellitus), type 2 with renal complications     ESRD on hemodialysis     TUESDAY/ THURSDAY/SATURDAY    Hyperlipidemia     Hyperparathyroidism, unspecified     Hypertension     Myocardial infarction     Prostate cancer 2016 7/16/19:  wife states "he doesn't have it anymore"    Retinopathy due to secondary diabetes     Seizures     Stroke 2011    PT HAS SHAKES     Unsteady gait     Uses roller walker      Review of patient's allergies indicates:   Allergen Reactions    Reglan [metoclopramide hcl] Diarrhea    Lorazepam      Other reaction(s): heavy sedation       Current Facility-Administered Medications   Medication    acetaminophen tablet 650 mg    atorvastatin tablet 40 mg    carvediloL tablet 25 mg    clopidogreL tablet 75 mg    dextrose 50% injection 12.5 g    diltiaZEM 24 hr capsule 180 mg    glucagon (human recombinant) injection 1 mg    glucose chewable tablet 16 g    heparin (porcine) injection 5,000 Units    heparin (porcine) injection 5,000 Units    insulin aspart U-100 pen 1-10 Units    insulin aspart U-100 pen 8 Units    insulin detemir U-100 pen 20 Units    megestrol tablet 20 mg    ondansetron injection 8 mg    promethazine (PHENERGAN) 6.25 mg in dextrose 5 % 50 mL IVPB    sodium chloride 0.9% flush 10 mL       LABS    Recent Results (from the past 24 hour(s))   POCT glucose    Collection Time: 03/01/20 12:05 PM   Result Value Ref Range    POCT Glucose 188 (H) 70 - 110 mg/dL   POCT glucose    Collection Time: 03/01/20  3:42 PM   Result Value Ref Range "    POCT Glucose 119 (H) 70 - 110 mg/dL   POCT glucose    Collection Time: 03/01/20  7:41 PM   Result Value Ref Range    POCT Glucose 139 (H) 70 - 110 mg/dL   CBC auto differential    Collection Time: 03/02/20  5:26 AM   Result Value Ref Range    WBC 4.91 3.90 - 12.70 K/uL    RBC 2.97 (L) 4.60 - 6.20 M/uL    Hemoglobin 8.7 (L) 14.0 - 18.0 g/dL    Hematocrit 26.6 (L) 40.0 - 54.0 %    Mean Corpuscular Volume 90 82 - 98 fL    Mean Corpuscular Hemoglobin 29.3 27.0 - 31.0 pg    Mean Corpuscular Hemoglobin Conc 32.7 32.0 - 36.0 g/dL    RDW 15.4 (H) 11.5 - 14.5 %    Platelets 156 150 - 350 K/uL    MPV 10.8 9.2 - 12.9 fL    Immature Granulocytes 0.4 0.0 - 0.5 %    Gran # (ANC) 3.4 1.8 - 7.7 K/uL    Immature Grans (Abs) 0.02 0.00 - 0.04 K/uL    Lymph # 0.8 (L) 1.0 - 4.8 K/uL    Mono # 0.6 0.3 - 1.0 K/uL    Eos # 0.1 0.0 - 0.5 K/uL    Baso # 0.02 0.00 - 0.20 K/uL    nRBC 0 0 /100 WBC    Gran% 69.5 38.0 - 73.0 %    Lymph% 16.1 (L) 18.0 - 48.0 %    Mono% 11.2 4.0 - 15.0 %    Eosinophil% 2.4 0.0 - 8.0 %    Basophil% 0.4 0.0 - 1.9 %    Differential Method Automated    Basic metabolic panel    Collection Time: 03/02/20  5:26 AM   Result Value Ref Range    Sodium 136 136 - 145 mmol/L    Potassium 3.6 3.5 - 5.1 mmol/L    Chloride 99 95 - 110 mmol/L    CO2 26 23 - 29 mmol/L    Glucose 256 (H) 70 - 110 mg/dL    BUN, Bld 27 (H) 8 - 23 mg/dL    Creatinine 5.9 (H) 0.5 - 1.4 mg/dL    Calcium 8.5 (L) 8.7 - 10.5 mg/dL    Anion Gap 11 8 - 16 mmol/L    eGFR if African American 11 (A) >60 mL/min/1.73 m^2    eGFR if non African American 9 (A) >60 mL/min/1.73 m^2   POCT glucose    Collection Time: 03/02/20  8:44 AM   Result Value Ref Range    POCT Glucose 255 (H) 70 - 110 mg/dL   ]    I/O last 3 completed shifts:  In: 720 [P.O.:720]  Out: -     Vitals:    03/01/20 2110 03/01/20 2329 03/02/20 0454 03/02/20 0741   BP:  (!) 110/57 (!) 148/78 (!) 141/86   Pulse: 86 84 85 86   Resp: 18 17 18 18   Temp:  99 °F (37.2 °C) 98.3 °F (36.8 °C) 98.4 °F (36.9  °C)   TempSrc:  Oral Oral Oral   SpO2: 99% 100% 98% 99%   Weight:       Height:           No Jvd, Thyromegaly or Lymphadenopathy  Lungs: Fairly clear anteriorly and laterally  Cor: RRR no G or rubs  Abd: Soft benign good bowel sounds non tender  Ext: No E C C    A)    ESRD on chronic HD TTS  Ligated AVF HD cath in  HyperK   HTN  DM  2nd hyperpth  Anemia of ckd     P)  Renal Diet  Home meds  Protect temp access, will need a longer term cath  HD TTS  EPO prn  Binders prn  Adjust all meds to the degree of renal fx  Close follow up I/O and weights  Maintain Hydration

## 2020-03-02 NOTE — PLAN OF CARE
POC continues with no acute concerns. Pt NPO since midnight and surgical bath given to patient. Pt denies pain. New PIV started.  LUE dressing to fistula in place with no  new drainage noted. Pt rested in bed with eyes closed. Will continue to monitor     Problem: Fall Injury Risk  Goal: Absence of Fall and Fall-Related Injury  Outcome: Ongoing, Progressing  Intervention: Identify and Manage Contributors to Fall Injury Risk  Flowsheets (Taken 3/2/2020 0518)  Self-Care Promotion: independence encouraged; BADL personal objects within reach; BADL personal routines maintained  Medication Review/Management: medications reviewed  Intervention: Promote Injury-Free Environment  Flowsheets (Taken 3/2/2020 0518)  Safety Promotion/Fall Prevention: assistive device/personal item within reach; diversional activities provided; Fall Risk reviewed with patient/family; Fall Risk signage in place; side rails raised x 2; instructed to call staff for mobility; medications reviewed  Environmental Safety Modification: assistive device/personal items within reach; lighting adjusted; room near unit station; room organization consistent     Problem: Adult Inpatient Plan of Care  Goal: Plan of Care Review  Outcome: Ongoing, Progressing  Goal: Patient-Specific Goal (Individualization)  Outcome: Ongoing, Progressing  Goal: Absence of Hospital-Acquired Illness or Injury  Outcome: Ongoing, Progressing  Intervention: Identify and Manage Fall Risk  Flowsheets (Taken 3/2/2020 0518)  Safety Promotion/Fall Prevention: assistive device/personal item within reach; diversional activities provided; Fall Risk reviewed with patient/family; Fall Risk signage in place; side rails raised x 2; instructed to call staff for mobility; medications reviewed  Intervention: Prevent VTE (venous thromboembolism)  Flowsheets (Taken 3/2/2020 0518)  VTE Prevention/Management: ambulation promoted; fluids promoted  Goal: Optimal Comfort and Wellbeing  Outcome: Ongoing,  Progressing  Goal: Readiness for Transition of Care  Outcome: Ongoing, Progressing  Goal: Rounds/Family Conference  Outcome: Ongoing, Progressing     Problem: Diabetes Comorbidity  Goal: Blood Glucose Level Within Desired Range  Outcome: Ongoing, Progressing     Problem: Device-Related Complication Risk (Hemodialysis)  Goal: Safe, Effective Therapy Delivery  Outcome: Ongoing, Progressing  Intervention: Optimize Device Care and Function  Flowsheets (Taken 3/2/2020 0518)  Medication Review/Management: medications reviewed     Problem: Hemodynamic Instability (Hemodialysis)  Goal: Vital Signs Remain in Desired Range  Outcome: Ongoing, Progressing     Problem: Infection (Hemodialysis)  Goal: Absence of Infection Signs/Symptoms  Outcome: Ongoing, Progressing  Intervention: Prevent or Manage Infection  Flowsheets (Taken 3/2/2020 0518)  Infection Prevention: environmental surveillance performed; personal protective equipment utilized; rest/sleep promoted  Fever Reduction/Comfort Measures: lightweight clothing; medication administered; lightweight bedding  Infection Management: aseptic technique maintained     Problem: Infection  Goal: Infection Symptom Resolution  Outcome: Ongoing, Progressing  Intervention: Prevent or Manage Infection  Flowsheets (Taken 3/2/2020 0518)  Fever Reduction/Comfort Measures: lightweight clothing; medication administered; lightweight bedding  Infection Management: aseptic technique maintained

## 2020-03-02 NOTE — SUBJECTIVE & OBJECTIVE
Interval History: No new issues.     Review of Systems   Constitutional: Negative for activity change.   HENT: Negative for congestion.    Respiratory: Negative for chest tightness and shortness of breath.    Cardiovascular: Negative for chest pain.   Gastrointestinal: Negative for abdominal pain.   Genitourinary: Negative for difficulty urinating.   Musculoskeletal: Negative for arthralgias.   Psychiatric/Behavioral: Negative for agitation.     Objective:     Vital Signs (Most Recent):  Temp: 98.4 °F (36.9 °C) (03/02/20 0741)  Pulse: 86 (03/02/20 0741)  Resp: 18 (03/02/20 0741)  BP: (!) 141/86 (03/02/20 0741)  SpO2: 99 % (03/02/20 0741) Vital Signs (24h Range):  Temp:  [97.6 °F (36.4 °C)-99 °F (37.2 °C)] 98.4 °F (36.9 °C)  Pulse:  [] 86  Resp:  [17-18] 18  SpO2:  [96 %-100 %] 99 %  BP: (110-152)/(57-86) 141/86     Weight: 64.8 kg (142 lb 13.7 oz)  Body mass index is 19.92 kg/m².    Intake/Output Summary (Last 24 hours) at 3/2/2020 0859  Last data filed at 3/1/2020 1520  Gross per 24 hour   Intake 480 ml   Output --   Net 480 ml      Physical Exam   Constitutional: He appears well-developed and well-nourished.   HENT:   Head: Normocephalic and atraumatic.   Cardiovascular: Normal rate and regular rhythm. Exam reveals no friction rub.   No murmur heard.  Pulmonary/Chest: Effort normal and breath sounds normal. No stridor. No respiratory distress. He has no wheezes.   Abdominal: Soft.   Neurological: He is alert.   Nursing note and vitals reviewed.      Significant Labs:   BMP:   Recent Labs   Lab 03/02/20  0526   *      K 3.6   CL 99   CO2 26   BUN 27*   CREATININE 5.9*   CALCIUM 8.5*     CBC:   Recent Labs   Lab 03/01/20  0558 03/02/20  0526   WBC 3.65* 4.91   HGB 14.0 8.7*   HCT 42.7 26.6*   PLT 65* 156       Significant Imaging:

## 2020-03-02 NOTE — NURSING
Pt aaox3. SSI/scheduled insulin given. Tunneled cath dressing to right chest c/d/i. Pt denies pain, n/v. Has good appetite. Dressing to LUE changed, small amount of clear drainage noted. Call light w/i reach. Bed alarm on.

## 2020-03-02 NOTE — PLAN OF CARE
Problem: Adult Inpatient Plan of Care  Goal: Plan of Care Review  Outcome: Ongoing, Progressing  Flowsheets (Taken 3/1/2020 1811)  Plan of Care Reviewed With: patient   Pt free from falls, injury or any further trauma throughout shift. Pt AAO x 4. Continued medications as ordered. No complaints of pain throughout shift. NPO @ midnight for temporary HD access removal and tunneled cath place. Pt in no distress. Will cont to monitor.

## 2020-03-02 NOTE — PT/OT/SLP PROGRESS
Occupational Therapy      Patient Name:  Kodi Ibanez .   MRN:  [id number]    Patient not seen today secondary to Patient unwilling to participate(refused treatment following procedure to place tunneled catheter). Will follow-up tomorrow.    YANY Wisdom, MS  3/2/2020

## 2020-03-02 NOTE — ASSESSMENT & PLAN NOTE
With bleeding on AV left graft,Bleeding is currently controlled.vascualr surgery is consulted,has stable HH,hold plavix,,ASA at this time.    S/p ligation of left upper extremity fistula, Closure of left upper extremity skin defect x 2 on 2/27.    Now has temporary dialysis cath,  Consulted IR for perma cath placement.

## 2020-03-02 NOTE — PROCEDURES
[facility]  Interventional Radiology  Procedure - Inpatient    Date: 03/02/2020 Time: 2:00 PM    Pre-Op Diagnosis: ESRD on dialysis; indwelling right EJ temporary dialysis catheter    Post-Op Diagnosis: same    Procedure Performed by: Kandy    Assistant:     Procedure: right tunneled dialysis catheter placement    Specimen/Tissue Removed: old temporary dialysis catheter    Estimated Blood Loss: minimal    Procedure Note/Findings: Tunnel created from right chest wall to temporary dialysis catheter entry site. 15.5 F Glidepath catheter tunneled to entry site at right neck.  Old temporary dialysis catheter removed over guidewire. Peel away sheath placed.  New dialysis catheter inserted and advanced to cavoatrial junction.  Catheter secured to patient's skin.  Patient tolerated procedure well.  Full report to follow.            Please refer to dictated report for additional details.

## 2020-03-02 NOTE — CONSULTS
"Inpatient Radiology Pre-procedure Note    History of Present Illness:  Kodi Ibanez Sr. is a 60 y.o. male who presents for tunneled dialysis catheter placement.  Admission H&P reviewed.  Past Medical History:   Diagnosis Date    Allergy     Ambulates with cane     Atherosclerosis of aorta     noted on VIKTORIA 2011    Blood transfusion     Cataracts, bilateral     CHF (congestive heart failure)     Chronic kidney disease     Clotting disorder     Coronary artery disease     Decreased appetite     Diabetic retinopathy     DM (diabetes mellitus), type 2 with renal complications     ESRD on hemodialysis     TUESDAY/ THURSDAY/SATURDAY    Hyperlipidemia     Hyperparathyroidism, unspecified     Hypertension     Myocardial infarction     Prostate cancer 2016 7/16/19:  wife states "he doesn't have it anymore"    Retinopathy due to secondary diabetes     Seizures     Stroke 2011    PT HAS SHAKES     Unsteady gait     Uses roller walker      Past Surgical History:   Procedure Laterality Date    CARDIAC SURGERY      CABG    CATARACT EXTRACTION      ou    CATARACT EXTRACTION, BILATERAL      cataracts      CORONARY ANGIOGRAPHY INCLUDING BYPASS GRAFTS WITH CATHETERIZATION OF LEFT HEART N/A 12/16/2019    Procedure: ANGIOGRAM, CORONARY, INCLUDING BYPASS GRAFT, WITH LEFT HEART CATHETERIZATION, rcfa, 5fr;  Surgeon: David Villegas MD;  Location: [facility] CATH LAB;  Service: Cardiology;  Laterality: N/A;    CORONARY ARTERY BYPASS GRAFT  2009    bypass    EYE SURGERY      Left Guera AVF  1/11/2012    PROSTATE BIOPSY      positive    retinal laser      REVISION OF ARTERIOVENOUS FISTULA Left 2/27/2020    Procedure: Ligation of left upper extremity fistula, Closure of left upper extremity skin defect x 2, Intraoperative ultrasound ;  Surgeon: Stephane Lopez MD;  Location: [facility] OR;  Service: Vascular;  Laterality: Left;    ROTATOR CUFF REPAIR  right, 2005    ULTRASOUND GUIDANCE  12/16/2019    " "Procedure: Ultrasound Guidance;  Surgeon: Dvaid Villegas MD;  Location: [facility] CATH LAB;  Service: Cardiology;;    VASCULAR SURGERY         Review of Systems:   As documented in primary team H&P    Home Meds:   Prior to Admission medications    Medication Sig Start Date End Date Taking? Authorizing Provider   ascorbic acid, vitamin C, (VITAMIN C) 500 MG tablet Take 500 mg by mouth every evening.     Historical Provider, MD   atorvastatin (LIPITOR) 40 MG tablet TAKE 1 TABLET(40 MG) BY MOUTH EVERY DAY 10/30/19   Melonie Elias MD   blood glucose control, normal Soln 1 drop by Misc.(Non-Drug; Combo Route) route as directed. 4/15/16   Melonie Elias MD   blood sugar diagnostic Strp To check BG 2 times daily, to use with accuchek casa meter 2/4/19   Elizabeth Fleming NP   blood-glucose meter kit To check BG 2 times daily, to use with accuchek casa meter 2/4/19 2/4/20  Elizabeth Fleming NP   carvedilol (COREG) 25 MG tablet TAKE 1 TABLET(25 MG) BY MOUTH TWICE DAILY WITH MEALS 10/30/19   Melonie Elias MD   clopidogrel (PLAVIX) 75 mg tablet Take 1 tablet (75 mg total) by mouth once daily. 12/18/19 12/17/20  DARRIAN Delgadillo   diltiaZEM (CARDIZEM CD) 180 MG 24 hr capsule Take 1 capsule (180 mg total) by mouth once daily. 1/20/20   Melonie Elias MD   furosemide (LASIX) 40 MG tablet TAKE 1 TABLET(40 MG) BY MOUTH EVERY DAY 10/30/19   Melonie Elias MD   insulin (LANTUS SOLOSTAR U-100 INSULIN) glargine 100 units/mL (3mL) SubQ pen INJECT 20 UNITS UNDER THE SKIN NIGHTLY 1/20/20   Melonie Elias MD   lancets [facility] To check BG 2 times daily, to use with accuchek casa meter 2/4/19   Elizabeth Fleming NP   losartan (COZAAR) 100 MG tablet Take 1 tablet (100 mg total) by mouth once daily.  Patient not taking: Reported on 1/20/2020 10/30/19   Melonie Elias MD   megestrol (MEGACE) 20 MG Tab Take 1 tablet (20 mg total) by mouth once daily. 10/30/19   Melonie Elias MD   NOVOFINE 30 30 x 1/3 " Ndle USE AS DIRECTED WITH " "LANTUS SOLARSTAR 11/3/13   Wilma Roman MD   NOVOLOG FLEXPEN U-100 INSULIN 100 unit/mL InPn pen INJECT 5 TO 10 UNITS WITH MEALS AND AT BEDTIME DEPENDING ON BLOOD SUGAR 1/7/19   Andrew Null NP   pen needle, diabetic (BD ULTRA-FINE ORIG PEN NEEDLE) 29 gauge x 1/2" Ndle USE  1 EVERY EVENING 12/10/19   Melonie Elias MD   promethazine (PHENERGAN) 25 MG tablet Take 1 tablet (25 mg total) by mouth every 6 (six) hours as needed for Nausea. 1/20/20   Melonie Elias MD     Scheduled Meds:    atorvastatin  40 mg Oral Daily    carvediloL  25 mg Oral BID WM    clopidogreL  75 mg Oral Daily    diltiaZEM  180 mg Oral Daily    heparin (porcine)  5,000 Units Subcutaneous Q12H    insulin aspart U-100  8 Units Subcutaneous TIDWM    insulin detemir U-100  20 Units Subcutaneous Daily    megestrol  20 mg Oral Daily     Continuous Infusions:   PRN Meds:acetaminophen, dextrose 50%, glucagon (human recombinant), glucose, heparin (porcine), insulin aspart U-100, ondansetron, promethazine (PHENERGAN) IVPB, sodium chloride 0.9%  Anticoagulants/Antiplatelets: Plavix    Allergies:   Review of patient's allergies indicates:   Allergen Reactions    Reglan [metoclopramide hcl] Diarrhea    Lorazepam      Other reaction(s): heavy sedation     Sedation Hx: have not been any systemic reactions    Labs:  Recent Labs   Lab 02/27/20  0315   INR 0.9       Recent Labs   Lab 03/02/20  0526   WBC 4.91   HGB 8.7*   HCT 26.6*   MCV 90         Recent Labs   Lab 02/28/20  1850  03/02/20  0526   *   < > 256*   *   < > 136   K 4.6   < > 3.6      < > 99   CO2 17*   < > 26   BUN 32*   < > 27*   CREATININE 6.7*   < > 5.9*   CALCIUM 8.5*   < > 8.5*   ALT 28  --   --    AST 23  --   --    ALBUMIN 3.0*  --   --    BILITOT 0.4  --   --     < > = values in this interval not displayed.         Vitals:  Temp: 98.2 °F (36.8 °C) (03/02/20 1117)  Pulse: 91 (03/02/20 1117)  Resp: 18 (03/02/20 1117)  BP: (!) 163/78 (03/02/20 " 1117)  SpO2: (!) 94 % (03/02/20 1117)     Physical Exam:  ASA: 3  Mallampati: 2    General: no acute distress  Mental Status: alert and oriented to person, place and time  HEENT: normocephalic, atraumatic  Chest: unlabored breathing  Heart: regular heart rate  Abdomen: nondistended  Extremity: moves all extremities    Plan: proceed with tunneled dialysis catheter placement  Sedation Plan: moderate    Nolberto Gaitan MD  Staff Radiologist  Department of Radiology  Pager: [phone]

## 2020-03-03 NOTE — SUBJECTIVE & OBJECTIVE
Medications:  Continuous Infusions:  Scheduled Meds:   atorvastatin  40 mg Oral Daily    carvediloL  25 mg Oral BID WM    clopidogreL  75 mg Oral Daily    diltiaZEM  180 mg Oral Daily    heparin (porcine)  5,000 Units Subcutaneous Q12H    insulin aspart U-100  8 Units Subcutaneous TIDWM    insulin detemir U-100  15 Units Subcutaneous Daily    megestrol  20 mg Oral Daily     PRN Meds:acetaminophen, dextrose 50%, glucagon (human recombinant), glucose, heparin (porcine), insulin aspart U-100, ondansetron, promethazine (PHENERGAN) IVPB, sodium chloride 0.9%     Objective:     Vital Signs (Most Recent):  Temp: 98.7 °F (37.1 °C) (03/03/20 0950)  Pulse: 80 (03/03/20 1230)  Resp: 18 (03/03/20 0950)  BP: (!) 149/109 (03/03/20 1230)  SpO2: 97 % (03/03/20 0748) Vital Signs (24h Range):  Temp:  [98.5 °F (36.9 °C)-98.9 °F (37.2 °C)] 98.7 °F (37.1 °C)  Pulse:  [80-92] 80  Resp:  [16-18] 18  SpO2:  [94 %-98 %] 97 %  BP: ()/() 149/109     Date 03/03/20 0700 - 03/04/20 0659   Shift 6907-4759 1150-7057 8622-3627 24 Hour Total   INTAKE   P.O. 240   240   Shift Total(mL/kg) 240(3.7)   240(3.7)   OUTPUT   Shift Total(mL/kg)       Weight (kg) 64.8 64.8 64.8 64.8       Physical Exam   Constitutional: He is oriented to person, place, and time. He appears well-developed and well-nourished. No distress.   HENT:   Head: Normocephalic and atraumatic.   Eyes: Conjunctivae are normal.   Neck: Neck supple.   Cardiovascular: Normal rate.   Pulses:       Radial pulses are 2+ on the right side, and 2+ on the left side.   LUE AVF without bleeding, no infection   Pulmonary/Chest: Effort normal. No respiratory distress.   Abdominal: Soft. He exhibits no distension and no mass. There is no tenderness. There is no rebound and no guarding. No hernia.   Musculoskeletal: Normal range of motion. He exhibits no deformity.   Neurological: He is alert and oriented to person, place, and time. No sensory deficit.   Skin: Skin is warm and  dry. Capillary refill takes less than 2 seconds. No rash noted. He is not diaphoretic. No erythema. No pallor.   Psychiatric: He has a normal mood and affect.   Vitals reviewed.      Significant Labs:  All pertinent labs from the last 24 hours have been reviewed.    Significant Diagnostics:  I have reviewed all pertinent imaging results/findings within the past 24 hours.

## 2020-03-03 NOTE — PT/OT/SLP PROGRESS
Physical Therapy      Patient Name:  Kodi Ibanez .   MRN:  [id number]    Patient not seen today secondary to Hemodialysis. Will follow-up as time allows.    Anamika Gibbs, PT

## 2020-03-03 NOTE — PLAN OF CARE
3:38pm TN taught Symptoms and Problems for POST OP home care with pt and with teach back:  1. Fever, 2. Chest Pain2. TN placed education sheet in [employer]..   Help at home will be from spouse, Stacey, assisting in pt's recovery.  3:54pm TN informed med surg nurse that pt is cleared for discharge from [facility]. Patient remembers that he got a walker in 2018.     03/03/20 0221   Final Note   Assessment Type Final Discharge Note   Anticipated Discharge Disposition Home-Health   What phone number can be called within the next 1-3 days to see how you are doing after discharge?   (see chart)   Hospital Follow Up  Appt(s) scheduled? Yes   Discharge plans and expectations educations in teach back method with documentation complete? Yes   Right Care Referral Info   Referral Type HOME CARE   Facility Name [facility]   Post-Acute Status   Post-Acute Authorization Home Health/Hospice   Home Health/Hospice Status Authorization Obtained   Discharge Delays None known at this time   .Florinda Ortiz, RN, BSN, STN [facility]  3/3/2020                                           VAN

## 2020-03-03 NOTE — PROGRESS NOTES
"Awake alert oriented NAD    Denies CNS ENT CP GI  RHEUM OR DERM SX  Past Medical History:   Diagnosis Date    Allergy     Ambulates with cane     Atherosclerosis of aorta     noted on VIKTORIA 2011    Blood transfusion     Cataracts, bilateral     CHF (congestive heart failure)     Chronic kidney disease     Clotting disorder     Coronary artery disease     Decreased appetite     Diabetic retinopathy     DM (diabetes mellitus), type 2 with renal complications     ESRD on hemodialysis     TUESDAY/ THURSDAY/SATURDAY    Hyperlipidemia     Hyperparathyroidism, unspecified     Hypertension     Myocardial infarction     Prostate cancer 2016 7/16/19:  wife states "he doesn't have it anymore"    Retinopathy due to secondary diabetes     Seizures     Stroke 2011    PT HAS SHAKES     Unsteady gait     Uses roller walker      Review of patient's allergies indicates:   Allergen Reactions    Reglan [metoclopramide hcl] Diarrhea    Lorazepam      Other reaction(s): heavy sedation       Current Facility-Administered Medications   Medication    acetaminophen tablet 650 mg    atorvastatin tablet 40 mg    carvediloL tablet 25 mg    clopidogreL tablet 75 mg    dextrose 50% injection 12.5 g    diltiaZEM 24 hr capsule 180 mg    glucagon (human recombinant) injection 1 mg    glucose chewable tablet 16 g    heparin (porcine) injection 5,000 Units    heparin (porcine) injection 5,000 Units    insulin aspart U-100 pen 1-10 Units    insulin aspart U-100 pen 8 Units    insulin detemir U-100 pen 15 Units    megestrol tablet 20 mg    ondansetron injection 8 mg    promethazine (PHENERGAN) 6.25 mg in dextrose 5 % 50 mL IVPB    sodium chloride 0.9% flush 10 mL       LABS    Recent Results (from the past 24 hour(s))   POCT glucose    Collection Time: 03/02/20 11:16 AM   Result Value Ref Range    POCT Glucose 236 (H) 70 - 110 mg/dL   POCT glucose    Collection Time: 03/02/20  2:12 PM   Result Value Ref Range "    POCT Glucose 228 (H) 70 - 110 mg/dL   POCT glucose    Collection Time: 03/02/20  4:41 PM   Result Value Ref Range    POCT Glucose 241 (H) 70 - 110 mg/dL   POCT glucose    Collection Time: 03/02/20  8:20 PM   Result Value Ref Range    POCT Glucose 101 70 - 110 mg/dL   CBC auto differential    Collection Time: 03/03/20  5:38 AM   Result Value Ref Range    WBC 5.88 3.90 - 12.70 K/uL    RBC 2.66 (L) 4.60 - 6.20 M/uL    Hemoglobin 7.7 (L) 14.0 - 18.0 g/dL    Hematocrit 24.2 (L) 40.0 - 54.0 %    Mean Corpuscular Volume 91 82 - 98 fL    Mean Corpuscular Hemoglobin 28.9 27.0 - 31.0 pg    Mean Corpuscular Hemoglobin Conc 31.8 (L) 32.0 - 36.0 g/dL    RDW 15.4 (H) 11.5 - 14.5 %    Platelets 155 150 - 350 K/uL    MPV 10.6 9.2 - 12.9 fL    Immature Granulocytes 0.5 0.0 - 0.5 %    Gran # (ANC) 4.5 1.8 - 7.7 K/uL    Immature Grans (Abs) 0.03 0.00 - 0.04 K/uL    Lymph # 0.7 (L) 1.0 - 4.8 K/uL    Mono # 0.5 0.3 - 1.0 K/uL    Eos # 0.1 0.0 - 0.5 K/uL    Baso # 0.01 0.00 - 0.20 K/uL    nRBC 0 0 /100 WBC    Gran% 75.8 (H) 38.0 - 73.0 %    Lymph% 12.4 (L) 18.0 - 48.0 %    Mono% 9.2 4.0 - 15.0 %    Eosinophil% 1.9 0.0 - 8.0 %    Basophil% 0.2 0.0 - 1.9 %    Differential Method Automated    Basic metabolic panel    Collection Time: 03/03/20  5:38 AM   Result Value Ref Range    Sodium 139 136 - 145 mmol/L    Potassium 4.2 3.5 - 5.1 mmol/L    Chloride 101 95 - 110 mmol/L    CO2 25 23 - 29 mmol/L    Glucose 189 (H) 70 - 110 mg/dL    BUN, Bld 38 (H) 8 - 23 mg/dL    Creatinine 7.4 (H) 0.5 - 1.4 mg/dL    Calcium 8.1 (L) 8.7 - 10.5 mg/dL    Anion Gap 13 8 - 16 mmol/L    eGFR if African American 8 (A) >60 mL/min/1.73 m^2    eGFR if non African American 7 (A) >60 mL/min/1.73 m^2   POCT glucose    Collection Time: 03/03/20  7:47 AM   Result Value Ref Range    POCT Glucose 221 (H) 70 - 110 mg/dL   ]    I/O last 3 completed shifts:  In: 240 [P.O.:240]  Out: 300 [Urine:300]    Vitals:    03/02/20 2110 03/03/20 0006 03/03/20 0504 03/03/20 0748   BP:  (!) 142/60 (!) 161/79 (!) 148/83 (!) 163/85   Pulse:  87 90 89   Resp:  16 17 16   Temp:  98.5 °F (36.9 °C) 98.5 °F (36.9 °C) 98.5 °F (36.9 °C)   TempSrc:  Oral Oral Oral   SpO2:  98% 96% 97%   Weight:       Height:           No Jvd, Thyromegaly or Lymphadenopathy  Lungs: Fairly clear anteriorly and laterally  Cor: RRR no G or rubs  Abd: Soft benign good bowel sounds non tender  Ext: No E C C    A)    ESRD on chronic HD TTS  Ligated AVF HD cath in  HyperK   HTN  DM  2nd hyperpth  Anemia of ckd     P)  Renal Diet  Home meds  Protect temp access, will need a longer term cath  HD TTS  EPO prn  Binders prn  Adjust all meds to the degree of renal fx  Close follow up I/O and weights  Maintain Hydration     Home soon    Will need to see vascular surgery as an out pt

## 2020-03-03 NOTE — PROGRESS NOTES
.[facility] WRITTEN HEALTHCARE AND DISCHARGE INFORMATION.  Follow-up Information     Melonie Elias MD On 3/18/2020.    Specialties:  Internal Medicine, Pediatrics  Why:  out patient services: 10:00 AM FOLLOW UP FROM THE HOSPITAL  Contact information:  [address] [facility] [address]  [phone]             DAVE Mazariegos Iii, MD. Call today.    Specialty:  Vascular Surgery  Contact information:  [address] [facility] [address]  [phone]             [facility] On 3/5/2020.    Why:  out patient services: TTS  Contact information:  [address]  [address] [facility] [address] [phone]           Stephane Lopez MD On 3/23/2020.    Specialties:  Vascular Surgery, Surgery  Why:  with vein mapping at 10:40 AM, schedled by Isabella  Contact information:  [address]  [address]  [place] [address]  [phone]             [facility].    Specialty:  Home Health Services  Contact information:  [address]  [address]  [place] [address]  [phone]             [facility] On 3/20/2020.    Why:  at 3:00pm  Contact information:  ultra sound                 Help at Home           [phone]  After discharge for assistance [facility] On Call Nurse Care Line 24/7 Assistance.   Things You are responsible For To Manage Your Care At Home:  1.    Getting your prescriptions filled   2.    Taking your medications as directed, DO NOT MISS ANY DOSES!  3.    Going to your follow-up doctor appointment. This is important because it  allow the doctor to monitor your progress and determine if  any changes need to made to your treatment plan.   Thank you for choosing [facility] for your care. [facility] happy to have the opportunity to serve you.    Sincerely,  Your [facility] Team,  Florinda Ortiz RN, BSN, [facility]  [phone]  3/3/2020

## 2020-03-03 NOTE — ASSESSMENT & PLAN NOTE
-Bleeding LUE AVF with multiple interventions in past including central venous angioplasty, distal fistula stenting and mid fistula stenting; skin breakdown overlying area of bleeding without evidence of infection s/p fistula ligation, unable to plicate/repair primarily due to stent involvement - cont local wound care to LUE incisions with dry dressings daily.  -Will plan for long term dialysis access placement once recovered from current clinical issues - f/u with me in 2 weeks  -Cont HD via R IJ tunneled catheter

## 2020-03-03 NOTE — NURSING
Patient going to scheduled procedure as ordered Dialysis. Patient is AAOx4 without any discomfort.

## 2020-03-03 NOTE — DISCHARGE SUMMARY
[facility] Hospital Medicine  Discharge Summary      Patient Name: Kodi Ibanez Sr.  MRN: [id number]  Admission Date: 2/27/2020  Hospital Length of Stay: 5 days  Discharge Date and Time:  03/03/2020 10:50 AM  Attending Physician: Rahul Santoyo MD   Discharging Provider: Rahul Santoyo MD  Primary Care Provider: Melonie Elias MD      HPI:   60 years old male with history of ESRD,DM,HTN,hyperlipidemia,old CVA,vascualr dementia,presents for evaluation from bleeding from his dialysis graft that started while sleeping last night,.per ER record,  Patient's wife notes that he has had trouble controlling bleeding after dialysis on 2 visits in the last week and half.  His last dialysis was 2 days ago.  Patient was well prior to this.  Denies trauma. Per ER record,The wife states that she has been trying to get touch this vascular surgeon about her concerned about difficulty controlling the bleeding after his sessions.  Patient is not taking his antihypertensive medications this evening.  Patient denies any weakness or dizziness.  No shortness of breath.  No diaphoresis.  No near syncope.  No change in medications.  Patient takes Plavix and ASA for old CVA and CAD, He is due for dialysis today, His wife placed a occlusive dressing over the and wrapped it with tape before coming to the ER.  Bleeding is currently controlled.vascualr surgery is consulted,he has potassium of 6.8,EKG show no peacked T wave,deneis chest pain and SOB,he received albuterol and insulin in ER,nephrology is consulted for HD,he had blood sugar over 500,improved with IV insulin.    Procedure(s) (LRB):  Ligation of left upper extremity fistula, Closure of left upper extremity skin defect x 2, Intraoperative ultrasound  (Left)      Hospital Course:   The patient is a 61 yo M who is a T, TH, Sat dialysis patient- presents to the hospital with a CC of L AV graft bleeding. Vascular was consulted.  Patient went for a  ligation of left upper extremity fistula, Closure of left upper extremity skin defect. The patient was sent to the ICU after due to labile BP requiring short course of pressor support.blood culture was done,no growth,recieved IV Abx, He was weaned off and then  has stable. BP. He was sent to the floor on 2/28.  PT/OT were consulted as the patient stated he walks at home with a walker.IR was consulted temporary sai was placed,at arrival he had Hyperkalemia,which is resolved with medical treatment and  HD,he remains stable on floor,no more bleeding on left arm was seen,he did well with PT,OT.  Consulted IR for perma cath placement.he had perma cath,nephrology did HD.  Patient was discharged home with HH and PCP and vascular surgery follow up.     Consults:   Consults (From admission, onward)        Status Ordering Provider     Inpatient consult to Interventional Radiology  Once     Provider:  (Not yet assigned)    Completed KSENIA HALL     Inpatient consult to Interventional Radiology  Once     Provider:  (Not yet assigned)    Completed KSENIA HALL     Inpatient consult to Interventional Radiology  Once     Provider:  Elvis Ayon MD    Completed KRISTOFER CHAO     Inpatient consult to Nephrology  Once     Provider:  Russ Escamilla MD    Completed KSENIA HALL     Inpatient consult to Social Work  Once     Provider:  (Not yet assigned)    Acknowledged KSENIA HALL     Inpatient consult to Vascular Surgery  Once     Provider:  Kristofer Chao MD    Completed LEXIS MERIDA     IP consult to case management  Once     Provider:  (Not yet assigned)    Acknowledged RUEL DIAZ          No new Assessment & Plan notes have been filed under this hospital service since the last note was generated.  Service: Hospital Medicine    Final Active Diagnoses:    Diagnosis Date Noted POA    PRINCIPAL PROBLEM:  Complication of arteriovenous dialysis  fistula [T82.9XXA] 02/27/2020 Yes    Debility [R53.81] 02/28/2020 Yes    Essential hypertension [I10] 02/27/2020 Yes    Prostate cancer [C61]  Yes    S/P CABG (coronary artery bypass graft) [Z95.1] 06/04/2015 Not Applicable     Chronic    Uncontrolled type 2 diabetes mellitus with end-stage renal disease [E11.22, N18.6, E11.65] 08/04/2014 Yes    Hyperlipidemia LDL goal <100 [E78.5] 10/17/2013 Yes     Chronic    Anemia of chronic disease [D63.8] 10/17/2013 Yes     Chronic    ESRD on hemodialysis [N18.6, Z99.2] 07/13/2012 Not Applicable    Cognitive deficits as late effect of cerebrovascular disease [I69.919] 06/26/2012 Not Applicable     Chronic    Vascular dementia with depressed mood [F01.51, F32.9] 01/15/2012 Yes    History of stroke [Z86.73] 11/12/2011 Not Applicable     Chronic      Problems Resolved During this Admission:    Diagnosis Date Noted Date Resolved POA    Hyperkalemia [E87.5] 02/27/2020 02/28/2020 Yes       Discharged Condition: stable    [place] [facility] [place] [facility] [place]    Follow Up:  Follow-up Information     Melonie Elias MD. Call in 1 day.    Specialties:  Internal Medicine, Pediatrics  Contact information:  [address] [facility] [place] [address] [phone]             DAVE Mazariegos Iii, MD. Call today.    Specialty:  Vascular Surgery  Contact information:  [address] [facility] [address]  [phone]             Melonie Elias MD In 1 week.    Specialties:  Internal Medicine, Pediatrics  Contact information:  [address] [facility] [place] [address] [phone]             Stephane Lopez MD.    Specialties:  Vascular Surgery, Surgery  Contact information:  [address]  [address]  [place] [id number] [phone]             Melonie Elias MD.    Specialties:  Internal Medicine, Pediatrics  Contact information:  [address] [facility] [place] [address] [phone]                 Patient Instructions:      Activity as tolerated       Significant Diagnostic  Studies: Labs:   BMP:   Recent Labs   Lab 03/02/20  0526 03/03/20  0538   * 189*    139   K 3.6 4.2   CL 99 101   CO2 26 25   BUN 27* 38*   CREATININE 5.9* 7.4*   CALCIUM 8.5* 8.1*   , CMP   Recent Labs   Lab 03/02/20  0526 03/03/20  0538    139   K 3.6 4.2   CL 99 101   CO2 26 25   * 189*   BUN 27* 38*   CREATININE 5.9* 7.4*   CALCIUM 8.5* 8.1*   ANIONGAP 11 13   ESTGFRAFRICA 11* 8*   EGFRNONAA 9* 7*    and CBC   Recent Labs   Lab 03/02/20  0526 03/03/20  0538   WBC 4.91 5.88   HGB 8.7* 7.7*   HCT 26.6* 24.2*    155     Microbiology:   Blood Culture   Lab Results   Component Value Date    LABBLOO No Growth to date 02/28/2020    LABBLOO No Growth to date 02/28/2020    LABBLOO No Growth to date 02/28/2020    LABBLOO No Growth to date 02/28/2020    LABBLOO No Growth to date 02/28/2020    LABBLOO No Growth to date 02/28/2020    LABBLOO No Growth to date 02/28/2020    LABBLOO No Growth to date 02/28/2020     Radiology: X-Ray: CXR: X-Ray Chest 1 View (CXR): No results found for this visit on 02/27/20. and X-Ray Chest PA and Lateral (CXR): No results found for this visit on 02/27/20.    Pending Diagnostic Studies:     None         Medications:  Reconciled Home Medications:      Medication List      CONTINUE taking these medications    atorvastatin 40 MG tablet  Commonly known as:  LIPITOR  TAKE 1 TABLET(40 MG) BY MOUTH EVERY DAY     blood glucose control, normal Soln  1 drop by Misc.(Non-Drug; Combo Route) route as directed.     blood sugar diagnostic Strp  To check BG 2 times daily, to use with accuchek casa meter     blood-glucose meter kit  To check BG 2 times daily, to use with accuchek casa meter     carvediloL 25 MG tablet  Commonly known as:  COREG  TAKE 1 TABLET(25 MG) BY MOUTH TWICE DAILY WITH MEALS     clopidogreL 75 mg tablet  Commonly known as:  PLAVIX  Take 1 tablet (75 mg total) by mouth once daily.     diltiaZEM 180 MG 24 hr capsule  Commonly known as:  CARDIZEM CD  Take 1  "capsule (180 mg total) by mouth once daily.     furosemide 40 MG tablet  Commonly known as:  LASIX  TAKE 1 TABLET(40 MG) BY MOUTH EVERY DAY     insulin glargine 100 units/mL (3mL) SubQ pen  Commonly known as:  Lantus Solostar U-100 Insulin  INJECT 20 UNITS UNDER THE SKIN NIGHTLY     lancets Misc  To check BG 2 times daily, to use with accuchek casa meter     losartan 100 MG tablet  Commonly known as:  COZAAR  Take 1 tablet (100 mg total) by mouth once daily.     megestrol 20 MG Tab  Commonly known as:  MEGACE  Take 1 tablet (20 mg total) by mouth once daily.     NovoFine 30 30 gauge x 1/3" Ndle  Generic drug:  pen needle, diabetic  USE AS DIRECTED WITH LANTUS SOLARSTAR     NovoLOG Flexpen U-100 Insulin 100 unit/mL (3 mL) Inpn pen  Generic drug:  insulin aspart U-100  INJECT 5 TO 10 UNITS WITH MEALS AND AT BEDTIME DEPENDING ON BLOOD SUGAR     pen needle, diabetic 29 gauge x 1/2" Ndle  Commonly known as:  BD Ultra-Fine Orig Pen Needle  USE  1 EVERY EVENING     promethazine 25 MG tablet  Commonly known as:  PHENERGAN  Take 1 tablet (25 mg total) by mouth every 6 (six) hours as needed for Nausea.     Vitamin C 500 MG tablet  Generic drug:  ascorbic acid (vitamin C)  Take 500 mg by mouth every evening.            Indwelling Lines/Drains at time of discharge:   Lines/Drains/Airways     Central Venous Catheter Line                 Hemodialysis Catheter 03/02/20 1333 external jugular less than 1 day          Drain                 Hemodialysis AV Fistula 04/01/17 0800 Left upper arm 1067 days                Time spent on the discharge of patient: over 30  minutes  Patient was seen and examined on the date of discharge and determined to be suitable for discharge.         Rahul Santoyo MD  Department of Hospital Medicine  [facility]  "

## 2020-03-03 NOTE — PLAN OF CARE
Problem: Fall Injury Risk  Goal: Absence of Fall and Fall-Related Injury  Outcome: Met     Problem: Diabetes Comorbidity  Goal: Blood Glucose Level Within Desired Range  Outcome: Ongoing, Not Progressing     Problem: Device-Related Complication Risk (Hemodialysis)  Goal: Safe, Effective Therapy Delivery  Outcome: Ongoing, Progressing     Problem: Hemodynamic Instability (Hemodialysis)  Goal: Vital Signs Remain in Desired Range  Outcome: Ongoing, Progressing     Problem: Infection (Hemodialysis)  Goal: Absence of Infection Signs/Symptoms  Outcome: Ongoing, Progressing

## 2020-03-03 NOTE — PLAN OF CARE
[facility]    HOME HEALTH ORDERS  FACE TO FACE ENCOUNTER    Patient Name: Kodi Ibanez Sr.  YOB: 1960    PCP: Melonie Elias MD   PCP Address: [address] / [place] [address]  PCP Phone Number: [phone]  PCP Fax: [phone]    Encounter Date: 03/03/2020    Admit to Home Health    Diagnoses:  Active Hospital Problems    Diagnosis  POA    *Complication of arteriovenous dialysis fistula [T82.9XXA]  Yes     Priority: 1 - High    Debility [R53.81]  Yes     Walks with a walker at home.  Currently does not have H/H.    Will order PT/OT.       Essential hypertension [I10]  Yes    Prostate cancer [C61]  Yes     - 9/2016: followed by urology, Dr. Hall  - opted for watchful waiting       S/P CABG (coronary artery bypass graft) [Z95.1]  Not Applicable     Chronic     2008 EJ      Uncontrolled type 2 diabetes mellitus with end-stage renal disease [E11.22, N18.6, E11.65]  Yes    Hyperlipidemia LDL goal <100 [E78.5]  Yes     Chronic    Anemia of chronic disease [D63.8]  Yes     Chronic    ESRD on hemodialysis [N18.6, Z99.2]  Not Applicable     Dialysis on Tu, Th, Sa  [facility]  AV graft care by Dr. Garduno      Cognitive deficits as late effect of cerebrovascular disease [I69.919]  Not Applicable     Chronic    Vascular dementia with depressed mood [F01.51, F32.9]  Yes    History of stroke [Z86.73]  Not Applicable     Chronic     9/2011, 10/2011        Resolved Hospital Problems    Diagnosis Date Resolved POA    Hyperkalemia [E87.5] 02/28/2020 Yes     Priority: 2        Future Appointments   Date Time Provider Department Center   3/27/2020  9:20 AM David Villegas MD [facility]     Follow-up Information     Melonie Elias MD. Call in 1 day.    Specialties:  Internal Medicine, Pediatrics  Contact information:  [address] [place] [address] [phone]             DAVE Mazariegos Iii, MD. Call today.    Specialty:  Vascular  Surgery  Contact information:  [address]  [facility] [address]  [phone]             Melonie Elias MD In 1 week.    Specialties:  Internal Medicine, Pediatrics  Contact information:  [address] [place] [address] [phone]                     I have seen and examined this patient face to face today. My clinical findings that support the need for the home health skilled services and home bound status are the following:  Weakness/numbness causing balance and gait disturbance due to Weakness/Debility making it taxing to leave home.    Allergies:  Review of patient's allergies indicates:   Allergen Reactions    Reglan [metoclopramide hcl] Diarrhea    Lorazepam      Other reaction(s): heavy sedation       Diet: diabetic diet: 2000 calorie and renal diet    Activities: activity as tolerated    Nursing:   SN to complete comprehensive assessment including routine vital signs. Instruct on disease process and s/s of complications to report to MD. Review/verify medication list sent home with the patient at time of discharge  and instruct patient/caregiver as needed. Frequency may be adjusted depending on start of care date.    Notify MD if SBP > 160 or < 90; DBP > 90 or < 50; HR > 120 or < 50; Temp > 101; Other:         CONSULTS:    Physical Therapy to evaluate and treat. Evaluate for home safety and equipment needs; Establish/upgrade home exercise program. Perform / instruct on therapeutic exercises, gait training, transfer training, and Range of Motion.  Occupational Therapy to evaluate and treat. Evaluate home environment for safety and equipment needs. Perform/Instruct on transfers, ADL training, ROM, and therapeutic exercises.    MISCELLANEOUS CARE:  Routine Skin for Bedridden Patients: Instruct patient/caregiver to apply moisture barrier cream to all skin folds and wet areas in perineal area daily and after baths and all bowel movements.    WOUND CARE ORDERS  n/a    Medications: Review  discharge medications with patient and family and provide education.      I certify that this patient is confined to his home and needs intermittent skilled nursing care, physical therapy and occupational therapy.

## 2020-03-03 NOTE — PROGRESS NOTES
[facility]  Vascular Surgery  Progress Note    Patient Name: Kodi Ibanez Sr.  MRN: [id number]  Admission Date: 2/27/2020  Primary Care Provider: Melonie Elias MD    Subjective:     Interval History: Minimal oozing from wound overnight.    Post-Op Info:  Procedure(s) (LRB):  Ligation of left upper extremity fistula, Closure of left upper extremity skin defect x 2, Intraoperative ultrasound  (Left)   5 Days Post-Op       Medications:  Continuous Infusions:  Scheduled Meds:   atorvastatin  40 mg Oral Daily    carvediloL  25 mg Oral BID WM    clopidogreL  75 mg Oral Daily    diltiaZEM  180 mg Oral Daily    heparin (porcine)  5,000 Units Subcutaneous Q12H    insulin aspart U-100  8 Units Subcutaneous TIDWM    insulin detemir U-100  15 Units Subcutaneous Daily    megestrol  20 mg Oral Daily     PRN Meds:acetaminophen, dextrose 50%, glucagon (human recombinant), glucose, heparin (porcine), insulin aspart U-100, ondansetron, promethazine (PHENERGAN) IVPB, sodium chloride 0.9%     Objective:     Vital Signs (Most Recent):  Temp: 98.7 °F (37.1 °C) (03/03/20 0950)  Pulse: 80 (03/03/20 1230)  Resp: 18 (03/03/20 0950)  BP: (!) 149/109 (03/03/20 1230)  SpO2: 97 % (03/03/20 0748) Vital Signs (24h Range):  Temp:  [98.5 °F (36.9 °C)-98.9 °F (37.2 °C)] 98.7 °F (37.1 °C)  Pulse:  [80-92] 80  Resp:  [16-18] 18  SpO2:  [94 %-98 %] 97 %  BP: ()/() 149/109     Date 03/03/20 0700 - 03/04/20 0659   Shift 7057-3065 8664-7051 8360-3115 24 Hour Total   INTAKE   P.O. 240   240   Shift Total(mL/kg) 240(3.7)   240(3.7)   OUTPUT   Shift Total(mL/kg)       Weight (kg) 64.8 64.8 64.8 64.8       Physical Exam   Constitutional: He is oriented to person, place, and time. He appears well-developed and well-nourished. No distress.   HENT:   Head: Normocephalic and atraumatic.   Eyes: Conjunctivae are normal.   Neck: Neck supple.   Cardiovascular: Normal rate.   Pulses:       Radial pulses are 2+ on the right side,  and 2+ on the left side.   LUE AVF without bleeding, no infection   Pulmonary/Chest: Effort normal. No respiratory distress.   Abdominal: Soft. He exhibits no distension and no mass. There is no tenderness. There is no rebound and no guarding. No hernia.   Musculoskeletal: Normal range of motion. He exhibits no deformity.   Neurological: He is alert and oriented to person, place, and time. No sensory deficit.   Skin: Skin is warm and dry. Capillary refill takes less than 2 seconds. No rash noted. He is not diaphoretic. No erythema. No pallor.   Psychiatric: He has a normal mood and affect.   Vitals reviewed.      Significant Labs:  All pertinent labs from the last 24 hours have been reviewed.    Significant Diagnostics:  I have reviewed all pertinent imaging results/findings within the past 24 hours.    Assessment/Plan:     * Complication of arteriovenous dialysis fistula  -Bleeding LUE AVF with multiple interventions in past including central venous angioplasty, distal fistula stenting and mid fistula stenting; skin breakdown overlying area of bleeding without evidence of infection s/p fistula ligation, unable to plicate/repair primarily due to stent involvement - cont local wound care to LUE incisions with dry dressings daily.  -Will plan for long term dialysis access placement once recovered from current clinical issues - f/u with me in 2 weeks  -Cont HD via R IJ tunneled catheter        Stephane Lopez MD  Vascular Surgery  [facility]

## 2020-03-03 NOTE — PLAN OF CARE
03/03/20 1242   Post-Acute Status   Post-Acute Authorization Home Health/Hospice   Home Health/Hospice Status Pending Payor Review   Patient choice form signed by patient/caregiver List with quality metrics by geographic area provided   Discharge Delays None known at this time   Discharge Plan   Discharge Plan A Home with family;Home Health   Discharge Plan B Home with family   .Florinda Ortiz RN, BSN, STN CCM  3/3/2020

## 2020-03-03 NOTE — PLAN OF CARE
Problem: Adult Inpatient Plan of Care  Goal: Plan of Care Review  Outcome: Ongoing, Progressing     Problem: Diabetes Comorbidity  Goal: Blood Glucose Level Within Desired Range  Outcome: Ongoing, Progressing     Problem: Infection (Hemodialysis)  Goal: Absence of Infection Signs/Symptoms  Outcome: Ongoing, Progressing

## 2020-03-04 NOTE — TELEPHONE ENCOUNTER
Attempted to contact Dozier in response to message. Voice message left stating patient should FU with Dr. Lopez in 2 weeks and that she should contact nurse Alondra to schedule. ----- Message from Stephane Dumont sent at 3/4/2020  3:36 PM CST -----  Contact: Sunday      The caller states that she left the paper work from the hospital for the Pt in someone's car and would like to have a call back on when to schedule the Pt's F/U appt.    Phone # [phone]

## 2020-03-04 NOTE — PT/OT/SLP DISCHARGE
Physical Therapy Discharge Summary    Name: Kodi Ibanez Sr.  MRN: [id number]   Principal Problem: Complication of arteriovenous dialysis fistula     Patient Discharged from acute Physical Therapy on 2020.  Please refer to prior PT noted date on 2020 for functional status.     Assessment:     Patient appropriate for care in another setting.    Objective:     GOALS:   Multidisciplinary Problems     Physical Therapy Goals     Not on file          Multidisciplinary Problems (Resolved)        Problem: Physical Therapy Goal    Goal Priority Disciplines Outcome Goal Variances Interventions   Physical Therapy Goal   (Resolved)     PT, PT/OT Met     Description:  Goals to be met by: 3/6/2019     Patient will increase functional independence with mobility by performin. Supine to sit with Modified [place]  2. Sit to stand transfer with Modified [place]  3. Gait  x 100 feet with Supervision using Rolling Walker.   4. Lower extremity exercise program x10 reps per handout, with supervision                      Reasons for Discontinuation of Therapy Services  Transfer to alternate level of care.      Plan:     Patient Discharged to: Home with Home Health Service.    Anamika Gibbs, PT  3/4/2020

## 2020-03-04 NOTE — NURSING
Patient discharged, off of floor with in wheelchair with transport accompanied by wife. Discharge instructions explained and given to wife.

## 2020-03-05 NOTE — DISCHARGE INSTRUCTIONS
Your lab tests are consistent with your baseline.  Your symptoms do not appear to be related to an acute heart attack.  Your symptoms may be related to your recent dialysis access placement.  Use Tylenol as needed for pain. Continue the remainder of your medications as you have been prescribed.  Follow up with your primary physician, vascular surgeon and cardiologist.  Return to the emergency department for any new, worsening or concerning symptoms.

## 2020-03-05 NOTE — ED NOTES
Per Claudia at [facility], patient is scheduled for HD tomorrow; clinic will contact patient or wife with chair time. Pt's wife updated and in en route to  patient.

## 2020-03-05 NOTE — ED NOTES
Call from Brenda CAMEJO; unable to take pt to HD today but will try to schedule for tomorrow. Dr. Deshpande aware and agrees with plan. Awaiting call back from RN for update.

## 2020-03-05 NOTE — ED TRIAGE NOTES
Pt arrived from home with wife c/o chest pain. Hx HD TTS currently has a left  Sub dialysis cath device in place.

## 2020-03-05 NOTE — ED NOTES
Per Dr. Deshpande, awaiting call back from home health nurse to arrange dialysis following discharge. Pt and wife called to update.

## 2020-03-05 NOTE — ED NOTES
came and stuck pt and got the blood, iv attempted in right AC unable to get a line. Wife stated pt was a hard stick and didn't want him restuck over and over, without ultrasound. MD aware and stated to just get lab to get the blood for now.

## 2020-03-05 NOTE — PATIENT INSTRUCTIONS
Sepsis     To treat sepsis, antibiotics and fluids may by given through an intravenous (IV) line.     Sepsis happens when your body responds with widespread inflammation to a bad infection or bacteremia--the presence of bacteria in your bloodstream. Sepsis can be deadly. Blood pressure may drop and the lungs and kidneys may start to fail. Emergency care for sepsis is crucial.  Risk factors  Those most at risk for sepsis are:  · Infants or older adults  · People who have an illness that weakens their immune system, such as cancer, AIDS, or diabetes  · People being treated with chemotherapy medicines or radiation, which weakens the immune system  · People who have had a transplant  · People with a very severe infection such as pneumonia, meningitis, or a urinary tract infection  When to go to the emergency department (ED)  Sepsis is an emergency. Go to the nearest ED if you have a fever with any of these symptoms:  · Chills and shaking  · Rapid heartbeat and breathing  · Trouble breathing  · Severe nausea or uncontrolled vomiting  · Confusion, disorientation, drowsiness, or dizziness  · Decreased urination  · Severe pain, including in the back or joints   What to expect in the ED  · Blood and urine tests are done to look for bacteria. They also check for organ failure.  · Blood, urine, or sputum cultures may be taken. The samples are sent to a lab. They are placed in a special container. Any bacteria should grow in 24 hours.  · X-rays or other imaging tests may be done.  A person with sepsis will be admitted to the hospital and treated with antibiotics. Treatment may also include oxygen and intravenous fluids.    © 9303-3400 [employer]. [address] [place] [address], [place] [address]. All rights reserved. This information is not intended as a substitute for professional medical care. Always follow your healthcare professional's instructions.

## 2020-03-05 NOTE — ED PROVIDER NOTES
Encounter Date: 3/5/2020    SCRIBE #1 NOTE: I, Gladys Patel, am scribing for, and in the presence of,  Ugo Deshpande MD. I have scribed the following portions of the note - Other sections scribed: HPI, ROS, PE, EKG, Diff.       History     Chief Complaint   Patient presents with    Chest Pain     right side chest pain. wife reports patient had a new dialysis access placed to chest wall on Monday. she states he went to dialysis on tues w/o complaints but this morning patient began to have chest pain. patient was just discharged on Monday for left shunt problem     CC: Chest Pain    HPI: This 60 y.o male, with a medical history of congestive heart failure, clotting disorder, coronary artery disease, diabetes mellitus type 2, ESRD on hemodialysis, hyperlipidemia, hypertension, myocardial infarction, seizures, and stroke, presents to the ED accompanied by his wife c/o acute, constant, moderate (5/10) right sided chest pain that began about 2 hours ago. Pt reports that he is experiencing the symptoms around the site where his dialysis port is placed. Pt's wife reports that the port was placed on Monday and was used the following day for dialysis. Pt denies experiencing pain to the area at the time of dialysis. He was discharged from this hospital on Tuesday and is due to receive his next round of dialysis this morning. Pt denies diaphoresis, nausea, emesis, shortness of breath or any other associated symptoms. No treatment attempted PTA to the ED. No alleviating factors.     The history is provided by the patient and the spouse.     Review of patient's allergies indicates:   Allergen Reactions    Reglan [metoclopramide hcl] Diarrhea    Lorazepam      Other reaction(s): heavy sedation     Past Medical History:   Diagnosis Date    Allergy     Ambulates with cane     Atherosclerosis of aorta     noted on VIKTORIA 2011    Blood transfusion     Cataracts, bilateral     CHF (congestive heart failure)     Chronic  "kidney disease     Clotting disorder     Coronary artery disease     Decreased appetite     Diabetic retinopathy     DM (diabetes mellitus), type 2 with renal complications     ESRD on hemodialysis     TUESDAY/ THURSDAY/SATURDAY    Hyperlipidemia     Hyperparathyroidism, unspecified     Hypertension     Myocardial infarction     Prostate cancer 2016    7/16/19:  wife states "he doesn't have it anymore"    Retinopathy due to secondary diabetes     Seizures     Stroke 2011    PT HAS SHAKES     Unsteady gait     Uses roller walker      Past Surgical History:   Procedure Laterality Date    CARDIAC SURGERY      CABG    CATARACT EXTRACTION      ou    CATARACT EXTRACTION, BILATERAL      cataracts      CORONARY ANGIOGRAPHY INCLUDING BYPASS GRAFTS WITH CATHETERIZATION OF LEFT HEART N/A 12/16/2019    Procedure: ANGIOGRAM, CORONARY, INCLUDING BYPASS GRAFT, WITH LEFT HEART CATHETERIZATION, rcfa, 5fr;  Surgeon: David Villegas MD;  Location: [facility] CATH LAB;  Service: Cardiology;  Laterality: N/A;    CORONARY ARTERY BYPASS GRAFT  2009    bypass    EYE SURGERY      Left Guera AVF  1/11/2012    PROSTATE BIOPSY      positive    retinal laser      REVISION OF ARTERIOVENOUS FISTULA Left 2/27/2020    Procedure: Ligation of left upper extremity fistula, Closure of left upper extremity skin defect x 2, Intraoperative ultrasound ;  Surgeon: Stephane Lopez MD;  Location: [facility] OR;  Service: Vascular;  Laterality: Left;    ROTATOR CUFF REPAIR  right, 2005    ULTRASOUND GUIDANCE  12/16/2019    Procedure: Ultrasound Guidance;  Surgeon: David Villegas MD;  Location: [facility] CATH LAB;  Service: Cardiology;;    VASCULAR SURGERY       Family History   Problem Relation Age of Onset    Diabetes Mother     Glaucoma Mother     Cancer Mother     Cataracts Mother     Heart disease Father     Cancer Paternal Grandmother     Heart disease Maternal Grandmother     No Known Problems Sister     Prostate cancer " Brother     No Known Problems Maternal Grandfather     No Known Problems Paternal Grandfather     No Known Problems Sister     No Known Problems Sister     Hypertension Brother     Cancer Paternal Uncle     Amblyopia Neg Hx     Blindness Neg Hx     Macular degeneration Neg Hx     Retinal detachment Neg Hx     Strabismus Neg Hx     Stroke Neg Hx     Thyroid disease Neg Hx     Anesthesia problems Neg Hx     Clotting disorder Neg Hx      problems Neg Hx     Kidney cancer Neg Hx     Kidney disease Neg Hx     Malignant hyperthermia Neg Hx     Sickle cell trait Neg Hx     Lupus Neg Hx     Urolithiasis Neg Hx     Sudden death Neg Hx      Social History     Tobacco Use    Smoking status: Never Smoker    Smokeless tobacco: Never Used   Substance Use Topics    Alcohol use: No    Drug use: No     Review of Systems   Constitutional: Negative for chills, diaphoresis and fever.   HENT: Negative for congestion, rhinorrhea and sore throat.    Eyes: Negative for visual disturbance.   Respiratory: Negative for cough and shortness of breath.    Cardiovascular: Positive for chest pain (right sided: around dialysis port).   Gastrointestinal: Negative for abdominal pain, nausea and vomiting.   Genitourinary: Negative for dysuria.   Musculoskeletal: Negative for back pain.   Skin: Negative for rash.   Neurological: Negative for weakness and headaches.       Physical Exam     Initial Vitals [03/05/20 0514]   BP Pulse Resp Temp SpO2   (!) 153/83 83 15 98.8 °F (37.1 °C) 100 %      MAP       --         Physical Exam    Nursing note and vitals reviewed.  Constitutional: He is not diaphoretic. No distress.   HENT:   Head: Normocephalic and atraumatic.   Mouth/Throat: Oropharynx is clear and moist.   Eyes: Conjunctivae and EOM are normal. Pupils are equal, round, and reactive to light. No scleral icterus.   Neck: Normal range of motion. Neck supple. No JVD present.   Cardiovascular: Normal rate, regular rhythm and  intact distal pulses.   Pulmonary/Chest: Breath sounds normal. No stridor. No respiratory distress.   Right chest wall dialysis access present. The area is clean and dry. No erythema. No purulence. There is mild reproducible tenderness around the dialysis access site.    Abdominal: Soft. Bowel sounds are normal. He exhibits no distension. There is no tenderness.   Musculoskeletal: Normal range of motion. He exhibits no edema or tenderness.   Neurological: He is alert. He has normal strength. No cranial nerve deficit or sensory deficit. GCS score is 15. GCS eye subscore is 4. GCS verbal subscore is 5. GCS motor subscore is 6.   Skin: Skin is warm and dry. No rash noted.   Psychiatric: He has a normal mood and affect.         ED Course   Procedures  Labs Reviewed   CBC W/ AUTO DIFFERENTIAL - Abnormal; Notable for the following components:       Result Value    RBC 2.73 (*)     Hemoglobin 7.9 (*)     Hematocrit 25.6 (*)     Mean Corpuscular Hemoglobin Conc 30.9 (*)     RDW 15.9 (*)     Immature Granulocytes 0.6 (*)     Lymph # 0.7 (*)     Gran% 74.0 (*)     Lymph% 12.3 (*)     All other components within normal limits   COMPREHENSIVE METABOLIC PANEL - Abnormal; Notable for the following components:    CO2 21 (*)     Glucose 244 (*)     BUN, Bld 40 (*)     Creatinine 7.2 (*)     Calcium 8.3 (*)     Albumin 2.7 (*)     eGFR if  9 (*)     eGFR if non  7 (*)     All other components within normal limits   TROPONIN I - Abnormal; Notable for the following components:    Troponin I 0.046 (*)     All other components within normal limits   B-TYPE NATRIURETIC PEPTIDE - Abnormal; Notable for the following components:     (*)     All other components within normal limits   TROPONIN I - Abnormal; Notable for the following components:    Troponin I 0.044 (*)     All other components within normal limits   PROTIME-INR   APTT   MAGNESIUM   PHOSPHORUS   POCT GLUCOSE MONITORING CONTINUOUS     EKG  Readings: (Independently Interpreted)   Initial Reading: No STEMI. Previous EKG: Compared with most recent EKG Rhythm: Normal Sinus Rhythm. Heart Rate: 82. Ectopy: No Ectopy. Conduction: Normal. ST Segments: Normal ST Segments.   Non-specific T Wave abnormality.       Imaging Results          X-Ray Chest AP Portable (Final result)  Result time 03/05/20 06:18:01    Final result by Alexandr Bragg MD (03/05/20 06:18:01)                 Impression:      No radiographic evidence of acute intrathoracic process.      Electronically signed by: Alexandr Bragg MD  Date:    03/05/2020  Time:    06:18             Narrative:    EXAMINATION:  XR CHEST AP PORTABLE    CLINICAL HISTORY:  Chest Pain;    TECHNIQUE:  Single frontal view of the chest was performed.    COMPARISON:  12/14/2019    FINDINGS:  There is a right-sided central venous catheter in place with tip projecting over the SVC.  Cardiac monitoring leads overlie the chest.  There is postoperative change of prior median sternotomy.  Cardiomediastinal silhouette appears within normal limits.  The lungs are symmetrically expanded without evidence of confluent airspace consolidation.  No evidence of pleural effusion or pneumothorax.  Vascular graft and surgical clips are noted within the soft tissues of the left upper extremity.  Osseous structures demonstrate degenerative changes.                                 Medical Decision Making:   History:   Old Medical Records: I decided to obtain old medical records.  Old Records Summarized: other records and records from previous admission(s).       <> Summary of Records: Patient was admitted into the hospital on 2/27/2020, for AV fistula bleeding.  Had fistula ligation and dialysis access place..  Differential Diagnosis:   Differential diagnosis includes musculoskeletal pain, ACS, pneumonia and CHF.  Independently Interpreted Test(s):   I have ordered and independently interpreted EKG Reading(s) - see prior notes  Clinical  Tests:   Lab Tests: Ordered and Reviewed  Radiological Study: Ordered and Reviewed  Medical Tests: Ordered and Reviewed            Scribe Attestation:   Scribe #1: I performed the above scribed service and the documentation accurately describes the services I performed. I attest to the accuracy of the note.            ED Course as of Mar 05 0952   Thu Mar 05, 2020   0636 Patient is afebrile and in no acute distress at time history and physical.  Vital signs are reassuring.  He has reproducible tenderness around his recently placed dialysis access.  He denies current pain when dialysis access is not being touch.  Symptoms appear related to recent procedure and patient has had no analgesia for his symptoms. Will give analgesia and obtain lab testing to further evaluate.    [SG]   0942 Patient declines analgesia because he has been without pain.  He has remained hemodynamically stable emergency department.  Lab results are consistent with patient's baseline.  Troponins have remained flat.  Chest x-ray without acute intrathoracic process.  On reassessment patient is resting comfortably in stretcher and remains pain-free.  Potassium is 3.9 and patient does not appear to require emergent dialysis.  Case discussed with nurse Marcum from patient's dialysis center who will call back with when and where patient should obtain dialysis today.  He remains stable emergency department and is fit for discharge to follow up with his primary physician as well as cardiologist. counseled on supportive care, appropriate medication usage, concerning symptoms for which to return to ER and the importance of follow up. Understanding and agreement with treatment plan was expressed.     [SG]      ED Course User Index  [SG] Ugo Deshpande MD          This chart was completed using dictation software, as a result there may be some transcription errors.       Clinical Impression:       ICD-10-CM ICD-9-CM   1. Chest wall pain R07.89 786.52   2.  Chest pain R07.9 786.50         Disposition:   Disposition: Discharged  Condition: Stable     ED Disposition Condition    Discharge Stable        ED Prescriptions     Medication Sig Dispense Start Date End Date Auth. Provider    acetaminophen (TYLENOL) 500 MG tablet Take 2 tablets (1,000 mg total) by mouth every 8 (eight) hours as needed for Pain or Temperature greater than (100.5). 30 tablet 3/5/2020  Ugo Deshpande MD        Follow-up Information     Follow up With Specialties Details Why Contact Info    Melonie Elias MD Internal Medicine, Pediatrics Schedule an appointment as soon as possible for a visit   [address] [facility] [place] [id number] [phone]      Stephane Lopez MD Vascular Surgery, Surgery Schedule an appointment as soon as possible for a visit   [address]  [address]  [place] [id number] [phone]      David Villegas MD Cardiology, INTERVENTIONAL CARDIOLOGY Schedule an appointment as soon as possible for a visit   [address]  [address]  [place] [id number] [phone]                          I, gUo Deshpande, personally performed the services described in this documentation. All medical record entries made by the scribe were at my direction and in my presence. I have reviewed the chart and agree that the record reflects my personal performance and is accurate and complete.             Ugo Deshpande MD  03/05/20 0940

## 2020-03-19 NOTE — TELEPHONE ENCOUNTER
"Call to patient wife who stated she wanted to know if they needed to come to in on Monday as he has "tubes in his chest" asked her what the tubes were for she stated dialysis. Explained that Allasyahir is still waiting to let us know what appointments we are moving and keeping and if we will be able to have surgery. Explained would contact her as soon as I knew more. She stated understanding.   "

## 2020-03-19 NOTE — TELEPHONE ENCOUNTER
----- Message from Gray Jennings sent at 3/19/2020  9:29 AM CDT -----  Contact: pt wife  Name of Who is Calling: pt wife    What is the request in detail: calling in regards to the patient appointment that is scheduled. Please contact to further discuss and advise      Can the clinic reply by [facility]: no    [facility]: [phone]

## 2020-03-23 NOTE — PROGRESS NOTES
Stephane Lopez MD VI                       [facility]                         03/23/2020    HPI:  Kodi Ibanez Sr. is a 60 y.o. male with   Patient Active Problem List   Diagnosis    ESRD on hemodialysis    History of stroke    Cognitive deficits as late effect of cerebrovascular disease    Congestive heart failure    Secondary renal hyperparathyroidism    Other extrapyramidal disease and abnormal movement disorder    Mononeuritis of lower limb, unspecified    Vascular dementia with depressed mood    Type 2 diabetes, uncontrolled, with neuropathy    CAD (coronary artery disease)    Seizure disorder    Hyperlipidemia LDL goal <100    Anemia of chronic disease    Hypertension    Uncontrolled type 2 diabetes mellitus with end-stage renal disease    History of MI (myocardial infarction)    Long-term insulin use    Atherosclerosis of aorta    S/P CABG (coronary artery bypass graft)    Hypertensive retinopathy of both eyes    Prostate cancer    Uncontrolled type 2 diabetes mellitus with both eyes affected by proliferative retinopathy and macular edema, with long-term current use of insulin    PVD (peripheral vascular disease)    Dependent on walker for ambulation    SVT (supraventricular tachycardia)    NSVT (nonsustained ventricular tachycardia)    Complication of arteriovenous dialysis fistula    Essential hypertension    Debility    being managed by PCP and specialists who is here today for postop evaluation of bleeding LUE AVF s/p ligation 2/27/20.  Pt with minimal drainage from skin breakdown without infection.  Patient states location is LUE occurring for a few weeks since surgery.  Associated signs and symptoms include none.    Alleviating factors include wound care.  Worsening factors include none.    no MI  no Stroke  Tobacco use: denies    Past Medical History:   Diagnosis Date    Allergy     Ambulates with cane     Atherosclerosis of aorta     noted  "on VIKTORIA 2011    Blood transfusion     Cataracts, bilateral     CHF (congestive heart failure)     Chronic kidney disease     Clotting disorder     Coronary artery disease     Decreased appetite     Diabetic retinopathy     DM (diabetes mellitus), type 2 with renal complications     ESRD on hemodialysis     TUESDAY/ THURSDAY/SATURDAY    Hyperlipidemia     Hyperparathyroidism, unspecified     Hypertension     Myocardial infarction     Prostate cancer 2016    7/16/19:  wife states "he doesn't have it anymore"    Retinopathy due to secondary diabetes     Seizures     Stroke 2011    PT HAS SHAKES     Unsteady gait     Uses roller walker      Past Surgical History:   Procedure Laterality Date    CARDIAC SURGERY      CABG    CATARACT EXTRACTION      ou    CATARACT EXTRACTION, BILATERAL      cataracts      CORONARY ANGIOGRAPHY INCLUDING BYPASS GRAFTS WITH CATHETERIZATION OF LEFT HEART N/A 12/16/2019    Procedure: ANGIOGRAM, CORONARY, INCLUDING BYPASS GRAFT, WITH LEFT HEART CATHETERIZATION, rcfa, 5fr;  Surgeon: David Villegas MD;  Location: [facility] CATH LAB;  Service: Cardiology;  Laterality: N/A;    CORONARY ARTERY BYPASS GRAFT  2009    bypass    EYE SURGERY      Left Guera AVF  1/11/2012    PROSTATE BIOPSY      positive    retinal laser      REVISION OF ARTERIOVENOUS FISTULA Left 2/27/2020    Procedure: Ligation of left upper extremity fistula, Closure of left upper extremity skin defect x 2, Intraoperative ultrasound ;  Surgeon: Stephane Lopez MD;  Location: [facility] OR;  Service: Vascular;  Laterality: Left;    ROTATOR CUFF REPAIR  right, 2005    ULTRASOUND GUIDANCE  12/16/2019    Procedure: Ultrasound Guidance;  Surgeon: David Villegas MD;  Location: [facility] CATH LAB;  Service: Cardiology;;    VASCULAR SURGERY       Family History   Problem Relation Age of Onset    Diabetes Mother     Glaucoma Mother     Cancer Mother     Cataracts Mother     Heart disease Father     Cancer Paternal " Grandmother     Heart disease Maternal Grandmother     No Known Problems Sister     Prostate cancer Brother     No Known Problems Maternal Grandfather     No Known Problems Paternal Grandfather     No Known Problems Sister     No Known Problems Sister     Hypertension Brother     Cancer Paternal Uncle     Amblyopia Neg Hx     Blindness Neg Hx     Macular degeneration Neg Hx     Retinal detachment Neg Hx     Strabismus Neg Hx     Stroke Neg Hx     Thyroid disease Neg Hx     Anesthesia problems Neg Hx     Clotting disorder Neg Hx      problems Neg Hx     Kidney cancer Neg Hx     Kidney disease Neg Hx     Malignant hyperthermia Neg Hx     Sickle cell trait Neg Hx     Lupus Neg Hx     Urolithiasis Neg Hx     Sudden death Neg Hx      Social History     Socioeconomic History    Marital status:      Spouse name: Not on file    Number of children: Not on file    Years of education: Not on file    Highest education level: Not on file   Occupational History    Not on file   Social Needs    Financial resource strain: Not on file    Food insecurity:     Worry: Not on file     Inability: Not on file    Transportation needs:     Medical: Not on file     Non-medical: Not on file   Tobacco Use    Smoking status: Never Smoker    Smokeless tobacco: Never Used   Substance and Sexual Activity    Alcohol use: No    Drug use: No    Sexual activity: Not Currently   Lifestyle    Physical activity:     Days per week: Not on file     Minutes per session: Not on file    Stress: Not on file   Relationships    Social connections:     Talks on phone: Not on file     Gets together: Not on file     Attends Oriental orthodox service: Not on file     Active member of club or organization: Not on file     Attends meetings of clubs or organizations: Not on file     Relationship status: Not on file   Other Topics Concern    Not on file   Social History Narrative    Not on file       Current Outpatient  "Medications:     ascorbic acid, vitamin C, (VITAMIN C) 500 MG tablet, Take 500 mg by mouth every evening. , Disp: , Rfl:     atorvastatin (LIPITOR) 40 MG tablet, TAKE 1 TABLET(40 MG) BY MOUTH EVERY DAY, Disp: 90 tablet, Rfl: 1    carvedilol (COREG) 25 MG tablet, TAKE 1 TABLET(25 MG) BY MOUTH TWICE DAILY WITH MEALS, Disp: 180 tablet, Rfl: 1    clopidogrel (PLAVIX) 75 mg tablet, Take 1 tablet (75 mg total) by mouth once daily., Disp: 30 tablet, Rfl: 11    diltiaZEM (CARDIZEM CD) 180 MG 24 hr capsule, Take 1 capsule (180 mg total) by mouth once daily., Disp: 90 capsule, Rfl: 1    furosemide (LASIX) 40 MG tablet, TAKE 1 TABLET(40 MG) BY MOUTH EVERY DAY, Disp: 90 tablet, Rfl: 1    insulin (LANTUS SOLOSTAR U-100 INSULIN) glargine 100 units/mL (3mL) SubQ pen, INJECT 20 UNITS UNDER THE SKIN NIGHTLY, Disp: 15 mL, Rfl: 0    losartan (COZAAR) 100 MG tablet, Take 1 tablet (100 mg total) by mouth once daily., Disp: 90 tablet, Rfl: 1    megestrol (MEGACE) 20 MG Tab, Take 1 tablet (20 mg total) by mouth once daily., Disp: 90 tablet, Rfl: 1    acetaminophen (TYLENOL) 500 MG tablet, Take 2 tablets (1,000 mg total) by mouth every 8 (eight) hours as needed for Pain or Temperature greater than (100.5). (Patient not taking: Reported on 3/5/2020), Disp: 30 tablet, Rfl: 0    blood glucose control, normal Soln, 1 drop by Misc.(Non-Drug; Combo Route) route as directed. (Patient not taking: Reported on 3/23/2020), Disp: 1 each, Rfl: 1    blood sugar diagnostic Strp, To check BG 2 times daily, to use with accuchek casa meter (Patient not taking: Reported on 3/23/2020), Disp: 200 each, Rfl: 3    blood-glucose meter kit, To check BG 2 times daily, to use with accuchek casa meter, Disp: 1 each, Rfl: 0    lancets Misc, To check BG 2 times daily, to use with accuchek casa meter (Patient not taking: Reported on 3/23/2020), Disp: 200 each, Rfl: 3    NOVOFINE 30 30 x 1/3 " Ndle, USE AS DIRECTED WITH LANTUS SOLARSTAR (Patient not " "taking: Reported on 3/23/2020), Disp: 100 each, Rfl: 3    NOVOLOG FLEXPEN U-100 INSULIN 100 unit/mL InPn pen, INJECT 5 TO 10 UNITS WITH MEALS AND AT BEDTIME DEPENDING ON BLOOD SUGAR (Patient not taking: Reported on 3/23/2020), Disp: 15 mL, Rfl: 0    pen needle, diabetic (BD ULTRA-FINE ORIG PEN NEEDLE) 29 gauge x 1/2" Ndle, USE  1 EVERY EVENING (Patient not taking: Reported on 3/23/2020), Disp: 90 each, Rfl: 3    promethazine (PHENERGAN) 25 MG tablet, Take 1 tablet (25 mg total) by mouth every 6 (six) hours as needed for Nausea. (Patient not taking: Reported on 3/23/2020), Disp: 30 tablet, Rfl: 0    REVIEW OF SYSTEMS:  General: No fevers or chills; ENT: No sore throat; Allergy and Immunology: no persistent infections; Hematological and Lymphatic: No history of bleeding or easy bruising; Endocrine: negative; Respiratory: no cough, shortness of breath, or wheezing; Cardiovascular: no chest pain or dyspnea on exertion; Gastrointestinal: no abdominal pain/back, change in bowel habits, or bloody stools; Genito-Urinary: no dysuria, trouble voiding, or hematuria; Musculoskeletal: negative; Neurological: no TIA or stroke symptoms; Psychiatric: no nervousness, anxiety or depression.    PHYSICAL EXAM:                General appearance:  Alert, well-appearing, and in no distress.  Oriented to person, place, and time                    Neurological: Normal speech, no focal findings noted; CN II - XII grossly intact. LUE sensation to light touch.            Musculoskeletal: Digits/nail without cyanosis/clubbing.  Strength 5/5 all extremities, decreased ROM.                    Neck: Supple, no significant adenopathy, no carotid bruit can be auscultated                  Chest:  Clear to auscultation, no wheezes, rales or rhonchi, symmetric air entry. No use of accessory muscles               Cardiac: Normal rate and regular rhythm, S1 and S2 normal            Abdomen: Soft, nontender, nondistended, no masses or organomegaly, no " hernia     No rebound tenderness noted; bowel sounds normal     Pulsatile aortic mass is non palpable.     No groin adenopathy      Extremities:   2+ L radial pulse, palpable proximal fistula, no thrill or bruit to central aspect of AVF     No LUE edema    Skin: LUE central skin defect without infection    LAB RESULTS:  No results found for: CBC  Lab Results   Component Value Date    LABPROT 10.1 03/05/2020    INR 0.9 03/05/2020     Lab Results   Component Value Date     03/05/2020    K 3.9 03/05/2020     03/05/2020    CO2 21 (L) 03/05/2020     (H) 03/05/2020    BUN 40 (H) 03/05/2020    CREATININE 7.2 (H) 03/05/2020    CALCIUM 8.3 (L) 03/05/2020    ANIONGAP 13 03/05/2020    EGFRNONAA 7 (A) 03/05/2020     Lab Results   Component Value Date    WBC 5.36 03/05/2020    RBC 2.73 (L) 03/05/2020    HGB 7.9 (L) 03/05/2020    HCT 25.6 (L) 03/05/2020    MCV 94 03/05/2020    MCH 28.9 03/05/2020    MCHC 30.9 (L) 03/05/2020    RDW 15.9 (H) 03/05/2020     03/05/2020    MPV 10.6 03/05/2020    GRAN 4.0 03/05/2020    GRAN 74.0 (H) 03/05/2020    LYMPH 0.7 (L) 03/05/2020    LYMPH 12.3 (L) 03/05/2020    MONO 0.6 03/05/2020    MONO 10.4 03/05/2020    EOS 0.1 03/05/2020    BASO 0.03 03/05/2020    EOSINOPHIL 2.1 03/05/2020    BASOPHIL 0.6 03/05/2020    DIFFMETHOD Automated 03/05/2020     .  Lab Results   Component Value Date    HGBA1C 11.7 (H) 01/16/2020       IMAGING:  All pertinent imaging has been reviewed and interpreted independently.    LUE HD US reviewed.    IMP/PLAN:  60 y.o. male with   Patient Active Problem List   Diagnosis    ESRD on hemodialysis    History of stroke    Cognitive deficits as late effect of cerebrovascular disease    Congestive heart failure    Secondary renal hyperparathyroidism    Other extrapyramidal disease and abnormal movement disorder    Mononeuritis of lower limb, unspecified    Vascular dementia with depressed mood    Type 2 diabetes, uncontrolled, with neuropathy     CAD (coronary artery disease)    Seizure disorder    Hyperlipidemia LDL goal <100    Anemia of chronic disease    Hypertension    Uncontrolled type 2 diabetes mellitus with end-stage renal disease    History of MI (myocardial infarction)    Long-term insulin use    Atherosclerosis of aorta    S/P CABG (coronary artery bypass graft)    Hypertensive retinopathy of both eyes    Prostate cancer    Uncontrolled type 2 diabetes mellitus with both eyes affected by proliferative retinopathy and macular edema, with long-term current use of insulin    PVD (peripheral vascular disease)    Dependent on walker for ambulation    SVT (supraventricular tachycardia)    NSVT (nonsustained ventricular tachycardia)    Complication of arteriovenous dialysis fistula    Essential hypertension    Debility    being managed by PCP and specialists who is here today for postop evaluation of bleeding LUE AVF s/p ligation 2/27/20.    -LUE AVF thrombosed without infection - rec daily wound care with TID dry dressing changes and keeping clean with soap/water; avoid peroxide/creams  -RTC 4-6 weeks for wound check and plan for long term HD access; possible LUE if wound heals well and vein/artery adequate size, otherwise RUE  -Rec evaluation by PCP for generalized weakness and nutritional optimization which has worsened since last seen     I spent 20 minutes evaluating this patient and greater than 50% of the time was spent counseling, coordinator care and discussing the plan of care.  All questions were answered and patient stated understanding with agreement with the above treatment plan.    Stephane Lopez MD [facility]  Vascular and Endovascular Surgery

## 2020-03-23 NOTE — PATIENT INSTRUCTIONS
Caring for Your Hemodialysis Access  A problem such as an infection or a blood clot may make the access unusable. Typically, this happens more often with an arteriovenous graft than with an arteriovenous fistula. If this happens, youll need a new access. To help your access last, you will need to follow certain guidelines.  Watching for problems  Call your healthcare provider right away if you:  · Cant feel the blood flowing in the access (this sensation is called a thrill)  · Have pain or numbness in your hand or arm  · Have bleeding, redness, bluish discoloration, or warmth around your access  · Notice your access suddenly bulging out more than usual (a slight bulge is normal)  · Have a fever of 100.4°F (38°C) or higher, or as directed by your healthcare provider   Follow these and any other guidelines youre given  · Dont wear tight clothes or jewelry around your access.  · Dont let anyone take your blood pressure on or draw blood from the arm with the access. Also, dont let anyone put IV lines into it.  · Protect your access from being hit or cut.  · Wash your hands often and keep the area around the access clean.  · Do not carry anything heavy or do anything that would put pressure on the access.  Feeling for your thrill    If you put your fingers over your access, you should feel the blood rushing through it. This is called a thrill, and it feels like a vibration. Feel for the thrill as often as you're told, usually once or twice a day. If you can't feel it, tell your healthcare provider right away. Blood may not be flowing through your access the way it should.  Important numbers  Write the names and numbers of your healthcare providers below. That way you will know how to get in touch with them.  Doctor:  Name ___________________ Phone ___________________  Surgeon:  Name ___________________ Phone ___________________  Dialysis Center:  Name ___________________ Phone ___________________   Date Last  Reviewed: 1/1/2017  © 5175-7282 [employer]. [address] [place] [address], [place] [address]. All rights reserved. This information is not intended as a substitute for professional medical care. Always follow your healthcare professional's instructions.

## 2020-03-23 NOTE — TELEPHONE ENCOUNTER
----- Message from Azul Canchola sent at 3/23/2020 10:47 AM CDT -----      Can the clinic reply in [facility]: no    Please refill the medication(s) listed below. Please call the patient when the prescription(s) is ready for  at this phone number   [phone] (home)     Medication #1 insulin (LANTUS SOLOSTAR U-100 INSULIN) glargine 100 units/mL (3mL) SubQ pen         Preferred Pharmacy:   [facility]  [address] [address]  [place] [address]  Phone: [phone] Fax: [phone]

## 2020-03-23 NOTE — LETTER
March 23, 2020        Melonie Elias MD  [address] [facility]  [place] [address]             [facility]  [address]  [facility] [address]  Phone: [phone]  Fax: [phone]   Patient: Kodi Ibanez Sr.   MR Number: [id number]   YOB: 1960   Date of Visit: 3/23/2020       Dear Dr. Elias:    Thank you for referring Kodi Ibanez to me for evaluation. Below are the relevant portions of my assessment and plan of care.            If you have questions, please do not hesitate to call me. I look forward to following Kodi along with you.    Sincerely,      Stephane Lopez MD           CC  No Recipients

## 2020-04-09 PROBLEM — T82.7XXA AV FISTULA INFECTION: Status: ACTIVE | Noted: 2020-01-01

## 2020-04-09 PROBLEM — T82.7XXA INFECTED PROSTHETIC VASCULAR GRAFT: Status: ACTIVE | Noted: 2020-01-01

## 2020-04-09 PROBLEM — D64.89 OTHER SPECIFIED ANEMIAS: Status: ACTIVE | Noted: 2020-01-01

## 2020-04-09 PROBLEM — D63.1 ANEMIA IN ESRD (END-STAGE RENAL DISEASE): Status: ACTIVE | Noted: 2020-01-01

## 2020-04-09 PROBLEM — N18.6 ANEMIA IN ESRD (END-STAGE RENAL DISEASE): Status: ACTIVE | Noted: 2020-01-01

## 2020-04-09 PROBLEM — R73.9 HYPERGLYCEMIA: Status: ACTIVE | Noted: 2020-01-01

## 2020-04-09 NOTE — SUBJECTIVE & OBJECTIVE
"Past Medical History:   Diagnosis Date    Allergy     Ambulates with cane     Atherosclerosis of aorta     noted on VIKTORIA 2011    Blood transfusion     Cataracts, bilateral     CHF (congestive heart failure)     Chronic kidney disease     Clotting disorder     Coronary artery disease     Decreased appetite     Diabetic retinopathy     DM (diabetes mellitus), type 2 with renal complications     ESRD on hemodialysis     TUESDAY/ THURSDAY/SATURDAY    Hyperlipidemia     Hyperparathyroidism, unspecified     Hypertension     Myocardial infarction     Prostate cancer 2016 7/16/19:  wife states "he doesn't have it anymore"    Retinopathy due to secondary diabetes     Seizures     Stroke 2011    PT HAS SHAKES     Unsteady gait     Uses roller walker        Past Surgical History:   Procedure Laterality Date    CARDIAC SURGERY      CABG    CATARACT EXTRACTION      ou    CATARACT EXTRACTION, BILATERAL      cataracts      CORONARY ANGIOGRAPHY INCLUDING BYPASS GRAFTS WITH CATHETERIZATION OF LEFT HEART N/A 12/16/2019    Procedure: ANGIOGRAM, CORONARY, INCLUDING BYPASS GRAFT, WITH LEFT HEART CATHETERIZATION, rcfa, 5fr;  Surgeon: David Villegas MD;  Location: [facility] CATH LAB;  Service: Cardiology;  Laterality: N/A;    CORONARY ARTERY BYPASS GRAFT  2009    bypass    EYE SURGERY      Left Guera AVF  1/11/2012    PROSTATE BIOPSY      positive    retinal laser      REVISION OF ARTERIOVENOUS FISTULA Left 2/27/2020    Procedure: Ligation of left upper extremity fistula, Closure of left upper extremity skin defect x 2, Intraoperative ultrasound ;  Surgeon: Stephane Lopez MD;  Location: [facility] OR;  Service: Vascular;  Laterality: Left;    ROTATOR CUFF REPAIR  right, 2005    ULTRASOUND GUIDANCE  12/16/2019    Procedure: Ultrasound Guidance;  Surgeon: David Villegas MD;  Location: [facility] CATH LAB;  Service: Cardiology;;    VASCULAR SURGERY         Review of patient's allergies indicates:   Allergen " Reactions    Reglan [metoclopramide hcl] Diarrhea    Lorazepam      Other reaction(s): heavy sedation       No current facility-administered medications on file prior to encounter.      Current Outpatient Medications on File Prior to Encounter   Medication Sig    ascorbic acid, vitamin C, (VITAMIN C) 500 MG tablet Take 500 mg by mouth every evening.     atorvastatin (LIPITOR) 40 MG tablet TAKE 1 TABLET(40 MG) BY MOUTH EVERY DAY    carvedilol (COREG) 25 MG tablet TAKE 1 TABLET(25 MG) BY MOUTH TWICE DAILY WITH MEALS    clopidogrel (PLAVIX) 75 mg tablet Take 1 tablet (75 mg total) by mouth once daily.    diltiaZEM (CARDIZEM CD) 180 MG 24 hr capsule Take 1 capsule (180 mg total) by mouth once daily.    furosemide (LASIX) 40 MG tablet TAKE 1 TABLET(40 MG) BY MOUTH EVERY DAY    insulin (LANTUS SOLOSTAR U-100 INSULIN) glargine 100 units/mL (3mL) SubQ pen INJECT 20 UNITS UNDER THE SKIN NIGHTLY    losartan (COZAAR) 100 MG tablet Take 1 tablet (100 mg total) by mouth once daily.    megestrol (MEGACE) 20 MG Tab Take 1 tablet (20 mg total) by mouth once daily.    NOVOLOG FLEXPEN U-100 INSULIN 100 unit/mL InPn pen INJECT 5 TO 10 UNITS WITH MEALS AND AT BEDTIME DEPENDING ON BLOOD SUGAR    promethazine (PHENERGAN) 25 MG tablet Take 1 tablet (25 mg total) by mouth every 6 (six) hours as needed for Nausea.    acetaminophen (TYLENOL) 500 MG tablet Take 2 tablets (1,000 mg total) by mouth every 8 (eight) hours as needed for Pain or Temperature greater than (100.5). (Patient not taking: Reported on 3/5/2020)    blood glucose control, normal Soln 1 drop by Misc.(Non-Drug; Combo Route) route as directed. (Patient not taking: Reported on 3/23/2020)    blood sugar diagnostic Strp To check BG 2 times daily, to use with accuchek casa meter (Patient not taking: Reported on 3/23/2020)    blood-glucose meter kit To check BG 2 times daily, to use with accuchek casa meter    lancets Misc To check BG 2 times daily, to use with  "accuchek casa meter (Patient not taking: Reported on 3/23/2020)    NOVOFINE 30 30 x 1/3 " Ndle USE AS DIRECTED WITH LANTUS SOLARSTAR (Patient not taking: Reported on 3/23/2020)    pen needle, diabetic (BD ULTRA-FINE ORIG PEN NEEDLE) 29 gauge x 1/2" Ndle USE  1 EVERY EVENING (Patient not taking: Reported on 3/23/2020)     Family History     Problem Relation (Age of Onset)    Cancer Mother, Paternal Grandmother, Paternal Uncle    Cataracts Mother    Diabetes Mother    Glaucoma Mother    Heart disease Father, Maternal Grandmother    Hypertension Brother    No Known Problems Sister, Maternal Grandfather, Paternal Grandfather, Sister, Sister    Prostate cancer Brother        Tobacco Use    Smoking status: Never Smoker    Smokeless tobacco: Never Used   Substance and Sexual Activity    Alcohol use: No    Drug use: No    Sexual activity: Not Currently     Review of Systems   Constitutional: Negative for chills, diaphoresis, fatigue and fever.   HENT: Negative for congestion, rhinorrhea, sore throat and trouble swallowing.    Eyes: Negative for photophobia and visual disturbance.   Respiratory: Negative for cough, chest tightness and shortness of breath.    Cardiovascular: Negative for chest pain, palpitations and leg swelling.   Gastrointestinal: Negative for abdominal pain, constipation, diarrhea, nausea and vomiting.   Genitourinary: Negative for dysuria and flank pain.   Musculoskeletal: Negative for arthralgias and myalgias.   Skin: Positive for wound (x 2 ulcers over LUE fistula).   Neurological: Negative for dizziness, syncope, weakness, numbness and headaches.   Psychiatric/Behavioral: Negative for agitation and confusion.       Objective:     Vital Signs (Most Recent):  Temp: 98.8 °F (37.1 °C) (04/09/20 1703)  Pulse: 86 (04/09/20 1703)  Resp: 18 (04/09/20 1703)  BP: 136/76 (04/09/20 1703)  SpO2: 99 % (04/09/20 1703) Vital Signs (24h Range):  Temp:  [98.8 °F (37.1 °C)-99.2 °F (37.3 °C)] 98.8 °F (37.1 " °C)  Pulse:  [86-94] 86  Resp:  [18-20] 18  SpO2:  [99 %] 99 %  BP: (116-140)/(72-76) 136/76        Body mass index is 20.69 kg/m².    Physical Exam   Constitutional: He is oriented to person, place, and time. He appears well-developed and well-nourished. No distress.   HENT:   Head: Normocephalic and atraumatic.   Mouth/Throat: Oropharynx is clear and moist.   Eyes: Conjunctivae are normal. No scleral icterus.   Neck: Normal range of motion. Neck supple.   Cardiovascular: Normal rate, regular rhythm and normal heart sounds.   No murmur heard.  Pulmonary/Chest: Effort normal and breath sounds normal. No respiratory distress. He has no wheezes. He has no rales.   Abdominal: Soft. Bowel sounds are normal. He exhibits no distension. There is no tenderness. There is no guarding.   Musculoskeletal: He exhibits edema and tenderness.   LUE with open wound x 2 over fistula site  Warm and tender to palpation with edema and serous drainage. Non-erythematous. No bleeding or pus expressed.   Neurological: He is alert and oriented to person, place, and time.   Skin: Skin is warm and dry. He is not diaphoretic.   Psychiatric: He has a normal mood and affect.   Nursing note and vitals reviewed.                      Significant Labs:   CBC:   Recent Labs   Lab 04/09/20  1505   WBC 4.06   HGB 9.2*   HCT 30.0*        CMP:   Recent Labs   Lab 04/09/20  1505   *   K 4.8      CO2 21*   *   BUN 28*   CREATININE 5.4*   CALCIUM 8.9   PROT 8.0   ALBUMIN 3.0*   BILITOT 0.4   ALKPHOS 140*   AST 26   ALT 25   ANIONGAP 11   EGFRNONAA 10.5*

## 2020-04-09 NOTE — PROGRESS NOTES
Stephane Lopez MD [facility]                         04/09/2020    HPI:  Kodi Ibanez Sr. is a 60 y.o. male with   Patient Active Problem List   Diagnosis    ESRD on hemodialysis    History of stroke    Cognitive deficits as late effect of cerebrovascular disease    Congestive heart failure    Secondary renal hyperparathyroidism    Other extrapyramidal disease and abnormal movement disorder    Mononeuritis of lower limb, unspecified    Vascular dementia with depressed mood    Type 2 diabetes, uncontrolled, with neuropathy    CAD (coronary artery disease)    Seizure disorder    Hyperlipidemia LDL goal <100    Anemia of chronic disease    Hypertension    Uncontrolled type 2 diabetes mellitus with end-stage renal disease    History of MI (myocardial infarction)    Long-term insulin use    Atherosclerosis of aorta    S/P CABG (coronary artery bypass graft)    Hypertensive retinopathy of both eyes    Prostate cancer    Uncontrolled type 2 diabetes mellitus with both eyes affected by proliferative retinopathy and macular edema, with long-term current use of insulin    PVD (peripheral vascular disease)    Dependent on walker for ambulation    SVT (supraventricular tachycardia)    NSVT (nonsustained ventricular tachycardia)    Complication of arteriovenous dialysis fistula    Essential hypertension    Debility    being managed by PCP and specialists who is here today for postop evaluation of bleeding LUE AVF s/p ligation 2/27/20.  Pt with minimal drainage from skin breakdown without infection.  Patient states location is LUE occurring for a few weeks since surgery.  Associated signs and symptoms include none.    Alleviating factors include wound care.  Worsening factors include none.    no MI  no Stroke  Tobacco use: denies    Interval history:  Patient presents urgently due to odor from left upper extremity noticed by wife.  Denies fevers  "chills.  Dialysis via catheter without issues.  States continue to keep left upper extremity clean and dry.    Past Medical History:   Diagnosis Date    Allergy     Ambulates with cane     Atherosclerosis of aorta     noted on IVKTORIA 2011    Blood transfusion     Cataracts, bilateral     CHF (congestive heart failure)     Chronic kidney disease     Clotting disorder     Coronary artery disease     Decreased appetite     Diabetic retinopathy     DM (diabetes mellitus), type 2 with renal complications     ESRD on hemodialysis     TUESDAY/ THURSDAY/SATURDAY    Hyperlipidemia     Hyperparathyroidism, unspecified     Hypertension     Myocardial infarction     Prostate cancer 2016 7/16/19:  wife states "he doesn't have it anymore"    Retinopathy due to secondary diabetes     Seizures     Stroke 2011    PT HAS SHAKES     Unsteady gait     Uses roller walker      Past Surgical History:   Procedure Laterality Date    CARDIAC SURGERY      CABG    CATARACT EXTRACTION      ou    CATARACT EXTRACTION, BILATERAL      cataracts      CORONARY ANGIOGRAPHY INCLUDING BYPASS GRAFTS WITH CATHETERIZATION OF LEFT HEART N/A 12/16/2019    Procedure: ANGIOGRAM, CORONARY, INCLUDING BYPASS GRAFT, WITH LEFT HEART CATHETERIZATION, rcfa, 5fr;  Surgeon: aDvid Villgeas MD;  Location: [facility] CATH LAB;  Service: Cardiology;  Laterality: N/A;    CORONARY ARTERY BYPASS GRAFT  2009    bypass    EYE SURGERY      Left Guera AVF  1/11/2012    PROSTATE BIOPSY      positive    retinal laser      REVISION OF ARTERIOVENOUS FISTULA Left 2/27/2020    Procedure: Ligation of left upper extremity fistula, Closure of left upper extremity skin defect x 2, Intraoperative ultrasound ;  Surgeon: Stephane Lopez MD;  Location: [facility] OR;  Service: Vascular;  Laterality: Left;    ROTATOR CUFF REPAIR  right, 2005    ULTRASOUND GUIDANCE  12/16/2019    Procedure: Ultrasound Guidance;  Surgeon: David Vlilegas MD;  Location: [facility] CATH " LAB;  Service: Cardiology;;    VASCULAR SURGERY       Family History   Problem Relation Age of Onset    Diabetes Mother     Glaucoma Mother     Cancer Mother     Cataracts Mother     Heart disease Father     Cancer Paternal Grandmother     Heart disease Maternal Grandmother     No Known Problems Sister     Prostate cancer Brother     No Known Problems Maternal Grandfather     No Known Problems Paternal Grandfather     No Known Problems Sister     No Known Problems Sister     Hypertension Brother     Cancer Paternal Uncle     Amblyopia Neg Hx     Blindness Neg Hx     Macular degeneration Neg Hx     Retinal detachment Neg Hx     Strabismus Neg Hx     Stroke Neg Hx     Thyroid disease Neg Hx     Anesthesia problems Neg Hx     Clotting disorder Neg Hx      problems Neg Hx     Kidney cancer Neg Hx     Kidney disease Neg Hx     Malignant hyperthermia Neg Hx     Sickle cell trait Neg Hx     Lupus Neg Hx     Urolithiasis Neg Hx     Sudden death Neg Hx      Social History     Socioeconomic History    Marital status:      Spouse name: Not on file    Number of children: Not on file    Years of education: Not on file    Highest education level: Not on file   Occupational History    Not on file   Social Needs    Financial resource strain: Not on file    Food insecurity:     Worry: Not on file     Inability: Not on file    Transportation needs:     Medical: Not on file     Non-medical: Not on file   Tobacco Use    Smoking status: Never Smoker    Smokeless tobacco: Never Used   Substance and Sexual Activity    Alcohol use: No    Drug use: No    Sexual activity: Not Currently   Lifestyle    Physical activity:     Days per week: Not on file     Minutes per session: Not on file    Stress: Not on file   Relationships    Social connections:     Talks on phone: Not on file     Gets together: Not on file     Attends Yazidi service: Not on file     Active member of club or  organization: Not on file     Attends meetings of clubs or organizations: Not on file     Relationship status: Not on file   Other Topics Concern    Not on file   Social History Narrative    Not on file       Current Outpatient Medications:     ascorbic acid, vitamin C, (VITAMIN C) 500 MG tablet, Take 500 mg by mouth every evening. , Disp: , Rfl:     atorvastatin (LIPITOR) 40 MG tablet, TAKE 1 TABLET(40 MG) BY MOUTH EVERY DAY, Disp: 90 tablet, Rfl: 1    carvedilol (COREG) 25 MG tablet, TAKE 1 TABLET(25 MG) BY MOUTH TWICE DAILY WITH MEALS, Disp: 180 tablet, Rfl: 1    clopidogrel (PLAVIX) 75 mg tablet, Take 1 tablet (75 mg total) by mouth once daily., Disp: 30 tablet, Rfl: 11    diltiaZEM (CARDIZEM CD) 180 MG 24 hr capsule, Take 1 capsule (180 mg total) by mouth once daily., Disp: 90 capsule, Rfl: 1    furosemide (LASIX) 40 MG tablet, TAKE 1 TABLET(40 MG) BY MOUTH EVERY DAY, Disp: 90 tablet, Rfl: 1    insulin (LANTUS SOLOSTAR U-100 INSULIN) glargine 100 units/mL (3mL) SubQ pen, INJECT 20 UNITS UNDER THE SKIN NIGHTLY, Disp: 15 mL, Rfl: 0    losartan (COZAAR) 100 MG tablet, Take 1 tablet (100 mg total) by mouth once daily., Disp: 90 tablet, Rfl: 1    megestrol (MEGACE) 20 MG Tab, Take 1 tablet (20 mg total) by mouth once daily., Disp: 90 tablet, Rfl: 1    acetaminophen (TYLENOL) 500 MG tablet, Take 2 tablets (1,000 mg total) by mouth every 8 (eight) hours as needed for Pain or Temperature greater than (100.5). (Patient not taking: Reported on 3/5/2020), Disp: 30 tablet, Rfl: 0    blood glucose control, normal Soln, 1 drop by Misc.(Non-Drug; Combo Route) route as directed. (Patient not taking: Reported on 3/23/2020), Disp: 1 each, Rfl: 1    blood sugar diagnostic Strp, To check BG 2 times daily, to use with accuchek casa meter (Patient not taking: Reported on 3/23/2020), Disp: 200 each, Rfl: 3    blood-glucose meter kit, To check BG 2 times daily, to use with accuchek casa meter, Disp: 1 each, Rfl: 0     "lancets Misc, To check BG 2 times daily, to use with accuchek casa meter (Patient not taking: Reported on 3/23/2020), Disp: 200 each, Rfl: 3    NOVOFINE 30 30 x 1/3 " Ndle, USE AS DIRECTED WITH LANTUS SOLARSTAR (Patient not taking: Reported on 3/23/2020), Disp: 100 each, Rfl: 3    NOVOLOG FLEXPEN U-100 INSULIN 100 unit/mL InPn pen, INJECT 5 TO 10 UNITS WITH MEALS AND AT BEDTIME DEPENDING ON BLOOD SUGAR, Disp: 15 mL, Rfl: 0    pen needle, diabetic (BD ULTRA-FINE ORIG PEN NEEDLE) 29 gauge x 1/2" Ndle, USE  1 EVERY EVENING (Patient not taking: Reported on 3/23/2020), Disp: 90 each, Rfl: 3    promethazine (PHENERGAN) 25 MG tablet, Take 1 tablet (25 mg total) by mouth every 6 (six) hours as needed for Nausea., Disp: 30 tablet, Rfl: 0    REVIEW OF SYSTEMS:  General: No fevers or chills; ENT: No sore throat; Allergy and Immunology: no persistent infections; Hematological and Lymphatic: No history of bleeding or easy bruising; Endocrine: negative; Respiratory: no cough, shortness of breath, or wheezing; Cardiovascular: no chest pain or dyspnea on exertion; Gastrointestinal: no abdominal pain/back, change in bowel habits, or bloody stools; Genito-Urinary: no dysuria, trouble voiding, or hematuria; Musculoskeletal: negative; Neurological: no TIA or stroke symptoms; Psychiatric: no nervousness, anxiety or depression.    PHYSICAL EXAM:                General appearance:  Alert, well-appearing, and in no distress.  Oriented to person, place, and time                    Neurological: Normal speech, no focal findings noted; CN II - XII grossly intact. LUE sensation to light touch.            Musculoskeletal: Digits/nail without cyanosis/clubbing.  Strength 5/5 all extremities, decreased ROM.                    Neck: Supple, no significant adenopathy, no carotid bruit can be auscultated                  Chest:  Clear to auscultation, no wheezes, rales or rhonchi, symmetric air entry. No use of accessory muscles               " Cardiac: Normal rate and regular rhythm, S1 and S2 normal            Abdomen: Soft, nontender, nondistended, no masses or organomegaly, no hernia     No rebound tenderness noted; bowel sounds normal     Pulsatile aortic mass is non palpable.     No groin adenopathy      Extremities:   2+ L radial pulse, palpable proximal fistula, no thrill or bruit to LUE AVF     No LUE edema    Skin: LUE central skin defect with purulent Drainage and odor present, no surrounding erythema.  New wound just above the AC fossa with necrotic tissue present, no purulence    LAB RESULTS:  No results found for: CBC  Lab Results   Component Value Date    LABPROT 10.1 03/05/2020    INR 0.9 03/05/2020     Lab Results   Component Value Date     03/05/2020    K 3.9 03/05/2020     03/05/2020    CO2 21 (L) 03/05/2020     (H) 03/05/2020    BUN 40 (H) 03/05/2020    CREATININE 7.2 (H) 03/05/2020    CALCIUM 8.3 (L) 03/05/2020    ANIONGAP 13 03/05/2020    EGFRNONAA 7 (A) 03/05/2020     Lab Results   Component Value Date    WBC 5.36 03/05/2020    RBC 2.73 (L) 03/05/2020    HGB 7.9 (L) 03/05/2020    HCT 25.6 (L) 03/05/2020    MCV 94 03/05/2020    MCH 28.9 03/05/2020    MCHC 30.9 (L) 03/05/2020    RDW 15.9 (H) 03/05/2020     03/05/2020    MPV 10.6 03/05/2020    GRAN 4.0 03/05/2020    GRAN 74.0 (H) 03/05/2020    LYMPH 0.7 (L) 03/05/2020    LYMPH 12.3 (L) 03/05/2020    MONO 0.6 03/05/2020    MONO 10.4 03/05/2020    EOS 0.1 03/05/2020    BASO 0.03 03/05/2020    EOSINOPHIL 2.1 03/05/2020    BASOPHIL 0.6 03/05/2020    DIFFMETHOD Automated 03/05/2020     .  Lab Results   Component Value Date    HGBA1C 11.7 (H) 01/16/2020       IMAGING:  All pertinent imaging has been reviewed and interpreted independently.    LUE HD US reviewed.    IMP/PLAN:  60 y.o. male with   Patient Active Problem List   Diagnosis    ESRD on hemodialysis    History of stroke    Cognitive deficits as late effect of cerebrovascular disease    Congestive heart  failure    Secondary renal hyperparathyroidism    Other extrapyramidal disease and abnormal movement disorder    Mononeuritis of lower limb, unspecified    Vascular dementia with depressed mood    Type 2 diabetes, uncontrolled, with neuropathy    CAD (coronary artery disease)    Seizure disorder    Hyperlipidemia LDL goal <100    Anemia of chronic disease    Hypertension    Uncontrolled type 2 diabetes mellitus with end-stage renal disease    History of MI (myocardial infarction)    Long-term insulin use    Atherosclerosis of aorta    S/P CABG (coronary artery bypass graft)    Hypertensive retinopathy of both eyes    Prostate cancer    Uncontrolled type 2 diabetes mellitus with both eyes affected by proliferative retinopathy and macular edema, with long-term current use of insulin    PVD (peripheral vascular disease)    Dependent on walker for ambulation    SVT (supraventricular tachycardia)    NSVT (nonsustained ventricular tachycardia)    Complication of arteriovenous dialysis fistula    Essential hypertension    Debility    being managed by PCP and specialists who is here today for postop evaluation of bleeding LUE AVF s/p ligation 2/27/20.    -LUE AVF thrombosed with evidence of new wound and infection - rec arm washout, debridement and excision of infected fistula and stents.  [place] [facility] [place].  Will need antibiotics    I spent 20 minutes evaluating this patient and greater than 50% of the time was spent counseling, coordinator care and discussing the plan of care.  All questions were answered and patient stated understanding with agreement with the above treatment plan.    Stephane Lopez MD VI  Vascular and Endovascular Surgery

## 2020-04-09 NOTE — HPI
Mr. Kodi Ibanez is a 59yo M with hx of CHF (EF 60%), CAD s/p CABG (2009) and NSTEMI s/p PCI with FERCHO to LCx (12/16/19), DMT2, ESRD (HD T/Th/Sa), HLD, HTN, seizures, CVA (2011), and prostate cancer (2016) who presents to the ED due to open wound LUE over fistula.     Patient is a poor historian and history is obtained from chart review, ED report and discussion with wife Ms Erwin. Patient underwent ligation of LUE fistula on 2/27/20 with Dr Lopez ([facility], vascular surgery) and has been having ongoing issues since then. (He is followed by Dr. Pressley in vascular surgery at [place] since 2011). Patient was seen by Dr. Lopez at  on day of presentation and sent to the ED for further evaluation of unhealing LUE fistula wound and possible urgent washout. Patient has no complaints. Denies fevers, chills, headache, CP, SOB, cough, abdominal pain, n/v/d, or dysuria. He reports receiving dialysis today (4/9) via his trialysis catheter in the right upper chest.    In the ED, patient remained afebrile, hemodynamically stable, and satting well on RA. Labs were remarkable for glucose >500, ESR > 120, CRP 74; lactate wnl (1.8). No leukocytosis, Hb 9.2 (around baseline). BMP consistent with ESRD (BUN 28, Cr 5.4). Patient was given 1 x dose ceftriaxone and 6U regular insulin. Vascular surgery was consulted in the ED. Admitted to  for management of HHS and multiple comorbidities.

## 2020-04-09 NOTE — ED TRIAGE NOTES
Dialysis pt w/ access in left upper arm. Sessions are on Tuesday, Thursday and Saturday. Completed session today. Referred to ER b/c  Access continued to bleed after  Needle removed. Pt has an open round wound near access

## 2020-04-09 NOTE — ED NOTES
LOC: The patient is awake and alert; oriented x 3 and speaking appropriately.Shy , slow to answer questions, speaking in a very low tone  APPEARANCE: Patient resting comfortably, patient is clean and well groomed  SKIN: warm and dry, normal skin turgor & moist mucus membranes, skin intact, no breakdown noted.Dressing  Over left upper arm access- open round wound noted, trialysis catheter in rt upper chest  MUSCULOSKELETAL: Patient moving all extremities well, no obvious swelling or deformities noted  RESPIRATORY: Airway is open and patent,  respirations are spontaneous, normal effort and rate  CARDIAC: Patient has a normal rate, no peripheral edema noted, capillary refill < 3 seconds; No complaints of chest pain Trialysis cathter in rt upper chest  ABDOMEN: Soft and non tender to palpation, no distention noted.

## 2020-04-09 NOTE — ED PROVIDER NOTES
Encounter Date: 4/9/2020       History     Chief Complaint   Patient presents with    Vascular Access Problem     left arm fistula infected, family reports he is getting emergency surgery tomorrow and his doctor told us to come today.      This 60 y.o male with past medical history of congestive heart failure, clotting disorder, coronary artery disease s/p CABG, diabetes mellitus type 2, ESRD on hemodialysis (TU/THUR/SAT), hyperlipidemia, hypertension, myocardial infarction, seizures, prostate cancer and CVA who presents to the emergency department secondary to open wound of LUE over fistula. Patient presents to ED alone given current COVID-19 visitor restriction policy and is a very poor historian, unable to answer most questions although awake, alert and oriented x4.  Per his medical record,  He underwent Ligation of left upper extremity fistula on 02/27/2020 with Dr. Stephane Lopez [facility] Vascular surgeon and reports having ongoing issues with the area since surgery. He has been a patient of Dr. Pressley in Vascular Surgery at [facility] since 2011.  He has no complaints but states he was seen by Dr. Lopez and sent to the ED for further evaluation secondary to open wound of the left upper extremity recommending. Upon physical examination of the patient, there are 2 open wounds of the left upper extremity atop his fistula, there is tenderness to palpation and serous drainage from the open wounds with manipulation.  No surrounding erythema, no active bleeding.  He denies chest pain, shortness of breath, abdominal pain, nausea, vomiting, fever, chills, cough, malaise, headache, dysuria         Review of patient's allergies indicates:   Allergen Reactions    Reglan [metoclopramide hcl] Diarrhea    Lorazepam      Other reaction(s): heavy sedation     Past Medical History:   Diagnosis Date    Atherosclerosis of aorta     noted on VIKTORIA 2011    Blood transfusion     Cataracts, bilateral     CHF  "(congestive heart failure)     Clotting disorder     Cognitive deficits as late effect of cerebrovascular disease 6/26/2012    Coronary artery disease     NSTEMI s/p PCI with FERCHO to LCx (12/16/19),     Decreased appetite     DM (diabetes mellitus), type 2 with renal complications     Diabetic retinopathy    ESRD on hemodialysis     TUESDAY/ THURSDAY/SATURDAY    Hyperlipidemia     Hyperparathyroidism, unspecified     Hypertension     Prostate cancer 2016 7/16/19:  wife states "he doesn't have it anymore"    Seizures     Stroke 2011    PT HAS SHAKES     Unsteady gait     Uses roller walker     Past Surgical History:   Procedure Laterality Date    CARDIAC SURGERY      CABG    CATARACT EXTRACTION      ou    CATARACT EXTRACTION, BILATERAL      cataracts      CORONARY ANGIOGRAPHY INCLUDING BYPASS GRAFTS WITH CATHETERIZATION OF LEFT HEART N/A 12/16/2019    Procedure: ANGIOGRAM, CORONARY, INCLUDING BYPASS GRAFT, WITH LEFT HEART CATHETERIZATION, rcfa, 5fr;  Surgeon: David Villegas MD;  Location: [facility] CATH LAB;  Service: Cardiology;  Laterality: N/A;    CORONARY ARTERY BYPASS GRAFT  2009    bypass    EYE SURGERY      Left Guera AVF  1/11/2012    PROSTATE BIOPSY      positive    retinal laser      REVISION OF ARTERIOVENOUS FISTULA Left 2/27/2020    Procedure: Ligation of left upper extremity fistula, Closure of left upper extremity skin defect x 2, Intraoperative ultrasound ;  Surgeon: Stephane Lopez MD;  Location: [facility] OR;  Service: Vascular;  Laterality: Left;    ROTATOR CUFF REPAIR  right, 2005    ULTRASOUND GUIDANCE  12/16/2019    Procedure: Ultrasound Guidance;  Surgeon: David Villegas MD;  Location: [facility] CATH LAB;  Service: Cardiology;;    VASCULAR SURGERY       Family History   Problem Relation Age of Onset    Diabetes Mother     Glaucoma Mother     Cancer Mother     Cataracts Mother     Heart disease Father     Cancer Paternal Grandmother     Heart disease Maternal " Grandmother     No Known Problems Sister     Prostate cancer Brother     No Known Problems Maternal Grandfather     No Known Problems Paternal Grandfather     No Known Problems Sister     No Known Problems Sister     Hypertension Brother     Cancer Paternal Uncle     Amblyopia Neg Hx     Blindness Neg Hx     Macular degeneration Neg Hx     Retinal detachment Neg Hx     Strabismus Neg Hx     Stroke Neg Hx     Thyroid disease Neg Hx     Anesthesia problems Neg Hx     Clotting disorder Neg Hx      problems Neg Hx     Kidney cancer Neg Hx     Kidney disease Neg Hx     Malignant hyperthermia Neg Hx     Sickle cell trait Neg Hx     Lupus Neg Hx     Urolithiasis Neg Hx     Sudden death Neg Hx      Social History     Tobacco Use    Smoking status: Never Smoker    Smokeless tobacco: Never Used   Substance Use Topics    Alcohol use: No    Drug use: No     Review of Systems   Constitutional: Negative for chills, diaphoresis, fatigue and fever.   HENT: Negative for congestion, drooling, postnasal drip, rhinorrhea and sore throat.    Eyes: Negative for photophobia.   Respiratory: Negative for apnea, cough, choking, chest tightness and shortness of breath.    Cardiovascular: Negative for chest pain and palpitations.   Gastrointestinal: Negative for abdominal pain, diarrhea, nausea and vomiting.   Genitourinary: Negative for dysuria.   Musculoskeletal: Negative for back pain.   Skin: Positive for wound (x2 LUE over fistula). Negative for pallor and rash.   Allergic/Immunologic: Negative for immunocompromised state.   Neurological: Negative for dizziness, weakness and headaches.   Hematological: Does not bruise/bleed easily.   Psychiatric/Behavioral: Negative for confusion. The patient is not nervous/anxious.        Physical Exam     Initial Vitals [04/09/20 1402]   BP Pulse Resp Temp SpO2   (!) 140/75 94 20 99.2 °F (37.3 °C) 99 %      MAP       --         Physical Exam    Nursing note and vitals  reviewed.  Constitutional: He appears well-developed and well-nourished. No distress.   HENT:   Head: Normocephalic and atraumatic.   Eyes: Conjunctivae and EOM are normal. Pupils are equal, round, and reactive to light.   Neck: Normal range of motion.   Cardiovascular: Normal rate, regular rhythm and normal heart sounds.   Pulmonary/Chest: Breath sounds normal. No respiratory distress. He has no wheezes. He has no rales.   Abdominal: Soft. Bowel sounds are normal. There is no tenderness.   Musculoskeletal: Normal range of motion.        Left upper arm: He exhibits tenderness (over fistula) and edema.   Open wound x 2 of LUE over fistula site - +TTP with warmth. No erythema. + Serous drainage. No bleeding or pus.    Neurological: He is alert and oriented to person, place, and time.   Skin: Skin is warm and dry. No rash noted.   Psychiatric: He has a normal mood and affect.                 ED Course   Procedures  Labs Reviewed   CBC W/ AUTO DIFFERENTIAL - Abnormal; Notable for the following components:       Result Value    RBC 3.15 (*)     Hemoglobin 9.2 (*)     Hematocrit 30.0 (*)     Mean Corpuscular Hemoglobin Conc 30.7 (*)     RDW 16.1 (*)     Lymph # 0.4 (*)     Gran% 76.4 (*)     Lymph% 10.6 (*)     All other components within normal limits   COMPREHENSIVE METABOLIC PANEL - Abnormal; Notable for the following components:    Sodium 134 (*)     CO2 21 (*)     Glucose 576 (*)     BUN, Bld 28 (*)     Creatinine 5.4 (*)     Albumin 3.0 (*)     Alkaline Phosphatase 140 (*)     eGFR if  12.1 (*)     eGFR if non  10.5 (*)     All other components within normal limits   C-REACTIVE PROTEIN - Abnormal; Notable for the following components:    CRP 74.0 (*)     All other components within normal limits   SEDIMENTATION RATE - Abnormal; Notable for the following components:    Sed Rate >120 (*)     All other components within normal limits   POCT GLUCOSE - Abnormal; Notable for the following  components:    POCT Glucose 445 (*)     All other components within normal limits   LACTIC ACID, PLASMA   SARS-COV-2 RNA AMPLIFICATION, QUAL    Narrative:     Pending admission, needs COVID testing          Imaging Results    None          Medical Decision Making:   Initial Assessment:   60 y.o. M emergently evaluated in ED secondary to wound healing issue of left upper extremity status post ligation of fistula.  I spoke to his wife on the telephone, Ms. Erwin, who is in the parking lot and unable to accompany her  given current visitor restriction with COVID-19..  She reports patient has had ongoing issues with the left upper extremity wounds following ligation of fistula in February of this year.  She reports that the patient is established with Dr. Mazariegos in vascular surgery here at [facility].  They were seen at 1:00 p.m. by Dr. Lopez on the [facility] who recommended they report to ED for further evaluation and possible washout of the left upper extremity wound tomorrow, per MD note from today.  Patient reports receiving dialysis today, patient with Trialysis catheter of right upper chest.  Patient's vital signs are stable, patient has no complaints or concerns.  Denies pain other than with palpation of left upper extremity.     Will initiate ED workup with labs and consult vascular surgery.  Will await discussion with vascular surgery for any further workup    2:57 PM Spoke with Vascular Surgery resident, he will reach out to Dr. Lopez prior to making any further recs. Will await return call. Patient updated on plan.        3:10 PM Spoke with Dr. Ly, vascular Surgery fellow. Requesting admission to  given multiple co morbidities and broad spectrum abx. Plan for OR washout of LUE wound tomorrow. Will give Ceftriaxone 1G IV now.     3:14 PM spoke with Azul in hospital medicine.  Will need COVID test resulted prior to admission.  Labs pending.    3:18 PM end of shift care transferred to  Dina Coleman PA-C - she will follow up on labs and COVID testing to notify HM once resulted.     3:21 PM spoke to patient's wife, advised of pending admission.      Differential Diagnosis:   Sepsis, vascular access problem, cellulitis, post operative complication              Attending Attestation:     Physician Attestation Statement for NP/PA:   I discussed this assessment and plan of this patient with the NP/PA, but I did not personally examine the patient. The face to face encounter was performed by the NP/PA.          I have reviewed and concur with the advanced practice clinician's history, physical, assessment, and plan.  The patient was admitted to [facility] for vascular surgery evaluation and washout tomorrow a.m..    Complexity: High - level 5    Final diagnoses:  [N18.6] ESRD (end stage renal disease)  [Z78.9] Problem with vascular access  [T82.7XXA] Infection of prosthetic vascular graft, initial encounter (Primary)      Reece Roldan DO, FAAEM    Emergency Medicine Physician  Dept of Emergency Medicine   [facility]  Spectralink: [id number]                        Clinical Impression:       ICD-10-CM ICD-9-CM   1. Infection of prosthetic vascular graft, initial encounter T82.7XXA 996.62   2. ESRD (end stage renal disease) N18.6 585.6   3. Problem with vascular access Z78.9 V49.9   4. Type 2 diabetes, uncontrolled, with neuropathy E11.40 250.62    E11.65 357.2   5. Infection of arteriovenous fistula, sequela T82.7XXS 909.3         Disposition:   Disposition: Placed in Observation  Condition: Fair     ED Disposition Condition    Admit                           Reece Roldan DO  04/11/20 [id number]

## 2020-04-09 NOTE — ASSESSMENT & PLAN NOTE
Presenting from Vascular Clinic for concern for infected AV fistula,  Patient denies fevers,  Chills, or purulent discharge at home  On exam, 2 open wounds on AV fistula, no drainage or fluctuance noted  WBC 4, Sed rate > 120, CRP 74  Given CTX in ED, plan for possible washout tomorrow per Vascular Surgery    Plan:  -will continue Vancomycin and Ceftriaxone for Staph and Strep coverage  -Vascular surgery consulted, plan to washout tomorrow, recs appreciated  -Blood cultures pending

## 2020-04-09 NOTE — ASSESSMENT & PLAN NOTE
Glucose 572 on admission  AG 11, HCO2 21, no signs of DKA  Given 6 units of regular insulin in ED, repeat Glu 445  Etiology likely due to medication non-compliance    Plan:  -will give additional 6 units of aspart x 1  -will restart Detemir 7 units nightly  -low dose sliding scale  -given ESRD, will continue to monitor for hypoglycemia and insulin stacking

## 2020-04-09 NOTE — PATIENT INSTRUCTIONS
Caring for Your Hemodialysis Access  A problem such as an infection or a blood clot may make the access unusable. Typically, this happens more often with an arteriovenous graft than with an arteriovenous fistula. If this happens, youll need a new access. To help your access last, you will need to follow certain guidelines.  Watching for problems  Call your healthcare provider right away if you:  · Cant feel the blood flowing in the access (this sensation is called a thrill)  · Have pain or numbness in your hand or arm  · Have bleeding, redness, bluish discoloration, or warmth around your access  · Notice your access suddenly bulging out more than usual (a slight bulge is normal)  · Have a fever of 100.4°F (38°C) or higher, or as directed by your healthcare provider   Follow these and any other guidelines youre given  · Dont wear tight clothes or jewelry around your access.  · Dont let anyone take your blood pressure on or draw blood from the arm with the access. Also, dont let anyone put IV lines into it.  · Protect your access from being hit or cut.  · Wash your hands often and keep the area around the access clean.  · Do not carry anything heavy or do anything that would put pressure on the access.  Feeling for your thrill    If you put your fingers over your access, you should feel the blood rushing through it. This is called a thrill, and it feels like a vibration. Feel for the thrill as often as you're told, usually once or twice a day. If you can't feel it, tell your healthcare provider right away. Blood may not be flowing through your access the way it should.  Important numbers  Write the names and numbers of your healthcare providers below. That way you will know how to get in touch with them.  Doctor:  Name ___________________ Phone ___________________  Surgeon:  Name ___________________ Phone ___________________  Dialysis Center:  Name ___________________ Phone ___________________   Date Last  Reviewed: 1/1/2017  © 4572-9123 [employer] [address] [place] [address], [place] [address]. All rights reserved. This information is not intended as a substitute for professional medical care. Always follow your healthcare professional's instructions.

## 2020-04-09 NOTE — LETTER
April 9, 2020        Melonie Elias MD  [address] [facility]  [place] [address]             [facility] Vascular Surgery  [address]  [facility] [address]  Phone: [phone]  Fax: [phone]   Patient: Kodi Ibanez Sr.   MR Number: [id number]   YOB: 1960   Date of Visit: 4/9/2020       Dear Dr. Elias:    Thank you for referring Kodi Ibanez to me for evaluation. Below are the relevant portions of my assessment and plan of care.            If you have questions, please do not hesitate to call me. I look forward to following Kodi along with you.    Sincerely,      Stephane Lopez MD           CC  No Recipients

## 2020-04-10 NOTE — ASSESSMENT & PLAN NOTE
60 y M with ESRD (HD TTS via permacath),CHF (EF 60%), CAD s/p CABG (2009) and PCI with FERCHO (12/16/19), DM,  HTN, seizures, CVA (2011), and prostate cancer (2016), now presents with infected AVF that was previously ligated for bleeding (2/27/20)  - will plan for excision this hospitalization  - cont abx  - cont HD via tunneled catheter  - cont plavix given recent FERCHO  - NPO p MN

## 2020-04-10 NOTE — SUBJECTIVE & OBJECTIVE
Medications Prior to Admission   Medication Sig Dispense Refill Last Dose    ascorbic acid, vitamin C, (VITAMIN C) 500 MG tablet Take 500 mg by mouth every evening.    4/8/2020    atorvastatin (LIPITOR) 40 MG tablet TAKE 1 TABLET(40 MG) BY MOUTH EVERY DAY 90 tablet 1 4/8/2020    carvedilol (COREG) 25 MG tablet TAKE 1 TABLET(25 MG) BY MOUTH TWICE DAILY WITH MEALS 180 tablet 1 4/8/2020    clopidogrel (PLAVIX) 75 mg tablet Take 1 tablet (75 mg total) by mouth once daily. 30 tablet 11 4/8/2020    diltiaZEM (CARDIZEM CD) 180 MG 24 hr capsule Take 1 capsule (180 mg total) by mouth once daily. 90 capsule 1 4/8/2020    furosemide (LASIX) 40 MG tablet TAKE 1 TABLET(40 MG) BY MOUTH EVERY DAY 90 tablet 1 4/8/2020    insulin (LANTUS SOLOSTAR U-100 INSULIN) glargine 100 units/mL (3mL) SubQ pen INJECT 20 UNITS UNDER THE SKIN NIGHTLY 15 mL 0 4/8/2020    losartan (COZAAR) 100 MG tablet Take 1 tablet (100 mg total) by mouth once daily. 90 tablet 1 4/8/2020    megestrol (MEGACE) 20 MG Tab Take 1 tablet (20 mg total) by mouth once daily. 90 tablet 1 4/8/2020    NOVOLOG FLEXPEN U-100 INSULIN 100 unit/mL InPn pen INJECT 5 TO 10 UNITS WITH MEALS AND AT BEDTIME DEPENDING ON BLOOD SUGAR 15 mL 0 4/8/2020    promethazine (PHENERGAN) 25 MG tablet Take 1 tablet (25 mg total) by mouth every 6 (six) hours as needed for Nausea. 30 tablet 0 Past Month    acetaminophen (TYLENOL) 500 MG tablet Take 2 tablets (1,000 mg total) by mouth every 8 (eight) hours as needed for Pain or Temperature greater than (100.5). (Patient not taking: Reported on 3/5/2020) 30 tablet 0 Not Taking    blood glucose control, normal Soln 1 drop by Misc.(Non-Drug; Combo Route) route as directed. (Patient not taking: Reported on 3/23/2020) 1 each 1 Not Taking    blood sugar diagnostic Strp To check BG 2 times daily, to use with accuchek casa meter (Patient not taking: Reported on 3/23/2020) 200 each 3 Not Taking    blood-glucose meter kit To check BG 2 times  "daily, to use with accuchek casa meter 1 each 0 Taking    lancets Misc To check BG 2 times daily, to use with accuchek casa meter (Patient not taking: Reported on 3/23/2020) 200 each 3 Not Taking    NOVOFINE 30 30 x 1/3 " Ndle USE AS DIRECTED WITH LANTUS SOLARSTAR (Patient not taking: Reported on 3/23/2020) 100 each 3 Not Taking    pen needle, diabetic (BD ULTRA-FINE ORIG PEN NEEDLE) 29 gauge x 1/2" Ndle USE  1 EVERY EVENING (Patient not taking: Reported on 3/23/2020) 90 each 3 Not Taking       Review of patient's allergies indicates:   Allergen Reactions    Reglan [metoclopramide hcl] Diarrhea    Lorazepam      Other reaction(s): heavy sedation       Past Medical History:   Diagnosis Date    Atherosclerosis of aorta     noted on VIKTORIA 2011    Blood transfusion     Cataracts, bilateral     CHF (congestive heart failure)     Clotting disorder     Cognitive deficits as late effect of cerebrovascular disease 6/26/2012    Coronary artery disease     NSTEMI s/p PCI with FERCHO to LCx (12/16/19),     Decreased appetite     DM (diabetes mellitus), type 2 with renal complications     Diabetic retinopathy    ESRD on hemodialysis     TUESDAY/ THURSDAY/SATURDAY    Hyperlipidemia     Hyperparathyroidism, unspecified     Hypertension     Prostate cancer 2016 7/16/19:  wife states "he doesn't have it anymore"    Seizures     Stroke 2011    PT HAS SHAKES     Unsteady gait     Uses roller walker     Past Surgical History:   Procedure Laterality Date    CARDIAC SURGERY      CABG    CATARACT EXTRACTION      ou    CATARACT EXTRACTION, BILATERAL      cataracts      CORONARY ANGIOGRAPHY INCLUDING BYPASS GRAFTS WITH CATHETERIZATION OF LEFT HEART N/A 12/16/2019    Procedure: ANGIOGRAM, CORONARY, INCLUDING BYPASS GRAFT, WITH LEFT HEART CATHETERIZATION, rcfa, 5fr;  Surgeon: David Villegas MD;  Location: [facility] CATH LAB;  Service: Cardiology;  Laterality: N/A;    CORONARY ARTERY BYPASS GRAFT  2009    bypass    EYE " SURGERY      Left Guera AVF  1/11/2012    PROSTATE BIOPSY      positive    retinal laser      REVISION OF ARTERIOVENOUS FISTULA Left 2/27/2020    Procedure: Ligation of left upper extremity fistula, Closure of left upper extremity skin defect x 2, Intraoperative ultrasound ;  Surgeon: Stephane Lopez MD;  Location: [facility] OR;  Service: Vascular;  Laterality: Left;    ROTATOR CUFF REPAIR  right, 2005    ULTRASOUND GUIDANCE  12/16/2019    Procedure: Ultrasound Guidance;  Surgeon: David Villegas MD;  Location: [facility] CATH LAB;  Service: Cardiology;;    VASCULAR SURGERY       Family History     Problem Relation (Age of Onset)    Cancer Mother, Paternal Grandmother, Paternal Uncle    Cataracts Mother    Diabetes Mother    Glaucoma Mother    Heart disease Father, Maternal Grandmother    Hypertension Brother    No Known Problems Sister, Maternal Grandfather, Paternal Grandfather, Sister, Sister    Prostate cancer Brother        Tobacco Use    Smoking status: Never Smoker    Smokeless tobacco: Never Used   Substance and Sexual Activity    Alcohol use: No    Drug use: No    Sexual activity: Not Currently     Review of Systems   All other systems reviewed and are negative.    Objective:     Vital Signs (Most Recent):  Temp: 98.4 °F (36.9 °C) (04/09/20 1921)  Pulse: 96 (04/09/20 1921)  Resp: 16 (04/09/20 1921)  BP: (!) 140/85 (04/09/20 1921)  SpO2: 100 % (04/09/20 1921) Vital Signs (24h Range):  Temp:  [98.4 °F (36.9 °C)-99.2 °F (37.3 °C)] 98.4 °F (36.9 °C)  Pulse:  [86-96] 96  Resp:  [16-20] 16  SpO2:  [99 %-100 %] 100 %  BP: (116-140)/(72-85) 140/85     Weight: 67.3 kg (148 lb 5.9 oz)  Body mass index is 20.69 kg/m².    Physical Exam   Constitutional: He appears well-developed and well-nourished.   HENT:   Head: Normocephalic and atraumatic.   Eyes: Pupils are equal, round, and reactive to light.   Neck: Normal range of motion. Neck supple.   Cardiovascular: Normal rate and regular rhythm.   Pulmonary/Chest:  Effort normal. No respiratory distress.   Abdominal: Soft. He exhibits no distension.   Musculoskeletal:   2+ L radial pulse, no thrill or bruit to LUE AVF  LUE central skin defect with purulent drainage and odor present, no surrounding erythema.  New wound just above the AC fossa with necrotic tissue        Significant Labs:  BMP:   Recent Labs   Lab 04/09/20  1505   *   *   K 4.8      CO2 21*   BUN 28*   CREATININE 5.4*   CALCIUM 8.9     CBC:   Recent Labs   Lab 04/09/20  1505   WBC 4.06   RBC 3.15*   HGB 9.2*   HCT 30.0*      MCV 95   MCH 29.2   MCHC 30.7*       Significant Diagnostics:  I have reviewed all pertinent imaging results/findings within the past 24 hours.

## 2020-04-10 NOTE — ASSESSMENT & PLAN NOTE
AV Fistula Infection    Presenting from Vascular Clinic for concern for infected AV fistula,  Patient denies fevers,  Chills, or purulent discharge at home  On exam, 2 open wounds on AV fistula, no drainage or fluctuance noted  WBC 4, Sed rate > 120, CRP 74  Given CTX in ED, plan for possible washout tomorrow per Vascular Surgery    Plan:  -IV Vancomycin and Ceftriaxone for Staph and Strep coverage  -Vascular surgery consulted, apprec recs  -Blood cultures NGTD  -s/p LUE AVF and stent excision (4/10)  -discharge with home health wound care and po augmentin/clindamycin x 10 days (end date: 4/18)

## 2020-04-10 NOTE — PT/OT/SLP PROGRESS
Physical Therapy      Patient Name:  Kodi Ibanez .   MRN:  [id number]    Patient not seen today secondary to pt off floor for procedure.  PT unable to return later in day for additional attempt. Will follow-up next scheduled date.    Zane Brar, PT   4/10/2020

## 2020-04-10 NOTE — PROGRESS NOTES
[facility]  Vascular Surgery  Progress Note    Patient Name: Kodi Ibanez Sr.  MRN: [id number]  Admission Date: 4/9/2020  Primary Care Provider: Melonie Elias MD    Subjective:     Interval History: no events    Post-Op Info:  Procedure(s) (LRB):  AV fistula excision (Left)   Day of Surgery     Medications Prior to Admission   Medication Sig Dispense Refill Last Dose    ascorbic acid, vitamin C, (VITAMIN C) 500 MG tablet Take 500 mg by mouth every evening.    4/8/2020    atorvastatin (LIPITOR) 40 MG tablet TAKE 1 TABLET(40 MG) BY MOUTH EVERY DAY 90 tablet 1 4/8/2020    carvedilol (COREG) 25 MG tablet TAKE 1 TABLET(25 MG) BY MOUTH TWICE DAILY WITH MEALS 180 tablet 1 4/8/2020    clopidogrel (PLAVIX) 75 mg tablet Take 1 tablet (75 mg total) by mouth once daily. 30 tablet 11 4/8/2020    diltiaZEM (CARDIZEM CD) 180 MG 24 hr capsule Take 1 capsule (180 mg total) by mouth once daily. 90 capsule 1 4/8/2020    furosemide (LASIX) 40 MG tablet TAKE 1 TABLET(40 MG) BY MOUTH EVERY DAY 90 tablet 1 4/8/2020    insulin (LANTUS SOLOSTAR U-100 INSULIN) glargine 100 units/mL (3mL) SubQ pen INJECT 20 UNITS UNDER THE SKIN NIGHTLY 15 mL 0 4/8/2020    losartan (COZAAR) 100 MG tablet Take 1 tablet (100 mg total) by mouth once daily. 90 tablet 1 4/8/2020    megestrol (MEGACE) 20 MG Tab Take 1 tablet (20 mg total) by mouth once daily. 90 tablet 1 4/8/2020    NOVOLOG FLEXPEN U-100 INSULIN 100 unit/mL InPn pen INJECT 5 TO 10 UNITS WITH MEALS AND AT BEDTIME DEPENDING ON BLOOD SUGAR 15 mL 0 4/8/2020    promethazine (PHENERGAN) 25 MG tablet Take 1 tablet (25 mg total) by mouth every 6 (six) hours as needed for Nausea. 30 tablet 0 Past Month    acetaminophen (TYLENOL) 500 MG tablet Take 2 tablets (1,000 mg total) by mouth every 8 (eight) hours as needed for Pain or Temperature greater than (100.5). (Patient not taking: Reported on 3/5/2020) 30 tablet 0 Not Taking    blood glucose control, normal Soln 1 drop by  "Misc.(Non-Drug; Combo Route) route as directed. (Patient not taking: Reported on 3/23/2020) 1 each 1 Not Taking    blood sugar diagnostic Strp To check BG 2 times daily, to use with accuchek casa meter (Patient not taking: Reported on 3/23/2020) 200 each 3 Not Taking    blood-glucose meter kit To check BG 2 times daily, to use with accuchek casa meter 1 each 0 Taking    lancets Misc To check BG 2 times daily, to use with accuchek casa meter (Patient not taking: Reported on 3/23/2020) 200 each 3 Not Taking    NOVOFINE 30 30 x 1/3 " Ndle USE AS DIRECTED WITH LANTUS SOLARSTAR (Patient not taking: Reported on 3/23/2020) 100 each 3 Not Taking    pen needle, diabetic (BD ULTRA-FINE ORIG PEN NEEDLE) 29 gauge x 1/2" Ndle USE  1 EVERY EVENING (Patient not taking: Reported on 3/23/2020) 90 each 3 Not Taking       Review of patient's allergies indicates:   Allergen Reactions    Reglan [metoclopramide hcl] Diarrhea    Lorazepam      Other reaction(s): heavy sedation       Past Medical History:   Diagnosis Date    Atherosclerosis of aorta     noted on VIKTORIA 2011    Blood transfusion     Cataracts, bilateral     CHF (congestive heart failure)     Clotting disorder     Cognitive deficits as late effect of cerebrovascular disease 6/26/2012    Coronary artery disease     NSTEMI s/p PCI with FERCHO to LCx (12/16/19),     Decreased appetite     DM (diabetes mellitus), type 2 with renal complications     Diabetic retinopathy    ESRD on hemodialysis     TUESDAY/ THURSDAY/SATURDAY    Hyperlipidemia     Hyperparathyroidism, unspecified     Hypertension     Prostate cancer 2016 7/16/19:  wife states "he doesn't have it anymore"    Seizures     Stroke 2011    PT HAS SHAKES     Unsteady gait     Uses roller walker     Past Surgical History:   Procedure Laterality Date    CARDIAC SURGERY      CABG    CATARACT EXTRACTION      ou    CATARACT EXTRACTION, BILATERAL      cataracts      CORONARY ANGIOGRAPHY INCLUDING " BYPASS GRAFTS WITH CATHETERIZATION OF LEFT HEART N/A 12/16/2019    Procedure: ANGIOGRAM, CORONARY, INCLUDING BYPASS GRAFT, WITH LEFT HEART CATHETERIZATION, rcfa, 5fr;  Surgeon: David Villegas MD;  Location: [facility] CATH LAB;  Service: Cardiology;  Laterality: N/A;    CORONARY ARTERY BYPASS GRAFT  2009    bypass    EYE SURGERY      Left Guera AVF  1/11/2012    PROSTATE BIOPSY      positive    retinal laser      REVISION OF ARTERIOVENOUS FISTULA Left 2/27/2020    Procedure: Ligation of left upper extremity fistula, Closure of left upper extremity skin defect x 2, Intraoperative ultrasound ;  Surgeon: Stephane Lopez MD;  Location: [facility] OR;  Service: Vascular;  Laterality: Left;    ROTATOR CUFF REPAIR  right, 2005    ULTRASOUND GUIDANCE  12/16/2019    Procedure: Ultrasound Guidance;  Surgeon: David Villegas MD;  Location: [facility] CATH LAB;  Service: Cardiology;;    VASCULAR SURGERY       Family History     Problem Relation (Age of Onset)    Cancer Mother, Paternal Grandmother, Paternal Uncle    Cataracts Mother    Diabetes Mother    Glaucoma Mother    Heart disease Father, Maternal Grandmother    Hypertension Brother    No Known Problems Sister, Maternal Grandfather, Paternal Grandfather, Sister, Sister    Prostate cancer Brother        Tobacco Use    Smoking status: Never Smoker    Smokeless tobacco: Never Used   Substance and Sexual Activity    Alcohol use: No    Drug use: No    Sexual activity: Not Currently     Review of Systems   All other systems reviewed and are negative.    Objective:     Vital Signs (Most Recent):  Temp: 99.2 °F (37.3 °C) (04/10/20 0810)  Pulse: 94 (04/10/20 0810)  Resp: 16 (04/10/20 0810)  BP: (!) 163/93 (04/10/20 0810)  SpO2: 98 % (04/10/20 0810) Vital Signs (24h Range):  Temp:  [98.4 °F (36.9 °C)-99.2 °F (37.3 °C)] 99.2 °F (37.3 °C)  Pulse:  [86-96] 94  Resp:  [16-20] 16  SpO2:  [98 %-100 %] 98 %  BP: (116-163)/(72-93) 163/93     Weight: 67.3 kg (148 lb 5.9 oz)  Body mass  index is 20.69 kg/m².    Physical Exam   Constitutional: He appears well-developed and well-nourished.   HENT:   Head: Normocephalic and atraumatic.   Eyes: Pupils are equal, round, and reactive to light.   Neck: Normal range of motion. Neck supple.   Cardiovascular: Normal rate and regular rhythm.   Pulmonary/Chest: Effort normal. No respiratory distress.   Abdominal: Soft. He exhibits no distension.   Musculoskeletal:   2+ L radial pulse, no thrill or bruit to LUE AVF  LUE central skin defect with purulent drainage and odor present, no surrounding erythema.  New wound just above the AC fossa with necrotic tissue        Significant Labs:  BMP:   Recent Labs   Lab 04/10/20  0422   *      K 4.4      CO2 21*   BUN 32*   CREATININE 6.4*   CALCIUM 9.1     CBC:   Recent Labs   Lab 04/10/20  0422   WBC 4.89   RBC 3.27*   HGB 9.4*   HCT 30.5*      MCV 93   MCH 28.7   MCHC 30.8*       Significant Diagnostics:  I have reviewed all pertinent imaging results/findings within the past 24 hours.    Assessment/Plan:     AV fistula infection  60 y M with ESRD (HD TTS via permacath),CHF (EF 60%), CAD s/p CABG (2009) and PCI with FERCHO (12/16/19), DM,  HTN, seizures, CVA (2011), and prostate cancer (2016), now presents with infected AVF that was previously ligated for bleeding (2/27/20)  - will plan for excision today  - cont abx  - cont HD via tunneled catheter  - cont plavix given recent FERCHO  - NPO        Gertrude Ly MD  Vascular Surgery  [facility]

## 2020-04-10 NOTE — SUBJECTIVE & OBJECTIVE
"Past Medical History:   Diagnosis Date    Atherosclerosis of aorta     noted on VIKTORIA 2011    Blood transfusion     Cataracts, bilateral     CHF (congestive heart failure)     Clotting disorder     Cognitive deficits as late effect of cerebrovascular disease 6/26/2012    Coronary artery disease     NSTEMI s/p PCI with FERCHO to LCx (12/16/19),     Decreased appetite     DM (diabetes mellitus), type 2 with renal complications     Diabetic retinopathy    ESRD on hemodialysis     TUESDAY/ THURSDAY/SATURDAY    Hyperlipidemia     Hyperparathyroidism, unspecified     Hypertension     Prostate cancer 2016 7/16/19:  wife states "he doesn't have it anymore"    Seizures     Stroke 2011    PT HAS SHAKES     Unsteady gait     Uses roller walker       Past Surgical History:   Procedure Laterality Date    CARDIAC SURGERY      CABG    CATARACT EXTRACTION      ou    CATARACT EXTRACTION, BILATERAL      cataracts      CORONARY ANGIOGRAPHY INCLUDING BYPASS GRAFTS WITH CATHETERIZATION OF LEFT HEART N/A 12/16/2019    Procedure: ANGIOGRAM, CORONARY, INCLUDING BYPASS GRAFT, WITH LEFT HEART CATHETERIZATION, rcfa, 5fr;  Surgeon: David Villegas MD;  Location: [facility] CATH LAB;  Service: Cardiology;  Laterality: N/A;    CORONARY ARTERY BYPASS GRAFT  2009    bypass    EYE SURGERY      Left Guera AVF  1/11/2012    PROSTATE BIOPSY      positive    retinal laser      REVISION OF ARTERIOVENOUS FISTULA Left 2/27/2020    Procedure: Ligation of left upper extremity fistula, Closure of left upper extremity skin defect x 2, Intraoperative ultrasound ;  Surgeon: Stephane Lopez MD;  Location: [facility] OR;  Service: Vascular;  Laterality: Left;    ROTATOR CUFF REPAIR  right, 2005    ULTRASOUND GUIDANCE  12/16/2019    Procedure: Ultrasound Guidance;  Surgeon: David Villegas MD;  Location: [facility] CATH LAB;  Service: Cardiology;;    VASCULAR SURGERY         Review of patient's allergies indicates:   Allergen Reactions    Reglan " [metoclopramide hcl] Diarrhea    Lorazepam      Other reaction(s): heavy sedation     Current Facility-Administered Medications   Medication Frequency    atorvastatin tablet 40 mg Daily    carvediloL tablet 25 mg BID    cefTRIAXone injection 1 g Q24H    clopidogreL tablet 75 mg Daily    dextrose 10% (D10W) Bolus PRN    dextrose 10% (D10W) Bolus PRN    diltiaZEM 24 hr capsule 180 mg Daily    furosemide tablet 40 mg Daily    glucagon (human recombinant) injection 1 mg PRN    heparin (porcine) injection 5,000 Units Q8H    insulin aspart U-100 pen 0-5 Units Q6H PRN    insulin detemir U-100 pen 7 Units QHS    losartan tablet 100 mg Daily    sodium chloride 0.9% flush 10 mL PRN    vancomycin - pharmacy to dose pharmacy to manage frequency     Family History     Problem Relation (Age of Onset)    Cancer Mother, Paternal Grandmother, Paternal Uncle    Cataracts Mother    Diabetes Mother    Glaucoma Mother    Heart disease Father, Maternal Grandmother    Hypertension Brother    No Known Problems Sister, Maternal Grandfather, Paternal Grandfather, Sister, Sister    Prostate cancer Brother        Tobacco Use    Smoking status: Never Smoker    Smokeless tobacco: Never Used   Substance and Sexual Activity    Alcohol use: No    Drug use: No    Sexual activity: Not Currently     Review of Systems   Constitutional: Negative for appetite change, chills and fever.   Eyes: Negative for photophobia and visual disturbance.   Respiratory: Negative for cough and shortness of breath.    Cardiovascular: Negative for chest pain and palpitations.   Gastrointestinal: Negative for abdominal pain, diarrhea, nausea and vomiting.   Genitourinary: Negative for difficulty urinating.   Musculoskeletal: Negative for back pain, myalgias and neck pain.   Skin: Positive for wound. Negative for pallor and rash.   Neurological: Negative for light-headedness, numbness and headaches.     Objective:     Vital Signs (Most Recent):  Temp:  99.2 °F (37.3 °C) (04/10/20 0810)  Pulse: 89 (04/10/20 0941)  Resp: 16 (04/10/20 0941)  BP: (!) 163/93 (04/10/20 0810)  SpO2: 99 % (04/10/20 0941)  O2 Device (Oxygen Therapy): room air (04/10/20 0941) Vital Signs (24h Range):  Temp:  [98.4 °F (36.9 °C)-99.2 °F (37.3 °C)] 99.2 °F (37.3 °C)  Pulse:  [86-96] 89  Resp:  [16-20] 16  SpO2:  [98 %-100 %] 99 %  BP: (116-163)/(72-93) 163/93     Weight: 67.3 kg (148 lb 5.9 oz) (04/09/20 1916)  Body mass index is 20.69 kg/m².  Body surface area is 1.84 meters squared.    No intake/output data recorded.    Physical Exam   Constitutional: He is oriented to person, place, and time. He appears well-developed and well-nourished. No distress.   HENT:   Head: Normocephalic and atraumatic.   Mouth/Throat: Oropharynx is clear and moist and mucous membranes are normal.   Eyes: Pupils are equal, round, and reactive to light. Conjunctivae are normal. No scleral icterus.   Neck: No JVD present.   Cardiovascular: Normal rate, regular rhythm, normal heart sounds and normal pulses.   Perma-catheter inserted in right subclavian with clean, dry, and intact dressing.    Pulmonary/Chest: Effort normal and breath sounds normal. No respiratory distress.   Abdominal: Soft. Normal appearance and bowel sounds are normal. He exhibits no distension. There is no tenderness.   Musculoskeletal: He exhibits no edema.   AVG in left upper extremity without thrill or bruit. There is an approximately 2 cm skin tear just proximal to AVG with purulent drainage.    Neurological: He is alert and oriented to person, place, and time.   Skin: Skin is warm and dry. No bruising and no rash noted.     Significant Labs:  CMP:   Recent Labs   Lab 04/09/20  1505 04/10/20  0422   * 208*   CALCIUM 8.9 9.1   ALBUMIN 3.0* 2.7*   PROT 8.0  --    * 139   K 4.8 4.4   CO2 21* 21*    106   BUN 28* 32*   CREATININE 5.4* 6.4*   ALKPHOS 140*  --    ALT 25  --    AST 26  --    BILITOT 0.4  --      All labs within the  past 24 hours have been reviewed.    Significant Imaging: All imaging within the last 24 hours has been reviewed.

## 2020-04-10 NOTE — ANESTHESIA PROCEDURE NOTES
Peripheral Block    Patient location during procedure: OR    Reason for block: primary anesthetic   Diagnosis: Problem with vascular access (Z78.9)   Start time: 4/10/2020 10:05 AM  Timeout: 4/10/2020 10:04 AM   End time: 4/10/2020 10:10 AM    Staffing  Authorizing Provider: Kim Ramsey MD  Performing Provider: Kim Ramsey MD    Preanesthetic Checklist  Completed: patient identified, site marked, surgical consent, pre-op evaluation, timeout performed, IV checked, risks and benefits discussed and monitors and equipment checked  Peripheral Block  Patient position: supine  Prep: ChloraPrep  Patient monitoring: heart rate, cardiac monitor, continuous pulse ox, continuous capnometry and frequent blood pressure checks  Block type: intercostobrachial and supraclavicular  Laterality: left  Injection technique: single shot  Needle  Needle type: Stimuplex   Needle gauge: 20 G  Needle length: 3.5 in  Needle localization: ultrasound guidance and anatomical landmarks   -ultrasound image captured on disc.  Assessment  Injection assessment: negative aspiration  Paresthesia pain: none  Heart rate change: no  Slow fractionated injection: yes  Additional Notes  VSS.  CRNA monitoring vitals throughout procedure.  Patient tolerated procedure well.

## 2020-04-10 NOTE — HPI
Mr. Kodi Ibanez is a 60 year old male for whom nephrology is consulted for assistance with in-patient HD management. He has a PMH significant for ESRD (HD TThS), HFpEF, HTN, and T2DM. He was hospitalized from 02/27/2020 to 03/03/2020 at [facility] for management of persistent bleeding from his LUE AVG used for HD access. He underwent ligation on 02/27 with subsequent resolution of bleeding. Perma-cath was placed in right subclavian during that hospitalization as temporary HD access. Following discharge, he developing persistent issues with intermittent bleeding and progressive onset of foul smelling discharge from ligation site. He was evaluated by vascular surgery outpatient on 04/09 and was prompted to report to ED here with concern for needing washout and excision of AVG. His most recent HD session was on 04/09 via right subclavian Perma-cath. He does not follow with a nephrologist outpatient and does not know his dry weight. He denies symptom association with fevers, chills, cough, SOB, or extremity swelling.     Hospital Course: On arrival, his vital signs were unremarkable on room air. Labs were significant for normal anion gap metabolic acidosis, elevated inflammatory markers (, CRP 74), and negative lactate. He tested negative for COVID-19. He was admitted to hospital medicine and initiated on Ceftriaxone and Vancomycin. Vascular surgery was consulted and he is scheduled for excision of AVG on 04/10.    Subjective:      Abby Call is a 27 y.o. female who presents with Abscess (RT forarm pus and rendess )          Other   This is a new problem. Episode onset: x 3-4 days ago. The problem occurs constantly. The problem has been unchanged. Associated symptoms include a rash. Pertinent negatives include no abdominal pain, chest pain, congestion, coughing, fever, headaches, nausea, sore throat or vomiting. She has tried nothing for the symptoms.     The patient presents to clinic secondary to diffuse skin lesions/pustules to her right forearm, right inner thigh, and abdomen. The patient reports a history of MRSA. The patient states the she noticed the first pustule on her abdomen. The patient then developed the pustule to her right forearm and right inner thigh. The patient reports associated tenderness with redness. There has been mild drainage from the affected areas. No fever.       PMH:  has a past medical history of Anxiety and depression and Migraine.  MEDS:   Current Outpatient Prescriptions:   •  Prenatal Vit-Fe Fumarate-FA (PRENATAL COMPLETE) 14-0.4 MG Tab, Take 1 Tab by mouth every day., Disp: 90 Tab, Rfl: 2  ALLERGIES: No Known Allergies  SURGHX:   Past Surgical History:   Procedure Laterality Date   • PRIMARY C SECTION  01/31/2019     SOCHX:  reports that she has never smoked. She has never used smokeless tobacco. She reports that she drinks alcohol. She reports that she does not use drugs.  FH: Family history was reviewed, no pertinent findings to report    The patient is currently breastfeeding.       Review of Systems   Constitutional: Negative for fever.   HENT: Negative for congestion, ear pain and sore throat.    Eyes: Negative for discharge and redness.   Respiratory: Negative for cough and shortness of breath.    Cardiovascular: Negative for chest pain and leg swelling.   Gastrointestinal: Negative for abdominal pain, nausea and vomiting.   Genitourinary: Negative for dysuria.    "  Musculoskeletal: Negative for joint pain.   Skin: Positive for rash.   Neurological: Negative for headaches.          Objective:     /72   Pulse 85   Temp 36.1 °C (97 °F)   Resp 14   Ht 1.549 m (5' 1\")   Wt 60.7 kg (133 lb 12.8 oz)   SpO2 96%   BMI 25.28 kg/m²      Physical Exam   Constitutional: She is oriented to person, place, and time. She appears well-developed and well-nourished. No distress.   HENT:   Head: Normocephalic.   Right Ear: External ear normal.   Left Ear: External ear normal.   Nose: Nose normal.   Eyes: Conjunctivae and EOM are normal.   Neck: Normal range of motion. Neck supple.   Cardiovascular: Normal rate.    Pulmonary/Chest: Effort normal.   Neurological: She is alert and oriented to person, place, and time.   Skin: Skin is warm and dry.        Right Forearm:  Small pustule to the right forearm with surrounding erythema and tenderness. Purulent discharge visible under the skin. Slight increased warmth. No fluctuance. No induration.  No drainage  Small pustule to the right upper leg with surrounding erythema and tenderness. Purulent discharge visible under the skin. Slight increased warmth. No fluctuance. No induration.   Small area of erythema to upper right abdomen with tenderness and increased warmth. No fluctuance. No induration. No drainage. No purulent discharge visible under the skin.             Progress:  Wound Culture - pending      Assessment/Plan:     1. Skin infection  The patient's presenting symptoms and physical exam are consistent with a skin infection. The patient reports a history of MRSA. A wound culture was obtained today in clinic. There were no abscess formation on physical exam. Will prescribe the patient antibiotics at this time. Discussed strict return precautions with the patient, and she verbalized understanding.    - sulfamethoxazole-trimethoprim (BACTRIM DS) 800-160 MG tablet; Take 1 Tab by mouth 2 times a day for 7 days.  Dispense: 14 Tab; Refill: " 0  - CULTURE WOUND W/O GRAM STAIN; Future    OTC Tylenol or Motrin for fever/discomfort.  Follow-up with PCP as needed  Return to clinic or go to the ED if symptoms worsen or fail to improve, or if the patient should develop worsening/increasing skin lesion, pain/tenderness, swelling, increased redness or warmth, drainage, abscess formation, fever/chills, and/or any concerning/symptoms.     Discussed plan with the patient, and she agrees to the above.

## 2020-04-10 NOTE — OP NOTE
4/10/2020      Pre-operative Diagnosis:  1. LUE AVF infection  2. ESRD on HD  3. HTN  4. HLD  5. DM  6. History of bleeding LUE AVF    Post-operative Diagnosis: same.    Procedures:  1. LUE AV fistula excision (Left)  2. LUE washout    Surgeon: Stephane Lopez MD     Assistants: Gertrude Ly MD     Anesthesia: Regional/MAC    Findings/Key elements:   infected LUE mid and distal AVF with stent exposure, cultures and specimen obtained    Implants: * No implants in log *          Procedure Details:  The patient was seen in the Holding Room. The risks, benefits, complications, treatment options, and expected outcomes were discussed with the patient. The patient concurred with the proposed plan, giving informed consent.  The site of surgery properly noted/marked.     Patient was brought to the OR and positioned supine on the table. Regional block anesthesia was administered by anesthesia team. Patient's L arm was prepped and draped in usual sterile fashion. A Time Out was held and the above information confirmed.    US was used to identify the proximal most extent of the stent. A longitudinal incision was then made overlying the 2 areas with ulceration. The fistula was identified, as well as the stent within. They were dissected out circumferentially at the distal end, then traced proximally until we were past the stent. The fistula at this point was divided and oversewn with a 2-0 silk suture. It was sent for culture. The wound was thoroughly irrigated with bacitracin solution and hemostasis was achieved. It was then packed with wet to dry dressings.    EBL:  5 mL           Complications:  None; patient tolerated the procedure well.           Disposition: Stable to recovery.      Dr Lopez was scrubbed and present for the entire procedure.    Gertrude Ly MD   Fellow, Vascular/Endovascular Surgery

## 2020-04-10 NOTE — NURSING
Spoke to Dr. Ly as to when wound care was to start. Dr. Ly said wound care can start in am with HH.

## 2020-04-10 NOTE — PROGRESS NOTES
Pharmacokinetic Initial Assessment: IV Vancomycin    Assessment/Plan:    Initiate intravenous vancomycin with loading dose of 1250 mg (20 mg/kg) once with subsequent doses when random concentrations are less than 20 mcg/mL.  Desired empiric serum trough concentration is 10 to 20 mcg/mL.  Draw vancomycin random level with AM labs on 04/11/2020 prior to HD.  Pharmacy will continue to follow and monitor vancomycin.      Please contact pharmacy at [phone] with any questions regarding this assessment.     Thank you for the consult,   Festus Farooq       Patient brief summary:  Kodi Ibanez Sr. is a 60 y.o. male initiated on antimicrobial therapy with IV Vancomycin for treatment of suspected skin & soft tissue infection.    Drug Allergies:   Review of patient's allergies indicates:   Allergen Reactions    Reglan [metoclopramide hcl] Diarrhea    Lorazepam      Other reaction(s): heavy sedation       Actual Body Weight:   67.3 kg    Renal Function:   Estimated Creatinine Clearance: 13.5 mL/min (A) (based on SCr of 5.4 mg/dL (H)).     Dialysis Method (if applicable):  intermittent HD    CBC (last 72 hours):  Recent Labs   Lab Result Units 04/09/20  1505   WBC K/uL 4.06   Hemoglobin g/dL 9.2*   Hematocrit % 30.0*   Platelets K/uL 152   Gran% % 76.4*   Lymph% % 10.6*   Mono% % 9.1   Eosinophil% % 3.0   Basophil% % 0.7   Differential Method  Automated       Metabolic Panel (last 72 hours):  Recent Labs   Lab Result Units 04/09/20  1505   Sodium mmol/L 134*   Potassium mmol/L 4.8   Chloride mmol/L 102   CO2 mmol/L 21*   Glucose mg/dL 576*   BUN, Bld mg/dL 28*   Creatinine mg/dL 5.4*   Albumin g/dL 3.0*   Total Bilirubin mg/dL 0.4   Alkaline Phosphatase U/L 140*   AST U/L 26   ALT U/L 25       Drug levels (last 3 results):  No results for input(s): VANCOMYCINRA, VANCOMYCINPE, VANCOMYCINTR in the last 72 hours.    Microbiologic Results:  Microbiology Results (last 7 days)     Procedure Component Value Units  Date/Time    Blood culture #2 **CANNOT BE ORDERED STAT** [[id number]] Collected:  04/09/20 1505    Order Status:  Sent Specimen:  Blood from Peripheral, Wrist, Right Updated:  04/09/20 1538    Blood culture #1 **CANNOT BE ORDERED STAT** [[id number]] Collected:  04/09/20 1455    Order Status:  Sent Specimen:  Blood from Peripheral, Hand, Right Updated:  04/09/20 1539

## 2020-04-10 NOTE — HOSPITAL COURSE
Patient admitted with infected LUE AVF site. Glucose under better control after receiving 6U regular insulin and 7U detemir qhs with LDSSI. Evaluated by vascular surgery s/p LUE fistula and stent excision (4/10); all prosthetic removed. Per vascular recs, will need wet to dry dressing changes twice daily and home health nurse to check on wound 3x/week if possible. Patient with no remaining stent and not bacteremic, cultures from OR sent. Can transition from IV ceftriaxone/vanc to po antibiotics. Continue to use permacath for HD. Stable for discharge home (4/10) with home health and renally dosed po augmentin and clindamycin to complete 10 days abx (end date: 4/18). Follow-up with PCP and vascular surgery outpatient.

## 2020-04-10 NOTE — PLAN OF CARE
Surgery completed today. Dressing to LUE remains dry and intact. HH arranged by MIGUEL A. CHAGO to change dressing in am. Denies pain. Glucoses monitored. No insulin indicated. Afebrile. Pt to start po antibiotics after discharge. Informed wife pt is to be discharged this afternoon.

## 2020-04-10 NOTE — HPI
60 y M with ESRD (HD TTS via permacath),CHF (EF 60%), CAD s/p CABG (2009) and PCI with FERCHO (12/16/19), DM,  HTN, seizures, CVA (2011), and prostate cancer (2016). Had history of bleeding LUE AVF s/p ligation 2/27/20. He presented to clinic due to odor from left upper extremity noticed by wife.  Denies fevers chills.  Dialysis via catheter without issues.  States continue to keep left upper extremity clean and dry.  no MI  no Stroke  Tobacco use: denies

## 2020-04-10 NOTE — SUBJECTIVE & OBJECTIVE
Medications Prior to Admission   Medication Sig Dispense Refill Last Dose    ascorbic acid, vitamin C, (VITAMIN C) 500 MG tablet Take 500 mg by mouth every evening.    4/8/2020    atorvastatin (LIPITOR) 40 MG tablet TAKE 1 TABLET(40 MG) BY MOUTH EVERY DAY 90 tablet 1 4/8/2020    carvedilol (COREG) 25 MG tablet TAKE 1 TABLET(25 MG) BY MOUTH TWICE DAILY WITH MEALS 180 tablet 1 4/8/2020    clopidogrel (PLAVIX) 75 mg tablet Take 1 tablet (75 mg total) by mouth once daily. 30 tablet 11 4/8/2020    diltiaZEM (CARDIZEM CD) 180 MG 24 hr capsule Take 1 capsule (180 mg total) by mouth once daily. 90 capsule 1 4/8/2020    furosemide (LASIX) 40 MG tablet TAKE 1 TABLET(40 MG) BY MOUTH EVERY DAY 90 tablet 1 4/8/2020    insulin (LANTUS SOLOSTAR U-100 INSULIN) glargine 100 units/mL (3mL) SubQ pen INJECT 20 UNITS UNDER THE SKIN NIGHTLY 15 mL 0 4/8/2020    losartan (COZAAR) 100 MG tablet Take 1 tablet (100 mg total) by mouth once daily. 90 tablet 1 4/8/2020    megestrol (MEGACE) 20 MG Tab Take 1 tablet (20 mg total) by mouth once daily. 90 tablet 1 4/8/2020    NOVOLOG FLEXPEN U-100 INSULIN 100 unit/mL InPn pen INJECT 5 TO 10 UNITS WITH MEALS AND AT BEDTIME DEPENDING ON BLOOD SUGAR 15 mL 0 4/8/2020    promethazine (PHENERGAN) 25 MG tablet Take 1 tablet (25 mg total) by mouth every 6 (six) hours as needed for Nausea. 30 tablet 0 Past Month    acetaminophen (TYLENOL) 500 MG tablet Take 2 tablets (1,000 mg total) by mouth every 8 (eight) hours as needed for Pain or Temperature greater than (100.5). (Patient not taking: Reported on 3/5/2020) 30 tablet 0 Not Taking    blood glucose control, normal Soln 1 drop by Misc.(Non-Drug; Combo Route) route as directed. (Patient not taking: Reported on 3/23/2020) 1 each 1 Not Taking    blood sugar diagnostic Strp To check BG 2 times daily, to use with accuchek casa meter (Patient not taking: Reported on 3/23/2020) 200 each 3 Not Taking    blood-glucose meter kit To check BG 2 times  "daily, to use with accuchek casa meter 1 each 0 Taking    lancets Misc To check BG 2 times daily, to use with accuchek casa meter (Patient not taking: Reported on 3/23/2020) 200 each 3 Not Taking    NOVOFINE 30 30 x 1/3 " Ndle USE AS DIRECTED WITH LANTUS SOLARSTAR (Patient not taking: Reported on 3/23/2020) 100 each 3 Not Taking    pen needle, diabetic (BD ULTRA-FINE ORIG PEN NEEDLE) 29 gauge x 1/2" Ndle USE  1 EVERY EVENING (Patient not taking: Reported on 3/23/2020) 90 each 3 Not Taking       Review of patient's allergies indicates:   Allergen Reactions    Reglan [metoclopramide hcl] Diarrhea    Lorazepam      Other reaction(s): heavy sedation       Past Medical History:   Diagnosis Date    Atherosclerosis of aorta     noted on VIKTORIA 2011    Blood transfusion     Cataracts, bilateral     CHF (congestive heart failure)     Clotting disorder     Cognitive deficits as late effect of cerebrovascular disease 6/26/2012    Coronary artery disease     NSTEMI s/p PCI with FERCHO to LCx (12/16/19),     Decreased appetite     DM (diabetes mellitus), type 2 with renal complications     Diabetic retinopathy    ESRD on hemodialysis     TUESDAY/ THURSDAY/SATURDAY    Hyperlipidemia     Hyperparathyroidism, unspecified     Hypertension     Prostate cancer 2016 7/16/19:  wife states "he doesn't have it anymore"    Seizures     Stroke 2011    PT HAS SHAKES     Unsteady gait     Uses roller walker     Past Surgical History:   Procedure Laterality Date    CARDIAC SURGERY      CABG    CATARACT EXTRACTION      ou    CATARACT EXTRACTION, BILATERAL      cataracts      CORONARY ANGIOGRAPHY INCLUDING BYPASS GRAFTS WITH CATHETERIZATION OF LEFT HEART N/A 12/16/2019    Procedure: ANGIOGRAM, CORONARY, INCLUDING BYPASS GRAFT, WITH LEFT HEART CATHETERIZATION, rcfa, 5fr;  Surgeon: David Villeags MD;  Location: [facility] CATH LAB;  Service: Cardiology;  Laterality: N/A;    CORONARY ARTERY BYPASS GRAFT  2009    bypass    EYE " SURGERY      Left Guera AVF  1/11/2012    PROSTATE BIOPSY      positive    retinal laser      REVISION OF ARTERIOVENOUS FISTULA Left 2/27/2020    Procedure: Ligation of left upper extremity fistula, Closure of left upper extremity skin defect x 2, Intraoperative ultrasound ;  Surgeon: Stephane Lopez MD;  Location: [facility] OR;  Service: Vascular;  Laterality: Left;    ROTATOR CUFF REPAIR  right, 2005    ULTRASOUND GUIDANCE  12/16/2019    Procedure: Ultrasound Guidance;  Surgeon: David Villegas MD;  Location: [facility] CATH LAB;  Service: Cardiology;;    VASCULAR SURGERY       Family History     Problem Relation (Age of Onset)    Cancer Mother, Paternal Grandmother, Paternal Uncle    Cataracts Mother    Diabetes Mother    Glaucoma Mother    Heart disease Father, Maternal Grandmother    Hypertension Brother    No Known Problems Sister, Maternal Grandfather, Paternal Grandfather, Sister, Sister    Prostate cancer Brother        Tobacco Use    Smoking status: Never Smoker    Smokeless tobacco: Never Used   Substance and Sexual Activity    Alcohol use: No    Drug use: No    Sexual activity: Not Currently     Review of Systems   All other systems reviewed and are negative.    Objective:     Vital Signs (Most Recent):  Temp: 99.2 °F (37.3 °C) (04/10/20 0810)  Pulse: 94 (04/10/20 0810)  Resp: 16 (04/10/20 0810)  BP: (!) 163/93 (04/10/20 0810)  SpO2: 98 % (04/10/20 0810) Vital Signs (24h Range):  Temp:  [98.4 °F (36.9 °C)-99.2 °F (37.3 °C)] 99.2 °F (37.3 °C)  Pulse:  [86-96] 94  Resp:  [16-20] 16  SpO2:  [98 %-100 %] 98 %  BP: (116-163)/(72-93) 163/93     Weight: 67.3 kg (148 lb 5.9 oz)  Body mass index is 20.69 kg/m².    Physical Exam   Constitutional: He appears well-developed and well-nourished.   HENT:   Head: Normocephalic and atraumatic.   Eyes: Pupils are equal, round, and reactive to light.   Neck: Normal range of motion. Neck supple.   Cardiovascular: Normal rate and regular rhythm.   Pulmonary/Chest:  Effort normal. No respiratory distress.   Abdominal: Soft. He exhibits no distension.   Musculoskeletal:   2+ L radial pulse, no thrill or bruit to LUE AVF  LUE central skin defect with purulent drainage and odor present, no surrounding erythema.  New wound just above the AC fossa with necrotic tissue        Significant Labs:  BMP:   Recent Labs   Lab 04/10/20  0422   *      K 4.4      CO2 21*   BUN 32*   CREATININE 6.4*   CALCIUM 9.1     CBC:   Recent Labs   Lab 04/10/20  0422   WBC 4.89   RBC 3.27*   HGB 9.4*   HCT 30.5*      MCV 93   MCH 28.7   MCHC 30.8*       Significant Diagnostics:  I have reviewed all pertinent imaging results/findings within the past 24 hours.

## 2020-04-10 NOTE — ANESTHESIA POSTPROCEDURE EVALUATION
Anesthesia Post Evaluation    Patient: Kodi Ibanez     Procedure(s) Performed: Procedure(s) (LRB):  AV fistula excision (Left)    Final Anesthesia Type: regional    Patient location during evaluation: PACU  Patient participation: Yes- Able to Participate  Level of consciousness: awake and alert  Post-procedure vital signs: reviewed and stable  Pain management: adequate  Airway patency: patent    PONV status at discharge: No PONV  Anesthetic complications: no      Cardiovascular status: hemodynamically stable  Respiratory status: spontaneous ventilation  Follow-up not needed.          Vitals Value Taken Time   /91 4/10/2020 12:02 PM   Temp 36.6 °C (97.8 °F) 4/10/2020 11:17 AM   Pulse 77 4/10/2020 12:08 PM   Resp 8 4/10/2020 12:08 PM   SpO2 99 % 4/10/2020 12:08 PM   Vitals shown include unvalidated device data.      No case tracking events are documented in the log.      Pain/Braxton Score: Braxton Score: 9 (4/10/2020 11:30 AM)

## 2020-04-10 NOTE — H&P
[facility] Hospital Medicine  History & Physical    Patient Name: Kodi Ibanez Sr.  MRN: [id number]  Admission Date: 4/9/2020  Attending Physician: Reece Roldan DO   Primary Care Provider: Melonie Elias MD    Hospital Medicine Team: [facility] HOSP MED 4 Tashia Khan MD     Patient information was obtained from patient, spouse/SO, past medical records and ER records.     Subjective:     Principal Problem:Infected prosthetic vascular graft    Chief Complaint:   Chief Complaint   Patient presents with    Vascular Access Problem     left arm fistula infected, family reports he is getting emergency surgery tomorrow and his doctor told us to come today.         HPI: Mr. Kodi Ibanez is a 61yo M with hx of CHF (EF 60%), CAD s/p CABG (2009) and NSTEMI s/p PCI with FERCHO to LCx (12/16/19), DMT2, ESRD (HD T/Th/Sa), HLD, HTN, seizures, CVA (2011), and prostate cancer (2016) who presents to the ED due to LUE open wound over AV fistula.     Patient is a poor historian and history is obtained from chart review, ED report and discussion with wife Ms Erwin. Patient underwent ligation of LUE fistula on 2/27/20 with Dr Lopez ([facility], vascular surgery) and has been having ongoing issues since then. (He is followed by Dr. Pressley in vascular surgery at [facility] since 2011). Patient was seen by Dr. Lopez at  on day of presentation and sent to the ED for further evaluation of unhealing LUE fistula wound and possible urgent washout. Patient has no complaints. Denies fevers, chills, headache, CP, SOB, cough, abdominal pain, n/v/d, or dysuria. He reports receiving dialysis today (4/9) via his trialysis catheter in the right upper chest.    In the ED, patient remained afebrile, hemodynamically stable, and satting well on RA. Labs were remarkable for glucose >500, ESR > 120, CRP 74; lactate wnl (1.8). No leukocytosis, Hb 9.2 (around baseline). BMP consistent with ESRD (BUN 28, Cr 5.4). Patient was given 1 x dose  "ceftriaxone and 6U regular insulin. Vascular surgery was consulted in the ED. Admitted to  for management of HHS and multiple comorbidities.     Past Medical History:   Diagnosis Date    Allergy     Ambulates with cane     Atherosclerosis of aorta     noted on VIKTORIA 2011    Blood transfusion     Cataracts, bilateral     CHF (congestive heart failure)     Chronic kidney disease     Clotting disorder     Coronary artery disease     Decreased appetite     Diabetic retinopathy     DM (diabetes mellitus), type 2 with renal complications     ESRD on hemodialysis     TUESDAY/ THURSDAY/SATURDAY    Hyperlipidemia     Hyperparathyroidism, unspecified     Hypertension     Myocardial infarction     Prostate cancer 2016 7/16/19:  wife states "he doesn't have it anymore"    Retinopathy due to secondary diabetes     Seizures     Stroke 2011    PT HAS SHAKES     Unsteady gait     Uses roller walker        Past Surgical History:   Procedure Laterality Date    CARDIAC SURGERY      CABG    CATARACT EXTRACTION      ou    CATARACT EXTRACTION, BILATERAL      cataracts      CORONARY ANGIOGRAPHY INCLUDING BYPASS GRAFTS WITH CATHETERIZATION OF LEFT HEART N/A 12/16/2019    Procedure: ANGIOGRAM, CORONARY, INCLUDING BYPASS GRAFT, WITH LEFT HEART CATHETERIZATION, rcfa, 5fr;  Surgeon: David Villegas MD;  Location: [facility] CATH LAB;  Service: Cardiology;  Laterality: N/A;    CORONARY ARTERY BYPASS GRAFT  2009    bypass    EYE SURGERY      Left Guera AVF  1/11/2012    PROSTATE BIOPSY      positive    retinal laser      REVISION OF ARTERIOVENOUS FISTULA Left 2/27/2020    Procedure: Ligation of left upper extremity fistula, Closure of left upper extremity skin defect x 2, Intraoperative ultrasound ;  Surgeon: Stephane Lopez MD;  Location: [facility] OR;  Service: Vascular;  Laterality: Left;    ROTATOR CUFF REPAIR  right, 2005    ULTRASOUND GUIDANCE  12/16/2019    Procedure: Ultrasound Guidance;  Surgeon: David" FATOUMATA Villegas MD;  Location: [facility] CATH LAB;  Service: Cardiology;;    VASCULAR SURGERY         Review of patient's allergies indicates:   Allergen Reactions    Reglan [metoclopramide hcl] Diarrhea    Lorazepam      Other reaction(s): heavy sedation       No current facility-administered medications on file prior to encounter.      Current Outpatient Medications on File Prior to Encounter   Medication Sig    ascorbic acid, vitamin C, (VITAMIN C) 500 MG tablet Take 500 mg by mouth every evening.     atorvastatin (LIPITOR) 40 MG tablet TAKE 1 TABLET(40 MG) BY MOUTH EVERY DAY    carvedilol (COREG) 25 MG tablet TAKE 1 TABLET(25 MG) BY MOUTH TWICE DAILY WITH MEALS    clopidogrel (PLAVIX) 75 mg tablet Take 1 tablet (75 mg total) by mouth once daily.    diltiaZEM (CARDIZEM CD) 180 MG 24 hr capsule Take 1 capsule (180 mg total) by mouth once daily.    furosemide (LASIX) 40 MG tablet TAKE 1 TABLET(40 MG) BY MOUTH EVERY DAY    insulin (LANTUS SOLOSTAR U-100 INSULIN) glargine 100 units/mL (3mL) SubQ pen INJECT 20 UNITS UNDER THE SKIN NIGHTLY    losartan (COZAAR) 100 MG tablet Take 1 tablet (100 mg total) by mouth once daily.    megestrol (MEGACE) 20 MG Tab Take 1 tablet (20 mg total) by mouth once daily.    NOVOLOG FLEXPEN U-100 INSULIN 100 unit/mL InPn pen INJECT 5 TO 10 UNITS WITH MEALS AND AT BEDTIME DEPENDING ON BLOOD SUGAR    promethazine (PHENERGAN) 25 MG tablet Take 1 tablet (25 mg total) by mouth every 6 (six) hours as needed for Nausea.    acetaminophen (TYLENOL) 500 MG tablet Take 2 tablets (1,000 mg total) by mouth every 8 (eight) hours as needed for Pain or Temperature greater than (100.5). (Patient not taking: Reported on 3/5/2020)    blood glucose control, normal Soln 1 drop by Misc.(Non-Drug; Combo Route) route as directed. (Patient not taking: Reported on 3/23/2020)    blood sugar diagnostic Strp To check BG 2 times daily, to use with accuchek casa meter (Patient not taking: Reported on 3/23/2020)  "   blood-glucose meter kit To check BG 2 times daily, to use with accuchek casa meter    lancets Misc To check BG 2 times daily, to use with accuchek casa meter (Patient not taking: Reported on 3/23/2020)    NOVOFINE 30 30 x 1/3 " Ndle USE AS DIRECTED WITH LANTUS SOLARSTAR (Patient not taking: Reported on 3/23/2020)    pen needle, diabetic (BD ULTRA-FINE ORIG PEN NEEDLE) 29 gauge x 1/2" Ndle USE  1 EVERY EVENING (Patient not taking: Reported on 3/23/2020)     Family History     Problem Relation (Age of Onset)    Cancer Mother, Paternal Grandmother, Paternal Uncle    Cataracts Mother    Diabetes Mother    Glaucoma Mother    Heart disease Father, Maternal Grandmother    Hypertension Brother    No Known Problems Sister, Maternal Grandfather, Paternal Grandfather, Sister, Sister    Prostate cancer Brother        Tobacco Use    Smoking status: Never Smoker    Smokeless tobacco: Never Used   Substance and Sexual Activity    Alcohol use: No    Drug use: No    Sexual activity: Not Currently     Review of Systems   Constitutional: Negative for chills, diaphoresis, fatigue and fever.   HENT: Negative for congestion, rhinorrhea, sore throat and trouble swallowing.    Eyes: Negative for photophobia and visual disturbance.   Respiratory: Negative for cough, chest tightness and shortness of breath.    Cardiovascular: Negative for chest pain, palpitations and leg swelling.   Gastrointestinal: Negative for abdominal pain, constipation, diarrhea, nausea and vomiting.   Genitourinary: Negative for dysuria and flank pain.   Musculoskeletal: Negative for arthralgias and myalgias.   Skin: Positive for wound (x 2 ulcers over LUE fistula).   Neurological: Negative for dizziness, syncope, weakness, numbness and headaches.   Psychiatric/Behavioral: Negative for agitation and confusion.       Objective:     Vital Signs (Most Recent):  Temp: 98.8 °F (37.1 °C) (04/09/20 1703)  Pulse: 86 (04/09/20 1703)  Resp: 18 (04/09/20 1703)  BP: " 136/76 (04/09/20 1703)  SpO2: 99 % (04/09/20 1703) Vital Signs (24h Range):  Temp:  [98.8 °F (37.1 °C)-99.2 °F (37.3 °C)] 98.8 °F (37.1 °C)  Pulse:  [86-94] 86  Resp:  [18-20] 18  SpO2:  [99 %] 99 %  BP: (116-140)/(72-76) 136/76        Body mass index is 20.69 kg/m².    Physical Exam   Constitutional: He is oriented to person, place, and time. He appears well-developed and well-nourished. No distress.   HENT:   Head: Normocephalic and atraumatic.   Mouth/Throat: Oropharynx is clear and moist.   Eyes: Conjunctivae are normal. No scleral icterus.   Neck: Normal range of motion. Neck supple.   Cardiovascular: Normal rate, regular rhythm and normal heart sounds.   No murmur heard.  Pulmonary/Chest: Effort normal and breath sounds normal. No respiratory distress. He has no wheezes. He has no rales.   Abdominal: Soft. Bowel sounds are normal. He exhibits no distension. There is no tenderness. There is no guarding.   Musculoskeletal: He exhibits edema and tenderness.   LUE with open wound x 2 over fistula site  Warm and tender to palpation with edema and serous drainage. Non-erythematous. No bleeding or pus expressed.   Neurological: He is alert and oriented to person, place, and time.   Skin: Skin is warm and dry. He is not diaphoretic.   Psychiatric: He has a normal mood and affect.   Nursing note and vitals reviewed.                      Significant Labs:   CBC:   Recent Labs   Lab 04/09/20  1505   WBC 4.06   HGB 9.2*   HCT 30.0*        CMP:   Recent Labs   Lab 04/09/20  1505   *   K 4.8      CO2 21*   *   BUN 28*   CREATININE 5.4*   CALCIUM 8.9   PROT 8.0   ALBUMIN 3.0*   BILITOT 0.4   ALKPHOS 140*   AST 26   ALT 25   ANIONGAP 11   EGFRNONAA 10.5*     Assessment/Plan:     * Infected prosthetic vascular graft  Presenting from Vascular Clinic for concern for infected AV fistula,  Patient denies fevers,  Chills, or purulent discharge at home  On exam, 2 open wounds on AV fistula, no drainage or  fluctuance noted  WBC 4, Sed rate > 120, CRP 74  Given CTX in ED, plan for possible washout tomorrow per Vascular Surgery    Plan:  -will continue Vancomycin and Ceftriaxone for Staph and Strep coverage  -Vascular surgery consulted, plan to washout tomorrow, recs appreciated  -Blood cultures pending    Hyperglycemia  Glucose 572 on admission  AG 11, HCO2 21, no signs of DKA  Given 6 units of regular insulin in ED, repeat Glu 445  Etiology likely due to medication non-compliance    Plan:  -will give additional 6 units of aspart x 1  -will restart Detemir 7 units nightly  -low dose sliding scale  -given ESRD, will continue to monitor for hypoglycemia and insulin stacking    Uncontrolled type 2 diabetes mellitus with end-stage renal disease  States that he only takes his insulin nightly as needed   See hyperglycemia    Hypertension  Home regimen: losartan 100mg   -will continue    CAD (coronary artery disease)  Hx of CAD s/p CABG (2009) and NSTEMI s/p PCI with FERCHO to LCx (12/16/19)    - continue home plavix 75mg daily      VTE Risk Mitigation (From admission, onward)         Ordered     heparin (porcine) injection 5,000 Units  Every 8 hours      04/09/20 1749     IP VTE HIGH RISK PATIENT  Once      04/09/20 1749                   Tashia Khan MD  Department of Hospital Medicine   [facility]

## 2020-04-10 NOTE — ANESTHESIA PREPROCEDURE EVALUATION
[facility]  Anesthesia Pre-Operative Evaluation         Patient Name: Kodi Ibanez Sr.  YOB: 1960  MRN: [id number]    SUBJECTIVE:     Pre-operative evaluation for Procedure(s) (LRB):  AV fistula excision (Left)     04/10/2020    Kodi Ibanez Sr. is a 60 y.o. male w/ a significant PMHx of CHF (EF 60%), CAD s/p CABG (2009) and NSTEMI s/p PCI with FERCHO to LCx (12/16/19), DMT2, ESRD (HD T/Th/Sa), HLD, HTN, seizures, CVA (2011), and prostate cancer (2016).    Presented to ED after HD 4/9, admitted for AV fistula infection. Noted to have hyperglycemia in ED< no indications of DKA, CBG down trending with basal/bolus insulin regimen. NPO since midnight.     Patient now presents for the above procedure(s).      LDA:        Hemodialysis Catheter 03/05/20 0535 right subclavian (Active)   Number of days: 36            Peripheral IV - Single Lumen 04/09/20 1455 20 G Right Hand (Active)   Site Assessment Clean;Intact;Dry;No swelling;No redness 4/9/2020  9:00 PM   Line Status Infusing 4/9/2020  9:00 PM   Dressing Intervention First dressing 4/9/2020  2:55 PM   Number of days: 0            Peripheral IV - Single Lumen 04/09/20 1505 22 G Right Wrist (Active)   Site Assessment Clean;Intact;No redness;Dry;No swelling 4/9/2020  9:00 PM   Line Status Infusing 4/9/2020  9:00 PM   Dressing Intervention First dressing 4/9/2020  3:05 PM   Number of days: 0            Hemodialysis AV Fistula 04/01/17 0800 Left upper arm (Active)   Number of days: 1105       Prev airway: Feb 2020 - easy mask, grade 1 view with mac 3.    Drips: None documented.      Patient Active Problem List   Diagnosis    ESRD on hemodialysis    History of stroke    Cognitive deficits as late effect of cerebrovascular disease    Congestive heart failure    Secondary renal hyperparathyroidism    Other extrapyramidal disease and abnormal movement disorder    Mononeuritis of lower limb, unspecified    Vascular dementia  with depressed mood    Type 2 diabetes, uncontrolled, with neuropathy    CAD (coronary artery disease)    Seizure disorder    Hyperlipidemia LDL goal <100    Renovascular hypertension    Uncontrolled type 2 diabetes mellitus with end-stage renal disease    History of MI (myocardial infarction)    Long-term insulin use    Atherosclerosis of aorta    S/P CABG (coronary artery bypass graft)    Hypertensive retinopathy of both eyes    Prostate cancer    Uncontrolled type 2 diabetes mellitus with both eyes affected by proliferative retinopathy and macular edema, with long-term current use of insulin    PVD (peripheral vascular disease)    Dependent on walker for ambulation    SVT (supraventricular tachycardia)    NSVT (nonsustained ventricular tachycardia)    Complication of arteriovenous dialysis fistula    Essential hypertension    Debility    Infected prosthetic vascular graft    Anemia in ESRD (end-stage renal disease)    AV fistula infection       Review of patient's allergies indicates:   Allergen Reactions    Reglan [metoclopramide hcl] Diarrhea    Lorazepam      Other reaction(s): heavy sedation       Current Inpatient Medications:   atorvastatin  40 mg Oral Daily    carvediloL  25 mg Oral BID    cefTRIAXone (ROCEPHIN) IVPB  1 g Intravenous Q24H    clopidogreL  75 mg Oral Daily    diltiaZEM  180 mg Oral Daily    furosemide  40 mg Oral Daily    heparin (porcine)  5,000 Units Subcutaneous Q8H    insulin detemir U-100  7 Units Subcutaneous QHS    losartan  100 mg Oral Daily       No current facility-administered medications on file prior to encounter.      Current Outpatient Medications on File Prior to Encounter   Medication Sig Dispense Refill    ascorbic acid, vitamin C, (VITAMIN C) 500 MG tablet Take 500 mg by mouth every evening.       atorvastatin (LIPITOR) 40 MG tablet TAKE 1 TABLET(40 MG) BY MOUTH EVERY DAY 90 tablet 1    carvedilol (COREG) 25 MG tablet TAKE 1 TABLET(25  "MG) BY MOUTH TWICE DAILY WITH MEALS 180 tablet 1    clopidogrel (PLAVIX) 75 mg tablet Take 1 tablet (75 mg total) by mouth once daily. 30 tablet 11    diltiaZEM (CARDIZEM CD) 180 MG 24 hr capsule Take 1 capsule (180 mg total) by mouth once daily. 90 capsule 1    furosemide (LASIX) 40 MG tablet TAKE 1 TABLET(40 MG) BY MOUTH EVERY DAY 90 tablet 1    insulin (LANTUS SOLOSTAR U-100 INSULIN) glargine 100 units/mL (3mL) SubQ pen INJECT 20 UNITS UNDER THE SKIN NIGHTLY 15 mL 0    losartan (COZAAR) 100 MG tablet Take 1 tablet (100 mg total) by mouth once daily. 90 tablet 1    megestrol (MEGACE) 20 MG Tab Take 1 tablet (20 mg total) by mouth once daily. 90 tablet 1    NOVOLOG FLEXPEN U-100 INSULIN 100 unit/mL InPn pen INJECT 5 TO 10 UNITS WITH MEALS AND AT BEDTIME DEPENDING ON BLOOD SUGAR 15 mL 0    promethazine (PHENERGAN) 25 MG tablet Take 1 tablet (25 mg total) by mouth every 6 (six) hours as needed for Nausea. 30 tablet 0    acetaminophen (TYLENOL) 500 MG tablet Take 2 tablets (1,000 mg total) by mouth every 8 (eight) hours as needed for Pain or Temperature greater than (100.5). (Patient not taking: Reported on 3/5/2020) 30 tablet 0    blood glucose control, normal Soln 1 drop by Misc.(Non-Drug; Combo Route) route as directed. (Patient not taking: Reported on 3/23/2020) 1 each 1    blood sugar diagnostic Strp To check BG 2 times daily, to use with accuchek casa meter (Patient not taking: Reported on 3/23/2020) 200 each 3    blood-glucose meter kit To check BG 2 times daily, to use with accuchek casa meter 1 each 0    lancets Mis To check BG 2 times daily, to use with accuchek casa meter (Patient not taking: Reported on 3/23/2020) 200 each 3    NOVOFINE 30 30 x 1/3 " Ndle USE AS DIRECTED WITH LANTUS SOLARSTAR (Patient not taking: Reported on 3/23/2020) 100 each 3    pen needle, diabetic (BD ULTRA-FINE ORIG PEN NEEDLE) 29 gauge x 1/2" Ndle USE  1 EVERY EVENING (Patient not taking: Reported on 3/23/2020) " 90 each 3       Past Surgical History:   Procedure Laterality Date    CARDIAC SURGERY      CABG    CATARACT EXTRACTION      ou    CATARACT EXTRACTION, BILATERAL      cataracts      CORONARY ANGIOGRAPHY INCLUDING BYPASS GRAFTS WITH CATHETERIZATION OF LEFT HEART N/A 12/16/2019    Procedure: ANGIOGRAM, CORONARY, INCLUDING BYPASS GRAFT, WITH LEFT HEART CATHETERIZATION, rcfa, 5fr;  Surgeon: David Villegas MD;  Location: [facility] CATH LAB;  Service: Cardiology;  Laterality: N/A;    CORONARY ARTERY BYPASS GRAFT  2009    bypass    EYE SURGERY      Left Guera AVF  1/11/2012    PROSTATE BIOPSY      positive    retinal laser      REVISION OF ARTERIOVENOUS FISTULA Left 2/27/2020    Procedure: Ligation of left upper extremity fistula, Closure of left upper extremity skin defect x 2, Intraoperative ultrasound ;  Surgeon: Stephane Lopez MD;  Location: [facility] OR;  Service: Vascular;  Laterality: Left;    ROTATOR CUFF REPAIR  right, 2005    ULTRASOUND GUIDANCE  12/16/2019    Procedure: Ultrasound Guidance;  Surgeon: David Villegas MD;  Location: [facility] CATH LAB;  Service: Cardiology;;    VASCULAR SURGERY         Social History     Socioeconomic History    Marital status:      Spouse name: Not on file    Number of children: Not on file    Years of education: Not on file    Highest education level: Not on file   Occupational History    Not on file   Social Needs    Financial resource strain: Not on file    Food insecurity:     Worry: Not on file     Inability: Not on file    Transportation needs:     Medical: Not on file     Non-medical: Not on file   Tobacco Use    Smoking status: Never Smoker    Smokeless tobacco: Never Used   Substance and Sexual Activity    Alcohol use: No    Drug use: No    Sexual activity: Not Currently   Lifestyle    Physical activity:     Days per week: Not on file     Minutes per session: Not on file    Stress: Not on file   Relationships    Social connections:     Talks  on phone: Not on file     Gets together: Not on file     Attends Spiritism service: Not on file     Active member of club or organization: Not on file     Attends meetings of clubs or organizations: Not on file     Relationship status: Not on file   Other Topics Concern    Not on file   Social History Narrative    Not on file       OBJECTIVE:     Vital Signs Range (Last 24H):  Temp:  [36.9 °C (98.4 °F)-37.3 °C (99.2 °F)]   Pulse:  [86-96]   Resp:  [16-20]   BP: (116-163)/(72-93)   SpO2:  [98 %-100 %]       Significant Labs:  Lab Results   Component Value Date    WBC 4.89 04/10/2020    HGB 9.4 (L) 04/10/2020    HCT 30.5 (L) 04/10/2020     04/10/2020    CHOL 158 03/12/2019    TRIG 118 03/12/2019    HDL 37 (L) 03/12/2019    ALT 25 04/09/2020    AST 26 04/09/2020     04/10/2020    K 4.4 04/10/2020     04/10/2020    CREATININE 6.4 (H) 04/10/2020    BUN 32 (H) 04/10/2020    CO2 21 (L) 04/10/2020    TSH 2.133 03/13/2019    PSA 6.9 (H) 10/30/2019    INR 0.9 03/05/2020    HGBA1C 11.7 (H) 01/16/2020       Diagnostic Studies: No relevant studies.    EKG:   Results for orders placed or performed during the hospital encounter of 03/05/20   EKG 12-lead    Collection Time: 03/05/20  5:13 AM    Narrative    Test Reason : R07.9,    Vent. Rate : 082 BPM     Atrial Rate : 082 BPM     P-R Int : 144 ms          QRS Dur : 082 ms      QT Int : 382 ms       P-R-T Axes : 004 138 -38 degrees     QTc Int : 446 ms    Normal sinus rhythm  Right axis deviation  Anteroseptal infarct (cited on or before 16-JUL-2019)  Abnormal ECG  When compared with ECG of 29-FEB-2020 20:14,  Significant changes have occurred  Confirmed by Wayne Starkey MD ([id number]) on 3/6/2020 12:04:56 AM    Referred By: [email or web address]   SELF           Confirmed By:Wayne Starkey MD       ECHOCARDIOGRAM:  TTE:  Results for orders placed or performed during the hospital encounter of 12/14/19   Echo Color Flow Doppler? Yes   Result Value Ref Range    BSA 1.83 m2    TDI  SEPTAL 0.04 m/s    LV LATERAL E/E' RATIO 9.20 m/s    LV SEPTAL E/E' RATIO 23.00 m/s    LA WIDTH 4.27 cm    AORTIC VALVE CUSP SEPERATION 2.16 cm    TDI LATERAL 0.10 m/s    PV PEAK VELOCITY 0.86 cm/s    LVIDD 4.67 3.5 - 6.0 cm    IVS 1.56 (A) 0.6 - 1.1 cm    PW 1.43 (A) 0.6 - 1.1 cm    Ao root annulus 3.77 cm    LVIDS 3.21 2.1 - 4.0 cm    FS 31 28 - 44 %    LA volume 87.06 cm3    Sinus 3.58 cm    STJ 2.88 cm    Ascending aorta 3.17 cm    LV mass 289.81 g    LA size 4.25 cm    RVDD 3.27 cm    TAPSE 0.98 cm    RV S' 8.55 cm/s    Left Ventricle Relative Wall Thickness 0.61 cm    AV mean gradient 4 mmHg    AV valve area 3.03 cm2    AV Velocity Ratio 0.86     AV index (prosthetic) 0.84     E/A ratio 0.75     Mean e' 0.07 m/s    E wave decelartion time 167.61 msec    IVRT 0.16 msec    Pulm vein S/D ratio 1.47     LVOT diameter 2.14 cm    LVOT area 3.6 cm2    LVOT peak tee 1.07 m/s    LVOT peak VTI 24.03 cm    Ao peak tee 1.25 m/s    Ao VTI 28.53 cm    LVOT stroke volume 86.39 cm3    AV peak gradient 6 mmHg    E/E' ratio 13.14 m/s    MV Peak E Tee 0.92 m/s    TR Max Tee 1.73 m/s    MV Peak A Tee 1.23 m/s    PV Peak S Tee 0.63 m/s    PV Peak D Tee 0.43 m/s    LV Systolic Volume 41.43 mL    LV Systolic Volume Index 22.4 mL/m2    LV Diastolic Volume 100.73 mL    LV Diastolic Volume Index 54.44 mL/m2    LA Volume Index 47.1 mL/m2    LV Mass Index 157 g/m2    RA Major Axis 5.71 cm    Left Atrium Minor Axis 5.71 cm    Left Atrium Major Axis 5.58 cm    Triscuspid Valve Regurgitation Peak Gradient 12 mmHg    RA Width 4.60 cm    Right Atrial Pressure (from IVC) 3 mmHg    TV rest pulmonary artery pressure 15 mmHg    Narrative    · Normal left ventricular systolic function. The estimated ejection   fraction is 60%  · Moderate concentric left ventricular hypertrophy.  · Moderate left atrial enlargement.  · Indeterminate left ventricular diastolic function.  · Normal right ventricular systolic function.  · Mild right atrial  enlargement.  · Mild aortic regurgitation.  · Mild mitral regurgitation.  · Mild tricuspid regurgitation.  · Normal central venous pressure (3 mm Hg).  · The estimated PA systolic pressure is 15 mm Hg  · Septal wall has abnormal motion consistent with post-operative status.          ASSESSMENT/PLAN:       Anesthesia Evaluation    I have reviewed the Patient Summary Reports.     I have reviewed the Medications.     Review of Systems  Anesthesia Hx:  No problems with previous Anesthesia   Denies Personal Hx of Anesthesia complications.   Hematology/Oncology:  Hematology Normal   Oncology Normal     EENT/Dental:EENT/Dental Normal   Cardiovascular:   Hypertension, well controlled Past MI CAD  CABG/stent  CHF  Coronary Artery Disease: S/P Aorto-Coronary Bypass Graft Surgery (CABG): CABG was 2008 Hx of Myocardial Infarction  Congestive Heart Failure (CHF)  Hypertension, Essential Hypertension , Pt in Pre-HTN range, systolic 130 - 139 or diastolic 85 - 89, Well Controlled on Rx    Pulmonary:  Pulmonary Normal  Denies COPD.  Denies Asthma.  Denies Shortness of breath.  Denies Asthma.  Denies Chronic Obstructive Pulmonary Disease (COPD).    Renal/:   Chronic Renal Disease, ESRD, Dialysis  Kidney Function/Disease, Chronic Kidney Disease (CKD) , CKD Stage V (ESRD) (GFR <15 or on Dialysis)  Dialysis Dependent, T-TH-S , last dialysis was 8-12-14   Hepatic/GI:  Hepatic/GI Normal Denies Liver Disease.  Denies Esophageal / Stomach Disorders  Denies Liver Disease    Musculoskeletal:  Musculoskeletal Normal    Neurological:   CVA, residual symptoms Seizures, well controlled  Seizure Disorder , Most recent seizure occurred 1-6  months ago , frequency is infrequent  CVA - Cerebrovasular Accident , has had 1 stroke , residual deficits are residual deficit.    Endocrine:   Diabetes, poorly controlled, type 2 Denies Hypothyroidism.  Diabetes, Type 2 Diabetes , controlled by insulin. , most recent HgA1c value was 8.2  Denies Thyroid  Disease    Dermatological:  Skin Normal    Psych:  Psychiatric Normal           Physical Exam  General:  Well nourished, Obesity    Airway/Jaw/Neck:  Airway Findings: Mouth Opening: Normal Tongue: Normal  General Airway Assessment: Adult  Mallampati: II  Jaw/Neck Findings:  Neck ROM: Normal ROM     Eyes/Ears/Nose:  Eyes/Ears/Nose Findings:    Dental:  Dental Findings:    Chest/Lungs:  Chest/Lungs Findings: Clear to auscultation, Normal Respiratory Rate     Heart/Vascular:  Heart Findings: Rate: Normal  Rhythm: Regular Rhythm  Heart Murmur  Systolic  Systolic Heart Murmur Description: L Upper Sternal Border  Systolic Heart Murmur Grade: Grade II        Mental Status:  Mental Status Findings:  Cooperative         Anesthesia Plan  Type of Anesthesia, risks & benefits discussed:  Anesthesia Type:  regional, MAC  Patient's Preference:   Intra-op Monitoring Plan: standard ASA monitors  Intra-op Monitoring Plan Comments:   Post Op Pain Control Plan: multimodal analgesia  Post Op Pain Control Plan Comments:   Induction:   IV  Beta Blocker:         Informed Consent: Patient understands risks and agrees with Anesthesia plan.  Questions answered. Anesthesia consent signed with patient.  ASA Score: 3     Day of Surgery Review of History & Physical:            Ready For Surgery From Anesthesia Perspective.

## 2020-04-10 NOTE — PROVIDER PROGRESS NOTES - EMERGENCY DEPT.
Encounter Date: 4/9/2020    ED Physician Progress Notes           ED Physician Hand-off Note:    ED Course: I assumed care of patient from off-going ED physician team. Briefly, Patient is a 60-year-old male who presents the emergency department with complaints of wound to left upper extremity over fistula.  Patient was evaluated by vascular surgery to day and sent to the emergency department for further evaluation.  Per chart review, plan is to admit the patient for possible washout tomorrow.    At the time of signout plan was pending labs and COVID-19 swab.    Medications given in the ED:    Medications   sodium chloride 0.9% flush 10 mL (has no administration in time range)   heparin (porcine) injection 5,000 Units (has no administration in time range)   dextrose 10% (D10W) Bolus (has no administration in time range)   dextrose 10% (D10W) Bolus (has no administration in time range)   glucagon (human recombinant) injection 1 mg (has no administration in time range)   insulin aspart U-100 pen 0-5 Units (has no administration in time range)   insulin aspart U-100 pen 6 Units (has no administration in time range)   insulin detemir U-100 pen 7 Units (has no administration in time range)   atorvastatin tablet 40 mg (has no administration in time range)   carvediloL tablet 25 mg (has no administration in time range)   clopidogreL tablet 75 mg (has no administration in time range)   diltiaZEM 24 hr capsule 180 mg (has no administration in time range)   furosemide tablet 40 mg (has no administration in time range)   losartan tablet 100 mg (has no administration in time range)   vancomycin - pharmacy to dose (has no administration in time range)   cefTRIAXone injection 1 g (has no administration in time range)   cefTRIAXone injection 1 g (1 g Intravenous Given 4/9/20 1543)   insulin regular injection 6 Units (6 Units Intravenous Given 4/9/20 1719)     Patient's glucose of 546.  Will give IV insulin and re-evaluate.  COVID  negative. Patient will be admitted to Hospital Medicine.       Disposition:  Admit    Patient comfortable with admission. Patient counseled regarding exam, results, diagnosis, treatment, and plan.    Impression:   Final diagnoses:  [N18.6] ESRD (end stage renal disease)  [Z78.9] Problem with vascular access  [T82.7XXA] Infection of prosthetic vascular graft, initial encounter (Primary)

## 2020-04-10 NOTE — CONSULTS
[facility]  Vascular Surgery  Consult Note    Inpatient consult to Vascular Surgery  Consult performed by: Gertrude Ly MD  Consult ordered by: CELSO Zeng        Subjective:     Chief Complaint/Reason for Admission: infected AVF    History of Present Illness: 60 y M with ESRD (HD TTS via permacath),CHF (EF 60%), CAD s/p CABG (2009) and PCI with FERCHO (12/16/19), DM,  HTN, seizures, CVA (2011), and prostate cancer (2016). Had history of bleeding LUE AVF s/p ligation 2/27/20. He presented to clinic due to odor from left upper extremity noticed by wife.  Denies fevers chills.  Dialysis via catheter without issues.  States continue to keep left upper extremity clean and dry.  no MI  no Stroke  Tobacco use: denies    Medications Prior to Admission   Medication Sig Dispense Refill Last Dose    ascorbic acid, vitamin C, (VITAMIN C) 500 MG tablet Take 500 mg by mouth every evening.    4/8/2020    atorvastatin (LIPITOR) 40 MG tablet TAKE 1 TABLET(40 MG) BY MOUTH EVERY DAY 90 tablet 1 4/8/2020    carvedilol (COREG) 25 MG tablet TAKE 1 TABLET(25 MG) BY MOUTH TWICE DAILY WITH MEALS 180 tablet 1 4/8/2020    clopidogrel (PLAVIX) 75 mg tablet Take 1 tablet (75 mg total) by mouth once daily. 30 tablet 11 4/8/2020    diltiaZEM (CARDIZEM CD) 180 MG 24 hr capsule Take 1 capsule (180 mg total) by mouth once daily. 90 capsule 1 4/8/2020    furosemide (LASIX) 40 MG tablet TAKE 1 TABLET(40 MG) BY MOUTH EVERY DAY 90 tablet 1 4/8/2020    insulin (LANTUS SOLOSTAR U-100 INSULIN) glargine 100 units/mL (3mL) SubQ pen INJECT 20 UNITS UNDER THE SKIN NIGHTLY 15 mL 0 4/8/2020    losartan (COZAAR) 100 MG tablet Take 1 tablet (100 mg total) by mouth once daily. 90 tablet 1 4/8/2020    megestrol (MEGACE) 20 MG Tab Take 1 tablet (20 mg total) by mouth once daily. 90 tablet 1 4/8/2020    NOVOLOG FLEXPEN U-100 INSULIN 100 unit/mL InPn pen INJECT 5 TO 10 UNITS WITH MEALS AND AT BEDTIME DEPENDING ON BLOOD SUGAR 15 mL 0  "4/8/2020    promethazine (PHENERGAN) 25 MG tablet Take 1 tablet (25 mg total) by mouth every 6 (six) hours as needed for Nausea. 30 tablet 0 Past Month    acetaminophen (TYLENOL) 500 MG tablet Take 2 tablets (1,000 mg total) by mouth every 8 (eight) hours as needed for Pain or Temperature greater than (100.5). (Patient not taking: Reported on 3/5/2020) 30 tablet 0 Not Taking    blood glucose control, normal Soln 1 drop by Misc.(Non-Drug; Combo Route) route as directed. (Patient not taking: Reported on 3/23/2020) 1 each 1 Not Taking    blood sugar diagnostic Strp To check BG 2 times daily, to use with accuchek casa meter (Patient not taking: Reported on 3/23/2020) 200 each 3 Not Taking    blood-glucose meter kit To check BG 2 times daily, to use with accuchek casa meter 1 each 0 Taking    lancets Misc To check BG 2 times daily, to use with accuchek casa meter (Patient not taking: Reported on 3/23/2020) 200 each 3 Not Taking    NOVOFINE 30 30 x 1/3 " Ndle USE AS DIRECTED WITH LANTUS SOLARSTAR (Patient not taking: Reported on 3/23/2020) 100 each 3 Not Taking    pen needle, diabetic (BD ULTRA-FINE ORIG PEN NEEDLE) 29 gauge x 1/2" Ndle USE  1 EVERY EVENING (Patient not taking: Reported on 3/23/2020) 90 each 3 Not Taking       Review of patient's allergies indicates:   Allergen Reactions    Reglan [metoclopramide hcl] Diarrhea    Lorazepam      Other reaction(s): heavy sedation       Past Medical History:   Diagnosis Date    Atherosclerosis of aorta     noted on VIKTORIA 2011    Blood transfusion     Cataracts, bilateral     CHF (congestive heart failure)     Clotting disorder     Cognitive deficits as late effect of cerebrovascular disease 6/26/2012    Coronary artery disease     NSTEMI s/p PCI with FERCHO to LCx (12/16/19),     Decreased appetite     DM (diabetes mellitus), type 2 with renal complications     Diabetic retinopathy    ESRD on hemodialysis     TUESDAY/ THURSDAY/SATURDAY    Hyperlipidemia  " "   Hyperparathyroidism, unspecified     Hypertension     Prostate cancer 2016    7/16/19:  wife states "he doesn't have it anymore"    Seizures     Stroke 2011    PT HAS SHAKES     Unsteady gait     Uses roller walker     Past Surgical History:   Procedure Laterality Date    CARDIAC SURGERY      CABG    CATARACT EXTRACTION      ou    CATARACT EXTRACTION, BILATERAL      cataracts      CORONARY ANGIOGRAPHY INCLUDING BYPASS GRAFTS WITH CATHETERIZATION OF LEFT HEART N/A 12/16/2019    Procedure: ANGIOGRAM, CORONARY, INCLUDING BYPASS GRAFT, WITH LEFT HEART CATHETERIZATION, rcfa, 5fr;  Surgeon: David Villegas MD;  Location: [facility] CATH LAB;  Service: Cardiology;  Laterality: N/A;    CORONARY ARTERY BYPASS GRAFT  2009    bypass    EYE SURGERY      Left Guera AVF  1/11/2012    PROSTATE BIOPSY      positive    retinal laser      REVISION OF ARTERIOVENOUS FISTULA Left 2/27/2020    Procedure: Ligation of left upper extremity fistula, Closure of left upper extremity skin defect x 2, Intraoperative ultrasound ;  Surgeon: Stephane Lopez MD;  Location: [facility] OR;  Service: Vascular;  Laterality: Left;    ROTATOR CUFF REPAIR  right, 2005    ULTRASOUND GUIDANCE  12/16/2019    Procedure: Ultrasound Guidance;  Surgeon: David Villegas MD;  Location: [facility] CATH LAB;  Service: Cardiology;;    VASCULAR SURGERY       Family History     Problem Relation (Age of Onset)    Cancer Mother, Paternal Grandmother, Paternal Uncle    Cataracts Mother    Diabetes Mother    Glaucoma Mother    Heart disease Father, Maternal Grandmother    Hypertension Brother    No Known Problems Sister, Maternal Grandfather, Paternal Grandfather, Sister, Sister    Prostate cancer Brother        Tobacco Use    Smoking status: Never Smoker    Smokeless tobacco: Never Used   Substance and Sexual Activity    Alcohol use: No    Drug use: No    Sexual activity: Not Currently     Review of Systems   All other systems reviewed and are " negative.    Objective:     Vital Signs (Most Recent):  Temp: 98.4 °F (36.9 °C) (04/09/20 1921)  Pulse: 96 (04/09/20 1921)  Resp: 16 (04/09/20 1921)  BP: (!) 140/85 (04/09/20 1921)  SpO2: 100 % (04/09/20 1921) Vital Signs (24h Range):  Temp:  [98.4 °F (36.9 °C)-99.2 °F (37.3 °C)] 98.4 °F (36.9 °C)  Pulse:  [86-96] 96  Resp:  [16-20] 16  SpO2:  [99 %-100 %] 100 %  BP: (116-140)/(72-85) 140/85     Weight: 67.3 kg (148 lb 5.9 oz)  Body mass index is 20.69 kg/m².    Physical Exam   Constitutional: He appears well-developed and well-nourished.   HENT:   Head: Normocephalic and atraumatic.   Eyes: Pupils are equal, round, and reactive to light.   Neck: Normal range of motion. Neck supple.   Cardiovascular: Normal rate and regular rhythm.   Pulmonary/Chest: Effort normal. No respiratory distress.   Abdominal: Soft. He exhibits no distension.   Musculoskeletal:   2+ L radial pulse, no thrill or bruit to LUE AVF  LUE central skin defect with purulent drainage and odor present, no surrounding erythema.  New wound just above the AC fossa with necrotic tissue        Significant Labs:  BMP:   Recent Labs   Lab 04/09/20  1505   *   *   K 4.8      CO2 21*   BUN 28*   CREATININE 5.4*   CALCIUM 8.9     CBC:   Recent Labs   Lab 04/09/20  1505   WBC 4.06   RBC 3.15*   HGB 9.2*   HCT 30.0*      MCV 95   MCH 29.2   MCHC 30.7*       Significant Diagnostics:  I have reviewed all pertinent imaging results/findings within the past 24 hours.    Assessment/Plan:     AV fistula infection  60 y M with ESRD (HD TTS via permacath),CHF (EF 60%), CAD s/p CABG (2009) and PCI with FERCHO (12/16/19), DM,  HTN, seizures, CVA (2011), and prostate cancer (2016), now presents with infected AVF that was previously ligated for bleeding (2/27/20)  - will plan for excision this hospitalization  - cont abx  - cont HD via tunneled catheter  - cont plavix given recent FERCHO  - NPO p MN        Thank you for your consult. I will follow-up with  patient. Please contact us if you have any additional questions.    Gertrude Ly MD  Vascular Surgery  [facility]

## 2020-04-10 NOTE — PHARMACY MED REC
"Admission Medication Reconciliation - Pharmacy Consult Note    The home medication history was taken by Jeana Bajwa.  Based on information gathered and subsequent review by the clinical pharmacist, the items below may need attention.    You may go to "Admission" then "Reconcile Home Medications" tabs to review and/or act upon these items.        No issues noted with the medication reconciliation.        Please address this information as you see fit.  Feel free to contact us if you have any questions or require assistance.    Kiran Da Silva, PharmD  [id number]                .    .          "

## 2020-04-10 NOTE — ASSESSMENT & PLAN NOTE
ESRD on IHD TTS   Out patient HD Center: [facility].   Out patient nephrologist: None  Duration of outpatient dialysis session - Unknown  EDW: Unknown  Residual Renal Function (Urine Output): None  Access: Right subclavian Perma-Cath    End-Stage Renal Disease on HD  - Will provide dialysis for metabolic clearance and volume management  - Seen and examined today during hemodialysis; tolerating treatment well without issues. Denied headaches, chest pain, abdominal pain, or muscle cramps   - BFR   - Target ultrafiltration   - Dialysate adjusted to current labs   - Medications adjusted to renal parameters  - Strict Input and Output and chart  - Pre/post HD standing weights    Anemia of Chronic Kidney Disease   - Hb > 7 gm/dL  - Will resume TAMMI as outpatient   - Adequate iron stores as per most recent profile   - Will request iron studies to assess further needs of supplementation     Mineral Bone Disease in CKD   - Renal Function Panel Daily for electrolytes monitoring  - Phos  - Already on binders as outpatient Please continue   - CoCa     HTN   - BP, will continue to monitor. Goal for BP is <140 mmHg SBP and BDP <90 mmHg, maintain MAP > 65.    Nutrition   - Renal Diet    Case discussed with staff further recs with attestation.

## 2020-04-10 NOTE — ASSESSMENT & PLAN NOTE
HD T/Th/Sa    - nephrology consulted  - HD per nephro  - monitor daily renal function  - continue HD via permacath

## 2020-04-10 NOTE — TRANSFER OF CARE
"Anesthesia Transfer of Care Note    Patient: Kodi Ibanez Sr.    Procedure(s) Performed: Procedure(s) (LRB):  AV fistula excision (Left)    Patient location: PACU    Anesthesia Type: general    Transport from OR: Transported from OR on 6-10 L/min O2 by face mask with adequate spontaneous ventilation    Post pain: adequate analgesia    Post assessment: no apparent anesthetic complications and tolerated procedure well    Post vital signs: stable    Level of consciousness: responds to stimulation and sedated    Nausea/Vomiting: no nausea/vomiting    Complications: none    Transfer of care protocol was followed      Last vitals:   Visit Vitals  BP (!) 163/93 (BP Location: Right arm, Patient Position: Lying)   Pulse 89   Temp 37.3 °C (99.2 °F) (Oral)   Resp 16   Ht 5' 11" (1.803 m)   Wt 67.3 kg (148 lb 5.9 oz)   SpO2 99%   BMI 20.69 kg/m²     "

## 2020-04-10 NOTE — PT/OT/SLP PROGRESS
Occupational Therapy      Patient Name:  Kodi Ibanez .   MRN:  [id number]    Patient not seen today secondary to Unavailable (Comment)(to OR for procedure). Writing therapist attempted pt in AM, but pt off the floor to OR for a procedure. Will follow-up as scheduled.    Darin Latham, OT  4/10/2020

## 2020-04-10 NOTE — ASSESSMENT & PLAN NOTE
60 y M with ESRD (HD TTS via permacath),CHF (EF 60%), CAD s/p CABG (2009) and PCI with FERCHO (12/16/19), DM,  HTN, seizures, CVA (2011), and prostate cancer (2016), now presents with infected AVF that was previously ligated for bleeding (2/27/20)  - will plan for excision today  - cont abx  - cont HD via tunneled catheter  - cont plavix given recent FERCHO  - NPO

## 2020-04-10 NOTE — NURSING
Discharge instructions given and reviewed with wife over the phone due to Covid 19 precautions. Informed supplies for dressing changes are given to pt for home dressing changes. Instructed wife on home dressing changes and that MD wants the changes to start tomorrow am with HH. Wife states she is an EMT and understands. Wife to  RX at the [facility]. Verbalized understanding of all discharge instructions.

## 2020-04-10 NOTE — ASSESSMENT & PLAN NOTE
Hx of CAD s/p CABG (2009) and NSTEMI s/p PCI with FERCHO to LCx (12/16/19)    - continue home plavix 75mg daily

## 2020-04-10 NOTE — CARE UPDATE
Vascular Surgery Note    LUE fistula and stent excised. All prosthetic removed.   - will need wet to dry dressing changes twice daily. Will likely need home health nurse check on wound 3x/week if possible  - will need po course of abx on discharge, as no remaining stent and not bacteremic. Cultures from OR sent  - cont to use permacath for HD    Gertrude Ly MD   Fellow, Vascular/Endovascular Surgery

## 2020-04-10 NOTE — PLAN OF CARE
[facility]    HOME HEALTH ORDERS  FACE TO FACE ENCOUNTER    Patient Name: Kodi Ibanez Sr.  YOB: 1960    PCP: Melonie Elias MD   PCP Address: [address] [facility] / [place]   PCP Phone Number: [phone]  PCP Fax: [phone]    Encounter Date: 04/10/2020    Admit to Home Health    Diagnoses:  Active Hospital Problems    Diagnosis  POA    *Infected prosthetic vascular graft [T82.7XXA]  Yes    Anemia in ESRD (end-stage renal disease) [N18.6, D63.1]  Yes    AV fistula infection [T82.7XXA]  Yes    Uncontrolled type 2 diabetes mellitus with end-stage renal disease [E11.22, N18.6, E11.65]  Yes    Renovascular hypertension [I15.0]  Yes    CAD (coronary artery disease) [I25.10]  Yes     Chronic     Cath 12/2019:  Successful PCI of severe stenosis in proximal to mid circumflex with drug-eluting stent x1.  A 3 into 26 mm drug-eluting stent, resolute farhad was implanted with excellent angiographic results.  OMARI 3 flow post PCI.      ESRD on hemodialysis [N18.6, Z99.2]  Not Applicable     Dialysis on Tu, Th, Sa  [facility] [place] [facility]  AV graft care by Dr. Garduno      Cognitive deficits as late effect of cerebrovascular disease [I69.919]  Not Applicable     Chronic      Resolved Hospital Problems   No resolved problems to display.       Future Appointments   Date Time Provider Department Center   5/4/2020 10:20 AM Stephane Lopez MD [facility]   5/6/2020  4:00 PM DARRIAN [facility] [place]           I have seen and examined this patient face to face today. My clinical findings that support the need for the home health skilled services and home bound status are the following:  Weakness/numbness causing balance and gait disturbance due to Infection and Weakness/Debility making it taxing to leave home.    Allergies:  Review of patient's allergies indicates:   Allergen Reactions    Reglan [metoclopramide hcl] Diarrhea    Lorazepam       Other reaction(s): heavy sedation       Diet: diabetic diet: 2000 calorie and renal diet    Activities: activity as tolerated    Nursing:   SN to complete comprehensive assessment including routine vital signs. Instruct on disease process and s/s of complications to report to MD. Review/verify medication list sent home with the patient at time of discharge  and instruct patient/caregiver as needed. Frequency may be adjusted depending on start of care date.    Notify MD if SBP > 160 or < 90; DBP > 90 or < 50; HR > 120 or < 50; Temp > 101; Other:         CONSULTS:    Physical Therapy to evaluate and treat. Evaluate for home safety and equipment needs; Establish/upgrade home exercise program. Perform / instruct on therapeutic exercises, gait training, transfer training, and Range of Motion.  Occupational Therapy to evaluate and treat. Evaluate home environment for safety and equipment needs. Perform/Instruct on transfers, ADL training, ROM, and therapeutic exercises.  Aide to provide assistance with personal care, ADLs, and vital signs.    MISCELLANEOUS CARE:  Wound Care Orders:  yes:  Vascular Wound:  Location: Left Upper Extremity AV fistula site    Consult ET nurse        Apply the following to wound:   Wet to Damp dressing: BID (frequency)        Medications: Review discharge medications with patient and family and provide education.      Current Discharge Medication List      START taking these medications    Details   amoxicillin-clavulanate 500-125mg (AUGMENTIN) 500-125 mg Tab Take 1 tablet (500 mg total) by mouth every evening. for 8 days  Qty: 8 tablet, Refills: 0   END DATE: 4/18/20   clindamycin (CLEOCIN) 150 MG capsule Take 3 capsules (450 mg total) by mouth every 8 (eight) hours. for 8 days  Qty: 72 capsule, Refills: 0   END DATE: 4/18/20      CONTINUE these medications which have CHANGED    Details   insulin (LANTUS SOLOSTAR U-100 INSULIN) glargine 100 units/mL (3mL) SubQ pen Inject 7 Units into the skin  every evening.  Qty: 1 Box, Refills: 3         CONTINUE these medications which have NOT CHANGED    Details   ascorbic acid, vitamin C, (VITAMIN C) 500 MG tablet Take 500 mg by mouth every evening.       atorvastatin (LIPITOR) 40 MG tablet TAKE 1 TABLET(40 MG) BY MOUTH EVERY DAY  Qty: 90 tablet, Refills: 1    Associated Diagnoses: Hyperlipidemia LDL goal <100      carvedilol (COREG) 25 MG tablet TAKE 1 TABLET(25 MG) BY MOUTH TWICE DAILY WITH MEALS  Qty: 180 tablet, Refills: 1    Associated Diagnoses: Essential hypertension      clopidogrel (PLAVIX) 75 mg tablet Take 1 tablet (75 mg total) by mouth once daily.  Qty: 30 tablet, Refills: 11      diltiaZEM (CARDIZEM CD) 180 MG 24 hr capsule Take 1 capsule (180 mg total) by mouth once daily.  Qty: 90 capsule, Refills: 1    Associated Diagnoses: Essential hypertension      furosemide (LASIX) 40 MG tablet TAKE 1 TABLET(40 MG) BY MOUTH EVERY DAY  Qty: 90 tablet, Refills: 1    Associated Diagnoses: Chronic congestive heart failure, unspecified heart failure type      losartan (COZAAR) 100 MG tablet Take 1 tablet (100 mg total) by mouth once daily.  Qty: 90 tablet, Refills: 1    Associated Diagnoses: Essential hypertension      megestrol (MEGACE) 20 MG Tab Take 1 tablet (20 mg total) by mouth once daily.  Qty: 90 tablet, Refills: 1    Associated Diagnoses: Failure to thrive in adult      NOVOLOG FLEXPEN U-100 INSULIN 100 unit/mL InPn pen INJECT 5 TO 10 UNITS WITH MEALS AND AT BEDTIME DEPENDING ON BLOOD SUGAR  Qty: 15 mL, Refills: 0    Associated Diagnoses: Type 2 diabetes, uncontrolled, with retinopathy      promethazine (PHENERGAN) 25 MG tablet Take 1 tablet (25 mg total) by mouth every 6 (six) hours as needed for Nausea.  Qty: 30 tablet, Refills: 0    Associated Diagnoses: Nausea      acetaminophen (TYLENOL) 500 MG tablet Take 2 tablets (1,000 mg total) by mouth every 8 (eight) hours as needed for Pain or Temperature greater than (100.5).  Qty: 30 tablet, Refills: 0     "  blood glucose control, normal Soln 1 drop by Misc.(Non-Drug; Combo Route) route as directed.  Qty: 1 each, Refills: 1      blood sugar diagnostic Strp To check BG 2 times daily, to use with accuchek casa meter  Qty: 200 each, Refills: 3      blood-glucose meter kit To check BG 2 times daily, to use with accuchek casa meter  Qty: 1 each, Refills: 0      lancets Misc To check BG 2 times daily, to use with accuchek casa meter  Qty: 200 each, Refills: 3      NOVOFINE 30 30 x 1/3 " Ndle USE AS DIRECTED WITH LANTUS SOLARSTAR  Qty: 100 each, Refills: 3      pen needle, diabetic (BD ULTRA-FINE ORIG PEN NEEDLE) 29 gauge x 1/2" Ndle USE  1 EVERY EVENING  Qty: 90 each, Refills: 3             I certify that this patient is confined to his home and needs intermittent skilled nursing care, physical therapy and occupational therapy.        "

## 2020-04-10 NOTE — PLAN OF CARE
04/10/20 1332   Post-Acute Status   Post-Acute Authorization Home Health   Home Health Status Referrals Sent       Referrals sent to [facility]. Awaiting acceptance.  SW in contact with CM and Medical staff. Will continue to follow and offer support as needed.     Maxim Mcpherson, LMSW [facility]   Ext. [phone]

## 2020-04-10 NOTE — DISCHARGE SUMMARY
[facility] Hospital Medicine  Discharge Summary      Patient Name: Kodi Ibanez Sr.  MRN: [id number]  Admission Date: 4/9/2020  Hospital Length of Stay: 1 days  Discharge Date and Time:  04/10/2020 1:42 PM  Attending Physician: Ian Ferris MD   Discharging Provider: Tashia Khan MD  Primary Care Provider: Melonie Elias MD  Hospital Medicine Team: [facility] HOSP MED 4 Tashia Khan MD    HPI:   Mr. Kodi Ibanez is a 61yo M with hx of CHF (EF 60%), CAD s/p CABG (2009) and NSTEMI s/p PCI with FERCHO to LCx (12/16/19), DMT2, ESRD (HD T/Th/Sa), HLD, HTN, seizures, CVA (2011), and prostate cancer (2016) who presents to the ED due to open wound LUE over fistula.     Patient is a poor historian and history is obtained from chart review, ED report and discussion with wife Ms Erwin. Patient underwent ligation of LUE fistula on 2/27/20 with Dr Lopez ([facility], vascular surgery) and has been having ongoing issues since then. (He is followed by Dr. Pressley in vascular surgery at [facility] since 2011). Patient was seen by Dr. Lopez at  on day of presentation and sent to the ED for further evaluation of unhealing LUE fistula wound and possible urgent washout. Patient has no complaints. Denies fevers, chills, headache, CP, SOB, cough, abdominal pain, n/v/d, or dysuria. He reports receiving dialysis today (4/9) via his trialysis catheter in the right upper chest.    In the ED, patient remained afebrile, hemodynamically stable, and satting well on RA. Labs were remarkable for glucose >500, ESR > 120, CRP 74; lactate wnl (1.8). No leukocytosis, Hb 9.2 (around baseline). BMP consistent with ESRD (BUN 28, Cr 5.4). Patient was given 1 x dose ceftriaxone and 6U regular insulin. Vascular surgery was consulted in the ED. Admitted to  for management of HHS and multiple comorbidities.     Procedure(s) (LRB):  AV fistula excision (Left)      Hospital Course:   Patient admitted with infected LUE AVF site. Glucose  under better control after receiving 6U regular insulin and 7U detemir qhs with LDSSI. Evaluated by vascular surgery s/p LUE fistula and stent excision (4/10); all prosthetic removed. Per vascular recs, will need wet to dry dressing changes twice daily and home health nurse to check on wound 3x/week if possible. Patient with no remaining stent and not bacteremic, cultures from OR sent. Can transition from IV ceftriaxone/vanc to po antibiotics. Continue to use permacath for HD. Stable for discharge home (4/10) with home health and renally dosed po augmentin and clindamycin to complete 10 days abx (end date: 4/18). Follow-up with PCP and vascular surgery outpatient.      Vitals:    04/10/20 1320   BP: (!) 156/84   Pulse: 85   Resp: 16   Temp: 98.3 °F (36.8 °C)     Physical Exam   Constitutional: He is oriented to person, place, and time. He appears well-developed and well-nourished. No distress.   HENT:   Head: Normocephalic and atraumatic.   Mouth/Throat: Oropharynx is clear and moist.   Eyes: Conjunctivae are normal. No scleral icterus.   Neck: Normal range of motion. Neck supple.   Cardiovascular: Normal rate, regular rhythm and normal heart sounds.   No murmur heard.  Pulmonary/Chest: Effort normal and breath sounds normal. No respiratory distress. He has no wheezes. He has no rales.   Abdominal: Soft. Bowel sounds are normal. He exhibits no distension. There is no tenderness. There is no guarding.   Musculoskeletal: He exhibits no edema or tenderness  LUE wound bandaged, c/d/i  Neurological: He is alert and oriented to person, place, and time.   Skin: Skin is warm and dry. He is not diaphoretic.   Psychiatric: He has a normal mood and affect.   Nursing note and vitals reviewed.    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    Presenting from Vascular Clinic for concern for infected AV fistula,  Patient denies fevers,  Chills, or purulent discharge at home  On exam, 2 open wounds on AV fistula, no drainage or fluctuance noted  WBC 4, Sed rate > 120, CRP 74  Given CTX in ED, plan for possible washout tomorrow per Vascular Surgery    Plan:  -IV Vancomycin and Ceftriaxone for Staph and Strep coverage  -Vascular surgery consulted, apprec recs  -Blood cultures NGTD  -s/p LUE AVF and stent excision (4/10)  -discharge with home health wound care and po augmentin/clindamycin x 10 days (end date: 4/18)    Anemia in ESRD (end-stage renal disease)  - chronic, stable  - continue to monitor      Uncontrolled type 2 diabetes mellitus with end-stage renal disease  States that he only takes his insulin nightly as needed   See hyperglycemia    Renovascular hypertension  Home regimen: losartan 100mg   -will continue    CAD (coronary artery disease)  Hx of CAD s/p CABG (2009) and NSTEMI s/p PCI with FERCHO to LCx (12/16/19)    - continue home plavix 75mg daily    ESRD on hemodialysis  HD T/Th/Sa    - nephrology consulted  - HD per nephro  - monitor daily renal function  - continue HD via permacath      Final Active Diagnoses:    Diagnosis Date Noted POA    PRINCIPAL PROBLEM:  Infected prosthetic vascular graft [T82.7XXA] 04/09/2020 Yes    Anemia in ESRD (end-stage renal disease) [N18.6, D63.1] 04/09/2020 Yes    AV fistula infection [T82.7XXA] 04/09/2020 Yes    Uncontrolled type 2 diabetes mellitus with end-stage renal disease [E11.22, N18.6, E11.65] 08/04/2014 Yes    Renovascular hypertension [I15.0] 10/17/2013 Yes    CAD (coronary artery disease) [I25.10] 08/30/2013 Yes     Chronic    ESRD on hemodialysis  [N18.6, Z99.2] 07/13/2012 Not Applicable    Cognitive deficits as late effect of cerebrovascular disease [I69.919] 06/26/2012 Not Applicable     Chronic      Problems Resolved During this Admission:       Discharged Condition: stable    Disposition:     Follow Up:    Patient Instructions:      Ambulatory referral/consult to Vascular Surgery   Standing Status: Future   Referral Priority: Routine Referral Type: Consultation   Referral Reason: Specialty Services Required   Requested Specialty: Vascular Surgery   Number of Visits Requested: 1       Significant Diagnostic Studies: Labs:   CMP   Recent Labs   Lab 04/09/20  1505 04/10/20  0422   * 139   K 4.8 4.4    106   CO2 21* 21*   * 208*   BUN 28* 32*   CREATININE 5.4* 6.4*   CALCIUM 8.9 9.1   PROT 8.0  --    ALBUMIN 3.0* 2.7*   BILITOT 0.4  --    ALKPHOS 140*  --    AST 26  --    ALT 25  --    ANIONGAP 11 12   ESTGFRAFRICA 12.1* 9.8*   EGFRNONAA 10.5* 8.5*   , CBC   Recent Labs   Lab 04/09/20  1505 04/10/20  0422   WBC 4.06 4.89   HGB 9.2* 9.4*   HCT 30.0* 30.5*    168    and All labs within the past 24 hours have been reviewed    Pending Diagnostic Studies:     Procedure Component Value Units Date/Time    Specimen to Pathology, Surgery Other [[id number]] Collected:  04/10/20 1102    Order Status:  Sent Lab Status:  In process Updated:  04/10/20 1102         Medications:  Reconciled Home Medications:      Medication List      START taking these medications    amoxicillin-clavulanate 500-125mg 500-125 mg Tab  Commonly known as:  AUGMENTIN  Take 1 tablet (500 mg total) by mouth every evening. for 8 days     clindamycin 150 MG capsule  Commonly known as:  CLEOCIN  Take 3 capsules (450 mg total) by mouth every 8 (eight) hours. for 8 days        CHANGE how you take these medications    insulin glargine 100 units/mL (3mL) SubQ pen  Commonly known as:  LANTUS SOLOSTAR U-100 INSULIN  Inject 7 Units into the skin every evening.  What changed:   "  · how much to take  · how to take this  · when to take this  · additional instructions        CONTINUE taking these medications    acetaminophen 500 MG tablet  Commonly known as:  TYLENOL  Take 2 tablets (1,000 mg total) by mouth every 8 (eight) hours as needed for Pain or Temperature greater than (100.5).     atorvastatin 40 MG tablet  Commonly known as:  LIPITOR  TAKE 1 TABLET(40 MG) BY MOUTH EVERY DAY     blood glucose control, normal Soln  1 drop by Misc.(Non-Drug; Combo Route) route as directed.     blood sugar diagnostic Strp  To check BG 2 times daily, to use with accuchek casa meter     blood-glucose meter kit  To check BG 2 times daily, to use with accuchek casa meter     carvediloL 25 MG tablet  Commonly known as:  COREG  TAKE 1 TABLET(25 MG) BY MOUTH TWICE DAILY WITH MEALS     clopidogreL 75 mg tablet  Commonly known as:  PLAVIX  Take 1 tablet (75 mg total) by mouth once daily.     diltiaZEM 180 MG 24 hr capsule  Commonly known as:  CARDIZEM CD  Take 1 capsule (180 mg total) by mouth once daily.     furosemide 40 MG tablet  Commonly known as:  LASIX  TAKE 1 TABLET(40 MG) BY MOUTH EVERY DAY     lancets Misc  To check BG 2 times daily, to use with accuchek casa meter     losartan 100 MG tablet  Commonly known as:  COZAAR  Take 1 tablet (100 mg total) by mouth once daily.     megestroL 20 MG Tab  Commonly known as:  MEGACE  Take 1 tablet (20 mg total) by mouth once daily.     NOVOFINE 30 30 gauge x 1/3" Ndle  Generic drug:  pen needle, diabetic  USE AS DIRECTED WITH SARA HA     NovoLOG Flexpen U-100 Insulin 100 unit/mL (3 mL) Inpn pen  Generic drug:  insulin aspart U-100  INJECT 5 TO 10 UNITS WITH MEALS AND AT BEDTIME DEPENDING ON BLOOD SUGAR     pen needle, diabetic 29 gauge x 1/2" Ndle  Commonly known as:  BD ULTRA-FINE ORIG PEN NEEDLE  USE  1 EVERY EVENING     promethazine 25 MG tablet  Commonly known as:  PHENERGAN  Take 1 tablet (25 mg total) by mouth every 6 (six) hours as needed for " Nausea.     VITAMIN C 500 MG tablet  Generic drug:  ascorbic acid (vitamin C)  Take 500 mg by mouth every evening.            Indwelling Lines/Drains at time of discharge:   Lines/Drains/Airways     Central Venous Catheter Line                 Hemodialysis Catheter 03/05/20 0535 right subclavian 36 days          Drain                 Hemodialysis AV Fistula 04/01/17 0800 Left upper arm 1105 days                Time spent on the discharge of patient: 40 minutes  Patient was seen and examined on the date of discharge and determined to be suitable for discharge.         Tashia Khan MD  Department of Hospital Medicine  [facility]

## 2020-04-13 NOTE — PROGRESS NOTES
Patient was hospitalized for infected AV graft.  Only follow-up needed is with his vascular doctor.  No need to come to his primary care office at this time.

## 2020-04-14 NOTE — TELEPHONE ENCOUNTER
Spoke to Caro with [facility] to contact patient on next scheduled HH visit per patients wife request. Patient is scheduled for a visit tomorrow.

## 2020-04-14 NOTE — PATIENT INSTRUCTIONS
Arteriovenous (AV) Fistula for Dialysis  An AV fistula is a connection between an artery and a vein. For this procedure, an AV fistula is surgically created using an artery and a vein in your arm. (Your healthcare provider will let you know if another site is to be used.) When the artery and vein are joined, blood flow increases from the artery into the vein. As a result, the vein gets bigger over time. The enlarged vein provides easier access to the blood for a treatment for kidney failure (dialysis). This sheet explains the procedure and what to expect.     An AV fistula increases blood flow from the artery into the vein. Over time, the vein becomes stronger and enlarged.   Preparing for the procedure  Prepare as you have been told. In addition:  · Tell your healthcare provider about all the medicines you take. This includes all over-the-counter and prescription medicines, and street drugs. It also includes herbs, vitamins, and other supplements. You may need to stop taking some or all of them before the procedure.  · Follow any directions youre given for not eating or drinking before the procedure.  · Do not allow anyone to draw blood from or take blood pressure on the arm that will have the fistula before the procedure.  The day of the procedure  The procedure takes about 1 to 2 hours. Youll likely go home the same day.  Before the procedure begins:  · An IV (intravenous) line is put into a vein in the arm or hand not being used for the procedure. This line supplies fluids and medicines.  · To keep you free of pain during the procedure, youre given general anesthesia. This medicine puts you into a state like a deep sleep through the procedure. Or a nerve block may be used. This medicine numbs the arm. With it, you may also be given medicine that makes you relaxed and drowsy through the procedure.  During the procedure:  · The skin over your arm may be injected with numbing medicine.  · One or more small  cuts (incisions) are then made through the numbed skin. This depends on the size of your arm and the depth of the vein in your arm.  · The vein is attached to the selected artery.  · Any incisions made are then closed with stitches (sutures), staples, surgical glue, or strips of surgical tape.  After the procedure:  · Youll be asked to keep your arm raised (elevated) as often as possible for at least a week after the procedure.  · Youll be given medicines to manage pain as needed.  · Your arm and hand will be checked to make sure blood is flowing through the fistula properly. The feeling of blood rushing through the fistula is called a thrill. It is somewhat similar to the purring of a cat. Youll be taught how to check for this feeling each day to make sure there are no problems with your fistula. Youll also be taught how to care for your fistula at home.  · When its time for you to leave the hospital, have an adult family member or friend ready to drive you home.  Recovering at home  Once at home, follow all of the instructions youve been given. Be sure to:  · Take all medicines as directed.  · Care for your incision as instructed.  · Check for signs of infection at the incision site (see below).  · Avoid heavy lifting and strenuous activities as directed.  · Monitor and care for your fistula as instructed.  · Do your hand and arm exercises as instructed. This usually involves squeezing a ball in your hand for a few minutes each hour.  Call your healthcare provider if you have any of the following:  · Fever of 100.4°F (38°C) or higher  · Signs of infection at the incision site, such as increased redness or swelling, warmth, worsening pain, bleeding, or bad-smelling drainage  · You cant feel a thrill (the vibration of blood going through your arm)  · Pain or numbness in your fingers, hand, or arm  · Bleeding, redness, or warmth around your fistula  · Sudden bulging of the fistula (more than usual; a slight  bulge is normal)   Follow-Up  Your healthcare provider will check your fistula within 1 to 2 weeks after the procedure. It will likely take about 6 to 8 weeks for the fistula to enlarge enough to start dialysis. After that, make sure the fistula is checked each time you have dialysis. Your healthcare provider may also suggest checkups every 6 months.  Risks and possible complications include:  · The fistula not working properly  · Long wait before the fistula is ready (up to 6 months)  · Coldness or numbness in the hand (due to blood flowing away from the hand and into the fistula)  · An unsightly bump under the skin (due to enlargement of the fistula)  · Prolonged bleeding from the fistula after dialysis  · Narrowing or weakening of the blood vessels used for the fistula  · Formation of blood clots in the blood vessels used for the fistula  · Risks of anesthesia or any other medicines used during the procedure   Living with an AV Fistula  A problem, such as a narrowing (stricture) of the vein or an infection, can make the fistula unusable. If this happens, you may need other treatments to repair or make a new fistula. To protect your fistula, follow these and any other guidelines youre given:  · Check your fistula as often as your healthcare provider says. If you cant feel your thrill, let your provider know right away.  · Make sure your fistula is checked before each dialysis treatment.  · Dont let anyone draw blood from or take blood pressure on the arm that has the fistula.  · Wash your hands often and keep the area around your fistula clean.  · Dont sleep on the arm that has the fistula.  · Dont wear tight jewelry or a watch on the arm with your fistula.  · Protect your fistula from cuts, scrapes, or blows.  Date Last Reviewed: 1/1/2017  © 4400-8095 [employer]. [address] [place] [address], [place] [address]. All rights reserved. This information is not intended as a substitute for professional  medical care. Always follow your healthcare professional's instructions.

## 2020-04-14 NOTE — TELEPHONE ENCOUNTER
No, he needs to follow up with vascular medicine only.  If he has any other issues we can do a video visit.

## 2020-04-20 NOTE — PROGRESS NOTES
The patient location is: home   The chief complaint leading to consultation is: AVF infection  Visit type: audiovisual  Total time spent with patient: 20 minutes  Each patient to whom he or she provides medical services by telemedicine is:  (1) informed of the relationship between the physician and patient and the respective role of any other health care provider with respect to management of the patient; and (2) notified that he or she may decline to receive medical services by telemedicine and may withdraw from such care at any time.    Notes: :      Subjective:       Patient ID: Kodi Ibanez Sr. is a 60 y.o. male.    Chief Complaint: No chief complaint on file.    Pt is a 59yo M with hx of CHF , CAD s/p CABG (2009) and NSTEMI s/p PCI with FERCHO to LCx, DMT2, ESRD (HD T/Th/Sa), HLD, HTN, and prostate cancer who presents via telemed visit after recent hospitalization w/ AV fistula excision.  Pt underwent AV fistula excision w/ Dr. Lopez after presenting to [facility] w/ AV fistula infection.  At the time pt's blood cxs NGTD.  He did have elevated ESR and CRP.    Cxs from excision grew Strep Anginosus, Prevotella and Fusobacterium.  After sx, pt was sent home w/ po augmentin/clindamycin x 10 days     Review of Systems   Constitutional: Negative for activity change, appetite change, chills, fatigue and fever.   Cardiovascular: Negative for chest pain.   Gastrointestinal: Negative for diarrhea, nausea and vomiting.   Genitourinary: Negative for difficulty urinating and dysuria.   Musculoskeletal: Negative for arthralgias and gait problem.   Skin: Positive for wound. Negative for color change.       Objective:      Physical Exam   Constitutional: He appears well-developed and well-nourished.   HENT:   Head: Normocephalic and atraumatic.   Skin:   Incision site c/d/i healing well             Assessment:       1. Infection of prosthetic vascular graft, initial encounter    2. Encounter for medication monitoring         Plan:       -Discontinue Amoxicillin and Clinda  -Start on Flagyl 500 mg [id number] to pt's pharmacy  -Start pt on Cefazolin 2g Tues and Thurs after HD, and 3g Saturday after HD.  -[facility]  -Recommend duration of 4 weeks of abx for AVF infection w/ ID f/u in 4 weeks (est end date 5/11/20)  -Weekly, CBC and CMP faxed attn: Rickey Ward  -ID f/u in 4 weeks.

## 2020-04-20 NOTE — PROGRESS NOTES
Chart reviewed.   Requested updates from Care Everywhere.  Immunizations reconciled.   HM updated.    
Yes

## 2020-04-24 NOTE — TELEPHONE ENCOUNTER
"----- Message from Emelyn Pedro sent at 4/24/2020  4:54 PM CDT -----  Contact: Yaima "Wife"  [phone]  Type: RX Refill Request    Who Called: Yaima "Wife"      Have you contacted your pharmacy: yes     Refill or New Rx: refill     RX Name and Strength: needle for insulin pen     Is this a 30 day or 90 day RX: 30 day     Preferred Pharmacy with phone number: [id number] - [place], [place] - [address] AT [facility] [phone] (Phone)  [phone] (Fax)    Local or Mail Order: local     Would the patient rather a call back or a response via [facility]? Call back     Best Call Back Number: [phone]      "

## 2020-04-29 PROBLEM — I21.4 NSTEMI (NON-ST ELEVATED MYOCARDIAL INFARCTION): Status: ACTIVE | Noted: 2020-01-01

## 2020-04-29 PROBLEM — G93.41 ENCEPHALOPATHY, METABOLIC: Status: ACTIVE | Noted: 2020-01-01

## 2020-04-29 NOTE — ED TRIAGE NOTES
Pt. Presents to ED via EMS. According to pt. Wife, pt. Displayed changes in LOC this morning, reports pt. Missed dialysis yesterday, last appointment was on Saturday. Pt. Wife Also reports pt. Has missed doses of prescribed antibiotic treatment due to lack of appetite. Pt. Is oriented to person and place. Pt. Placed on cardiac monitor, continuous pulse ox, and BP cuff. Pt. Able to respond to commands and appears to be in no acute distress. Pt. Denies any pain.

## 2020-04-29 NOTE — TELEPHONE ENCOUNTER
Spoke with patient's wife and she informed me that patient has been weak and no appetite since Saturday. She said that patient blood pressure has been fluctuating a lot SBP range 110-160 and not able to hear the bottom blood pressure at all. She is waiting on home health nurse to arrive. She said that patient is taking antibiotic(Metrodnidzole 500 mg tid) for infected fistula removal. She said that he is also receiving IV antibiotic in dialysis. She said one day patient was very clammy and she took his blood sugar and it was 280 pulse 106. She did contact EMT to bring to the hospital but then shortly patient came around so she cancelled EMT. Patient has not been with fever, pain are shortness of breath. Very concern but don't want to bring patient to the emergency room due to Coronavirus.

## 2020-04-29 NOTE — Clinical Note
Catheter insertion attempt made into the ostium   left main. Angiography performed of the left coronary arteries in multiple views.Unable to engage.

## 2020-04-29 NOTE — ED PROVIDER NOTES
Encounter Date: 4/29/2020    SCRIBE #1 NOTE: I, Jd Almonte, am scribing for, and in the presence of,  Claudia Pabon MD. I have scribed the following portions of the note - Other sections scribed: HPI,ROS,PE.       History     Chief Complaint   Patient presents with    Altered Mental Status     per EMS, pt family called reporting pt had changed in LOC; noticed on this morning.     60-year-old male with past medical history CHF, CAD (CABG 2009, FERCHO placed in 12/2019), DM, ESRD(dialysis on Tues/Thurs/Friday) presenting with weakness. Patient last had Dialysis on the 25th and missed yesterday's session. Patient had AV fistula excision by Dr. Lopez on April 10th (graft was infected). Patient denies any pain, he states he has felt tired today. Per wife, pt has been weak since last dialysis treatment on the 25th. Wife reports pt is normally able to ambulate with walker or cane, however, he has not been able to since the 25th. Wife states pt has decrease appetite and has been taking only one flagyl a day secondary to loss of appetite. Pt's wife notes BP is sometimes high and sometimes low, did have BP medications yesterday. Pt does make some urine according to wife. Sugars rages from 175-200. Wife states she has been changing bandage twice a day and has not noticed any drainage. No fever, cough, or pain         The history is provided by the patient and a significant other. No  was used.     Review of patient's allergies indicates:   Allergen Reactions    Reglan [metoclopramide hcl] Diarrhea    Lorazepam      Other reaction(s): heavy sedation     Past Medical History:   Diagnosis Date    Atherosclerosis of aorta     noted on VIKTORIA 2011    Blood transfusion     Cataracts, bilateral     CHF (congestive heart failure)     Clotting disorder     Cognitive deficits as late effect of cerebrovascular disease 6/26/2012    Coronary artery disease     NSTEMI s/p PCI with FERCHO to LCx (12/16/19),      "Decreased appetite     DM (diabetes mellitus), type 2 with renal complications     Diabetic retinopathy    ESRD on hemodialysis     TUESDAY/ THURSDAY/SATURDAY    Hyperlipidemia     Hyperparathyroidism, unspecified     Hypertension     Prostate cancer 2016    7/16/19:  wife states "he doesn't have it anymore"    Seizures     Stroke 2011    PT HAS SHAKES     Unsteady gait     Uses roller walker     Past Surgical History:   Procedure Laterality Date    CARDIAC SURGERY      CABG    CATARACT EXTRACTION      ou    CATARACT EXTRACTION, BILATERAL      cataracts      CORONARY ANGIOGRAPHY INCLUDING BYPASS GRAFTS WITH CATHETERIZATION OF LEFT HEART N/A 12/16/2019    Procedure: ANGIOGRAM, CORONARY, INCLUDING BYPASS GRAFT, WITH LEFT HEART CATHETERIZATION, rcfa, 5fr;  Surgeon: David Villegas MD;  Location: [facility] CATH LAB;  Service: Cardiology;  Laterality: N/A;    CORONARY ARTERY BYPASS GRAFT  2009    bypass    EYE SURGERY      Left Guera AVF  1/11/2012    PROSTATE BIOPSY      positive    retinal laser      REVISION OF ARTERIOVENOUS FISTULA Left 2/27/2020    Procedure: Ligation of left upper extremity fistula, Closure of left upper extremity skin defect x 2, Intraoperative ultrasound ;  Surgeon: Stephane Lopez MD;  Location: [facility] OR;  Service: Vascular;  Laterality: Left;    REVISION OF ARTERIOVENOUS FISTULA Left 4/10/2020    Procedure: AV fistula excision;  Surgeon: Stephane Lopez MD;  Location: [facility] OR [address] [place] [address];  Service: Vascular;  Laterality: Left;    ROTATOR CUFF REPAIR  right, 2005    ULTRASOUND GUIDANCE  12/16/2019    Procedure: Ultrasound Guidance;  Surgeon: David Villegas MD;  Location: [facility] CATH LAB;  Service: Cardiology;;    VASCULAR SURGERY       Family History   Problem Relation Age of Onset    Diabetes Mother     Glaucoma Mother     Cancer Mother     Cataracts Mother     Heart disease Father     Cancer Paternal Grandmother     Heart disease Maternal Grandmother     No " Known Problems Sister     Prostate cancer Brother     No Known Problems Maternal Grandfather     No Known Problems Paternal Grandfather     No Known Problems Sister     No Known Problems Sister     Hypertension Brother     Cancer Paternal Uncle     Amblyopia Neg Hx     Blindness Neg Hx     Macular degeneration Neg Hx     Retinal detachment Neg Hx     Strabismus Neg Hx     Stroke Neg Hx     Thyroid disease Neg Hx     Anesthesia problems Neg Hx     Clotting disorder Neg Hx      problems Neg Hx     Kidney cancer Neg Hx     Kidney disease Neg Hx     Malignant hyperthermia Neg Hx     Sickle cell trait Neg Hx     Lupus Neg Hx     Urolithiasis Neg Hx     Sudden death Neg Hx      Social History     Tobacco Use    Smoking status: Never Smoker    Smokeless tobacco: Never Used   Substance Use Topics    Alcohol use: No    Drug use: No     Review of Systems   Constitutional: Positive for appetite change and fatigue. Negative for chills and fever.   HENT: Negative for congestion and sore throat.    Eyes: Negative for visual disturbance.   Respiratory: Negative for cough and shortness of breath.    Cardiovascular: Negative for chest pain.   Gastrointestinal: Negative for abdominal pain, nausea and vomiting.   Genitourinary: Negative for decreased urine volume and dysuria.   Skin: Negative for rash.   Neurological: Positive for weakness. Negative for headaches.   Psychiatric/Behavioral: Negative for confusion.       Physical Exam     Initial Vitals [04/29/20 1710]   BP Pulse Resp Temp SpO2   118/81 97 20 98.8 °F (37.1 °C) 99 %      MAP       --         Physical Exam    Nursing note and vitals reviewed.  Constitutional: He appears well-developed and well-nourished. He is not diaphoretic. No distress.   Alert but appears fatigued   HENT:   Mouth/Throat: Oropharynx is clear and moist.   Eyes: Pupils are equal, round, and reactive to light.   Neck: Neck supple.   Cardiovascular: Normal rate and regular  rhythm.   Pulmonary/Chest: Breath sounds normal. No respiratory distress.   HD cath R chest with bandage in place, no surrounding erythema   Abdominal: Soft. Bowel sounds are normal. He exhibits no distension. There is no tenderness.   Musculoskeletal: He exhibits no edema.   Neurological: He is alert. He has normal strength. No cranial nerve deficit or sensory deficit. GCS score is 15. GCS eye subscore is 4. GCS verbal subscore is 5. GCS motor subscore is 6.   Alert, oriented to person and place. When asked what year it is he said 2011 but knows Melchor is president. 5/5 strength in all 4 extremities. Cranial nerves intact.   Skin: Skin is warm and dry. No erythema.   Approximately 3 cm wound to left upper extremity (from graft excision) that is not swollen, erythematous, or draining.          ED Course   Critical Care  Date/Time: 5/10/2020 8:21 AM  Performed by: Claudia Pabon MD  Authorized by: Arely Gar MD   Total critical care time (exclusive of procedural time) : 0 minutes  Comments: Please put in 50 minutes of critical care due to patient having a high risk of cardiac failure.   Separate from teaching and exclusive of procedure and ekg time  Includes:  Time at bedside  Time reviewing test results  Time discussing case with staff  Time documenting the medical record  Time spent with family members  Time spent with consults  Management          Labs Reviewed   CBC W/ AUTO DIFFERENTIAL - Abnormal; Notable for the following components:       Result Value    RBC 4.19 (*)     Hemoglobin 11.9 (*)     Hematocrit 38.1 (*)     Mean Corpuscular Hemoglobin Conc 31.2 (*)     RDW 17.2 (*)     Lymph% 12.3 (*)     All other components within normal limits   COMPREHENSIVE METABOLIC PANEL - Abnormal; Notable for the following components:    CO2 18 (*)     Glucose 307 (*)     BUN, Bld 39 (*)     Creatinine 9.9 (*)     Total Protein 8.7 (*)     ALT <5 (*)     Anion Gap 19 (*)     eGFR if  6 (*)      eGFR if non  5 (*)     All other components within normal limits   LACTIC ACID, PLASMA - Abnormal; Notable for the following components:    Lactate (Lactic Acid) 3.1 (*)     All other components within normal limits   TROPONIN I - Abnormal; Notable for the following components:    Troponin I 25.771 (*)     All other components within normal limits   MAGNESIUM - Abnormal; Notable for the following components:    Magnesium 2.7 (*)     All other components within normal limits   POCT GLUCOSE - Abnormal; Notable for the following components:    POCT Glucose 308 (*)     All other components within normal limits   CULTURE, BLOOD   CULTURE, BLOOD   TSH   URINALYSIS, REFLEX TO URINE CULTURE   SARS-COV-2 RNA AMPLIFICATION, QUAL   APTT   PROTIME-INR   LACTIC ACID, PLASMA   BETA - HYDROXYBUTYRATE, SERUM   POCT GLUCOSE MONITORING CONTINUOUS   POCT GLUCOSE MONITORING CONTINUOUS        ECG Results          EKG 12-lead (Preliminary result)  Result time 04/29/20 18:13:58    ED Interpretation by Claudia Pabon MD (04/29/20 18:13:58)    Normal sinus rhythm, rate 95 beats per minute, normal MD interval,  milliseconds.  T-wave inversions in the inferior lateral leads do not appear new compared to previous EKG.  No STEMI.                            Imaging Results          X-Ray Chest AP Portable (Final result)  Result time 04/29/20 18:51:37    Final result by Noah Wayne MD (04/29/20 18:51:37)                 Impression:      1. No acute cardiopulmonary process, shallow inspiratory effort.      Electronically signed by: Noah Wayne MD  Date:    04/29/2020  Time:    18:51             Narrative:    EXAMINATION:  XR CHEST AP PORTABLE    CLINICAL HISTORY:  weakness;    TECHNIQUE:  Single frontal view of the chest was performed.    COMPARISON:  03/05/2020    FINDINGS:  The cardiomediastinal silhouette is not enlarged noting surgical change.  Right central venous catheter tip projects over the distal  SVC.  There is mild elevation of the right hemidiaphragm..  There is no pleural effusion.  The trachea is midline.  The lungs are symmetrically expanded bilaterally without evidence of acute parenchymal process. No large focal consolidation seen.  There is no pneumothorax.  The osseous structures are remarkable for degenerative changes.  Surgical change noted along the left upper arm, previous vascular stent is not identified on current exam within the left upper arm.                                 Medical Decision Making:   Initial Assessment:   60-year-old male with extensive chronic medical issues presenting with generalized weakness for several days, missed hemodialysis yesterday.  On exam, the patient appears fatigued, he is disoriented to year.  The wound to his left upper extremity does not have signs of infection.  Differential broad, electrolyte disturbance, symptomatic anemia, infection, ACS.  Workup initiated with labs including cardiac markers.  Workup significant for a troponin level of 25, hyperglycemia, elevated lactic acid.  Although anion gap is increase, I suspect this is related to elevated lactic acid level as patient's blood glucose routinely runs between upper 100s in upper 200s.  I do not think this represents DKA but will check a serum ketone.  I discussed this case with Dr. Goff who agrees with initiation of heparin, continue aspirin and Plavix, and keep NPO at midnight.  Case discussed with Dr. Stewart for admission to hospital medicine.  She requests patient be given IV Flagyl as patient has missed doses of his Flagyl due to decreased p.o. intake.            Scribe Attestation:   Scribe #1: I performed the above scribed service and the documentation accurately describes the services I performed. I attest to the accuracy of the note.                          Clinical Impression:       ICD-10-CM ICD-9-CM   1. NSTEMI (non-ST elevated myocardial infarction) I21.4 410.70   2. Weakness R53.1  780.79   3. ESRD (end stage renal disease) N18.6 585.6   4. Hyperglycemia R73.9 790.29             ED Disposition Condition    Admit            I, Claudia Pabon MD, personally performed the services described in this documentation. All medical record entries made by the scribe were at my direction and in my presence. I have reviewed the chart and agree that the record reflects my personal performance and is accurate and complete.             Claudia Pabon MD  04/29/20 1941       Claudia Paobn MD  05/10/20 0822

## 2020-04-29 NOTE — TELEPHONE ENCOUNTER
Has HH nurse in route. BP fluctuating. From high to low and poor appetite. Politely refused triage.     Reason for Disposition   Nursing judgment or information in reference    Additional Information   Negative: Nursing judgment, per information in Reference   Negative: Information only call about a Well Adult (no illness or injury)   Negative: Nursing judgment or information in reference   Negative: Nursing judgment or information in reference   Negative: Nursing judgment or information in reference   Negative: Nursing judgment or information in reference   Negative: Nursing judgment or information in reference    Protocols used: NO GUIDELINE [id number]

## 2020-04-29 NOTE — Clinical Note
134 ml injected throughout the case. 66 mL total wasted during the case. 200 mL total used in the case.

## 2020-04-29 NOTE — TELEPHONE ENCOUNTER
Received call back from home health nurse Navin and patient is very week. Has contact EMT for hospital transfer to [facility].

## 2020-04-30 PROBLEM — N30.90 CYSTITIS: Status: ACTIVE | Noted: 2020-01-01

## 2020-04-30 NOTE — SUBJECTIVE & OBJECTIVE
"Past Medical History:   Diagnosis Date    Atherosclerosis of aorta     noted on VIKTORIA 2011    Blood transfusion     Cataracts, bilateral     CHF (congestive heart failure)     Clotting disorder     Cognitive deficits as late effect of cerebrovascular disease 6/26/2012    Coronary artery disease     NSTEMI s/p PCI with FERCHO to LCx (12/16/19),     Decreased appetite     DM (diabetes mellitus), type 2 with renal complications     Diabetic retinopathy    ESRD on hemodialysis     TUESDAY/ THURSDAY/SATURDAY    Hyperlipidemia     Hyperparathyroidism, unspecified     Hypertension     Prostate cancer 2016 7/16/19:  wife states "he doesn't have it anymore"    Seizures     Stroke 2011    PT HAS SHAKES     Unsteady gait     Uses roller walker       Past Surgical History:   Procedure Laterality Date    CARDIAC SURGERY      CABG    CATARACT EXTRACTION      ou    CATARACT EXTRACTION, BILATERAL      cataracts      CORONARY ANGIOGRAPHY INCLUDING BYPASS GRAFTS WITH CATHETERIZATION OF LEFT HEART N/A 12/16/2019    Procedure: ANGIOGRAM, CORONARY, INCLUDING BYPASS GRAFT, WITH LEFT HEART CATHETERIZATION, rcfa, 5fr;  Surgeon: David Villegas MD;  Location: [facility] CATH LAB;  Service: Cardiology;  Laterality: N/A;    CORONARY ARTERY BYPASS GRAFT  2009    bypass    EYE SURGERY      Left Guera AVF  1/11/2012    PROSTATE BIOPSY      positive    retinal laser      REVISION OF ARTERIOVENOUS FISTULA Left 2/27/2020    Procedure: Ligation of left upper extremity fistula, Closure of left upper extremity skin defect x 2, Intraoperative ultrasound ;  Surgeon: Stephane Lopez MD;  Location: [facility] OR;  Service: Vascular;  Laterality: Left;    REVISION OF ARTERIOVENOUS FISTULA Left 4/10/2020    Procedure: AV fistula excision;  Surgeon: Stephane Lopez MD;  Location: [address];  Service: Vascular;  Laterality: Left;    ROTATOR CUFF REPAIR  right, 2005    ULTRASOUND GUIDANCE  12/16/2019    Procedure: Ultrasound " Guidance;  Surgeon: David Villegas MD;  Location: [facility] CATH LAB;  Service: Cardiology;;    VASCULAR SURGERY         Review of patient's allergies indicates:   Allergen Reactions    Reglan [metoclopramide hcl] Diarrhea    Lorazepam      Other reaction(s): heavy sedation       No current facility-administered medications on file prior to encounter.      Current Outpatient Medications on File Prior to Encounter   Medication Sig    acetaminophen (TYLENOL) 500 MG tablet Take 2 tablets (1,000 mg total) by mouth every 8 (eight) hours as needed for Pain or Temperature greater than (100.5). (Patient not taking: Reported on 4/14/2020)    ascorbic acid, vitamin C, (VITAMIN C) 500 MG tablet Take 500 mg by mouth every evening.     atorvastatin (LIPITOR) 40 MG tablet TAKE 1 TABLET(40 MG) BY MOUTH EVERY DAY    blood glucose control, normal Soln 1 drop by Misc.(Non-Drug; Combo Route) route as directed.    blood sugar diagnostic Strp To check BG 2 times daily, to use with accuchek casa meter    blood-glucose meter kit To check BG 2 times daily, to use with accuchek casa meter    carvedilol (COREG) 25 MG tablet TAKE 1 TABLET(25 MG) BY MOUTH TWICE DAILY WITH MEALS    clopidogrel (PLAVIX) 75 mg tablet Take 1 tablet (75 mg total) by mouth once daily.    diltiaZEM (CARDIZEM CD) 180 MG 24 hr capsule Take 1 capsule (180 mg total) by mouth once daily.    furosemide (LASIX) 40 MG tablet TAKE 1 TABLET(40 MG) BY MOUTH EVERY DAY    insulin (LANTUS SOLOSTAR U-100 INSULIN) glargine 100 units/mL (3mL) SubQ pen Inject 7 Units into the skin every evening.    lancets Misc To check BG 2 times daily, to use with accuchek casa meter    losartan (COZAAR) 100 MG tablet Take 1 tablet (100 mg total) by mouth once daily.    megestrol (MEGACE) 20 MG Tab Take 1 tablet (20 mg total) by mouth once daily.    metroNIDAZOLE (FLAGYL) 500 MG tablet Take 1 tablet (500 mg total) by mouth 3 (three) times daily.    NOVOLOG FLEXPEN U-100 INSULIN 100  "unit/mL InPn pen INJECT 5 TO 10 UNITS WITH MEALS AND AT BEDTIME DEPENDING ON BLOOD SUGAR    pen needle, diabetic (BD ULTRA-FINE ORIG PEN NEEDLE) 29 gauge x 1/2" Ndle USE  1 EVERY EVENING    pen needle, diabetic (NOVOFINE 30) 30 gauge x 1/3" Ndle USE AS DIRECTED WITH LANTUS SOLARSTAR    promethazine (PHENERGAN) 25 MG tablet Take 1 tablet (25 mg total) by mouth every 6 (six) hours as needed for Nausea. (Patient not taking: Reported on 4/14/2020)     Family History     Problem Relation (Age of Onset)    Cancer Mother, Paternal Grandmother, Paternal Uncle    Cataracts Mother    Diabetes Mother    Glaucoma Mother    Heart disease Father, Maternal Grandmother    Hypertension Brother    No Known Problems Sister, Maternal Grandfather, Paternal Grandfather, Sister, Sister    Prostate cancer Brother        Tobacco Use    Smoking status: Never Smoker    Smokeless tobacco: Never Used   Substance and Sexual Activity    Alcohol use: No    Drug use: No    Sexual activity: Not Currently     Review of Systems   Unable to perform ROS: Mental status change     Objective:     Vital Signs (Most Recent):  Temp: 98.8 °F (37.1 °C) (04/29/20 1710)  Pulse: 98 (04/29/20 1931)  Resp: 18 (04/29/20 1930)  BP: 114/69 (04/29/20 1931)  SpO2: 100 % (04/29/20 1731) Vital Signs (24h Range):  Temp:  [98.8 °F (37.1 °C)] 98.8 °F (37.1 °C)  Pulse:  [96-98] 98  Resp:  [18-24] 18  SpO2:  [99 %-100 %] 100 %  BP: (114-141)/(69-89) 114/69     Weight: 82.6 kg (182 lb)  Body mass index is 25.38 kg/m².    Physical Exam   Constitutional: He appears well-developed and well-nourished.   HENT:   Head: Normocephalic and atraumatic.   Eyes: Pupils are equal, round, and reactive to light. EOM are normal.   Neck: Normal range of motion. Neck supple.   Cardiovascular: Normal rate, regular rhythm, normal heart sounds and intact distal pulses.   No murmur heard.  Pulmonary/Chest: Effort normal and breath sounds normal. No respiratory distress. He has no wheezes. "   Abdominal: Soft. Bowel sounds are normal. He exhibits no distension. There is no tenderness. There is no rebound and no guarding.   Musculoskeletal: Normal range of motion. He exhibits no edema or deformity.   Neurological: He is alert. No cranial nerve deficit.   Skin: Skin is warm and dry. Capillary refill takes 2 to 3 seconds. No rash noted.   Psychiatric: He has a normal mood and affect. His behavior is normal.   Nursing note and vitals reviewed.        CRANIAL NERVES     CN III, IV, VI   Pupils are equal, round, and reactive to light.  Extraocular motions are normal.        Significant Labs: All pertinent labs within the past 24 hours have been reviewed.    Significant Imaging: I have reviewed and interpreted all pertinent imaging results/findings within the past 24 hours.

## 2020-04-30 NOTE — PROGRESS NOTES
[facility] Hospital Medicine  Progress Note    Patient Name: Kodi Ibanez Sr.  MRN: [id number]  Patient Class: IP- Inpatient   Admission Date: 4/29/2020  Length of Stay: 1 days  Attending Physician: Arely Gar MD  Primary Care Provider: Melonie Elias MD        Subjective:     Principal Problem:NSTEMI (non-ST elevated myocardial infarction)        HPI:  60-year-old male with a history of end-stage renal disease on dialysis Tuesday Thursday Saturday last dialysis session on Tuesday.  Wife brought patient in for increased weakness, nausea and decreased appetitie.  Patient recently has had an infected graft has been taking Flagyl twice a day but only has really been taking it once a day due to weakness and nausea.  Has a history of coronary artery disease status post CABG as well as recent stent placement in 2019 in the left circ.  He denies any shortness of breath or chest pain.  He has no report of any fevers.  Patient brought in a found to have NSTEMI with a troponin of over 25.  Patient has been started on heparin drip and cardiology has been called who are planning on taking him to do a heart catheterization in the morning.  Patient is comfortable in no respiratory distress and denies any pain.  He is confused however worse than his baseline.    Overview/Hospital Course:  Patient had echo which showed worsening EF from last echo in 12/19 surrounding his last NSTEMI.   Patient had left circ stent occulusion with successful cath and PCI this am. Patient will receive hemodialysis today.  Patient continues on ID outpatient regimen for incision on upper extremity since the AV graft was removed on 4/20. Patient also reciesed cefazolin on hemodialysis days as well.   Patient's urinalysis also c/w a UTI       Interval History:  Examined patient before he went down for angiogram and after he had his echocardiogram.  Also spoke with the wife and apparently the patient is at his baseline state  mentally he is just not active in his medical care and she believes is down lately due to the loss of a relative who she was close to and another 1 in the hospital with a COVID virus.  Patient denies any chest pain, shortness of breath, heartburn, nausea or any heart palpitations.    Review of Systems   Constitutional: Negative for activity change, diaphoresis, fatigue and fever.   HENT: Negative for congestion, ear pain, postnasal drip, sinus pressure, sore throat and trouble swallowing.    Eyes: Negative for pain.   Respiratory: Negative for cough, chest tightness, shortness of breath and wheezing.    Cardiovascular: Negative for chest pain, palpitations and leg swelling.   Gastrointestinal: Negative for abdominal distention, abdominal pain, blood in stool, constipation and diarrhea.   Endocrine: Negative for cold intolerance and heat intolerance.   Genitourinary: Negative for decreased urine volume, difficulty urinating, dysuria, flank pain, frequency and hematuria.   Musculoskeletal: Negative for arthralgias, back pain, joint swelling, myalgias and neck pain.   Skin: Negative for pallor, rash and wound.   Neurological: Positive for weakness. Negative for tremors, syncope, light-headedness, numbness and headaches.   Hematological: Negative for adenopathy.   Psychiatric/Behavioral: Negative for confusion, decreased concentration, hallucinations, self-injury, sleep disturbance and suicidal ideas. The patient is not nervous/anxious.      Objective:     Vital Signs (Most Recent):  Temp: 97.5 °F (36.4 °C) (04/30/20 1300)  Pulse: 82 (04/30/20 1400)  Resp: 12 (04/30/20 1400)  BP: (!) 146/89 (04/30/20 1400)  SpO2: 99 % (04/30/20 1400) Vital Signs (24h Range):  Temp:  [97.2 °F (36.2 °C)-98.8 °F (37.1 °C)] 97.5 °F (36.4 °C)  Pulse:  [] 82  Resp:  [12-24] 12  SpO2:  [89 %-100 %] 99 %  BP: (101-146)/(63-95) 146/89  Arterial Line BP: (0-138)/(0-76) 138/76     Weight: 63.5 kg (140 lb)  Body mass index is 19.53  kg/m².    Intake/Output Summary (Last 24 hours) at 4/30/2020 1407  Last data filed at 4/30/2020 1348  Gross per 24 hour   Intake 200 ml   Output --   Net 200 ml      Physical Exam   Constitutional: He is oriented to person, place, and time. He appears well-developed and well-nourished. No distress.   HENT:   Head: Normocephalic and atraumatic.   Right Ear: External ear normal.   Left Ear: External ear normal.   Nose: Nose normal.   Mouth/Throat: Oropharynx is clear and moist.   Eyes: Conjunctivae and EOM are normal. Right eye exhibits no discharge. Left eye exhibits no discharge. No scleral icterus.   Neck: Normal range of motion. Neck supple. No JVD present. No tracheal deviation present. No thyromegaly present.   Cardiovascular: Normal rate, regular rhythm, normal heart sounds and intact distal pulses. Exam reveals no gallop.   No murmur heard.  Pulmonary/Chest: Effort normal and breath sounds normal. No respiratory distress. He has no wheezes. He has no rales.   Abdominal: Soft. Bowel sounds are normal. He exhibits no distension and no mass. There is no tenderness.   Musculoskeletal: Normal range of motion. He exhibits no edema or tenderness.   Lymphadenopathy:     He has no cervical adenopathy.   Neurological: He is alert and oriented to person, place, and time.   Skin: Skin is warm and dry. No rash noted. He is not diaphoretic. No erythema.   Psychiatric: He has a normal mood and affect. His behavior is normal. Judgment and thought content normal.       Significant Labs:   CBC:   Recent Labs   Lab 04/29/20  1804 04/30/20  0405   WBC 8.31 7.39   HGB 11.9* 10.7*   HCT 38.1* 34.8*    142*     CMP:   Recent Labs   Lab 04/29/20  1804      K 4.2      CO2 18*   *   BUN 39*   CREATININE 9.9*   CALCIUM 9.7   PROT 8.7*   ALBUMIN 3.5   BILITOT 0.2   ALKPHOS 113   AST 38   ALT <5*   ANIONGAP 19*   EGFRNONAA 5*     Cardiac Markers: No results for input(s): CKMB, MYOGLOBIN, BNP, TROPISTAT in the  last 48 hours.  Troponin:   Recent Labs   Lab 04/29/20  1804 04/30/20  0018 04/30/20  0405   TROPONINI 25.771* 19.642* 18.338*     TSH:   Recent Labs   Lab 04/29/20  1804   TSH 3.023     Hemoglobin A1c 10.3    Significant Imaging: CXR: I have reviewed all pertinent results/findings within the past 24 hours and my personal findings are:  unremarkable  Echo: I have reviewed all pertinent results/findings within the past 24 hours and my personal findings are:  ·Moderately decreased left ventricular systolic function. The estimated ejection fraction is 30-35%.      Assessment/Plan:      * NSTEMI (non-ST elevated myocardial infarction)  Troponin's decreasing; on heparin ggt  Angiogram revealed stent occulsion placed in December. And patient not taking DAPT recently but also a poorly controlled diabetic  Continue DAPT as well as ARB, Coreg and atorvastatin despite ESRD      Complication of arteriovenous dialysis fistula  Continue IV Flagyl for now ;appears clean dry and intact at this time  Consult wound care to change dressings (according to the wife - wet to dry bid)  If patient having nausea from flagyl , may consider changing to clindamycin.   Patient also receiving a dose of cefazolin after each dialysis on Tuesday, Thursday and Saturdays.  He has home health as well.  He is to be on antibiotics per infectious disease for another 2 weeks at least.    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 May consider 70/30 bid dosing as outpatient     CAD (coronary artery disease)/  S/P CABG  (coronary artery bypass graft)  Noted treating for ACS as above    Chronic diastolic congestive heart failure  Currently compensated and stable    ESRD on hemodialysis  Consult nephrology for routine dialysis he missed his session on Tuesday is due tomorrow currently not having any emergent need for dialysis tonight.  Vital signs stable O2 sats normal potassium normal    History of stroke  Noted no focal neurological deficits    Cystitis  Dose of rocephin and follow urine culture;           VTE Risk Mitigation (From admission, onward)         Ordered     IP VTE HIGH RISK PATIENT  Once      04/29/20 2256     Place sequential compression device  Until discontinued      04/29/20 2256     heparin 25,000 units in dextrose 5% (100 units/ml) IV bolus from bag - ADDITIONAL PRN BOLUS - 60 units/kg (max bolus 4000 units)  As needed (PRN)     Question:  Heparin Infusion Adjustment (DO NOT MODIFY ANSWER)  Answer:  \\[email or web address]\epic\Images\Pharmacy\HeparinInfusions\heparin LOW INTENSITY nomogram for [facility] [id number]    04/29/20 1903     heparin 25,000 units in dextrose 5% (100 units/ml) IV bolus from bag - ADDITIONAL PRN BOLUS - 30 units/kg (max bolus 4000 units)  As needed (PRN)     Question:  Heparin Infusion Adjustment (DO NOT MODIFY ANSWER)  Answer:  \\[email or web address]\epic\Images\Pharmacy\HeparinInfusions\heparin LOW INTENSITY nomogram for [facility] [id number].pdf    04/29/20 1903     heparin 25,000 units in dextrose 5% 250 mL (100 units/mL) infusion LOW INTENSITY nomogram - [facility]  Continuous     Question:  Heparin Infusion Adjustment (DO NOT MODIFY ANSWER)  Answer:  \\[email or web address]\epic\Images\Pharmacy\HeparinInfusions\heparin LOW INTENSITY nomogram for [facility] [id number]    04/29/20 1903                Critical care time spent on the evaluation and treatment of severe organ dysfunction, review of pertinent labs and imaging studies, discussions with consulting providers and discussions with patient/family: 35 minutes.      Alicia Barlow,  MD  Department of Hospital Medicine   [facility]

## 2020-04-30 NOTE — HPI
60-year-old male with a history of end-stage renal disease on dialysis Tuesday Thursday Saturday last dialysis session on Tuesday.  Wife brought patient in for increased weakness, nausea and decreased appetitie.  Patient recently has had an infected graft has been taking Flagyl twice a day but only has really been taking it once a day due to weakness and nausea.  Has a history of coronary artery disease status post CABG as well as recent stent placement in 2019 in the left circ.  He denies any shortness of breath or chest pain.  He has no report of any fevers.  Patient brought in a found to have NSTEMI with a troponin of over 25.  Patient has been started on heparin drip and cardiology has been called who are planning on taking him to do a heart catheterization in the morning.  Patient is comfortable in no respiratory distress and denies any pain.  He is confused however worse than his baseline.

## 2020-04-30 NOTE — HPI
60-year-old male with a history of end-stage renal disease on dialysis Tuesday Thursday Saturday last dialysis session on Saturday.  Missed the last 1 secondary to illness has come in with his wife secondary to just progressive generalized weakness and p.o. intake.  Patient recently has had an infected graft has been taking Flagyl twice a day but only has really been taking it once a day because he has been vomiting.  Has a history of coronary artery disease status post CABG and stent placement in 2019.  He denies any shortness of breath or chest pain.  He has no report of any fevers.  Patient brought in a found to have NSTEMI with a troponin of over 25.  Patient has been started on heparin drip and cardiology has been called who are planning on taking him to do a heart catheterization in the morning.  Patient is comfortable in no respiratory distress and denies any pain.  He is confused however worse than his baseline.    Pt follows with Dr. Villegas, last seen 12/2019.    The patient has a history of coronary artery disease status post PCI in December 2019.  He also has a history of significant peripheral arterial disease.  The patient presents to the emergency room with complaints predominantly of failure to thrive.  There has been no overt complained of anginal symptoms.  However, his troponin was noted to be greater than 25.  His EKG is not particularly changed verses his prior tracing from March of this year, although there are inferolateral T-wave inversions.  Overnight, he did have an episode of supraventricular tachycardia which might have been 2-1 flutter, but is now back in sinus rhythm.  At present, he is pain-free.  I have discussed the case with Dr. Villegas who will plan catheterization later on today.

## 2020-04-30 NOTE — SUBJECTIVE & OBJECTIVE
"Past Medical History:   Diagnosis Date    Atherosclerosis of aorta     noted on VIKTORIA 2011    Blood transfusion     Cataracts, bilateral     CHF (congestive heart failure)     Clotting disorder     Cognitive deficits as late effect of cerebrovascular disease 6/26/2012    Coronary artery disease     NSTEMI s/p PCI with FERCHO to LCx (12/16/19),     Decreased appetite     DM (diabetes mellitus), type 2 with renal complications     Diabetic retinopathy    ESRD on hemodialysis     TUESDAY/ THURSDAY/SATURDAY    Hyperlipidemia     Hyperparathyroidism, unspecified     Hypertension     Prostate cancer 2016 7/16/19:  wife states "he doesn't have it anymore"    Seizures     Stroke 2011    PT HAS SHAKES     Unsteady gait     Uses roller walker       Past Surgical History:   Procedure Laterality Date    CARDIAC SURGERY      CABG    CATARACT EXTRACTION      ou    CATARACT EXTRACTION, BILATERAL      cataracts      CORONARY ANGIOGRAPHY INCLUDING BYPASS GRAFTS WITH CATHETERIZATION OF LEFT HEART N/A 12/16/2019    Procedure: ANGIOGRAM, CORONARY, INCLUDING BYPASS GRAFT, WITH LEFT HEART CATHETERIZATION, rcfa, 5fr;  Surgeon: David Villegas MD;  Location: [facility] CATH LAB;  Service: Cardiology;  Laterality: N/A;    CORONARY ARTERY BYPASS GRAFT  2009    bypass    EYE SURGERY      Left Guera AVF  1/11/2012    PROSTATE BIOPSY      positive    retinal laser      REVISION OF ARTERIOVENOUS FISTULA Left 2/27/2020    Procedure: Ligation of left upper extremity fistula, Closure of left upper extremity skin defect x 2, Intraoperative ultrasound ;  Surgeon: Stephane Lopez MD;  Location: [facility] OR;  Service: Vascular;  Laterality: Left;    REVISION OF ARTERIOVENOUS FISTULA Left 4/10/2020    Procedure: AV fistula excision;  Surgeon: Stephane Lopez MD;  Location: [address];  Service: Vascular;  Laterality: Left;    ROTATOR CUFF REPAIR  right, 2005    ULTRASOUND GUIDANCE  12/16/2019    Procedure: Ultrasound " Guidance;  Surgeon: David Villegas MD;  Location: [facility] CATH LAB;  Service: Cardiology;;    VASCULAR SURGERY         Review of patient's allergies indicates:   Allergen Reactions    Reglan [metoclopramide hcl] Diarrhea    Lorazepam      Other reaction(s): heavy sedation       No current facility-administered medications on file prior to encounter.      Current Outpatient Medications on File Prior to Encounter   Medication Sig    acetaminophen (TYLENOL) 500 MG tablet Take 2 tablets (1,000 mg total) by mouth every 8 (eight) hours as needed for Pain or Temperature greater than (100.5). (Patient not taking: Reported on 4/14/2020)    ascorbic acid, vitamin C, (VITAMIN C) 500 MG tablet Take 500 mg by mouth every evening.     atorvastatin (LIPITOR) 40 MG tablet TAKE 1 TABLET(40 MG) BY MOUTH EVERY DAY    blood glucose control, normal Soln 1 drop by Misc.(Non-Drug; Combo Route) route as directed.    blood sugar diagnostic Strp To check BG 2 times daily, to use with accuchek casa meter    blood-glucose meter kit To check BG 2 times daily, to use with accuchek casa meter    carvedilol (COREG) 25 MG tablet TAKE 1 TABLET(25 MG) BY MOUTH TWICE DAILY WITH MEALS    clopidogrel (PLAVIX) 75 mg tablet Take 1 tablet (75 mg total) by mouth once daily.    diltiaZEM (CARDIZEM CD) 180 MG 24 hr capsule Take 1 capsule (180 mg total) by mouth once daily.    furosemide (LASIX) 40 MG tablet TAKE 1 TABLET(40 MG) BY MOUTH EVERY DAY    insulin (LANTUS SOLOSTAR U-100 INSULIN) glargine 100 units/mL (3mL) SubQ pen Inject 7 Units into the skin every evening.    lancets Misc To check BG 2 times daily, to use with accuchek casa meter    losartan (COZAAR) 100 MG tablet Take 1 tablet (100 mg total) by mouth once daily.    megestrol (MEGACE) 20 MG Tab Take 1 tablet (20 mg total) by mouth once daily.    metroNIDAZOLE (FLAGYL) 500 MG tablet Take 1 tablet (500 mg total) by mouth 3 (three) times daily.    NOVOLOG FLEXPEN U-100 INSULIN 100  "unit/mL InPn pen INJECT 5 TO 10 UNITS WITH MEALS AND AT BEDTIME DEPENDING ON BLOOD SUGAR    pen needle, diabetic (BD ULTRA-FINE ORIG PEN NEEDLE) 29 gauge x 1/2" Ndle USE  1 EVERY EVENING    pen needle, diabetic (NOVOFINE 30) 30 gauge x 1/3" Ndle USE AS DIRECTED WITH LANTUS SOLARSTAR    promethazine (PHENERGAN) 25 MG tablet Take 1 tablet (25 mg total) by mouth every 6 (six) hours as needed for Nausea. (Patient not taking: Reported on 4/14/2020)     Family History     Problem Relation (Age of Onset)    Cancer Mother, Paternal Grandmother, Paternal Uncle    Cataracts Mother    Diabetes Mother    Glaucoma Mother    Heart disease Father, Maternal Grandmother    Hypertension Brother    No Known Problems Sister, Maternal Grandfather, Paternal Grandfather, Sister, Sister    Prostate cancer Brother        Tobacco Use    Smoking status: Never Smoker    Smokeless tobacco: Never Used   Substance and Sexual Activity    Alcohol use: No    Drug use: No    Sexual activity: Not Currently     Review of Systems   Constitution: Positive for malaise/fatigue. Negative for chills, diaphoresis and fever.   HENT: Negative for nosebleeds.    Eyes: Negative for blurred vision and double vision.   Cardiovascular: Negative for chest pain, claudication, cyanosis, dyspnea on exertion, leg swelling, orthopnea, palpitations, paroxysmal nocturnal dyspnea and syncope.   Respiratory: Negative for cough, shortness of breath and wheezing.    Skin: Negative for dry skin and poor wound healing.   Musculoskeletal: Negative for back pain, joint swelling and myalgias.   Gastrointestinal: Negative for abdominal pain, nausea and vomiting.   Genitourinary: Negative for hematuria.   Neurological: Negative for dizziness, headaches, numbness, seizures and weakness.   Psychiatric/Behavioral: Negative for altered mental status and depression.     Objective:     Vital Signs (Most Recent):  Temp: 97.2 °F (36.2 °C) (04/30/20 0357)  Pulse: 81 (04/30/20 " 0644)  Resp: 19 (04/29/20 2256)  BP: 138/75 (04/30/20 0644)  SpO2: 100 % (04/30/20 0644) Vital Signs (24h Range):  Temp:  [97.2 °F (36.2 °C)-98.8 °F (37.1 °C)] 97.2 °F (36.2 °C)  Pulse:  [] 81  Resp:  [18-24] 19  SpO2:  [95 %-100 %] 100 %  BP: (101-141)/(63-95) 138/75     Weight: 63.8 kg (140 lb 10.5 oz)  Body mass index is 19.62 kg/m².    SpO2: 100 %  O2 Device (Oxygen Therapy): room air      Intake/Output Summary (Last 24 hours) at 4/30/2020 0721  Last data filed at 4/29/2020 2147  Gross per 24 hour   Intake 100 ml   Output --   Net 100 ml       Lines/Drains/Airways     Central Venous Catheter Line                 Hemodialysis Catheter 03/05/20 0535 right subclavian 56 days          Drain                 Hemodialysis AV Fistula 04/01/17 0800 Left upper arm 1124 days          Peripheral Intravenous Line                 Peripheral IV - Single Lumen 04/29/20 1803 20 G Right Antecubital less than 1 day         Peripheral IV - Single Lumen 04/29/20 2017 22 G Right Forearm less than 1 day                Physical Exam   Constitutional: He is oriented to person, place, and time. He appears well-developed and well-nourished.   HENT:   Head: Normocephalic and atraumatic.   Eyes: Pupils are equal, round, and reactive to light. Conjunctivae and EOM are normal. No scleral icterus.   Neck: Normal range of motion. Neck supple. No JVD present. No tracheal deviation present. No thyromegaly present.   Cardiovascular: Normal rate, regular rhythm, S1 normal and S2 normal. Exam reveals no gallop and no friction rub.   No murmur heard.  Pulmonary/Chest: Effort normal and breath sounds normal. No respiratory distress. He has no wheezes. He has no rales. He exhibits no tenderness.   Abdominal: Soft. He exhibits no distension.   Musculoskeletal: He exhibits no edema.   Neurological: He is alert and oriented to person, place, and time. He has normal strength. No cranial nerve deficit.   Skin: Skin is warm and dry. No rash noted.    Psychiatric: He has a normal mood and affect. His behavior is normal.       Current Medications:   metronidazole  500 mg Intravenous Q8H      dilTIAZem      heparin (porcine) in D5W 9 Units/kg/hr (04/30/20 0646)     acetaminophen, dextrose 50%, dextrose 50%, glucagon (human recombinant), glucose, glucose, heparin (PORCINE), heparin (PORCINE), insulin aspart U-100, ondansetron, sodium chloride 0.9%    Laboratory (all labs reviewed):  CBC:  Recent Labs   Lab 03/05/20  0622 04/09/20  1505 04/10/20  0422 04/29/20  1804 04/30/20  0405   WBC 5.36 4.06 4.89 8.31 7.39   Hemoglobin 7.9 L 9.2 L 9.4 L 11.9 L 10.7 L   Hematocrit 25.6 L 30.0 L 30.5 L 38.1 L 34.8 L   Platelets 163 152 168 157 142 L       CHEMISTRIES:  Recent Labs   Lab 07/16/19  0719 07/17/19  0455  03/03/20  0538 03/05/20  0622 04/09/20  1505 04/10/20  0422 04/29/20  1804   Glucose 212 H 189 H   < > 189 H 244 H 576  H 307 H   Sodium 138 139   < > 139 140 134 L 139 137   Potassium 4.5 4.4   < > 4.2 3.9 4.8 4.4 4.2   BUN, Bld 48 H 23   < > 38 H 40 H 28 H 32 H 39 H   Creatinine 8.3 H 5.2 H   < > 7.4 H 7.2 H 5.4 H 6.4 H 9.9 H   eGFR if  7 A 13 A   < > 8 A 9 A 12.1 A 9.8 A 6 A   eGFR if non  6 A 11 A   < > 7 A 7 A 10.5 A 8.5 A 5 A   Calcium 9.7 8.8   < > 8.1 L 8.3 L 8.9 9.1 9.7   Magnesium 2.3 1.9  --   --  2.0  --   --  2.7 H    < > = values in this interval not displayed.       CARDIAC BIOMARKERS:  Recent Labs   Lab 12/14/19  2043 03/05/20  0622 03/05/20  0850 04/29/20  1804 04/30/20  0018   Troponin I 0.051 H 0.046 H 0.044 H 25.771 H 19.642 H       COAGS:  Recent Labs   Lab 07/16/19  0719 02/27/20  0315 03/05/20  0622 04/29/20  1932   INR 1.0 0.9 0.9 1.0       LIPIDS/LFTS:  Recent Labs   Lab 05/29/17  1529  06/25/18  0757 12/15/18  0935  03/12/19  1004  12/17/19  0458 02/28/20  1850 03/05/20  0622 04/09/20  1505 04/29/20  1804   Cholesterol 134  --  239 H 263 H  --  158  --   --   --   --   --   --    Triglycerides 209 H   --  179 H 210 H  --  118  --   --   --   --   --   --    HDL 27 L  --  38 L 40  --  37 L  --   --   --   --   --   --    LDL Cholesterol 65.2  --  165.2 H 181.0 H  --  97.4  --   --   --   --   --   --    Non-HDL Cholesterol 107  --  201 223  --  121  --   --   --   --   --   --    AST  --    < > 21 52 H   < >  --    < > 18 23 26 26 38   ALT  --    < > 38 83 H   < >  --    < > 26 28 17 25 <5 L    < > = values in this interval not displayed.       BNP:  Recent Labs   Lab 03/12/19  0706 07/16/19  0719 12/14/19  0817 03/05/20  0622    H 289 H 617 H 652 H       TSH:  Recent Labs   Lab 03/13/19  0532 04/29/20  1804   TSH 2.133 3.023       Free T4:        Diagnostic Results:  ECG (personally reviewed and interpreted tracing(s)):  4/29/20 1732 SR 95, ASMI-age indet, inflat TWI ?isch, similar to 3/5/20  4/30/20 0530  (?AFL 2:1)  4/30/20 0724 tele: SR 80s    Chest X-Ray (personally reviewed and interpreted image(s)): 4/29/20 prior sternotomy, R emma, NAD    Echo: 12/15/19 (repeat pending)  · Normal left ventricular systolic function. The estimated ejection fraction is 60%  · Moderate concentric left ventricular hypertrophy.  · Moderate left atrial enlargement.  · Indeterminate left ventricular diastolic function.  · Normal right ventricular systolic function.  · Mild right atrial enlargement.  · Mild aortic regurgitation.  · Mild mitral regurgitation.  · Mild tricuspid regurgitation.  · Normal central venous pressure (3 mm Hg).  · The estimated PA systolic pressure is 15 mm Hg  · Septal wall has abnormal motion consistent with post-operative status.    Cath: 12/16/19 (Nelly)  1.  Successful PCI of severe stenosis in proximal to mid circumflex with drug-eluting stent x1.  A 3 into 26 mm drug-eluting stent, resolute farhad was implanted with excellent angiographic results.  OMARI 3 flow post PCI.  Coronary anatomy:  Lad:  Proximal chronic total occlusion.  Fills via LIMA to LAD.  No stenosis in LIMA to LAD.  No  significant stenosis in LAD after LIMA touchdown.  Circumflex:  High OM1 is a small 1.5 mm vessel with proximal 70% stenosis.  Mid circumflex has a severe 80-85% stenosis.  OM3 is a small 1.5 to 2 mm vessel with severe 70% stenosis proximally.  RCA chronic total occlusion proximally.  Distal RCA fills via collaterals from LAD.  Grafts:  SVG to RCA is occluded  LIMA to LAD is patent with no stenosis  Access and hemostasis:  Ultrasound-guided access of right common femoral artery was obtained.  After angioplasty, hemostasis was achieved with Perclose.  Assessment and plan  Aspirin 81 mg daily indefinitely  Plavix 75 mg daily uninterrupted for at least 1 year  Statins  Aggressive risk factor modification  Follow-up in Cardiology Clinic

## 2020-04-30 NOTE — ASSESSMENT & PLAN NOTE
Short lived yesterday upon admit  Monitor lytes - HD patient  Also patient's glucose uncontrolled

## 2020-04-30 NOTE — ASSESSMENT & PLAN NOTE
Consult nephrology for routine dialysis he missed his session on Tuesday is due tomorrow currently not having any emergent need for dialysis tonight.  Vital signs stable O2 sats normal potassium normal

## 2020-04-30 NOTE — SUBJECTIVE & OBJECTIVE
Interval History:  Examined patient before he went down for angiogram and after he had his echocardiogram.  Also spoke with the wife and apparently the patient is at his baseline state mentally he is just not active in his medical care and she believes is down lately due to the loss of a relative who she was close to and another 1 in the hospital with a COVID virus.  Patient denies any chest pain, shortness of breath, heartburn, nausea or any heart palpitations.    Review of Systems   Constitutional: Negative for activity change, diaphoresis, fatigue and fever.   HENT: Negative for congestion, ear pain, postnasal drip, sinus pressure, sore throat and trouble swallowing.    Eyes: Negative for pain.   Respiratory: Negative for cough, chest tightness, shortness of breath and wheezing.    Cardiovascular: Negative for chest pain, palpitations and leg swelling.   Gastrointestinal: Negative for abdominal distention, abdominal pain, blood in stool, constipation and diarrhea.   Endocrine: Negative for cold intolerance and heat intolerance.   Genitourinary: Negative for decreased urine volume, difficulty urinating, dysuria, flank pain, frequency and hematuria.   Musculoskeletal: Negative for arthralgias, back pain, joint swelling, myalgias and neck pain.   Skin: Negative for pallor, rash and wound.   Neurological: Positive for weakness. Negative for tremors, syncope, light-headedness, numbness and headaches.   Hematological: Negative for adenopathy.   Psychiatric/Behavioral: Negative for confusion, decreased concentration, hallucinations, self-injury, sleep disturbance and suicidal ideas. The patient is not nervous/anxious.      Objective:     Vital Signs (Most Recent):  Temp: 97.5 °F (36.4 °C) (04/30/20 1300)  Pulse: 82 (04/30/20 1400)  Resp: 12 (04/30/20 1400)  BP: (!) 146/89 (04/30/20 1400)  SpO2: 99 % (04/30/20 1400) Vital Signs (24h Range):  Temp:  [97.2 °F (36.2 °C)-98.8 °F (37.1 °C)] 97.5 °F (36.4 °C)  Pulse:  []  82  Resp:  [12-24] 12  SpO2:  [89 %-100 %] 99 %  BP: (101-146)/(63-95) 146/89  Arterial Line BP: (0-138)/(0-76) 138/76     Weight: 63.5 kg (140 lb)  Body mass index is 19.53 kg/m².    Intake/Output Summary (Last 24 hours) at 4/30/2020 1407  Last data filed at 4/30/2020 1348  Gross per 24 hour   Intake 200 ml   Output --   Net 200 ml      Physical Exam   Constitutional: He is oriented to person, place, and time. He appears well-developed and well-nourished. No distress.   HENT:   Head: Normocephalic and atraumatic.   Right Ear: External ear normal.   Left Ear: External ear normal.   Nose: Nose normal.   Mouth/Throat: Oropharynx is clear and moist.   Eyes: Conjunctivae and EOM are normal. Right eye exhibits no discharge. Left eye exhibits no discharge. No scleral icterus.   Neck: Normal range of motion. Neck supple. No JVD present. No tracheal deviation present. No thyromegaly present.   Cardiovascular: Normal rate, regular rhythm, normal heart sounds and intact distal pulses. Exam reveals no gallop.   No murmur heard.  Pulmonary/Chest: Effort normal and breath sounds normal. No respiratory distress. He has no wheezes. He has no rales.   Abdominal: Soft. Bowel sounds are normal. He exhibits no distension and no mass. There is no tenderness.   Musculoskeletal: Normal range of motion. He exhibits no edema or tenderness.   Lymphadenopathy:     He has no cervical adenopathy.   Neurological: He is alert and oriented to person, place, and time.   Skin: Skin is warm and dry. No rash noted. He is not diaphoretic. No erythema.   Psychiatric: He has a normal mood and affect. His behavior is normal. Judgment and thought content normal.       Significant Labs:   CBC:   Recent Labs   Lab 04/29/20  1804 04/30/20  0405   WBC 8.31 7.39   HGB 11.9* 10.7*   HCT 38.1* 34.8*    142*     CMP:   Recent Labs   Lab 04/29/20  1804      K 4.2      CO2 18*   *   BUN 39*   CREATININE 9.9*   CALCIUM 9.7   PROT 8.7*    ALBUMIN 3.5   BILITOT 0.2   ALKPHOS 113   AST 38   ALT <5*   ANIONGAP 19*   EGFRNONAA 5*     Cardiac Markers: No results for input(s): CKMB, MYOGLOBIN, BNP, TROPISTAT in the last 48 hours.  Troponin:   Recent Labs   Lab 04/29/20  1804 04/30/20  0018 04/30/20  0405   TROPONINI 25.771* 19.642* 18.338*     TSH:   Recent Labs   Lab 04/29/20  1804   TSH 3.023     Hemoglobin A1c 10.3    Significant Imaging: CXR: I have reviewed all pertinent results/findings within the past 24 hours and my personal findings are:  unremarkable  Echo: I have reviewed all pertinent results/findings within the past 24 hours and my personal findings are:  ·Moderately decreased left ventricular systolic function. The estimated ejection fraction is 30-35%.

## 2020-04-30 NOTE — ASSESSMENT & PLAN NOTE
Troponin's decreasing  Angiogram revealed stent occulsion placed in December. And patient not taking DAPT recently but also a poorly controlled diabetic  Continue DAPT as well as ARB, Coreg and atorvastatin despite ESRD

## 2020-04-30 NOTE — H&P
[facility] Medicine  History & Physical    Patient Name: Kodi Ibanez Sr.  MRN: [id number]  Admission Date: 4/29/2020  Attending Physician: Arely Gar MD   Primary Care Provider: Melonie Elias MD         Patient information was obtained from past medical records and ER records.     Subjective:     Principal Problem:NSTEMI (non-ST elevated myocardial infarction)    Chief Complaint:   Chief Complaint   Patient presents with    Altered Mental Status     per EMS, pt family called reporting pt had changed in LOC; noticed on this morning.        HPI: 60-year-old male with a history of end-stage renal disease on dialysis Tuesday Thursday Saturday last dialysis session on Saturday.  Missed the last 1 secondary to illness has come in with his wife secondary to just progressive generalized weakness and p.o. intake.  Patient recently has had an infected graft has been taking Flagyl twice a day but only has really been taking it once a day because he has been vomiting.  Has a history of coronary artery disease status post CABG and stent placement in 2019.  He denies any shortness of breath or chest pain.  He has no report of any fevers.  Patient brought in a found to have NSTEMI with a troponin of over 25.  Patient has been started on heparin drip and cardiology has been called who are planning on taking him to do a heart catheterization in the morning.  Patient is comfortable in no respiratory distress and denies any pain.  He is confused however worse than his baseline.    Past Medical History:   Diagnosis Date    Atherosclerosis of aorta     noted on VIKTORIA 2011    Blood transfusion     Cataracts, bilateral     CHF (congestive heart failure)     Clotting disorder     Cognitive deficits as late effect of cerebrovascular disease 6/26/2012    Coronary artery disease     NSTEMI s/p PCI with FERCHO to LCx (12/16/19),     Decreased appetite     DM (diabetes mellitus), type 2 with renal  "complications     Diabetic retinopathy    ESRD on hemodialysis     TUESDAY/ THURSDAY/SATURDAY    Hyperlipidemia     Hyperparathyroidism, unspecified     Hypertension     Prostate cancer 2016 7/16/19:  wife states "he doesn't have it anymore"    Seizures     Stroke 2011    PT HAS SHAKES     Unsteady gait     Uses roller walker       Past Surgical History:   Procedure Laterality Date    CARDIAC SURGERY      CABG    CATARACT EXTRACTION      ou    CATARACT EXTRACTION, BILATERAL      cataracts      CORONARY ANGIOGRAPHY INCLUDING BYPASS GRAFTS WITH CATHETERIZATION OF LEFT HEART N/A 12/16/2019    Procedure: ANGIOGRAM, CORONARY, INCLUDING BYPASS GRAFT, WITH LEFT HEART CATHETERIZATION, rcfa, 5fr;  Surgeon: David Villegas MD;  Location: [facility] CATH LAB;  Service: Cardiology;  Laterality: N/A;    CORONARY ARTERY BYPASS GRAFT  2009    bypass    EYE SURGERY      Left Guera AVF  1/11/2012    PROSTATE BIOPSY      positive    retinal laser      REVISION OF ARTERIOVENOUS FISTULA Left 2/27/2020    Procedure: Ligation of left upper extremity fistula, Closure of left upper extremity skin defect x 2, Intraoperative ultrasound ;  Surgeon: Stephane Lopez MD;  Location: [facility] OR;  Service: Vascular;  Laterality: Left;    REVISION OF ARTERIOVENOUS FISTULA Left 4/10/2020    Procedure: AV fistula excision;  Surgeon: Stephane Lopez MD;  Location: [facility] OR [address] [place] [address];  Service: Vascular;  Laterality: Left;    ROTATOR CUFF REPAIR  right, 2005    ULTRASOUND GUIDANCE  12/16/2019    Procedure: Ultrasound Guidance;  Surgeon: David Villegas MD;  Location: [facility] CATH LAB;  Service: Cardiology;;    VASCULAR SURGERY         Review of patient's allergies indicates:   Allergen Reactions    Reglan [metoclopramide hcl] Diarrhea    Lorazepam      Other reaction(s): heavy sedation       No current facility-administered medications on file prior to encounter.      Current Outpatient Medications on File Prior to Encounter " "  Medication Sig    acetaminophen (TYLENOL) 500 MG tablet Take 2 tablets (1,000 mg total) by mouth every 8 (eight) hours as needed for Pain or Temperature greater than (100.5). (Patient not taking: Reported on 4/14/2020)    ascorbic acid, vitamin C, (VITAMIN C) 500 MG tablet Take 500 mg by mouth every evening.     atorvastatin (LIPITOR) 40 MG tablet TAKE 1 TABLET(40 MG) BY MOUTH EVERY DAY    blood glucose control, normal Soln 1 drop by Misc.(Non-Drug; Combo Route) route as directed.    blood sugar diagnostic Strp To check BG 2 times daily, to use with accuchek casa meter    blood-glucose meter kit To check BG 2 times daily, to use with accuchek casa meter    carvedilol (COREG) 25 MG tablet TAKE 1 TABLET(25 MG) BY MOUTH TWICE DAILY WITH MEALS    clopidogrel (PLAVIX) 75 mg tablet Take 1 tablet (75 mg total) by mouth once daily.    diltiaZEM (CARDIZEM CD) 180 MG 24 hr capsule Take 1 capsule (180 mg total) by mouth once daily.    furosemide (LASIX) 40 MG tablet TAKE 1 TABLET(40 MG) BY MOUTH EVERY DAY    insulin (LANTUS SOLOSTAR U-100 INSULIN) glargine 100 units/mL (3mL) SubQ pen Inject 7 Units into the skin every evening.    lancets Misc To check BG 2 times daily, to use with accuchek casa meter    losartan (COZAAR) 100 MG tablet Take 1 tablet (100 mg total) by mouth once daily.    megestrol (MEGACE) 20 MG Tab Take 1 tablet (20 mg total) by mouth once daily.    metroNIDAZOLE (FLAGYL) 500 MG tablet Take 1 tablet (500 mg total) by mouth 3 (three) times daily.    NOVOLOG FLEXPEN U-100 INSULIN 100 unit/mL InPn pen INJECT 5 TO 10 UNITS WITH MEALS AND AT BEDTIME DEPENDING ON BLOOD SUGAR    pen needle, diabetic (BD ULTRA-FINE ORIG PEN NEEDLE) 29 gauge x 1/2" Ndle USE  1 EVERY EVENING    pen needle, diabetic (NOVOFINE 30) 30 gauge x 1/3" Ndle USE AS DIRECTED WITH LANTUS SOLARSTAR    promethazine (PHENERGAN) 25 MG tablet Take 1 tablet (25 mg total) by mouth every 6 (six) hours as needed for Nausea. (Patient " not taking: Reported on 4/14/2020)     Family History     Problem Relation (Age of Onset)    Cancer Mother, Paternal Grandmother, Paternal Uncle    Cataracts Mother    Diabetes Mother    Glaucoma Mother    Heart disease Father, Maternal Grandmother    Hypertension Brother    No Known Problems Sister, Maternal Grandfather, Paternal Grandfather, Sister, Sister    Prostate cancer Brother        Tobacco Use    Smoking status: Never Smoker    Smokeless tobacco: Never Used   Substance and Sexual Activity    Alcohol use: No    Drug use: No    Sexual activity: Not Currently     Review of Systems   Unable to perform ROS: Mental status change     Objective:     Vital Signs (Most Recent):  Temp: 98.8 °F (37.1 °C) (04/29/20 1710)  Pulse: 98 (04/29/20 1931)  Resp: 18 (04/29/20 1930)  BP: 114/69 (04/29/20 1931)  SpO2: 100 % (04/29/20 1731) Vital Signs (24h Range):  Temp:  [98.8 °F (37.1 °C)] 98.8 °F (37.1 °C)  Pulse:  [96-98] 98  Resp:  [18-24] 18  SpO2:  [99 %-100 %] 100 %  BP: (114-141)/(69-89) 114/69     Weight: 82.6 kg (182 lb)  Body mass index is 25.38 kg/m².    Physical Exam   Constitutional: He appears well-developed and well-nourished.   HENT:   Head: Normocephalic and atraumatic.   Eyes: Pupils are equal, round, and reactive to light. EOM are normal.   Neck: Normal range of motion. Neck supple.   Cardiovascular: Normal rate, regular rhythm, normal heart sounds and intact distal pulses.   No murmur heard.  Pulmonary/Chest: Effort normal and breath sounds normal. No respiratory distress. He has no wheezes.   Abdominal: Soft. Bowel sounds are normal. He exhibits no distension. There is no tenderness. There is no rebound and no guarding.   Musculoskeletal: Normal range of motion. He exhibits no edema or deformity.   Neurological: He is alert. No cranial nerve deficit.   Skin: Skin is warm and dry. Capillary refill takes 2 to 3 seconds. No rash noted.   Psychiatric: He has a normal mood and affect. His behavior is  normal.   Nursing note and vitals reviewed.        CRANIAL NERVES     CN III, IV, VI   Pupils are equal, round, and reactive to light.  Extraocular motions are normal.        Significant Labs: All pertinent labs within the past 24 hours have been reviewed.    Significant Imaging: I have reviewed and interpreted all pertinent imaging results/findings within the past 24 hours.    Assessment/Plan:     * NSTEMI (non-ST elevated myocardial infarction)  Heparin drip.  Aspirin.  Plavix.  Cardiology called NPO after midnight plan on catheterization in the morning.  Serial troponin.      Encephalopathy, metabolic  Secondary to NSTEMI      Complication of arteriovenous dialysis fistula  Continue IV Flagyl appears clean dry and intact at this time      ESRD on hemodialysis  Consult nephrology for routine dialysis he missed his session on Tuesday is due tomorrow currently not having any emergent need for dialysis tonight.  Vital signs stable O2 sats normal potassium normal      S/P CABG (coronary artery bypass graft)  Noted      Seizure disorder  Placed on seizure precautions      CAD (coronary artery disease)  Noted treating for ACS as above      Congestive heart failure  Currently compensated and stable      History of stroke  Noted no focal neurological deficits      VTE Risk Mitigation (From admission, onward)         Ordered     IP VTE HIGH RISK PATIENT  Once      04/29/20 2256     Place sequential compression device  Until discontinued      04/29/20 2256     heparin 25,000 units in dextrose 5% (100 units/ml) IV bolus from bag - ADDITIONAL PRN BOLUS - 60 units/kg (max bolus 4000 units)  As needed (PRN)     Question:  Heparin Infusion Adjustment (DO NOT MODIFY ANSWER)  Answer:  [email or web address]\epic\Images\Pharmacy\HeparinInfusions\heparin LOW INTENSITY nomogram for [facility] [id number]    04/29/20 1903     heparin 25,000 units in dextrose 5% (100 units/ml) IV bolus from bag - ADDITIONAL PRN BOLUS - 30 units/kg (max bolus 4000  units)  As needed (PRN)     Question:  Heparin Infusion Adjustment (DO NOT MODIFY ANSWER)  Answer:  [email or web address]\epic\Images\Pharmacy\HeparinInfusions\heparin LOW INTENSITY nomogram for [facility] [id number]    04/29/20 1903     heparin 25,000 units in dextrose 5% 250 mL (100 units/mL) infusion LOW INTENSITY nomogram - [facility]  Continuous     Question:  Heparin Infusion Adjustment (DO NOT MODIFY ANSWER)  Answer:  [email or web address]\epic\Images\Pharmacy\HeparinInfusions\heparin LOW INTENSITY nomogram for [facility] [id number]    04/29/20 1903                   Haleigh Stewart MD  Department of Hospital Medicine   [facility]

## 2020-04-30 NOTE — CARE UPDATE
Talked to patient's wife. He has not taking his medicines (including aspirin and plavix) for the past 2-3 days because of weakness.  I re-emphasized the importance of compliance with dual antiplatelet therapy.  Patient's wife understands.          David Villegas MD, FACC, [facility]  Interventional Cardiologist, [facility].

## 2020-04-30 NOTE — ASSESSMENT & PLAN NOTE
Heparin drip.  Aspirin.  Plavix.  Cardiology called NPO after midnight plan on catheterization in the morning.  Serial troponin.

## 2020-04-30 NOTE — CONSULTS
[facility]  Cardiology  Consult Note    Patient Name: Kodi Ibanez Sr.  MRN: [id number]  Admission Date: 4/29/2020  Hospital Length of Stay: 1 days  Code Status: Full Code   Attending Provider: Arely Gar MD   Consulting Provider: Sincere Goff MD  Primary Care Physician: Melonie Elias MD  Principal Problem:NSTEMI (non-ST elevated myocardial infarction)    Patient information was obtained from patient and ER records.     Inpatient consult to Cardiology  Consult performed by: Sincere Goff MD  Consult ordered by: Claudia Pabon MD  Reason for consult: NSTEMI        Subjective:     Chief Complaint:  FTT     HPI:   60-year-old male with a history of end-stage renal disease on dialysis Tuesday Thursday Saturday last dialysis session on Saturday.  Missed the last 1 secondary to illness has come in with his wife secondary to just progressive generalized weakness and p.o. intake.  Patient recently has had an infected graft has been taking Flagyl twice a day but only has really been taking it once a day because he has been vomiting.  Has a history of coronary artery disease status post CABG and stent placement in 2019.  He denies any shortness of breath or chest pain.  He has no report of any fevers.  Patient brought in a found to have NSTEMI with a troponin of over 25.  Patient has been started on heparin drip and cardiology has been called who are planning on taking him to do a heart catheterization in the morning.  Patient is comfortable in no respiratory distress and denies any pain.  He is confused however worse than his baseline.    Pt follows with Dr. Villegas, last seen 12/2019.    The patient has a history of coronary artery disease status post PCI in December 2019.  He also has a history of significant peripheral arterial disease.  The patient presents to the emergency room with complaints predominantly of failure to thrive.  There has been no overt complained of anginal  "symptoms.  However, his troponin was noted to be greater than 25.  His EKG is not particularly changed verses his prior tracing from March of this year, although there are inferolateral T-wave inversions.  Overnight, he did have an episode of supraventricular tachycardia which might have been 2-1 flutter, but is now back in sinus rhythm.  At present, he is pain-free.  I have discussed the case with Dr. Villegas who will plan catheterization later on today.    Past Medical History:   Diagnosis Date    Atherosclerosis of aorta     noted on VIKTORIA 2011    Blood transfusion     Cataracts, bilateral     CHF (congestive heart failure)     Clotting disorder     Cognitive deficits as late effect of cerebrovascular disease 6/26/2012    Coronary artery disease     NSTEMI s/p PCI with FERCHO to LCx (12/16/19),     Decreased appetite     DM (diabetes mellitus), type 2 with renal complications     Diabetic retinopathy    ESRD on hemodialysis     TUESDAY/ THURSDAY/SATURDAY    Hyperlipidemia     Hyperparathyroidism, unspecified     Hypertension     Prostate cancer 2016 7/16/19:  wife states "he doesn't have it anymore"    Seizures     Stroke 2011    PT HAS SHAKES     Unsteady gait     Uses roller walker       Past Surgical History:   Procedure Laterality Date    CARDIAC SURGERY      CABG    CATARACT EXTRACTION      ou    CATARACT EXTRACTION, BILATERAL      cataracts      CORONARY ANGIOGRAPHY INCLUDING BYPASS GRAFTS WITH CATHETERIZATION OF LEFT HEART N/A 12/16/2019    Procedure: ANGIOGRAM, CORONARY, INCLUDING BYPASS GRAFT, WITH LEFT HEART CATHETERIZATION, rcfa, 5fr;  Surgeon: David Villegas MD;  Location: [facility] CATH LAB;  Service: Cardiology;  Laterality: N/A;    CORONARY ARTERY BYPASS GRAFT  2009    bypass    EYE SURGERY      Left Guera AVF  1/11/2012    PROSTATE BIOPSY      positive    retinal laser      REVISION OF ARTERIOVENOUS FISTULA Left 2/27/2020    Procedure: Ligation of left upper extremity fistula, " Closure of left upper extremity skin defect x 2, Intraoperative ultrasound ;  Surgeon: Stephane Lopez MD;  Location: [facility] OR;  Service: Vascular;  Laterality: Left;    REVISION OF ARTERIOVENOUS FISTULA Left 4/10/2020    Procedure: AV fistula excision;  Surgeon: Stephane Lopez MD;  [facility]: [facility] OR [facility] FLR;  Service: Vascular;  Laterality: Left;    ROTATOR CUFF REPAIR  right, 2005    ULTRASOUND GUIDANCE  12/16/2019    Procedure: Ultrasound Guidance;  Surgeon: David Villegas MD;  Location: [facility] CATH LAB;  Service: Cardiology;;    VASCULAR SURGERY         Review of patient's allergies indicates:   Allergen Reactions    Reglan [metoclopramide hcl] Diarrhea    Lorazepam      Other reaction(s): heavy sedation       No current facility-administered medications on file prior to encounter.      Current Outpatient Medications on File Prior to Encounter   Medication Sig    acetaminophen (TYLENOL) 500 MG tablet Take 2 tablets (1,000 mg total) by mouth every 8 (eight) hours as needed for Pain or Temperature greater than (100.5). (Patient not taking: Reported on 4/14/2020)    ascorbic acid, vitamin C, (VITAMIN C) 500 MG tablet Take 500 mg by mouth every evening.     atorvastatin (LIPITOR) 40 MG tablet TAKE 1 TABLET(40 MG) BY MOUTH EVERY DAY    blood glucose control, normal Soln 1 drop by Misc.(Non-Drug; Combo Route) route as directed.    blood sugar diagnostic Strp To check BG 2 times daily, to use with accuchek casa meter    blood-glucose meter kit To check BG 2 times daily, to use with accuchek casa meter    carvedilol (COREG) 25 MG tablet TAKE 1 TABLET(25 MG) BY MOUTH TWICE DAILY WITH MEALS    clopidogrel (PLAVIX) 75 mg tablet Take 1 tablet (75 mg total) by mouth once daily.    diltiaZEM (CARDIZEM CD) 180 MG 24 hr capsule Take 1 capsule (180 mg total) by mouth once daily.    furosemide (LASIX) 40 MG tablet TAKE 1 TABLET(40 MG) BY MOUTH EVERY DAY    insulin (LANTUS SOLOSTAR U-100 INSULIN)  "glargine 100 units/mL (3mL) SubQ pen Inject 7 Units into the skin every evening.    lancets Misc To check BG 2 times daily, to use with accuchek casa meter    losartan (COZAAR) 100 MG tablet Take 1 tablet (100 mg total) by mouth once daily.    megestrol (MEGACE) 20 MG Tab Take 1 tablet (20 mg total) by mouth once daily.    metroNIDAZOLE (FLAGYL) 500 MG tablet Take 1 tablet (500 mg total) by mouth 3 (three) times daily.    NOVOLOG FLEXPEN U-100 INSULIN 100 unit/mL InPn pen INJECT 5 TO 10 UNITS WITH MEALS AND AT BEDTIME DEPENDING ON BLOOD SUGAR    pen needle, diabetic (BD ULTRA-FINE ORIG PEN NEEDLE) 29 gauge x 1/2" Ndle USE  1 EVERY EVENING    pen needle, diabetic (NOVOFINE 30) 30 gauge x 1/3" Ndle USE AS DIRECTED WITH LANTUS SOLARSTAR    promethazine (PHENERGAN) 25 MG tablet Take 1 tablet (25 mg total) by mouth every 6 (six) hours as needed for Nausea. (Patient not taking: Reported on 4/14/2020)     Family History     Problem Relation (Age of Onset)    Cancer Mother, Paternal Grandmother, Paternal Uncle    Cataracts Mother    Diabetes Mother    Glaucoma Mother    Heart disease Father, Maternal Grandmother    Hypertension Brother    No Known Problems Sister, Maternal Grandfather, Paternal Grandfather, Sister, Sister    Prostate cancer Brother        Tobacco Use    Smoking status: Never Smoker    Smokeless tobacco: Never Used   Substance and Sexual Activity    Alcohol use: No    Drug use: No    Sexual activity: Not Currently     Review of Systems   Constitution: Positive for malaise/fatigue. Negative for chills, diaphoresis and fever.   HENT: Negative for nosebleeds.    Eyes: Negative for blurred vision and double vision.   Cardiovascular: Negative for chest pain, claudication, cyanosis, dyspnea on exertion, leg swelling, orthopnea, palpitations, paroxysmal nocturnal dyspnea and syncope.   Respiratory: Negative for cough, shortness of breath and wheezing.    Skin: Negative for dry skin and poor wound " healing.   Musculoskeletal: Negative for back pain, joint swelling and myalgias.   Gastrointestinal: Negative for abdominal pain, nausea and vomiting.   Genitourinary: Negative for hematuria.   Neurological: Negative for dizziness, headaches, numbness, seizures and weakness.   Psychiatric/Behavioral: Negative for altered mental status and depression.     Objective:     Vital Signs (Most Recent):  Temp: 97.2 °F (36.2 °C) (04/30/20 0357)  Pulse: 81 (04/30/20 0644)  Resp: 19 (04/29/20 2256)  BP: 138/75 (04/30/20 0644)  SpO2: 100 % (04/30/20 0644) Vital Signs (24h Range):  Temp:  [97.2 °F (36.2 °C)-98.8 °F (37.1 °C)] 97.2 °F (36.2 °C)  Pulse:  [] 81  Resp:  [18-24] 19  SpO2:  [95 %-100 %] 100 %  BP: (101-141)/(63-95) 138/75     Weight: 63.8 kg (140 lb 10.5 oz)  Body mass index is 19.62 kg/m².    SpO2: 100 %  O2 Device (Oxygen Therapy): room air      Intake/Output Summary (Last 24 hours) at 4/30/2020 0721  Last data filed at 4/29/2020 2147  Gross per 24 hour   Intake 100 ml   Output --   Net 100 ml       Lines/Drains/Airways     Central Venous Catheter Line                 Hemodialysis Catheter 03/05/20 0535 right subclavian 56 days          Drain                 Hemodialysis AV Fistula 04/01/17 0800 Left upper arm 1124 days          Peripheral Intravenous Line                 Peripheral IV - Single Lumen 04/29/20 1803 20 G Right Antecubital less than 1 day         Peripheral IV - Single Lumen 04/29/20 2017 22 G Right Forearm less than 1 day                Physical Exam   Constitutional: He is oriented to person, place, and time. He appears well-developed and well-nourished.   HENT:   Head: Normocephalic and atraumatic.   Eyes: Pupils are equal, round, and reactive to light. Conjunctivae and EOM are normal. No scleral icterus.   Neck: Normal range of motion. Neck supple. No JVD present. No tracheal deviation present. No thyromegaly present.   Cardiovascular: Normal rate, regular rhythm, S1 normal and S2 normal.  Exam reveals no gallop and no friction rub.   No murmur heard.  Pulmonary/Chest: Effort normal and breath sounds normal. No respiratory distress. He has no wheezes. He has no rales. He exhibits no tenderness.   Abdominal: Soft. He exhibits no distension.   Musculoskeletal: He exhibits no edema.   Neurological: He is alert and oriented to person, place, and time. He has normal strength. No cranial nerve deficit.   Skin: Skin is warm and dry. No rash noted.   Psychiatric: He has a normal mood and affect. His behavior is normal.       Current Medications:   metronidazole  500 mg Intravenous Q8H      dilTIAZem      heparin (porcine) in D5W 9 Units/kg/hr (04/30/20 0646)     acetaminophen, dextrose 50%, dextrose 50%, glucagon (human recombinant), glucose, glucose, heparin (PORCINE), heparin (PORCINE), insulin aspart U-100, ondansetron, sodium chloride 0.9%    Laboratory (all labs reviewed):  CBC:  Recent Labs   Lab 03/05/20  0622 04/09/20  1505 04/10/20  0422 04/29/20  1804 04/30/20  0405   WBC 5.36 4.06 4.89 8.31 7.39   Hemoglobin 7.9 L 9.2 L 9.4 L 11.9 L 10.7 L   Hematocrit 25.6 L 30.0 L 30.5 L 38.1 L 34.8 L   Platelets 163 152 168 157 142 L       CHEMISTRIES:  Recent Labs   Lab 07/16/19  0719 07/17/19  0455  03/03/20  0538 03/05/20  0622 04/09/20  1505 04/10/20  0422 04/29/20  1804   Glucose 212 H 189 H   < > 189 H 244 H 576  H 307 H   Sodium 138 139   < > 139 140 134 L 139 137   Potassium 4.5 4.4   < > 4.2 3.9 4.8 4.4 4.2   BUN, Bld 48 H 23   < > 38 H 40 H 28 H 32 H 39 H   Creatinine 8.3 H 5.2 H   < > 7.4 H 7.2 H 5.4 H 6.4 H 9.9 H   eGFR if  7 A 13 A   < > 8 A 9 A 12.1 A 9.8 A 6 A   eGFR if non  6 A 11 A   < > 7 A 7 A 10.5 A 8.5 A 5 A   Calcium 9.7 8.8   < > 8.1 L 8.3 L 8.9 9.1 9.7   Magnesium 2.3 1.9  --   --  2.0  --   --  2.7 H    < > = values in this interval not displayed.       CARDIAC BIOMARKERS:  Recent Labs   Lab 12/14/19  2043 03/05/20  0622 03/05/20  0850  04/29/20  1804 04/30/20  0018   Troponin I 0.051 H 0.046 H 0.044 H 25.771 H 19.642 H       COAGS:  Recent Labs   Lab 07/16/19  0719 02/27/20  0315 03/05/20  0622 04/29/20  1932   INR 1.0 0.9 0.9 1.0       LIPIDS/LFTS:  Recent Labs   Lab 05/29/17  1529  06/25/18  0757 12/15/18  0935  03/12/19  1004  12/17/19  0458 02/28/20  1850 03/05/20  0622 04/09/20  1505 04/29/20  1804   Cholesterol 134  --  239 H 263 H  --  158  --   --   --   --   --   --    Triglycerides 209 H  --  179 H 210 H  --  118  --   --   --   --   --   --    HDL 27 L  --  38 L 40  --  37 L  --   --   --   --   --   --    LDL Cholesterol 65.2  --  165.2 H 181.0 H  --  97.4  --   --   --   --   --   --    Non-HDL Cholesterol 107  --  201 223  --  121  --   --   --   --   --   --    AST  --    < > 21 52 H   < >  --    < > 18 23 26 26 38   ALT  --    < > 38 83 H   < >  --    < > 26 28 17 25 <5 L    < > = values in this interval not displayed.       BNP:  Recent Labs   Lab 03/12/19  0706 07/16/19  0719 12/14/19  0817 03/05/20  0622    H 289 H 617 H 652 H       TSH:  Recent Labs   Lab 03/13/19  0532 04/29/20  1804   TSH 2.133 3.023       Free T4:        Diagnostic Results:  ECG (personally reviewed and interpreted tracing(s)):  4/29/20 1732 SR 95, ASMI-age indet, inflat TWI ?isch, similar to 3/5/20  4/30/20 0530  (?AFL 2:1)  4/30/20 0724 tele: SR 80s    Chest X-Ray (personally reviewed and interpreted image(s)): 4/29/20 prior sternotomy, R vascath, NAD    Echo: 12/15/19 (repeat pending)  · Normal left ventricular systolic function. The estimated ejection fraction is 60%  · Moderate concentric left ventricular hypertrophy.  · Moderate left atrial enlargement.  · Indeterminate left ventricular diastolic function.  · Normal right ventricular systolic function.  · Mild right atrial enlargement.  · Mild aortic regurgitation.  · Mild mitral regurgitation.  · Mild tricuspid regurgitation.  · Normal central venous pressure (3 mm Hg).  · The estimated  PA systolic pressure is 15 mm Hg  · Septal wall has abnormal motion consistent with post-operative status.    Cath: 12/16/19 (Nelly)  1.  Successful PCI of severe stenosis in proximal to mid circumflex with drug-eluting stent x1.  A 3 into 26 mm drug-eluting stent, resolute farhad was implanted with excellent angiographic results.  OMARI 3 flow post PCI.  Coronary anatomy:  Lad:  Proximal chronic total occlusion.  Fills via LIMA to LAD.  No stenosis in LIMA to LAD.  No significant stenosis in LAD after LIMA touchdown.  Circumflex:  High OM1 is a small 1.5 mm vessel with proximal 70% stenosis.  Mid circumflex has a severe 80-85% stenosis.  OM3 is a small 1.5 to 2 mm vessel with severe 70% stenosis proximally.  RCA chronic total occlusion proximally.  Distal RCA fills via collaterals from LAD.  Grafts:  SVG to RCA is occluded  LIMA to LAD is patent with no stenosis  Access and hemostasis:  Ultrasound-guided access of right common femoral artery was obtained.  After angioplasty, hemostasis was achieved with Perclose.  Assessment and plan  Aspirin 81 mg daily indefinitely  Plavix 75 mg daily uninterrupted for at least 1 year  Statins  Aggressive risk factor modification  Follow-up in Cardiology Clinic      Assessment and Plan:     * NSTEMI (non-ST elevated myocardial infarction)  Trop >25 on admission, down to 19 today.  No overt isch sxs noted.  Known CAD s/p CABG with PCI LCx 12/2019.  Cont med rx: ASA/Plavix/heparin (pt reports DAPT adherence)  Cont heparin gtt  Check echo  Cath today with Dr. 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, with long-term current use of insulin  Per IM        VTE Risk Mitigation (From admission, onward)         Ordered     IP VTE HIGH RISK PATIENT  Once      04/29/20 0256     Place sequential  compression device  Until discontinued      04/29/20 2256     heparin 25,000 units in dextrose 5% (100 units/ml) IV bolus from bag - ADDITIONAL PRN BOLUS - 60 units/kg (max bolus 4000 units)  As needed (PRN)     Question:  Heparin Infusion Adjustment (DO NOT MODIFY ANSWER)  Answer:  [email or web address]\epic\Images\Pharmacy\HeparinInfusions\heparin LOW INTENSITY nomogram for [facility] [id number]    04/29/20 1903     heparin 25,000 units in dextrose 5% (100 units/ml) IV bolus from bag - ADDITIONAL PRN BOLUS - 30 units/kg (max bolus 4000 units)  As needed (PRN)     Question:  Heparin Infusion Adjustment (DO NOT MODIFY ANSWER)  Answer:  \\[email or web address]\epic\Images\Pharmacy\HeparinInfusions\heparin LOW INTENSITY nomogram for [facility] [id number].pdf    04/29/20 1903     heparin 25,000 units in dextrose 5% 250 mL (100 units/mL) infusion LOW INTENSITY nomogram - [facility]  Continuous     Question:  Heparin Infusion Adjustment (DO NOT MODIFY ANSWER)  Answer:  [email or web address]\epic\Images\Pharmacy\HeparinInfusions\heparin LOW INTENSITY nomogram for [facility] [id number]    04/29/20 1903              Case d/w Dr. Villegas.    Thank you for your consult. I will follow-up with patient. Please contact us if you have any additional questions.    Sincere Goff MD  Cardiology   [facility]

## 2020-04-30 NOTE — ASSESSMENT & PLAN NOTE
Continue IV Flagyl for now ;appears clean dry and intact at this time  Consult wound care to change dressings (according to the wife - wet to dry bid)  If patient having nausea from flagyl , may consider changing to clindamycin.   Patient also receiving a dose of cefazolin after each dialysis on Tuesday, Thursday and Saturdays.  He has home health as well.  He is to be on antibiotics per infectious disease for another 2 weeks at least.

## 2020-04-30 NOTE — ASSESSMENT & PLAN NOTE
Trop >25 on admission, down to 19 today.  No overt isch sxs noted.  Known CAD s/p CABG with PCI LCx 12/2019.  Cont med rx: ASA/Plavix/heparin (pt reports DAPT adherence)  Cont heparin gtt  Check echo  Cath today with Dr. Villegas

## 2020-04-30 NOTE — CARE UPDATE
Risks, benefits and alternatives of the catheterization procedure were discussed with the patient.The risks of coronary angiography include but are not limited to: bleeding, infection, death, heart attack, arrhythmia, kidney injury or failure, potential need for dialysis, allergic reactions, stroke, need for emergency surgery, hematoma, pseudoaneurysm etc.  Should stenting be indicated, the patient has agreed to dual anti-platelet therapy for 1-consecutive year with a drug-eluting stent and a minimum of 1-month with the use of a bare metal stent. Additionally, pt is aware that non-compliance is likely to result in stent clotting with heart attack, heart failure, and/or death  The risks of moderate sedation include hypotension, respiratory depression, arrhythmias, bronchospasm, and death. Informed consent was obtained and the  patient is agreeable to proceed with the procedure. Consent was placed on the chart.

## 2020-04-30 NOTE — ASSESSMENT & PLAN NOTE
Uncontrolled as far back as the Aic has been measured  Will increase basal insulin while inpatient  Uncertain if patient will take preprandial insulin short acting and count carbs. May consider 70/30 bid dosing as outpatient

## 2020-04-30 NOTE — HOSPITAL COURSE
Admitted to the floor for mental status changes.  It seems as though he has not been taking his medications due to weakness and nausea; this includes his aspirin, Plavix, and Flagyl.  Suspect part of his nausea and a may be from Flagyl.  Found to have NSTEMI.  Cardiology consulted.  Angiogram 4/30 with instent stenosis of left circumflex that was ballooned.  No stent was placed.  He was continued on aspirin and Plavix.  He sent to ICU after the procedure because his sheath was still in place.  This was old.     Of note, he recently had infected LUE AVF.  This was excised by vascular surgery.  Surgical cultures on 4/10 grew 2 different prevotella species, fusobacterium, and strep anginosus.  He was discharged from the hospital and instructed to take Augmentin and clindamycin from April 10th to April 18th.  He had a telemedicine appointment with infectious disease on 04/20.  At that time his antibiotics were changed to Flagyl 500 mg p.o. q.8 +cefazolin after dialysis 2 g on Tuesday and Thursday, 3 g on Saturday.  He is instructed to continue these antibiotics until 5/11.

## 2020-04-30 NOTE — PLAN OF CARE
"SW spoke with patient's wife to complete discharge planning assessment. Prior to beginning the discharge planning assessment, patient verified name and date of birth. SW educated patient's wife on the discharge process and explained that discharge planning begins at admission. SW reviewed  "Help at home", "Managing your health", and "Your preferences". Patient's wife stated that he needs help at home since surgery on arm.       04/30/20 3046   Discharge Assessment   Assessment Type Discharge Planning Assessment   Confirmed/corrected address and phone number on facesheet? Yes   Assessment information obtained from? Patient   Communicated expected length of stay with patient/caregiver yes   Prior to hospitilization cognitive status: Alert/Oriented   Prior to hospitalization functional status: Independent;Needs Assistance   Current cognitive status: Alert/Oriented   Current Functional Status: Needs Assistance   Facility Arrived From: home   Lives With spouse   Able to Return to Prior Arrangements yes   Is patient able to care for self after discharge? Yes   Patient's perception of discharge disposition home or selfcare;home health   Readmission Within the Last 30 Days no previous admission in last 30 days   Patient currently being followed by outpatient case management? No   Patient currently receives any other outside agency services? Yes   Name and contact number of agency or person providing outside services Saúl    Is it the patient/care giver preference to resume care with the current outside agency? Yes   Equipment Currently Used at Home none   Do you have any problems affording any of your prescribed medications? No   Is the patient taking medications as prescribed? yes   Does the patient have transportation home? Yes   Transportation Anticipated family or friend will provide   Dialysis Name and Scheduled days Tues, Thurs, Sat [facility]   Does the patient receive services at the Coumadin Clinic? No "   Discharge Plan A Home Health   Discharge Plan B Home Health   DME Needed Upon Discharge  bedside commode   Patient/Family in Agreement with Plan yes     Melonie Elias MD      [id number] - [place] - [address] AT [facility] [address]  [address]  [place] [address]  Phone: [phone] Fax: [phone]    [facility] - [facility] [address] [facility] [address]  [address] [facility] [address]  Phone: [phone] Fax: [phone]    [facility]  [address]  [facility] [address]  Phone: [phone] Fax: [phone]    Extended Emergency Contact Information  Primary Emergency Contact: Yaima Ibanez  Address: [address] [place] [address] [address]           [place] [address] [place]  Home Phone: [phone]  Work Phone: [phone]  Mobile Phone: [phone]  Relation: Spouse

## 2020-04-30 NOTE — NURSING
Patient's HR up to 152, patient asymptomatic laying supine in bed. No c/o cp or sob. Spoke w/ nocturnal hospitalist EKG ordered and 10mg cardizem iv push x1 given. Cardizem non-effective. Patient given 5mg lopressor IV, HR down to 110's. Within 10mins HR back up to 140's. EKG done read SVT . Nocturnal hospitalist notified of HR. 10mg of Lopressor IV push x1 ordered and 500cc bolus. HR back in NSR 70's-80's as of 0645 today.

## 2020-05-01 PROBLEM — I42.9 CARDIOMYOPATHY: Status: ACTIVE | Noted: 2020-01-01

## 2020-05-01 PROBLEM — I47.9 PAROXYSMAL TACHYCARDIA: Status: RESOLVED | Noted: 2019-03-12 | Resolved: 2020-01-01

## 2020-05-01 PROBLEM — I10 HTN (HYPERTENSION): Status: ACTIVE | Noted: 2020-01-01

## 2020-05-01 PROBLEM — N30.90 CYSTITIS: Status: RESOLVED | Noted: 2020-01-01 | Resolved: 2020-01-01

## 2020-05-01 NOTE — CONSULTS
"60 y o AAM with hx of ESRD on chronic HD TTS admitted with change in LOC    Renal consult requested to help with HD past Hd this past Tuesday (as peer pt).  He has been under the care of Vascular Surgery for infected avf being dialyzed via R IJ Tunneled hd cath. Admission lab showed a high trop 1 and the dx of NSTEMI was made. Had an angiogram and angioplasty today. Pt feeling better.    PMHX DM HTN ESRD Anemia 2nd hyperpth cad cabg infected L arm avf    Denies CNS ENT GI  RHEUM OR DERM SX  Past Medical History:   Diagnosis Date    Atherosclerosis of aorta     noted on VIKTORIA 2011    Blood transfusion     Cataracts, bilateral     CHF (congestive heart failure)     Clotting disorder     Cognitive deficits as late effect of cerebrovascular disease 6/26/2012    Coronary artery disease     NSTEMI s/p PCI with FERCHO to LCx (12/16/19),     Decreased appetite     DM (diabetes mellitus), type 2 with renal complications     Diabetic retinopathy    ESRD on hemodialysis     TUESDAY/ THURSDAY/SATURDAY    Hyperlipidemia     Hyperparathyroidism, unspecified     Hypertension     Prostate cancer 2016 7/16/19:  wife states "he doesn't have it anymore"    Seizures     Stroke 2011    PT HAS SHAKES     Unsteady gait     Uses roller walker     Review of patient's allergies indicates:   Allergen Reactions    Reglan [metoclopramide hcl] Diarrhea    Lorazepam      Other reaction(s): heavy sedation     Past Surgical History:   Procedure Laterality Date    CARDIAC SURGERY      CABG    CATARACT EXTRACTION      ou    CATARACT EXTRACTION, BILATERAL      cataracts      CORONARY ANGIOGRAPHY INCLUDING BYPASS GRAFTS WITH CATHETERIZATION OF LEFT HEART N/A 12/16/2019    Procedure: ANGIOGRAM, CORONARY, INCLUDING BYPASS GRAFT, WITH LEFT HEART CATHETERIZATION, rcfa, 5fr;  Surgeon: David Villegas MD;  Location: [facility] CATH LAB;  Service: Cardiology;  Laterality: N/A;    CORONARY ANGIOGRAPHY INCLUDING BYPASS GRAFTS WITH " CATHETERIZATION OF LEFT HEART N/A 4/30/2020    Procedure: ANGIOGRAM, CORONARY, INCLUDING BYPASS GRAFT, WITH LEFT HEART CATHETERIZATION, rcfa, 10 am start;  Surgeon: David Villegas MD;  Location: [facility] CATH LAB;  Service: Cardiology;  Laterality: N/A;    CORONARY ARTERY BYPASS GRAFT  2009    bypass    EYE SURGERY      Left Guera AVF  1/11/2012    PROSTATE BIOPSY      positive    retinal laser      REVISION OF ARTERIOVENOUS FISTULA Left 2/27/2020    Procedure: Ligation of left upper extremity fistula, Closure of left upper extremity skin defect x 2, Intraoperative ultrasound ;  Surgeon: Stephane Lopez MD;  Location: [facility] OR;  Service: Vascular;  Laterality: Left;    REVISION OF ARTERIOVENOUS FISTULA Left 4/10/2020    Procedure: AV fistula excision;  Surgeon: Stephane Lopez MD;  Location: [address];  Service: Vascular;  Laterality: Left;    ROTATOR CUFF REPAIR  right, 2005    ULTRASOUND GUIDANCE  12/16/2019    Procedure: Ultrasound Guidance;  Surgeon: David Villegas MD;  Location: [facility] CATH LAB;  Service: Cardiology;;    VASCULAR SURGERY       Social History     Socioeconomic History    Marital status:      Spouse name: Not on file    Number of children: Not on file    Years of education: Not on file    Highest education level: Not on file   Occupational History    Not on file   Social Needs    Financial resource strain: Not on file    Food insecurity:     Worry: Not on file     Inability: Not on file    Transportation needs:     Medical: Not on file     Non-medical: Not on file   Tobacco Use    Smoking status: Never Smoker    Smokeless tobacco: Never Used   Substance and Sexual Activity    Alcohol use: No    Drug use: No    Sexual activity: Not Currently   Lifestyle    Physical activity:     Days per week: Not on file     Minutes per session: Not on file    Stress: Not on file   Relationships    Social connections:     Talks on phone: Not on file     Gets together:  Not on file     Attends [employer] service: Not on file     Active member of club or organization: Not on file     Attends meetings of clubs or organizations: Not on file     Relationship status: Not on file   Other Topics Concern    Not on file   Social History Narrative    Not on file     Family History   Problem Relation Age of Onset    Diabetes Mother     Glaucoma Mother     Cancer Mother     Cataracts Mother     Heart disease Father     Cancer Paternal Grandmother     Heart disease Maternal Grandmother     No Known Problems Sister     Prostate cancer Brother     No Known Problems Maternal Grandfather     No Known Problems Paternal Grandfather     No Known Problems Sister     No Known Problems Sister     Hypertension Brother     Cancer Paternal Uncle     Amblyopia Neg Hx     Blindness Neg Hx     Macular degeneration Neg Hx     Retinal detachment Neg Hx     Strabismus Neg Hx     Stroke Neg Hx     Thyroid disease Neg Hx     Anesthesia problems Neg Hx     Clotting disorder Neg Hx      problems Neg Hx     Kidney cancer Neg Hx     Kidney disease Neg Hx     Malignant hyperthermia Neg Hx     Sickle cell trait Neg Hx     Lupus Neg Hx     Urolithiasis Neg Hx     Sudden death Neg Hx          Current Facility-Administered Medications   Medication    acetaminophen tablet 650 mg    acetaminophen tablet 650 mg    aspirin EC tablet 81 mg    atorvastatin tablet 80 mg    carvediloL tablet 12.5 mg    [START ON 5/2/2020] ceFAZolin (ANCEF) 3 gram in dextrose 5% IVPB    clopidogreL tablet 75 mg    dextrose 50% injection 12.5 g    dextrose 50% injection 25 g    glucagon (human recombinant) injection 1 mg    glucose chewable tablet 16 g    glucose chewable tablet 24 g    heparin (porcine) injection 5,000 Units    insulin aspart U-100 pen 0-5 Units    insulin aspart U-100 pen 3 Units    insulin detemir U-100 pen 15 Units    losartan tablet 100 mg    metroNIDAZOLE tablet 500 mg     ondansetron injection 4 mg    sodium chloride 0.9% flush 10 mL       LABS    Recent Results (from the past 24 hour(s))   POCT glucose    Collection Time: 04/30/20  4:06 PM   Result Value Ref Range    POCT Glucose 235 (H) 70 - 110 mg/dL   ISTAT ACT-K    Collection Time: 04/30/20  4:06 PM   Result Value Ref Range    POC ACTIVATED CLOTTING TIME K 169 (H) 74 - 137 sec    Rate 18     Sample ARTERIAL     Site Cooper/UAC     Allens Test N/A     DelSys Room Air     Mode SPONT     FiO2 21     Sp02 99    POCT glucose    Collection Time: 04/30/20  5:20 PM   Result Value Ref Range    POCT Glucose 207 (H) 70 - 110 mg/dL   ISTAT ACT-K    Collection Time: 04/30/20  5:22 PM   Result Value Ref Range    POC ACTIVATED CLOTTING TIME K 153 (H) 74 - 137 sec    Rate 19     Sample ARTERIAL     Site Cooper/UAC     Allens Test N/A     DelSys Room Air     Mode SPONT     FiO2 21     Sp02 99    POCT glucose    Collection Time: 04/30/20  8:13 PM   Result Value Ref Range    POCT Glucose 226 (H) 70 - 110 mg/dL   Renal function panel    Collection Time: 05/01/20  4:23 AM   Result Value Ref Range    Glucose 155 (H) 70 - 110 mg/dL    Sodium 136 136 - 145 mmol/L    Potassium 4.9 3.5 - 5.1 mmol/L    Chloride 106 95 - 110 mmol/L    CO2 15 (L) 23 - 29 mmol/L    BUN, Bld 46 (H) 8 - 23 mg/dL    Calcium 8.5 (L) 8.7 - 10.5 mg/dL    Creatinine 11.0 (H) 0.5 - 1.4 mg/dL    Albumin 2.8 (L) 3.5 - 5.2 g/dL    Phosphorus 5.9 (H) 2.7 - 4.5 mg/dL    eGFR if African American 5 (A) >60 mL/min/1.73 m^2    eGFR if non African American 4 (A) >60 mL/min/1.73 m^2    Anion Gap 15 8 - 16 mmol/L   CBC auto differential    Collection Time: 05/01/20  4:24 AM   Result Value Ref Range    WBC 6.27 3.90 - 12.70 K/uL    RBC 3.57 (L) 4.60 - 6.20 M/uL    Hemoglobin 10.5 (L) 14.0 - 18.0 g/dL    Hematocrit 33.0 (L) 40.0 - 54.0 %    Mean Corpuscular Volume 92 82 - 98 fL    Mean Corpuscular Hemoglobin 29.4 27.0 - 31.0 pg    Mean Corpuscular Hemoglobin Conc 31.8 (L) 32.0 - 36.0 g/dL    RDW  16.9 (H) 11.5 - 14.5 %    Platelets 149 (L) 150 - 350 K/uL    MPV 11.7 9.2 - 12.9 fL    Immature Granulocytes 0.2 0.0 - 0.5 %    Gran # (ANC) 5.0 1.8 - 7.7 K/uL    Immature Grans (Abs) 0.01 0.00 - 0.04 K/uL    Lymph # 0.7 (L) 1.0 - 4.8 K/uL    Mono # 0.5 0.3 - 1.0 K/uL    Eos # 0.1 0.0 - 0.5 K/uL    Baso # 0.03 0.00 - 0.20 K/uL    nRBC 0 0 /100 WBC    Gran% 79.0 (H) 38.0 - 73.0 %    Lymph% 10.4 (L) 18.0 - 48.0 %    Mono% 8.5 4.0 - 15.0 %    Eosinophil% 1.4 0.0 - 8.0 %    Basophil% 0.5 0.0 - 1.9 %    Differential Method Automated    POCT glucose    Collection Time: 05/01/20  8:10 AM   Result Value Ref Range    POCT Glucose 216 (H) 70 - 110 mg/dL   POCT glucose    Collection Time: 05/01/20 12:22 PM   Result Value Ref Range    POCT Glucose 257 (H) 70 - 110 mg/dL   ]    I/O last 3 completed shifts:  In: 200 [IV Piggyback:200]  Out: -     Vitals:    05/01/20 1100 05/01/20 1200 05/01/20 1300 05/01/20 1400   BP: (!) 143/78 (!) 145/83 (!) 183/101 (!) 139/94   Pulse: 90 92 93 99   Resp: (!) 23 19 13 (!) 29   Temp:  97.6 °F (36.4 °C)     TempSrc:  Oral     SpO2: 100% 98% 99% 100%   Weight:       Height:           No Jvd, Thyromegaly or Lymphadenopathy  Lungs: Fairly clear anteriorly and laterally  Cor: RRR no G or rubs  Abd: Soft benign good bowel sounds non tender  Ext: No E C C    A)    ESRD  CAD  AMI  SP angioplasty  Infected L arm avf  Tunneled HD cath R IJ  DM  Anemia of ckd  2nd hyperpth  Met acidosis    P)  Renal Diet  Home meds  Protect access  HD today maybe short Hd in am  EPO   Binders  Adjust all meds to the degree of renal fx  Close follow up I/O and weights  Maintain Hydration

## 2020-05-01 NOTE — PROVIDER TRANSFER
Admitted to the floor for mental status changes.  It seems as though he has not been taking his medications due to weakness and nausea; this includes his aspirin, Plavix, and Flagyl.  Suspect part of his nausea and a may be from Flagyl.  Found to have NSTEMI.  Cardiology consulted.  Angiogram 4/30 with instent stenosis of left circumflex that was ballooned.  No stent was placed.  He was continued on aspirin and Plavix.  He sent to ICU after the procedure because his sheath was still in place.  This was old.      Of note, he recently had infected LUE AVF.  This was excised by vascular surgery.  Surgical cultures on 4/10 grew 2 different prevotella species, fusobacterium, and strep anginosus.  He was discharged from the hospital and instructed to take Augmentin and clindamycin from April 10th to April 18th.  He had a telemedicine appointment with infectious disease on 04/20.  At that time his antibiotics were changed to Flagyl 500 mg p.o. q.8 +cefazolin after dialysis 2 g on Tuesday and Thursday, 3 g on Saturday.  He is instructed to continue these antibiotics until 5/11.     To do  PT and OT  Will need cardiology and ID follow-up at discharge  Likely will need home health as well    Ania Barber MD  05/01/2020  1:31 PM

## 2020-05-01 NOTE — ASSESSMENT & PLAN NOTE
Ischemic cardiomyopathy.  EF of 35%.  It was 60% on prior echo.  Probably related to his circ non-STEMI.  Now status post revascularization.  Recheck echo in 3 months.  Continue Coreg 12.5 mg b.i.d. and titrate up to the maximally tolerated dose.  Initiate losartan 100 mg daily.  If EF fails to improve despite maximum medical management in 3 months, consider AICD.

## 2020-05-01 NOTE — ASSESSMENT & PLAN NOTE
Troponin elevated  Cardiology consulted  Angiogram 4/30 with InStent stenosis of the left circumflex status post balloon.  No new stent placed  Was not adherent to aspirin products prior to admission which likely caused his stenosis  Continue aspirin Plavix

## 2020-05-01 NOTE — PROGRESS NOTES
[facility] Hospital Medicine  Progress Note    Patient Name: Kodi Ibanez Sr.  MRN: [id number]  Patient Class: IP- Inpatient   Admission Date: 4/29/2020  Length of Stay: 2 days  Attending Physician: Ania Barber MD  Primary Care Provider: Melonie Elias MD        Subjective:     Principal Problem:NSTEMI (non-ST elevated myocardial infarction)        HPI:  60-year-old male with a history of end-stage renal disease on dialysis Tuesday Thursday Saturday last dialysis session on Tuesday.  Wife brought patient in for increased weakness, nausea and decreased appetitie.  Patient recently has had an infected graft has been taking Flagyl twice a day but only has really been taking it once a day due to weakness and nausea.  Has a history of coronary artery disease status post CABG as well as recent stent placement in 2019 in the left circ.  He denies any shortness of breath or chest pain.  He has no report of any fevers.  Patient brought in a found to have NSTEMI with a troponin of over 25.  Patient has been started on heparin drip and cardiology has been called who are planning on taking him to do a heart catheterization in the morning.  Patient is comfortable in no respiratory distress and denies any pain.  He is confused however worse than his baseline.    Overview/Hospital Course:  Admitted to the floor for mental status changes.  It seems as though he has not been taking his medications due to weakness and nausea; this includes his aspirin, Plavix, and Flagyl.  Suspect part of his nausea and a may be from Flagyl.  Found to have NSTEMI.  Cardiology consulted.  Angiogram 4/30 with instent stenosis of left circumflex that was ballooned.  No stent was placed.  He was continued on aspirin and Plavix.  He sent to ICU after the procedure because his sheath was still in place.  This was old.     Of note, he recently had infected LUE AVF.  This was excised by vascular surgery.  Surgical cultures on 4/10  grew 2 different prevotella species, fusobacterium, and strep anginosus.  He was discharged from the hospital and instructed to take Augmentin and clindamycin from April 10th to April 18th.  He had a telemedicine appointment with infectious disease on 04/20.  At that time his antibiotics were changed to Flagyl 500 mg p.o. q.8 +cefazolin after dialysis 2 g on Tuesday and Thursday, 3 g on Saturday.  He is instructed to continue these antibiotics until 5/11.       Interval History:  No complaints.  Was only able to walk 2 doors down from his room before he became weak and had to stop.    Review of Systems   Constitutional: Negative for chills and fever.   Respiratory: Negative for cough and shortness of breath.    Cardiovascular: Negative for chest pain.   Gastrointestinal: Negative for abdominal pain.   Neurological: Positive for weakness.     Objective:     Vital Signs (Most Recent):  Temp: 97.6 °F (36.4 °C) (05/01/20 1200)  Pulse: 93 (05/01/20 1300)  Resp: 13 (05/01/20 1300)  BP: (!) 183/101 (05/01/20 1300)  SpO2: 99 % (05/01/20 1300) Vital Signs (24h Range):  Temp:  [97.5 °F (36.4 °C)-98.3 °F (36.8 °C)] 97.6 °F (36.4 °C)  Pulse:  [81-94] 93  Resp:  [10-26] 13  SpO2:  [97 %-100 %] 99 %  BP: (108-183)/() 183/101  Arterial Line BP: (127-138)/(71-76) 136/72     Weight: 63.5 kg (140 lb)  Body mass index is 19.53 kg/m².    Intake/Output Summary (Last 24 hours) at 5/1/2020 1322  Last data filed at 4/30/2020 1348  Gross per 24 hour   Intake 100 ml   Output --   Net 100 ml      Physical Exam   Constitutional: He is oriented to person, place, and time. He appears well-developed and well-nourished. No distress.   HENT:   Head: Normocephalic and atraumatic.   Cardiovascular: Normal rate, regular rhythm, normal heart sounds and intact distal pulses. Exam reveals no gallop and no friction rub.   No murmur heard.  Pulmonary/Chest: Effort normal and breath sounds normal. No stridor. No respiratory distress. He has no  wheezes. He has no rales.   Room air   Abdominal: Soft. Bowel sounds are normal. He exhibits no distension and no mass. There is no tenderness. There is no guarding.   Musculoskeletal: He exhibits no edema.   Neurological: He is alert and oriented to person, place, and time.   Skin: He is not diaphoretic.   Left arm surgical site bandaged.  Right chest wall PermCath in place.   Nursing note and vitals reviewed.      Significant Labs: All pertinent labs within the past 24 hours have been reviewed.    Significant Imaging: I have reviewed and interpreted all pertinent imaging results/findings within the past 24 hours.      Assessment/Plan:      * NSTEMI (non-ST elevated myocardial infarction)  Troponin elevated  Cardiology consulted  Angiogram 4/30 with InStent stenosis of the left circumflex status post balloon.  No new stent placed  Was not adherent to aspirin products prior to admission which likely caused his stenosis  Continue aspirin Plavix      Encephalopathy, metabolic  Secondary to NSTEMI  Improving.  Oriented x3  Still weak, step-down floor for PT and OT      Complication of arteriovenous dialysis fistula  Recently had infected LUE AVF s/p excision by vascular surgery  Surgical cultures 4/10 grew 2 different prevotella species, fusobacterium, and strep anginosus.    Telemedicine appointment with infectious disease on 04/20.   At that time his antibiotics were changed to Flagyl 500 mg p.o. q.8 and cefazolin after dialysis 2 g on Tuesday and Thursday, 3 g on Saturday.  He is instructed to continue these antibiotics until 5/11.   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, with long-term current use of insulin        Uncontrolled type 2 diabetes mellitus with both eyes affected by proliferative retinopathy and macular edema, with long-term current use of insulin  A1c:   Lab Results    Component Value Date    HGBA1C 10.3 (H) 04/30/2020     Meds: basal bolus insulin + SSI PRN to maintain goal 140-180  ADA diet, accuchecks ACHS, hypoglycemic protocol        S/P CABG (coronary artery bypass graft)  Noted      Seizure disorder  Placed on seizure precautions      CAD (coronary artery disease)  Noted treating for ACS as above      Vascular dementia with depressed mood  No acute issues  Not on any medications for depression or dementia at home      Chronic diastolic congestive heart failure  No acute issues  Appears euvolemic  Dialysis for volume control      History of stroke  Noted no focal neurological deficits  Continue aspirin, Plavix, statin      ESRD on hemodialysis  Nephrology consulted  Receiving cefazolin after dialysis for infected LUE AVF that has now been excised.  Receives 2 g after dialysis on Tuesday and Thursday and 3 g after dialysis on Saturday until 5/11      VTE Risk Mitigation (From admission, onward)         Ordered     heparin (porcine) injection 5,000 Units  Every 8 hours      05/01/20 0848     IP VTE HIGH RISK PATIENT  Once      04/29/20 2256     Place sequential compression device  Until discontinued      04/29/20 2256                Critical care time spent on the evaluation and treatment of severe organ dysfunction, review of pertinent labs and imaging studies, discussions with consulting providers and discussions with patient/family: 40 minutes.    2:44 PM  Called to update wife Mrs. Ibanez. All questions answered.     Ania Barber MD  Department of Hospital Medicine   [facility]

## 2020-05-01 NOTE — ASSESSMENT & PLAN NOTE
Recently had infected LUE AVF s/p excision by vascular surgery  Surgical cultures 4/10 grew 2 different prevotella species, fusobacterium, and strep anginosus.    Telemedicine appointment with infectious disease on 04/20.   At that time his antibiotics were changed to Flagyl 500 mg p.o. q.8 and cefazolin after dialysis 2 g on Tuesday and Thursday, 3 g on Saturday.  He is instructed to continue these antibiotics until 5/11.   Concerned that Flagyl may be contributing to nausea

## 2020-05-01 NOTE — ASSESSMENT & PLAN NOTE
Blood pressure elevated today.  Initiate losartan.  Continue Coreg and titrate to maximally tolerated dose.

## 2020-05-01 NOTE — CONSULTS
Follow-up Information     David Villegas MD On 5/18/2020.    Specialties:  Cardiology, INTERVENTIONAL CARDIOLOGY  Why:  Cardiology Telephone Followup:  5/18/2020 at 1:00 p.m  Contact information:  [address]  [address]  [facility] [address]  [phone]                 Devora Restrepo LMSW, [facility]  5/1/20

## 2020-05-01 NOTE — ASSESSMENT & PLAN NOTE
Nephrology consulted  Receiving cefazolin after dialysis for infected LUE AVF that has now been excised.  Receives 2 g after dialysis on Tuesday and Thursday and 3 g after dialysis on Saturday until 5/11

## 2020-05-01 NOTE — SUBJECTIVE & OBJECTIVE
Interval History:  Doing fine.  No bleeding from cardiac catheterization site.  States feels has more energy today as compared to yesterday.  Denies orthopnea, PND, swelling of feet.  Denies any chest pains.  No complications from cardiac catheterization.      Review of Systems   Constitution: Positive for malaise/fatigue.   Cardiovascular: Negative for chest pain and orthopnea.   Respiratory: Negative for shortness of breath.    Gastrointestinal: Negative for abdominal pain.   Genitourinary: Negative for flank pain.     Objective:     Vital Signs (Most Recent):  Temp: 97.6 °F (36.4 °C) (05/01/20 1200)  Pulse: 99 (05/01/20 1400)  Resp: (!) 29 (05/01/20 1400)  BP: (!) 183/101 (05/01/20 1300)  SpO2: 100 % (05/01/20 1400) Vital Signs (24h Range):  Temp:  [97.5 °F (36.4 °C)-98.3 °F (36.8 °C)] 97.6 °F (36.4 °C)  Pulse:  [81-99] 99  Resp:  [10-29] 29  SpO2:  [97 %-100 %] 100 %  BP: (108-183)/() 183/101  Arterial Line BP: (127-136)/(71-72) 136/72     Weight: 63.5 kg (140 lb)  Body mass index is 19.53 kg/m².     SpO2: 100 %  O2 Device (Oxygen Therapy): room air    No intake or output data in the 24 hours ending 05/01/20 1427    Lines/Drains/Airways     Central Venous Catheter Line                 Hemodialysis Catheter 03/05/20 0535 right subclavian 57 days          Drain                 Hemodialysis AV Fistula 04/01/17 0800 Left upper arm 1126 days                Physical Exam   Constitutional: He is oriented to person, place, and time. He appears well-developed and well-nourished.   HENT:   Head: Normocephalic.   Eyes: Pupils are equal, round, and reactive to light. Conjunctivae are normal.   Neck: Normal range of motion. Neck supple.   Cardiovascular: Normal rate, regular rhythm and normal heart sounds.   Pulmonary/Chest: Effort normal and breath sounds normal.   Abdominal: Soft. Bowel sounds are normal.   Neurological: He is alert and oriented to person, place, and time.   Skin: Skin is warm.   Vitals  reviewed.    No bleeding or hematoma noted.    Significant Labs:   Blood Culture:   Recent Labs   Lab 04/29/20 1804 04/29/20 1935   LABBLOO No Growth to date  No Growth to date No Growth to date  No Growth to date   , BMP:   Recent Labs   Lab 04/29/20 1804 05/01/20  0423   * 155*    136   K 4.2 4.9    106   CO2 18* 15*   BUN 39* 46*   CREATININE 9.9* 11.0*   CALCIUM 9.7 8.5*   MG 2.7*  --    , CMP   Recent Labs   Lab 04/29/20 1804 05/01/20  0423    136   K 4.2 4.9    106   CO2 18* 15*   * 155*   BUN 39* 46*   CREATININE 9.9* 11.0*   CALCIUM 9.7 8.5*   PROT 8.7*  --    ALBUMIN 3.5 2.8*   BILITOT 0.2  --    ALKPHOS 113  --    AST 38  --    ALT <5*  --    ANIONGAP 19* 15   ESTGFRAFRICA 6* 5*   EGFRNONAA 5* 4*   , CBC   Recent Labs   Lab 04/29/20 1804 04/30/20 0405 05/01/20  0424   WBC 8.31 7.39 6.27   HGB 11.9* 10.7* 10.5*   HCT 38.1* 34.8* 33.0*    142* 149*   , INR   Recent Labs   Lab 04/29/20 1932   INR 1.0   , Lipid Panel No results for input(s): CHOL, HDL, LDLCALC, TRIG, CHOLHDL in the last 48 hours., Troponin   Recent Labs   Lab 04/29/20 1804 04/30/20  0018 04/30/20 0405   TROPONINI 25.771* 19.642* 18.338*    and All pertinent lab results from the last 24 hours have been reviewed.    Significant Imaging: Echocardiogram:   Transthoracic echo (TTE) complete (Cupid Only):   Results for orders placed or performed during the hospital encounter of 04/29/20   Echo Color Flow Doppler? Yes   Result Value Ref Range    BSA 1.78 m2    LA WIDTH 4.00 cm    AORTIC VALVE CUSP SEPERATION 1.89 cm    PV PEAK VELOCITY 0.60 cm/s    LVIDD 4.69 3.5 - 6.0 cm    IVS 1.28 (A) 0.6 - 1.1 cm    PW 1.20 (A) 0.6 - 1.1 cm    Ao root annulus 3.81 cm    LVIDS 3.88 2.1 - 4.0 cm    FS 17 28 - 44 %    LA volume 51.80 cm3    Sinus 3.97 cm    STJ 2.87 cm    LV mass 221.45 g    LA size 2.92 cm    RVDD 2.48 cm    TAPSE 1.04 cm    RV S' 7.14 cm/s    Left Ventricle Relative Wall Thickness 0.51 cm     AV mean gradient 2 mmHg    AV valve area 2.15 cm2    AV Velocity Ratio 0.70     AV index (prosthetic) 0.53     E/A ratio 0.51     E wave decelartion time 228.83 msec    IVRT 117.65 msec    Pulm vein S/D ratio 2.27     LVOT diameter 2.27 cm    LVOT area 4.0 cm2    LVOT peak tee 0.65 m/s    LVOT peak VTI 9.17 cm    Ao peak tee 0.93 m/s    Ao VTI 17.23 cm    LVOT stroke volume 37.09 cm3    AV peak gradient 3 mmHg    MV Peak E Tee 0.55 m/s    TR Max Tee 2.41 m/s    MV Peak A Tee 1.08 m/s    PV Peak S Tee 0.68 m/s    PV Peak D Tee 0.30 m/s    LV Systolic Volume 64.94 mL    LV Systolic Volume Index 35.8 mL/m2    LV Diastolic Volume 101.63 mL    LV Diastolic Volume Index 56.08 mL/m2    LA Volume Index 28.6 mL/m2    LV Mass Index 122 g/m2    RA Major Axis 3.18 cm    Left Atrium Minor Axis 5.58 cm    Left Atrium Major Axis 4.90 cm    Triscuspid Valve Regurgitation Peak Gradient 23 mmHg    RA Width 3.06 cm    Right Atrial Pressure (from IVC) 3 mmHg    TV rest pulmonary artery pressure 26 mmHg    Narrative    · Moderately decreased left ventricular systolic function. The estimated   ejection fraction is 30-35%.  · Septal wall has abnormal motion consistent with post-operative status.   Moderate global hypokinesis.  · Mild concentric left ventricular hypertrophy.  · Grade I (mild) left ventricular diastolic dysfunction consistent with   impaired relaxation.  · The sinuses of Valsalva is mildly dilated. 4.0cm.  · Normal right ventricular systolic function.  · The estimated PA systolic pressure is 26 mmHg.

## 2020-05-01 NOTE — PROGRESS NOTES
[facility]  Cardiology  Progress Note    Patient Name: Kodi Ibanez Sr.  MRN: [id number]  Admission Date: 4/29/2020  Hospital Length of Stay: 2 days  Code Status: Full Code   Attending Physician: Ania Barber MD   Primary Care Physician: Melonie Elias MD  Expected Discharge Date:   Principal Problem:NSTEMI (non-ST elevated myocardial infarction)    Subjective:        Interval History:  Doing fine.  No bleeding from cardiac catheterization site.  States feels has more energy today as compared to yesterday.  Denies orthopnea, PND, swelling of feet.  Denies any chest pains.  No complications from cardiac catheterization.      Review of Systems   Constitution: Positive for malaise/fatigue.   Cardiovascular: Negative for chest pain and orthopnea.   Respiratory: Negative for shortness of breath.    Gastrointestinal: Negative for abdominal pain.   Genitourinary: Negative for flank pain.     Objective:     Vital Signs (Most Recent):  Temp: 97.6 °F (36.4 °C) (05/01/20 1200)  Pulse: 99 (05/01/20 1400)  Resp: (!) 29 (05/01/20 1400)  BP: (!) 183/101 (05/01/20 1300)  SpO2: 100 % (05/01/20 1400) Vital Signs (24h Range):  Temp:  [97.5 °F (36.4 °C)-98.3 °F (36.8 °C)] 97.6 °F (36.4 °C)  Pulse:  [81-99] 99  Resp:  [10-29] 29  SpO2:  [97 %-100 %] 100 %  BP: (108-183)/() 183/101  Arterial Line BP: (127-136)/(71-72) 136/72     Weight: 63.5 kg (140 lb)  Body mass index is 19.53 kg/m².     SpO2: 100 %  O2 Device (Oxygen Therapy): room air    No intake or output data in the 24 hours ending 05/01/20 1427    Lines/Drains/Airways     Central Venous Catheter Line                 Hemodialysis Catheter 03/05/20 0535 right subclavian 57 days          Drain                 Hemodialysis AV Fistula 04/01/17 0800 Left upper arm 1126 days                Physical Exam   Constitutional: He is oriented to person, place, and time. He appears well-developed and well-nourished.   HENT:   Head: Normocephalic.   Eyes: Pupils are  equal, round, and reactive to light. Conjunctivae are normal.   Neck: Normal range of motion. Neck supple.   Cardiovascular: Normal rate, regular rhythm and normal heart sounds.   Pulmonary/Chest: Effort normal and breath sounds normal.   Abdominal: Soft. Bowel sounds are normal.   Neurological: He is alert and oriented to person, place, and time.   Skin: Skin is warm.   Vitals reviewed.    No bleeding or hematoma noted.    Significant Labs:   Blood Culture:   Recent Labs   Lab 04/29/20 1804 04/29/20 1935   LABBLOO No Growth to date  No Growth to date No Growth to date  No Growth to date   , BMP:   Recent Labs   Lab 04/29/20 1804 05/01/20  0423   * 155*    136   K 4.2 4.9    106   CO2 18* 15*   BUN 39* 46*   CREATININE 9.9* 11.0*   CALCIUM 9.7 8.5*   MG 2.7*  --    , CMP   Recent Labs   Lab 04/29/20 1804 05/01/20 0423    136   K 4.2 4.9    106   CO2 18* 15*   * 155*   BUN 39* 46*   CREATININE 9.9* 11.0*   CALCIUM 9.7 8.5*   PROT 8.7*  --    ALBUMIN 3.5 2.8*   BILITOT 0.2  --    ALKPHOS 113  --    AST 38  --    ALT <5*  --    ANIONGAP 19* 15   ESTGFRAFRICA 6* 5*   EGFRNONAA 5* 4*   , CBC   Recent Labs   Lab 04/29/20 1804 04/30/20 0405 05/01/20 0424   WBC 8.31 7.39 6.27   HGB 11.9* 10.7* 10.5*   HCT 38.1* 34.8* 33.0*    142* 149*   , INR   Recent Labs   Lab 04/29/20 1932   INR 1.0   , Lipid Panel No results for input(s): CHOL, HDL, LDLCALC, TRIG, CHOLHDL in the last 48 hours., Troponin   Recent Labs   Lab 04/29/20 1804 04/30/20  0018 04/30/20 0405   TROPONINI 25.771* 19.642* 18.338*    and All pertinent lab results from the last 24 hours have been reviewed.    Significant Imaging: Echocardiogram:   Transthoracic echo (TTE) complete (Cupid Only):   Results for orders placed or performed during the hospital encounter of 04/29/20   Echo Color Flow Doppler? Yes   Result Value Ref Range    BSA 1.78 m2    LA WIDTH 4.00 cm    AORTIC VALVE CUSP SEPERATION 1.89 cm    PV  PEAK VELOCITY 0.60 cm/s    LVIDD 4.69 3.5 - 6.0 cm    IVS 1.28 (A) 0.6 - 1.1 cm    PW 1.20 (A) 0.6 - 1.1 cm    Ao root annulus 3.81 cm    LVIDS 3.88 2.1 - 4.0 cm    FS 17 28 - 44 %    LA volume 51.80 cm3    Sinus 3.97 cm    STJ 2.87 cm    LV mass 221.45 g    LA size 2.92 cm    RVDD 2.48 cm    TAPSE 1.04 cm    RV S' 7.14 cm/s    Left Ventricle Relative Wall Thickness 0.51 cm    AV mean gradient 2 mmHg    AV valve area 2.15 cm2    AV Velocity Ratio 0.70     AV index (prosthetic) 0.53     E/A ratio 0.51     E wave decelartion time 228.83 msec    IVRT 117.65 msec    Pulm vein S/D ratio 2.27     LVOT diameter 2.27 cm    LVOT area 4.0 cm2    LVOT peak tee 0.65 m/s    LVOT peak VTI 9.17 cm    Ao peak tee 0.93 m/s    Ao VTI 17.23 cm    LVOT stroke volume 37.09 cm3    AV peak gradient 3 mmHg    MV Peak E Tee 0.55 m/s    TR Max Tee 2.41 m/s    MV Peak A Tee 1.08 m/s    PV Peak S Tee 0.68 m/s    PV Peak D Tee 0.30 m/s    LV Systolic Volume 64.94 mL    LV Systolic Volume Index 35.8 mL/m2    LV Diastolic Volume 101.63 mL    LV Diastolic Volume Index 56.08 mL/m2    LA Volume Index 28.6 mL/m2    LV Mass Index 122 g/m2    RA Major Axis 3.18 cm    Left Atrium Minor Axis 5.58 cm    Left Atrium Major Axis 4.90 cm    Triscuspid Valve Regurgitation Peak Gradient 23 mmHg    RA Width 3.06 cm    Right Atrial Pressure (from IVC) 3 mmHg    TV rest pulmonary artery pressure 26 mmHg    Narrative    · Moderately decreased left ventricular systolic function. The estimated   ejection fraction is 30-35%.  · Septal wall has abnormal motion consistent with post-operative status.   Moderate global hypokinesis.  · Mild concentric left ventricular hypertrophy.  · Grade I (mild) left ventricular diastolic dysfunction consistent with   impaired relaxation.  · The sinuses of Valsalva is mildly dilated. 4.0cm.  · Normal right ventricular systolic function.  · The estimated PA systolic pressure is 26 mmHg.        Assessment and Plan:     Brief HPI:     *  NSTEMI (non-ST elevated myocardial infarction)  Trop >25 on admission, down to 19 today.  No overt isch sxs noted.  Known CAD s/p CABG with PCI LCx 12/2019.  Cont med rx: ASA/Plavix/heparin (pt reports DAPT adherence)  Cont heparin gtt  Check echo  Cath today with Dr. Villegas    5/1:  Status post cardiac catheterization yesterday.  Culprit vessel was left circumflex.  Patient's wife states that he has been noncompliant with his dual antiplatelet therapy for the past few days because of nausea and weakness.  Underwent PCI of circumflex yesterday with balloon angioplasty only with restoration of OMARI 3 flow.  Doing fine.  No complications from cardiac catheterization.  Feeling better today.  Continue aspirin 81 mg daily, Plavix 75 mg daily.  Importance of compliance with dual antiplatelet therapy discussed in detail with the patient.    CAD (coronary artery disease)        ESRD on hemodialysis  Per IM/neph    S/P CABG (coronary artery bypass graft)  Status post LIMA to LAD.  SVG to RCA is occluded.  LIMA to LAD is patent.    History of stroke        HTN (hypertension)  Blood pressure elevated today.  Initiate losartan.  Continue Coreg and titrate to maximally tolerated dose.    Cardiomyopathy  Ischemic cardiomyopathy.  EF of 35%.  It was 60% on prior echo.  Probably related to his circ non-STEMI.  Now status post revascularization.  Recheck echo in 3 months.  Continue Coreg 12.5 mg b.i.d. and titrate up to the maximally tolerated dose.  Initiate losartan.  If EF fails to improve despite maximum medical management in 3 months, consider AICD.    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

## 2020-05-01 NOTE — ASSESSMENT & PLAN NOTE
A1c:   Lab Results   Component Value Date    HGBA1C 10.3 (H) 04/30/2020     Meds: basal bolus insulin + SSI PRN to maintain goal 140-180  ADA diet, accuchecks ACHS, hypoglycemic protocol

## 2020-05-01 NOTE — SUBJECTIVE & OBJECTIVE
Interval History:  No complaints.  Was only able to walk 2 doors down from his room before he became weak and had to stop.    Review of Systems   Constitutional: Negative for chills and fever.   Respiratory: Negative for cough and shortness of breath.    Cardiovascular: Negative for chest pain.   Gastrointestinal: Negative for abdominal pain.   Neurological: Positive for weakness.     Objective:     Vital Signs (Most Recent):  Temp: 97.6 °F (36.4 °C) (05/01/20 1200)  Pulse: 93 (05/01/20 1300)  Resp: 13 (05/01/20 1300)  BP: (!) 183/101 (05/01/20 1300)  SpO2: 99 % (05/01/20 1300) Vital Signs (24h Range):  Temp:  [97.5 °F (36.4 °C)-98.3 °F (36.8 °C)] 97.6 °F (36.4 °C)  Pulse:  [81-94] 93  Resp:  [10-26] 13  SpO2:  [97 %-100 %] 99 %  BP: (108-183)/() 183/101  Arterial Line BP: (127-138)/(71-76) 136/72     Weight: 63.5 kg (140 lb)  Body mass index is 19.53 kg/m².    Intake/Output Summary (Last 24 hours) at 5/1/2020 1322  Last data filed at 4/30/2020 1348  Gross per 24 hour   Intake 100 ml   Output --   Net 100 ml      Physical Exam   Constitutional: He is oriented to person, place, and time. He appears well-developed and well-nourished. No distress.   HENT:   Head: Normocephalic and atraumatic.   Cardiovascular: Normal rate, regular rhythm, normal heart sounds and intact distal pulses. Exam reveals no gallop and no friction rub.   No murmur heard.  Pulmonary/Chest: Effort normal and breath sounds normal. No stridor. No respiratory distress. He has no wheezes. He has no rales.   Room air   Abdominal: Soft. Bowel sounds are normal. He exhibits no distension and no mass. There is no tenderness. There is no guarding.   Musculoskeletal: He exhibits no edema.   Neurological: He is alert and oriented to person, place, and time.   Skin: He is not diaphoretic.   Left arm surgical site bandaged.  Right chest wall PermCath in place.   Nursing note and vitals reviewed.      Significant Labs: All pertinent labs within the past  24 hours have been reviewed.    Significant Imaging: I have reviewed and interpreted all pertinent imaging results/findings within the past 24 hours.

## 2020-05-01 NOTE — ASSESSMENT & PLAN NOTE
Trop >25 on admission, down to 19 today.  No overt isch sxs noted.  Known CAD s/p CABG with PCI LCx 12/2019.  Cont med rx: ASA/Plavix/heparin (pt reports DAPT adherence)  Cont heparin gtt  Check echo  Cath today with Dr. Villegas    5/1:  Status post cardiac catheterization yesterday.  Culprit vessel was left circumflex.  Patient's wife states that he has been noncompliant with his dual antiplatelet therapy for the past few days because of nausea and weakness.  Underwent PCI of circumflex yesterday with balloon angioplasty only with restoration of OMARI 3 flow.  Doing fine.  No complications from cardiac catheterization.  Feeling better today.  Continue aspirin 81 mg daily, Plavix 75 mg daily.  Importance of compliance with dual antiplatelet therapy discussed in detail with the patient.

## 2020-05-02 NOTE — ASSESSMENT & PLAN NOTE
Nephrology consulted. S/p HD Friday 5/1 and Saturday 5/2. Continue routine Tu/Th/Sat schedule.    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

## 2020-05-02 NOTE — ASSESSMENT & PLAN NOTE
A1c:   Lab Results   Component Value Date    HGBA1C 10.3 (H) 04/30/2020     Lantus increased to 20units nightly. Poor diabetic control.   Will need close f/up with PCP for further insulin titration.    ASA. ARB. Diabetic diet.

## 2020-05-02 NOTE — PT/OT/SLP EVAL
Occupational Therapy   Evaluation    Name: Kodi Ibanez Sr.  MRN: [id number]  Admitting Diagnosis:  NSTEMI (non-ST elevated myocardial infarction) 2 Days Post-Op    Recommendations:     Discharge Recommendations: home health OT(with family assist)  Discharge Equipment Recommendations:  none  Barriers to discharge:  None    Assessment:     Kodi Ibanez Sr. is a 60 y.o. male with a medical diagnosis of NSTEMI (non-ST elevated myocardial infarction).   Performance deficits affecting function: impaired endurance, weakness, impaired self care skills, impaired functional mobilty, gait instability, impaired balance, decreased upper extremity function, decreased safety awareness, impaired cardiopulmonary response to activity.     The patient with decreased initiation and required min assist with all mobility. The patient was unaware of soiled brief but was able to assist with cleaning using a wipe. The patient is a poor historian with minimal verbal response to questions.   The patient will require assist/(S) at home.    Rehab Prognosis: fair; patient would benefit from acute skilled OT services to address these deficits and reach maximum level of function.       Plan:     Patient to be seen 3 x/week to address the above listed problems via self-care/home management, therapeutic activities, therapeutic exercises  · Plan of Care Expires: 05/16/20  · Plan of Care Reviewed with: patient    Subjective     Chief Complaint: agreeable to OT  Patient/Family Comments/goals: wants to go home today    Occupational Profile:  Living Environment: Per chart, the patient lives with his spouise in an apartment.  Previous level of function: The patient report he is able to walk and receives assist from his wife. Per patient, the spouse does not work and is available to assist as needed at home.  Roles and Routines: The spouse drives the patient to HD  Equipment Used at Home:  wheelchair, rollator  Assistance upon Discharge:  spouse    Pain/Comfort:  · Pain Rating 1: 0/10    Patients cultural, spiritual, Taoism conflicts given the current situation: no    Objective:     Communicated with: nurse prior to session.  Patient found left sidelying with blankets over his head with bed alarm, central line(HD cath) upon OT entry to room.    General Precautions: Standard, fall   Orthopedic Precautions:N/A   Braces: N/A     Occupational Performance:    Bed Mobility:    · Patient completed Supine to Sit with minimum assistance  · Patient completed Sit to Supine with minimum assistance    Functional Mobility/Transfers:  · Patient completed Sit <> Stand Transfer with minimum assistance  with  rolling walker   · Functional Mobility: The patient amb using a RW ~8' with min assist from bed to the sink.The patient was unable to tolerate standing at the sink and needed to sit.    Activities of Daily Living:  · Grooming: contact guard assistance and verbal cues to use soap to wash his hands at the sink while seated in the chair  · Upper Body Dressing:      · Lower Body Dressing: dependence to don/doff diaper  · Toileting: The patient was able to wipe after soiled diaper while stand & set up.      Cognitive/Visual Perceptual:  Cognitive/Psychosocial Skills:     -       Oriented to: Person and Place   -       Follows Commands/attention:Follows one-step commands  -       Communication: clear/fluent and sometimes slurred  -       Memory: does not appear to be an acurate historian  -       Safety awareness/insight to disability: impaired   -       Mood/Affect/Coping skills/emotional control: Appropriate to situation    Physical Exam:  Balance: -       fair  Postural examination/scapula alignment:    -       Rounded shoulders  -       Forward head  Skin integrity: Visible skin intact  Upper Extremity Range of Motion:     -       Right Upper Extremity: WFL  -       Left Upper Extremity: WFL  Upper Extremity Strength:    -       Right Upper Extremity: WFL  -     "   Left Upper Extremity: WFL    [facility] 6 Click ADL:  AMPA Total Score: 19    Treatment & Education:  The patient participated in OT eval. White board updated. The patient was encouraged to sit in the chair and amb to the bathroom with nursing assist.  Education:    Patient left HOB elevated with all lines intact, call button in reach, bed alarm on and nurse notified    GOALS:   Multidisciplinary Problems     Occupational Therapy Goals        Problem: Occupational Therapy Goal    Goal Priority Disciplines Outcome Interventions   Occupational Therapy Goal     OT, PT/OT Ongoing, Progressing    Description:  Goals to be met by: 5/16/20    Patient will increase functional independence with ADLs by performing:    Feeding with [place].  UE Dressing with Stand-by Assistance.  LE Dressing with Stand-by Assistance.  Grooming while standing at sink with Stand-by Assistance.  Supine to sit with Stand-by Assistance.  Step transfer with Stand-by Assistance  Toilet transfer to toilet with Contact Guard Assistance.  Upper extremity exercise program x10 reps per handout, with assistance as needed.                      History:     Past Medical History:   Diagnosis Date    Atherosclerosis of aorta     noted on VIKTORIA 2011    Blood transfusion     Cataracts, bilateral     CHF (congestive heart failure)     Clotting disorder     Cognitive deficits as late effect of cerebrovascular disease 6/26/2012    Coronary artery disease     NSTEMI s/p PCI with FERCHO to LCx (12/16/19),     Decreased appetite     DM (diabetes mellitus), type 2 with renal complications     Diabetic retinopathy    ESRD on hemodialysis     TUESDAY/ THURSDAY/SATURDAY    Hyperlipidemia     Hyperparathyroidism, unspecified     Hypertension     Prostate cancer 2016 7/16/19:  wife states "he doesn't have it anymore"    Seizures     Stroke 2011    PT HAS SHAKES     Unsteady gait     Uses roller walker       Past Surgical History:   Procedure Laterality " Date    CARDIAC SURGERY      CABG    CATARACT EXTRACTION      ou    CATARACT EXTRACTION, BILATERAL      cataracts      CORONARY ANGIOGRAPHY INCLUDING BYPASS GRAFTS WITH CATHETERIZATION OF LEFT HEART N/A 12/16/2019    Procedure: ANGIOGRAM, CORONARY, INCLUDING BYPASS GRAFT, WITH LEFT HEART CATHETERIZATION, rcfa, 5fr;  Surgeon: David Villegas MD;  Location: [facility] CATH LAB;  Service: Cardiology;  Laterality: N/A;    CORONARY ANGIOGRAPHY INCLUDING BYPASS GRAFTS WITH CATHETERIZATION OF LEFT HEART N/A 4/30/2020    Procedure: ANGIOGRAM, CORONARY, INCLUDING BYPASS GRAFT, WITH LEFT HEART CATHETERIZATION, rcfa, 10 am start;  Surgeon: David Villegas MD;  Location: [facility] CATH LAB;  Service: Cardiology;  Laterality: N/A;    CORONARY ARTERY BYPASS GRAFT  2009    bypass    EYE SURGERY      Left [place] AVF  1/11/2012    PROSTATE BIOPSY      positive    retinal laser      REVISION OF ARTERIOVENOUS FISTULA Left 2/27/2020    Procedure: Ligation of left upper extremity fistula, Closure of left upper extremity skin defect x 2, Intraoperative ultrasound ;  Surgeon: Stephane Lopez MD;  Location: [facility] OR;  Service: Vascular;  Laterality: Left;    REVISION OF ARTERIOVENOUS FISTULA Left 4/10/2020    Procedure: AV fistula excision;  Surgeon: Stephane Lopez MD;  Location: [address];  Service: Vascular;  Laterality: Left;    ROTATOR CUFF REPAIR  right, 2005    ULTRASOUND GUIDANCE  12/16/2019    Procedure: Ultrasound Guidance;  Surgeon: David Villegas MD;  Location: [facility] CATH LAB;  Service: Cardiology;;    VASCULAR SURGERY         Time Tracking:     OT Date of Treatment: 05/02/20  OT Start Time: 0918  OT Stop Time: 0935  OT Total Time (min): 17 min    Billable Minutes:Evaluation 17 (with PT)    Sadie Owen, OT  5/2/2020

## 2020-05-02 NOTE — PLAN OF CARE
05/02/20 1449   Medicare Message   Important Message from Medicare regarding Discharge Appeal Rights Explained to patient/caregiver;Signed/date by patient/caregiver   Date IMM was signed 05/02/20   Time IMM was signed 1448   TN explained IMM to patient's wife. Patient's wife verbalized understanding. Copy will be mailed to patient.

## 2020-05-02 NOTE — ASSESSMENT & PLAN NOTE
Trop >25 on admission, down to 18 upon repeat  Known CAD s/p CABG with PCI LCx 12/2019    See NSTEMI plan, above

## 2020-05-02 NOTE — PLAN OF CARE
Problem: Physical Therapy Goal  Goal: Physical Therapy Goal  Description  Goals to be met by:      Patient will increase functional independence with mobility by performin. Supine to sit with Modified [place]  2. Sit to supine with Modified [place]  3. Sit to stand transfer with Stand-by Assistance  4. Bed to chair transfer with Stand-by Assistance using Rolling Walker  5. Gait  x 100 feet with Contact Guard Assistance using Rolling Walker.   6. Lower extremity exercise program x20 reps per handout, with supervision   Outcome: Ongoing, Progressing    Ongoing family assistance and HHPT. 5x/week

## 2020-05-02 NOTE — ASSESSMENT & PLAN NOTE
Trop >25 on admission, down to 19 today.  No overt isch sxs noted.  Known CAD s/p CABG with PCI LCx 12/2019.  Cont med rx: ASA/Plavix/heparin (pt reports DAPT adherence)  Cont heparin gtt  Check echo  Cath today with Dr. Villegas    5/1:  Status post cardiac catheterization yesterday.  Culprit vessel was left circumflex.  Patient's wife states that he has been noncompliant with his dual antiplatelet therapy for the past few days because of nausea and weakness.  Underwent PCI of circumflex yesterday with balloon angioplasty only with restoration of OMARI 3 flow.  Doing fine.  No complications from cardiac catheterization.  Feeling better today.  Continue aspirin 81 mg daily, Plavix 75 mg daily.  Importance of compliance with dual antiplatelet therapy discussed in detail with the patient.    5/2:  Patient doing fine.  Continue aspirin and Plavix.  Continue statins.  Cardiac rehab referral made from cardiology clinic.  We will sign off.  Follow-up in Cardiology Clinic.

## 2020-05-02 NOTE — PLAN OF CARE
Problem: Occupational Therapy Goal  Goal: Occupational Therapy Goal  Description  Goals to be met by: 5/16/20    Patient will increase functional independence with ADLs by performing:    Feeding with [place].  UE Dressing with Stand-by Assistance.  LE Dressing with Stand-by Assistance.  Grooming while standing at sink with Stand-by Assistance.  Supine to sit with Stand-by Assistance.  Step transfer with Stand-by Assistance  Toilet transfer to toilet with Contact Guard Assistance.  Upper extremity exercise program x10 reps per handout, with assistance as needed.     Outcome: Ongoing, Progressing   The patient with decreased initiation and required min assist with all mobility. Patient will benefit from OT to address functional deficits.   The patient will benefit from  OT.

## 2020-05-02 NOTE — PROGRESS NOTES
"Awake alert oriented NAD    Denies CNS ENT CP GI  RHEUM OR DERM SX  Past Medical History:   Diagnosis Date    Atherosclerosis of aorta     noted on VIKTORIA 2011    Blood transfusion     Cataracts, bilateral     CHF (congestive heart failure)     Clotting disorder     Cognitive deficits as late effect of cerebrovascular disease 6/26/2012    Coronary artery disease     NSTEMI s/p PCI with FERCHO to LCx (12/16/19),     Decreased appetite     DM (diabetes mellitus), type 2 with renal complications     Diabetic retinopathy    ESRD on hemodialysis     TUESDAY/ THURSDAY/SATURDAY    Hyperlipidemia     Hyperparathyroidism, unspecified     Hypertension     Prostate cancer 2016 7/16/19:  wife states "he doesn't have it anymore"    Seizures     Stroke 2011    PT HAS SHAKES     Unsteady gait     Uses roller walker     Review of patient's allergies indicates:   Allergen Reactions    Reglan [metoclopramide hcl] Diarrhea    Lorazepam      Other reaction(s): heavy sedation       Current Facility-Administered Medications   Medication    acetaminophen tablet 650 mg    aspirin EC tablet 81 mg    atorvastatin tablet 80 mg    carvediloL tablet 12.5 mg    ceFAZolin (ANCEF) 3 gram in dextrose 5% IVPB    clopidogreL tablet 75 mg    dextrose 50% injection 12.5 g    dextrose 50% injection 25 g    glucagon (human recombinant) injection 1 mg    glucose chewable tablet 16 g    glucose chewable tablet 24 g    heparin (porcine) injection 5,000 Units    heparin (porcine) injection 5,000 Units    insulin aspart U-100 pen 0-5 Units    insulin aspart U-100 pen 3 Units    insulin detemir U-100 pen 20 Units    losartan tablet 100 mg    metroNIDAZOLE tablet 500 mg    ondansetron injection 4 mg    sevelamer carbonate tablet 1,600 mg    sodium chloride 0.9% flush 10 mL       LABS    Recent Results (from the past 24 hour(s))   POCT glucose    Collection Time: 05/01/20 12:22 PM   Result Value Ref Range    POCT Glucose " 257 (H) 70 - 110 mg/dL   POCT glucose    Collection Time: 05/01/20  3:46 PM   Result Value Ref Range    POCT Glucose 261 (H) 70 - 110 mg/dL   POCT glucose    Collection Time: 05/01/20  7:55 PM   Result Value Ref Range    POCT Glucose 144 (H) 70 - 110 mg/dL   POCT glucose    Collection Time: 05/02/20  7:44 AM   Result Value Ref Range    POCT Glucose 179 (H) 70 - 110 mg/dL   ]    I/O last 3 completed shifts:  In: 600 [Other:600]  Out: 2600 [Other:2600]    Vitals:    05/01/20 2100 05/01/20 2332 05/02/20 0349 05/02/20 0745   BP: 110/70 120/67 (!) 111/55 119/77   Pulse: 96 98 97 100   Resp:  20 20 17   Temp:  98.8 °F (37.1 °C) 98.1 °F (36.7 °C) 97.9 °F (36.6 °C)   TempSrc:       SpO2:  97% 100% 98%   Weight:       Height:           No Jvd, Thyromegaly or Lymphadenopathy  Lungs: Fairly clear anteriorly and laterally  Cor: RRR no G or rubs  Abd: Soft benign good bowel sounds non tender  Ext: No E C C    A)    ESRD usually TTS will dialyze today to get him back to his schedule  CAD  AMI  SP angioplasty  Infected L arm avf  Tunneled HD cath R IJ  DM  Anemia of ckd  2nd hyperpth  Met acidosis     P)    Renal Diet  Home meds  Protect access  HD today maybe short Hd in am  EPO   Binders  Adjust all meds to the degree of renal fx  Close follow up I/O and weights  Maintain Hydration

## 2020-05-02 NOTE — ASSESSMENT & PLAN NOTE
No focal neurological deficits or acute stroke concerns upon discharge    Continue aspirin, plavix, statin. BP control.

## 2020-05-02 NOTE — PROGRESS NOTES
[facility] CASE MANAGEMENT                  WRITTEN DISCHARGE INFORMATION      APPOINTMENTS AND RESOURCES TO HELP YOU MANAGE YOUR CARE AT HOME BASED ON YOUR PREFERENCES:  (If an appointment is not scheduled for you when you leave the hospital, call your doctor to schedule a follow up visit within a week)    Follow-up Information     David Villegas MD On 5/18/2020.    Specialties:  Cardiology, INTERVENTIONAL CARDIOLOGY  Why:  Cardiology Telephone Followup:  5/18/2020 at 1:00 p.m  Contact information:  [address]  [address]  [facility] [address]  [phone]             DARIEL Peterson. Go on 5/6/2020.    Specialty:  Family Medicine  Why:  Appt scheduled  Contact information:  [address] [address]  [phone]             [facility].    Specialty:  Home Health Services  Contact information:  [address]  [address]  [place] [address]  [phone]                   Healthy Living Instructions to HELP MANAGE YOUR CARE AT HOME:  Things You are responsible for:  1.    Getting your prescriptions filled   2.    Taking your medications as directed, DO NOT MISS ANY DOSES!  3.    Following the diet and exercise recommended by your doctor  4.    Going to your follow-up doctor appointment. This is important because it allows the doctor to monitor your progress and determine if any changes need to made to your treatment plan.  5. If you have any questions about MANAGING YOUR CARE AT HOME Call the Nurse Care Line for 24/7 Assistance [phone]       Please answer any calls you may receive from [facility]. We want to continue to support you as you manage your healthcare needs. [facility] is happy to have the opportunity to serve you.      Thank you for choosing [facility] for your healthcare needs!  Your [facility] Case Management Team,

## 2020-05-02 NOTE — DISCHARGE SUMMARY
"[facility] Hospital Medicine  Discharge Summary      Patient Name: Kodi Ibanez Sr.  MRN: [id number]  Admission Date: 4/29/2020  Hospital Length of Stay: 3 days  Discharge Date and Time: No discharge date for patient encounter.  Attending Physician: Arely Gar MD   Discharging Provider: Arely Gar MD  Primary Care Provider: Melonie Elias MD    Hospital Course: Kodi Ibanez is a 60 year old male with ESRD (on HD TTSat), poorly controlled DM T2 (HgA1C 10), and CAD s/p CABG who presented with generalized weakness and was admitted for management of a STEMI. He was started on a heparin gtt and cardiology was consulted. See below for [facility] results and CAD management. He was monitored in the ICU after his procedure and transferred to the telemetry floor on 5/1. PT/OT were consulted and recommended home with PT/OT and family assistance. Patient expressed no confusion upon discharge. He ate his full lunch and breakfast with no nausea issues and denied any nausea or abdominal pain day of discharged. He was discharged in stable condition.     Procedure(s) (LRB):  ANGIOGRAM, CORONARY, INCLUDING BYPASS GRAFT, WITH LEFT HEART CATHETERIZATION, rcfa, 10 am start (N/A)  Percutaneous coronary intervention (N/A)  IVUS, Coronary      Consults:   Consults (From admission, onward)        Status Ordering Provider     Inpatient consult to Cardiology  Once     Provider:  Sincere Goff MD    Completed VIANCA MCNAMARA     Inpatient consult to Nephrology  Once     Provider:  (Not yet assigned)    Completed VIANCA MCNAMARA     Inpatient consult to Social Work  Once     Provider:  (Not yet assigned)    Completed COLBY MAURICIO          * NSTEMI (non-ST elevated myocardial infarction)  Troponin elevated at 25, trended down to 18. Heparin gtt initiated upon admission. ASA/Plavix, BB, ARB.    4/30/2020 [facility] with Dr. Mauricio "Culprit vessel was left circumflex.  Patient's wife states that he has been " "noncompliant with his dual antiplatelet therapy for the past few days because of nausea and weakness.  Underwent PCI of circumflex with balloon angioplasty only with restoration of OMARI 3 flow.      Ischemic cardiomyopathy. EF of 35%.  It was 60% on prior echo.  Probably related to his circ non-STEMI.  Now status post revascularization.  Recheck echo in 3 months.  Continue Coreg 12.5 mg b.i.d. and titrate up to the maximally tolerated dose.  Initiate losartan.  If EF fails to improve despite maximum medical management in 3 months, consider AICD.    Continue aspirin 81 mg daily, Plavix 75 mg daily.  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      Telemedicine appointment with infectious disease on 04/20 -> at that time antibiotics were changed to Flagyl 500 mg p.o. q.8 and Cefazolin after dialysis 2 g on Tuesday and Thursday, 3 g on Saturday.  He is instructed to continue these antibiotics until 5/11.     Wound care and abx to be resumed as previously ordered (discussed with SW upon discharge).      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 Poor diabetic control.   Will need close f/up with PCP for further insulin titration.    ASA. ARB. Diabetic diet.         S/P CABG (coronary artery bypass graft)        CAD (coronary artery disease)  Trop >25 on admission, down to 18 upon repeat  Known CAD s/p CABG with PCI LCx 12/2019    See NSTEMI plan, above    Vascular dementia with depressed mood  Not on any medications for depression or dementia at " home -> will defer to PCP.      Chronic diastolic congestive heart failure  No acute issues. Appears euvolemic  Dialysis for volume control      History of stroke  No focal neurological deficits or acute stroke concerns upon discharge    Continue aspirin, plavix, statin. BP control.      ESRD on hemodialysis  Nephrology consulted. S/p HD Friday 5/1 and Saturday 5/2. Continue routine Tu/Th/Sat schedule.    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, with long-term current use of insulin [E11.3513, E11.65, Z79.4] 11/13/2017 Yes     Chronic    S/P CABG (coronary artery bypass graft) [Z95.1] 06/04/2015 Not Applicable     Chronic    CAD (coronary artery disease) [I25.10] 08/30/2013 Yes     Chronic    ESRD on hemodialysis [N18.6, Z99.2] 07/13/2012 Not Applicable    Vascular dementia with depressed mood [F01.51, F32.9] 01/15/2012 Yes    Chronic diastolic congestive heart failure [I50.32] 11/17/2011 Yes    History of stroke [Z86.73] 11/12/2011 Not Applicable     Chronic      Problems Resolved During this Admission:    Diagnosis Date Noted Date Resolved POA    Cystitis [N30.90] 04/30/2020 05/01/2020 Yes    Paroxysmal tachycardia [I47.9] 03/12/2019 05/01/2020 Yes    ESRD (end stage renal disease) on dialysis [N18.6, Z99.2]  06/25/2018 Not Applicable       Discharged Condition: stable    Disposition: Home or Self Care    Follow Up:  Follow-up Information     David Villegas MD On  5/18/2020.    Specialties:  Cardiology, INTERVENTIONAL CARDIOLOGY  Why:  Cardiology Telephone Followup:  5/18/2020 at 1:00 p.m  Contact information:  [address] [facility]  [address]  [place] [address] [phone]             Melonie Elias MD. Schedule an appointment as soon as possible for a visit in 1 week.    Specialties:  Internal Medicine, Pediatrics  Why:  with primary care doctor  Contact information:  [address] [facility]  [place] [address] [phone]                 Patient Instructions:   No discharge procedures on file.    Significant Diagnostic Studies: Labs:   CMP   Recent Labs   Lab 05/01/20  0423      K 4.9      CO2 15*   *   BUN 46*   CREATININE 11.0*   CALCIUM 8.5*   ALBUMIN 2.8*   ANIONGAP 15   ESTGFRAFRICA 5*   EGFRNONAA 4*   , CBC   Recent Labs   Lab 05/01/20  0424   WBC 6.27   HGB 10.5*   HCT 33.0*   *    and Troponin   Recent Labs   Lab 04/30/20  0405   TROPONINI 18.338*       Pending Diagnostic Studies:     None         Medications:  Reconciled Home Medications:      Medication List      START taking these medications    aspirin 81 MG EC tablet  Commonly known as:  ECOTRIN  Take 1 tablet (81 mg total) by mouth once daily.  Start taking on:  May 3, 2020     ceFAZolin 1 g/50ml Dextrose IVPB 1 gram/50 mL PgBk 1 g, ceFAZolin 2 g/50mL Dextrose IVPB 2 gram/50 mL PgBk 2 g  Inject 3 g into the vein once. for 1 dose  Start taking on:  May 5, 2020     sevelamer carbonate 800 mg Tab  Commonly known as:  RENVELA  Take 2 tablets (1,600 mg total) by mouth 3 (three) times daily with meals.        CHANGE how you take these medications    acetaminophen 325 MG tablet  Commonly known as:  TYLENOL  Take 2 tablets (650 mg total) by mouth every 6 (six) hours as needed.  What changed:    · medication strength  · how much to take  · when to take this  · reasons to take this     atorvastatin 80 MG tablet  Commonly known as:  LIPITOR  Take 1 tablet (80 mg total) by mouth every evening.  What  "changed:    · medication strength  · how much to take  · how to take this  · when to take this  · additional instructions     carvediloL 12.5 MG tablet  Commonly known as:  COREG  Take 1 tablet (12.5 mg total) by mouth 2 (two) times daily.  What changed:    · medication strength  · how much to take  · how to take this  · when to take this  · additional instructions     insulin glargine 100 units/mL (3mL) SubQ pen  Commonly known as:  LANTUS SOLOSTAR U-100 INSULIN  Inject 20 Units into the skin every evening.  What changed:  how much to take        CONTINUE taking these medications    blood glucose control, normal Soln  1 drop by Misc.(Non-Drug; Combo Route) route as directed.     blood sugar diagnostic Strp  To check BG 2 times daily, to use with accuchek casa meter     blood-glucose meter kit  To check BG 2 times daily, to use with accuchek casa meter     clopidogreL 75 mg tablet  Commonly known as:  PLAVIX  Take 1 tablet (75 mg total) by mouth once daily.     diltiaZEM 180 MG 24 hr capsule  Commonly known as:  CARDIZEM CD  Take 1 capsule (180 mg total) by mouth once daily.     furosemide 40 MG tablet  Commonly known as:  LASIX  TAKE 1 TABLET(40 MG) BY MOUTH EVERY DAY     lancets Misc  To check BG 2 times daily, to use with accuchek casa meter     losartan 100 MG tablet  Commonly known as:  COZAAR  Take 1 tablet (100 mg total) by mouth once daily.     metroNIDAZOLE 500 MG tablet  Commonly known as:  FLAGYL  Take 1 tablet (500 mg total) by mouth 3 (three) times daily.     NovoLOG Flexpen U-100 Insulin 100 unit/mL (3 mL) Inpn pen  Generic drug:  insulin aspart U-100  INJECT 5 TO 10 UNITS WITH MEALS AND AT BEDTIME DEPENDING ON BLOOD SUGAR     * pen needle, diabetic 30 gauge x 1/3" Ndle  Commonly known as:  NOVOFINE 30  USE AS DIRECTED WITH LANTUS SOLARSTAR     * pen needle, diabetic 29 gauge x 1/2" Ndle  Commonly known as:  BD ULTRA-FINE ORIG PEN NEEDLE  USE  1 EVERY EVENING     VITAMIN C 500 MG tablet  Generic " drug:  ascorbic acid (vitamin C)  Take 500 mg by mouth every evening.         * This list has 2 medication(s) that are the same as other medications prescribed for you. Read the directions carefully, and ask your doctor or other care provider to review them with you.            STOP taking these medications    promethazine 25 MG tablet  Commonly known as:  PHENERGAN        ASK your doctor about these medications    megestroL 20 MG Tab  Commonly known as:  MEGACE  Take 1 tablet (20 mg total) by mouth once daily.            Indwelling Lines/Drains at time of discharge:   Lines/Drains/Airways     Central Venous Catheter Line                 Hemodialysis Catheter 03/05/20 0535 right subclavian 58 days          Drain                 Hemodialysis AV Fistula 04/01/17 0800 Left upper arm 1127 days              Time spent on the discharge of patient: 30+ minutes (including discussing discharge plan with Dr. Villegas).    Patient was seen and examined on the date of discharge and determined to be suitable for discharge.    Arely Gar MD  Department of Hospital Medicine  [facility]

## 2020-05-02 NOTE — SUBJECTIVE & OBJECTIVE
Interval History:  Denies any chest pain.  Feeling better.  Malaise is better.  No complications from cardiac catheterization.            Review of Systems   Constitution: Positive for malaise/fatigue.   Cardiovascular: Negative for chest pain and orthopnea.   Respiratory: Negative for shortness of breath.    Gastrointestinal: Negative for abdominal pain.   Genitourinary: Negative for flank pain.     Objective:     Vital Signs (Most Recent):  Temp: 98.6 °F (37 °C) (05/02/20 1300)  Pulse: (P) 105 (05/02/20 1330)  Resp: 18 (05/02/20 1300)  BP: (!) (P) 138/120 (05/02/20 1330)  SpO2: 98 % (05/02/20 1111) Vital Signs (24h Range):  Temp:  [97.9 °F (36.6 °C)-98.8 °F (37.1 °C)] 98.6 °F (37 °C)  Pulse:  [] (P) 105  Resp:  [17-20] 18  SpO2:  [97 %-100 %] 98 %  BP: ()/() (P) 138/120     Weight: 63.5 kg (140 lb)  Body mass index is 19.53 kg/m².     SpO2: 98 %  O2 Device (Oxygen Therapy): room air      Intake/Output Summary (Last 24 hours) at 5/2/2020 1453  Last data filed at 5/1/2020 2100  Gross per 24 hour   Intake 600 ml   Output 2600 ml   Net -2000 ml       Lines/Drains/Airways     Central Venous Catheter Line                 Hemodialysis Catheter 03/05/20 0535 right subclavian 58 days          Drain                 Hemodialysis AV Fistula 04/01/17 0800 Left upper arm 1127 days                Physical Exam   Constitutional: He is oriented to person, place, and time. He appears well-developed and well-nourished.   HENT:   Head: Normocephalic.   Eyes: Pupils are equal, round, and reactive to light. Conjunctivae are normal.   Neck: Normal range of motion. Neck supple.   Cardiovascular: Normal rate, regular rhythm and normal heart sounds.   Pulmonary/Chest: Effort normal and breath sounds normal.   Abdominal: Soft. Bowel sounds are normal.   Neurological: He is alert and oriented to person, place, and time.   Skin: Skin is warm.   Vitals reviewed.    No bleeding or hematoma noted.    Significant Labs:   Blood  Culture:   No results for input(s): LABBLOO in the last 48 hours., BMP:   Recent Labs   Lab 05/01/20  0423   *      K 4.9      CO2 15*   BUN 46*   CREATININE 11.0*   CALCIUM 8.5*   , CMP   Recent Labs   Lab 05/01/20  0423      K 4.9      CO2 15*   *   BUN 46*   CREATININE 11.0*   CALCIUM 8.5*   ALBUMIN 2.8*   ANIONGAP 15   ESTGFRAFRICA 5*   EGFRNONAA 4*   , CBC   Recent Labs   Lab 05/01/20  0424   WBC 6.27   HGB 10.5*   HCT 33.0*   *   , INR   No results for input(s): INR, PROTIME in the last 48 hours., Lipid Panel No results for input(s): CHOL, HDL, LDLCALC, TRIG, CHOLHDL in the last 48 hours., Troponin   No results for input(s): TROPONINI in the last 48 hours. and All pertinent lab results from the last 24 hours have been reviewed.    Significant Imaging: Echocardiogram:   Transthoracic echo (TTE) complete (Cupid Only):   Results for orders placed or performed during the hospital encounter of 04/29/20   Echo Color Flow Doppler? Yes   Result Value Ref Range    BSA 1.78 m2    LA WIDTH 4.00 cm    AORTIC VALVE CUSP SEPERATION 1.89 cm    PV PEAK VELOCITY 0.60 cm/s    LVIDD 4.69 3.5 - 6.0 cm    IVS 1.28 (A) 0.6 - 1.1 cm    PW 1.20 (A) 0.6 - 1.1 cm    Ao root annulus 3.81 cm    LVIDS 3.88 2.1 - 4.0 cm    FS 17 28 - 44 %    LA volume 51.80 cm3    Sinus 3.97 cm    STJ 2.87 cm    LV mass 221.45 g    LA size 2.92 cm    RVDD 2.48 cm    TAPSE 1.04 cm    RV S' 7.14 cm/s    Left Ventricle Relative Wall Thickness 0.51 cm    AV mean gradient 2 mmHg    AV valve area 2.15 cm2    AV Velocity Ratio 0.70     AV index (prosthetic) 0.53     E/A ratio 0.51     E wave decelartion time 228.83 msec    IVRT 117.65 msec    Pulm vein S/D ratio 2.27     LVOT diameter 2.27 cm    LVOT area 4.0 cm2    LVOT peak tee 0.65 m/s    LVOT peak VTI 9.17 cm    Ao peak tee 0.93 m/s    Ao VTI 17.23 cm    LVOT stroke volume 37.09 cm3    AV peak gradient 3 mmHg    MV Peak E Tee 0.55 m/s    TR Max Tee 2.41 m/s    MV  Peak A Tee 1.08 m/s    PV Peak S Tee 0.68 m/s    PV Peak D Tee 0.30 m/s    LV Systolic Volume 64.94 mL    LV Systolic Volume Index 35.8 mL/m2    LV Diastolic Volume 101.63 mL    LV Diastolic Volume Index 56.08 mL/m2    LA Volume Index 28.6 mL/m2    LV Mass Index 122 g/m2    RA Major Axis 3.18 cm    Left Atrium Minor Axis 5.58 cm    Left Atrium Major Axis 4.90 cm    Triscuspid Valve Regurgitation Peak Gradient 23 mmHg    RA Width 3.06 cm    Right Atrial Pressure (from IVC) 3 mmHg    TV rest pulmonary artery pressure 26 mmHg    Narrative    · Moderately decreased left ventricular systolic function. The estimated   ejection fraction is 30-35%.  · Septal wall has abnormal motion consistent with post-operative status.   Moderate global hypokinesis.  · Mild concentric left ventricular hypertrophy.  · Grade I (mild) left ventricular diastolic dysfunction consistent with   impaired relaxation.  · The sinuses of Valsalva is mildly dilated. 4.0cm.  · Normal right ventricular systolic function.  · The estimated PA systolic pressure is 26 mmHg.

## 2020-05-02 NOTE — PLAN OF CARE
"TN reviewed follow up appointment information as well as  "NSTEMI discharge instructions" handout with patient's wife. Patient's wife will notify the doctor if patient has chest pain or trouble breathing. Patient's wife is in agreement and verbalized an understanding.TN also reviewed patient responsibility checklist with her  1. Going to follow up appointments   2. Picking up rx from the pharmacy when discharged  3. Taking his medications as prescribed     Patient's wife informed that [facility] will contact them to arrange patient's home health and that patient will resume dialysis on Tuesday, 5/5/20.    Patient's nurse, Yoli, informed that patient can discharge from  standpoint.        05/02/20 1450   Final Note   Assessment Type Final Discharge Note   Anticipated Discharge Disposition Home-Health   What phone number can be called within the next 1-3 days to see how you are doing after discharge?   ([phone])   Hospital Follow Up  Appt(s) scheduled? Yes   Discharge plans and expectations educations in teach back method with documentation complete? Yes   Right Care Referral Info   Post Acute Recommendation Home-care   Facility Name   ([facility] )     "

## 2020-05-02 NOTE — ASSESSMENT & PLAN NOTE
"Troponin elevated at 25, trended down to 18. Heparin gtt initiated upon admission. ASA/Plavix, BB, ARB.    4/30/2020 [facility] with Dr. Villegas "Culprit vessel was left circumflex.  Patient's wife states that he has been noncompliant with his dual antiplatelet therapy for the past few days because of nausea and weakness.  Underwent PCI of circumflex with balloon angioplasty only with restoration of OMARI 3 flow.      Ischemic cardiomyopathy. EF of 35%.  It was 60% on prior echo.  Probably related to his circ non-STEMI.  Now status post revascularization.  Recheck echo in 3 months.  Continue Coreg 12.5 mg b.i.d. and titrate up to the maximally tolerated dose.  Initiate losartan.  If EF fails to improve despite maximum medical management in 3 months, consider AICD.    Continue aspirin 81 mg daily, Plavix 75 mg daily.  Importance of compliance with dual antiplatelet therapy discussed in detail with the patient."    "

## 2020-05-02 NOTE — PT/OT/SLP EVAL
Physical Therapy Evaluation    Patient Name:  Kodi Ibanez Sr.   MRN:  [id number]    Recommendations:     Discharge Recommendations:  [facility]  Discharge Equipment Recommendations: none   Barriers to discharge: None    Assessment:     Kodi Ibanez Sr. is a 60 y.o. male admitted with a medical diagnosis of NSTEMI (non-ST elevated myocardial infarction).  He presents with the following impairments/functional limitations:  weakness, impaired self care skills, impaired balance, decreased safety awareness, impaired endurance, impaired functional mobilty, impaired coordination, gait instability, impaired cognition, decreased lower extremity function, impaired cardiopulmonary response to activity .    Pt is quite weak. Needs assistance for all OOB at this time. Not quite sure of baseline LOF, as pt is minimally verbal and somewhat confused. Pt stated wife is home with him and drives him to HD. Denies falls. Mobility Level 3 with nursing staff - recommend assist to ambulate to toilet as needed.     Rehab Prognosis: Poor; patient would benefit from acute skilled PT services to address these deficits and reach maximum level of function.    Recent Surgery: Procedure(s) (LRB):  ANGIOGRAM, CORONARY, INCLUDING BYPASS GRAFT, WITH LEFT HEART CATHETERIZATION, [facility], 10 am start (N/A)  Percutaneous coronary intervention (N/A)  IVUS, Coronary 2 Days Post-Op    Plan:     During this hospitalization, patient to be seen 5 x/week to address the identified rehab impairments via gait training, therapeutic activities, therapeutic exercises, neuromuscular re-education and progress toward the following goals:    · Plan of Care Expires:  05/16/20    Subjective     Chief Complaint: none  Patient/Family Comments/goals: agreed to PT exam  Pain/Comfort:  · Pain Rating 1: 0/10    Patients cultural, spiritual, Christianity conflicts given the current situation: no    Living Environment:  Home with wife. amb with  "rollator  Prior to admission, patients level of function was ?.  Equipment used at home: rollator, wheelchair(according to pt).  DME owned (not currently used): ?.  Upon discharge, patient will have assistance from wife?.    Objective:     Communicated with RN prior to session.  Patient found supine with bed alarm, central line(HD cath)  upon PT entry to room.    General Precautions: Standard, fall   Orthopedic Precautions:N/A   Braces: N/A     Pt found soiled in small amount of stool. Mostly communicates via mumble " hm".   Generally agreable, flat affect.     Exams:  · blankets covering head. increased time needed to make eye contact.  · Cognitive Exam:  Patient is oriented to Person and Place  · Gross Motor Coordination:  WFL  · Postural Exam:  Patient presented with the following abnormalities:    · -       muscular atrophy lower legs  · Sensation:    · -       Intact  · Skin Integrity/Edema:      · -       Skin integrity: Visible skin intact  · RLE ROM: WFL  · RLE Strength: Deficits: grossly 3+/5  · LLE ROM: WFL  · LLE Strength: Deficits: grossly 3+/5    Functional Mobility:  · Bed Mobility:     · Supine to Sit: minimum assistance  · Sit to Supine: minimum assistance  · Transfers:     · Sit to Stand:  minimum assistance with rolling walker  · Gait: Tyrell with RW ~ 5 ft to sink, slowly and flexed. unable to tolerate standing at sink 2/2 weakness; began resting trunk against wall for support. guided pt to sit in chair to rest. pt then washed hands with OT assist.  · Balance: fair + standing with RW      Therapeutic Activities and Exercises:   pt stood with RW and close SBA while wiping perineum ~ 3 minutes; encouraged to stand longer but unable. Seated rest break at EOB then stood again while OT assisted for terminal wiping. A x 2 for new diaper placement in standing.     AM-PAC 6 CLICK MOBILITY  Total Score:18     Patient left HOB elevated with call button in reach and bed alarm on.    GOALS: " "  Multidisciplinary Problems     Physical Therapy Goals        Problem: Physical Therapy Goal    Goal Priority Disciplines Outcome Goal Variances Interventions   Physical Therapy Goal     PT, PT/OT Ongoing, Progressing     Description:  Goals to be met by:      Patient will increase functional independence with mobility by performin. Supine to sit with Modified [place]  2. Sit to supine with Modified [place]  3. Sit to stand transfer with Stand-by Assistance  4. Bed to chair transfer with Stand-by Assistance using Rolling Walker  5. Gait  x 100 feet with Contact Guard Assistance using Rolling Walker.   6. Lower extremity exercise program x20 reps per handout, with supervision                    History:     Past Medical History:   Diagnosis Date    Atherosclerosis of aorta     noted on [facility]     Blood transfusion     Cataracts, bilateral     CHF (congestive heart failure)     Clotting disorder     Cognitive deficits as late effect of cerebrovascular disease 2012    Coronary artery disease     NSTEMI s/p PCI with FERCHO to LCx (19),     Decreased appetite     DM (diabetes mellitus), type 2 with renal complications     Diabetic retinopathy    ESRD on hemodialysis     TUESDAY/ THURSDAY/SATURDAY    Hyperlipidemia     Hyperparathyroidism, unspecified     Hypertension     Prostate cancer 20:  wife states "he doesn't have it anymore"    Seizures     Stroke     PT HAS SHAKES     Unsteady gait     Uses roller walker       Past Surgical History:   Procedure Laterality Date    CARDIAC SURGERY      CABG    CATARACT EXTRACTION      ou    CATARACT EXTRACTION, BILATERAL      cataracts      CORONARY ANGIOGRAPHY INCLUDING BYPASS GRAFTS WITH CATHETERIZATION OF LEFT HEART N/A 2019    Procedure: ANGIOGRAM, CORONARY, INCLUDING BYPASS GRAFT, WITH LEFT HEART CATHETERIZATION, rcfa, 5fr;  Surgeon: David Villegas MD;  Location: [facility] CATH LAB;  Service: Cardiology; "  Laterality: N/A;    CORONARY ANGIOGRAPHY INCLUDING BYPASS GRAFTS WITH CATHETERIZATION OF LEFT HEART N/A 4/30/2020    Procedure: ANGIOGRAM, CORONARY, INCLUDING BYPASS GRAFT, WITH LEFT HEART CATHETERIZATION, [facility], 10 am start;  Surgeon: David Villegas MD;  Location: [facility] CATH LAB;  Service: Cardiology;  Laterality: N/A;    CORONARY ARTERY BYPASS GRAFT  2009    bypass    EYE SURGERY      Left [place] AVF  1/11/2012    PROSTATE BIOPSY      positive    retinal laser      REVISION OF ARTERIOVENOUS FISTULA Left 2/27/2020    Procedure: Ligation of left upper extremity fistula, Closure of left upper extremity skin defect x 2, Intraoperative ultrasound ;  Surgeon: Stephane Lopez MD;  Location: [facility] OR;  Service: Vascular;  Laterality: Left;    REVISION OF ARTERIOVENOUS FISTULA Left 4/10/2020    Procedure: AV fistula excision;  Surgeon: Stephane Lopez MD;  Location: [address];  Service: Vascular;  Laterality: Left;    ROTATOR CUFF REPAIR  right, 2005    ULTRASOUND GUIDANCE  12/16/2019    Procedure: Ultrasound Guidance;  Surgeon: David Villegas MD;  Location: [facility] CATH LAB;  Service: Cardiology;;    VASCULAR SURGERY         Time Tracking:     PT Received On: 05/02/20  PT Start Time: 0918     PT Stop Time: 0935  PT Total Time (min): 17 min     Billable Minutes: Evaluation 17   Coeval with ROVERTO Yuan, PT  05/02/2020

## 2020-05-02 NOTE — ASSESSMENT & PLAN NOTE
Recently had infected LUE AVF s/p excision by vascular surgery  Surgical cultures 4/10 grew 2 different prevotella species, fusobacterium, and strep anginosus.      Telemedicine appointment with infectious disease on 04/20 -> at that time antibiotics were changed to Flagyl 500 mg p.o. q.8 and Cefazolin after dialysis 2 g on Tuesday and Thursday, 3 g on Saturday.  He is instructed to continue these antibiotics until 5/11.     Wound care and abx to be resumed as previously ordered (discussed with SW upon discharge).

## 2020-05-02 NOTE — PROGRESS NOTES
[facility]  Cardiology  Progress Note    Patient Name: Kodi Ibanez Sr.  MRN: [id number]  Admission Date: 4/29/2020  Hospital Length of Stay: 3 days  Code Status: Full Code   Attending Physician: Arely Gar MD   Primary Care Physician: Melonie Elias MD  Expected Discharge Date: 5/2/2020  Principal Problem:NSTEMI (non-ST elevated myocardial infarction)    Subjective:       Interval History:  Denies any chest pain.  Feeling better.  Malaise is better.  No complications from cardiac catheterization.            Review of Systems   Constitution: Positive for malaise/fatigue.   Cardiovascular: Negative for chest pain and orthopnea.   Respiratory: Negative for shortness of breath.    Gastrointestinal: Negative for abdominal pain.   Genitourinary: Negative for flank pain.     Objective:     Vital Signs (Most Recent):  Temp: 98.6 °F (37 °C) (05/02/20 1300)  Pulse: (P) 105 (05/02/20 1330)  Resp: 18 (05/02/20 1300)  BP: (!) (P) 138/120 (05/02/20 1330)  SpO2: 98 % (05/02/20 1111) Vital Signs (24h Range):  Temp:  [97.9 °F (36.6 °C)-98.8 °F (37.1 °C)] 98.6 °F (37 °C)  Pulse:  [] (P) 105  Resp:  [17-20] 18  SpO2:  [97 %-100 %] 98 %  BP: ()/() (P) 138/120     Weight: 63.5 kg (140 lb)  Body mass index is 19.53 kg/m².     SpO2: 98 %  O2 Device (Oxygen Therapy): room air      Intake/Output Summary (Last 24 hours) at 5/2/2020 1453  Last data filed at 5/1/2020 2100  Gross per 24 hour   Intake 600 ml   Output 2600 ml   Net -2000 ml       Lines/Drains/Airways     Central Venous Catheter Line                 Hemodialysis Catheter 03/05/20 0535 right subclavian 58 days          Drain                 Hemodialysis AV Fistula 04/01/17 0800 Left upper arm 1127 days                Physical Exam   Constitutional: He is oriented to person, place, and time. He appears well-developed and well-nourished.   HENT:   Head: Normocephalic.   Eyes: Pupils are equal, round, and reactive to light. Conjunctivae  are normal.   Neck: Normal range of motion. Neck supple.   Cardiovascular: Normal rate, regular rhythm and normal heart sounds.   Pulmonary/Chest: Effort normal and breath sounds normal.   Abdominal: Soft. Bowel sounds are normal.   Neurological: He is alert and oriented to person, place, and time.   Skin: Skin is warm.   Vitals reviewed.    No bleeding or hematoma noted.    Significant Labs:   Blood Culture:   No results for input(s): LABBLOO in the last 48 hours., BMP:   Recent Labs   Lab 05/01/20  0423   *      K 4.9      CO2 15*   BUN 46*   CREATININE 11.0*   CALCIUM 8.5*   , CMP   Recent Labs   Lab 05/01/20  0423      K 4.9      CO2 15*   *   BUN 46*   CREATININE 11.0*   CALCIUM 8.5*   ALBUMIN 2.8*   ANIONGAP 15   ESTGFRAFRICA 5*   EGFRNONAA 4*   , CBC   Recent Labs   Lab 05/01/20  0424   WBC 6.27   HGB 10.5*   HCT 33.0*   *   , INR   No results for input(s): INR, PROTIME in the last 48 hours., Lipid Panel No results for input(s): CHOL, HDL, LDLCALC, TRIG, CHOLHDL in the last 48 hours., Troponin   No results for input(s): TROPONINI in the last 48 hours. and All pertinent lab results from the last 24 hours have been reviewed.    Significant Imaging: Echocardiogram:   Transthoracic echo (TTE) complete (Cupid Only):   Results for orders placed or performed during the hospital encounter of 04/29/20   Echo Color Flow Doppler? Yes   Result Value Ref Range    BSA 1.78 m2    LA WIDTH 4.00 cm    AORTIC VALVE CUSP SEPERATION 1.89 cm    PV PEAK VELOCITY 0.60 cm/s    LVIDD 4.69 3.5 - 6.0 cm    IVS 1.28 (A) 0.6 - 1.1 cm    PW 1.20 (A) 0.6 - 1.1 cm    Ao root annulus 3.81 cm    LVIDS 3.88 2.1 - 4.0 cm    FS 17 28 - 44 %    LA volume 51.80 cm3    Sinus 3.97 cm    STJ 2.87 cm    LV mass 221.45 g    LA size 2.92 cm    RVDD 2.48 cm    TAPSE 1.04 cm    RV S' 7.14 cm/s    Left Ventricle Relative Wall Thickness 0.51 cm    AV mean gradient 2 mmHg    AV valve area 2.15 cm2    AV Velocity  Ratio 0.70     AV index (prosthetic) 0.53     E/A ratio 0.51     E wave decelartion time 228.83 msec    IVRT 117.65 msec    Pulm vein S/D ratio 2.27     LVOT diameter 2.27 cm    LVOT area 4.0 cm2    LVOT peak tee 0.65 m/s    LVOT peak VTI 9.17 cm    Ao peak tee 0.93 m/s    Ao VTI 17.23 cm    LVOT stroke volume 37.09 cm3    AV peak gradient 3 mmHg    MV Peak E Tee 0.55 m/s    TR Max Tee 2.41 m/s    MV Peak A Tee 1.08 m/s    PV Peak S Tee 0.68 m/s    PV Peak D Tee 0.30 m/s    LV Systolic Volume 64.94 mL    LV Systolic Volume Index 35.8 mL/m2    LV Diastolic Volume 101.63 mL    LV Diastolic Volume Index 56.08 mL/m2    LA Volume Index 28.6 mL/m2    LV Mass Index 122 g/m2    RA Major Axis 3.18 cm    Left Atrium Minor Axis 5.58 cm    Left Atrium Major Axis 4.90 cm    Triscuspid Valve Regurgitation Peak Gradient 23 mmHg    RA Width 3.06 cm    Right Atrial Pressure (from IVC) 3 mmHg    TV rest pulmonary artery pressure 26 mmHg    Narrative    · Moderately decreased left ventricular systolic function. The estimated   ejection fraction is 30-35%.  · Septal wall has abnormal motion consistent with post-operative status.   Moderate global hypokinesis.  · Mild concentric left ventricular hypertrophy.  · Grade I (mild) left ventricular diastolic dysfunction consistent with   impaired relaxation.  · The sinuses of Valsalva is mildly dilated. 4.0cm.  · Normal right ventricular systolic function.  · The estimated PA systolic pressure is 26 mmHg.        Assessment and Plan:     Brief HPI:     * NSTEMI (non-ST elevated myocardial infarction)  Trop >25 on admission, down to 19 today.  No overt isch sxs noted.  Known CAD s/p CABG with PCI LCx 12/2019.  Cont med rx: ASA/Plavix/heparin (pt reports DAPT adherence)  Cont heparin gtt  Check echo  Cath today with Dr. Villegas    5/1:  Status post cardiac catheterization yesterday.  Culprit vessel was left circumflex.  Patient's wife states that he has been noncompliant with his dual antiplatelet  therapy for the past few days because of nausea and weakness.  Underwent PCI of circumflex yesterday with balloon angioplasty only with restoration of OMARI 3 flow.  Doing fine.  No complications from cardiac catheterization.  Feeling better today.  Continue aspirin 81 mg daily, Plavix 75 mg daily.  Importance of compliance with dual antiplatelet therapy discussed in detail with the patient.    5/2:  Patient doing fine.  Continue aspirin and Plavix.  Continue statins.  Cardiac rehab referral made from cardiology clinic.  We will sign off.  Follow-up in Cardiology Clinic.    CAD (coronary artery disease)        ESRD on hemodialysis  Per IM/neph    S/P CABG (coronary artery bypass graft)  Status post LIMA to LAD.  SVG to RCA is occluded.  LIMA to LAD is patent.    History of stroke        HTN (hypertension)  Blood pressure elevated today.  Initiate losartan.  Continue Coreg and titrate to maximally tolerated dose.    Cardiomyopathy  Ischemic cardiomyopathy.  EF of 35%.  It was 60% on prior echo.  Probably related to his circ non-STEMI.  Now status post revascularization.  Recheck echo in 3 months.  Continue Coreg 12.5 mg b.i.d. and titrate up to the maximally tolerated dose.  Initiate losartan 100 mg daily.  If EF fails to improve despite maximum medical management in 3 months, consider AICD.    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

## 2020-05-02 NOTE — PLAN OF CARE
[facility]    HOME HEALTH ORDERS  FACE TO FACE ENCOUNTER    Patient Name: Kodi Ibanez Sr.  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  A 3 into 26 mm drug-eluting stent, resolute farhad was implanted with excellent angiographic results.  OMARI 3 flow post PCI.      ESRD on hemodialysis [N18.6, Z99.2]  Not Applicable     Dialysis on Tu, Th, Sa  [facility]  AV graft care by Dr. Garduno      Vascular dementia with depressed mood [F01.51, F32.9]  Yes    Chronic diastolic congestive heart failure [I50.32]  Yes     Followed by cardiology, Dr. Villegas      History of stroke [Z86.73]  Not Applicable     Chronic     9/2011, 10/2011        Resolved Hospital Problems    Diagnosis Date Resolved POA    Cystitis [N30.90] 05/01/2020 Yes    Paroxysmal tachycardia [I47.9] 05/01/2020 Yes    ESRD (end stage renal disease) on dialysis [N18.6, Z99.2] 06/25/2018 Not Applicable       Future Appointments   Date Time Provider Department Center   5/4/2020 10:20 AM Stephane Lopez MD [facility]  VAS [place] [facility]   5/6/2020  4:00 PM Maddy Silva, FNP-C [facility] MED [place]   5/18/2020  1:00 PM David Villegas MD [facility] CARDIO [facility]     Follow-up Information     David Villegas MD On 5/18/2020.    Specialties:  Cardiology, INTERVENTIONAL CARDIOLOGY  Why:  Cardiology Telephone Followup:  5/18/2020 at 1:00 p.m  Contact information:  [address]  [address]  [place] [id number] [phone]                 I have seen and examined this patient face to face today. My clinical findings that support the need for the home health skilled services and home bound status are the following:  Requiring assistive device to leave home due to unsteady gait caused by  Coronary Artery Disease and Weakness/Debility.  Medical restrictions requiring assistance of another human to leave home due to  Unstable ambulation.    Allergies:  Review of patient's allergies indicates:   Allergen Reactions    Reglan [metoclopramide hcl] Diarrhea    Lorazepam      Other reaction(s): heavy sedation     Diet: renal diet, cardiac diet (less than 2 grams of sodium daily), diabetic    Activities: ambulate in house with assistance    Nursing:   SN to complete comprehensive assessment including routine vital signs. Instruct on disease process and s/s of complications to report to MD. Review/verify medication list sent home with the patient at time of discharge  and instruct patient/caregiver as needed. Frequency may be adjusted depending on start of care date.    Notify MD if SBP > 160 or < 90; DBP > 90 or < 50; HR > 120 or < 50; Temp > 100.4F    CONSULTS:    Physical Therapy to evaluate and treat. Evaluate for home safety and equipment needs; Establish/upgrade home exercise program. Perform / instruct on therapeutic exercises, gait training, transfer training, and Range of Motion.  Occupational Therapy to evaluate and treat. Evaluate home environment for safety and equipment needs. Perform/Instruct on transfers, ADL training, ROM,  and therapeutic exercises.  Aide to provide assistance with personal care, ADLs, and vital signs.    MISCELLANEOUS CARE:    Heart Failure:      SN to instruct on the following:    Instruct on the definition of CHF.   Instruct on the signs/sympoms of CHF to be reported.   Instruct on and monitor daily weights.   Instruct on factors that cause exacerbation.   Instruct on action, dose, schedule, and side effects of medications.   Instruct on diet as prescribed.   Instruct on activity allowed.   Instruct on life-style modifications for life long management of CHF   SN to assess compliance with daily weights, diet, medications, fluid retention,    safety precautions, activities permitted and life-style modifications.   Additional 1-2 SN visits per week as needed for signs and symptoms     of CHF exacerbation.    For Weight Gain > 2-3 lbs in 1 day or 4-6 lbs over 1 week notify PCP    Wound Care Orders:  yes:  Vascular Wound:  Location: Left Upper Extremity AV fistula site           Apply the following to wound: Wet to Damp dressing: BID (frequency)    Medications: Review discharge medications with patient and family and provide education.      Current Discharge Medication List      START taking these medications    Details   aspirin (ECOTRIN) 81 MG EC tablet Take 1 tablet (81 mg total) by mouth once daily.  Refills: 0      ceFAZolin 1 g/50ml Dextrose IVPB 1 gram/50 mL PgBk 1 g, ceFAZolin 2 g/50mL Dextrose IVPB 2 gram/50 mL PgBk 2 g Inject 3 g into the vein once days of HD (Tues/Thurs/Sat) Qty: 3 g     AS PREVIOUSLY ORDERED     (estimated end date 5/11/20 per ID Dr. Ward)      sevelamer carbonate (RENVELA) 800 mg Tab Take 2 tablets (1,600 mg total) by mouth 3 (three) times daily with meals.  Qty: 540 tablet, Refills: 0         CONTINUE these medications which have CHANGED    Details   acetaminophen (TYLENOL) 325 MG tablet Take 2 tablets (650 mg total) by mouth every 6 (six) hours as needed.  Refills: 0       atorvastatin (LIPITOR) 80 MG tablet Take 1 tablet (80 mg total) by mouth every evening.  Qty: 90 tablet, Refills: 0      carvediloL (COREG) 12.5 MG tablet Take 1 tablet (12.5 mg total) by mouth 2 (two) times daily.  Qty: 180 tablet, Refills: 0      insulin (LANTUS SOLOSTAR U-100 INSULIN) glargine 100 units/mL (3mL) SubQ pen Inject 20 Units into the skin every evening.  Qty: 18 mL, Refills: 0         CONTINUE these medications which have NOT CHANGED    Details   ascorbic acid, vitamin C, (VITAMIN C) 500 MG tablet Take 500 mg by mouth every evening.       blood glucose control, normal Soln 1 drop by Misc.(Non-Drug; Combo Route) route as directed.  Qty: 1 each, Refills: 1      blood sugar diagnostic Strp To check BG 2 times daily, to use with accuchek casa meter  Qty: 200 each, Refills: 3      blood-glucose meter kit To check BG 2 times daily, to use with accuchek casa meter  Qty: 1 each, Refills: 0      clopidogrel (PLAVIX) 75 mg tablet Take 1 tablet (75 mg total) by mouth once daily.  Qty: 30 tablet, Refills: 11      diltiaZEM (CARDIZEM CD) 180 MG 24 hr capsule Take 1 capsule (180 mg total) by mouth once daily.  Qty: 90 capsule, Refills: 1    Associated Diagnoses: Essential hypertension      furosemide (LASIX) 40 MG tablet TAKE 1 TABLET(40 MG) BY MOUTH EVERY DAY  Qty: 90 tablet, Refills: 1    Associated Diagnoses: Chronic congestive heart failure, unspecified heart failure type      lancets Misc To check BG 2 times daily, to use with accuchek casa meter  Qty: 200 each, Refills: 3      losartan (COZAAR) 100 MG tablet Take 1 tablet (100 mg total) by mouth once daily.  Qty: 90 tablet, Refills: 1    Associated Diagnoses: Essential hypertension      megestrol (MEGACE) 20 MG Tab Take 1 tablet (20 mg total) by mouth once daily.  Qty: 90 tablet, Refills: 1    Associated Diagnoses: Failure to thrive in adult      metroNIDAZOLE (FLAGYL) 500 MG tablet Take 1 tablet (500 mg total) by mouth 3 (three) times daily.  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, with long-term current use of insulin      pen needle, diabetic (NOVOFINE 30) 30 gauge x 1/3" Ndle USE AS DIRECTED WITH LANTUS SOLARSTAR  Qty: 100 each, Refills: 3    Associated Diagnoses: 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    Arely Gar MD  Internal Medicine - Pediatrics  [facility]      "

## 2020-05-02 NOTE — PROGRESS NOTES
Patient's clinicals sent to [facility]. Patient accepted.     1437- CM contacted patient's dialysis center, [facility], to inform of patient's discharge today, spoke with Barbara. Barbara informed that [place] will fax patient's dc summary and orders.     1442- CM faxed patient's facesheet, dc summary,and home health order to [facility] at [phone].

## 2020-05-02 NOTE — NURSING
Pt rerturned from dialysis, attempting to stand but unable to ambulate.  Wife called and states that he's usually weak after dialysis, but she is also very concerned about him falling at home.  Informed her that I would assess him again in 30 minutes and let her know the status.

## 2020-05-04 NOTE — PROGRESS NOTES
Stephane Lopez MD VI                       [facility]                         05/04/2020    HPI:  Kodi Ibanez Sr. is a 60 y.o. male with   Patient Active Problem List   Diagnosis    ESRD on hemodialysis    History of stroke    Cognitive deficits as late effect of cerebrovascular disease    Chronic diastolic congestive heart failure    Secondary renal hyperparathyroidism    Other extrapyramidal disease and abnormal movement disorder    Mononeuritis of lower limb, unspecified    Vascular dementia with depressed mood    Type 2 diabetes, uncontrolled, with neuropathy    CAD (coronary artery disease)    Seizure disorder    Hyperlipidemia LDL goal <100    Renovascular hypertension    Uncontrolled type 2 diabetes mellitus with end-stage renal disease    History of MI (myocardial infarction)    Long-term insulin use    Atherosclerosis of aorta    S/P CABG (coronary artery bypass graft)    Hypertensive retinopathy of both eyes    Prostate cancer    Uncontrolled type 2 diabetes mellitus with both eyes affected by proliferative retinopathy and macular edema, with long-term current use of insulin    Uncontrolled type 2 diabetes mellitus with right eye affected by severe nonproliferative retinopathy and macular edema, with long-term current use of insulin    PVD (peripheral vascular disease)    Dependent on walker for ambulation    SVT (supraventricular tachycardia)    NSVT (nonsustained ventricular tachycardia)    Complication of arteriovenous dialysis fistula    Essential hypertension    Debility    Infected prosthetic vascular graft    Anemia in ESRD (end-stage renal disease)    AV fistula infection    NSTEMI (non-ST elevated myocardial infarction)    Encephalopathy, metabolic    Cardiomyopathy    HTN (hypertension)    being managed by PCP and specialists who is here today for postop evaluation of bleeding LUE AVF s/p ligation 2/27/20.  Pt with minimal  "drainage from skin breakdown without infection.  Patient states location is LUE occurring for a few weeks since surgery.  Associated signs and symptoms include none.    Alleviating factors include wound care.  Worsening factors include none.    no MI  no Stroke  Tobacco use: denies    4/9/20:  Patient presents urgently due to odor from left upper extremity noticed by wife.  Denies fevers chills.  Dialysis via catheter without issues.  States continue to keep left upper extremity clean and dry.    Interval history:  Recovering well.  S/p NSTEMI postop s/p PCI and hospitalization with recent d/c.  On ID rec Abx.      Past Medical History:   Diagnosis Date    Atherosclerosis of aorta     noted on VIKTORIA 2011    Blood transfusion     Cataracts, bilateral     CHF (congestive heart failure)     Clotting disorder     Cognitive deficits as late effect of cerebrovascular disease 6/26/2012    Coronary artery disease     NSTEMI s/p PCI with FERCHO to LCx (12/16/19),     Decreased appetite     DM (diabetes mellitus), type 2 with renal complications     Diabetic retinopathy    ESRD on hemodialysis     TUESDAY/ THURSDAY/SATURDAY    Hyperlipidemia     Hyperparathyroidism, unspecified     Hypertension     Prostate cancer 2016 7/16/19:  wife states "he doesn't have it anymore"    Seizures     Stroke 2011    PT HAS SHAKES     Unsteady gait     Uses roller walker     Past Surgical History:   Procedure Laterality Date    CARDIAC SURGERY      CABG    CATARACT EXTRACTION      ou    CATARACT EXTRACTION, BILATERAL      cataracts      CORONARY ANGIOGRAPHY INCLUDING BYPASS GRAFTS WITH CATHETERIZATION OF LEFT HEART N/A 12/16/2019    Procedure: ANGIOGRAM, CORONARY, INCLUDING BYPASS GRAFT, WITH LEFT HEART CATHETERIZATION, rcfa, 5fr;  Surgeon: David Villegas MD;  Location: [facility] CATH LAB;  Service: Cardiology;  Laterality: N/A;    CORONARY ANGIOGRAPHY INCLUDING BYPASS GRAFTS WITH CATHETERIZATION OF LEFT HEART N/A 4/30/2020    " Procedure: ANGIOGRAM, CORONARY, INCLUDING BYPASS GRAFT, WITH LEFT HEART CATHETERIZATION, rcfa, 10 am start;  Surgeon: David Villegas MD;  Location: [facility] CATH LAB;  Service: Cardiology;  Laterality: N/A;    CORONARY ARTERY BYPASS GRAFT  2009    bypass    EYE SURGERY      Left Guera AVF  1/11/2012    PROSTATE BIOPSY      positive    retinal laser      REVISION OF ARTERIOVENOUS FISTULA Left 2/27/2020    Procedure: Ligation of left upper extremity fistula, Closure of left upper extremity skin defect x 2, Intraoperative ultrasound ;  Surgeon: Stephane Lopez MD;  Location: [facility] OR;  Service: Vascular;  Laterality: Left;    REVISION OF ARTERIOVENOUS FISTULA Left 4/10/2020    Procedure: AV fistula excision;  Surgeon: Stephane Lopez MD;  Location: [facility] OR [address] [place] [address];  Service: Vascular;  Laterality: Left;    ROTATOR CUFF REPAIR  right, 2005    ULTRASOUND GUIDANCE  12/16/2019    Procedure: Ultrasound Guidance;  Surgeon: David Villegas MD;  Location: [facility] CATH LAB;  Service: Cardiology;;    VASCULAR SURGERY       Family History   Problem Relation Age of Onset    Diabetes Mother     Glaucoma Mother     Cancer Mother     Cataracts Mother     Heart disease Father     Cancer Paternal Grandmother     Heart disease Maternal Grandmother     No Known Problems Sister     Prostate cancer Brother     No Known Problems Maternal Grandfather     No Known Problems Paternal Grandfather     No Known Problems Sister     No Known Problems Sister     Hypertension Brother     Cancer Paternal Uncle     Amblyopia Neg Hx     Blindness Neg Hx     Macular degeneration Neg Hx     Retinal detachment Neg Hx     Strabismus Neg Hx     Stroke Neg Hx     Thyroid disease Neg Hx     Anesthesia problems Neg Hx     Clotting disorder Neg Hx      problems Neg Hx     Kidney cancer Neg Hx     Kidney disease Neg Hx     Malignant hyperthermia Neg Hx     Sickle cell trait Neg Hx     Lupus Neg Hx     Urolithiasis Neg  Hx     Sudden death Neg Hx      Social History     Socioeconomic History    Marital status:      Spouse name: Not on file    Number of children: Not on file    Years of education: Not on file    Highest education level: Not on file   Occupational History    Not on file   Social Needs    Financial resource strain: Not on file    Food insecurity:     Worry: Not on file     Inability: Not on file    Transportation needs:     Medical: Not on file     Non-medical: Not on file   Tobacco Use    Smoking status: Never Smoker    Smokeless tobacco: Never Used   Substance and Sexual Activity    Alcohol use: No    Drug use: No    Sexual activity: Not Currently   Lifestyle    Physical activity:     Days per week: Not on file     Minutes per session: Not on file    Stress: Not on file   Relationships    Social connections:     Talks on phone: Not on file     Gets together: Not on file     Attends Buddhist service: Not on file     Active member of club or organization: Not on file     Attends meetings of clubs or organizations: Not on file     Relationship status: Not on file   Other Topics Concern    Not on file   Social History Narrative    Not on file       Current Outpatient Medications:     aspirin (ECOTRIN) 81 MG EC tablet, Take 1 tablet (81 mg total) by mouth once daily., Disp: , Rfl: 0    atorvastatin (LIPITOR) 80 MG tablet, Take 1 tablet (80 mg total) by mouth every evening., Disp: 90 tablet, Rfl: 0    carvediloL (COREG) 12.5 MG tablet, Take 1 tablet (12.5 mg total) by mouth 2 (two) times daily., Disp: 180 tablet, Rfl: 0    [START ON 5/5/2020] ceFAZolin 1 g/50ml Dextrose IVPB 1 gram/50 mL PgBk 1 g, ceFAZolin 2 g/50mL Dextrose IVPB 2 gram/50 mL PgBk 2 g, Inject 3 g into the vein once. for 1 dose, Disp: 3 g, Rfl: 0    clopidogrel (PLAVIX) 75 mg tablet, Take 1 tablet (75 mg total) by mouth once daily., Disp: 30 tablet, Rfl: 11    diltiaZEM (CARDIZEM CD) 180 MG 24 hr capsule, Take 1 capsule  "(180 mg total) by mouth once daily., Disp: 90 capsule, Rfl: 1    furosemide (LASIX) 40 MG tablet, TAKE 1 TABLET(40 MG) BY MOUTH EVERY DAY, Disp: 90 tablet, Rfl: 1    insulin (LANTUS SOLOSTAR U-100 INSULIN) glargine 100 units/mL (3mL) SubQ pen, Inject 20 Units into the skin every evening., Disp: 18 mL, Rfl: 0    losartan (COZAAR) 100 MG tablet, Take 1 tablet (100 mg total) by mouth once daily., Disp: 90 tablet, Rfl: 1    megestrol (MEGACE) 20 MG Tab, Take 1 tablet (20 mg total) by mouth once daily., Disp: 90 tablet, Rfl: 1    metroNIDAZOLE (FLAGYL) 500 MG tablet, Take 1 tablet (500 mg total) by mouth 3 (three) times daily., Disp: 84 tablet, Rfl: 0    NOVOLOG FLEXPEN U-100 INSULIN 100 unit/mL InPn pen, INJECT 5 TO 10 UNITS WITH MEALS AND AT BEDTIME DEPENDING ON BLOOD SUGAR, Disp: 15 mL, Rfl: 0    acetaminophen (TYLENOL) 325 MG tablet, Take 2 tablets (650 mg total) by mouth every 6 (six) hours as needed. (Patient not taking: Reported on 5/4/2020), Disp: , Rfl: 0    ascorbic acid, vitamin C, (VITAMIN C) 500 MG tablet, Take 500 mg by mouth every evening. , Disp: , Rfl:     blood glucose control, normal Soln, 1 drop by Misc.(Non-Drug; Combo Route) route as directed. (Patient not taking: Reported on 5/4/2020), Disp: 1 each, Rfl: 1    blood sugar diagnostic Strp, To check BG 2 times daily, to use with accuchek casa meter (Patient not taking: Reported on 5/4/2020), Disp: 200 each, Rfl: 3    blood-glucose meter kit, To check BG 2 times daily, to use with accuchek casa meter, Disp: 1 each, Rfl: 0    lancets Misc, To check BG 2 times daily, to use with accuchek casa meter (Patient not taking: Reported on 5/4/2020), Disp: 200 each, Rfl: 3    pen needle, diabetic (BD ULTRA-FINE ORIG PEN NEEDLE) 29 gauge x 1/2" Ndle, USE  1 EVERY EVENING (Patient not taking: Reported on 5/4/2020), Disp: 90 each, Rfl: 3    pen needle, diabetic (NOVOFINE 30) 30 gauge x 1/3" Ndle, USE AS DIRECTED WITH LANTUS SOLARSTAR (Patient not " taking: Reported on 5/4/2020), Disp: 100 each, Rfl: 3    sevelamer carbonate (RENVELA) 800 mg Tab, Take 2 tablets (1,600 mg total) by mouth 3 (three) times daily with meals. (Patient not taking: Reported on 5/4/2020), Disp: 540 tablet, Rfl: 0    REVIEW OF SYSTEMS:  General: No fevers or chills; ENT: No sore throat; Allergy and Immunology: no persistent infections; Hematological and Lymphatic: No history of bleeding or easy bruising; Endocrine: negative; Respiratory: no cough, shortness of breath, or wheezing; Cardiovascular: no chest pain or dyspnea on exertion; Gastrointestinal: no abdominal pain/back, change in bowel habits, or bloody stools; Genito-Urinary: no dysuria, trouble voiding, or hematuria; Musculoskeletal: negative; Neurological: no TIA or stroke symptoms; Psychiatric: no nervousness, anxiety or depression.    PHYSICAL EXAM:                General appearance:  Alert, well-appearing, and in no distress.  Oriented to person, place, and time                    Neurological: Normal speech, no focal findings noted; CN II - XII grossly intact. LUE sensation to light touch.            Musculoskeletal: Digits/nail without cyanosis/clubbing.  Strength 5/5 all extremities, decreased ROM.                    Neck: Supple, no significant adenopathy, no carotid bruit can be auscultated                  Chest:  Clear to auscultation, no wheezes, rales or rhonchi, symmetric air entry. No use of accessory muscles               Cardiac: Normal rate and regular rhythm, S1 and S2 normal            Abdomen: Soft, nontender, nondistended, no masses or organomegaly, no hernia     No rebound tenderness noted; bowel sounds normal     Pulsatile aortic mass is non palpable.     No groin adenopathy      Extremities:   2+ L radial pulse, palpable proximal fistula, no thrill or bruit to LUE AVF     No LUE edema    Skin: LUE nearly fully healed wound, no surrounding erythema. no purulence    LAB RESULTS:  No results found for:  CBC  Lab Results   Component Value Date    LABPROT 11.1 04/29/2020    INR 1.0 04/29/2020     Lab Results   Component Value Date     05/01/2020    K 4.9 05/01/2020     05/01/2020    CO2 15 (L) 05/01/2020     (H) 05/01/2020    BUN 46 (H) 05/01/2020    CREATININE 11.0 (H) 05/01/2020    CALCIUM 8.5 (L) 05/01/2020    ANIONGAP 15 05/01/2020    EGFRNONAA 4 (A) 05/01/2020     Lab Results   Component Value Date    WBC 6.27 05/01/2020    RBC 3.57 (L) 05/01/2020    HGB 10.5 (L) 05/01/2020    HCT 33.0 (L) 05/01/2020    MCV 92 05/01/2020    MCH 29.4 05/01/2020    MCHC 31.8 (L) 05/01/2020    RDW 16.9 (H) 05/01/2020     (L) 05/01/2020    MPV 11.7 05/01/2020    GRAN 5.0 05/01/2020    GRAN 79.0 (H) 05/01/2020    LYMPH 0.7 (L) 05/01/2020    LYMPH 10.4 (L) 05/01/2020    MONO 0.5 05/01/2020    MONO 8.5 05/01/2020    EOS 0.1 05/01/2020    BASO 0.03 05/01/2020    EOSINOPHIL 1.4 05/01/2020    BASOPHIL 0.5 05/01/2020    DIFFMETHOD Automated 05/01/2020     .  Lab Results   Component Value Date    HGBA1C 10.3 (H) 04/30/2020       IMAGING:  All pertinent imaging has been reviewed and interpreted independently.    LUE HD US reviewed.    IMP/PLAN:  60 y.o. male with   Patient Active Problem List   Diagnosis    ESRD on hemodialysis    History of stroke    Cognitive deficits as late effect of cerebrovascular disease    Chronic diastolic congestive heart failure    Secondary renal hyperparathyroidism    Other extrapyramidal disease and abnormal movement disorder    Mononeuritis of lower limb, unspecified    Vascular dementia with depressed mood    Type 2 diabetes, uncontrolled, with neuropathy    CAD (coronary artery disease)    Seizure disorder    Hyperlipidemia LDL goal <100    Renovascular hypertension    Uncontrolled type 2 diabetes mellitus with end-stage renal disease    History of MI (myocardial infarction)    Long-term insulin use    Atherosclerosis of aorta    S/P CABG (coronary artery bypass  graft)    Hypertensive retinopathy of both eyes    Prostate cancer    Uncontrolled type 2 diabetes mellitus with both eyes affected by proliferative retinopathy and macular edema, with long-term current use of insulin    Uncontrolled type 2 diabetes mellitus with right eye affected by severe nonproliferative retinopathy and macular edema, with long-term current use of insulin    PVD (peripheral vascular disease)    Dependent on walker for ambulation    SVT (supraventricular tachycardia)    NSVT (nonsustained ventricular tachycardia)    Complication of arteriovenous dialysis fistula    Essential hypertension    Debility    Infected prosthetic vascular graft    Anemia in ESRD (end-stage renal disease)    AV fistula infection    NSTEMI (non-ST elevated myocardial infarction)    Encephalopathy, metabolic    Cardiomyopathy    HTN (hypertension)    being managed by PCP and specialists who is here today for postop evaluation of bleeding LUE AVF s/p ligation 2/27/20 with subsequent infection and excision of fistula and stents at central aspect.    -Recovering well - needs to schedule ID f/u appt and cont recs  -cont Cards f/u and recs  -cont local wound care  -nutrition optimization  -RTC 4 weeks with vein mapping    I spent 8 minutes evaluating this patient and greater than 50% of the time was spent counseling, coordinator care and discussing the plan of care.  All questions were answered and patient stated understanding with agreement with the above treatment plan.    Stephane Lopez MD [facility]  Vascular and Endovascular Surgery

## 2020-05-04 NOTE — PATIENT INSTRUCTIONS
Discharge Instructions for Heart Attack  You have had a heart attack (acute myocardial infarction). A heart attack occurs when a vessel that sends blood to your heart suddenly becomes blocked. This causes your heart not to work as well as it should. Follow these guidelines for home care and lifestyle changes.  Home care  · Take your medicines exactly as directed. Dont skip doses. Talk with your healthcare provider if your medicines aren't working for you. Together you can come up with another treatment plan.  · Remember that recovery after a heart attack takes time. Plan to rest for at least 4 to 8 weeks while you recover. Then return to normal activity when your doctor says its OK.  · Ask your doctor about joining a heart rehabilitation program. This can help strengthen your heart and lungs and give you more energy and confidence.  · Tell your doctor if you are feeling depressed. Feelings of sadness are common after a heart attack. But it is important to speak to someone or seek counseling if you are feeling overwhelmed by these feelings.  · Call 911 right away if you have chest pain or pain that goes to your shoulder, neck, or back. Don't drive yourself to the hospital.  · Ask your family members to learn CPR. This is an important skill that can save lives when it's needed.  · Learn to take your own blood pressure and pulse. Keep a record of your results. Ask your doctor when you should seek emergency medical attention. He or she will tell you which blood pressure reading is dangerous.  Lifestyle changes  Your heart attack might have been caused by cardiovascular disease. Your healthcare provider will work with you to make changes to your lifestyle. This will help the heart disease from getting worse. These changes will most likely be a combination of diet and exercise.  Diet  Your healthcare provider will tell you what changes you need to make to your diet. You may need to see a registered dietitian for help  with these diet changes. These changes may include:  · Cutting back on how much fat and cholesterol you eat  · Cutting back on how much salt (sodium) you eat, especially if you have high blood pressure  · Eating more fresh vegetables and fruits  · Eating lean proteins such as fish, poultry, beans, and peas, and eating less red meat and processed meats  · Using low-fat dairy products  · Using vegetable and nut oils in limited amounts  · Limiting how many sweets and processed foods such as chips, cookies, and baked goods you eat  · Limiting how often you eat out. And when you do eat out, making better food choices.  · Not eating fried or greasy foods, or foods high in saturated fat  Exercise  Your healthcare provider may tell you to get more exercise if you haven't been physically active. Depending on your case, your provider may recommend that you get moderate to vigorous physical activity for at least 40 minutes each day, and for at least 3 to 4 days each week. A few examples of moderate to vigorous activity include:  · Walking at a brisk pace, about 3 to 4 miles per hour  · Jogging or running  · Swimming or water aerobics  · Hiking  · Dancing  · Martial arts  · Tennis  · Riding a bicycle or stationary bike  Other changes  Your healthcare provider may also recommend that you:  · Lose weight. If you are overweight or obese, your provider will work with you to lose extra pounds. Making diet changes and getting more exercise can help. A good goal is to lose your 10% of your body weight in one year.  · Stop smoking. Sign up for a stop-smoking program to make it more likely for you to quit for good. You can join a stop-smoking support group. Or ask your doctor about nicotine replacement products.  · Learn to manage stress. Stress management techniques to help you deal with stress in your home and work life. This will help you feel better emotionally and ease the strain on your heart.  Follow-up  Make a follow-up  appointment as directed by our staff.     When to seek medical advice  Call 911 right away if you have:  · Chest pain that goes to your neck, jaw, back, or shoulder  · Shortness of breath  Otherwise, call your doctor immediately if you have:  · Lightheadedness, dizziness, or fainting  · Feeling of irregular heartbeat or fast pulse.   Date Last Reviewed: 10/1/2016  © 4575-6662 [employer]. [address] [place] [address], [place] [address]. All rights reserved. This information is not intended as a substitute for professional medical care. Always follow your healthcare professional's instructions.

## 2020-05-04 NOTE — LETTER
May 4, 2020      Tashia Khan MD  [address]  [facility] [address]           [facility]  [address]  [facility] [address]  Phone: [phone]  Fax: [phone]          Patient: Kodi Ibanez Sr.   MR Number: [id number]   YOB: 1960   Date of Visit: 5/4/2020       Dear Dr. Tashia Khan:    Thank you for referring Kodi Ibanez to me for evaluation. Attached you will find relevant portions of my assessment and plan of care.    If you have questions, please do not hesitate to call me. I look forward to following Kodi Ibanez along with you.    Sincerely,    Stephane Lopez MD    Enclosure  CC:  No Recipients    If you would like to receive this communication electronically, please contact [email or web address] or [phone] to request more information on [employer] access.    For providers and/or their staff who would like to refer a patient to [facility], please contact us through our one-stop-shop provider referral line, [facility] , at [phone].    If you feel you have received this communication in error or would no longer like to receive these types of communications, please e-mail [email or web address]

## 2020-05-04 NOTE — PATIENT INSTRUCTIONS
Creating a Hemodialysis Access    Before hemodialysis can be done, a way for blood to leave and return to your body (an access) is needed. During hemodialysis, needles placed into the access carry blood to and from the dialyzer. A hemodialysis access is usually created in your arm. The 2 main types of accesses are an arteriovenous fistula (AV fistula) and an arteriovenous graft (AV graft).  Creating your access  The hemodialysis access provides a large volume of rapidly flowing blood. It involves a surgical operation under anesthesia. You may be able to go home the same day. It is made during a short procedure using one of these methods:  · A fistula is made by connecting an artery to a nearby vein. The high pressure and blood flow in the artery are transferred into the vein and help it grow in size and thickness. This enlarged vein (fistula) eventually has high blood flow and is thick enough for needles to be placed safely several times each week during hemodialysis. It may need weeks or months to develop before it's ready to be used. A fistula is more efficient than the graft and has fewer long-term problems. Therefore, it is the preferred form of access.  · A graft (piece of synthetic tube) may be sewn between an artery and a vein if a fistula is not possible because of the small size of your veins. Blood flows rapidly through the graft from the artery to the vein. A graft is usually ready to use in a few weeks. Needles can be placed into the plastic tube to obtain blood during dialysis.  Both types of access may take weeks to months before they can be used. If dialysis is necessary immediately, a temporary venous catheter is used. A catheter that allows two way blood flow is placed into a large vein and the dialysis tubing is connected to the catheter. If both the AV fistula and graft are unsuccessful, a more permanent venous catheter is used.  The most common complications for hemodialysis access are  infection, clotting and decreased blood flow from clotting or other narrowing.  Date Last Reviewed: 1/1/2017  © 7696-7898 [employer]. [address] [place] [address], [place] [address]. All rights reserved. This information is not intended as a substitute for professional medical care. Always follow your healthcare professional's instructions.

## 2020-05-06 NOTE — TELEPHONE ENCOUNTER
Spoke with Crescencio gallagher [facility] pharmacist he was given a verbal prescription for Renvela and Novolog insulin pen.

## 2020-05-06 NOTE — PROGRESS NOTES
Patient, Kodi Ibanez . (MRN #[id number]), presented with a recent Platelet count less than 150 K/uL consistent with the definition of thrombocytopenia (ICD10 - D69.6).    Platelets   Date Value Ref Range Status   05/01/2020 149 (L) 150 - 350 K/uL Final     The patient's thrombocytopenia was monitored, evaluated, addressed and/or treated. This addendum to the medical record is made on 05/06/2020.

## 2020-05-06 NOTE — PROGRESS NOTES
Transitional Care Note  Subjective:       Patient ID: Kodi Ibanez Sr. is a 60 y.o. male.  Chief Complaint: Hospital Follow Up    Family and/or Caretaker present at visit?  Yes.  Diagnostic tests reviewed/disposition: No diagnosic tests pending after this hospitalization.  Disease/illness education: yes  Home health/community services discussion/referrals: Patient has home health established at [facility].   Establishment or re-establishment of referral orders for community resources: No other necessary community resources.   Discussion with other health care providers: No discussion with other health care providers necessary.   60-year-old male presents to the clinic today with his wife for hospital follow-up.  He was hospitalized from 04/29/2020 through 05/02/2020 at [facility].  He presented to the emergency room with generalized weakness and was admitted for management of a STEMI.  He was started on a heparin drip and Cardiology was consulted.  He was monitored in ICU after his procedure and transferred to telemetry floor on 05/01.  PT and OT were consulted and recommended home health for family assistance.  Patient expressed no confusion on a on discharge.  He ate his full lunch and breakfast with no nausea.  He denied any nausea or abdominal pain on the day of discharge.  He was discharged in a  stable condition.  He had an angiogram with a left heart catheterization.  He had angioplasty balloon of the PCI of the circumflex per Dr. Villegas.  His troponin elevated at 25 trended down to 18.  He was treated with aspirin, Plavix, beta-blocker, and ARB.  He had been non- compliant with his dual anti-platelet therapy the past few days because of nausea and weakness according to the patient.  He has very poor dietary habits.  He is receiving OT and PT per [facility] for home health.  His blood sugars run anywhere from 172-485.  He takes Lantus anywhere from 20-26 units every night depending  on his blood sugar.  He takes NovoLog 10 units with breakfast and dinner.  He usually does not eat lunch.  His home blood pressures run in the 130s over 80s.  She does have to hold his blood pressures medication at times due to low blood pressure.  He receives dialysis on Tuesday, Thursdays, and Saturdays.  He is currently taking Flagyl 3 times a day and receiving Cefazolin the 2 g IV with dialysis on Tuesdays, Thursdays, since therapies for an infected left upper arm fistula. He currently has a Vas-Cath in his right upper chest wall that they are using for dialysis. He is being followed by Dr. Lopez. He is due to see Dr. Villegas on 5/18/2010. The wife refuses to bring him to the eye doctor because she says it is not in the budget.  He denies any cardiac chest pain, heart palpitations, shortness of breath, or swelling to lower extremities.  He denies any headaches, dizziness, or blurred vision.  He does complain of generalized weakness.    Review of Systems   Constitutional: Positive for fatigue. Negative for activity change.   Respiratory: Negative for cough, chest tightness, shortness of breath and wheezing.    Cardiovascular: Negative for chest pain, palpitations and leg swelling.   Gastrointestinal: Negative for abdominal pain, diarrhea, nausea and vomiting.   Musculoskeletal: Positive for gait problem. Negative for back pain.        Walks with a rolator   Skin: Negative for color change.   Neurological: Positive for weakness. Negative for dizziness, light-headedness and headaches.       Objective:      Physical Exam   Constitutional: He is oriented to person, place, and time. No distress.   Very thin pleasant sick appearing male in NAD    Cardiovascular: Normal rate, regular rhythm and normal heart sounds. Exam reveals no gallop and no friction rub.   No murmur heard.  Pulmonary/Chest: Effort normal and breath sounds normal. No respiratory distress. He has no rales.   Abdominal: Soft. Bowel sounds are normal.  There is no tenderness.   Musculoskeletal: Normal range of motion. He exhibits no edema.   AV shunt right upper extremity and vas cath right upper chest wall    Neurological: He is alert and oriented to person, place, and time.   Skin: Skin is warm and dry. He is not diaphoretic.   Skin very drn        Assessment:       1. Hospital discharge follow-up    2. History of MI (myocardial infarction)    3. Snoring    4. Anemia in ESRD (end-stage renal disease)    5. Atherosclerosis of aorta    6. Infection of arteriovenous fistula, sequela    7. Coronary artery disease involving native coronary artery of native heart with angina pectoris    8. Cardiomyopathy, unspecified type    9. Chronic diastolic congestive heart failure    10. Cognitive deficits as late effect of cerebrovascular disease    11. Complication of arteriovenous dialysis fistula, initial encounter    12. Debility    13. Dependent on walker for ambulation    14. ESRD on hemodialysis    15. Essential hypertension    16. History of stroke    17. Hypertension, unspecified type    18. Hyperlipidemia LDL goal <100    19. Hypertensive retinopathy of both eyes    20. Long-term insulin use    21. NSVT (nonsustained ventricular tachycardia)    22. Prostate cancer    23. PVD (peripheral vascular disease)    24. Renovascular hypertension    25. Secondary renal hyperparathyroidism    26. Uncontrolled type 2 diabetes mellitus with both eyes affected by proliferative retinopathy and macular edema, with long-term current use of insulin    27. Seizure disorder    28. Uncontrolled type 2 diabetes mellitus with end-stage renal disease    29. Uncontrolled type 2 diabetes mellitus with right eye affected by severe nonproliferative retinopathy and macular edema, with long-term current use of insulin    30. Vascular dementia with depressed mood    31. Type 2 diabetes, uncontrolled, with retinopathy    32. Failure to thrive in adult        Plan:         Hospital discharge  follow-up  - follow up with DR. Villegas on 5/18/2020 as scheduled     History of MI (myocardial infarction)  - s/p angioplasty 4/29/2020    Snoring  -     Ambulatory referral/consult to Pulmonology; Future; Expected date: 05/13/2020    Anemia in ESRD (end-stage renal disease)  -     Comprehensive metabolic panel; Future; Expected date: 05/06/2020  -     PTH, intact; Future; Expected date: 05/06/2020    Atherosclerosis of aorta  - Stable / Asymptomatic is on blood pressure and cholesterol lowering medications    Infection of arteriovenous fistula, sequela  - on Flagyl and Cefazolin    Coronary artery disease involving native coronary artery of native heart with angina pectoris  -     Lipid Panel; Future; Expected date: 05/06/2020  - followed by Dr. Villegas     Cardiomyopathy, unspecified type  - followed by Dr. Villegas    Chronic diastolic congestive heart failure  - The current medical regimen is effective;  continue present plan and medications.    Cognitive deficits as late effect of cerebrovascular disease  - stable    Complication of arteriovenous dialysis fistula, initial encounter  - on Flagyl and Cefazolin     Debility  - receiving PT/PT per [facility]     Dependent on walker for ambulation  - uses rolator     ESRD on hemodialysis  - goes to dialysis on Tuesdays, Thursdays, and Saturdays.    Essential hypertension  - The current medical regimen is effective;  continue present plan and medications.    History of stroke    Hypertension, unspecified type    Hyperlipidemia LDL goal <100    Hypertensive retinopathy of both eyes    Long-term insulin use    NSVT (nonsustained ventricular tachycardia)    Prostate cancer    PVD (peripheral vascular disease)    Renovascular hypertension    Secondary renal hyperparathyroidism    Uncontrolled type 2 diabetes mellitus with both eyes affected by proliferative retinopathy and macular edema, with long-term current use of insulin  - continue Lanutus 20-26 units every hs  depending on blood sugar  - continue Novolin 10 units with breakfast and dinner    Seizure disorder  - last seizure 2014    Uncontrolled type 2 diabetes mellitus with end-stage renal disease  -     Hemoglobin A1C; Future; Expected date: 05/06/2020    Uncontrolled type 2 diabetes mellitus with right eye affected by severe nonproliferative retinopathy and macular edema, with long-term current use of insulin  - wife refuses to take him for routine yearly eye exam due to budget.    Vascular dementia with depressed mood  - stable    Type 2 diabetes, uncontrolled, with retinopathy  -     Discontinue: insulin aspart U-100 (NOVOLOG FLEXPEN U-100 INSULIN) 100 unit/mL (3 mL) InPn pen; Inject 10 Units into the skin 2 (two) times daily with meals.  Dispense: 15 mL; Refill: 1  -     Discontinue: insulin aspart U-100 (NOVOLOG FLEXPEN U-100 INSULIN) 100 unit/mL (3 mL) InPn pen; Inject 10 Units into the skin 2 (two) times daily with meals.  Dispense: 15 mL; Refill: 1  -     insulin aspart U-100 (NOVOLOG FLEXPEN U-100 INSULIN) 100 unit/mL (3 mL) InPn pen; Inject 10 Units into the skin 2 (two) times daily with meals.  Dispense: 15 mL; Refill: 1    Failure to thrive in adult  - stressed the importance of eating regularly and avoiding sweets     Other orders  -     Discontinue: sevelamer carbonate (RENVELA) 800 mg Tab; Take 2 tablets (1,600 mg total) by mouth 3 (three) times daily with meals.  Dispense: 540 tablet; Refill: 1  -     Discontinue: sevelamer carbonate (RENVELA) 800 mg Tab; Take 2 tablets (1,600 mg total) by mouth 3 (three) times daily with meals.  Dispense: 540 tablet; Refill: 1  -     sevelamer carbonate (RENVELA) 800 mg Tab; Take 2 tablets (1,600 mg total) by mouth 3 (three) times daily with meals.  Dispense: 540 tablet; Refill: 1

## 2020-05-06 NOTE — PATIENT INSTRUCTIONS
Watch sugar in diet closely  Continue current insulin doses  Fasting labs in 3 months with one week follow up with Dr. Elias

## 2020-05-06 NOTE — PROGRESS NOTES
"Subjective:       Patient ID: Kodi Ibanez Sr. is a 60 y.o. male.    Chief Complaint: Hospital Follow Up    HPI  Past Medical History:   Diagnosis Date    Atherosclerosis of aorta     noted on VIKTORIA 2011    Blood transfusion     Cataracts, bilateral     CHF (congestive heart failure)     Clotting disorder     Cognitive deficits as late effect of cerebrovascular disease 6/26/2012    Coronary artery disease     NSTEMI s/p PCI with FERCHO to LCx (12/16/19),     Decreased appetite     DM (diabetes mellitus), type 2 with renal complications     Diabetic retinopathy    ESRD on hemodialysis     TUESDAY/ THURSDAY/SATURDAY    Hyperlipidemia     Hyperparathyroidism, unspecified     Hypertension     Prostate cancer 2016 7/16/19:  wife states "he doesn't have it anymore"    Seizures     Stroke 2011    PT HAS SHAKES     Unsteady gait     Uses roller walker     Past Surgical History:   Procedure Laterality Date    CARDIAC SURGERY      CABG    CATARACT EXTRACTION      ou    CATARACT EXTRACTION, BILATERAL      cataracts      CORONARY ANGIOGRAPHY INCLUDING BYPASS GRAFTS WITH CATHETERIZATION OF LEFT HEART N/A 12/16/2019    Procedure: ANGIOGRAM, CORONARY, INCLUDING BYPASS GRAFT, WITH LEFT HEART CATHETERIZATION, rcfa, 5fr;  Surgeon: David Villegas MD;  Location: [facility] CATH LAB;  Service: Cardiology;  Laterality: N/A;    CORONARY ANGIOGRAPHY INCLUDING BYPASS GRAFTS WITH CATHETERIZATION OF LEFT HEART N/A 4/30/2020    Procedure: ANGIOGRAM, CORONARY, INCLUDING BYPASS GRAFT, WITH LEFT HEART CATHETERIZATION, rcfa, 10 am start;  Surgeon: David Villegas MD;  Location: [facility] CATH LAB;  Service: Cardiology;  Laterality: N/A;    CORONARY ARTERY BYPASS GRAFT  2009    bypass    EYE SURGERY      Left Guera AVF  1/11/2012    PROSTATE BIOPSY      positive    retinal laser      REVISION OF ARTERIOVENOUS FISTULA Left 2/27/2020    Procedure: Ligation of left upper extremity fistula, Closure of left upper extremity skin defect " x 2, Intraoperative ultrasound ;  Surgeon: Stephane Lopez MD;  Location: [facility] OR;  Service: Vascular;  Laterality: Left;    REVISION OF ARTERIOVENOUS FISTULA Left 4/10/2020    Procedure: AV fistula excision;  Surgeon: Stephane Lopez MD;  Location: [facility] OR 2ND FLR;  Service: Vascular;  Laterality: Left;    ROTATOR CUFF REPAIR  right, 2005    ULTRASOUND GUIDANCE  12/16/2019    Procedure: Ultrasound Guidance;  Surgeon: David Villegas MD;  Location: [facility] CATH LAB;  Service: Cardiology;;    VASCULAR SURGERY        reports that he has never smoked. He has never used smokeless tobacco. He reports that he does not drink alcohol or use drugs.  Review of Systems    Objective:      Physical Exam    Assessment:       1. Hospital discharge follow-up    2. History of MI (myocardial infarction)    3. Snoring    4. Anemia in ESRD (end-stage renal disease)    5. Atherosclerosis of aorta    6. Infection of arteriovenous fistula, sequela    7. Coronary artery disease involving native coronary artery of native heart with angina pectoris    8. Cardiomyopathy, unspecified type    9. Chronic diastolic congestive heart failure    10. Cognitive deficits as late effect of cerebrovascular disease    11. Complication of arteriovenous dialysis fistula, initial encounter    12. Debility    13. Dependent on walker for ambulation    14. ESRD on hemodialysis    15. Essential hypertension    16. History of stroke    17. Hypertension, unspecified type    18. Hyperlipidemia LDL goal <100    19. Hypertensive retinopathy of both eyes    20. Long-term insulin use    21. NSVT (nonsustained ventricular tachycardia)    22. Prostate cancer    23. PVD (peripheral vascular disease)    24. Renovascular hypertension    25. Secondary renal hyperparathyroidism    26. Uncontrolled type 2 diabetes mellitus with both eyes affected by proliferative retinopathy and macular edema, with long-term current use of insulin    27. Seizure disorder    28.  Uncontrolled type 2 diabetes mellitus with end-stage renal disease    29. Uncontrolled type 2 diabetes mellitus with right eye affected by severe nonproliferative retinopathy and macular edema, with long-term current use of insulin    30. Vascular dementia with depressed mood    31. Type 2 diabetes, uncontrolled, with retinopathy    32. Failure to thrive in adult        Plan:       ***    No follow-ups on file.

## 2020-05-11 NOTE — TELEPHONE ENCOUNTER
----- Message from Jesus Ward PA-C sent at 5/11/2020  2:18 PM CDT -----  Can we get pt setup for ID f/u this weeks?

## 2020-05-11 NOTE — TELEPHONE ENCOUNTER
Called patient to set up follow up appt. There was no answer. Left a message on voicemail. Informing patient to call back and schedule appt.

## 2020-05-12 NOTE — TELEPHONE ENCOUNTER
Call to wife and asked what medication she was talking about. She stated the antibiotics. Explained that Dr. Lopez usually doesn't manage that, explained infectious disease does. She stated she had talked to them and they stated to stop taking it and her  has a appointment with them tomorrow to discuss. Explained that she should follow whatever directions they give her. She stated understanding.

## 2020-05-12 NOTE — TELEPHONE ENCOUNTER
----- Message from Shasha Solis sent at 5/11/2020 11:33 AM CDT -----  Contact: Valerie ([facility])  Name of Who is Calling: Valerie ([facility])    What is the request in detail: Patient reported he was discharged from ER, he was seen for elevated blood pressure and blood sugar levels. Patient is constipated and has not have a bowel movement since Saturday. Please contact to further discuss and advise      What Number to Call Back: [phone] (if no answer contact the patient's wife)

## 2020-05-12 NOTE — TELEPHONE ENCOUNTER
----- Message from Cecelia Fieldfield sent at 5/12/2020  4:00 PM CDT -----  Contact: Yaima (Wife)  Name of Who is Calling: Yaima (Wife)      What is the request in detail: Would like to speak to staff in regards to the patient being admitted to the ER due to the medication he was prescribed by Dr. Lopez. Please advise.       Can the clinic reply by [facility]: No      What Number to Call Back if not in [facility]: [phone]

## 2020-05-12 NOTE — TELEPHONE ENCOUNTER
Spoke with pt's wife states pt was back at ER being evaluated.States pt heart rate was elevated and he was vomiting.Pt's wife disconnected call after stating she was on ervin way back to the hospital and had not time to talk to us.

## 2020-05-13 PROBLEM — A41.9 SEPSIS: Status: ACTIVE | Noted: 2020-01-01

## 2020-05-13 PROBLEM — R57.9 SHOCK: Status: ACTIVE | Noted: 2020-01-01

## 2020-05-13 PROBLEM — I48.91 ATRIAL FIBRILLATION: Status: ACTIVE | Noted: 2020-01-01

## 2020-05-13 NOTE — ED PROVIDER NOTES
Encounter Date: 5/13/2020       History     Chief Complaint   Patient presents with    Altered Mental Status     worse today; elevated WBC; was at [facility] yesterday; dialysis pt; sugar read high at home     61 y/o male with history of Coronary artery disease with recent non STEMI, CHF, diabetes, end-stage renal disease on dialysis Tuesday Thursday Saturday (last dialyzed yesterday morning) presents to the ER by referral from infectious disease clinic after virtual visit today.  ID provider was concerned about patients AMS and recent elevated WBC count. Patient is currently on Flagyl, recently admitted 4/9/2020 and treated for an infected left biceps AV graft that was surgically removed.  His dialysis access is now a right tunneled subclavian Luis catheter.  Patients wife says that he has not been eating well over the last 2-3 weeks, he is weak and developed altered mental status today.  Patient had 2 episodes of vomiting last night and 2 episodes of vomiting today, which appeared brown - non bloody. She is concerned that he is dehydration. He had a brown watery bowel movement today.  Patient urinates 2-3 x per week.    Patient was seen in the ER due to palpitations after dialysis, patient and his wife preferred to return home after evaluation so the patient was discharged.  Wife is now concerned that he is too weak to care for himself at home.  She denies fever.  She denies drainage, swelling or redness around dialysis site.  They have no other complaints at this time.              Review of patient's allergies indicates:   Allergen Reactions    Reglan [metoclopramide hcl] Diarrhea    Lorazepam      Other reaction(s): heavy sedation     Past Medical History:   Diagnosis Date    Atherosclerosis of aorta     noted on VIKTORIA 2011    Blood transfusion     Cataracts, bilateral     CHF (congestive heart failure)     Clotting disorder     Cognitive deficits as late effect of cerebrovascular disease 6/26/2012     "Coronary artery disease     NSTEMI s/p PCI with FERCHO to LCx (12/16/19),     Decreased appetite     DM (diabetes mellitus), type 2 with renal complications     Diabetic retinopathy    ESRD on hemodialysis     TUESDAY/ THURSDAY/SATURDAY    Hyperlipidemia     Hyperparathyroidism, unspecified     Hypertension     Prostate cancer 2016 7/16/19:  wife states "he doesn't have it anymore"    Seizures     Stroke 2011    PT HAS SHAKES     Unsteady gait     Uses roller walker     Past Surgical History:   Procedure Laterality Date    CARDIAC SURGERY      CABG    CATARACT EXTRACTION      ou    CATARACT EXTRACTION, BILATERAL      cataracts      CORONARY ANGIOGRAPHY INCLUDING BYPASS GRAFTS WITH CATHETERIZATION OF LEFT HEART N/A 12/16/2019    Procedure: ANGIOGRAM, CORONARY, INCLUDING BYPASS GRAFT, WITH LEFT HEART CATHETERIZATION, rcfa, 5fr;  Surgeon: David Villegas MD;  Location: [facility] CATH LAB;  Service: Cardiology;  Laterality: N/A;    CORONARY ANGIOGRAPHY INCLUDING BYPASS GRAFTS WITH CATHETERIZATION OF LEFT HEART N/A 4/30/2020    Procedure: ANGIOGRAM, CORONARY, INCLUDING BYPASS GRAFT, WITH LEFT HEART CATHETERIZATION, rcfa, 10 am start;  Surgeon: David Villegas MD;  Location: [facility] CATH LAB;  Service: Cardiology;  Laterality: N/A;    CORONARY ARTERY BYPASS GRAFT  2009    bypass    EYE SURGERY      Left Guera AVF  1/11/2012    PROSTATE BIOPSY      positive    retinal laser      REVISION OF ARTERIOVENOUS FISTULA Left 2/27/2020    Procedure: Ligation of left upper extremity fistula, Closure of left upper extremity skin defect x 2, Intraoperative ultrasound ;  Surgeon: Stephane Lopez MD;  Location: [facility] OR;  Service: Vascular;  Laterality: Left;    REVISION OF ARTERIOVENOUS FISTULA Left 4/10/2020    Procedure: AV fistula excision;  Surgeon: Stephane Lopez MD;  Location: [address];  Service: Vascular;  Laterality: Left;    ROTATOR CUFF REPAIR  right, 2005    ULTRASOUND GUIDANCE  12/16/2019 "    Procedure: Ultrasound Guidance;  Surgeon: David Villegas MD;  Location: [facility] CATH LAB;  Service: Cardiology;;    VASCULAR SURGERY         Social History     Tobacco Use    Smoking status: Never Smoker    Smokeless tobacco: Never Used   Substance Use Topics    Alcohol use: No    Drug use: No     Review of Systems   Constitutional: Negative for chills and fever.   HENT: Negative for sore throat.    Respiratory: Negative for cough, shortness of breath and wheezing.    Cardiovascular: Negative for chest pain.   Gastrointestinal: Positive for vomiting. Negative for abdominal pain and blood in stool.   Genitourinary: Negative for dysuria.   Musculoskeletal: Negative for back pain.   Skin: Negative for rash.   Neurological: Negative for dizziness, seizures, syncope, speech difficulty, weakness and light-headedness.   Hematological: Does not bruise/bleed easily.   Psychiatric/Behavioral: Positive for confusion.       Physical Exam     Initial Vitals [05/13/20 1038]   BP Pulse Resp Temp SpO2   117/75 100 14 97.7 °F (36.5 °C) 98 %      MAP       --         Physical Exam    Nursing note and vitals reviewed.  Constitutional: He appears well-developed.   Patient is thin   HENT:   Head: Atraumatic.   Mouth/Throat: Oropharynx is clear and moist.   Eyes: Conjunctivae and EOM are normal. Pupils are equal, round, and reactive to light.   Neck: Normal range of motion. Neck supple.   Cardiovascular: Normal rate, regular rhythm and intact distal pulses.   Pulmonary/Chest: No respiratory distress. He has no wheezes. He has no rhonchi. He has no rales.   Abdominal: Soft. Bowel sounds are normal. He exhibits no distension. There is no tenderness. There is no rebound and no guarding.   Neurological: He is alert. He has normal strength.   Patient is oriented to person and place, but not time.  He is speaking in full sentences.    Skin: Capillary refill takes less than 2 seconds. No rash noted.   Psychiatric: He has a normal mood and  affect.         ED Course   Critical Care  Date/Time: 5/13/2020 2:00 PM  Performed by: Veronica Ortiz MD  Authorized by: Veronica Ortiz MD   Direct patient critical care time: 15 minutes  Additional history critical care time: 10 minutes  Ordering / reviewing critical care time: 10 minutes  Documentation critical care time: 10 minutes  Total critical care time (exclusive of procedural time) : 45 minutes  Critical care time was exclusive of separately billable procedures and treating other patients and teaching time.  Critical care was necessary to treat or prevent imminent or life-threatening deterioration of the following conditions: sepsis.  Critical care was time spent personally by me on the following activities: development of treatment plan with patient or surrogate, interpretation of cardiac output measurements, examination of patient, obtaining history from patient or surrogate, evaluation of patient's response to treatment, ordering and performing treatments and interventions, ordering and review of radiographic studies, ordering and review of laboratory studies, pulse oximetry, re-evaluation of patient's condition and review of old charts.        Labs Reviewed   CBC W/ AUTO DIFFERENTIAL - Abnormal; Notable for the following components:       Result Value    WBC 15.06 (*)     RBC 3.77 (*)     Hemoglobin 10.9 (*)     Hematocrit 34.1 (*)     RDW 16.8 (*)     Immature Granulocytes 0.7 (*)     Gran # (ANC) 13.4 (*)     Immature Grans (Abs) 0.10 (*)     Lymph # 0.6 (*)     Gran% 88.8 (*)     Lymph% 3.9 (*)     All other components within normal limits   LACTIC ACID, PLASMA - Abnormal; Notable for the following components:    Lactate (Lactic Acid) 2.7 (*)     All other components within normal limits   URINALYSIS, REFLEX TO URINE CULTURE - Abnormal; Notable for the following components:    Appearance, UA Cloudy (*)     Protein, UA 2+ (*)     Glucose, UA 3+ (*)     Occult Blood UA 2+ (*)     Leukocytes, UA  3+ (*)     All other components within normal limits    Narrative:     Preferred Collection Type->Urine, Clean Catch   SEDIMENTATION RATE - Abnormal; Notable for the following components:    Sed Rate >120 (*)     All other components within normal limits   B-TYPE NATRIURETIC PEPTIDE - Abnormal; Notable for the following components:     (*)     All other components within normal limits   TROPONIN I - Abnormal; Notable for the following components:    Troponin I 0.482 (*)     All other components within normal limits   PROCALCITONIN - Abnormal; Notable for the following components:    Procalcitonin 10.98 (*)     All other components within normal limits   URINALYSIS MICROSCOPIC - Abnormal; Notable for the following components:    RBC, UA >100 (*)     WBC, UA >100 (*)     Bacteria Many (*)     Non-Squam Epith 1 (*)     All other components within normal limits    Narrative:     Preferred Collection Type->Urine, Clean Catch   ISTAT PROCEDURE - Abnormal; Notable for the following components:    POC Glucose 697 (*)     POC BUN 41 (*)     POC Creatinine 6.5 (*)     POC Sodium 131 (*)     POC TCO2 (MEASURED) 15 (*)     POC Ionized Calcium 0.88 (*)     POC Hematocrit 29 (*)     All other components within normal limits   CULTURE, BLOOD   CULTURE, BLOOD   CULTURE, URINE   BETA - HYDROXYBUTYRATE, SERUM   PROTIME-INR   PROTIME-INR   POCT GLUCOSE MONITORING CONTINUOUS   ISTAT CHEM8        ECG Results          EKG 12-lead (Final result)  Result time 05/13/20 11:08:24    Final result by Interface, Lab In [facility] (05/13/20 11:08:24)                 Narrative:    Test Reason : R41.82,    Vent. Rate : 099 BPM     Atrial Rate : 099 BPM     P-R Int : 170 ms          QRS Dur : 086 ms      QT Int : 374 ms       P-R-T Axes : 015 120 -74 degrees     QTc Int : 479 ms    Normal sinus rhythm  Probable Septal infarct (cited on or before 10-MAY-2020)  Possible  Lateral infarct (cited on or before 10-MAY-2020)  ST and T wave abnormality,  consider inferior ischemia  Abnormal ECG  When compared with ECG of 12-MAY-2020 09:56,  Vent. rate has decreased BY  61 BPM    Confirmed by Calvin CORTES MD ([id number]) on 5/13/2020 11:08:15 AM    Referred By: [email or web address]   SELF           Confirmed By:Calvin CORTES MD                            Imaging Results          X-Ray Chest AP Portable (Final result)  Result time 05/13/20 18:13:12    Final result by Noah Wayne MD (05/13/20 18:13:12)                 Impression:      As above      Electronically signed by: Noah Wayne MD  Date:    05/13/2020  Time:    18:13             Narrative:    EXAMINATION:  XR CHEST AP PORTABLE    CLINICAL HISTORY:  Encounter for adjustment and management of vascular access device    TECHNIQUE:  Single frontal view of the chest was performed.    COMPARISON:  05/13/2020    FINDINGS:  Right central venous catheter tip projects distal SVC junction.  Left central venous catheter tip crosses midline, and projects in the region of the right subclavian, repositioning is recommended.  There is no pneumothorax or significant interval detrimental change in the cardiopulmonary status as compared to the previous exam.                               X-Ray Chest AP Portable (Final result)  Result time 05/13/20 12:54:09    Final result by Jesus Perry MD (05/13/20 12:54:09)                 Impression:      No acute radiographic findings.  No significant detrimental change from prior exam.      Electronically signed by: Jesus Perry MD  Date:    05/13/2020  Time:    12:54             Narrative:    EXAMINATION:  XR CHEST AP PORTABLE    CLINICAL HISTORY:  Sepsis;    TECHNIQUE:  Single frontal view of the chest was performed.    COMPARISON:  05/12/2020    FINDINGS:  Lungs are symmetrically expanded without evidence of significant focal consolidation, large effusion or pneumothorax.  Right IJ tunneled central venous catheter with tip projected over the SVC.  Cardiomediastinal silhouette and pulmonary  vascularity are within normal limits.  There are sternotomy wires and evidence of prior CABG.  Bones show no acute abnormalities.                                       APC / Resident Notes:   Patient presents to the ER with his wife for evaluation due to AMS, increased weakness for 2-3 weeks.  He is currently on Flagyl for recently infected dialysis graft.  Patient seen infectious disease clinic by virtual visit today.  Patient was advised to come to the ER for evaluation, the PA advised that he discontinue Flagyl due to possible medication side effects.    With recent tachycardia, leukocytosis, and concern for sepsis infection, id has recommended vancomycin and Zosyn.  Will obtain blood work, blood cultures, begin antibiotics, and give gentle IV hydration due to history of CHF and dialysis. BP is 117/75, patient denies SOB.    Patient had negative COVID testing yesterday.  Glucose is again elevated >500 with negative beta hydroxybutyrate. Will give 4 units IV regular insulin and continue to monitor.    Patient lactic acid elevated to 2.7, ESR >120, procalcitonin 10.98, WBC count 15.06 concerning for bacterial infection and sepsis.   While I was in the room with the patient and wife discussing admission, patient's blood pressure decreased to 67/43, then 84/46. Patient continued to deny any complaints.   I discussed the patients abnormal vital signs and presentation with Dr. Up who assumed care as supervising MD at 2 pm.  I will give additional 500 cc fluid bolus and Dr. Up has advised levophed infusion to maintain MAP >65.    Patient has received 1 liter of fluids and 30 minutes of levophed without significant improvement of blood pressure.  Will titrate levophed and contact Critical care team.    Patient has been evaluated in the ED by Critical care team.   Critical care physician, Dr. Lopez has evaluated the patient in the ER and placed admission orders.     They have requested Central Line placement.   ER resident, Dr. Jennifer Plascencia has placed central line in the ED.    After ICU admission patients istat results with glucose of 697, lactate 3.6 (increased from 2.7).  ICU fellow states that he will give additional insulin.  Patient transferred to ICU at 5:56 pm.  Blood pressure 101/63, O2 sat 96% prior to transfer.  patient denies SOB, he is resting comfortable.    I discussed the care of this patient with Dr. Ortiz initially.  Supervising physician transferred to Dr. Up at 2 pm - further treatment and admission discussed with Dr. Up after 2 pm.    Critical care procedure note will be placed by Dr. Up.            Attending Attestation:     Physician Attestation Statement for NP/PA:   I have conducted a face to face encounter with this patient in addition to the NP/PA, due to Medical Complexity    Other NP/PA Attestation Additions:      Medical Decision Making: Pt appears globally weakened and fatigued, given recent visits for infection and hyperglycemia would advise admission for IV abx. Pt is reluctant to stay, however I expressed my concern for poor and possibly life-threatening outcomes if he were to leave AMA. Pt has EF 30% and ESRD on HD, appears septic but VSS, will initiate cautious fluid resuscitation with initial 500 cc with goal 30 ml/kg. IV abx administered.     I discussed the patient's care with Advanced Practice Clinician, and did see this patient with the APC.  I reviewed their note and agree with the history, physical, assessment, diagnosis, treatment, and disposition plan provided by the Advanced Practice Clinician.                  ED Course as of May 13 1837   Wed May 13, 2020   1107 Patient is dialysis dependent, will provide gentle fluid resuscitation.    Will discuss antibiotics with infectious disease as advised in clinic note from today.      [LH]   1137 I discussed patients presentation with infectious disease PA, Karen Fischer.  She has reviewed patients chart  and discussed patients symptoms with wife this morning during virtual visit.  She had advised that I give 500 mg Vancomycin and 4.5 g Zosyn after blood cultures obtained.  ID will continue to follow.    Discussed with Pharmacy and ID, will change vanc dosing to 15 mg/kg loading dose.     [LH]   1455 Patient BP decreased to 67/43, then 84/46.  I discussed the patients abnormal vital signs and presentation with Dr. Up who assumed care as supervising MD at 2 pm.  I will give additional 500 cc fluid bolus and Dr. Up has advised levophed infusion to maintain MAP >65.  Patient's wife has agreed to plan for admission.  CMP and UA pending.  Repeat POCT glucose pending after insulin admin.    [LH]   1548 Patient recent MAP 61, will titrate levophed up.  I have contacted critical care to evaluate the patient for admission. They will come to the ER now to evaluate the patient.      [LH]      ED Course User Index  [LH] CELSO Shaw                Clinical Impression:       ICD-10-CM ICD-9-CM   1. Sepsis, due to unspecified organism, unspecified whether acute organ dysfunction present A41.9 038.9     995.91   2. Altered mental status R41.82 780.97   3. Hypotension, unspecified hypotension type I95.9 458.9   4. Urinary tract infection without hematuria, site unspecified N39.0 599.0   5. Sepsis A41.9 038.9     995.91   6. Encounter for central line placement Z45.2 V58.81             ED Disposition Condition    Admit                           CELSO Shaw  05/13/20 1837       Veronica Ortiz MD  05/14/20 [id number]

## 2020-05-13 NOTE — PROVIDER PROGRESS NOTES - EMERGENCY DEPT.
Central Line  Date/Time: 5/13/2020 6:06 PM  Performed by: Jennifer Plascencia MD  Supervising provider: joy slater   Indications: med administration  Preparation: skin prepped with ChloraPrep  Location details: left internal jugular  Catheter type: triple lumen  Catheter size: 7 Fr  Catheter Length: 20cm    Ultrasound guidance: yes  Vessel Caliber: large, compressibility normal  Needle advanced into vessel with real time Ultrasound guidance.  Guidewire confirmed in vessel.  Sterile sheath used.  Manometry: No   Number of attempts: 1  Assessment: placement verified by x-ray  Complications: none  Estimated blood loss (mL): 5  Post-procedure: line sutured  Complications: No

## 2020-05-13 NOTE — ED NOTES
Pt transported to ICU on tele with nurse.  Pt remains oriented to person and place.  Pt transported to ICU without difficulty.  Pt's wife updated on pt transfer to floor.

## 2020-05-13 NOTE — ED NOTES
Pt with brown soft bm to diaper.  Pt cleaned and diaper changed.  Pt able to turn self and reposition for comfort.

## 2020-05-13 NOTE — ED TRIAGE NOTES
Yesterday @ [facility], pt was tachycardic, coffee-ground emesis, weakness, diarrhea. Pt wanted to go home.     WBCs were high, needs some fluids, were advised by PCP to come in.

## 2020-05-13 NOTE — ED NOTES
Bed rails are up and call light is within patient reach. Pt is on continuous cardiac monitoring, pulse oximetry and cycling blood pressure. Will continue hourly rounding.     Pt reminded to provide a urine specimen

## 2020-05-13 NOTE — ASSESSMENT & PLAN NOTE
2/2 probable sepsis likely r/t LUE AV fistula excision. Patient also with R tunneled catheter placed 2/2020.   Patient underwent ligation of LUE fistula on 2/27/20 with Dr Lopez ([facility], vascular surgery) and had ongoing issues afterwards. Patient was admitted on 4/10 for excision of his infected LUE mid and distal AVF with cultures and specimen obtained. Surgical cultures 4/10 grew 2 different prevotella species, fusobacterium, and strep anginosus. The patient was d/c'ed with home health wound care and po augmentin/clindamycin x 10 days. On 4/20 antibiotics were changed to Flagyl 500 mg p.o. q.8 and Cefazolin after dialysis 2 g on Tuesday and Thursday, 3 g on Saturday. End date on antibiotics on 5/11.    --WBC 15, lactate 2.7, procal 10.98  --1.5 L IVF given in ED  --broad spectrum abx initiated  --pan-cx'ed, plan to f/up results  --remains on Levophed

## 2020-05-13 NOTE — PROGRESS NOTES
Subjective:      Patient ID: Kodi Ibanez Sr. is a 60 y.o. male.    The patient location is: Home  The chief complaint leading to consultation is: AVF infection, leukocytosis, altered mental status  Visit type: audiovisual  Total time spent with patient: 40    Each patient to whom he or she provides medical services by telemedicine is:  (1) informed of the relationship between the physician and patient and the respective role of any other health care provider with respect to management of the patient; and (2) notified that he or she may decline to receive medical services by telemedicine and may withdraw from such care at any time.      History of Present Illness  HPI    Kodi Ibanez is a 60 year old male with hx of CHF, CAD s/p CABG and NSTEMI s/p PCI,  DMII, ESRD on HD, HLD, HTN and prostate cancer who presents via telemed visit after recent hospitalization for AVF infection requiring excision. In April he was found to have an infected LUE mid and distal AVF with stent exposure s/p excision on 4/10/20. Surgical cultures grew Strep Anginosus, Prevotella and Fusobacterium. Blood cx were negative. ID was not consulted during this admission. He was discharged on Augmentin plus clindamycin x 10 days.    He was seen by ID on 4/20 via telemedicine visit. At that time his Augmentin and Clindamycin were discontinued and He was transitioned to Flagyl 500 mg PO TID and Cefazolin dosed after HD for an endovascular infection x 4 weeks (end date 5/11/20). His dialysis access is now and a right tunneled subclavian Luis catheter.     Since last seen by ID he has suffered from an NSTEMI s/p PCI (admit 4/29 - 5/2). Over the last few weeks he has developed poop appetite, nausea and vomiting. He has progressively felt weaker. High blood sugars noted at home. He has been seen in the ED multiple times for these complaints. Most recently was seen yesterday and noted to be hypotensive and tachycardic with a new leukocytosis, though  was discharged home. During my virtual visit with the patient and his wife he is unable to participate in our conversation. Per his wife, he has gotten significantly more lethargic and now with AMS. He is too weak to get out of bed or walk. She became tearful as she is extremely worried about her  and doesn't understand why he keeps getting discharged home in this condition. She is unable to care for him in this state of health. She denies any rigors, night sweats, cough, SOB. He has been constipated but finally had a bowel movement yesterday. He stopped taking Flagyl yesterday due to ongoing nausea resulting in vomiting.       Review of Systems   Constitution: Positive for malaise/fatigue. Negative for chills, fever and night sweats.   HENT: Negative for congestion and sore throat.    Cardiovascular: Positive for palpitations. Negative for chest pain and leg swelling.   Respiratory: Negative for cough, shortness of breath and sputum production.    Skin: Negative for color change, dry skin and itching.   Musculoskeletal: Negative for back pain, joint pain and joint swelling.   Gastrointestinal: Positive for constipation, nausea and vomiting. Negative for abdominal pain, diarrhea and heartburn.   Genitourinary: Negative for dysuria and hematuria.   Neurological: Positive for difficulty with concentration and weakness. Negative for dizziness.   Psychiatric/Behavioral: Positive for altered mental status. The patient is not nervous/anxious.      Objective:   There were no vitals filed for this visit.  Physical Exam   Constitutional: He appears well-developed. He appears lethargic. He appears ill. No distress.   HENT:   Head: Normocephalic and atraumatic.   Pulmonary/Chest: No respiratory distress.   Neurological: He appears lethargic.   Appears lethargic. Unable to answer questions 2/2 AMS   Skin: Skin is warm and dry. No rash noted.   Incision site healing well                Assessment:       1. Infection of  arteriovenous fistula, subsequent encounter    2. Altered mental status, unspecified altered mental status type    3. Leukocytosis, unspecified type          Plan:   - Patient with worsening lethargy and AMS. He is unable to get out of bed or ambulate. He is unable to participate in our virtual visit today. Discussed medical issues and concerns with his wife.   - He was seen yesterday in the ED and noted to be hypotensive, tachycardic, hyperglcemic with a new leukocytosis, though was discharged home.  - Advised patient's wife to bring him back to the ED for evaluation. Recommend admission for further evaluation of his abnormal vital signs, leukocytosis and AMS.  - Stop Flagyl given ongoing poor appetite and emesis   - Blood cultures x 2, UA with reflex to urine culture   - Check a CBC, CMP, ESR, CRP, lactic acid  - Please consult ID once admitted for antibiotic recommendations

## 2020-05-13 NOTE — PROVIDER PROGRESS NOTES - EMERGENCY DEPT.
Encounter Date: 5/13/2020    ED Physician Progress Notes        Physician Note:   Received patient at sign-out    60-year-old male with past medical history of ESRD with recent fistula infection presents with weakness, plan to be admitted.  I was informed by the advanced practice provider that the patient became hypotensive.  We initiated Levophed.  Administered 2nd 500 cc bolus.  Over time, Levophed required increasing and a 3rd 500 cc bolus was ordered.  MICU was consulted for admission.  Patient is already on broad-spectrum antibiotics.  His mentation seems to worsen.  He will be admitted to the MICU in critical condition.        Critical Care:  Date: 05/13/2020  Performed by:  Julio Up MD  Authorized by: Julio Up MD  Total critical care time (exclusive of procedural time) : 45 minutes  Critical care was necessary to treat or prevent imminent or life-threatening deterioration of the following conditions:  Septic shock

## 2020-05-13 NOTE — HPI
59 y/o male with past medical history of CHF, CAD with recent NSTEMI [~4/29] (CABG 2009, FERCHO placed in 12/2019), diabetes, CVA, ESRD on dialysis Tuesday/Thursday/Saturday (last dialyzed morning of 5/12) presents to the ER by referral from infectious disease clinic after virtual visit today. The ID provider was concerned about the patients AMS and recent elevated WBC count. Recently,junior underwent ligation of LUE fistula on 2/27/20 with Dr Lopez ([facility], vascular surgery) and had ongoing issues afterwards. Patient was admitted on 4/10 for excision of his infected LUE mid and distal AVF with cultures and specimen obtained. Surgical cultures 4/10 grew 2 different prevotella species, fusobacterium, and strep anginosus. The patient was d/c'ed with home health wound care and po augmentin/clindamycin x 10 days. On 4/20 antibiotics were changed to Flagyl 500 mg p.o. q.8 and Cefazolin after dialysis 2 g on Tuesday and Thursday, 3 g on Saturday. End date on antibiotics on 5/11.     Following these events, on 4/29 the patient was admitted for an NSTEMI, a LHC was performed where re-stenosis of a previous stent was found and balloon angioplasty was done. He was discharged on 5/2. He is now on ASA/plavix, but per wife has not been compliant.     Patients wife says that he has had poor PO intake for the last 2-3 weeks, has become progressively weak, and developed altered mental status today. Patient had 3 episodes of vomiting within the last 24 hours, which appeared brown-non bloody. He had a brown watery bowel movement today. Patient normally oliguric (urinating ~2-3 x per week). Patient was seen in [facility] ED on the evening of 5/12 due to palpitations after his normal dialysis and sent home after evaluation, for possible antibiotic side effect.     He presents to the ED with encephalopathy x 1 day, leukocytosis of 15, lactate 2.7, troponin 0.482, , procal 10.98, Cr 6.5, BUN 41, and hyperglycemia. Patient  pan-cultured, currently only UA resulted with large amount of bacteria. Patient requiring high dose Levophed on admission. Broad spectrum antibiotics initiated. 1.5 L IVF given. Patient remains on RA. Wife was updated regarding patient's current status. Patient spoken to regarding code status and verbalized his wishes as a DNR. His wife confirmed he has an LAPost but it is not on file. Instructed to bring in.

## 2020-05-13 NOTE — SUBJECTIVE & OBJECTIVE
"Past Medical History:   Diagnosis Date    Atherosclerosis of aorta     noted on VIKTORIA 2011    Blood transfusion     Cataracts, bilateral     CHF (congestive heart failure)     Clotting disorder     Cognitive deficits as late effect of cerebrovascular disease 6/26/2012    Coronary artery disease     NSTEMI s/p PCI with FERCHO to LCx (12/16/19),     Decreased appetite     DM (diabetes mellitus), type 2 with renal complications     Diabetic retinopathy    ESRD on hemodialysis     TUESDAY/ THURSDAY/SATURDAY    Hyperlipidemia     Hyperparathyroidism, unspecified     Hypertension     Prostate cancer 2016 7/16/19:  wife states "he doesn't have it anymore"    Seizures     Stroke 2011    PT HAS SHAKES     Unsteady gait     Uses roller walker       Past Surgical History:   Procedure Laterality Date    CARDIAC SURGERY      CABG    CATARACT EXTRACTION      ou    CATARACT EXTRACTION, BILATERAL      cataracts      CORONARY ANGIOGRAPHY INCLUDING BYPASS GRAFTS WITH CATHETERIZATION OF LEFT HEART N/A 12/16/2019    Procedure: ANGIOGRAM, CORONARY, INCLUDING BYPASS GRAFT, WITH LEFT HEART CATHETERIZATION, rcfa, 5fr;  Surgeon: David Villegas MD;  Location: [facility] CATH LAB;  Service: Cardiology;  Laterality: N/A;    CORONARY ANGIOGRAPHY INCLUDING BYPASS GRAFTS WITH CATHETERIZATION OF LEFT HEART N/A 4/30/2020    Procedure: ANGIOGRAM, CORONARY, INCLUDING BYPASS GRAFT, WITH LEFT HEART CATHETERIZATION, rcfa, 10 am start;  Surgeon: David Villegas MD;  Location: [facility] CATH LAB;  Service: Cardiology;  Laterality: N/A;    CORONARY ARTERY BYPASS GRAFT  2009    bypass    EYE SURGERY      Left Guera AVF  1/11/2012    PROSTATE BIOPSY      positive    retinal laser      REVISION OF ARTERIOVENOUS FISTULA Left 2/27/2020    Procedure: Ligation of left upper extremity fistula, Closure of left upper extremity skin defect x 2, Intraoperative ultrasound ;  Surgeon: Stephane Lopez MD;  Location: [facility] OR;  Service: Vascular;  " Laterality: Left;    REVISION OF ARTERIOVENOUS FISTULA Left 4/10/2020    Procedure: AV fistula excision;  Surgeon: Stephane Lopez MD;  [facility];  Service: Vascular;  Laterality: Left;    ROTATOR CUFF REPAIR  right, 2005    ULTRASOUND GUIDANCE  12/16/2019    Procedure: Ultrasound Guidance;  Surgeon: David Villegas MD;  Location: [facility] CATH LAB;  Service: Cardiology;;    VASCULAR SURGERY         Review of patient's allergies indicates:   Allergen Reactions    Reglan [metoclopramide hcl] Diarrhea    Lorazepam      Other reaction(s): heavy sedation       Family History     Problem Relation (Age of Onset)    Cancer Mother, Paternal Grandmother, Paternal Uncle    Cataracts Mother    Diabetes Mother    Glaucoma Mother    Heart disease Father, Maternal Grandmother    Hypertension Brother    No Known Problems Sister, Maternal Grandfather, Paternal Grandfather, Sister, Sister    Prostate cancer Brother        Tobacco Use    Smoking status: Never Smoker    Smokeless tobacco: Never Used   Substance and Sexual Activity    Alcohol use: No    Drug use: No    Sexual activity: Not Currently      Review of Systems   Constitutional: Positive for activity change and fatigue. Negative for appetite change and diaphoresis.   HENT: Negative for congestion and dental problem.    Eyes: Negative for discharge and itching.   Respiratory: Negative for apnea, cough, choking, chest tightness and shortness of breath.    Cardiovascular: Negative for chest pain and palpitations.   Gastrointestinal: Positive for nausea. Negative for abdominal distention, abdominal pain, rectal pain and vomiting.   Endocrine: Negative for cold intolerance and heat intolerance.   Genitourinary: Negative for enuresis and flank pain.   Musculoskeletal: Negative for myalgias and neck pain.   Skin: Negative for color change and wound.   Allergic/Immunologic: Negative for environmental allergies and food allergies.   Neurological: Positive  for weakness. Negative for dizziness, tremors and syncope.   Hematological: Does not bruise/bleed easily.   Psychiatric/Behavioral: Negative for decreased concentration and dysphoric mood.     Objective:     Vital Signs (Most Recent):  Temp: 97.7 °F (36.5 °C) (05/13/20 1038)  Pulse: 103 (05/13/20 1722)  Resp: 16 (05/13/20 1702)  BP: 101/63 (05/13/20 1722)  SpO2: (!) 94 % (05/13/20 1722) Vital Signs (24h Range):  Temp:  [97.7 °F (36.5 °C)] 97.7 °F (36.5 °C)  Pulse:  [] 103  Resp:  [12-24] 16  SpO2:  [92 %-98 %] 94 %  BP: ()/(43-75) 101/63   Weight: 67.1 kg (148 lb)  Body mass index is 20.64 kg/m².      Intake/Output Summary (Last 24 hours) at 5/13/2020 1757  Last data filed at 5/13/2020 1656  Gross per 24 hour   Intake 1850 ml   Output --   Net 1850 ml       Physical Exam   Constitutional: He appears well-developed and well-nourished. He has a sickly appearance.   HENT:   Head: Normocephalic and atraumatic.   Eyes: Pupils are equal, round, and reactive to light. EOM are normal.   Neck: Normal range of motion. Neck supple.   Cardiovascular: Normal rate and regular rhythm.   Pulmonary/Chest: Effort normal and breath sounds normal.   Abdominal: Soft. Bowel sounds are normal. He exhibits no distension and no ascites.   Musculoskeletal: Normal range of motion.   Neurological: He is alert. GCS eye subscore is 4. GCS verbal subscore is 5. GCS motor subscore is 6.   Skin: Skin is warm and dry.   LUE with bandage from previous AVF excision. No erythema, warmth, or tenderness noted at the site.   Psychiatric: He has a normal mood and affect.       Vents:     Lines/Drains/Airways     Central Venous Catheter Line                 Hemodialysis Catheter 03/05/20 0535 right subclavian 69 days          Drain                 Hemodialysis AV Fistula 04/01/17 0800 Left upper arm 1138 days          Peripheral Intravenous Line                 Peripheral IV - Single Lumen 05/13/20 1201 Right Hand less than 1 day          Peripheral IV - Single Lumen 05/13/20 1620 22 G Right Hand less than 1 day              Significant Labs:    CBC/Anemia Profile:  Recent Labs   Lab 05/12/20  1124 05/13/20  1202 05/13/20  1626   WBC 14.07* 15.06*  --    HGB 11.9* 10.9*  --    HCT 37.7* 34.1* 29*    280  --    MCV 89 91  --    RDW 15.9* 16.8*  --         Chemistries:  Recent Labs   Lab 05/12/20  1124      K 4.5      CO2 18*   BUN 19   CREATININE 5.4*   CALCIUM 9.3   ALBUMIN 2.8*   PROT 8.3   BILITOT 0.3   ALKPHOS 87   ALT <5*   AST 12   MG 2.1   PHOS 5.5*       All pertinent labs within the past 24 hours have been reviewed.    Significant Imaging: I have reviewed all pertinent imaging results/findings within the past 24 hours.

## 2020-05-13 NOTE — ED NOTES
IV attempt x 2 unsuccessful.  CELSO Taylor at bedside aware, will have another staff member attempt.

## 2020-05-13 NOTE — ED NOTES
Received report.  Pt sleeping, arouses to verbal stimuli.  Pt oriented to person and place, wife states this is pt's normal.  Pt denies pain at present.  Siderails up x 2.  Will continue to monitor.

## 2020-05-13 NOTE — ED NOTES
Questioned Claudia HOUSTON re: insulin administration, states ICU MD is aware of glucose and will place further insulin orders

## 2020-05-13 NOTE — ASSESSMENT & PLAN NOTE
--patient on diltiazem for rate control; currently holding in the setting of normal rate with marked hypotension  --on ASA/plavix  --Chads Vasc score 6 points  --initiated low-intensity Heparin gtt

## 2020-05-13 NOTE — PROGRESS NOTES
Pharmacokinetic Initial Assessment: IV Vancomycin    Assessment/Plan:    Intravenous Vancomycin 1000 mg was given x1 in ED on 5/13 @1330.  Pulse dosing Vanc in ESRD patient - TTHSat iHD (last HD 5/12).  Desired empiric serum trough concentration is 15 to 20 mcg/mL.  Draw vancomycin random level on 5/14 with am labs.  Pharmacy will continue to follow and monitor vancomycin.      Please contact pharmacy at extension [phone] with any questions regarding this assessment.     Thank you for the consult,   Tara Toledo       Patient brief summary:  Kodi Ibanez Sr. is a 60 y.o. male initiated on antimicrobial therapy with IV Vancomycin for treatment of suspected bacteremia/presumed sepsis    Drug Allergies:   Review of patient's allergies indicates:   Allergen Reactions    Reglan [metoclopramide hcl] Diarrhea    Lorazepam      Other reaction(s): heavy sedation       Actual Body Weight:   67.1 kg    Renal Function:   Estimated Creatinine Clearance: 13.5 mL/min (A) (based on SCr of 5.4 mg/dL (H)).,     Dialysis Method (if applicable):  Intermittent HD    CBC (last 72 hours):  Recent Labs   Lab Result Units 05/10/20  2050 05/12/20  1124 05/13/20  1202   WBC K/uL  --  14.07* 15.06*   Hemoglobin g/dL 10.2*  247* 11.9* 10.9*   Hematocrit %  --  37.7* 34.1*   Platelets K/uL  --  233 280   Gran% %  --  87.5* 88.8*   Lymph% %  --  4.1* 3.9*   Mono% %  --  7.0 6.5   Eosinophil% %  --  0.6 0.0   Basophil% %  --  0.2 0.1   Differential Method   --  Automated Automated       Metabolic Panel (last 72 hours):  Recent Labs   Lab Result Units 05/12/20  1124 05/13/20  1458   Sodium mmol/L 139  --    Potassium mmol/L 4.5  --    Chloride mmol/L 101  --    CO2 mmol/L 18*  --    Glucose mg/dL 273*  --    Glucose, UA   --  3+*   BUN, Bld mg/dL 19  --    Creatinine mg/dL 5.4*  --    Albumin g/dL 2.8*  --    Total Bilirubin mg/dL 0.3  --    Alkaline Phosphatase U/L 87  --    AST U/L 12  --    ALT U/L <5*  --    Magnesium mg/dL 2.1  --     Phosphorus mg/dL 5.5*  --        Drug levels (last 3 results):  No results for input(s): VANCOMYCINRA, VANCOMYCINPE, VANCOMYCINTR in the last 72 hours.    Microbiologic Results:  Microbiology Results (last 7 days)     Procedure Component Value Units Date/Time    Respiratory Infection Panel (PCR), Nasopharyngeal [[id number]]     Order Status:  No result Specimen:  Nasopharyngeal Swab     Urine culture [[id number]] Collected:  05/13/20 1458    Order Status:  No result Specimen:  Urine Updated:  05/13/20 1550    Blood culture x two cultures. Draw prior to antibiotics. [[id number]] Collected:  05/13/20 1202    Order Status:  Sent Specimen:  Blood from Peripheral, Antecubital, Right Updated:  05/13/20 1431    Blood culture x two cultures. Draw prior to antibiotics. [[id number]] Collected:  05/13/20 1202    Order Status:  Sent Specimen:  Blood from Peripheral, Hand, Right Updated:  05/13/20 1221

## 2020-05-13 NOTE — NURSING
Pt arrived to ICU from ER; drowsy but oriented and moved self into new bed; ekg done-afib; POCT glucose >500 x 2; labs sent; levo at 0.2-titrating down; notified CC of arrival and glucose

## 2020-05-14 NOTE — SUBJECTIVE & OBJECTIVE
"Past Medical History:   Diagnosis Date    Atherosclerosis of aorta     noted on VIKTORIA 2011    Blood transfusion     Cataracts, bilateral     CHF (congestive heart failure)     Clotting disorder     Cognitive deficits as late effect of cerebrovascular disease 6/26/2012    Coronary artery disease     NSTEMI s/p PCI with FERCHO to LCx (12/16/19),     Decreased appetite     DM (diabetes mellitus), type 2 with renal complications     Diabetic retinopathy    ESRD on hemodialysis     TUESDAY/ THURSDAY/SATURDAY    Hyperlipidemia     Hyperparathyroidism, unspecified     Hypertension     Prostate cancer 2016 7/16/19:  wife states "he doesn't have it anymore"    Seizures     Stroke 2011    PT HAS SHAKES     Unsteady gait     Uses roller walker       Past Surgical History:   Procedure Laterality Date    CARDIAC SURGERY      CABG    CATARACT EXTRACTION      ou    CATARACT EXTRACTION, BILATERAL      cataracts      CORONARY ANGIOGRAPHY INCLUDING BYPASS GRAFTS WITH CATHETERIZATION OF LEFT HEART N/A 12/16/2019    Procedure: ANGIOGRAM, CORONARY, INCLUDING BYPASS GRAFT, WITH LEFT HEART CATHETERIZATION, rcfa, 5fr;  Surgeon: David Villegas MD;  Location: [facility] CATH LAB;  Service: Cardiology;  Laterality: N/A;    CORONARY ANGIOGRAPHY INCLUDING BYPASS GRAFTS WITH CATHETERIZATION OF LEFT HEART N/A 4/30/2020    Procedure: ANGIOGRAM, CORONARY, INCLUDING BYPASS GRAFT, WITH LEFT HEART CATHETERIZATION, rcfa, 10 am start;  Surgeon: David Villegas MD;  Location: [facility] CATH LAB;  Service: Cardiology;  Laterality: N/A;    CORONARY ARTERY BYPASS GRAFT  2009    bypass    EYE SURGERY      Left Guera AVF  1/11/2012    PROSTATE BIOPSY      positive    retinal laser      REVISION OF ARTERIOVENOUS FISTULA Left 2/27/2020    Procedure: Ligation of left upper extremity fistula, Closure of left upper extremity skin defect x 2, Intraoperative ultrasound ;  Surgeon: Stephane Lopez MD;  Location: [facility] OR;  Service: Vascular;  " Laterality: Left;    REVISION OF ARTERIOVENOUS FISTULA Left 4/10/2020    Procedure: AV fistula excision;  Surgeon: Stephane Lopez MD;  [facility];  Service: Vascular;  Laterality: Left;    ROTATOR CUFF REPAIR  right, 2005    ULTRASOUND GUIDANCE  12/16/2019    Procedure: Ultrasound Guidance;  Surgeon: David Villegas MD;  Location: [facility] CATH LAB;  Service: Cardiology;;    VASCULAR SURGERY         Review of patient's allergies indicates:   Allergen Reactions    Reglan [metoclopramide hcl] Diarrhea    Lorazepam      Other reaction(s): heavy sedation     Current Facility-Administered Medications   Medication Frequency    aspirin EC tablet 81 mg Daily    atorvastatin tablet 80 mg QHS    clopidogreL tablet 75 mg Daily    dextrose 10% (D10W) Bolus PRN    glucagon (human recombinant) injection 1 mg PRN    heparin 25,000 units in dextrose 5% (100 units/ml) IV bolus from bag - ADDITIONAL PRN BOLUS - 30 units/kg PRN    heparin 25,000 units in dextrose 5% (100 units/ml) IV bolus from bag - ADDITIONAL PRN BOLUS - 60 units/kg PRN    heparin 25,000 units in dextrose 5% 250 mL (100 units/mL) infusion LOW INTENSITY nomogram - [facility] Continuous    insulin aspart U-100 pen 1-10 Units Q4H PRN    insulin detemir U-100 pen 10 Units QHS    norepinephrine 32 mg in dextrose 5 % 250 mL infusion Continuous    piperacillin-tazobactam 4.5 g in sodium chloride 0.9% 100 mL IVPB (ready to mix system) Q12H    sodium chloride 0.9% flush 10 mL PRN    vancomycin - pharmacy to dose pharmacy to manage frequency     Family History     Problem Relation (Age of Onset)    Cancer Mother, Paternal Grandmother, Paternal Uncle    Cataracts Mother    Diabetes Mother    Glaucoma Mother    Heart disease Father, Maternal Grandmother    Hypertension Brother    No Known Problems Sister, Maternal Grandfather, Paternal Grandfather, Sister, Sister    Prostate cancer Brother        Tobacco Use    Smoking status: Never Smoker     Smokeless tobacco: Never Used   Substance and Sexual Activity    Alcohol use: No    Drug use: No    Sexual activity: Not Currently     Review of Systems   Constitutional: Positive for activity change and fatigue. Negative for appetite change, diaphoresis, fever and unexpected weight change.   HENT: Negative for congestion, dental problem, trouble swallowing and voice change.    Eyes: Negative for photophobia, discharge, itching and visual disturbance.   Respiratory: Negative for apnea, cough, choking, chest tightness and shortness of breath.    Cardiovascular: Negative for chest pain, palpitations and leg swelling.   Gastrointestinal: Positive for nausea. Negative for abdominal distention, abdominal pain, blood in stool, constipation, diarrhea, rectal pain and vomiting.   Endocrine: Negative for cold intolerance, heat intolerance, polyphagia and polyuria.   Genitourinary: Negative for difficulty urinating, dysuria, enuresis, flank pain and hematuria.   Musculoskeletal: Negative for myalgias and neck pain.   Skin: Negative for color change, rash and wound.   Allergic/Immunologic: Negative for environmental allergies, food allergies and immunocompromised state.   Neurological: Positive for weakness. Negative for dizziness, tremors, seizures, syncope and facial asymmetry.   Hematological: Does not bruise/bleed easily.   Psychiatric/Behavioral: Negative for agitation, behavioral problems, confusion, decreased concentration and dysphoric mood.     Objective:     Vital Signs (Most Recent):  Temp: 98.1 °F (36.7 °C) (05/14/20 0700)  Pulse: 85 (05/14/20 0921)  Resp: 17 (05/14/20 0900)  BP: 139/65 (05/14/20 0921)  SpO2: 99 % (05/14/20 0900)  O2 Device (Oxygen Therapy): room air (05/14/20 0900) Vital Signs (24h Range):  Temp:  [97.7 °F (36.5 °C)-98.5 °F (36.9 °C)] 98.1 °F (36.7 °C)  Pulse:  [] 85  Resp:  [12-30] 17  SpO2:  [92 %-99 %] 99 %  BP: ()/(43-75) 139/65     Weight: 67.6 kg (149 lb) (05/14/20 0921)  Body  mass index is 20.78 kg/m².  Body surface area is 1.84 meters squared.    I/O last 3 completed shifts:  In: 2458 [I.V.:408; IV Piggyback:2050]  Out: -     Physical Exam   Constitutional: He appears well-developed and well-nourished. He has a sickly appearance.   HENT:   Head: Normocephalic and atraumatic.   Eyes: Pupils are equal, round, and reactive to light. EOM are normal.   Neck: Normal range of motion. Neck supple. No tracheal deviation present. No thyromegaly present.   Cardiovascular: Normal rate and regular rhythm.   Pulmonary/Chest: Effort normal and breath sounds normal. No stridor.   Abdominal: Soft. Bowel sounds are normal. He exhibits no distension and no ascites.   Musculoskeletal: Normal range of motion. He exhibits no tenderness.   Neurological: He is alert. No cranial nerve deficit. GCS eye subscore is 4. GCS verbal subscore is 5. GCS motor subscore is 6.   Skin: Skin is warm and dry.   AVF infx       Psychiatric: He has a normal mood and affect.   Nursing note and vitals reviewed.      Significant Labs:  Recent Results (from the past 24 hour(s))   POCT glucose    Collection Time: 05/13/20 12:01 PM   Result Value Ref Range    POCT Glucose >500 (H) 70 - 110 mg/dL   Blood culture x two cultures. Draw prior to antibiotics.    Collection Time: 05/13/20 12:02 PM   Result Value Ref Range    Blood Culture, Routine No Growth to date    CBC auto differential    Collection Time: 05/13/20 12:02 PM   Result Value Ref Range    WBC 15.06 (H) 3.90 - 12.70 K/uL    RBC 3.77 (L) 4.60 - 6.20 M/uL    Hemoglobin 10.9 (L) 14.0 - 18.0 g/dL    Hematocrit 34.1 (L) 40.0 - 54.0 %    Mean Corpuscular Volume 91 82 - 98 fL    Mean Corpuscular Hemoglobin 28.9 27.0 - 31.0 pg    Mean Corpuscular Hemoglobin Conc 32.0 32.0 - 36.0 g/dL    RDW 16.8 (H) 11.5 - 14.5 %    Platelets 280 150 - 350 K/uL    MPV 12.1 9.2 - 12.9 fL    Immature Granulocytes 0.7 (H) 0.0 - 0.5 %    Gran # (ANC) 13.4 (H) 1.8 - 7.7 K/uL    Immature Grans (Abs) 0.10  (H) 0.00 - 0.04 K/uL    Lymph # 0.6 (L) 1.0 - 4.8 K/uL    Mono # 1.0 0.3 - 1.0 K/uL    Eos # 0.0 0.0 - 0.5 K/uL    Baso # 0.02 0.00 - 0.20 K/uL    nRBC 0 0 /100 WBC    Gran% 88.8 (H) 38.0 - 73.0 %    Lymph% 3.9 (L) 18.0 - 48.0 %    Mono% 6.5 4.0 - 15.0 %    Eosinophil% 0.0 0.0 - 8.0 %    Basophil% 0.1 0.0 - 1.9 %    Differential Method Automated    Lactic acid, plasma #1    Collection Time: 05/13/20 12:02 PM   Result Value Ref Range    Lactate (Lactic Acid) 2.7 (H) 0.5 - 2.2 mmol/L   Sedimentation rate    Collection Time: 05/13/20 12:02 PM   Result Value Ref Range    Sed Rate >120 (H) 0 - 23 mm/Hr   Brain natriuretic peptide    Collection Time: 05/13/20 12:02 PM   Result Value Ref Range     (H) 0 - 99 pg/mL   Beta - Hydroxybutyrate, Serum    Collection Time: 05/13/20 12:02 PM   Result Value Ref Range    Beta-Hydroxybutyrate 0.2 0.0 - 0.5 mmol/L   Troponin I    Collection Time: 05/13/20 12:02 PM   Result Value Ref Range    Troponin I 0.482 (H) 0.000 - 0.026 ng/mL   Procalcitonin    Collection Time: 05/13/20 12:02 PM   Result Value Ref Range    Procalcitonin 10.98 (H) <0.25 ng/mL   POCT glucose    Collection Time: 05/13/20  2:06 PM   Result Value Ref Range    POCT Glucose >500 (H) 70 - 110 mg/dL   Urinalysis, Reflex to Urine Culture Urine, Clean Catch    Collection Time: 05/13/20  2:58 PM   Result Value Ref Range    Specimen UA Urine, Catheterized     Color, UA Maryjo Yellow, Straw, Maryjo    Appearance, UA Cloudy (A) Clear    pH, UA 7.0 5.0 - 8.0    Specific Gravity, UA 1.010 1.005 - 1.030    Protein, UA 2+ (A) Negative    Glucose, UA 3+ (A) Negative    Ketones, UA Negative Negative    Bilirubin (UA) Negative Negative    Occult Blood UA 2+ (A) Negative    Nitrite, UA Negative Negative    Leukocytes, UA 3+ (A) Negative   Urinalysis Microscopic    Collection Time: 05/13/20  2:58 PM   Result Value Ref Range    RBC, UA >100 (H) 0 - 4 /hpf    WBC, UA >100 (H) 0 - 5 /hpf    Bacteria Many (A) None-Occ /hpf    Yeast, UA  None None    Squam Epithel, UA 6 /hpf    Non-Squam Epith 1 (A) <1/hpf /hpf    Hyaline Casts, UA 0 0-1/lpf /lpf    Microscopic Comment SEE COMMENT    Lactic acid, plasma #2    Collection Time: 05/13/20  4:16 PM   Result Value Ref Range    Lactate (Lactic Acid) 3.6 (HH) 0.5 - 2.2 mmol/L   ISTAT PROCEDURE    Collection Time: 05/13/20  4:26 PM   Result Value Ref Range    POC Glucose 697 (HH) 70 - 110 mg/dL    POC BUN 41 (H) 6 - 30 mg/dL    POC Creatinine 6.5 (H) 0.5 - 1.4 mg/dL    POC Sodium 131 (L) 136 - 145 mmol/L    POC Potassium 3.9 3.5 - 5.1 mmol/L    POC Chloride 102 95 - 110 mmol/L    POC TCO2 (MEASURED) 15 (L) 23 - 29 mmol/L    POC Ionized Calcium 0.88 (L) 1.06 - 1.42 mmol/L    POC Hematocrit 29 (L) 36 - 54 %PCV    Sample LYN    COVID-19 Rapid Screening    Collection Time: 05/13/20  5:16 PM   Result Value Ref Range    SARS-CoV-2 RNA, Amplification, Qual Negative Negative   ISTAT PROCEDURE    Collection Time: 05/13/20  5:36 PM   Result Value Ref Range    POC PH 7.173 (L) 7.35 - 7.45    POC PCO2 32.4 (L) 35 - 45 mmHg    POC PO2 21 (LL) 40 - 60 mmHg    POC HCO3 11.9 (L) 24 - 28 mmol/L    POC BE -17 -2 to 2 mmol/L    POC SATURATED O2 24 (L) 95 - 100 %    POC TCO2 13 (L) 24 - 29 mmol/L    Sample VENOUS     Site Other     Allens Test N/A    Troponin I    Collection Time: 05/13/20  6:04 PM   Result Value Ref Range    Troponin I 0.425 (H) 0.000 - 0.026 ng/mL   Hemoglobin A1c    Collection Time: 05/13/20  6:04 PM   Result Value Ref Range    Hemoglobin A1C 10.3 (H) 4.0 - 5.6 %    Estimated Avg Glucose 249 (H) 68 - 131 mg/dL   Basic metabolic panel    Collection Time: 05/13/20  6:04 PM   Result Value Ref Range    Sodium 131 (L) 136 - 145 mmol/L    Potassium 3.6 3.5 - 5.1 mmol/L    Chloride 99 95 - 110 mmol/L    CO2 16 (L) 23 - 29 mmol/L    Glucose 666 (HH) 70 - 110 mg/dL    BUN, Bld 49 (H) 8 - 23 mg/dL    Creatinine 7.1 (H) 0.5 - 1.4 mg/dL    Calcium 7.9 (L) 8.7 - 10.5 mg/dL    Anion Gap 16 8 - 16 mmol/L    eGFR if African  American 8.7 (A) >60 mL/min/1.73 m^2    eGFR if non African American 7.5 (A) >60 mL/min/1.73 m^2   POCT glucose    Collection Time: 05/13/20  6:08 PM   Result Value Ref Range    POCT Glucose >500 (H) 70 - 110 mg/dL   ISTAT PROCEDURE    Collection Time: 05/13/20  6:09 PM   Result Value Ref Range    POC PH 7.328 (L) 7.35 - 7.45    POC PCO2 36.4 35 - 45 mmHg    POC PO2 26 (LL) 40 - 60 mmHg    POC HCO3 19.1 (L) 24 - 28 mmol/L    POC BE -7 -2 to 2 mmol/L    POC SATURATED O2 45 (L) 95 - 100 %    POC TCO2 20 (L) 24 - 29 mmol/L    Sample VENOUS     Site Other     Allens Test N/A     DelSys Room Air     Mode SPONT     FiO2 21     Sp02 96    POCT glucose    Collection Time: 05/13/20  6:09 PM   Result Value Ref Range    POCT Glucose >500 (H) 70 - 110 mg/dL   POCT glucose    Collection Time: 05/13/20  7:41 PM   Result Value Ref Range    POCT Glucose >500 (H) 70 - 110 mg/dL   APTT    Collection Time: 05/13/20  7:42 PM   Result Value Ref Range    aPTT 27.5 21.0 - 32.0 sec   Protime-INR    Collection Time: 05/13/20  7:42 PM   Result Value Ref Range    Prothrombin Time 11.8 9.0 - 12.5 sec    INR 1.2 0.8 - 1.2   CBC auto differential    Collection Time: 05/13/20  7:42 PM   Result Value Ref Range    WBC 10.96 3.90 - 12.70 K/uL    RBC 3.34 (L) 4.60 - 6.20 M/uL    Hemoglobin 9.4 (L) 14.0 - 18.0 g/dL    Hematocrit 29.8 (L) 40.0 - 54.0 %    Mean Corpuscular Volume 89 82 - 98 fL    Mean Corpuscular Hemoglobin 28.1 27.0 - 31.0 pg    Mean Corpuscular Hemoglobin Conc 31.5 (L) 32.0 - 36.0 g/dL    RDW 15.9 (H) 11.5 - 14.5 %    Platelets 229 150 - 350 K/uL    MPV 11.7 9.2 - 12.9 fL    Immature Granulocytes 0.7 (H) 0.0 - 0.5 %    Gran # (ANC) 9.8 (H) 1.8 - 7.7 K/uL    Immature Grans (Abs) 0.08 (H) 0.00 - 0.04 K/uL    Lymph # 0.7 (L) 1.0 - 4.8 K/uL    Mono # 0.4 0.3 - 1.0 K/uL    Eos # 0.0 0.0 - 0.5 K/uL    Baso # 0.01 0.00 - 0.20 K/uL    nRBC 0 0 /100 WBC    Gran% 89.5 (H) 38.0 - 73.0 %    Lymph% 6.0 (L) 18.0 - 48.0 %    Mono% 3.7 (L) 4.0 - 15.0  %    Eosinophil% 0.0 0.0 - 8.0 %    Basophil% 0.1 0.0 - 1.9 %    Differential Method Automated    POCT glucose    Collection Time: 05/13/20  8:59 PM   Result Value Ref Range    POCT Glucose 495 (HH) 70 - 110 mg/dL   Potassium - if not ordered in last 24 hours    Collection Time: 05/13/20  9:00 PM   Result Value Ref Range    Potassium 3.3 (L) 3.5 - 5.1 mmol/L   POCT glucose    Collection Time: 05/13/20 10:23 PM   Result Value Ref Range    POCT Glucose >500 (H) 70 - 110 mg/dL   POCT glucose    Collection Time: 05/13/20 11:16 PM   Result Value Ref Range    POCT Glucose 460 (HH) 70 - 110 mg/dL   POCT glucose    Collection Time: 05/14/20 12:13 AM   Result Value Ref Range    POCT Glucose 449 (H) 70 - 110 mg/dL   Troponin I    Collection Time: 05/14/20 12:23 AM   Result Value Ref Range    Troponin I 0.380 (H) 0.000 - 0.026 ng/mL   POCT glucose    Collection Time: 05/14/20  1:16 AM   Result Value Ref Range    POCT Glucose 330 (H) 70 - 110 mg/dL   POCT glucose    Collection Time: 05/14/20  2:17 AM   Result Value Ref Range    POCT Glucose 205 (H) 70 - 110 mg/dL   CBC auto differential    Collection Time: 05/14/20  2:23 AM   Result Value Ref Range    WBC 13.03 (H) 3.90 - 12.70 K/uL    RBC 3.44 (L) 4.60 - 6.20 M/uL    Hemoglobin 9.6 (L) 14.0 - 18.0 g/dL    Hematocrit 31.2 (L) 40.0 - 54.0 %    Mean Corpuscular Volume 91 82 - 98 fL    Mean Corpuscular Hemoglobin 27.9 27.0 - 31.0 pg    Mean Corpuscular Hemoglobin Conc 30.8 (L) 32.0 - 36.0 g/dL    RDW 15.8 (H) 11.5 - 14.5 %    Platelets 253 150 - 350 K/uL    MPV 11.4 9.2 - 12.9 fL    Immature Granulocytes 0.6 (H) 0.0 - 0.5 %    Gran # (ANC) 11.7 (H) 1.8 - 7.7 K/uL    Immature Grans (Abs) 0.08 (H) 0.00 - 0.04 K/uL    Lymph # 0.7 (L) 1.0 - 4.8 K/uL    Mono # 0.5 0.3 - 1.0 K/uL    Eos # 0.0 0.0 - 0.5 K/uL    Baso # 0.01 0.00 - 0.20 K/uL    nRBC 0 0 /100 WBC    Gran% 89.9 (H) 38.0 - 73.0 %    Lymph% 5.6 (L) 18.0 - 48.0 %    Mono% 3.6 (L) 4.0 - 15.0 %    Eosinophil% 0.2 0.0 - 8.0 %     Basophil% 0.1 0.0 - 1.9 %    Differential Method Automated    Basic metabolic panel    Collection Time: 05/14/20  2:23 AM   Result Value Ref Range    Sodium 136 136 - 145 mmol/L    Potassium 2.9 (L) 3.5 - 5.1 mmol/L    Chloride 104 95 - 110 mmol/L    CO2 15 (L) 23 - 29 mmol/L    Glucose 181 (H) 70 - 110 mg/dL    BUN, Bld 49 (H) 8 - 23 mg/dL    Creatinine 7.4 (H) 0.5 - 1.4 mg/dL    Calcium 8.0 (L) 8.7 - 10.5 mg/dL    Anion Gap 17 (H) 8 - 16 mmol/L    eGFR if African American 8.3 (A) >60 mL/min/1.73 m^2    eGFR if non  7.1 (A) >60 mL/min/1.73 m^2   Lactic Acid, Plasma    Collection Time: 05/14/20  2:23 AM   Result Value Ref Range    Lactate (Lactic Acid) 3.4 (H) 0.5 - 2.2 mmol/L   Magnesium    Collection Time: 05/14/20  2:23 AM   Result Value Ref Range    Magnesium 2.0 1.6 - 2.6 mg/dL   Phosphorus    Collection Time: 05/14/20  2:23 AM   Result Value Ref Range    Phosphorus 5.1 (H) 2.7 - 4.5 mg/dL   Vancomycin, random    Collection Time: 05/14/20  2:23 AM   Result Value Ref Range    Vancomycin, Random 15.4 Not established ug/mL   High sensitivity CRP    Collection Time: 05/14/20  2:23 AM   Result Value Ref Range    CRP, High Sensitivity 119.03 (H) 0.00 - 3.19 mg/L   APTT    Collection Time: 05/14/20  2:23 AM   Result Value Ref Range    aPTT 48.8 (H) 21.0 - 32.0 sec   CBC auto differential    Collection Time: 05/14/20  2:23 AM   Result Value Ref Range    WBC 13.03 (H) 3.90 - 12.70 K/uL    RBC 3.44 (L) 4.60 - 6.20 M/uL    Hemoglobin 9.6 (L) 14.0 - 18.0 g/dL    Hematocrit 31.2 (L) 40.0 - 54.0 %    Mean Corpuscular Volume 91 82 - 98 fL    Mean Corpuscular Hemoglobin 27.9 27.0 - 31.0 pg    Mean Corpuscular Hemoglobin Conc 30.8 (L) 32.0 - 36.0 g/dL    RDW 15.8 (H) 11.5 - 14.5 %    Platelets 253 150 - 350 K/uL    MPV 11.4 9.2 - 12.9 fL    Immature Granulocytes 0.6 (H) 0.0 - 0.5 %    Gran # (ANC) 11.7 (H) 1.8 - 7.7 K/uL    Immature Grans (Abs) 0.08 (H) 0.00 - 0.04 K/uL    Lymph # 0.7 (L) 1.0 - 4.8 K/uL    Mono  # 0.5 0.3 - 1.0 K/uL    Eos # 0.0 0.0 - 0.5 K/uL    Baso # 0.01 0.00 - 0.20 K/uL    nRBC 0 0 /100 WBC    Gran% 89.9 (H) 38.0 - 73.0 %    Lymph% 5.6 (L) 18.0 - 48.0 %    Mono% 3.6 (L) 4.0 - 15.0 %    Eosinophil% 0.2 0.0 - 8.0 %    Basophil% 0.1 0.0 - 1.9 %    Differential Method Automated    APTT    Collection Time: 05/14/20  2:23 AM   Result Value Ref Range    aPTT 48.8 (H) 21.0 - 32.0 sec   POCT glucose    Collection Time: 05/14/20  2:57 AM   Result Value Ref Range    POCT Glucose 144 (H) 70 - 110 mg/dL   ISTAT PROCEDURE    Collection Time: 05/14/20  2:59 AM   Result Value Ref Range    POC PH 7.299 (L) 7.35 - 7.45    POC PCO2 34.5 (L) 35 - 45 mmHg    POC PO2 28 (LL) 40 - 60 mmHg    POC HCO3 17.0 (L) 24 - 28 mmol/L    POC BE -9 -2 to 2 mmol/L    POC SATURATED O2 46 (L) 95 - 100 %    POC TCO2 18 (L) 24 - 29 mmol/L    Sample VENOUS     Site Other     Allens Test N/A     DelSys Room Air     Mode SPONT    POCT glucose    Collection Time: 05/14/20  4:26 AM   Result Value Ref Range    POCT Glucose 123 (H) 70 - 110 mg/dL   POCT glucose    Collection Time: 05/14/20  5:21 AM   Result Value Ref Range    POCT Glucose 121 (H) 70 - 110 mg/dL   POCT glucose    Collection Time: 05/14/20  6:07 AM   Result Value Ref Range    POCT Glucose 118 (H) 70 - 110 mg/dL   POCT glucose    Collection Time: 05/14/20  7:16 AM   Result Value Ref Range    POCT Glucose 129 (H) 70 - 110 mg/dL   APTT    Collection Time: 05/14/20  8:21 AM   Result Value Ref Range    aPTT 49.3 (H) 21.0 - 32.0 sec   POCT glucose    Collection Time: 05/14/20  8:21 AM   Result Value Ref Range    POCT Glucose 181 (H) 70 - 110 mg/dL   Echo Color Flow Doppler? Yes    Collection Time: 05/14/20  9:21 AM   Result Value Ref Range    BSA 1.84 m2    Ascending aorta 3.02 cm    STJ 3.31 cm    AV mean gradient 3 mmHg    Ao peak dori 1.05 m/s    Ao VTI 19.57 cm    IVS 1.16 (A) 0.6 - 1.1 cm    LA size 3.12 cm    Left Atrium Major Axis 4.83 cm    Left Atrium Minor Axis 4.29 cm    LVIDD  4.39 3.5 - 6.0 cm    LVIDS 3.22 2.1 - 4.0 cm    LVOT diameter 2.32 cm    LVOT peak VTI 15.44 cm    PW 1.21 (A) 0.6 - 1.1 cm    MV Peak A Tee 1.04 m/s    E wave decelartion time 201.13 msec    MV Peak E Tee 0.88 m/s    PV Peak D Tee 0.25 m/s    PV Peak S Tee 0.25 m/s    RA Major Axis 4.87 cm    RA Width 3.33 cm    RVDD 3.45 cm    Sinus 3.88 cm    TAPSE 1.49 cm    TR Max Tee 1.62 m/s    TDI LATERAL 0.09 m/s    TDI SEPTAL 0.05 m/s    LA WIDTH 3.86 cm    LV Diastolic Volume 87.11 mL    LV Systolic Volume 41.44 mL    RV S' 7.01 cm/s    LVOT peak tee 0.79 m/s    LV LATERAL E/E' RATIO 9.78 m/s    LV SEPTAL E/E' RATIO 17.60 m/s    FS 27 %    LA volume 46.52 cm3    LV mass 187.23 g    Left Ventricle Relative Wall Thickness 0.55 cm    AV valve area 3.33 cm2    AV Velocity Ratio 0.75     AV index (prosthetic) 0.79     E/A ratio 0.85     Mean e' 0.07 m/s    Pulm vein S/D ratio 1.00     LVOT area 4.2 cm2    LVOT stroke volume 65.24 cm3    AV peak gradient 4 mmHg    E/E' ratio 12.57 m/s    LV Systolic Volume Index 22.3 mL/m2    LV Diastolic Volume Index 46.81 mL/m2    LA Volume Index 25.0 mL/m2    LV Mass Index 101 g/m2    Triscuspid Valve Regurgitation Peak Gradient 10 mmHg     Microbiology Results (last 7 days)     Procedure Component Value Units Date/Time    Respiratory Infection Panel (PCR), Nasopharyngeal [[id number]] Collected:  05/13/20 2238    Order Status:  Sent Specimen:  Nasopharyngeal Swab Updated:  05/13/20 2248    Blood culture x two cultures. Draw prior to antibiotics. [[id number]] Collected:  05/13/20 1202    Order Status:  Completed Specimen:  Blood from Peripheral, Hand, Right Updated:  05/13/20 1915     Blood Culture, Routine No Growth to date    Narrative:       Aerobic and anaerobic    Respiratory Infection Panel (PCR), Nasopharyngeal [[id number]]     Order Status:  Canceled Specimen:  Nasopharyngeal Swab     Respiratory Infection Panel (PCR), Nasopharyngeal [[id number]] Collected:  05/13/20 8175    Order Status:   Canceled Specimen:  Nasopharyngeal Swab     Urine culture [[id number]] Collected:  05/13/20 1457    Order Status:  No result Specimen:  Urine Updated:  05/13/20 1550    Blood culture x two cultures. Draw prior to antibiotics. [[id number]] Collected:  05/13/20 1202    Order Status:  Sent Specimen:  Blood from Peripheral, Antecubital, Right Updated:  05/13/20 1431

## 2020-05-14 NOTE — ASSESSMENT & PLAN NOTE
-->500 on admission, 4 units aspart IV given in ED; brief IV insulin gtt initiated, currently off  --SSI prn initiated  --accuchecks q4h

## 2020-05-14 NOTE — ASSESSMENT & PLAN NOTE
Patient on HD T/Th/Sat with last session on 5/12  Tunneled catheter that was inserted in 2/2020 that is being used for access  [facility]  AV graft care by Dr. Garduno      Recommendations:    -Will set patient up for inpatient RRT given low BP's requiring pressor support.  -See attending attestation for changes or further recs  · Monitor Ins and Outs and daily weights.   · Avoid nephrotoxic agents such as NSAIDS, Gadolinium and IV Radiocontrast  · Renal Dose meds to current GFR/Creat Clereance

## 2020-05-14 NOTE — HPI
Kodi Ibanez is a 60 year old male with hx of CHF, CAD s/p CABG and NSTEMI s/p PCI,  DMII, ESRD on HD, HLD, HTN and prostate cancer who presents via telemed visit after recent hospitalization for AVF infection requiring excision. In April he was found to have an infected LUE mid and distal AVF with stent exposure s/p excision on 4/10/20. Surgical cultures grew Strep Anginosus, Prevotella and Fusobacterium. Blood cx were negative. He was discharged on Augmentin plus clindamycin x 10 days. He was seen by ID on 4/20 via telemedicine visit. At that time his Augmentin and Clindamycin were discontinued and He was transitioned to Flagyl 500 mg PO TID and Cefazolin dosed after HD for an endovascular infection x 4 weeks (end date 5/11/20). His dialysis access is now and a right tunneled subclavian Luis catheter.Since last seen by ID he has suffered from an NSTEMI s/p PCI (admit 4/29 - 5/2). Over the last few weeks he has developed poop appetite, nausea and vomiting. He has progressively felt weaker. High blood sugars noted at home. He has been seen in the ED multiple times for these complaints. Most recently was seen yesterday and noted to be hypotensive and tachycardic with a new leukocytosis, though was discharged home. During my virtual visit with the patient and his wife he is unable to participate in our conversation. Per his wife, he has gotten significantly more lethargic and now with AMS. He is too weak to get out of bed or walk. She became tearful as she is extremely worried about her  and doesn't understand why he keeps getting discharged home in this condition. She is unable to care for him in this state of health. She denies any rigors, night sweats, cough, SOB. He has been constipated but finally had a bowel movement yesterday. He stopped taking Flagyl 5/12 due to ongoing nausea resulting in vomiting.     Nephrology consulted to manage dialysis in ESRD inpatient.

## 2020-05-14 NOTE — CONSULTS
[facility]  Nephrology  Consult Note    Patient Name: Kodi Ibanez Sr.  MRN: [id number]  Admission Date: 5/13/2020  Hospital Length of Stay: 1 days  Attending Provider: Josie Thomas MD   Primary Care Physician: Melonie Elias MD  Principal Problem:Shock    Inpatient consult to Nephrology  Consult performed by: Giovanni Polo MD  Consult ordered by: Xavier Lopez NP  Reason for consult: ESRD inpatient        Subjective:     HPI: Kodi Ibanez is a 60 year old male with hx of CHF, CAD s/p CABG and NSTEMI s/p PCI,  DMII, ESRD on HD, HLD, HTN and prostate cancer who presents via telemed visit after recent hospitalization for AVF infection requiring excision. In April he was found to have an infected LUE mid and distal AVF with stent exposure s/p excision on 4/10/20. Surgical cultures grew Strep Anginosus, Prevotella and Fusobacterium. Blood cx were negative. He was discharged on Augmentin plus clindamycin x 10 days. He was seen by ID on 4/20 via telemedicine visit. At that time his Augmentin and Clindamycin were discontinued and He was transitioned to Flagyl 500 mg PO TID and Cefazolin dosed after HD for an endovascular infection x 4 weeks (end date 5/11/20). His dialysis access is now and a right tunneled subclavian Luis catheter.Since last seen by ID he has suffered from an NSTEMI s/p PCI (admit 4/29 - 5/2). Over the last few weeks he has developed poop appetite, nausea and vomiting. He has progressively felt weaker. High blood sugars noted at home. He has been seen in the ED multiple times for these complaints. Most recently was seen yesterday and noted to be hypotensive and tachycardic with a new leukocytosis, though was discharged home. During my virtual visit with the patient and his wife he is unable to participate in our conversation. Per his wife, he has gotten significantly more lethargic and now with AMS. He is too weak to get out of bed or walk. She became tearful as she is  "extremely worried about her  and doesn't understand why he keeps getting discharged home in this condition. She is unable to care for him in this state of health. She denies any rigors, night sweats, cough, SOB. He has been constipated but finally had a bowel movement yesterday. He stopped taking Flagyl 5/12 due to ongoing nausea resulting in vomiting.     Nephrology consulted to manage dialysis in ESRD inpatient.     Past Medical History:   Diagnosis Date    Atherosclerosis of aorta     noted on VIKTORIA 2011    Blood transfusion     Cataracts, bilateral     CHF (congestive heart failure)     Clotting disorder     Cognitive deficits as late effect of cerebrovascular disease 6/26/2012    Coronary artery disease     NSTEMI s/p PCI with FERCHO to LCx (12/16/19),     Decreased appetite     DM (diabetes mellitus), type 2 with renal complications     Diabetic retinopathy    ESRD on hemodialysis     TUESDAY/ THURSDAY/SATURDAY    Hyperlipidemia     Hyperparathyroidism, unspecified     Hypertension     Prostate cancer 2016 7/16/19:  wife states "he doesn't have it anymore"    Seizures     Stroke 2011    PT HAS SHAKES     Unsteady gait     Uses roller walker       Past Surgical History:   Procedure Laterality Date    CARDIAC SURGERY      CABG    CATARACT EXTRACTION      ou    CATARACT EXTRACTION, BILATERAL      cataracts      CORONARY ANGIOGRAPHY INCLUDING BYPASS GRAFTS WITH CATHETERIZATION OF LEFT HEART N/A 12/16/2019    Procedure: ANGIOGRAM, CORONARY, INCLUDING BYPASS GRAFT, WITH LEFT HEART CATHETERIZATION, rcfa, 5fr;  Surgeon: David Villegas MD;  Location: [facility] CATH LAB;  Service: Cardiology;  Laterality: N/A;    CORONARY ANGIOGRAPHY INCLUDING BYPASS GRAFTS WITH CATHETERIZATION OF LEFT HEART N/A 4/30/2020    Procedure: ANGIOGRAM, CORONARY, INCLUDING BYPASS GRAFT, WITH LEFT HEART CATHETERIZATION, rcfa, 10 am start;  Surgeon: David Villegas MD;  Location: [facility] CATH LAB;  Service: Cardiology;  " Laterality: N/A;    CORONARY ARTERY BYPASS GRAFT  2009    bypass    EYE SURGERY      Left Guera AVF  1/11/2012    PROSTATE BIOPSY      positive    retinal laser      REVISION OF ARTERIOVENOUS FISTULA Left 2/27/2020    Procedure: Ligation of left upper extremity fistula, Closure of left upper extremity skin defect x 2, Intraoperative ultrasound ;  Surgeon: Stephane Lopez MD;  Location: [facility] OR;  Service: Vascular;  Laterality: Left;    REVISION OF ARTERIOVENOUS FISTULA Left 4/10/2020    Procedure: AV fistula excision;  Surgeon: Stephane Lopez MD;  Location: [facility] OR [facility] FLR;  Service: Vascular;  Laterality: Left;    ROTATOR CUFF REPAIR  right, 2005    ULTRASOUND GUIDANCE  12/16/2019    Procedure: Ultrasound Guidance;  Surgeon: David Villegas MD;  Location: [facility] CATH LAB;  Service: Cardiology;;    VASCULAR SURGERY         Review of patient's allergies indicates:   Allergen Reactions    Reglan [metoclopramide hcl] Diarrhea    Lorazepam      Other reaction(s): heavy sedation     Current Facility-Administered Medications   Medication Frequency    aspirin EC tablet 81 mg Daily    atorvastatin tablet 80 mg QHS    clopidogreL tablet 75 mg Daily    dextrose 10% (D10W) Bolus PRN    glucagon (human recombinant) injection 1 mg PRN    heparin 25,000 units in dextrose 5% (100 units/ml) IV bolus from bag - ADDITIONAL PRN BOLUS - 30 units/kg PRN    heparin 25,000 units in dextrose 5% (100 units/ml) IV bolus from bag - ADDITIONAL PRN BOLUS - 60 units/kg PRN    heparin 25,000 units in dextrose 5% 250 mL (100 units/mL) infusion LOW INTENSITY nomogram - [facility] Continuous    insulin aspart U-100 pen 1-10 Units Q4H PRN    insulin detemir U-100 pen 10 Units QHS    norepinephrine 32 mg in dextrose 5 % 250 mL infusion Continuous    piperacillin-tazobactam 4.5 g in sodium chloride 0.9% 100 mL IVPB (ready to mix system) Q12H    sodium chloride 0.9% flush 10 mL PRN    vancomycin - pharmacy to dose pharmacy  to manage frequency     Family History     Problem Relation (Age of Onset)    Cancer Mother, Paternal Grandmother, Paternal Uncle    Cataracts Mother    Diabetes Mother    Glaucoma Mother    Heart disease Father, Maternal Grandmother    Hypertension Brother    No Known Problems Sister, Maternal Grandfather, Paternal Grandfather, Sister, Sister    Prostate cancer Brother        Tobacco Use    Smoking status: Never Smoker    Smokeless tobacco: Never Used   Substance and Sexual Activity    Alcohol use: No    Drug use: No    Sexual activity: Not Currently     Review of Systems   Constitutional: Positive for activity change and fatigue. Negative for appetite change, diaphoresis, fever and unexpected weight change.   HENT: Negative for congestion, dental problem, trouble swallowing and voice change.    Eyes: Negative for photophobia, discharge, itching and visual disturbance.   Respiratory: Negative for apnea, cough, choking, chest tightness and shortness of breath.    Cardiovascular: Negative for chest pain, palpitations and leg swelling.   Gastrointestinal: Positive for nausea. Negative for abdominal distention, abdominal pain, blood in stool, constipation, diarrhea, rectal pain and vomiting.   Endocrine: Negative for cold intolerance, heat intolerance, polyphagia and polyuria.   Genitourinary: Negative for difficulty urinating, dysuria, enuresis, flank pain and hematuria.   Musculoskeletal: Negative for myalgias and neck pain.   Skin: Negative for color change, rash and wound.   Allergic/Immunologic: Negative for environmental allergies, food allergies and immunocompromised state.   Neurological: Positive for weakness. Negative for dizziness, tremors, seizures, syncope and facial asymmetry.   Hematological: Does not bruise/bleed easily.   Psychiatric/Behavioral: Negative for agitation, behavioral problems, confusion, decreased concentration and dysphoric mood.     Objective:     Vital Signs (Most Recent):  Temp:  98.1 °F (36.7 °C) (05/14/20 0700)  Pulse: 85 (05/14/20 0921)  Resp: 17 (05/14/20 0900)  BP: 139/65 (05/14/20 0921)  SpO2: 99 % (05/14/20 0900)  O2 Device (Oxygen Therapy): room air (05/14/20 0900) Vital Signs (24h Range):  Temp:  [97.7 °F (36.5 °C)-98.5 °F (36.9 °C)] 98.1 °F (36.7 °C)  Pulse:  [] 85  Resp:  [12-30] 17  SpO2:  [92 %-99 %] 99 %  BP: ()/(43-75) 139/65     Weight: 67.6 kg (149 lb) (05/14/20 0921)  Body mass index is 20.78 kg/m².  Body surface area is 1.84 meters squared.    I/O last 3 completed shifts:  In: 2458 [I.V.:408; IV Piggyback:2050]  Out: -     Physical Exam   Constitutional: He appears well-developed and well-nourished. He has a sickly appearance.   HENT:   Head: Normocephalic and atraumatic.   Eyes: Pupils are equal, round, and reactive to light. EOM are normal.   Neck: Normal range of motion. Neck supple. No tracheal deviation present. No thyromegaly present.   Cardiovascular: Normal rate and regular rhythm.   Pulmonary/Chest: Effort normal and breath sounds normal. No stridor.   Abdominal: Soft. Bowel sounds are normal. He exhibits no distension and no ascites.   Musculoskeletal: Normal range of motion. He exhibits no tenderness.   Neurological: He is alert. No cranial nerve deficit. GCS eye subscore is 4. GCS verbal subscore is 5. GCS motor subscore is 6.   Skin: Skin is warm and dry.   AVF infx       Psychiatric: He has a normal mood and affect.   Nursing note and vitals reviewed.      Significant Labs:  Recent Results (from the past 24 hour(s))   POCT glucose    Collection Time: 05/13/20 12:01 PM   Result Value Ref Range    POCT Glucose >500 (H) 70 - 110 mg/dL   Blood culture x two cultures. Draw prior to antibiotics.    Collection Time: 05/13/20 12:02 PM   Result Value Ref Range    Blood Culture, Routine No Growth to date    CBC auto differential    Collection Time: 05/13/20 12:02 PM   Result Value Ref Range    WBC 15.06 (H) 3.90 - 12.70 K/uL    RBC 3.77 (L) 4.60 - 6.20 M/uL     Hemoglobin 10.9 (L) 14.0 - 18.0 g/dL    Hematocrit 34.1 (L) 40.0 - 54.0 %    Mean Corpuscular Volume 91 82 - 98 fL    Mean Corpuscular Hemoglobin 28.9 27.0 - 31.0 pg    Mean Corpuscular Hemoglobin Conc 32.0 32.0 - 36.0 g/dL    RDW 16.8 (H) 11.5 - 14.5 %    Platelets 280 150 - 350 K/uL    MPV 12.1 9.2 - 12.9 fL    Immature Granulocytes 0.7 (H) 0.0 - 0.5 %    Gran # (ANC) 13.4 (H) 1.8 - 7.7 K/uL    Immature Grans (Abs) 0.10 (H) 0.00 - 0.04 K/uL    Lymph # 0.6 (L) 1.0 - 4.8 K/uL    Mono # 1.0 0.3 - 1.0 K/uL    Eos # 0.0 0.0 - 0.5 K/uL    Baso # 0.02 0.00 - 0.20 K/uL    nRBC 0 0 /100 WBC    Gran% 88.8 (H) 38.0 - 73.0 %    Lymph% 3.9 (L) 18.0 - 48.0 %    Mono% 6.5 4.0 - 15.0 %    Eosinophil% 0.0 0.0 - 8.0 %    Basophil% 0.1 0.0 - 1.9 %    Differential Method Automated    Lactic acid, plasma #1    Collection Time: 05/13/20 12:02 PM   Result Value Ref Range    Lactate (Lactic Acid) 2.7 (H) 0.5 - 2.2 mmol/L   Sedimentation rate    Collection Time: 05/13/20 12:02 PM   Result Value Ref Range    Sed Rate >120 (H) 0 - 23 mm/Hr   Brain natriuretic peptide    Collection Time: 05/13/20 12:02 PM   Result Value Ref Range     (H) 0 - 99 pg/mL   Beta - Hydroxybutyrate, Serum    Collection Time: 05/13/20 12:02 PM   Result Value Ref Range    Beta-Hydroxybutyrate 0.2 0.0 - 0.5 mmol/L   Troponin I    Collection Time: 05/13/20 12:02 PM   Result Value Ref Range    Troponin I 0.482 (H) 0.000 - 0.026 ng/mL   Procalcitonin    Collection Time: 05/13/20 12:02 PM   Result Value Ref Range    Procalcitonin 10.98 (H) <0.25 ng/mL   POCT glucose    Collection Time: 05/13/20  2:06 PM   Result Value Ref Range    POCT Glucose >500 (H) 70 - 110 mg/dL   Urinalysis, Reflex to Urine Culture Urine, Clean Catch    Collection Time: 05/13/20  2:58 PM   Result Value Ref Range    Specimen UA Urine, Catheterized     Color, UA Maryjo Yellow, Straw, Maryjo    Appearance, UA Cloudy (A) Clear    pH, UA 7.0 5.0 - 8.0    Specific Gravity, UA 1.010 1.005 - 1.030     Protein, UA 2+ (A) Negative    Glucose, UA 3+ (A) Negative    Ketones, UA Negative Negative    Bilirubin (UA) Negative Negative    Occult Blood UA 2+ (A) Negative    Nitrite, UA Negative Negative    Leukocytes, UA 3+ (A) Negative   Urinalysis Microscopic    Collection Time: 05/13/20  2:58 PM   Result Value Ref Range    RBC, UA >100 (H) 0 - 4 /hpf    WBC, UA >100 (H) 0 - 5 /hpf    Bacteria Many (A) None-Occ /hpf    Yeast, UA None None    Squam Epithel, UA 6 /hpf    Non-Squam Epith 1 (A) <1/hpf /hpf    Hyaline Casts, UA 0 0-1/lpf /lpf    Microscopic Comment SEE COMMENT    Lactic acid, plasma #2    Collection Time: 05/13/20  4:16 PM   Result Value Ref Range    Lactate (Lactic Acid) 3.6 (HH) 0.5 - 2.2 mmol/L   ISTAT PROCEDURE    Collection Time: 05/13/20  4:26 PM   Result Value Ref Range    POC Glucose 697 (HH) 70 - 110 mg/dL    POC BUN 41 (H) 6 - 30 mg/dL    POC Creatinine 6.5 (H) 0.5 - 1.4 mg/dL    POC Sodium 131 (L) 136 - 145 mmol/L    POC Potassium 3.9 3.5 - 5.1 mmol/L    POC Chloride 102 95 - 110 mmol/L    POC TCO2 (MEASURED) 15 (L) 23 - 29 mmol/L    POC Ionized Calcium 0.88 (L) 1.06 - 1.42 mmol/L    POC Hematocrit 29 (L) 36 - 54 %PCV    Sample LYN    COVID-19 Rapid Screening    Collection Time: 05/13/20  5:16 PM   Result Value Ref Range    SARS-CoV-2 RNA, Amplification, Qual Negative Negative   ISTAT PROCEDURE    Collection Time: 05/13/20  5:36 PM   Result Value Ref Range    POC PH 7.173 (L) 7.35 - 7.45    POC PCO2 32.4 (L) 35 - 45 mmHg    POC PO2 21 (LL) 40 - 60 mmHg    POC HCO3 11.9 (L) 24 - 28 mmol/L    POC BE -17 -2 to 2 mmol/L    POC SATURATED O2 24 (L) 95 - 100 %    POC TCO2 13 (L) 24 - 29 mmol/L    Sample VENOUS     Site Other     Allens Test N/A    Troponin I    Collection Time: 05/13/20  6:04 PM   Result Value Ref Range    Troponin I 0.425 (H) 0.000 - 0.026 ng/mL   Hemoglobin A1c    Collection Time: 05/13/20  6:04 PM   Result Value Ref Range    Hemoglobin A1C 10.3 (H) 4.0 - 5.6 %    Estimated Avg  Glucose 249 (H) 68 - 131 mg/dL   Basic metabolic panel    Collection Time: 05/13/20  6:04 PM   Result Value Ref Range    Sodium 131 (L) 136 - 145 mmol/L    Potassium 3.6 3.5 - 5.1 mmol/L    Chloride 99 95 - 110 mmol/L    CO2 16 (L) 23 - 29 mmol/L    Glucose 666 (HH) 70 - 110 mg/dL    BUN, Bld 49 (H) 8 - 23 mg/dL    Creatinine 7.1 (H) 0.5 - 1.4 mg/dL    Calcium 7.9 (L) 8.7 - 10.5 mg/dL    Anion Gap 16 8 - 16 mmol/L    eGFR if African American 8.7 (A) >60 mL/min/1.73 m^2    eGFR if non African American 7.5 (A) >60 mL/min/1.73 m^2   POCT glucose    Collection Time: 05/13/20  6:08 PM   Result Value Ref Range    POCT Glucose >500 (H) 70 - 110 mg/dL   ISTAT PROCEDURE    Collection Time: 05/13/20  6:09 PM   Result Value Ref Range    POC PH 7.328 (L) 7.35 - 7.45    POC PCO2 36.4 35 - 45 mmHg    POC PO2 26 (LL) 40 - 60 mmHg    POC HCO3 19.1 (L) 24 - 28 mmol/L    POC BE -7 -2 to 2 mmol/L    POC SATURATED O2 45 (L) 95 - 100 %    POC TCO2 20 (L) 24 - 29 mmol/L    Sample VENOUS     Site Other     Allens Test N/A     DelSys Room Air     Mode SPONT     FiO2 21     Sp02 96    POCT glucose    Collection Time: 05/13/20  6:09 PM   Result Value Ref Range    POCT Glucose >500 (H) 70 - 110 mg/dL   POCT glucose    Collection Time: 05/13/20  7:41 PM   Result Value Ref Range    POCT Glucose >500 (H) 70 - 110 mg/dL   APTT    Collection Time: 05/13/20  7:42 PM   Result Value Ref Range    aPTT 27.5 21.0 - 32.0 sec   Protime-INR    Collection Time: 05/13/20  7:42 PM   Result Value Ref Range    Prothrombin Time 11.8 9.0 - 12.5 sec    INR 1.2 0.8 - 1.2   CBC auto differential    Collection Time: 05/13/20  7:42 PM   Result Value Ref Range    WBC 10.96 3.90 - 12.70 K/uL    RBC 3.34 (L) 4.60 - 6.20 M/uL    Hemoglobin 9.4 (L) 14.0 - 18.0 g/dL    Hematocrit 29.8 (L) 40.0 - 54.0 %    Mean Corpuscular Volume 89 82 - 98 fL    Mean Corpuscular Hemoglobin 28.1 27.0 - 31.0 pg    Mean Corpuscular Hemoglobin Conc 31.5 (L) 32.0 - 36.0 g/dL    RDW 15.9 (H) 11.5  - 14.5 %    Platelets 229 150 - 350 K/uL    MPV 11.7 9.2 - 12.9 fL    Immature Granulocytes 0.7 (H) 0.0 - 0.5 %    Gran # (ANC) 9.8 (H) 1.8 - 7.7 K/uL    Immature Grans (Abs) 0.08 (H) 0.00 - 0.04 K/uL    Lymph # 0.7 (L) 1.0 - 4.8 K/uL    Mono # 0.4 0.3 - 1.0 K/uL    Eos # 0.0 0.0 - 0.5 K/uL    Baso # 0.01 0.00 - 0.20 K/uL    nRBC 0 0 /100 WBC    Gran% 89.5 (H) 38.0 - 73.0 %    Lymph% 6.0 (L) 18.0 - 48.0 %    Mono% 3.7 (L) 4.0 - 15.0 %    Eosinophil% 0.0 0.0 - 8.0 %    Basophil% 0.1 0.0 - 1.9 %    Differential Method Automated    POCT glucose    Collection Time: 05/13/20  8:59 PM   Result Value Ref Range    POCT Glucose 495 (HH) 70 - 110 mg/dL   Potassium - if not ordered in last 24 hours    Collection Time: 05/13/20  9:00 PM   Result Value Ref Range    Potassium 3.3 (L) 3.5 - 5.1 mmol/L   POCT glucose    Collection Time: 05/13/20 10:23 PM   Result Value Ref Range    POCT Glucose >500 (H) 70 - 110 mg/dL   POCT glucose    Collection Time: 05/13/20 11:16 PM   Result Value Ref Range    POCT Glucose 460 (HH) 70 - 110 mg/dL   POCT glucose    Collection Time: 05/14/20 12:13 AM   Result Value Ref Range    POCT Glucose 449 (H) 70 - 110 mg/dL   Troponin I    Collection Time: 05/14/20 12:23 AM   Result Value Ref Range    Troponin I 0.380 (H) 0.000 - 0.026 ng/mL   POCT glucose    Collection Time: 05/14/20  1:16 AM   Result Value Ref Range    POCT Glucose 330 (H) 70 - 110 mg/dL   POCT glucose    Collection Time: 05/14/20  2:17 AM   Result Value Ref Range    POCT Glucose 205 (H) 70 - 110 mg/dL   CBC auto differential    Collection Time: 05/14/20  2:23 AM   Result Value Ref Range    WBC 13.03 (H) 3.90 - 12.70 K/uL    RBC 3.44 (L) 4.60 - 6.20 M/uL    Hemoglobin 9.6 (L) 14.0 - 18.0 g/dL    Hematocrit 31.2 (L) 40.0 - 54.0 %    Mean Corpuscular Volume 91 82 - 98 fL    Mean Corpuscular Hemoglobin 27.9 27.0 - 31.0 pg    Mean Corpuscular Hemoglobin Conc 30.8 (L) 32.0 - 36.0 g/dL    RDW 15.8 (H) 11.5 - 14.5 %    Platelets 253 150 - 350  K/uL    MPV 11.4 9.2 - 12.9 fL    Immature Granulocytes 0.6 (H) 0.0 - 0.5 %    Gran # (ANC) 11.7 (H) 1.8 - 7.7 K/uL    Immature Grans (Abs) 0.08 (H) 0.00 - 0.04 K/uL    Lymph # 0.7 (L) 1.0 - 4.8 K/uL    Mono # 0.5 0.3 - 1.0 K/uL    Eos # 0.0 0.0 - 0.5 K/uL    Baso # 0.01 0.00 - 0.20 K/uL    nRBC 0 0 /100 WBC    Gran% 89.9 (H) 38.0 - 73.0 %    Lymph% 5.6 (L) 18.0 - 48.0 %    Mono% 3.6 (L) 4.0 - 15.0 %    Eosinophil% 0.2 0.0 - 8.0 %    Basophil% 0.1 0.0 - 1.9 %    Differential Method Automated    Basic metabolic panel    Collection Time: 05/14/20  2:23 AM   Result Value Ref Range    Sodium 136 136 - 145 mmol/L    Potassium 2.9 (L) 3.5 - 5.1 mmol/L    Chloride 104 95 - 110 mmol/L    CO2 15 (L) 23 - 29 mmol/L    Glucose 181 (H) 70 - 110 mg/dL    BUN, Bld 49 (H) 8 - 23 mg/dL    Creatinine 7.4 (H) 0.5 - 1.4 mg/dL    Calcium 8.0 (L) 8.7 - 10.5 mg/dL    Anion Gap 17 (H) 8 - 16 mmol/L    eGFR if African American 8.3 (A) >60 mL/min/1.73 m^2    eGFR if non  7.1 (A) >60 mL/min/1.73 m^2   Lactic Acid, Plasma    Collection Time: 05/14/20  2:23 AM   Result Value Ref Range    Lactate (Lactic Acid) 3.4 (H) 0.5 - 2.2 mmol/L   Magnesium    Collection Time: 05/14/20  2:23 AM   Result Value Ref Range    Magnesium 2.0 1.6 - 2.6 mg/dL   Phosphorus    Collection Time: 05/14/20  2:23 AM   Result Value Ref Range    Phosphorus 5.1 (H) 2.7 - 4.5 mg/dL   Vancomycin, random    Collection Time: 05/14/20  2:23 AM   Result Value Ref Range    Vancomycin, Random 15.4 Not established ug/mL   High sensitivity CRP    Collection Time: 05/14/20  2:23 AM   Result Value Ref Range    CRP, High Sensitivity 119.03 (H) 0.00 - 3.19 mg/L   APTT    Collection Time: 05/14/20  2:23 AM   Result Value Ref Range    aPTT 48.8 (H) 21.0 - 32.0 sec   CBC auto differential    Collection Time: 05/14/20  2:23 AM   Result Value Ref Range    WBC 13.03 (H) 3.90 - 12.70 K/uL    RBC 3.44 (L) 4.60 - 6.20 M/uL    Hemoglobin 9.6 (L) 14.0 - 18.0 g/dL    Hematocrit 31.2  (L) 40.0 - 54.0 %    Mean Corpuscular Volume 91 82 - 98 fL    Mean Corpuscular Hemoglobin 27.9 27.0 - 31.0 pg    Mean Corpuscular Hemoglobin Conc 30.8 (L) 32.0 - 36.0 g/dL    RDW 15.8 (H) 11.5 - 14.5 %    Platelets 253 150 - 350 K/uL    MPV 11.4 9.2 - 12.9 fL    Immature Granulocytes 0.6 (H) 0.0 - 0.5 %    Gran # (ANC) 11.7 (H) 1.8 - 7.7 K/uL    Immature Grans (Abs) 0.08 (H) 0.00 - 0.04 K/uL    Lymph # 0.7 (L) 1.0 - 4.8 K/uL    Mono # 0.5 0.3 - 1.0 K/uL    Eos # 0.0 0.0 - 0.5 K/uL    Baso # 0.01 0.00 - 0.20 K/uL    nRBC 0 0 /100 WBC    Gran% 89.9 (H) 38.0 - 73.0 %    Lymph% 5.6 (L) 18.0 - 48.0 %    Mono% 3.6 (L) 4.0 - 15.0 %    Eosinophil% 0.2 0.0 - 8.0 %    Basophil% 0.1 0.0 - 1.9 %    Differential Method Automated    APTT    Collection Time: 05/14/20  2:23 AM   Result Value Ref Range    aPTT 48.8 (H) 21.0 - 32.0 sec   POCT glucose    Collection Time: 05/14/20  2:57 AM   Result Value Ref Range    POCT Glucose 144 (H) 70 - 110 mg/dL   ISTAT PROCEDURE    Collection Time: 05/14/20  2:59 AM   Result Value Ref Range    POC PH 7.299 (L) 7.35 - 7.45    POC PCO2 34.5 (L) 35 - 45 mmHg    POC PO2 28 (LL) 40 - 60 mmHg    POC HCO3 17.0 (L) 24 - 28 mmol/L    POC BE -9 -2 to 2 mmol/L    POC SATURATED O2 46 (L) 95 - 100 %    POC TCO2 18 (L) 24 - 29 mmol/L    Sample VENOUS     Site Other     Allens Test N/A     DelSys Room Air     Mode SPONT    POCT glucose    Collection Time: 05/14/20  4:26 AM   Result Value Ref Range    POCT Glucose 123 (H) 70 - 110 mg/dL   POCT glucose    Collection Time: 05/14/20  5:21 AM   Result Value Ref Range    POCT Glucose 121 (H) 70 - 110 mg/dL   POCT glucose    Collection Time: 05/14/20  6:07 AM   Result Value Ref Range    POCT Glucose 118 (H) 70 - 110 mg/dL   POCT glucose    Collection Time: 05/14/20  7:16 AM   Result Value Ref Range    POCT Glucose 129 (H) 70 - 110 mg/dL   APTT    Collection Time: 05/14/20  8:21 AM   Result Value Ref Range    aPTT 49.3 (H) 21.0 - 32.0 sec   POCT glucose    Collection  Time: 05/14/20  8:21 AM   Result Value Ref Range    POCT Glucose 181 (H) 70 - 110 mg/dL   Echo Color Flow Doppler? Yes    Collection Time: 05/14/20  9:21 AM   Result Value Ref Range    BSA 1.84 m2    Ascending aorta 3.02 cm    STJ 3.31 cm    AV mean gradient 3 mmHg    Ao peak tee 1.05 m/s    Ao VTI 19.57 cm    IVS 1.16 (A) 0.6 - 1.1 cm    LA size 3.12 cm    Left Atrium Major Axis 4.83 cm    Left Atrium Minor Axis 4.29 cm    LVIDD 4.39 3.5 - 6.0 cm    LVIDS 3.22 2.1 - 4.0 cm    LVOT diameter 2.32 cm    LVOT peak VTI 15.44 cm    PW 1.21 (A) 0.6 - 1.1 cm    MV Peak A Tee 1.04 m/s    E wave decelartion time 201.13 msec    MV Peak E Tee 0.88 m/s    PV Peak D Tee 0.25 m/s    PV Peak S Tee 0.25 m/s    RA Major Axis 4.87 cm    RA Width 3.33 cm    RVDD 3.45 cm    Sinus 3.88 cm    TAPSE 1.49 cm    TR Max Tee 1.62 m/s    TDI LATERAL 0.09 m/s    TDI SEPTAL 0.05 m/s    LA WIDTH 3.86 cm    LV Diastolic Volume 87.11 mL    LV Systolic Volume 41.44 mL    RV S' 7.01 cm/s    LVOT peak tee 0.79 m/s    LV LATERAL E/E' RATIO 9.78 m/s    LV SEPTAL E/E' RATIO 17.60 m/s    FS 27 %    LA volume 46.52 cm3    LV mass 187.23 g    Left Ventricle Relative Wall Thickness 0.55 cm    AV valve area 3.33 cm2    AV Velocity Ratio 0.75     AV index (prosthetic) 0.79     E/A ratio 0.85     Mean e' 0.07 m/s    Pulm vein S/D ratio 1.00     LVOT area 4.2 cm2    LVOT stroke volume 65.24 cm3    AV peak gradient 4 mmHg    E/E' ratio 12.57 m/s    LV Systolic Volume Index 22.3 mL/m2    LV Diastolic Volume Index 46.81 mL/m2    LA Volume Index 25.0 mL/m2    LV Mass Index 101 g/m2    Triscuspid Valve Regurgitation Peak Gradient 10 mmHg     Microbiology Results (last 7 days)     Procedure Component Value Units Date/Time    Respiratory Infection Panel (PCR), Nasopharyngeal [[id number]] Collected:  05/13/20 2238    Order Status:  Sent Specimen:  Nasopharyngeal Swab Updated:  05/13/20 2248    Blood culture x two cultures. Draw prior to antibiotics. [[id number]]  Collected:  05/13/20 1202    Order Status:  Completed Specimen:  Blood from Peripheral, Hand, Right Updated:  05/13/20 1915     Blood Culture, Routine No Growth to date    Narrative:       Aerobic and anaerobic    Respiratory Infection Panel (PCR), Nasopharyngeal [[id number]]     Order Status:  Canceled Specimen:  Nasopharyngeal Swab     Respiratory Infection Panel (PCR), Nasopharyngeal [[id number]] Collected:  05/13/20 1835    Order Status:  Canceled Specimen:  Nasopharyngeal Swab     Urine culture [[id number]] Collected:  05/13/20 1458    Order Status:  No result Specimen:  Urine Updated:  05/13/20 1550    Blood culture x two cultures. Draw prior to antibiotics. [[id number]] Collected:  05/13/20 1202    Order Status:  Sent Specimen:  Blood from Peripheral, Antecubital, Right Updated:  05/13/20 1431            Assessment/Plan:     * Shock  See AV fistula infx    AV fistula infection  Sx culture 4/10/20:       PREVOTELLA MELANINOGENICA      FUSOBACTERIUM NUCLEATUM      PREVOTELLA ORIS/BUCCAE       STREPTOCOCCUS ANGINOSUS   On V+Z  -Tx per ID and primary          ESRD on hemodialysis  Patient on HD T/Th/Sat with last session on 5/12  Tunneled catheter that was inserted in 2/2020 that is being used for access  [facility]  AV graft care by Dr. Garduno      Recommendations:    -Will set patient up for inpatient RRT given low BP's requiring pressor support.       Will do shorter 6 hours and check labs q2h while on SLED. HCO3 35 bath; K 4.0 bath                      (will monitor closely to not create alkalosis and shift more K into cells worsening hypoK)  -See attending attestation for changes or further recs  · Monitor Ins and Outs and daily weights.   · Avoid nephrotoxic agents such as NSAIDS, Gadolinium and IV Radiocontrast  · Renal Dose meds to current GFR/Creat Clereance          Thank you for your consult. I will follow-up with patient. Please contact us if you have any additional questions.    Giovanni Polo,  MD  Nephrology  [facility]

## 2020-05-14 NOTE — NURSING
CMICU DAILY GOALS     No acute events during shift; oriented but drowsy and slept most of the day; 6 hours of SLED this evening, tolerating well; off levo since 0900; plan to DC TLC after SLED and possible step down; updated spouse on phone     A: Awake    RASS: Goal - RASS Goal: 0-->alert and calm  Actual - RASS (Torres Agitation-Sedation Scale): 0-->alert and calm   Restraint necessity:  NO  B: Breath   SBT: Not intubated   C: Coordinate A & B, analgesics/sedatives   Pain: managed    SAT: Not intubated  D: Delirium   CAM-ICU: Overall CAM-ICU: Negative  E: Early Mobility   MOVE Screen: Pass   Activity: Activity Management: activity encouraged  FAS: Feeding/Nutrition   Diet order: Diet/Nutrition Received: consistent carb/diabetic diet,   Fluid restriction:    T: Thrombus   DVT prophylaxis: VTE Required Core Measure: (SCDs) Sequential compression device initiated/maintained, Pharmacological prophylaxis initiated/maintained  H: HOB Elevation   Head of Bed (HOB): HOB elevated  U: Ulcer Prophylaxis   GI: yes  G: Glucose control   managed Glycemic Management: blood glucose monitoring  S: Skin   Bundle compliance: yes   Bathing/Skin Care: incontinence care, bath, partial Date: 5/14  B: Bowel Function   no issues   I: Indwelling Catheters   Foster necessity:  no foster   CVC necessity: Yes   IPAD offered: Yes  D: De-escalation Antibx   No  Plan for the day   Monitor BP; SLED; step down  Family/Goals of care/Code Status   Code Status: DNR     No acute events throughout day, VS and assessment per flow sheet, patient progressing towards goals as tolerated, plan of care reviewed with Kodi Ibanez Sr. and family, all concerns addressed, will continue to monitor.

## 2020-05-14 NOTE — ASSESSMENT & PLAN NOTE
Hx of CABG 4/29 the patient was admitted for an NSTEMI, a LHC was performed where re-stenosis of a previous stent was found and balloon angioplasty was done. He is now on ASA/plavix, but per wife has not been compliant.

## 2020-05-14 NOTE — SUBJECTIVE & OBJECTIVE
Interval History/Significant Events: No acute overnight events. Patient remains on Levophed. Nephro consulted. BG now controlled.    Review of Systems   Constitutional: Positive for activity change and fatigue. Negative for appetite change and diaphoresis.   HENT: Negative for congestion and dental problem.    Eyes: Negative for discharge and itching.   Respiratory: Negative for apnea, cough, choking, chest tightness and shortness of breath.    Cardiovascular: Negative for chest pain and palpitations.   Gastrointestinal: Positive for nausea. Negative for abdominal distention, abdominal pain, rectal pain and vomiting.   Endocrine: Negative for cold intolerance and heat intolerance.   Genitourinary: Negative for enuresis and flank pain.   Musculoskeletal: Negative for myalgias and neck pain.   Skin: Negative for color change and wound.   Allergic/Immunologic: Negative for environmental allergies and food allergies.   Neurological: Positive for weakness. Negative for dizziness, tremors and syncope.   Hematological: Does not bruise/bleed easily.   Psychiatric/Behavioral: Negative for decreased concentration and dysphoric mood.     Objective:     Vital Signs (Most Recent):  Temp: 98.1 °F (36.7 °C) (05/14/20 0700)  Pulse: 88 (05/14/20 0800)  Resp: 20 (05/14/20 0800)  BP: 125/69 (05/14/20 0800)  SpO2: 96 % (05/14/20 0800) Vital Signs (24h Range):  Temp:  [97.7 °F (36.5 °C)-98.5 °F (36.9 °C)] 98.1 °F (36.7 °C)  Pulse:  [] 88  Resp:  [12-30] 20  SpO2:  [92 %-98 %] 96 %  BP: ()/(43-75) 125/69   Weight: 68 kg (149 lb 14.6 oz)  Body mass index is 20.91 kg/m².      Intake/Output Summary (Last 24 hours) at 5/14/2020 0849  Last data filed at 5/14/2020 0800  Gross per 24 hour   Intake 2532.46 ml   Output --   Net 2532.46 ml       Physical Exam   Constitutional: He appears well-developed and well-nourished. He has a sickly appearance.   HENT:   Head: Normocephalic and atraumatic.   Eyes: Pupils are equal, round, and  reactive to light. EOM are normal.   Neck: Normal range of motion. Neck supple.   Cardiovascular: An irregular rhythm present. Tachycardia present.   Pulmonary/Chest: Effort normal and breath sounds normal.   Abdominal: Soft. Bowel sounds are normal. He exhibits no distension and no ascites.   Musculoskeletal: Normal range of motion.   Neurological: He is alert. GCS eye subscore is 4. GCS verbal subscore is 5. GCS motor subscore is 6.   Skin: Skin is warm and dry.   LUE with bandage from previous AVF excision. No erythema, warmth, or tenderness noted at the site.   Psychiatric: He has a normal mood and affect.       Vents:     Lines/Drains/Airways     Central Venous Catheter Line                 Hemodialysis Catheter 03/05/20 0535 right subclavian 70 days    Percutaneous Central Line Insertion/Assessment - Triple Lumen  05/13/20 1800 left internal jugular less than 1 day          Peripheral Intravenous Line                 Peripheral IV - Single Lumen 05/13/20 1620 22 G Right Hand less than 1 day              Significant Labs:    CBC/Anemia Profile:  Recent Labs   Lab 05/13/20  1202 05/13/20  1626 05/13/20  1942 05/14/20  0223   WBC 15.06*  --  10.96 13.03*  13.03*   HGB 10.9*  --  9.4* 9.6*  9.6*   HCT 34.1* 29* 29.8* 31.2*  31.2*     --  229 253  253   MCV 91  --  89 91  91   RDW 16.8*  --  15.9* 15.8*  15.8*        Chemistries:  Recent Labs   Lab 05/12/20  1124 05/13/20  1804 05/13/20  2100 05/14/20  0223    131*  --  136   K 4.5 3.6 3.3* 2.9*    99  --  104   CO2 18* 16*  --  15*   BUN 19 49*  --  49*   CREATININE 5.4* 7.1*  --  7.4*   CALCIUM 9.3 7.9*  --  8.0*   ALBUMIN 2.8*  --   --   --    PROT 8.3  --   --   --    BILITOT 0.3  --   --   --    ALKPHOS 87  --   --   --    ALT <5*  --   --   --    AST 12  --   --   --    MG 2.1  --   --  2.0   PHOS 5.5*  --   --  5.1*       All pertinent labs within the past 24 hours have been reviewed.    Significant Imaging:  I have reviewed all  pertinent imaging results/findings within the past 24 hours.

## 2020-05-14 NOTE — PROGRESS NOTES
RD triggered for pt's A1C of 10.3.  RD attempted to educate patient, pt declined education & wouldn't engage in conversation. RD left diabetic diet paperwork at bedside, will follow-up.    Thanks!  SANJAY Muller, LDN

## 2020-05-14 NOTE — ASSESSMENT & PLAN NOTE
2/2 probable sepsis likely r/t LUE AV fistula excision. Patient also with R tunneled catheter placed 2/2020.   Patient underwent ligation of LUE fistula on 2/27/20 with Dr Lopez ([facility], vascular surgery) and had ongoing issues afterwards. Patient was admitted on 4/10 for excision of his infected LUE mid and distal AVF with cultures and specimen obtained. Surgical cultures 4/10 grew 2 different prevotella species, fusobacterium, and strep anginosus. The patient was d/c'ed with home health wound care and po augmentin/clindamycin x 10 days. On 4/20 antibiotics were changed to Flagyl 500 mg p.o. q.8 and Cefazolin after dialysis 2 g on Tuesday and Thursday, 3 g on Saturday. End date on antibiotics on 5/11.    --WBC 15, lactate 2.7, procal 10.98  --1.5 L IVF given in ED  --broad spectrum abx initiated  --pan-cx'ed, plan to f/up results  --off of vasopressors

## 2020-05-14 NOTE — PLAN OF CARE
CMICU DAILY GOALS       A: Awake    RASS: Goal -    Actual -     Restraint necessity:    B: Breath   SBT: NA   C: Coordinate A & B, analgesics/sedatives   Pain: managed    SAT: NA  D: Delirium   CAM-ICU: Overall CAM-ICU: Negative  E: Early Mobility   MOVE Screen: Pass   Activity: Activity Management: activity adjusted per tolerance  FAS: Feeding/Nutrition   Diet order: Diet/Nutrition Received: NPO,   Fluid restriction:    T: Thrombus   DVT prophylaxis: VTE Required Core Measure: (SCDs) Sequential compression device initiated/maintained, Pharmacological prophylaxis initiated/maintained  H: HOB Elevation   Head of Bed (HOB): HOB at 30 degrees  U: Ulcer Prophylaxis   GI: no  G: Glucose control   managed Glycemic Management: blood glucose monitoring  S: Skin   Bundle compliance: yes   Bathing/Skin Care: bath, chlorhexidine, bath, partial, dressed/undressed, incontinence care, linen changed Date: 5/14/20 (PM bath)  B: Bowel Function   no issues   I: Indwelling Catheters   Mayers necessity:     CVC necessity: Yes   IPAD offered: No  D: De-escalation Antibx   No  Plan for the day   Control blood glucose, titrate levo MAP >65, nephrology consult  Family/Goals of care/Code Status   Code Status: DNR     No acute events throughout day, VS and assessment per flow sheet, patient progressing towards goals as tolerated, plan of care reviewed with Kodi Ibanez Sr. and family, all concerns addressed, will continue to monitor.

## 2020-05-14 NOTE — ASSESSMENT & PLAN NOTE
Sx culture: PREVOTELLA MELANINOGENICA; FUSOBACTERIUM NUCLEATUM; PREVOTELLA (B.) ORIS/BUCCAE   On V+Z  -Tx per ID and primary

## 2020-05-14 NOTE — HOSPITAL COURSE
The patient was admitted to MICU for shock likely 2/2 to sepsis on 5/13. Patient remains on low dose Levophed. Nephrology consulted for possible dialysis. Blood cx NGTD. Patient remains on Heparin gtt for paroxysmal A fib with CHADS Vasc score of 6. Previously hyperglycemic with BG now well controlled.    5/14: Levo stopped in morning. Stable all day off pressors on room air. Zosyn changed to unasyn.     5/15: Patient spiked new temp, vanc restarted. Patient with intermittent SVT- Home coreg and dilt restarted. Step down orders placed but patient didn't get bed    5/17: Hypotension overnight requiring vasopressors. Transferred back to MICU on norepi    5/18: Norepi stopped yesterday. BP stable. Restarted home dilt. Will step down to the floor today.     5/19: Patient remained in ICU over night due to SVT requiring diltiazem drip. Will wean diltiazem drip today and transition to po meds. HD ordered for today.     5:29: Patient admitted to ICU from floor following ongoing nonsustained VT with associated hypotension. Patient was also less alert although was answering some questions appropriately. He received 2 separate doses of Lopressor IV 5mg as well as valsalva without appropriate response. Was noted to have R lower lobe rales on exam. Admitted for HD instability/control in the setting of ongoing and uncontrolled sepsis with afib and increased oxygen demands.    5/30: Patient with improved oxygen demand and hypoxia. Is following commands. Not to receive HD given lack of urgency and anemia. Will step down to hospital medicine from ICU

## 2020-05-14 NOTE — PROGRESS NOTES
Pharmacokinetic Assessment Follow Up: IV Vancomycin    Vancomycin Regimen Assessment/Plan:    - Vancomycin random level resulted as 15.4 mg/dl.  Level was drawn approximately 13 hours after the previous dose.   - ESRD patient.  Nephrology is planning for 6 hours of SLED today.   - Administer vancomycin 500 mg x 1 dose after SLED.  - A random level is ordered with morning labs tomorrow.  Pharmacy to re-dose depending on level and RRT plans.     Drug levels (last 3 results):  Recent Labs   Lab Result Units 05/14/20  0223   Vancomycin, Random ug/mL 15.4       Pharmacy will continue to follow and monitor vancomycin.    Please contact pharmacy at extension [phone] for questions regarding this assessment.    Thank you for the consult,   Ketty Hollis, PharmD, BCCCP       Patient brief summary:  Kodi Ibanez Sr. is a 60 y.o. male initiated on antimicrobial therapy with IV Vancomycin for treatment of sepsis    The patient's current regimen is pulse dosing.    Drug Allergies:   Review of patient's allergies indicates:   Allergen Reactions    Reglan [metoclopramide hcl] Diarrhea    Lorazepam      Other reaction(s): heavy sedation       Actual Body Weight:   67.6 kg    Renal Function:   Estimated Creatinine Clearance: 9.8 mL/min (A) (based on SCr of 7.5 mg/dL (H)).,     Dialysis Method (if applicable):  SLED x 6 hours     CBC (last 72 hours):  Recent Labs   Lab Result Units 05/12/20  1124 05/13/20  1202 05/13/20  1804 05/13/20  1942 05/14/20  0223   WBC K/uL 14.07* 15.06*  --  10.96 13.03*  13.03*   Hemoglobin g/dL 11.9* 10.9*  --  9.4* 9.6*  9.6*   Hemoglobin A1C %  --   --  10.3*  --   --    Hematocrit % 37.7* 34.1*  --  29.8* 31.2*  31.2*   Platelets K/uL 233 280  --  229 253  253   Gran% % 87.5* 88.8*  --  89.5* 89.9*  89.9*   Lymph% % 4.1* 3.9*  --  6.0* 5.6*  5.6*   Mono% % 7.0 6.5  --  3.7* 3.6*  3.6*   Eosinophil% % 0.6 0.0  --  0.0 0.2  0.2   Basophil% % 0.2 0.1  --  0.1 0.1  0.1   Differential Method   Automated Automated  --  Automated Automated  Automated       Metabolic Panel (last 72 hours):  Recent Labs   Lab Result Units 05/12/20  1124 05/13/20  1458 05/13/20  1804 05/13/20  2100 05/14/20  0223 05/14/20  1130   Sodium mmol/L 139  --  131*  --  136 136   Potassium mmol/L 4.5  --  3.6 3.3* 2.9* 5.0   Chloride mmol/L 101  --  99  --  104 106   CO2 mmol/L 18*  --  16*  --  15* 14*   Glucose mg/dL 273*  --  666*  --  181* 218*   Glucose, UA   --  3+*  --   --   --   --    BUN, Bld mg/dL 19  --  49*  --  49* 53*   Creatinine mg/dL 5.4*  --  7.1*  --  7.4* 7.5*   Albumin g/dL 2.8*  --   --   --   --   --    Total Bilirubin mg/dL 0.3  --   --   --   --   --    Alkaline Phosphatase U/L 87  --   --   --   --   --    AST U/L 12  --   --   --   --   --    ALT U/L <5*  --   --   --   --   --    Magnesium mg/dL 2.1  --   --   --  2.0  --    Phosphorus mg/dL 5.5*  --   --   --  5.1*  --        Vancomycin Administrations:  vancomycin given in the last 96 hours                   vancomycin in dextrose 5 % 1 gram/250 mL IVPB 1,000 mg (mg) 1,000 mg New Bag 05/13/20 1330                Microbiologic Results:  Microbiology Results (last 7 days)     Procedure Component Value Units Date/Time    Respiratory Infection Panel (PCR), Nasopharyngeal [[id number]] Collected:  05/13/20 2238    Order Status:  Completed Specimen:  Nasopharyngeal Swab Updated:  05/14/20 1102     Respiratory Infection Panel Source NP Swab     Adenovirus Not Detected     Coronavirus 229E, Common Cold Virus Not Detected     Coronavirus HKU1, Common Cold Virus Not Detected     Coronavirus NL63, Common Cold Virus Not Detected     Coronavirus OC43, Common Cold Virus Not Detected     Comment: The Coronavirus strains detected in this test cause the common cold.  These strains are not the COVID-19 (novel Coronavirus)strain   associated with the respiratory disease outbreak.          Human Metapneumovirus Not Detected     Human Rhinovirus/Enterovirus Not Detected      Influenza A (subtypes H1, H1-2009,H3) Not Detected     Influenza B Not Detected     Parainfluenza Virus 1 Not Detected     Parainfluenza Virus 2 Not Detected     Parainfluenza Virus 3 Not Detected     Parainfluenza Virus 4 Not Detected     Respiratory Syncytial Virus Not Detected     Bordetella Parapertussis (UC1613) Not Detected     Bordetella pertussis (ptxP) Not Detected     Chlamydia pneumoniae Not Detected     Mycoplasma pneumoniae Not Detected     Comment: Respiratory Infection Panel testing performed by Multiplex PCR.       Narrative:       For all other respiratory sources, order [id number] -  Respiratory Viral Panel by PCR    Blood culture x two cultures. Draw prior to antibiotics. [[id number]] Collected:  05/13/20 1202    Order Status:  Completed Specimen:  Blood from Peripheral, Hand, Right Updated:  05/13/20 1915     Blood Culture, Routine No Growth to date    Narrative:       Aerobic and anaerobic    Respiratory Infection Panel (PCR), Nasopharyngeal [[id number]]     Order Status:  Canceled Specimen:  Nasopharyngeal Swab     Respiratory Infection Panel (PCR), Nasopharyngeal [[id number]] Collected:  05/13/20 1835    Order Status:  Canceled Specimen:  Nasopharyngeal Swab     Urine culture [[id number]] Collected:  05/13/20 1458    Order Status:  No result Specimen:  Urine Updated:  05/13/20 1550    Blood culture x two cultures. Draw prior to antibiotics. [[id number]] Collected:  05/13/20 1202    Order Status:  Sent Specimen:  Blood from Peripheral, Antecubital, Right Updated:  05/13/20 1431

## 2020-05-14 NOTE — ASSESSMENT & PLAN NOTE
Patient on HD T/Th/Sat with last session on 5/12, where he was admitted to the ED with palpatations after session. Patient has tunneled catheter that was inserted in 2/2020 that is being used for access.     --nephrology consulted  --avoid nephrotoxic meds  --oliguric, strict I/o's  --BMP daily

## 2020-05-14 NOTE — H&P
[facility]  Critical Care Medicine  History & Physical    Patient Name: Kodi Ibanez Sr.  MRN: [id number]  Admission Date: 5/13/2020  Hospital Length of Stay: 0 days  Code Status: DNR  Attending Physician: Mary Thomas  Primary Care Provider: Melonie Elias MD   Principal Problem: Shock    Subjective:     HPI:  59 y/o male with past medical history of CHF, CAD with recent NSTEMI [~4/29] (CABG 2009, FERCHO placed in 12/2019), diabetes, CVA, ESRD on dialysis Tuesday/Thursday/Saturday (last dialyzed morning of 5/12) presents to the ER by referral from infectious disease clinic after virtual visit today. The ID provider was concerned about the patients AMS and recent elevated WBC count. Recently,atzenon underwent ligation of LUE fistula on 2/27/20 with Dr Lopez ([facility], vascular surgery) and had ongoing issues afterwards. Patient was admitted on 4/10 for excision of his infected LUE mid and distal AVF with cultures and specimen obtained. Surgical cultures 4/10 grew 2 different prevotella species, fusobacterium, and strep anginosus. The patient was d/c'ed with home health wound care and po augmentin/clindamycin x 10 days. On 4/20 antibiotics were changed to Flagyl 500 mg p.o. q.8 and Cefazolin after dialysis 2 g on Tuesday and Thursday, 3 g on Saturday. End date on antibiotics on 5/11.    Following these events, on 4/29 the patient was admitted for an NSTEMI, a LHC was performed where re-stenosis of a previous stent was found and balloon angioplasty was done. He was discharged on 5/2. He is now on ASA/plavix, but per wife has not been compliant.    Patients wife says that he has had poor PO intake for the last 2-3 weeks, has become progressively weak, and developed altered mental status today. Patient had 3 episodes of vomiting within the last 24 hours, which appeared brown-non bloody. He had a brown watery bowel movement today. Patient normally oliguric (urinating ~2-3 x per week). Patient was  "seen in the [facility] ED on the evening of 5/12 due to palpitations after his normal dialysis and sent home after evaluation, for possible antibiotic side effect.    He presents to the ED with encephalopathy x 1 day, leukocytosis of 15, lactate 2.7, troponin 0.482, , procal 10.98, Cr 6.5, BUN 41, and hyperglycemia. Patient pan-cultured, currently only UA resulted with large amount of bacteria. Patient requiring high dose Levophed on admission. Broad spectrum antibiotics initiated. 1.5 L IVF given. Patient remains on RA. Wife was updated regarding patient's current status. Patient spoken to regarding code status and verbalized his wishes as a DNR. His wife confirmed he has an LAPost but it is not on file. Instructed to bring in.            Hospital/ICU Course:  The patient was admitted to MICU for shock likely 2/2 to sepsis on 5/13.     Past Medical History:   Diagnosis Date    Atherosclerosis of aorta     noted on VIKTORIA 2011    Blood transfusion     Cataracts, bilateral     CHF (congestive heart failure)     Clotting disorder     Cognitive deficits as late effect of cerebrovascular disease 6/26/2012    Coronary artery disease     NSTEMI s/p PCI with FERCHO to LCx (12/16/19),     Decreased appetite     DM (diabetes mellitus), type 2 with renal complications     Diabetic retinopathy    ESRD on hemodialysis     TUESDAY/ THURSDAY/SATURDAY    Hyperlipidemia     Hyperparathyroidism, unspecified     Hypertension     Prostate cancer 2016 7/16/19:  wife states "he doesn't have it anymore"    Seizures     Stroke 2011    PT HAS SHAKES     Unsteady gait     Uses roller walker       Past Surgical History:   Procedure Laterality Date    CARDIAC SURGERY      CABG    CATARACT EXTRACTION      ou    CATARACT EXTRACTION, BILATERAL      cataracts      CORONARY ANGIOGRAPHY INCLUDING BYPASS GRAFTS WITH CATHETERIZATION OF LEFT HEART N/A 12/16/2019    Procedure: ANGIOGRAM, CORONARY, INCLUDING BYPASS GRAFT, WITH " LEFT HEART CATHETERIZATION, rcfa, 5fr;  Surgeon: David Villegas MD;  Location: [facility] CATH LAB;  Service: Cardiology;  Laterality: N/A;    CORONARY ANGIOGRAPHY INCLUDING BYPASS GRAFTS WITH CATHETERIZATION OF LEFT HEART N/A 4/30/2020    Procedure: ANGIOGRAM, CORONARY, INCLUDING BYPASS GRAFT, WITH LEFT HEART CATHETERIZATION, rcfa, 10 am start;  Surgeon: David Villegas MD;  Location: [facility] CATH LAB;  Service: Cardiology;  Laterality: N/A;    CORONARY ARTERY BYPASS GRAFT  2009    bypass    EYE SURGERY      Left Guera AVF  1/11/2012    PROSTATE BIOPSY      positive    retinal laser      REVISION OF ARTERIOVENOUS FISTULA Left 2/27/2020    Procedure: Ligation of left upper extremity fistula, Closure of left upper extremity skin defect x 2, Intraoperative ultrasound ;  Surgeon: Stephane Lopez MD;  Location: [facility] OR;  Service: Vascular;  Laterality: Left;    REVISION OF ARTERIOVENOUS FISTULA Left 4/10/2020    Procedure: AV fistula excision;  Surgeon: Stephane Lopez MD;  [facility];  Service: Vascular;  Laterality: Left;    ROTATOR CUFF REPAIR  right, 2005    ULTRASOUND GUIDANCE  12/16/2019    Procedure: Ultrasound Guidance;  Surgeon: David Villegas MD;  Location: [facility] CATH LAB;  Service: Cardiology;;    VASCULAR SURGERY         Review of patient's allergies indicates:   Allergen Reactions    Reglan [metoclopramide hcl] Diarrhea    Lorazepam      Other reaction(s): heavy sedation       Family History     Problem Relation (Age of Onset)    Cancer Mother, Paternal Grandmother, Paternal Uncle    Cataracts Mother    Diabetes Mother    Glaucoma Mother    Heart disease Father, Maternal Grandmother    Hypertension Brother    No Known Problems Sister, Maternal Grandfather, Paternal Grandfather, Sister, Sister    Prostate cancer Brother        Tobacco Use    Smoking status: Never Smoker    Smokeless tobacco: Never Used   Substance and Sexual Activity    Alcohol use: No    Drug use: No     Sexual activity: Not Currently      Review of Systems   Constitutional: Positive for activity change and fatigue. Negative for appetite change and diaphoresis.   HENT: Negative for congestion and dental problem.    Eyes: Negative for discharge and itching.   Respiratory: Negative for apnea, cough, choking, chest tightness and shortness of breath.    Cardiovascular: Negative for chest pain and palpitations.   Gastrointestinal: Positive for nausea. Negative for abdominal distention, abdominal pain, rectal pain and vomiting.   Endocrine: Negative for cold intolerance and heat intolerance.   Genitourinary: Negative for enuresis and flank pain.   Musculoskeletal: Negative for myalgias and neck pain.   Skin: Negative for color change and wound.   Allergic/Immunologic: Negative for environmental allergies and food allergies.   Neurological: Positive for weakness. Negative for dizziness, tremors and syncope.   Hematological: Does not bruise/bleed easily.   Psychiatric/Behavioral: Negative for decreased concentration and dysphoric mood.     Objective:     Vital Signs (Most Recent):  Temp: 97.7 °F (36.5 °C) (05/13/20 1038)  Pulse: 103 (05/13/20 1722)  Resp: 16 (05/13/20 1702)  BP: 101/63 (05/13/20 1722)  SpO2: (!) 94 % (05/13/20 1722) Vital Signs (24h Range):  Temp:  [97.7 °F (36.5 °C)] 97.7 °F (36.5 °C)  Pulse:  [] 103  Resp:  [12-24] 16  SpO2:  [92 %-98 %] 94 %  BP: ()/(43-75) 101/63   Weight: 67.1 kg (148 lb)  Body mass index is 20.64 kg/m².      Intake/Output Summary (Last 24 hours) at 5/13/2020 1757  Last data filed at 5/13/2020 1656  Gross per 24 hour   Intake 1850 ml   Output --   Net 1850 ml       Physical Exam   Constitutional: He appears well-developed and well-nourished. He has a sickly appearance.   HENT:   Head: Normocephalic and atraumatic.   Eyes: Pupils are equal, round, and reactive to light. EOM are normal.   Neck: Normal range of motion. Neck supple.   Cardiovascular: Normal rate and regular  rhythm.   Pulmonary/Chest: Effort normal and breath sounds normal.   Abdominal: Soft. Bowel sounds are normal. He exhibits no distension and no ascites.   Musculoskeletal: Normal range of motion.   Neurological: He is alert. GCS eye subscore is 4. GCS verbal subscore is 5. GCS motor subscore is 6.   Skin: Skin is warm and dry.   LUE with bandage from previous AVF excision. No erythema, warmth, or tenderness noted at the site.   Psychiatric: He has a normal mood and affect.       Vents:     Lines/Drains/Airways     Central Venous Catheter Line                 Hemodialysis Catheter 03/05/20 0535 right subclavian 69 days          Drain                 Hemodialysis AV Fistula 04/01/17 0800 Left upper arm 1138 days          Peripheral Intravenous Line                 Peripheral IV - Single Lumen 05/13/20 1201 Right Hand less than 1 day         Peripheral IV - Single Lumen 05/13/20 1620 22 G Right Hand less than 1 day              Significant Labs:    CBC/Anemia Profile:  Recent Labs   Lab 05/12/20  1124 05/13/20  1202 05/13/20  1626   WBC 14.07* 15.06*  --    HGB 11.9* 10.9*  --    HCT 37.7* 34.1* 29*    280  --    MCV 89 91  --    RDW 15.9* 16.8*  --         Chemistries:  Recent Labs   Lab 05/12/20  1124      K 4.5      CO2 18*   BUN 19   CREATININE 5.4*   CALCIUM 9.3   ALBUMIN 2.8*   PROT 8.3   BILITOT 0.3   ALKPHOS 87   ALT <5*   AST 12   MG 2.1   PHOS 5.5*       All pertinent labs within the past 24 hours have been reviewed.    Significant Imaging: I have reviewed all pertinent imaging results/findings within the past 24 hours.    Assessment/Plan:     Cardiac/Vascular  Atrial fibrillation  --patient on diltiazem for rate control; currently holding in the setting of normal rate with marked hypotension  --on ASA/plavix  --Chads Vasc score 6 points  --initiated low-intensity Heparin gtt    NSTEMI (non-ST elevated myocardial infarction)  Hx of CABG in 2009 and placement of FERCHO in 12/2019. Patient  recenetly admitted on 4/29 and d/c'ed 5/2, admitted for an NSTEMI, a LHC was performed where re-stenosis of a previous stent was found and balloon angioplasty was done. He is now on ASA/plavix, but per wife has not been compliant.    --stat EKG  --troponin elevated, trend x 3 or until decrease  --continue ASA/plavix (home meds)  --echo pending    Essential hypertension  --holding home losartan and coreg in the setting of hypotension    HLD (hyperlipidemia)  --holding home statin, resume when clinically appropriate    Renal/  ESRD on hemodialysis  Patient on HD T/Th/Sat with last session on 5/12, where he was admitted to the ED with palpatations after session. Patient has tunneled catheter that was inserted in 2/2020 that is being used for access.     --nephrology consulted  --avoid nephrotoxic meds  --oliguric, strict I/o's  --BMP daily    ID  AV fistula infection  --see shock    Endocrine  Type 2 diabetes, uncontrolled, with neuropathy  -->500 on admission, 4 units aspart IV given in ED  --SSI prn initiated  --spot dose SQ insulin and q2h accuchecks until BG normalizes      Other  * Shock  2/2 probable sepsis likely r/t LUE AV fistula excision. Patient also with R tunneled catheter placed 2/2020.   Patient underwent ligation of LUE fistula on 2/27/20 with Dr Lopez ([facility], vascular surgery) and had ongoing issues afterwards. Patient was admitted on 4/10 for excision of his infected LUE mid and distal AVF with cultures and specimen obtained. Surgical cultures 4/10 grew 2 different prevotella species, fusobacterium, and strep anginosus. The patient was d/c'ed with home health wound care and po augmentin/clindamycin x 10 days. On 4/20 antibiotics were changed to Flagyl 500 mg p.o. q.8 and Cefazolin after dialysis 2 g on Tuesday and Thursday, 3 g on Saturday. End date on antibiotics on 5/11.    --WBC 15, lactate 2.7, procal 10.98  --1.5 L IVF given in ED  --broad spectrum abx initiated  --pan-cx'ed, plan to  f/up results  --remains on Levophed        Critical Care Daily Checklist:    A: Awake: RASS Goal/Actual Goal:    Actual: Torres Agitation Sedation Scale (RASS): Alert and calm   B: Spontaneous Breathing Trial Performed?  n/a   C: SAT & SBT Coordinated?  n/a                  D: Delirium: CAM-ICU Overall CAM-ICU: Negative   E: Early Mobility Performed? No   F: Feeding Goal:    Status:     Current Diet Order   Procedures    Diet NPO      AS: Analgesia/Sedation none   T: Thromboembolic Prophylaxis Hep gtt   H: HOB > 300 yes   U: Stress Ulcer Prophylaxis (if needed) n/a   G: Glucose Control q2h accucheck; ssi q4h   B: Bowel Function     I: Indwelling Catheter (Lines & Mayers) Necessity necessary   D: De-escalation of Antimicrobials/Pharmacotherapies done    Plan for the day/ETD Admit to icu    Code Status:  Family/Goals of Care: DNR  Admit to icu     Critical Care Time: 70 minutes  Critical secondary to patient requiring levophed 2/2 shock.     Critical care was time spent personally by me on the following activities: development of treatment plan with patient or surrogate and bedside caregivers, discussions with consultants, evaluation of patient's response to treatment, examination of patient, ordering and performing treatments and interventions, ordering and review of laboratory studies, ordering and review of radiographic studies, pulse oximetry, re-evaluation of patient's condition. This critical care time did not overlap with that of any other provider or involve time for any procedures.     Xavier Lopez NP  Critical Care Medicine  [facility]

## 2020-05-14 NOTE — CARE UPDATE
Patient's wife contacted yesterday evening and today regarding patient updatse. Wife aware that patient is of of vasopressors and will received SLED this afternoon. All questions answered.

## 2020-05-14 NOTE — PROGRESS NOTES
MD Miriam notified of K 2.9, ordered for 40meq potassium IV. MD also notified of magnesium 2.0, stated to hold magnesium sulfate IV. MD also aware of creatinine 7.4. TM.

## 2020-05-14 NOTE — ASSESSMENT & PLAN NOTE
Hx of CABG in 2009 and placement of FERCHO in 12/2019. Patient recenetly admitted on 4/29 and d/c'ed 5/2, admitted for an NSTEMI, a LHC was performed where re-stenosis of a previous stent was found and balloon angioplasty was done. He is now on ASA/plavix, but per wife has not been compliant.    --stat EKG  --troponin elevated, trend x 3 or until decrease  --continue ASA/plavix (home meds)  --echo pending

## 2020-05-14 NOTE — PROGRESS NOTES
MD Miriam notified of K 3.3, ordered 40meq potassium PO and 2g magnesium sulfate IV. MD also notified of constant up titration of levophed, will discuss about placement of arterial line. WCTM.

## 2020-05-14 NOTE — PROGRESS NOTES
[facility]  Critical Care Medicine  Progress Note    Patient Name: Kodi Ibanez Sr.  MRN: [id number]  Admission Date: 5/13/2020  Hospital Length of Stay: 1 days  Code Status: DNR  Attending Provider: Josie Thomas MD  Primary Care Provider: Melonie Elias MD   Principal Problem: Shock    Subjective:     HPI:  61 y/o male with past medical history of CHF, CAD with recent NSTEMI [~4/29] (CABG 2009, FERCHO placed in 12/2019), diabetes, CVA, ESRD on dialysis Tuesday/Thursday/Saturday (last dialyzed morning of 5/12) presents to the ER by referral from infectious disease clinic after virtual visit today. The ID provider was concerned about the patients AMS and recent elevated WBC count. Recently,atient underwent ligation of LUE fistula on 2/27/20 with Dr Lopez ([facility], vascular surgery) and had ongoing issues afterwards. Patient was admitted on 4/10 for excision of his infected LUE mid and distal AVF with cultures and specimen obtained. Surgical cultures 4/10 grew 2 different prevotella species, fusobacterium, and strep anginosus. The patient was d/c'ed with home health wound care and po augmentin/clindamycin x 10 days. On 4/20 antibiotics were changed to Flagyl 500 mg p.o. q.8 and Cefazolin after dialysis 2 g on Tuesday and Thursday, 3 g on Saturday. End date on antibiotics on 5/11.     Following these events, on 4/29 the patient was admitted for an NSTEMI, a LHC was performed where re-stenosis of a previous stent was found and balloon angioplasty was done. He was discharged on 5/2. He is now on ASA/plavix, but per wife has not been compliant.     Patients wife says that he has had poor PO intake for the last 2-3 weeks, has become progressively weak, and developed altered mental status today. Patient had 3 episodes of vomiting within the last 24 hours, which appeared brown-non bloody. He had a brown watery bowel movement today. Patient normally oliguric (urinating ~2-3 x per week). Patient was  seen in the [facility] ED on the evening of 5/12 due to palpitations after his normal dialysis and sent home after evaluation, for possible antibiotic side effect.     He presents to the ED with encephalopathy x 1 day, leukocytosis of 15, lactate 2.7, troponin 0.482, , procal 10.98, Cr 6.5, BUN 41, and hyperglycemia. Patient pan-cultured, currently only UA resulted with large amount of bacteria. Patient requiring high dose Levophed on admission. Broad spectrum antibiotics initiated. 1.5 L IVF given. Patient remains on RA. Wife was updated regarding patient's current status. Patient spoken to regarding code status and verbalized his wishes as a DNR. His wife confirmed he has an LAPost but it is not on file. Instructed to bring in.    Hospital/ICU Course:  The patient was admitted to MICU for shock likely 2/2 to sepsis on 5/13. Patient remains on low dose Levophed. Nephrology consulted for possible dialysis. Blood cx NGTD. Patient remains on Heparin gtt for paroxysmal A fib with CHADS Vasc score of 6. Previously hyperglycemic with BG now well controlled.    Interval History/Significant Events: No acute overnight events. Patient remains on Levophed. Nephro consulted. BG now controlled.    Review of Systems   Constitutional: Positive for activity change and fatigue. Negative for appetite change and diaphoresis.   HENT: Negative for congestion and dental problem.    Eyes: Negative for discharge and itching.   Respiratory: Negative for apnea, cough, choking, chest tightness and shortness of breath.    Cardiovascular: Negative for chest pain and palpitations.   Gastrointestinal: Positive for nausea. Negative for abdominal distention, abdominal pain, rectal pain and vomiting.   Endocrine: Negative for cold intolerance and heat intolerance.   Genitourinary: Negative for enuresis and flank pain.   Musculoskeletal: Negative for myalgias and neck pain.   Skin: Negative for color change and wound.   Allergic/Immunologic:  Negative for environmental allergies and food allergies.   Neurological: Positive for weakness. Negative for dizziness, tremors and syncope.   Hematological: Does not bruise/bleed easily.   Psychiatric/Behavioral: Negative for decreased concentration and dysphoric mood.     Objective:     Vital Signs (Most Recent):  Temp: 98.1 °F (36.7 °C) (05/14/20 0700)  Pulse: 88 (05/14/20 0800)  Resp: 20 (05/14/20 0800)  BP: 125/69 (05/14/20 0800)  SpO2: 96 % (05/14/20 0800) Vital Signs (24h Range):  Temp:  [97.7 °F (36.5 °C)-98.5 °F (36.9 °C)] 98.1 °F (36.7 °C)  Pulse:  [] 88  Resp:  [12-30] 20  SpO2:  [92 %-98 %] 96 %  BP: ()/(43-75) 125/69   Weight: 68 kg (149 lb 14.6 oz)  Body mass index is 20.91 kg/m².      Intake/Output Summary (Last 24 hours) at 5/14/2020 0849  Last data filed at 5/14/2020 0800  Gross per 24 hour   Intake 2532.46 ml   Output --   Net 2532.46 ml       Physical Exam   Constitutional: He appears well-developed and well-nourished. He has a sickly appearance.   HENT:   Head: Normocephalic and atraumatic.   Eyes: Pupils are equal, round, and reactive to light. EOM are normal.   Neck: Normal range of motion. Neck supple.   Cardiovascular: An irregular rhythm present. Tachycardia present.   Pulmonary/Chest: Effort normal and breath sounds normal.   Abdominal: Soft. Bowel sounds are normal. He exhibits no distension and no ascites.   Musculoskeletal: Normal range of motion.   Neurological: He is alert. GCS eye subscore is 4. GCS verbal subscore is 5. GCS motor subscore is 6.   Skin: Skin is warm and dry.   LUE with bandage from previous AVF excision. No erythema, warmth, or tenderness noted at the site.   Psychiatric: He has a normal mood and affect.       Vents:     Lines/Drains/Airways     Central Venous Catheter Line                 Hemodialysis Catheter 03/05/20 0535 right subclavian 70 days    Percutaneous Central Line Insertion/Assessment - Triple Lumen  05/13/20 1800 left internal jugular less  than 1 day          Peripheral Intravenous Line                 Peripheral IV - Single Lumen 05/13/20 1620 22 G Right Hand less than 1 day              Significant Labs:    CBC/Anemia Profile:  Recent Labs   Lab 05/13/20  1202 05/13/20  1626 05/13/20  1942 05/14/20  0223   WBC 15.06*  --  10.96 13.03*  13.03*   HGB 10.9*  --  9.4* 9.6*  9.6*   HCT 34.1* 29* 29.8* 31.2*  31.2*     --  229 253  253   MCV 91  --  89 91  91   RDW 16.8*  --  15.9* 15.8*  15.8*        Chemistries:  Recent Labs   Lab 05/12/20  1124 05/13/20  1804 05/13/20  2100 05/14/20  0223    131*  --  136   K 4.5 3.6 3.3* 2.9*    99  --  104   CO2 18* 16*  --  15*   BUN 19 49*  --  49*   CREATININE 5.4* 7.1*  --  7.4*   CALCIUM 9.3 7.9*  --  8.0*   ALBUMIN 2.8*  --   --   --    PROT 8.3  --   --   --    BILITOT 0.3  --   --   --    ALKPHOS 87  --   --   --    ALT <5*  --   --   --    AST 12  --   --   --    MG 2.1  --   --  2.0   PHOS 5.5*  --   --  5.1*       All pertinent labs within the past 24 hours have been reviewed.    Significant Imaging:  I have reviewed all pertinent imaging results/findings within the past 24 hours.      ABG  Recent Labs   Lab 05/14/20  0259   PH 7.299*   PO2 28*   PCO2 34.5*   HCO3 17.0*   BE -9     Assessment/Plan:     Cardiac/Vascular  Atrial fibrillation  --patient on diltiazem for rate control; currently holding in the setting of normal rate with marked hypotension  --on ASA/plavix  --Chads Vasc score 6 points  --initiated low-intensity Heparin gtt    NSTEMI (non-ST elevated myocardial infarction)  Hx of CABG in 2009 and placement of FERCHO in 12/2019. Patient recenetly admitted on 4/29 and d/c'ed 5/2, admitted for an NSTEMI, a LHC was performed where re-stenosis of a previous stent was found and balloon angioplasty was done. He is now on ASA/plavix, but per wife has not been compliant.    --stat EKG  --troponin now down-trending; stopped trending  --continue ASA/plavix (home meds)  --echo  pending    Essential hypertension  --holding home losartan and coreg in the setting of hypotension    HLD (hyperlipidemia)  --holding home statin, resume when clinically appropriate    Renal/  ESRD on hemodialysis  Patient on HD T/Th/Sat with last session on 5/12, where he was admitted to the ED with palpatations after session. Patient has tunneled catheter that was inserted in 2/2020 that is being used for access.     --nephrology consulted  --avoid nephrotoxic meds  --oliguric, strict I/o's  --BMP daily    ID  AV fistula infection  --see shock    Endocrine  Type 2 diabetes, uncontrolled, with neuropathy  -->500 on admission, 4 units aspart IV given in ED; brief IV insulin gtt initiated, currently off  --SSI prn initiated  --accuchecks q4h      Other  * Shock  2/2 probable sepsis likely r/t LUE AV fistula excision. Patient also with R tunneled catheter placed 2/2020.   Patient underwent ligation of LUE fistula on 2/27/20 with Dr Lopez ([facility], vascular surgery) and had ongoing issues afterwards. Patient was admitted on 4/10 for excision of his infected LUE mid and distal AVF with cultures and specimen obtained. Surgical cultures 4/10 grew 2 different prevotella species, fusobacterium, and strep anginosus. The patient was d/c'ed with home health wound care and po augmentin/clindamycin x 10 days. On 4/20 antibiotics were changed to Flagyl 500 mg p.o. q.8 and Cefazolin after dialysis 2 g on Tuesday and Thursday, 3 g on Saturday. End date on antibiotics on 5/11.    --WBC 15, lactate 2.7, procal 10.98  --1.5 L IVF given in ED  --broad spectrum abx initiated  --pan-cx'ed, plan to f/up results  --off of vasopressors       Critical Care Daily Checklist:    A: Awake: RASS Goal/Actual Goal: RASS Goal: 0-->alert and calm  Actual: Torres Agitation Sedation Scale (RASS): Alert and calm   B: Spontaneous Breathing Trial Performed?     C: SAT & SBT Coordinated?  n/a                      D: Delirium: CAM-ICU Overall  CAM-ICU: Negative   E: Early Mobility Performed? Yes   F: Feeding Goal:    Status:     Current Diet Order   Procedures    Diet diabetic [facility]; 2000 Calorie     Order Specific Question:   Indicate patient location for additional diet options:     Answer:   [facility]     Order Specific Question:   Total calories:     Answer:   2000 Calorie      AS: Analgesia/Sedation none   T: Thromboembolic Prophylaxis Hep gtt   H: HOB > 300 Yes   U: Stress Ulcer Prophylaxis (if needed) n/a   G: Glucose Control Q4h; ssi   B: Bowel Function Stool Occurrence: 1   I: Indwelling Catheter (Lines & Mayers) Necessity done   D: De-escalation of Antimicrobials/Pharmacotherapies done    Plan for the day/ETD Cont course; SD    Code Status:  Family/Goals of Care: DNR  Cont course; sd patient     Critical Care Time: 70 minutes  Critical secondary to Patient has a condition that poses threat to life and bodily function: recently off of vasopressors.     Critical care was time spent personally by me on the following activities: development of treatment plan with patient or surrogate and bedside caregivers, discussions with consultants, evaluation of patient's response to treatment, examination of patient, ordering and performing treatments and interventions, ordering and review of laboratory studies, ordering and review of radiographic studies, pulse oximetry, re-evaluation of patient's condition. This critical care time did not overlap with that of any other provider or involve time for any procedures.     Xavier Lopez NP  Critical Care Medicine  [facility]

## 2020-05-14 NOTE — PLAN OF CARE
Melonie Elias MD  [address] / [place] [address]      [id number] - [place] - [address]  [address]  [place] [address]  Phone: [phone] Fax: [phone]    [facility] - [place], [place] - [address]  [address] [facility] [address]  Phone: [phone] Fax: [phone]    [facility]  [address]  [place] [address]  Phone: [phone] Fax: [phone]    [id number] - [place], [place] - [address] AT [facility] [address]  [address]  [place] [address]  Phone: [phone] Fax: [phone]      Extended Emergency Contact Information  Primary Emergency Contact: IbanezYaima  Address: [address]           [address]           [place] [address] [place]  Home Phone: [phone]  Work Phone: [phone]  Mobile Phone: [phone]  Relation: Spouse    Future Appointments   Date Time Provider Department Center   5/18/2020  2:00 PM Gerson Cárdenas MD [facility] PULSM [facility]   5/26/2020  2:40 PM David Villegas MD [facility] CARDIO [facility]   5/27/2020 11:15 AM [facility] USVAS5 MH VASCUS [facility]   6/1/2020  1:40 PM Stephane Lopez MD [facility] VAS CARIDAD [facility]   7/30/2020  8:00 AM LAB, [address] [facility] [place]   8/7/2020  1:20 PM Melonie Elias MD [facility] MED [place]       Payor: [employer] MANAGED MEDICARE / Plan: [employer] MEDICARE HMO / Product Type: Capitation /        05/14/20 1706   Discharge Assessment   Assessment Type Discharge Planning Assessment   Confirmed/corrected address and phone number on facesheet? Yes   Assessment information obtained from? Medical Record   Prior to hospitilization cognitive status: Alert/Oriented   Prior to hospitalization functional status: Assistive Equipment;Needs Assistance   Current cognitive status: Alert/Oriented   Current Functional Status: Needs Assistance   Facility Arrived From: ED  [facility]   Lives With spouse   Able to Return to Prior Arrangements other (see comments)  (TBD)   Is patient able to care for self after discharge? Unable to determine at this time (comments)   Who are your caregiver(s) and their phone number(s)? Yaima Ibanez (wife) [phone]   Readmission Within the Last 30 Days previous discharge plan unsuccessful   Patient currently receives any other outside agency services? Yes   Name and contact number of agency or person providing outside services [facility]    Equipment Currently Used at Home wheelchair;rollator   Do you have any problems affording any of your prescribed medications? No   Is the patient taking medications as prescribed? yes   Does the patient have transportation home? Yes   Transportation Anticipated family or friend will provide   Dialysis Name and Scheduled days HD T/Th/Sat [facility] (5/12 last)  [place]   Discharge Plan A Home with family;Home Health   Discharge Plan B Skilled Nursing Facility   DME Needed Upon Discharge  other (see comments)  (TBD)   Readmission Questionnaire   At the time of discharge, did someone talk to you about what to watch out for regarding worsening of your health problem? Yes   At the time of discharge, did someone talk to you about what to do if you experienced worsening of your health problem? Yes   At the time of discharge, did someone talk to you about which medication to take when you left the hospital and which ones to stop taking? Yes   At the time of discharge, did someone talk to you about when and where to follow up with a doctor after you left the hospital? Yes   Do you have problems taking your medications as prescribed? No   Do you have problems obtaining/receiving your medications? No   Does the patient have transportation to healthcare appointments? Yes   Living Arrangements apartment       Jose Angel Jamison RN MSN  Critical Care-   Ext. [phone]

## 2020-05-14 NOTE — ASSESSMENT & PLAN NOTE
Hx of CABG in 2009 and placement of FERCHO in 12/2019. Patient recenetly admitted on 4/29 and d/c'ed 5/2, admitted for an NSTEMI, a LHC was performed where re-stenosis of a previous stent was found and balloon angioplasty was done. He is now on ASA/plavix, but per wife has not been compliant.    --stat EKG  --troponin now down-trending; stopped trending  --continue ASA/plavix (home meds)  --echo pending

## 2020-05-15 NOTE — EICU
Zackery Communicated with Bedside Nurse regarding:  HR    Nurse Notified:  Yes via secure chat unable to contact via phone no anser    Comments: Message sent to Shefali Moise RN via secure chat: regarding increase HR from low 100's tp 150 and greater.

## 2020-05-15 NOTE — PLAN OF CARE
I spoke with the patient's wife, Stacey, over the phone to provide her with an update on Kodi's condition. I told her that we are very pleased to inform that he is doing well. I told her that we are hoping to transfer Ibanez to the general medicine floor today. I told her that he did have a fever today and that we are re-drawing blood cultures. Stacey expressed her gratitude for the care Kodi is receiving. All questions answered.    Susanne Stanton  [facility] Emergency Medicine, PGY3   5/15/2020 1:30 PM

## 2020-05-15 NOTE — PLAN OF CARE
CMICU DAILY GOALS       A: Awake    RASS: Goal - RASS Goal: 0-->alert and calm  Actual - RASS (Torres Agitation-Sedation Scale): 0-->alert and calm   Restraint necessity:    B: Breath   SBT: NA   C: Coordinate A & B, analgesics/sedatives   Pain: managed    SAT: NA  D: Delirium   CAM-ICU: Overall CAM-ICU: Negative  E: Early Mobility   MOVE Screen: Pass   Activity: Activity Management: activity encouraged  FAS: Feeding/Nutrition   Diet order: Diet/Nutrition Received: consistent carb/diabetic diet,   Fluid restriction:    T: Thrombus   DVT prophylaxis: VTE Required Core Measure: Pharmacological prophylaxis initiated/maintained, (SCDs) Sequential compression device initiated/maintained  H: HOB Elevation   Head of Bed (HOB): HOB at 30-45 degrees  U: Ulcer Prophylaxis   GI: no  G: Glucose control   managed Glycemic Management: blood glucose monitoring  S: Skin   Bundle compliance: yes   Bathing/Skin Care: incontinence care, linen changed Date: 5/14/20 (PM bath)  B: Bowel Function   no issues   I: Indwelling Catheters   Mayers necessity:     CVC necessity: Yes   IPAD offered: No  D: De-escalation Antibx   No  Plan for the day   Stepdown   Family/Goals of care/Code Status   Code Status: DNR     No acute events throughout day, VS and assessment per flow sheet, patient progressing towards goals as tolerated, plan of care reviewed with Kodi Ibanez Sr. and family, all concerns addressed, will continue to monitor.

## 2020-05-15 NOTE — SUBJECTIVE & OBJECTIVE
Interval History/Significant Events: NAEO    Review of Systems   All other systems reviewed and are negative.    Objective:     Vital Signs (Most Recent):  Temp: 99 °F (37.2 °C) (05/15/20 0300)  Pulse: (!) 113 (05/15/20 0701)  Resp: (!) 28 (05/15/20 0701)  BP: (!) 147/87 (05/15/20 0701)  SpO2: 98 % (05/15/20 0701) Vital Signs (24h Range):  Temp:  [97.8 °F (36.6 °C)-99.4 °F (37.4 °C)] 99 °F (37.2 °C)  Pulse:  [] 113  Resp:  [15-33] 28  SpO2:  [95 %-100 %] 98 %  BP: (105-171)/() 147/87   Weight: 67.6 kg (149 lb)  Body mass index is 20.78 kg/m².      Intake/Output Summary (Last 24 hours) at 5/15/2020 0812  Last data filed at 5/15/2020 0701  Gross per 24 hour   Intake 2483.74 ml   Output 1312 ml   Net 1171.74 ml       Physical Exam   Constitutional: He appears well-developed and well-nourished.   HENT:   Head: Normocephalic and atraumatic.   Eyes: EOM are normal. Right eye exhibits no discharge. Left eye exhibits no discharge.   Neck: Normal range of motion. No tracheal deviation present.   Cardiovascular: Normal rate, regular rhythm and normal heart sounds.   Pulmonary/Chest: Effort normal. No stridor. No respiratory distress. He has no wheezes.   Abdominal: Soft. He exhibits no distension. There is no tenderness.   Musculoskeletal: He exhibits no deformity.   Neurological: He is alert.   CN's 2-12 grossly intact, LEE   Skin: Skin is warm and dry.       Vents:     Lines/Drains/Airways     Central Venous Catheter Line                 Hemodialysis Catheter 03/05/20 0535 right subclavian 71 days          Peripheral Intravenous Line                 Peripheral IV - Single Lumen 05/13/20 1620 22 G Right Hand 1 day         Peripheral IV - Single Lumen 05/14/20 1448 20 G;1 3/4 in Right Forearm less than 1 day              Significant Labs:    CBC/Anemia Profile:  Recent Labs   Lab 05/13/20  1942 05/14/20  0223 05/15/20  0431   WBC 10.96 13.03*  13.03* 7.85   HGB 9.4* 9.6*  9.6* 9.5*   HCT 29.8* 31.2*  31.2*  29.2*    253  253 229   MCV 89 91  91 87   RDW 15.9* 15.8*  15.8* 15.4*        Chemistries:  Recent Labs   Lab 05/14/20  1358 05/14/20  2235 05/15/20  0432    137 138  138   K 4.6 4.5 4.6  4.6    99 99  99   CO2 16* 26 26  26   BUN 44* 15 17  17   CREATININE 6.2* 3.2* 3.6*  3.6*   CALCIUM 7.8* 7.8* 7.7*  7.7*   ALBUMIN 1.9* 2.1* 2.0*   MG 1.9 2.1 2.0  2.0   PHOS 4.8* 2.7 5.1*  5.1*       All pertinent labs within the past 24 hours have been reviewed.    Significant Imaging:  I have reviewed and interpreted all pertinent imaging results/findings within the past 24 hours.

## 2020-05-15 NOTE — PROGRESS NOTES
[facility]  Nephrology  Progress Note    Patient Name: Kodi Ibanez Sr.  MRN: [id number]  Admission Date: 5/13/2020  Hospital Length of Stay: 2 days  Attending Provider: Josie Thomas MD   Primary Care Physician: Melonie Elias MD  Principal Problem:Shock    Subjective:     HPI: Kodi Ibanez is a 60 year old male with hx of CHF, CAD s/p CABG and NSTEMI s/p PCI,  DMII, ESRD on HD, HLD, HTN and prostate cancer who presents via telemed visit after recent hospitalization for AVF infection requiring excision. In April he was found to have an infected LUE mid and distal AVF with stent exposure s/p excision on 4/10/20. Surgical cultures grew Strep Anginosus, Prevotella and Fusobacterium. Blood cx were negative. He was discharged on Augmentin plus clindamycin x 10 days. He was seen by ID on 4/20 via telemedicine visit. At that time his Augmentin and Clindamycin were discontinued and He was transitioned to Flagyl 500 mg PO TID and Cefazolin dosed after HD for an endovascular infection x 4 weeks (end date 5/11/20). His dialysis access is now and a right tunneled subclavian Luis catheter.Since last seen by ID he has suffered from an NSTEMI s/p PCI (admit 4/29 - 5/2). Over the last few weeks he has developed poop appetite, nausea and vomiting. He has progressively felt weaker. High blood sugars noted at home. He has been seen in the ED multiple times for these complaints. Most recently was seen yesterday and noted to be hypotensive and tachycardic with a new leukocytosis, though was discharged home. During my virtual visit with the patient and his wife he is unable to participate in our conversation. Per his wife, he has gotten significantly more lethargic and now with AMS. He is too weak to get out of bed or walk. She became tearful as she is extremely worried about her  and doesn't understand why he keeps getting discharged home in this condition. She is unable to care for him in this state of  health. She denies any rigors, night sweats, cough, SOB. He has been constipated but finally had a bowel movement yesterday. He stopped taking Flagyl 5/12 due to ongoing nausea resulting in vomiting.     Nephrology consulted to manage dialysis in ESRD inpatient.     Interval History:     5/15: Pressors stopped early yesterday morning. Okay to s/d from nephrology standpoint, can get HD (TTSa).     Review of patient's allergies indicates:   Allergen Reactions    Reglan [metoclopramide hcl] Diarrhea    Lorazepam      Other reaction(s): heavy sedation     Current Facility-Administered Medications   Medication Frequency    0.9%  NaCl infusion (CRRT USE ONLY) Continuous    aspirin EC tablet 81 mg Daily    atorvastatin tablet 80 mg QHS    clopidogreL tablet 75 mg Daily    dextrose 10% (D10W) Bolus PRN    glucagon (human recombinant) injection 1 mg PRN    heparin 25,000 units in dextrose 5% (100 units/ml) IV bolus from bag - ADDITIONAL PRN BOLUS - 30 units/kg PRN    heparin 25,000 units in dextrose 5% (100 units/ml) IV bolus from bag - ADDITIONAL PRN BOLUS - 60 units/kg PRN    heparin 25,000 units in dextrose 5% 250 mL (100 units/mL) infusion LOW INTENSITY nomogram - [facility] Continuous    insulin aspart U-100 pen 1-10 Units Q4H PRN    insulin detemir U-100 pen 10 Units QHS    magnesium sulfate 2g in water 50mL IVPB (premix) PRN    piperacillin-tazobactam 4.5 g in sodium chloride 0.9% 100 mL IVPB (ready to mix system) Q12H    sevelamer carbonate tablet 800 mg TID WM    sodium chloride 0.9% flush 10 mL PRN    sodium phosphate 20.01 mmol in dextrose 5 % 250 mL IVPB PRN    sodium phosphate 30 mmol in dextrose 5 % 250 mL IVPB PRN    sodium phosphate 39.99 mmol in dextrose 5 % 250 mL IVPB PRN    vancomycin - pharmacy to dose pharmacy to manage frequency       Objective:     Vital Signs (Most Recent):  Temp: 99 °F (37.2 °C) (05/15/20 0300)  Pulse: (!) 113 (05/15/20 0701)  Resp: (!) 28 (05/15/20 0701)  BP: (!)  147/87 (05/15/20 0701)  SpO2: 98 % (05/15/20 0701)  O2 Device (Oxygen Therapy): room air (05/15/20 0701) Vital Signs (24h Range):  Temp:  [97.8 °F (36.6 °C)-99.4 °F (37.4 °C)] 99 °F (37.2 °C)  Pulse:  [] 113  Resp:  [15-33] 28  SpO2:  [95 %-100 %] 98 %  BP: (105-171)/() 147/87     Weight: 67.6 kg (149 lb) (05/15/20 0701)  Body mass index is 20.78 kg/m².  Body surface area is 1.84 meters squared.    I/O last 3 completed shifts:  In: 3061.3 [P.O.:330; I.V.:1981.3; IV Piggyback:750]  Out: 1312 [Other:1312]    Physical Exam   Constitutional: He appears well-developed and well-nourished. He has a sickly appearance.   HENT:   Head: Normocephalic and atraumatic.   Eyes: Pupils are equal, round, and reactive to light. EOM are normal.   Neck: Normal range of motion. Neck supple. No tracheal deviation present. No thyromegaly present.   Cardiovascular: Normal rate and regular rhythm.   Pulmonary/Chest: Effort normal and breath sounds normal. No stridor.   Abdominal: Soft. Bowel sounds are normal. He exhibits no distension and no ascites.   Musculoskeletal: Normal range of motion. He exhibits no tenderness.   Neurological: He is alert. No cranial nerve deficit. GCS eye subscore is 4. GCS verbal subscore is 5. GCS motor subscore is 6.   Skin: Skin is warm and dry.   AVF infx       Psychiatric: He has a normal mood and affect.   Nursing note and vitals reviewed.      Significant Labs:  Recent Results (from the past 24 hour(s))   Echo Color Flow Doppler? Yes    Collection Time: 05/14/20  9:21 AM   Result Value Ref Range    BSA 1.84 m2    Ascending aorta 3.02 cm    STJ 3.31 cm    AV mean gradient 3 mmHg    Ao peak tee 1.05 m/s    Ao VTI 19.57 cm    IVS 1.00 (A) 0.6 - 1.1 cm    LA size 3.12 cm    Left Atrium Major Axis 4.83 cm    Left Atrium Minor Axis 4.29 cm    LVIDD 5.00 3.5 - 6.0 cm    LVIDS 4.00 2.1 - 4.0 cm    LVOT diameter 2.32 cm    LVOT peak VTI 15.44 cm    PW 1.00 (A) 0.6 - 1.1 cm    MV Peak A Tee 1.04 m/s    E  wave decelartion time 201.13 msec    MV Peak E Tee 0.88 m/s    PV Peak D Tee 0.25 m/s    PV Peak S Tee 0.25 m/s    RA Major Axis 4.87 cm    RA Width 3.33 cm    RVDD 3.45 cm    Sinus 3.88 cm    TAPSE 1.49 cm    TR Max Tee 1.62 m/s    TDI LATERAL 0.09 m/s    TDI SEPTAL 0.05 m/s    LA WIDTH 3.86 cm    LV Diastolic Volume 87.11 mL    LV Systolic Volume 41.44 mL    RV S' 7.01 cm/s    LVOT peak tee 0.79 m/s    LV LATERAL E/E' RATIO 9.78 m/s    LV SEPTAL E/E' RATIO 17.60 m/s    FS 20 %    LA volume 46.52 cm3    LV mass 181.98 g    Left Ventricle Relative Wall Thickness 0.40 cm    AV valve area 3.33 cm2    AV Velocity Ratio 0.75     AV index (prosthetic) 0.79     E/A ratio 0.85     Mean e' 0.07 m/s    Pulm vein S/D ratio 1.00     LVOT area 4.2 cm2    LVOT stroke volume 65.24 cm3    AV peak gradient 4 mmHg    E/E' ratio 12.57 m/s    LV Systolic Volume Index 22.3 mL/m2    LV Diastolic Volume Index 46.81 mL/m2    LA Volume Index 25.0 mL/m2    LV Mass Index 98 g/m2    Triscuspid Valve Regurgitation Peak Gradient 10 mmHg    Right Atrial Pressure (from IVC) 3 mmHg    TV rest pulmonary artery pressure 13 mmHg   POCT glucose    Collection Time: 05/14/20 10:16 AM   Result Value Ref Range    POCT Glucose 207 (H) 70 - 110 mg/dL   Basic metabolic panel    Collection Time: 05/14/20 11:30 AM   Result Value Ref Range    Sodium 136 136 - 145 mmol/L    Potassium 5.0 3.5 - 5.1 mmol/L    Chloride 106 95 - 110 mmol/L    CO2 14 (L) 23 - 29 mmol/L    Glucose 218 (H) 70 - 110 mg/dL    BUN, Bld 53 (H) 8 - 23 mg/dL    Creatinine 7.5 (H) 0.5 - 1.4 mg/dL    Calcium 7.9 (L) 8.7 - 10.5 mg/dL    Anion Gap 16 8 - 16 mmol/L    eGFR if African American 8.1 (A) >60 mL/min/1.73 m^2    eGFR if non  7.0 (A) >60 mL/min/1.73 m^2   Lactic Acid, Plasma    Collection Time: 05/14/20 11:30 AM   Result Value Ref Range    Lactate (Lactic Acid) 1.0 0.5 - 2.2 mmol/L   POCT glucose    Collection Time: 05/14/20 11:38 AM   Result Value Ref Range    POCT  Glucose 225 (H) 70 - 110 mg/dL   Renal function panel    Collection Time: 05/14/20  1:58 PM   Result Value Ref Range    Glucose 197 (H) 70 - 110 mg/dL    Sodium 137 136 - 145 mmol/L    Potassium 4.6 3.5 - 5.1 mmol/L    Chloride 106 95 - 110 mmol/L    CO2 16 (L) 23 - 29 mmol/L    BUN, Bld 44 (H) 8 - 23 mg/dL    Calcium 7.8 (L) 8.7 - 10.5 mg/dL    Creatinine 6.2 (H) 0.5 - 1.4 mg/dL    Albumin 1.9 (L) 3.5 - 5.2 g/dL    Phosphorus 4.8 (H) 2.7 - 4.5 mg/dL    eGFR if  10.2 (A) >60 mL/min/1.73 m^2    eGFR if non  8.8 (A) >60 mL/min/1.73 m^2    Anion Gap 15 8 - 16 mmol/L   Magnesium    Collection Time: 05/14/20  1:58 PM   Result Value Ref Range    Magnesium 1.9 1.6 - 2.6 mg/dL   POCT glucose    Collection Time: 05/14/20  4:06 PM   Result Value Ref Range    POCT Glucose 160 (H) 70 - 110 mg/dL   POCT glucose    Collection Time: 05/14/20  8:11 PM   Result Value Ref Range    POCT Glucose 154 (H) 70 - 110 mg/dL   Renal function panel    Collection Time: 05/14/20 10:35 PM   Result Value Ref Range    Glucose 184 (H) 70 - 110 mg/dL    Sodium 137 136 - 145 mmol/L    Potassium 4.5 3.5 - 5.1 mmol/L    Chloride 99 95 - 110 mmol/L    CO2 26 23 - 29 mmol/L    BUN, Bld 15 8 - 23 mg/dL    Calcium 7.8 (L) 8.7 - 10.5 mg/dL    Creatinine 3.2 (H) 0.5 - 1.4 mg/dL    Albumin 2.1 (L) 3.5 - 5.2 g/dL    Phosphorus 2.7 2.7 - 4.5 mg/dL    eGFR if African American 22.8 (A) >60 mL/min/1.73 m^2    eGFR if non  19.7 (A) >60 mL/min/1.73 m^2    Anion Gap 12 8 - 16 mmol/L   Magnesium    Collection Time: 05/14/20 10:35 PM   Result Value Ref Range    Magnesium 2.1 1.6 - 2.6 mg/dL   POCT glucose    Collection Time: 05/15/20 12:07 AM   Result Value Ref Range    POCT Glucose 210 (H) 70 - 110 mg/dL   POCT glucose    Collection Time: 05/15/20  4:27 AM   Result Value Ref Range    POCT Glucose 202 (H) 70 - 110 mg/dL   CBC auto differential    Collection Time: 05/15/20  4:31 AM   Result Value Ref Range    WBC 7.85 3.90  - 12.70 K/uL    RBC 3.35 (L) 4.60 - 6.20 M/uL    Hemoglobin 9.5 (L) 14.0 - 18.0 g/dL    Hematocrit 29.2 (L) 40.0 - 54.0 %    Mean Corpuscular Volume 87 82 - 98 fL    Mean Corpuscular Hemoglobin 28.4 27.0 - 31.0 pg    Mean Corpuscular Hemoglobin Conc 32.5 32.0 - 36.0 g/dL    RDW 15.4 (H) 11.5 - 14.5 %    Platelets 229 150 - 350 K/uL    MPV 11.4 9.2 - 12.9 fL    Immature Granulocytes 0.9 (H) 0.0 - 0.5 %    Gran # (ANC) 7.0 1.8 - 7.7 K/uL    Immature Grans (Abs) 0.07 (H) 0.00 - 0.04 K/uL    Lymph # 0.3 (L) 1.0 - 4.8 K/uL    Mono # 0.4 0.3 - 1.0 K/uL    Eos # 0.0 0.0 - 0.5 K/uL    Baso # 0.01 0.00 - 0.20 K/uL    nRBC 0 0 /100 WBC    Gran% 89.8 (H) 38.0 - 73.0 %    Lymph% 3.9 (L) 18.0 - 48.0 %    Mono% 5.2 4.0 - 15.0 %    Eosinophil% 0.1 0.0 - 8.0 %    Basophil% 0.1 0.0 - 1.9 %    Differential Method Automated    APTT    Collection Time: 05/15/20  4:31 AM   Result Value Ref Range    aPTT 40.8 (H) 21.0 - 32.0 sec   Vancomycin, random    Collection Time: 05/15/20  4:31 AM   Result Value Ref Range    Vancomycin, Random 21.0 Not established ug/mL   Magnesium    Collection Time: 05/15/20  4:32 AM   Result Value Ref Range    Magnesium 2.0 1.6 - 2.6 mg/dL   Phosphorus    Collection Time: 05/15/20  4:32 AM   Result Value Ref Range    Phosphorus 5.1 (H) 2.7 - 4.5 mg/dL   Basic metabolic panel    Collection Time: 05/15/20  4:32 AM   Result Value Ref Range    Sodium 138 136 - 145 mmol/L    Potassium 4.6 3.5 - 5.1 mmol/L    Chloride 99 95 - 110 mmol/L    CO2 26 23 - 29 mmol/L    Glucose 195 (H) 70 - 110 mg/dL    BUN, Bld 17 8 - 23 mg/dL    Creatinine 3.6 (H) 0.5 - 1.4 mg/dL    Calcium 7.7 (L) 8.7 - 10.5 mg/dL    Anion Gap 13 8 - 16 mmol/L    eGFR if African American 19.7 (A) >60 mL/min/1.73 m^2    eGFR if non  17.1 (A) >60 mL/min/1.73 m^2   Renal function panel    Collection Time: 05/15/20  4:32 AM   Result Value Ref Range    Glucose 195 (H) 70 - 110 mg/dL    Sodium 138 136 - 145 mmol/L    Potassium 4.6 3.5 - 5.1  mmol/L    Chloride 99 95 - 110 mmol/L    CO2 26 23 - 29 mmol/L    BUN, Bld 17 8 - 23 mg/dL    Calcium 7.7 (L) 8.7 - 10.5 mg/dL    Creatinine 3.6 (H) 0.5 - 1.4 mg/dL    Albumin 2.0 (L) 3.5 - 5.2 g/dL    Phosphorus 5.1 (H) 2.7 - 4.5 mg/dL    eGFR if African American 19.7 (A) >60 mL/min/1.73 m^2    eGFR if non  17.1 (A) >60 mL/min/1.73 m^2    Anion Gap 13 8 - 16 mmol/L   Magnesium    Collection Time: 05/15/20  4:32 AM   Result Value Ref Range    Magnesium 2.0 1.6 - 2.6 mg/dL     Microbiology Results (last 7 days)     Procedure Component Value Units Date/Time    Urine culture [[id number]] Collected:  05/13/20 1458    Order Status:  Completed Specimen:  Urine Updated:  05/14/20 2352     Urine Culture, Routine Multiple organisms isolated. None in predominance.  Repeat if      clinically necessary.    Narrative:       Preferred Collection Type->Urine, Clean Catch    Blood culture x two cultures. Draw prior to antibiotics. [[id number]] Collected:  05/13/20 1202    Order Status:  Completed Specimen:  Blood from Peripheral, Hand, Right Updated:  05/14/20 1412     Blood Culture, Routine No Growth to date      No Growth to date    Narrative:       Aerobic and anaerobic    Respiratory Infection Panel (PCR), Nasopharyngeal [[id number]] Collected:  05/13/20 2238    Order Status:  Completed Specimen:  Nasopharyngeal Swab Updated:  05/14/20 1102     Respiratory Infection Panel Source NP Swab     Adenovirus Not Detected     Coronavirus 229E, Common Cold Virus Not Detected     Coronavirus HKU1, Common Cold Virus Not Detected     Coronavirus NL63, Common Cold Virus Not Detected     Coronavirus OC43, Common Cold Virus Not Detected     Comment: The Coronavirus strains detected in this test cause the common cold.  These strains are not the COVID-19 (novel Coronavirus)strain   associated with the respiratory disease outbreak.          Human Metapneumovirus Not Detected     Human Rhinovirus/Enterovirus Not Detected      Influenza A (subtypes H1, H1-2009,H3) Not Detected     Influenza B Not Detected     Parainfluenza Virus 1 Not Detected     Parainfluenza Virus 2 Not Detected     Parainfluenza Virus 3 Not Detected     Parainfluenza Virus 4 Not Detected     Respiratory Syncytial Virus Not Detected     Bordetella Parapertussis (BM0371) Not Detected     Bordetella pertussis (ptxP) Not Detected     Chlamydia pneumoniae Not Detected     Mycoplasma pneumoniae Not Detected     Comment: Respiratory Infection Panel testing performed by Multiplex PCR.       Narrative:       For all other respiratory sources, order [id number] -  Respiratory Viral Panel by PCR    Respiratory Infection Panel (PCR), Nasopharyngeal [[id number]]     Order Status:  Canceled Specimen:  Nasopharyngeal Swab     Respiratory Infection Panel (PCR), Nasopharyngeal [[id number]] Collected:  05/13/20 1835    Order Status:  Canceled Specimen:  Nasopharyngeal Swab     Blood culture x two cultures. Draw prior to antibiotics. [[id number]] Collected:  05/13/20 1202    Order Status:  Sent Specimen:  Blood from Peripheral, Antecubital, Right Updated:  05/13/20 1431        Imaging Results          X-Ray Chest AP Portable (Final result)  Result time 05/13/20 18:13:12    Final result by Noah Wayne MD (05/13/20 18:13:12)                 Impression:      As above      Electronically signed by: Noah Wayne MD  Date:    05/13/2020  Time:    18:13             Narrative:    EXAMINATION:  XR CHEST AP PORTABLE    CLINICAL HISTORY:  Encounter for adjustment and management of vascular access device    TECHNIQUE:  Single frontal view of the chest was performed.    COMPARISON:  05/13/2020    FINDINGS:  Right central venous catheter tip projects distal SVC junction.  Left central venous catheter tip crosses midline, and projects in the region of the right subclavian, repositioning is recommended.  There is no pneumothorax or significant interval detrimental change in the cardiopulmonary  status as compared to the previous exam.                               X-Ray Chest AP Portable (Final result)  Result time 05/13/20 12:54:09    Final result by Jesus Perry MD (05/13/20 12:54:09)                 Impression:      No acute radiographic findings.  No significant detrimental change from prior exam.      Electronically signed by: Jesus Perry MD  Date:    05/13/2020  Time:    12:54             Narrative:    EXAMINATION:  XR CHEST AP PORTABLE    CLINICAL HISTORY:  Sepsis;    TECHNIQUE:  Single frontal view of the chest was performed.    COMPARISON:  05/12/2020    FINDINGS:  Lungs are symmetrically expanded without evidence of significant focal consolidation, large effusion or pneumothorax.  Right IJ tunneled central venous catheter with tip projected over the SVC.  Cardiomediastinal silhouette and pulmonary vascularity are within normal limits.  There are sternotomy wires and evidence of prior CABG.  Bones show no acute abnormalities.                                  Assessment/Plan:     * Shock  See AV fistula infx.    AV fistula infection   Sx culture 4/10/20:       PREVOTELLA MELANINOGENICA      FUSOBACTERIUM NUCLEATUM      PREVOTELLA ORIS/BUCCAE       STREPTOCOCCUS ANGINOSUS   On V+Z  -Tx per ID and primary. NGTD (3) blcx          ESRD on hemodialysis  Patient on HD T/Th/Sat with last session on 5/12  Tunneled catheter that was inserted in 2/2020 that is being used for access  [facility]  AV graft care by Dr. Garduno      Recommendations:  5/15: Pressors stopped early yesterday morning. Okay to s/d from nephrology standpoint, can get HD (TTSa).   -See attending attestation for changes or further recs  · Monitor Ins and Outs and daily weights.   · Avoid nephrotoxic agents such as NSAIDS, Gadolinium and IV Radiocontrast  · Renal Dose meds to current GFR/Creat Clereance            Thank you for your consult. I will follow-up with patient. Please contact us if you have any additional  questions.    Giovanni Polo MD  Nephrology  [facility]

## 2020-05-15 NOTE — PROGRESS NOTES
Therapy with vancomycin complete and/or consult discontinued by provider.  Pharmacy will sign off, please re-consult as needed.    Ketty Hollis, PharmD, BCCCP  [id number]

## 2020-05-15 NOTE — PROGRESS NOTES
[facility]  Critical Care Medicine  Progress Note    Patient Name: Kodi Ibanez Sr.  MRN: [id number]  Admission Date: 5/13/2020  Hospital Length of Stay: 2 days  Code Status: DNR  Attending Provider: Josie Thomas MD  Primary Care Provider: Melonie Elias MD   Principal Problem: Shock    Subjective:     HPI:  61 y/o male with past medical history of CHF, CAD with recent NSTEMI [~4/29] (CABG 2009, FERCHO placed in 12/2019), diabetes, CVA, ESRD on dialysis Tuesday/Thursday/Saturday (last dialyzed morning of 5/12) presents to the ER by referral from infectious disease clinic after virtual visit today. The ID provider was concerned about the patients AMS and recent elevated WBC count. Recently,atient underwent ligation of LUE fistula on 2/27/20 with Dr Lopez ([facility], vascular surgery) and had ongoing issues afterwards. Patient was admitted on 4/10 for excision of his infected LUE mid and distal AVF with cultures and specimen obtained. Surgical cultures 4/10 grew 2 different prevotella species, fusobacterium, and strep anginosus. The patient was d/c'ed with home health wound care and po augmentin/clindamycin x 10 days. On 4/20 antibiotics were changed to Flagyl 500 mg p.o. q.8 and Cefazolin after dialysis 2 g on Tuesday and Thursday, 3 g on Saturday. End date on antibiotics on 5/11.     Following these events, on 4/29 the patient was admitted for an NSTEMI, a LHC was performed where re-stenosis of a previous stent was found and balloon angioplasty was done. He was discharged on 5/2. He is now on ASA/plavix, but per wife has not been compliant.     Patients wife says that he has had poor PO intake for the last 2-3 weeks, has become progressively weak, and developed altered mental status today. Patient had 3 episodes of vomiting within the last 24 hours, which appeared brown-non bloody. He had a brown watery bowel movement today. Patient normally oliguric (urinating ~2-3 x per week). Patient was  seen in the [facility] ED on the evening of 5/12 due to palpitations after his normal dialysis and sent home after evaluation, for possible antibiotic side effect.     He presents to the ED with encephalopathy x 1 day, leukocytosis of 15, lactate 2.7, troponin 0.482, , procal 10.98, Cr 6.5, BUN 41, and hyperglycemia. Patient pan-cultured, currently only UA resulted with large amount of bacteria. Patient requiring high dose Levophed on admission. Broad spectrum antibiotics initiated. 1.5 L IVF given. Patient remains on RA. Wife was updated regarding patient's current status. Patient spoken to regarding code status and verbalized his wishes as a DNR. His wife confirmed he has an LAPost but it is not on file. Instructed to bring in.    Hospital/ICU Course:  The patient was admitted to MICU for shock likely 2/2 to sepsis on 5/13. Patient remains on low dose Levophed. Nephrology consulted for possible dialysis. Blood cx NGTD. Patient remains on Heparin gtt for paroxysmal A fib with CHADS Vasc score of 6. Previously hyperglycemic with BG now well controlled.    5/14: Levo stopped in morning. Stable all day off pressors on room air    5/15: Step down orders placed    Interval History/Significant Events: NAEO    Review of Systems   All other systems reviewed and are negative.    Objective:     Vital Signs (Most Recent):  Temp: 99 °F (37.2 °C) (05/15/20 0300)  Pulse: (!) 113 (05/15/20 0701)  Resp: (!) 28 (05/15/20 0701)  BP: (!) 147/87 (05/15/20 0701)  SpO2: 98 % (05/15/20 0701) Vital Signs (24h Range):  Temp:  [97.8 °F (36.6 °C)-99.4 °F (37.4 °C)] 99 °F (37.2 °C)  Pulse:  [] 113  Resp:  [15-33] 28  SpO2:  [95 %-100 %] 98 %  BP: (105-171)/() 147/87   Weight: 67.6 kg (149 lb)  Body mass index is 20.78 kg/m².      Intake/Output Summary (Last 24 hours) at 5/15/2020 0812  Last data filed at 5/15/2020 0701  Gross per 24 hour   Intake 2483.74 ml   Output 1312 ml   Net 1171.74 ml       Physical Exam    Constitutional: He appears well-developed and well-nourished.   HENT:   Head: Normocephalic and atraumatic.   Eyes: EOM are normal. Right eye exhibits no discharge. Left eye exhibits no discharge.   Neck: Normal range of motion. No tracheal deviation present.   Cardiovascular: Normal rate, regular rhythm and normal heart sounds.   Pulmonary/Chest: Effort normal. No stridor. No respiratory distress. He has no wheezes.   Abdominal: Soft. He exhibits no distension. There is no tenderness.   Musculoskeletal: He exhibits no deformity.   Neurological: He is alert.   CN's 2-12 grossly intact, LEE   Skin: Skin is warm and dry.       Vents:     Lines/Drains/Airways     Central Venous Catheter Line                 Hemodialysis Catheter 03/05/20 0535 right subclavian 71 days          Peripheral Intravenous Line                 Peripheral IV - Single Lumen 05/13/20 1620 22 G Right Hand 1 day         Peripheral IV - Single Lumen 05/14/20 1448 20 G;1 3/4 in Right Forearm less than 1 day              Significant Labs:    CBC/Anemia Profile:  Recent Labs   Lab 05/13/20  1942 05/14/20  0223 05/15/20  0431   WBC 10.96 13.03*  13.03* 7.85   HGB 9.4* 9.6*  9.6* 9.5*   HCT 29.8* 31.2*  31.2* 29.2*    253  253 229   MCV 89 91  91 87   RDW 15.9* 15.8*  15.8* 15.4*        Chemistries:  Recent Labs   Lab 05/14/20  1358 05/14/20  2235 05/15/20  0432    137 138  138   K 4.6 4.5 4.6  4.6    99 99  99   CO2 16* 26 26  26   BUN 44* 15 17  17   CREATININE 6.2* 3.2* 3.6*  3.6*   CALCIUM 7.8* 7.8* 7.7*  7.7*   ALBUMIN 1.9* 2.1* 2.0*   MG 1.9 2.1 2.0  2.0   PHOS 4.8* 2.7 5.1*  5.1*       All pertinent labs within the past 24 hours have been reviewed.    Significant Imaging:  I have reviewed and interpreted all pertinent imaging results/findings within the past 24 hours.      ABG  Recent Labs   Lab 05/14/20  0259   PH 7.299*   PO2 28*   PCO2 34.5*   HCO3 17.0*   BE -9     Assessment/Plan:      Cardiac/Vascular  Atrial fibrillation  --patient on diltiazem for rate control; currently holding in the setting of normal rate with marked hypotension  --on ASA/plavix  --Chads Vasc score 6 points  --initiated low-intensity Heparin gtt    NSTEMI (non-ST elevated myocardial infarction)  Hx of CABG in 2009 and placement of FERCHO in 12/2019. Patient recenetly admitted on 4/29 and d/c'ed 5/2, admitted for an NSTEMI, a LHC was performed where re-stenosis of a previous stent was found and balloon angioplasty was done. He is now on ASA/plavix, but per wife has not been compliant.    --stat EKG  --troponin now down-trending; stopped trending  --continue ASA/plavix (home meds)  -- No systolic or diastolic dysfunction on echo yesterday    Essential hypertension  --holding home losartan and coreg in the setting of acute illness    HLD (hyperlipidemia)  -- home atorva restarted    Renal/  ESRD on hemodialysis  Patient on HD T/Th/Sat with last session on 5/12, where he was admitted to the ED with palpatations after session. Patient has tunneled catheter that was inserted in 2/2020 that is being used for access.     --nephrology consulted  --avoid nephrotoxic meds  --oliguric, strict I/o's  --BMP daily    ID  AV fistula infection  --see shock    Endocrine  Type 2 diabetes, uncontrolled, with neuropathy  -->500 on admission, 4 units aspart IV given in ED; brief IV insulin gtt initiated, currently off  --SSI prn initiated  --accuchecks q4h      Other  * Shock  2/2 probable sepsis likely r/t LUE AV fistula excision vs UTI. Patient also with R tunneled catheter placed 2/2020.   Patient underwent ligation of LUE fistula on 2/27/20 with Dr Lopez ([facility], vascular surgery) and had ongoing issues afterwards. Patient was admitted on 4/10 for excision of his infected LUE mid and distal AVF with cultures and specimen obtained. Surgical cultures 4/10 grew 2 different prevotella species, fusobacterium, and strep anginosus. The  patient was d/c'ed with home health wound care and po augmentin/clindamycin x 10 days. On 4/20 antibiotics were changed to Flagyl 500 mg p.o. q.8 and Cefazolin after dialysis 2 g on Tuesday and Thursday, 3 g on Saturday. End date on antibiotics on 5/11.    --WBC 15, lactate 2.7, procal 10.98  --1.5 L IVF given in ED  --broad spectrum abx initiated  --pan-cx'ed, plan to f/up results  --off of vasopressors       Critical Care Daily Checklist:    A: Awake: RASS Goal/Actual Goal: RASS Goal: 0-->alert and calm  Actual: Torres Agitation Sedation Scale (RASS): Alert and calm   B: Spontaneous Breathing Trial Performed?     C: SAT & SBT Coordinated?  NA           D: Delirium: CAM-ICU Overall CAM-ICU: Negative   E: Early Mobility Performed? Yes   F: Feeding Goal:    Status:     Current Diet Order   Procedures    Diet diabetic [facility]; 2000 Calorie     Order Specific Question:   Indicate patient location for additional diet options:     Answer:   [facility]     Order Specific Question:   Total calories:     Answer:   2000 Calorie      AS: Analgesia/Sedation None   T: Thromboembolic Prophylaxis Hep ggt   H: HOB > 300 Yes   U: Stress Ulcer Prophylaxis (if needed) None   G: Glucose Control Detemir, SSI   B: Bowel Function Stool Occurrence: 1   I: Indwelling Catheter (Lines & Mayers) Necessity Luis, PIV   D: De-escalation of Antimicrobials/Pharmacotherapies Performed    Plan for the day/ETD Step down    Code Status:  Family/Goals of Care: DNR         Critical secondary to Patient has a condition that poses threat to life and bodily function: Septic Shock (resolved)      Critical care was time spent personally by me on the following activities: development of treatment plan with patient or surrogate and bedside caregivers, discussions with consultants, evaluation of patient's response to treatment, examination of patient, ordering and performing treatments and interventions, ordering and review of laboratory  studies, ordering and review of radiographic studies, pulse oximetry, re-evaluation of patient's condition. This critical care time did not overlap with that of any other provider or involve time for any procedures.     Susanne Stanton MD  Critical Care Medicine  [facility]

## 2020-05-15 NOTE — RESIDENT HANDOFF
ICU Transfer of Care Note  Critical Care Medicine    Admit Date: 5/13/2020  LOS: 2    CC: Shock    Code Status: DNR     Transfer to Hospital Medicine sent through [employer] Secure chat at 00:23     HPI and Hospital Course:     HPI:  59 y/o male with past medical history of CHF, CAD with recent NSTEMI [~4/29] (CABG 2009, FERCHO placed in 12/2019), diabetes, CVA, ESRD on dialysis Tuesday/Thursday/Saturday (last dialyzed morning of 5/12) presents to the ER by referral from infectious disease clinic after virtual visit today. The ID provider was concerned about the patients AMS and recent elevated WBC count. Recently,junior underwent ligation of LUE fistula on 2/27/20 with Dr Lopez ([facility], vascular surgery) and had ongoing issues afterwards. Patient was admitted on 4/10 for excision of his infected LUE mid and distal AVF with cultures and specimen obtained. Surgical cultures 4/10 grew 2 different prevotella species, fusobacterium, and strep anginosus. The patient was d/c'ed with home health wound care and po augmentin/clindamycin x 10 days. On 4/20 antibiotics were changed to Flagyl 500 mg p.o. q.8 and Cefazolin after dialysis 2 g on Tuesday and Thursday, 3 g on Saturday. End date on antibiotics on 5/11.     Following these events, on 4/29 the patient was admitted for an NSTEMI, a LHC was performed where re-stenosis of a previous stent was found and balloon angioplasty was done. He was discharged on 5/2. He is now on ASA/plavix, but per wife has not been compliant.     Patients wife says that he has had poor PO intake for the last 2-3 weeks, has become progressively weak, and developed altered mental status today. Patient had 3 episodes of vomiting within the last 24 hours, which appeared brown-non bloody. He had a brown watery bowel movement today. Patient normally oliguric (urinating ~2-3 x per week). Patient was seen in the [facility] ED on the evening of 5/12 due to palpitations after his normal dialysis and sent  home after evaluation, for possible antibiotic side effect.     He presents to the ED with encephalopathy x 1 day, leukocytosis of 15, lactate 2.7, troponin 0.482, , procal 10.98, Cr 6.5, BUN 41, and hyperglycemia. Patient pan-cultured, currently only UA resulted with large amount of bacteria. Patient requiring high dose Levophed on admission. Broad spectrum antibiotics initiated. 1.5 L IVF given. Patient remains on RA. Wife was updated regarding patient's current status. Patient spoken to regarding code status and verbalized his wishes as a DNR. His wife confirmed he has an LAPost but it is not on file. Instructed to bring in.    Hospital/ICU Course:  The patient was admitted to MICU for shock likely 2/2 to sepsis on 5/13. Patient remains on low dose Levophed. Nephrology consulted for possible dialysis. Blood cx NGTD. Patient remains on Heparin gtt for paroxysmal A fib with CHADS Vasc score of 6. Previously hyperglycemic with BG now well controlled.      To Follow Up:     1) afib - home dilt on hold due to hypotension, currently rate controlled; resume when clinically indicated. On low intensity heparin gtt.     2) Nstemi (on most recent d/c 5/2) - trop trending down. On statin, dapt & heparin. BB on hold as per below    3) HTN - home coreg, dilt & losartan on hold in setting of sepsis. Currently normotensive off all agents. Resume when clinically indicated    4) ESRD - nephrology following. Last HD 5/14    5) Dm  - ssi    6) septic shock - likely source LUE av fistula excision. On vanc & zosyn. Pressors weaned off.     Discharge Plan:     Pending clinical course    Call [phone] with questions.     Ruth Bell NP  Critical Care Medicine

## 2020-05-15 NOTE — SUBJECTIVE & OBJECTIVE
Interval History:     5/15: Pressors stopped early yesterday morning. Okay to s/d from nephrology standpoint, can get HD (TSa).     Review of patient's allergies indicates:   Allergen Reactions    Reglan [metoclopramide hcl] Diarrhea    Lorazepam      Other reaction(s): heavy sedation     Current Facility-Administered Medications   Medication Frequency    0.9%  NaCl infusion (CRRT USE ONLY) Continuous    aspirin EC tablet 81 mg Daily    atorvastatin tablet 80 mg QHS    clopidogreL tablet 75 mg Daily    dextrose 10% (D10W) Bolus PRN    glucagon (human recombinant) injection 1 mg PRN    heparin 25,000 units in dextrose 5% (100 units/ml) IV bolus from bag - ADDITIONAL PRN BOLUS - 30 units/kg PRN    heparin 25,000 units in dextrose 5% (100 units/ml) IV bolus from bag - ADDITIONAL PRN BOLUS - 60 units/kg PRN    heparin 25,000 units in dextrose 5% 250 mL (100 units/mL) infusion LOW INTENSITY nomogram - [facility] Continuous    insulin aspart U-100 pen 1-10 Units Q4H PRN    insulin detemir U-100 pen 10 Units QHS    magnesium sulfate 2g in water 50mL IVPB (premix) PRN    piperacillin-tazobactam 4.5 g in sodium chloride 0.9% 100 mL IVPB (ready to mix system) Q12H    sevelamer carbonate tablet 800 mg TID WM    sodium chloride 0.9% flush 10 mL PRN    sodium phosphate 20.01 mmol in dextrose 5 % 250 mL IVPB PRN    sodium phosphate 30 mmol in dextrose 5 % 250 mL IVPB PRN    sodium phosphate 39.99 mmol in dextrose 5 % 250 mL IVPB PRN    vancomycin - pharmacy to dose pharmacy to manage frequency       Objective:     Vital Signs (Most Recent):  Temp: 99 °F (37.2 °C) (05/15/20 0300)  Pulse: (!) 113 (05/15/20 0701)  Resp: (!) 28 (05/15/20 0701)  BP: (!) 147/87 (05/15/20 0701)  SpO2: 98 % (05/15/20 0701)  O2 Device (Oxygen Therapy): room air (05/15/20 0701) Vital Signs (24h Range):  Temp:  [97.8 °F (36.6 °C)-99.4 °F (37.4 °C)] 99 °F (37.2 °C)  Pulse:  [] 113  Resp:  [15-33] 28  SpO2:  [95 %-100 %] 98 %  BP:  (105-171)/() 147/87     Weight: 67.6 kg (149 lb) (05/15/20 0701)  Body mass index is 20.78 kg/m².  Body surface area is 1.84 meters squared.    I/O last 3 completed shifts:  In: 3061.3 [P.O.:330; I.V.:1981.3; IV Piggyback:750]  Out: 1312 [Other:1312]    Physical Exam   Constitutional: He appears well-developed and well-nourished. He has a sickly appearance.   HENT:   Head: Normocephalic and atraumatic.   Eyes: Pupils are equal, round, and reactive to light. EOM are normal.   Neck: Normal range of motion. Neck supple. No tracheal deviation present. No thyromegaly present.   Cardiovascular: Normal rate and regular rhythm.   Pulmonary/Chest: Effort normal and breath sounds normal. No stridor.   Abdominal: Soft. Bowel sounds are normal. He exhibits no distension and no ascites.   Musculoskeletal: Normal range of motion. He exhibits no tenderness.   Neurological: He is alert. No cranial nerve deficit. GCS eye subscore is 4. GCS verbal subscore is 5. GCS motor subscore is 6.   Skin: Skin is warm and dry.   AVF infx       Psychiatric: He has a normal mood and affect.   Nursing note and vitals reviewed.      Significant Labs:  Recent Results (from the past 24 hour(s))   Echo Color Flow Doppler? Yes    Collection Time: 05/14/20  9:21 AM   Result Value Ref Range    BSA 1.84 m2    Ascending aorta 3.02 cm    STJ 3.31 cm    AV mean gradient 3 mmHg    Ao peak tee 1.05 m/s    Ao VTI 19.57 cm    IVS 1.00 (A) 0.6 - 1.1 cm    LA size 3.12 cm    Left Atrium Major Axis 4.83 cm    Left Atrium Minor Axis 4.29 cm    LVIDD 5.00 3.5 - 6.0 cm    LVIDS 4.00 2.1 - 4.0 cm    LVOT diameter 2.32 cm    LVOT peak VTI 15.44 cm    PW 1.00 (A) 0.6 - 1.1 cm    MV Peak A Tee 1.04 m/s    E wave decelartion time 201.13 msec    MV Peak E Tee 0.88 m/s    PV Peak D Tee 0.25 m/s    PV Peak S Tee 0.25 m/s    RA Major Axis 4.87 cm    RA Width 3.33 cm    RVDD 3.45 cm    Sinus 3.88 cm    TAPSE 1.49 cm    TR Max Tee 1.62 m/s    TDI LATERAL 0.09 m/s    TDI  SEPTAL 0.05 m/s    LA WIDTH 3.86 cm    LV Diastolic Volume 87.11 mL    LV Systolic Volume 41.44 mL    RV S' 7.01 cm/s    LVOT peak dori 0.79 m/s    LV LATERAL E/E' RATIO 9.78 m/s    LV SEPTAL E/E' RATIO 17.60 m/s    FS 20 %    LA volume 46.52 cm3    LV mass 181.98 g    Left Ventricle Relative Wall Thickness 0.40 cm    AV valve area 3.33 cm2    AV Velocity Ratio 0.75     AV index (prosthetic) 0.79     E/A ratio 0.85     Mean e' 0.07 m/s    Pulm vein S/D ratio 1.00     LVOT area 4.2 cm2    LVOT stroke volume 65.24 cm3    AV peak gradient 4 mmHg    E/E' ratio 12.57 m/s    LV Systolic Volume Index 22.3 mL/m2    LV Diastolic Volume Index 46.81 mL/m2    LA Volume Index 25.0 mL/m2    LV Mass Index 98 g/m2    Triscuspid Valve Regurgitation Peak Gradient 10 mmHg    Right Atrial Pressure (from IVC) 3 mmHg    TV rest pulmonary artery pressure 13 mmHg   POCT glucose    Collection Time: 05/14/20 10:16 AM   Result Value Ref Range    POCT Glucose 207 (H) 70 - 110 mg/dL   Basic metabolic panel    Collection Time: 05/14/20 11:30 AM   Result Value Ref Range    Sodium 136 136 - 145 mmol/L    Potassium 5.0 3.5 - 5.1 mmol/L    Chloride 106 95 - 110 mmol/L    CO2 14 (L) 23 - 29 mmol/L    Glucose 218 (H) 70 - 110 mg/dL    BUN, Bld 53 (H) 8 - 23 mg/dL    Creatinine 7.5 (H) 0.5 - 1.4 mg/dL    Calcium 7.9 (L) 8.7 - 10.5 mg/dL    Anion Gap 16 8 - 16 mmol/L    eGFR if African American 8.1 (A) >60 mL/min/1.73 m^2    eGFR if non  7.0 (A) >60 mL/min/1.73 m^2   Lactic Acid, Plasma    Collection Time: 05/14/20 11:30 AM   Result Value Ref Range    Lactate (Lactic Acid) 1.0 0.5 - 2.2 mmol/L   POCT glucose    Collection Time: 05/14/20 11:38 AM   Result Value Ref Range    POCT Glucose 225 (H) 70 - 110 mg/dL   Renal function panel    Collection Time: 05/14/20  1:58 PM   Result Value Ref Range    Glucose 197 (H) 70 - 110 mg/dL    Sodium 137 136 - 145 mmol/L    Potassium 4.6 3.5 - 5.1 mmol/L    Chloride 106 95 - 110 mmol/L    CO2 16 (L) 23  - 29 mmol/L    BUN, Bld 44 (H) 8 - 23 mg/dL    Calcium 7.8 (L) 8.7 - 10.5 mg/dL    Creatinine 6.2 (H) 0.5 - 1.4 mg/dL    Albumin 1.9 (L) 3.5 - 5.2 g/dL    Phosphorus 4.8 (H) 2.7 - 4.5 mg/dL    eGFR if  10.2 (A) >60 mL/min/1.73 m^2    eGFR if non  8.8 (A) >60 mL/min/1.73 m^2    Anion Gap 15 8 - 16 mmol/L   Magnesium    Collection Time: 05/14/20  1:58 PM   Result Value Ref Range    Magnesium 1.9 1.6 - 2.6 mg/dL   POCT glucose    Collection Time: 05/14/20  4:06 PM   Result Value Ref Range    POCT Glucose 160 (H) 70 - 110 mg/dL   POCT glucose    Collection Time: 05/14/20  8:11 PM   Result Value Ref Range    POCT Glucose 154 (H) 70 - 110 mg/dL   Renal function panel    Collection Time: 05/14/20 10:35 PM   Result Value Ref Range    Glucose 184 (H) 70 - 110 mg/dL    Sodium 137 136 - 145 mmol/L    Potassium 4.5 3.5 - 5.1 mmol/L    Chloride 99 95 - 110 mmol/L    CO2 26 23 - 29 mmol/L    BUN, Bld 15 8 - 23 mg/dL    Calcium 7.8 (L) 8.7 - 10.5 mg/dL    Creatinine 3.2 (H) 0.5 - 1.4 mg/dL    Albumin 2.1 (L) 3.5 - 5.2 g/dL    Phosphorus 2.7 2.7 - 4.5 mg/dL    eGFR if African American 22.8 (A) >60 mL/min/1.73 m^2    eGFR if non  19.7 (A) >60 mL/min/1.73 m^2    Anion Gap 12 8 - 16 mmol/L   Magnesium    Collection Time: 05/14/20 10:35 PM   Result Value Ref Range    Magnesium 2.1 1.6 - 2.6 mg/dL   POCT glucose    Collection Time: 05/15/20 12:07 AM   Result Value Ref Range    POCT Glucose 210 (H) 70 - 110 mg/dL   POCT glucose    Collection Time: 05/15/20  4:27 AM   Result Value Ref Range    POCT Glucose 202 (H) 70 - 110 mg/dL   CBC auto differential    Collection Time: 05/15/20  4:31 AM   Result Value Ref Range    WBC 7.85 3.90 - 12.70 K/uL    RBC 3.35 (L) 4.60 - 6.20 M/uL    Hemoglobin 9.5 (L) 14.0 - 18.0 g/dL    Hematocrit 29.2 (L) 40.0 - 54.0 %    Mean Corpuscular Volume 87 82 - 98 fL    Mean Corpuscular Hemoglobin 28.4 27.0 - 31.0 pg    Mean Corpuscular Hemoglobin Conc 32.5 32.0 -  36.0 g/dL    RDW 15.4 (H) 11.5 - 14.5 %    Platelets 229 150 - 350 K/uL    MPV 11.4 9.2 - 12.9 fL    Immature Granulocytes 0.9 (H) 0.0 - 0.5 %    Gran # (ANC) 7.0 1.8 - 7.7 K/uL    Immature Grans (Abs) 0.07 (H) 0.00 - 0.04 K/uL    Lymph # 0.3 (L) 1.0 - 4.8 K/uL    Mono # 0.4 0.3 - 1.0 K/uL    Eos # 0.0 0.0 - 0.5 K/uL    Baso # 0.01 0.00 - 0.20 K/uL    nRBC 0 0 /100 WBC    Gran% 89.8 (H) 38.0 - 73.0 %    Lymph% 3.9 (L) 18.0 - 48.0 %    Mono% 5.2 4.0 - 15.0 %    Eosinophil% 0.1 0.0 - 8.0 %    Basophil% 0.1 0.0 - 1.9 %    Differential Method Automated    APTT    Collection Time: 05/15/20  4:31 AM   Result Value Ref Range    aPTT 40.8 (H) 21.0 - 32.0 sec   Vancomycin, random    Collection Time: 05/15/20  4:31 AM   Result Value Ref Range    Vancomycin, Random 21.0 Not established ug/mL   Magnesium    Collection Time: 05/15/20  4:32 AM   Result Value Ref Range    Magnesium 2.0 1.6 - 2.6 mg/dL   Phosphorus    Collection Time: 05/15/20  4:32 AM   Result Value Ref Range    Phosphorus 5.1 (H) 2.7 - 4.5 mg/dL   Basic metabolic panel    Collection Time: 05/15/20  4:32 AM   Result Value Ref Range    Sodium 138 136 - 145 mmol/L    Potassium 4.6 3.5 - 5.1 mmol/L    Chloride 99 95 - 110 mmol/L    CO2 26 23 - 29 mmol/L    Glucose 195 (H) 70 - 110 mg/dL    BUN, Bld 17 8 - 23 mg/dL    Creatinine 3.6 (H) 0.5 - 1.4 mg/dL    Calcium 7.7 (L) 8.7 - 10.5 mg/dL    Anion Gap 13 8 - 16 mmol/L    eGFR if African American 19.7 (A) >60 mL/min/1.73 m^2    eGFR if non  17.1 (A) >60 mL/min/1.73 m^2   Renal function panel    Collection Time: 05/15/20  4:32 AM   Result Value Ref Range    Glucose 195 (H) 70 - 110 mg/dL    Sodium 138 136 - 145 mmol/L    Potassium 4.6 3.5 - 5.1 mmol/L    Chloride 99 95 - 110 mmol/L    CO2 26 23 - 29 mmol/L    BUN, Bld 17 8 - 23 mg/dL    Calcium 7.7 (L) 8.7 - 10.5 mg/dL    Creatinine 3.6 (H) 0.5 - 1.4 mg/dL    Albumin 2.0 (L) 3.5 - 5.2 g/dL    Phosphorus 5.1 (H) 2.7 - 4.5 mg/dL    eGFR if African American  19.7 (A) >60 mL/min/1.73 m^2    eGFR if non  17.1 (A) >60 mL/min/1.73 m^2    Anion Gap 13 8 - 16 mmol/L   Magnesium    Collection Time: 05/15/20  4:32 AM   Result Value Ref Range    Magnesium 2.0 1.6 - 2.6 mg/dL     Microbiology Results (last 7 days)     Procedure Component Value Units Date/Time    Urine culture [[id number]] Collected:  05/13/20 1458    Order Status:  Completed Specimen:  Urine Updated:  05/14/20 2352     Urine Culture, Routine Multiple organisms isolated. None in predominance.  Repeat if      clinically necessary.    Narrative:       Preferred Collection Type->Urine, Clean Catch    Blood culture x two cultures. Draw prior to antibiotics. [[id number]] Collected:  05/13/20 1202    Order Status:  Completed Specimen:  Blood from Peripheral, Hand, Right Updated:  05/14/20 1412     Blood Culture, Routine No Growth to date      No Growth to date    Narrative:       Aerobic and anaerobic    Respiratory Infection Panel (PCR), Nasopharyngeal [[id number]] Collected:  05/13/20 2238    Order Status:  Completed Specimen:  Nasopharyngeal Swab Updated:  05/14/20 1102     Respiratory Infection Panel Source NP Swab     Adenovirus Not Detected     Coronavirus 229E, Common Cold Virus Not Detected     Coronavirus HKU1, Common Cold Virus Not Detected     Coronavirus NL63, Common Cold Virus Not Detected     Coronavirus OC43, Common Cold Virus Not Detected     Comment: The Coronavirus strains detected in this test cause the common cold.  These strains are not the COVID-19 (novel Coronavirus)strain   associated with the respiratory disease outbreak.          Human Metapneumovirus Not Detected     Human Rhinovirus/Enterovirus Not Detected     Influenza A (subtypes H1, H1-2009,H3) Not Detected     Influenza B Not Detected     Parainfluenza Virus 1 Not Detected     Parainfluenza Virus 2 Not Detected     Parainfluenza Virus 3 Not Detected     Parainfluenza Virus 4 Not Detected     Respiratory Syncytial Virus Not  Detected     Bordetella Parapertussis (CG2399) Not Detected     Bordetella pertussis (ptxP) Not Detected     Chlamydia pneumoniae Not Detected     Mycoplasma pneumoniae Not Detected     Comment: Respiratory Infection Panel testing performed by Multiplex PCR.       Narrative:       For all other respiratory sources, order [id number] -  Respiratory Viral Panel by PCR    Respiratory Infection Panel (PCR), Nasopharyngeal [[id number]]     Order Status:  Canceled Specimen:  Nasopharyngeal Swab     Respiratory Infection Panel (PCR), Nasopharyngeal [[id number]] Collected:  05/13/20 1835    Order Status:  Canceled Specimen:  Nasopharyngeal Swab     Blood culture x two cultures. Draw prior to antibiotics. [[id number]] Collected:  05/13/20 1202    Order Status:  Sent Specimen:  Blood from Peripheral, Antecubital, Right Updated:  05/13/20 1431        Imaging Results          X-Ray Chest AP Portable (Final result)  Result time 05/13/20 18:13:12    Final result by Noah Wayne MD (05/13/20 18:13:12)                 Impression:      As above      Electronically signed by: Noah Wayne MD  Date:    05/13/2020  Time:    18:13             Narrative:    EXAMINATION:  XR CHEST AP PORTABLE    CLINICAL HISTORY:  Encounter for adjustment and management of vascular access device    TECHNIQUE:  Single frontal view of the chest was performed.    COMPARISON:  05/13/2020    FINDINGS:  Right central venous catheter tip projects distal SVC junction.  Left central venous catheter tip crosses midline, and projects in the region of the right subclavian, repositioning is recommended.  There is no pneumothorax or significant interval detrimental change in the cardiopulmonary status as compared to the previous exam.                               X-Ray Chest AP Portable (Final result)  Result time 05/13/20 12:54:09    Final result by Jesus Perry MD (05/13/20 12:54:09)                 Impression:      No acute radiographic findings.  No  significant detrimental change from prior exam.      Electronically signed by: Jesus Perry MD  Date:    05/13/2020  Time:    12:54             Narrative:    EXAMINATION:  XR CHEST AP PORTABLE    CLINICAL HISTORY:  Sepsis;    TECHNIQUE:  Single frontal view of the chest was performed.    COMPARISON:  05/12/2020    FINDINGS:  Lungs are symmetrically expanded without evidence of significant focal consolidation, large effusion or pneumothorax.  Right IJ tunneled central venous catheter with tip projected over the SVC.  Cardiomediastinal silhouette and pulmonary vascularity are within normal limits.  There are sternotomy wires and evidence of prior CABG.  Bones show no acute abnormalities.

## 2020-05-15 NOTE — ASSESSMENT & PLAN NOTE
Patient on HD T/Th/Sat with last session on 5/12  Tunneled catheter that was inserted in 2/2020 that is being used for access  [facility] [place] [facility]  AV graft care by Dr. Garduno      Recommendations:  5/15: Pressors stopped early yesterday morning. Okay to s/d from nephrology standpoint, can get HD (TSa).   -See attending attestation for changes or further recs  · Monitor Ins and Outs and daily weights.   · Avoid nephrotoxic agents such as NSAIDS, Gadolinium and IV Radiocontrast  · Renal Dose meds to current GFR/Creat Clereance

## 2020-05-15 NOTE — ASSESSMENT & PLAN NOTE
Sx culture 4/10/20:       PREVOTELLA MELANINOGENICA      FUSOBACTERIUM NUCLEATUM      PREVOTELLA ORIS/BUCCAE       STREPTOCOCCUS ANGINOSUS   On V+Z  -Tx per ID and primary. NGTD (3) blcx

## 2020-05-16 NOTE — SUBJECTIVE & OBJECTIVE
Interval History: Evaluated bedside, stable vital signs.  Night uneventful.  Had HD this AM tolerated well.    Review of patient's allergies indicates:   Allergen Reactions    Reglan [metoclopramide hcl] Diarrhea    Lorazepam      Other reaction(s): heavy sedation     Current Facility-Administered Medications   Medication Frequency    0.9%  NaCl infusion Once    aspirin EC tablet 81 mg Daily    atorvastatin tablet 80 mg QHS    carvediloL tablet 12.5 mg BID    clopidogreL tablet 75 mg Daily    dextrose 10% (D10W) Bolus PRN    diltiaZEM injection 5 mg Once    diltiaZEM tablet 30 mg Q6H    glucagon (human recombinant) injection 1 mg PRN    heparin 25,000 units in dextrose 5% (100 units/ml) IV bolus from bag - ADDITIONAL PRN BOLUS - 30 units/kg PRN    heparin 25,000 units in dextrose 5% (100 units/ml) IV bolus from bag - ADDITIONAL PRN BOLUS - 60 units/kg PRN    heparin 25,000 units in dextrose 5% 250 mL (100 units/mL) infusion LOW INTENSITY nomogram - [facility] Continuous    insulin aspart U-100 pen 1-10 Units Q4H PRN    insulin detemir U-100 pen 10 Units QHS    piperacillin-tazobactam 4.5 g in sodium chloride 0.9% 100 mL IVPB (ready to mix system) Q12H    sevelamer carbonate tablet 800 mg TID WM    sodium chloride 0.9% flush 10 mL PRN    vancomycin (VANCOCIN) 500 mg in dextrose 5 % 100 mL IVPB Once    vancomycin - pharmacy to dose pharmacy to manage frequency       Objective:     Vital Signs (Most Recent):  Temp: 98.9 °F (37.2 °C) (05/16/20 0300)  Pulse: 96 (05/16/20 0600)  Resp: 17 (05/16/20 0600)  BP: 132/67 (05/16/20 0600)  SpO2: 100 % (05/16/20 0600)  O2 Device (Oxygen Therapy): room air (05/16/20 0600) Vital Signs (24h Range):  Temp:  [98.9 °F (37.2 °C)-101.8 °F (38.8 °C)] 98.9 °F (37.2 °C)  Pulse:  [] 96  Resp:  [10-25] 17  SpO2:  [97 %-100 %] 100 %  BP: ()/(52-84) 132/67     Weight: 67.6 kg (149 lb) (05/15/20 0701)  Body mass index is 20.78 kg/m².  Body surface area is 1.84 meters  squared.    I/O last 3 completed shifts:  In: 2067 [P.O.:400; I.V.:617; Other:500; IV Piggyback:550]  Out: 1177 [Other:1177]    Physical Exam   Constitutional: He is oriented to person, place, and time. He appears well-developed and well-nourished.   HENT:   Head: Normocephalic and atraumatic.   Eyes: Pupils are equal, round, and reactive to light. EOM are normal.   Neck: Normal range of motion. Neck supple.   Pulmonary/Chest: Effort normal and breath sounds normal.   Abdominal: Soft. Bowel sounds are normal. He exhibits no distension and no ascites.   Musculoskeletal: Normal range of motion.   Neurological: He is alert and oriented to person, place, and time.   Skin: Skin is warm and dry.   LUE with bandage from previous AVF excision. No erythema, warmth, or tenderness noted at the site.   Psychiatric: He has a normal mood and affect.       Significant Labs:  CBC:   Recent Labs   Lab 05/16/20  0342   WBC 8.17   RBC 3.20*   HGB 8.9*   HCT 28.5*      MCV 89   MCH 27.8   MCHC 31.2*     CMP:   Recent Labs   Lab 05/12/20  1124  05/16/20  0342   *   < > 111*   CALCIUM 9.3   < > 7.8*   ALBUMIN 2.8*   < > 1.8*   PROT 8.3  --   --       < > 137   K 4.5   < > 3.7   CO2 18*   < > 21*      < > 107   BUN 19   < > 11   CREATININE 5.4*   < > 2.4*   ALKPHOS 87  --   --    ALT <5*  --   --    AST 12  --   --    BILITOT 0.3  --   --     < > = values in this interval not displayed.     All labs within the past 24 hours have been reviewed.

## 2020-05-16 NOTE — NURSING
MD notified that patient had brief episode of SVT with HR in the 170s. PT able to bear down and vagal out of it into ST of HR in the 150s.    Will do full assessment at 2300 and re-notify team of any changes.     Md to consult back

## 2020-05-16 NOTE — PROGRESS NOTES
[facility]  Nephrology  Progress Note    Patient Name: Kodi Ibanez Sr.  MRN: [id number]  Admission Date: 5/13/2020  Hospital Length of Stay: 3 days  Attending Provider: Josie Thomas MD   Primary Care Physician: Melonie Elias MD  Principal Problem:Shock    Subjective:     HPI: Kodi Ibanez is a 60 year old male with hx of CHF, CAD s/p CABG and NSTEMI s/p PCI,  DMII, ESRD on HD, HLD, HTN and prostate cancer who presents via telemed visit after recent hospitalization for AVF infection requiring excision. In April he was found to have an infected LUE mid and distal AVF with stent exposure s/p excision on 4/10/20. Surgical cultures grew Strep Anginosus, Prevotella and Fusobacterium. Blood cx were negative. He was discharged on Augmentin plus clindamycin x 10 days. He was seen by ID on 4/20 via telemedicine visit. At that time his Augmentin and Clindamycin were discontinued and He was transitioned to Flagyl 500 mg PO TID and Cefazolin dosed after HD for an endovascular infection x 4 weeks (end date 5/11/20). His dialysis access is now and a right tunneled subclavian Luis catheter.Since last seen by ID he has suffered from an NSTEMI s/p PCI (admit 4/29 - 5/2). Over the last few weeks he has developed poop appetite, nausea and vomiting. He has progressively felt weaker. High blood sugars noted at home. He has been seen in the ED multiple times for these complaints. Most recently was seen yesterday and noted to be hypotensive and tachycardic with a new leukocytosis, though was discharged home. During my virtual visit with the patient and his wife he is unable to participate in our conversation. Per his wife, he has gotten significantly more lethargic and now with AMS. He is too weak to get out of bed or walk. She became tearful as she is extremely worried about her  and doesn't understand why he keeps getting discharged home in this condition. She is unable to care for him in this state of  health. She denies any rigors, night sweats, cough, SOB. He has been constipated but finally had a bowel movement yesterday. He stopped taking Flagyl 5/12 due to ongoing nausea resulting in vomiting.     Nephrology consulted to manage dialysis in ESRD inpatient.     Interval History: Evaluated bedside, stable vital signs.  Night uneventful.  Had HD this AM tolerated well.    Review of patient's allergies indicates:   Allergen Reactions    Reglan [metoclopramide hcl] Diarrhea    Lorazepam      Other reaction(s): heavy sedation     Current Facility-Administered Medications   Medication Frequency    0.9%  NaCl infusion Once    aspirin EC tablet 81 mg Daily    atorvastatin tablet 80 mg QHS    carvediloL tablet 12.5 mg BID    clopidogreL tablet 75 mg Daily    dextrose 10% (D10W) Bolus PRN    diltiaZEM injection 5 mg Once    diltiaZEM tablet 30 mg Q6H    glucagon (human recombinant) injection 1 mg PRN    heparin 25,000 units in dextrose 5% (100 units/ml) IV bolus from bag - ADDITIONAL PRN BOLUS - 30 units/kg PRN    heparin 25,000 units in dextrose 5% (100 units/ml) IV bolus from bag - ADDITIONAL PRN BOLUS - 60 units/kg PRN    heparin 25,000 units in dextrose 5% 250 mL (100 units/mL) infusion LOW INTENSITY nomogram - [facility] Continuous    insulin aspart U-100 pen 1-10 Units Q4H PRN    insulin detemir U-100 pen 10 Units QHS    piperacillin-tazobactam 4.5 g in sodium chloride 0.9% 100 mL IVPB (ready to mix system) Q12H    sevelamer carbonate tablet 800 mg TID WM    sodium chloride 0.9% flush 10 mL PRN    vancomycin (VANCOCIN) 500 mg in dextrose 5 % 100 mL IVPB Once    vancomycin - pharmacy to dose pharmacy to manage frequency       Objective:     Vital Signs (Most Recent):  Temp: 98.9 °F (37.2 °C) (05/16/20 0300)  Pulse: 96 (05/16/20 0600)  Resp: 17 (05/16/20 0600)  BP: 132/67 (05/16/20 0600)  SpO2: 100 % (05/16/20 0600)  O2 Device (Oxygen Therapy): room air (05/16/20 0600) Vital Signs (24h Range):  Temp:   [98.9 °F (37.2 °C)-101.8 °F (38.8 °C)] 98.9 °F (37.2 °C)  Pulse:  [] 96  Resp:  [10-25] 17  SpO2:  [97 %-100 %] 100 %  BP: ()/(52-84) 132/67     Weight: 67.6 kg (149 lb) (05/15/20 0701)  Body mass index is 20.78 kg/m².  Body surface area is 1.84 meters squared.    I/O last 3 completed shifts:  In: 2067 [P.O.:400; I.V.:617; Other:500; IV Piggyback:550]  Out: 1177 [Other:1177]    Physical Exam   Constitutional: He is oriented to person, place, and time. He appears well-developed and well-nourished.   HENT:   Head: Normocephalic and atraumatic.   Eyes: Pupils are equal, round, and reactive to light. EOM are normal.   Neck: Normal range of motion. Neck supple.   Pulmonary/Chest: Effort normal and breath sounds normal.   Abdominal: Soft. Bowel sounds are normal. He exhibits no distension and no ascites.   Musculoskeletal: Normal range of motion.   Neurological: He is alert and oriented to person, place, and time.   Skin: Skin is warm and dry.   LUE with bandage from previous AVF excision. No erythema, warmth, or tenderness noted at the site.   Psychiatric: He has a normal mood and affect.       Significant Labs:  CBC:   Recent Labs   Lab 05/16/20  0342   WBC 8.17   RBC 3.20*   HGB 8.9*   HCT 28.5*      MCV 89   MCH 27.8   MCHC 31.2*     CMP:   Recent Labs   Lab 05/12/20  1124  05/16/20  0342   *   < > 111*   CALCIUM 9.3   < > 7.8*   ALBUMIN 2.8*   < > 1.8*   PROT 8.3  --   --       < > 137   K 4.5   < > 3.7   CO2 18*   < > 21*      < > 107   BUN 19   < > 11   CREATININE 5.4*   < > 2.4*   ALKPHOS 87  --   --    ALT <5*  --   --    AST 12  --   --    BILITOT 0.3  --   --     < > = values in this interval not displayed.     All labs within the past 24 hours have been reviewed.       Assessment/Plan:     ESRD on hemodialysis  Patient on HD T/Th/Sat with last session on 5/12  Tunneled catheter that was inserted in 2/2020 that is being used for access  [facility] dialysis center  AV graft care by  Dr. Garduno      Recommendations:  · Had HD session this morning, tolerated well  · Will continue to provide iHD sessions in a TiW fashion while inpatient  · Continue monitor I/Os and daily weights.   · Avoid nephrotoxic agents such as NSAIDS, Gadolinium and IV Radiocontrast  · Renal Dose meds to current GFR/Creat Clereance  · Will continue to follow            Thank you for your consult. I will follow-up with patient. Please contact us if you have any additional questions.    Keith Jones MD  Nephrology  [facility]

## 2020-05-16 NOTE — NURSING
MD called at 1915 for Patient's HR in the 150s.     EKG obtained. Pt having periods of SVT with stable blood pressure.       Md to bedside. Ordered to give Coreg early.     Will continue to monitor.

## 2020-05-16 NOTE — PLAN OF CARE
Spoke with patient's wife, Stacey, to provide daily update. Of note, nursing was also able to coordinate video chat between Kodi and his son last night. I told Ms. Ibanez that Kodi is doing well and that we have restarted all of his home meds. I told him that since yesterday evening he has not had any further fevers. She expressed her gratitutde for the care Kodi is receiving. All questions answered.    Susanne Stanton  [facility] Emergency Medicine, PGY3   5/16/2020 1:22 PM

## 2020-05-16 NOTE — NURSING
Pt had episodes of SVT throughout the night.     Restarted patient's PO diltiazem. HR improved with HD.     Speech consult placed due to patient coughing after attempts to swallow pills.

## 2020-05-16 NOTE — SUBJECTIVE & OBJECTIVE
Interval History/Significant Events: NAEO    Review of Systems   All other systems reviewed and are negative.    Objective:     Vital Signs (Most Recent):  Temp: 98.9 °F (37.2 °C) (05/16/20 0300)  Pulse: (!) 113 (05/16/20 0701)  Resp: 13 (05/16/20 0701)  BP: 111/76 (05/16/20 0701)  SpO2: 100 % (05/16/20 0701) Vital Signs (24h Range):  Temp:  [98.9 °F (37.2 °C)-101.8 °F (38.8 °C)] 98.9 °F (37.2 °C)  Pulse:  [] 113  Resp:  [10-25] 13  SpO2:  [97 %-100 %] 100 %  BP: ()/(52-84) 111/76   Weight: 67.6 kg (149 lb)  Body mass index is 20.78 kg/m².      Intake/Output Summary (Last 24 hours) at 5/16/2020 0755  Last data filed at 5/16/2020 0701  Gross per 24 hour   Intake 1261.8 ml   Output 1000 ml   Net 261.8 ml       Physical Exam   Constitutional: He appears well-developed and well-nourished.   HENT:   Head: Normocephalic and atraumatic.   Eyes: Conjunctivae and EOM are normal.   Neck: No tracheal deviation present.   Cardiovascular: Normal rate and regular rhythm. Exam reveals no friction rub.   No murmur heard.  Pulmonary/Chest: Effort normal and breath sounds normal. No respiratory distress.   Abdominal: He exhibits no distension.   Musculoskeletal: He exhibits no edema or deformity.   Neurological: He is alert. No cranial nerve deficit.   Skin: Skin is warm and dry.       Vents:     Lines/Drains/Airways     Central Venous Catheter Line                 Hemodialysis Catheter 03/05/20 0535 right subclavian 72 days          Peripheral Intravenous Line                 Peripheral IV - Single Lumen 05/13/20 1620 22 G Right Hand 2 days         Peripheral IV - Single Lumen 05/14/20 1448 20 G;1 3/4 in Right Forearm 1 day              Significant Labs:    CBC/Anemia Profile:  Recent Labs   Lab 05/15/20  0431 05/16/20  0342   WBC 7.85 8.17   HGB 9.5* 8.9*   HCT 29.2* 28.5*    183   MCV 87 89   RDW 15.4* 16.0*        Chemistries:  Recent Labs   Lab 05/15/20  0432  05/15/20  2100 05/15/20  2252 05/16/20  0342   NA  138  138   < > 136 136 137   K 4.6  4.6   < > 4.8 5.8* 3.7   CL 99  99   < > 99 99 107   CO2 26  26   < > 23 21* 21*   BUN 17  17   < > 23 25* 11   CREATININE 3.6*  3.6*   < > 4.9* 5.0* 2.4*   CALCIUM 7.7*  7.7*   < > 7.3* 7.3* 7.8*   ALBUMIN 2.0*  --   --  1.8* 1.8*   MG 2.0  2.0  --   --  2.0 1.9  1.9   PHOS 5.1*  5.1*  --   --  6.1* 2.9  2.9    < > = values in this interval not displayed.       All pertinent labs within the past 24 hours have been reviewed.    Significant Imaging:  I have reviewed and interpreted all pertinent imaging results/findings within the past 24 hours.

## 2020-05-16 NOTE — PT/OT/SLP EVAL
"Speech Language Pathology Evaluation  Bedside Swallow    Patient Name:  Kodi Ibanez Sr.   MRN:  [id number]  Admitting Diagnosis: Shock    Recommendations:                 General Recommendations:  Follow-up not indicated  Diet recommendations:  Regular, Thin   Aspiration Precautions: Assistance with meals, HOB to 90 degrees, Remain upright 30 minutes post meal and Standard aspiration precautions   General Precautions: Standard, fall  Communication strategies:  provide increased time to answer    History:     Past Medical History:   Diagnosis Date    Atherosclerosis of aorta     noted on VIKTORIA 2011    Blood transfusion     Cataracts, bilateral     CHF (congestive heart failure)     Clotting disorder     Cognitive deficits as late effect of cerebrovascular disease 6/26/2012    Coronary artery disease     NSTEMI s/p PCI with FERCHO to LCx (12/16/19),     Decreased appetite     DM (diabetes mellitus), type 2 with renal complications     Diabetic retinopathy    ESRD on hemodialysis     TUESDAY/ THURSDAY/SATURDAY    Hyperlipidemia     Hyperparathyroidism, unspecified     Hypertension     Prostate cancer 2016 7/16/19:  wife states "he doesn't have it anymore"    Seizures     Stroke 2011    PT HAS SHAKES     Unsteady gait     Uses roller walker       Past Surgical History:   Procedure Laterality Date    CARDIAC SURGERY      CABG    CATARACT EXTRACTION      ou    CATARACT EXTRACTION, BILATERAL      cataracts      CORONARY ANGIOGRAPHY INCLUDING BYPASS GRAFTS WITH CATHETERIZATION OF LEFT HEART N/A 12/16/2019    Procedure: ANGIOGRAM, CORONARY, INCLUDING BYPASS GRAFT, WITH LEFT HEART CATHETERIZATION, rcfa, 5fr;  Surgeon: David Villegas MD;  Location: [facility] CATH LAB;  Service: Cardiology;  Laterality: N/A;    CORONARY ANGIOGRAPHY INCLUDING BYPASS GRAFTS WITH CATHETERIZATION OF LEFT HEART N/A 4/30/2020    Procedure: ANGIOGRAM, CORONARY, INCLUDING BYPASS GRAFT, WITH LEFT HEART CATHETERIZATION, rcfa, 10 am start; "  Surgeon: David Villegas MD;  Location: [facility] CATH LAB;  Service: Cardiology;  Laterality: N/A;    CORONARY ARTERY BYPASS GRAFT  2009    bypass    EYE SURGERY      Left Guera AVF  1/11/2012    PROSTATE BIOPSY      positive    retinal laser      REVISION OF ARTERIOVENOUS FISTULA Left 2/27/2020    Procedure: Ligation of left upper extremity fistula, Closure of left upper extremity skin defect x 2, Intraoperative ultrasound ;  Surgeon: Stephane Lopez MD;  Location: [facility] OR;  Service: Vascular;  Laterality: Left;    REVISION OF ARTERIOVENOUS FISTULA Left 4/10/2020    Procedure: AV fistula excision;  Surgeon: Stephane Lopez MD;  Location: [facility] OR [facility] FLR;  Service: Vascular;  Laterality: Left;    ROTATOR CUFF REPAIR  right, 2005    ULTRASOUND GUIDANCE  12/16/2019    Procedure: Ultrasound Guidance;  Surgeon: David Villegas MD;  Location: [facility] CATH LAB;  Service: Cardiology;;    VASCULAR SURGERY         Social History: Patient lives with spouse.    Chest X-Rays: 5/13- No acute radiographic findings.    Prior diet: regular    Subjective     Spoke with RN prior to entering pt's room . Pt seen bedside for session. Alert and agreeable to ST.        Pain/Comfort:  · Pain Rating 1: 0/10  · Pain Rating Post-Intervention 1: 0/10    Objective:     Oral Musculature Evaluation  · Oral Musculature: general weakness  · Dentition: present and adequate    Bedside Swallow Eval:   Consistencies Assessed:  · Thin liquids via consecutive straw sips  · Puree pudding  · Solids cracker     Oral Phase:   · WFL    Pharyngeal Phase:   · no overt clinical signs/symptoms of aspiration  · no overt clinical signs/symptoms of pharyngeal dysphagia    Compensatory Strategies  · None    Treatment: Provided education re:role of ST, diet recs, swallow precautions, and plan to discontinue ST services. Pt nodded in agreement. RN notified of results and recs.     Assessment:     Kodi Ibanez  is a 60 y.o. male with an SLP diagnosis  of functional oropharyngeal swallow.      Goals:   Multidisciplinary Problems     SLP Goals     Not on file                Plan:     · Patient to be seen:      · Plan of Care expires:     · Plan of Care reviewed with:  patient   · SLP Follow-Up:  No       Discharge recommendations:      Barriers to Discharge:  Level of Skilled Assistance Needed .    Time Tracking:     SLP Treatment Date:   05/16/20  Speech Start Time:  0752  Speech Stop Time:  0800     Speech Total Time (min):  8 min    Billable Minutes: Eval Swallow and Oral Function 8 minutes    Jaqui Rock CCC-SLP  05/16/2020

## 2020-05-16 NOTE — PROGRESS NOTES
Patient received 3 hour bedside HD treatment.  Tolerated without difficulty.  UF of 500 cc.  See flowsheet for details.

## 2020-05-16 NOTE — NURSING
MD notified that patient had 3 beat run of VTACH. Frequent PVCs    Redrew BMP    Blood pressure stable

## 2020-05-16 NOTE — NURSING TRANSFER
Nursing Transfer Note      5/16/2020     Transfer To: 804    Transfer via bed    Transfer with cardiac monitoring    Transported by RN and PCT    Medicines sent: Insulin aspart and insulin detemir pens    Chart send with patient: Yes    Notified: spouse    Upon arrival to floor: cardiac monitor applied, patient oriented to room, call bell in reach and bed in lowest position

## 2020-05-16 NOTE — ASSESSMENT & PLAN NOTE
Patient on HD T/Th/Sat with last session on 5/12  Tunneled catheter that was inserted in 2/2020 that is being used for access  [facility] [place] [facility]  AV graft care by Dr. Garduno      Recommendations:  · Had HD session this morning, tolerated well  · Will continue to provide iHD sessions in a TiW fashion while inpatient  · Continue monitor I/Os and daily weights.   · Avoid nephrotoxic agents such as NSAIDS, Gadolinium and IV Radiocontrast  · Renal Dose meds to current GFR/Creat Clereance  · Will continue to follow

## 2020-05-16 NOTE — PROGRESS NOTES
[facility]  Critical Care Medicine  Progress Note    Patient Name: Kodi Ibanez Sr.  MRN: [id number]  Admission Date: 5/13/2020  Hospital Length of Stay: 3 days  Code Status: DNR  Attending Provider: Josie Thomas MD  Primary Care Provider: Melonie Elias MD   Principal Problem: Shock    Subjective:     HPI:  59 y/o male with past medical history of CHF, CAD with recent NSTEMI [~4/29] (CABG 2009, FERCHO placed in 12/2019), diabetes, CVA, ESRD on dialysis Tuesday/Thursday/Saturday (last dialyzed morning of 5/12) presents to the ER by referral from infectious disease clinic after virtual visit today. The ID provider was concerned about the patients AMS and recent elevated WBC count. Recently,atient underwent ligation of LUE fistula on 2/27/20 with Dr Lopez ([facility], vascular surgery) and had ongoing issues afterwards. Patient was admitted on 4/10 for excision of his infected LUE mid and distal AVF with cultures and specimen obtained. Surgical cultures 4/10 grew 2 different prevotella species, fusobacterium, and strep anginosus. The patient was d/c'ed with home health wound care and po augmentin/clindamycin x 10 days. On 4/20 antibiotics were changed to Flagyl 500 mg p.o. q.8 and Cefazolin after dialysis 2 g on Tuesday and Thursday, 3 g on Saturday. End date on antibiotics on 5/11.     Following these events, on 4/29 the patient was admitted for an NSTEMI, a LHC was performed where re-stenosis of a previous stent was found and balloon angioplasty was done. He was discharged on 5/2. He is now on ASA/plavix, but per wife has not been compliant.     Patients wife says that he has had poor PO intake for the last 2-3 weeks, has become progressively weak, and developed altered mental status today. Patient had 3 episodes of vomiting within the last 24 hours, which appeared brown-non bloody. He had a brown watery bowel movement today. Patient normally oliguric (urinating ~2-3 x per week). Patient was  seen in the [facility] ED on the evening of 5/12 due to palpitations after his normal dialysis and sent home after evaluation, for possible antibiotic side effect.     He presents to the ED with encephalopathy x 1 day, leukocytosis of 15, lactate 2.7, troponin 0.482, , procal 10.98, Cr 6.5, BUN 41, and hyperglycemia. Patient pan-cultured, currently only UA resulted with large amount of bacteria. Patient requiring high dose Levophed on admission. Broad spectrum antibiotics initiated. 1.5 L IVF given. Patient remains on RA. Wife was updated regarding patient's current status. Patient spoken to regarding code status and verbalized his wishes as a DNR. His wife confirmed he has an LAPost but it is not on file. Instructed to bring in.    Hospital/ICU Course:  The patient was admitted to MICU for shock likely 2/2 to sepsis on 5/13. Patient remains on low dose Levophed. Nephrology consulted for possible dialysis. Blood cx NGTD. Patient remains on Heparin gtt for paroxysmal A fib with CHADS Vasc score of 6. Previously hyperglycemic with BG now well controlled.    5/14: Levo stopped in morning. Stable all day off pressors on room air    5/15: Patient spiked new temp, vanc restarted. Patient with intermittent SVT- Home coreg and dilt restarted. Step down orders placed but patient didn't get bed      Interval History/Significant Events: NAEO    Review of Systems   All other systems reviewed and are negative.    Objective:     Vital Signs (Most Recent):  Temp: 98.9 °F (37.2 °C) (05/16/20 0300)  Pulse: (!) 113 (05/16/20 0701)  Resp: 13 (05/16/20 0701)  BP: 111/76 (05/16/20 0701)  SpO2: 100 % (05/16/20 0701) Vital Signs (24h Range):  Temp:  [98.9 °F (37.2 °C)-101.8 °F (38.8 °C)] 98.9 °F (37.2 °C)  Pulse:  [] 113  Resp:  [10-25] 13  SpO2:  [97 %-100 %] 100 %  BP: ()/(52-84) 111/76   Weight: 67.6 kg (149 lb)  Body mass index is 20.78 kg/m².      Intake/Output Summary (Last 24 hours) at 5/16/2020 7670  Last data  filed at 5/16/2020 0701  Gross per 24 hour   Intake 1261.8 ml   Output 1000 ml   Net 261.8 ml       Physical Exam   Constitutional: He appears well-developed and well-nourished.   HENT:   Head: Normocephalic and atraumatic.   Eyes: Conjunctivae and EOM are normal.   Neck: No tracheal deviation present.   Cardiovascular: Normal rate and regular rhythm. Exam reveals no friction rub.   No murmur heard.  Pulmonary/Chest: Effort normal and breath sounds normal. No respiratory distress.   Abdominal: He exhibits no distension.   Musculoskeletal: He exhibits no edema or deformity.   Neurological: He is alert. No cranial nerve deficit.   Skin: Skin is warm and dry.       Vents:     Lines/Drains/Airways     Central Venous Catheter Line                 Hemodialysis Catheter 03/05/20 0535 right subclavian 72 days          Peripheral Intravenous Line                 Peripheral IV - Single Lumen 05/13/20 1620 22 G Right Hand 2 days         Peripheral IV - Single Lumen 05/14/20 1448 20 G;1 3/4 in Right Forearm 1 day              Significant Labs:    CBC/Anemia Profile:  Recent Labs   Lab 05/15/20  0431 05/16/20  0342   WBC 7.85 8.17   HGB 9.5* 8.9*   HCT 29.2* 28.5*    183   MCV 87 89   RDW 15.4* 16.0*        Chemistries:  Recent Labs   Lab 05/15/20  0432  05/15/20  2100 05/15/20  2252 05/16/20  0342     138   < > 136 136 137   K 4.6  4.6   < > 4.8 5.8* 3.7   CL 99  99   < > 99 99 107   CO2 26  26   < > 23 21* 21*   BUN 17  17   < > 23 25* 11   CREATININE 3.6*  3.6*   < > 4.9* 5.0* 2.4*   CALCIUM 7.7*  7.7*   < > 7.3* 7.3* 7.8*   ALBUMIN 2.0*  --   --  1.8* 1.8*   MG 2.0  2.0  --   --  2.0 1.9  1.9   PHOS 5.1*  5.1*  --   --  6.1* 2.9  2.9    < > = values in this interval not displayed.       All pertinent labs within the past 24 hours have been reviewed.    Significant Imaging:  I have reviewed and interpreted all pertinent imaging results/findings within the past 24 hours.      ABG  Recent Labs   Lab  05/14/20  0259   PH 7.299*   PO2 28*   PCO2 34.5*   HCO3 17.0*   BE -9     Assessment/Plan:     Cardiac/Vascular  Atrial fibrillation  --patient on diltiazem and coreg- restrated 5/15  --on ASA/plavix  --Chads Vasc score 6 points  --initiated low-intensity Heparin gtt. Patient not on anticoag at home.     NSTEMI (non-ST elevated myocardial infarction)  Hx of CABG in 2009 and placement of FERCHO in 12/2019. Patient recenetly admitted on 4/29 and d/c'ed 5/2, admitted for an NSTEMI, a LHC was performed where re-stenosis of a previous stent was found and balloon angioplasty was done. He is now on ASA/plavix, but per wife has not been compliant.    --troponin now down-trending; stopped trending  --continue ASA/plavix (home meds)  --echo without evidence of HF    Essential hypertension  --holding home losartan, coreg restarted 5/15    HLD (hyperlipidemia)  -- restart home statin 5/16    Renal/  ESRD on hemodialysis  Patient on HD T/Th/Sat with last session on 5/12, where he was admitted to the ED with palpatations after session. Patient has tunneled catheter that was inserted in 2/2020 that is being used for access.     --nephrology consulted  --avoid nephrotoxic meds  --oliguric, strict I/o's  --BMP daily    ID  AV fistula infection  --see shock    Endocrine  Type 2 diabetes, uncontrolled, with neuropathy  -->500 on admission, 4 units aspart IV given in ED; brief IV insulin gtt initiated, currently off  --SSI prn initiated  --accuchecks q4h      Other  * Shock  2/2 probable sepsis likely r/t LUE AV fistula excision. Patient also with R tunneled catheter placed 2/2020.   Patient underwent ligation of LUE fistula on 2/27/20 with Dr Lopez ([facility], vascular surgery) and had ongoing issues afterwards. Patient was admitted on 4/10 for excision of his infected LUE mid and distal AVF with cultures and specimen obtained. Surgical cultures 4/10 grew 2 different prevotella species, fusobacterium, and strep anginosus. The  patient was d/c'ed with home health wound care and po augmentin/clindamycin x 10 days. On 4/20 antibiotics were changed to Flagyl 500 mg p.o. q.8 and Cefazolin after dialysis 2 g on Tuesday and Thursday, 3 g on Saturday. End date on antibiotics on 5/11.    --WBC 15, lactate 2.7, procal 10.98  --1.5 L IVF given in ED  --broad spectrum abx initiated  --pan-cx'ed, plan to f/up results  --off of vasopressors     Critical Care Daily Checklist:    A: Awake: RASS Goal/Actual Goal: RASS Goal: 0-->alert and calm  Actual: Torres Agitation Sedation Scale (RASS): Alert and calm   B: Spontaneous Breathing Trial Performed?     C: SAT & SBT Coordinated?  NA                D: Delirium: CAM-ICU Overall CAM-ICU: Negative   E: Early Mobility Performed? Yes   F: Feeding Goal:    Status:     Current Diet Order   Procedures    Diet diabetic [facility]; 2000 Calorie     Order Specific Question:   Indicate patient location for additional diet options:     Answer:   [facility]     Order Specific Question:   Total calories:     Answer:   2000 Calorie      AS: Analgesia/Sedation None     T: Thromboembolic Prophylaxis Hep ggt   H: HOB > 300 Yes   U: Stress Ulcer Prophylaxis (if needed) NA   G: Glucose Control None   B: Bowel Function Stool Occurrence: 1   I: Indwelling Catheter (Lines & Mayers) Necessity Right subclavian Luis   D: De-escalation of Antimicrobials/Pharmacotherapies TBD    Plan for the day/ETD Floor    Code Status:  Family/Goals of Care: DNR         Critical secondary to Patient has a condition that poses threat to life and bodily function: Septic Shock (resolved)      Critical care was time spent personally by me on the following activities: development of treatment plan with patient or surrogate and bedside caregivers, discussions with consultants, evaluation of patient's response to treatment, examination of patient, ordering and performing treatments and interventions, ordering and review of laboratory  studies, ordering and review of radiographic studies, pulse oximetry, re-evaluation of patient's condition. This critical care time did not overlap with that of any other provider or involve time for any procedures.     Susanne Stanton MD  Critical Care Medicine  [facility]

## 2020-05-16 NOTE — PROGRESS NOTES
Pharmacokinetic Initial Assessment: IV Vancomycin    Assessment/Plan:    · Patient is getting restarted on vancomycin tonight   · Vancomycin random level resulted at 21 mcg/mL (~8 hr post dose)  · ESRD patient. Nephrology is planning for HD tomorrow   · Random level is ordered for tomorrow with AM labs   · Pharmacy will re-dose if level is <20 mcg/mL      Pharmacy will continue to follow and monitor vancomycin.      Please contact pharmacy at extension [phone] with any questions regarding this assessment.     Thank you for the consult,   Xiomara MillerD       Patient brief summary:  Kodi Ibanez Sr. is a 60 y.o. male initiated on antimicrobial therapy with IV Vancomycin for treatment of suspected bacteremia    Drug Allergies:   Review of patient's allergies indicates:   Allergen Reactions    Reglan [metoclopramide hcl] Diarrhea    Lorazepam      Other reaction(s): heavy sedation       Actual Body Weight:   67.6 kg    Renal Function:   Estimated Creatinine Clearance: 17.9 mL/min (A) (based on SCr of 4.1 mg/dL (H)).,     Dialysis Method (if applicable):  Received [facility] x 6 hours on 5/14/20 and will be receiving intermittent HD tomorrow    CBC (last 72 hours):  Recent Labs   Lab Result Units 05/13/20  1202 05/13/20  1804 05/13/20  1942 05/14/20  0223 05/15/20  0431   WBC K/uL 15.06*  --  10.96 13.03*  13.03* 7.85   Hemoglobin g/dL 10.9*  --  9.4* 9.6*  9.6* 9.5*   Hemoglobin A1C %  --  10.3*  --   --   --    Hematocrit % 34.1*  --  29.8* 31.2*  31.2* 29.2*   Platelets K/uL 280  --  229 253  253 229   Gran% % 88.8*  --  89.5* 89.9*  89.9* 89.8*   Lymph% % 3.9*  --  6.0* 5.6*  5.6* 3.9*   Mono% % 6.5  --  3.7* 3.6*  3.6* 5.2   Eosinophil% % 0.0  --  0.0 0.2  0.2 0.1   Basophil% % 0.1  --  0.1 0.1  0.1 0.1   Differential Method  Automated  --  Automated Automated  Automated Automated       Metabolic Panel (last 72 hours):  Recent Labs   Lab Result Units 05/13/20  1458 05/13/20  1804 05/13/20  2100  05/14/20  0223 05/14/20  1130 05/14/20  1358 05/14/20  2235 05/15/20  0432 05/15/20  0830   Sodium mmol/L  --  131*  --  136 136 137 137 138  138 140   Potassium mmol/L  --  3.6 3.3* 2.9* 5.0 4.6 4.5 4.6  4.6 4.7   Chloride mmol/L  --  99  --  104 106 106 99 99  99 101   CO2 mmol/L  --  16*  --  15* 14* 16* 26 26  26 28   Glucose mg/dL  --  666*  --  181* 218* 197* 184* 195*  195* 149*   Glucose, UA  3+*  --   --   --   --   --   --   --   --    BUN, Bld mg/dL  --  49*  --  49* 53* 44* 15 17  17 19   Creatinine mg/dL  --  7.1*  --  7.4* 7.5* 6.2* 3.2* 3.6*  3.6* 4.1*   Albumin g/dL  --   --   --   --   --  1.9* 2.1* 2.0*  --    Magnesium mg/dL  --   --   --  2.0  --  1.9 2.1 2.0  2.0  --    Phosphorus mg/dL  --   --   --  5.1*  --  4.8* 2.7 5.1*  5.1*  --        Drug levels (last 3 results):  Recent Labs   Lab Result Units 05/14/20  0223 05/15/20  0431   Vancomycin, Random ug/mL 15.4 21.0       Microbiologic Results:  Microbiology Results (last 7 days)     Procedure Component Value Units Date/Time    Blood culture x two cultures. Draw prior to antibiotics. [[id number]] Collected:  05/13/20 1202    Order Status:  Completed Specimen:  Blood from Peripheral, Hand, Right Updated:  05/15/20 1412     Blood Culture, Routine No Growth to date      No Growth to date      No Growth to date    Narrative:       Aerobic and anaerobic    Blood culture [[id number]] Collected:  05/15/20 1330    Order Status:  Sent Specimen:  Blood from Peripheral, Forearm, Left Updated:  05/15/20 1345    Blood culture [[id number]] Collected:  05/15/20 1321    Order Status:  Sent Specimen:  Blood from Peripheral, Antecubital, Right Updated:  05/15/20 1343    Urine culture [[id number]] Collected:  05/13/20 1458    Order Status:  Completed Specimen:  Urine Updated:  05/14/20 2352     Urine Culture, Routine Multiple organisms isolated. None in predominance.  Repeat if      clinically necessary.    Narrative:       Preferred Collection Type->Urine,  Clean Catch    Respiratory Infection Panel (PCR), Nasopharyngeal [[id number]] Collected:  05/13/20 2238    Order Status:  Completed Specimen:  Nasopharyngeal Swab Updated:  05/14/20 1102     Respiratory Infection Panel Source NP Swab     Adenovirus Not Detected     Coronavirus 229E, Common Cold Virus Not Detected     Coronavirus HKU1, Common Cold Virus Not Detected     Coronavirus NL63, Common Cold Virus Not Detected     Coronavirus OC43, Common Cold Virus Not Detected     Comment: The Coronavirus strains detected in this test cause the common cold.  These strains are not the COVID-19 (novel Coronavirus)strain   associated with the respiratory disease outbreak.          Human Metapneumovirus Not Detected     Human Rhinovirus/Enterovirus Not Detected     Influenza A (subtypes H1, H1-2009,H3) Not Detected     Influenza B Not Detected     Parainfluenza Virus 1 Not Detected     Parainfluenza Virus 2 Not Detected     Parainfluenza Virus 3 Not Detected     Parainfluenza Virus 4 Not Detected     Respiratory Syncytial Virus Not Detected     Bordetella Parapertussis (FE9135) Not Detected     Bordetella pertussis (ptxP) Not Detected     Chlamydia pneumoniae Not Detected     Mycoplasma pneumoniae Not Detected     Comment: Respiratory Infection Panel testing performed by Multiplex PCR.       Narrative:       For all other respiratory sources, order [id number] -  Respiratory Viral Panel by PCR    Respiratory Infection Panel (PCR), Nasopharyngeal [[id number]]     Order Status:  Canceled Specimen:  Nasopharyngeal Swab     Respiratory Infection Panel (PCR), Nasopharyngeal [[id number]] Collected:  05/13/20 1835    Order Status:  Canceled Specimen:  Nasopharyngeal Swab     Blood culture x two cultures. Draw prior to antibiotics. [[id number]] Collected:  05/13/20 1202    Order Status:  Sent Specimen:  Blood from Peripheral, Antecubital, Right Updated:  05/13/20 1431

## 2020-05-17 PROBLEM — Z51.5 PALLIATIVE CARE ENCOUNTER: Status: ACTIVE | Noted: 2020-01-01

## 2020-05-17 NOTE — PLAN OF CARE
Patient's family (wife Stacey, three children, and mother) were able to visit Kodi. Together, with Kodi, the family came to a consensus regarding his care going forward. Kodi will not have his catheter exchanged. He will continue to get dialysis through his tunneled right subclavian line. Kodi does not want any more procedures. We will continue to treat aggressively with antifungals, and to go up on pressors to a max of two pressors. We will not add a third. The patient is DNR/DNI.    Susanne Stanton  [facility], PGY3   5/17/2020 1:29 PM

## 2020-05-17 NOTE — ASSESSMENT & PLAN NOTE
-->500 on admission. Goal -180  Not at goal on 10 detemir. Will hold increasing as he may have decreased PO intake today.   --SSI prn initiated  --accuchecks q4h

## 2020-05-17 NOTE — SIGNIFICANT EVENT
Significant Event  Hospital Medicine    CC:  Shock  Date:  05/17/2020  Admit Date:  5/13/2020  Hospital Length of Stay:  4    Code Status:  DNR     SUBJECTIVE:     Significant Events:  Notified by RN that they were having trouble getting a BP and SpO2 on patient.  IV bolus started.  Patient seen and examined.  RRT and CCM at bedside.  Patient moaning in bed, otherwise unresponsive.  Previously alert and talking throughout shift.  Manual SBP of 52.  CCM attempted to call wife, but unable to reach her.  SBP improved to 84 following CCM administration on levophed.  Patient transferred to ICU.      OBJECTIVE:     Physical Exam:  Last Vitals:   Vitals:    05/17/20 0324   BP:    Pulse: 84   Resp:    Temp:      GA:  Moaning, ill-appearing  HEENT:  PERRL.  No scleral icterus or JVD.   Pulmonary:  Clear to auscultation A/P/L.  No wheezing, crackles, or rhonchi.  Cardiac:  RRR, S1 & S2 w/o rubs/murmurs/gallops.   Abdominal:  Bowel sounds present x 4. No appreciable hepatosplenomegaly.  Neuro:  Unresponsive, moaning    ASSESSMENT AND PLAN/INTERVENTIONS:     1) Hypotension  Shock  Plan/Interventions:  - Manual SBP as low as 52.  - IVFs initiated with minimal response.  - CCM at bedside.  Patient received Levophed with improvement in SBP to 84.  Some improvement to mental status noted.  - Patient transferred to [facility].    Uninterrupted Critical Care/Counseling Time (not including procedures):  30 minutes    CYNTHIA Loredo, PA-C  Hospital Medicine Department  Staff:  Dr. Chang

## 2020-05-17 NOTE — CONSULTS
Patient hypotensive on the floor requiring vasopressor support. Transferred to MICU.     José Luis Fry MD   Internal Medicine PGY-3  [phone]

## 2020-05-17 NOTE — PLAN OF CARE
"      SICU PLAN OF CARE NOTE    Dx: Shock    Vital Signs: /76 (BP Location: Right arm, Patient Position: Lying)   Pulse 88   Temp 97.5 °F (36.4 °C) (Oral)   Resp 19   Ht 5' 11" (1.803 m)   Wt 67.6 kg (149 lb)   SpO2 100%   BMI 20.78 kg/m²     SHIFT EVENTS:  VSS at this time. Patient hypotensive during shift. Vaso gtt and Levophed gtt titrated to maintain a MAP >65. Patient AAOx4. Room air. Family meeting held at the bedside. Clarification provided on patient and family's wishes for patient's care at this time. At this time, patient and family want to continue to aggressivly treat with antifungals and continue to go up on vasopressors. 3 BMs during shift. Plan of care reviewed with patient and family. Questions and concerns addressed. WCTM.     Neuro: AAO x4, Follows Commands and Moves All Extremities    Respiratory: Room Air    Diet: Diabetic Diet    Gtts: Levophed    Urine Output: Anuric 0 cc/shift    Labs/Accuchecks: Accuchecks q 4 hours. Renal/Mg labs.     SKIN NOTE:  Skin: Stage one noted to sacrum. Foam dressing applied. Cream applied to excoriated bottom. Turned q 2 hours and patient turns independently. Foam dressings to heels. Bed plugged in. Waffle mattress in place.       "

## 2020-05-17 NOTE — CHAPLAIN
Visited with fam and patient while doc explained the options for POC. Strong, loving family. Their lei tradition is JW and their  will be coming to visit. SC will help with Skype visit when [facility] is ready. Wife Eirn will be point person [phone].

## 2020-05-17 NOTE — CONSULTS
[facility]  Palliative Medicine  Consult Note    Patient Name: Kodi Ibanez Sr.  MRN: [id number]  Admission Date: 5/13/2020  Hospital Length of Stay: 4 days  Code Status: DNR   Attending Provider: Josie Thomas MD  Consulting Provider: Armen Salcedo MD  Primary Care Physician: Melonie Elias MD  Principal Problem:Shock    Patient information was obtained from patient, spouse/SO, past medical records and ER records.      Inpatient consult to Palliative Care  Consult performed by: Armen Salcedo MD  Consult ordered by: Susanne Stanton MD        Assessment/Plan:     * Shock  Worsening;   Currently on levo and vaso; no additional pressors to be added per GOC discussions      Palliative care encounter  61 yo mamle with h/o ESRD, CVA, frequent hospitalizations 2/2/ immunocompromised state admitted with septic shock, fungemia, and requirement for vasopressors.  Consulted for GOC    Prognosis: likely weeks to months at best    1) Symptoms: encephalopathic due to acute illness; no perceived pain; denies any symptoms for palliation    2) Code status:  DNR in event of cardiopulm arrest    3) Psychosocial: _ well supported by family at home; wife former EMT/paramedic    4) Medicolegal: pt has living will on chart requesting no life proloning measures in the event of terminal illness. This is his terminal illness, chronic debility, chronic illness, fengemia, and ESRD.  Yaima Ibanez Spouse [phone]     5) Spiritual:  Chrisitian    6) Goals of care/discussion:  Per CC: discussion at bedside today establishing boundaries regarding DNR, no additional pressors or invasive measures of life prolonging care.  Will continue to explore goals of care depending on response to treatment and clinical state    7) Disposition plan: TBD      AV fistula infection  Per CC      ESRD on hemodialysis  Concern for fungemia related to permacath;   Per CC pt doesn't want to endure additional  procedures  Planning on continuing HD as long as pt perceives a benefit        Thank you for your consult. I will follow-up with patient. Please contact us if you have any additional questions.    Subjective:     HPI:   61 y/o male with past medical history of CHF, CAD with recent NSTEMI [~4/29] (CABG 2009, FERCHO placed in 12/2019), diabetes, CVA, ESRD on dialysis Tuesday/Thursday/Saturday (last dialyzed morning of 5/12) presents to the ER by referral from infectious disease clinic after virtual visit today. The ID provider was concerned about the patients AMS and recent elevated WBC count. Recently,junior underwent ligation of LUE fistula on 2/27/20 with Dr Lopez ([facility], vascular surgery) and had ongoing issues afterwards. Patient was admitted on 4/10 for excision of his infected LUE mid and distal AVF with cultures and specimen obtained. Surgical cultures 4/10 grew 2 different prevotella species, fusobacterium, and strep anginosus. The patient was d/c'ed with home health wound care and po augmentin/clindamycin x 10 days. On 4/20 antibiotics were changed to Flagyl 500 mg p.o. q.8 and Cefazolin after dialysis 2 g on Tuesday and Thursday, 3 g on Saturday. End date on antibiotics on 5/11.     Following these events, on 4/29 the patient was admitted for an NSTEMI, a LHC was performed where re-stenosis of a previous stent was found and balloon angioplasty was done. He was discharged on 5/2. He is now on ASA/plavix, but per wife has not been compliant.     Patients wife says that he has had poor PO intake for the last 2-3 weeks, has become progressively weak, and developed altered mental status today. Patient had 3 episodes of vomiting within the last 24 hours, which appeared brown-non bloody. He had a brown watery bowel movement today. Patient normally oliguric (urinating ~2-3 x per week). Patient was seen in the [facility] ED on the evening of 5/12 due to palpitations after his normal dialysis and sent home after  evaluation, for possible antibiotic side effect.     He presents to the ED with encephalopathy x 1 day, leukocytosis of 15, lactate 2.7, troponin 0.482, , procal 10.98, Cr 6.5, BUN 41, and hyperglycemia. Patient pan-cultured, currently only UA resulted with large amount of bacteria. Patient requiring high dose Levophed on admission. Broad spectrum antibiotics initiated. 1.5 L IVF given. Patient remains on RA. Wife was updated regarding patient's current status. Patient spoken to regarding code status and verbalized his wishes as a DNR. His wife confirmed he has an LAPost but it is not on file. Instructed to bring in.     Hospital/ICU Course:  The patient was admitted to MICU for shock likely 2/2 to sepsis on 5/13. Patient remains on low dose Levophed. Nephrology consulted for possible dialysis. Blood cx NGTD. Patient remains on Heparin gtt for paroxysmal A fib with CHADS Vasc score of 6. Previously hyperglycemic with BG now well controlled.     The pt had briefly stepped down from ICU level of care but quickly decompensated with concern for septic shock, fungemia possibly related to dailysis cath, and returned to the ICU.      In this setting, palliative medicine was consulted to help with medical decision making and aid in the formation of goals of care.          Hospital Course:  No notes on file    Interval History: discussed case with team, RN , reviewed chart and discussed with family    Past Medical History:   Diagnosis Date    Atherosclerosis of aorta     noted on VIKTORIA 2011    Blood transfusion     Cataracts, bilateral     CHF (congestive heart failure)     Clotting disorder     Cognitive deficits as late effect of cerebrovascular disease 6/26/2012    Coronary artery disease     NSTEMI s/p PCI with FERCHO to LCx (12/16/19),     Decreased appetite     DM (diabetes mellitus), type 2 with renal complications     Diabetic retinopathy    ESRD on hemodialysis     TUESDAY/ THURSDAY/SATURDAY     "Hyperlipidemia     Hyperparathyroidism, unspecified     Hypertension     Prostate cancer 2016    7/16/19:  wife states "he doesn't have it anymore"    Seizures     Stroke 2011    PT HAS SHAKES     Unsteady gait     Uses roller walker       Past Surgical History:   Procedure Laterality Date    CARDIAC SURGERY      CABG    CATARACT EXTRACTION      ou    CATARACT EXTRACTION, BILATERAL      cataracts      CORONARY ANGIOGRAPHY INCLUDING BYPASS GRAFTS WITH CATHETERIZATION OF LEFT HEART N/A 12/16/2019    Procedure: ANGIOGRAM, CORONARY, INCLUDING BYPASS GRAFT, WITH LEFT HEART CATHETERIZATION, rcfa, 5fr;  Surgeon: David Villegas MD;  Location: [facility] CATH LAB;  Service: Cardiology;  Laterality: N/A;    CORONARY ANGIOGRAPHY INCLUDING BYPASS GRAFTS WITH CATHETERIZATION OF LEFT HEART N/A 4/30/2020    Procedure: ANGIOGRAM, CORONARY, INCLUDING BYPASS GRAFT, WITH LEFT HEART CATHETERIZATION, rcfa, 10 am start;  Surgeon: David Villegas MD;  Location: [facility] CATH LAB;  Service: Cardiology;  Laterality: N/A;    CORONARY ARTERY BYPASS GRAFT  2009    bypass    EYE SURGERY      Left Guera AVF  1/11/2012    PROSTATE BIOPSY      positive    retinal laser      REVISION OF ARTERIOVENOUS FISTULA Left 2/27/2020    Procedure: Ligation of left upper extremity fistula, Closure of left upper extremity skin defect x 2, Intraoperative ultrasound ;  Surgeon: Stephane Lopez MD;  Location: [facility] OR;  Service: Vascular;  Laterality: Left;    REVISION OF ARTERIOVENOUS FISTULA Left 4/10/2020    Procedure: AV fistula excision;  Surgeon: Stephane Lopez MD;  Location: [address];  Service: Vascular;  Laterality: Left;    ROTATOR CUFF REPAIR  right, 2005    ULTRASOUND GUIDANCE  12/16/2019    Procedure: Ultrasound Guidance;  Surgeon: David Villegas MD;  Location: [facility] CATH LAB;  Service: Cardiology;;    VASCULAR SURGERY         Review of patient's allergies indicates:   Allergen Reactions    Reglan [metoclopramide hcl] " Diarrhea    Lorazepam      Other reaction(s): heavy sedation       Medications:  Continuous Infusions:   norepinephrine bitartrate-D5W 0.14 mcg/kg/min (05/17/20 1000)    vasopressin (PITRESSIN) infusion 0.04 Units/min (05/17/20 1000)     Scheduled Meds:   sodium chloride 0.9%   Intravenous Once    apixaban  5 mg Oral BID    aspirin  81 mg Oral Daily    atorvastatin  80 mg Oral QHS    clopidogreL  75 mg Oral Daily    insulin detemir U-100  15 Units Subcutaneous QHS    micafungin (MYCAMINE) IVPB  100 mg Intravenous Q24H    piperacillin-tazobactam (ZOSYN) IVPB  4.5 g Intravenous Q12H    sevelamer carbonate  800 mg Oral TID WM     PRN Meds:Dextrose 10% Bolus, glucagon (human recombinant), insulin aspart U-100, sodium chloride 0.9%, Pharmacy to dose Vancomycin consult **AND** vancomycin - pharmacy to dose    Family History     Problem Relation (Age of Onset)    Cancer Mother, Paternal Grandmother, Paternal Uncle    Cataracts Mother    Diabetes Mother    Glaucoma Mother    Heart disease Father, Maternal Grandmother    Hypertension Brother    No Known Problems Sister, Maternal Grandfather, Paternal Grandfather, Sister, Sister    Prostate cancer Brother        Tobacco Use    Smoking status: Never Smoker    Smokeless tobacco: Never Used   Substance and Sexual Activity    Alcohol use: No    Drug use: No    Sexual activity: Not Currently       Review of Systems   Unable to perform ROS: Acuity of condition     Objective:     Vital Signs (Most Recent):  Temp: 97.5 °F (36.4 °C) (05/17/20 0700)  Pulse: 88 (05/17/20 1030)  Resp: 19 (05/17/20 1030)  BP: 116/76 (05/17/20 1000)  SpO2: 100 % (05/17/20 1030) Vital Signs (24h Range):  Temp:  [93.7 °F (34.3 °C)-98.5 °F (36.9 °C)] 97.5 °F (36.4 °C)  Pulse:  [] 88  Resp:  [9-28] 19  SpO2:  [95 %-100 %] 100 %  BP: ()/(41-80) 116/76  Arterial Line BP: ()/(42-63) 116/56     Weight: 67.6 kg (149 lb)  Body mass index is 20.78 kg/m².    Review of  Symptoms    Unable to assess due to acuity of condition  Symptom Assessment (ESAS 0-10 scale)   ESAS 0 1 2 3 4 5 6 7 8 9 10   Pain              Dyspnea              Anxiety              Nausea              Depression               Anorexia              Fatigue              Insomnia              Restlessness               Agitation              CAM / Delirium __ --  ___+   Constipation     __ --  ___+   Diarrhea           __ --  ___+  Bowel Management Plan (BMP): Yes    Comments: none    Pain Assessment: unable to assess; no nonverbal signs of pain    OME in 24 hours: 0    Performance Status: 50      Physical Exam  Constitutional: He is awake but confused  Elderly male, disoriented  No distress  Cachectic, chronically ill appearing   HENT:   Mouth/Throat: Oropharynx is clear and moist. No oropharyngeal exudate.   Eyes: Conjunctivae are normal. No scleral icterus.   Neck: No tracheal deviation present.   Cardiovascular: Normal rate, regular rhythm and normal heart sounds.   Irregular tachycardia, rate > 100   Pulmonary/Chest: Effort normal and breath sounds normal. No respiratory distress.   Abdominal: He exhibits no distension.   Musculoskeletal: He exhibits no edema or tenderness.   Neurological: He is alert and oriented to person, place, and time. No cranial nerve deficit.   Lethargic. No gross focal deficits   Skin: Skin is warm and dry.   Nursing note and vitals reviewed.  Significant Labs:   LFT:   Recent Labs   Lab 05/17/20 0515   AST 47*   ALKPHOS 74   BILITOT 0.3     CBC:   Recent Labs   Lab 05/17/20 0515   WBC 8.61   HGB 9.1*   HCT 28.9*   MCV 88        BMP:  Recent Labs   Lab 05/17/20  0515 05/17/20  0816   *  222* 257*   *  136 134*   K 4.0  4.2 4.3     106 106   CO2 18*  18* 17*   BUN 23  24* 25*   CREATININE 4.1*  4.1* 4.0*   CALCIUM 7.3*  7.3* 6.9*   MG 2.0  2.0  --      LFT:  Lab Results   Component Value Date    AST 47 (H) 05/17/2020    ALKPHOS 74 05/17/2020     BILITOT 0.3 05/17/2020     Albumin:   Albumin   Date Value Ref Range Status   05/17/2020 1.4 (L) 3.5 - 5.2 g/dL Final   05/17/2020 1.5 (L) 3.5 - 5.2 g/dL Final     Protein:   Total Protein   Date Value Ref Range Status   05/17/2020 5.0 (L) 6.0 - 8.4 g/dL Final     Lactic acid:   Lab Results   Component Value Date    LACTATE 2.1 05/17/2020    LACTATE 1.0 05/14/2020       Significant Imaging: I have reviewed all pertinent imaging results/findings within the past 24 hours.    Advance Care Planning   Advanced Directives::  Living Will: Yes. Copy on chart: Yes  LaPOST: No  Do Not Resuscitate Status: DNR since this admission  Medical Power of : No; wife is surrogate CHILANGO  Yaima Ibanez Spouse [phone]         Decision-Making Capacity: Family answered questions, Patient unable to communicate due to disease severity/cognitive impairment       Living Arrangements: Lives with spouse    Psychosocial/Cultural: did not explore during today's visit; wife is an EMT with some medical knowledge    Spiritual:     F- Madai and Belief: Deepak's    I - Importance: yes  .  C - Community: yes    A - Address in Care: wife to coordinate      > 50% of 85 min visit spent in chart review, face to face discussion of goals of care,  symptom assessment, coordination of care and emotional support.    Armen Salcedo MD  Palliative Medicine  [facility]

## 2020-05-17 NOTE — SIGNIFICANT EVENT
Called by rapid response nurse to assess patient. Stepped down from MICU after admission for shock, suspected sepsis of unknown source. GHe has been stable but having tachyarrhythmias requiring PO B blockers, CCBs. Off pressors 5/15 and 5/16. On arrival to the floor patient was conversing normally. Just prior to my assessment patient's vitals were checked and they were unable to obtain a blood pressure. He was moaning and minimally responsive to painful stimuli.     On my initial assessment patient with SBP in 60s, awake but minimally responsive to sternal rub. Pulses present. Permacath and multiple peripheral IVs present. Pupils pinpoint.     A 1 L bolus of fluids was hanging in pressure bag on my arrival.   He was started on norepi at my order by the Rapid Response Nurse and transferred to the SICU.     Neuro  #Encephalopathy  Altered on initial assessment, suspect 2/2 hypoperfusion  Improved with elevation of blood pressure  Full neuro exam pending clinical stability    Cardiovascular  #Shock  Warm and dry  With encephalopathy which markedly improved after administration of vasopressors.   Suspected sepsis but differential includes cardiogenic given recent history of cardiac disease  No bleeding (actively stooling at time of initial assessment)  Lactic acid, CBC, CMP, BNP, troponin  Will complete liter of fluid and reassess    #CAD  Recent stent, continue DAPT     #Afib  With RVR earlier in admission  On multiple AV edna blocking agents  Could contribute to hypotension, will hold for now.    Continue AC. Risk/benefit of triple therapy    Resp  #Hypoxemia  Low pulse ox, unknown if accurate  On NRB on initial assessment.   Getting CXR, ABG    Renal  #ESRD  C/b fistula infection  Permacath in place. Received HD last night, 1 L removed. Still positive, suspect not hypovolemic    Endo  Blood glucose above goal 140-180    ID  Cultures NGTD  Day 4 Vanc, Zosyn. Will not broaden unless convincing evidence of uncontrolled  infection.     José Luis Fry MD   Internal Medicine PGY-3  [phone]

## 2020-05-17 NOTE — PT/OT/SLP PROGRESS
Physical Therapy      Patient Name:  Kodi Ibanez Sr.   MRN:  [id number]    PT orders received; however patient with change in status and transfer to ICU since PT orders. As a result, will need new orders before patient can be seen for PT.  Please reorder if a new PT need arises. Thank you.    Fermin Melgar, PT

## 2020-05-17 NOTE — SUBJECTIVE & OBJECTIVE
"Interval History: discussed case with team, RN , reviewed chart and discussed with family    Past Medical History:   Diagnosis Date    Atherosclerosis of aorta     noted on VIKTORIA 2011    Blood transfusion     Cataracts, bilateral     CHF (congestive heart failure)     Clotting disorder     Cognitive deficits as late effect of cerebrovascular disease 6/26/2012    Coronary artery disease     NSTEMI s/p PCI with FERCHO to LCx (12/16/19),     Decreased appetite     DM (diabetes mellitus), type 2 with renal complications     Diabetic retinopathy    ESRD on hemodialysis     TUESDAY/ THURSDAY/SATURDAY    Hyperlipidemia     Hyperparathyroidism, unspecified     Hypertension     Prostate cancer 2016 7/16/19:  wife states "he doesn't have it anymore"    Seizures     Stroke 2011    PT HAS SHAKES     Unsteady gait     Uses roller walker       Past Surgical History:   Procedure Laterality Date    CARDIAC SURGERY      CABG    CATARACT EXTRACTION      ou    CATARACT EXTRACTION, BILATERAL      cataracts      CORONARY ANGIOGRAPHY INCLUDING BYPASS GRAFTS WITH CATHETERIZATION OF LEFT HEART N/A 12/16/2019    Procedure: ANGIOGRAM, CORONARY, INCLUDING BYPASS GRAFT, WITH LEFT HEART CATHETERIZATION, rcfa, 5fr;  Surgeon: David Villegas MD;  Location: [facility] CATH LAB;  Service: Cardiology;  Laterality: N/A;    CORONARY ANGIOGRAPHY INCLUDING BYPASS GRAFTS WITH CATHETERIZATION OF LEFT HEART N/A 4/30/2020    Procedure: ANGIOGRAM, CORONARY, INCLUDING BYPASS GRAFT, WITH LEFT HEART CATHETERIZATION, rcfa, 10 am start;  Surgeon: David Villegas MD;  Location: [facility] CATH LAB;  Service: Cardiology;  Laterality: N/A;    CORONARY ARTERY BYPASS GRAFT  2009    bypass    EYE SURGERY      Left Guera AVF  1/11/2012    PROSTATE BIOPSY      positive    retinal laser      REVISION OF ARTERIOVENOUS FISTULA Left 2/27/2020    Procedure: Ligation of left upper extremity fistula, Closure of left upper extremity skin defect x 2, " Intraoperative ultrasound ;  Surgeon: Stephane Lopez MD;  Location: [facility] OR;  Service: Vascular;  Laterality: Left;    REVISION OF ARTERIOVENOUS FISTULA Left 4/10/2020    Procedure: AV fistula excision;  Surgeon: Stephane Lopez MD;  Location: [facility] OR 2ND FLR;  Service: Vascular;  Laterality: Left;    ROTATOR CUFF REPAIR  right, 2005    ULTRASOUND GUIDANCE  12/16/2019    Procedure: Ultrasound Guidance;  Surgeon: David Villegas MD;  Location: [facility] CATH LAB;  Service: Cardiology;;    VASCULAR SURGERY         Review of patient's allergies indicates:   Allergen Reactions    Reglan [metoclopramide hcl] Diarrhea    Lorazepam      Other reaction(s): heavy sedation       Medications:  Continuous Infusions:   norepinephrine bitartrate-D5W 0.14 mcg/kg/min (05/17/20 1000)    vasopressin (PITRESSIN) infusion 0.04 Units/min (05/17/20 1000)     Scheduled Meds:   sodium chloride 0.9%   Intravenous Once    apixaban  5 mg Oral BID    aspirin  81 mg Oral Daily    atorvastatin  80 mg Oral QHS    clopidogreL  75 mg Oral Daily    insulin detemir U-100  15 Units Subcutaneous QHS    micafungin (MYCAMINE) IVPB  100 mg Intravenous Q24H    piperacillin-tazobactam (ZOSYN) IVPB  4.5 g Intravenous Q12H    sevelamer carbonate  800 mg Oral TID WM     PRN Meds:Dextrose 10% Bolus, glucagon (human recombinant), insulin aspart U-100, sodium chloride 0.9%, Pharmacy to dose Vancomycin consult **AND** vancomycin - pharmacy to dose    Family History     Problem Relation (Age of Onset)    Cancer Mother, Paternal Grandmother, Paternal Uncle    Cataracts Mother    Diabetes Mother    Glaucoma Mother    Heart disease Father, Maternal Grandmother    Hypertension Brother    No Known Problems Sister, Maternal Grandfather, Paternal Grandfather, Sister, Sister    Prostate cancer Brother        Tobacco Use    Smoking status: Never Smoker    Smokeless tobacco: Never Used   Substance and Sexual Activity    Alcohol use: No    Drug use:  No    Sexual activity: Not Currently       Review of Systems   Unable to perform ROS: Acuity of condition     Objective:     Vital Signs (Most Recent):  Temp: 97.5 °F (36.4 °C) (05/17/20 0700)  Pulse: 88 (05/17/20 1030)  Resp: 19 (05/17/20 1030)  BP: 116/76 (05/17/20 1000)  SpO2: 100 % (05/17/20 1030) Vital Signs (24h Range):  Temp:  [93.7 °F (34.3 °C)-98.5 °F (36.9 °C)] 97.5 °F (36.4 °C)  Pulse:  [] 88  Resp:  [9-28] 19  SpO2:  [95 %-100 %] 100 %  BP: ()/(41-80) 116/76  Arterial Line BP: ()/(42-63) 116/56     Weight: 67.6 kg (149 lb)  Body mass index is 20.78 kg/m².    Review of Symptoms    Unable to assess due to acuity of condition  Symptom Assessment (ESAS 0-10 scale)   ESAS 0 1 2 3 4 5 6 7 8 9 10   Pain              Dyspnea              Anxiety              Nausea              Depression               Anorexia              Fatigue              Insomnia              Restlessness               Agitation              CAM / Delirium __ --  ___+   Constipation     __ --  ___+   Diarrhea           __ --  ___+  Bowel Management Plan (BMP): Yes    Comments: none    Pain Assessment: unable to assess; no nonverbal signs of pain    OME in 24 hours: 0    Performance Status: 50      Physical Exam  Constitutional: He is awake but confused  Elderly male, disoriented  No distress  Cachectic, chronically ill appearing   HENT:   Mouth/Throat: Oropharynx is clear and moist. No oropharyngeal exudate.   Eyes: Conjunctivae are normal. No scleral icterus.   Neck: No tracheal deviation present.   Cardiovascular: Normal rate, regular rhythm and normal heart sounds.   Irregular tachycardia, rate > 100   Pulmonary/Chest: Effort normal and breath sounds normal. No respiratory distress.   Abdominal: He exhibits no distension.   Musculoskeletal: He exhibits no edema or tenderness.   Neurological: He is alert and oriented to person, place, and time. No cranial nerve deficit.   Lethargic. No gross focal deficits   Skin:  Skin is warm and dry.   Nursing note and vitals reviewed.  Significant Labs:   LFT:   Recent Labs   Lab 05/17/20 0515   AST 47*   ALKPHOS 74   BILITOT 0.3     CBC:   Recent Labs   Lab 05/17/20 0515   WBC 8.61   HGB 9.1*   HCT 28.9*   MCV 88        BMP:  Recent Labs   Lab 05/17/20  0515 05/17/20  0816   *  222* 257*   *  136 134*   K 4.0  4.2 4.3     106 106   CO2 18*  18* 17*   BUN 23  24* 25*   CREATININE 4.1*  4.1* 4.0*   CALCIUM 7.3*  7.3* 6.9*   MG 2.0  2.0  --      LFT:  Lab Results   Component Value Date    AST 47 (H) 05/17/2020    ALKPHOS 74 05/17/2020    BILITOT 0.3 05/17/2020     Albumin:   Albumin   Date Value Ref Range Status   05/17/2020 1.4 (L) 3.5 - 5.2 g/dL Final   05/17/2020 1.5 (L) 3.5 - 5.2 g/dL Final     Protein:   Total Protein   Date Value Ref Range Status   05/17/2020 5.0 (L) 6.0 - 8.4 g/dL Final     Lactic acid:   Lab Results   Component Value Date    LACTATE 2.1 05/17/2020    LACTATE 1.0 05/14/2020       Significant Imaging: I have reviewed all pertinent imaging results/findings within the past 24 hours.    Advance Care Planning   Advanced Directives::  Living Will: Yes. Copy on chart: Yes  LaPOST: No  Do Not Resuscitate Status: DNR since this admission  Medical Power of : No; wife is surrogate CHILANGO [employer] Spouse [phone]         Decision-Making Capacity: Family answered questions, Patient unable to communicate due to disease severity/cognitive impairment       Living Arrangements: Lives with spouse    Psychosocial/Cultural: did not explore during today's visit; wife is an EMT with some medical knowledge    Spiritual:     F- Madai and Belief: Deepak's    I - Importance: yes  .  C - Community: yes    A - Address in Care: wife to coordinate

## 2020-05-17 NOTE — SUBJECTIVE & OBJECTIVE
"Past Medical History:   Diagnosis Date    Atherosclerosis of aorta     noted on VIKTORIA 2011    Blood transfusion     Cataracts, bilateral     CHF (congestive heart failure)     Clotting disorder     Cognitive deficits as late effect of cerebrovascular disease 6/26/2012    Coronary artery disease     NSTEMI s/p PCI with FERCHO to LCx (12/16/19),     Decreased appetite     DM (diabetes mellitus), type 2 with renal complications     Diabetic retinopathy    ESRD on hemodialysis     TUESDAY/ THURSDAY/SATURDAY    Hyperlipidemia     Hyperparathyroidism, unspecified     Hypertension     Prostate cancer 2016 7/16/19:  wife states "he doesn't have it anymore"    Seizures     Stroke 2011    PT HAS SHAKES     Unsteady gait     Uses roller walker       Past Surgical History:   Procedure Laterality Date    CARDIAC SURGERY      CABG    CATARACT EXTRACTION      ou    CATARACT EXTRACTION, BILATERAL      cataracts      CORONARY ANGIOGRAPHY INCLUDING BYPASS GRAFTS WITH CATHETERIZATION OF LEFT HEART N/A 12/16/2019    Procedure: ANGIOGRAM, CORONARY, INCLUDING BYPASS GRAFT, WITH LEFT HEART CATHETERIZATION, rcfa, 5fr;  Surgeon: David Villegas MD;  Location: [facility] CATH LAB;  Service: Cardiology;  Laterality: N/A;    CORONARY ANGIOGRAPHY INCLUDING BYPASS GRAFTS WITH CATHETERIZATION OF LEFT HEART N/A 4/30/2020    Procedure: ANGIOGRAM, CORONARY, INCLUDING BYPASS GRAFT, WITH LEFT HEART CATHETERIZATION, rcfa, 10 am start;  Surgeon: David Villegas MD;  Location: [facility] CATH LAB;  Service: Cardiology;  Laterality: N/A;    CORONARY ARTERY BYPASS GRAFT  2009    bypass    EYE SURGERY      Left Guera AVF  1/11/2012    PROSTATE BIOPSY      positive    retinal laser      REVISION OF ARTERIOVENOUS FISTULA Left 2/27/2020    Procedure: Ligation of left upper extremity fistula, Closure of left upper extremity skin defect x 2, Intraoperative ultrasound ;  Surgeon: Stephane Lopez MD;  Location: [facility] OR;  Service: Vascular;  " Laterality: Left;    REVISION OF ARTERIOVENOUS FISTULA Left 4/10/2020    Procedure: AV fistula excision;  Surgeon: Stephane Lopez MD;  [facility];  Service: Vascular;  Laterality: Left;    ROTATOR CUFF REPAIR  right, 2005    ULTRASOUND GUIDANCE  12/16/2019    Procedure: Ultrasound Guidance;  Surgeon: David Villegas MD;  Location: [facility] CATH LAB;  Service: Cardiology;;    VASCULAR SURGERY         Review of patient's allergies indicates:   Allergen Reactions    Reglan [metoclopramide hcl] Diarrhea    Lorazepam      Other reaction(s): heavy sedation       Family History     Problem Relation (Age of Onset)    Cancer Mother, Paternal Grandmother, Paternal Uncle    Cataracts Mother    Diabetes Mother    Glaucoma Mother    Heart disease Father, Maternal Grandmother    Hypertension Brother    No Known Problems Sister, Maternal Grandfather, Paternal Grandfather, Sister, Sister    Prostate cancer Brother        Tobacco Use    Smoking status: Never Smoker    Smokeless tobacco: Never Used   Substance and Sexual Activity    Alcohol use: No    Drug use: No    Sexual activity: Not Currently      Review of Systems   Unable to perform ROS: Acuity of condition     Objective:     Vital Signs (Most Recent):  Temp: (!) 93.7 °F (34.3 °C) (05/17/20 0502)  Pulse: 95 (05/17/20 0545)  Resp: 16 (05/17/20 0545)  BP: (!) 80/51 (05/17/20 0545)  SpO2: 100 % (05/17/20 0545) Vital Signs (24h Range):  Temp:  [93.7 °F (34.3 °C)-98.7 °F (37.1 °C)] 93.7 °F (34.3 °C)  Pulse:  [] 95  Resp:  [9-39] 16  SpO2:  [95 %-100 %] 100 %  BP: ()/(51-76) 80/51   Weight: 67.6 kg (149 lb)  Body mass index is 20.78 kg/m².      Intake/Output Summary (Last 24 hours) at 5/17/2020 0645  Last data filed at 5/17/2020 0600  Gross per 24 hour   Intake 517 ml   Output --   Net 517 ml       Physical Exam   Constitutional: He is oriented to person, place, and time.   Elderly male, oriented x 4  No distress  Cachectic, chronically ill  appearing   HENT:   Mouth/Throat: Oropharynx is clear and moist. No oropharyngeal exudate.   Eyes: Conjunctivae are normal. No scleral icterus.   Neck: No tracheal deviation present.   Cardiovascular: Normal rate, regular rhythm and normal heart sounds.   Irregular tachycardia, rate 100   Pulmonary/Chest: Effort normal and breath sounds normal. No respiratory distress.   Abdominal: He exhibits no distension.   Musculoskeletal: He exhibits no edema or tenderness.   Neurological: He is alert and oriented to person, place, and time. No cranial nerve deficit.   Somnolent and moaning. No gross focal deficits   Skin: Skin is warm and dry.   Nursing note and vitals reviewed.      Vents:  Oxygen Concentration (%): 100 (05/17/20 0445)  Lines/Drains/Airways     Central Venous Catheter Line                 Hemodialysis Catheter 03/05/20 0535 right subclavian 73 days          Arterial Line                 Arterial Line 05/17/20 0603 Right Radial less than 1 day          Peripheral Intravenous Line                 Peripheral IV - Single Lumen 05/13/20 1620 22 G Right Hand 3 days         Peripheral IV - Single Lumen 05/14/20 1448 20 G;1 3/4 in Right Forearm 2 days              Significant Labs:    CBC/Anemia Profile:  Recent Labs   Lab 05/16/20  0342 05/17/20  0515   WBC 8.17 8.61   HGB 8.9* 9.1*   HCT 28.5* 28.9*    168   MCV 89 88   RDW 16.0* 15.9*        Chemistries:  Recent Labs   Lab 05/16/20  1559 05/16/20  2147 05/16/20  2148 05/17/20  0515   * 134* 133* 135*  136   K 4.3 4.5 4.6 4.0  4.2    102 102 105  106   CO2 21* 18* 18* 18*  18*   BUN 17 21 20 23  24*   CREATININE 3.6* 3.9* 3.9* 4.1*  4.1*   CALCIUM 7.3* 7.5* 7.5* 7.3*  7.3*   ALBUMIN 1.6*  --  1.6* 1.4*  1.5*   PROT  --   --   --  5.0*   BILITOT  --   --   --  0.3   ALKPHOS  --   --   --  74   ALT  --   --   --  <5*   AST  --   --   --  47*   MG 2.0  --  2.0 2.0  2.0   PHOS 4.4  --  4.9* 5.0*  5.0*       All pertinent labs within the  past 24 hours have been reviewed.    Significant Imaging: I have reviewed and interpreted all pertinent imaging results/findings within the past 24 hours.

## 2020-05-17 NOTE — NURSING
Admitted to [address]646 as a rapid response.  Portable monitor, O2 @ 100% NRB, and Levophed infusing at 0.2mcg/kg.  Connected to monitor and O2.  Transferred to bed.  Stool cleaned, sheets and gown changed.  Oriented per RN.  Warming blanket applied for temp 93.7 A.  Pt able to state name and .  Disoriented to place, time and situation.  Refer to [facility] for assessment and updates.  MD at bedside.

## 2020-05-17 NOTE — PT/OT/SLP PROGRESS
Occupational Therapy      Patient Name:  Kodi Ibanez .   MRN:  [id number]    Patient not seen today secondary to pt transferred to ICU on this date from step down unit.  Will need new orders post ICU transfer to proceed with OT eval.    YANY Rahman  5/17/2020

## 2020-05-17 NOTE — ASSESSMENT & PLAN NOTE
61 yo mamle with h/o ESRD, CVA, frequent hospitalizations 2/2/ immunocompromised state admitted with septic shock, fungemia, and requirement for vasopressors.  Consulted for GOC    Prognosis: likely weeks to months at best    1) Symptoms: encephalopathic due to acute illness; no perceived pain; denies any symptoms for palliation    2) Code status:  DNR in event of cardiopulm arrest    3) Psychosocial: _ well supported by family at home; wife former EMT/paramedic    4) Medicolegal: pt has living will on chart requesting no life proloning measures in the event of terminal illness. This is his terminal illness, chronic debility, chronic illness, fengemia, and ESRD.  Yaima Ibanez Spouse [phone]     5) Spiritual:  [place]    6) Goals of care/discussion:  Per CC: discussion at bedside today establishing boundaries regarding DNR, no additional pressors or invasive measures of life prolonging care.  Will continue to explore goals of care depending on response to treatment and clinical state    7) Disposition plan: TBD

## 2020-05-17 NOTE — HPI
59 y/o male with past medical history of CHF, CAD with recent NSTEMI [~4/29] (CABG 2009, FERCHO placed in 12/2019), diabetes, CVA, ESRD on dialysis Tuesday/Thursday/Saturday (last dialyzed morning of 5/12) presents to the ER by referral from infectious disease clinic after virtual visit today. The ID provider was concerned about the patients AMS and recent elevated WBC count. Recently,junior underwent ligation of LUE fistula on 2/27/20 with Dr Lopez ([facility], vascular surgery) and had ongoing issues afterwards. Patient was admitted on 4/10 for excision of his infected LUE mid and distal AVF with cultures and specimen obtained. Surgical cultures 4/10 grew 2 different prevotella species, fusobacterium, and strep anginosus. The patient was d/c'ed with home health wound care and po augmentin/clindamycin x 10 days. On 4/20 antibiotics were changed to Flagyl 500 mg p.o. q.8 and Cefazolin after dialysis 2 g on Tuesday and Thursday, 3 g on Saturday. End date on antibiotics on 5/11.     Following these events, on 4/29 the patient was admitted for an NSTEMI, a LHC was performed where re-stenosis of a previous stent was found and balloon angioplasty was done. He was discharged on 5/2. He is now on ASA/plavix, but per wife has not been compliant.     Patients wife says that he has had poor PO intake for the last 2-3 weeks, has become progressively weak, and developed altered mental status today. Patient had 3 episodes of vomiting within the last 24 hours, which appeared brown-non bloody. He had a brown watery bowel movement today. Patient normally oliguric (urinating ~2-3 x per week). Patient was seen in the [facility] ED on the evening of 5/12 due to palpitations after his normal dialysis and sent home after evaluation, for possible antibiotic side effect.     He presents to the ED with encephalopathy x 1 day, leukocytosis of 15, lactate 2.7, troponin 0.482, , procal 10.98, Cr 6.5, BUN 41, and hyperglycemia. Patient  pan-cultured, currently only UA resulted with large amount of bacteria. Patient requiring high dose Levophed on admission. Broad spectrum antibiotics initiated. 1.5 L IVF given. Patient remains on RA. Wife was updated regarding patient's current status. Patient spoken to regarding code status and verbalized his wishes as a DNR. His wife confirmed he has an LAPost but it is not on file. Instructed to bring in.     Hospital/ICU Course:  The patient was admitted to MICU for shock likely 2/2 to sepsis on 5/13. Patient remains on low dose Levophed. Nephrology consulted for possible dialysis. Blood cx NGTD. Patient remains on Heparin gtt for paroxysmal A fib with CHADS Vasc score of 6. Previously hyperglycemic with BG now well controlled.     The pt had briefly stepped down from ICU level of care but quickly decompensated with concern for septic shock, fungemia possibly related to dailysis cath, and returned to the ICU.      In this setting, palliative medicine was consulted to help with medical decision making and aid in the formation of goals of care.

## 2020-05-17 NOTE — PROCEDURES
"Kodi Ibanez Sr. is a 60 y.o. male patient.    Temp: (!) 93.7 °F (34.3 °C) (05/17/20 0502)  Pulse: 95 (05/17/20 0545)  Resp: 16 (05/17/20 0545)  BP: (!) 80/51 (05/17/20 0545)  SpO2: 100 % (05/17/20 0545)  Weight: 67.6 kg (149 lb) (05/15/20 0701)  Height: 5' 11" (180.3 cm) (05/16/20 0701)       Arterial Line  Date/Time: 5/17/2020 6:18 AM  Location procedure was performed: [facility] CRITICAL CARE MEDICINE  Performed by: José Luis Fry MD  Authorized by: José Luis Fry MD   Consent Done: Emergent Situation  Preparation: Patient was prepped and draped in the usual sterile fashion.  Indications: hemodynamic monitoring  Location: right radial  Anesthesia: local infiltration    Anesthesia:  Local Anesthetic: lidocaine 1% without epinephrine  Anesthetic total: 5 mL  Patient sedated: no  Ruperto's test normal: yes  Needle gauge: 22  Number of attempts: 3  Technical procedures used: Guidewire Catheterization  Complications: No  Estimated blood loss (mL): 5  Specimens: No  Implants: No  Post-procedure: line sutured and dressing applied  Post-procedure CMS: normal  Patient tolerance: Patient tolerated the procedure well with no immediate complications          José Luis Fry  5/17/2020  "

## 2020-05-17 NOTE — PROGRESS NOTES
Pt found to be unresponsive with occasional moans. GCS 3.  Unable to obtain blood pressure. . Pt has agonal breathing. Team called, Swords to bedside. RRT and CCM contacted.

## 2020-05-17 NOTE — PROGRESS NOTES
[facility]  Critical Care Medicine  Progress Note    Patient Name: Kodi Ibanez Sr.  MRN: [id number]  Admission Date: 5/13/2020  Hospital Length of Stay: 4 days  Code Status: DNR  Attending Provider: Josie Thomas MD  Primary Care Provider: Melonie Elias MD   Principal Problem: Shock    Subjective:     HPI:  61 y/o male with past medical history of CHF, CAD with recent NSTEMI [~4/29] (CABG 2009, FERCHO placed in 12/2019), diabetes, CVA, ESRD on dialysis Tuesday/Thursday/Saturday (last dialyzed morning of 5/12) presents to the ER by referral from infectious disease clinic after virtual visit today. The ID provider was concerned about the patients AMS and recent elevated WBC count. Recently,atient underwent ligation of LUE fistula on 2/27/20 with Dr Lopez ([facility], vascular surgery) and had ongoing issues afterwards. Patient was admitted on 4/10 for excision of his infected LUE mid and distal AVF with cultures and specimen obtained. Surgical cultures 4/10 grew 2 different prevotella species, fusobacterium, and strep anginosus. The patient was d/c'ed with home health wound care and po augmentin/clindamycin x 10 days. On 4/20 antibiotics were changed to Flagyl 500 mg p.o. q.8 and Cefazolin after dialysis 2 g on Tuesday and Thursday, 3 g on Saturday. End date on antibiotics on 5/11.     Following these events, on 4/29 the patient was admitted for an NSTEMI, a LHC was performed where re-stenosis of a previous stent was found and balloon angioplasty was done. He was discharged on 5/2. He is now on ASA/plavix, but per wife has not been compliant.     Patients wife says that he has had poor PO intake for the last 2-3 weeks, has become progressively weak, and developed altered mental status today. Patient had 3 episodes of vomiting within the last 24 hours, which appeared brown-non bloody. He had a brown watery bowel movement today. Patient normally oliguric (urinating ~2-3 x per week). Patient was  seen in the [facility] ED on the evening of 5/12 due to palpitations after his normal dialysis and sent home after evaluation, for possible antibiotic side effect.     He presents to the ED with encephalopathy x 1 day, leukocytosis of 15, lactate 2.7, troponin 0.482, , procal 10.98, Cr 6.5, BUN 41, and hyperglycemia. Patient pan-cultured, currently only UA resulted with large amount of bacteria. Patient requiring high dose Levophed on admission. Broad spectrum antibiotics initiated. 1.5 L IVF given. Patient remains on RA. Wife was updated regarding patient's current status. Patient spoken to regarding code status and verbalized his wishes as a DNR. His wife confirmed he has an LAPost but it is not on file. Instructed to bring in.    Hospital/ICU Course:  The patient was admitted to MICU for shock likely 2/2 to sepsis on 5/13. Patient remains on low dose Levophed. Nephrology consulted for possible dialysis. Blood cx NGTD. Patient remains on Heparin gtt for paroxysmal A fib with CHADS Vasc score of 6. Previously hyperglycemic with BG now well controlled.    5/14: Levo stopped in morning. Stable all day off pressors on room air. Zosyn changed to unasyn.     5/15: Patient spiked new temp, vanc restarted. Patient with intermittent SVT- Home coreg and dilt restarted. Step down orders placed but patient didn't get bed    5/17: Hypotension overnight requiring vasopressors. Transferred back to MICU on norepi    Past Medical History:   Diagnosis Date    Atherosclerosis of aorta     noted on VIKTORIA 2011    Blood transfusion     Cataracts, bilateral     CHF (congestive heart failure)     Clotting disorder     Cognitive deficits as late effect of cerebrovascular disease 6/26/2012    Coronary artery disease     NSTEMI s/p PCI with FERCHO to LCx (12/16/19),     Decreased appetite     DM (diabetes mellitus), type 2 with renal complications     Diabetic retinopathy    ESRD on hemodialysis     TUESDAY/ THURSDAY/SATURDAY     "Hyperlipidemia     Hyperparathyroidism, unspecified     Hypertension     Prostate cancer 2016    7/16/19:  wife states "he doesn't have it anymore"    Seizures     Stroke 2011    PT HAS SHAKES     Unsteady gait     Uses roller walker       Past Surgical History:   Procedure Laterality Date    CARDIAC SURGERY      CABG    CATARACT EXTRACTION      ou    CATARACT EXTRACTION, BILATERAL      cataracts      CORONARY ANGIOGRAPHY INCLUDING BYPASS GRAFTS WITH CATHETERIZATION OF LEFT HEART N/A 12/16/2019    Procedure: ANGIOGRAM, CORONARY, INCLUDING BYPASS GRAFT, WITH LEFT HEART CATHETERIZATION, rcfa, 5fr;  Surgeon: David Villegas MD;  Location: [facility] CATH LAB;  Service: Cardiology;  Laterality: N/A;    CORONARY ANGIOGRAPHY INCLUDING BYPASS GRAFTS WITH CATHETERIZATION OF LEFT HEART N/A 4/30/2020    Procedure: ANGIOGRAM, CORONARY, INCLUDING BYPASS GRAFT, WITH LEFT HEART CATHETERIZATION, rcfa, 10 am start;  Surgeon: David Villegas MD;  Location: [facility] CATH LAB;  Service: Cardiology;  Laterality: N/A;    CORONARY ARTERY BYPASS GRAFT  2009    bypass    EYE SURGERY      Left Guera AVF  1/11/2012    PROSTATE BIOPSY      positive    retinal laser      REVISION OF ARTERIOVENOUS FISTULA Left 2/27/2020    Procedure: Ligation of left upper extremity fistula, Closure of left upper extremity skin defect x 2, Intraoperative ultrasound ;  Surgeon: Stephane Lopez MD;  Location: [facility] OR;  Service: Vascular;  Laterality: Left;    REVISION OF ARTERIOVENOUS FISTULA Left 4/10/2020    Procedure: AV fistula excision;  Surgeon: Stephane Lopez MD;  Location: [address];  Service: Vascular;  Laterality: Left;    ROTATOR CUFF REPAIR  right, 2005    ULTRASOUND GUIDANCE  12/16/2019    Procedure: Ultrasound Guidance;  Surgeon: David Villegas MD;  Location: [facility] CATH LAB;  Service: Cardiology;;    VASCULAR SURGERY         Review of patient's allergies indicates:   Allergen Reactions    Reglan [metoclopramide hcl] " Diarrhea    Lorazepam      Other reaction(s): heavy sedation       Family History     Problem Relation (Age of Onset)    Cancer Mother, Paternal Grandmother, Paternal Uncle    Cataracts Mother    Diabetes Mother    Glaucoma Mother    Heart disease Father, Maternal Grandmother    Hypertension Brother    No Known Problems Sister, Maternal Grandfather, Paternal Grandfather, Sister, Sister    Prostate cancer Brother        Tobacco Use    Smoking status: Never Smoker    Smokeless tobacco: Never Used   Substance and Sexual Activity    Alcohol use: No    Drug use: No    Sexual activity: Not Currently      Review of Systems   Unable to perform ROS: Acuity of condition     Objective:     Vital Signs (Most Recent):  Temp: (!) 93.7 °F (34.3 °C) (05/17/20 0502)  Pulse: 95 (05/17/20 0545)  Resp: 16 (05/17/20 0545)  BP: (!) 80/51 (05/17/20 0545)  SpO2: 100 % (05/17/20 0545) Vital Signs (24h Range):  Temp:  [93.7 °F (34.3 °C)-98.7 °F (37.1 °C)] 93.7 °F (34.3 °C)  Pulse:  [] 95  Resp:  [9-39] 16  SpO2:  [95 %-100 %] 100 %  BP: ()/(51-76) 80/51   Weight: 67.6 kg (149 lb)  Body mass index is 20.78 kg/m².      Intake/Output Summary (Last 24 hours) at 5/17/2020 0645  Last data filed at 5/17/2020 0600  Gross per 24 hour   Intake 517 ml   Output --   Net 517 ml       Physical Exam   Constitutional: He is oriented to person, place, and time.   Elderly male, oriented x 4  No distress  Cachectic, chronically ill appearing   HENT:   Mouth/Throat: Oropharynx is clear and moist. No oropharyngeal exudate.   Eyes: Conjunctivae are normal. No scleral icterus.   Neck: No tracheal deviation present.   Cardiovascular: Normal rate, regular rhythm and normal heart sounds.   Irregular tachycardia, rate 100   Pulmonary/Chest: Effort normal and breath sounds normal. No respiratory distress.   Abdominal: He exhibits no distension.   Musculoskeletal: He exhibits no edema or tenderness.   Neurological: He is alert and oriented to person,  place, and time. No cranial nerve deficit.   Somnolent and moaning. No gross focal deficits   Skin: Skin is warm and dry.   Nursing note and vitals reviewed.      Vents:  Oxygen Concentration (%): 100 (05/17/20 0445)  Lines/Drains/Airways     Central Venous Catheter Line                 Hemodialysis Catheter 03/05/20 0535 right subclavian 73 days          Arterial Line                 Arterial Line 05/17/20 0603 Right Radial less than 1 day          Peripheral Intravenous Line                 Peripheral IV - Single Lumen 05/13/20 1620 22 G Right Hand 3 days         Peripheral IV - Single Lumen 05/14/20 1448 20 G;1 3/4 in Right Forearm 2 days              Significant Labs:    CBC/Anemia Profile:  Recent Labs   Lab 05/16/20  0342 05/17/20  0515   WBC 8.17 8.61   HGB 8.9* 9.1*   HCT 28.5* 28.9*    168   MCV 89 88   RDW 16.0* 15.9*        Chemistries:  Recent Labs   Lab 05/16/20  1559 05/16/20  2147 05/16/20  2148 05/17/20  0515   * 134* 133* 135*  136   K 4.3 4.5 4.6 4.0  4.2    102 102 105  106   CO2 21* 18* 18* 18*  18*   BUN 17 21 20 23  24*   CREATININE 3.6* 3.9* 3.9* 4.1*  4.1*   CALCIUM 7.3* 7.5* 7.5* 7.3*  7.3*   ALBUMIN 1.6*  --  1.6* 1.4*  1.5*   PROT  --   --   --  5.0*   BILITOT  --   --   --  0.3   ALKPHOS  --   --   --  74   ALT  --   --   --  <5*   AST  --   --   --  47*   MG 2.0  --  2.0 2.0  2.0   PHOS 4.4  --  4.9* 5.0*  5.0*       All pertinent labs within the past 24 hours have been reviewed.    Significant Imaging: I have reviewed and interpreted all pertinent imaging results/findings within the past 24 hours.      ABG  Recent Labs   Lab 05/14/20  0259   PH 7.299*   PO2 28*   PCO2 34.5*   HCO3 17.0*   BE -9     Assessment/Plan:     Cardiac/Vascular  Atrial fibrillation  --patient on diltiazem for rate control; currently holding in the setting of normal rate with marked hypotension  --on ASA/plavix  --Chads Vasc score 6 points  --initiated low-intensity Heparin gtt.  Changed to eliquis on 5/16    NSTEMI (non-ST elevated myocardial infarction)  Hx of CABG in 2009 and placement of FERCHO in 12/2019. Patient recenetly admitted on 4/29 and d/c'ed 5/2, admitted for an NSTEMI, a LHC was performed where re-stenosis of a previous stent was found and balloon angioplasty was done. He is now on ASA/plavix, but per wife has not been compliant.    --troponin now down-trending; stopped trending  --continue ASA/plavix (home meds)  --echo WNL    Essential hypertension  --holding home losartan and coreg in the setting of hypotension    HLD (hyperlipidemia)  --statin for secondary prevention    Renal/  ESRD on hemodialysis  Patient on HD T/Th/Sat with last session on 5/12, where he was admitted to the ED with palpatations after session. Patient has tunneled catheter that was inserted in 2/2020 that is being used for access.     --nephrology consulted  --avoid nephrotoxic meds  --oliguric, strict I/o's  --BMP daily    ID  AV fistula infection  --see shock    Endocrine  Type 2 diabetes, uncontrolled, with neuropathy  -->500 on admission. Goal -180  Not at goal on 10 detemir. Will increase to 15 w/ goal BG less than 185 per [facility]  --SSI prn initiated  --accuchecks q4h      Other  * Shock  2/2 probable sepsis w/ budeding yeast growing from blood cultures from 5/15. Likely from broad spectrum antibiotics over past month. Need to remove tunneled HD cath (placed 2/2020)     Patient underwent ligation of LUE fistula on 2/27/20 with Dr Lopez ([facility], vascular surgery) and had ongoing issues afterwards. Patient was admitted on 4/10 for excision of his infected LUE mid and distal AVF with cultures and specimen obtained. Surgical cultures 4/10 grew 2 different prevotella species, fusobacterium, and strep anginosus. The patient was d/c'ed with home health wound care and po augmentin/clindamycin x 10 days. On 4/20 antibiotics were changed to Flagyl 500 mg p.o. q.8 and Cefazolin after dialysis  2 g on Tuesday and Thursday, 3 g on Saturday. End date on antibiotics on 5/11.    --Patient rapidly decompensated overnight  -- Now on .32 levo, vaso ordered  --1.5 L IVF given by night team  -- Consider DCing vanc, zosyn as we have fungal source  -- Micafungin initiated  -- Need to have goals of care discussion with patient and family     Critical Care Daily Checklist:    A: Awake: RASS Goal/Actual Goal: RASS Goal: -1-->drowsy  Actual: Torres Agitation Sedation Scale (RASS): Drowsy   B: Spontaneous Breathing Trial Performed?     C: SAT & SBT Coordinated?  NA            D: Delirium: CAM-ICU Overall CAM-ICU: Negative   E: Early Mobility Performed? Yes   F: Feeding Goal:    Status:     Current Diet Order   Procedures    Diet diabetic [facility]; 2000 Calorie     Order Specific Question:   Indicate patient location for additional diet options:     Answer:   [facility]     Order Specific Question:   Total calories:     Answer:   2000 Calorie      AS: Analgesia/Sedation None   T: Thromboembolic Prophylaxis eliquis   H: HOB > 300 Yes   U: Stress Ulcer Prophylaxis (if needed) none   G: Glucose Control Detemir, SSI   B: Bowel Function Stool Occurrence: 1   I: Indwelling Catheter (Lines & Mayesr) Necessity Right tunneled subclavian HD   D: De-escalation of Antimicrobials/Pharmacotherapies TBD    Plan for the day/ETD Goals of care    Code Status:  Family/Goals of Care: DNR  ongoing       Critical secondary to Patient has a condition that poses threat to life and bodily function: septic shock      Critical care was time spent personally by me on the following activities: development of treatment plan with patient or surrogate and bedside caregivers, discussions with consultants, evaluation of patient's response to treatment, examination of patient, ordering and performing treatments and interventions, ordering and review of laboratory studies, ordering and review of radiographic studies, pulse oximetry,  re-evaluation of patient's condition. This critical care time did not overlap with that of any other provider or involve time for any procedures.     Susanne Stanton MD  Critical Care Medicine  [facility]

## 2020-05-17 NOTE — PROGRESS NOTES
Shivani, RRT, and CCM at bedside. Systolic BP 52. Pt agonally breathing. NRB on with 15 lpm. HR 90, a-fib. Pupils pinpoint. 0.4 narcan administered, no effect. CCM ordered Levophed. Pt to be transferred to ICU. Room 44683.

## 2020-05-17 NOTE — NURSING
Dr. Stanton made aware of patient's Levophed requirements up to 0.32 mcg/kg/time at this time. Orders given to start vasopressin. WCTM.

## 2020-05-17 NOTE — PROGRESS NOTES
Pharmacokinetic Assessment Follow Up: IV Vancomycin    Vancomycin serum concentration assessment(s):    -Vancomycin random level was drawn appropriately and can be used to guide therapy.   -Level of 17.3 mcg/mL is within therapeutic range of 15-20 mcg/mL for sepsis.  -ESRD on iHD. Last iHD 5/16 for 3 hrs. Scr Nephrology is following, plans to resume TiW HD.     Vancomycin Regimen Plan:    -Give x 1 vancomycin 500 mg IV today.  -Assess vancomycin random level with AM labs.   -Re-dose depending on level and RRT plans.     Drug levels (last 3 results):  Recent Labs   Lab Result Units 05/15/20  0431 05/16/20  0342 05/17/20  0515   Vancomycin, Random ug/mL 21.0 11.5 17.3       Pharmacy will continue to follow and monitor vancomycin.    Please contact pharmacy at extension [phone] for questions regarding this assessment.    Thank you for the consult,   Johnny Fleming       Patient brief summary:  Kodi Ibanez Sr. is a 60 y.o. male initiated on antimicrobial therapy with IV Vancomycin for treatment of sepsis    Drug Allergies:   Review of patient's allergies indicates:   Allergen Reactions    Reglan [metoclopramide hcl] Diarrhea    Lorazepam      Other reaction(s): heavy sedation       Actual Body Weight:   67.6 kg    Renal Function:   Estimated Creatinine Clearance: 18.3 mL/min (A) (based on SCr of 4 mg/dL (H)).,     Dialysis Method (if applicable):  intermittent HD    CBC (last 72 hours):  Recent Labs   Lab Result Units 05/15/20  0431 05/16/20  0342 05/17/20  0515   WBC K/uL 7.85 8.17 8.61   Hemoglobin g/dL 9.5* 8.9* 9.1*   Hematocrit % 29.2* 28.5* 28.9*   Platelets K/uL 229 183 168   Gran% % 89.8* 90.6* 86.4*   Lymph% % 3.9* 2.1* 4.4*   Mono% % 5.2 5.8 8.1   Eosinophil% % 0.1 0.0 0.3   Basophil% % 0.1 0.2 0.1   Differential Method  Automated Automated Automated       Metabolic Panel (last 72 hours):  Recent Labs   Lab Result Units 05/14/20  1130 05/14/20  1358 05/14/20  2235 05/15/20  0432 05/15/20  0830 05/15/20  2100  05/15/20  2252 05/16/20  0342 05/16/20  0820 05/16/20  1559 05/16/20  2147 05/16/20  2148 05/17/20  0515 05/17/20  0816   Sodium mmol/L 136 137 137 138  138 140 136 136 137 136 134* 134* 133* 135*  136 134*   Potassium mmol/L 5.0 4.6 4.5 4.6  4.6 4.7 4.8 5.8* 3.7 4.1 4.3 4.5 4.6 4.0  4.2 4.3   Chloride mmol/L 106 106 99 99  99 101 99 99 107 105 103 102 102 105  106 106   CO2 mmol/L 14* 16* 26 26  26 28 23 21* 21* 20* 21* 18* 18* 18*  18* 17*   Glucose mg/dL 218* 197* 184* 195*  195* 149* 191* 197* 111* 147* 221* 283* 278* 219*  222* 257*   BUN, Bld mg/dL 53* 44* 15 17  17 19 23 25* 11 13 17 21 20 23  24* 25*   Creatinine mg/dL 7.5* 6.2* 3.2* 3.6*  3.6* 4.1* 4.9* 5.0* 2.4* 3.2* 3.6* 3.9* 3.9* 4.1*  4.1* 4.0*   Albumin g/dL  --  1.9* 2.1* 2.0*  --   --  1.8* 1.8*  --  1.6*  --  1.6* 1.4*  1.5*  --    Total Bilirubin mg/dL  --   --   --   --   --   --   --   --   --   --   --   --  0.3  --    Alkaline Phosphatase U/L  --   --   --   --   --   --   --   --   --   --   --   --  74  --    AST U/L  --   --   --   --   --   --   --   --   --   --   --   --  47*  --    ALT U/L  --   --   --   --   --   --   --   --   --   --   --   --  <5*  --    Magnesium mg/dL  --  1.9 2.1 2.0  2.0  --   --  2.0 1.9  1.9  --  2.0  --  2.0 2.0  2.0  --    Phosphorus mg/dL  --  4.8* 2.7 5.1*  5.1*  --   --  6.1* 2.9  2.9  --  4.4  --  4.9* 5.0*  5.0*  --        Vancomycin Administrations:  vancomycin given in the last 96 hours                   vancomycin (VANCOCIN) 500 mg in dextrose 5 % 100 mL IVPB (mg) 500 mg New Bag 05/16/20 0826                Microbiologic Results:  Microbiology Results (last 7 days)     Procedure Component Value Units Date/Time    Blood culture [[id number]] Collected:  05/17/20 0520    Order Status:  Sent Specimen:  Blood from Line, Subclavian, Right Updated:  05/17/20 0754    Blood culture [[id number]] Collected:  05/17/20 0630    Order Status:  Sent Specimen:  Blood from Line, Arterial, Right  Updated:  05/17/20 0752    Blood culture [[id number]] Collected:  05/15/20 1330    Order Status:  Completed Specimen:  Blood from Peripheral, Forearm, Left Updated:  05/17/20 0524     Blood Culture, Routine Gram stain aer bottle: budding yeast      Results called to and read back by: Jennifer Estevez RN  05/17/2020        05:22    Blood culture [[id number]] Collected:  05/15/20 1321    Order Status:  Completed Specimen:  Blood from Peripheral, Antecubital, Right Updated:  05/17/20 0523     Blood Culture, Routine Gram stain aer bottle: budding yeast      Results called to and read back by: Jennifer Estevez RN  05/17/2020        05:22    Blood culture x two cultures. Draw prior to antibiotics. [[id number]] Collected:  05/13/20 1202    Order Status:  Completed Specimen:  Blood from Peripheral, Hand, Right Updated:  05/16/20 1412     Blood Culture, Routine No Growth to date      No Growth to date      No Growth to date      No Growth to date    Narrative:       Aerobic and anaerobic    Urine culture [[id number]] Collected:  05/13/20 1458    Order Status:  Completed Specimen:  Urine Updated:  05/14/20 2352     Urine Culture, Routine Multiple organisms isolated. None in predominance.  Repeat if      clinically necessary.    Narrative:       Preferred Collection Type->Urine, Clean Catch    Respiratory Infection Panel (PCR), Nasopharyngeal [[id number]] Collected:  05/13/20 2238    Order Status:  Completed Specimen:  Nasopharyngeal Swab Updated:  05/14/20 1102     Respiratory Infection Panel Source NP Swab     Adenovirus Not Detected     Coronavirus 229E, Common Cold Virus Not Detected     Coronavirus HKU1, Common Cold Virus Not Detected     Coronavirus NL63, Common Cold Virus Not Detected     Coronavirus OC43, Common Cold Virus Not Detected     Comment: The Coronavirus strains detected in this test cause the common cold.  These strains are not the COVID-19 (novel Coronavirus)strain   associated with the respiratory disease  outbreak.          Human Metapneumovirus Not Detected     Human Rhinovirus/Enterovirus Not Detected     Influenza A (subtypes H1, H1-2009,H3) Not Detected     Influenza B Not Detected     Parainfluenza Virus 1 Not Detected     Parainfluenza Virus 2 Not Detected     Parainfluenza Virus 3 Not Detected     Parainfluenza Virus 4 Not Detected     Respiratory Syncytial Virus Not Detected     Bordetella Parapertussis (PF5130) Not Detected     Bordetella pertussis (ptxP) Not Detected     Chlamydia pneumoniae Not Detected     Mycoplasma pneumoniae Not Detected     Comment: Respiratory Infection Panel testing performed by Multiplex PCR.       Narrative:       For all other respiratory sources, order [id number] -  Respiratory Viral Panel by PCR    Respiratory Infection Panel (PCR), Nasopharyngeal [[id number]]     Order Status:  Canceled Specimen:  Nasopharyngeal Swab     Respiratory Infection Panel (PCR), Nasopharyngeal [[id number]] Collected:  05/13/20 1835    Order Status:  Canceled Specimen:  Nasopharyngeal Swab     Blood culture x two cultures. Draw prior to antibiotics. [[id number]] Collected:  05/13/20 1202    Order Status:  Sent Specimen:  Blood from Peripheral, Antecubital, Right Updated:  05/13/20 1431

## 2020-05-17 NOTE — CARE UPDATE
Called patient's wife Yaima and given care update on transfer to MICU, possible fungemia, need for pressors, placement of arterial line.     José Luis Fry MD   Internal Medicine PGY-3  [phone]

## 2020-05-17 NOTE — SIGNIFICANT EVENT
"RAPID RESPONSE NURSE NOTE     Admit Date: 2020  LOS: 4  Code Status: DNR   Date of Consult: 2020  : 1960  Age: 60 y.o.  Weight:   Wt Readings from Last 1 Encounters:   05/15/20 67.6 kg (149 lb)     Sex: male  Race: Black or    Bed: 23441 CVICU/44715 CVICU A:   MRN: [id number]  Time Rapid Response Team page Received: Called 0403  Time Rapid Response Team at Bedside: 0405  Time Rapid Response Team left Bedside: 0440  Was the patient discharged from an ICU this admission?   yes  Was the patient discharged from a PACU within last 24 hours?  no  Did the patient receive conscious sedation/general anesthesia within last 24 hours?  no  Was the patient in the ED within the past 24 hours?  no  Was the patient started on NIPPV within the past 24 hours?  no  Did this progress into an ARC or CPA:  no  Attending Physician: Josie Thomas MD  Primary Service: [facility] MED R  Consult Requested By: Josie Thomas MD     SITUATION     Notified by bedside RN via phone call.  Reason for alert: Pt unresponsive  Called to evaluate the patient for Circulatory    BACKGROUND     Why is the patient in the hospital?: Shock    Patient has a past medical history of Atherosclerosis of aorta, Blood transfusion, Cataracts, bilateral, CHF (congestive heart failure), Clotting disorder, Cognitive deficits as late effect of cerebrovascular disease, Coronary artery disease, Decreased appetite, DM (diabetes mellitus), type 2 with renal complications, ESRD on hemodialysis, Hyperlipidemia, Hyperparathyroidism, unspecified, Hypertension, Prostate cancer, Seizures, Stroke, and Unsteady gait.    ASSESSMENT/INTERVENTIONS     BP (!) 91/59   Pulse 94   Temp 98.2 °F (36.8 °C) (Oral)   Resp (!) 28   Ht 5' 11" (1.803 m)   Wt 67.6 kg (149 lb)   SpO2 100%   BMI 20.78 kg/m²     What did you find: Pt mid fowlers position, largely unresponsive, occasionally moaning, no response to stimuli, pupils 1 fixed bilaterally. Per BS RN pt " had called 1.5 hrs ago for a blanket. . Unable to obtain machine BP, manual SBP 58 no diastolic heard. Intermittent apnea.  Pt placed in trendelenburg with bilateral leg raise. Pt placed on NRBM 15 100%, 1 L fluid bolus started, MICU, Dr. Fry notified and to BS. Primary team at BS. Pt is a DNR, but had just transferred from MICU, with prior pressors as DNR. Levophed was mixed and initiated per Dr. Fry. Attempt made by Dr. Fry to reach wife concerning escalation, but he was unable to reach her. The decision was made to move to ICU. Once SBP 85, pt awakening, mumbling words, moving head around, pt moved SICU [id number]    RECOMMENDATIONS     We recommend: ICU transfer    FOLLOW-UP/CONTINGENCY PLAN     Call the Rapid Response Nurse, Mercy Caldwell RN at x [phone] for additional questions or concerns.    PHYSICIAN ESCALATION     Orders received and case discussed with Dr. Fry.    Disposition: Tx in ICU bed 83191.

## 2020-05-18 NOTE — PLAN OF CARE
Patient readmitted to SICU from hospital medicine on 5/17 for hypotension and fungemia.          Melonie Elias MD  [address] / [place] [address]      [id number] - [place], [place] - [address]  [address]  [place] [address]  Phone: [phone] Fax: [phone]    [facility] - [place], [place] - [address]  [address]  [facility] [address]  Phone: [phone] Fax: [phone]    [facility]  [address]  [place] [address]  Phone: [phone] Fax: [phone]    [id number] - [place] - [address] AT [facility] [address]  [address]  [place] [address]  Phone: [phone] Fax: [phone]      Payor: [employer] MEDICARE / Plan: [employer] MEDICARE HMO / Product Type: Capitation /     Extended Emergency Contact Information  Primary Emergency Contact: Yaima Ibanez  Address: [address] [place] [address]           [address]           [place] [address] [facility]  Home Phone: [phone]  Work Phone: [phone]  Mobile Phone: [phone]  Relation: Spouse    Future Appointments   Date Time Provider Department Center   5/26/2020  2:40 PM David Villegas MD [facility] CARDIO [facility]   5/27/2020 11:15 AM [facility] [id number] [facility] VASCUS [facility]   6/1/2020  1:40 PM Stephane Lopez MD [facility] VAS CARIDAD [facility]   7/30/2020  8:00 AM LAB, [address] [facility] [place]   8/7/2020  1:20 PM Melonie Elias MD [facility] MED [place]          05/18/20 1137   Discharge Reassessment   Assessment Type Discharge Planning Reassessment   Provided patient/caregiver education on the expected discharge date and the discharge plan Yes   Do you have any problems affording any of your prescribed medications? No   Discharge Plan A Home Health   Discharge Plan B Skilled Nursing Facility   DME Needed Upon Discharge  other (see comments)  (TBD)   Anticipated Discharge Disposition  Home-Health  ([facility])   Post-Acute Status   Discharge Delays (!) Change in Medical Condition  (admitted to SICU 5/17)       Estefany Lowery MPH, RN, CM  Ext. [phone]

## 2020-05-18 NOTE — CONSULTS
Chief complaint/Reason for Consult: fungemia     History of Present Illness: Kodi Ibanez Sr. is a 60 y.o. male with a PMH of CHF, CAD with NSTEMI, DM, CVA,ESRD on dialysis who is being treated for sepsis.  Blood cultures on 5/15 were positive for candida and patient was started on micafungin.  He denies any changes in vision or floaters.    POcularHx:Cataract sx OU    Current eye gtts: none      PMHx:  has a past medical history of Atherosclerosis of aorta, Blood transfusion, Cataracts, bilateral, CHF (congestive heart failure), Clotting disorder, Cognitive deficits as late effect of cerebrovascular disease (6/26/2012), Coronary artery disease, Decreased appetite, DM (diabetes mellitus), type 2 with renal complications, ESRD on hemodialysis, Hyperlipidemia, Hyperparathyroidism, unspecified, Hypertension, Prostate cancer (2016), Seizures, Stroke (2011), and Unsteady gait.     PSurgHx:  has a past surgical history that includes Coronary artery bypass graft (2009); Left Guera AVF (1/11/2012); Rotator cuff repair (right, 2005); retinal laser; Cataract extraction, bilateral; Vascular surgery; Eye surgery; Cardiac surgery; Cataract extraction; cataracts; Prostate biopsy; Coronary angiography including bypass grafts with catheterization of left heart (N/A, 12/16/2019); Ultrasound guidance (12/16/2019); Revision of arteriovenous fistula (Left, 2/27/2020); Revision of arteriovenous fistula (Left, 4/10/2020); and Coronary angiography including bypass grafts with catheterization of left heart (N/A, 4/30/2020).     Home Medications:   Prior to Admission medications    Medication Sig Start Date End Date Taking? Authorizing Provider   atorvastatin (LIPITOR) 80 MG tablet Take 1 tablet (80 mg total) by mouth every evening. 5/2/20 7/31/20 Yes Arely Gar MD   carvediloL (COREG) 12.5 MG tablet Take 1 tablet (12.5 mg total) by mouth 2 (two) times daily. 5/2/20 7/31/20 Yes Arely Gar MD   clopidogrel (PLAVIX) 75  mg tablet Take 1 tablet (75 mg total) by mouth once daily. 12/18/19 12/17/20 Yes DARRIAN Delgadillo   diltiaZEM (CARDIZEM CD) 180 MG 24 hr capsule Take 1 capsule (180 mg total) by mouth once daily. 1/20/20  Yes Melonie Elias MD   docusate sodium (COLACE) 100 MG capsule Take 1 capsule (100 mg total) by mouth 2 (two) times daily. 5/10/20  Yes Juanjo Ding PA-C   furosemide (LASIX) 40 MG tablet TAKE 1 TABLET(40 MG) BY MOUTH EVERY DAY 10/30/19  Yes Melonie Elias MD   losartan (COZAAR) 100 MG tablet Take 1 tablet (100 mg total) by mouth once daily. 10/30/19  Yes Melonie Elias MD   megestrol (MEGACE) 20 MG Tab Take 1 tablet (20 mg total) by mouth once daily.  Patient taking differently: Take 20 mg by mouth 2 (two) times daily . 10/30/19  Yes Melonie Elias MD   sevelamer carbonate (RENVELA) 800 mg Tab Take 2 tablets (1,600 mg total) by mouth 3 (three) times daily with meals. 5/6/20 8/4/20 Yes DARIEL Mason   acetaminophen (TYLENOL) 325 MG tablet Take 2 tablets (650 mg total) by mouth every 6 (six) hours as needed.  Patient not taking: Reported on 5/4/2020 5/2/20   Arely Gar MD   ascorbic acid, vitamin C, (VITAMIN C) 500 MG tablet Take 500 mg by mouth every evening.     Historical Provider, MD   aspirin (ECOTRIN) 81 MG EC tablet Take 1 tablet (81 mg total) by mouth once daily. 5/3/20 5/3/21  Arely Gar MD   blood-glucose meter kit To check BG 2 times daily, to use with accuchek casa meter 2/4/19 2/4/20  Elizabeth Fleming NP   insulin (LANTUS SOLOSTAR U-100 INSULIN) glargine 100 units/mL (3mL) SubQ pen Inject 20 Units into the skin every evening. 5/2/20 7/31/20  Arely Gar MD   insulin aspart U-100 (NOVOLOG FLEXPEN U-100 INSULIN) 100 unit/mL (3 mL) InPn pen Inject 10 Units into the skin 2 (two) times daily with meals. 5/6/20   MARCIAL MasonP-C   metroNIDAZOLE (FLAGYL) 500 MG tablet Take 1 tablet (500 mg total) by mouth 3 (three) times daily. 4/20/20  5/18/20  Jesus Ward PA-C        Medications this encounter:    sodium chloride 0.9%   Intravenous Once    apixaban  5 mg Oral BID    aspirin  81 mg Oral Daily    atorvastatin  80 mg Oral QHS    clopidogreL  75 mg Oral Daily    insulin detemir U-100  15 Units Subcutaneous QHS    losartan  100 mg Oral Daily    micafungin (MYCAMINE) IVPB  100 mg Intravenous Q24H    sevelamer carbonate  800 mg Oral TID WM       Allergies: is allergic to reglan [metoclopramide hcl] and lorazepam.     Social:  reports that he has never smoked. He has never used smokeless tobacco. He reports that he does not drink alcohol or use drugs.     Family Hx: No family history of glaucoma. family history includes Cancer in his mother, paternal grandmother, and paternal uncle; Cataracts in his mother; Diabetes in his mother; Glaucoma in his mother; Heart disease in his father and maternal grandmother; Hypertension in his brother; No Known Problems in his maternal grandfather, paternal grandfather, sister, sister, and sister; Prostate cancer in his brother.     ROS: Negative x 10 except for complaints as described in HPI; negative for fever, chills, weight loss, nausea, vomiting, diarrhea, shortness of breath, nasal discharge, cough, abdominal pain, dyspnea, difficulty moving arms and legs, confusion, dysuria, palpitations, or chest pain     Ocular examination/Dilated fundus examination:  Base Eye Exam     Visual Acuity (Near)       Right Left    Dist cc 20/200 20/200 -2   Poor effort by patient           Tonometry (Tonopen, 4:17 PM)       Right Left    Pressure 19 20          Pupils       Pupils Dark Light Shape React APD    Right PERRL 1 1 Round Minimal None    Left PERRL 1 1 Round Minimal None          Visual Fields    Unable due to patient not following directions correctly           Extraocular Movement       Right Left     Full, Ortho Full, Ortho            Slit Lamp and Fundus Exam     External Exam       Right Left     External Normal Normal          Slit Lamp Exam       Right Left    Lids/Lashes Normal Normal    Conjunctiva/Sclera racial melanosis racial melanosis    Cornea Clear Clear    Anterior Chamber Deep and quiet Deep and quiet    Iris Round and reactive Round and reactive    Lens PCIOL PCIOL    Vitreous Normal Normal          Fundus Exam       Right Left    Disc Normal Normal    Macula Normal Normal    Vessels Normal Normal    Periphery limited due to poor dilation limited due to poor dilation--PRP scars                Assessment/Plan:   1. Fungemia  -Bcx were positive for candida  -Subsequent Bcx were negative  -Patient dilated very poorly, unable to view entire retina to definitively rule out ocular involvment, despite 2 rounds of 10% phenylephrine and tropicamide  -Continue current managemnt per primary team        Mita Thomas, PGY 2      Addendum:    Ophthalmology will sign off.  Please re-consult if patient develops vision changes.

## 2020-05-18 NOTE — NURSING
Patient with 7 beat run Vtach / sustained -150's; patient remains asymptomatic. Dr Jamison notified. Orders received for EKG, CMP, Mg, and Phos. Will wait for further orders

## 2020-05-18 NOTE — NURSING
Dialysis RN phoned and notified that HD was charted in flowsheet and pt did not have HD/CRRT today.

## 2020-05-18 NOTE — NURSING
Gloria continues to be dampened r/t pt's constant moving, repositioning, and placing arm behind his head.  Cuff bp higher than gloria and levophed drip being titrated for map>65.

## 2020-05-18 NOTE — ASSESSMENT & PLAN NOTE
--patient on diltiazem for rate control; restarted home dose   --on ASA/plavix/apixiban   --Chads Vasc score 6 points

## 2020-05-18 NOTE — ASSESSMENT & PLAN NOTE
Patient on HD T/Th/Sat with last session  Prior to admission on 5/12, where he was admitted to the ED with palpatations after session. Patient has tunneled catheter that was inserted in 2/2020 that is being used for access.     --nephrology consulted  --HD done on 5/16/20  --avoid nephrotoxic meds  --oliguric, strict I/o's  --BMP daily

## 2020-05-18 NOTE — PLAN OF CARE
"    SICU PLAN OF CARE NOTE    Dx: Shock    Vital Signs: /86 (BP Location: Right arm, Patient Position: Lying)   Pulse (!) 143   Temp 98.6 °F (37 °C) (Oral)   Resp (!) 26   Ht 5' 11" (1.803 m)   Wt 67.6 kg (149 lb)   SpO2 97%   BMI 20.78 kg/m²     Neuro: Follows Commands, Moves All Extremities and Confused    Respiratory: Room Air    Diet: Renal/Diabetic Diet    Gtts: Diltiazem    Urine Output: Anuric      Accuchecks:  AC/HS    SKIN NOTE:  Skin:   #1- Moisture Excoriation noted to gluteal fold; barrier cream applied BID and PRN    Skin precautions maintained including:  Heels with foam dressing in place for pressure protection. Frequent weight shift assistance provided, Patient turned Q2 hr to prevent breakdown. Bed plugged in and mattress inflated. adhesive use limited. heels elevated off bed. positioned off wounds. pressure points protected. positioning supports utilized. skin-to-device areas padded. skin-to-skin areas padded    SHIFT EVENTS:  Patient with orders to transfer to [facility] this afternoon. However, he went into sustained SVT. Lopressor 5mg x2 and Diltiazem bolus unsuccessful with maintaining conversion to SR. Diltiazem gtt started and Transfer orders held. Patient currently on 7.5mg/hr Diltiazem with HR maintaining 70-90's.     "

## 2020-05-18 NOTE — SUBJECTIVE & OBJECTIVE
Interval History/Significant Events: No acute changes or events overnight. Patient cooperative during interview    Review of Systems   Constitutional: Negative for chills and fever.   HENT: Negative for congestion, rhinorrhea and sore throat.    Respiratory: Negative for cough.    Cardiovascular: Negative for chest pain.   Gastrointestinal: Negative for abdominal pain, nausea and vomiting.   Genitourinary: Negative for dysuria and hematuria.   Musculoskeletal: Negative for back pain and neck pain.   Skin: Negative for rash.   Neurological: Negative for seizures and syncope.     Objective:     Vital Signs (Most Recent):  Temp: 98.7 °F (37.1 °C) (05/18/20 0800)  Pulse: 95 (05/18/20 1300)  Resp: (!) 26 (05/18/20 1300)  BP: (!) 154/83 (05/18/20 1300)  SpO2: 100 % (05/18/20 1300) Vital Signs (24h Range):  Temp:  [98.7 °F (37.1 °C)-99.6 °F (37.6 °C)] 98.7 °F (37.1 °C)  Pulse:  [] 95  Resp:  [6-47] 26  SpO2:  [96 %-100 %] 100 %  BP: (115-173)/() 154/83  Arterial Line BP: ()/() 153/60   Weight: 67.6 kg (149 lb)  Body mass index is 20.78 kg/m².      Intake/Output Summary (Last 24 hours) at 5/18/2020 1350  Last data filed at 5/18/2020 0900  Gross per 24 hour   Intake 773 ml   Output 0 ml   Net 773 ml       Physical Exam   Constitutional: He is oriented to person, place, and time. No distress.   Appears older than stated age    HENT:   Head: Normocephalic and atraumatic.   Eyes: Pupils are equal, round, and reactive to light. EOM are normal. No scleral icterus.   Neck: Normal range of motion. Neck supple. No JVD present.   Cardiovascular: Normal rate.   No murmur heard.  Pulmonary/Chest: Effort normal and breath sounds normal. No respiratory distress. He has no wheezes.   Abdominal: Soft. Bowel sounds are normal. He exhibits no distension. There is no tenderness.   Musculoskeletal: Normal range of motion. He exhibits no edema.   Neurological: He is alert and oriented to person, place, and time. No  cranial nerve deficit.   Skin: Skin is warm. No rash noted. He is not diaphoretic.       Vents:  Oxygen Concentration (%): 100 (05/17/20 0445)  Lines/Drains/Airways     Central Venous Catheter Line                 Hemodialysis Catheter 03/05/20 0535 right subclavian 74 days          Arterial Line                 Arterial Line 05/17/20 0603 Right Radial 1 day          Peripheral Intravenous Line                 Peripheral IV - Single Lumen 05/13/20 1620 22 G Right Hand 4 days              Significant Labs:    CBC/Anemia Profile:  Recent Labs   Lab 05/17/20  0515 05/18/20  0350   WBC 8.61 11.25   HGB 9.1* 7.9*   HCT 28.9* 24.9*    172   MCV 88 85   RDW 15.9* 15.8*        Chemistries:  Recent Labs   Lab 05/16/20  2148 05/17/20  0515 05/17/20  0816 05/17/20  1400 05/18/20  0350   * 135*  136 134* 133* 139   K 4.6 4.0  4.2 4.3 4.2 4.2    105  106 106 106 107   CO2 18* 18*  18* 17* 16* 18*   BUN 20 23  24* 25* 27* 32*   CREATININE 3.9* 4.1*  4.1* 4.0* 4.2* 5.2*   CALCIUM 7.5* 7.3*  7.3* 6.9* 6.7* 7.2*   ALBUMIN 1.6* 1.4*  1.5*  --  1.4*  --    PROT  --  5.0*  --   --   --    BILITOT  --  0.3  --   --   --    ALKPHOS  --  74  --   --   --    ALT  --  <5*  --   --   --    AST  --  47*  --   --   --    MG 2.0 2.0  2.0  --  1.9 2.1   PHOS 4.9* 5.0*  5.0*  --  5.4* 4.6*       Blood Culture:   Recent Labs   Lab 05/17/20  0520 05/17/20  0630   LABBLOO No Growth to date  No Growth to date No Growth to date  No Growth to date     Pathology Results  (Last 10 years)               04/10/20 1102  Specimen to Pathology, Surgery Other Final result    Narrative:  Pre-op Diagnosis: Problem with vascular access [Z78.9]   Procedure(s):   AV fistula excision   Number of specimens: 2   Name of specimens: 1. Left upper extremity thrombus-   permanent   2. Left upper extremity stent- permanent   Specimen total (fresh, frozen, permanent):->2             Significant Imaging:  I have reviewed all pertinent imaging  results/findings within the past 24 hours.

## 2020-05-18 NOTE — PLAN OF CARE
Plan of care reviewed with pt.  Remains disoriented to time and situation.  Reoritented per RN.  Levophed weaned off.  No ivf's currently.  Tolerating diet well.  Episode of hr elevated to 140's with bp stable, denies sob or cp.  No meds given.  Free from falls or injury.

## 2020-05-18 NOTE — RESIDENT HANDOFF
Handoff     Primary Team: [facility] CRITICAL CARE MEDICINE Room Number: [id number] CVICU/71028 CVICU A     Patient Name: Kodi Ibanez Sr. MRN: [id number]     Date of Birth: [birth year] Allergies: Reglan [metoclopramide hcl] and Lorazepam     Age: 60 y.o. Admit Date: 5/13/2020     Sex: male  BMI: Body mass index is 20.78 kg/m².     Code Status: DNR        Illness Level (current clinical status): Watcher - No    Reason for Admission: Shock    Brief HPI (pertinent PMH and diagnosis or differential diagnosis):  61 y/o male with past medical history of CHF, CAD with recent NSTEMI [~4/29] (CABG 2009, FERCHO placed in 12/2019), diabetes, CVA, ESRD on dialysis Tuesday/Thursday/Saturday (last dialyzed morning of 5/12) presents to the ER by referral from infectious disease clinic after virtual visit today. The ID provider was concerned about the patients AMS and recent elevated WBC count. Recently,junior underwent ligation of LUE fistula on 2/27/20 with Dr Lopez ([facility], vascular surgery) and had ongoing issues afterwards. Patient was admitted on 4/10 for excision of his infected LUE mid and distal AVF with cultures and specimen obtained. Surgical cultures 4/10 grew 2 different prevotella species, fusobacterium, and strep anginosus. The patient was d/c'ed with home health wound care and po augmentin/clindamycin x 10 days. On 4/20 antibiotics were changed to Flagyl 500 mg p.o. q.8 and Cefazolin after dialysis 2 g on Tuesday and Thursday, 3 g on Saturday. End date on antibiotics on 5/11.     Following these events, on 4/29 the patient was admitted for an NSTEMI, a LHC was performed where re-stenosis of a previous stent was found and balloon angioplasty was done. He was discharged on 5/2. He is now on ASA/plavix, but per wife has not been compliant.     Patients wife says that he has had poor PO intake for the last 2-3 weeks, has become progressively weak, and developed altered mental status today. Patient had 3 episodes of vomiting within  the last 24 hours, which appeared brown-non bloody. He had a brown watery bowel movement today. Patient normally oliguric (urinating ~2-3 x per week). [place] [facility] [place] the evening of [place]/12 due to palpitations after his normal dialysis and sent home after evaluation, for possible antibiotic side effect.     He presents to the ED with encephalopathy x 1 day, leukocytosis of 15, lactate 2.7, troponin 0.482, , procal 10.98, Cr 6.5, BUN 41, and hyperglycemia. Patient pan-cultured, currently only UA resulted with large amount of bacteria. Patient requiring high dose Levophed on admission. Broad spectrum antibiotics initiated. 1.5 L IVF given. Patient remains on RA. Wife was updated regarding patient's current status. Patient spoken to regarding code status and verbalized his wishes as a DNR. His wife confirmed he has an LAPost but it is not on file. Instructed to bring in.     Hospital/ICU Course:  The patient was admitted to MICU for shock likely 2/2 to sepsis on 5/13. Patient remains on low dose Levophed. Nephrology consulted for possible dialysis. Blood cx NGTD. Patient remains on Heparin gtt for paroxysmal A fib with CHADS Vasc score of 6. Previously hyperglycemic with BG now well controlled.     5/14: Levo stopped in morning. Stable all day off pressors on room air. Zosyn changed to unasyn.      5/15: Patient spiked new temp, vanc restarted. Patient with intermittent SVT- Home coreg and dilt restarted. Step down orders placed but patient didn't get bed     5/17: Hypotension overnight requiring vasopressors. Transferred back to MICU on norepi     5/18: Norepi stopped yesterday. BP stable. Restarted home dilt. Will step down to the floor today.       Tasks (specific, using if-then statements): Follow up on Optho and ID recommendations    Contingency Plan (special circumstances anticipated and plan): If patient becomes febrile consider re-admission to MICU vs palliative care    Estimated  Discharge Date: unknown     Discharge Disposition: Admitted as an Inpatient    Mentored By:   Dr. Melgar

## 2020-05-18 NOTE — PHYSICIAN QUERY
PT Name: Kodi Ibanez Sr.  MR #: [id number]     CDS: Kristian Feng RN CCDS               Contact information: [email or web address]  This form is a permanent document in the medical record.     Query Date: May 18, 2020    Physician Query - Neurological Condition Clarification    By submitting this query, we are merely seeking further clarification of documentation to reflect the severity of illness of your patient. Please utilize your independent clinical judgment when addressing the question(s) below.    The Medical record reflects the following:     Indicators   Supporting Clinical Findings Location in Medical Record     x AMS, Confusion,  LOC, etc.  developed altered mental status today 5/13/2020 H&P Xavier Lopez NP/Josie Thomas MD     x Acute / Chronic Illness Encephalopathy  Fungemia  Septic shock  Nstemi (on most recent d/c 5/2) - trop trending down. 5/13/2020 H&P Xavier Lopez NP/Josie Thomas MD  5/17/2020 Critical Care progress notes Susanne Stanton MD/Josie Thomas MD  5/15/2020 Critical Care progress notes Ruth Bell NP    Radiology Findings      Electrolyte Imbalance       x Medication micafungin 100 mg IVPB q24h  sodium chloride 0.9% bolus 500 mL x 3  lactated ringers bolus 1,000 mL  sodium chloride 0.9% bolus 500 mL 5/17/2020- Current Medication  5/13/2020 Medication  5/17/2020 Medications    Treatment               x Other Encephalopathy  Altered on initial assessment, suspect 2/2 hypoperfusion  Improved with elevation of blood pressure 5/17/2020 Critical Care significant event José Luis Fry MD     Encephalopathy- is a general term for any diffuse disease of the brain that alters brain function or structure. Treatment of the cognitive dysfunction varies but is ultimately dependent on the treatment of the underlying condition.    Major Symptoms of Encephalopathy - Decreased level of consciousness, fluctuating alertness/concentration, confusion, agitation, lethargy, somnolence,  drowsiness, obtundation, stupor, or coma.         References: National Institutes of Healths (NIH) National [place] of Neurological Disorders and Strokes;  HCPro 2016; Advisory Board     Clinical Guidelines:   These guidelines will set system standards to assist providers in managing, documentation, and coding of encephalopathy. The intent of this document is to serve as a system guideline, not replace the providers clinical judgment:  Provider, please specify the diagnosis or diagnoses associated with above clinical findings.    Provider, please further specify the diagnosis of Encephalopathy associated with above clinical findings.  [ x  ] Metabolic Encephalopathy - Due to electrolye imbalance, metabolic derangements, or infections processes, includes Septic Encephalopathy   [   ] Unspecified Encephalopathy      [   ] Other Neurological Condition-  Includes Post-ictal altered mental status. (please specify condition): _________   [   ]  Clinically Undetermined     Please document in your progress notes daily for the duration of treatment until resolved, and include in your discharge summary.

## 2020-05-19 PROBLEM — B37.7 CANDIDEMIA: Status: ACTIVE | Noted: 2020-01-01

## 2020-05-19 NOTE — SUBJECTIVE & OBJECTIVE
"Interval History: Visited with pt. Spoke with ICU team and pt. Wife.    Past Medical History:   Diagnosis Date    Atherosclerosis of aorta     noted on VIKTORIA 2011    Blood transfusion     Cataracts, bilateral     CHF (congestive heart failure)     Clotting disorder     Cognitive deficits as late effect of cerebrovascular disease 6/26/2012    Coronary artery disease     NSTEMI s/p PCI with FERCHO to LCx (12/16/19),     Decreased appetite     DM (diabetes mellitus), type 2 with renal complications     Diabetic retinopathy    ESRD on hemodialysis     TUESDAY/ THURSDAY/SATURDAY    Hyperlipidemia     Hyperparathyroidism, unspecified     Hypertension     Prostate cancer 2016    7/16/19:  wife states "he doesn't have it anymore"    Seizures     Stroke 2011    PT HAS SHAKES     Unsteady gait     Uses roller walker       Past Surgical History:   Procedure Laterality Date    CARDIAC SURGERY      CABG    CATARACT EXTRACTION      ou    CATARACT EXTRACTION, BILATERAL      cataracts      CORONARY ANGIOGRAPHY INCLUDING BYPASS GRAFTS WITH CATHETERIZATION OF LEFT HEART N/A 12/16/2019    Procedure: ANGIOGRAM, CORONARY, INCLUDING BYPASS GRAFT, WITH LEFT HEART CATHETERIZATION, rcfa, 5fr;  Surgeon: David Villegas MD;  Location: [facility] CATH LAB;  Service: Cardiology;  Laterality: N/A;    CORONARY ANGIOGRAPHY INCLUDING BYPASS GRAFTS WITH CATHETERIZATION OF LEFT HEART N/A 4/30/2020    Procedure: ANGIOGRAM, CORONARY, INCLUDING BYPASS GRAFT, WITH LEFT HEART CATHETERIZATION, rcfa, 10 am start;  Surgeon: David Villegas MD;  Location: [facility] CATH LAB;  Service: Cardiology;  Laterality: N/A;    CORONARY ARTERY BYPASS GRAFT  2009    bypass    EYE SURGERY      Left Guera AVF  1/11/2012    PROSTATE BIOPSY      positive    retinal laser      REVISION OF ARTERIOVENOUS FISTULA Left 2/27/2020    Procedure: Ligation of left upper extremity fistula, Closure of left upper extremity skin defect x 2, Intraoperative ultrasound ;  " Surgeon: Stephane Lopez MD;  Location: [facility] OR;  Service: Vascular;  Laterality: Left;    REVISION OF ARTERIOVENOUS FISTULA Left 4/10/2020    Procedure: AV fistula excision;  Surgeon: Stephane Lopez MD;  Location: [facility] OR 2ND FLR;  Service: Vascular;  Laterality: Left;    ROTATOR CUFF REPAIR  right, 2005    ULTRASOUND GUIDANCE  12/16/2019    Procedure: Ultrasound Guidance;  Surgeon: David Villegas MD;  Location: [facility] CATH LAB;  Service: Cardiology;;    VASCULAR SURGERY         Review of patient's allergies indicates:   Allergen Reactions    Reglan [metoclopramide hcl] Diarrhea    Lorazepam      Other reaction(s): heavy sedation       Medications:  Continuous Infusions:   dilTIAZem Stopped (05/19/20 1330)     Scheduled Meds:   sodium chloride 0.9%   Intravenous Once    apixaban  5 mg Oral BID    aspirin  81 mg Oral Daily    atorvastatin  80 mg Oral QHS    clopidogreL  75 mg Oral Daily    diltiaZEM  60 mg Oral Q6H    insulin detemir U-100  10 Units Subcutaneous BID    losartan  100 mg Oral Daily    metoprolol tartrate  25 mg Oral BID    micafungin (MYCAMINE) IVPB  100 mg Intravenous Q24H    sevelamer carbonate  800 mg Oral TID WM     PRN Meds:Dextrose 10% Bolus, glucagon (human recombinant), insulin aspart U-100, sodium chloride 0.9%    Family History     Problem Relation (Age of Onset)    Cancer Mother, Paternal Grandmother, Paternal Uncle    Cataracts Mother    Diabetes Mother    Glaucoma Mother    Heart disease Father, Maternal Grandmother    Hypertension Brother    No Known Problems Sister, Maternal Grandfather, Paternal Grandfather, Sister, Sister    Prostate cancer Brother        Tobacco Use    Smoking status: Never Smoker    Smokeless tobacco: Never Used   Substance and Sexual Activity    Alcohol use: No    Drug use: No    Sexual activity: Not Currently       Review of Systems   Unable to perform ROS: Acuity of condition     Objective:     Vital Signs (Most Recent):  Temp: 98  °F (36.7 °C) (05/19/20 1200)  Pulse: 83 (05/19/20 1330)  Resp: (!) 6 (05/19/20 1330)  BP: 109/63 (05/19/20 1300)  SpO2: (!) 76 % (05/19/20 1330) Vital Signs (24h Range):  Temp:  [98 °F (36.7 °C)-98.7 °F (37.1 °C)] 98 °F (36.7 °C)  Pulse:  [] 83  Resp:  [2-33] 6  SpO2:  [76 %-100 %] 76 %  BP: (109-157)/(63-93) 109/63  Arterial Line BP: (109-148)/(43-75) 115/50     Weight: 67.6 kg (149 lb)  Body mass index is 20.78 kg/m².    Review of Symptoms    Unable to assess due to acuity of condition  Symptom Assessment (ESAS 0-10 scale)   ESAS 0 1 2 3 4 5 6 7 8 9 10   Pain              Dyspnea              Anxiety              Nausea              Depression               Anorexia              Fatigue              Insomnia              Restlessness               Agitation              CAM / Delirium __ --  ___+   Constipation     __ --  ___+   Diarrhea           __ --  ___+  Bowel Management Plan (BMP): Yes    Comments: none    Pain Assessment: unable to assess; no nonverbal signs of pain    OME in 24 hours: 0    Performance Status: 50      Physical Exam  Constitutional: He is awake but confused  Elderly male, disoriented  No distress  Cachectic, chronically ill appearing   HENT:   Mouth/Throat: Oropharynx is clear and moist. No oropharyngeal exudate.   Eyes: Conjunctivae are normal. No scleral icterus.   Neck: No tracheal deviation present.   Cardiovascular: Normal rate, regular rhythm and normal heart sounds.   Irregular tachycardia, rate > 100   Pulmonary/Chest: Effort normal and breath sounds normal. No respiratory distress.   Abdominal: He exhibits no distension.   Musculoskeletal: He exhibits no edema or tenderness.   Neurological: He is alert and oriented to person, place, and time. No cranial nerve deficit.   Lethargic. No gross focal deficits   Skin: Skin is warm and dry.   Nursing note and vitals reviewed.  Significant Labs:   LFT:   Recent Labs   Lab 05/18/20  1534   AST 43*   ALKPHOS 155*   BILITOT 0.3     CBC:    Recent Labs   Lab 05/19/20  0330   WBC 13.10*   HGB 8.2*   HCT 24.7*   MCV 86        BMP:  Recent Labs   Lab 05/19/20  0330   *   *   K 4.1      CO2 18*   BUN 41*   CREATININE 6.2*   CALCIUM 7.2*   MG 2.2     LFT:  Lab Results   Component Value Date    AST 43 (H) 05/18/2020    ALKPHOS 155 (H) 05/18/2020    BILITOT 0.3 05/18/2020     Albumin:   Albumin   Date Value Ref Range Status   05/18/2020 1.7 (L) 3.5 - 5.2 g/dL Final     Protein:   Total Protein   Date Value Ref Range Status   05/18/2020 5.8 (L) 6.0 - 8.4 g/dL Final     Lactic acid:   Lab Results   Component Value Date    LACTATE 2.1 05/17/2020    LACTATE 1.0 05/14/2020       Significant Imaging: I have reviewed all pertinent imaging results/findings within the past 24 hours.    Advance Care Planning   Advanced Directives::  Living Will: Yes. Copy on chart: Yes  LaPOST: No  Do Not Resuscitate Status: DNR since this admission  Medical Power of : No; wife is surrogate CHILANGO  Yaima Ibanez Spouse [phone]         Decision-Making Capacity: Family answered questions, Patient unable to communicate due to disease severity/cognitive impairment       Living Arrangements: Lives with spouse    Psychosocial/Cultural: did not explore during today's visit; wife is an EMT with some medical knowledge    Spiritual:     F- Madai and Belief: Reuben    I - Importance: yes  .  C - Community: yes    A - Address in Care: wife to coordinate

## 2020-05-19 NOTE — NURSING
"      TRANSFER NOTE    Dx: Shock    Vital Signs: /64   Pulse 75   Temp 98 °F (36.7 °C) (Oral)   Resp (!) 5   Ht 5' 11" (1.803 m)   Wt 67.6 kg (149 lb)   SpO2 (!) 93%   BMI 20.78 kg/m²     Neuro: Follows Commands and Moves All Extremities AAOx 2-3    Cardiac: NSR since 07:00 am, with Diltiazem gtt at 12.5mg/hr. PO Diltiazem 60mg Q6 hr initiated this am, gtt weaned off completely at 13:00. Currently HR 70-80's 's-120's.    Respiratory: Room Air    Diet: Renal/Diabetic Diet;  Very poor appetite/intake, however patient enjoys supplement shakes and drinks without prompts.      Labs/Accuchecks: AC/HS. Levemir increased to 10 units BID, sliding scale coverage given PRN.     SKIN NOTE:  Skin:   #1- Shearing/Moisture Excoriation to bilat buttocks; barrier cream applied BID and PRN    Skin precautions maintained including:  Heels with foam dressing in place for pressure protection. Frequent weight shift assistance provided, Patient turned Q2 hr to prevent breakdown. Bed plugged in and mattress inflated. adhesive use limited. heels elevated off bed. positioned off wounds. pressure points protected. positioning supports utilized. skin-to-device areas padded. skin-to-skin areas padded    Transfer Report:  Report called to Corinne, RN. Pt being transferred to  55 with portable telemetry, on room air. VSS prior to transfer. Patient updated on POC and all questions and concerns addressed.     "

## 2020-05-19 NOTE — ASSESSMENT & PLAN NOTE
Patient on HD T/Th/Sat with last session  Prior to admission on 5/12, where he was admitted to the ED with palpatations after session. Patient has tunneled catheter that was inserted in 2/2020 that is being used for access.     --nephrology consulted  --HD done on 5/16/20, will be done today  --K+ 6.2 hold meds to shift as patient will get HD today per nephro  --avoid nephrotoxic meds  --oliguric, strict I/o's  --BMP daily

## 2020-05-19 NOTE — ASSESSMENT & PLAN NOTE
--ECG showed SVT yesterday with -150'2  --No improvement after metoprolol 5mgx2 or IV bolus dilt  --Required diltiazem drip, wean and transition to PO dilt today

## 2020-05-19 NOTE — ASSESSMENT & PLAN NOTE
Patient on HD T/Th/Sat with last session on 5/12  Tunneled catheter that was inserted in 2/2020 that is being used for access  [facility] dialysis center  AV graft care by Dr. Garduno      Recommendations:    -HD today for volume management and metabolic clearance; UFG of 2.5L as tolerated, keep map >65  -Strict I/O's  -Daily renal function panels   -Renally dose meds  -Maintain Hgb >7.0  -Renal diet, if not NPO   -Will continue to follow closely   -Plan discussed with staff, Dr Babcock

## 2020-05-19 NOTE — PROGRESS NOTES
[facility]  Nephrology  Progress Note    Patient Name: Kodi Ibanez Sr.  MRN: [id number]  Admission Date: 5/13/2020  Hospital Length of Stay: 6 days  Attending Provider: Callie Melgar MD   Primary Care Physician: Melonie Elias MD  Principal Problem:Shock      Interval History: Patient seen and examined at bedside. Pt on room air. ESRD/anuric; net positive 1.8L/past 24hrs. Last HD on 5/16/2020. On dilt infusion. HR 80's this morning on exam.     Review of patient's allergies indicates:   Allergen Reactions    Reglan [metoclopramide hcl] Diarrhea    Lorazepam      Other reaction(s): heavy sedation     Current Facility-Administered Medications   Medication Frequency    0.9%  NaCl infusion Once    apixaban tablet 5 mg BID    aspirin EC tablet 81 mg Daily    atorvastatin tablet 80 mg QHS    clopidogreL tablet 75 mg Daily    dextrose 10% (D10W) Bolus PRN    diltiaZEM 125 mg in D5W 125 mL infusion Continuous    diltiaZEM tablet 60 mg Q6H    glucagon (human recombinant) injection 1 mg PRN    insulin aspart U-100 pen 1-10 Units Q4H PRN    insulin detemir U-100 pen 10 Units BID    losartan tablet 100 mg Daily    metoprolol tartrate (LOPRESSOR) tablet 25 mg BID    micafungin 100 mg in sodium chloride 0.9 % 100 mL IVPB (ready to mix system) Q24H    sevelamer carbonate tablet 800 mg TID WM    sodium chloride 0.9% flush 10 mL PRN       Objective:     Vital Signs (Most Recent):  Temp: 98.6 °F (37 °C) (05/19/20 0300)  Pulse: (!) 124 (05/19/20 0625)  Resp: (!) 28 (05/19/20 0625)  BP: 135/71 (05/19/20 0600)  SpO2: (!) 92 % (05/19/20 0625)  O2 Device (Oxygen Therapy): room air (05/19/20 0600) Vital Signs (24h Range):  Temp:  [98.5 °F (36.9 °C)-98.7 °F (37.1 °C)] 98.6 °F (37 °C)  Pulse:  [] 124  Resp:  [2-33] 28  SpO2:  [69 %-100 %] 92 %  BP: (120-169)/(68-93) 135/71  Arterial Line BP: (111-167)/(43-75) 129/65     Weight: 67.6 kg (149 lb) (05/15/20 0701)  Body mass index is 20.78  kg/m².  Body surface area is 1.84 meters squared.    I/O last 3 completed shifts:  In: 1861 [P.O.:1780; I.V.:81]  Out: 0     Physical Exam   Constitutional: He is oriented to person, place, and time. He appears well-developed.   HENT:   Head: Normocephalic and atraumatic.   Eyes: Pupils are equal, round, and reactive to light. Conjunctivae are normal.   Neck: Normal range of motion. Neck supple.   Cardiovascular: Normal rate.   Pulmonary/Chest: Effort normal.   Abdominal: Soft.   Musculoskeletal: He exhibits no edema.   Neurological: He is alert and oriented to person, place, and time.   Skin: Skin is warm and dry.   R IJ tunnel cath noted to R chest wall    Psychiatric: He has a normal mood and affect. His behavior is normal.       Significant Labs:  CBC:   Recent Labs   Lab 05/19/20  0330   WBC 13.10*   RBC 2.89*   HGB 8.2*   HCT 24.7*      MCV 86   MCH 28.4   MCHC 33.2     CMP:   Recent Labs   Lab 05/18/20  1534 05/19/20  0330   * 383*   CALCIUM 7.4* 7.2*   ALBUMIN 1.7*  --    PROT 5.8*  --     133*   K 4.2 4.1   CO2 19* 18*    102   BUN 35* 41*   CREATININE 6.0* 6.2*   ALKPHOS 155*  --    ALT 5*  --    AST 43*  --    BILITOT 0.3  --      All labs within the past 24 hours have been reviewed.         Assessment/Plan:     * Shock  Managed by primary team     ESRD on hemodialysis  Patient on HD T/Th/Sat with last session on 5/12  Tunneled catheter that was inserted in 2/2020 that is being used for access  [facility] [place] [facility]  AV graft care by Dr. Garduno      Recommendations:    -HD today for volume management and metabolic clearance; UFG of 2.5L as tolerated, keep map >65  -Strict I/O's  -Daily renal function panels   -Renally dose meds  -Maintain Hgb >7.0  -Renal diet, if not NPO   -Will continue to follow closely   -Plan discussed with staff, Dr Babcock             Thank you for your consult. I will follow-up with patient. Please contact us if you have any additional questions.    George  SOBEIDA Aguero NP  Nephrology  [facility]

## 2020-05-19 NOTE — PROGRESS NOTES
[facility]  Infectious Disease  Progress Note    Patient Name: Kodi Ibanez Sr.  MRN: [id number]  Admission Date: 5/13/2020  Length of Stay: 6 days  Attending Physician: Callie Melgar MD  Primary Care Provider: Melonie Elias MD    Isolation Status: No active isolations  Assessment/Plan:      Candidemia  Kodi Ibanez Sr. is a 61 y/o male with past medical history of CHF, CAD with recent NSTEMI [~4/29] (CABG 2009, FERCHO placed in 12/2019), diabetes, CVA, ESRD on dialysis who was admitted to MICU for septic shock on 5/13. He was started on antibiotics and required pressor support which has since been weaned off. On 5/15, patient noted to be febrile with temp > 101F. Blood cultures ordered revealed candida albicans in 2/4 cultures. Patient was started on Micafungin 100 mg IV daily.   Blood cx 5/15: Candida Albicans 2/4 bottles  Blood cx 5/17: Yeast, 1/4 bottles    Recommendations  - Continue Micafungin 100 mg IV daily. Will hold off on Fluconazole due to prolonged QT  - Agree with ophthalmology consult   - Concern for source control with R. subclavian tunneled cath given persistent candidemia. Ideally would recommend removing the line, will currently look into other options and follow up tomorrow.  - F/u repeat blood cultures              Anticipated Disposition: TBD    Thank you for your consult. I will follow-up with patient. Please contact us if you have any additional questions.    Monica Lucas, DO  Infectious Disease  [facility]    Subjective:     Principal Problem:Shock    HPI: Kodi Ibanez Sr. is a 61 y/o male with past medical history of CHF, CAD with recent NSTEMI [~4/29] (CABG 2009, FERCHO placed in 12/2019), diabetes, CVA, ESRD on dialysis Tuesday/Thursday/Saturday (last dialyzed morning of 5/12) presents to the ER by referral from infectious disease clinic after virtual visit today. The ID provider was concerned about the patients AMS and recent elevated WBC  count. Recently,junior underwent ligation of LUE fistula on 2/27/20 with Dr Lopez ([facility], vascular surgery) and had ongoing issues afterwards. Patient was admitted on 4/10 for excision of his infected LUE mid and distal AVF with cultures and specimen obtained. Surgical cultures 4/10 grew 2 different prevotella species, fusobacterium, and strep anginosus. The patient was d/c'ed with home health wound care and po augmentin/clindamycin x 10 days. On 4/20 antibiotics were changed to Flagyl 500 mg p.o. q.8 and Cefazolin after dialysis 2 g on Tuesday and Thursday, 3 g on Saturday. End date on antibiotics on 5/11.     Following these events, on 4/29 the patient was admitted for an NSTEMI, a LHC was performed where re-stenosis of a previous stent was found and balloon angioplasty was done. He was discharged on 5/2. He is now on ASA/plavix, but per wife has not been compliant. Patient normally oliguric (urinating ~2-3 x per week). [place] [facility] [place] the evening of 5/12 due to palpitations after his normal dialysis and sent home after evaluation, for possible antibiotic side effect.He presents to the ED with encephalopathy x 1 day, leukocytosis of 15, lactate 2.7, troponin 0.482, , procal 10.98, Cr 6.5, BUN 41, and hyperglycemia. Patient pan-cultured, currently only UA resulted with large amount of bacteria. Patient requiring high dose Levophed on admission. Broad spectrum antibiotics initiated. 1.5 L IVF given.    Hospital course   5/13: The patient was admitted to MICU for shock likely 2/2 to sepsis on 5/13. Patient remains on low dose Levophed. Nephrology consulted for possible dialysis. Blood cx NGTD. Patient remains on Heparin gtt for paroxysmal A fib with CHADS Vasc score of 6. Previously hyperglycemic with BG now well controlled.     5/14: Levo stopped in morning. Zosyn changed to unasyn.      5/15: Patient spiked new temp, vanc restarted. Patient with intermittent SVT- Home coreg and  "dilt restarted. Step down orders placed.     5/17: Hypotension overnight requiring vasopressors. Transferred back to MICU on norepi    5/18: Patient with candida albicans on blood cultures from 5/15.    5:19: ID consulted for Candidemia.      Past Medical History:   Diagnosis Date    Atherosclerosis of aorta     noted on VIKTORIA 2011    Blood transfusion     Cataracts, bilateral     CHF (congestive heart failure)     Clotting disorder     Cognitive deficits as late effect of cerebrovascular disease 6/26/2012    Coronary artery disease     NSTEMI s/p PCI with FERCHO to LCx (12/16/19),     Decreased appetite     DM (diabetes mellitus), type 2 with renal complications     Diabetic retinopathy    ESRD on hemodialysis     TUESDAY/ THURSDAY/SATURDAY    Hyperlipidemia     Hyperparathyroidism, unspecified     Hypertension     Prostate cancer 2016 7/16/19:  wife states "he doesn't have it anymore"    Seizures     Stroke 2011    PT HAS SHAKES     Unsteady gait     Uses roller walker       Past Surgical History:   Procedure Laterality Date    CARDIAC SURGERY      CABG    CATARACT EXTRACTION      ou    CATARACT EXTRACTION, BILATERAL      cataracts      CORONARY ANGIOGRAPHY INCLUDING BYPASS GRAFTS WITH CATHETERIZATION OF LEFT HEART N/A 12/16/2019    Procedure: ANGIOGRAM, CORONARY, INCLUDING BYPASS GRAFT, WITH LEFT HEART CATHETERIZATION, rcfa, 5fr;  Surgeon: David Villegas MD;  Location: [facility] CATH LAB;  Service: Cardiology;  Laterality: N/A;    CORONARY ANGIOGRAPHY INCLUDING BYPASS GRAFTS WITH CATHETERIZATION OF LEFT HEART N/A 4/30/2020    Procedure: ANGIOGRAM, CORONARY, INCLUDING BYPASS GRAFT, WITH LEFT HEART CATHETERIZATION, rcfa, 10 am start;  Surgeon: David Villegas MD;  Location: [facility] CATH LAB;  Service: Cardiology;  Laterality: N/A;    CORONARY ARTERY BYPASS GRAFT  2009    bypass    EYE SURGERY      Left Guera AVF  1/11/2012    PROSTATE BIOPSY      positive    retinal laser      REVISION OF " ARTERIOVENOUS FISTULA Left 2/27/2020    Procedure: Ligation of left upper extremity fistula, Closure of left upper extremity skin defect x 2, Intraoperative ultrasound ;  Surgeon: Stephane Lopez MD;  Location: [facility] OR;  Service: Vascular;  Laterality: Left;    REVISION OF ARTERIOVENOUS FISTULA Left 4/10/2020    Procedure: AV fistula excision;  Surgeon: Stephane Lopez MD;  Location: [facility] OR 2ND FLR;  Service: Vascular;  Laterality: Left;    ROTATOR CUFF REPAIR  right, 2005    ULTRASOUND GUIDANCE  12/16/2019    Procedure: Ultrasound Guidance;  Surgeon: David Villegas MD;  Location: [facility] CATH LAB;  Service: Cardiology;;    VASCULAR SURGERY         Review of patient's allergies indicates:   Allergen Reactions    Reglan [metoclopramide hcl] Diarrhea    Lorazepam      Other reaction(s): heavy sedation       Medications:  Medications Prior to Admission   Medication Sig    atorvastatin (LIPITOR) 80 MG tablet Take 1 tablet (80 mg total) by mouth every evening.    carvediloL (COREG) 12.5 MG tablet Take 1 tablet (12.5 mg total) by mouth 2 (two) times daily.    clopidogrel (PLAVIX) 75 mg tablet Take 1 tablet (75 mg total) by mouth once daily.    diltiaZEM (CARDIZEM CD) 180 MG 24 hr capsule Take 1 capsule (180 mg total) by mouth once daily.    docusate sodium (COLACE) 100 MG capsule Take 1 capsule (100 mg total) by mouth 2 (two) times daily.    furosemide (LASIX) 40 MG tablet TAKE 1 TABLET(40 MG) BY MOUTH EVERY DAY    losartan (COZAAR) 100 MG tablet Take 1 tablet (100 mg total) by mouth once daily.    megestrol (MEGACE) 20 MG Tab Take 1 tablet (20 mg total) by mouth once daily. (Patient taking differently: Take 20 mg by mouth 2 (two) times daily .)    sevelamer carbonate (RENVELA) 800 mg Tab Take 2 tablets (1,600 mg total) by mouth 3 (three) times daily with meals.    acetaminophen (TYLENOL) 325 MG tablet Take 2 tablets (650 mg total) by mouth every 6 (six) hours as needed. (Patient not taking:  Reported on 2020)    ascorbic acid, vitamin C, (VITAMIN C) 500 MG tablet Take 500 mg by mouth every evening.     aspirin (ECOTRIN) 81 MG EC tablet Take 1 tablet (81 mg total) by mouth once daily.    blood-glucose meter kit To check BG 2 times daily, to use with accuchek casa meter    insulin (LANTUS SOLOSTAR U-100 INSULIN) glargine 100 units/mL (3mL) SubQ pen Inject 20 Units into the skin every evening.    insulin aspart U-100 (NOVOLOG FLEXPEN U-100 INSULIN) 100 unit/mL (3 mL) InPn pen Inject 10 Units into the skin 2 (two) times daily with meals.    [] metroNIDAZOLE (FLAGYL) 500 MG tablet Take 1 tablet (500 mg total) by mouth 3 (three) times daily.     Antibiotics (From admission, onward)    None        Antifungals (From admission, onward)    Start     Stop Route Frequency Ordered    20 0730  micafungin 100 mg in sodium chloride 0.9 % 100 mL IVPB (ready to mix system)      -- IV Every 24 hours (non-standard times) 20 0624        Antivirals (From admission, onward)    None           Immunization History   Administered Date(s) Administered    Influenza - High Dose - PF (65 years and older) 10/05/2017    Influenza - Quadrivalent - PF (6 months and older) 2006, 11/15/2016    Influenza Split 2006    Pneumococcal Conjugate - 13 Valent 10/10/2016    Pneumococcal Polysaccharide - 23 Valent 2014, 10/30/2019    Tdap 2016       Family History     Problem Relation (Age of Onset)    Cancer Mother, Paternal Grandmother, Paternal Uncle    Cataracts Mother    Diabetes Mother    Glaucoma Mother    Heart disease Father, Maternal Grandmother    Hypertension Brother    No Known Problems Sister, Maternal Grandfather, Paternal Grandfather, Sister, Sister    Prostate cancer Brother        Social History     Socioeconomic History    Marital status:      Spouse name: Not on file    Number of children: Not on file    Years of education: Not on file    Highest education  level: Not on file   Occupational History    Not on file   Social Needs    Financial resource strain: Not on file    Food insecurity:     Worry: Not on file     Inability: Not on file    Transportation needs:     Medical: Not on file     Non-medical: Not on file   Tobacco Use    Smoking status: Never Smoker    Smokeless tobacco: Never Used   Substance and Sexual Activity    Alcohol use: No    Drug use: No    Sexual activity: Not Currently   Lifestyle    Physical activity:     Days per week: Not on file     Minutes per session: Not on file    Stress: Not on file   Relationships    Social connections:     Talks on phone: Not on file     Gets together: Not on file     Attends Jewish service: Not on file     Active member of club or organization: Not on file     Attends meetings of clubs or organizations: Not on file     Relationship status: Not on file   Other Topics Concern    Not on file   Social History Narrative    Not on file     Review of Systems   Constitutional: Negative for chills and fever.   HENT: Negative for congestion, rhinorrhea and sore throat.    Respiratory: Negative for cough.    Cardiovascular: Negative for chest pain.   Gastrointestinal: Negative for abdominal pain, nausea and vomiting.   Genitourinary: Negative for dysuria and hematuria.   Musculoskeletal: Negative for back pain and neck pain.   Skin: Negative for rash.   Neurological: Negative for seizures and syncope.     Objective:     Vital Signs (Most Recent):  Temp: 98.6 °F (37 °C) (05/19/20 0300)  Pulse: (!) 124 (05/19/20 0625)  Resp: (!) 28 (05/19/20 0625)  BP: 135/71 (05/19/20 0600)  SpO2: (!) 92 % (05/19/20 0625) Vital Signs (24h Range):  Temp:  [98.5 °F (36.9 °C)-98.7 °F (37.1 °C)] 98.6 °F (37 °C)  Pulse:  [] 124  Resp:  [2-33] 28  SpO2:  [69 %-100 %] 92 %  BP: (120-169)/(68-93) 135/71  Arterial Line BP: (111-167)/(43-75) 129/65     Weight: 67.6 kg (149 lb)  Body mass index is 20.78 kg/m².    Estimated Creatinine  Clearance: 11.8 mL/min (A) (based on SCr of 6.2 mg/dL (H)).    Physical Exam   Constitutional: He is oriented to person, place, and time. He appears well-developed. He appears ill.   HENT:   Head: Normocephalic and atraumatic.   Eyes: Pupils are equal, round, and reactive to light. Conjunctivae are normal.   Neck: Normal range of motion. Neck supple.   Cardiovascular: Normal rate.   Pulmonary/Chest: Effort normal.   Abdominal: Soft. Bowel sounds are normal.   Musculoskeletal: He exhibits no edema.   R. A-line in place. PIV on right in place   Neurological: He is alert and oriented to person, place, and time.   Skin: Skin is warm and dry.   R IJ tunnel cath noted to R chest wall    Psychiatric: He has a normal mood and affect. His behavior is normal.       Significant Labs:   Blood Culture:   Recent Labs   Lab 05/13/20  1202 05/15/20  1321 05/15/20  1330 05/17/20  0520 05/17/20  0630   LABBLOO No Growth after 4 days.  Gram stain aer bottle: budding yeast  Results called to and read back by: Jennifer Estevez RN  05/17/2020    05:22  DEB ALBICANS  ID consult required at [facility].  For susceptibility see order #[id number]  * Gram stain aer bottle: budding yeast  Results called to and read back by: Jennifer Estevez RN  05/17/2020    05:22  DEB ALBICANS  Susceptibility pending  ID consult required at [facility] and [facility].  * No Growth to date  No Growth to date  No Growth to date Gram stain aer bottle: yeast  Results called to and read back by:Brianda Vaz 05/19/2020  10:33     CBC:   Recent Labs   Lab 05/18/20  0350 05/19/20  0330   WBC 11.25 13.10*   HGB 7.9* 8.2*   HCT 24.9* 24.7*    164     CMP:   Recent Labs   Lab 05/17/20  1400 05/18/20  0350 05/18/20  1534 05/19/20  0330   * 139 136 133*   K 4.2 4.2 4.2 4.1    107 105 102   CO2 16* 18* 19* 18*   * 112* 224* 383*   BUN 27* 32* 35* 41*   CREATININE 4.2* 5.2* 6.0* 6.2*    CALCIUM 6.7* 7.2* 7.4* 7.2*   PROT  --   --  5.8*  --    ALBUMIN 1.4*  --  1.7*  --    BILITOT  --   --  0.3  --    ALKPHOS  --   --  155*  --    AST  --   --  43*  --    ALT  --   --  5*  --    ANIONGAP 11 14 12 13   EGFRNONAA 14.2* 10.9* 9.2* 8.8*     Fungus Culture (Blood or Bone Marrow): No results for input(s): FUNGUSCULTUR in the last 4320 hours.  Urine Culture:   Recent Labs   Lab 04/29/20 1956 05/13/20  1458   LABURIN No significant growth Multiple organisms isolated. None in predominance.  Repeat if  clinically necessary.       Significant Imaging: None

## 2020-05-19 NOTE — NURSING
MD Feng that pt was sent to floor w/o PIV access and ICU has been using HD cath for drawing labs and running IV fluids. MD stated that it is okay to use HD cath for the rest of the night and she will consult PICC team in the morning.

## 2020-05-19 NOTE — ASSESSMENT & PLAN NOTE
--optho and ID consulted  --continue micafungin   --no line changes or aggressive treatment per family wishes

## 2020-05-19 NOTE — ASSESSMENT & PLAN NOTE
Kodi Ibanez Sr. is a 59 y/o male with past medical history of CHF, CAD with recent NSTEMI [~4/29] (CABG 2009, FERCHO placed in 12/2019), diabetes, CVA, ESRD on dialysis who was admitted to MICU for septic shock on 5/13. He was started on antibiotics and required pressor support which has since been weaned off. On 5/15, patient noted to be febrile with temp > 101F. Blood cultures ordered revealed candida albicans in 2/4 cultures. Patient was started on Micafungin 100 mg IV daily.   Blood cx 5/15: Candida Albicans 2/4 bottles  Blood cx 5/17: Yeast, 1/4 bottles    Recommendations  - Continue Micafungin 100 mg IV daily. Will hold off on Fluconazole due to prolonged QT  - Agree with ophthalmology consult   - Concern for source control with R. subclavian tunneled cath given persistent candidemia. Ideally would recommend removing the line, will currently look into other options and follow up tomorrow.  - F/u repeat blood cultures

## 2020-05-19 NOTE — PLAN OF CARE
Problem: Fall Injury Risk  Goal: Absence of Fall and Fall-Related Injury  Outcome: Ongoing, Progressing     Problem: Adult Inpatient Plan of Care  Goal: Plan of Care Review  Outcome: Ongoing, Progressing  Goal: Patient-Specific Goal (Individualization)  Description  PMHx: CHF, CAD with recent NSTEMI [~4/29] (CABG 2009), diabetes, CVA, ESRD on dialysis Tuesday/Thursday/Saturday (last dialyzed morning of 5/12), paroxysmal A fib     5/13: ED AMS, high WBC, levo gtt, pan cultured 1.5 L LR    5/14: No pressors required   5/15: Febrile. Intermittent SVT.   5/16: Stepped Down  5/17: Rapid SICU. Hypotension. Levo and Vaso gtt. Blood cultures.   5/18: 7 beats Vtach/-150's sustained. 5mg Lopressor IVP x2. Bolus dose Cardizem 15mg. Stepdown orders d/c'd.  Cardizem drip.  Lopressor po started.        Nursing:   MAP >65   Accuchecks Q4hrs     Outcome: Ongoing, Progressing  Goal: Absence of Hospital-Acquired Illness or Injury  Outcome: Ongoing, Progressing  Goal: Optimal Comfort and Wellbeing  Outcome: Ongoing, Progressing  Goal: Readiness for Transition of Care  Outcome: Ongoing, Progressing  Goal: Rounds/Family Conference  Outcome: Ongoing, Progressing     Problem: Diabetes Comorbidity  Goal: Blood Glucose Level Within Desired Range  Outcome: Ongoing, Progressing     Problem: Adjustment to Illness (Sepsis/Septic Shock)  Goal: Optimal Coping  Outcome: Ongoing, Progressing     Problem: Bleeding (Sepsis/Septic Shock)  Goal: Absence of Bleeding  Outcome: Ongoing, Progressing     Problem: Glycemic Control Impaired (Sepsis/Septic Shock)  Goal: Blood Glucose Level Within Desired Range  Outcome: Ongoing, Progressing     Problem: Hemodynamic Instability (Sepsis/Septic Shock)  Goal: Effective Tissue Perfusion  Outcome: Ongoing, Progressing     Problem: Infection (Sepsis/Septic Shock)  Goal: Absence of Infection Signs/Symptoms  Outcome: Ongoing, Progressing     Problem: Nutrition Impaired (Sepsis/Septic Shock)  Goal: Optimal  Nutrition Intake  Outcome: Ongoing, Progressing     Problem: Respiratory Compromise (Sepsis/Septic Shock)  Goal: Effective Oxygenation and Ventilation  Outcome: Ongoing, Progressing     Problem: Infection  Goal: Infection Symptom Resolution  Outcome: Ongoing, Progressing     Problem: Skin Injury Risk Increased  Goal: Skin Health and Integrity  Outcome: Ongoing, Progressing     Problem: Device-Related Complication Risk (CRRT (Continuous Renal Replacement Therapy))  Goal: Safe, Effective Therapy Delivery  Outcome: Ongoing, Progressing     Problem: Infection (CRRT (Continuous Renal Replacement Therapy))  Goal: Absence of Infection Signs/Symptoms  Outcome: Ongoing, Progressing     Problem: Hypothermia (CRRT (Continuous Renal Replacement Therapy))  Goal: Body Temperature Maintained in Desired Range  Outcome: Ongoing, Progressing     Problem: Device-Related Complication Risk (Hemodialysis)  Goal: Safe, Effective Therapy Delivery  Outcome: Ongoing, Progressing     Problem: Hemodynamic Instability (Hemodialysis)  Goal: Vital Signs Remain in Desired Range  Outcome: Ongoing, Progressing     Problem: Infection (Hemodialysis)  Goal: Absence of Infection Signs/Symptoms  Outcome: Ongoing, Progressing     Problem: Coping Ineffective  Goal: Effective Coping  Outcome: Ongoing, Progressing     Problem: Wound  Goal: Optimal Wound Healing  Outcome: Ongoing, Progressing     Pt not oriented to situation. Pt oriented to room. R fistula in place. Dressing CDI. No pain noted. VSS. Call light in reach. Bed in lowest position. Will continue to monitor.

## 2020-05-19 NOTE — PT/OT/SLP EVAL
"Occupational Therapy   Evaluation    Name: Kodi Ibanez Sr.  MRN: [id number]  Admitting Diagnosis:  Shock    Length of Stay: 6 days    Recommendations:     Discharge Recommendations: nursing facility, skilled  Discharge Equipment Recommendations:  other (see comments), shower chair(TBD)  Barriers to discharge:  Inaccessible home environment, Decreased caregiver support    Plan:     Patient to be seen 3 x/week to address the above listed problems via self-care/home management, therapeutic activities, therapeutic exercises  · Plan of Care Expires: 06/18/20  · Plan of Care Reviewed with: patient    Assessment:     Kodi Ibanez Sr. is a 60 y.o. male with a medical diagnosis of Shock.  He presents with the following performance deficits affecting function: weakness, impaired endurance, impaired self care skills, impaired functional mobilty, impaired balance, gait instability, impaired cognition, decreased coordination, decreased upper extremity function, decreased lower extremity function, decreased safety awareness, impaired coordination, impaired fine motor, impaired skin, impaired cardiopulmonary response to activity.      Rehab Prognosis: Good; patient would benefit from acute skilled OT services to address these deficits and reach maximum level of function.       Subjective   Communicated with: RN prior to session.  Patient found right sidelying with bed alarm, blood pressure cuff, foster catheter, peripheral IV, pulse ox (continuous), SCD, telemetry, arterial line upon OT entry to room.    Chief Complaint: Altered Mental Status (worse today; elevated WBC; was at [facility] yesterday; dialysis pt; sugar read high at home)    Patient/Family Comments/goals: "does this mean I get to go home?"  Patient lethargic and required increased time for responses and command follow.     Pain/Comfort:  Pain Rating 1: 0/10(endorsed fatigue)  Pain Rating Post-Intervention 1: 0/10    Patients cultural, spiritual, Latter day conflicts " "given the current situation: no    Occupational Profile:  Living Environment: Patient lives in apartment with wife,  SSH, 0STE, TBS with shower chair  Prior Level of Function: Patient reports requiring assistance from wife with transfers, mobility & with ADLs.   Patient uses DME as follows: wheelchair, rollator, shower chair.   DME owned (not currently used): shower chair.  Roles/Repsonsibilities:   Hand Dominance: right   Work: no.   Drive: no.   Managing Medicines/Managing Home: no.   Hobbies: enjoys spending time with wife, enjoys watching sports.  Equipment Used at Home:  wheelchair, rollator, shower chair    Patient reports they will have assistance from wife upon discharge.      Objective:     Patient found with: bed alarm, blood pressure cuff, foster catheter, peripheral IV, pulse ox (continuous), SCD, telemetry, arterial line   General Precautions: Standard, Cardiac fall, seizure   Orthopedic Precautions:N/A   Braces: N/A   Oxygen Device: Room Air  Vitals: BP (!) 143/73 (BP Location: Right arm, Patient Position: Lying)   Pulse 79   Temp 98 °F (36.7 °C) (Oral)   Resp (!) 21   Ht 5' 11" (1.803 m)   Wt 67.6 kg (149 lb)   SpO2 (!) 93%   BMI 20.78 kg/m²     Cognitive and Psychosocial Function:   · AxOx4 -- Person, Place, Time and Situation   · Follows Commands/attention:easily distracted by fatigue and lethargy, impaired attention to task  · Communication:  clear/fluent  · Memory: Poor immediate recall  · Safety awareness/insight to disability: impaired   · Mood/Affect/Coping skills/emotional control: Appropriate to situation, Cooperative, Lethargic and Guarded    Hearing: Intact    Vision:  wears glasses     Physical Exam:  Postural examination/scapula alignment:    -       Rounded shoulders  -       Forward head  -       Posterior pelvic tilt  -       Kyphosis  -       patient with Rward lean  Skin integrity: Visible skin intact and Thin     Left UE Right UE   UE Edema absent absent   UE ROM AROM WFL and " limited by fatigue AROM WFL and limited by fatigue   UE Strength 4/5 4/5    Strength fair composite grasp fair composite grasp   Sensation LUE INTACT:WFL RUE INTACT: WFL   Fine Motor Coordination:  LUE INTACT: manipulation of objects RUE INTACT: manipulation of objects   Gross Motor Coordination: LUE INTACT: WFL, limited by fatigue and impaired functional endurance RUE INTACT:WFL, limited by fatigue and impaired functional endurance     Occupational Performance:  Bed Mobility:    · Patient completed Rolling/Turning to Right with maximal assistance  · Scooting to HOB in supine: maximal assistance and 2 persons  · Patient completed Supine to Sit with maximal assistance and 2 persons on R side of bed  · Scooting anteriorly to EOB to have both feet planted on floor: moderate assistance for advancing hips to EOB  · Patient completed Sit to Supine with maximal assistance and 2 persons on R side of bed, impaired elevation of legs, able to initiate movements impaired functional strength     Functional Mobility/Transfers:  -Static Sitting EOB: CGA with max cues for hand placement - attempting to lay back down, with fatigue, Total A for sitting EOB.  -Dynamic Sitting EOB: Max A  Further mobility deferred 2* impaired truncal control sitting EOB and impaired arousal as patient fatigued sitting EOB with staff assistance.     NOTE TO STAFF: Patient is safe to transfer to with therapy at this time [facility]-3 person assist.     Activities of Daily Living:  · Feeding:  contact guard assistance set up and gaurding, patient UE fatigued with exertion  · Grooming: contact guard assistance washing face EOB, patient able to tolerate aprx 4 seconds of washing face before tiring. Max A for therapist to complete task  · Lower Body Dressing: max assistance donning socks seated EOB, patient with impaired command follow and fatigue, impaired truncal control with LB movement      AMPAC 6 Click ADL:  AMPAC Total Score:  "13    Treatment & Education:  -Pt education on OT role and POC   -Importance of E/OOB activity with staff assistance  -Multiple self-care tasks and functional mobility completed -- assistance level noted above  -Safety during functional transfer and mobility ensured  -White board updated, orientation assessed and provided  -Education provided reviewed, questions answered within OT scope of practice.     Patient left HOB elevated with all lines intact, call button in reach, bed alarm on and RN notified    GOALS:   Multidisciplinary Problems     Occupational Therapy Goals        Problem: Occupational Therapy Goal    Goal Priority Disciplines Outcome Interventions   Occupational Therapy Goal     OT, PT/OT Ongoing, Progressing    Description:  Goals set on 5/19 with expiration date 6/2:  Patient will increase functional independence with ADLs by performing:    Supine <> Sit with Min Assistance.  Feeding with Min Assistance.  Grooming while standing at sink with Min Assistance.  UB Dressing with Min  Assistance.  LB Dressing with Min Assistance.  Step transfer with Min Assistance with DME as needed.  Pt will demonstrate understanding of education provided regarding energy conservation and task modification through teach-back method.                         History:     Past Medical History:   Diagnosis Date    Atherosclerosis of aorta     noted on VIKTORIA 2011    Blood transfusion     Cataracts, bilateral     CHF (congestive heart failure)     Clotting disorder     Cognitive deficits as late effect of cerebrovascular disease 6/26/2012    Coronary artery disease     NSTEMI s/p PCI with FERCHO to LCx (12/16/19),     Decreased appetite     DM (diabetes mellitus), type 2 with renal complications     Diabetic retinopathy    ESRD on hemodialysis     TUESDAY/ THURSDAY/SATURDAY    Hyperlipidemia     Hyperparathyroidism, unspecified     Hypertension     Prostate cancer 2016 7/16/19:  wife states "he doesn't have it " "anymore"    Seizures     Stroke 2011    PT HAS SHAKES     Unsteady gait     Uses roller walker         Past Surgical History:   Procedure Laterality Date    CARDIAC SURGERY      CABG    CATARACT EXTRACTION      ou    CATARACT EXTRACTION, BILATERAL      cataracts      CORONARY ANGIOGRAPHY INCLUDING BYPASS GRAFTS WITH CATHETERIZATION OF LEFT HEART N/A 12/16/2019    Procedure: ANGIOGRAM, CORONARY, INCLUDING BYPASS GRAFT, WITH LEFT HEART CATHETERIZATION, rcfa, 5fr;  Surgeon: David Villegas MD;  Location: [facility] CATH LAB;  Service: Cardiology;  Laterality: N/A;    CORONARY ANGIOGRAPHY INCLUDING BYPASS GRAFTS WITH CATHETERIZATION OF LEFT HEART N/A 4/30/2020    Procedure: ANGIOGRAM, CORONARY, INCLUDING BYPASS GRAFT, WITH LEFT HEART CATHETERIZATION, rcfa, 10 am start;  Surgeon: David Villegas MD;  Location: [facility] CATH LAB;  Service: Cardiology;  Laterality: N/A;    CORONARY ARTERY BYPASS GRAFT  2009    bypass    EYE SURGERY      Left Guera AVF  1/11/2012    PROSTATE BIOPSY      positive    retinal laser      REVISION OF ARTERIOVENOUS FISTULA Left 2/27/2020    Procedure: Ligation of left upper extremity fistula, Closure of left upper extremity skin defect x 2, Intraoperative ultrasound ;  Surgeon: Stephane Lopez MD;  Location: [facility] OR;  Service: Vascular;  Laterality: Left;    REVISION OF ARTERIOVENOUS FISTULA Left 4/10/2020    Procedure: AV fistula excision;  Surgeon: Stephane Lopez MD;  Location: [facility] OR [address] [place] [address];  Service: Vascular;  Laterality: Left;    ROTATOR CUFF REPAIR  right, 2005    ULTRASOUND GUIDANCE  12/16/2019    Procedure: Ultrasound Guidance;  Surgeon: David Villegas MD;  Location: [facility] CATH LAB;  Service: Cardiology;;    VASCULAR SURGERY         Time Tracking:       OT Date of Treatment: 05/19/20  OT Start Time: 1048  OT Stop Time: 1116  OT Total Time (min): 28 min  Additional staff present: PT      Billable Minutes:Evaluation 14  Self Care/Home Management 14      Lori Anamika " YANY Foster  5/19/2020

## 2020-05-19 NOTE — HPI
Kodi Ibanez Sr. is a 61 y/o male with past medical history of CHF, CAD with recent NSTEMI [~4/29] (CABG 2009, FERCHO placed in 12/2019), diabetes, CVA, ESRD on dialysis Tuesday/Thursday/Saturday (last dialyzed morning of 5/12) presents to the ER by referral from infectious disease clinic after virtual visit today. The ID provider was concerned about the patients AMS and recent elevated WBC count. Recently,junior underwent ligation of LUE fistula on 2/27/20 with Dr Lopez ([facility], vascular surgery) and had ongoing issues afterwards. Patient was admitted on 4/10 for excision of his infected LUE mid and distal AVF with cultures and specimen obtained. Surgical cultures 4/10 grew 2 different prevotella species, fusobacterium, and strep anginosus. The patient was d/c'ed with home health wound care and po augmentin/clindamycin x 10 days. On 4/20 antibiotics were changed to Flagyl 500 mg p.o. q.8 and Cefazolin after dialysis 2 g on Tuesday and Thursday, 3 g on Saturday. End date on antibiotics on 5/11.     Following these events, on 4/29 the patient was admitted for an NSTEMI, a LHC was performed where re-stenosis of a previous stent was found and balloon angioplasty was done. He was discharged on 5/2. He is now on ASA/plavix, but per wife has not been compliant. Patient normally oliguric (urinating ~2-3 x per week). Patient was seen in the [facility] ED on the evening of 5/12 due to palpitations after his normal dialysis and sent home after evaluation, for possible antibiotic side effect.He presents to the ED with encephalopathy x 1 day, leukocytosis of 15, lactate 2.7, troponin 0.482, , procal 10.98, Cr 6.5, BUN 41, and hyperglycemia. Patient pan-cultured, currently only UA resulted with large amount of bacteria. Patient requiring high dose Levophed on admission. Broad spectrum antibiotics initiated. 1.5 L IVF given.    Hospital course   5/13: The patient was admitted to MICU for shock likely 2/2 to sepsis on  5/13. Patient remains on low dose Levophed. Nephrology consulted for possible dialysis. Blood cx NGTD. Patient remains on Heparin gtt for paroxysmal A fib with CHADS Vasc score of 6. Previously hyperglycemic with BG now well controlled.     5/14: Levo stopped in morning. Zosyn changed to unasyn.      5/15: Patient spiked new temp, vanc restarted. Patient with intermittent SVT- Home coreg and dilt restarted. Step down orders placed.     5/17: Hypotension overnight requiring vasopressors. Transferred back to MICU on norepi    5/18: Patient with candida albicans on blood cultures from 5/15.    5:19: ID consulted for Candidemia.

## 2020-05-19 NOTE — PROGRESS NOTES
[facility]  Palliative Medicine  Progress Note    Patient Name: Kodi Ibanez Sr.  MRN: [id number]  Admission Date: 5/13/2020  Hospital Length of Stay: 6 days  Code Status: DNR   Attending Provider: Callie Melgar MD  Consulting Provider: Sukhdeep Guy MD  Primary Care Physician: Melonie Elias MD  Principal Problem:Shock    Patient information was obtained from patient and past medical records.      Assessment/Plan:     ESRD on hemodialysis  .Palliative Care Encounter / Goals of care discussion:     Narrative:   Kodi Ibanez Sr. is a 60 y.o. male patient with ESRD, complications of vascular access, CAD, s/p CABG, H/O NSTEMI with PCI, now admitted with fungemia and sepsis likely related to perm-a-cath. Pt. Has expressed wishes to not undergo any additional procedures.     1: Symptoms:   - pain assesment: denies pain   - dyspnea assessment: not short of breath   - anxiety assessment: not anxious   - depression assessment: unable to assess    2: Code Status:   - DNR    3: Psychosocial :   - Marital status:   - Children: one daughter and two sons  - POA: wife is next of kin and makes most medical decisions. She is a former EMT    3: Medicolegal:    - Advance directive: on file. No artificial life prolonging measures at the end of life    4: Support System:  - Spiritual: yes, nawaf mon  - Family: supportive     5: Goals of care Decisions / Recommendations / Plan:     05/19/2020   Initial Assessment:    Insight in Disease and Illness trajectory  - pt. Is very little responsive to medial questions and conversations. He engages when asked about his family.   - wife is very involved in medical care. She understands that there is a fungus infection and that likely the Perm-a-cath line is the culprit.   - she had a conversation with her children and feels that if the pt. Was to need a cath exchange the family would want him to get it.     Prognostication:  - overall poor long term  "prognosis due to many advanced co-morbidities and now acute fungal septicemia.     Goals of Care:  - discussed difficulty to treat or cure fungemia without catheter exchange  - family feels that "if he needs a exchange he should get one"  - pt. Has not been engaged in any discussion with me today, but the family expressed to respect his wishes should they be voiced  - will plan on teleconference tomorrow to discuss next steps  - awaiting ID evaluation and recommendations  - Discussed with Dr. West.       Advanced Care Planning:   Advance Care Planning    Continue current care       Symptom Management:  - no sx. To palliate currently    Disposition:  -continue hospital based care.     6: Follow up plans:    Daily.     Thank you for your consult. I will follow along with you. Please call [phone] with questions.               I will follow-up with patient. Please contact us if you have any additional questions.    Subjective:     Chief Complaint:   Chief Complaint   Patient presents with    Altered Mental Status     worse today; elevated WBC; was at [facility] yesterday; dialysis pt; sugar read high at home       HPI:   61 y/o male with past medical history of CHF, CAD with recent NSTEMI [~4/29] (CABG 2009, FERCHO placed in 12/2019), diabetes, CVA, ESRD on dialysis Tuesday/Thursday/Saturday (last dialyzed morning of 5/12) presents to the ER by referral from infectious disease clinic after virtual visit today. The ID provider was concerned about the patients AMS and recent elevated WBC count. Recently,junior underwent ligation of LUE fistula on 2/27/20 with Dr Lopez ([facility], vascular surgery) and had ongoing issues afterwards. Patient was admitted on 4/10 for excision of his infected LUE mid and distal AVF with cultures and specimen obtained. Surgical cultures 4/10 grew 2 different prevotella species, fusobacterium, and strep anginosus. The patient was d/c'ed with home health wound care and po " augmentin/clindamycin x 10 days. On 4/20 antibiotics were changed to Flagyl 500 mg p.o. q.8 and Cefazolin after dialysis 2 g on Tuesday and Thursday, 3 g on Saturday. End date on antibiotics on 5/11.     Following these events, on 4/29 the patient was admitted for an NSTEMI, a LHC was performed where re-stenosis of a previous stent was found and balloon angioplasty was done. He was discharged on 5/2. He is now on ASA/plavix, but per wife has not been compliant.     Patients wife says that he has had poor PO intake for the last 2-3 weeks, has become progressively weak, and developed altered mental status today. Patient had 3 episodes of vomiting within the last 24 hours, which appeared brown-non bloody. He had a brown watery bowel movement today. Patient normally oliguric (urinating ~2-3 x per week). Patient was seen in the [facility] on the evening of 5/12 due to palpitations after his normal dialysis and sent home after evaluation, for possible antibiotic side effect.     He presents to the ED with encephalopathy x 1 day, leukocytosis of 15, lactate 2.7, troponin 0.482, , procal 10.98, Cr 6.5, BUN 41, and hyperglycemia. Patient pan-cultured, currently only UA resulted with large amount of bacteria. Patient requiring high dose Levophed on admission. Broad spectrum antibiotics initiated. 1.5 L IVF given. Patient remains on RA. Wife was updated regarding patient's current status. Patient spoken to regarding code status and verbalized his wishes as a DNR. His wife confirmed he has an LAPost but it is not on file. Instructed to bring in.     Hospital/ICU Course:  The patient was admitted to MICU for shock likely 2/2 to sepsis on 5/13. Patient remains on low dose Levophed. Nephrology consulted for possible dialysis. Blood cx NGTD. Patient remains on Heparin gtt for paroxysmal A fib with CHADS Vasc score of 6. Previously hyperglycemic with BG now well controlled.     The pt had briefly stepped down from ICU level  "of care but quickly decompensated with concern for septic shock, fungemia possibly related to dailysis cath, and returned to the ICU.      In this setting, palliative medicine was consulted to help with medical decision making and aid in the formation of goals of care.          Hospital Course:  No notes on file    Interval History: Visited with pt. Spoke with ICU team and pt. Wife.    Past Medical History:   Diagnosis Date    Atherosclerosis of aorta     noted on VIKTORIA 2011    Blood transfusion     Cataracts, bilateral     CHF (congestive heart failure)     Clotting disorder     Cognitive deficits as late effect of cerebrovascular disease 6/26/2012    Coronary artery disease     NSTEMI s/p PCI with FERCHO to LCx (12/16/19),     Decreased appetite     DM (diabetes mellitus), type 2 with renal complications     Diabetic retinopathy    ESRD on hemodialysis     TUESDAY/ THURSDAY/SATURDAY    Hyperlipidemia     Hyperparathyroidism, unspecified     Hypertension     Prostate cancer 2016 7/16/19:  wife states "he doesn't have it anymore"    Seizures     Stroke 2011    PT HAS SHAKES     Unsteady gait     Uses roller walker       Past Surgical History:   Procedure Laterality Date    CARDIAC SURGERY      CABG    CATARACT EXTRACTION      ou    CATARACT EXTRACTION, BILATERAL      cataracts      CORONARY ANGIOGRAPHY INCLUDING BYPASS GRAFTS WITH CATHETERIZATION OF LEFT HEART N/A 12/16/2019    Procedure: ANGIOGRAM, CORONARY, INCLUDING BYPASS GRAFT, WITH LEFT HEART CATHETERIZATION, rcfa, 5fr;  Surgeon: David Villegas MD;  Location: [facility] CATH LAB;  Service: Cardiology;  Laterality: N/A;    CORONARY ANGIOGRAPHY INCLUDING BYPASS GRAFTS WITH CATHETERIZATION OF LEFT HEART N/A 4/30/2020    Procedure: ANGIOGRAM, CORONARY, INCLUDING BYPASS GRAFT, WITH LEFT HEART CATHETERIZATION, rcfa, 10 am start;  Surgeon: David Villegas MD;  Location: [facility] CATH LAB;  Service: Cardiology;  Laterality: N/A;    CORONARY ARTERY BYPASS " GRAFT  2009    bypass    EYE SURGERY      Left Guera AVF  1/11/2012    PROSTATE BIOPSY      positive    retinal laser      REVISION OF ARTERIOVENOUS FISTULA Left 2/27/2020    Procedure: Ligation of left upper extremity fistula, Closure of left upper extremity skin defect x 2, Intraoperative ultrasound ;  Surgeon: Stephane Lopez MD;  Location: [facility] OR;  Service: Vascular;  Laterality: Left;    REVISION OF ARTERIOVENOUS FISTULA Left 4/10/2020    Procedure: AV fistula excision;  Surgeon: Stephane Lopez MD;  [facility];  Service: Vascular;  Laterality: Left;    ROTATOR CUFF REPAIR  right, 2005    ULTRASOUND GUIDANCE  12/16/2019    Procedure: Ultrasound Guidance;  Surgeon: David Villegas MD;  Location: [facility] CATH LAB;  Service: Cardiology;;    VASCULAR SURGERY         Review of patient's allergies indicates:   Allergen Reactions    Reglan [metoclopramide hcl] Diarrhea    Lorazepam      Other reaction(s): heavy sedation       Medications:  Continuous Infusions:   dilTIAZem Stopped (05/19/20 1330)     Scheduled Meds:   sodium chloride 0.9%   Intravenous Once    apixaban  5 mg Oral BID    aspirin  81 mg Oral Daily    atorvastatin  80 mg Oral QHS    clopidogreL  75 mg Oral Daily    diltiaZEM  60 mg Oral Q6H    insulin detemir U-100  10 Units Subcutaneous BID    losartan  100 mg Oral Daily    metoprolol tartrate  25 mg Oral BID    micafungin (MYCAMINE) IVPB  100 mg Intravenous Q24H    sevelamer carbonate  800 mg Oral TID WM     PRN Meds:Dextrose 10% Bolus, glucagon (human recombinant), insulin aspart U-100, sodium chloride 0.9%    Family History     Problem Relation (Age of Onset)    Cancer Mother, Paternal Grandmother, Paternal Uncle    Cataracts Mother    Diabetes Mother    Glaucoma Mother    Heart disease Father, Maternal Grandmother    Hypertension Brother    No Known Problems Sister, Maternal Grandfather, Paternal Grandfather, Sister, Sister    Prostate cancer Brother         Tobacco Use    Smoking status: Never Smoker    Smokeless tobacco: Never Used   Substance and Sexual Activity    Alcohol use: No    Drug use: No    Sexual activity: Not Currently       Review of Systems   Unable to perform ROS: Acuity of condition     Objective:     Vital Signs (Most Recent):  Temp: 98 °F (36.7 °C) (05/19/20 1200)  Pulse: 83 (05/19/20 1330)  Resp: (!) 6 (05/19/20 1330)  BP: 109/63 (05/19/20 1300)  SpO2: (!) 76 % (05/19/20 1330) Vital Signs (24h Range):  Temp:  [98 °F (36.7 °C)-98.7 °F (37.1 °C)] 98 °F (36.7 °C)  Pulse:  [] 83  Resp:  [2-33] 6  SpO2:  [76 %-100 %] 76 %  BP: (109-157)/(63-93) 109/63  Arterial Line BP: (109-148)/(43-75) 115/50     Weight: 67.6 kg (149 lb)  Body mass index is 20.78 kg/m².    Review of Symptoms    Unable to assess due to acuity of condition  Symptom Assessment (ESAS 0-10 scale)   ESAS 0 1 2 3 4 5 6 7 8 9 10   Pain              Dyspnea              Anxiety              Nausea              Depression               Anorexia              Fatigue              Insomnia              Restlessness               Agitation              CAM / Delirium __ --  ___+   Constipation     __ --  ___+   Diarrhea           __ --  ___+  Bowel Management Plan (BMP): Yes    Comments: none    Pain Assessment: unable to assess; no nonverbal signs of pain    OME in 24 hours: 0    Performance Status: 50      Physical Exam  Constitutional: He is awake but confused  Elderly male, disoriented  No distress  Cachectic, chronically ill appearing   HENT:   Mouth/Throat: Oropharynx is clear and moist. No oropharyngeal exudate.   Eyes: Conjunctivae are normal. No scleral icterus.   Neck: No tracheal deviation present.   Cardiovascular: Normal rate, regular rhythm and normal heart sounds.   Irregular tachycardia, rate > 100   Pulmonary/Chest: Effort normal and breath sounds normal. No respiratory distress.   Abdominal: He exhibits no distension.   Musculoskeletal: He exhibits no edema or  tenderness.   Neurological: He is alert and oriented to person, place, and time. No cranial nerve deficit.   Lethargic. No gross focal deficits   Skin: Skin is warm and dry.   Nursing note and vitals reviewed.  Significant Labs:   LFT:   Recent Labs   Lab 05/18/20  1534   AST 43*   ALKPHOS 155*   BILITOT 0.3     CBC:   Recent Labs   Lab 05/19/20  0330   WBC 13.10*   HGB 8.2*   HCT 24.7*   MCV 86        BMP:  Recent Labs   Lab 05/19/20  0330   *   *   K 4.1      CO2 18*   BUN 41*   CREATININE 6.2*   CALCIUM 7.2*   MG 2.2     LFT:  Lab Results   Component Value Date    AST 43 (H) 05/18/2020    ALKPHOS 155 (H) 05/18/2020    BILITOT 0.3 05/18/2020     Albumin:   Albumin   Date Value Ref Range Status   05/18/2020 1.7 (L) 3.5 - 5.2 g/dL Final     Protein:   Total Protein   Date Value Ref Range Status   05/18/2020 5.8 (L) 6.0 - 8.4 g/dL Final     Lactic acid:   Lab Results   Component Value Date    LACTATE 2.1 05/17/2020    LACTATE 1.0 05/14/2020       Significant Imaging: I have reviewed all pertinent imaging results/findings within the past 24 hours.    Advance Care Planning   Advanced Directives::  Living Will: Yes. Copy on chart: Yes  LaPOST: No  Do Not Resuscitate Status: DNR since this admission  Medical Power of : No; wife is surrogate CHILANGO Bricenoter,[place] Spouse [phone]         Decision-Making Capacity: Family answered questions, Patient unable to communicate due to disease severity/cognitive impairment       Living Arrangements: Lives with spouse    Psychosocial/Cultural: did not explore during today's visit; wife is an EMT with some medical knowledge    Spiritual:     F- Madai and Belief: Reuben    I - Importance: yes  .  C - Community: yes    A - Address in Care: wife to coordinate      > 50% of 55 min visit spent in chart review, face to face discussion of goals of care,  symptom assessment, coordination of care and emotional support.    Sukhdeep GUTIERREZ  MD [facility]  Palliative Medicine  [facility]

## 2020-05-19 NOTE — PLAN OF CARE
Problem: Occupational Therapy Goal  Goal: Occupational Therapy Goal  Description  Goals set on 5/19 with expiration date 6/2:  Patient will increase functional independence with ADLs by performing:    Supine <> Sit with Min Assistance.  Feeding with Min Assistance.  Grooming while standing at sink with Min Assistance.  UB Dressing with Min  Assistance.  LB Dressing with Min Assistance.  Step transfer with Min Assistance with DME as needed.  Pt will demonstrate understanding of education provided regarding energy conservation and task modification through teach-back method.        Outcome: Ongoing, Progressing     Eval complete. Pt tolerated session well. Initiate OT POC.    YANY Villalobos  05/19/2020

## 2020-05-19 NOTE — SUBJECTIVE & OBJECTIVE
Interval History/Significant Events: SVT requiring dilt drip. No CP, SOB, n/v, abd pain. Spoke with nephrology who will dialyze today.     Review of Systems   Constitutional: Negative for chills and fever.   HENT: Negative for congestion, facial swelling and sore throat.    Eyes: Negative for discharge, redness and visual disturbance.   Respiratory: Negative for cough and shortness of breath.    Cardiovascular: Negative for chest pain and leg swelling.   Gastrointestinal: Negative for abdominal pain, constipation, nausea and vomiting.   Genitourinary: Negative for flank pain, scrotal swelling and testicular pain.   Musculoskeletal: Negative for back pain and neck pain.   Skin: Negative for rash.   Neurological: Negative for tremors and seizures.     Objective:     Vital Signs (Most Recent):  Temp: 98.6 °F (37 °C) (05/19/20 0800)  Pulse: 83 (05/19/20 1130)  Resp: (!) 25 (05/19/20 1130)  BP: 124/65 (05/19/20 1100)  SpO2: (!) 94 % (05/19/20 1130) Vital Signs (24h Range):  Temp:  [98.6 °F (37 °C)-98.7 °F (37.1 °C)] 98.6 °F (37 °C)  Pulse:  [] 83  Resp:  [2-33] 25  SpO2:  [69 %-100 %] 94 %  BP: (116-169)/(65-93) 124/65  Arterial Line BP: (111-167)/(43-75) 137/61   Weight: 67.6 kg (149 lb)  Body mass index is 20.78 kg/m².      Intake/Output Summary (Last 24 hours) at 5/19/2020 1200  Last data filed at 5/19/2020 0900  Gross per 24 hour   Intake 1891 ml   Output --   Net 1891 ml       Physical Exam   Constitutional: He is oriented to person, place, and time.   Had head covered with blanket during interview    HENT:   Head: Normocephalic and atraumatic.   Eyes: Pupils are equal, round, and reactive to light. EOM are normal. No scleral icterus.   Neck: Normal range of motion. Neck supple. No JVD present.   Cardiovascular:   No murmur heard.  HR 82   Pulmonary/Chest: Effort normal and breath sounds normal. No respiratory distress. He has no wheezes. He has no rales. He exhibits no tenderness.   Abdominal: Soft. Bowel  sounds are normal. He exhibits no distension. There is no tenderness. There is no rebound and no guarding.   Musculoskeletal: He exhibits no edema or deformity.   Fistula in LUE   Neurological: He is alert and oriented to person, place, and time.        Vents:  Oxygen Concentration (%): 100 (05/17/20 0445)  Lines/Drains/Airways     Central Venous Catheter Line                 Hemodialysis Catheter 03/05/20 0535 right subclavian 75 days          Arterial Line                 Arterial Line 05/17/20 0603 Right Radial 2 days              Significant Labs:    CBC/Anemia Profile:  Recent Labs   Lab 05/18/20  0350 05/19/20  0330   WBC 11.25 13.10*   HGB 7.9* 8.2*   HCT 24.9* 24.7*    164   MCV 85 86   RDW 15.8* 15.9*        Chemistries:  Recent Labs   Lab 05/17/20  1400 05/18/20  0350 05/18/20  1534 05/19/20  0330   * 139 136 133*   K 4.2 4.2 4.2 4.1    107 105 102   CO2 16* 18* 19* 18*   BUN 27* 32* 35* 41*   CREATININE 4.2* 5.2* 6.0* 6.2*   CALCIUM 6.7* 7.2* 7.4* 7.2*   ALBUMIN 1.4*  --  1.7*  --    PROT  --   --  5.8*  --    BILITOT  --   --  0.3  --    ALKPHOS  --   --  155*  --    ALT  --   --  5*  --    AST  --   --  43*  --    MG 1.9 2.1 2.1 2.2   PHOS 5.4* 4.6* 5.2* 4.9*       All pertinent labs within the past 24 hours have been reviewed.    Significant Imaging:  I have reviewed all pertinent imaging results/findings within the past 24 hours.

## 2020-05-19 NOTE — SUBJECTIVE & OBJECTIVE
Interval History: Patient seen and examined at bedside. Pt on room air. ESRD/anuric; net positive 1.8L/past 24hrs. Last HD on 5/16/2020. On dilt infusion. HR 80's this morning on exam.     Review of patient's allergies indicates:   Allergen Reactions    Reglan [metoclopramide hcl] Diarrhea    Lorazepam      Other reaction(s): heavy sedation     Current Facility-Administered Medications   Medication Frequency    0.9%  NaCl infusion Once    apixaban tablet 5 mg BID    aspirin EC tablet 81 mg Daily    atorvastatin tablet 80 mg QHS    clopidogreL tablet 75 mg Daily    dextrose 10% (D10W) Bolus PRN    diltiaZEM 125 mg in D5W 125 mL infusion Continuous    diltiaZEM tablet 60 mg Q6H    glucagon (human recombinant) injection 1 mg PRN    insulin aspart U-100 pen 1-10 Units Q4H PRN    insulin detemir U-100 pen 10 Units BID    losartan tablet 100 mg Daily    metoprolol tartrate (LOPRESSOR) tablet 25 mg BID    micafungin 100 mg in sodium chloride 0.9 % 100 mL IVPB (ready to mix system) Q24H    sevelamer carbonate tablet 800 mg TID WM    sodium chloride 0.9% flush 10 mL PRN       Objective:     Vital Signs (Most Recent):  Temp: 98.6 °F (37 °C) (05/19/20 0300)  Pulse: (!) 124 (05/19/20 0625)  Resp: (!) 28 (05/19/20 0625)  BP: 135/71 (05/19/20 0600)  SpO2: (!) 92 % (05/19/20 0625)  O2 Device (Oxygen Therapy): room air (05/19/20 0600) Vital Signs (24h Range):  Temp:  [98.5 °F (36.9 °C)-98.7 °F (37.1 °C)] 98.6 °F (37 °C)  Pulse:  [] 124  Resp:  [2-33] 28  SpO2:  [69 %-100 %] 92 %  BP: (120-169)/(68-93) 135/71  Arterial Line BP: (111-167)/(43-75) 129/65     Weight: 67.6 kg (149 lb) (05/15/20 0701)  Body mass index is 20.78 kg/m².  Body surface area is 1.84 meters squared.    I/O last 3 completed shifts:  In: 1861 [P.O.:1780; I.V.:81]  Out: 0     Physical Exam   Constitutional: He is oriented to person, place, and time. He appears well-developed.   HENT:   Head: Normocephalic and atraumatic.   Eyes: Pupils are  equal, round, and reactive to light. Conjunctivae are normal.   Neck: Normal range of motion. Neck supple.   Cardiovascular: Normal rate.   Pulmonary/Chest: Effort normal.   Abdominal: Soft.   Musculoskeletal: He exhibits no edema.   Neurological: He is alert and oriented to person, place, and time.   Skin: Skin is warm and dry.   R IJ tunnel cath noted to R chest wall    Psychiatric: He has a normal mood and affect. His behavior is normal.       Significant Labs:  CBC:   Recent Labs   Lab 05/19/20  0330   WBC 13.10*   RBC 2.89*   HGB 8.2*   HCT 24.7*      MCV 86   MCH 28.4   MCHC 33.2     CMP:   Recent Labs   Lab 05/18/20  1534 05/19/20  0330   * 383*   CALCIUM 7.4* 7.2*   ALBUMIN 1.7*  --    PROT 5.8*  --     133*   K 4.2 4.1   CO2 19* 18*    102   BUN 35* 41*   CREATININE 6.0* 6.2*   ALKPHOS 155*  --    ALT 5*  --    AST 43*  --    BILITOT 0.3  --      All labs within the past 24 hours have been reviewed.

## 2020-05-19 NOTE — PT/OT/SLP EVAL
Physical Therapy Evaluation    Patient Name:  Kodi Ibanez Sr.   MRN:  [id number]  Admit Date: 5/13/2020  Admitting Diagnosis:  Shock   Length of Stay: 6 days  Recent Surgery: * No surgery found *      Recommendations:     Discharge Recommendations:  nursing facility, skilled   Discharge Equipment Recommendations: other (see comments)(tbd pending progress)   Barriers to discharge: None    Assessment:     Kodi Ibanez Sr. is a 60 y.o. male admitted with a medical diagnosis of Shock.  He presents with the following impairments/functional limitations: weakness, impaired endurance, impaired self care skills, impaired functional mobilty, impaired balance, decreased coordination, decreased lower extremity function, decreased upper extremity function, impaired cardiopulmonary response to activity, impaired fine motor, impaired skin, impaired coordination. Pt tolerated session fair today. Pt demonstrates overall deconditioning and global weakness, requiring significant assist to perform any mobility. Pt also demonstrating generalized confusion, attempting to return himself to supine multiple times throughout session despite verbal and tactile cues to remain upright. Pt will benefit from SNF after discharge from acute services in order to continue to progress pt's strength, endurance, and balance to help pt maximize independence at or near PLOF.    Rehab Prognosis: Good; patient would benefit from acute skilled PT services to address these deficits and reach maximum level of function.      Plan:     During this hospitalization, patient to be seen 4 x/week to address the identified rehab impairments via gait training, therapeutic activities, therapeutic exercises, neuromuscular re-education and progress towards the established goals.    · Plan of Care Expires:  06/19/20    Subjective     RN notified prior to session. No family present upon PT entrance into room.    Chief Complaint: Pt reporting overall fatigue  Patient/Family  Comments/goals: get better  Pain/Comfort:  Pain Rating 1: 0/10  Pain Rating Post-Intervention 1: 0/10    Living Environment:  Patient lives with wife in a apartment with 0 JENNIFER and tub/shower with shower chair.   Prior Level of Function: Patient reports being mod I with mobility with rollator but req'ing assist from wife with transfers, mobility, and ADLs Patient uses DME as follows: wheelchair, rollator, shower chair. DME owned (not currently used): none.  Roles/Repsonsibilities: Hand Dominance: right Work: no. Drive: no. Managing Medicines/Managing Home: yes. Hobbies: Pt enjoys watching football, specifically the [place] [employer].    Patient reports they will have assistance from wife upon discharge.    Objective:     Additional staff present: OT for co-eval    Patient found HOB elevated with: blood pressure cuff, telemetry, pulse ox (continuous), foster catheter, peripheral IV, arterial line, central line     General Precautions: Standard, Cardiac fall, seizure   Orthopedic Precautions:N/A   Braces: N/A   Body mass index is 20.78 kg/m².  Oxygen Device: Room Air     Exams:  · Mental Status: Patient is AxOx4 and follow single-step verbal commands with increased time for processing. Pt is Alert, Distractible, Cooperative and fatigued during session.  · Skin Integrity: Visible skin intact and shearing/excoriation of bottom  · Edema: Pitting bilateral LE  · Sensation: Intact  · Hearing: Intact  · Vision:  Intact  · Postural Assessment: slouched posture, rounded shoulders, forward head, trunk deviated right, posterior pelvic tilt and increased cervical flexion  · Range of Motion:  · RUE: WFL  · LUE: WFL  · RLE: WFL  · LLE: WFL  · Strength Exam:  Lower Extremity Strength: BLE 3/5    Outcome Measures:  AM-PAC 6 CLICK MOBILITY  Turning over in bed (including adjusting bedclothes, sheets and blankets)?: 2  Sitting down on and standing up from a chair with arms (e.g., wheelchair, bedside commode, etc.): 1  Moving from lying on  back to sitting on the side of the bed?: 2  Moving to and from a bed to a chair (including a wheelchair)?: 1  Need to walk in hospital room?: 1  Climbing 3-5 steps with a railing?: 1  Basic Mobility Total Score: 8     Functional Mobility:    Bed Mobility:   · Rolling/Turning to Right: maximal assistance  · Scooting to HOB via supine bridge: maximal assistance, 2 persons and with drawsheet  · Supine to Sit: maximal assistance, 2 persons and with HOB elevated; from Rt side of bed  · Scooting anteriorly to EOB to have both feet planted on floor: moderate assistance  · Sit to Supine: maximal assistance and 2 persons; to Rt side of bed    Sitting Balance at Edge of Bed:   Assistance Level Required: Contact Guard Assistance to Total Assistance   Time: 8 minutes   Postural deviations noted: slouched posture, rounded shoulders, forward head, trunk deviated right, posterior pelvic tilt and increased cervical flexion   Encouraged: upright gaze for neutral c-sp, BUE support to assist with sitting balance and midline posture   Comments: Pt with range of CGA with BUE to Total A (RUE, no UE) while EOB. Pt frequently attempting to return himself to supine throughout time EOB due to fatigue vs. Confusion and req'd assist/cueing to stay upright.  Neuromuscular facilitation was provided to bilateral QL, periscapular musculature, and c-sp extensors to promote activation of postural musculature.    Transfers/Gait: deferred 2/2 pt fatigue    Education:   Time provided for education, counseling and discussion of health disposition in regards to patient's current status   All questions answered within PT scope of practice and to patient's satisfaction   PT role in POC to address current functional deficits   Pt educated on proper body mechanics, safety techniques, and energy conservation with PT facilitation and cueing throughout session    Patient left HOB elevated with all lines intact, call button in reach and RN  "present.    GOALS:   Multidisciplinary Problems     Physical Therapy Goals        Problem: Physical Therapy Goal    Goal Priority Disciplines Outcome Goal Variances Interventions   Physical Therapy Goal     PT, PT/OT Ongoing, Progressing     Description:  Goals to be met by: 2020     Patient will increase functional independence with mobility by performin. Supine to sit with Stand-by Assistance  2. Sit to supine with Stand-by Assistance  3. Sit to stand transfer with Minimal Assistance  4. Bed to chair transfer with Moderate Assistance using LRAD  5. Gait  x 10 feet with Moderate Assistance using LRAD.   6. Sitting at edge of bed x15 minutes with Stand-by Assistance while performing dynamic UE activities  7. Lower extremity exercise program x20 reps per handout, with independence                      History:     Past Medical History:   Diagnosis Date    Atherosclerosis of aorta     noted on VIKTORIA     Blood transfusion     Cataracts, bilateral     CHF (congestive heart failure)     Clotting disorder     Cognitive deficits as late effect of cerebrovascular disease 2012    Coronary artery disease     NSTEMI s/p PCI with FERCHO to LCx (19),     Decreased appetite     DM (diabetes mellitus), type 2 with renal complications     Diabetic retinopathy    ESRD on hemodialysis     TUESDAY/ THURSDAY/SATURDAY    Hyperlipidemia     Hyperparathyroidism, unspecified     Hypertension     Prostate cancer 20:  wife states "he doesn't have it anymore"    Seizures     Stroke     PT HAS SHAKES     Unsteady gait     Uses roller walker       Past Surgical History:   Procedure Laterality Date    CARDIAC SURGERY      CABG    CATARACT EXTRACTION      ou    CATARACT EXTRACTION, BILATERAL      cataracts      CORONARY ANGIOGRAPHY INCLUDING BYPASS GRAFTS WITH CATHETERIZATION OF LEFT HEART N/A 2019    Procedure: ANGIOGRAM, CORONARY, INCLUDING BYPASS GRAFT, WITH LEFT HEART " CATHETERIZATION, rcfa, 5fr;  Surgeon: David Villegas MD;  Location: [facility] CATH LAB;  Service: Cardiology;  Laterality: N/A;    CORONARY ANGIOGRAPHY INCLUDING BYPASS GRAFTS WITH CATHETERIZATION OF LEFT HEART N/A 4/30/2020    Procedure: ANGIOGRAM, CORONARY, INCLUDING BYPASS GRAFT, WITH LEFT HEART CATHETERIZATION, rcfa, 10 am start;  Surgeon: David Villegas MD;  Location: [facility] CATH LAB;  Service: Cardiology;  Laterality: N/A;    CORONARY ARTERY BYPASS GRAFT  2009    bypass    EYE SURGERY      Left Guera AVF  1/11/2012    PROSTATE BIOPSY      positive    retinal laser      REVISION OF ARTERIOVENOUS FISTULA Left 2/27/2020    Procedure: Ligation of left upper extremity fistula, Closure of left upper extremity skin defect x 2, Intraoperative ultrasound ;  Surgeon: Stephane Lopez MD;  Location: [facility] OR;  Service: Vascular;  Laterality: Left;    REVISION OF ARTERIOVENOUS FISTULA Left 4/10/2020    Procedure: AV fistula excision;  Surgeon: Stephane Lopez MD;  Location: [facility] OR [address] [place] [address];  Service: Vascular;  Laterality: Left;    ROTATOR CUFF REPAIR  right, 2005    ULTRASOUND GUIDANCE  12/16/2019    Procedure: Ultrasound Guidance;  Surgeon: David Villegas MD;  Location: [facility] CATH LAB;  Service: Cardiology;;    VASCULAR SURGERY         Time Tracking:     PT Received On: 05/19/20  PT Start Time: 1055     PT Stop Time: 1116  PT Total Time (min): 21 min     Billable Minutes: Evaluation 13 and Neuromuscular Re-education 8    Cherelle Grier, PT, DPT  5/19/2020  Pager: [phone]

## 2020-05-19 NOTE — ASSESSMENT & PLAN NOTE
".Palliative Care Encounter / Goals of care discussion:     Narrative:   Kodi Ibanez Sr. is a 60 y.o. male patient with ESRD, complications of vascular access, CAD, s/p CABG, H/O NSTEMI with PCI, now admitted with fungemia and sepsis likely related to perm-a-cath. Pt. Has expressed wishes to not undergo any additional procedures.     1: Symptoms:   - pain assesment: denies pain   - dyspnea assessment: not short of breath   - anxiety assessment: not anxious   - depression assessment: unable to assess    2: Code Status:   - DNR    3: Psychosocial :   - Marital status:   - Children: one daughter and two sons  - POA: wife is next of kin and makes most medical decisions. She is a former EMT    3: Medicolegal:    - Advance directive: on file. No artificial life prolonging measures at the end of life    4: Support System:  - Spiritual: yes, nawaf mon  - Family: supportive     5: Goals of care Decisions / Recommendations / Plan:     05/19/2020   Initial Assessment:    Insight in Disease and Illness trajectory  - pt. Is very little responsive to medial questions and conversations. He engages when asked about his family.   - wife is very involved in medical care. She understands that there is a fungus infection and that likely the Perm-a-cath line is the culprit.   - she had a conversation with her children and feels that if the pt. Was to need a cath exchange the family would want him to get it.     Prognostication:  - overall poor long term prognosis due to many advanced co-morbidities and now acute fungal septicemia.     Goals of Care:  - discussed difficulty to treat or cure fungemia without catheter exchange  - family feels that "if he needs a exchange he should get one"  - pt. Has not been engaged in any discussion with me today, but the family expressed to respect his wishes should they be voiced  - will plan on teleconference tomorrow to discuss next steps  - awaiting ID evaluation and " recommendations  - Discussed with Dr. West.       Advanced Care Planning:   Advance Care Planning    Continue current care        Symptom Management:  - no sx. To palliate currently    Disposition:  -continue hospital based care.     6: Follow up plans:    Daily.     Thank you for your consult. I will follow along with you. Please call [phone] with questions.

## 2020-05-19 NOTE — ASSESSMENT & PLAN NOTE
-->500 on admission. Goal -180  --Not at goal on 10 detemir, increased to 10U BID  --SSI prn initiated  --accuchecks q4h  --

## 2020-05-19 NOTE — PROGRESS NOTES
[facility]  Critical Care Medicine  Progress Note    Patient Name: Kodi Ibanez Sr.  MRN: [id number]  Admission Date: 5/13/2020  Hospital Length of Stay: 6 days  Code Status: DNR  Attending Provider: Callie Melgar MD  Primary Care Provider: Melonie Elias MD   Principal Problem: Shock    Subjective:     HPI:  59 y/o male with past medical history of CHF, CAD with recent NSTEMI [~4/29] (CABG 2009, FERCHO placed in 12/2019), diabetes, CVA, ESRD on dialysis Tuesday/Thursday/Saturday (last dialyzed morning of 5/12) presents to the ER by referral from infectious disease clinic after virtual visit today. The ID provider was concerned about the patients AMS and recent elevated WBC count. Recently,atzenon underwent ligation of LUE fistula on 2/27/20 with Dr oLpez ([facility], vascular surgery) and had ongoing issues afterwards. Patient was admitted on 4/10 for excision of his infected LUE mid and distal AVF with cultures and specimen obtained. Surgical cultures 4/10 grew 2 different prevotella species, fusobacterium, and strep anginosus. The patient was d/c'ed with home health wound care and po augmentin/clindamycin x 10 days. On 4/20 antibiotics were changed to Flagyl 500 mg p.o. q.8 and Cefazolin after dialysis 2 g on Tuesday and Thursday, 3 g on Saturday. End date on antibiotics on 5/11.     Following these events, on 4/29 the patient was admitted for an NSTEMI, a LHC was performed where re-stenosis of a previous stent was found and balloon angioplasty was done. He was discharged on 5/2. He is now on ASA/plavix, but per wife has not been compliant.     Patients wife says that he has had poor PO intake for the last 2-3 weeks, has become progressively weak, and developed altered mental status today. Patient had 3 episodes of vomiting within the last 24 hours, which appeared brown-non bloody. He had a brown watery bowel movement today. Patient normally oliguric (urinating ~2-3 x per week). Patient  was seen in the [facility] ED on the evening of 5/12 due to palpitations after his normal dialysis and sent home after evaluation, for possible antibiotic side effect.     He presents to the ED with encephalopathy x 1 day, leukocytosis of 15, lactate 2.7, troponin 0.482, , procal 10.98, Cr 6.5, BUN 41, and hyperglycemia. Patient pan-cultured, currently only UA resulted with large amount of bacteria. Patient requiring high dose Levophed on admission. Broad spectrum antibiotics initiated. 1.5 L IVF given. Patient remains on RA. Wife was updated regarding patient's current status. Patient spoken to regarding code status and verbalized his wishes as a DNR. His wife confirmed he has an LAPost but it is not on file. Instructed to bring in.    Hospital/ICU Course:  The patient was admitted to MICU for shock likely 2/2 to sepsis on 5/13. Patient remains on low dose Levophed. Nephrology consulted for possible dialysis. Blood cx NGTD. Patient remains on Heparin gtt for paroxysmal A fib with CHADS Vasc score of 6. Previously hyperglycemic with BG now well controlled.    5/14: Levo stopped in morning. Stable all day off pressors on room air. Zosyn changed to unasyn.     5/15: Patient spiked new temp, vanc restarted. Patient with intermittent SVT- Home coreg and dilt restarted. Step down orders placed but patient didn't get bed    5/17: Hypotension overnight requiring vasopressors. Transferred back to MICU on norepi    5/18: Norepi stopped yesterday. BP stable. Restarted home dilt. Will step down to the floor today.     5/19: Patient remained in ICU over night due to SVT requiring diltiazem drip. Will wean diltiazem drip today and transition to po meds. HD ordered for today.     Interval History/Significant Events: SVT requiring dilt drip. No CP, SOB, n/v, abd pain. Spoke with nephrology who will dialyze today.     Review of Systems   Constitutional: Negative for chills and fever.   HENT: Negative for congestion, facial  swelling and sore throat.    Eyes: Negative for discharge, redness and visual disturbance.   Respiratory: Negative for cough and shortness of breath.    Cardiovascular: Negative for chest pain and leg swelling.   Gastrointestinal: Negative for abdominal pain, constipation, nausea and vomiting.   Genitourinary: Negative for flank pain, scrotal swelling and testicular pain.   Musculoskeletal: Negative for back pain and neck pain.   Skin: Negative for rash.   Neurological: Negative for tremors and seizures.     Objective:     Vital Signs (Most Recent):  Temp: 98.6 °F (37 °C) (05/19/20 0800)  Pulse: 83 (05/19/20 1130)  Resp: (!) 25 (05/19/20 1130)  BP: 124/65 (05/19/20 1100)  SpO2: (!) 94 % (05/19/20 1130) Vital Signs (24h Range):  Temp:  [98.6 °F (37 °C)-98.7 °F (37.1 °C)] 98.6 °F (37 °C)  Pulse:  [] 83  Resp:  [2-33] 25  SpO2:  [69 %-100 %] 94 %  BP: (116-169)/(65-93) 124/65  Arterial Line BP: (111-167)/(43-75) 137/61   Weight: 67.6 kg (149 lb)  Body mass index is 20.78 kg/m².      Intake/Output Summary (Last 24 hours) at 5/19/2020 1200  Last data filed at 5/19/2020 0900  Gross per 24 hour   Intake 1891 ml   Output --   Net 1891 ml       Physical Exam   Constitutional: He is oriented to person, place, and time.   Had head covered with blanket during interview    HENT:   Head: Normocephalic and atraumatic.   Eyes: Pupils are equal, round, and reactive to light. EOM are normal. No scleral icterus.   Neck: Normal range of motion. Neck supple. No JVD present.   Cardiovascular:   No murmur heard.  HR 82   Pulmonary/Chest: Effort normal and breath sounds normal. No respiratory distress. He has no wheezes. He has no rales. He exhibits no tenderness.   Abdominal: Soft. Bowel sounds are normal. He exhibits no distension. There is no tenderness. There is no rebound and no guarding.   Musculoskeletal: He exhibits no edema or deformity.   Fistula in LUE   Neurological: He is alert and oriented to person, place, and time.         Vents:  Oxygen Concentration (%): 100 (05/17/20 0445)  Lines/Drains/Airways     Central Venous Catheter Line                 Hemodialysis Catheter 03/05/20 0535 right subclavian 75 days          Arterial Line                 Arterial Line 05/17/20 0603 Right Radial 2 days              Significant Labs:    CBC/Anemia Profile:  Recent Labs   Lab 05/18/20  0350 05/19/20  0330   WBC 11.25 13.10*   HGB 7.9* 8.2*   HCT 24.9* 24.7*    164   MCV 85 86   RDW 15.8* 15.9*        Chemistries:  Recent Labs   Lab 05/17/20  1400 05/18/20  0350 05/18/20  1534 05/19/20  0330   * 139 136 133*   K 4.2 4.2 4.2 4.1    107 105 102   CO2 16* 18* 19* 18*   BUN 27* 32* 35* 41*   CREATININE 4.2* 5.2* 6.0* 6.2*   CALCIUM 6.7* 7.2* 7.4* 7.2*   ALBUMIN 1.4*  --  1.7*  --    PROT  --   --  5.8*  --    BILITOT  --   --  0.3  --    ALKPHOS  --   --  155*  --    ALT  --   --  5*  --    AST  --   --  43*  --    MG 1.9 2.1 2.1 2.2   PHOS 5.4* 4.6* 5.2* 4.9*       All pertinent labs within the past 24 hours have been reviewed.    Significant Imaging:  I have reviewed all pertinent imaging results/findings within the past 24 hours.      ABG  Recent Labs   Lab 05/14/20  0259   PH 7.299*   PO2 28*   PCO2 34.5*   HCO3 17.0*   BE -9     Assessment/Plan:     Cardiac/Vascular  Atrial fibrillation  --patient on diltiazem for rate control; restarted home dose   --on ASA/plavix/apixiban   --Chads Vasc score 6 points      NSTEMI (non-ST elevated myocardial infarction)  Hx of CABG in 2009 and placement of FERCHO in 12/2019. Patient recenetly admitted on 4/29 and d/c'ed 5/2, admitted for an NSTEMI, a LHC was performed where re-stenosis of a previous stent was found and balloon angioplasty was done. He is now on ASA/plavix, but per wife has not been compliant.    --stat EKG  --troponin now down-trending; stopped trending  --continue ASA/plavix (home meds)  --echo pending    Essential hypertension  --restarted home losartan and will hold coreg  monitor BP then consider restarting     SVT (supraventricular tachycardia)  --ECG showed SVT yesterday with -150'2  --No improvement after metoprolol 5mgx2 or IV bolus dilt  --Required diltiazem drip, wean and transition to PO dilt today       HLD (hyperlipidemia)  --statin for secondary prevention    Renal/  ESRD on hemodialysis  Patient on HD T/Th/Sat with last session  Prior to admission on 5/12, where he was admitted to the ED with palpatations after session. Patient has tunneled catheter that was inserted in 2/2020 that is being used for access.     --nephrology consulted  --HD done on 5/16/20, will be done today  --K+ 6.2 hold meds to shift as patient will get HD today per nephro  --avoid nephrotoxic meds  --oliguric, strict I/o's  --BMP daily    ID  Candidemia  --optho and ID consulted  --continue micafungin   --no line changes or aggressive treatment per family wishes     AV fistula infection  --see shock  --continue micafungin   --ID and opthalmology consulted     Endocrine  Type 2 diabetes, uncontrolled, with neuropathy  -->500 on admission. Goal -180  --Not at goal on 10 detemir, increased to 10U BID  --SSI prn initiated  --accuchecks q4h  --      Other  * Shock  2/2 probable sepsis likely r/t LUE AV fistula excision. Patient also with R tunneled catheter placed 2/2020.  Patient underwent ligation of LUE fistula on 2/27/20 with Dr Lopez ([facility], vascular surgery) and had ongoing issues afterwards. Patient was admitted on 4/10 for excision of his infected LUE mid and distal AVF with cultures and specimen obtained. Surgical cultures 4/10 grew 2 different prevotella species, fusobacterium, and strep anginosus. The patient was d/c'ed with home health wound care and po augmentin/clindamycin x 10 days. On 4/20 antibiotics were changed to Flagyl 500 mg p.o. q.8 and Cefazolin after dialysis 2 g on Tuesday and Thursday, 3 g on Saturday. End date on antibiotics on 5/11.    --WBC 15, lactate  2.7, procal 10.98  --1.5 L IVF given in ED  --broad spectrum abx initiated  --pan-cx'ed, plan to f/up results  --BC grew candida albicans  --Started on micafungin   --ID and opthalmology consulted   --off of vasopressors       Critical Care Daily Checklist:    A: Awake: RASS Goal/Actual Goal: RASS Goal: 0-->alert and calm  Actual: Torres Agitation Sedation Scale (RASS): Alert and calm   B: Spontaneous Breathing Trial Performed?  n/a   C: SAT & SBT Coordinated?  n/a                      D: Delirium: CAM-ICU Overall CAM-ICU: Negative   E: Early Mobility Performed? Yes   F: Feeding Goal:    Status:     Current Diet Order   Procedures    Diet diabetic [facility]; 2000 Calorie; Renal     Order Specific Question:   Indicate patient location for additional diet options:     Answer:   [facility]     Order Specific Question:   Total calories:     Answer:   2000 Calorie     Order Specific Question:   Additional Diet Options:     Answer:   Renal      AS: Analgesia/Sedation n/a   T: Thromboembolic Prophylaxis apixaban    H: HOB > 300 Yes   U: Stress Ulcer Prophylaxis (if needed) n/a   G: Glucose Control detemir 10U BID, SSI   B: Bowel Function Stool Occurrence: 1   I: Indwelling Catheter (Lines & Mayers) Necessity yes   D: De-escalation of Antimicrobials/Pharmacotherapies yes    Plan for the day/ETD Wean dilt drip, step down to floor     Code Status:  Family/Goals of Care: DNR  Ongoing        Critical secondary to Patient has a condition that poses threat to life and bodily function: SVT requiring dilt drip       Critical care was time spent personally by me on the following activities: development of treatment plan with patient or surrogate and bedside caregivers, discussions with consultants, evaluation of patient's response to treatment, examination of patient, ordering and performing treatments and interventions, ordering and review of laboratory studies, ordering and review of radiographic studies, pulse  oximetry, re-evaluation of patient's condition. This critical care time did not overlap with that of any other provider or involve time for any procedures.     Mounika Jamison MD  Critical Care Medicine  [facility]

## 2020-05-19 NOTE — SUBJECTIVE & OBJECTIVE
"Past Medical History:   Diagnosis Date    Atherosclerosis of aorta     noted on VIKTORIA 2011    Blood transfusion     Cataracts, bilateral     CHF (congestive heart failure)     Clotting disorder     Cognitive deficits as late effect of cerebrovascular disease 6/26/2012    Coronary artery disease     NSTEMI s/p PCI with FERCHO to LCx (12/16/19),     Decreased appetite     DM (diabetes mellitus), type 2 with renal complications     Diabetic retinopathy    ESRD on hemodialysis     TUESDAY/ THURSDAY/SATURDAY    Hyperlipidemia     Hyperparathyroidism, unspecified     Hypertension     Prostate cancer 2016 7/16/19:  wife states "he doesn't have it anymore"    Seizures     Stroke 2011    PT HAS SHAKES     Unsteady gait     Uses roller walker       Past Surgical History:   Procedure Laterality Date    CARDIAC SURGERY      CABG    CATARACT EXTRACTION      ou    CATARACT EXTRACTION, BILATERAL      cataracts      CORONARY ANGIOGRAPHY INCLUDING BYPASS GRAFTS WITH CATHETERIZATION OF LEFT HEART N/A 12/16/2019    Procedure: ANGIOGRAM, CORONARY, INCLUDING BYPASS GRAFT, WITH LEFT HEART CATHETERIZATION, rcfa, 5fr;  Surgeon: David Villegas MD;  Location: [facility] CATH LAB;  Service: Cardiology;  Laterality: N/A;    CORONARY ANGIOGRAPHY INCLUDING BYPASS GRAFTS WITH CATHETERIZATION OF LEFT HEART N/A 4/30/2020    Procedure: ANGIOGRAM, CORONARY, INCLUDING BYPASS GRAFT, WITH LEFT HEART CATHETERIZATION, rcfa, 10 am start;  Surgeon: David Villegas MD;  Location: [facility] CATH LAB;  Service: Cardiology;  Laterality: N/A;    CORONARY ARTERY BYPASS GRAFT  2009    bypass    EYE SURGERY      Left Guera AVF  1/11/2012    PROSTATE BIOPSY      positive    retinal laser      REVISION OF ARTERIOVENOUS FISTULA Left 2/27/2020    Procedure: Ligation of left upper extremity fistula, Closure of left upper extremity skin defect x 2, Intraoperative ultrasound ;  Surgeon: Stephane Lopez MD;  Location: [facility] OR;  Service: Vascular;  " Laterality: Left;    REVISION OF ARTERIOVENOUS FISTULA Left 4/10/2020    Procedure: AV fistula excision;  Surgeon: Stephane Lopez MD;  [facility];  Service: Vascular;  Laterality: Left;    ROTATOR CUFF REPAIR  right, 2005    ULTRASOUND GUIDANCE  12/16/2019    Procedure: Ultrasound Guidance;  Surgeon: David Villegas MD;  Location: [facility] CATH LAB;  Service: Cardiology;;    VASCULAR SURGERY         Review of patient's allergies indicates:   Allergen Reactions    Reglan [metoclopramide hcl] Diarrhea    Lorazepam      Other reaction(s): heavy sedation       Medications:  Medications Prior to Admission   Medication Sig    atorvastatin (LIPITOR) 80 MG tablet Take 1 tablet (80 mg total) by mouth every evening.    carvediloL (COREG) 12.5 MG tablet Take 1 tablet (12.5 mg total) by mouth 2 (two) times daily.    clopidogrel (PLAVIX) 75 mg tablet Take 1 tablet (75 mg total) by mouth once daily.    diltiaZEM (CARDIZEM CD) 180 MG 24 hr capsule Take 1 capsule (180 mg total) by mouth once daily.    docusate sodium (COLACE) 100 MG capsule Take 1 capsule (100 mg total) by mouth 2 (two) times daily.    furosemide (LASIX) 40 MG tablet TAKE 1 TABLET(40 MG) BY MOUTH EVERY DAY    losartan (COZAAR) 100 MG tablet Take 1 tablet (100 mg total) by mouth once daily.    megestrol (MEGACE) 20 MG Tab Take 1 tablet (20 mg total) by mouth once daily. (Patient taking differently: Take 20 mg by mouth 2 (two) times daily .)    sevelamer carbonate (RENVELA) 800 mg Tab Take 2 tablets (1,600 mg total) by mouth 3 (three) times daily with meals.    acetaminophen (TYLENOL) 325 MG tablet Take 2 tablets (650 mg total) by mouth every 6 (six) hours as needed. (Patient not taking: Reported on 5/4/2020)    ascorbic acid, vitamin C, (VITAMIN C) 500 MG tablet Take 500 mg by mouth every evening.     aspirin (ECOTRIN) 81 MG EC tablet Take 1 tablet (81 mg total) by mouth once daily.    blood-glucose meter kit To check BG 2 times  daily, to use with accuchek casa meter    insulin (LANTUS SOLOSTAR U-100 INSULIN) glargine 100 units/mL (3mL) SubQ pen Inject 20 Units into the skin every evening.    insulin aspart U-100 (NOVOLOG FLEXPEN U-100 INSULIN) 100 unit/mL (3 mL) InPn pen Inject 10 Units into the skin 2 (two) times daily with meals.    [] metroNIDAZOLE (FLAGYL) 500 MG tablet Take 1 tablet (500 mg total) by mouth 3 (three) times daily.     Antibiotics (From admission, onward)    None        Antifungals (From admission, onward)    Start     Stop Route Frequency Ordered    20 0730  micafungin 100 mg in sodium chloride 0.9 % 100 mL IVPB (ready to mix system)      -- IV Every 24 hours (non-standard times) 20 0624        Antivirals (From admission, onward)    None           Immunization History   Administered Date(s) Administered    Influenza - High Dose - PF (65 years and older) 10/05/2017    Influenza - Quadrivalent - PF (6 months and older) 2006, 11/15/2016    Influenza Split 2006    Pneumococcal Conjugate - 13 Valent 10/10/2016    Pneumococcal Polysaccharide - 23 Valent 2014, 10/30/2019    Tdap 2016       Family History     Problem Relation (Age of Onset)    Cancer Mother, Paternal Grandmother, Paternal Uncle    Cataracts Mother    Diabetes Mother    Glaucoma Mother    Heart disease Father, Maternal Grandmother    Hypertension Brother    No Known Problems Sister, Maternal Grandfather, Paternal Grandfather, Sister, Sister    Prostate cancer Brother        Social History     Socioeconomic History    Marital status:      Spouse name: Not on file    Number of children: Not on file    Years of education: Not on file    Highest education level: Not on file   Occupational History    Not on file   Social Needs    Financial resource strain: Not on file    Food insecurity:     Worry: Not on file     Inability: Not on file    Transportation needs:     Medical: Not on file      Non-medical: Not on file   Tobacco Use    Smoking status: Never Smoker    Smokeless tobacco: Never Used   Substance and Sexual Activity    Alcohol use: No    Drug use: No    Sexual activity: Not Currently   Lifestyle    Physical activity:     Days per week: Not on file     Minutes per session: Not on file    Stress: Not on file   Relationships    Social connections:     Talks on phone: Not on file     Gets together: Not on file     Attends Caodaism service: Not on file     Active member of club or organization: Not on file     Attends meetings of clubs or organizations: Not on file     Relationship status: Not on file   Other Topics Concern    Not on file   Social History Narrative    Not on file     Review of Systems   Constitutional: Negative for chills and fever.   HENT: Negative for congestion, rhinorrhea and sore throat.    Respiratory: Negative for cough.    Cardiovascular: Negative for chest pain.   Gastrointestinal: Negative for abdominal pain, nausea and vomiting.   Genitourinary: Negative for dysuria and hematuria.   Musculoskeletal: Negative for back pain and neck pain.   Skin: Negative for rash.   Neurological: Negative for seizures and syncope.     Objective:     Vital Signs (Most Recent):  Temp: 98.6 °F (37 °C) (05/19/20 0300)  Pulse: (!) 124 (05/19/20 0625)  Resp: (!) 28 (05/19/20 0625)  BP: 135/71 (05/19/20 0600)  SpO2: (!) 92 % (05/19/20 0625) Vital Signs (24h Range):  Temp:  [98.5 °F (36.9 °C)-98.7 °F (37.1 °C)] 98.6 °F (37 °C)  Pulse:  [] 124  Resp:  [2-33] 28  SpO2:  [69 %-100 %] 92 %  BP: (120-169)/(68-93) 135/71  Arterial Line BP: (111-167)/(43-75) 129/65     Weight: 67.6 kg (149 lb)  Body mass index is 20.78 kg/m².    Estimated Creatinine Clearance: 11.8 mL/min (A) (based on SCr of 6.2 mg/dL (H)).    Physical Exam   Constitutional: He is oriented to person, place, and time. He appears well-developed. He appears ill.   HENT:   Head: Normocephalic and atraumatic.   Eyes: Pupils  are equal, round, and reactive to light. Conjunctivae are normal.   Neck: Normal range of motion. Neck supple.   Cardiovascular: Normal rate.   Pulmonary/Chest: Effort normal.   Abdominal: Soft. Bowel sounds are normal.   Musculoskeletal: He exhibits no edema.   R. A-line in place. PIV on right in place   Neurological: He is alert and oriented to person, place, and time.   Skin: Skin is warm and dry.   R IJ tunnel cath noted to R chest wall    Psychiatric: He has a normal mood and affect. His behavior is normal.       Significant Labs:   Blood Culture:   Recent Labs   Lab 05/13/20  1202 05/15/20  1321 05/15/20  1330 05/17/20  0520 05/17/20  0630   LABBLOO No Growth after 4 days.  Gram stain aer bottle: budding yeast  Results called to and read back by: Jennifer Estevez RN  05/17/2020    05:22  DEB ALBICANS  ID consult required at [facility].  For susceptibility see order #[id number]  * Gram stain aer bottle: budding yeast  Results called to and read back by: Jennifer Estevez RN  05/17/2020    05:22  DEB ALBICANS  Susceptibility pending  ID consult required at [facility] and [facility].  * No Growth to date  No Growth to date  No Growth to date Gram stain aer bottle: yeast  Results called to and read back by:Brianda Vaz 05/19/2020  10:33     CBC:   Recent Labs   Lab 05/18/20  0350 05/19/20  0330   WBC 11.25 13.10*   HGB 7.9* 8.2*   HCT 24.9* 24.7*    164     CMP:   Recent Labs   Lab 05/17/20  1400 05/18/20  0350 05/18/20  1534 05/19/20  0330   * 139 136 133*   K 4.2 4.2 4.2 4.1    107 105 102   CO2 16* 18* 19* 18*   * 112* 224* 383*   BUN 27* 32* 35* 41*   CREATININE 4.2* 5.2* 6.0* 6.2*   CALCIUM 6.7* 7.2* 7.4* 7.2*   PROT  --   --  5.8*  --    ALBUMIN 1.4*  --  1.7*  --    BILITOT  --   --  0.3  --    ALKPHOS  --   --  155*  --    AST  --   --  43*  --    ALT  --   --  5*  --    ANIONGAP 11 14 12 13   EGFRNONAA 14.2* 10.9* 9.2*  8.8*     Fungus Culture (Blood or Bone Marrow): No results for input(s): FUNGUSCULTUR in the last 4320 hours.  Urine Culture:   Recent Labs   Lab 04/29/20 1956 05/13/20  1458   LABURIN No significant growth Multiple organisms isolated. None in predominance.  Repeat if  clinically necessary.       Significant Imaging: None

## 2020-05-19 NOTE — PLAN OF CARE
Discharge Recommendation: SNF.    Evaluation completed today. PT goals appropriate.    Cherelle Grier PT, DPT  2020  Pager: [phone]        Problem: Physical Therapy Goal  Goal: Physical Therapy Goal  Description  Goals to be met by: 2020     Patient will increase functional independence with mobility by performin. Supine to sit with Stand-by Assistance  2. Sit to supine with Stand-by Assistance  3. Sit to stand transfer with Minimal Assistance  4. Bed to chair transfer with Moderate Assistance using LRAD  5. Gait  x 10 feet with Moderate Assistance using LRAD.   6. Sitting at edge of bed x15 minutes with Stand-by Assistance while performing dynamic UE activities  7. Lower extremity exercise program x20 reps per handout, with independence     Outcome: Ongoing, Progressing

## 2020-05-20 NOTE — PLAN OF CARE
05/20/20 1032   Post-Acute Status   Post-Acute Authorization Placement   Post-Acute Placement Status Referrals Sent   SW outreached patient spouse via phone at [phone] to follow up on patient recs for SNF for post acute care. SW reviewed SNF list with spouse. Spouse identified [facility] and [facility] as choices to send referrals to. Spouse not identifying additional choices at this time. SW sent referrals to [facility] and  SNF via [facility]. SW will continue to follow.  Ani Fischer LMSW [facility]    (1:40PM) MIGUEL A called in LOCET and faxed PASRR to state. PASRR uploaded to [employer]. Awaiting 142.    (1:51PM) Patient denied by [facility] due to level of functioning too low.  Ani Fischer LMSW  [facility]     (3:32PM) MIGUEL A received 142 and uploaded to [employer].  Ani Fischer LMSW  [facility]

## 2020-05-20 NOTE — NURSING
Pt arrived on unit w/o PIV, spoke w/ MD about using HD catheter, MD aware labs are late and are put back in under lab collection. MD updated on current situation will update if any changes. VSS. Report to be given to oncoming nurse.

## 2020-05-20 NOTE — PLAN OF CARE
"[facility]  Palliative Care   Psychosocial Assessment    Patient Name: Kodi Ibanez Sr.  MRN: [id number]  Admission Date: 5/13/2020  Hospital Length of Stay: 7 days  Code Status: DNR   Attending Provider: Unruly Feng MD  Palliative Care Provider: Dr. Guy  Primary Care Physician: Melonie Elias MD  Principal Problem:Shock    Reason for Referral: assistance with advance directives and assistance with clarification of goals of care  Consult Order Date: 5/17/20 0759  Primary CM/SW: Ani Fischer LMSW    Present during Interview: Spoke with wife Yaima Ibanez via phone..      Primary Language:English   Needed: no      Past Medical Situation:   PMH:   Past Medical History:   Diagnosis Date    Atherosclerosis of aorta     noted on VIKTORIA 2011    Blood transfusion     Cataracts, bilateral     CHF (congestive heart failure)     Clotting disorder     Cognitive deficits as late effect of cerebrovascular disease 6/26/2012    Coronary artery disease     NSTEMI s/p PCI with FERCHO to LCx (12/16/19),     Decreased appetite     DM (diabetes mellitus), type 2 with renal complications     Diabetic retinopathy    ESRD on hemodialysis     TUESDAY/ THURSDAY/SATURDAY    Hyperlipidemia     Hyperparathyroidism, unspecified     Hypertension     Prostate cancer 2016    7/16/19:  wife states "he doesn't have it anymore"    Seizures     Stroke 2011    PT HAS SHAKES     Unsteady gait     Uses roller walker     Mental Health/Substance Use History: n/a  Risk of Abuse, neglect or exploitation: none noted  Current or Previous Trauma and/or evidence of PTSD: none noted  Non-traditional Health practices:     Understanding of diagnosis and prognosis: Will benefit from continued follow up regarding px and dx  Experience/Comfort level with health care system:      Socio-Economic Factors/Resources:  Address: [address] [place] [address]  Erasmo [place]NANDEZ [address]  Phone Number: [phone] " "(home)     Marital Status:  x 42 years  Perceived impact on household composition: Lives with wife in apartment.  Children: 3 children: 2 sons: Kodi Vickey Garcia, and joe Camilo    Patient/Family perceptions about Caregiving Needs; availability and capacity: Wife's focus is to hope pt will agree to have dialysis line replaced to continue dialysis. Wife understands pt will still need to have dialysis 3 times a week. Wife hoping pt will be able to come home.    Family Dynamics/Relationships:  Supportive family. Wife primary caregiver. Wife stated that she has close relationship and is missing pt being home. Wife currently staying with her granddaughter and granddaughter's mom as she is having trouble being alone in the home.    Wife stated that her children are 42, 32, and 29 and have three grandchildren ages 21, 19, and 2. Pt and Wife are very close to their kids and grandchildren.     Patient/Family Strengths/Resilience: supportive family. Supportive Religious family  Patient/Family Coping Strategies:  relies on lei, family.     Activities of Daily Living: Some assistance with adls, transporation  Support Systems-Family & Community (Home Health, HME etc): [facility]. Wheelchair, rollator, [facility]. [facility].    Transportation:  yes    Work/Education History: Pt is retired. Pt managed [employer]  Self-Care Activities/Hobbies: Enjoyed taking care of his yard. Thought his grass needed to be "particular length" and his sons didn't do it right. Liked to fish sometimes. Loved any type of sports, volleyball and football on tv. Wife joked and called the remote control "His girlfriend" as he always would have to have it.      History: no    Financial Resources:[employer] Medicare.      Advanced Care Planning & Legal Concerns:   Advanced Directives/Living Will: yes Reveiwed in chart.   LaPOST/POLST: no   Planning:  no    Power of : yes Reviewed. Wife POA  Surrogate " Decision Maker: Wife:     Emergency Contacts:  Wife: Yaima Bricenoter: [phone]; [phone];   Son: oKdi Garcia: [phone]  Son: Vickey: [phone]  Daughter: Leslee: [phone]    Spirituality, Culture & Coping Mechanisms:  F- Madai and Belief: Worship     I - Importance: Strong Madai. Active. Would have a Bible Study with two people he would meet with often.    C - Community/Culture Values:     A - Address in Care: their  visiting.       Goals/Hopes/Expectations: Wife hoping that pt will agree to replace line for dialysis.  Fears/Anxiety/Concerns: Wife stated that pt is tired and doesn't want unnecessary procedures. Wife having trouble staying in home alone and has been staying with her grandchildren.        Preferences about EOL Environment: (own bed, family nearby, pets, music, etc)  tbd    Complicated Bereavement Risk Assessment Tool (CBRAT)  Reference:  [facility] Clinical Practice Group (May 2016). Bereavement Risk Screening and Management Guidelines.  Retrieved from: http://www.grpcc.com.au/wp-content/uploads//VEONV-Qrfmhctpjyx-Jcaasgcaz-and-Management-Guideline-2016.pdf      Bereaved Client Characteristics   ? Under 18      no  ? Was a Twin   no  ? Young Spouse   no  ? Elderly Spouse    yes  ? Isolated    no  ? Lacks Meaningful Social Support   no  ? Dissatisfied with help available during illness   no  ? New to Financial Whitman no  ? New to Decision-Making   no    Illness  ? Inherited Disorder   no  ? Stigmatized Disease in the family/community   no  ? Lengthy/Burdensome   yes     Bereaved Client's History of Loss   ? Cumulative Multiple Losses   no  ? Previous Mental Health Illnesses   no  ? Current Mental Health Illness   no  ? Other Significant Health Issues   no   ? Migrant/Refugee   no Death  ? Sudden or Unexpected   no  ? Traumatic Circumstances Associated with Death   no  ? Significant Cultural/Social Burdens as  "a result of Death   no   Relationship with   ? Profound Lifelong Partner   yes  ? Highly Dependent    no  ? Antagonistic   no  ? Ambivalent   no  ? Deeply Connected   yes  ? Culturally Defined   no   Risk Factors Scores  0-2  Low  3-5  Moderate  5+  High  All persons scoring moderate to high presume to be at risk**    (** It is acknowledged that protective factors and resilience may outweigh apparent risk factors.      Total Risk Factors Score:   Mild to Moderate    Wife has support from children and grandchildren, along with spiritual support with Episcopalian. Wife to benefit from continued follow up and bereavement support.       Discharge Planning Needs/Plan of Care:       [facility] SW spoke with pt's wife this morning via phone for additional support. Wife able to describe pt as a "stubborn" strong willed man. Wife spoke about how pt loved taking care of his yard and lawn and even after he was unable to do it himself he tried to assist. Wife spoke about pt being an "early bird" and wanting to be places, including dialysis at 5 am in the morning early. Wife stated that pt is a "cold" person and always has to have blankets and "wiggling" in bed until he gets warm. Wife stated that she knew he was feeling better when he started "wiggling again."     Wife expressed sadness that pt has been in hospital and she has not been able to be at bedside. Wife stated that she has been staying with her granddaughter as she has trouble being in their home "without him". Wife became tearful when talking about it.     Wife stated that in 2018 his physician Dr. Elias spoke with them about "lapost/DNR". Wife stated that pt stated that he did not want to be on the vent or have CPR right away. Wife stated that she was an EMT for 37 years and asked pt for weeks later to make sure that pt knew what he was saying. Wife stated that she didn't "turn in the papers" till 6 to 8 months later to make sure he really wanted these thing. Wife " "stated that she would honor his wishes as she knows pt really wants this.     Wife stated that prior to coming into hospital, pt was very weak and needed assistance with his ADLs. Wife stated that pt is "very stubborn" and wanted to still do everything on own but wife insisted on helping as she didn't want him to fall or be dirty.     Informed wife that [facility] to continue to follow and support pt and her in pt's journey. Support provided to wife.    Informed Dr. Guy of conversation with pt's wife.    Elizabeth Morales, KRYSTAL, [facility]    "

## 2020-05-20 NOTE — NURSING
Pt in bed with no complaints voiced, no acute distress noted at this time.  Assisted pt with bedtime care.  Bed locked and at lowest position.  Call light in hand.  Frequent assessments ongoing, pain currently controlled.

## 2020-05-20 NOTE — ASSESSMENT & PLAN NOTE
.Palliative Care Encounter / Goals of care discussion:     Narrative:   Kodi Ibanez Sr. is a 60 y.o. male patient with ESRD, complications of vascular access, CAD, s/p CABG, H/O NSTEMI with PCI, now admitted with fungemia and sepsis likely related to perm-a-cath. Pt. Has expressed wishes to not undergo any additional procedures.     1: Symptoms:   - pain assesment: denies pain   - dyspnea assessment: not short of breath   - anxiety assessment: not anxious   - depression assessment: unable to assess    2: Code Status:   - DNR    3: Psychosocial :   - Marital status:   - Children: one daughter and two sons  - POA: wife is next of kin and makes most medical decisions. She is a former EMT    3: Medicolegal:    - Advance directive: on file. No artificial life prolonging measures at the end of life    4: Support System:  - Spiritual: yes, haleigh's whitglendy  - Family: supportive     5: Goals of care Decisions / Recommendations / Plan:     05/20/2020    - Zoom conference with pt. Wife and pt. Today   - discussed necessity to exchange perm-a-cath in order to provide best possible care for fungemia   - pt. Agrees to have line changed.    - outlined that ABX therapy would be extended for likely weeks after blood cultures clear.    - discussed option of oral ABX if QT time allows      - briefly discussed post hospital discharge options including SNF. Pt. Voices he wants to go home.      - will speak with wife more regarding discharge planning once appropriate. Will follow with Zoom conference again in AM.     Above discussed with Dr. Feng       6: Follow up plans:    Daily.     Thank you for your consult. I will follow along with you. Please call [phone] with questions.

## 2020-05-20 NOTE — SUBJECTIVE & OBJECTIVE
"Interval History: pt. Stepped down to the floor. Spoke with Dr. Feng and Dr. Montiel. Video conference with pt. And wife via Zoom.     Past Medical History:   Diagnosis Date    Atherosclerosis of aorta     noted on VIKTORIA 2011    Blood transfusion     Cataracts, bilateral     CHF (congestive heart failure)     Clotting disorder     Cognitive deficits as late effect of cerebrovascular disease 6/26/2012    Coronary artery disease     NSTEMI s/p PCI with FERCHO to LCx (12/16/19),     Decreased appetite     DM (diabetes mellitus), type 2 with renal complications     Diabetic retinopathy    ESRD on hemodialysis     TUESDAY/ THURSDAY/SATURDAY    Hyperlipidemia     Hyperparathyroidism, unspecified     Hypertension     Prostate cancer 2016    7/16/19:  wife states "he doesn't have it anymore"    Seizures     Stroke 2011    PT HAS SHAKES     Unsteady gait     Uses roller walker       Past Surgical History:   Procedure Laterality Date    CARDIAC SURGERY      CABG    CATARACT EXTRACTION      ou    CATARACT EXTRACTION, BILATERAL      cataracts      CORONARY ANGIOGRAPHY INCLUDING BYPASS GRAFTS WITH CATHETERIZATION OF LEFT HEART N/A 12/16/2019    Procedure: ANGIOGRAM, CORONARY, INCLUDING BYPASS GRAFT, WITH LEFT HEART CATHETERIZATION, rcfa, 5fr;  Surgeon: David Villegas MD;  Location: [facility] CATH LAB;  Service: Cardiology;  Laterality: N/A;    CORONARY ANGIOGRAPHY INCLUDING BYPASS GRAFTS WITH CATHETERIZATION OF LEFT HEART N/A 4/30/2020    Procedure: ANGIOGRAM, CORONARY, INCLUDING BYPASS GRAFT, WITH LEFT HEART CATHETERIZATION, rcfa, 10 am start;  Surgeon: Dvaid Villegas MD;  Location: [facility] CATH LAB;  Service: Cardiology;  Laterality: N/A;    CORONARY ARTERY BYPASS GRAFT  2009    bypass    EYE SURGERY      Left Guera AVF  1/11/2012    PROSTATE BIOPSY      positive    retinal laser      REVISION OF ARTERIOVENOUS FISTULA Left 2/27/2020    Procedure: Ligation of left upper extremity fistula, Closure of left " upper extremity skin defect x 2, Intraoperative ultrasound ;  Surgeon: Stephane Lopez MD;  Location: [facility] OR;  Service: Vascular;  Laterality: Left;    REVISION OF ARTERIOVENOUS FISTULA Left 4/10/2020    Procedure: AV fistula excision;  Surgeon: Stephane Lopez MD;  Location: [facility] OR 2ND FLR;  Service: Vascular;  Laterality: Left;    ROTATOR CUFF REPAIR  right, 2005    ULTRASOUND GUIDANCE  12/16/2019    Procedure: Ultrasound Guidance;  Surgeon: David Villegas MD;  Location: [facility] CATH LAB;  Service: Cardiology;;    VASCULAR SURGERY         Review of patient's allergies indicates:   Allergen Reactions    Reglan [metoclopramide hcl] Diarrhea    Lorazepam      Other reaction(s): heavy sedation       Medications:  Continuous Infusions:    Scheduled Meds:   sodium chloride 0.9%   Intravenous Once    apixaban  5 mg Oral BID    aspirin  81 mg Oral Daily    atorvastatin  80 mg Oral QHS    clopidogreL  75 mg Oral Daily    diltiaZEM  60 mg Oral Q6H    fluconazole  800 mg Oral Once    Followed by    [START ON 5/21/2020] fluconazole  400 mg Oral Daily    insulin detemir U-100  10 Units Subcutaneous BID    losartan  100 mg Oral Daily    metoprolol tartrate  25 mg Oral BID    sevelamer carbonate  800 mg Oral TID WM     PRN Meds:Dextrose 10% Bolus, glucagon (human recombinant), hydroxyzine HCL, insulin aspart U-100, sodium chloride 0.9%    Family History     Problem Relation (Age of Onset)    Cancer Mother, Paternal Grandmother, Paternal Uncle    Cataracts Mother    Diabetes Mother    Glaucoma Mother    Heart disease Father, Maternal Grandmother    Hypertension Brother    No Known Problems Sister, Maternal Grandfather, Paternal Grandfather, Sister, Sister    Prostate cancer Brother        Tobacco Use    Smoking status: Never Smoker    Smokeless tobacco: Never Used   Substance and Sexual Activity    Alcohol use: No    Drug use: No    Sexual activity: Not Currently       Review of Systems   Unable  to perform ROS: Acuity of condition     Objective:     Vital Signs (Most Recent):  Temp: 98.1 °F (36.7 °C) (05/20/20 0822)  Pulse: 95 (05/20/20 0822)  Resp: 18 (05/20/20 0500)  BP: 126/75 (05/20/20 0822)  SpO2: (!) 94 % (05/20/20 0822) Vital Signs (24h Range):  Temp:  [96.8 °F (36 °C)-98.1 °F (36.7 °C)] 98.1 °F (36.7 °C)  Pulse:  [] 95  Resp:  [16-32] 18  SpO2:  [92 %-94 %] 94 %  BP: (115-134)/(52-80) 126/75  Arterial Line BP: (112-132)/(49-55) 131/52     Weight: 67.6 kg (149 lb)  Body mass index is 20.78 kg/m².    Review of Symptoms    Unable to assess due to acuity of condition  Symptom Assessment (ESAS 0-10 scale)   ESAS 0 1 2 3 4 5 6 7 8 9 10   Pain              Dyspnea              Anxiety              Nausea              Depression               Anorexia              Fatigue              Insomnia              Restlessness               Agitation              CAM / Delirium __ --  ___+   Constipation     __ --  ___+   Diarrhea           __ --  ___+  Bowel Management Plan (BMP): Yes    Comments: none    Pain Assessment: unable to assess; no nonverbal signs of pain    OME in 24 hours: 0    Performance Status: 50      Physical Exam  Constitutional: He is awake but confused  Elderly male, disoriented  No distress  Cachectic, chronically ill appearing   HENT:   Mouth/Throat: Oropharynx is clear and moist. No oropharyngeal exudate.   Eyes: Conjunctivae are normal. No scleral icterus.   Neck: No tracheal deviation present.   Cardiovascular: Normal rate, regular rhythm and normal heart sounds.   Irregular tachycardia, rate > 100   Pulmonary/Chest: Effort normal and breath sounds normal. No respiratory distress.   Abdominal: He exhibits no distension.   Musculoskeletal: He exhibits no edema or tenderness.   Neurological: He is alert and oriented to person, place, and time. No cranial nerve deficit.   Lethargic. No gross focal deficits   Skin: Skin is warm and dry.   Nursing note and vitals reviewed.  Significant  Labs:   LFT:   Recent Labs   Lab 05/18/20  1534   AST 43*   ALKPHOS 155*   BILITOT 0.3     CBC:   Recent Labs   Lab 05/20/20 0519   WBC 13.25*   HGB 8.7*   HCT 26.9*   MCV 84        BMP:  Recent Labs   Lab 05/20/20 0519   *   *   K 3.9   CL 98   CO2 24   BUN 23   CREATININE 4.1*   CALCIUM 7.7*   MG 2.1     LFT:  Lab Results   Component Value Date    AST 43 (H) 05/18/2020    ALKPHOS 155 (H) 05/18/2020    BILITOT 0.3 05/18/2020     Albumin:   Albumin   Date Value Ref Range Status   05/18/2020 1.7 (L) 3.5 - 5.2 g/dL Final     Protein:   Total Protein   Date Value Ref Range Status   05/18/2020 5.8 (L) 6.0 - 8.4 g/dL Final     Lactic acid:   Lab Results   Component Value Date    LACTATE 2.1 05/17/2020    LACTATE 1.0 05/14/2020       Significant Imaging: I have reviewed all pertinent imaging results/findings within the past 24 hours.    Advance Care Planning   Advanced Directives::  Living Will: Yes. Copy on chart: Yes  LaPOST: No  Do Not Resuscitate Status: DNR since this admission  Medical Power of : No; wife is surrogate Yaima Mendez Spouse [phone]         Decision-Making Capacity: Family answered questions, Patient unable to communicate due to disease severity/cognitive impairment       Living Arrangements: Lives with spouse    Psychosocial/Cultural: did not explore during today's visit; wife is an EMT with some medical knowledge    Spiritual:     F- Madai and Belief: Reuben    I - Importance: yes  .  C - Community: yes    A - Address in Care: wife to coordinate

## 2020-05-20 NOTE — CARE UPDATE
Rapid Response Nurse Chart Check     Chart check completed, abnormal VS noted. bedside RNAlina contacted, no concerns verbalized at this time, pt receiving dialysis at bedside at this time, instructed to call [phone] for further concerns or assistance.

## 2020-05-20 NOTE — PLAN OF CARE
05/20/20 1012   Post-Acute Status   Post-Acute Authorization Placement   Post-Acute Placement Status Patient List Provided   SW following patient for post acute care needs. Patient received therapy recs for SNF for post acute care. SW met with patient in room to provide with SNF list and to obtain choices. Patient requesting SW to come back at a later time due to feeling too tired to discuss at this time. SW provided SNF list to patient. SW will follow up with patient at a later time today to obtain choices.  Ani Fischer, WILLIAM  [facility]

## 2020-05-20 NOTE — ASSESSMENT & PLAN NOTE
Kodi Ibanez Sr. is a 59 y/o male with past medical history of CHF, CAD with recent NSTEMI [~4/29] (CABG 2009, FERCHO placed in 12/2019), diabetes, CVA, ESRD on dialysis who was admitted to MICU for septic shock on 5/13. He was started on antibiotics and required pressor support which has since been weaned off. On 5/15, patient noted to be febrile with temp > 101F. Blood cultures ordered revealed candida albicans in 2/4 cultures. Patient was started on Micafungin 100 mg IV daily.   Blood cx 5/15: Candida Albicans 2/4 bottles  Blood cx 5/17: Yeast, 2/4 bottles    Recommendations  - Stop micafungin and switch to Fluconazole 800 mg PO X 1, followed by 400 mg daily. Check EKG for QT prolongation in the morning  - Agree with ophthalmology consult, unable to visualize retina after dilation  - Concern for source control with R. subclavian tunneled cath given persistent candidemia. Recommend removal of the tunneled catheter for source control.  - Repeat blood cultures 5/17 with yeast in 2/4 bottles.

## 2020-05-20 NOTE — ASSESSMENT & PLAN NOTE
".Palliative Care Encounter / Goals of care discussion:     Narrative:   Kodi Ibanez Sr. is a 60 y.o. male patient with ESRD, complications of vascular access, CAD, s/p CABG, H/O NSTEMI with PCI, now admitted with fungemia and sepsis likely related to perm-a-cath. Pt. Has expressed wishes to not undergo any additional procedures.     1: Symptoms:   - pain assesment: denies pain   - dyspnea assessment: not short of breath   - anxiety assessment: not anxious   - depression assessment: unable to assess    2: Code Status:   - DNR    3: Psychosocial :   - Marital status:   - Children: one daughter and two sons  - POA: wife is next of kin and makes most medical decisions. She is a former EMT    3: Medicolegal:    - Advance directive: on file. No artificial life prolonging measures at the end of life    4: Support System:  - Spiritual: yes, nawaf mon  - Family: supportive     5: Goals of care Decisions / Recommendations / Plan:     05/20/2020   Initial Assessment:    Insight in Disease and Illness trajectory  - pt. Is very little responsive to medial questions and conversations. He engages when asked about his family.   - wife is very involved in medical care. She understands that there is a fungus infection and that likely the Perm-a-cath line is the culprit.   - she had a conversation with her children and feels that if the pt. Was to need a cath exchange the family would want him to get it.     Prognostication:  - overall poor long term prognosis due to many advanced co-morbidities and now acute fungal septicemia.     Goals of Care:  - discussed difficulty to treat or cure fungemia without catheter exchange  - family feels that "if he needs a exchange he should get one"  - pt. Has not been engaged in any discussion with me today, but the family expressed to respect his wishes should they be voiced  - will plan on teleconference tomorrow to discuss next steps  - awaiting ID evaluation and " recommendations  - Discussed with Dr. West.       Advanced Care Planning:   Advance Care Planning    Continue current care        Symptom Management:  - no sx. To palliate currently    Disposition:  -continue hospital based care.     6: Follow up plans:    Daily.     Thank you for your consult. I will follow along with you. Please call [phone] with questions.

## 2020-05-20 NOTE — PROGRESS NOTES
Hospital Medicine  Progress Note      Patient Name: Kodi Ibanez Sr.  MRN:  [id number]  [facility] Medicine Team: [facility] HOSP MED R Unruly Feng MD  Date of Admission:  5/13/2020     Length of Stay:  LOS: 7 days   Expected Discharge Date: 5/22/2020  Principal Problem:  Shock      Subjective:    Interval History/Overnight Events:  Patient stepped down from MICU overnight. ID, nephrology, palliative care all following for patient's fungemia. Per discussion with palliative care, patient now agreeable to tunneled catheter removal for source control so vascular surgery consulted. Plan for removal of line AFTER HD on 5/21/20. ID recommended switching from micafungin to fluconazole today, ophthalmology to re-evaluate tomorrow. HR well controlled with PO diltiazem.       Review of Systems:  Constitutional: Negative for chills, fatigue, fever.   Respiratory: Negative for cough, shortness of breath.    Cardiovascular: Negative for chest pain, palpitations, leg swelling.   Gastrointestinal: Negative for abdominal pain, nausea, vomiting, diarrhea, constipation  Genitourinary: Negative for dysuria, frequency.   All other systems reviewed and are negative.      Objective:    Physical Exam:  Temp:  [96.8 °F (36 °C)-98.1 °F (36.7 °C)]   Pulse:  []   Resp:  [16-32]   BP: (113-134)/(52-80)   SpO2:  [92 %-94 %]   Arterial Line BP: (112-132)/(49-55)     Constitutional: Appears comfortable in no acute distress  Eyes: No scleral icterus or eye discharge  Cardiovascular: Normal heart rate.  Regular heart rhythm.  No murmur heard.  Pulmonary/Chest: Effort normal and breath sounds normal. No respiratory distress. No wheezes, rales, or rhonchi  Abdominal: Soft. Bowel sounds are normal.  No distension.  No tenderness  Musculoskeletal: No deformities.  No edema.   Neurological: Alert.  Oriented to person, place, and time.   Skin: Skin is warm and dry.       Assessment and Plan:    Mr. Kodi Ibanez Sr. is a 60 y.o. male who presented to  [facility] on 5/13/2020 with shock 2/2 sepsis. Please see MICU note hospital course.     Shock  Candidemia  2/2 probable sepsis likely r/t LUE AV fistula excision. Patient also with R tunneled catheter placed 2/2020.  Patient underwent ligation of LUE fistula on 2/27/20 with Dr Lopez ([facility] , vascular surgery) and had ongoing issues afterwards. Patient was admitted on 4/10 for excision of his infected LUE mid and distal AVF with cultures and specimen obtained. Surgical cultures 4/10 grew 2 different prevotella species, fusobacterium, and strep anginosus. The patient was d/c'ed with home health wound care and po augmentin/clindamycin x 10 days. On 4/20 antibiotics were changed to Flagyl 500 mg p.o. q.8 and Cefazolin after dialysis 2 g on Tuesday and Thursday, 3 g on Saturday. End date on antibiotics on 5/11.     --WBC 15, lactate 2.7, procal 10.98  --1.5 L IVF given in ED  --broad spectrum abx initiated  --pan-cx'ed, plan to f/up results  --BC grew candida albicans  --Started on micafungin, now on fluconazole  --ID and opthalmology consulted   --off of vasopressors  --vascular surgery consult for tunneled catheter removal after HD on 5/21/20        Atrial fibrillation  --patient on diltiazem for rate control; restarted home dose   --on ASA/plavix/apixiban   --Chads Vasc score 6 points        NSTEMI (non-ST elevated myocardial infarction)  Hx of CABG in 2009 and placement of FERCHO in 12/2019. Patient recenetly admitted on 4/29 and d/c'ed 5/2, admitted for an NSTEMI, a LHC was performed where re-stenosis of a previous stent was found and balloon angioplasty was done. He is now on ASA/plavix, but per wife has not been compliant.     --troponin now down-trending; stopped trending  --continue ASA/plavix (home meds)  --echo pending     Essential hypertension  --restarted home losartan and will hold coreg monitor BP then consider restarting      SVT (supraventricular tachycardia)  --ECG showed SVT yesterday with HR  140-150'2  --No improvement after metoprolol 5mgx2 or IV bolus dilt  --Required diltiazem drip, wean and transition to PO dilt today         HLD (hyperlipidemia)  --statin for secondary prevention    ESRD on hemodialysis  Patient on HD T/Th/Sat with last session  Prior to admission on 5/12, where he was admitted to the ED with palpatations after session. Patient has tunneled catheter that was inserted in 2/2020 that is being used for access.      --nephrology consulted  --HD done on 5/16/20, will be done today  --K+ 6.2 hold meds to shift as patient will get HD today per nephro  --avoid nephrotoxic meds  --oliguric, strict I/o's  --BMP daily       AV fistula infection  --see shock  --continue micafungin   --ID and opthalmology consulted      Endocrine  Type 2 diabetes, uncontrolled, with neuropathy  -->500 on admission. Goal -180  --Not at goal on 10 detemir, increased to 10U BID  --SSI prn initiated  --accuchecks q4h        Diet:  Diabetic, Renal  DVT PPx:  Eliquis  Goals of Care:  DNR      Disposition:  Pending clearance of fungemia  Discharge Needs:  SNF, follow up with ID      Unruly Feng MD  [facility]  Spectra:  [phone]  Pager:  [phone]

## 2020-05-20 NOTE — PROGRESS NOTES
[facility]  Infectious Disease  Progress Note    Patient Name: Kodi Ibanez Sr.  MRN: [id number]  Admission Date: 5/13/2020  Length of Stay: 7 days  Attending Physician: Unruly Feng MD  Primary Care Provider: Melonie Elias MD    Isolation Status: No active isolations  Assessment/Plan:      Candidemia  Kodi Ibanez Sr. is a 59 y/o male with past medical history of CHF, CAD with recent NSTEMI [~4/29] (CABG 2009, FERCHO placed in 12/2019), diabetes, CVA, ESRD on dialysis who was admitted to MICU for septic shock on 5/13. He was started on antibiotics and required pressor support which has since been weaned off. On 5/15, patient noted to be febrile with temp > 101F. Blood cultures ordered revealed candida albicans in 2/4 cultures. Patient was started on Micafungin 100 mg IV daily.   Blood cx 5/15: Candida Albicans 2/4 bottles  Blood cx 5/17: Yeast, 2/4 bottles    Recommendations  - Stop micafungin and switch to Fluconazole 800 mg PO X 1, followed by 400 mg daily. Check EKG for QT prolongation in the morning  - Agree with ophthalmology consult, unable to visualize retina after dilation  - Concern for source control with R. subclavian tunneled cath given persistent candidemia. Recommend removal of the tunneled catheter for source control.  - Repeat blood cultures 5/17 with yeast in 2/4 bottles.              Anticipated Disposition: TBD    Thank you for your consult. I will follow-up with patient. Please contact us if you have any additional questions.    Monica Lucas,   Infectious Disease  [facility]    Subjective:     Principal Problem:Shock    HPI: Kodi Ibanez Sr. is a 59 y/o male with past medical history of CHF, CAD with recent NSTEMI [~4/29] (CABG 2009, FERCHO placed in 12/2019), diabetes, CVA, ESRD on dialysis Tuesday/Thursday/Saturday (last dialyzed morning of 5/12) presents to the ER by referral from infectious disease clinic after virtual visit today. The ID provider  was concerned about the patients AMS and recent elevated WBC count. Recently,junior underwent ligation of LUE fistula on 2/27/20 with Dr Lopez ([facility], vascular surgery) and had ongoing issues afterwards. Patient was admitted on 4/10 for excision of his infected LUE mid and distal AVF with cultures and specimen obtained. Surgical cultures 4/10 grew 2 different prevotella species, fusobacterium, and strep anginosus. The patient was d/c'ed with home health wound care and po augmentin/clindamycin x 10 days. On 4/20 antibiotics were changed to Flagyl 500 mg p.o. q.8 and Cefazolin after dialysis 2 g on Tuesday and Thursday, 3 g on Saturday. End date on antibiotics on 5/11.     Following these events, on 4/29 the patient was admitted for an NSTEMI, a LHC was performed where re-stenosis of a previous stent was found and balloon angioplasty was done. He was discharged on 5/2. He is now on ASA/plavix, but per wife has not been compliant. Patient normally oliguric (urinating ~2-3 x per week). Patient was seen in the [facility] on the evening of 5/12 due to palpitations after his normal dialysis and sent home after evaluation, for possible antibiotic side effect.He presents to the ED with encephalopathy x 1 day, leukocytosis of 15, lactate 2.7, troponin 0.482, , procal 10.98, Cr 6.5, BUN 41, and hyperglycemia. Patient pan-cultured, currently only UA resulted with large amount of bacteria. Patient requiring high dose Levophed on admission. Broad spectrum antibiotics initiated. 1.5 L IVF given.    Hospital course   5/13: The patient was admitted to MICU for shock likely 2/2 to sepsis on 5/13. Patient remains on low dose Levophed. Nephrology consulted for possible dialysis. Blood cx NGTD. Patient remains on Heparin gtt for paroxysmal A fib with CHADS Vasc score of 6. Previously hyperglycemic with BG now well controlled.     5/14: Levo stopped in morning. Zosyn changed to unasyn.      5/15: Patient spiked new temp,  vanc restarted. Patient with intermittent SVT- Home coreg and dilt restarted. Step down orders placed.     5/17: Hypotension overnight requiring vasopressors. Transferred back to MICU on norepi    5/18: Patient with candida albicans on blood cultures from 5/15.    5:19: ID consulted for Candidemia.      Interval History: No acute events overnight. Patient stepped down from ICU. Repeat cultures from 5/17 with yeast.    Review of Systems   Constitutional: Negative for chills and fever.   HENT: Negative for congestion, rhinorrhea and sore throat.    Respiratory: Negative for cough.    Cardiovascular: Negative for chest pain.   Gastrointestinal: Negative for abdominal pain, nausea and vomiting.   Genitourinary: Negative for dysuria and hematuria.   Musculoskeletal: Negative for back pain and neck pain.   Skin: Negative for rash.   Neurological: Negative for seizures and syncope.     Objective:     Vital Signs (Most Recent):  Temp: 98.1 °F (36.7 °C) (05/20/20 0822)  Pulse: 95 (05/20/20 0822)  Resp: 18 (05/20/20 0500)  BP: 126/75 (05/20/20 0822)  SpO2: (!) 94 % (05/20/20 0822) Vital Signs (24h Range):  Temp:  [96.8 °F (36 °C)-98.1 °F (36.7 °C)] 98.1 °F (36.7 °C)  Pulse:  [] 95  Resp:  [0-32] 18  SpO2:  [91 %-96 %] 94 %  BP: (109-143)/(52-80) 126/75  Arterial Line BP: (109-143)/(49-62) 131/52     Weight: 67.6 kg (149 lb)  Body mass index is 20.78 kg/m².    Estimated Creatinine Clearance: 17.9 mL/min (A) (based on SCr of 4.1 mg/dL (H)).    Physical Exam   Constitutional: He is oriented to person, place, and time. He appears well-developed. He appears ill.   HENT:   Head: Normocephalic and atraumatic.   Eyes: Pupils are equal, round, and reactive to light. Conjunctivae are normal.   Neck: Normal range of motion. Neck supple.   Cardiovascular: Normal rate.   Pulmonary/Chest: Effort normal.   Abdominal: Soft. Bowel sounds are normal.   Musculoskeletal: He exhibits no edema.    PIV on right in place   Neurological: He is  alert and oriented to person, place, and time.   Skin: Skin is warm and dry.   R IJ tunnel cath noted to R chest wall    Psychiatric: He has a normal mood and affect. His behavior is normal.       Significant Labs:   Blood Culture:   Recent Labs   Lab 05/13/20  1202 05/15/20  1321 05/15/20  1330 05/17/20  0520 05/17/20  0630   LABBLOO No Growth after 4 days.  Gram stain aer bottle: budding yeast  Results called to and read back by: Jennifer Estevez RN  05/17/2020    05:22  DEB ALBICANS  ID consult required at [facility] and [facility].  For susceptibility see order #[id number]  * Gram stain aer bottle: budding yeast  Results called to and read back by: Jennifer Estevez RN  05/17/2020    05:22  DEB ALBICANS  Susceptibility pending  ID consult required at [facility].  * Gram stain aer bottle: yeast  Positive results previously called  YEAST   Identification pending  ID consult required at [facility] and [facility].  * Gram stain aer bottle: yeast  Results called to and read back by:Brianda Vaz 05/19/2020  10:33  YEAST   Identification pending  ID consult required at [facility] and [facility].  *       Significant Imaging: None

## 2020-05-20 NOTE — PROGRESS NOTES
[facility]  Palliative Medicine  Progress Note    Patient Name: Kodi Ibanez Sr.  MRN: [id number]  Admission Date: 5/13/2020  Hospital Length of Stay: 7 days  Code Status: DNR   Attending Provider: Unruly Feng MD  Consulting Provider: Sukhdeep Guy MD  Primary Care Physician: Melonie Elias MD  Principal Problem:Shock    Patient information was obtained from patient, spouse/SO and past medical records.      Assessment/Plan:     Palliative care encounter  .Palliative Care Encounter / Goals of care discussion:     Narrative:   Kodi Ibanez Sr. is a 60 y.o. male patient with ESRD, complications of vascular access, CAD, s/p CABG, H/O NSTEMI with PCI, now admitted with fungemia and sepsis likely related to perm-a-cath. Pt. Has expressed wishes to not undergo any additional procedures.     1: Symptoms:   - pain assesment: denies pain   - dyspnea assessment: not short of breath   - anxiety assessment: not anxious   - depression assessment: unable to assess    2: Code Status:   - DNR    3: Psychosocial :   - Marital status:   - Children: one daughter and two sons  - POA: wife is next of kin and makes most medical decisions. She is a former EMT    3: Medicolegal:    - Advance directive: on file. No artificial life prolonging measures at the end of life    4: Support System:  - Spiritual: yes, haleigh's whitness  - Family: supportive     5: Goals of care Decisions / Recommendations / Plan:     05/20/2020    - Zoom conference with pt. Wife and pt. Today   - discussed necessity to exchange perm-a-cath in order to provide best possible care for fungemia   - pt. Agrees to have line changed.    - outlined that ABX therapy would be extended for likely weeks after blood cultures clear.    - discussed option of oral ABX if QT time allows      - briefly discussed post hospital discharge options including SNF. Pt. Voices he wants to go home.      - will speak with wife more regarding discharge  planning once appropriate. Will follow with Zoom conference again in AM.     Above discussed with Dr. Feng       6: Follow up plans:    Daily.     Thank you for your consult. I will follow along with you. Please call [phone] with questions.             ESRD on hemodialysis  .Palliative Care Encounter / Goals of care discussion:     Narrative:   Kodi Ibanez Sr. is a 60 y.o. male patient with ESRD, complications of vascular access, CAD, s/p CABG, H/O NSTEMI with PCI, now admitted with fungemia and sepsis likely related to perm-a-cath. Pt. Has expressed wishes to not undergo any additional procedures.     1: Symptoms:   - pain assesment: denies pain   - dyspnea assessment: not short of breath   - anxiety assessment: not anxious   - depression assessment: unable to assess    2: Code Status:   - DNR    3: Psychosocial :   - Marital status:   - Children: one daughter and two sons  - POA: wife is next of kin and makes most medical decisions. She is a former EMT    3: Medicolegal:    - Advance directive: on file. No artificial life prolonging measures at the end of life    4: Support System:  - Spiritual: yes, haleigh'suleiman mon  - Family: supportive     5: Goals of care Decisions / Recommendations / Plan:     05/20/2020   Initial Assessment:    [facility] in Disease and Illness trajectory  - pt. Is very little responsive to medial questions and conversations. He engages when asked about his family.   - wife is very involved in medical care. She understands that there is a fungus infection and that likely the Perm-a-cath line is the culprit.   - she had a conversation with her children and feels that if the pt. Was to need a cath exchange the family would want him to get it.     Prognostication:  - overall poor long term prognosis due to many advanced co-morbidities and now acute fungal septicemia.     Goals of Care:  - discussed difficulty to treat or cure fungemia without catheter exchange  - family feels  "that "if he needs a exchange he should get one"  - pt. Has not been engaged in any discussion with me today, but the family expressed to respect his wishes should they be voiced  - will plan on teleconference tomorrow to discuss next steps  - awaiting ID evaluation and recommendations  - Discussed with Dr. West.       Advanced Care Planning:   Advance Care Planning    Continue current care       Symptom Management:  - no sx. To palliate currently    Disposition:  -continue hospital based care.     6: Follow up plans:    Daily.     Thank you for your consult. I will follow along with you. Please call [phone] with questions.               I will follow-up with patient. Please contact us if you have any additional questions.    Subjective:     Chief Complaint:   Chief Complaint   Patient presents with    Altered Mental Status     worse today; elevated WBC; was at [facility] yesterday; dialysis pt; sugar read high at home       HPI:   59 y/o male with past medical history of CHF, CAD with recent NSTEMI [~4/29] (CABG 2009, FERCHO placed in 12/2019), diabetes, CVA, ESRD on dialysis Tuesday/Thursday/Saturday (last dialyzed morning of 5/12) presents to the ER by referral from infectious disease clinic after virtual visit today. The ID provider was concerned about the patients AMS and recent elevated WBC count. Recently,junior underwent ligation of LUE fistula on 2/27/20 with Dr Lopez ([facility], vascular surgery) and had ongoing issues afterwards. Patient was admitted on 4/10 for excision of his infected LUE mid and distal AVF with cultures and specimen obtained. Surgical cultures 4/10 grew 2 different prevotella species, fusobacterium, and strep anginosus. The patient was d/c'ed with home health wound care and po augmentin/clindamycin x 10 days. On 4/20 antibiotics were changed to Flagyl 500 mg p.o. q.8 and Cefazolin after dialysis 2 g on Tuesday and Thursday, 3 g on Saturday. End date on antibiotics on " 5/11.     Following these events, on 4/29 the patient was admitted for an NSTEMI, a LHC was performed where re-stenosis of a previous stent was found and balloon angioplasty was done. He was discharged on 5/2. He is now on ASA/plavix, but per wife has not been compliant.     Patients wife says that he has had poor PO intake for the last 2-3 weeks, has become progressively weak, and developed altered mental status today. Patient had 3 episodes of vomiting within the last 24 hours, which appeared brown-non bloody. He had a brown watery bowel movement today. Patient normally oliguric (urinating ~2-3 x per week). Patient was seen in the [facility] ED on the evening of 5/12 due to palpitations after his normal dialysis and sent home after evaluation, for possible antibiotic side effect.     He presents to the ED with encephalopathy x 1 day, leukocytosis of 15, lactate 2.7, troponin 0.482, , procal 10.98, Cr 6.5, BUN 41, and hyperglycemia. Patient pan-cultured, currently only UA resulted with large amount of bacteria. Patient requiring high dose Levophed on admission. Broad spectrum antibiotics initiated. 1.5 L IVF given. Patient remains on RA. Wife was updated regarding patient's current status. Patient spoken to regarding code status and verbalized his wishes as a DNR. His wife confirmed he has an LAPost but it is not on file. Instructed to bring in.     Hospital/ICU Course:  The patient was admitted to MICU for shock likely 2/2 to sepsis on 5/13. Patient remains on low dose Levophed. Nephrology consulted for possible dialysis. Blood cx NGTD. Patient remains on Heparin gtt for paroxysmal A fib with CHADS Vasc score of 6. Previously hyperglycemic with BG now well controlled.     The pt had briefly stepped down from ICU level of care but quickly decompensated with concern for septic shock, fungemia possibly related to dailysis cath, and returned to the ICU.      In this setting, palliative medicine was consulted to  "help with medical decision making and aid in the formation of goals of care.          Hospital Course:  No notes on file    Interval History: pt. Stepped down to the floor. Spoke with Dr. Feng and Dr. Montiel. Video conference with pt. And wife via Zoom.     Past Medical History:   Diagnosis Date    Atherosclerosis of aorta     noted on VIKTORIA 2011    Blood transfusion     Cataracts, bilateral     CHF (congestive heart failure)     Clotting disorder     Cognitive deficits as late effect of cerebrovascular disease 6/26/2012    Coronary artery disease     NSTEMI s/p PCI with FERCHO to LCx (12/16/19),     Decreased appetite     DM (diabetes mellitus), type 2 with renal complications     Diabetic retinopathy    ESRD on hemodialysis     TUESDAY/ THURSDAY/SATURDAY    Hyperlipidemia     Hyperparathyroidism, unspecified     Hypertension     Prostate cancer 2016 7/16/19:  wife states "he doesn't have it anymore"    Seizures     Stroke 2011    PT HAS SHAKES     Unsteady gait     Uses roller walker       Past Surgical History:   Procedure Laterality Date    CARDIAC SURGERY      CABG    CATARACT EXTRACTION      ou    CATARACT EXTRACTION, BILATERAL      cataracts      CORONARY ANGIOGRAPHY INCLUDING BYPASS GRAFTS WITH CATHETERIZATION OF LEFT HEART N/A 12/16/2019    Procedure: ANGIOGRAM, CORONARY, INCLUDING BYPASS GRAFT, WITH LEFT HEART CATHETERIZATION, rcfa, 5fr;  Surgeon: David Villegas MD;  Location: [facility] CATH LAB;  Service: Cardiology;  Laterality: N/A;    CORONARY ANGIOGRAPHY INCLUDING BYPASS GRAFTS WITH CATHETERIZATION OF LEFT HEART N/A 4/30/2020    Procedure: ANGIOGRAM, CORONARY, INCLUDING BYPASS GRAFT, WITH LEFT HEART CATHETERIZATION, rcfa, 10 am start;  Surgeon: David Villegas MD;  Location: [facility] CATH LAB;  Service: Cardiology;  Laterality: N/A;    CORONARY ARTERY BYPASS GRAFT  2009    bypass    EYE SURGERY      Left Guera AVF  1/11/2012    PROSTATE BIOPSY      positive    retinal laser      " REVISION OF ARTERIOVENOUS FISTULA Left 2/27/2020    Procedure: Ligation of left upper extremity fistula, Closure of left upper extremity skin defect x 2, Intraoperative ultrasound ;  Surgeon: Stephane Lopez MD;  Location: [facility] OR;  Service: Vascular;  Laterality: Left;    REVISION OF ARTERIOVENOUS FISTULA Left 4/10/2020    Procedure: AV fistula excision;  Surgeon: Stephane Lopez MD;  Location: [facility] OR 2ND FLR;  Service: Vascular;  Laterality: Left;    ROTATOR CUFF REPAIR  right, 2005    ULTRASOUND GUIDANCE  12/16/2019    Procedure: Ultrasound Guidance;  Surgeon: David Villegas MD;  Location: [facility] CATH LAB;  Service: Cardiology;;    VASCULAR SURGERY         Review of patient's allergies indicates:   Allergen Reactions    Reglan [metoclopramide hcl] Diarrhea    Lorazepam      Other reaction(s): heavy sedation       Medications:  Continuous Infusions:    Scheduled Meds:   sodium chloride 0.9%   Intravenous Once    apixaban  5 mg Oral BID    aspirin  81 mg Oral Daily    atorvastatin  80 mg Oral QHS    clopidogreL  75 mg Oral Daily    diltiaZEM  60 mg Oral Q6H    fluconazole  800 mg Oral Once    Followed by    [START ON 5/21/2020] fluconazole  400 mg Oral Daily    insulin detemir U-100  10 Units Subcutaneous BID    losartan  100 mg Oral Daily    metoprolol tartrate  25 mg Oral BID    sevelamer carbonate  800 mg Oral TID WM     PRN Meds:Dextrose 10% Bolus, glucagon (human recombinant), hydroxyzine HCL, insulin aspart U-100, sodium chloride 0.9%    Family History     Problem Relation (Age of Onset)    Cancer Mother, Paternal Grandmother, Paternal Uncle    Cataracts Mother    Diabetes Mother    Glaucoma Mother    Heart disease Father, Maternal Grandmother    Hypertension Brother    No Known Problems Sister, Maternal Grandfather, Paternal Grandfather, Sister, Sister    Prostate cancer Brother        Tobacco Use    Smoking status: Never Smoker    Smokeless tobacco: Never Used   Substance and  Sexual Activity    Alcohol use: No    Drug use: No    Sexual activity: Not Currently       Review of Systems   Unable to perform ROS: Acuity of condition     Objective:     Vital Signs (Most Recent):  Temp: 98.1 °F (36.7 °C) (05/20/20 0822)  Pulse: 95 (05/20/20 0822)  Resp: 18 (05/20/20 0500)  BP: 126/75 (05/20/20 0822)  SpO2: (!) 94 % (05/20/20 0822) Vital Signs (24h Range):  Temp:  [96.8 °F (36 °C)-98.1 °F (36.7 °C)] 98.1 °F (36.7 °C)  Pulse:  [] 95  Resp:  [16-32] 18  SpO2:  [92 %-94 %] 94 %  BP: (115-134)/(52-80) 126/75  Arterial Line BP: (112-132)/(49-55) 131/52     Weight: 67.6 kg (149 lb)  Body mass index is 20.78 kg/m².    Review of Symptoms    Unable to assess due to acuity of condition  Symptom Assessment (ESAS 0-10 scale)   ESAS 0 1 2 3 4 5 6 7 8 9 10   Pain              Dyspnea              Anxiety              Nausea              Depression               Anorexia              Fatigue              Insomnia              Restlessness               Agitation              CAM / Delirium __ --  ___+   Constipation     __ --  ___+   Diarrhea           __ --  ___+  Bowel Management Plan (BMP): Yes    Comments: none    Pain Assessment: unable to assess; no nonverbal signs of pain    OME in 24 hours: 0    Performance Status: 50      Physical Exam  Constitutional: He is awake but confused  Elderly male, disoriented  No distress  Cachectic, chronically ill appearing   HENT:   Mouth/Throat: Oropharynx is clear and moist. No oropharyngeal exudate.   Eyes: Conjunctivae are normal. No scleral icterus.   Neck: No tracheal deviation present.   Cardiovascular: Normal rate, regular rhythm and normal heart sounds.   Irregular tachycardia, rate > 100   Pulmonary/Chest: Effort normal and breath sounds normal. No respiratory distress.   Abdominal: He exhibits no distension.   Musculoskeletal: He exhibits no edema or tenderness.   Neurological: He is alert and oriented to person, place, and time. No cranial nerve  deficit.   Lethargic. No gross focal deficits   Skin: Skin is warm and dry.   Nursing note and vitals reviewed.  Significant Labs:   LFT:   Recent Labs   Lab 05/18/20  1534   AST 43*   ALKPHOS 155*   BILITOT 0.3     CBC:   Recent Labs   Lab 05/20/20 0519   WBC 13.25*   HGB 8.7*   HCT 26.9*   MCV 84        BMP:  Recent Labs   Lab 05/20/20 0519   *   *   K 3.9   CL 98   CO2 24   BUN 23   CREATININE 4.1*   CALCIUM 7.7*   MG 2.1     LFT:  Lab Results   Component Value Date    AST 43 (H) 05/18/2020    ALKPHOS 155 (H) 05/18/2020    BILITOT 0.3 05/18/2020     Albumin:   Albumin   Date Value Ref Range Status   05/18/2020 1.7 (L) 3.5 - 5.2 g/dL Final     Protein:   Total Protein   Date Value Ref Range Status   05/18/2020 5.8 (L) 6.0 - 8.4 g/dL Final     Lactic acid:   Lab Results   Component Value Date    LACTATE 2.1 05/17/2020    LACTATE 1.0 05/14/2020       Significant Imaging: I have reviewed all pertinent imaging results/findings within the past 24 hours.    Advance Care Planning   Advanced Directives::  Living Will: Yes. Copy on chart: Yes  LaPOST: No  Do Not Resuscitate Status: DNR since this admission  Medical Power of : No; wife is surrogate CHILANGO  Yaima Ibanez Spouse [phone]         Decision-Making Capacity: Family answered questions, Patient unable to communicate due to disease severity/cognitive impairment       Living Arrangements: Lives with spouse    Psychosocial/Cultural: did not explore during today's visit; wife is an EMT with some medical knowledge    Spiritual:     F- Madai and Belief: Deepak's    I - Importance: yes  .  C - Community: yes    A - Address in Care: wife to coordinate      > 50% of 45 min visit spent in chart review, face to face discussion of goals of care,  symptom assessment, coordination of care and emotional support.    Sukhdeep Guy MD  Palliative Medicine  [facility]

## 2020-05-21 NOTE — SUBJECTIVE & OBJECTIVE
Medications Prior to Admission   Medication Sig Dispense Refill Last Dose    atorvastatin (LIPITOR) 80 MG tablet Take 1 tablet (80 mg total) by mouth every evening. 90 tablet 0 5/13/2020    carvediloL (COREG) 12.5 MG tablet Take 1 tablet (12.5 mg total) by mouth 2 (two) times daily. 180 tablet 0 5/13/2020    clopidogrel (PLAVIX) 75 mg tablet Take 1 tablet (75 mg total) by mouth once daily. 30 tablet 11 5/13/2020    diltiaZEM (CARDIZEM CD) 180 MG 24 hr capsule Take 1 capsule (180 mg total) by mouth once daily. 90 capsule 1 5/13/2020    docusate sodium (COLACE) 100 MG capsule Take 1 capsule (100 mg total) by mouth 2 (two) times daily. 60 capsule 0 5/12/2020    furosemide (LASIX) 40 MG tablet TAKE 1 TABLET(40 MG) BY MOUTH EVERY DAY 90 tablet 1 5/13/2020    losartan (COZAAR) 100 MG tablet Take 1 tablet (100 mg total) by mouth once daily. 90 tablet 1 5/13/2020    megestrol (MEGACE) 20 MG Tab Take 1 tablet (20 mg total) by mouth once daily. (Patient taking differently: Take 20 mg by mouth 2 (two) times daily .) 90 tablet 1 5/13/2020    sevelamer carbonate (RENVELA) 800 mg Tab Take 2 tablets (1,600 mg total) by mouth 3 (three) times daily with meals. 540 tablet 1 Past Week    acetaminophen (TYLENOL) 325 MG tablet Take 2 tablets (650 mg total) by mouth every 6 (six) hours as needed. (Patient not taking: Reported on 5/4/2020)  0 Not Taking    ascorbic acid, vitamin C, (VITAMIN C) 500 MG tablet Take 500 mg by mouth every evening.    Taking    aspirin (ECOTRIN) 81 MG EC tablet Take 1 tablet (81 mg total) by mouth once daily.  0 Taking    blood-glucose meter kit To check BG 2 times daily, to use with accuchek casa meter 1 each 0 Taking    insulin (LANTUS SOLOSTAR U-100 INSULIN) glargine 100 units/mL (3mL) SubQ pen Inject 20 Units into the skin every evening. 18 mL 0 Taking    insulin aspart U-100 (NOVOLOG FLEXPEN U-100 INSULIN) 100 unit/mL (3 mL) InPn pen Inject 10 Units into the skin 2 (two) times daily with  "meals. 15 mL 1     [] metroNIDAZOLE (FLAGYL) 500 MG tablet Take 1 tablet (500 mg total) by mouth 3 (three) times daily. 84 tablet 0 Taking       Review of patient's allergies indicates:   Allergen Reactions    Reglan [metoclopramide hcl] Diarrhea    Lorazepam      Other reaction(s): heavy sedation       Past Medical History:   Diagnosis Date    Atherosclerosis of aorta     noted on VIKTORIA     Blood transfusion     Cataracts, bilateral     CHF (congestive heart failure)     Clotting disorder     Cognitive deficits as late effect of cerebrovascular disease 2012    Coronary artery disease     NSTEMI s/p PCI with FERCHO to LCx (19),     Decreased appetite     DM (diabetes mellitus), type 2 with renal complications     Diabetic retinopathy    ESRD on hemodialysis     TUESDAY/ THURSDAY/SATURDAY    Hyperlipidemia     Hyperparathyroidism, unspecified     Hypertension     Prostate cancer 20:  wife states "he doesn't have it anymore"    Seizures     Stroke     PT HAS SHAKES     Unsteady gait     Uses roller walker     Past Surgical History:   Procedure Laterality Date    CARDIAC SURGERY      CABG    CATARACT EXTRACTION      ou    CATARACT EXTRACTION, BILATERAL      cataracts      CORONARY ANGIOGRAPHY INCLUDING BYPASS GRAFTS WITH CATHETERIZATION OF LEFT HEART N/A 2019    Procedure: ANGIOGRAM, CORONARY, INCLUDING BYPASS GRAFT, WITH LEFT HEART CATHETERIZATION, rcfa, 5fr;  Surgeon: David Villegas MD;  Location: [facility] CATH LAB;  Service: Cardiology;  Laterality: N/A;    CORONARY ANGIOGRAPHY INCLUDING BYPASS GRAFTS WITH CATHETERIZATION OF LEFT HEART N/A 2020    Procedure: ANGIOGRAM, CORONARY, INCLUDING BYPASS GRAFT, WITH LEFT HEART CATHETERIZATION, rcfa, 10 am start;  Surgeon: David Villegas MD;  Location: [facility] CATH LAB;  Service: Cardiology;  Laterality: N/A;    CORONARY ARTERY BYPASS GRAFT      bypass    EYE SURGERY      Left Guera AVF  2012    " PROSTATE BIOPSY      positive    retinal laser      REVISION OF ARTERIOVENOUS FISTULA Left 2/27/2020    Procedure: Ligation of left upper extremity fistula, Closure of left upper extremity skin defect x 2, Intraoperative ultrasound ;  Surgeon: Stephane Lopez MD;  Location[facility];  Service: Vascular;  Laterality: Left;    REVISION OF ARTERIOVENOUS FISTULA Left 4/10/2020    Procedure: AV fistula excision;  Surgeon: Stephane Lopez MD;  Location: [facility] FLR;  Service: Vascular;  Laterality: Left;    ROTATOR CUFF REPAIR  right, 2005    ULTRASOUND GUIDANCE  12/16/2019    Procedure: Ultrasound Guidance;  Surgeon: David Villegas MD;  Location: [facility] CATH LAB;  Service: Cardiology;;    VASCULAR SURGERY       Family History     Problem Relation (Age of Onset)    Cancer Mother, Paternal Grandmother, Paternal Uncle    Cataracts Mother    Diabetes Mother    Glaucoma Mother    Heart disease Father, Maternal Grandmother    Hypertension Brother    No Known Problems Sister, Maternal Grandfather, Paternal Grandfather, Sister, Sister    Prostate cancer Brother        Tobacco Use    Smoking status: Never Smoker    Smokeless tobacco: Never Used   Substance and Sexual Activity    Alcohol use: No    Drug use: No    Sexual activity: Not Currently     Review of Systems   Constitutional: Positive for fatigue and fever.   HENT: Negative for congestion and dental problem.    Respiratory: Negative for shortness of breath and wheezing.    Cardiovascular: Negative for chest pain and palpitations.   Gastrointestinal: Negative for abdominal distention and abdominal pain.   Endocrine: Negative for cold intolerance and heat intolerance.   Genitourinary: Negative for dysuria and flank pain.   Musculoskeletal: Negative for back pain and neck pain.   Skin: Negative for rash and wound.   Allergic/Immunologic: Negative for environmental allergies and food allergies.   Neurological: Positive for weakness. Negative for  numbness.   Psychiatric/Behavioral: Negative for decreased concentration and dysphoric mood.     Genitourinary: Negative for enuresis and flank pain.   Musculoskeletal: Negative for myalgias and neck pain.   Skin: Negative for color change and wound.   Allergic/Immunologic: Negative for environmental allergies and food allergies.   Neurological: Positive for weakness. Negative for dizziness, tremors and syncope.   Hematological: Does not bruise/bleed easily.   Psychiatric/Behavioral: Negative for decreased concentration and dysphoric mood.   Objective:     Vital Signs (Most Recent):  Temp: 97.9 °F (36.6 °C) (05/21/20 1234)  Pulse: 95 (05/21/20 1234)  Resp: 18 (05/21/20 1234)  BP: 129/74 (05/21/20 1234)  SpO2: (!) 94 % (05/21/20 1234) Vital Signs (24h Range):  Temp:  [97.5 °F (36.4 °C)-98.9 °F (37.2 °C)] 97.9 °F (36.6 °C)  Pulse:  [] 95  Resp:  [18] 18  SpO2:  [94 %] 94 %  BP: ()/(49-82) 129/74     Weight: 67.6 kg (149 lb)  Body mass index is 20.78 kg/m².    Physical Exam   Constitutional: He is oriented to person, place, and time. He appears well-developed and well-nourished.   HENT:   Head: Normocephalic and atraumatic.   Eyes: Pupils are equal, round, and reactive to light. EOM are normal.   Neck: Normal range of motion.   R IJ permacath in place, no surrounding erythema   Cardiovascular: Normal rate and intact distal pulses.   Pulmonary/Chest: Effort normal. No respiratory distress.   Abdominal: Soft. There is no tenderness.   Musculoskeletal: Normal range of motion. He exhibits no deformity.   Neurological: He is alert and oriented to person, place, and time.   Skin: Skin is warm and dry.       Significant Labs:  All pertinent labs from the last 24 hours have been reviewed.    Significant Diagnostics:  I have reviewed all pertinent imaging results/findings within the past 24 hours.

## 2020-05-21 NOTE — PROGRESS NOTES
Pt arrived to [facility] via stretcher.  Dialysis starterd to right IJ catheter.  Pt is confused and uncooperative with getting started.  Good blood flow and lines flushed without difficulty. Will monitor closely

## 2020-05-21 NOTE — PLAN OF CARE
SW called patient's wife, Yaima, again to discuss discharge planning. MIGUEL A and Yaima reviewed the list of SNF's in network with the patient's insurance. Yaima explained to SW that she works for EMS and there are many Nursing Homes that she is not agreeable to the patient going because she has experience with them. MIGUEL A expressed understanding. Yaima stated that she is agreeable to referrals being sent to [facility], [facility] and [facility]. Yaima stated that she will go tour those facilities and notify SW of preference.   MIGUEL A sent referral to [facility], [facility] and [facility] via [facility].     Michelle Mullins, LCSW [facility]  [id number]

## 2020-05-21 NOTE — PROGRESS NOTES
Pt completed 3.5 hours dialysis via right IJ catheter.  Lines flushed with NS, lines not locked due to line being D/C this afternoon.  Net removal 2 liters, pt tolerated without difficulty.  Report called to Olga, pt returned to room by hospital escort.

## 2020-05-21 NOTE — PT/OT/SLP PROGRESS
Occupational Therapy      Patient Name:  Kodi Ibanez .   MRN:  [id number]    Patient not seen today secondary to pt off floor for dialysis followed by R IJ permacath removal. Will follow-up 5/22/2020.    Lanette Perkins OT  5/21/2020

## 2020-05-21 NOTE — PT/OT/SLP PROGRESS
Physical Therapy      Patient Name:  Kodi Ibanez .   MRN:  [id number]    Patient not seen today secondary to pt unavailable. Pt receiving dialysis this AM, then R IJ Permacath Removal in PM. Will follow up 5/22/20.     Madison Levi, PT   5/21/2020

## 2020-05-21 NOTE — PLAN OF CARE
SW followed up with [facility] SNF. Brenda with [facility] informed SW that they are unable to accept new patient's at this time.     SW attempted to call patient's wife, Yaima ([phone]), to discuss discharge planning and obtain additional choices. No answer; SW left message.     Michelle Mullins, LCSW [facility]  [id number]

## 2020-05-21 NOTE — PROGRESS NOTES
[facility]  Infectious Disease  Progress Note    Patient Name: Kodi Ibanez Sr.  MRN: [id number]  Admission Date: 5/13/2020  Length of Stay: 8 days  Attending Physician: Estephania Styles*  Primary Care Provider: Melonie Elias MD    Isolation Status: No active isolations  Assessment/Plan:      Candidemia  Kodi Ibanez Sr. is a 61 y/o male with past medical history of CHF, CAD with recent NSTEMI [~4/29] (CABG 2009, FERCHO placed in 12/2019), diabetes, CVA, ESRD on dialysis who was admitted to MICU for septic shock on 5/13. He was started on antibiotics and required pressor support which has since been weaned off. On 5/15, patient noted to be febrile with temp > 101F. Blood cultures ordered revealed candida albicans in 2/4 cultures. Patient was started on Micafungin 100 mg IV daily.   Blood cx 5/15: Candida Albicans 2/4 bottles  Blood cx 5/17: Candida Albicans, 2/4 bottles    Recommendations  - S/p  micafungin  - Continue 400 mg daily.  EKG with . Will need to monitor QTC frequently.  - Agree with ophthalmology consult, unable to visualize retina after dilation  - Concern for source control with R. subclavian tunneled cath given persistent candidemia. Recommend removal of the tunneled catheter for source control.Vasc. Sx consulted for removal of catheter.  - Continue to follow blood cultures for clearance          Anticipated Disposition: TBD    Thank you for your consult. I will follow-up with patient. Please contact us if you have any additional questions.    Monica Lucas,   Infectious Disease  [facility]    Subjective:     Principal Problem:Shock    HPI: Kodi Ibanez Sr. is a 61 y/o male with past medical history of CHF, CAD with recent NSTEMI [~4/29] (CABG 2009, FERCHO placed in 12/2019), diabetes, CVA, ESRD on dialysis Tuesday/Thursday/Saturday (last dialyzed morning of 5/12) presents to the ER by referral from infectious disease clinic after virtual visit today.  The ID provider was concerned about the patients AMS and recent elevated WBC count. Recently,junior underwent ligation of LUE fistula on 2/27/20 with Dr Lopez ([facility], vascular surgery) and had ongoing issues afterwards. Patient was admitted on 4/10 for excision of his infected LUE mid and distal AVF with cultures and specimen obtained. Surgical cultures 4/10 grew 2 different prevotella species, fusobacterium, and strep anginosus. The patient was d/c'ed with home health wound care and po augmentin/clindamycin x 10 days. On 4/20 antibiotics were changed to Flagyl 500 mg p.o. q.8 and Cefazolin after dialysis 2 g on Tuesday and Thursday, 3 g on Saturday. End date on antibiotics on 5/11.     Following these events, on 4/29 the patient was admitted for an NSTEMI, a LHC was performed where re-stenosis of a previous stent was found and balloon angioplasty was done. He was discharged on 5/2. He is now on ASA/plavix, but per wife has not been compliant. Patient normally oliguric (urinating ~2-3 x per week). Patient was seen in the [facility] ED on the evening of 5/12 due to palpitations after his normal dialysis and sent home after evaluation, for possible antibiotic side effect.He presents to the ED with encephalopathy x 1 day, leukocytosis of 15, lactate 2.7, troponin 0.482, , procal 10.98, Cr 6.5, BUN 41, and hyperglycemia. Patient pan-cultured, currently only UA resulted with large amount of bacteria. Patient requiring high dose Levophed on admission. Broad spectrum antibiotics initiated. 1.5 L IVF given.    Hospital course   5/13: The patient was admitted to MICU for shock likely 2/2 to sepsis on 5/13. Patient remains on low dose Levophed. Nephrology consulted for possible dialysis. Blood cx NGTD. Patient remains on Heparin gtt for paroxysmal A fib with CHADS Vasc score of 6. Previously hyperglycemic with BG now well controlled.     5/14: Levo stopped in morning. Zosyn changed to unasyn.      5/15: Patient  spiked new temp, vanc restarted. Patient with intermittent SVT- Home coreg and dilt restarted. Step down orders placed.     5/17: Hypotension overnight requiring vasopressors. Transferred back to MICU on norepi    5/18: Patient with candida albicans on blood cultures from 5/15.    5:19: ID consulted for Candidemia.      Interval History: No acute events overnight. Patient now on PO fluconazole. Plan to remove tunneled catheter.     Review of Systems   Constitutional: Negative for chills and fever.   HENT: Negative for congestion, rhinorrhea and sore throat.    Respiratory: Negative for cough.    Cardiovascular: Negative for chest pain.   Gastrointestinal: Negative for abdominal pain, nausea and vomiting.   Musculoskeletal: Negative for back pain.   Skin: Negative for rash.   Neurological: Negative for seizures and syncope.     Objective:     Vital Signs (Most Recent):  Temp: 97.5 °F (36.4 °C) (05/21/20 0720)  Pulse: 94 (05/21/20 1015)  Resp: 18 (05/21/20 0720)  BP: 111/61 (05/21/20 1015)  SpO2: (!) 94 % (05/21/20 0550) Vital Signs (24h Range):  Temp:  [97.5 °F (36.4 °C)-98.9 °F (37.2 °C)] 97.5 °F (36.4 °C)  Pulse:  [] 94  Resp:  [18] 18  SpO2:  [94 %] 94 %  BP: ()/(53-82) 111/61     Weight: 67.6 kg (149 lb)  Body mass index is 20.78 kg/m².    Estimated Creatinine Clearance: 14.1 mL/min (A) (based on SCr of 5.2 mg/dL (H)).    Physical Exam   Constitutional: He is oriented to person, place, and time. He appears well-developed. He appears ill.   HENT:   Head: Normocephalic and atraumatic.   Eyes: Pupils are equal, round, and reactive to light. Conjunctivae are normal.   Neck: Normal range of motion. Neck supple.   Cardiovascular: Normal rate.   Pulmonary/Chest: Effort normal.   Abdominal: Soft. Bowel sounds are normal.   Musculoskeletal: He exhibits no edema.   Neurological: He is alert and oriented to person, place, and time.   Skin: Skin is warm and dry.   R IJ tunnel cath noted to R chest wall     Psychiatric: His affect is blunt. He is slowed and withdrawn.       Significant Labs:   Blood Culture:   Recent Labs   Lab 05/15/20  1330 05/17/20  0520 05/17/20  0630 05/20/20  1556 05/20/20  1620   LABBLOO Gram stain aer bottle: budding yeast  Results called to and read back by: Jennifer Estevez RN  05/17/2020    05:22  DEB ALBICANS  ID consult required at [facility].  * Gram stain aer bottle: yeast  Positive results previously called  DEB ALBICANS  ID consult required at [facility].  * Gram stain aer bottle: yeast  Results called to and read back by:Brianda Vaz 05/19/2020  10:33  DEB ALBICANS  ID consult required at [facility].  * No Growth to date No Growth to date     CBC:   Recent Labs   Lab 05/20/20  0519 05/21/20  0248   WBC 13.25* 9.97   HGB 8.7* 8.7*   HCT 26.9* 28.3*    151     CMP:   Recent Labs   Lab 05/20/20  0519 05/20/20  1620 05/21/20  0248   * 134* 132*   K 3.9 3.9 4.1   CL 98 101 99   CO2 24 22* 21*   * 221* 435*   BUN 23 26* 33*   CREATININE 4.1* 4.4* 5.2*   CALCIUM 7.7* 7.9* 7.6*   ANIONGAP 11 11 12   EGFRNONAA 14.6* 13.4* 10.9*       Significant Imaging: None

## 2020-05-21 NOTE — CONSULTS
[facility]  Vascular Surgery  Consult Note    Inpatient consult to Vascular Surgery  Consult performed by: Rafy Escudero MD  Consult ordered by: Unruly Feng MD        Subjective:     Chief Complaint/Reason for Admission: Fungemia    History of Present Illness: Kodi Ibanez Sr. is a 60M with PMH of CHF, CAD, NSTEMI, DM, CVA, ESRD on dialysis via a R IJ permacath who was admitted to the hospital on 5/13 in septic shock and ultimately discovered to have candida fungemia. Vascular surgery was consulted for removal of his permacath for source control.    Medications Prior to Admission   Medication Sig Dispense Refill Last Dose    atorvastatin (LIPITOR) 80 MG tablet Take 1 tablet (80 mg total) by mouth every evening. 90 tablet 0 5/13/2020    carvediloL (COREG) 12.5 MG tablet Take 1 tablet (12.5 mg total) by mouth 2 (two) times daily. 180 tablet 0 5/13/2020    clopidogrel (PLAVIX) 75 mg tablet Take 1 tablet (75 mg total) by mouth once daily. 30 tablet 11 5/13/2020    diltiaZEM (CARDIZEM CD) 180 MG 24 hr capsule Take 1 capsule (180 mg total) by mouth once daily. 90 capsule 1 5/13/2020    docusate sodium (COLACE) 100 MG capsule Take 1 capsule (100 mg total) by mouth 2 (two) times daily. 60 capsule 0 5/12/2020    furosemide (LASIX) 40 MG tablet TAKE 1 TABLET(40 MG) BY MOUTH EVERY DAY 90 tablet 1 5/13/2020    losartan (COZAAR) 100 MG tablet Take 1 tablet (100 mg total) by mouth once daily. 90 tablet 1 5/13/2020    megestrol (MEGACE) 20 MG Tab Take 1 tablet (20 mg total) by mouth once daily. (Patient taking differently: Take 20 mg by mouth 2 (two) times daily .) 90 tablet 1 5/13/2020    sevelamer carbonate (RENVELA) 800 mg Tab Take 2 tablets (1,600 mg total) by mouth 3 (three) times daily with meals. 540 tablet 1 Past Week    acetaminophen (TYLENOL) 325 MG tablet Take 2 tablets (650 mg total) by mouth every 6 (six) hours as needed. (Patient not taking: Reported on 5/4/2020)  0 Not  "Taking    ascorbic acid, vitamin C, (VITAMIN C) 500 MG tablet Take 500 mg by mouth every evening.    Taking    aspirin (ECOTRIN) 81 MG EC tablet Take 1 tablet (81 mg total) by mouth once daily.  0 Taking    blood-glucose meter kit To check BG 2 times daily, to use with accuchek casa meter 1 each 0 Taking    insulin (LANTUS SOLOSTAR U-100 INSULIN) glargine 100 units/mL (3mL) SubQ pen Inject 20 Units into the skin every evening. 18 mL 0 Taking    insulin aspart U-100 (NOVOLOG FLEXPEN U-100 INSULIN) 100 unit/mL (3 mL) InPn pen Inject 10 Units into the skin 2 (two) times daily with meals. 15 mL 1     [] metroNIDAZOLE (FLAGYL) 500 MG tablet Take 1 tablet (500 mg total) by mouth 3 (three) times daily. 84 tablet 0 Taking       Review of patient's allergies indicates:   Allergen Reactions    Reglan [metoclopramide hcl] Diarrhea    Lorazepam      Other reaction(s): heavy sedation       Past Medical History:   Diagnosis Date    Atherosclerosis of aorta     noted on VIKTORIA     Blood transfusion     Cataracts, bilateral     CHF (congestive heart failure)     Clotting disorder     Cognitive deficits as late effect of cerebrovascular disease 2012    Coronary artery disease     NSTEMI s/p PCI with FERCHO to LCx (19),     Decreased appetite     DM (diabetes mellitus), type 2 with renal complications     Diabetic retinopathy    ESRD on hemodialysis     TUESDAY/ THURSDAY/SATURDAY    Hyperlipidemia     Hyperparathyroidism, unspecified     Hypertension     Prostate cancer 20:  wife states "he doesn't have it anymore"    Seizures     Stroke     PT HAS SHAKES     Unsteady gait     Uses roller walker     Past Surgical History:   Procedure Laterality Date    CARDIAC SURGERY      CABG    CATARACT EXTRACTION      ou    CATARACT EXTRACTION, BILATERAL      cataracts      CORONARY ANGIOGRAPHY INCLUDING BYPASS GRAFTS WITH CATHETERIZATION OF LEFT HEART N/A 2019    Procedure: " ANGIOGRAM, CORONARY, INCLUDING BYPASS GRAFT, WITH LEFT HEART CATHETERIZATION, rcfa, 5fr;  Surgeon: David Villegas MD;  Location: [facility] CATH LAB;  Service: Cardiology;  Laterality: N/A;    CORONARY ANGIOGRAPHY INCLUDING BYPASS GRAFTS WITH CATHETERIZATION OF LEFT HEART N/A 4/30/2020    Procedure: ANGIOGRAM, CORONARY, INCLUDING BYPASS GRAFT, WITH LEFT HEART CATHETERIZATION, rcfa, 10 am start;  Surgeon: David Villegas MD;  Location: [facility] CATH LAB;  Service: Cardiology;  Laterality: N/A;    CORONARY ARTERY BYPASS GRAFT  2009    bypass    EYE SURGERY      Left Guera AVF  1/11/2012    PROSTATE BIOPSY      positive    retinal laser      REVISION OF ARTERIOVENOUS FISTULA Left 2/27/2020    Procedure: Ligation of left upper extremity fistula, Closure of left upper extremity skin defect x 2, Intraoperative ultrasound ;  Surgeon: Stephane Lopez MD;  Location: [facility] OR;  Service: Vascular;  Laterality: Left;    REVISION OF ARTERIOVENOUS FISTULA Left 4/10/2020    Procedure: AV fistula excision;  Surgeon: Stephane Lopez MD;  Location: [facility] OR [address] [place] [address];  Service: Vascular;  Laterality: Left;    ROTATOR CUFF REPAIR  right, 2005    ULTRASOUND GUIDANCE  12/16/2019    Procedure: Ultrasound Guidance;  Surgeon: David Villegas MD;  Location: [facility] CATH LAB;  Service: Cardiology;;    VASCULAR SURGERY       Family History     Problem Relation (Age of Onset)    Cancer Mother, Paternal Grandmother, Paternal Uncle    Cataracts Mother    Diabetes Mother    Glaucoma Mother    Heart disease Father, Maternal Grandmother    Hypertension Brother    No Known Problems Sister, Maternal Grandfather, Paternal Grandfather, Sister, Sister    Prostate cancer Brother        Tobacco Use    Smoking status: Never Smoker    Smokeless tobacco: Never Used   Substance and Sexual Activity    Alcohol use: No    Drug use: No    Sexual activity: Not Currently     Review of Systems   Constitutional: Positive for fatigue and fever.   HENT:  Negative for congestion and dental problem.    Respiratory: Negative for shortness of breath and wheezing.    Cardiovascular: Negative for chest pain and palpitations.   Gastrointestinal: Negative for abdominal distention and abdominal pain.   Endocrine: Negative for cold intolerance and heat intolerance.   Genitourinary: Negative for dysuria and flank pain.   Musculoskeletal: Negative for back pain and neck pain.   Skin: Negative for rash and wound.   Allergic/Immunologic: Negative for environmental allergies and food allergies.   Neurological: Positive for weakness. Negative for numbness.   Psychiatric/Behavioral: Negative for decreased concentration and dysphoric mood.     Genitourinary: Negative for enuresis and flank pain.   Musculoskeletal: Negative for myalgias and neck pain.   Skin: Negative for color change and wound.   Allergic/Immunologic: Negative for environmental allergies and food allergies.   Neurological: Positive for weakness. Negative for dizziness, tremors and syncope.   Hematological: Does not bruise/bleed easily.   Psychiatric/Behavioral: Negative for decreased concentration and dysphoric mood.   Objective:     Vital Signs (Most Recent):  Temp: 97.9 °F (36.6 °C) (05/21/20 1234)  Pulse: 95 (05/21/20 1234)  Resp: 18 (05/21/20 1234)  BP: 129/74 (05/21/20 1234)  SpO2: (!) 94 % (05/21/20 1234) Vital Signs (24h Range):  Temp:  [97.5 °F (36.4 °C)-98.9 °F (37.2 °C)] 97.9 °F (36.6 °C)  Pulse:  [] 95  Resp:  [18] 18  SpO2:  [94 %] 94 %  BP: ()/(49-82) 129/74     Weight: 67.6 kg (149 lb)  Body mass index is 20.78 kg/m².    Physical Exam   Constitutional: He is oriented to person, place, and time. He appears well-developed and well-nourished.   HENT:   Head: Normocephalic and atraumatic.   Eyes: Pupils are equal, round, and reactive to light. EOM are normal.   Neck: Normal range of motion.   R IJ permacath in place, no surrounding erythema   Cardiovascular: Normal rate and intact distal pulses.    Pulmonary/Chest: Effort normal. No respiratory distress.   Abdominal: Soft. There is no tenderness.   Musculoskeletal: Normal range of motion. He exhibits no deformity.   Neurological: He is alert and oriented to person, place, and time.   Skin: Skin is warm and dry.       Significant Labs:  All pertinent labs from the last 24 hours have been reviewed.    Significant Diagnostics:  I have reviewed all pertinent imaging results/findings within the past 24 hours.    Assessment/Plan:     Candidemia  Kodi Ibanez Sr. is a 60M with PMH of CHF, CAD, NSTEMI, DM, CVA, ESRD on dialysis via a R IJ permacath who was admitted to the hospital on 5/13 in septic shock and ultimately discovered to have candida fungemia. Vascular surgery was consulted for removal of his permacath for source control.    -Patient had dialysis today  -Permacath removed see procedure note.  -The patient tolerated this well  -Agree with giving the patient a line holiday and placing a temporary dialysis line on Saturday, a permacath can be placed again once his fungemia has cleared  -We will continue to follow peripherally and will be available if needed          Thank you for your consult. I will follow-up with patient. Please contact us if you have any additional questions.    Rafy Escudero MD  Vascular Surgery  [facility]

## 2020-05-21 NOTE — SUBJECTIVE & OBJECTIVE
Interval History: No acute events overnight. Patient now on PO fluconazole. Plan to remove tunneled catheter.     Review of Systems   Constitutional: Negative for chills and fever.   HENT: Negative for congestion, rhinorrhea and sore throat.    Respiratory: Negative for cough.    Cardiovascular: Negative for chest pain.   Gastrointestinal: Negative for abdominal pain, nausea and vomiting.   Musculoskeletal: Negative for back pain.   Skin: Negative for rash.   Neurological: Negative for seizures and syncope.     Objective:     Vital Signs (Most Recent):  Temp: 97.5 °F (36.4 °C) (05/21/20 0720)  Pulse: 94 (05/21/20 1015)  Resp: 18 (05/21/20 0720)  BP: 111/61 (05/21/20 1015)  SpO2: (!) 94 % (05/21/20 0550) Vital Signs (24h Range):  Temp:  [97.5 °F (36.4 °C)-98.9 °F (37.2 °C)] 97.5 °F (36.4 °C)  Pulse:  [] 94  Resp:  [18] 18  SpO2:  [94 %] 94 %  BP: ()/(53-82) 111/61     Weight: 67.6 kg (149 lb)  Body mass index is 20.78 kg/m².    Estimated Creatinine Clearance: 14.1 mL/min (A) (based on SCr of 5.2 mg/dL (H)).    Physical Exam   Constitutional: He is oriented to person, place, and time. He appears well-developed. He appears ill.   HENT:   Head: Normocephalic and atraumatic.   Eyes: Pupils are equal, round, and reactive to light. Conjunctivae are normal.   Neck: Normal range of motion. Neck supple.   Cardiovascular: Normal rate.   Pulmonary/Chest: Effort normal.   Abdominal: Soft. Bowel sounds are normal.   Musculoskeletal: He exhibits no edema.   Neurological: He is alert and oriented to person, place, and time.   Skin: Skin is warm and dry.   R IJ tunnel cath noted to R chest wall    Psychiatric: His affect is blunt. He is slowed and withdrawn.       Significant Labs:   Blood Culture:   Recent Labs   Lab 05/15/20  1330 05/17/20  0520 05/17/20  0630 05/20/20  1556 05/20/20  1620   LABBLOO Gram stain aer bottle: budding yeast  Results called to and read back by: Jennifer Estevez RN  05/17/2020    05:22   DEB ALBICANS  ID consult required at [facility].  * Gram stain aer bottle: yeast  Positive results previously called  DEB ALBICANS  ID consult required at [facility].  * Gram stain aer bottle: yeast  Results called to and read back by:Brianda Vaz 05/19/2020  10:33  DEB ALBICANS  ID consult required at [facility].  * No Growth to date No Growth to date     CBC:   Recent Labs   Lab 05/20/20  0519 05/21/20  0248   WBC 13.25* 9.97   HGB 8.7* 8.7*   HCT 26.9* 28.3*    151     CMP:   Recent Labs   Lab 05/20/20  0519 05/20/20  1620 05/21/20  0248   * 134* 132*   K 3.9 3.9 4.1   CL 98 101 99   CO2 24 22* 21*   * 221* 435*   BUN 23 26* 33*   CREATININE 4.1* 4.4* 5.2*   CALCIUM 7.7* 7.9* 7.6*   ANIONGAP 11 11 12   EGFRNONAA 14.6* 13.4* 10.9*       Significant Imaging: None

## 2020-05-21 NOTE — PROGRESS NOTES
Notified CHELO Apple of patient BS of 385. And HR of 136.  New orders placed. will continue to monitor.

## 2020-05-21 NOTE — PROGRESS NOTES
Hospital Medicine  Progress Note      Patient Name: Kodi Ibanez Sr.  MRN:  [id number]  [facility] Medicine Team: [facility] R Estephania Styles MD  Date of Admission:  5/13/2020     Length of Stay:  LOS: 8 days   Expected Discharge Date: 5/22/2020  Principal Problem:  Shock      Subjective:    Interval History/Overnight Events:  NAEON. Pt without new complaints today. Disoriented to place and time while at HD. Plan for removal of line today AFTER HD. Currently on fluconazole for candidemia, ID following.       Review of Systems:  Constitutional: Negative for chills, fatigue, fever.   Respiratory: Negative for cough, shortness of breath.    Cardiovascular: Negative for chest pain, palpitations, leg swelling.   Gastrointestinal: Negative for abdominal pain, nausea, vomiting, diarrhea, constipation  Genitourinary: Negative for dysuria, frequency.   All other systems reviewed and are negative.      Objective:    Physical Exam:  Temp:  [97.5 °F (36.4 °C)-98.9 °F (37.2 °C)]   Pulse:  []   Resp:  [18]   BP: ()/(49-82)   SpO2:  [94 %]     Constitutional: Appears comfortable in no acute distress  Eyes: No scleral icterus or eye discharge  Cardiovascular: Normal heart rate.  Regular heart rhythm.  No murmur heard.  Pulmonary/Chest: Effort normal and breath sounds normal. No respiratory distress. No wheezes, rales, or rhonchi  Abdominal: Soft. Bowel sounds are normal.  No distension.  No tenderness  Musculoskeletal: No deformities.  No edema.   Neurological: Alert.  Oriented to person, disoriented to time and place  Skin: Skin is warm and dry.       Assessment and Plan:    Mr. Kodi Ibanez Sr. is a 60 y.o. male who presented to [facility] on 5/13/2020 with shock 2/2 sepsis.     The patient was admitted to MICU for shock likely 2/2 to sepsis on 5/13 requiring low dose Levophed. Weaned off on 5/14, but required to go back on vasopresors on   5/17/20. Started on Heparin gtt for paroxysmal A fib with CHADS Vasc  score of 6. Started on empiric broad spectrum abx/ Patient with intermittent SVT- Home coreg and dilt restarted.  Grew candida albicans on blood cx x 2 on 5/15, 5/17, 5/20. Initially on micafungin, but switched to fluconazole on 5/20 per ID recs. Evaluated by ophtha.             Shock  Candidemia  2/2 probable sepsis likely r/t LUE AV fistula excision. Patient also with R tunneled catheter placed 2/2020.  Patient underwent ligation of LUE fistula on 2/27/20 with Dr Lopez ([facility], vascular surgery) and had ongoing issues afterwards. Patient was admitted on 4/10 for excision of his infected LUE mid and distal AVF with cultures and specimen obtained. Surgical cultures 4/10 grew 2 different prevotella species, fusobacterium, and strep anginosus. The patient was d/c'ed with home health wound care and po augmentin/clindamycin x 10 days. On 4/20 antibiotics were changed to Flagyl 500 mg p.o. q.8 and Cefazolin after dialysis 2 g on Tuesday and Thursday, 3 g on Saturday. End date on antibiotics on 5/11.       --BC grew candida albicans 5/15, 5/17, 5/20  --Started on micafungin, now on fluconazole since 5/20  --ID and opthalmology consulted; appreciate recs  --vascular surgery consult for tunneled catheter removal after HD on 5/21/20        Atrial fibrillation  --patient on diltiazem for rate control; restarted home dose   --on ASA/plavix/apixiban   --Chads Vasc score 6 points        NSTEMI (non-ST elevated myocardial infarction)  Hx of CABG in 2009 and placement of FERCHO in 12/2019. Patient recenetly admitted on 4/29 and d/c'ed 5/2, admitted for an NSTEMI, a LHC was performed where re-stenosis of a previous stent was found and balloon angioplasty was done. He is now on ASA/plavix, but per wife has not been compliant.     --continue ASA/plavix (home meds)  --TTE 5/14 with preserved EF 60% and no focal WMA     Essential hypertension  --well controlled on losartan, diltiazem and lopressor     SVT (supraventricular  tachycardia)  --ECG showed SVT with -150'2  --Required diltiazem drip, weaned and transition to PO dilt on 5/19  --continue dilt and lopressor        HLD (hyperlipidemia)  --statin for secondary prevention    ESRD on hemodialysis  Patient on HD T/Th/Sat with last session  Prior to admission on 5/12, where he was admitted to the ED with palpatations after session. Patient has tunneled catheter that was inserted in 2/2020 that is being used for access.      --nephrology consulted  --HD done on 5/21/20,   --plan for permacath removal today per vascular surgery  --per  nephro may need temporary line placed Saturday for HD  --avoid nephrotoxic meds  --oliguric, strict I/o's  --BMP daily       AV fistula infection  --see shock  --continue micafungin   --ID and opthalmology consulted      Endocrine  Type 2 diabetes, uncontrolled, with neuropathy  -->500 on admission. Goal -180  --Not at goal on 10 detemir, increased to 10U BID  --SSI prn initiated  --accuchecks q4h        Diet:  Diabetic, Renal  DVT PPx:  Eliquis  Goals of Care:  DNR      Disposition:  Pending clearance of fungemia  Discharge Needs:  SNF, follow up with ID      Estephania Styles MD  [facility] Medicine  Pager:  [phone]

## 2020-05-21 NOTE — HPI
Kodi Ibanez Sr. is a 60M with PMH of CHF, CAD, NSTEMI, DM, CVA, ESRD on dialysis via a R IJ permacath who was admitted to the hospital on 5/13 in septic shock and ultimately discovered to have candida fungemia. Permacath was removed on 5/21. He has improved clinically. Has remained afebrile x48 hours. Leukocytosis resolved and blood cultures have been negative since 5/20. Vascular surgery consulted for permacath replacement. Has been tolerating HD well via LIJ trialysis catheter.   Last dose of Eliquis was on 5/26. Still taking Plavix.

## 2020-05-21 NOTE — PROGRESS NOTES
[facility] NOTE     Patient currently on hemodialysis for removal of uremic toxins and volume.     Patient seen and evaluated on hemodialysis, tolerating treatment, see HD flowsheet for vitals and assessments.      Ultrafiltration goal is 2L     Labs have been reviewed and the dialysate bath has been adjusted.     Assessment/Plan:  Seen on dialysis this morning, tolerating well.  Discussed with hospital medicine yesterday with plans to have dialysis this morning and removal of catheter 2/2 fungemia and source control.  Will likely need temporary catheter placed on Saturday unless a tunneled line can be placed over the weekend as long as cultures remain negative?  Renal diet  Phos levels daily.  Continue renvela.  Add EPO     CAIN Amaral, FNP-BC  Nephrology  Pager:  [phone]

## 2020-05-21 NOTE — ASSESSMENT & PLAN NOTE
Kodi Ibanez  is a 60M with PMH of CHF, CAD, NSTEMI, DM, CVA, ESRD on dialysis via a R IJ permacath who was admitted to the hospital on 5/13 in septic shock and ultimately discovered to have candida fungemia. Vascular surgery was consulted for removal of his permacath for source control.    -Patient had dialysis today  -Permacath removed see procedure note.  -The patient tolerated this well  -Agree with giving the patient a line holiday and placing a temporary dialysis line on Saturday, a permacath can be placed again once his fungemia has cleared  -We will continue to follow peripherally and will be available if needed

## 2020-05-21 NOTE — PROCEDURES
R IJ Permacath Removal:    The patient stated his name and date of birth, and this was matched to his wristband. A timeout was performed, all present were in agreement. The head of the bed was lowered to 0 degrees and the patient was placed in slight trendelenburg position. The skin and permacath were prepped and the skin adjacent to the catheter tract was injected with 10cc of local 2% lidocaine with epinephrine. The skin and permacath were then prepped again and draped in the usual sterile fashion. The sutures holding the permacath in place were cut. Blunt dissection was performed to free the permacath from the surrounding scar tissue, and with gentle tension, the permacath slid out without resistance. Pressure was immediately applied to the venotomy site for 20 minutes. Pressure was also applied to the exit site and this was covered with a sterile dressing.    The patient tolerated the procedure well. He was instructed to notify medical staff immediately if he noticed any swelling of his neck, difficulty swallowing, increased work of breathing, or shortness of breath.

## 2020-05-21 NOTE — ASSESSMENT & PLAN NOTE
Kodi Ibanez Sr. is a 59 y/o male with past medical history of CHF, CAD with recent NSTEMI [~4/29] (CABG 2009, FERCHO placed in 12/2019), diabetes, CVA, ESRD on dialysis who was admitted to MICU for septic shock on 5/13. He was started on antibiotics and required pressor support which has since been weaned off. On 5/15, patient noted to be febrile with temp > 101F. Blood cultures ordered revealed candida albicans in 2/4 cultures. Patient was started on Micafungin 100 mg IV daily.   Blood cx 5/15: Candida Albicans 2/4 bottles  Blood cx 5/17: Candida Albicans, 2/4 bottles    Recommendations  - S/p  micafungin  - Continue 400 mg daily.  EKG with . Will need to monitor QTC frequently.  - Agree with ophthalmology consult, unable to visualize retina after dilation  - Concern for source control with R. subclavian tunneled cath given persistent candidemia. Recommend removal of the tunneled catheter for source control.Vasc. Sx consulted for removal of catheter.  - Continue to follow blood cultures for clearance

## 2020-05-22 PROBLEM — E44.0 MODERATE MALNUTRITION: Status: ACTIVE | Noted: 2020-01-01

## 2020-05-22 NOTE — PT/OT/SLP PROGRESS
Occupational Therapy   Treatment    Name: Kodi Ibanez Sr.  MRN: [id number]  Admitting Diagnosis:  Shock       Recommendations:     Discharge Recommendations: nursing facility, skilled  Discharge Equipment Recommendations:  (TBD pending progress)  Barriers to discharge:  Inaccessible home environment, Decreased caregiver support    Assessment:     Kodi Ibanez Sr. is a 60 y.o. male with a medical diagnosis of Shock.  Performance deficits affecting function are weakness, impaired endurance, impaired self care skills, impaired functional mobilty, impaired balance, gait instability, impaired coordination, impaired skin, impaired fine motor, impaired cardiopulmonary response to activity, decreased lower extremity function, decreased upper extremity function. Patient would benefit from continued skilled acute OT 4x/wk to improve functional mobility, increase independence with ADLs, and address established goals. Recommending SNF once medically appropriate for discharge to increase maximal independence, reduce burden of care, and ensure safety.     Rehab Prognosis:  Fair; patient would benefit from acute skilled OT services to address these deficits and reach maximum level of function.       Plan:     Patient to be seen 4 x/week to address the above listed problems via self-care/home management, therapeutic activities, therapeutic exercises  · Plan of Care Expires: 06/18/20  · Plan of Care Reviewed with: patient    Subjective     Pain/Comfort:  · Pain Rating 1: 0/10  · Pain Rating Post-Intervention 1: 0/10    Objective:     Communicated with: NSTAZ prior to session.  Patient found right sidelying upon OT entry to room.    General Precautions: Standard, fall, seizure   Orthopedic Precautions:N/A   Braces: N/A     Occupational Performance:     Bed Mobility:    · Patient completed Rolling/Turning to Left with  minimum assistance  · Patient completed Rolling/Turning to Right with minimum assistance  · Patient completed Supine to  Sit with moderate assistance  · Patient completed Sit to Supine with moderate assistance     Functional Mobility/Transfers:  · Patient completed Sit <> Stand Transfer with maximal assistance and of 2 persons  with  hand-held assist (Patient was unable to sidestep to HOB when standing)    Activities of Daily Living:  · Upper Body Dressing: maximal assistance Donning front gown  · Lower Body Dressing: total assistance [place] and donning socks  · Toileting: total assistance Patient was found soiled from having a BM and needed to be cleaned (Dressing was soiled, nursing notified for it to be changed). Patient also had a BM when standing, needing to be cleaned up again.     [facility] 6 Click ADL: 11    Treatment & Education:  Role of OT and POC  Safety  ADL retraining  Functional mobility training    Patient left supine with call button in reach, nursing present and all needs met. Education:      GOALS:   Multidisciplinary Problems     Occupational Therapy Goals        Problem: Occupational Therapy Goal    Goal Priority Disciplines Outcome Interventions   Occupational Therapy Goal     OT, PT/OT Ongoing, Progressing    Description:  Goals set on 5/19 with expiration date 6/2:  Patient will increase functional independence with ADLs by performing:    Supine <> Sit with Min Assistance.  Feeding with Min Assistance.  Grooming while standing at sink with Min Assistance.  UB Dressing with Min  Assistance.  LB Dressing with Min Assistance.  Step transfer with Min Assistance with DME as needed.  Pt will demonstrate understanding of education provided regarding energy conservation and task modification through teach-back method.                         Time Tracking:     OT Date of Treatment: 05/22/20  OT Start Time: 1545  OT Stop Time: 1610  OT Total Time (min): 25 min    Billable Minutes:Self Care/Home Management 25    YANY Houston  5/22/2020

## 2020-05-22 NOTE — PLAN OF CARE
Problem: Physical Therapy Goal  Goal: Physical Therapy Goal  Description  Goals to be met by: 2020     Patient will increase functional independence with mobility by performin. Supine to sit with Stand-by Assistance  2. Sit to supine with Stand-by Assistance  3. Sit to stand transfer with Minimal Assistance  4. Bed to chair transfer with Moderate Assistance using LRAD  5. Gait  x 10 feet with Moderate Assistance using LRAD.   6. Sitting at edge of bed x15 minutes with Stand-by Assistance while performing dynamic UE activities  7. Lower extremity exercise program x20 reps per handout, with independence     Outcome: Ongoing, Progressing

## 2020-05-22 NOTE — PLAN OF CARE
05/22/20 1408   Post-Acute Status   Post-Acute Authorization Placement   Post-Acute Placement Status Awaiting Internal Medical Clearance   Patient accepted by [facility] for post acute care. Patient not medically clear at this time. SW/CM to follow up with provider next week for when to submit for auth for patient admit. SW will continue to follow.  Ani Fischer, WILLIAM  [facility]

## 2020-05-22 NOTE — PLAN OF CARE
Problem: Malnutrition  Goal: Improved Nutritional Intake  Outcome: Ongoing, Progressing  Intervention: Promote and Optimize Oral Intake  Flowsheets (Taken 5/22/2020 1351)  Oral Nutrition Promotion: calorie dense liquids provided; nutritional therapy counseling provided     Recommendations  1. Continue diabetic renal diet + [employer] Renal TID as tolerated. Encourage PO intake. Suggest liberalizing to regular if PO intake remains poor (pt consuming 0-25% of meals).   2. Consider appetite stimulant if appetite remains poor.  3. RD to monitor.     Goals: PO intake > 50% by RD follow up  Nutrition Goal Status: new  Communication of RD Recs: other (comment)(POC)

## 2020-05-22 NOTE — PLAN OF CARE
Plan of care reviewed with pt. Pt AAOx4, VSSAF. Patient on RA. Purposeful rounding for pt care and safety. No reports of NV or pain. No falls or injuries reported during this shift. Skin integrity intact, aside from documented pressure injury. Safety precautions maintained during shift- bed in low position, call light in reach, SR up x2, personal belongings in reach.

## 2020-05-22 NOTE — ASSESSMENT & PLAN NOTE
Nutrition Problem:  Moderate Protein-Calorie Malnutrition  Malnutrition in the context of Acute Illness/Injury     Related to (etiology):  Inadequate energy intake 2/2 decreased appetite     Signs and Symptoms (as evidenced by):  Energy Intake: <50% of estimated energy requirement for > 1 week, < 75% intake x 2-3 weeks PTA per chart  Body Fat Depletion: mild and moderate depletion of orbitals and triceps   Muscle Mass Depletion: mild, moderate and severe depletion of temples, clavicle region, interosseous muscle and lower extremities      Interventions (treatment strategy):  Collaboration of nutrition care with other [facility]  [employer]  [employer]      Nutrition Diagnosis Status:  Continues

## 2020-05-22 NOTE — ASSESSMENT & PLAN NOTE
".Palliative Care Encounter / Goals of care discussion:     Narrative:   Kodi Ibanez Sr. is a 60 y.o. male patient with ESRD, complications of vascular access, CAD, s/p CABG, H/O NSTEMI with PCI, now admitted with fungemia and sepsis likely related to perm-a-cath. Pt. Has expressed wishes to not undergo any additional procedures.     1: Symptoms:   - pain assesment: denies pain   - dyspnea assessment: not short of breath   - anxiety assessment: not anxious   - depression assessment: unable to assess    2: Code Status:   - DNR    3: Psychosocial :   - Marital status:   - Children: one daughter and two sons  - POA: wife is next of kin and makes most medical decisions. She is a former EMT    3: Medicolegal:    - Advance directive: on file. No artificial life prolonging measures at the end of life    4: Support System:  - Spiritual: yes, nawaf mon  - Family: supportive     5: Goals of care Decisions / Recommendations / Plan:     05/22/2020   Initial Assessment:    Insight in Disease and Illness trajectory  - pt. Is very little responsive to medial questions and conversations. He engages when asked about his family.   - wife is very involved in medical care. She understands that there is a fungus infection and that likely the Perm-a-cath line is the culprit.   - she had a conversation with her children and feels that if the pt. Was to need a cath exchange the family would want him to get it.     Prognostication:  - overall poor long term prognosis due to many advanced co-morbidities and now acute fungal septicemia.     Goals of Care:  - discussed difficulty to treat or cure fungemia without catheter exchange  - family feels that "if he needs a exchange he should get one"  - pt. Has not been engaged in any discussion with me today, but the family expressed to respect his wishes should they be voiced  - will plan on teleconference tomorrow to discuss next steps  - awaiting ID evaluation and " recommendations  - Discussed with Dr. West.       Advanced Care Planning:   Advance Care Planning    Continue current care        Symptom Management:  - no sx. To palliate currently    Disposition:  -continue hospital based care.     6: Follow up plans:    Daily.     Thank you for your consult. I will follow along with you. Please call [phone] with questions.

## 2020-05-22 NOTE — PLAN OF CARE
Problem: Occupational Therapy Goal  Goal: Occupational Therapy Goal  Description  Goals set on 5/19 with expiration date 6/2:  Patient will increase functional independence with ADLs by performing:    Supine <> Sit with Min Assistance.  Feeding with Min Assistance.  Grooming while standing at sink with Min Assistance.  UB Dressing with Min  Assistance.  LB Dressing with Min Assistance.  Step transfer with Min Assistance with DME as needed.  Pt will demonstrate understanding of education provided regarding energy conservation and task modification through teach-back method.        Outcome: Ongoing, Progressing   Patient's goals are appropriate.   YANY Houston  5/22/2020

## 2020-05-22 NOTE — PT/OT/SLP PROGRESS
Physical Therapy Treatment    Patient Name:  Kodi Ibanez Sr.   MRN:  [id number]    Recommendations:     Discharge Recommendations:  nursing facility, skilled   Discharge Equipment Recommendations: none   Barriers to discharge: Decreased caregiver support    Assessment:     Kodi Ibanez Sr. is a 60 y.o. male admitted with a medical diagnosis of Shock.  He presents with the following impairments/functional limitations:  weakness, impaired endurance, impaired self care skills, impaired balance, gait instability, impaired functional mobilty, impaired cognition.    Rehab Prognosis: Fair; patient would benefit from acute skilled PT services to address these deficits and reach maximum level of function.    Recent Surgery: * No surgery found *      Plan:     During this hospitalization, patient to be seen 4 x/week to address the identified rehab impairments via therapeutic activities, therapeutic exercises, gait training and progress toward the following goals:    · Plan of Care Expires:  06/19/20    Subjective     Chief Complaint: did not speak  Patient/Family Comments/goals:  Did not speak  Pain/Comfort:  ·        Objective:     Communicated with nurse prior to session.  Patient found supine with   upon PT entry to room.     General Precautions: Standard, fall   Orthopedic Precautions:N/A   Braces:       Functional Mobility:  · Bed Mobility:     · Supine to Sit: moderate assistance  · Sit to Supine: moderate assistance  · Transfers:     · Sit to Stand:  maximal assistance and of 2 persons with hand-held assist  · Gait: attempted side steps x 2 trials but patient unable.      AM-PAC 6 CLICK MOBILITY  Turning over in bed (including adjusting bedclothes, sheets and blankets)?: 3  Sitting down on and standing up from a chair with arms (e.g., wheelchair, bedside commode, etc.): 2  Moving from lying on back to sitting on the side of the bed?: 2  Moving to and from a bed to a chair (including a wheelchair)?: 1  Need to walk in  hospital room?: 1  Climbing 3-5 steps with a railing?: 1  Basic Mobility Total Score: 10       Therapeutic Activities and Exercises:   patient soiled upon entering room, OT present. Cleaned patient up. Followed some commands with max cues, rolled left<>right with min a and cues.     Patient left supine with all lines intact, call button in reach and nurse present..    GOALS:   Multidisciplinary Problems     Physical Therapy Goals        Problem: Physical Therapy Goal    Goal Priority Disciplines Outcome Goal Variances Interventions   Physical Therapy Goal     PT, PT/OT Ongoing, Progressing     Description:  Goals to be met by: 2020     Patient will increase functional independence with mobility by performin. Supine to sit with Stand-by Assistance  2. Sit to supine with Stand-by Assistance  3. Sit to stand transfer with Minimal Assistance  4. Bed to chair transfer with Moderate Assistance using LRAD  5. Gait  x 10 feet with Moderate Assistance using LRAD.   6. Sitting at edge of bed x15 minutes with Stand-by Assistance while performing dynamic UE activities  7. Lower extremity exercise program x20 reps per handout, with independence                      Time Tracking:     PT Received On: 20  PT Start Time: 1550     PT Stop Time: 1610  PT Total Time (min): 20 min     Billable Minutes: Therapeutic Activity 20    Treatment Type: Treatment  PT/PTA: PTA     PTA Visit Number: 1     Thelma Piedra PTA  2020

## 2020-05-22 NOTE — SUBJECTIVE & OBJECTIVE
Interval History: No acute events overnight.  R. Tunneled cathter was removed.    Review of Systems   Constitutional: Positive for fatigue. Negative for chills and fever.   HENT: Negative for congestion, rhinorrhea and sore throat.    Respiratory: Negative for cough.    Cardiovascular: Negative for chest pain.   Gastrointestinal: Negative for abdominal pain, nausea and vomiting.   Musculoskeletal: Negative for back pain.   Skin: Negative for rash.   Neurological: Negative for seizures and syncope.     Objective:     Vital Signs (Most Recent):  Temp: 98.2 °F (36.8 °C) (05/22/20 1228)  Pulse: 82 (05/22/20 1228)  Resp: 18 (05/22/20 1228)  BP: 124/71 (05/22/20 1228)  SpO2: 96 % (05/22/20 1228) Vital Signs (24h Range):  Temp:  [97.9 °F (36.6 °C)-99.7 °F (37.6 °C)] 98.2 °F (36.8 °C)  Pulse:  [] 82  Resp:  [18-20] 18  SpO2:  [94 %-96 %] 96 %  BP: (109-129)/(64-80) 124/71     Weight: 67.6 kg (149 lb)  Body mass index is 20.78 kg/m².    Estimated Creatinine Clearance: 17.4 mL/min (A) (based on SCr of 4.2 mg/dL (H)).    Physical Exam   Constitutional: He is oriented to person, place, and time. He appears well-developed. He appears ill.   HENT:   Head: Normocephalic and atraumatic.   Eyes: Pupils are equal, round, and reactive to light. Conjunctivae are normal.   Neck: Normal range of motion. Neck supple.   Cardiovascular: Normal rate.   Pulmonary/Chest: Effort normal.   Abdominal: Soft. Bowel sounds are normal.   Musculoskeletal: He exhibits no edema.   Neurological: He is alert and oriented to person, place, and time.   Skin: Skin is warm and dry.   Psychiatric: His affect is blunt. He is slowed and withdrawn.       Significant Labs:   Blood Culture:   Recent Labs   Lab 05/15/20  1330 05/17/20  0520 05/17/20  0630 05/20/20  1556 05/20/20  1620   LABBLOO Gram stain aer bottle: budding yeast  Results called to and read back by: Jennifer Estevez RN  05/17/2020    05:22  DEB ALBICANS  ID consult required at [facility]  [place].  * Gram stain aer bottle: yeast  Positive results previously called  DEB ALBICANS  ID consult required at [facility] and [facility].  * Gram stain aer bottle: yeast  Results called to and read back by:Brianda Vaz 05/19/2020  10:33  DEB ALBICANS  ID consult required at [facility] and [facility].  * No Growth to date  No Growth to date No Growth to date  No Growth to date     CBC:   Recent Labs   Lab 05/21/20 0248 05/22/20  0359   WBC 9.97 13.18*   HGB 8.7* 7.5*   HCT 28.3* 23.7*    212     CMP:   Recent Labs   Lab 05/21/20 0248 05/22/20  0359 05/22/20  1103   * 136 137   K 4.1 4.2 4.0   CL 99 104 105   CO2 21* 23 23   * 266* 249*   BUN 33* 23 26*   CREATININE 5.2* 3.6* 4.2*   CALCIUM 7.6* 7.7* 7.7*   ANIONGAP 12 9 9   EGFRNONAA 10.9* 17.1* 14.2*       Significant Imaging: None

## 2020-05-22 NOTE — PROGRESS NOTES
Hospital Medicine  Progress Note      Patient Name: Kodi Ibanez Sr.  MRN:  [id number]  [facility] Medicine Team: [facility] HOSP MED R Estephania Styles MD  Date of Admission:  5/13/2020     Length of Stay:  LOS: 9 days   Expected Discharge Date: 5/25/2020  Principal Problem:  Shock      Subjective:    Interval History/Overnight Events:  NAEON. Pt lethargic this morning, but answering appropriately to most questions. Denies HA, sob, cp, fevers or chills. Tunneled catheter removed yesterday for line holiday. Per nephro, may need temp line placed tomorrow for HD.      Review of Systems:  Constitutional: Negative for chills, fatigue, fever.   Respiratory: Negative for cough, shortness of breath.    Cardiovascular: Negative for chest pain, palpitations, leg swelling.   Gastrointestinal: Negative for abdominal pain, nausea, vomiting, diarrhea, constipation  Genitourinary: Negative for dysuria, frequency.   All other systems reviewed and are negative.      Objective:    Physical Exam:  Temp:  [97.9 °F (36.6 °C)-99.7 °F (37.6 °C)]   Pulse:  []   Resp:  [18-20]   BP: (109-129)/(64-80)   SpO2:  [94 %-96 %]     Constitutional: Appears comfortable in no acute distress  Eyes: No scleral icterus or eye discharge  Cardiovascular: Normal heart rate.  Regular heart rhythm.  No murmur heard.  Pulmonary/Chest: Effort normal and breath sounds normal. No respiratory distress. No wheezes, rales, or rhonchi  Abdominal: Soft. Bowel sounds are normal.  No distension.  No tenderness  Musculoskeletal: No deformities.  No edema.   Neurological: Alert.  Oriented to person, disoriented to time and place  Skin: Skin is warm and dry.       Assessment and Plan:    Mr. Kodi Ibanez Sr. is a 60 y.o. male who presented to [facility] on 5/13/2020 with shock 2/2 sepsis.     The patient was admitted to MICU for shock likely 2/2 to sepsis on 5/13 requiring low dose Levophed. Weaned off on 5/14, but required to go back on vasopresors on   5/17/20.  Started on Heparin gtt for paroxysmal A fib with CHADS Vasc score of 6. Started on empiric broad spectrum abx/ Patient with intermittent SVT- Home coreg and dilt restarted.  Grew candida albicans on blood cx x 2 on 5/15, 5/17, 5/20. Initially on micafungin, but switched to fluconazole on 5/20 per ID recs. Evaluated by ophtha. Tunneled cath removed by surgery on 5/21 for line holiday.            Shock  Candidemia  2/2 probable sepsis likely r/t LUE AV fistula excision. Patient also with R tunneled catheter placed 2/2020.  Patient underwent ligation of LUE fistula on 2/27/20 with Dr Lopez ([facility], vascular surgery) and had ongoing issues afterwards. Patient was admitted on 4/10 for excision of his infected LUE mid and distal AVF with cultures and specimen obtained. Surgical cultures 4/10 grew 2 different prevotella species, fusobacterium, and strep anginosus. The patient was d/c'ed with home health wound care and po augmentin/clindamycin x 10 days. On 4/20 antibiotics were changed to Flagyl 500 mg p.o. q.8 and Cefazolin after dialysis 2 g on Tuesday and Thursday, 3 g on Saturday. End date on antibiotics on 5/11.       --BC grew candida albicans 5/15, 5/17, 5/20  --ID and opthalmology consulted; appreciate recs  --s/p tunneled cath removal by vascular surgery on 5/21/20      --Started on micafungin, now on fluconazole since 5/20; continue      Atrial fibrillation  --patient on diltiazem for rate control; restarted home dose   --on ASA/plavix/apixiban   --Chads Vasc score 6 points        NSTEMI (non-ST elevated myocardial infarction)  Hx of CABG in 2009 and placement of FERCHO in 12/2019. Patient recenetly admitted on 4/29 and d/c'ed 5/2, admitted for an NSTEMI, a LHC was performed where re-stenosis of a previous stent was found and balloon angioplasty was done. He is now on ASA/plavix, but per wife has not been compliant.     --continue ASA/plavix (home meds)  --TTE 5/14 with preserved EF 60% and no focal  WMA     Essential hypertension  --well controlled on losartan, diltiazem and lopressor     SVT (supraventricular tachycardia)  --ECG showed SVT with -150'2  --Required diltiazem drip, weaned and transition to PO dilt on 5/19  --continue dilt and lopressor        HLD (hyperlipidemia)  --statin for secondary prevention    ESRD on hemodialysis  Patient on HD T/Th/Sat with last session  Prior to admission on 5/12, where he was admitted to the ED with palpatations after session. Patient has tunneled catheter that was inserted in 2/2020 that is being used for access.      --nephrology consulted  --HD done on 5/21/20, may need line placement deepthi  --plan for permacath removal today per vascular surgery  --per  nephro may need temporary line placed Saturday for HD  --avoid nephrotoxic meds  --oliguric, strict I/o's  --BMP daily       AV fistula infection  --see shock  --continue micafungin   --ID and opthalmology consulted      Endocrine  Type 2 diabetes, uncontrolled, with neuropathy  -->500 on admission. Goal -180  --Not at goal on 10 detemir, increased to 10U BID  --SSI prn initiated  --accuchecks q4h        Diet:  Diabetic, Renal  DVT PPx:  Eliquis  Goals of Care:  DNR      Disposition:  Pending clearance of fungemia  Discharge Needs:  SNF, follow up with ID      Estephania Styles MD  [facility]  Pager:  [phone]

## 2020-05-22 NOTE — CARE UPDATE
Rapid Response Nurse Chart Check     Chart check completed, abnormal VS noted.  Bedside, JUDITH John contacted, no concerns verbalized at this time.   Instructed to call [phone] for further concerns or assistance.

## 2020-05-22 NOTE — PLAN OF CARE
05/22/20 0746   Post-Acute Status   Post-Acute Authorization Placement   Post-Acute Placement Status Pending Post-Acute Clinical Review   SW following patient for post acute care needs. Patient referral denied by the following providers:  -[facility] - level of functioning too low  -[facility] - not accepting new patients at this time  -[facility] - unable to accept dialysis patients.    Patient referral pending at [facility] and [facility]. SW will follow up with providers re update on patient referral status. SW will continue to follow  Ani Fischer LMSW [facility]     (10:00AM) SW outreached [facility] via phone at [phone] to follow up on patient referral status. Spoke with admit coordinator who reports patient referral in review at this time and will call SW once a determination has been made.  Ani Fischer LMSW [facility]     (10:05AM) MIGUEL A outreached [facility] SNF via phone at [phone] to follow up on patient referral status. Spoke with RN who states will have Meghnaa return call once a determination has been made on patient.  Ani Fischer LMSW [facility]    (10:52AM) MIGUEL A outreached patient spouse to update on patient referral status. SW informed of [facility] and [facility] still reviewing patient referral at this time. Spouse requesting SW to cancel referral to [facility] . Spouse states will stop by [facility] today. Wife reports if patient is declined by [facility] than she will take patient home post discharge. Wife does not want any additional SNF referrals placed at this time. SW will continue to follow.  Ani Fischer LMSW [facility]     (12:58PM) SW outreached Meghana with [facility] to follow up on patient referral status. Meghana states patient referral still under review. Meghana reports will follow up with DON and call SW  with a determination.  Ani Fischer LMSW [facility]     (1:43PM) MIGUEL A received call from Meghana with [facility] saying patient accepted for admission. MIGUEL A will follow up with Meghana to give update on patient medical clearance.   Ani Fischer LMSW [facility]

## 2020-05-22 NOTE — SUBJECTIVE & OBJECTIVE
"Interval History: Perm-a-cath removed yesterday. Able to connect with wife and have pt. Zoom with her today.   Blood cultures remain NGT     Past Medical History:   Diagnosis Date    Atherosclerosis of aorta     noted on VIKTORIA 2011    Blood transfusion     Cataracts, bilateral     CHF (congestive heart failure)     Clotting disorder     Cognitive deficits as late effect of cerebrovascular disease 6/26/2012    Coronary artery disease     NSTEMI s/p PCI with FERCHO to LCx (12/16/19),     Decreased appetite     DM (diabetes mellitus), type 2 with renal complications     Diabetic retinopathy    ESRD on hemodialysis     TUESDAY/ THURSDAY/SATURDAY    Hyperlipidemia     Hyperparathyroidism, unspecified     Hypertension     Prostate cancer 2016    7/16/19:  wife states "he doesn't have it anymore"    Seizures     Stroke 2011    PT HAS SHAKES     Unsteady gait     Uses roller walker       Past Surgical History:   Procedure Laterality Date    CARDIAC SURGERY      CABG    CATARACT EXTRACTION      ou    CATARACT EXTRACTION, BILATERAL      cataracts      CORONARY ANGIOGRAPHY INCLUDING BYPASS GRAFTS WITH CATHETERIZATION OF LEFT HEART N/A 12/16/2019    Procedure: ANGIOGRAM, CORONARY, INCLUDING BYPASS GRAFT, WITH LEFT HEART CATHETERIZATION, rcfa, 5fr;  Surgeon: David Villegas MD;  Location: [facility] CATH LAB;  Service: Cardiology;  Laterality: N/A;    CORONARY ANGIOGRAPHY INCLUDING BYPASS GRAFTS WITH CATHETERIZATION OF LEFT HEART N/A 4/30/2020    Procedure: ANGIOGRAM, CORONARY, INCLUDING BYPASS GRAFT, WITH LEFT HEART CATHETERIZATION, rcfa, 10 am start;  Surgeon: David Villegas MD;  Location: [facility] CATH LAB;  Service: Cardiology;  Laterality: N/A;    CORONARY ARTERY BYPASS GRAFT  2009    bypass    EYE SURGERY      Left Guera AVF  1/11/2012    PROSTATE BIOPSY      positive    retinal laser      REVISION OF ARTERIOVENOUS FISTULA Left 2/27/2020    Procedure: Ligation of left upper extremity fistula, Closure of " left upper extremity skin defect x 2, Intraoperative ultrasound ;  Surgeon: Stephane Lopez MD;  Location: [facility] OR;  Service: Vascular;  Laterality: Left;    REVISION OF ARTERIOVENOUS FISTULA Left 4/10/2020    Procedure: AV fistula excision;  Surgeon: Stephane Lopez MD;  Location: [facility] OR 2ND FLR;  Service: Vascular;  Laterality: Left;    ROTATOR CUFF REPAIR  right, 2005    ULTRASOUND GUIDANCE  12/16/2019    Procedure: Ultrasound Guidance;  Surgeon: David Villegas MD;  Location: [facility] CATH LAB;  Service: Cardiology;;    VASCULAR SURGERY         Review of patient's allergies indicates:   Allergen Reactions    Reglan [metoclopramide hcl] Diarrhea    Lorazepam      Other reaction(s): heavy sedation       Medications:  Continuous Infusions:    Scheduled Meds:   sodium chloride 0.9%   Intravenous Once    apixaban  5 mg Oral BID    aspirin  81 mg Oral Daily    atorvastatin  80 mg Oral QHS    clopidogreL  75 mg Oral Daily    diltiaZEM  60 mg Oral Q6H    epoetin megan-epbx  4,000 Units Intravenous Every Tues, Thurs, Sat    fluconazole  200 mg Oral Daily    insulin aspart U-100  2 Units Subcutaneous TIDWM    insulin detemir U-100  16 Units Subcutaneous BID    lidocaine 2%/EPINEPHrine 1:100,000  20 mL Intradermal Once    losartan  100 mg Oral Daily    metoprolol tartrate  25 mg Oral BID    sevelamer carbonate  800 mg Oral TID WM     PRN Meds:Dextrose 10% Bolus, Dextrose 10% Bolus, Dextrose 10% Bolus, glucagon (human recombinant), glucose, glucose, hydrOXYzine HCL, insulin aspart U-100, sodium chloride 0.9%    Family History     Problem Relation (Age of Onset)    Cancer Mother, Paternal Grandmother, Paternal Uncle    Cataracts Mother    Diabetes Mother    Glaucoma Mother    Heart disease Father, Maternal Grandmother    Hypertension Brother    No Known Problems Sister, Maternal Grandfather, Paternal Grandfather, Sister, Sister    Prostate cancer Brother        Tobacco Use    Smoking status: Never  Smoker    Smokeless tobacco: Never Used   Substance and Sexual Activity    Alcohol use: No    Drug use: No    Sexual activity: Not Currently       Review of Systems   Unable to perform ROS: Acuity of condition     Objective:     Vital Signs (Most Recent):  Temp: 98.2 °F (36.8 °C) (05/22/20 1228)  Pulse: 82 (05/22/20 1228)  Resp: 18 (05/22/20 1228)  BP: 124/71 (05/22/20 1228)  SpO2: 96 % (05/22/20 1228) Vital Signs (24h Range):  Temp:  [97.9 °F (36.6 °C)-99.7 °F (37.6 °C)] 98.2 °F (36.8 °C)  Pulse:  [] 82  Resp:  [18-20] 18  SpO2:  [94 %-96 %] 96 %  BP: (109-129)/(64-80) 124/71     Weight: 67.6 kg (149 lb)  Body mass index is 20.78 kg/m².    Review of Symptoms    Unable to assess due to acuity of condition  Symptom Assessment (ESAS 0-10 scale)   ESAS 0 1 2 3 4 5 6 7 8 9 10   Pain              Dyspnea              Anxiety              Nausea              Depression               Anorexia              Fatigue              Insomnia              Restlessness               Agitation              CAM / Delirium __ --  ___+   Constipation     __ --  ___+   Diarrhea           __ --  ___+  Bowel Management Plan (BMP): Yes    Comments: none    Pain Assessment: unable to assess; no nonverbal signs of pain    OME in 24 hours: 0    Performance Status: 50      Physical Exam  Constitutional: He is awake but confused  Elderly male, disoriented  No distress  Cachectic, chronically ill appearing   HENT:   Mouth/Throat: Oropharynx is clear and moist. No oropharyngeal exudate.   Eyes: Conjunctivae are normal. No scleral icterus.   Neck: No tracheal deviation present.   Cardiovascular: Normal rate, regular rhythm and normal heart sounds.   Irregular tachycardia, rate > 100   Pulmonary/Chest: Effort normal and breath sounds normal. No respiratory distress.   Abdominal: He exhibits no distension.   Musculoskeletal: He exhibits no edema or tenderness.   Neurological: He is alert and oriented to person, place, and time. No cranial  nerve deficit.   Lethargic. No gross focal deficits   Skin: Skin is warm and dry.   Nursing note and vitals reviewed.  Significant Labs:   LFT:   No results for input(s): AST, GGT, ALKPHOS, BILITOT in the last 48 hours.    Invalid input(s): ALST  CBC:   Recent Labs   Lab 05/22/20  0359   WBC 13.18*   HGB 7.5*   HCT 23.7*   MCV 87        BMP:  Recent Labs   Lab 05/22/20  0359 05/22/20  1103   * 249*    137   K 4.2 4.0    105   CO2 23 23   BUN 23 26*   CREATININE 3.6* 4.2*   CALCIUM 7.7* 7.7*   MG 2.2  --      LFT:  Lab Results   Component Value Date    AST 43 (H) 05/18/2020    ALKPHOS 155 (H) 05/18/2020    BILITOT 0.3 05/18/2020     Albumin:   Albumin   Date Value Ref Range Status   05/18/2020 1.7 (L) 3.5 - 5.2 g/dL Final     Protein:   Total Protein   Date Value Ref Range Status   05/18/2020 5.8 (L) 6.0 - 8.4 g/dL Final     Lactic acid:   Lab Results   Component Value Date    LACTATE 2.1 05/17/2020    LACTATE 1.0 05/14/2020       Significant Imaging: I have reviewed all pertinent imaging results/findings within the past 24 hours.    Advance Care Planning   Advanced Directives::  Living Will: Yes. Copy on chart: Yes  LaPOST: No  Do Not Resuscitate Status: DNR since this admission  Medical Power of : No; wife is surrogate Yaima Mnedez Spouse [phone]         Decision-Making Capacity: Family answered questions, Patient unable to communicate due to disease severity/cognitive impairment       Living Arrangements: Lives with spouse    Psychosocial/Cultural: did not explore during today's visit; wife is an EMT with some medical knowledge    Spiritual:     F- Madai and Belief: Reuben    I - Importance: yes  .  C - Community: yes    A - Address in Care: wife to coordinate

## 2020-05-22 NOTE — PROGRESS NOTES
"[facility]  Adult Nutrition  Progress Note    SUMMARY       Recommendations  1. Continue diabetic renal diet + Novasource Renal TID as tolerated. Encourage PO intake. Suggest liberalizing to regular if PO intake remains poor (pt consuming 0-25% of meals).   2. Consider appetite stimulant if appetite remains poor.  3. RD to monitor.    Goals: PO intake > 50% by RD follow up  Nutrition Goal Status: new  Communication of RD Recs: other (comment)(POC)    Reason for Assessment    Reason For Assessment: RD follow-up  Diagnosis: (shock)  Relevant Medical History: CHF, CAD, NSTEMI, DM, CVA, ESRD on HD  General Information Comments: Pt resting in bed consuming lunch this afternoon. 0% breakfast at 50% Novasource observed to be consumed at bedside earlier this morning. Pt reports he usually drinks 1 Novasource per day. 0-25% intake of meals per RN documentation. Pt reports normal intake PTA of 2 meals/day, however per chart wife reports pt w/ decreased intake x 2-3 weeks PTA. Pt denies wt changes and states his UBW is 142#. Stable wt 140-150# x 1 year PTA with some fluctuations likely 2/2 dialysis. No updated wt since 5/15. NFPE completed today, pt with mild/moderate muscle/fat wasting and meets criteria for acute moderate malnutrition 2/2 decreased intake 0-25% of meals > 1 week this admission and muscle/fat wasting present.    A1c > 9, diabetic diet education reviewed with pt today. Handout provided to bedside.    Nutrition Discharge Planning: Adequate PO intake on renal diet    Nutrition Risk Screen    Nutrition Risk Screen: no indicators present    Nutrition/Diet History    Spiritual, Cultural Beliefs, Mandaeism Practices, Values that Affect Care: no    Anthropometrics    Temp: 98.2 °F (36.8 °C)  Height Method: Stated  Height: 5' 11" (180.3 cm)  Height (inches): 71 in  Weight Method: Bed Scale  Weight: 67.6 kg (149 lb)  Weight (lb): 149 lb  Ideal Body Weight (IBW), Male: 172 lb  % Ideal Body Weight, " Male (lb): 86.63 %  BMI (Calculated): 20.8    Lab/Procedures/Meds    Pertinent Labs Reviewed: reviewed  Pertinent Labs Comments: Cr 3.6, GFR 19.7, Glu 266, Ca 7.7, P 2.2, A1c 10.3  Pertinent Medications Reviewed: reviewed  Pertinent Medications Comments: statin, insulin, losartan, sevelamer    Estimated/Assessed Needs    Weight Used For Calorie Calculations: 67.6 kg (149 lb 0.5 oz)  Energy Calorie Requirements (kcal): 1873 kcal/day  Energy Need Method: [place]-St Jeor(x 1.25 PAL)  Protein Requirements: 81-88 g/day(1.2-1.3 g/kg)  Weight Used For Protein Calculations: 67.6 kg (149 lb 0.5 oz)  Fluid Requirements (mL): per MD or 1 mL/kcal     RDA Method (mL): 1873  CHO Requirement: 234g/day    Nutrition Prescription Ordered    Current Diet Order: Diabetic renal  Oral Nutrition Supplement: Novasource TID    Evaluation of Received Nutrient/Fluid Intake    I/O: +5.3L since admit  Comments: LBM 5/20  % Intake of Estimated Energy Needs: 0 - 25 %  % Meal Intake: 0 - 25 %    Nutrition Risk    Level of Risk/Frequency of Follow-up: low(1x/week)     Assessment and Plan    Nutrition Problem:  Moderate Protein-Calorie Malnutrition  Malnutrition in the context of Acute Illness/Injury    Related to (etiology):  Inadequate energy intake 2/2 decreased appetite    Signs and Symptoms (as evidenced by):  Energy Intake: <50% of estimated energy requirement for > 1 week, < 75% intake x 2-3 weeks PTA per chart  Body Fat Depletion: mild and moderate depletion of orbitals and triceps   Muscle Mass Depletion: mild, moderate and severe depletion of temples, clavicle region, interosseous muscle and lower extremities     Interventions(treatment strategy):  Collaboration with other [employer] beverage - Novasource TID    Nutrition Diagnosis Status:  New     Monitor and Evaluation    Food and Nutrient Intake: energy intake, food and beverage intake  Food and Nutrient Adminstration: diet order  Knowledge/Beliefs/Attitudes: food and  nutrition knowledge/skill  Anthropometric Measurements: weight, weight change, body mass index  Biochemical Data, Medical Tests and Procedures: electrolyte and renal panel, gastrointestinal profile, glucose/endocrine profile, inflammatory profile, lipid profile  Nutrition-Focused Physical Findings: overall appearance     Malnutrition Assessment  Malnutrition Type: acute illness or injury  Energy Intake: moderate energy intake      Energy Intake (Malnutrition): less than or equal to 50% for greater than or equal to 5 days   Orbital Region (Subcutaneous Fat Loss): mild depletion  Upper Arm Region (Subcutaneous Fat Loss): moderate depletion   Synagogue Region (Muscle Loss): mild depletion  Clavicle Bone Region (Muscle Loss): mild depletion  Dorsal Hand (Muscle Loss): mild depletion  Anterior Thigh Region (Muscle Loss): severe depletion  Posterior Calf Region (Muscle Loss): moderate depletion     Nutrition Follow-Up    RD Follow-up?: Yes

## 2020-05-22 NOTE — ASSESSMENT & PLAN NOTE
".Palliative Care Encounter / Goals of care discussion:     Narrative:   Kodi Ibanez Sr. is a 60 y.o. male patient with ESRD, complications of vascular access, CAD, s/p CABG, H/O NSTEMI with PCI, now admitted with fungemia and sepsis likely related to perm-a-cath. Pt. Has expressed wishes to not undergo any additional procedures.     1: Symptoms:   - pain assesment: denies pain   - dyspnea assessment: not short of breath   - anxiety assessment: not anxious   - depression assessment: unable to assess    2: Code Status:   - DNR    3: Psychosocial :   - Marital status:   - Children: one daughter and two sons  - POA: wife is next of kin and makes most medical decisions. She is a former EMT    3: Medicolegal:    - Advance directive: on file. No artificial life prolonging measures at the end of life    4: Support System:  - Spiritual: yes, haleigh's whitness  - Family: supportive     5: Goals of care Decisions / Recommendations / Plan:     05/20/2020    - on my visit the pt. Again states he wants to go home.    - discussed with wife and pt. At the bedside.    - pt. Not very engaged with our conversation, not participating well in PT or other therapies. Appears withdrawn and repeats he "wants to go home".    - wife outlined to me and him that care would be challenging at home if pt. Can not support himself sitting. They would not be able to bring him to HD much less him sitting in the chair to tolerate HD.    - Pt. Agrees to participate and "try to get stronger". Anticipate SNF needs post discharge.       6: Follow up plans:    Not much to offer outside communication with wife. Will reach out for informational visit. Will follow from afar.     Thank you for your consult. I will follow along with you. Please call [phone] with questions.           "

## 2020-05-22 NOTE — PROGRESS NOTES
"[facility]  Palliative Medicine  Progress Note    Patient Name: Kodi Ibanez Sr.  MRN: [id number]  Admission Date: 5/13/2020  Hospital Length of Stay: 9 days  Code Status: DNR   Attending Provider: Estephania Styles*  Consulting Provider: Sukhdeep Guy MD  Primary Care Physician: Melonie Elias MD  Principal Problem:Shock    Patient information was obtained from patient, spouse/SO and past medical records.      Assessment/Plan:     Palliative care encounter  .Palliative Care Encounter / Goals of care discussion:     Narrative:   Kodi Ibanez Sr. is a 60 y.o. male patient with ESRD, complications of vascular access, CAD, s/p CABG, H/O NSTEMI with PCI, now admitted with fungemia and sepsis likely related to perm-a-cath. Pt. Has expressed wishes to not undergo any additional procedures.     1: Symptoms:   - pain assesment: denies pain   - dyspnea assessment: not short of breath   - anxiety assessment: not anxious   - depression assessment: unable to assess    2: Code Status:   - DNR    3: Psychosocial :   - Marital status:   - Children: one daughter and two sons  - POA: wife is next of kin and makes most medical decisions. She is a former EMT    3: Medicolegal:    - Advance directive: on file. No artificial life prolonging measures at the end of life    4: Support System:  - Spiritual: yes, haleigh[employer] whitglendy  - Family: supportive     5: Goals of care Decisions / Recommendations / Plan:     05/20/2020    - on my visit the pt. Again states he wants to go home.    - discussed with wife and pt. At the bedside.    - pt. Not very engaged with our conversation, not participating well in PT or other therapies. Appears withdrawn and repeats he "wants to go home".    - wife outlined to me and him that care would be challenging at home if pt. Can not support himself sitting. They would not be able to bring him to HD much less him sitting in the chair to tolerate HD.    - Pt. Agrees to " "participate and "try to get stronger". Anticipate SNF needs post discharge.       6: Follow up plans:    Not much to offer outside communication with wife. Will reach out for informational visit. Will follow from afar.     Thank you for your consult. I will follow along with you. Please call [phone] with questions.             ESRD on hemodialysis  .Palliative Care Encounter / Goals of care discussion:     Narrative:   Kodi Ibanez Sr. is a 60 y.o. male patient with ESRD, complications of vascular access, CAD, s/p CABG, H/O NSTEMI with PCI, now admitted with fungemia and sepsis likely related to perm-a-cath. Pt. Has expressed wishes to not undergo any additional procedures.     1: Symptoms:   - pain assesment: denies pain   - dyspnea assessment: not short of breath   - anxiety assessment: not anxious   - depression assessment: unable to assess    2: Code Status:   - DNR    3: Psychosocial :   - Marital status:   - Children: one daughter and two sons  - POA: wife is next of kin and makes most medical decisions. She is a former EMT    3: Medicolegal:    - Advance directive: on file. No artificial life prolonging measures at the end of life    4: Support System:  - Spiritual: yes, haleigh[employer] whitness  - Family: supportive     5: Goals of care Decisions / Recommendations / Plan:     05/22/2020   Initial Assessment:    [facility] in Disease and Illness trajectory  - pt. Is very little responsive to medial questions and conversations. He engages when asked about his family.   - wife is very involved in medical care. She understands that there is a fungus infection and that likely the Perm-a-cath line is the culprit.   - she had a conversation with her children and feels that if the pt. Was to need a cath exchange the family would want him to get it.     Prognostication:  - overall poor long term prognosis due to many advanced co-morbidities and now acute fungal septicemia.     Goals of Care:  - discussed " "difficulty to treat or cure fungemia without catheter exchange  - family feels that "if he needs a exchange he should get one"  - pt. Has not been engaged in any discussion with me today, but the family expressed to respect his wishes should they be voiced  - will plan on teleconference tomorrow to discuss next steps  - awaiting ID evaluation and recommendations  - Discussed with Dr. West.       Advanced Care Planning:   Advance Care Planning    Continue current care       Symptom Management:  - no sx. To palliate currently    Disposition:  -continue hospital based care.     6: Follow up plans:    Daily.     Thank you for your consult. I will follow along with you. Please call [phone] with questions.               I will follow-up with patient. Please contact us if you have any additional questions.    Subjective:     Chief Complaint:   Chief Complaint   Patient presents with    Altered Mental Status     worse today; elevated WBC; was at [facility] yesterday; dialysis pt; sugar read high at home       HPI:   61 y/o male with past medical history of CHF, CAD with recent NSTEMI [~4/29] (CABG 2009, FERCHO placed in 12/2019), diabetes, CVA, ESRD on dialysis Tuesday/Thursday/Saturday (last dialyzed morning of 5/12) presents to the ER by referral from infectious disease clinic after virtual visit today. The ID provider was concerned about the patients AMS and recent elevated WBC count. Recently,junior underwent ligation of LUE fistula on 2/27/20 with Dr Lopez ([facility], vascular surgery) and had ongoing issues afterwards. Patient was admitted on 4/10 for excision of his infected LUE mid and distal AVF with cultures and specimen obtained. Surgical cultures 4/10 grew 2 different prevotella species, fusobacterium, and strep anginosus. The patient was d/c'ed with home health wound care and po augmentin/clindamycin x 10 days. On 4/20 antibiotics were changed to Flagyl 500 mg p.o. q.8 and Cefazolin after dialysis 2 g " on Tuesday and Thursday, 3 g on Saturday. End date on antibiotics on 5/11.     Following these events, on 4/29 the patient was admitted for an NSTEMI, a LHC was performed where re-stenosis of a previous stent was found and balloon angioplasty was done. He was discharged on 5/2. He is now on ASA/plavix, but per wife has not been compliant.     Patients wife says that he has had poor PO intake for the last 2-3 weeks, has become progressively weak, and developed altered mental status today. Patient had 3 episodes of vomiting within the last 24 hours, which appeared brown-non bloody. He had a brown watery bowel movement today. Patient normally oliguric (urinating ~2-3 x per week). Patient was seen in the [facility] ED on the evening of 5/12 due to palpitations after his normal dialysis and sent home after evaluation, for possible antibiotic side effect.     He presents to the ED with encephalopathy x 1 day, leukocytosis of 15, lactate 2.7, troponin 0.482, , procal 10.98, Cr 6.5, BUN 41, and hyperglycemia. Patient pan-cultured, currently only UA resulted with large amount of bacteria. Patient requiring high dose Levophed on admission. Broad spectrum antibiotics initiated. 1.5 L IVF given. Patient remains on RA. Wife was updated regarding patient's current status. Patient spoken to regarding code status and verbalized his wishes as a DNR. His wife confirmed he has an LAPost but it is not on file. Instructed to bring in.     Hospital/ICU Course:  The patient was admitted to MICU for shock likely 2/2 to sepsis on 5/13. Patient remains on low dose Levophed. Nephrology consulted for possible dialysis. Blood cx NGTD. Patient remains on Heparin gtt for paroxysmal A fib with CHADS Vasc score of 6. Previously hyperglycemic with BG now well controlled.     The pt had briefly stepped down from ICU level of care but quickly decompensated with concern for septic shock, fungemia possibly related to dailysis cath, and returned to  "the ICU.      In this setting, palliative medicine was consulted to help with medical decision making and aid in the formation of goals of care.          Hospital Course:  No notes on file    Interval History: Perm-a-cath removed yesterday. Able to connect with wife and have pt. Zoom with her today.   Blood cultures remain NGT     Past Medical History:   Diagnosis Date    Atherosclerosis of aorta     noted on VIKTORIA 2011    Blood transfusion     Cataracts, bilateral     CHF (congestive heart failure)     Clotting disorder     Cognitive deficits as late effect of cerebrovascular disease 6/26/2012    Coronary artery disease     NSTEMI s/p PCI with FERCHO to LCx (12/16/19),     Decreased appetite     DM (diabetes mellitus), type 2 with renal complications     Diabetic retinopathy    ESRD on hemodialysis     TUESDAY/ THURSDAY/SATURDAY    Hyperlipidemia     Hyperparathyroidism, unspecified     Hypertension     Prostate cancer 2016 7/16/19:  wife states "he doesn't have it anymore"    Seizures     Stroke 2011    PT HAS SHAKES     Unsteady gait     Uses roller walker       Past Surgical History:   Procedure Laterality Date    CARDIAC SURGERY      CABG    CATARACT EXTRACTION      ou    CATARACT EXTRACTION, BILATERAL      cataracts      CORONARY ANGIOGRAPHY INCLUDING BYPASS GRAFTS WITH CATHETERIZATION OF LEFT HEART N/A 12/16/2019    Procedure: ANGIOGRAM, CORONARY, INCLUDING BYPASS GRAFT, WITH LEFT HEART CATHETERIZATION, rcfa, 5fr;  Surgeon: David Villegas MD;  Location: [facility] CATH LAB;  Service: Cardiology;  Laterality: N/A;    CORONARY ANGIOGRAPHY INCLUDING BYPASS GRAFTS WITH CATHETERIZATION OF LEFT HEART N/A 4/30/2020    Procedure: ANGIOGRAM, CORONARY, INCLUDING BYPASS GRAFT, WITH LEFT HEART CATHETERIZATION, rcfa, 10 am start;  Surgeon: David Villegas MD;  Location: [facility] CATH LAB;  Service: Cardiology;  Laterality: N/A;    CORONARY ARTERY BYPASS GRAFT  2009    bypass    EYE SURGERY      Left [place] " AVF  1/11/2012    PROSTATE BIOPSY      positive    retinal laser      REVISION OF ARTERIOVENOUS FISTULA Left 2/27/2020    Procedure: Ligation of left upper extremity fistula, Closure of left upper extremity skin defect x 2, Intraoperative ultrasound ;  Surgeon: Stephane Lopez MD;  Location: [facility] OR;  Service: Vascular;  Laterality: Left;    REVISION OF ARTERIOVENOUS FISTULA Left 4/10/2020    Procedure: AV fistula excision;  Surgeon: Stephane Lopez MD;  Location: [facility] OR 2ND FLR;  Service: Vascular;  Laterality: Left;    ROTATOR CUFF REPAIR  right, 2005    ULTRASOUND GUIDANCE  12/16/2019    Procedure: Ultrasound Guidance;  Surgeon: David Villegas MD;  Location: [facility] CATH LAB;  Service: Cardiology;;    VASCULAR SURGERY         Review of patient's allergies indicates:   Allergen Reactions    Reglan [metoclopramide hcl] Diarrhea    Lorazepam      Other reaction(s): heavy sedation       Medications:  Continuous Infusions:    Scheduled Meds:   sodium chloride 0.9%   Intravenous Once    apixaban  5 mg Oral BID    aspirin  81 mg Oral Daily    atorvastatin  80 mg Oral QHS    clopidogreL  75 mg Oral Daily    diltiaZEM  60 mg Oral Q6H    epoetin megan-epbx  4,000 Units Intravenous Every Tues, Thurs, Sat    fluconazole  200 mg Oral Daily    insulin aspart U-100  2 Units Subcutaneous TIDWM    insulin detemir U-100  16 Units Subcutaneous BID    lidocaine 2%/EPINEPHrine 1:100,000  20 mL Intradermal Once    losartan  100 mg Oral Daily    metoprolol tartrate  25 mg Oral BID    sevelamer carbonate  800 mg Oral TID WM     PRN Meds:Dextrose 10% Bolus, Dextrose 10% Bolus, Dextrose 10% Bolus, glucagon (human recombinant), glucose, glucose, hydrOXYzine HCL, insulin aspart U-100, sodium chloride 0.9%    Family History     Problem Relation (Age of Onset)    Cancer Mother, Paternal Grandmother, Paternal Uncle    Cataracts Mother    Diabetes Mother    Glaucoma Mother    Heart disease Father, Maternal  Grandmother    Hypertension Brother    No Known Problems Sister, Maternal Grandfather, Paternal Grandfather, Sister, Sister    Prostate cancer Brother        Tobacco Use    Smoking status: Never Smoker    Smokeless tobacco: Never Used   Substance and Sexual Activity    Alcohol use: No    Drug use: No    Sexual activity: Not Currently       Review of Systems   Unable to perform ROS: Acuity of condition     Objective:     Vital Signs (Most Recent):  Temp: 98.2 °F (36.8 °C) (05/22/20 1228)  Pulse: 82 (05/22/20 1228)  Resp: 18 (05/22/20 1228)  BP: 124/71 (05/22/20 1228)  SpO2: 96 % (05/22/20 1228) Vital Signs (24h Range):  Temp:  [97.9 °F (36.6 °C)-99.7 °F (37.6 °C)] 98.2 °F (36.8 °C)  Pulse:  [] 82  Resp:  [18-20] 18  SpO2:  [94 %-96 %] 96 %  BP: (109-129)/(64-80) 124/71     Weight: 67.6 kg (149 lb)  Body mass index is 20.78 kg/m².    Review of Symptoms    Unable to assess due to acuity of condition  Symptom Assessment (ESAS 0-10 scale)   ESAS 0 1 2 3 4 5 6 7 8 9 10   Pain              Dyspnea              Anxiety              Nausea              Depression               Anorexia              Fatigue              Insomnia              Restlessness               Agitation              CAM / Delirium __ --  ___+   Constipation     __ --  ___+   Diarrhea           __ --  ___+  Bowel Management Plan (BMP): Yes    Comments: none    Pain Assessment: unable to assess; no nonverbal signs of pain    OME in 24 hours: 0    Performance Status: 50      Physical Exam  Constitutional: He is awake but confused  Elderly male, disoriented  No distress  Cachectic, chronically ill appearing   HENT:   Mouth/Throat: Oropharynx is clear and moist. No oropharyngeal exudate.   Eyes: Conjunctivae are normal. No scleral icterus.   Neck: No tracheal deviation present.   Cardiovascular: Normal rate, regular rhythm and normal heart sounds.   Irregular tachycardia, rate > 100   Pulmonary/Chest: Effort normal and breath sounds normal. No  respiratory distress.   Abdominal: He exhibits no distension.   Musculoskeletal: He exhibits no edema or tenderness.   Neurological: He is alert and oriented to person, place, and time. No cranial nerve deficit.   Lethargic. No gross focal deficits   Skin: Skin is warm and dry.   Nursing note and vitals reviewed.  Significant Labs:   LFT:   No results for input(s): AST, GGT, ALKPHOS, BILITOT in the last 48 hours.    Invalid input(s): ALST  CBC:   Recent Labs   Lab 05/22/20  0359   WBC 13.18*   HGB 7.5*   HCT 23.7*   MCV 87        BMP:  Recent Labs   Lab 05/22/20  0359 05/22/20  1103   * 249*    137   K 4.2 4.0    105   CO2 23 23   BUN 23 26*   CREATININE 3.6* 4.2*   CALCIUM 7.7* 7.7*   MG 2.2  --      LFT:  Lab Results   Component Value Date    AST 43 (H) 05/18/2020    ALKPHOS 155 (H) 05/18/2020    BILITOT 0.3 05/18/2020     Albumin:   Albumin   Date Value Ref Range Status   05/18/2020 1.7 (L) 3.5 - 5.2 g/dL Final     Protein:   Total Protein   Date Value Ref Range Status   05/18/2020 5.8 (L) 6.0 - 8.4 g/dL Final     Lactic acid:   Lab Results   Component Value Date    LACTATE 2.1 05/17/2020    LACTATE 1.0 05/14/2020       Significant Imaging: I have reviewed all pertinent imaging results/findings within the past 24 hours.    Advance Care Planning   Advanced Directives::  Living Will: Yes. Copy on chart: Yes  LaPOST: No  Do Not Resuscitate Status: DNR since this admission  Medical Power of : No; wife is surrogate Yaima Mendez Spouse [phone]         Decision-Making Capacity: Family answered questions, Patient unable to communicate due to disease severity/cognitive impairment       Living Arrangements: Lives with spouse    Psychosocial/Cultural: did not explore during today's visit; wife is an EMT with some medical knowledge    Spiritual:     F- Madai and Belief: Reuben    I - Importance: yes  .  C - Community: yes    A - Address in Care: wife  to coordinate      > 50% of 35 min visit spent in chart review, face to face discussion of goals of care,  symptom assessment, coordination of care and emotional support.    Sukhdeep Guy MD  Palliative Medicine  [facility]

## 2020-05-22 NOTE — PROGRESS NOTES
[facility]  Infectious Disease  Progress Note    Patient Name: Kodi Ibanez Sr.  MRN: [id number]  Admission Date: 5/13/2020  Length of Stay: 9 days  Attending Physician: Estephania Styles*  Primary Care Provider: Melonie Elias MD    Isolation Status: No active isolations  Assessment/Plan:      Candidemia  Kodi Ibanez Sr. is a 59 y/o male with past medical history of CHF, CAD with recent NSTEMI [~4/29] (CABG 2009, FERCHO placed in 12/2019), diabetes, CVA, ESRD on dialysis, LAV infection with excision,  who was admitted to MICU for septic shock on 5/13. He was started on antibiotics and required pressor support which has since been weaned off. On 5/15, patient noted to be febrile with temp > 101F. Blood cultures ordered revealed candida albicans in 2/4 cultures. Patient was started on Micafungin 100 mg IV daily.   Blood cx 5/15: Candida Albicans 2/4 bottles  Blood cx 5/17: Candida Albicans, 2/4 bottles  Blood cx 5/20: NGTD    Recommendations  - Continue Fluconazole 400 mg daily for 4 weeks from line removal.   - S/p  micafungin 100mg IV x 4 days  -  EKG with . Will need to monitor QTC frequently.  - Agree with ophthalmology consult, unable to visualize retina after dilation  - Continue to follow blood cultures for clearance          Anticipated Disposition: TBD    Thank you for your consult. I will sign off. Please contact us if you have any additional questions.    Monica Lucas,   Infectious Disease  [facility]    Subjective:     Principal Problem:Shock    HPI: Kodi Ibanez Sr. is a 59 y/o male with past medical history of CHF, CAD with recent NSTEMI [~4/29] (CABG 2009, FERCHO placed in 12/2019), diabetes, CVA, ESRD on dialysis Tuesday/Thursday/Saturday (last dialyzed morning of 5/12) presents to the ER by referral from infectious disease clinic after virtual visit today. The ID provider was concerned about the patients AMS and recent elevated WBC  count. Recently,junior underwent ligation of LUE fistula on 2/27/20 with Dr Lopez ([facility], vascular surgery) and had ongoing issues afterwards. Patient was admitted on 4/10 for excision of his infected LUE mid and distal AVF with cultures and specimen obtained. Surgical cultures 4/10 grew 2 different prevotella species, fusobacterium, and strep anginosus. The patient was d/c'ed with home health wound care and po augmentin/clindamycin x 10 days. On 4/20 antibiotics were changed to Flagyl 500 mg p.o. q.8 and Cefazolin after dialysis 2 g on Tuesday and Thursday, 3 g on Saturday. End date on antibiotics on 5/11.     Following these events, on 4/29 the patient was admitted for an NSTEMI, a LHC was performed where re-stenosis of a previous stent was found and balloon angioplasty was done. He was discharged on 5/2. He is now on ASA/plavix, but per wife has not been compliant. Patient normally oliguric (urinating ~2-3 x per week). Patient was seen in the [facility] ED on the evening of 5/12 due to palpitations after his normal dialysis and sent home after evaluation, for possible antibiotic side effect.He presents to the ED with encephalopathy x 1 day, leukocytosis of 15, lactate 2.7, troponin 0.482, , procal 10.98, Cr 6.5, BUN 41, and hyperglycemia. Patient pan-cultured, currently only UA resulted with large amount of bacteria. Patient requiring high dose Levophed on admission. Broad spectrum antibiotics initiated. 1.5 L IVF given.    Hospital course   5/13: The patient was admitted to MICU for shock likely 2/2 to sepsis on 5/13. Patient remains on low dose Levophed. Nephrology consulted for possible dialysis. Blood cx NGTD. Patient remains on Heparin gtt for paroxysmal A fib with CHADS Vasc score of 6. Previously hyperglycemic with BG now well controlled.     5/14: Levo stopped in morning. Zosyn changed to unasyn.      5/15: Patient spiked new temp, vanc restarted. Patient with intermittent SVT- Home coreg and  dilt restarted. Step down orders placed.     5/17: Hypotension overnight requiring vasopressors. Transferred back to MICU on norepi    5/18: Patient with candida albicans on blood cultures from 5/15.    5:19: ID consulted for Candidemia.      Interval History: No acute events overnight.  R. Tunneled cathter was removed.    Review of Systems   Constitutional: Positive for fatigue. Negative for chills and fever.   HENT: Negative for congestion, rhinorrhea and sore throat.    Respiratory: Negative for cough.    Cardiovascular: Negative for chest pain.   Gastrointestinal: Negative for abdominal pain, nausea and vomiting.   Musculoskeletal: Negative for back pain.   Skin: Negative for rash.   Neurological: Negative for seizures and syncope.     Objective:     Vital Signs (Most Recent):  Temp: 98.2 °F (36.8 °C) (05/22/20 1228)  Pulse: 82 (05/22/20 1228)  Resp: 18 (05/22/20 1228)  BP: 124/71 (05/22/20 1228)  SpO2: 96 % (05/22/20 1228) Vital Signs (24h Range):  Temp:  [97.9 °F (36.6 °C)-99.7 °F (37.6 °C)] 98.2 °F (36.8 °C)  Pulse:  [] 82  Resp:  [18-20] 18  SpO2:  [94 %-96 %] 96 %  BP: (109-129)/(64-80) 124/71     Weight: 67.6 kg (149 lb)  Body mass index is 20.78 kg/m².    Estimated Creatinine Clearance: 17.4 mL/min (A) (based on SCr of 4.2 mg/dL (H)).    Physical Exam   Constitutional: He is oriented to person, place, and time. He appears well-developed. He appears ill.   HENT:   Head: Normocephalic and atraumatic.   Eyes: Pupils are equal, round, and reactive to light. Conjunctivae are normal.   Neck: Normal range of motion. Neck supple.   Cardiovascular: Normal rate.   Pulmonary/Chest: Effort normal.   Abdominal: Soft. Bowel sounds are normal.   Musculoskeletal: He exhibits no edema.   Neurological: He is alert and oriented to person, place, and time.   Skin: Skin is warm and dry.   Psychiatric: His affect is blunt. He is slowed and withdrawn.       Significant Labs:   Blood Culture:   Recent Labs   Lab  05/15/20  1330 05/17/20  0520 05/17/20  0630 05/20/20  1556 05/20/20  1620   LABBLOO Gram stain aer bottle: budding yeast  Results called to and read back by: Jennifer Estevez RN  05/17/2020    05:22  DEB ALBICANS  ID consult required at [facility].  * Gram stain aer bottle: yeast  Positive results previously called  DEB ALBICANS  [facility].  * Gram stain aer bottle: yeast  Results called to and read back by:Brianda Vaz 05/19/2020  10:33  DEB ALBICANS  ID consult required at [facility] and [facility].  * No Growth to date  No Growth to date No Growth to date  No Growth to date     CBC:   Recent Labs   Lab 05/21/20  0248 05/22/20  0359   WBC 9.97 13.18*   HGB 8.7* 7.5*   HCT 28.3* 23.7*    212     CMP:   Recent Labs   Lab 05/21/20  0248 05/22/20  0359 05/22/20  1103   * 136 137   K 4.1 4.2 4.0   CL 99 104 105   CO2 21* 23 23   * 266* 249*   BUN 33* 23 26*   CREATININE 5.2* 3.6* 4.2*   CALCIUM 7.6* 7.7* 7.7*   ANIONGAP 12 9 9   EGFRNONAA 10.9* 17.1* 14.2*       Significant Imaging: None

## 2020-05-22 NOTE — PROGRESS NOTES
ESRD on iHD (TTS): s/p HD Th (5/21/20) via RIJ PC (which was removed 5/21 d/t candida fungemia; blood cxs collected 5/20/20 with NGTD; currently on line holiday; per 5/21/20 1402 Consult note by Vascular Surgery, temporary line to be placed tomorrow. Hgb 7.5 (and down from 8.7 5/22/20); iron panel (add-on); plan for HD tomorrow after temp line placement with Hgb>7; managed by Primary Team.

## 2020-05-22 NOTE — ASSESSMENT & PLAN NOTE
Kodi Ibanez Sr. is a 61 y/o male with past medical history of CHF, CAD with recent NSTEMI [~4/29] (CABG 2009, FERCHO placed in 12/2019), diabetes, CVA, ESRD on dialysis, LAV infection with excision,  who was admitted to MICU for septic shock on 5/13. He was started on antibiotics and required pressor support which has since been weaned off. On 5/15, patient noted to be febrile with temp > 101F. Blood cultures ordered revealed candida albicans in 2/4 cultures. Patient was started on Micafungin 100 mg IV daily.   Blood cx 5/15: Candida Albicans 2/4 bottles  Blood cx 5/17: Candida Albicans, 2/4 bottles  Blood cx 5/20: NGTD    Recommendations  - Continue Fluconazole 400 mg daily for 4 weeks from line removal.   - S/p  micafungin 100mg IV x 4 days  -  EKG with . Will need to monitor QTC frequently.  - Agree with ophthalmology consult, unable to visualize retina after dilation  - Continue to follow blood cultures for clearance

## 2020-05-23 NOTE — PROGRESS NOTES
ESRD on iHD (TTS): s/p HD Th (5/21/20) via RIJ PC (which was removed 5/21 d/t candida fungemia; blood cxs collected 5/20/20 with NGTD; currently on line holiday; per 5/21/20 1402 Consult note by Vascular Surgery, temporary line to be placed today. Hgb 8.0; ferritin 1230 (5/22/20).    -noted 5/22/20 1453 Progress Note by Dr. Guy; discussed with Primary Team

## 2020-05-23 NOTE — PLAN OF CARE
Problem: Fall Injury Risk  Goal: Absence of Fall and Fall-Related Injury  Outcome: Ongoing, Progressing     Problem: Adult Inpatient Plan of Care  Goal: Plan of Care Review  Outcome: Ongoing, Progressing  Goal: Patient-Specific Goal (Individualization)  Description  PMHx: CHF, CAD with recent NSTEMI [~4/29] (CABG 2009), diabetes, CVA, ESRD on dialysis Tuesday/Thursday/Saturday (last dialyzed morning of 5/12), paroxysmal A fib     5/13: ED AMS, high WBC, levo gtt, pan cultured 1.5 L LR    5/14: No pressors required   5/15: Febrile. Intermittent SVT.   5/16: Stepped Down  5/17: Rapid SICU. Hypotension. Levo and Vaso gtt. Blood cultures.   5/18: 7 beats Vtach/-150's sustained. 5mg Lopressor IVP x2. Bolus dose Cardizem 15mg. Stepdown orders d/c'd.  Cardizem drip.  Lopressor po started.        Nursing:   MAP >65   Accuchecks Q4hrs     Outcome: Ongoing, Progressing  Goal: Absence of Hospital-Acquired Illness or Injury  Outcome: Ongoing, Progressing  Goal: Optimal Comfort and Wellbeing  Outcome: Ongoing, Progressing  Goal: Readiness for Transition of Care  Outcome: Ongoing, Progressing  Goal: Rounds/Family Conference  Outcome: Ongoing, Progressing     Problem: Diabetes Comorbidity  Goal: Blood Glucose Level Within Desired Range  Outcome: Ongoing, Progressing     Problem: Adjustment to Illness (Sepsis/Septic Shock)  Goal: Optimal Coping  Outcome: Ongoing, Progressing     Problem: Bleeding (Sepsis/Septic Shock)  Goal: Absence of Bleeding  Outcome: Ongoing, Progressing     Problem: Glycemic Control Impaired (Sepsis/Septic Shock)  Goal: Blood Glucose Level Within Desired Range  Outcome: Ongoing, Progressing     Problem: Hemodynamic Instability (Sepsis/Septic Shock)  Goal: Effective Tissue Perfusion  Outcome: Ongoing, Progressing     Problem: Infection (Sepsis/Septic Shock)  Goal: Absence of Infection Signs/Symptoms  Outcome: Ongoing, Progressing     Problem: Nutrition Impaired (Sepsis/Septic Shock)  Goal: Optimal  Nutrition Intake  Outcome: Ongoing, Progressing     Problem: Respiratory Compromise (Sepsis/Septic Shock)  Goal: Effective Oxygenation and Ventilation  Outcome: Ongoing, Progressing     Problem: Infection  Goal: Infection Symptom Resolution  Outcome: Ongoing, Progressing     Problem: Skin Injury Risk Increased  Goal: Skin Health and Integrity  Outcome: Ongoing, Progressing     Problem: Device-Related Complication Risk (CRRT (Continuous Renal Replacement Therapy))  Goal: Safe, Effective Therapy Delivery  Outcome: Ongoing, Progressing     Problem: Infection (CRRT (Continuous Renal Replacement Therapy))  Goal: Absence of Infection Signs/Symptoms  Outcome: Ongoing, Progressing     Problem: Hypothermia (CRRT (Continuous Renal Replacement Therapy))  Goal: Body Temperature Maintained in Desired Range  Outcome: Ongoing, Progressing     Problem: Device-Related Complication Risk (Hemodialysis)  Goal: Safe, Effective Therapy Delivery  Outcome: Ongoing, Progressing     Problem: Hemodynamic Instability (Hemodialysis)  Goal: Vital Signs Remain in Desired Range  Outcome: Ongoing, Progressing     Problem: Infection (Hemodialysis)  Goal: Absence of Infection Signs/Symptoms  Outcome: Ongoing, Progressing     Problem: Coping Ineffective  Goal: Effective Coping  Outcome: Ongoing, Progressing     Problem: Wound  Goal: Optimal Wound Healing  Outcome: Ongoing, Progressing     Problem: Malnutrition  Goal: Improved Nutritional Intake  Outcome: Ongoing, Progressing     Pt non oriented to time, place or situation. Going to get HD access today. Getting dialysis after access placed. MD aware.  Non compliant with tele. MD aware and will order EKG for tomorrow. Pt sleeping in bed. Call light in reach. Bed in lowest position. Will continue to monitor pt.

## 2020-05-23 NOTE — PROGRESS NOTES
ESRD on iHD: plan for HD today (likely at the bedside) after line placement (with ok to use); Guerline (JOVON charge nurse) aware.

## 2020-05-23 NOTE — PROGRESS NOTES
Hospital Medicine  Progress Note      Patient Name: Kodi Ibanez Sr.  MRN:  [id number]  [facility] Medicine Team: [facility] MED R Estephania Styles MD  Date of Admission:  5/13/2020     Length of Stay:  LOS: 10 days   Expected Discharge Date: 5/26/2020  Principal Problem:  Shock      Subjective:    Interval History/Overnight Events:  NAEON. Pt lethargic this morning, but answering appropriately to most questions (knew the yr, thought he was at [facility]). Spoke to wife and she is OK with trialysis line re-insertion. Consulted anesthesia for trialysis line placement with plans for HD after.      Review of Systems:  Constitutional: Negative for chills, fatigue, fever.   Respiratory: Negative for cough, shortness of breath.    Cardiovascular: Negative for chest pain, palpitations, leg swelling.   Gastrointestinal: Negative for abdominal pain, nausea, vomiting, diarrhea, constipation  Genitourinary: Negative for dysuria, frequency.   All other systems reviewed and are negative.      Objective:    Physical Exam:  Temp:  [97.1 °F (36.2 °C)-98.6 °F (37 °C)]   Pulse:  [81-90]   Resp:  [16-20]   BP: (118-141)/(56-75)   SpO2:  [95 %-97 %]     Constitutional: Appears comfortable in no acute distress  Eyes: No scleral icterus or eye discharge  Cardiovascular: Normal heart rate.  Regular heart rhythm.  No murmur heard.  Pulmonary/Chest: Effort normal and breath sounds normal. No respiratory distress. No wheezes, rales, or rhonchi  Abdominal: Soft. Bowel sounds are normal.  No distension.  No tenderness  Musculoskeletal: No deformities.  No edema.   Neurological: Alert.  Oriented to person, disoriented to time and place  Skin: Skin is warm and dry.       Assessment and Plan:    Mr. Kodi Ibanez Sr. is a 60 y.o. male who presented to [facility] on 5/13/2020 with shock 2/2 sepsis.     The patient was admitted to MICU for shock likely 2/2 to sepsis on 5/13 requiring low dose Levophed. Weaned off on 5/14, but required to go  back on vasopresors on   5/17/20. Started on Heparin gtt for paroxysmal A fib with CHADS Vasc score of 6. Started on empiric broad spectrum abx/ Patient with intermittent SVT- Home coreg and dilt restarted.  Grew candida albicans on blood cx x 2 on 5/15, 5/17, 5/20. Initially on micafungin, but switched to fluconazole on 5/20 per ID recs. Evaluated by ophtha. Tunneled cath removed by surgery on 5/21 for line holiday.            Shock  Candidemia  2/2 probable sepsis likely r/t LUE AV fistula excision. Patient also with R tunneled catheter placed 2/2020.  Patient underwent ligation of LUE fistula on 2/27/20 with Dr Lopez ([facility], vascular surgery) and had ongoing issues afterwards. Patient was admitted on 4/10 for excision of his infected LUE mid and distal AVF with cultures and specimen obtained. Surgical cultures 4/10 grew 2 different prevotella species, fusobacterium, and strep anginosus. The patient was d/c'ed with home health wound care and po augmentin/clindamycin x 10 days. On 4/20 antibiotics were changed to Flagyl 500 mg p.o. q.8 and Cefazolin after dialysis 2 g on Tuesday and Thursday, 3 g on Saturday. End date on antibiotics on 5/11.       --BC grew candida albicans 5/15, 5/17, 5/20  --ID and opthalmology consulted; appreciate recs  --s/p tunneled cath removal by vascular surgery on 5/21/20      --Started on micafungin, now on fluconazole since 5/20; continue      Atrial fibrillation  --patient on diltiazem for rate control; restarted home dose   --on ASA/plavix/apixiban   --Chads Vasc score 6 points        NSTEMI (non-ST elevated myocardial infarction)  Hx of CABG in 2009 and placement of FERCHO in 12/2019. Patient recenetly admitted on 4/29 and d/c'ed 5/2, admitted for an NSTEMI, a LHC was performed where re-stenosis of a previous stent was found and balloon angioplasty was done. He is now on ASA/plavix, but per wife has not been compliant.     --continue ASA/plavix (home meds)  --TTE 5/14 with  preserved EF 60% and no focal WMA     Essential hypertension  --well controlled on losartan, diltiazem and lopressor     SVT (supraventricular tachycardia)  --ECG showed SVT with -150'2  --Required diltiazem drip, weaned and transition to PO dilt on 5/19  --continue dilt and lopressor        HLD (hyperlipidemia)  --statin for secondary prevention    ESRD on hemodialysis  Patient on HD T/Th/Sat with last session  Prior to admission on 5/12, where he was admitted to the ED with palpatations after session. Patient has tunneled catheter that was inserted in 2/2020 that is being used for access.      --nephrology consulted  --HD done on 5/21/20, may need line placement deepthi  --plan for permacath removal today per vascular surgery  --per  nephro may need temporary line placed; anesthesia consulted for line  --avoid nephrotoxic meds  --oliguric, strict I/o's  --BMP daily       AV fistula infection  --see shock  --continue micafungin   --ID and opthalmology consulted      Endocrine  Type 2 diabetes, uncontrolled, with neuropathy  -->500 on admission. Goal -180  --Not at goal, increased to 20U BID  --SSI prn initiated  --accuchecks q4h        Diet:  Diabetic, Renal  DVT PPx:  Eliquis  Goals of Care:  DNR      Disposition:  Pending clearance of fungemia  Discharge Needs:  SNF, follow up with ID      Estephania Styles MD  [facility] Medicine  Pager:  [phone]

## 2020-05-23 NOTE — NURSING
Spoke with anesthesia resident. He was unable to give a time to get HD access but it will get done today. notified MD.

## 2020-05-23 NOTE — PLAN OF CARE
Plan of care reviewed with pt. Pt AAOx4, VSSAF. Patient on RA. Purposeful rounding for pt care and safety. No reports of NV or pain. No falls or injuries reported during this shift. Skin integrity intact aside from pressure injury to sacrum. Safety precautions maintained during shift- bed in low position, call light in reach, SR up x2, personal belongings in reach.

## 2020-05-24 NOTE — PROGRESS NOTES
Hospital Medicine  Progress Note      Patient Name: Kodi Ibanez Sr.  MRN:  [id number]  [facility] Medicine Team: [facility] MED R Estephania Styles MD  Date of Admission:  5/13/2020     Length of Stay:  LOS: 11 days   Expected Discharge Date: 5/26/2020  Principal Problem:  Shock      Subjective:    Interval History/Overnight Events:    Anesthesia placed trialysis line and pt underwent HD overnight with 1L fluid removal. Noted to be tachycardic since approx 3am. EKG this morning with sinus tachycardia. Given AM dose of dilt and lopressor. Pt asymptomatic and afebrile. Will re-culture given rise in WBC 12-->15.85k and place back on telemetry. H/H stable. Denies sob, cp, palpitations, lightheadedness. Continuing fluconazole for candidemia.       Review of Systems:  Constitutional: Negative for chills, fatigue, fever.   Respiratory: Negative for cough, shortness of breath.    Cardiovascular: Negative for chest pain, palpitations, leg swelling.   Gastrointestinal: Negative for abdominal pain, nausea, vomiting, diarrhea, constipation  Genitourinary: Negative for dysuria, frequency.   All other systems reviewed and are negative.      Objective:    Physical Exam:  Temp:  [97.6 °F (36.4 °C)-99.8 °F (37.7 °C)]   Pulse:  []   Resp:  [16-20]   BP: ()/(58-86)   SpO2:  [95 %-97 %]     Constitutional: somnolent, hiding under covers, in NAD  Eyes: No scleral icterus or eye discharge  Cardiovascular: tachycardic.  Regular heart rhythm.  No murmur heard. Lt IJ trialysis in place.  Pulmonary/Chest: Effort normal and breath sounds normal. No respiratory distress. No wheezes, rales, or rhonchi  Abdominal: Soft. Bowel sounds are normal.  No distension.  No tenderness  Musculoskeletal: No deformities.  No edema.   Neurological: Alert.  Oriented to person, disoriented to time and place  Skin: Skin is warm and dry.       Assessment and Plan:    Mr. Kodi Ibanez Sr. is a 60 y.o. male who presented to [facility] on 5/13/2020 with  shock 2/2 sepsis.     The patient was admitted to MICU for shock likely 2/2 to sepsis on 5/13 requiring low dose Levophed. Weaned off on 5/14, but required to go back on vasopresors on   5/17/20. Started on Heparin gtt for paroxysmal A fib with CHADS Vasc score of 6. Started on empiric broad spectrum abx/ Patient with intermittent SVT- Home coreg and dilt restarted.  Grew candida albicans on blood cx x 2 on 5/15, 5/17, 5/20. Initially on micafungin, but switched to fluconazole on 5/20 per ID recs. Evaluated by ophtha. Tunneled cath removed by surgery on 5/21 for line holiday. Line placed 5/24 and underwent HD.            Shock  Candidemia  2/2 probable sepsis likely r/t LUE AV fistula excision. Patient also with R tunneled catheter placed 2/2020.  Patient underwent ligation of LUE fistula on 2/27/20 with Dr Lopez ([facility], vascular surgery) and had ongoing issues afterwards. Patient was admitted on 4/10 for excision of his infected LUE mid and distal AVF with cultures and specimen obtained. Surgical cultures 4/10 grew 2 different prevotella species, fusobacterium, and strep anginosus. The patient was d/c'ed with home health wound care and po augmentin/clindamycin x 10 days. On 4/20 antibiotics were changed to Flagyl 500 mg p.o. q.8 and Cefazolin after dialysis 2 g on Tuesday and Thursday, 3 g on Saturday. End date on antibiotics on 5/11.       --BC grew candida albicans 5/15, 5/17, 5/20  --ID and opthalmology consulted; appreciate recs  --s/p tunneled cath removal by vascular surgery on 5/21/20      --Started on micafungin, now on fluconazole since 5/20; continue      Atrial fibrillation  --patient on diltiazem for rate control; restarted home dose   --on ASA/plavix/apixiban   --Chads Vasc score 6 points     Sinus Tachycardia  --confirmed on EKG 5/24-   --concern for acute infection  --blood cx, LA ordered  --temp 99.1, so will recheck in 1 hour  --currently on fluconazole for candidemia, may need to  broaden if febrile  --on BB for afib; received additional dose lopressor 5mg IV  --place back on telemetry         NSTEMI (non-ST elevated myocardial infarction)  Hx of CABG in 2009 and placement of FERCHO in 12/2019. Patient recenetly admitted on 4/29 and d/c'ed 5/2, admitted for an NSTEMI, a LHC was performed where re-stenosis of a previous stent was found and balloon angioplasty was done. He is now on ASA/plavix, but per wife has not been compliant.     --continue ASA/plavix (home meds)  --TTE 5/14 with preserved EF 60% and no focal WMA     Essential hypertension  --well controlled on losartan, diltiazem and lopressor     SVT (supraventricular tachycardia)  --ECG showed SVT with -150'2  --Required diltiazem drip, weaned and transition to PO dilt on 5/19  --continue dilt and lopressor        HLD (hyperlipidemia)  --statin for secondary prevention    ESRD on hemodialysis  Patient on HD T/Th/Sat with last session  Prior to admission on 5/12, where he was admitted to the ED with palpatations after session. Patient has tunneled catheter that was inserted in 2/2020 that is being used for access.      --nephrology consulted  --HD done on 5/21/20, may need line placement deepthi  --plan for permacath removal today per vascular surgery  --trialysis line placed 5/24 by anesthesia- s/p HD today  --avoid nephrotoxic meds  --oliguric, strict I/o's  --BMP daily       AV fistula infection  --see shock  --continue micafungin   --ID and opthalmology consulted      Endocrine  Type 2 diabetes, uncontrolled, with neuropathy  -->500 on admission. Goal -180  --Not at goal, increased to 20U BID  --SSI prn initiated  --accuchecks q4h        Diet:  Diabetic, Renal  DVT PPx:  Eliquis  Goals of Care:  DNR      Disposition:  Pending clearance of fungemia  Discharge Needs:  SNF, follow up with ID      Estephania Styles MD  [facility] Medicine  Pager:  [phone]

## 2020-05-24 NOTE — PLAN OF CARE
Problem: Fall Injury Risk  Goal: Absence of Fall and Fall-Related Injury  Outcome: Ongoing, Not Progressing     Problem: Adult Inpatient Plan of Care  Goal: Plan of Care Review  Outcome: Ongoing, Not Progressing  Goal: Patient-Specific Goal (Individualization)  Description  PMHx: CHF, CAD with recent NSTEMI [~4/29] (CABG 2009), diabetes, CVA, ESRD on dialysis Tuesday/Thursday/Saturday (last dialyzed morning of 5/12), paroxysmal A fib     5/13: ED AMS, high WBC, levo gtt, pan cultured 1.5 L LR    5/14: No pressors required   5/15: Febrile. Intermittent SVT.   5/16: Stepped Down  5/17: Rapid SICU. Hypotension. Levo and Vaso gtt. Blood cultures.   5/18: 7 beats Vtach/-150's sustained. 5mg Lopressor IVP x2. Bolus dose Cardizem 15mg. Stepdown orders d/c'd.  Cardizem drip.  Lopressor po started.        Nursing:   MAP >65   Accuchecks Q4hrs     Outcome: Ongoing, Not Progressing  Goal: Absence of Hospital-Acquired Illness or Injury  Outcome: Ongoing, Not Progressing  Goal: Optimal Comfort and Wellbeing  Outcome: Ongoing, Not Progressing  Goal: Readiness for Transition of Care  Outcome: Ongoing, Not Progressing  Goal: Rounds/Family Conference  Outcome: Ongoing, Not Progressing     Problem: Diabetes Comorbidity  Goal: Blood Glucose Level Within Desired Range  Outcome: Ongoing, Not Progressing     Problem: Adjustment to Illness (Sepsis/Septic Shock)  Goal: Optimal Coping  Outcome: Ongoing, Not Progressing     Problem: Bleeding (Sepsis/Septic Shock)  Goal: Absence of Bleeding  Outcome: Ongoing, Not Progressing     Problem: Glycemic Control Impaired (Sepsis/Septic Shock)  Goal: Blood Glucose Level Within Desired Range  Outcome: Ongoing, Not Progressing     Problem: Hemodynamic Instability (Sepsis/Septic Shock)  Goal: Effective Tissue Perfusion  Outcome: Ongoing, Not Progressing     Problem: Infection (Sepsis/Septic Shock)  Goal: Absence of Infection Signs/Symptoms  Outcome: Ongoing, Not Progressing     Problem:  Nutrition Impaired (Sepsis/Septic Shock)  Goal: Optimal Nutrition Intake  Outcome: Ongoing, Not Progressing     Problem: Respiratory Compromise (Sepsis/Septic Shock)  Goal: Effective Oxygenation and Ventilation  Outcome: Ongoing, Not Progressing     Problem: Infection  Goal: Infection Symptom Resolution  Outcome: Ongoing, Not Progressing     Problem: Skin Injury Risk Increased  Goal: Skin Health and Integrity  Outcome: Ongoing, Not Progressing     Problem: Device-Related Complication Risk (CRRT (Continuous Renal Replacement Therapy))  Goal: Safe, Effective Therapy Delivery  Outcome: Ongoing, Not Progressing     Problem: Infection (CRRT (Continuous Renal Replacement Therapy))  Goal: Absence of Infection Signs/Symptoms  Outcome: Ongoing, Not Progressing     Problem: Hypothermia (CRRT (Continuous Renal Replacement Therapy))  Goal: Body Temperature Maintained in Desired Range  Outcome: Ongoing, Not Progressing     Problem: Device-Related Complication Risk (Hemodialysis)  Goal: Safe, Effective Therapy Delivery  Outcome: Ongoing, Not Progressing     Problem: Hemodynamic Instability (Hemodialysis)  Goal: Vital Signs Remain in Desired Range  Outcome: Ongoing, Not Progressing     Problem: Infection (Hemodialysis)  Goal: Absence of Infection Signs/Symptoms  Outcome: Ongoing, Not Progressing     Problem: Coping Ineffective  Goal: Effective Coping  Outcome: Ongoing, Not Progressing     Problem: Wound  Goal: Optimal Wound Healing  Outcome: Ongoing, Not Progressing     Problem: Malnutrition  Goal: Improved Nutritional Intake  Outcome: Ongoing, Not Progressing     Pt not oriented to situation. Was able to talk to his wife via phone. Pt was able to hold conversation appropriately. Low blood sugar. Insulin held for lunch and dinner. MD aware. IV lopressor meds given d/t high . VSS after admin meds. Waffle mattress in place. Left neck HD access dressing CDI. Re applied sacral dressing. Call light in reach. Bed in lowest  position. Will continue to monitor pt.

## 2020-05-24 NOTE — NURSING
Called anesthesia(Sincere Mccarty MD) after hemodialysis catheters got to the floor. MD stated he would be up soon to place catheter for dialysis.

## 2020-05-24 NOTE — ANESTHESIA PROCEDURE NOTES
Central Line    Diagnosis: ESRD  Patient location during procedure: floor  Procedure start time: 5/24/2020 12:50 AM  Timeout: 5/24/2020 12:40 AM  Procedure end time: 5/24/2020 1:20 AM    Staffing  Authorizing Provider: Seamus Wilkinson MD  Performing Provider: Sincere Mccarty MD    Staffing  Anesthesiologist: Seamus Wilkinson MD  Resident/CRNA: Sincere Mccarty MD  Performed: resident/CRNA   Anesthesiologist was present at the time of the procedure.  Preanesthetic Checklist  Completed: patient identified, site marked, surgical consent, pre-op evaluation, timeout performed, IV checked, risks and benefits discussed, monitors and equipment checked and anesthesia consent given  Indication   Indication: hemodialysis     Anesthesia   local infiltration    Central Line   Skin Prep: skin prepped with ChloraPrep, skin prep agent completely dried prior to procedure  maximum sterile barriers used during central venous catheter insertion  hand hygiene performed prior to central venous catheter insertion  Location: left, internal jugular.   Catheter type: dialysis  Catheter Size: 12 Fr  Inserted Catheter Length: 16 cm  Ultrasound: vascular probe with ultrasound  Vessel Caliber: medium, patent  Vascular Doppler:  not done, compressibility poor  Needle advanced into vessel with real time Ultrasound guidance.  Guidewire confirmed in vessel.   Manometry: Venous cannualation confirmed by visual estimation of blood vessel pressure using manometry.  Insertion Attempts: 2   Securement:line sutured, chlorhexidine patch, sterile dressing applied and blood return through all ports    Post-Procedure   Post-procedure assessment: Pending.    Guidewire Guidewire removed intact. Guidewire removed intact, verified with nurse.

## 2020-05-24 NOTE — PROGRESS NOTES
Pateint completed 3.5 hr dialysis tx.  UF of 1000 cc.  New catheter in place had to run with lines reversed.  See flowsheet for details.

## 2020-05-24 NOTE — NURSING
Blood sugar 52. Checked x2. notified MD. Glucose tabs given. Lunch has arrived will incourage pt to eat.    MD wants to hold diltiazem until blood pressure goes up.   Will continue to monitor pt

## 2020-05-24 NOTE — PROGRESS NOTES
Bedside HD treatment initiated.  New R IJ cathetr in place verified by xray and order to use.  Poor pull from arterial limb, lines reversed with good result.

## 2020-05-24 NOTE — PROGRESS NOTES
Pharmacokinetic Initial Assessment: IV Vancomycin    Assessment/Plan:    Initiate intravenous vancomycin 1000 mg once with subsequent doses when random concentrations are less than 20 mcg/mL  Desired empiric serum trough concentration is 15 to 20 mcg/mL  Draw vancomycin random level on 5/25 at 0430.  Pharmacy will continue to follow and monitor vancomycin.      Please contact pharmacy at extension [phone] with any questions regarding this assessment.     Thank you for the consult,   Henrietta Dontae       Patient brief summary:  Kodi Ibanez Sr. is a 60 y.o. male initiated on antimicrobial therapy with IV Vancomycin for treatment of suspected sepsis    Drug Allergies:   Review of patient's allergies indicates:   Allergen Reactions    Reglan [metoclopramide hcl] Diarrhea    Lorazepam      Other reaction(s): heavy sedation       Actual Body Weight:   67.6 kg    Renal Function:   Estimated Creatinine Clearance: 27.1 mL/min (A) (based on SCr of 2.7 mg/dL (H)).,     Dialysis Method (if applicable):  intermittent HD    CBC (last 72 hours):  Recent Labs   Lab Result Units 05/22/20  0359 05/23/20  0216 05/24/20  0459   WBC K/uL 13.18* 12.11 15.85*   Hemoglobin g/dL 7.5* 8.0* 7.8*   Hematocrit % 23.7* 26.3* 25.0*   Platelets K/uL 212 231 315   Gran% % 80.3* 81.8* 87.2*   Lymph% % 7.6* 6.9* 4.7*   Mono% % 9.2 9.2 6.5   Eosinophil% % 0.8 1.0 0.7   Basophil% % 0.2 0.2 0.3   Differential Method  Automated Automated Automated       Metabolic Panel (last 72 hours):  Recent Labs   Lab Result Units 05/22/20  0359 05/22/20  1103 05/23/20  0216 05/23/20  1606 05/24/20  0459   Sodium mmol/L 136 137 136 133* 140   Potassium mmol/L 4.2 4.0 4.4 4.3 3.8   Chloride mmol/L 104 105 105 100 106   CO2 mmol/L 23 23 19* 23 24   Glucose mg/dL 266* 249* 319* 392* 146*   BUN, Bld mg/dL 23 26* 34* 41* 19   Creatinine mg/dL 3.6* 4.2* 4.9* 5.3* 2.7*   Magnesium mg/dL 2.2  --  2.3  --  2.1   Phosphorus mg/dL 2.2*  --  3.1  --  1.6*       Drug levels  (last 3 results):  No results for input(s): VANCOMYCINRA, VANCOMYCINPE, VANCOMYCINTR in the last 72 hours.    Microbiologic Results:  Microbiology Results (last 7 days)     Procedure Component Value Units Date/Time    Blood culture [[id number]] Collected:  05/24/20 1002    Order Status:  Sent Specimen:  Blood Updated:  05/24/20 1007    Blood culture [[id number]] Collected:  05/24/20 1002    Order Status:  Sent Specimen:  Blood Updated:  05/24/20 1007    Blood culture [[id number]] Collected:  05/20/20 1620    Order Status:  Completed Specimen:  Blood Updated:  05/23/20 1812     Blood Culture, Routine No Growth to date      No Growth to date      No Growth to date      No Growth to date    Narrative:       Collection has been rescheduled by FB at 05/20/2020 13:56 Reason:   nurse wants all labs at 4.  Collection has been rescheduled by FB at 05/20/2020 13:57 Reason:   nurse Olga.  Collection has been rescheduled by OF1 at 05/20/2020 16:07 Reason:   unable to collect second set of blood cultures nurse Espinoza notified   #[id number]  Collection has been rescheduled by FB at 05/20/2020 13:56 Reason:   nurse wants all labs at 4.  Collection has been rescheduled by FB at 05/20/2020 13:57 Reason:   nurse Olga.  Collection has been rescheduled by OF1 at 05/20/2020 16:07 Reason:   unable to collect second set of blood cultures nurse Espinoza notified   #[id number]    Blood culture [[id number]] Collected:  05/20/20 1556    Order Status:  Completed Specimen:  Blood Updated:  05/23/20 1612     Blood Culture, Routine No Growth to date      No Growth to date      No Growth to date      No Growth to date    Narrative:       Collection has been rescheduled by FB at 05/20/2020 13:56 Reason:   nurse wants all labs at 4.  Collection has been rescheduled by FB at 05/20/2020 13:57 Reason:   nurse Olga.  Collection has been rescheduled by FB at 05/20/2020 13:56 Reason:   nurse wants all labs at 4.  Collection has been rescheduled by FB at 05/20/2020 13:57  Reason:   nurse Olga.    Blood culture [[id number]]  (Abnormal) Collected:  05/17/20 0630    Order Status:  Completed Specimen:  Blood from Line, Arterial, Right Updated:  05/20/20 1216     Blood Culture, Routine Gram stain aer bottle: yeast      Results called to and read back by:Brianda Vaz 05/19/2020  10:33      DEB ALBICANS  ID consult required at [facility].      Blood culture [[id number]]  (Abnormal) Collected:  05/17/20 0520    Order Status:  Completed Specimen:  Blood from Line, Subclavian, Right Updated:  05/20/20 1215     Blood Culture, Routine Gram stain aer bottle: yeast      Positive results previously called      DEB ALBICANS  ID consult required at [facility] and [facility].      Blood culture [[id number]]  (Abnormal) Collected:  05/15/20 1330    Order Status:  Completed Specimen:  Blood from Peripheral, Forearm, Left Updated:  05/20/20 1205     Blood Culture, Routine Gram stain aer bottle: budding yeast      Results called to and read back by: Jennifer Estevez RN  05/17/2020        05:22      DEB ALBICANS  ID consult required at [facility] and [facility].      Blood culture [[id number]]  (Abnormal) Collected:  05/15/20 1321    Order Status:  Completed Specimen:  Blood from Peripheral, Antecubital, Right Updated:  05/19/20 0843     Blood Culture, Routine Gram stain aer bottle: budding yeast      Results called to and read back by: Jennifer Estevez RN  05/17/2020        05:22      DEB ALBICANS  ID consult required at [facility] and [facility].  For susceptibility see order #[id number]      Blood culture x two cultures. Draw prior to antibiotics. [[id number]] Collected:  05/13/20 1202    Order Status:  Completed Specimen:  Blood from Peripheral, Hand, Right Updated:  05/17/20 1412     Blood Culture, Routine No Growth after 4 days.     Narrative:       Aerobic and anaerobic

## 2020-05-24 NOTE — NURSING
Spoke with MD. Ok to hold dilriazem d/t low BP. Also Ok to hold insulin d/t low BS. Will continue to monitor pt

## 2020-05-24 NOTE — PLAN OF CARE
Plan of care reviewed with pt. Pt AAOx4, VSSAF, tachycardic while on dialysis. Patient on RA. Purposeful rounding for pt care and safety. No reports of NV or pain. No falls or injuries reported during this shift. Skin integrity intact aside from documented sacral pressure injury. Safety precautions maintained during shift- bed in low position, call light in reach, SR up x2, personal belongings in reach.

## 2020-05-24 NOTE — PT/OT/SLP PROGRESS
"Occupational Therapy      Patient Name:  Kodi Ibanez .   MRN:  [id number]    Patient not seen today secondary to Other (Patient with dialysis this am, then presenting with issues regarding blood sugar and BP at this time and feeling "unwell.").     Will follow-up per schedule and as patient is appropriate to participate with therapy.    Vanessa Nichols, OT  5/24/2020  "

## 2020-05-25 NOTE — PT/OT/SLP PROGRESS
"Physical Therapy Treatment    Patient Name:  Kodi Ibanez Sr.   MRN:  [id number]    Recommendations:     Discharge Recommendations:  nursing facility, skilled   Discharge Equipment Recommendations: none   Barriers to discharge: increased assist level for all mobility and self care    Assessment:     Kodi Ibanez Sr. is a 60 y.o. male admitted with a medical diagnosis of Shock.  He presents with the following impairments/functional limitations:  weakness, impaired endurance, impaired self care skills, gait instability, impaired functional mobilty, impaired balance, impaired cognition, impaired cardiopulmonary response to activity, impaired skin.    Rehab Prognosis: Fair; patient would benefit from acute skilled PT services to address these deficits and reach maximum level of function.    Recent Surgery: * No surgery found *      Plan:     During this hospitalization, patient to be seen 4 x/week to address the identified rehab impairments via therapeutic activities, therapeutic exercises, gait training and progress toward the following goals:    · Plan of Care Expires:  06/19/20    Subjective     Chief Complaint: none  Patient/Family Comments/goals: "Is my wife coming"  Pain/Comfort:  · Pain Rating 1: 0/10      Objective:     Communicated with nurse prior to session.  Patient found supine with blood pressure cuff, telemetry, arterial line, peripheral IV upon PT entry to room.     General Precautions: Standard, fall, seizure   Orthopedic Precautions:N/A   Braces:       Functional Mobility:  · Bed Mobility:     · Supine to Sit: minimum assistance  · Sit to Supine: minimum assistance  · Balance: sat up EOB with SBA/Mod a for sitting balance. was able to scoot to R with SBA.       AM-PAC 6 CLICK MOBILITY  Turning over in bed (including adjusting bedclothes, sheets and blankets)?: 3  Sitting down on and standing up from a chair with arms (e.g., wheelchair, bedside commode, etc.): 2  Moving from lying on back to sitting on " the side of the bed?: 2  Moving to and from a bed to a chair (including a wheelchair)?: 1  Need to walk in hospital room?: 1  Climbing 3-5 steps with a railing?: 1  Basic Mobility Total Score: 10       Therapeutic Activities and Exercises:   patient soiled upon entering room. Dried on stool to buttocks and feet. Nurse present and assisting with clean up. Patient able to roll with cga/Min a, follows commands.   Unable to progress to standing as HR increased, nurse present.     Patient left supine with all lines intact, call button in reach and nurse present..    GOALS:   Multidisciplinary Problems     Physical Therapy Goals        Problem: Physical Therapy Goal    Goal Priority Disciplines Outcome Goal Variances Interventions   Physical Therapy Goal     PT, PT/OT Ongoing, Progressing     Description:  Goals to be met by: 2020     Patient will increase functional independence with mobility by performin. Supine to sit with Stand-by Assistance  2. Sit to supine with Stand-by Assistance  3. Sit to stand transfer with Minimal Assistance  4. Bed to chair transfer with Moderate Assistance using LRAD  5. Gait  x 10 feet with Moderate Assistance using LRAD.   6. Sitting at edge of bed x15 minutes with Stand-by Assistance while performing dynamic UE activities  7. Lower extremity exercise program x20 reps per handout, with independence                      Time Tracking:     PT Received On: 20  PT Start Time: 846     PT Stop Time: 924  PT Total Time (min): 38 min     Billable Minutes: Therapeutic Activity 38    Treatment Type: Treatment  PT/PTA: PTA     PTA Visit Number: 2     Thelma Piedra, PTA  2020

## 2020-05-25 NOTE — PLAN OF CARE
Pt agreeable to therapy and tolerated session well secondary to weakness. Pt required total A in perineal hygiene but was able to roll L<>R with Min A. Pt sat EOB ~10 minutes to wipe himself. Pt with increased heart rate with movement and returned to supine. Continue OT POC      Problem: Occupational Therapy Goal  Goal: Occupational Therapy Goal  Description  Goals set on 5/19 with expiration date 6/2:  Patient will increase functional independence with ADLs by performing:    Supine <> Sit with Min Assistance.  Feeding with Min Assistance.  Grooming while standing at sink with Min Assistance.  UB Dressing with Min  Assistance.  LB Dressing with Min Assistance.  Step transfer with Min Assistance with DME as needed.  Pt will demonstrate understanding of education provided regarding energy conservation and task modification through teach-back method.        Outcome: Ongoing, Progressing

## 2020-05-25 NOTE — PROGRESS NOTES
Notified by lab of critical glucose value at 1615.  Glucose last recorded by nurse at lunch was 88, and insulin held due to pt refusal to eat. Pt drinking supplemental drink while nurse in room, also took in 8 oz orange juice with 4 packets of sugar. Will recheck glucose in 15 mins.     Bolus of D10 initiated per MD order. Will recheck BG once bolus is completed.     bg rechecked and reads 178.

## 2020-05-25 NOTE — PT/OT/SLP PROGRESS
Occupational Therapy   Treatment    Name: Kodi Ibanez Sr.  MRN: [id number]  Admitting Diagnosis:  Shock       Recommendations:     Discharge Recommendations: nursing facility, skilled  Discharge Equipment Recommendations:  walker, rolling, bedside commode  Barriers to discharge:  Inaccessible home environment, Decreased caregiver support    Assessment:     Kodi Ibanez Sr. is a 60 y.o. male with a medical diagnosis of Shock.  He presents with the following Performance deficits affecting function are weakness, impaired endurance, impaired self care skills, impaired functional mobilty, decreased lower extremity function, decreased upper extremity function, decreased safety awareness, pain, impaired balance, orthopedic precautions.     Pt agreeable to therapy and tolerated session well secondary to weakness. Pt required total A in perineal hygiene but was able to roll L<>R with Min A. Pt sat EOB ~10 minutes to wipe himself. Pt with increased heart rate with movement and returned to supine. Continue OT POC    Rehab Prognosis:  Fair; patient would benefit from acute skilled OT services to address these deficits and reach maximum level of function.       Plan:     Patient to be seen 4 x/week to address the above listed problems via self-care/home management, therapeutic activities, therapeutic exercises  · Plan of Care Expires: 06/18/20  · Plan of Care Reviewed with: patient    Subjective     Pain/Comfort:  · Pain Rating 1: 0/10    Objective:     Communicated with: RN prior to session.  Patient found supine with peripheral IV upon OT entry to room.    General Precautions: Standard, fall, seizure   Orthopedic Precautions:N/A   Braces: N/A     Occupational Performance:     Bed Mobility:    · Patient completed Supine to Sit with minimum assistance  · Patient completed Sit to Supine with minimum assistance     Functional Mobility/Transfers:  · Not tested due to increased HR  · Functional Mobility: Pt agreeable to therapy and  tolerated session well secondary to weakness. Pt required total A in perineal hygiene but was able to roll L<>R with Min A. Pt sat EOB ~10 minutes to wipe himself. Pt with increased heart rate with movement and returned to supine. Continue OT POC    Activities of Daily Living:  · Upper Body Dressing: minimum assistance consuelo gown  · Toileting: total assistance for perineal hygiene      [facility] 6 Click ADL: 11    Treatment & Education:  - OT/POC-  - Importance of mobility to maximize recovery.  - safety w/ functional mobility; hand placement to ensure safe transfers to various surfaces in prep for ADLs  - Reviewed gait sequence and RW management via verbalization and demonstration   - encouraged to ambulate within household environment at least every hour to hour 1/2      Patient left supine with all lines intact, call button in reach and RN notifiedEducation:      GOALS:   Multidisciplinary Problems     Occupational Therapy Goals        Problem: Occupational Therapy Goal    Goal Priority Disciplines Outcome Interventions   Occupational Therapy Goal     OT, PT/OT Ongoing, Progressing    Description:  Goals set on 5/19 with expiration date 6/2:  Patient will increase functional independence with ADLs by performing:    Supine <> Sit with Min Assistance.  Feeding with Min Assistance.  Grooming while standing at sink with Min Assistance.  UB Dressing with Min  Assistance.  LB Dressing with Min Assistance.  Step transfer with Min Assistance with DME as needed.  Pt will demonstrate understanding of education provided regarding energy conservation and task modification through teach-back method.                         Time Tracking:     OT Date of Treatment: 05/25/20  OT Start Time: 0845  OT Stop Time: 0925  OT Total Time (min): 40 min    Billable Minutes:Self Care/Home Management 40    Lanette Perkins OT  5/25/2020

## 2020-05-25 NOTE — ASSESSMENT & PLAN NOTE
".Palliative Care Encounter / Goals of care discussion:     Narrative:   Kodi Ibanez Sr. is a 60 y.o. male patient with ESRD, complications of vascular access, CAD, s/p CABG, H/O NSTEMI with PCI, now admitted with fungemia and sepsis likely related to perm-a-cath. Pt. Has expressed wishes to not undergo any additional procedures.     1: Symptoms:   - pain assesment: denies pain   - dyspnea assessment: not short of breath   - anxiety assessment: not anxious   - depression assessment: unable to assess    2: Code Status:   - DNR    3: Psychosocial :   - Marital status:   - Children: one daughter and two sons  - POA: wife is next of kin and makes most medical decisions. She is a former EMT    3: Medicolegal:    - Advance directive: on file. No artificial life prolonging measures at the end of life    4: Support System:  - Spiritual: yes, nawaf mon  - Family: supportive     5: Goals of care Decisions / Recommendations / Plan:     05/25/2020    - temp cath for HD placed. Tolerated HD Yesterday   - Blood cultures have cleared it appears - now on Fluconazole   - Discharge planning underway. Pt. Reports he wants to go home. Wife tells me she can not take care of him like this.      - discussed concern with wife that pt. Appears not to be engaged in his care at all. He is not informed about his illness, recent or remote events and does not appear to be engaged in therapy much at all.    - she tells me "that is how he is" and " I have committed to take care of him until the end and that is what I will do"      - SNF appears to be next best step. SW to discuss needs once discharged home   - advanced care planning inplace       6: Follow up plans:    Will facilitate video visit for wife. Will sign off after. Please call if we can help in any other way.     Thank you for your consult. I will follow along with you. Please call [phone] with questions.           "

## 2020-05-25 NOTE — PROGRESS NOTES
Pharmacokinetic Assessment Follow Up: IV Vancomycin    Vancomycin serum concentration assessment(s):  · Vancomycin random concentration this AM resulted at 25.8 mcg/mL.    · Last iHD 5/24; ESRD on TThSat - followed by [facility]  · Goal trough of 15-20 mcg/mL     Vancomycin Regimen Plan:  1.  No Vancomycin today  2.  Obtain trough concentration with AM labs tomorrow  3.  Follow plans for next HD session      Drug levels (last 3 results):  Recent Labs   Lab Result Units 05/25/20  0400   Vancomycin, Random ug/mL 25.8       Pharmacy will continue to follow and monitor vancomycin.    Please contact pharmacy at extension [phone] for questions regarding this assessment.    Thank you for the consult,   Tara Toledo       Patient brief summary:  Kodi Ibanez Sr. is a 60 y.o. male initiated on antimicrobial therapy with IV Vancomycin for treatment of sepsis      Drug Allergies:   Review of patient's allergies indicates:   Allergen Reactions    Reglan [metoclopramide hcl] Diarrhea    Lorazepam      Other reaction(s): heavy sedation       Actual Body Weight:   67.6 kg    Renal Function:   Estimated Creatinine Clearance: 15.6 mL/min (A) (based on SCr of 4.7 mg/dL (H)).,     Dialysis Method (if applicable):  intermittent HD    CBC (last 72 hours):  Recent Labs   Lab Result Units 05/23/20  0216 05/24/20  0459 05/25/20  0401   WBC K/uL 12.11 15.85* 13.63*   Hemoglobin g/dL 8.0* 7.8* 7.3*   Hematocrit % 26.3* 25.0* 23.3*   Platelets K/uL 231 315 278   Gran% % 81.8* 87.2* 88.2*   Lymph% % 6.9* 4.7* 4.0*   Mono% % 9.2 6.5 6.5   Eosinophil% % 1.0 0.7 0.6   Basophil% % 0.2 0.3 0.2   Differential Method  Automated Automated Automated       Metabolic Panel (last 72 hours):  Recent Labs   Lab Result Units 05/22/20  1103 05/23/20  0216 05/23/20  1606 05/24/20  0459 05/24/20  1602 05/25/20  0400   Sodium mmol/L 137 136 133* 140 142 138   Potassium mmol/L 4.0 4.4 4.3 3.8 4.6 5.1   Chloride mmol/L 105 105 100 106 109 104   CO2 mmol/L 23 19* 23  24 25 22*   Glucose mg/dL 249* 319* 392* 146* 74 201*   BUN, Bld mg/dL 26* 34* 41* 19 26* 38*   Creatinine mg/dL 4.2* 4.9* 5.3* 2.7* 3.9* 4.7*   Magnesium mg/dL  --  2.3  --  2.1  --  2.4   Phosphorus mg/dL  --  3.1  --  1.6*  --  3.3       Vancomycin Administrations:  vancomycin given in the last 96 hours                   vancomycin in dextrose 5 % 1 gram/250 mL IVPB 1,000 mg (mg) 1,000 mg New Bag 05/24/20 1724                Microbiologic Results:  Microbiology Results (last 7 days)     Procedure Component Value Units Date/Time    Blood culture [[id number]] Collected:  05/20/20 1620    Order Status:  Completed Specimen:  Blood Updated:  05/24/20 1812     Blood Culture, Routine No Growth to date      No Growth to date      No Growth to date      No Growth to date      No Growth to date    Narrative:       Collection has been rescheduled by FB at 05/20/2020 13:56 Reason:   nurse wants all labs at 4.  Collection has been rescheduled by FB at 05/20/2020 13:57 Reason:   nurse Olga.  Collection has been rescheduled by OF1 at 05/20/2020 16:07 Reason:   unable to collect second set of blood cultures nurse Espinoza notified   #[id number]  Collection has been rescheduled by FB at 05/20/2020 13:56 Reason:   nurse wants all labs at 4.  Collection has been rescheduled by FB at 05/20/2020 13:57 Reason:   nurse Olga.  Collection has been rescheduled by OF1 at 05/20/2020 16:07 Reason:   unable to collect second set of blood cultures nurse Espinoza notified   #[id number]    Blood culture [[id number]] Collected:  05/24/20 1002    Order Status:  Completed Specimen:  Blood Updated:  05/24/20 1745     Blood Culture, Routine No Growth to date    Blood culture [[id number]] Collected:  05/24/20 1002    Order Status:  Completed Specimen:  Blood Updated:  05/24/20 1745     Blood Culture, Routine No Growth to date    Blood culture [[id number]] Collected:  05/20/20 1556    Order Status:  Completed Specimen:  Blood Updated:  05/24/20 1612     Blood Culture,  Routine No Growth to date      No Growth to date      No Growth to date      No Growth to date      No Growth to date    Narrative:       Collection has been rescheduled by FB at 05/20/2020 13:56 Reason:   nurse wants all labs at 4.  Collection has been rescheduled by FB at 05/20/2020 13:57 Reason:   nurse Olga.  Collection has been rescheduled by FB at 05/20/2020 13:56 Reason:   nurse wants all labs at 4.  Collection has been rescheduled by FB at 05/20/2020 13:57 Reason:   nurse Olga.    Blood culture [[id number]]  (Abnormal) Collected:  05/17/20 0630    Order Status:  Completed Specimen:  Blood from Line, Arterial, Right Updated:  05/20/20 1216     Blood Culture, Routine Gram stain aer bottle: yeast      Results called to and read back by:Brianda Vaz 05/19/2020  10:33      DEB ALBICANS  ID consult required at [facility].      Blood culture [[id number]]  (Abnormal) Collected:  05/17/20 0520    Order Status:  Completed Specimen:  Blood from Line, Subclavian, Right Updated:  05/20/20 1215     Blood Culture, Routine Gram stain aer bottle: yeast      Positive results previously called      DEB ALBICANS  ID consult required at [facility].      Blood culture [[id number]]  (Abnormal) Collected:  05/15/20 1330    Order Status:  Completed Specimen:  Blood from Peripheral, Forearm, Left Updated:  05/20/20 1205     Blood Culture, Routine Gram stain aer bottle: budding yeast      Results called to and read back by: Jennifer Estevez RN  05/17/2020        05:22      DEB ALBICANS  ID consult required at [facility].      Blood culture [[id number]]  (Abnormal) Collected:  05/15/20 1321    Order Status:  Completed Specimen:  Blood from Peripheral, Antecubital, Right Updated:  05/19/20 0843     Blood Culture, Routine Gram stain aer bottle: budding yeast      Results called to and read back by: Jennifer Estevez RN  05/17/2020         05:22      DEB ALBICANS  ID consult required at [facility].Olesya Brink and Dick grijalva.  For susceptibility see order #[id number]

## 2020-05-25 NOTE — SUBJECTIVE & OBJECTIVE
"Interval History: Had temporary cath placed. Blood cultures NGT since 5/20.   Pt. Tells me "I want to go home".   Spoke with wife over the phone today.     Past Medical History:   Diagnosis Date    Atherosclerosis of aorta     noted on VIKTORIA 2011    Blood transfusion     Cataracts, bilateral     CHF (congestive heart failure)     Clotting disorder     Cognitive deficits as late effect of cerebrovascular disease 6/26/2012    Coronary artery disease     NSTEMI s/p PCI with FERCHO to LCx (12/16/19),     Decreased appetite     DM (diabetes mellitus), type 2 with renal complications     Diabetic retinopathy    ESRD on hemodialysis     TUESDAY/ THURSDAY/SATURDAY    Hyperlipidemia     Hyperparathyroidism, unspecified     Hypertension     Prostate cancer 2016 7/16/19:  wife states "he doesn't have it anymore"    Seizures     Stroke 2011    PT HAS SHAKES     Unsteady gait     Uses roller walker       Past Surgical History:   Procedure Laterality Date    CARDIAC SURGERY      CABG    CATARACT EXTRACTION      ou    CATARACT EXTRACTION, BILATERAL      cataracts      CORONARY ANGIOGRAPHY INCLUDING BYPASS GRAFTS WITH CATHETERIZATION OF LEFT HEART N/A 12/16/2019    Procedure: ANGIOGRAM, CORONARY, INCLUDING BYPASS GRAFT, WITH LEFT HEART CATHETERIZATION, rcfa, 5fr;  Surgeon: David Villegas MD;  Location: [facility] CATH LAB;  Service: Cardiology;  Laterality: N/A;    CORONARY ANGIOGRAPHY INCLUDING BYPASS GRAFTS WITH CATHETERIZATION OF LEFT HEART N/A 4/30/2020    Procedure: ANGIOGRAM, CORONARY, INCLUDING BYPASS GRAFT, WITH LEFT HEART CATHETERIZATION, rcfa, 10 am start;  Surgeon: David Villegas MD;  Location: [facility] CATH LAB;  Service: Cardiology;  Laterality: N/A;    CORONARY ARTERY BYPASS GRAFT  2009    bypass    EYE SURGERY      Left Guera AVF  1/11/2012    PROSTATE BIOPSY      positive    retinal laser      REVISION OF ARTERIOVENOUS FISTULA Left 2/27/2020    Procedure: Ligation of left upper extremity fistula, " Closure of left upper extremity skin defect x 2, Intraoperative ultrasound ;  Surgeon: Stephane Lopez MD;  Location: [facility] OR;  Service: Vascular;  Laterality: Left;    REVISION OF ARTERIOVENOUS FISTULA Left 4/10/2020    Procedure: AV fistula excision;  Surgeon: Stephane Lopez MD;  Location: [facility] OR 2ND FLR;  Service: Vascular;  Laterality: Left;    ROTATOR CUFF REPAIR  right, 2005    ULTRASOUND GUIDANCE  12/16/2019    Procedure: Ultrasound Guidance;  Surgeon: David Villegas MD;  Location: [facility] CATH LAB;  Service: Cardiology;;    VASCULAR SURGERY         Review of patient's allergies indicates:   Allergen Reactions    Reglan [metoclopramide hcl] Diarrhea    Lorazepam      Other reaction(s): heavy sedation       Medications:  Continuous Infusions:    Scheduled Meds:   sodium chloride 0.9%   Intravenous Once    sodium chloride 0.9%   Intravenous Once    apixaban  5 mg Oral BID    aspirin  81 mg Oral Daily    atorvastatin  80 mg Oral QHS    clopidogreL  75 mg Oral Daily    diltiaZEM  60 mg Oral Q6H    epoetin megan-epbx  4,000 Units Intravenous Every Tues, Thurs, Sat    fluconazole  200 mg Oral Daily    insulin aspart U-100  2 Units Subcutaneous TIDWM    insulin detemir U-100  15 Units Subcutaneous BID    lidocaine 2%/EPINEPHrine 1:100,000  20 mL Intradermal Once    metoprolol tartrate  25 mg Oral BID    piperacillin-tazobactam (ZOSYN) IVPB  4.5 g Intravenous Q12H    sevelamer carbonate  800 mg Oral TID WM     PRN Meds:sodium chloride 0.9%, Dextrose 10% Bolus, Dextrose 10% Bolus, glucagon (human recombinant), glucose, glucose, hydrOXYzine HCL, insulin aspart U-100, sodium chloride 0.9%, Pharmacy to dose Vancomycin consult **AND** vancomycin - pharmacy to dose    Family History     Problem Relation (Age of Onset)    Cancer Mother, Paternal Grandmother, Paternal Uncle    Cataracts Mother    Diabetes Mother    Glaucoma Mother    Heart disease Father, Maternal Grandmother    Hypertension  Brother    No Known Problems Sister, Maternal Grandfather, Paternal Grandfather, Sister, Sister    Prostate cancer Brother        Tobacco Use    Smoking status: Never Smoker    Smokeless tobacco: Never Used   Substance and Sexual Activity    Alcohol use: No    Drug use: No    Sexual activity: Not Currently       Review of Systems   Unable to perform ROS: Acuity of condition     Objective:     Vital Signs (Most Recent):  Temp: 99 °F (37.2 °C) (05/25/20 1114)  Pulse: 97 (05/25/20 1114)  Resp: 18 (05/25/20 1114)  BP: (!) 129/59 (05/25/20 1114)  SpO2: (!) 94 % (05/25/20 1114) Vital Signs (24h Range):  Temp:  [96.3 °F (35.7 °C)-100.9 °F (38.3 °C)] 99 °F (37.2 °C)  Pulse:  [] 97  Resp:  [18] 18  SpO2:  [94 %-100 %] 94 %  BP: (101-134)/(53-66) 129/59     Weight: 67.6 kg (149 lb)  Body mass index is 20.78 kg/m².    Review of Symptoms    Unable to assess due to acuity of condition  Symptom Assessment (ESAS 0-10 scale)   ESAS 0 1 2 3 4 5 6 7 8 9 10   Pain              Dyspnea              Anxiety              Nausea              Depression               Anorexia              Fatigue              Insomnia              Restlessness               Agitation              CAM / Delirium __ --  ___+   Constipation     __ --  ___+   Diarrhea           __ --  ___+  Bowel Management Plan (BMP): Yes    Comments: none    Pain Assessment: unable to assess; no nonverbal signs of pain    OME in 24 hours: 0    Performance Status: 50      Physical Exam  Constitutional: awake, withdrawn.   Elderly male, disoriented  No distress  Cachectic, chronically ill appearing   HENT:   Mouth/Throat: Oropharynx is clear and moist. No oropharyngeal exudate.   Eyes: Conjunctivae are normal. No scleral icterus.   Neck: No tracheal deviation present.   Cardiovascular: Normal rate, regular rhythm and normal heart sounds.   Irregular tachycardia, rate > 100   Pulmonary/Chest: Effort normal and breath sounds normal. No respiratory distress.    Abdominal: He exhibits no distension.   Musculoskeletal: He exhibits no edema or tenderness.   Neurological: He is alert and oriented to person, place, and time. No cranial nerve deficit.   Lethargic. No gross focal deficits   Skin: Skin is warm and dry.   Nursing note and vitals reviewed.  Significant Labs:   LFT:   No results for input(s): AST, GGT, ALKPHOS, BILITOT in the last 48 hours.    Invalid input(s): ALST  CBC:   Recent Labs   Lab 05/25/20  0401   WBC 13.63*   HGB 7.3*   HCT 23.3*   MCV 87        BMP:  Recent Labs   Lab 05/25/20  0400   *      K 5.1      CO2 22*   BUN 38*   CREATININE 4.7*   CALCIUM 7.9*   MG 2.4     LFT:  Lab Results   Component Value Date    AST 43 (H) 05/18/2020    ALKPHOS 155 (H) 05/18/2020    BILITOT 0.3 05/18/2020     Albumin:   Albumin   Date Value Ref Range Status   05/18/2020 1.7 (L) 3.5 - 5.2 g/dL Final     Protein:   Total Protein   Date Value Ref Range Status   05/18/2020 5.8 (L) 6.0 - 8.4 g/dL Final     Lactic acid:   Lab Results   Component Value Date    LACTATE 1.3 05/24/2020    LACTATE 2.1 05/17/2020       Significant Imaging: I have reviewed all pertinent imaging results/findings within the past 24 hours.    Advance Care Planning   Advanced Directives::  Living Will: Yes. Copy on chart: Yes  LaPOST: No  Do Not Resuscitate Status: DNR since this admission  Medical Power of : No; wife is surrogate Yaima Mendez Spouse [phone]         Decision-Making Capacity: Family answered questions, Patient unable to communicate due to disease severity/cognitive impairment       Living Arrangements: Lives with spouse    Psychosocial/Cultural: did not explore during today's visit; wife is an EMT with some medical knowledge    Spiritual:     F- Madai and Belief: Reuben    I - Importance: yes  .  C - Community: yes    A - Address in Care: wife to coordinate

## 2020-05-25 NOTE — PROGRESS NOTES
Hospital Medicine  Progress Note      Patient Name: Kodi Ibanez Sr.  MRN:  [id number]  [facility] Medicine Team: [facility] [place] [facility] Estephania Styles MD  Date of Admission:  5/13/2020     Length of Stay:  LOS: 12 days   Expected Discharge Date: 5/26/2020  Principal Problem:  Shock      Subjective:    Interval History/Overnight Events:    Pt persistently tachycardic yesterday and borderline BP (92/52) concerning for acute infection, so started on vanc and zosyn and given 250cc bolus with improvement in hemodynamics. Febrile to 100.9 this morning. Numerous watery stools overnight. cdiff ordered. Pt reports a dry cough that started last night and chills. Denies abdominal pain, cp, sob. Continuing fluconazole for candidemia. ID following.    Spoke to pt's wife Yaima and updated her on current condition and plan of care.      Review of Systems:  Constitutional: Positive for   fatigue, fever.   Respiratory: Negative for    shortness of breath.  Positive for cough  Cardiovascular: Negative for chest pain, palpitations, leg swelling.   Gastrointestinal: Negative for abdominal pain, nausea, vomiting,   constipation  Genitourinary: Negative for dysuria, frequency.   All other systems reviewed and are negative.      Objective:    Physical Exam:  Temp:  [96.3 °F (35.7 °C)-100.9 °F (38.3 °C)]   Pulse:  []   Resp:  [18-20]   BP: ()/(52-86)   SpO2:  [93 %-96 %]     Constitutional: somnolent, hiding under covers, in NAD  Eyes: No scleral icterus or eye discharge  Cardiovascular: tachycardic.  Regular heart rhythm.  No murmur heard. Lt IJ trialysis in place.  Pulmonary/Chest: Effort normal and breath sounds normal. No respiratory distress. No wheezes, rales, or rhonchi  Abdominal: Soft. Bowel sounds are normal.  No distension.  No tenderness  Musculoskeletal: No deformities.  No edema.   Neurological: Alert.  Oriented to person, disoriented to time and place  Skin: Skin is warm and dry.       Assessment and  Plan:    Mr. Kodi Ibanez Sr. is a 60 y.o. male who presented to [facility] on 5/13/2020 with shock 2/2 sepsis.     The patient was admitted to MICU for shock likely 2/2 to sepsis on 5/13 requiring low dose Levophed. Weaned off on 5/14, but required to go back on vasopresors on   5/17/20. Started on Heparin gtt for paroxysmal A fib with CHADS Vasc score of 6. Started on empiric broad spectrum abx/ Patient with intermittent SVT- Home coreg and dilt restarted.  Grew candida albicans on blood cx x 2 on 5/15, 5/17, 5/20. Initially on micafungin, but switched to fluconazole on 5/20 per ID recs. Evaluated by ophtha. Tunneled cath removed by surgery on 5/21 for line holiday. Line placed 5/24 and underwent HD. Tachycardic since 3 am. EKG with sinus tach. BP borderline in afternoon. Received 250cc bolus w improvement. WBC uptrended to 15.8k, concerning for acute infection, so started on vanc/zosyn and cultured. Febrile the next morning with watery stools. cdiff sent.            Shock  Candidemia  2/2 probable sepsis likely r/t LUE AV fistula excision. Patient also with R tunneled catheter placed 2/2020.  Patient underwent ligation of LUE fistula on 2/27/20 with Dr Lopez ([facility], vascular surgery) and had ongoing issues afterwards. Patient was admitted on 4/10 for excision of his infected LUE mid and distal AVF with cultures and specimen obtained. Surgical cultures 4/10 grew 2 different prevotella species, fusobacterium, and strep anginosus. The patient was d/c'ed with home health wound care and po augmentin/clindamycin x 10 days. On 4/20 antibiotics were changed to Flagyl 500 mg p.o. q.8 and Cefazolin after dialysis 2 g on Tuesday and Thursday, 3 g on Saturday. End date on antibiotics on 5/11.       --BC grew candida albicans 5/15, 5/17, 5/20  --ID and opthalmology consulted; appreciate recs  --s/p tunneled cath removal by vascular surgery on 5/21/20      --Started on micafungin, now on fluconazole since 5/20;  continue    Sepsis  --WBC 15.85, tachycardic on 5/24 and febrile on 5/25  --source unclear - CXR without consolidation, line just placed on 5/24. Has known fungemia, but has been on fluconazole.  --on empiric vanc and zosyn since 5/24 PLUS fluconazole  --c diff ordered today  --f/u blood cultures  --f/u ID recs      Atrial fibrillation  --patient on diltiazem for rate control; restarted home dose   --on ASA/plavix/apixiban   --Chads Vasc score 6 points     Sinus Tachycardia  --confirmed on EKG 5/24-   --concern for acute infection  --blood cx, LA ordered   --place back on telemetry  --RESOLVED after 250cc bolus, abx         NSTEMI (non-ST elevated myocardial infarction)  Hx of CABG in 2009 and placement of FERCHO in 12/2019. Patient recenetly admitted on 4/29 and d/c'ed 5/2, admitted for an NSTEMI, a LHC was performed where re-stenosis of a previous stent was found and balloon angioplasty was done. He is now on ASA/plavix, but per wife has not been compliant.     --continue ASA/plavix (home meds)  --TTE 5/14 with preserved EF 60% and no focal WMA     Essential hypertension  --well controlled on losartan, diltiazem and lopressor     SVT (supraventricular tachycardia)  --ECG showed SVT with -150'2  --Required diltiazem drip, weaned and transition to PO dilt on 5/19  --continue dilt and lopressor        HLD (hyperlipidemia)  --statin for secondary prevention    ESRD on hemodialysis  Patient on HD T/Th/Sat with last session  Prior to admission on 5/12, where he was admitted to the ED with palpatations after session. Patient has tunneled catheter that was inserted in 2/2020 that is being used for access.      --nephrology consulted  --HD done on 5/21/20, may need line placement deepthi  --plan for permacath removal today per vascular surgery  --trialysis line placed 5/24 by anesthesia- s/p HD today  --avoid nephrotoxic meds  --oliguric, strict I/o's  --BMP daily       AV fistula infection  --see shock  --continue  micafungin   --ID and opthalmology consulted      Endocrine  Type 2 diabetes, uncontrolled, with neuropathy  -->500 on admission. Goal -180  --hypoglycemic on 5/24 , so levemir decreased to 15u bid  --SSI prn initiated  --accuchecks q4h    Diarrhea  --x 1 day  --c diff ordered        Diet:  Diabetic, Renal  DVT PPx:  Eliquis  Goals of Care:  DNR      Disposition:  Pending clearance of fungemia  Discharge Needs:  SNF, follow up with ID      Estephania Styles MD  [facility]  Pager:  [phone]

## 2020-05-25 NOTE — CARE UPDATE
Rapid Response Nurse Chart Check     Chart check completed, abnormal VS noted.  DNR.   Palliative Care consulted.   Please call [phone] for further concerns or assistance.

## 2020-05-25 NOTE — PROGRESS NOTES
"[facility]  Palliative Medicine  Progress Note    Patient Name: Kodi Ibanez Sr.  MRN: [id number]  Admission Date: 5/13/2020  Hospital Length of Stay: 12 days  Code Status: DNR   Attending Provider: Estephania Styles*  Consulting Provider: Sukhdeep Guy MD  Primary Care Physician: Melonie Elias MD  Principal Problem:Shock    Patient information was obtained from patient, spouse/SO and past medical records.      Assessment/Plan:     Palliative care encounter  .Palliative Care Encounter / Goals of care discussion:     Narrative:   Kodi Ibanez Sr. is a 60 y.o. male patient with ESRD, complications of vascular access, CAD, s/p CABG, H/O NSTEMI with PCI, now admitted with fungemia and sepsis likely related to perm-a-cath. Pt. Has expressed wishes to not undergo any additional procedures.     1: Symptoms:   - pain assesment: denies pain   - dyspnea assessment: not short of breath   - anxiety assessment: not anxious   - depression assessment: unable to assess    2: Code Status:   - DNR    3: Psychosocial :   - Marital status:   - Children: one daughter and two sons  - POA: wife is next of kin and makes most medical decisions. She is a former EMT    3: Medicolegal:    - Advance directive: on file. No artificial life prolonging measures at the end of life    4: Support System:  - Spiritual: yes, haleigh'suleiman mon  - Family: supportive     5: Goals of care Decisions / Recommendations / Plan:     05/25/2020    - temp cath for HD placed. Tolerated HD Yesterday   - Blood cultures have cleared it appears - now on Fluconazole   - Discharge planning underway. Pt. Reports he wants to go home. Wife tells me she can not take care of him like this.      - discussed concern with wife that pt. Appears not to be engaged in his care at all. He is not informed about his illness, recent or remote events and does not appear to be engaged in therapy much at all.    - she tells me "that is how he is" and " "" I have committed to take care of him until the end and that is what I will do"      - SNF appears to be next best step. SW to discuss needs once discharged home   - advanced care planning inplace       6: Follow up plans:    Will facilitate video visit for wife. Will sign off after. Please call if we can help in any other way.     Thank you for your consult. I will follow along with you. Please call [phone] with questions.                 I will follow-up with patient. Please contact us if you have any additional questions.    Subjective:     Chief Complaint:   Chief Complaint   Patient presents with    Altered Mental Status     worse today; elevated WBC; was at [facility] yesterday; dialysis pt; sugar read high at home       HPI:   61 y/o male with past medical history of CHF, CAD with recent NSTEMI [~4/29] (CABG 2009, FERCHO placed in 12/2019), diabetes, CVA, ESRD on dialysis Tuesday/Thursday/Saturday (last dialyzed morning of 5/12) presents to the ER by referral from infectious disease clinic after virtual visit today. The ID provider was concerned about the patients AMS and recent elevated WBC count. Recently,junior underwent ligation of LUE fistula on 2/27/20 with Dr Lopez ([facility], vascular surgery) and had ongoing issues afterwards. Patient was admitted on 4/10 for excision of his infected LUE mid and distal AVF with cultures and specimen obtained. Surgical cultures 4/10 grew 2 different prevotella species, fusobacterium, and strep anginosus. The patient was d/c'ed with home health wound care and po augmentin/clindamycin x 10 days. On 4/20 antibiotics were changed to Flagyl 500 mg p.o. q.8 and Cefazolin after dialysis 2 g on Tuesday and Thursday, 3 g on Saturday. End date on antibiotics on 5/11.     Following these events, on 4/29 the patient was admitted for an NSTEMI, a LHC was performed where re-stenosis of a previous stent was found and balloon angioplasty was done. He was discharged on 5/2. " He is now on ASA/plavix, but per wife has not been compliant.     Patients wife says that he has had poor PO intake for the last 2-3 weeks, has become progressively weak, and developed altered mental status today. Patient had 3 episodes of vomiting within the last 24 hours, which appeared brown-non bloody. He had a brown watery bowel movement today. Patient normally oliguric (urinating ~2-3 x per week). Patient was seen in the [facility] ED on the evening of 5/12 due to palpitations after his normal dialysis and sent home after evaluation, for possible antibiotic side effect.     He presents to the ED with encephalopathy x 1 day, leukocytosis of 15, lactate 2.7, troponin 0.482, , procal 10.98, Cr 6.5, BUN 41, and hyperglycemia. Patient pan-cultured, currently only UA resulted with large amount of bacteria. Patient requiring high dose Levophed on admission. Broad spectrum antibiotics initiated. 1.5 L IVF given. Patient remains on RA. Wife was updated regarding patient's current status. Patient spoken to regarding code status and verbalized his wishes as a DNR. His wife confirmed he has an LAPost but it is not on file. Instructed to bring in.     Hospital/ICU Course:  The patient was admitted to MICU for shock likely 2/2 to sepsis on 5/13. Patient remains on low dose Levophed. Nephrology consulted for possible dialysis. Blood cx NGTD. Patient remains on Heparin gtt for paroxysmal A fib with CHADS Vasc score of 6. Previously hyperglycemic with BG now well controlled.     The pt had briefly stepped down from ICU level of care but quickly decompensated with concern for septic shock, fungemia possibly related to dailysis cath, and returned to the ICU.      In this setting, palliative medicine was consulted to help with medical decision making and aid in the formation of goals of care.          Hospital Course:  No notes on file    Interval History: Had temporary cath placed. Blood cultures NGT since 5/20.   Pt.  "Tells me "I want to go home".   Spoke with wife over the phone today.     Past Medical History:   Diagnosis Date    Atherosclerosis of aorta     noted on VIKTORIA 2011    Blood transfusion     Cataracts, bilateral     CHF (congestive heart failure)     Clotting disorder     Cognitive deficits as late effect of cerebrovascular disease 6/26/2012    Coronary artery disease     NSTEMI s/p PCI with FERCHO to LCx (12/16/19),     Decreased appetite     DM (diabetes mellitus), type 2 with renal complications     Diabetic retinopathy    ESRD on hemodialysis     TUESDAY/ THURSDAY/SATURDAY    Hyperlipidemia     Hyperparathyroidism, unspecified     Hypertension     Prostate cancer 2016    7/16/19:  wife states "he doesn't have it anymore"    Seizures     Stroke 2011    PT HAS SHAKES     Unsteady gait     Uses roller walker       Past Surgical History:   Procedure Laterality Date    CARDIAC SURGERY      CABG    CATARACT EXTRACTION      ou    CATARACT EXTRACTION, BILATERAL      cataracts      CORONARY ANGIOGRAPHY INCLUDING BYPASS GRAFTS WITH CATHETERIZATION OF LEFT HEART N/A 12/16/2019    Procedure: ANGIOGRAM, CORONARY, INCLUDING BYPASS GRAFT, WITH LEFT HEART CATHETERIZATION, rcfa, 5fr;  Surgeon: David Villegas MD;  Location: [facility] CATH LAB;  Service: Cardiology;  Laterality: N/A;    CORONARY ANGIOGRAPHY INCLUDING BYPASS GRAFTS WITH CATHETERIZATION OF LEFT HEART N/A 4/30/2020    Procedure: ANGIOGRAM, CORONARY, INCLUDING BYPASS GRAFT, WITH LEFT HEART CATHETERIZATION, rcfa, 10 am start;  Surgeon: David Villegas MD;  Location: [facility] CATH LAB;  Service: Cardiology;  Laterality: N/A;    CORONARY ARTERY BYPASS GRAFT  2009    bypass    EYE SURGERY      Left Guera AVF  1/11/2012    PROSTATE BIOPSY      positive    retinal laser      REVISION OF ARTERIOVENOUS FISTULA Left 2/27/2020    Procedure: Ligation of left upper extremity fistula, Closure of left upper extremity skin defect x 2, Intraoperative ultrasound ;  " Surgeon: Stephane Lopez MD;  Location: [facility] OR;  Service: Vascular;  Laterality: Left;    REVISION OF ARTERIOVENOUS FISTULA Left 4/10/2020    Procedure: AV fistula excision;  Surgeon: Stephane Lopez MD;  Location: [facility] OR 2ND FLR;  Service: Vascular;  Laterality: Left;    ROTATOR CUFF REPAIR  right, 2005    ULTRASOUND GUIDANCE  12/16/2019    Procedure: Ultrasound Guidance;  Surgeon: David Villegas MD;  Location: [facility] CATH LAB;  Service: Cardiology;;    VASCULAR SURGERY         Review of patient's allergies indicates:   Allergen Reactions    Reglan [metoclopramide hcl] Diarrhea    Lorazepam      Other reaction(s): heavy sedation       Medications:  Continuous Infusions:    Scheduled Meds:   sodium chloride 0.9%   Intravenous Once    sodium chloride 0.9%   Intravenous Once    apixaban  5 mg Oral BID    aspirin  81 mg Oral Daily    atorvastatin  80 mg Oral QHS    clopidogreL  75 mg Oral Daily    diltiaZEM  60 mg Oral Q6H    epoetin megan-epbx  4,000 Units Intravenous Every Tues, Thurs, Sat    fluconazole  200 mg Oral Daily    insulin aspart U-100  2 Units Subcutaneous TIDWM    insulin detemir U-100  15 Units Subcutaneous BID    lidocaine 2%/EPINEPHrine 1:100,000  20 mL Intradermal Once    metoprolol tartrate  25 mg Oral BID    piperacillin-tazobactam (ZOSYN) IVPB  4.5 g Intravenous Q12H    sevelamer carbonate  800 mg Oral TID WM     PRN Meds:sodium chloride 0.9%, Dextrose 10% Bolus, Dextrose 10% Bolus, glucagon (human recombinant), glucose, glucose, hydrOXYzine HCL, insulin aspart U-100, sodium chloride 0.9%, Pharmacy to dose Vancomycin consult **AND** vancomycin - pharmacy to dose    Family History     Problem Relation (Age of Onset)    Cancer Mother, Paternal Grandmother, Paternal Uncle    Cataracts Mother    Diabetes Mother    Glaucoma Mother    Heart disease Father, Maternal Grandmother    Hypertension Brother    No Known Problems Sister, Maternal Grandfather, Paternal Grandfather,  Sister, Sister    Prostate cancer Brother        Tobacco Use    Smoking status: Never Smoker    Smokeless tobacco: Never Used   Substance and Sexual Activity    Alcohol use: No    Drug use: No    Sexual activity: Not Currently       Review of Systems   Unable to perform ROS: Acuity of condition     Objective:     Vital Signs (Most Recent):  Temp: 99 °F (37.2 °C) (05/25/20 1114)  Pulse: 97 (05/25/20 1114)  Resp: 18 (05/25/20 1114)  BP: (!) 129/59 (05/25/20 1114)  SpO2: (!) 94 % (05/25/20 1114) Vital Signs (24h Range):  Temp:  [96.3 °F (35.7 °C)-100.9 °F (38.3 °C)] 99 °F (37.2 °C)  Pulse:  [] 97  Resp:  [18] 18  SpO2:  [94 %-100 %] 94 %  BP: (101-134)/(53-66) 129/59     Weight: 67.6 kg (149 lb)  Body mass index is 20.78 kg/m².    Review of Symptoms    Unable to assess due to acuity of condition  Symptom Assessment (ESAS 0-10 scale)   ESAS 0 1 2 3 4 5 6 7 8 9 10   Pain              Dyspnea              Anxiety              Nausea              Depression               Anorexia              Fatigue              Insomnia              Restlessness               Agitation              CAM / Delirium __ --  ___+   Constipation     __ --  ___+   Diarrhea           __ --  ___+  Bowel Management Plan (BMP): Yes    Comments: none    Pain Assessment: unable to assess; no nonverbal signs of pain    OME in 24 hours: 0    Performance Status: 50      Physical Exam  Constitutional: awake, withdrawn.   Elderly male, disoriented  No distress  Cachectic, chronically ill appearing   HENT:   Mouth/Throat: Oropharynx is clear and moist. No oropharyngeal exudate.   Eyes: Conjunctivae are normal. No scleral icterus.   Neck: No tracheal deviation present.   Cardiovascular: Normal rate, regular rhythm and normal heart sounds.   Irregular tachycardia, rate > 100   Pulmonary/Chest: Effort normal and breath sounds normal. No respiratory distress.   Abdominal: He exhibits no distension.   Musculoskeletal: He exhibits no edema or  tenderness.   Neurological: He is alert and oriented to person, place, and time. No cranial nerve deficit.   Lethargic. No gross focal deficits   Skin: Skin is warm and dry.   Nursing note and vitals reviewed.  Significant Labs:   LFT:   No results for input(s): AST, GGT, ALKPHOS, BILITOT in the last 48 hours.    Invalid input(s): ALST  CBC:   Recent Labs   Lab 05/25/20  0401   WBC 13.63*   HGB 7.3*   HCT 23.3*   MCV 87        BMP:  Recent Labs   Lab 05/25/20  0400   *      K 5.1      CO2 22*   BUN 38*   CREATININE 4.7*   CALCIUM 7.9*   MG 2.4     LFT:  Lab Results   Component Value Date    AST 43 (H) 05/18/2020    ALKPHOS 155 (H) 05/18/2020    BILITOT 0.3 05/18/2020     Albumin:   Albumin   Date Value Ref Range Status   05/18/2020 1.7 (L) 3.5 - 5.2 g/dL Final     Protein:   Total Protein   Date Value Ref Range Status   05/18/2020 5.8 (L) 6.0 - 8.4 g/dL Final     Lactic acid:   Lab Results   Component Value Date    LACTATE 1.3 05/24/2020    LACTATE 2.1 05/17/2020       Significant Imaging: I have reviewed all pertinent imaging results/findings within the past 24 hours.    Advance Care Planning   Advanced Directives::  Living Will: Yes. Copy on chart: Yes  LaPOST: No  Do Not Resuscitate Status: DNR since this admission  Medical Power of : No; wife is surrogate CHILANGO IbanezDisney Spouse [phone]         Decision-Making Capacity: Family answered questions, Patient unable to communicate due to disease severity/cognitive impairment       Living Arrangements: Lives with spouse    Psychosocial/Cultural: did not explore during today's visit; wife is an EMT with some medical knowledge    Spiritual:     F- Madai and Belief: Reuben    I - Importance: yes  .  C - Community: yes    A - Address in Care: wife to coordinate      > 50% of 35 min visit spent in chart review, face to face discussion of goals of care,  symptom assessment, coordination of care and  emotional support.    Sukhdeep Guy MD  Palliative Medicine  [facility]

## 2020-05-26 NOTE — PROGRESS NOTES
Pharmacokinetic Assessment Follow Up: IV Vancomycin    Vancomycin serum concentration assessment(s):  · Vancomycin random concentration this AM resulted at 21.3 mcg/mL.    · Last iHD 5/24; ESRD on TThSat - followed by Nephro  · Goal trough of 15-20 mcg/mL     Vancomycin Regimen Plan:  1.  Administer Vancomycin 500 mg IV x 1 dose after HD today  2.  Obtain random concentration with AM labs on 5/28  3.  Follow HD plans    Drug levels (last 3 results):  Recent Labs   Lab Result Units 05/25/20  0400 05/26/20  0421   Vancomycin, Random ug/mL 25.8 21.3       Pharmacy will continue to follow and monitor vancomycin.    Please contact pharmacy at extension [phone] for questions regarding this assessment.    Thank you for the consult,   Tara Toledo       Patient brief summary:  Kodi Ibanez Sr. is a 60 y.o. male initiated on antimicrobial therapy with IV Vancomycin for treatment of sepsis      Drug Allergies:   Review of patient's allergies indicates:   Allergen Reactions    Reglan [metoclopramide hcl] Diarrhea    Lorazepam      Other reaction(s): heavy sedation       Actual Body Weight:   67.6 kg    Renal Function:   Estimated Creatinine Clearance: 13.3 mL/min (A) (based on SCr of 5.5 mg/dL (H)).,     Dialysis Method (if applicable):  intermittent HD    CBC (last 72 hours):  Recent Labs   Lab Result Units 05/24/20  0459 05/25/20  0401 05/26/20  0421   WBC K/uL 15.85* 13.63* 10.75   Hemoglobin g/dL 7.8* 7.3* 7.0*   Hematocrit % 25.0* 23.3* 22.5*   Platelets K/uL 315 278 264   Gran% % 87.2* 88.2* 82.6*   Lymph% % 4.7* 4.0* 7.3*   Mono% % 6.5 6.5 8.6   Eosinophil% % 0.7 0.6 0.7   Basophil% % 0.3 0.2 0.3   Differential Method  Automated Automated Automated       Metabolic Panel (last 72 hours):  Recent Labs   Lab Result Units 05/23/20  1606 05/24/20  0459 05/24/20  1602 05/25/20  0400 05/25/20  1528 05/26/20  0421   Sodium mmol/L 133* 140 142 138 140 135*   Potassium mmol/L 4.3 3.8 4.6 5.1 4.2 5.7*   Chloride mmol/L 100 106  109 104 107 104   CO2 mmol/L 23 24 25 22* 24 20*   Glucose mg/dL 392* 146* 74 201* 41* 280*   BUN, Bld mg/dL 41* 19 26* 38* 40* 47*   Creatinine mg/dL 5.3* 2.7* 3.9* 4.7* 5.0* 5.5*   Magnesium mg/dL  --  2.1  --  2.4  --  2.4   Phosphorus mg/dL  --  1.6*  --  3.3  --  4.5       Vancomycin Administrations:  vancomycin given in the last 96 hours                   vancomycin in dextrose 5 % 1 gram/250 mL IVPB 1,000 mg (mg) 1,000 mg New Bag 05/24/20 1724                Microbiologic Results:  Microbiology Results (last 7 days)     Procedure Component Value Units Date/Time    Clostridium difficile EIA [[id number]] Collected:  05/25/20 0919    Order Status:  Completed Specimen:  Stool Updated:  05/25/20 1952     C. diff Antigen Negative     C difficile Toxins A+B, EIA Negative     Comment: Testing not recommended for children <24 months old.       Blood culture [[id number]] Collected:  05/20/20 1620    Order Status:  Completed Specimen:  Blood Updated:  05/25/20 1812     Blood Culture, Routine No growth after 5 days.    Narrative:       Collection has been rescheduled by FB at 05/20/2020 13:56 Reason:   nurse wants all labs at 4.  Collection has been rescheduled by FB at 05/20/2020 13:57 Reason:   nurse Olga.  Collection has been rescheduled by OF1 at 05/20/2020 16:07 Reason:   unable to collect second set of blood cultures nurse Espinoza notified   #[id number]  Collection has been rescheduled by FB at 05/20/2020 13:56 Reason:   nurse wants all labs at 4.  Collection has been rescheduled by FB at 05/20/2020 13:57 Reason:   nurse Olga.  Collection has been rescheduled by OF1 at 05/20/2020 16:07 Reason:   unable to collect second set of blood cultures nurse Espinoza notified   #[id number]    Blood culture [[id number]] Collected:  05/20/20 1556    Order Status:  Completed Specimen:  Blood Updated:  05/25/20 1612     Blood Culture, Routine No growth after 5 days.    Narrative:       Collection has been rescheduled by FB at 05/20/2020 13:56  Reason:   nurse wants all labs at 4.  Collection has been rescheduled by FB at 05/20/2020 13:57 Reason:   nurse Olga.  Collection has been rescheduled by FB at 05/20/2020 13:56 Reason:   nurse wants all labs at 4.  Collection has been rescheduled by FB at 05/20/2020 13:57 Reason:   nurse Olga.    Blood culture [[id number]] Collected:  05/24/20 1002    Order Status:  Completed Specimen:  Blood Updated:  05/25/20 1212     Blood Culture, Routine No Growth to date      No Growth to date    Blood culture [[id number]] Collected:  05/24/20 1002    Order Status:  Completed Specimen:  Blood Updated:  05/25/20 1212     Blood Culture, Routine No Growth to date      No Growth to date    Blood culture [[id number]]  (Abnormal) Collected:  05/17/20 0630    Order Status:  Completed Specimen:  Blood from Line, Arterial, Right Updated:  05/20/20 1216     Blood Culture, Routine Gram stain aer bottle: yeast      Results called to and read back by:Brianda Vaz 05/19/2020  10:33      DEB ALBICANS  ID consult required at [facility].      Blood culture [[id number]]  (Abnormal) Collected:  05/17/20 0520    Order Status:  Completed Specimen:  Blood from Line, Subclavian, Right Updated:  05/20/20 1215     Blood Culture, Routine Gram stain aer bottle: yeast      Positive results previously called      DEB ALBICANS  ID consult required at [facility].      Blood culture [[id number]]  (Abnormal) Collected:  05/15/20 1330    Order Status:  Completed Specimen:  Blood from Peripheral, Forearm, Left Updated:  05/20/20 1205     Blood Culture, Routine Gram stain aer bottle: budding yeast      Results called to and read back by: Jennifer Estevez RN  05/17/2020        05:22      DEB ALBICANS  ID consult required at [facility].      Blood culture [[id number]]  (Abnormal) Collected:  05/15/20 1321    Order Status:  Completed Specimen:  Blood from Peripheral,  Antecubital, Right Updated:  05/19/20 0843     Blood Culture, Routine Gram stain aer bottle: budding yeast      Results called to and read back by: Jennifer Estevez RN  05/17/2020        05:22      DEB ALBICANS  ID consult required at [facility].Olesya Brink and Dick grijalva.  For susceptibility see order #[id number]

## 2020-05-26 NOTE — PROGRESS NOTES
[facility] NOTE     Patient currently on hemodialysis for removal of uremic toxins and volume.     Patient seen and evaluated on hemodialysis, tolerating treatment, see HD flowsheet for vitals and assessments.      Ultrafiltration goal is 1L     Labs have been reviewed and the dialysate bath has been adjusted.     Assessment/Plan:  Seen on HD this morning, tolerating well.   Continue iHD while in-patient  Awaiting permacath placement, BC remain NGTD  Continue TAMMI  No need for phos binders at this time    CAIN Amaral, FNP-BC  Nephrology  Pager:  [phone]

## 2020-05-26 NOTE — PROGRESS NOTES
Treatment terminated. All blood returned. Catheter dressing changed. POC discussed with primary nurse.

## 2020-05-26 NOTE — PROGRESS NOTES
Hospital Medicine  Progress Note      Patient Name: Kodi Ibanez Sr.  MRN:  [id number]  [facility] Medicine Team: [facility] MED R Estephania Styles MD  Date of Admission:  5/13/2020     Length of Stay:  LOS: 13 days   Expected Discharge Date: 5/28/2020  Principal Problem:  Shock      Subjective:    Interval History/Overnight Events:    Mr. Ibanez remained afebrile and HD stable overnight. Pt hiding under blankets wihout any new complaints today. Plan for HD today. BCx NGTD. Will dc vanc and continue zosyn for now. Continuing fluconazole for fungemia. Will plan to consult [facility] deepthi for permacath.       Review of Systems:  Constitutional:NEG for   fatigue, fever.   Respiratory: Negative for    shortness of breath.  Positive for cough  Cardiovascular: Negative for chest pain, palpitations, leg swelling.   Gastrointestinal: Negative for abdominal pain, nausea, vomiting,   constipation  Genitourinary: Negative for dysuria, frequency.   All other systems reviewed and are negative.      Objective:    Physical Exam:  Temp:  [96.4 °F (35.8 °C)-98.5 °F (36.9 °C)]   Pulse:  [68-97]   Resp:  [16-18]   BP: (119-167)/(56-87)   SpO2:  [95 %-97 %]     Constitutional: alert, in NAD  Eyes: No scleral icterus or eye discharge  Cardiovascular: tachycardic.  Regular heart rhythm.  No murmur heard. Lt IJ trialysis in place.  Pulmonary/Chest: Effort normal and breath sounds normal. No respiratory distress. No wheezes, rales, or rhonchi  Abdominal: Soft. Bowel sounds are normal.  No distension.  No tenderness  Musculoskeletal: No deformities.  No edema.   Neurological: Alert.  Oriented to person,  time and place  Skin: Skin is warm and dry.       Assessment and Plan:    Mr. Kodi Ibanez Sr. is a 60 y.o. male who presented to [facility] on 5/13/2020 with shock 2/2 sepsis.     The patient was admitted to MICU for shock likely 2/2 to sepsis on 5/13 requiring low dose Levophed. Weaned off on 5/14, but required to go back on vasopresors on    5/17/20. Started on Heparin gtt for paroxysmal A fib with CHADS Vasc score of 6. Started on empiric broad spectrum abx/ Patient with intermittent SVT- Home coreg and dilt restarted.  Grew candida albicans on blood cx x 2 on 5/15, 5/17, 5/20. Initially on micafungin, but switched to fluconazole on 5/20 per ID recs. Evaluated by ophtha. Tunneled cath removed by surgery on 5/21 for line holiday. Line placed 5/24 and underwent HD. Tachycardic since 3 am. EKG with sinus tach. BP borderline in afternoon. Received 250cc bolus w improvement. WBC uptrended to 15.8k, concerning for acute infection, so started on vanc/zosyn and cultured. Febrile the next morning with watery stools. cdiff sent and was negative. vanc stopped on 5/26.            Shock  Candidemia  2/2 probable sepsis likely r/t LUE AV fistula excision. Patient also with R tunneled catheter placed 2/2020.  Patient underwent ligation of LUE fistula on 2/27/20 with Dr Lopez ([facility], vascular surgery) and had ongoing issues afterwards. Patient was admitted on 4/10 for excision of his infected LUE mid and distal AVF with cultures and specimen obtained. Surgical cultures 4/10 grew 2 different prevotella species, fusobacterium, and strep anginosus. The patient was d/c'ed with home health wound care and po augmentin/clindamycin x 10 days. On 4/20 antibiotics were changed to Flagyl 500 mg p.o. q.8 and Cefazolin after dialysis 2 g on Tuesday and Thursday, 3 g on Saturday. End date on antibiotics on 5/11.       --BC grew candida albicans 5/15, 5/17, 5/20  --ID and opthalmology consulted; appreciate recs  --s/p tunneled cath removal by vascular surgery on 5/21/20      --Started on micafungin, now on fluconazole since 5/20; continue x 4 wks post line removal per ID (estimated end date 06/18/20)    Sepsis  --WBC 15.85, tachycardic on 5/24 and febrile on 5/25  --source unclear - CXR without consolidation, line just placed on 5/24. Has known fungemia, but has been on  fluconazole.  --on empiric vanc and zosyn since 5/24 PLUS fluconazole  --c diff NEG  --blood cultures 5/24- NGTD  --afebrile x 24 hours  --de-escalate to zosyn only      Atrial fibrillation  --patient on diltiazem for rate control; restarted home dose   --on ASA/plavix/apixaban   --Chads Vasc score 6 points  --apixaban held (last dose 5/26 am) for permacath placement; consider hep gtt in AM       Sinus Tachycardia  --confirmed on EKG 5/24-    --improved with 250cc bolus         NSTEMI (non-ST elevated myocardial infarction)  Hx of CABG in 2009 and placement of FERCHO in 12/2019. Patient recenetly admitted on 4/29 and d/c'ed 5/2, admitted for an NSTEMI, a LHC was performed where re-stenosis of a previous stent was found and balloon angioplasty was done. He is now on ASA/plavix, but per wife has not been compliant.     --continue ASA/plavix (home meds)  --TTE 5/14 with preserved EF 60% and no focal WMA     Essential hypertension  --well controlled on losartan, diltiazem and lopressor  --losartan held for hypotension, resume as needed     SVT (supraventricular tachycardia)  --ECG showed SVT with -150'2  --Required diltiazem drip, weaned and transition to PO dilt on 5/19  --continue dilt and lopressor        HLD (hyperlipidemia)  --statin for secondary prevention    ESRD on hemodialysis  Patient on HD T/Th/Sat with last session  Prior to admission on 5/12, where he was admitted to the ED with palpatations after session. Patient has tunneled catheter that was inserted in 2/2020 that is being used for access.      --nephrology consulted  --trialysis line placed 5/24 by anesthesia- s/p HD 5/26  --avoid nephrotoxic meds  --oliguric, strict I/o's  --BMP daily  --vasc sx consulted today for permacath       AV fistula infection  --see shock  --continue micafungin   --ID and opthalmology consulted      Endocrine  Type 2 diabetes, uncontrolled, with neuropathy  -->500 on admission. Goal -180- per wife goal at home  Is 225  --difficulty finding an adequate regimen as pt w episodes of hypoglycemia and hyperglycemia  --on levemir 15u BID--decreased to 7 u bid for hypoglycemia  --SSI    --continue to adjust and monitor for hypoglycemia  --accuchecks q4h    Diarrhea  --x 1 day  --c diff ordered and was neg  --imodium prn    Anemia  --likely of chronic disease and Fe def  --slowly downtrending since admission without signs of active bleeding  --daily cbc        Diet:  Diabetic, Renal  DVT PPx:  hep  Goals of Care:  DNR      Disposition:  Pending permacath placement  Discharge Needs:  SNF, follow up with ID      Estephania Styles MD  [facility]  Pager:  [phone]

## 2020-05-26 NOTE — PLAN OF CARE
Pt is awake and alert this shift. VSS. No falls/injury as pt calls for assistance when needed. Bed alarm on. Wound care done to sacrum as ordered. Pt remains on waffle overlay. No c/o pain. Antibiotic given. Accu checks ACHS and insulin given as ordered. Incontinence care done. Bed in lowest position. Call light within reach. Will continue to monitor.

## 2020-05-26 NOTE — PLAN OF CARE
Accepted to [facility]        05/25/20 1025   Discharge Reassessment   Assessment Type Discharge Planning Reassessment   Provided patient/caregiver education on the expected discharge date and the discharge plan Yes   Do you have any problems affording any of your prescribed medications? No   Discharge Plan A Skilled Nursing Facility   Discharge Plan B Home Health;Home with family   DME Needed Upon Discharge  none   Anticipated Discharge Disposition SNF   Can the patient/caregiver answer the patient profile reliably? No, cognitively impaired   Post-Acute Status   Post-Acute Authorization Placement   Post-Acute Placement Status Awaiting Internal Medical Clearance

## 2020-05-26 NOTE — PT/OT/SLP PROGRESS
Occupational Therapy      Patient Name:  Kodi Ibanez .   MRN:  [id number]    Patient not seen today secondary to pt off floor for dialysis. Will follow-up 5/27/2020    Lanette Perkins OT  5/26/2020

## 2020-05-26 NOTE — PROGRESS NOTES
Wound care consulted for sacrum  Patient is not available for assessment, currently in hemodialysis. Will continue to follow-up.

## 2020-05-26 NOTE — PROGRESS NOTES
Received pt to [facility] via stretcher.  Pt oriented to self only.  Dialysis started to left IJ catheter, catheter is ozzing blood.  Pt without complaint.

## 2020-05-26 NOTE — NURSING TRANSFER
Nursing Transfer Note      5/26/2020     Transfer Dialysis    Transfer via strecther    Transfer with none    Transported by escort    Medicines sent: insulin pen    Chart send with patient: yes    Notified: patient    Patient reassessed at: upon transfer

## 2020-05-26 NOTE — PT/OT/SLP PROGRESS
Physical Therapy      Patient Name:  Kodi Ibanez .   MRN:  [id number]    Patient not seen today secondary to off unit for dialysis. Will follow-up 5/27/20.    Thelma Piedra, PTA

## 2020-05-27 NOTE — PLAN OF CARE
Plan of care reviewed with patient and pt's spouse via telephone. Pt very quiet and withdrawn. Pt did not want to eat meals, drank mostly water and a little novasource. Seen this am by wound care, continue to cleanse buttocks with aloe vesta and apply triad cream. Pt turns self frequently. VSS. Last accucheck was 82, scheduled novolog insulin held secondary to pt not going to eat. Denies pain when asked multiple times throughout the day. NAD, call bell in reach, bed alarm on. Continue to monitor

## 2020-05-27 NOTE — PT/OT/SLP PROGRESS
"Physical Therapy Treatment    Patient Name:  Kodi Ibanez Sr.   MRN:  [id number]    Recommendations:     Discharge Recommendations:  nursing facility, skilled   Discharge Equipment Recommendations: walker, rolling, bedside commode   Barriers to discharge: Decreased caregiver support    Assessment:     Kodi Ibanez Sr. is a 60 y.o. male admitted with a medical diagnosis of Shock.  He presents with the following impairments/functional limitations:  weakness, gait instability, impaired endurance, impaired balance, impaired functional mobilty, impaired self care skills, decreased lower extremity function, decreased safety awareness.      Patient demonstrates good motivation and decreased activity tolerance secondary to drowsiness and weakness.  Patient demonstrated decreased functional mobility secondary to weakness and fatigue.  Patient required some encouragement for participation and patient required between CGA and max A for bed mobility and transfers.  Patient tolerated stance ~ 15 sec x 2 secondary to LE weakness.  Patient continues to benefit from skilled PT for strengthening and mobility training.      Rehab Prognosis: Good; patient continues to benefit from acute skilled PT services to address these deficits and reach maximum level of function.  Patient remains most appropriate to discharge to nursing facility, skilled  Recent Surgery: * No surgery found *      Plan:     During this hospitalization, patient to be seen 4 x/week to address the identified rehab impairments via gait training, therapeutic activities, therapeutic exercises, neuromuscular re-education and progress toward the following goals:    · Plan of Care Expires:  06/19/20    Subjective     Subjective: "I can't stand, my legs are weakn."   Pain/Comfort:  · Pain Rating 1: 0/10  · Pain Rating Post-Intervention 1: 0/10      Objective:     Communicated with RN prior to session.  Patient found supine with peripheral IV upon PT entry to room. "     General Precautions: Standard, fall, seizure   Orthopedic Precautions:N/A   Braces: N/A     Functional Mobility:  · Bed Mobility:     · Supine to Sit: minimum assistance  · Sit to Supine: minimum assistance for LE management.  · Transfers:     · Sit to Stand:  maximal assistance with rolling walker  · Gait: Patient unable to ambulate secondary to weakness and fatigue.  · Balance:   · Sitting: GOOD: Maintains balance through MODERATE excursions of active trunk movement      AM-PAC 6 CLICK MOBILITY  Turning over in bed (including adjusting bedclothes, sheets and blankets)?: 3  Sitting down on and standing up from a chair with arms (e.g., wheelchair, bedside commode, etc.): 2  Moving from lying on back to sitting on the side of the bed?: 3  Moving to and from a bed to a chair (including a wheelchair)?: 2  Need to walk in hospital room?: 1  Climbing 3-5 steps with a railing?: 1  Basic Mobility Total Score: 12       Therapeutic Activities and Exercises:  Patient tolerated standing ~ 30 sec with mod A to maintain stance secondary to weakness.  Patient required cues for upright posture and glut contraction.    Patient Education:    Patient educated on Fall risk, gait training, home safety, Home exercise program and transfer training by explanation.  Patient was receptive to education and needs reinforcement.     Patient left supine with all lines intact and call button in reach.    GOALS:   Multidisciplinary Problems     Physical Therapy Goals        Problem: Physical Therapy Goal    Goal Priority Disciplines Outcome Goal Variances Interventions   Physical Therapy Goal     PT, PT/OT Ongoing, Progressing     Description:  Goals to be met by: 2020     Patient will increase functional independence with mobility by performin. Supine to sit with Stand-by Assistance  2. Sit to supine with Stand-by Assistance  3. Sit to stand transfer with Minimal Assistance  4. Bed to chair transfer with Moderate Assistance using  LRAD  5. Gait  x 10 feet with Moderate Assistance using LRAD.   6. Sitting at edge of bed x15 minutes with Stand-by Assistance while performing dynamic UE activities  7. Lower extremity exercise program x20 reps per handout, with independence                      Time Tracking:     PT Received On: 05/27/20  PT Start Time: 1028     PT Stop Time: 1048  PT Total Time (min): 20 min     Billable Minutes: Therapeutic Activity 20 min    Treatment Type: Treatment  PT/PTA: PT     PTA Visit Number: 0     Fermin Melgar, PT  05/27/2020

## 2020-05-27 NOTE — CLINICAL REVIEW
Soy Zaragoza PA-C paged and notified of pts blood sugar results. New orders given and noted. Will continue to monitor.

## 2020-05-27 NOTE — PROGRESS NOTES
Follow up for wound care consult for sacrum. Attempted to see pt yesterday; however, pt was in dialysis.     Pt was admitted on 5/13/20 with fungemia and sepsis likely related to perm-a-cath with a PMHx of CHF, CAD with recent NSTEMI [~4/29] (CABG 2009, FERCHO placed in 12/2019), diabetes, CVA, ESRD on dialysis Tuesday/Thursday/Saturday.    Received pt lying in bed on right side with covers over head. Pt speaks when spoken to. Waffle overlay in use. Pt's nurse at bedside during assessment. Reports that pt turns independently.     Upon assessment of sacral spine: dried feces and controlled bleeding noted to sacrum. Nurse at bedside aware. Cleansed sacrum gently with cleansing cloths to reveal multiple scattered areas of partial skin thickness around the beth rectal area and an open area linear in appearance to the right buttock measuring 4 cm x 1 cm x 0.1 cm. Wound appears to be related to shearing complicated by moisture vs a pressure related injury.     (see assessment and photo below)    TRIAD at bedside applied to sacrum. Discussed with nurse the use of Aloe Vesta foam cleanser vs using Hibiclens due to the Hibiclens possibly causing further skin irritation to the open areas on the sacrum. Pt's nurse verbalized understanding.     Also of note, Palliative Care has been consulted for pt. Per Dr. Guy's progress note form 5/25/20 the pt has expressed wishes not to undergo and additional procedures with the recommendation that post discharge that the pt may benefit from going to SNF.    Recommendations:  -Nursing to cleanse sacrum with cleansing wipes or Aloe Vesta cleansing foam and apply Triad ointment BID and prn cleaning.Triad ointment provides a hydrophilic environment to promote healing of exposed tissue and prevents breakdown of intact skin.  -Nursing to continue pressure prevention interventions as directed. Pressure injury prevention interventions to include assisting pt with turning and repositioning with  pillow/wedge pillow, use of heel protectors and waffle cushion overlay.    Plan of care discussed with pt's nurse at bedside. Wound care to sign off. Please reconsult wound care if further assistance is needed. [id number]             05/27/20 0856        Wound 05/27/20 Shearing Sacral Spine   Date First Assessed: 05/27/20   Primary Wound Type: Shearing  Location: Sacral Spine   Wound Image    Wound WDL ex   Dressing Appearance Open to air;No dressing   Drainage Amount Scant   Drainage Characteristics/Odor Bleeding controlled   Appearance Pink;Red;Moist   Tissue loss description Partial thickness   Periwound Area Denuded;Moist;Pink;Satellite lesion   Wound Edges Open   Wound Length (cm) 4 cm   Wound Width (cm) 1 cm   Wound Depth (cm) 0.1 cm   Wound Volume (cm^3) 0.4 cm^3   Wound Surface Area (cm^2) 4 cm^2   Care Cleansed with:;Other (see comments)  (cleansing cloths; Aloevesta foam cleanser)   Dressing Applied;Other (see comments)  (TRIAD; left open to air)   Periwound Care Topical treatment applied   Off Loading Other (see comments)  (waffle overlay)   Dressing Change Due 05/27/20

## 2020-05-27 NOTE — PROGRESS NOTES
Hospital Medicine  Progress Note      Patient Name: Kodi Ibanez Sr.  MRN:  [id number]  [facility] Medicine Team: [facility]OS[facility] MED R Estephania Styles MD  Date of Admission:  5/13/2020     Length of Stay:  LOS: 14 days   Expected Discharge Date: 5/28/2020  Principal Problem:  Shock      Subjective:    Interval History/Overnight Events:    Mr. Ibanez remained afebrile and HD stable overnight. However, was noted to be hyperglycemic. Levemir increased back to 15u bid. Also with sinus tachycardia this morning that improved after 250cc bolus. H/H down to 6.5 from 7. 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 Called wife and updated her on current condition and plan of care.    Med ready for SNF pending permacath placement.      Review of Systems:  Constitutional:NEG for   fatigue, fever.   Respiratory: Negative for    shortness of breath.  Positive for cough  Cardiovascular: Negative for chest pain, palpitations, leg swelling.   Gastrointestinal: Negative for abdominal pain, nausea, vomiting,   constipation  Genitourinary: Negative for dysuria, frequency.   All other systems reviewed and are negative.      Objective:    Physical Exam:  Temp:  [95.9 °F (35.5 °C)-99.5 °F (37.5 °C)]   Pulse:  []   Resp:  [18-20]   BP: (104-137)/(59-79)   SpO2:  [94 %-98 %]     Constitutional: alert, in NAD  Eyes: No scleral icterus or eye discharge  Cardiovascular: tachycardic.  Regular heart rhythm.  No murmur heard. Lt IJ trialysis in place.  Pulmonary/Chest: Effort normal and breath sounds normal. No respiratory distress. No wheezes, rales, or rhonchi  Abdominal: Soft. Bowel sounds are normal.  No distension.  No tenderness  Musculoskeletal: No deformities.  No edema.   Neurological: Alert.  Oriented to person,  time and place  Skin: Skin is warm and dry.       Assessment and Plan:    Mr. Kodi Ibanez Sr. is a 60 y.o. male who presented to [facility] on 5/13/2020 with shock 2/2 sepsis.     The patient was admitted  to MICU for shock likely 2/2 to sepsis on 5/13 requiring low dose Levophed. Weaned off on 5/14, but required to go back on vasopresors on   5/17/20. Started on Heparin gtt for paroxysmal A fib with CHADS Vasc score of 6. Started on empiric broad spectrum abx/ Patient with intermittent SVT- Home coreg and dilt restarted.  Grew candida albicans on blood cx x 2 on 5/15, 5/17, 5/20. Initially on micafungin, but switched to fluconazole on 5/20 per ID recs. Evaluated by ophtha. Tunneled cath removed by surgery on 5/21 for line holiday. Line placed 5/24 and underwent HD. Tachycardic since 3 am. EKG with sinus tach. BP borderline in afternoon. Received 250cc bolus w improvement. WBC uptrended to 15.8k, concerning for acute infection, so started on vanc/zosyn and cultured. Febrile the next morning with watery stools. cdiff sent and was negative. vanc stopped on 5/26.            Shock  Candidemia  2/2 probable sepsis likely r/t LUE AV fistula excision. Patient also with R tunneled catheter placed 2/2020.  Patient underwent ligation of LUE fistula on 2/27/20 with Dr Lopez ([facility], vascular surgery) and had ongoing issues afterwards. Patient was admitted on 4/10 for excision of his infected LUE mid and distal AVF with cultures and specimen obtained. Surgical cultures 4/10 grew 2 different prevotella species, fusobacterium, and strep anginosus. The patient was d/c'ed with home health wound care and po augmentin/clindamycin x 10 days. On 4/20 antibiotics were changed to Flagyl 500 mg p.o. q.8 and Cefazolin after dialysis 2 g on Tuesday and Thursday, 3 g on Saturday. End date on antibiotics on 5/11.       --BC grew candida albicans 5/15, 5/17, 5/20  --ID and opthalmology consulted; appreciate recs  --s/p tunneled cath removal by vascular surgery on 5/21/20      --Started on micafungin, now on fluconazole since 5/20; continue x 4 wks post line removal per ID (estimated end date 06/18/20)    Sepsis  --WBC 15.85, tachycardic  on 5/24 and febrile on 5/25  --source unclear - CXR without consolidation, line just placed on 5/24. Has known fungemia, but has been on fluconazole.  --on empiric vanc and zosyn since 5/24 PLUS fluconazole  --c diff NEG  --blood cultures 5/24- NGTD  --afebrile x 24 hours  --de-escalated to zosyn only 5/26 and to levaquin for an additional 48 hrs on 5/27      Atrial fibrillation  --patient on diltiazem for rate control; restarted home dose   --on ASA/plavix/apixaban   --Chads Vasc score 6 points  --apixaban held (last dose 5/26 am) for permacath placement  --hgb low today at 6.5, will hold off on starting hep gtt pending  H/H remains stable.     Sinus Tachycardia  --confirmed on EKG 5/24-  and on 5/27  --improved with 250cc bolus         NSTEMI (non-ST elevated myocardial infarction)  Hx of CABG in 2009 and placement of FERCHO in 12/2019. Patient recenetly admitted on 4/29 and d/c'ed 5/2, admitted for an NSTEMI, a LHC was performed where re-stenosis of a previous stent was found and balloon angioplasty was done. He is now on ASA/plavix, but per wife has not been compliant.     --continue ASA/plavix (home meds)  --TTE 5/14 with preserved EF 60% and no focal WMA     Essential hypertension  --well controlled on losartan, diltiazem and lopressor  --losartan held for hypotension, resume as needed     SVT (supraventricular tachycardia)  --ECG showed SVT with -150'2  --Required diltiazem drip, weaned and transition to PO dilt on 5/19  --continue dilt and lopressor        HLD (hyperlipidemia)  --statin for secondary prevention    ESRD on hemodialysis  Patient on HD T/Th/Sat with last session  Prior to admission on 5/12, where he was admitted to the ED with palpatations after session. Patient has tunneled catheter that was inserted in 2/2020 that is being used for access.      --nephrology consulted  --trialysis line placed 5/24 by anesthesia- s/p HD 5/26  --avoid nephrotoxic meds  --oliguric, strict I/o's  --BMP  daily  --vasc sx consulted today for permacath       AV fistula infection  --see shock  --continue micafungin   --ID and opthalmology consulted      Endocrine  Type 2 diabetes, uncontrolled, with neuropathy  -->500 on admission. Goal -180- per wife goal at home Is 225  --difficulty finding an adequate regimen as pt w episodes of hypoglycemia and hyperglycemia  --on levemir 15u qd, aspart 3 TIDWM  --SSI    --continue to adjust and monitor for hypoglycemia  --accuchecks q4h    Diarrhea  --x 1 day  --c diff ordered and was neg  --avoid fruits per wife  --imodium prn    Anemia  --likely of chronic disease and Fe def  --slowly downtrending since admission without signs of active bleeding  --hgb 7-->6.5 today  --jehova's witness; does NOT accept blood products  --limit blood draws  --hold off on starting hep gtt until H/H stable        Diet:  Diabetic, Renal  DVT PPx:  hep  Goals of Care:  DNR      Disposition:  Pending permacath placement  Discharge Needs:  SNF, follow up with MEHREEN Styles MD  [facility]  Pager:  [phone]

## 2020-05-27 NOTE — SUBJECTIVE & OBJECTIVE
Medications Prior to Admission   Medication Sig Dispense Refill Last Dose    atorvastatin (LIPITOR) 80 MG tablet Take 1 tablet (80 mg total) by mouth every evening. 90 tablet 0 5/13/2020    carvediloL (COREG) 12.5 MG tablet Take 1 tablet (12.5 mg total) by mouth 2 (two) times daily. 180 tablet 0 5/13/2020    clopidogrel (PLAVIX) 75 mg tablet Take 1 tablet (75 mg total) by mouth once daily. 30 tablet 11 5/13/2020    diltiaZEM (CARDIZEM CD) 180 MG 24 hr capsule Take 1 capsule (180 mg total) by mouth once daily. 90 capsule 1 5/13/2020    docusate sodium (COLACE) 100 MG capsule Take 1 capsule (100 mg total) by mouth 2 (two) times daily. 60 capsule 0 5/12/2020    furosemide (LASIX) 40 MG tablet TAKE 1 TABLET(40 MG) BY MOUTH EVERY DAY 90 tablet 1 5/13/2020    losartan (COZAAR) 100 MG tablet Take 1 tablet (100 mg total) by mouth once daily. 90 tablet 1 5/13/2020    megestrol (MEGACE) 20 MG Tab Take 1 tablet (20 mg total) by mouth once daily. (Patient taking differently: Take 20 mg by mouth 2 (two) times daily .) 90 tablet 1 5/13/2020    sevelamer carbonate (RENVELA) 800 mg Tab Take 2 tablets (1,600 mg total) by mouth 3 (three) times daily with meals. 540 tablet 1 Past Week    acetaminophen (TYLENOL) 325 MG tablet Take 2 tablets (650 mg total) by mouth every 6 (six) hours as needed. (Patient not taking: Reported on 5/4/2020)  0 Not Taking    ascorbic acid, vitamin C, (VITAMIN C) 500 MG tablet Take 500 mg by mouth every evening.    Taking    aspirin (ECOTRIN) 81 MG EC tablet Take 1 tablet (81 mg total) by mouth once daily.  0 Taking    blood-glucose meter kit To check BG 2 times daily, to use with accuchek casa meter 1 each 0 Taking    insulin (LANTUS SOLOSTAR U-100 INSULIN) glargine 100 units/mL (3mL) SubQ pen Inject 20 Units into the skin every evening. 18 mL 0 Taking    insulin aspart U-100 (NOVOLOG FLEXPEN U-100 INSULIN) 100 unit/mL (3 mL) InPn pen Inject 10 Units into the skin 2 (two) times daily with  "meals. 15 mL 1     [] metroNIDAZOLE (FLAGYL) 500 MG tablet Take 1 tablet (500 mg total) by mouth 3 (three) times daily. 84 tablet 0 Taking       Review of patient's allergies indicates:   Allergen Reactions    Reglan [metoclopramide hcl] Diarrhea    Lorazepam      Other reaction(s): heavy sedation       Past Medical History:   Diagnosis Date    Atherosclerosis of aorta     noted on VIKTORIA     Blood transfusion     Cataracts, bilateral     CHF (congestive heart failure)     Clotting disorder     Cognitive deficits as late effect of cerebrovascular disease 2012    Coronary artery disease     NSTEMI s/p PCI with FERCHO to LCx (19),     Decreased appetite     DM (diabetes mellitus), type 2 with renal complications     Diabetic retinopathy    ESRD on hemodialysis     TUESDAY/ THURSDAY/SATURDAY    Hyperlipidemia     Hyperparathyroidism, unspecified     Hypertension     Prostate cancer 20:  wife states "he doesn't have it anymore"    Seizures     Stroke     PT HAS SHAKES     Unsteady gait     Uses roller walker     Past Surgical History:   Procedure Laterality Date    CARDIAC SURGERY      CABG    CATARACT EXTRACTION      ou    CATARACT EXTRACTION, BILATERAL      cataracts      CORONARY ANGIOGRAPHY INCLUDING BYPASS GRAFTS WITH CATHETERIZATION OF LEFT HEART N/A 2019    Procedure: ANGIOGRAM, CORONARY, INCLUDING BYPASS GRAFT, WITH LEFT HEART CATHETERIZATION, rcfa, 5fr;  Surgeon: David Villegas MD;  Location: [facility] CATH LAB;  Service: Cardiology;  Laterality: N/A;    CORONARY ANGIOGRAPHY INCLUDING BYPASS GRAFTS WITH CATHETERIZATION OF LEFT HEART N/A 2020    Procedure: ANGIOGRAM, CORONARY, INCLUDING BYPASS GRAFT, WITH LEFT HEART CATHETERIZATION, rcfa, 10 am start;  Surgeon: David Villegas MD;  Location: [facility] CATH LAB;  Service: Cardiology;  Laterality: N/A;    CORONARY ARTERY BYPASS GRAFT      bypass    EYE SURGERY      Left Guera AVF  2012    " PROSTATE BIOPSY      positive    retinal laser      REVISION OF ARTERIOVENOUS FISTULA Left 2/27/2020    Procedure: Ligation of left upper extremity fistula, Closure of left upper extremity skin defect x 2, Intraoperative ultrasound ;  Surgeon: Stephane Lopez MD;  Location: [facility] OR;  Service: Vascular;  Laterality: Left;    REVISION OF ARTERIOVENOUS FISTULA Left 4/10/2020    Procedure: AV fistula excision;  Surgeon: Stephane Lopez MD;  Location: [facility] OR 2ND FLR;  Service: Vascular;  Laterality: Left;    ROTATOR CUFF REPAIR  right, 2005    ULTRASOUND GUIDANCE  12/16/2019    Procedure: Ultrasound Guidance;  Surgeon: David Villegas MD;  Location: [facility] CATH LAB;  Service: Cardiology;;    VASCULAR SURGERY       Family History     Problem Relation (Age of Onset)    Cancer Mother, Paternal Grandmother, Paternal Uncle    Cataracts Mother    Diabetes Mother    Glaucoma Mother    Heart disease Father, Maternal Grandmother    Hypertension Brother    No Known Problems Sister, Maternal Grandfather, Paternal Grandfather, Sister, Sister    Prostate cancer Brother        Tobacco Use    Smoking status: Never Smoker    Smokeless tobacco: Never Used   Substance and Sexual Activity    Alcohol use: No    Drug use: No    Sexual activity: Not Currently     Review of Systems   Constitutional: Positive for fatigue. Negative for chills and fever.   HENT: Negative for congestion and dental problem.    Respiratory: Negative for shortness of breath and wheezing.    Cardiovascular: Negative for chest pain and palpitations.   Gastrointestinal: Negative for abdominal distention and abdominal pain.   Endocrine: Negative for cold intolerance and heat intolerance.   Genitourinary: Negative for dysuria and flank pain.   Musculoskeletal: Negative for back pain and neck pain.   Skin: Negative for rash and wound.   Allergic/Immunologic: Negative for environmental allergies and food allergies.   Neurological: Positive for weakness.  Negative for numbness.   Psychiatric/Behavioral: Negative for decreased concentration and dysphoric mood.     Objective:     Vital Signs (Most Recent):  Temp: 98.3 °F (36.8 °C) (05/27/20 0815)  Pulse: (!) 138 (05/27/20 0815)  Resp: 18 (05/27/20 0815)  BP: 119/79 (05/27/20 0815)  SpO2: (!) 94 % (05/27/20 0815) Vital Signs (24h Range):  Temp:  [95.9 °F (35.5 °C)-98.3 °F (36.8 °C)] 98.3 °F (36.8 °C)  Pulse:  [] 138  Resp:  [16-18] 18  SpO2:  [94 %-98 %] 94 %  BP: (104-156)/(59-87) 119/79     Weight: 67.6 kg (149 lb)  Body mass index is 20.78 kg/m².    Physical Exam   Constitutional: He is oriented to person, place, and time. He appears well-developed and well-nourished.   HENT:   Head: Normocephalic and atraumatic.   Eyes: Pupils are equal, round, and reactive to light. EOM are normal.   Neck: Normal range of motion.   LIJ trialysis catheter in place   Cardiovascular: Normal rate and intact distal pulses.   Pulmonary/Chest: Effort normal. No respiratory distress.   Abdominal: Soft. There is no tenderness.   Musculoskeletal: Normal range of motion. He exhibits no deformity.   Neurological: He is alert and oriented to person, place, and time.   Skin: Skin is warm and dry.   Psychiatric: He has a normal mood and affect. His behavior is normal.       Significant Labs:  ABGs: No results for input(s): PH, PCO2, PO2, HCO3, POCSATURATED, BE in the last 48 hours.  Amylase: No results for input(s): AMYLASE in the last 48 hours.  Anti-XA: No results for input(s): LABHEPA in the last 48 hours.  BMP:   Recent Labs   Lab 05/27/20 0535   *      K 4.6      CO2 25   BUN 32*   CREATININE 4.2*   CALCIUM 7.6*   MG 2.1     Cardiac markers: No results for input(s): CKMB, CPKMB, TROPONINT, TROPONINI, MYOGLOBIN in the last 48 hours.  CBC:   Recent Labs   Lab 05/27/20  0535   WBC 8.13   RBC 2.38*   HGB 6.5*   HCT 20.7*      MCV 87   MCH 27.3   MCHC 31.4*     CMP:   Recent Labs   Lab 05/27/20  0535   *    CALCIUM 7.6*      K 4.6   CO2 25      BUN 32*   CREATININE 4.2*     Coagulation: No results for input(s): LABPROT, INR, APTT in the last 48 hours.    Significant Diagnostics:  I have reviewed all pertinent imaging results/findings within the past 24 hours.

## 2020-05-27 NOTE — PT/OT/SLP PROGRESS
Occupational Therapy   Treatment    Name: Kodi Ibanez Sr.  MRN: [id number]  Admitting Diagnosis:  Shock       Recommendations:     Discharge Recommendations: nursing facility, skilled  Discharge Equipment Recommendations:  walker, rolling, bedside commode  Barriers to discharge:  Inaccessible home environment, Decreased caregiver support    Assessment:     Kodi Ibanez Sr. is a 60 y.o. male with a medical diagnosis of Shock.  He presents with the following Performance deficits affecting function are weakness, impaired endurance, impaired self care skills, gait instability, impaired functional mobilty, impaired balance, pain, decreased safety awareness.     Pt agreeable to therapy but somnolent throughout session. Pt requires Min A in bed mobility and CGA-SBA in seating balance when EOB. Pt completed grooming tasks SBA seated EOB. Pt stood Max A x2 trials but unable to take any steps due to weakness. Pt requested to return to supine. Continue OT POC    Rehab Prognosis:  Fair; patient would benefit from acute skilled OT services to address these deficits and reach maximum level of function.       Plan:     Patient to be seen 4 x/week to address the above listed problems via self-care/home management, therapeutic activities, therapeutic exercises  · Plan of Care Expires: 06/18/20  · Plan of Care Reviewed with: patient    Subjective     Pain/Comfort:  · Pain Rating 1: 0/10    Objective:     Communicated with: RN prior to session.  Patient found supine with peripheral IV upon OT entry to room.    General Precautions: Standard, fall, seizure   Orthopedic Precautions:N/A   Braces: N/A     Occupational Performance:     Bed Mobility:    · Patient completed Scooting/Bridging with minimum assistance  · Patient completed Supine to Sit with minimum assistance  · Patient completed Sit to Supine with minimum assistance     Functional Mobility/Transfers:  · Patient completed Sit <> Stand Transfer with maximal assistance  with   rolling walker   · Functional Mobility: Pt agreeable to therapy but somnolent throughout session. Pt requires Min A in bed mobility and CGA-SBA in seating balance when EOB. Pt completed grooming tasks SBA seated EOB. Pt stood Max A x2 trials but unable to take any steps due to weakness. Pt requested to return to supine. Continue OT POC    Activities of Daily Living:  · Grooming: stand by assistance seated EOB  · Upper Body Dressing: minimum assistance consuelo patel      [facility] 6 Click ADL: 13    Treatment & Education:  - OT/POC-  - Importance of mobility to maximize recovery.  - whiteboard updated  - safety w/ functional mobility; hand placement to ensure safe transfers to various surfaces in prep for ADLs  - Advised pt on the need for nutrition to aid healing and increase energy - Pt acknowledged understanding   - encouraged to ambulate within household environment at least every hour to hour 1/2      Patient left supine with all lines intact, call button in reach and RN notifiedEducation:      GOALS:   Multidisciplinary Problems     Occupational Therapy Goals        Problem: Occupational Therapy Goal    Goal Priority Disciplines Outcome Interventions   Occupational Therapy Goal     OT, PT/OT Ongoing, Progressing    Description:  Goals set on 5/19 with expiration date 6/2:  Patient will increase functional independence with ADLs by performing:    Supine <> Sit with Min Assistance.  Feeding with Min Assistance.  Grooming while standing at sink with Min Assistance.  UB Dressing with Min  Assistance.  LB Dressing with Min Assistance.  Step transfer with Min Assistance with DME as needed.  Pt will demonstrate understanding of education provided regarding energy conservation and task modification through teach-back method.                         Time Tracking:     OT Date of Treatment: 05/27/20  OT Start Time: 1028  OT Stop Time: 1048  OT Total Time (min): 20 min    Billable Minutes:Self Care/Home Management 20    Lanette  Maddie, OT  5/27/2020

## 2020-05-27 NOTE — ASSESSMENT & PLAN NOTE
60 y.o. Male with  CHF, CAD, NSTEMI, DM, CVA, ESRD on HD admitted with sepsis 2/2 fungemia. Permacath removed last week now in need of permacath replacement    Plan for permacath placement this week in cath lab. Will discuss timing with staff  Hold Eliquis (last dose 5/26). Can use heparin for anticoagulation if needed

## 2020-05-27 NOTE — PLAN OF CARE
Pt awake, alert and oriented x3. Accu checks monitored and GRIFFIN De La Garza aware of elevated blood sugars. Orders noted. Pt sleeping much of the shift-turns frequently per self. White board updated and q2h rounding protocol maintained.

## 2020-05-27 NOTE — CARE UPDATE
Rapid Response Nurse Chart Check     Chart check completed, abnormal VS noted. bedside RN, Lillie contacted, no concerns verbalized at this time, instructed to call [phone] for further concerns or assistance.

## 2020-05-27 NOTE — CONSULTS
[facility]  Vascular Surgery  Consult Note    Inpatient consult to Vascular Surgery  Consult performed by: Buddy Castañeda MD  Consult ordered by: Estephania Styles MD        Subjective:     Chief Complaint/Reason for Admission: Sepsis    History of Present Illness: Kodi Ibanez Sr. is a 60M with PMH of CHF, CAD, NSTEMI, DM, CVA, ESRD on dialysis via a R IJ permacath who was admitted to the hospital on 5/13 in septic shock and ultimately discovered to have candida fungemia. Permacath was removed on 5/21. He has improved clinically. Has remained afebrile x48 hours. Leukocytosis resolved and blood cultures have been negative since 5/20. Vascular surgery consulted for permacath replacement. Has been tolerating HD well via LIJ trialysis catheter.   Last dose of Eliquis was on 5/26. Still taking Plavix.     Medications Prior to Admission   Medication Sig Dispense Refill Last Dose    atorvastatin (LIPITOR) 80 MG tablet Take 1 tablet (80 mg total) by mouth every evening. 90 tablet 0 5/13/2020    carvediloL (COREG) 12.5 MG tablet Take 1 tablet (12.5 mg total) by mouth 2 (two) times daily. 180 tablet 0 5/13/2020    clopidogrel (PLAVIX) 75 mg tablet Take 1 tablet (75 mg total) by mouth once daily. 30 tablet 11 5/13/2020    diltiaZEM (CARDIZEM CD) 180 MG 24 hr capsule Take 1 capsule (180 mg total) by mouth once daily. 90 capsule 1 5/13/2020    docusate sodium (COLACE) 100 MG capsule Take 1 capsule (100 mg total) by mouth 2 (two) times daily. 60 capsule 0 5/12/2020    furosemide (LASIX) 40 MG tablet TAKE 1 TABLET(40 MG) BY MOUTH EVERY DAY 90 tablet 1 5/13/2020    losartan (COZAAR) 100 MG tablet Take 1 tablet (100 mg total) by mouth once daily. 90 tablet 1 5/13/2020    megestrol (MEGACE) 20 MG Tab Take 1 tablet (20 mg total) by mouth once daily. (Patient taking differently: Take 20 mg by mouth 2 (two) times daily .) 90 tablet 1 5/13/2020    sevelamer carbonate (RENVELA) 800 mg Tab Take 2 tablets  "(1,600 mg total) by mouth 3 (three) times daily with meals. 540 tablet 1 Past Week    acetaminophen (TYLENOL) 325 MG tablet Take 2 tablets (650 mg total) by mouth every 6 (six) hours as needed. (Patient not taking: Reported on 2020)  0 Not Taking    ascorbic acid, vitamin C, (VITAMIN C) 500 MG tablet Take 500 mg by mouth every evening.    Taking    aspirin (ECOTRIN) 81 MG EC tablet Take 1 tablet (81 mg total) by mouth once daily.  0 Taking    blood-glucose meter kit To check BG 2 times daily, to use with accuchek casa meter 1 each 0 Taking    insulin (LANTUS SOLOSTAR U-100 INSULIN) glargine 100 units/mL (3mL) SubQ pen Inject 20 Units into the skin every evening. 18 mL 0 Taking    insulin aspart U-100 (NOVOLOG FLEXPEN U-100 INSULIN) 100 unit/mL (3 mL) InPn pen Inject 10 Units into the skin 2 (two) times daily with meals. 15 mL 1     [] metroNIDAZOLE (FLAGYL) 500 MG tablet Take 1 tablet (500 mg total) by mouth 3 (three) times daily. 84 tablet 0 Taking       Review of patient's allergies indicates:   Allergen Reactions    Reglan [metoclopramide hcl] Diarrhea    Lorazepam      Other reaction(s): heavy sedation       Past Medical History:   Diagnosis Date    Atherosclerosis of aorta     noted on VIKTORIA     Blood transfusion     Cataracts, bilateral     CHF (congestive heart failure)     Clotting disorder     Cognitive deficits as late effect of cerebrovascular disease 2012    Coronary artery disease     NSTEMI s/p PCI with FERCHO to LCx (19),     Decreased appetite     DM (diabetes mellitus), type 2 with renal complications     Diabetic retinopathy    ESRD on hemodialysis     TUESDAY/ THURSDAY/SATURDAY    Hyperlipidemia     Hyperparathyroidism, unspecified     Hypertension     Prostate cancer 2016    19:  wife states "he doesn't have it anymore"    Seizures     Stroke     PT HAS SHAKES     Unsteady gait     Uses roller walker     Past Surgical History:   Procedure " Laterality Date    CARDIAC SURGERY      CABG    CATARACT EXTRACTION      ou    CATARACT EXTRACTION, BILATERAL      cataracts      CORONARY ANGIOGRAPHY INCLUDING BYPASS GRAFTS WITH CATHETERIZATION OF LEFT HEART N/A 12/16/2019    Procedure: ANGIOGRAM, CORONARY, INCLUDING BYPASS GRAFT, WITH LEFT HEART CATHETERIZATION, rcfa, 5fr;  Surgeon: David Villegas MD;  Location: [facility] CATH LAB;  Service: Cardiology;  Laterality: N/A;    CORONARY ANGIOGRAPHY INCLUDING BYPASS GRAFTS WITH CATHETERIZATION OF LEFT HEART N/A 4/30/2020    Procedure: ANGIOGRAM, CORONARY, INCLUDING BYPASS GRAFT, WITH LEFT HEART CATHETERIZATION, rcfa, 10 am start;  Surgeon: David Villegas MD;  Location: [facility] CATH LAB;  Service: Cardiology;  Laterality: N/A;    CORONARY ARTERY BYPASS GRAFT  2009    bypass    EYE SURGERY      Left Guera AVF  1/11/2012    PROSTATE BIOPSY      positive    retinal laser      REVISION OF ARTERIOVENOUS FISTULA Left 2/27/2020    Procedure: Ligation of left upper extremity fistula, Closure of left upper extremity skin defect x 2, Intraoperative ultrasound ;  Surgeon: Stephane Lopez MD;  Location: [facility] OR;  Service: Vascular;  Laterality: Left;    REVISION OF ARTERIOVENOUS FISTULA Left 4/10/2020    Procedure: AV fistula excision;  Surgeon: Stephane Lopez MD;  Location: [address];  Service: Vascular;  Laterality: Left;    ROTATOR CUFF REPAIR  right, 2005    ULTRASOUND GUIDANCE  12/16/2019    Procedure: Ultrasound Guidance;  Surgeon: David Villegas MD;  Location: [facility] CATH LAB;  Service: Cardiology;;    VASCULAR SURGERY       Family History     Problem Relation (Age of Onset)    Cancer Mother, Paternal Grandmother, Paternal Uncle    Cataracts Mother    Diabetes Mother    Glaucoma Mother    Heart disease Father, Maternal Grandmother    Hypertension Brother    No Known Problems Sister, Maternal Grandfather, Paternal Grandfather, Sister, Sister    Prostate cancer Brother        Tobacco Use    Smoking  status: Never Smoker    Smokeless tobacco: Never Used   Substance and Sexual Activity    Alcohol use: No    Drug use: No    Sexual activity: Not Currently     Review of Systems   Constitutional: Positive for fatigue. Negative for chills and fever.   HENT: Negative for congestion and dental problem.    Respiratory: Negative for shortness of breath and wheezing.    Cardiovascular: Negative for chest pain and palpitations.   Gastrointestinal: Negative for abdominal distention and abdominal pain.   Endocrine: Negative for cold intolerance and heat intolerance.   Genitourinary: Negative for dysuria and flank pain.   Musculoskeletal: Negative for back pain and neck pain.   Skin: Negative for rash and wound.   Allergic/Immunologic: Negative for environmental allergies and food allergies.   Neurological: Positive for weakness. Negative for numbness.   Psychiatric/Behavioral: Negative for decreased concentration and dysphoric mood.     Objective:     Vital Signs (Most Recent):  Temp: 98.3 °F (36.8 °C) (05/27/20 0815)  Pulse: (!) 138 (05/27/20 0815)  Resp: 18 (05/27/20 0815)  BP: 119/79 (05/27/20 0815)  SpO2: (!) 94 % (05/27/20 0815) Vital Signs (24h Range):  Temp:  [95.9 °F (35.5 °C)-98.3 °F (36.8 °C)] 98.3 °F (36.8 °C)  Pulse:  [] 138  Resp:  [16-18] 18  SpO2:  [94 %-98 %] 94 %  BP: (104-156)/(59-87) 119/79     Weight: 67.6 kg (149 lb)  Body mass index is 20.78 kg/m².    Physical Exam   Constitutional: He is oriented to person, place, and time. He appears well-developed and well-nourished.   HENT:   Head: Normocephalic and atraumatic.   Eyes: Pupils are equal, round, and reactive to light. EOM are normal.   Neck: Normal range of motion.   LIJ trialysis catheter in place   Cardiovascular: Normal rate and intact distal pulses.   Pulmonary/Chest: Effort normal. No respiratory distress.   Abdominal: Soft. There is no tenderness.   Musculoskeletal: Normal range of motion. He exhibits no deformity.   Neurological: He is  alert and oriented to person, place, and time.   Skin: Skin is warm and dry.   Psychiatric: He has a normal mood and affect. His behavior is normal.       Significant Labs:  ABGs: No results for input(s): PH, PCO2, PO2, HCO3, POCSATURATED, BE in the last 48 hours.  Amylase: No results for input(s): AMYLASE in the last 48 hours.  Anti-XA: No results for input(s): LABHEPA in the last 48 hours.  BMP:   Recent Labs   Lab 05/27/20  0535   *      K 4.6      CO2 25   BUN 32*   CREATININE 4.2*   CALCIUM 7.6*   MG 2.1     Cardiac markers: No results for input(s): CKMB, CPKMB, TROPONINT, TROPONINI, MYOGLOBIN in the last 48 hours.  CBC:   Recent Labs   Lab 05/27/20  0535   WBC 8.13   RBC 2.38*   HGB 6.5*   HCT 20.7*      MCV 87   MCH 27.3   MCHC 31.4*     CMP:   Recent Labs   Lab 05/27/20  0535   *   CALCIUM 7.6*      K 4.6   CO2 25      BUN 32*   CREATININE 4.2*     Coagulation: No results for input(s): LABPROT, INR, APTT in the last 48 hours.    Significant Diagnostics:  I have reviewed all pertinent imaging results/findings within the past 24 hours.    Assessment/Plan:     Candidemia  Kodi Ibanez Sr. is a 60M with PMH of CHF, CAD, NSTEMI, DM, CVA, ESRD on dialysis via a R IJ permacath who was admitted to the hospital on 5/13 in septic shock and ultimately discovered to have candida fungemia. Vascular surgery was consulted for removal of his permacath for source control.    -Patient had dialysis today  -Permacath removed see procedure note.  -The patient tolerated this well  -Agree with giving the patient a line holiday and placing a temporary dialysis line on Saturday, a permacath can be placed again once his fungemia has cleared  -We will continue to follow peripherally and will be available if needed      ESRD on hemodialysis  60 y.o. Male with  CHF, CAD, NSTEMI, DM, CVA, ESRD on HD admitted with sepsis 2/2 fungemia. Permacath removed last week now in need of permacath  replacement    Plan for permacath placement this week in cath lab. Will discuss timing with staff  Hold Eliquis (last dose 5/26). Can use heparin for anticoagulation if needed         Thank you for your consult. I will follow-up with patient. Please contact us if you have any additional questions.    Buddy Castañeda MD  Vascular Surgery  [facility]

## 2020-05-27 NOTE — PLAN OF CARE
Pt agreeable to therapy but somnolent throughout session. Pt requires Min A in bed mobility and CGA-SBA in seating balance when EOB. Pt completed grooming tasks SBA seated EOB. Pt stood Max A x2 trials but unable to take any steps due to weakness. Pt requested to return to supine. Continue OT POC      Problem: Occupational Therapy Goal  Goal: Occupational Therapy Goal  Description  Goals set on 5/19 with expiration date 6/2:  Patient will increase functional independence with ADLs by performing:    Supine <> Sit with Min Assistance.  Feeding with Min Assistance.  Grooming while standing at sink with Min Assistance.  UB Dressing with Min  Assistance.  LB Dressing with Min Assistance.  Step transfer with Min Assistance with DME as needed.  Pt will demonstrate understanding of education provided regarding energy conservation and task modification through teach-back method.        Outcome: Ongoing, Progressing

## 2020-05-28 NOTE — PROGRESS NOTES
Attempted to place PermCath.  RIJ not amenable to cannulation.  LIJ trialysis only access available.  Became tachycardic to 130s and hypotensive to SBP 70-80s during procedure after Versed 1mg and Fentanyl 25mcg.   Elected to not abandon our only IV access to attempt to place tunneled line.  Will reschedule for tomorrow / early next week  Will need to place in LIJ    Please establish peripheral IV access and remove LIJ line prior to PermCath    Armen Foster MD PGY6  Vascular and Endovascular Surgery Fellow  05/28/2020

## 2020-05-28 NOTE — PLAN OF CARE
VASCULAR SURGERY     Will attempt permacath placement tomorrow in the OR.   Hold Eliquis  NPO p MN  Please obtain reliable peripheral IVs and remove LIJ trialysis line ag Castañeda MD  General Surgery PGYIII  [phone]

## 2020-05-28 NOTE — PT/OT/SLP PROGRESS
Physical Therapy      Patient Name:  Kodi Ibanez .   MRN:  [id number]    Patient not seen today secondary to Dialysis. Will follow-up as time allows.     Adriana Rolon, PT, DPT

## 2020-05-28 NOTE — PROGRESS NOTES
3.5hr HD treatment completed pt net +350cc due to low bp pt asymptomatic. Epoetin given as ordered. Both lumens of a LIJ cvc flushed, capped and taped. Report given to BASHIR Espinoza.

## 2020-05-28 NOTE — PT/OT/SLP PROGRESS
Occupational Therapy      Patient Name:  Kodi Ibanez .   MRN:  [id number]    Patient not seen today secondary to Dialysis. Will follow-up tomorrow.    YANY Hester  5/28/2020

## 2020-05-28 NOTE — PROGRESS NOTES
Hospital Medicine  Progress Note      Patient Name: Kodi Ibanez Sr.  MRN:  [id number]  [facility] Medicine Team: [facility] MED R Unruly Feng MD  Date of Admission:  5/13/2020     Length of Stay:  LOS: 15 days   Expected Discharge Date: 5/28/2020  Principal Problem:  Shock      Subjective:    Interval History/Overnight Events:    Tachycardic to 130's this AM during HD, increased metoprolol to 50 mg BID and given 500 cc back during HD. H/H down to 6.0 from 6.5 however no signs of bleeding. 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 Discussed with nephrology, IV EPO 10k units initiated during HD. To OR for permacath placement today with vascular surgery however patient got hypotensive and tachycardic after versed and fentanyl were given. Discussed with vascular and charge nurse on 5th floor, plan for peripheral IV access today and back to the OR tomorrow for permacath in LIJ. Diet resumed today, NPO at midnight.      Review of Systems:  Constitutional:NEG for fatigue, fever.   Respiratory: Negative for shortness of breath.  Positive for cough  Cardiovascular: Negative for chest pain, palpitations, leg swelling.   Gastrointestinal: Negative for abdominal pain, nausea, vomiting,   constipation  Genitourinary: Negative for dysuria, frequency.   All other systems reviewed and are negative.      Objective:    Physical Exam:  Temp:  [97.2 °F (36.2 °C)-99.5 °F (37.5 °C)]   Pulse:  []   Resp:  [17-20]   BP: ()/(52-88)   SpO2:  [94 %-98 %]     Constitutional: alert, in NAD  Eyes: No scleral icterus or eye discharge  Cardiovascular: tachycardic.  Regular heart rhythm.  No murmur heard. Lt IJ trialysis in place.  Pulmonary/Chest: Effort normal and breath sounds normal. No respiratory distress. No wheezes, rales, or rhonchi  Abdominal: Soft. Bowel sounds are normal.  No distension.  No tenderness  Musculoskeletal: No deformities.  No edema.   Neurological: Alert.  Oriented to person,  time and  place  Skin: Skin is warm and dry.       Assessment and Plan:    Mr. Kodi Ibanez Sr. is a 60 y.o. male who presented to [facility] on 5/13/2020 with shock 2/2 sepsis.     The patient was admitted to MICU for shock likely 2/2 to sepsis on 5/13 requiring low dose Levophed. Weaned off on 5/14, but required to go back on vasopresors on   5/17/20. Started on Heparin gtt for paroxysmal A fib with CHADS Vasc score of 6. Started on empiric broad spectrum abx/ Patient with intermittent SVT- Home coreg and dilt restarted.  Grew candida albicans on blood cx x 2 on 5/15, 5/17, 5/20. Initially on micafungin, but switched to fluconazole on 5/20 per ID recs. Evaluated by ophtha. Tunneled cath removed by surgery on 5/21 for line holiday. Line placed 5/24 and underwent HD. Tachycardic since 3 am. EKG with sinus tach. BP borderline in afternoon. Received 250cc bolus w improvement. WBC uptrended to 15.8k, concerning for acute infection, so started on vanc/zosyn and cultured. Febrile the next morning with watery stools. cdiff sent and was negative. vanc stopped on 5/26.  Increased metoprolol to 50 mg BID and given 500 cc back during HD. H/H down to 6.0 from 6.5 however no signs of bleeding. 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 Discussed with nephrology, IV EPO 10k units initiated during HD. To OR for permacath placement today with IR.       Shock  Candidemia  2/2 probable sepsis likely r/t LUE AV fistula excision. Patient also with R tunneled catheter placed 2/2020.  Patient underwent ligation of LUE fistula on 2/27/20 with Dr Lopez ([facility], vascular surgery) and had ongoing issues afterwards. Patient was admitted on 4/10 for excision of his infected LUE mid and distal AVF with cultures and specimen obtained. Surgical cultures 4/10 grew 2 different prevotella species, fusobacterium, and strep anginosus. The patient was d/c'ed with home health wound care and po augmentin/clindamycin x 10 days. On  4/20 antibiotics were changed to Flagyl 500 mg p.o. q.8 and Cefazolin after dialysis 2 g on Tuesday and Thursday, 3 g on Saturday. End date on antibiotics on 5/11.       --BC grew candida albicans 5/15, 5/17, 5/20  --ID and opthalmology consulted; appreciate recs  --s/p tunneled cath removal by vascular surgery on 5/21/20      --Started on micafungin, now on fluconazole since 5/20; continue x 4 wks post line removal per ID (estimated end date 06/18/20)    Sepsis  --WBC 15.85, tachycardic on 5/24 and febrile on 5/25  --source unclear - CXR without consolidation, line just placed on 5/24. Has known fungemia, but has been on fluconazole.  --on empiric vanc and zosyn since 5/24 PLUS fluconazole  --c diff NEG  --blood cultures 5/24- NGTD  --afebrile x 24 hours  --de-escalated to zosyn only 5/26 and to levaquin for an additional 48 hrs on 5/27      Atrial fibrillation  --patient on diltiazem for rate control; restarted home dose   --on ASA/plavix/apixaban   --Chads Vasc score 6 points  --apixaban held (last dose 5/26 am) for permacath placement  --hgb low today at 6.0, will hold off on starting hep gtt     Sinus Tachycardia  --confirmed on EKG 5/24-  and on 5/27  --improved with 250cc bolus  -- increased dose of lopressor on 5/28/20         NSTEMI (non-ST elevated myocardial infarction)  Hx of CABG in 2009 and placement of FERCHO in 12/2019. Patient recenetly admitted on 4/29 and d/c'ed 5/2, admitted for an NSTEMI, a LHC was performed where re-stenosis of a previous stent was found and balloon angioplasty was done. He is now on ASA/plavix, but per wife has not been compliant.     --continue ASA/plavix (home meds)  --TTE 5/14 with preserved EF 60% and no focal WMA     Essential hypertension  --well controlled on losartan, diltiazem and lopressor  --losartan held for hypotension, resume as needed     SVT (supraventricular tachycardia)  --ECG showed SVT with -150'2  --Required diltiazem drip, weaned and transition  to PO dilt on 5/19  --continue dilt and lopressor        HLD (hyperlipidemia)  --statin for secondary prevention    ESRD on hemodialysis  Patient on HD T/Th/Sat with last session  Prior to admission on 5/12, where he was admitted to the ED with palpatations after session. Patient has tunneled catheter that was inserted in 2/2020 that is being used for access.      --nephrology consulted  --trialysis line placed 5/24 by anesthesia- s/p HD 5/26  --avoid nephrotoxic meds  --oliguric, strict I/o's  --BMP daily  --vasc sx consulted, plan for permacath placement 5/28/20 however patient got hypotensive and tachycardic after versed and fentanyl were given. Discussed with vascular and charge nurse on 5th floor, plan for peripheral IV access today and back to the OR tomorrow for permacath in [facility]. Diet resumed today, NPO at midnight.       AV fistula infection  --see shock  --continue micafungin   --ID and opthalmology consulted      Endocrine  Type 2 diabetes, uncontrolled, with neuropathy  -->500 on admission. Goal -180- per wife goal at home Is 225  --difficulty finding an adequate regimen as pt w episodes of hypoglycemia and hyperglycemia  --on levemir 15u qd, aspart 3 TIDWM  --SSI    --continue to adjust and monitor for hypoglycemia  --accuchecks q4h    Diarrhea  --x 1 day  --c diff ordered and was neg  --avoid fruits per wife  --imodium prn    Anemia  --likely of chronic disease and Fe def  --slowly downtrending since admission without signs of active bleeding  --hgb 7-->6.5-->6.0 today  --jehova's witness; does NOT accept blood products  --limit blood draws  --hold off on starting hep gtt until H/H stable  --IV EPO 10k units with HD initiated         Diet:  Diabetic, Renal. NPO at midnight  DVT PPx:  SCD  Goals of Care:  DNR      Disposition:  Pending permacath placement  Discharge Needs:  SNF, follow up with MEHREEN Feng MD  [facility] Medicine  Pager:  [phone]

## 2020-05-28 NOTE — PROGRESS NOTES
[facility] HEMODIALYSIS NOTE     Patient currently on hemodialysis for removal of uremic toxins and volume.     Patient seen and evaluated on hemodialysis, tolerating treatment, see HD flowsheet for vitals and assessments.      Ultrafiltration goal is 0     Labs have been reviewed and the dialysate bath has been adjusted.     Assessment/Plan:  Problems with RVR overnight, metoprolol increased per primary team.  -140's while on RRT today, administered his AM dose.  He remained asymptomatic.  500 cc of NS administered while on HD as patient noted to be dry on examination.  Improvement in HR to 120's, BP stable.  Continue treatment for now for metabolic clearance.  Hgb downtrending to 6.0 (does not accept blood products).  Will increase procrit to 10,000 units with each HD.  Iron stores acceptable at this time.        CAIN Amaral, FNP-BC  Nephrology  Pager:  [phone]

## 2020-05-28 NOTE — PROGRESS NOTES
[facility]  Vascular Surgery  Progress Note    Patient Name: Kodi Ibanez Sr.  MRN: [id number]  Admission Date: 5/13/2020  Primary Care Provider: Melonie Elias MD    Subjective:     Interval History: NAEO, tachycardic overnight to 130s. Reports no pain this morning.     Post-Op Info:  Procedure(s) (LRB):  Insertion,catheter,tunneled (N/A)           Medications:  Continuous Infusions:  Scheduled Meds:   sodium chloride 0.9%   Intravenous Once    sodium chloride 0.9%   Intravenous Once    sodium chloride 0.9%   Intravenous Once    sodium chloride 0.9%   Intravenous Once    aspirin  81 mg Oral Daily    atorvastatin  80 mg Oral QHS    clopidogreL  75 mg Oral Daily    diltiaZEM  60 mg Oral Q6H    epoetin megan-epbx  4,000 Units Intravenous Every Tues, Thurs, Sat    fluconazole  200 mg Oral Daily    heparin (porcine)  5,000 Units Subcutaneous Q8H    insulin aspart U-100  3 Units Subcutaneous TIDWM    insulin detemir U-100  15 Units Subcutaneous Daily    levoFLOXacin  750 mg Oral Daily    metoprolol tartrate  25 mg Oral BID    sevelamer carbonate  800 mg Oral TID WM     PRN Meds:sodium chloride 0.9%, Dextrose 10% Bolus, Dextrose 10% Bolus, glucagon (human recombinant), glucose, glucose, hydrOXYzine HCL, insulin aspart U-100, sodium chloride 0.9%     Objective:     Vital Signs (Most Recent):  Temp: 97.2 °F (36.2 °C) (05/28/20 0459)  Pulse: (!) 112 (05/28/20 0459)  Resp: 18 (05/28/20 0459)  BP: 118/68 (05/28/20 0558)  SpO2: 95 % (05/28/20 0459) Vital Signs (24h Range):  Temp:  [97.2 °F (36.2 °C)-99.5 °F (37.5 °C)] 97.2 °F (36.2 °C)  Pulse:  [] 112  Resp:  [18-20] 18  SpO2:  [94 %-98 %] 95 %  BP: (109-149)/(59-88) 118/68         Physical Exam   Constitutional: He appears well-developed and well-nourished.   Cardiovascular: Normal rate and intact distal pulses.   Pulmonary/Chest: Effort normal. No respiratory distress.   Skin: Skin is warm and dry.   Nursing note and vitals  reviewed.      Significant Labs:  BMP:   Recent Labs   Lab 05/27/20  0535   *      K 4.6      CO2 25   BUN 32*   CREATININE 4.2*   CALCIUM 7.6*   MG 2.1     CBC:   Recent Labs   Lab 05/27/20  0535   WBC 8.13   RBC 2.38*   HGB 6.5*   HCT 20.7*      MCV 87   MCH 27.3   MCHC 31.4*     CMP:   Recent Labs   Lab 05/27/20  0535   *   CALCIUM 7.6*      K 4.6   CO2 25      BUN 32*   CREATININE 4.2*       Significant Diagnostics:  I have reviewed all pertinent imaging results/findings within the past 24 hours.    Assessment/Plan:       ESRD on hemodialysis  60 y.o. Male with  CHF, CAD, NSTEMI, DM, CVA, ESRD on HD admitted with sepsis 2/2 fungemia. Permacath removed last week now in need of permacath replacement. Has been NPO since midnight, Elliquis held since the morning of 5/26.     - Discussing timing of permacath placement in cath lab  - Will continue to follow       Gwen Smalls MD  Vascular Surgery  [facility]

## 2020-05-28 NOTE — PROGRESS NOTES
Therapy with vancomycin complete and consult discontinued by provider.  Pharmacy will sign off, please re-consult as needed.    Verona Zee, PharmD  Pharmacy Resident  [phone]

## 2020-05-28 NOTE — ASSESSMENT & PLAN NOTE
60 y.o. Male with  CHF, CAD, NSTEMI, DM, CVA, ESRD on HD admitted with sepsis 2/2 fungemia. Permacath removed last week now in need of permacath replacement. Has been NPO since midnight, Elliquis held since the morning of 5/26.     - Discussing timing of permacath placement in cath lab  - Will continue to follow

## 2020-05-28 NOTE — CARE UPDATE
Rapid Response Nurse Chart Check     Chart check completed, abnormal VS noted.  -140, sustaining.   Pt currently in dialysis.   Metoprolol 50mg PO BID sent to dialysis to administer as scheduled.   POSS RNAyse contacted, no concerns verbalized at this time.   RN instructed to call [phone] for further concerns or assistance.

## 2020-05-28 NOTE — PROGRESS NOTES
Report received JUDITH Dickson. Pt arrived from floor via stretcher. Maintenance dialysis initiated via [facility] cvc BFR@350.

## 2020-05-28 NOTE — SUBJECTIVE & OBJECTIVE
Medications:  Continuous Infusions:  Scheduled Meds:   sodium chloride 0.9%   Intravenous Once    sodium chloride 0.9%   Intravenous Once    sodium chloride 0.9%   Intravenous Once    sodium chloride 0.9%   Intravenous Once    aspirin  81 mg Oral Daily    atorvastatin  80 mg Oral QHS    clopidogreL  75 mg Oral Daily    diltiaZEM  60 mg Oral Q6H    epoetin megan-epbx  4,000 Units Intravenous Every Tues, Thurs, Sat    fluconazole  200 mg Oral Daily    heparin (porcine)  5,000 Units Subcutaneous Q8H    insulin aspart U-100  3 Units Subcutaneous TIDWM    insulin detemir U-100  15 Units Subcutaneous Daily    levoFLOXacin  750 mg Oral Daily    metoprolol tartrate  25 mg Oral BID    sevelamer carbonate  800 mg Oral TID WM     PRN Meds:sodium chloride 0.9%, Dextrose 10% Bolus, Dextrose 10% Bolus, glucagon (human recombinant), glucose, glucose, hydrOXYzine HCL, insulin aspart U-100, sodium chloride 0.9%     Objective:     Vital Signs (Most Recent):  Temp: 97.2 °F (36.2 °C) (05/28/20 0459)  Pulse: (!) 112 (05/28/20 0459)  Resp: 18 (05/28/20 0459)  BP: 118/68 (05/28/20 0558)  SpO2: 95 % (05/28/20 0459) Vital Signs (24h Range):  Temp:  [97.2 °F (36.2 °C)-99.5 °F (37.5 °C)] 97.2 °F (36.2 °C)  Pulse:  [] 112  Resp:  [18-20] 18  SpO2:  [94 %-98 %] 95 %  BP: (109-149)/(59-88) 118/68         Physical Exam   Constitutional: He appears well-developed and well-nourished.   Cardiovascular: Normal rate and intact distal pulses.   Pulmonary/Chest: Effort normal. No respiratory distress.   Skin: Skin is warm and dry.   Nursing note and vitals reviewed.      Significant Labs:  BMP:   Recent Labs   Lab 05/27/20  0535   *      K 4.6      CO2 25   BUN 32*   CREATININE 4.2*   CALCIUM 7.6*   MG 2.1     CBC:   Recent Labs   Lab 05/27/20  0535   WBC 8.13   RBC 2.38*   HGB 6.5*   HCT 20.7*      MCV 87   MCH 27.3   MCHC 31.4*     CMP:   Recent Labs   Lab 05/27/20  0535   *   CALCIUM 7.6*   NA  138   K 4.6   CO2 25      BUN 32*   CREATININE 4.2*       Significant Diagnostics:  I have reviewed all pertinent imaging results/findings within the past 24 hours.

## 2020-05-28 NOTE — PROGRESS NOTES
Bed alarm went off when arrived to room patient was on the floor next to bed. NADN. When asked patient what happened and do he have any pain or injuries. He stated he got to close to the edge of the bed, and that he hit  his right arm, yet denies any pain. He states he feels ok.  Patient denies hitting his head or changes in LOC. Assessment done full AROM in all  Extremities. VSS Oriented at baseline. MD on call notified of fall. No new orders place. Will continue to monitor patient for any change in LOC. Wife notified.

## 2020-05-28 NOTE — PROGRESS NOTES
Pharmacokinetic Assessment Follow Up: IV Vancomycin  · 5/28 trough resulted at 16.6 mcg/mL  · Within goal of 15-20 mcg/mL  · Pt with hx ESRD w/ iHD TTHSat, last session 5/28   · Last vancomycin dose: 1000 mg on 5/24 post iHD  · Administer vancomycin 500 mg x1 today  · Obtain random vancomycin concentration with AM labs on 5/30  · Re-dose based on level and HD schedule    Drug levels (last 3 results):  Recent Labs   Lab Result Units 05/26/20 0421 05/28/20  0714   Vancomycin, Random ug/mL 21.3 16.6       Pharmacy will continue to follow and monitor vancomycin.    Please contact pharmacy at extension [phone] for questions regarding this assessment.    Thank you for the consult,   Verona Zee       Patient brief summary:  Kodi Ibanez Sr. is a 60 y.o. male initiated on antimicrobial therapy with IV Vancomycin for treatment of sepsis    Drug Allergies:   Review of patient's allergies indicates:   Allergen Reactions    Reglan [metoclopramide hcl] Diarrhea    Lorazepam      Other reaction(s): heavy sedation       Actual Body Weight:   67.6 kg    Renal Function:   Estimated Creatinine Clearance: 13.6 mL/min (A) (based on SCr of 5.4 mg/dL (H)).,     Dialysis Method (if applicable):  intermittent HD    CBC (last 72 hours):  Recent Labs   Lab Result Units 05/26/20 0421 05/27/20  0535 05/28/20  0714   WBC K/uL 10.75 8.13 12.64   Hemoglobin g/dL 7.0* 6.5* 6.0*   Hematocrit % 22.5* 20.7* 19.6*   Platelets K/uL 264 242 268   Gran% % 82.6* 77.9* 83.9*   Lymph% % 7.3* 10.3* 7.1*   Mono% % 8.6 10.0 7.4   Eosinophil% % 0.7 1.0 0.9   Basophil% % 0.3 0.2 0.4   Differential Method  Automated Automated Automated       Metabolic Panel (last 72 hours):  Recent Labs   Lab Result Units 05/25/20  1528 05/26/20  0421 05/27/20  0051 05/27/20  0535 05/28/20  0714   Sodium mmol/L 140 135* 136 138 140   Potassium mmol/L 4.2 5.7* 4.9 4.6 5.6*   Chloride mmol/L 107 104 99 102 103   CO2 mmol/L 24 20* 27 25 24   Glucose mg/dL 41* 280* 488* 388*  239*   BUN, Bld mg/dL 40* 47* 30* 32* 41*   Creatinine mg/dL 5.0* 5.5* 3.8* 4.2* 5.4*   Magnesium mg/dL  --  2.4  --  2.1 2.2   Phosphorus mg/dL  --  4.5  --  3.8 5.0*       Vancomycin Administrations:  vancomycin given in the last 96 hours      No antibiotic orders with administrations found.                Microbiologic Results:  Microbiology Results (last 7 days)     Procedure Component Value Units Date/Time    Blood culture [[id number]] Collected:  05/24/20 1002    Order Status:  Completed Specimen:  Blood Updated:  05/27/20 1212     Blood Culture, Routine No Growth to date      No Growth to date      No Growth to date      No Growth to date    Blood culture [[id number]] Collected:  05/24/20 1002    Order Status:  Completed Specimen:  Blood Updated:  05/27/20 1212     Blood Culture, Routine No Growth to date      No Growth to date      No Growth to date      No Growth to date    Clostridium difficile EIA [[id number]] Collected:  05/25/20 0919    Order Status:  Completed Specimen:  Stool Updated:  05/25/20 1952     C. diff Antigen Negative     C difficile Toxins A+B, EIA Negative     Comment: Testing not recommended for children <24 months old.       Blood culture [[id number]] Collected:  05/20/20 1620    Order Status:  Completed Specimen:  Blood Updated:  05/25/20 1812     Blood Culture, Routine No growth after 5 days.    Narrative:       Collection has been rescheduled by FB at 05/20/2020 13:56 Reason:   nurse wants all labs at 4.  Collection has been rescheduled by FB at 05/20/2020 13:57 Reason:   nurse Olga.  Collection has been rescheduled by OF1 at 05/20/2020 16:07 Reason:   unable to collect second set of blood cultures nurse Espinoza notified   #[phone]  Collection has been rescheduled by FB at 05/20/2020 13:56 Reason:   nurse wants all labs at 4.  Collection has been rescheduled by FB at 05/20/2020 13:57 Reason:   nurse Espinoza.  Collection has been rescheduled by OF1 at 05/20/2020 16:07 Reason:   unable to  collect second set of blood cultures nurse Espinoza notified   #[id number]    Blood culture [[id number]] Collected:  05/20/20 1556    Order Status:  Completed Specimen:  Blood Updated:  05/25/20 1612     Blood Culture, Routine No growth after 5 days.    Narrative:       Collection has been rescheduled by FB at 05/20/2020 13:56 Reason:   nurse wants all labs at 4.  Collection has been rescheduled by FB at 05/20/2020 13:57 Reason:   nurse Espinoza.  Collection has been rescheduled by FB at 05/20/2020 13:56 Reason:   nurse wants all labs at 4.  Collection has been rescheduled by FB at 05/20/2020 13:57 Reason:   nurse Espinoza.

## 2020-05-29 NOTE — ASSESSMENT & PLAN NOTE
2/2 probable sepsis likely r/t LUE AV fistula excision. Patient also with R tunneled catheter placed 2/2020.  Patient underwent ligation of LUE fistula on 2/27/20 with Dr Lopez ([facility], vascular surgery) and had ongoing issues afterwards. Patient was admitted on 4/10 for excision of his infected LUE mid and distal AVF with cultures and specimen obtained. Surgical cultures 4/10 grew 2 different prevotella species, fusobacterium, and strep anginosus. The patient was d/c'ed with home health wound care and po augmentin/clindamycin x 10 days. On 4/20 antibiotics were changed to Flagyl 500 mg p.o. q.8 and Cefazolin after dialysis 2 g on Tuesday and Thursday, 3 g on Saturday. End date on antibiotics on 5/11.    No current leukocytosis; previously persistent C. Albicans BCx growth  Most recent BCx NGTD    --ID and opthalmology consulted   --Will continue Fluconazole for 4 weeks of therapy  -- Started Cefepime on 5/29  -- Stopped ABx on 5/30  -- Patient with improved oxygen demand and hypoxia. Is following commands. Will step down to hospital medicine from ICU today

## 2020-05-29 NOTE — RESPIRATORY THERAPY
Received patient from the floor on nasal cannula.  Patient stated he felt short of breath and would be more comfortable on his BIPAP.  Patient placed on BIPAP at documented settings.  Patient said the BIPAP is helping with his shortness of breath.  Will continue to monitor.

## 2020-05-29 NOTE — ASSESSMENT & PLAN NOTE
-->500 on admission. Goal -180  -- Continuing management with insulin aspart/detemir and adjusting as needed  --SSI  --accuchecks q4h

## 2020-05-29 NOTE — ASSESSMENT & PLAN NOTE
Patient on HD T/Th/Sat with last session  Prior to admission on 5/12, where he was admitted to the ED with palpatations after session. Patient has tunneled catheter that was inserted in 2/2020 that is being used for access.     --nephrology consulted  --HD still being completed as scheduled  -- Will continue to monitor electrolytes given previous hyperkalemia (now resolved)  --avoid nephrotoxic meds  --oliguric, strict I/o's  --BMP daily  -- Vascular surgery to complete permacath placement in future; follow up  -- Not to receive HD given lack of urgency and anemia. Will step down to hospital medicine from ICU

## 2020-05-29 NOTE — SIGNIFICANT EVENT
Rapid Response Nurse Follow-up Note     Followed up with patient for proactive rounding.   Pt to be transferred to ICU.  Pt accepted by MICU team.  Reviewed plan of care with bedside RNOlga and charge RN Michael.   Team will not continue to follow while pt is in ICU.  Please call Rapid Response RNSandi RN with any questions or concerns at [phone].

## 2020-05-29 NOTE — NURSING TRANSFER
Nursing Transfer Note      5/29/2020     Transfer From: 553A to 6074    Transfer via bed    Transfer with O2, cardiac monitoring    Transported by RRRN    Medicines sent: Yes    Chart send with patient: Yes    Upon arrival to floor: cardiac monitor applied, patient oriented to room, call bell in reach and bed in lowest position

## 2020-05-29 NOTE — PLAN OF CARE
CMICU DAILY GOALS       A: Awake    RASS: Goal - RASS Goal: 0-->alert and calm  Actual - RASS (Torres Agitation-Sedation Scale): 0-->alert and calm   Restraint necessity:    B: Breath   SBT: Pass   C: Coordinate A & B, analgesics/sedatives   Pain: managed    SAT: Pass  D: Delirium   CAM-ICU: Overall CAM-ICU: Negative  E: Early Mobility   MOVE Screen: Pass   Activity: Activity Management: activity adjusted per tolerance, activity clustered for rest period  FAS: Feeding/Nutrition   Diet order: Diet/Nutrition Received: consistent carb/diabetic diet, Specialty Diet/Nutrition Received: renal diet Fluid restriction:    T: Thrombus   DVT prophylaxis: VTE Required Core Measure: Pharmacological prophylaxis initiated/maintained  H: HOB Elevation   Head of Bed (HOB): HOB at 20-30 degrees  U: Ulcer Prophylaxis   GI: yes  G: Glucose control   managed Glycemic Management: blood glucose monitoring  S: Skin   Bundle compliance: yes   Bathing/Skin Care: dressed/undressed, incontinence care Date:   B: Bowel Function   no issues   I: Indwelling Catheters   Mayers necessity:     CVC necessity: Yes   IPAD offered: Not appropriate  D: De-escalation Antibx   Yes  Plan for the day   Monitor BP and heart rate  Family/Goals of care/Code Status   Code Status: DNR     No acute events throughout day, VS and assessment per flow sheet, patient progressing towards goals as tolerated, plan of care reviewed with Kodi Ibanez Sr. and family, all concerns addressed, will continue to monitor.

## 2020-05-29 NOTE — PROGRESS NOTES
"[facility]  Adult Nutrition  Progress Note    SUMMARY       Recommendations     Pt meets criteria for acute moderate malnutrition.    1. Continue diabetic renal diet + Novasource Renal TID as tolerated.    Encourage PO intake.    Consider liberalizing to regular if PO intake remains poor (pt consuming 0-25% of meals).     2. RD to monitor.  Goals: PO intake > 50% by RD follow up  Nutrition Goal Status: goal not met  Communication of RD Recs: (POC)    Reason for Assessment    Reason For Assessment: RD follow-up  Diagnosis: (shock)  Relevant Medical History: CHF, CAD, NSTEMI, DM, CVA, ESRD on HD  Interdisciplinary Rounds: did not attend  General Information Comments: Remote access. Pt tranfered to ICU this AM after RR. Will postpone permacath placement once medically stable. Per chart review, pt continues with poor po intake. NFPE completed 5/22. Pt with mild/moderate muscle/fat wasting and continues to meet criteria for acute moderate malnutrition.  Nutrition Discharge Planning: Adequate PO intake on renal diet    Nutrition Risk Screen    Nutrition Risk Screen: no indicators present    Nutrition/Diet History    Spiritual, Cultural Beliefs, Advent Practices, Values that Affect Care: no  Factors Affecting Nutritional Intake: decreased appetite    Anthropometrics    Temp: 98.1 °F (36.7 °C)  Height Method: Stated  Height: 5' 11" (180.3 cm)  Height (inches): 71 in  Weight Method: Bed Scale  Weight: 67.6 kg (149 lb)  Weight (lb): 149 lb  Ideal Body Weight (IBW), Male: 172 lb  % Ideal Body Weight, Male (lb): 86.63 %  BMI (Calculated): 20.8     Lab/Procedures/Meds    Pertinent Labs Reviewed: reviewed  Pertinent Labs Comments: BUN 31, Cr 3.8, GFR 18.5, glucose 296, alk phos 219, BNP 4203  Pertinent Medications Reviewed: reviewed  Pertinent Medications Comments: aspirin, statin, insulin, metoprolol    Estimated/Assessed Needs    Weight Used For Calorie Calculations: 67.6 kg (149 lb 0.5 oz)  Energy " Calorie Requirements (kcal): 1873 kcal/day  Energy Need Method: [place]-St Jeor(x 1.25 PAL)  Protein Requirements: 81-88 g/day(1.2-1.3 g/kg)  Weight Used For Protein Calculations: 67.6 kg (149 lb 0.5 oz)  Fluid Requirements (mL): per MD or 1 mL/kcal     RDA Method (mL): 1873  CHO Requirement: 234g/day    Nutrition Prescription Ordered    Current Diet Order: Diabetic renal  Oral Nutrition Supplement: [facility]    Evaluation of Received Nutrient/Fluid Intake    I/O: +3.717L since 5/15  Comments: LBM 5/28  Tolerance: tolerating  % Intake of Estimated Energy Needs: 0 - 25 %  % Meal Intake: 0 - 25 %    Nutrition Risk    Level of Risk/Frequency of Follow-up: low(1x/week)     Assessment and Plan    Moderate malnutrition  Nutrition Problem:  Moderate Protein-Calorie Malnutrition  Malnutrition in the context of Acute Illness/Injury     Related to (etiology):  Inadequate energy intake 2/2 decreased appetite     Signs and Symptoms (as evidenced by):  Energy Intake: <50% of estimated energy requirement for > 1 week, < 75% intake x 2-3 weeks PTA per chart  Body Fat Depletion: mild and moderate depletion of orbitals and triceps   Muscle Mass Depletion: mild, moderate and severe depletion of temples, clavicle region, interosseous muscle and lower extremities      Interventions (treatment strategy):  Collaboration of nutrition care with other [facility]  [employer]      Nutrition Diagnosis Status:  Continues            Monitor and Evaluation    Food and Nutrient Intake: energy intake, food and beverage intake  Food and Nutrient Adminstration: diet order  Knowledge/Beliefs/Attitudes: food and nutrition knowledge/skill  Anthropometric Measurements: weight, weight change, body mass index  Biochemical Data, Medical Tests and Procedures: electrolyte and renal panel, gastrointestinal profile, glucose/endocrine profile, inflammatory profile, lipid profile  Nutrition-Focused Physical Findings: overall appearance      Malnutrition Assessment  Malnutrition Type: acute illness or injury  Energy Intake: moderate energy intake          Energy Intake (Malnutrition): less than or equal to 50% for greater than or equal to 5 days   Orbital Region (Subcutaneous Fat Loss): mild depletion  Upper Arm Region (Subcutaneous Fat Loss): moderate depletion   Yarsanism Region (Muscle Loss): mild depletion  Clavicle Bone Region (Muscle Loss): mild depletion  Dorsal Hand (Muscle Loss): mild depletion  Anterior Thigh Region (Muscle Loss): severe depletion  Posterior Calf Region (Muscle Loss): moderate depletion                 Nutrition Follow-Up    RD Follow-up?: Yes

## 2020-05-29 NOTE — CARE UPDATE
"RAPID RESPONSE NURSE PROACTIVE ROUNDING NOTE     Time of Visit: 712    Admit Date: 2020  LOS: 16  Code Status: DNR   Date of Visit: 2020  : 1960  Age: 60 y.o.  Sex: male  Race: Black or   Bed: [address] [place] [address] A:   MRN: [id number]  Was the patient discharged from an ICU this admission? yes   Was the patient discharged from a PACU within last 24 hours?  no  Did the patient receive conscious sedation/general anesthesia in last 24 hours?  no  Was the patient in the ED within the past 24 hours?  no  Was the patient started on NIPPV within the past 24 hours?  yes  Attending Physician: Unruly Feng MD  Primary Service: [facility] MED     ASSESSMENT     Notified by charge RN via phone call.  Reason for alert: Administer IV diltiazem    Diagnosis: Shock    Abnormal Vital Signs: BP (!) 116/56 (BP Location: Left arm)   Pulse (!) 138   Temp 97.7 °F (36.5 °C) (Axillary)   Resp (!) 28   Ht 5' 11" (1.803 m)   Wt 67.6 kg (149 lb)   SpO2 95%   BMI 20.78 kg/m²      Clinical Issues: Circulatory    Patient  has a past medical history of Atherosclerosis of aorta, Blood transfusion, Cataracts, bilateral, CHF (congestive heart failure), Clotting disorder, Cognitive deficits as late effect of cerebrovascular disease, Coronary artery disease, Decreased appetite, DM (diabetes mellitus), type 2 with renal complications, ESRD on hemodialysis, Hyperlipidemia, Hyperparathyroidism, unspecified, Hypertension, Prostate cancer, Seizures, Stroke, and Unsteady gait.      Called to administer IV diltiazem.   Pt resting comfortably in bed.   . BP 96/55. Dr. Feng notified of pt's BP.   Ordered not to administer IV diltiazem due to soft BP.   Instructed to consult ICU.   Dr. West w/ MICU notified of pt's elevated HR & soft BP.   States will evaluate pt.      INTERVENTIONS/ RECOMMENDATIONS     Monitor VS closely. Pt in NAD at this time. Awaiting ICU evaluation.     Discussed plan of care with RNOlga, " [id number].    PHYSICIAN ESCALATION     Yes/No  yes    Orders received and case discussed with Dr. Feng & Dr. West.    Disposition: Remain in room 553A pending ICU evaluation.    FOLLOW-UP     Call back the Rapid Response Nurse, Sandi Hernandez RN at [phone] for additional questions or concerns.

## 2020-05-29 NOTE — PLAN OF CARE
05/28/20 0814   Discharge Reassessment   Assessment Type Discharge Planning Reassessment   Provided patient/caregiver education on the expected discharge date and the discharge plan Yes   Do you have any problems affording any of your prescribed medications? No   Discharge Plan A Skilled Nursing Facility  (pt wife first choice [facility], they have accepted pt.)   Discharge Plan B Skilled Nursing Facility  (pt. wife second choice [facility], they also have accepted pt.)   DME Needed Upon Discharge  none   Anticipated Discharge Disposition SNF   Post-Acute Status   Post-Acute Authorization Placement   Post-Acute Placement Status Awaiting Internal Medical Clearance

## 2020-05-29 NOTE — PROGRESS NOTES
[facility]  Critical Care Medicine  Progress Note    Patient Name: Kodi Ibanez Sr.  MRN: [id number]  Admission Date: 5/13/2020  Hospital Length of Stay: 16 days  Code Status: DNR  Attending Provider: Willis Polo MD  Primary Care Provider: Melonie Elias MD   Principal Problem: Shock    Subjective:     HPI:  59 y/o male with past medical history of CHF, CAD with recent NSTEMI [~4/29] (CABG 2009, FERCHO placed in 12/2019), diabetes, CVA, ESRD on dialysis Tuesday/Thursday/Saturday (last dialyzed morning of 5/12) presents to the ER by referral from infectious disease clinic after virtual visit today. The ID provider was concerned about the patients AMS and recent elevated WBC count. Recently,atzenon underwent ligation of LUE fistula on 2/27/20 with Dr Lopez ([facility], vascular surgery) and had ongoing issues afterwards. Patient was admitted on 4/10 for excision of his infected LUE mid and distal AVF with cultures and specimen obtained. Surgical cultures 4/10 grew 2 different prevotella species, fusobacterium, and strep anginosus. The patient was d/c'ed with home health wound care and po augmentin/clindamycin x 10 days. On 4/20 antibiotics were changed to Flagyl 500 mg p.o. q.8 and Cefazolin after dialysis 2 g on Tuesday and Thursday, 3 g on Saturday. End date on antibiotics on 5/11.     Following these events, on 4/29 the patient was admitted for an NSTEMI, a LHC was performed where re-stenosis of a previous stent was found and balloon angioplasty was done. He was discharged on 5/2. He is now on ASA/plavix, but per wife has not been compliant.     Patients wife says that he has had poor PO intake for the last 2-3 weeks, has become progressively weak, and developed altered mental status today. Patient had 3 episodes of vomiting within the last 24 hours, which appeared brown-non bloody. He had a brown watery bowel movement today. Patient normally oliguric (urinating ~2-3 x per  week). [place] [facility] [place] of [place]12 due to palpitations after his normal dialysis and sent home after evaluation, for possible antibiotic side effect.     He presents to the ED with encephalopathy x 1 day, leukocytosis of 15, lactate 2.7, troponin 0.482, , procal 10.98, Cr 6.5, BUN 41, and hyperglycemia. Patient pan-cultured, currently only UA resulted with large amount of bacteria. Patient requiring high dose Levophed on admission. Broad spectrum antibiotics initiated. 1.5 L IVF given. Patient remains on RA. Wife was updated regarding patient's current status. Patient spoken to regarding code status and verbalized his wishes as a DNR. His wife confirmed he has an LAPost but it is not on file. Instructed to bring in.    Hospital/ICU Course:  The patient was admitted to MICU for shock likely 2/2 to sepsis on 5/13. Patient remains on low dose Levophed. Nephrology consulted for possible dialysis. Blood cx NGTD. Patient remains on Heparin gtt for paroxysmal A fib with CHADS Vasc score of 6. Previously hyperglycemic with BG now well controlled.    5/14: Levo stopped in morning. Stable all day off pressors on room air. Zosyn changed to unasyn.     5/15: Patient spiked new temp, vanc restarted. Patient with intermittent SVT- Home coreg and dilt restarted. Step down orders placed but patient didn't get bed    5/17: Hypotension overnight requiring vasopressors. Transferred back to MICU on norepi    5/18: Norepi stopped yesterday. BP stable. Restarted home dilt. Will step down to the floor today.     5/19: Patient remained in ICU over night due to SVT requiring diltiazem drip. Will wean diltiazem drip today and transition to po meds. HD ordered for today.     5:29: Patient admitted to ICU from floor following ongoing nonsustained VT with associated hypotension. Patient was also less alert although was answering some questions appropriately. He received 2 separate doses of Lopressor IV 5mg  as well as valsalva without appropriate response. Was noted to have R lower lobe rales on exam. Admitted for HD instability/control in the setting of ongoing and uncontrolled sepsis with afib and increased oxygen demands.    Interval History/Significant Events: Patient admitted to ICU from floor following ongoing nonsustained VT with associated hypotension. Patient was also less alert although was answering some questions appropriately. He received 2 separate doses of Lopressor IV 5mg as well as valsalva without appropriate response. Was noted to have R lower lobe rales on exam. Admitted for HD instability/control in the setting of ongoing and uncontrolled sepsis with afib and increased oxygen demands.    Review of Systems   Constitutional: Negative for chills and fever.   HENT: Negative for rhinorrhea, sneezing and sore throat.    Eyes: Negative for redness.   Respiratory: Positive for shortness of breath. Negative for wheezing.    Cardiovascular: Negative for chest pain and palpitations.   Gastrointestinal: Negative for nausea and vomiting.   Musculoskeletal: Negative for joint swelling and neck stiffness.   Skin: Negative for pallor and rash.   Neurological: Negative for dizziness, light-headedness and headaches.   Psychiatric/Behavioral: Positive for confusion and decreased concentration.     Objective:     Vital Signs (Most Recent):  Temp: 98.1 °F (36.7 °C) (05/29/20 0804)  Pulse: (!) 130 (05/29/20 1100)  Resp: (!) 21 (05/29/20 1100)  BP: (!) 88/57 (05/29/20 1000)  SpO2: 100 % (05/29/20 1100) Vital Signs (24h Range):  Temp:  [97.2 °F (36.2 °C)-98.2 °F (36.8 °C)] 98.1 °F (36.7 °C)  Pulse:  [] 130  Resp:  [16-28] 21  SpO2:  [91 %-100 %] 100 %  BP: ()/(52-68) 88/57   Weight: 67.6 kg (149 lb)  Body mass index is 20.78 kg/m².      Intake/Output Summary (Last 24 hours) at 5/29/2020 1118  Last data filed at 5/29/2020 0600  Gross per 24 hour   Intake 1000 ml   Output 547 ml   Net 453 ml       Physical Exam    Constitutional: He appears well-developed and well-nourished.   HENT:   Head: Normocephalic and atraumatic.   Eyes: Pupils are equal, round, and reactive to light. Conjunctivae are normal. No scleral icterus.   Neck: Normal range of motion. No JVD present.   Cardiovascular: Intact distal pulses. Exam reveals no friction rub.   No murmur heard.  tqchycardia with intermittant SVT   Pulmonary/Chest: He has no wheezes. He has rales.   Increased o2 demand   Abdominal: Soft. He exhibits no distension. There is no tenderness. There is no rebound.   Musculoskeletal: Normal range of motion. He exhibits no edema.   Neurological: He is alert. No cranial nerve deficit.   Skin: Skin is warm and dry. He is not diaphoretic.       Vents:  Oxygen Concentration (%): 6 (05/29/20 1100)  Lines/Drains/Airways     Central Venous Catheter Line                 Hemodialysis Catheter 05/24/20 0050 5 days          Peripheral Intravenous Line                 Peripheral IV - Single Lumen 05/28/20 1635 20 G;1 3/4 in Right Forearm less than 1 day              Significant Labs:    CBC/Anemia Profile:  Recent Labs   Lab 05/28/20  0714 05/29/20 0248   WBC 12.64 7.79   HGB 6.0* 7.4*   HCT 19.6* 23.2*    201   MCV 87 84   RDW 17.2* 16.6*        Chemistries:  Recent Labs   Lab 05/28/20  0714 05/29/20 0248    136   K 5.6* 4.7    102   CO2 24 22*   BUN 41* 31*   CREATININE 5.4* 3.8*   CALCIUM 7.8* 7.6*   ALBUMIN  --  1.4*   PROT  --  6.5   BILITOT  --  0.5   ALKPHOS  --  219*   ALT  --  21   AST  --  37   MG 2.2 2.1   PHOS 5.0* 4.2       Blood Culture: No results for input(s): LABBLOO in the last 48 hours.  BMP:   Recent Labs   Lab 05/29/20 0248   *      K 4.7      CO2 22*   BUN 31*   CREATININE 3.8*   CALCIUM 7.6*   MG 2.1     CMP:   Recent Labs   Lab 05/28/20  0714 05/29/20 0248    136   K 5.6* 4.7    102   CO2 24 22*   * 296*   BUN 41* 31*   CREATININE 5.4* 3.8*   CALCIUM 7.8* 7.6*   PROT   --  6.5   ALBUMIN  --  1.4*   BILITOT  --  0.5   ALKPHOS  --  219*   AST  --  37   ALT  --  21   ANIONGAP 13 12   EGFRNONAA 10.5* 16.0*     Lactic Acid: No results for input(s): LACTATE in the last 48 hours.  POCT Glucose:   Recent Labs   Lab 05/28/20  1802 05/28/20  2220 05/29/20  0942   POCTGLUCOSE 186* 274* 219*     Troponin: No results for input(s): TROPONINI in the last 48 hours.  All pertinent labs within the past 24 hours have been reviewed.    Significant Imaging:  I have reviewed all pertinent imaging results/findings within the past 24 hours.      ABG  Recent Labs   Lab 05/29/20  0654   PH 7.487*   PO2 120*   PCO2 30.5*   HCO3 23.1*   BE 0     Assessment/Plan:     Cardiac/Vascular  Atrial fibrillation  --patient on diltiazem for rate control; restarted home dose   --on ASA/plavix  -- Previously holding AC given possible procedure; will re-evaluate with possible readministration  --Chads Vasc score 6 points      NSTEMI (non-ST elevated myocardial infarction)  Hx of CABG in 2009 and placement of FERCHO in 12/2019. Patient recenetly admitted on 4/29 and d/c'ed 5/2, admitted for an NSTEMI, a LHC was performed where re-stenosis of a previous stent was found and balloon angioplasty was done. He is now on ASA/plavix, but per wife has not been compliant.    --stat EKG  --troponin now down-trending; stopped trending  --continue ASA/plavix (home meds)  --echo pending    Essential hypertension  Currently holding home lopressor and coreg    Continuing dilt 30 q6h and lopressor 50 BID    SVT (supraventricular tachycardia)  --ECG showed SVT with -150'2; Reported nonsustained Vtach overnight - not responsive to metoprolol or valsalva  -- Currently on dilt and metoprolol scheduled  -- Decreased Dilt to 30 recently  -- Will continue to monitor patients rate, rhythm and BP given recent instability and diltiazem scheduling/dose      History of MI (myocardial infarction)  Currently on ASA and Plavix and atorvastatin    HLD  (hyperlipidemia)  --statin for secondary prevention    Renal/  ESRD on hemodialysis  Patient on HD T/Th/Sat with last session  Prior to admission on 5/12, where he was admitted to the ED with palpatations after session. Patient has tunneled catheter that was inserted in 2/2020 that is being used for access.     --nephrology consulted  --HD still being completed as scheduled  -- Will continue to monitor electrolytes given previous hyperkalemia (now resolved)  --avoid nephrotoxic meds  --oliguric, strict I/o's  --BMP daily  -- Vascular surgery consulted regarding new line placement; currently pending HD stability and control of irregular rhythm; will follow up     ID  Candidemia  --optho and ID consulted  --continue fluconazole for 4 weeks of therapy from line removal  -- Possible line change with vascular surgery pending HD improvement    AV fistula infection  --see shock  --continue fluconazole  --ID and opthalmology consulted     Endocrine  Type 2 diabetes, uncontrolled, with neuropathy  -->500 on admission. Goal -180  -- Continuing management with insulin aspart/detemir and adjusting as needed  --SSI  --accuchecks q4h        Other  * Shock  2/2 probable sepsis likely r/t LUE AV fistula excision. Patient also with R tunneled catheter placed 2/2020.  Patient underwent ligation of LUE fistula on 2/27/20 with Dr Lopez ([facility], vascular surgery) and had ongoing issues afterwards. Patient was admitted on 4/10 for excision of his infected LUE mid and distal AVF with cultures and specimen obtained. Surgical cultures 4/10 grew 2 different prevotella species, fusobacterium, and strep anginosus. The patient was d/c'ed with home health wound care and po augmentin/clindamycin x 10 days. On 4/20 antibiotics were changed to Flagyl 500 mg p.o. q.8 and Cefazolin after dialysis 2 g on Tuesday and Thursday, 3 g on Saturday. End date on antibiotics on 5/11.    No current leukocytosis; previously persistent C. Albicans  BCx growth  Most recent BCx NGTD    --ID and opthalmology consulted   --Will continue Fluconazole for 4 weeks of therapy  -- Starting Cefepime on 5/29  -- Will continue to monitor BP with possible pressor support if needed  -- Respiratory Cx ordered; will follow up       Critical Care Daily Checklist:    A: Awake: RASS Goal/Actual Goal: RASS Goal: 0-->alert and calm  Actual: Torres Agitation Sedation Scale (RASS): Alert and calm   B: Spontaneous Breathing Trial Performed?     C: SAT & SBT Coordinated?  n/a                      D: Delirium: CAM-ICU Overall CAM-ICU: Negative   E: Early Mobility Performed? No   F: Feeding Goal: Goals: PO intake > 50% by RD follow up  Status: Nutrition Goal Status: goal not met   Current Diet Order   Procedures    Diet NPO      AS: Analgesia/Sedation no   T: Thromboembolic Prophylaxis Heparin drip   H: HOB > 300 Yes   U: Stress Ulcer Prophylaxis (if needed) no   G: Glucose Control yes   B: Bowel Function Stool Occurrence: 1   I: Indwelling Catheter (Lines & Mayers) Necessity    D: De-escalation of Antimicrobials/Pharmacotherapies Fluconazole and cefepime    Plan for the day/ETD     Code Status:  Family/Goals of Care: DNR         Critical secondary to Patient has a condition that poses threat to life and bodily function: Severe Respiratory Distress      Critical care was time spent personally by me on the following activities: development of treatment plan with patient or surrogate and bedside caregivers, discussions with consultants, evaluation of patient's response to treatment, examination of patient, ordering and performing treatments and interventions, ordering and review of laboratory studies, ordering and review of radiographic studies, pulse oximetry, re-evaluation of patient's condition. This critical care time did not overlap with that of any other provider or involve time for any procedures.     Reece Lawson MD  Critical Care Medicine  [facility]

## 2020-05-29 NOTE — ASSESSMENT & PLAN NOTE
--ECG showed SVT with -150'2; Reported nonsustained Vtach overnight - not responsive to metoprolol or valsalva  -- Currently on dilt and metoprolol scheduled  -- Decreased Dilt to 30 recently  -- Will continue to monitor patients rate, rhythm and BP given recent instability and diltiazem scheduling/dose

## 2020-05-29 NOTE — PROGRESS NOTES
Pharmacokinetic Initial Assessment: IV Vancomycin    Assessment/Plan:    - Patient previously on vancomycin during admission.  Random level from morning labs today resulted as 12.7 mg/dl.  Goal 15-20 mg/dl.   - Pt with hx of ESRD on iHD TThSat, last HD session on 5/28. Nephrology is not planning for iHD today.   - Administer vancomycin 500 mg x 1 dose.   - A random level is ordered with morning labs tomorrow.  Pharmacy to re-dose based on level and RRT plans.     Pharmacy will continue to follow and monitor vancomycin.      Please contact pharmacy at extension [phone] with any questions regarding this assessment.     Thank you for the consult,   Ketty Hollis, PharmD, BCCCP       Patient brief summary:  Kodi Ibanez Sr. is a 60 y.o. male initiated on antimicrobial therapy with IV Vancomycin for treatment of suspected lower respiratory infection.     Drug Allergies:   Review of patient's allergies indicates:   Allergen Reactions    Reglan [metoclopramide hcl] Diarrhea    Lorazepam      Other reaction(s): heavy sedation       Actual Body Weight:   67.6 kg    Renal Function:   Estimated Creatinine Clearance: 19.3 mL/min (A) (based on SCr of 3.8 mg/dL (H)).,     Dialysis Method (if applicable):  No RRT today    CBC (last 72 hours):  Recent Labs   Lab Result Units 05/27/20  0535 05/28/20  0714 05/29/20  0248   WBC K/uL 8.13 12.64 7.79   Hemoglobin g/dL 6.5* 6.0* 7.4*   Hematocrit % 20.7* 19.6* 23.2*   Platelets K/uL 242 268 201   Gran% % 77.9* 83.9* 76.4*   Lymph% % 10.3* 7.1* 10.8*   Mono% % 10.0 7.4 10.1   Eosinophil% % 1.0 0.9 1.0   Basophil% % 0.2 0.4 0.8   Differential Method  Automated Automated Automated       Metabolic Panel (last 72 hours):  Recent Labs   Lab Result Units 05/27/20  0051 05/27/20  0535 05/28/20  0714 05/29/20  0248   Sodium mmol/L 136 138 140 136   Potassium mmol/L 4.9 4.6 5.6* 4.7   Chloride mmol/L 99 102 103 102   CO2 mmol/L 27 25 24 22*   Glucose mg/dL 488* 388* 239* 296*   BUN, Bld  mg/dL 30* 32* 41* 31*   Creatinine mg/dL 3.8* 4.2* 5.4* 3.8*   Albumin g/dL  --   --   --  1.4*   Total Bilirubin mg/dL  --   --   --  0.5   Alkaline Phosphatase U/L  --   --   --  219*   AST U/L  --   --   --  37   ALT U/L  --   --   --  21   Magnesium mg/dL  --  2.1 2.2 2.1   Phosphorus mg/dL  --  3.8 5.0* 4.2       Drug levels (last 3 results):  Recent Labs   Lab Result Units 05/28/20  0714 05/29/20  0248   Vancomycin, Random ug/mL 16.6 12.7       Microbiologic Results:  Microbiology Results (last 7 days)     Procedure Component Value Units Date/Time    Blood culture [[id number]] Collected:  05/24/20 1002    Order Status:  Completed Specimen:  Blood Updated:  05/29/20 1212     Blood Culture, Routine No growth after 5 days.    Blood culture [[id number]] Collected:  05/24/20 1002    Order Status:  Completed Specimen:  Blood Updated:  05/29/20 1212     Blood Culture, Routine No growth after 5 days.    Culture, Respiratory with Gram Stain [[id number]]     Order Status:  No result Specimen:  Respiratory     Clostridium difficile EIA [[id number]] Collected:  05/25/20 0919    Order Status:  Completed Specimen:  Stool Updated:  05/25/20 1952     C. diff Antigen Negative     C difficile Toxins A+B, EIA Negative     Comment: Testing not recommended for children <24 months old.       Blood culture [[id number]] Collected:  05/20/20 1620    Order Status:  Completed Specimen:  Blood Updated:  05/25/20 1812     Blood Culture, Routine No growth after 5 days.    Narrative:       Collection has been rescheduled by FB at 05/20/2020 13:56 Reason:   nurse wants all labs at 4.  Collection has been rescheduled by FB at 05/20/2020 13:57 Reason:   nurse Espinoza.  Collection has been rescheduled by OF1 at 05/20/2020 16:07 Reason:   unable to collect second set of blood cultures nurse Olga notified   #[phone]  Collection has been rescheduled by FB at 05/20/2020 13:56 Reason:   nurse wants all labs at 4.  Collection has been rescheduled by FB at  05/20/2020 13:57 Reason:   nurse Olga.  Collection has been rescheduled by OF1 at 05/20/2020 16:07 Reason:   unable to collect second set of blood cultures nurse Olga notified   #[phone]    Blood culture [[id number]] Collected:  05/20/20 1556    Order Status:  Completed Specimen:  Blood Updated:  05/25/20 1612     Blood Culture, Routine No growth after 5 days.    Narrative:       Collection has been rescheduled by FB at 05/20/2020 13:56 Reason:   nurse wants all labs at 4.  Collection has been rescheduled by FB at 05/20/2020 13:57 Reason:   nurse Olga.  Collection has been rescheduled by FB at 05/20/2020 13:56 Reason:   nurse wants all labs at 4.  Collection has been rescheduled by FB at 05/20/2020 13:57 Reason:   nurse Espinoza.

## 2020-05-29 NOTE — RESPIRATORY THERAPY
0529 ABG results:  PH          7.52  CO2       27.9  PaO2     62  BE           0  HCO3     22.8  SpO2 94%    PALOMO Yeboah MD notified of results.

## 2020-05-29 NOTE — PROGRESS NOTES
[facility]  Vascular Surgery  Progress Note    Patient Name: Kodi Ibanez Sr.  MRN: [id number]  Admission Date: 5/13/2020  Primary Care Provider: Melonie Elias MD    Subjective:     Interval History: Uncontrolled afib w/ RVR overnight. Worsening resp status (on BIPAP this AM)  Somnolent    Post-Op Info:  Procedure(s) (LRB):  Insertion, Catheter, Central Venous, Hemodialysis (N/A)   1 Day Post-Op       Medications:  Continuous Infusions:   heparin (porcine)       Scheduled Meds:   aspirin  81 mg Oral Daily    atorvastatin  80 mg Oral QHS    clopidogreL  75 mg Oral Daily    diltiaZEM  5 mg Intravenous Once    diltiaZEM  60 mg Oral Q6H    epoetin megan-epbx  10,000 Units Intravenous Every Tues, Thurs, Sat    fluconazole  200 mg Oral Daily    heparin (porcine)  5,000 Units Subcutaneous Q8H    insulin aspart U-100  3 Units Subcutaneous TIDWM    insulin detemir U-100  15 Units Subcutaneous Daily    metoprolol tartrate  50 mg Oral BID    piperacillin-tazobactam (ZOSYN) IVPB  4.5 g Intravenous Once    sevelamer carbonate  800 mg Oral TID WM     PRN Meds:sodium chloride 0.9%, Dextrose 10% Bolus, Dextrose 10% Bolus, glucagon (human recombinant), glucose, glucose, heparin (porcine), hydrOXYzine HCL, insulin aspart U-100, sodium chloride 0.9%     Objective:     Vital Signs (Most Recent):  Temp: 98.1 °F (36.7 °C) (05/29/20 0804)  Pulse: (!) 134 (05/29/20 0808)  Resp: 19 (05/29/20 0808)  BP: (!) 86/60 (05/29/20 0808)  SpO2: 99 % (05/29/20 0808) Vital Signs (24h Range):  Temp:  [97.2 °F (36.2 °C)-98.2 °F (36.8 °C)] 98.1 °F (36.7 °C)  Pulse:  [] 134  Resp:  [16-28] 19  SpO2:  [91 %-100 %] 99 %  BP: ()/(52-77) 86/60         Physical Exam   Constitutional: He is oriented to person, place, and time. He appears well-developed and well-nourished.   HENT:   Head: Normocephalic and atraumatic.   Eyes: Pupils are equal, round, and reactive to light. EOM are normal.   Neck: Normal range of  motion.   LIJ trialysis catheter in place   Cardiovascular: Normal rate and intact distal pulses.   Pulmonary/Chest: Effort normal. No respiratory distress.   Abdominal: Soft. There is no tenderness.   Musculoskeletal: Normal range of motion. He exhibits no deformity.   Neurological: He is alert and oriented to person, place, and time.   Skin: Skin is warm and dry.   Psychiatric: He has a normal mood and affect. His behavior is normal.   Nursing note and vitals reviewed.      Significant Labs:  ABGs:   Recent Labs   Lab 05/29/20  0654   PH 7.487*   PCO2 30.5*   PO2 120*   HCO3 23.1*   POCSATURATED 99   BE 0     Coagulation: No results for input(s): LABPROT, INR, APTT in the last 48 hours.  eGFR: No results for input(s): EGFRIFAFRICA, EGFRCYSTC, LABGLOM in the last 48 hours.    Invalid input(s): EGFRNON-AA  Hemoglobin: No results for input(s): HGBA1C in the last 48 hours.  Lactic Acid: No results for input(s): LACTATE in the last 48 hours.  LFTs:   Recent Labs   Lab 05/29/20  0248   ALT 21   AST 37   ALKPHOS 219*   BILITOT 0.5   PROT 6.5   ALBUMIN 1.4*       Significant Diagnostics:  I have reviewed all pertinent imaging results/findings within the past 24 hours.    Assessment/Plan:     ESRD on hemodialysis  60 y.o. Male with  CHF, CAD, NSTEMI, DM, CVA, ESRD on HD admitted with sepsis 2/2 fungemia. Permacath removed last week now in need of permacath replacement. Has been NPO since midnight, Elliquis held since the morning of 5/26    Unsuccessful attempt at [facility] permacath placement yesterday due to occluded vein. Procedure was aborted secondary to hypotension, desaturation. Overnight, patient had uncontrolled afib w/ RVR, worsening respiratory status (on BIPAP with worsening CXR findings). Somnolent on rounds this AM  Will postpone permacath placement at this time  Will be happy to attempt permacath placement again once he clinically improves. This will have to be done in the OR  LIJ trialysis will have to be removed  and peripheral IVs obtained prior to next attempt      Buddy Castañeda MD  Vascular Surgery  [facility]

## 2020-05-29 NOTE — SIGNIFICANT EVENT
Significant Event  Hospital Medicine    CC:  Shock  Date:  05/29/2020  Admit Date:  5/13/2020  Hospital Length of Stay:  16    Code Status:  DNR     SUBJECTIVE:     Significant Events:  Called urgently to the bedside by nurse to evaluate patient for uncontrolled HR at 130s, mild tachypnea, and new oxygen requirement of 6L.  Patient denies any sxs, but appears tachypneic.  Rapid team and myself came to bedside to evaluate patient.         OBJECTIVE:     Physical Exam:  Last Vitals:   Vitals:    05/29/20 0158   BP: 98/62   Pulse: (!) 134   Resp: (!) 26   Temp:      GA: tired, frail appearing elderly male  HEENT:  PERRL.  No scleral icterus or JVD.   Pulmonary:  R sided rales throughout, L lung clear, strong cough, RR 25   Cardiac:  Tachycardic rate, regular, S1 & S2 w/o rubs/murmurs/gallops.   Abdominal:  Bowel sounds present x 4. No appreciable hepatosplenomegaly.  Neuro:  --Mental Status: AAOx4  --CN II-XII grossly intact.  Corneal reflex, gag, cough intact.  --Extremity strength and sensation intact x 4.     ASSESSMENT AND PLAN/INTERVENTIONS:     1) Tachycardia  Plan/Interventions:  - EKG shows SVT vs 2:1 A flutter  - two doses of MTP 5 mg IV given slowly without improvement d/t soft BPs.  5 mg IVP given without any significant changes.    - Valsalva maneuver (limited by patient's weakness) without any changes on EKG  - CBC, BMP, mag/phos pending labs  - patient more tachycardic and hypotensive yesterday in setting of anemia (Hg 6 and Judaism), so suspect major contributing factor anemia/dehydration.  - missed two doses of evening diltiazem d/t hypotension  - will give trial of small bolus 250 over 2 hours  - HDS, so will monitor clinical status.  Discussed plan with rapid team.    2) Acute hypoxia  - now on 6L NC, satting 90-94%   - R sided rales without complaints of cough or vomitus to suggest aspiration  - appears dry on exam, so do not suspect heart failure  - afebrile and given Levaquin earlier in  the day to cover for pneumonia.  May broaden if clinical status changes.  - CXR ordered, ABG ordered    Uninterrupted Critical Care/Counseling Time (not including procedures):  36    Soy Yeboah PA-C  [facility] Medicine Department  Staff:   Francis Chang DO

## 2020-05-29 NOTE — CARE UPDATE
"RAPID RESPONSE NURSE PROACTIVE ROUNDING NOTE     Time of Visit: 0205    Admit Date: 2020  LOS: 16  Code Status: DNR   Date of Visit: 2020  : 1960  Age: 60 y.o.  Sex: male  Race: Black or   Bed: [address] [place] [address] A:   MRN: [id number]  Was the patient discharged from an ICU this admission? yes   Was the patient discharged from a PACU within last 24 hours?  no  Did the patient receive conscious sedation/general anesthesia in last 24 hours?  no  Was the patient in the ED within the past 24 hours?  no  Was the patient started on NIPPV within the past 24 hours?  no  Attending Physician: Unruly Feng MD  Primary Service: [facility]    ASSESSMENT     Notified by bedside RN via phone call.  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    Pt with x2 episodes of non-sustained VT on telemetry monitor. HR currently 130-140. On assessment pt lying in bed without apparent distress. Denies subjective complaints. VS as above.      INTERVENTIONS/ RECOMMENDATIONS     STAT EKG ordered. STAT BMP ordered. Additional IV lopressor ordered for prn coverage. Will follow up after prn has been administered. Please notify RRT of hypotension or worsening tachycardia.      0250 - Prn Lopressor x3 adminstered w/out improvement. BP remains " stable. Pt now requiring 5L NC for SpO2 90-94%. CELSO Zaragoza at bedside. STAT CXR and ABG ordered. 250 cc NS bolus ordered to be infused over two hours.    Discussed plan of care with RN, Belen.    PHYSICIAN ESCALATION     Yes/No  yes    Orders received and case discussed with CELSO Yeboah.    Disposition: Remain in room 553.    FOLLOW-UP     Call back the Rapid Response Nurse, Stevan Chan RN at [phone] for additional questions or concerns.

## 2020-05-29 NOTE — ASSESSMENT & PLAN NOTE
Patient on HD T/Th/Sat with last session on 5/12  Tunneled catheter that was inserted in 2/2020 that is being used for access  [facility]  AV graft care by Dr. Garduno      Recommendations:    -No urgent indication for RRT today; tentatively plan for iHD tomorrow   -Strict I/O's  -Daily renal function panels   -Renally dose meds  -Maintain Hgb >7.0  -Renal diet, if not NPO   -Will continue to follow closely   -Plan discussed with staff, Dr Noguera

## 2020-05-29 NOTE — PROGRESS NOTES
[facility]  Nephrology  Progress Note    Patient Name: Kodi Ibanez Sr.  MRN: [id number]  Admission Date: 5/13/2020  Hospital Length of Stay: 16 days  Attending Provider: Willis Polo MD   Primary Care Physician: Melonie Elias MD  Principal Problem:Shock      Interval History: Patient seen and examined at bedside. Pt on 6L nasal canula and resting calmly in bed. Received iHD yesterday without UF. Pt moved to ICU overnight 2/2 to afib w/ RVR and increased O2 requirements.     Review of patient's allergies indicates:   Allergen Reactions    Reglan [metoclopramide hcl] Diarrhea    Lorazepam      Other reaction(s): heavy sedation     Current Facility-Administered Medications   Medication Frequency    0.9%  NaCl infusion PRN    aspirin EC tablet 81 mg Daily    atorvastatin tablet 80 mg QHS    ceFEPIme injection 1 g Q24H    clopidogreL tablet 75 mg Daily    dextrose 10% (D10W) Bolus PRN    dextrose 10% (D10W) Bolus PRN    diltiaZEM tablet 30 mg Q6H    epoetin megan-epbx injection 10,000 Units Every Tues, Thurs, Sat    fluconazole tablet 200 mg Daily    glucagon (human recombinant) injection 1 mg PRN    glucose chewable tablet 16 g PRN    glucose chewable tablet 24 g PRN    heparin 25,000 units in dextrose 5% (100 units/ml) IV bolus from bag - ADDITIONAL PRN BOLUS - 30 units/kg PRN    heparin 25,000 units in dextrose 5% (100 units/ml) IV bolus from bag - ADDITIONAL PRN BOLUS - 60 units/kg PRN    heparin 25,000 units in dextrose 5% 250 mL (100 units/mL) infusion LOW INTENSITY nomogram - [facility] Continuous    heparin infusion 1,000 units/500 ml in 0.9% NaCl (pressure line flush) Continuous PRN    hydroxyzine HCL tablet 25 mg TID PRN    insulin aspart U-100 pen 0-5 Units QID (AC + HS) PRN    insulin aspart U-100 pen 3 Units TIDWM    insulin detemir U-100 pen 15 Units Daily    metoprolol tartrate (LOPRESSOR) tablet 50 mg BID    sevelamer carbonate tablet 800 mg TID WM     sodium chloride 0.9% flush 10 mL PRN    vancomycin (VANCOCIN) 500 mg in dextrose 5 % 100 mL IVPB Once    vancomycin - pharmacy to dose pharmacy to manage frequency       Objective:     Vital Signs (Most Recent):  Temp: 98.1 °F (36.7 °C) (05/29/20 0804)  Pulse: (!) 127 (05/29/20 1207)  Resp: 16 (05/29/20 1207)  BP: 104/65 (05/29/20 1207)  SpO2: 100 % (05/29/20 1207)  O2 Device (Oxygen Therapy): nasal cannula w/ humidification (05/29/20 1207) Vital Signs (24h Range):  Temp:  [97.7 °F (36.5 °C)-98.2 °F (36.8 °C)] 98.1 °F (36.7 °C)  Pulse:  [] 127  Resp:  [16-28] 16  SpO2:  [91 %-100 %] 100 %  BP: ()/(52-66) 104/65     Weight: 67.6 kg (149 lb) (05/15/20 0701)  Body mass index is 20.78 kg/m².  Body surface area is 1.84 meters squared.    I/O last 3 completed shifts:  In: 1200 [P.O.:200; Other:900; IV Piggyback:100]  Out: 547 [Other:546; Stool:1]    Physical Exam   Constitutional: He is oriented to person, place, and time. He appears well-developed.   HENT:   Head: Normocephalic and atraumatic.   Eyes: Pupils are equal, round, and reactive to light. Conjunctivae are normal.   Neck: Normal range of motion. Neck supple.   Cardiovascular: Regular rhythm.   Pulmonary/Chest: Effort normal.   Abdominal: Soft.   Musculoskeletal: Normal range of motion. He exhibits no edema.   Neurological: He is alert and oriented to person, place, and time.   Skin: Skin is warm and dry.   Psychiatric: He has a normal mood and affect.       Significant Labs:  CBC:   Recent Labs   Lab 05/29/20  0248   WBC 7.79   RBC 2.78*   HGB 7.4*   HCT 23.2*      MCV 84   MCH 26.6*   MCHC 31.9*     CMP:   Recent Labs   Lab 05/29/20  0248   *   CALCIUM 7.6*   ALBUMIN 1.4*   PROT 6.5      K 4.7   CO2 22*      BUN 31*   CREATININE 3.8*   ALKPHOS 219*   ALT 21   AST 37   BILITOT 0.5     All labs within the past 24 hours have been reviewed.       Assessment/Plan:     * Shock  Managed by primary team     ESRD on  hemodialysis  Patient on HD T/Th/Sat with last session on 5/12  Tunneled catheter that was inserted in 2/2020 that is being used for access  [facility]  AV graft care by Dr. Garduno      Recommendations:    -No urgent indication for RRT today; tentatively plan for iHD tomorrow   -Strict I/O's  -Daily renal function panels   -Renally dose meds  -Maintain Hgb >7.0  -Renal diet, if not NPO   -Will continue to follow closely   -Plan discussed with staff, Dr Noguera             Thank you for your consult. I will follow-up with patient. Please contact us if you have any additional questions.    George Aguero, NP  Nephrology  [facility]

## 2020-05-29 NOTE — ASSESSMENT & PLAN NOTE
--patient on diltiazem for rate control; restarted home dose   --on ASA/plavix  -- Previously holding AC given possible procedure; will re-evaluate with possible readministration  --Chads Vasc score 6 points

## 2020-05-29 NOTE — ASSESSMENT & PLAN NOTE
60 y.o. Male with  CHF, CAD, NSTEMI, DM, CVA, ESRD on HD admitted with sepsis 2/2 fungemia. Permacath removed last week now in need of permacath replacement. Has been NPO since midnight, Elliquis held since the morning of 5/26    Unsuccessful attempt at RIJ permacath placement yesterday due to occluded vein. Procedure was aborted secondary to hypotension, desaturation. Overnight, patient had uncontrolled afib w/ RVR, worsening respiratory status (on BIPAP with worsening CXR findings). Somnolent on rounds this AM  Will postpone permacath placement at this time  Will be happy to attempt permacath placement again once he clinically improves. This will have to be done in the OR  LIJ trialysis will have to be removed and peripheral IVs obtained prior to next attempt

## 2020-05-29 NOTE — ASSESSMENT & PLAN NOTE
--optho and ID consulted  --continue fluconazole for 4 weeks of therapy from line removal  -- Possible line change with vascular surgery pending HD improvement

## 2020-05-29 NOTE — SUBJECTIVE & OBJECTIVE
Interval History/Significant Events: Patient admitted to ICU from floor following ongoing nonsustained VT with associated hypotension. Patient was also less alert although was answering some questions appropriately. He received 2 separate doses of Lopressor IV 5mg as well as valsalva without appropriate response. Was noted to have R lower lobe rales on exam. Admitted for HD instability/control in the setting of ongoing and uncontrolled sepsis with afib and increased oxygen demands.    Review of Systems   Constitutional: Negative for chills and fever.   HENT: Negative for rhinorrhea, sneezing and sore throat.    Eyes: Negative for redness.   Respiratory: Positive for shortness of breath. Negative for wheezing.    Cardiovascular: Negative for chest pain and palpitations.   Gastrointestinal: Negative for nausea and vomiting.   Musculoskeletal: Negative for joint swelling and neck stiffness.   Skin: Negative for pallor and rash.   Neurological: Negative for dizziness, light-headedness and headaches.   Psychiatric/Behavioral: Positive for confusion and decreased concentration.     Objective:     Vital Signs (Most Recent):  Temp: 98.1 °F (36.7 °C) (05/29/20 0804)  Pulse: (!) 130 (05/29/20 1100)  Resp: (!) 21 (05/29/20 1100)  BP: (!) 88/57 (05/29/20 1000)  SpO2: 100 % (05/29/20 1100) Vital Signs (24h Range):  Temp:  [97.2 °F (36.2 °C)-98.2 °F (36.8 °C)] 98.1 °F (36.7 °C)  Pulse:  [] 130  Resp:  [16-28] 21  SpO2:  [91 %-100 %] 100 %  BP: ()/(52-68) 88/57   Weight: 67.6 kg (149 lb)  Body mass index is 20.78 kg/m².      Intake/Output Summary (Last 24 hours) at 5/29/2020 1118  Last data filed at 5/29/2020 0600  Gross per 24 hour   Intake 1000 ml   Output 547 ml   Net 453 ml       Physical Exam   Constitutional: He appears well-developed and well-nourished.   HENT:   Head: Normocephalic and atraumatic.   Eyes: Pupils are equal, round, and reactive to light. Conjunctivae are normal. No scleral icterus.   Neck: Normal  range of motion. No JVD present.   Cardiovascular: Intact distal pulses. Exam reveals no friction rub.   No murmur heard.  tqchycardia with intermittant SVT   Pulmonary/Chest: He has no wheezes. He has rales.   Increased o2 demand   Abdominal: Soft. He exhibits no distension. There is no tenderness. There is no rebound.   Musculoskeletal: Normal range of motion. He exhibits no edema.   Neurological: He is alert. No cranial nerve deficit.   Skin: Skin is warm and dry. He is not diaphoretic.       Vents:  Oxygen Concentration (%): 6 (05/29/20 1100)  Lines/Drains/Airways     Central Venous Catheter Line                 Hemodialysis Catheter 05/24/20 0050 5 days          Peripheral Intravenous Line                 Peripheral IV - Single Lumen 05/28/20 1635 20 G;1 3/4 in Right Forearm less than 1 day              Significant Labs:    CBC/Anemia Profile:  Recent Labs   Lab 05/28/20  0714 05/29/20  0248   WBC 12.64 7.79   HGB 6.0* 7.4*   HCT 19.6* 23.2*    201   MCV 87 84   RDW 17.2* 16.6*        Chemistries:  Recent Labs   Lab 05/28/20  0714 05/29/20  0248    136   K 5.6* 4.7    102   CO2 24 22*   BUN 41* 31*   CREATININE 5.4* 3.8*   CALCIUM 7.8* 7.6*   ALBUMIN  --  1.4*   PROT  --  6.5   BILITOT  --  0.5   ALKPHOS  --  219*   ALT  --  21   AST  --  37   MG 2.2 2.1   PHOS 5.0* 4.2       Blood Culture: No results for input(s): LABBLOO in the last 48 hours.  BMP:   Recent Labs   Lab 05/29/20  0248   *      K 4.7      CO2 22*   BUN 31*   CREATININE 3.8*   CALCIUM 7.6*   MG 2.1     CMP:   Recent Labs   Lab 05/28/20  0714 05/29/20  0248    136   K 5.6* 4.7    102   CO2 24 22*   * 296*   BUN 41* 31*   CREATININE 5.4* 3.8*   CALCIUM 7.8* 7.6*   PROT  --  6.5   ALBUMIN  --  1.4*   BILITOT  --  0.5   ALKPHOS  --  219*   AST  --  37   ALT  --  21   ANIONGAP 13 12   EGFRNONAA 10.5* 16.0*     Lactic Acid: No results for input(s): LACTATE in the last 48 hours.  POCT Glucose:    Recent Labs   Lab 05/28/20  1802 05/28/20  2220 05/29/20  0942   POCTGLUCOSE 186* 274* 219*     Troponin: No results for input(s): TROPONINI in the last 48 hours.  All pertinent labs within the past 24 hours have been reviewed.    Significant Imaging:  I have reviewed all pertinent imaging results/findings within the past 24 hours.

## 2020-05-29 NOTE — CARE UPDATE
Rapid Response Respiratory Therapy Proactive Rounding Note      Time of visit: 657    Code Status: DNR   : 1960  Bed: 553/553 A:   MRN: [id number]    SITUATION     Evaluated patient for: High MEWs score, BiPAP compliance, Follow-Up    BACKGROUND     Patient has a past medical history of Atherosclerosis of aorta, Blood transfusion, Cataracts, bilateral, CHF (congestive heart failure), Clotting disorder, Cognitive deficits as late effect of cerebrovascular disease, Coronary artery disease, Decreased appetite, DM (diabetes mellitus), type 2 with renal complications, ESRD on hemodialysis, Hyperlipidemia, Hyperparathyroidism, unspecified, Hypertension, Prostate cancer, Seizures, Stroke, and Unsteady gait.    ASSESSMENT/INTERVENTIONS     Upon arrival in room patient found on BiPAP 12/7, 50%. Patient resting comfortably on current settings. Repeat ABG was assessed and BiPAP weaned to 10/5, 30%. Patient tolerated changes well with no further respiratory issues. Jung MORA called and plans made to take patient off BiPAP and gather repeat VBG's to assess patient's respiratory status.    Pulse: 136 Respiratory rate: 28 Temperature: Temp: 97.7 °F (36.5 °C) BP: BP: (!) 116/56 SpO2: 99  Level of Consciousness: Level of Consciousness (AVPU): alert  Respiratory Effort: Respiratory Effort: Unlabored, Normal Expansion/Accessory Muscle Usage: Expansion/Accessory Muscles/Retractions: no use of accessory muscles, no retractions, expansion symmetric  All Lung Field Breath Sounds: All Lung Fields Breath Sounds: Anterior:, Posterior:, Lateral:, coarse  O2 Device/Concentration: BiPAP 30%  Most recent blood gas:   Recent Labs     20  0654   PH 7.487*   PCO2 30.5*   PO2 120*   HCO3 23.1*   POCSATURATED 99   BE 0     NIPPV: Yes, Type: BiPAP Settings: 12/7, 30% Surgical airway: No  ETCO2 monitored:    Ambu at bedside: Ambu bag with the patient?: Yes, Adult Ambu    Current Respiratory Care Orders:   Start   Ordered   20  0800  Incentive spirometry 4 times daily while awake (4 of 16 released)    Release   References: [facility] Clinical Practice Guidelines    05/29/20 0423   05/29/20 0419  Bipap QHS Nightly (2 of 5 released)    Release   Question Answer Comment   FiO2% 30    Inspiratory pressure: 12    Expiratory pressure: 7        05/29/20 0418   05/28/20 1436  Oxygen Continuous Continuous     References: Oxygen Titration Protocol   Question Answer Comment   Device type: Low flow    Device: Nasal Cannula (1- 5 Liters)    LPM: 2    Titrate O2 per Oxygen Titration Protocol: Yes    To maintain SpO2 goal of: >= 90%    Notify MD of: Inability to achieve desired SpO2; Sudden change in patient status and requires 20% increase in FiO2; Patient requires >60% FiO2                 RECOMMENDATIONS     We recommend: As discussed with Jung MORA, patient is to rest on BiPAP for a little while longer and then taken off and placed back on NC. VBG's should be used to further evaluate patient status.    ESCALATION      Physician Escalation (Yes/No) Yes   Care discussed with: Jung MORA  Discussed plan of care primary RTPAULY CRT     FOLLOW-UP     Please call back the Rapid Response RTTorsten, RRT at x [phone] for any questions or concerns.

## 2020-05-29 NOTE — SUBJECTIVE & OBJECTIVE
Interval History: Patient seen and examined at bedside. Pt on 6L nasal canula and resting calmly in bed. Received iHD yesterday without UF. Pt moved to ICU overnight 2/2 to afib w/ RVR and increased O2 requirements.     Review of patient's allergies indicates:   Allergen Reactions    Reglan [metoclopramide hcl] Diarrhea    Lorazepam      Other reaction(s): heavy sedation     Current Facility-Administered Medications   Medication Frequency    0.9%  NaCl infusion PRN    aspirin EC tablet 81 mg Daily    atorvastatin tablet 80 mg QHS    ceFEPIme injection 1 g Q24H    clopidogreL tablet 75 mg Daily    dextrose 10% (D10W) Bolus PRN    dextrose 10% (D10W) Bolus PRN    diltiaZEM tablet 30 mg Q6H    epoetin megan-epbx injection 10,000 Units Every Tues, Thurs, Sat    fluconazole tablet 200 mg Daily    glucagon (human recombinant) injection 1 mg PRN    glucose chewable tablet 16 g PRN    glucose chewable tablet 24 g PRN    heparin 25,000 units in dextrose 5% (100 units/ml) IV bolus from bag - ADDITIONAL PRN BOLUS - 30 units/kg PRN    heparin 25,000 units in dextrose 5% (100 units/ml) IV bolus from bag - ADDITIONAL PRN BOLUS - 60 units/kg PRN    heparin 25,000 units in dextrose 5% 250 mL (100 units/mL) infusion LOW INTENSITY nomogram - [facility] Continuous    heparin infusion 1,000 units/500 ml in 0.9% NaCl (pressure line flush) Continuous PRN    hydroxyzine HCL tablet 25 mg TID PRN    insulin aspart U-100 pen 0-5 Units QID (AC + HS) PRN    insulin aspart U-100 pen 3 Units TIDWM    insulin detemir U-100 pen 15 Units Daily    metoprolol tartrate (LOPRESSOR) tablet 50 mg BID    sevelamer carbonate tablet 800 mg TID WM    sodium chloride 0.9% flush 10 mL PRN    vancomycin (VANCOCIN) 500 mg in dextrose 5 % 100 mL IVPB Once    vancomycin - pharmacy to dose pharmacy to manage frequency       Objective:     Vital Signs (Most Recent):  Temp: 98.1 °F (36.7 °C) (05/29/20 0804)  Pulse: (!) 127 (05/29/20 1207)  Resp:  16 (05/29/20 1207)  BP: 104/65 (05/29/20 1207)  SpO2: 100 % (05/29/20 1207)  O2 Device (Oxygen Therapy): nasal cannula w/ humidification (05/29/20 1207) Vital Signs (24h Range):  Temp:  [97.7 °F (36.5 °C)-98.2 °F (36.8 °C)] 98.1 °F (36.7 °C)  Pulse:  [] 127  Resp:  [16-28] 16  SpO2:  [91 %-100 %] 100 %  BP: ()/(52-66) 104/65     Weight: 67.6 kg (149 lb) (05/15/20 0701)  Body mass index is 20.78 kg/m².  Body surface area is 1.84 meters squared.    I/O last 3 completed shifts:  In: 1200 [P.O.:200; Other:900; IV Piggyback:100]  Out: 547 [Other:546; Stool:1]    Physical Exam   Constitutional: He is oriented to person, place, and time. He appears well-developed.   HENT:   Head: Normocephalic and atraumatic.   Eyes: Pupils are equal, round, and reactive to light. Conjunctivae are normal.   Neck: Normal range of motion. Neck supple.   Cardiovascular: Regular rhythm.   Pulmonary/Chest: Effort normal.   Abdominal: Soft.   Musculoskeletal: Normal range of motion. He exhibits no edema.   Neurological: He is alert and oriented to person, place, and time.   Skin: Skin is warm and dry.   Psychiatric: He has a normal mood and affect.       Significant Labs:  CBC:   Recent Labs   Lab 05/29/20  0248   WBC 7.79   RBC 2.78*   HGB 7.4*   HCT 23.2*      MCV 84   MCH 26.6*   MCHC 31.9*     CMP:   Recent Labs   Lab 05/29/20  0248   *   CALCIUM 7.6*   ALBUMIN 1.4*   PROT 6.5      K 4.7   CO2 22*      BUN 31*   CREATININE 3.8*   ALKPHOS 219*   ALT 21   AST 37   BILITOT 0.5     All labs within the past 24 hours have been reviewed.

## 2020-05-29 NOTE — SUBJECTIVE & OBJECTIVE
Medications:  Continuous Infusions:   heparin (porcine)       Scheduled Meds:   aspirin  81 mg Oral Daily    atorvastatin  80 mg Oral QHS    clopidogreL  75 mg Oral Daily    diltiaZEM  5 mg Intravenous Once    diltiaZEM  60 mg Oral Q6H    epoetin megan-epbx  10,000 Units Intravenous Every Tues, Thurs, Sat    fluconazole  200 mg Oral Daily    heparin (porcine)  5,000 Units Subcutaneous Q8H    insulin aspart U-100  3 Units Subcutaneous TIDWM    insulin detemir U-100  15 Units Subcutaneous Daily    metoprolol tartrate  50 mg Oral BID    piperacillin-tazobactam (ZOSYN) IVPB  4.5 g Intravenous Once    sevelamer carbonate  800 mg Oral TID WM     PRN Meds:sodium chloride 0.9%, Dextrose 10% Bolus, Dextrose 10% Bolus, glucagon (human recombinant), glucose, glucose, heparin (porcine), hydrOXYzine HCL, insulin aspart U-100, sodium chloride 0.9%     Objective:     Vital Signs (Most Recent):  Temp: 98.1 °F (36.7 °C) (05/29/20 0804)  Pulse: (!) 134 (05/29/20 0808)  Resp: 19 (05/29/20 0808)  BP: (!) 86/60 (05/29/20 0808)  SpO2: 99 % (05/29/20 0808) Vital Signs (24h Range):  Temp:  [97.2 °F (36.2 °C)-98.2 °F (36.8 °C)] 98.1 °F (36.7 °C)  Pulse:  [] 134  Resp:  [16-28] 19  SpO2:  [91 %-100 %] 99 %  BP: ()/(52-77) 86/60         Physical Exam   Constitutional: He is oriented to person, place, and time. He appears well-developed and well-nourished.   HENT:   Head: Normocephalic and atraumatic.   Eyes: Pupils are equal, round, and reactive to light. EOM are normal.   Neck: Normal range of motion.   LIJ trialysis catheter in place   Cardiovascular: Normal rate and intact distal pulses.   Pulmonary/Chest: Effort normal. No respiratory distress.   Abdominal: Soft. There is no tenderness.   Musculoskeletal: Normal range of motion. He exhibits no deformity.   Neurological: He is alert and oriented to person, place, and time.   Skin: Skin is warm and dry.   Psychiatric: He has a normal mood and affect. His behavior  is normal.   Nursing note and vitals reviewed.      Significant Labs:  ABGs:   Recent Labs   Lab 05/29/20  0654   PH 7.487*   PCO2 30.5*   PO2 120*   HCO3 23.1*   POCSATURATED 99   BE 0     Coagulation: No results for input(s): LABPROT, INR, APTT in the last 48 hours.  eGFR: No results for input(s): EGFRIFAFRICA, EGFRCYSTC, LABGLOM in the last 48 hours.    Invalid input(s): EGFRNON-AA  Hemoglobin: No results for input(s): HGBA1C in the last 48 hours.  Lactic Acid: No results for input(s): LACTATE in the last 48 hours.  LFTs:   Recent Labs   Lab 05/29/20  0248   ALT 21   AST 37   ALKPHOS 219*   BILITOT 0.5   PROT 6.5   ALBUMIN 1.4*       Significant Diagnostics:  I have reviewed all pertinent imaging results/findings within the past 24 hours.

## 2020-05-30 NOTE — SUBJECTIVE & OBJECTIVE
Interval History/Significant Events:  Patient with improved oxygen demand and hypoxia. Is following commands. Not to receive HD given lack of urgency and anemia. Will step down to hospital medicine from ICU    Review of Systems   Constitutional: Negative for chills and fever.   HENT: Negative for rhinorrhea, sneezing and sore throat.    Eyes: Negative for redness.   Respiratory: Positive for shortness of breath (improved from previous day). Negative for wheezing.    Cardiovascular: Negative for chest pain and palpitations.   Gastrointestinal: Negative for nausea and vomiting.   Musculoskeletal: Negative for joint swelling and neck stiffness.   Skin: Negative for pallor and rash.   Neurological: Negative for dizziness, light-headedness and headaches.   Psychiatric/Behavioral: Positive for confusion and decreased concentration.     Objective:     Vital Signs (Most Recent):  Temp: 97.5 °F (36.4 °C) (05/30/20 1100)  Pulse: 82 (05/30/20 1400)  Resp: 16 (05/30/20 1400)  BP: 97/63 (05/30/20 1400)  SpO2: 100 % (05/30/20 1400) Vital Signs (24h Range):  Temp:  [97.5 °F (36.4 °C)-98.7 °F (37.1 °C)] 97.5 °F (36.4 °C)  Pulse:  [] 82  Resp:  [13-30] 16  SpO2:  [74 %-100 %] 100 %  BP: ()/(45-80) 97/63   Weight: 67.6 kg (149 lb)  Body mass index is 20.78 kg/m².      Intake/Output Summary (Last 24 hours) at 5/30/2020 1616  Last data filed at 5/30/2020 1400  Gross per 24 hour   Intake 1054.56 ml   Output --   Net 1054.56 ml       Physical Exam   Constitutional: He appears well-developed and well-nourished.   HENT:   Head: Normocephalic and atraumatic.   Eyes: Pupils are equal, round, and reactive to light. Conjunctivae are normal. No scleral icterus.   Neck: Normal range of motion. No JVD present.   Cardiovascular: Intact distal pulses. Exam reveals no friction rub.   No murmur heard.  tqchycardia with intermittant SVT   Pulmonary/Chest: He has no wheezes. He has rales.   Increased o2 demand   Abdominal: Soft. He exhibits  no distension. There is no tenderness. There is no rebound.   Musculoskeletal: Normal range of motion. He exhibits no edema.   Neurological: He is alert. No cranial nerve deficit.   Skin: Skin is warm and dry. He is not diaphoretic.       Vents:  Oxygen Concentration (%): 6 (05/30/20 1400)  Lines/Drains/Airways     Central Venous Catheter Line                 Hemodialysis Catheter 05/24/20 0050 6 days          Peripheral Intravenous Line                 Peripheral IV - Single Lumen 05/28/20 1635 20 G;1 3/4 in Right Forearm 1 day              Significant Labs:    CBC/Anemia Profile:  Recent Labs   Lab 05/29/20  0248 05/29/20  1136 05/30/20  0351   WBC 7.79 9.73 8.24   HGB 7.4* 5.5* 5.6*   HCT 23.2* 17.8* 18.7*    255 239   MCV 84 87 89   RDW 16.6* 17.0* 17.4*        Chemistries:  Recent Labs   Lab 05/29/20 0248 05/30/20  0351    137   K 4.7 4.7    105   CO2 22* 19*   BUN 31* 37*   CREATININE 3.8* 4.6*   CALCIUM 7.6* 7.8*   ALBUMIN 1.4*  --    PROT 6.5  --    BILITOT 0.5  --    ALKPHOS 219*  --    ALT 21  --    AST 37  --    MG 2.1 2.1   PHOS 4.2 5.0*       Blood Culture: No results for input(s): LABBLOO in the last 48 hours.  BMP:   Recent Labs   Lab 05/30/20  0351   GLU 87      K 4.7      CO2 19*   BUN 37*   CREATININE 4.6*   CALCIUM 7.8*   MG 2.1     CMP:   Recent Labs   Lab 05/29/20  0248 05/30/20  0351    137   K 4.7 4.7    105   CO2 22* 19*   * 87   BUN 31* 37*   CREATININE 3.8* 4.6*   CALCIUM 7.6* 7.8*   PROT 6.5  --    ALBUMIN 1.4*  --    BILITOT 0.5  --    ALKPHOS 219*  --    AST 37  --    ALT 21  --    ANIONGAP 12 13   EGFRNONAA 16.0* 12.7*     Lactic Acid: No results for input(s): LACTATE in the last 48 hours.  POCT Glucose:   Recent Labs   Lab 05/30/20  0353 05/30/20  0828 05/30/20  1136   POCTGLUCOSE 101 82 89     Troponin: No results for input(s): TROPONINI in the last 48 hours.  All pertinent labs within the past 24 hours have been  reviewed.    Significant Imaging:  I have reviewed all pertinent imaging results/findings within the past 24 hours.

## 2020-05-30 NOTE — NURSING TRANSFER
Nursing Transfer Note      5/30/2020     Transfer To: [id number]    Transfer via bed    Transfer with cardiac monitoring    Transported by 2 RNs    Medicines sent: Insulin aspart&insulin detemir    Chart send with patient: Yes    Upon arrival to floor: cardiac monitor applied, patient oriented to room, call bell in reach and bed in lowest position

## 2020-05-30 NOTE — NURSING
Paged Med Team R, to see if they wanted a repeat CBC patient IV was leaking blood and patient had to be cleaned up, as HGB low at 5.6 .

## 2020-05-30 NOTE — PROGRESS NOTES
Pharmacokinetic Assessment: IV Vancomycin    Therapy with vancomycin complete and consult discontinued by provider. Pharmacy will sign off, please re-consult as needed.    Jennifer Reyes, PharmD, BCCCP  [id number]

## 2020-05-30 NOTE — PROGRESS NOTES
[facility]  Critical Care Medicine  Progress Note    Patient Name: Kodi Ibanez Sr.  MRN: [id number]  Admission Date: 5/13/2020  Hospital Length of Stay: 17 days  Code Status: DNR  Attending Provider: Willis Polo MD  Primary Care Provider: Melonie Elias MD   Principal Problem: Shock    Subjective:     HPI:  59 y/o male with past medical history of CHF, CAD with recent NSTEMI [~4/29] (CABG 2009, FERCHO placed in 12/2019), diabetes, CVA, ESRD on dialysis Tuesday/Thursday/Saturday (last dialyzed morning of 5/12) presents to the ER by referral from infectious disease clinic after virtual visit today. The ID provider was concerned about the patients AMS and recent elevated WBC count. Recently,atzenon underwent ligation of LUE fistula on 2/27/20 with Dr Lopez ([facility], vascular surgery) and had ongoing issues afterwards. Patient was admitted on 4/10 for excision of his infected LUE mid and distal AVF with cultures and specimen obtained. Surgical cultures 4/10 grew 2 different prevotella species, fusobacterium, and strep anginosus. The patient was d/c'ed with home health wound care and po augmentin/clindamycin x 10 days. On 4/20 antibiotics were changed to Flagyl 500 mg p.o. q.8 and Cefazolin after dialysis 2 g on Tuesday and Thursday, 3 g on Saturday. End date on antibiotics on 5/11.     Following these events, on 4/29 the patient was admitted for an NSTEMI, a LHC was performed where re-stenosis of a previous stent was found and balloon angioplasty was done. He was discharged on 5/2. He is now on ASA/plavix, but per wife has not been compliant.     Patients wife says that he has had poor PO intake for the last 2-3 weeks, has become progressively weak, and developed altered mental status today. Patient had 3 episodes of vomiting within the last 24 hours, which appeared brown-non bloody. He had a brown watery bowel movement today. Patient normally oliguric (urinating ~2-3 x per  week). Patient was seen in the [facility] ED on the evening of 5/12 due to palpitations after his normal dialysis and sent home after evaluation, for possible antibiotic side effect.     He presents to the ED with encephalopathy x 1 day, leukocytosis of 15, lactate 2.7, troponin 0.482, , procal 10.98, Cr 6.5, BUN 41, and hyperglycemia. Patient pan-cultured, currently only UA resulted with large amount of bacteria. Patient requiring high dose Levophed on admission. Broad spectrum antibiotics initiated. 1.5 L IVF given. Patient remains on RA. Wife was updated regarding patient's current status. Patient spoken to regarding code status and verbalized his wishes as a DNR. His wife confirmed he has an LAPost but it is not on file. Instructed to bring in.    Hospital/ICU Course:  The patient was admitted to MICU for shock likely 2/2 to sepsis on 5/13. Patient remains on low dose Levophed. Nephrology consulted for possible dialysis. Blood cx NGTD. Patient remains on Heparin gtt for paroxysmal A fib with CHADS Vasc score of 6. Previously hyperglycemic with BG now well controlled.    5/14: Levo stopped in morning. Stable all day off pressors on room air. Zosyn changed to unasyn.     5/15: Patient spiked new temp, vanc restarted. Patient with intermittent SVT- Home coreg and dilt restarted. Step down orders placed but patient didn't get bed    5/17: Hypotension overnight requiring vasopressors. Transferred back to MICU on norepi    5/18: Norepi stopped yesterday. BP stable. Restarted home dilt. Will step down to the floor today.     5/19: Patient remained in ICU over night due to SVT requiring diltiazem drip. Will wean diltiazem drip today and transition to po meds. HD ordered for today.     5:29: Patient admitted to ICU from floor following ongoing nonsustained VT with associated hypotension. Patient was also less alert although was answering some questions appropriately. He received 2 separate doses of Lopressor IV 5mg  as well as valsalva without appropriate response. Was noted to have R lower lobe rales on exam. Admitted for HD instability/control in the setting of ongoing and uncontrolled sepsis with afib and increased oxygen demands.    5/30: Patient with improved oxygen demand and hypoxia. Is following commands. Not to receive HD given lack of urgency and anemia. Will step down to hospital medicine from ICU    Interval History/Significant Events:  Patient with improved oxygen demand and hypoxia. Is following commands. Not to receive HD given lack of urgency and anemia. Will step down to hospital medicine from ICU    Review of Systems   Constitutional: Negative for chills and fever.   HENT: Negative for rhinorrhea, sneezing and sore throat.    Eyes: Negative for redness.   Respiratory: Positive for shortness of breath (improved from previous day). Negative for wheezing.    Cardiovascular: Negative for chest pain and palpitations.   Gastrointestinal: Negative for nausea and vomiting.   Musculoskeletal: Negative for joint swelling and neck stiffness.   Skin: Negative for pallor and rash.   Neurological: Negative for dizziness, light-headedness and headaches.   Psychiatric/Behavioral: Positive for confusion and decreased concentration.     Objective:     Vital Signs (Most Recent):  Temp: 97.5 °F (36.4 °C) (05/30/20 1100)  Pulse: 82 (05/30/20 1400)  Resp: 16 (05/30/20 1400)  BP: 97/63 (05/30/20 1400)  SpO2: 100 % (05/30/20 1400) Vital Signs (24h Range):  Temp:  [97.5 °F (36.4 °C)-98.7 °F (37.1 °C)] 97.5 °F (36.4 °C)  Pulse:  [] 82  Resp:  [13-30] 16  SpO2:  [74 %-100 %] 100 %  BP: ()/(45-80) 97/63   Weight: 67.6 kg (149 lb)  Body mass index is 20.78 kg/m².      Intake/Output Summary (Last 24 hours) at 5/30/2020 1616  Last data filed at 5/30/2020 1400  Gross per 24 hour   Intake 1054.56 ml   Output --   Net 1054.56 ml       Physical Exam   Constitutional: He appears well-developed and well-nourished.   HENT:   Head:  Normocephalic and atraumatic.   Eyes: Pupils are equal, round, and reactive to light. Conjunctivae are normal. No scleral icterus.   Neck: Normal range of motion. No JVD present.   Cardiovascular: Intact distal pulses. Exam reveals no friction rub.   No murmur heard.  tqchycardia with intermittant SVT   Pulmonary/Chest: He has no wheezes. He has rales.   Increased o2 demand   Abdominal: Soft. He exhibits no distension. There is no tenderness. There is no rebound.   Musculoskeletal: Normal range of motion. He exhibits no edema.   Neurological: He is alert. No cranial nerve deficit.   Skin: Skin is warm and dry. He is not diaphoretic.       Vents:  Oxygen Concentration (%): 6 (05/30/20 1400)  Lines/Drains/Airways     Central Venous Catheter Line                 Hemodialysis Catheter 05/24/20 0050 6 days          Peripheral Intravenous Line                 Peripheral IV - Single Lumen 05/28/20 1635 20 G;1 3/4 in Right Forearm 1 day              Significant Labs:    CBC/Anemia Profile:  Recent Labs   Lab 05/29/20  0248 05/29/20  1136 05/30/20  0351   WBC 7.79 9.73 8.24   HGB 7.4* 5.5* 5.6*   HCT 23.2* 17.8* 18.7*    255 239   MCV 84 87 89   RDW 16.6* 17.0* 17.4*        Chemistries:  Recent Labs   Lab 05/29/20 0248 05/30/20  0351    137   K 4.7 4.7    105   CO2 22* 19*   BUN 31* 37*   CREATININE 3.8* 4.6*   CALCIUM 7.6* 7.8*   ALBUMIN 1.4*  --    PROT 6.5  --    BILITOT 0.5  --    ALKPHOS 219*  --    ALT 21  --    AST 37  --    MG 2.1 2.1   PHOS 4.2 5.0*       Blood Culture: No results for input(s): LABBLOO in the last 48 hours.  BMP:   Recent Labs   Lab 05/30/20  0351   GLU 87      K 4.7      CO2 19*   BUN 37*   CREATININE 4.6*   CALCIUM 7.8*   MG 2.1     CMP:   Recent Labs   Lab 05/29/20  0248 05/30/20  0351    137   K 4.7 4.7    105   CO2 22* 19*   * 87   BUN 31* 37*   CREATININE 3.8* 4.6*   CALCIUM 7.6* 7.8*   PROT 6.5  --    ALBUMIN 1.4*  --    BILITOT 0.5  --     ALKPHOS 219*  --    AST 37  --    ALT 21  --    ANIONGAP 12 13   EGFRNONAA 16.0* 12.7*     Lactic Acid: No results for input(s): LACTATE in the last 48 hours.  POCT Glucose:   Recent Labs   Lab 05/30/20  0353 05/30/20  0828 05/30/20  1136   POCTGLUCOSE 101 82 89     Troponin: No results for input(s): TROPONINI in the last 48 hours.  All pertinent labs within the past 24 hours have been reviewed.    Significant Imaging:  I have reviewed all pertinent imaging results/findings within the past 24 hours.      ABG  Recent Labs   Lab 05/29/20  0654   PH 7.487*   PO2 120*   PCO2 30.5*   HCO3 23.1*   BE 0     Assessment/Plan:     Cardiac/Vascular  Atrial fibrillation  --patient on diltiazem for rate control; restarted home dose   --on ASA/plavix  -- Previously holding AC given possible procedure; will re-evaluate with possible readministration  --Chads Vasc score 6 points      NSTEMI (non-ST elevated myocardial infarction)  Hx of CABG in 2009 and placement of FERCHO in 12/2019. Patient recenetly admitted on 4/29 and d/c'ed 5/2, admitted for an NSTEMI, a LHC was performed where re-stenosis of a previous stent was found and balloon angioplasty was done. He is now on ASA/plavix, but per wife has not been compliant.    --stat EKG  --troponin now down-trending; stopped trending  --continue ASA/plavix (home meds)  --echo pending    Essential hypertension  Currently holding home lopressor and coreg    Continuing dilt 30 q6h and lopressor 50 BID    SVT (supraventricular tachycardia)  --ECG showed SVT with -150'2; Reported nonsustained Vtach overnight - not responsive to metoprolol or valsalva  -- Currently on dilt and metoprolol scheduled  -- Decreased Dilt to 30 recently  -- Will continue to monitor patients rate, rhythm and BP given recent instability and diltiazem scheduling/dose      History of MI (myocardial infarction)  Currently on ASA and Plavix and atorvastatin    HLD (hyperlipidemia)  --statin for secondary  prevention    Renal/  ESRD on hemodialysis  Patient on HD T/Th/Sat with last session  Prior to admission on 5/12, where he was admitted to the ED with palpatations after session. Patient has tunneled catheter that was inserted in 2/2020 that is being used for access.     --nephrology consulted  --HD still being completed as scheduled  -- Will continue to monitor electrolytes given previous hyperkalemia (now resolved)  --avoid nephrotoxic meds  --oliguric, strict I/o's  --BMP daily  -- Vascular surgery to complete permacath placement in future; follow up  -- Not to receive HD given lack of urgency and anemia. Will step down to hospital medicine from ICU    ID  Candidemia  --optho and ID consulted  --continue fluconazole for 4 weeks of therapy from line removal  -- Possible line change with vascular surgery pending HD improvement    AV fistula infection  --see shock  --continue fluconazole  --ID and opthalmology consulted     Endocrine  Type 2 diabetes, uncontrolled, with neuropathy  -->500 on admission. Goal -180  -- Continuing management with insulin aspart/detemir and adjusting as needed  --SSI  --accuchecks q4h        Other  * Shock  2/2 probable sepsis likely r/t LUE AV fistula excision. Patient also with R tunneled catheter placed 2/2020.  Patient underwent ligation of LUE fistula on 2/27/20 with Dr Lopez ([facility], vascular surgery) and had ongoing issues afterwards. Patient was admitted on 4/10 for excision of his infected LUE mid and distal AVF with cultures and specimen obtained. Surgical cultures 4/10 grew 2 different prevotella species, fusobacterium, and strep anginosus. The patient was d/c'ed with home health wound care and po augmentin/clindamycin x 10 days. On 4/20 antibiotics were changed to Flagyl 500 mg p.o. q.8 and Cefazolin after dialysis 2 g on Tuesday and Thursday, 3 g on Saturday. End date on antibiotics on 5/11.    No current leukocytosis; previously persistent C. Albicans BCx  growth  Most recent BCx NGTD    --ID and opthalmology consulted   --Will continue Fluconazole for 4 weeks of therapy  -- Started Cefepime on 5/29  -- Stopped ABx on 5/30  -- Patient with improved oxygen demand and hypoxia. Is following commands. Will step down to hospital medicine from ICU today       Critical Care Daily Checklist:    A: Awake: RASS Goal/Actual Goal: RASS Goal: 0-->alert and calm  Actual: Torres Agitation Sedation Scale (RASS): Alert and calm   B: Spontaneous Breathing Trial Performed?     C: SAT & SBT Coordinated?  n/a                      D: Delirium: CAM-ICU Overall CAM-ICU: Negative   E: Early Mobility Performed? Yes   F: Feeding Goal: Goals: PO intake > 50% by RD follow up  Status: Nutrition Goal Status: goal not met   Current Diet Order   Procedures    Diet NPO      AS: Analgesia/Sedation no   T: Thromboembolic Prophylaxis no   H: HOB > 300 Yes   U: Stress Ulcer Prophylaxis (if needed) no   G: Glucose Control no   B: Bowel Function Stool Occurrence: 2   I: Indwelling Catheter (Lines & Mayers) Necessity Piv, trialysis   D: De-escalation of Antimicrobials/Pharmacotherapies Discontinued abx today    Plan for the day/ETD Step down to medicine today; follow up with vascular surgery for permacath placement; monitor anemia    Code Status:  Family/Goals of Care: DNR  No blood products       Critical secondary to Patient has a condition that poses threat to life and bodily function: Severe Respiratory Distress      Critical care was time spent personally by me on the following activities: development of treatment plan with patient or surrogate and bedside caregivers, discussions with consultants, evaluation of patient's response to treatment, examination of patient, ordering and performing treatments and interventions, ordering and review of laboratory studies, ordering and review of radiographic studies, pulse oximetry, re-evaluation of patient's condition. This critical care time did not overlap  with that of any other provider or involve time for any procedures.     Reece Lawson MD  Critical Care Medicine  [facility]

## 2020-05-30 NOTE — NURSING
CMICU DAILY GOALS       A: Awake    RASS: Goal - RASS Goal: 0-->alert and calm  Actual - RASS (Torres Agitation-Sedation Scale): 0-->alert and calm   Restraint necessity:    B: Breath   SBT: Not intubated   C: Coordinate A & B, analgesics/sedatives   Pain: managed    SAT: Not intubated  D: Delirium   CAM-ICU: Overall CAM-ICU: Negative  E: Early Mobility   MOVE Screen: Pass   Activity: Activity Management: activity adjusted per tolerance  FAS: Feeding/Nutrition   Diet order: Diet/Nutrition Received: NPO, Specialty Diet/Nutrition Received: renal diet Fluid restriction:    T: Thrombus   DVT prophylaxis: VTE Required Core Measure: Pharmacological prophylaxis initiated/maintained  H: HOB Elevation   Head of Bed (HOB): HOB at 20-30 degrees  U: Ulcer Prophylaxis   GI: no  G: Glucose control   managed Glycemic Management: blood glucose monitoring  S: Skin   Bundle compliance: yes   Bathing/Skin Care: dressed/undressed, incontinence care, bath, chlorhexidine, bath, complete Date: 5/30/20   B: Bowel Function   no issues   I: Indwelling Catheters   Mayers necessity:     CVC necessity: Yes   IPAD offered: Not appropriate  D: De-escalation Antibx   Yes  Plan for the day   Monitor BP and HR.   Family/Goals of care/Code Status   Code Status: DNR     No acute events throughout day, VS and assessment per flow sheet, patient progressing towards goals as tolerated, plan of care reviewed with Kodi Ibanez Sr. and family, all concerns addressed, will continue to monitor.

## 2020-05-30 NOTE — SUBJECTIVE & OBJECTIVE
"Past Medical History:   Diagnosis Date    Atherosclerosis of aorta     noted on VIKTORIA 2011    Blood transfusion     Cataracts, bilateral     CHF (congestive heart failure)     Clotting disorder     Cognitive deficits as late effect of cerebrovascular disease 6/26/2012    Coronary artery disease     NSTEMI s/p PCI with FERCHO to LCx (12/16/19),     Decreased appetite     DM (diabetes mellitus), type 2 with renal complications     Diabetic retinopathy    ESRD on hemodialysis     TUESDAY/ THURSDAY/SATURDAY    Hyperlipidemia     Hyperparathyroidism, unspecified     Hypertension     Prostate cancer 2016 7/16/19:  wife states "he doesn't have it anymore"    Seizures     Stroke 2011    PT HAS SHAKES     Unsteady gait     Uses roller walker       Past Surgical History:   Procedure Laterality Date    CARDIAC SURGERY      CABG    CATARACT EXTRACTION      ou    CATARACT EXTRACTION, BILATERAL      cataracts      CORONARY ANGIOGRAPHY INCLUDING BYPASS GRAFTS WITH CATHETERIZATION OF LEFT HEART N/A 12/16/2019    Procedure: ANGIOGRAM, CORONARY, INCLUDING BYPASS GRAFT, WITH LEFT HEART CATHETERIZATION, rcfa, 5fr;  Surgeon: David Villegas MD;  Location: [facility] CATH LAB;  Service: Cardiology;  Laterality: N/A;    CORONARY ANGIOGRAPHY INCLUDING BYPASS GRAFTS WITH CATHETERIZATION OF LEFT HEART N/A 4/30/2020    Procedure: ANGIOGRAM, CORONARY, INCLUDING BYPASS GRAFT, WITH LEFT HEART CATHETERIZATION, rcfa, 10 am start;  Surgeon: David Villegas MD;  Location: [facility] CATH LAB;  Service: Cardiology;  Laterality: N/A;    CORONARY ARTERY BYPASS GRAFT  2009    bypass    EYE SURGERY      INSERTION OF TUNNELED CENTRAL VENOUS HEMODIALYSIS CATHETER N/A 5/28/2020    Procedure: Insertion, Catheter, Central Venous, Hemodialysis;  Surgeon: DAVE Mazariegos III, MD;  Location: [facility] CATH LAB;  Service: Peripheral Vascular;  Laterality: N/A;    Left Guera AVF  1/11/2012    PROSTATE BIOPSY      positive    retinal laser      REVISION " OF ARTERIOVENOUS FISTULA Left 2/27/2020    Procedure: Ligation of left upper extremity fistula, Closure of left upper extremity skin defect x 2, Intraoperative ultrasound ;  Surgeon: Stephane Lopez MD;  Location: [facility] OR;  Service: Vascular;  Laterality: Left;    REVISION OF ARTERIOVENOUS FISTULA Left 4/10/2020    Procedure: AV fistula excision;  Surgeon: Stephane Lopez MD;  Location: [facility] OR [facility] FLR;  Service: Vascular;  Laterality: Left;    ROTATOR CUFF REPAIR  right, 2005    ULTRASOUND GUIDANCE  12/16/2019    Procedure: Ultrasound Guidance;  Surgeon: David Villegas MD;  Location: [facility] CATH LAB;  Service: Cardiology;;    VASCULAR SURGERY         Review of patient's allergies indicates:   Allergen Reactions    Reglan [metoclopramide hcl] Diarrhea    Lorazepam      Other reaction(s): heavy sedation       Family History     Problem Relation (Age of Onset)    Cancer Mother, Paternal Grandmother, Paternal Uncle    Cataracts Mother    Diabetes Mother    Glaucoma Mother    Heart disease Father, Maternal Grandmother    Hypertension Brother    No Known Problems Sister, Maternal Grandfather, Paternal Grandfather, Sister, Sister    Prostate cancer Brother        Tobacco Use    Smoking status: Never Smoker    Smokeless tobacco: Never Used   Substance and Sexual Activity    Alcohol use: No    Drug use: No    Sexual activity: Not Currently        Review of Systems   Constitutional: Negative for chills and fever.   HENT: Negative for rhinorrhea, sneezing and sore throat.    Eyes: Negative for redness.   Respiratory: Positive for shortness of breath. Negative for wheezing.    Cardiovascular: Negative for chest pain and palpitations.   Gastrointestinal: Negative for nausea and vomiting.   Musculoskeletal: Negative for joint swelling and neck stiffness.   Skin: Negative for pallor and rash.   Neurological: Negative for dizziness, light-headedness and headaches.   Psychiatric/Behavioral: Positive for  confusion and decreased concentration.      Objective:      Vital Signs (Most Recent):  Temp: 98.1 °F (36.7 °C) (05/29/20 0804)  Pulse: (!) 130 (05/29/20 1100)  Resp: (!) 21 (05/29/20 1100)  BP: (!) 88/57 (05/29/20 1000)  SpO2: 100 % (05/29/20 1100) Vital Signs (24h Range):  Temp:  [97.2 °F (36.2 °C)-98.2 °F (36.8 °C)] 98.1 °F (36.7 °C)  Pulse:  [] 130  Resp:  [16-28] 21  SpO2:  [91 %-100 %] 100 %  BP: ()/(52-68) 88/57   Weight: 67.6 kg (149 lb)  Body mass index is 20.78 kg/m².        Intake/Output Summary (Last 24 hours) at 5/29/2020 1118  Last data filed at 5/29/2020 0600      Gross per 24 hour   Intake 1000 ml   Output 547 ml   Net 453 ml         Physical Exam   Constitutional: He appears well-developed and well-nourished.   HENT:   Head: Normocephalic and atraumatic.   Eyes: Pupils are equal, round, and reactive to light. Conjunctivae are normal. No scleral icterus.   Neck: Normal range of motion. No JVD present.   Cardiovascular: Intact distal pulses. Exam reveals no friction rub.   No murmur heard.  tqchycardia with intermittant SVT   Pulmonary/Chest: He has no wheezes. He has rales.   Increased o2 demand   Abdominal: Soft. He exhibits no distension. There is no tenderness. There is no rebound.   Musculoskeletal: Normal range of motion. He exhibits no edema.   Neurological: He is alert. No cranial nerve deficit.   Skin: Skin is warm and dry. He is not diaphoretic.        Vents:  Oxygen Concentration (%): 6 (05/29/20 1100)      Lines/Drains/Airways            Central Venous Catheter Line                          Hemodialysis Catheter 05/24/20 0050 5 days                   Peripheral Intravenous Line                          Peripheral IV - Single Lumen 05/28/20 1635 20 G;1 3/4 in Right Forearm less than 1 day                  Significant Labs:     CBC/Anemia Profile:       Recent Labs   Lab 05/28/20  0714 05/29/20  0248   WBC 12.64 7.79   HGB 6.0* 7.4*   HCT 19.6* 23.2*    201   MCV 87 84    RDW 17.2* 16.6*         Chemistries:       Recent Labs   Lab 05/28/20  0714 05/29/20  0248    136   K 5.6* 4.7    102   CO2 24 22*   BUN 41* 31*   CREATININE 5.4* 3.8*   CALCIUM 7.8* 7.6*   ALBUMIN  --  1.4*   PROT  --  6.5   BILITOT  --  0.5   ALKPHOS  --  219*   ALT  --  21   AST  --  37   MG 2.2 2.1   PHOS 5.0* 4.2         Blood Culture: No results for input(s): LABBLOO in the last 48 hours.  BMP:       Recent Labs   Lab 05/29/20  0248   *      K 4.7      CO2 22*   BUN 31*   CREATININE 3.8*   CALCIUM 7.6*   MG 2.1      CMP:        Recent Labs   Lab 05/28/20  0714 05/29/20  0248    136   K 5.6* 4.7    102   CO2 24 22*   * 296*   BUN 41* 31*   CREATININE 5.4* 3.8*   CALCIUM 7.8* 7.6*   PROT  --  6.5   ALBUMIN  --  1.4*   BILITOT  --  0.5   ALKPHOS  --  219*   AST  --  37   ALT  --  21   ANIONGAP 13 12   EGFRNONAA 10.5* 16.0*      Lactic Acid: No results for input(s): LACTATE in the last 48 hours.  POCT Glucose:         Recent Labs   Lab 05/28/20  1802 05/28/20  2220 05/29/20  0942   POCTGLUCOSE 186* 274* 219*      Troponin: No results for input(s): TROPONINI in the last 48 hours.  All pertinent labs within the past 24 hours have been reviewed.     Significant Imaging:  I have reviewed all pertinent imaging results/findings within the past 24 hours.        ABG      Recent Labs   Lab 05/29/20  0654   PH 7.487*   PO2 120*   PCO2 30.5*   HCO3 23.1*   BE 0

## 2020-05-30 NOTE — PLAN OF CARE
Handoff     Primary Team: Networked reference to record [facility]  Room Number: 6074/6074 A     Patient Name: Kodi Ibanez Sr. MRN: [id number]     Date of Birth: [id number] Allergies: Reglan [metoclopramide hcl] and Lorazepam     Age: 60 y.o. Admit Date: 5/13/2020     Sex: male  BMI: Body mass index is 20.78 kg/m².     Code Status: DNR        Illness Level (current clinical status): Watcher - No    Reason for Admission: Shock    Brief HPI (pertinent PMH and diagnosis or differential diagnosis): 59 y/o male with past medical history of CHF, CAD with recent NSTEMI [~4/29] (CABG 2009, FERCHO placed in 12/2019), diabetes, CVA, ESRD on dialysis Tuesday/Thursday/Saturday (last dialyzed morning of 5/12) presents to the ER by referral from infectious disease clinic after virtual visit today. The ID provider was concerned about the patients AMS and recent elevated WBC count. Recently,junior underwent ligation of LUE fistula on 2/27/20 with Dr Lopez ([facility], vascular surgery) and had ongoing issues afterwards. Patient was admitted on 4/10 for excision of his infected LUE mid and distal AVF with cultures and specimen obtained. Surgical cultures 4/10 grew 2 different prevotella species, fusobacterium, and strep anginosus. The patient was d/c'ed with home health wound care and po augmentin/clindamycin x 10 days. On 4/20 antibiotics were changed to Flagyl 500 mg p.o. q.8 and Cefazolin after dialysis 2 g on Tuesday and Thursday, 3 g on Saturday. End date on antibiotics on 5/11.     Following these events, on 4/29 the patient was admitted for an NSTEMI, a LHC was performed where re-stenosis of a previous stent was found and balloon angioplasty was done. He was discharged on 5/2. He is now on ASA/plavix, but per wife has not been compliant.     Patients wife says that he has had poor PO intake for the last 2-3 weeks, has become progressively weak, and developed altered mental status today. Patient had 3 episodes of vomiting within the  last 24 hours, which appeared brown-non bloody. He had a brown watery bowel movement today. Patient normally oliguric (urinating ~2-3 x per week). Patient was seen in the [facility] ED on the evening of 5/12 due to palpitations after his normal dialysis and sent home after evaluation, for possible antibiotic side effect.     He presents to the ED with encephalopathy x 1 day, leukocytosis of 15, lactate 2.7, troponin 0.482, , procal 10.98, Cr 6.5, BUN 41, and hyperglycemia. Patient pan-cultured, currently only UA resulted with large amount of bacteria. Patient requiring high dose Levophed on admission. Broad spectrum antibiotics initiated. 1.5 L IVF given. Patient remains on RA. Wife was updated regarding patient's current status. Patient spoken to regarding code status and verbalized his wishes as a DNR. His wife confirmed he has an LAPost but it is not on file. Instructed to bring in.    Procedure Date: none    Hospital Course (updated, brief assessment by system or problem, significant events): Patient with improved oxygen requirments in ICU. Nephrology recommended no dialysis today given current labs and anemia. Will continue to monitor. Vascular sx with plans for permacath for HD access once stable.     Tasks (specific, using if-then statements): Follow up with vascualr surgery regarding permacath placement. Follow up with nephro regarding plans for next HD. May be contingent upon improvement of anemia    Contingency Plan (special circumstances anticipated and plan):     Estimated Discharge Date: 6/7/2020    Discharge Disposition: Home or Self Care    Mentored By: Dr Polo

## 2020-05-30 NOTE — H&P
[facility]  Critical Care Medicine  History & Physical    Patient Name: Kodi Ibanez Sr.  MRN: [id number]  Admission Date: 5/13/2020  Hospital Length of Stay: 17 days  Code Status: DNR  Attending Physician: Willis Polo MD   Primary Care Provider: Melonie Elias MD   Principal Problem: Shock    Subjective:     HPI:  61 y/o male with past medical history of CHF, CAD with recent NSTEMI [~4/29] (CABG 2009, FERHCO placed in 12/2019), diabetes, CVA, ESRD on dialysis Tuesday/Thursday/Saturday (last dialyzed morning of 5/12) presents to the ER by referral from infectious disease clinic after virtual visit today. The ID provider was concerned about the patients AMS and recent elevated WBC count. Recently,atient underwent ligation of LUE fistula on 2/27/20 with Dr Lopez ([facility], vascular surgery) and had ongoing issues afterwards. Patient was admitted on 4/10 for excision of his infected LUE mid and distal AVF with cultures and specimen obtained. Surgical cultures 4/10 grew 2 different prevotella species, fusobacterium, and strep anginosus. The patient was d/c'ed with home health wound care and po augmentin/clindamycin x 10 days. On 4/20 antibiotics were changed to Flagyl 500 mg p.o. q.8 and Cefazolin after dialysis 2 g on Tuesday and Thursday, 3 g on Saturday. End date on antibiotics on 5/11.     Following these events, on 4/29 the patient was admitted for an NSTEMI, a LHC was performed where re-stenosis of a previous stent was found and balloon angioplasty was done. He was discharged on 5/2. He is now on ASA/plavix, but per wife has not been compliant.     Patients wife says that he has had poor PO intake for the last 2-3 weeks, has become progressively weak, and developed altered mental status today. Patient had 3 episodes of vomiting within the last 24 hours, which appeared brown-non bloody. He had a brown watery bowel movement today. Patient normally oliguric (urinating ~2-3 x per  week). Patient was seen in the [facility] ED on the evening of 5/12 due to palpitations after his normal dialysis and sent home after evaluation, for possible antibiotic side effect.     He presents to the ED with encephalopathy x 1 day, leukocytosis of 15, lactate 2.7, troponin 0.482, , procal 10.98, Cr 6.5, BUN 41, and hyperglycemia. Patient pan-cultured, currently only UA resulted with large amount of bacteria. Patient requiring high dose Levophed on admission. Broad spectrum antibiotics initiated. 1.5 L IVF given. Patient remains on RA. Wife was updated regarding patient's current status. Patient spoken to regarding code status and verbalized his wishes as a DNR. His wife confirmed he has an LAPost but it is not on file. Instructed to bring in.    Hospital/ICU Course:  The patient was admitted to MICU for shock likely 2/2 to sepsis on 5/13. Patient remains on low dose Levophed. Nephrology consulted for possible dialysis. Blood cx NGTD. Patient remains on Heparin gtt for paroxysmal A fib with CHADS Vasc score of 6. Previously hyperglycemic with BG now well controlled.    5/14: Levo stopped in morning. Stable all day off pressors on room air. Zosyn changed to unasyn.     5/15: Patient spiked new temp, vanc restarted. Patient with intermittent SVT- Home coreg and dilt restarted. Step down orders placed but patient didn't get bed    5/17: Hypotension overnight requiring vasopressors. Transferred back to MICU on norepi    5/18: Norepi stopped yesterday. BP stable. Restarted home dilt. Will step down to the floor today.     5/19: Patient remained in ICU over night due to SVT requiring diltiazem drip. Will wean diltiazem drip today and transition to po meds. HD ordered for today.     5:29: Patient admitted to ICU from floor following ongoing nonsustained VT with associated hypotension. Patient was also less alert although was answering some questions appropriately. He received 2 separate doses of Lopressor IV 5mg  "as well as valsalva without appropriate response. Was noted to have R lower lobe rales on exam. Admitted for HD instability/control in the setting of ongoing and uncontrolled sepsis with afib and increased oxygen demands.     Past Medical History:   Diagnosis Date    Atherosclerosis of aorta     noted on VIKTORIA 2011    Blood transfusion     Cataracts, bilateral     CHF (congestive heart failure)     Clotting disorder     Cognitive deficits as late effect of cerebrovascular disease 6/26/2012    Coronary artery disease     NSTEMI s/p PCI with FERCHO to LCx (12/16/19),     Decreased appetite     DM (diabetes mellitus), type 2 with renal complications     Diabetic retinopathy    ESRD on hemodialysis     TUESDAY/ THURSDAY/SATURDAY    Hyperlipidemia     Hyperparathyroidism, unspecified     Hypertension     Prostate cancer 2016 7/16/19:  wife states "he doesn't have it anymore"    Seizures     Stroke 2011    PT HAS SHAKES     Unsteady gait     Uses roller walker       Past Surgical History:   Procedure Laterality Date    CARDIAC SURGERY      CABG    CATARACT EXTRACTION      ou    CATARACT EXTRACTION, BILATERAL      cataracts      CORONARY ANGIOGRAPHY INCLUDING BYPASS GRAFTS WITH CATHETERIZATION OF LEFT HEART N/A 12/16/2019    Procedure: ANGIOGRAM, CORONARY, INCLUDING BYPASS GRAFT, WITH LEFT HEART CATHETERIZATION, rcfa, 5fr;  Surgeon: David Villegas MD;  Location: [facility] CATH LAB;  Service: Cardiology;  Laterality: N/A;    CORONARY ANGIOGRAPHY INCLUDING BYPASS GRAFTS WITH CATHETERIZATION OF LEFT HEART N/A 4/30/2020    Procedure: ANGIOGRAM, CORONARY, INCLUDING BYPASS GRAFT, WITH LEFT HEART CATHETERIZATION, rcfa, 10 am start;  Surgeon: David Villegas MD;  Location: [facility] CATH LAB;  Service: Cardiology;  Laterality: N/A;    CORONARY ARTERY BYPASS GRAFT  2009    bypass    EYE SURGERY      INSERTION OF TUNNELED CENTRAL VENOUS HEMODIALYSIS CATHETER N/A 5/28/2020    Procedure: Insertion, Catheter, Central " Venous, Hemodialysis;  Surgeon: DAVE Mazariegos III, MD;  Location: [facility] CATH LAB;  Service: Peripheral Vascular;  Laterality: N/A;    Left Guera AVF  1/11/2012    PROSTATE BIOPSY      positive    retinal laser      REVISION OF ARTERIOVENOUS FISTULA Left 2/27/2020    Procedure: Ligation of left upper extremity fistula, Closure of left upper extremity skin defect x 2, Intraoperative ultrasound ;  Surgeon: Stephane Lopez MD;  Location: [facility] OR;  Service: Vascular;  Laterality: Left;    REVISION OF ARTERIOVENOUS FISTULA Left 4/10/2020    Procedure: AV fistula excision;  Surgeon: Stephane Lopez MD;  Location: [facility] OR 2ND FLR;  Service: Vascular;  Laterality: Left;    ROTATOR CUFF REPAIR  right, 2005    ULTRASOUND GUIDANCE  12/16/2019    Procedure: Ultrasound Guidance;  Surgeon: David Villegas MD;  Location: [facility] CATH LAB;  Service: Cardiology;;    VASCULAR SURGERY         Review of patient's allergies indicates:   Allergen Reactions    Reglan [metoclopramide hcl] Diarrhea    Lorazepam      Other reaction(s): heavy sedation       Family History     Problem Relation (Age of Onset)    Cancer Mother, Paternal Grandmother, Paternal Uncle    Cataracts Mother    Diabetes Mother    Glaucoma Mother    Heart disease Father, Maternal Grandmother    Hypertension Brother    No Known Problems Sister, Maternal Grandfather, Paternal Grandfather, Sister, Sister    Prostate cancer Brother        Tobacco Use    Smoking status: Never Smoker    Smokeless tobacco: Never Used   Substance and Sexual Activity    Alcohol use: No    Drug use: No    Sexual activity: Not Currently        Review of Systems   Constitutional: Negative for chills and fever.   HENT: Negative for rhinorrhea, sneezing and sore throat.    Eyes: Negative for redness.   Respiratory: Positive for shortness of breath. Negative for wheezing.    Cardiovascular: Negative for chest pain and palpitations.   Gastrointestinal: Negative for nausea and  vomiting.   Musculoskeletal: Negative for joint swelling and neck stiffness.   Skin: Negative for pallor and rash.   Neurological: Negative for dizziness, light-headedness and headaches.   Psychiatric/Behavioral: Positive for confusion and decreased concentration.      Objective:      Vital Signs (Most Recent):  Temp: 98.1 °F (36.7 °C) (05/29/20 0804)  Pulse: (!) 130 (05/29/20 1100)  Resp: (!) 21 (05/29/20 1100)  BP: (!) 88/57 (05/29/20 1000)  SpO2: 100 % (05/29/20 1100) Vital Signs (24h Range):  Temp:  [97.2 °F (36.2 °C)-98.2 °F (36.8 °C)] 98.1 °F (36.7 °C)  Pulse:  [] 130  Resp:  [16-28] 21  SpO2:  [91 %-100 %] 100 %  BP: ()/(52-68) 88/57   Weight: 67.6 kg (149 lb)  Body mass index is 20.78 kg/m².        Intake/Output Summary (Last 24 hours) at 5/29/2020 1118  Last data filed at 5/29/2020 0600      Gross per 24 hour   Intake 1000 ml   Output 547 ml   Net 453 ml         Physical Exam   Constitutional: He appears well-developed and well-nourished.   HENT:   Head: Normocephalic and atraumatic.   Eyes: Pupils are equal, round, and reactive to light. Conjunctivae are normal. No scleral icterus.   Neck: Normal range of motion. No JVD present.   Cardiovascular: Intact distal pulses. Exam reveals no friction rub.   No murmur heard.  tqchycardia with intermittant SVT   Pulmonary/Chest: He has no wheezes. He has rales.   Increased o2 demand   Abdominal: Soft. He exhibits no distension. There is no tenderness. There is no rebound.   Musculoskeletal: Normal range of motion. He exhibits no edema.   Neurological: He is alert. No cranial nerve deficit.   Skin: Skin is warm and dry. He is not diaphoretic.        Vents:  Oxygen Concentration (%): 6 (05/29/20 1100)      Lines/Drains/Airways            Central Venous Catheter Line                          Hemodialysis Catheter 05/24/20 0050 5 days                   Peripheral Intravenous Line                          Peripheral IV - Single Lumen 05/28/20 1635 20 G;1 3/4  in Right Forearm less than 1 day                  Significant Labs:     CBC/Anemia Profile:       Recent Labs   Lab 05/28/20  0714 05/29/20  0248   WBC 12.64 7.79   HGB 6.0* 7.4*   HCT 19.6* 23.2*    201   MCV 87 84   RDW 17.2* 16.6*         Chemistries:       Recent Labs   Lab 05/28/20  0714 05/29/20  0248    136   K 5.6* 4.7    102   CO2 24 22*   BUN 41* 31*   CREATININE 5.4* 3.8*   CALCIUM 7.8* 7.6*   ALBUMIN  --  1.4*   PROT  --  6.5   BILITOT  --  0.5   ALKPHOS  --  219*   ALT  --  21   AST  --  37   MG 2.2 2.1   PHOS 5.0* 4.2         Blood Culture: No results for input(s): LABBLOO in the last 48 hours.  BMP:       Recent Labs   Lab 05/29/20  0248   *      K 4.7      CO2 22*   BUN 31*   CREATININE 3.8*   CALCIUM 7.6*   MG 2.1      CMP:        Recent Labs   Lab 05/28/20  0714 05/29/20  0248    136   K 5.6* 4.7    102   CO2 24 22*   * 296*   BUN 41* 31*   CREATININE 5.4* 3.8*   CALCIUM 7.8* 7.6*   PROT  --  6.5   ALBUMIN  --  1.4*   BILITOT  --  0.5   ALKPHOS  --  219*   AST  --  37   ALT  --  21   ANIONGAP 13 12   EGFRNONAA 10.5* 16.0*      Lactic Acid: No results for input(s): LACTATE in the last 48 hours.  POCT Glucose:         Recent Labs   Lab 05/28/20  1802 05/28/20  2220 05/29/20  0942   POCTGLUCOSE 186* 274* 219*      Troponin: No results for input(s): TROPONINI in the last 48 hours.  All pertinent labs within the past 24 hours have been reviewed.     Significant Imaging:  I have reviewed all pertinent imaging results/findings within the past 24 hours.        ABG      Recent Labs   Lab 05/29/20  0654   PH 7.487*   PO2 120*   PCO2 30.5*   HCO3 23.1*   BE 0         Assessment/Plan:     Cardiac/Vascular  Atrial fibrillation  --patient on diltiazem for rate control; restarted home dose   --on ASA/plavix  -- Previously holding AC given possible procedure; will re-evaluate with possible readministration  --Chads Vasc score 6 points      NSTEMI (non-ST  elevated myocardial infarction)  Hx of CABG in 2009 and placement of FERCHO in 12/2019. Patient recenetly admitted on 4/29 and d/c'ed 5/2, admitted for an NSTEMI, a LHC was performed where re-stenosis of a previous stent was found and balloon angioplasty was done. He is now on ASA/plavix, but per wife has not been compliant.    --stat EKG  --troponin now down-trending; stopped trending  --continue ASA/plavix (home meds)  --echo pending    Essential hypertension  Currently holding home lopressor and coreg    Continuing dilt 30 q6h and lopressor 50 BID    SVT (supraventricular tachycardia)  --ECG showed SVT with -150'2; Reported nonsustained Vtach overnight - not responsive to metoprolol or valsalva  -- Currently on dilt and metoprolol scheduled  -- Decreased Dilt to 30 recently  -- Will continue to monitor patients rate, rhythm and BP given recent instability and diltiazem scheduling/dose      History of MI (myocardial infarction)  Currently on ASA and Plavix and atorvastatin    HLD (hyperlipidemia)  --statin for secondary prevention    Renal/  ESRD on hemodialysis  Patient on HD T/Th/Sat with last session  Prior to admission on 5/12, where he was admitted to the ED with palpatations after session. Patient has tunneled catheter that was inserted in 2/2020 that is being used for access.     --nephrology consulted  --HD still being completed as scheduled  -- Will continue to monitor electrolytes given previous hyperkalemia (now resolved)  --avoid nephrotoxic meds  --oliguric, strict I/o's  --BMP daily  -- Vascular surgery consulted regarding new line placement; currently pending HD stability and control of irregular rhythm; will follow up     ID  Candidemia  --optho and ID consulted  --continue fluconazole for 4 weeks of therapy from line removal  -- Possible line change with vascular surgery pending HD improvement    AV fistula infection  --see shock  --continue fluconazole  --ID and opthalmology consulted      Endocrine  Type 2 diabetes, uncontrolled, with neuropathy  -->500 on admission. Goal -180  -- Continuing management with insulin aspart/detemir and adjusting as needed  --SSI  --accuchecks q4h        Other  * Shock  2/2 probable sepsis likely r/t LUE AV fistula excision. Patient also with R tunneled catheter placed 2/2020.  Patient underwent ligation of LUE fistula on 2/27/20 with Dr Lopez ([facility], vascular surgery) and had ongoing issues afterwards. Patient was admitted on 4/10 for excision of his infected LUE mid and distal AVF with cultures and specimen obtained. Surgical cultures 4/10 grew 2 different prevotella species, fusobacterium, and strep anginosus. The patient was d/c'ed with home health wound care and po augmentin/clindamycin x 10 days. On 4/20 antibiotics were changed to Flagyl 500 mg p.o. q.8 and Cefazolin after dialysis 2 g on Tuesday and Thursday, 3 g on Saturday. End date on antibiotics on 5/11.    No current leukocytosis; previously persistent C. Albicans BCx growth  Most recent BCx NGTD    --ID and opthalmology consulted   --Will continue Fluconazole for 4 weeks of therapy  -- Starting Cefepime on 5/29  -- Will continue to monitor BP with possible pressor support if needed  -- Respiratory Cx ordered; will follow up        Critical Care Daily Checklist:    A: Awake: RASS Goal/Actual Goal: RASS Goal: 0-->alert and calm  Actual: Torres Agitation Sedation Scale (RASS): Alert and calm   B: Spontaneous Breathing Trial Performed?     C: SAT & SBT Coordinated?  n/a                      D: Delirium: CAM-ICU Overall CAM-ICU: Negative   E: Early Mobility Performed? Yes   F: Feeding Goal: Goals: PO intake > 50% by RD follow up  Status: Nutrition Goal Status: goal not met   Current Diet Order   Procedures    Diet NPO      AS: Analgesia/Sedation no   T: Thromboembolic Prophylaxis Heparin drip   H: HOB > 300 Yes   U: Stress Ulcer Prophylaxis (if needed) no   G: Glucose Control yes   B: Bowel  Function Stool Occurrence: 2   I: Indwelling Catheter (Lines & Mayers) Necessity PIV, HD cath   D: De-escalation of Antimicrobials/Pharmacotherapies     Plan for the day/ETD     Code Status:  Family/Goals of Care: DNR         Critical secondary to Patient has a condition that poses threat to life and bodily function: Severe Respiratory Distress     Critical care was time spent personally by me on the following activities: development of treatment plan with patient or surrogate and bedside caregivers, discussions with consultants, evaluation of patient's response to treatment, examination of patient, ordering and performing treatments and interventions, ordering and review of laboratory studies, ordering and review of radiographic studies, pulse oximetry, re-evaluation of patient's condition. This critical care time did not overlap with that of any other provider or involve time for any procedures.     Reece Lawson MD  Critical Care Medicine  [facility]

## 2020-05-31 NOTE — PLAN OF CARE
Problem: Occupational Therapy Goal  Goal: Occupational Therapy Goal  Description  Goals set on 5/19 with expiration date 6/2:  Patient will increase functional independence with ADLs by performing:    Supine <> Sit with Min Assistance.  Feeding with Min Assistance.  Grooming while standing at sink with Min Assistance.  UB Dressing with Min  Assistance.  LB Dressing with Min Assistance.  Step transfer with Min Assistance with DME as needed.  Pt will demonstrate understanding of education provided regarding energy conservation and task modification through teach-back method.        Outcome: Ongoing, Progressing     Evelyn Wallace, OTR/L  Pager: [phone]  5/31/2020

## 2020-05-31 NOTE — NURSING
Pts wife wanted him to get taken off NPO.     Contacted provider on call Ketty who explained, the orders have to stay intact until the pt is assessed by the morning team.     Called Mrs Ibanez back to provide her with this update as well as the patient's BG.     She was understanding and would like to be kept up to date on any updates throughout the night.   She reiterated she does not want him to go through any more procedures.     She wants to possibly  have him d/c home on hospice.

## 2020-05-31 NOTE — PROGRESS NOTES
Hospital Medicine  Progress Note      Patient Name: Kodi Ibanez Sr.  MRN:  [id number]  [facility] Medicine Team: [facility] Unruly Feng MD  Date of Admission:  5/13/2020     Length of Stay:  LOS: 18 days   Expected Discharge Date: 6/2/2020  Principal Problem:  Shock      Subjective:    Interval History/Overnight Events:    Patient stepped back down from MICU overnight, HR and BP better controlled. Patient with Hb of 5.3, no evidence of bleeding, remains on low intensity heparin gtt. Nephrology following, no need for HD today. Vascular surgery to place permacath once patient more HDS.       Review of Systems:  Constitutional:NEG for fatigue, fever.   Respiratory: Negative for shortness of breath.  Positive for cough  Cardiovascular: Negative for chest pain, palpitations, leg swelling.   Gastrointestinal: Negative for abdominal pain, nausea, vomiting,   constipation  Genitourinary: Negative for dysuria, frequency.   All other systems reviewed and are negative.      Objective:    Physical Exam:  Temp:  [97 °F (36.1 °C)-97.7 °F (36.5 °C)]   Pulse:  []   Resp:  [16-20]   BP: ()/(61-76)   SpO2:  [93 %-100 %]     Constitutional: alert, in NAD  Eyes: No scleral icterus or eye discharge  Cardiovascular: tachycardic.  Regular heart rhythm.  No murmur heard. Lt IJ trialysis in place.  Pulmonary/Chest: Effort normal and breath sounds normal. No respiratory distress. No wheezes, rales, or rhonchi  Abdominal: Soft. Bowel sounds are normal.  No distension.  No tenderness  Musculoskeletal: No deformities.  No edema.   Neurological: Alert.  Oriented to person,  time and place  Skin: Skin is warm and dry.       Assessment and Plan:    Mr. Kodi Ibanez Sr. is a 60 y.o. male who presented to [facility] on 5/13/2020 with shock 2/2 sepsis.     The patient was admitted to MICU for shock likely 2/2 to sepsis on 5/13 requiring low dose Levophed. Weaned off on 5/14, but required to go back on vasopresors on   5/17/20.  Started on Heparin gtt for paroxysmal A fib with CHADS Vasc score of 6. Started on empiric broad spectrum abx/ Patient with intermittent SVT- Home coreg and dilt restarted.  Grew candida albicans on blood cx x 2 on 5/15, 5/17, 5/20. Initially on micafungin, but switched to fluconazole on 5/20 per ID recs. Evaluated by ophtha. Tunneled cath removed by surgery on 5/21 for line holiday. Line placed 5/24 and underwent HD. Tachycardic since 3 am. EKG with sinus tach. BP borderline in afternoon. Received 250cc bolus w improvement. WBC uptrended to 15.8k, concerning for acute infection, so started on vanc/zosyn and cultured. Febrile the next morning with watery stools. cdiff sent and was negative. vanc stopped on 5/26.  Increased metoprolol to 50 mg BID and given 500 cc back during HD. H/H down to 6.0 from 6.5 however no signs of bleeding. 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 Discussed with nephrology, IV EPO 10k units initiated during HD. To OR for permacath placement today with IR.       Shock  Candidemia  2/2 probable sepsis likely r/t LUE AV fistula excision. Patient also with R tunneled catheter placed 2/2020.  Patient underwent ligation of LUE fistula on 2/27/20 with Dr Lopez ([facility], vascular surgery) and had ongoing issues afterwards. Patient was admitted on 4/10 for excision of his infected LUE mid and distal AVF with cultures and specimen obtained. Surgical cultures 4/10 grew 2 different prevotella species, fusobacterium, and strep anginosus. The patient was d/c'ed with home health wound care and po augmentin/clindamycin x 10 days. On 4/20 antibiotics were changed to Flagyl 500 mg p.o. q.8 and Cefazolin after dialysis 2 g on Tuesday and Thursday, 3 g on Saturday. End date on antibiotics on 5/11.       --BC grew candida albicans 5/15, 5/17, 5/20  --ID and opthalmology consulted; appreciate recs  --s/p tunneled cath removal by vascular surgery on 5/21/20      --Started on micafungin,  now on fluconazole since 5/20; continue x 4 wks post line removal per ID (estimated end date 06/18/20)    Sepsis  --WBC 15.85, tachycardic on 5/24 and febrile on 5/25  --source unclear - CXR without consolidation, line just placed on 5/24. Has known fungemia, but has been on fluconazole.  --on empiric vanc and zosyn since 5/24 PLUS fluconazole  --c diff NEG  --blood cultures 5/24- NGTD  --afebrile x 24 hours  --de-escalated to zosyn only 5/26 and to levaquin for an additional 48 hrs on 5/27      Atrial fibrillation  --patient on diltiazem for rate control; restarted home dose   --on ASA/plavix/apixaban   --Chads Vasc score 6 points  --apixaban held (last dose 5/26 am) for permacath placement  --hgb low today at 5.3, continuing hep gtt     Sinus Tachycardia  --confirmed on EKG 5/24-  and on 5/27  --improved with 250cc bolus  -- increased dose of lopressor on 5/28/20         NSTEMI (non-ST elevated myocardial infarction)  Hx of CABG in 2009 and placement of FERCHO in 12/2019. Patient recenetly admitted on 4/29 and d/c'ed 5/2, admitted for an NSTEMI, a LHC was performed where re-stenosis of a previous stent was found and balloon angioplasty was done. He is now on ASA/plavix, but per wife has not been compliant.     --continue ASA/plavix (home meds)  --TTE 5/14 with preserved EF 60% and no focal WMA     Essential hypertension  --well controlled on losartan, diltiazem and lopressor  --losartan held for hypotension, resume as needed     SVT (supraventricular tachycardia)  --ECG showed SVT with -150'2  --Required diltiazem drip, weaned and transition to PO dilt on 5/19  --continue dilt and lopressor        HLD (hyperlipidemia)  --statin for secondary prevention    ESRD on hemodialysis  Patient on HD T/Th/Sat with last session  Prior to admission on 5/12, where he was admitted to the ED with palpatations after session. Patient has tunneled catheter that was inserted in 2/2020 that is being used for access.       --nephrology consulted  --trialysis line placed 5/24 by anesthesia- s/p HD 5/26  --avoid nephrotoxic meds  --oliguric, strict I/o's  --BMP daily  --vasc sx consulted, plan for permacath placement 5/28/20 however patient got hypotensive and tachycardic after versed and fentanyl were given. Discussed with vascular and charge nurse on 5th floor, plan for peripheral IV access today and back to the OR tomorrow for permacath in [facility]. Diet resumed today, NPO at midnight.       AV fistula infection  --see shock  --continue micafungin   --ID and opthalmology consulted      Endocrine  Type 2 diabetes, uncontrolled, with neuropathy  -->500 on admission. Goal -180- per wife goal at home Is 225  --difficulty finding an adequate regimen as pt w episodes of hypoglycemia and hyperglycemia  --on levemir 15u qd, aspart 3 TIDWM  --SSI    --continue to adjust and monitor for hypoglycemia  --accuchecks q4h    Diarrhea  --x 1 day  --c diff ordered and was neg  --avoid fruits per wife  --imodium prn    Anemia  --likely of chronic disease and Fe def  --slowly downtrending since admission without signs of active bleeding  --hgb 7-->6.5-->6.0 today  --jehova's witness; does NOT accept blood products  --limit blood draws  --hold off on starting hep gtt until H/H stable  --IV EPO 10k units with HD initiated         Diet:  Diabetic, Renal  DVT PPx:  SCD  Goals of Care:  DNR      Disposition:  Pending permacath placement  Discharge Needs:  SNF, follow up with MEHREEN Feng MD  [facility] Medicine  Pager:  [phone]

## 2020-05-31 NOTE — ANESTHESIA PROCEDURE NOTES
Peripheral IV Insertion    Diagnosis: I99.8 Other disorder of circulatory system    Patient location during procedure: floor    Staffing  Authorizing Provider: Misha Nieto MD  Performing Provider: Misha Nieto MD      Preanesthetic Checklist  Completed: patient identifiedPeripheral IV Insertion  Skin Prep: alcohol swabs  Local Infiltration: none  Orientation: right  Location: forearm  Catheter Size: 20 GInsertion Attempts: 4 or more  Assessment  Dressing: secured with tape and tegaderm  Patient: Tolerated well  Line flushed easily.

## 2020-05-31 NOTE — PLAN OF CARE
Problem: Fall Injury Risk  Goal: Absence of Fall and Fall-Related Injury  Outcome: Ongoing, Not Progressing     Problem: Adult Inpatient Plan of Care  Goal: Plan of Care Review  Outcome: Ongoing, Not Progressing  Goal: Patient-Specific Goal (Individualization)  Description  PMHx: CHF, CAD with recent NSTEMI [~4/29] (CABG 2009), diabetes, CVA, ESRD on dialysis Tuesday/Thursday/Saturday (last dialyzed morning of 5/12), paroxysmal A fib     5/13: ED AMS, high WBC, levo gtt, pan cultured 1.5 L LR    5/14: No pressors required   5/15: Febrile. Intermittent SVT.   5/16: Stepped Down  5/17: Rapid SICU. Hypotension. Levo and Vaso gtt. Blood cultures.   5/18: 7 beats Vtach/-150's sustained. 5mg Lopressor IVP x2. Bolus dose Cardizem 15mg. Stepdown orders d/c'd.  Cardizem drip.  Lopressor po started.        Nursing:   MAP >65   Accuchecks Q4hrs     Outcome: Ongoing, Not Progressing  Goal: Absence of Hospital-Acquired Illness or Injury  Outcome: Ongoing, Not Progressing  Goal: Optimal Comfort and Wellbeing  Outcome: Ongoing, Not Progressing  Goal: Readiness for Transition of Care  Outcome: Ongoing, Not Progressing  Goal: Rounds/Family Conference  Outcome: Ongoing, Not Progressing     Problem: Diabetes Comorbidity  Goal: Blood Glucose Level Within Desired Range  Outcome: Ongoing, Not Progressing     Problem: Adjustment to Illness (Sepsis/Septic Shock)  Goal: Optimal Coping  Outcome: Ongoing, Not Progressing     Problem: Bleeding (Sepsis/Septic Shock)  Goal: Absence of Bleeding  Outcome: Ongoing, Not Progressing     Problem: Glycemic Control Impaired (Sepsis/Septic Shock)  Goal: Blood Glucose Level Within Desired Range  Outcome: Ongoing, Not Progressing     Problem: Hemodynamic Instability (Sepsis/Septic Shock)  Goal: Effective Tissue Perfusion  Outcome: Ongoing, Not Progressing     Problem: Infection (Sepsis/Septic Shock)  Goal: Absence of Infection Signs/Symptoms  Outcome: Ongoing, Not Progressing     Problem:  Nutrition Impaired (Sepsis/Septic Shock)  Goal: Optimal Nutrition Intake  Outcome: Ongoing, Not Progressing     Problem: Respiratory Compromise (Sepsis/Septic Shock)  Goal: Effective Oxygenation and Ventilation  Outcome: Ongoing, Not Progressing     Problem: Infection  Goal: Infection Symptom Resolution  Outcome: Ongoing, Not Progressing     Problem: Skin Injury Risk Increased  Goal: Skin Health and Integrity  Outcome: Ongoing, Not Progressing     Problem: Device-Related Complication Risk (CRRT (Continuous Renal Replacement Therapy))  Goal: Safe, Effective Therapy Delivery  Outcome: Ongoing, Not Progressing     Problem: Infection (CRRT (Continuous Renal Replacement Therapy))  Goal: Absence of Infection Signs/Symptoms  Outcome: Ongoing, Not Progressing     Problem: Hypothermia (CRRT (Continuous Renal Replacement Therapy))  Goal: Body Temperature Maintained in Desired Range  Outcome: Ongoing, Not Progressing     Problem: Device-Related Complication Risk (Hemodialysis)  Goal: Safe, Effective Therapy Delivery  Outcome: Ongoing, Not Progressing     Problem: Hemodynamic Instability (Hemodialysis)  Goal: Vital Signs Remain in Desired Range  Outcome: Ongoing, Not Progressing     Problem: Infection (Hemodialysis)  Goal: Absence of Infection Signs/Symptoms  Outcome: Ongoing, Not Progressing     Problem: Coping Ineffective  Goal: Effective Coping  Outcome: Ongoing, Not Progressing     Problem: Wound  Goal: Optimal Wound Healing  Outcome: Ongoing, Not Progressing     Problem: Malnutrition  Goal: Improved Nutritional Intake  Outcome: Ongoing, Not Progressing     Problem: Electrolyte Imbalance (Chronic Kidney Disease)  Goal: Electrolyte Balance  Outcome: Ongoing, Not Progressing     Problem: Fluid Volume Excess (Chronic Kidney Disease)  Goal: Fluid Balance  Outcome: Ongoing, Not Progressing     Pt AAOx3. Resting in bed. No pain noted. NPO order removed. Pt able to tolerate food. Wife does not want pt to have procedure done. Paged  MD waiting for call back. HD access CDI. Bed in lowest position. Call light in reach. Will continue to monitor.

## 2020-05-31 NOTE — PT/OT/SLP PROGRESS
Physical Therapy      Patient Name:  Kodi Ibanez .   MRN:  [id number]    Patient not seen today secondary to Patient unwilling to participate. Pt educated on importance of participating with therapy. Will follow-up next scheduled treatment per PT POC.    Artem Marx, PTA  5/31/2020

## 2020-05-31 NOTE — PT/OT/SLP PROGRESS
Occupational Therapy   Treatment    Name: Kodi Ibanez Sr.  MRN: [id number]  Admitting Diagnosis:  Shock  3 Days Post-Op    Recommendations:     Discharge Recommendations: nursing facility, skilled  Discharge Equipment Recommendations:  walker, rolling, bedside commode  Barriers to discharge:  Inaccessible home environment, Decreased caregiver support, Other (Comment)(Pt requires increased assistance at current functional level )    Assessment:     Kodi Ibanez Sr. is a 60 y.o. male with a medical diagnosis of Shock.  He presents with performance deficits affecting function are weakness, impaired endurance, impaired self care skills, gait instability, impaired balance, decreased upper extremity function, decreased lower extremity function, decreased safety awareness, impaired cardiopulmonary response to activity, decreased coordination, impaired functional mobilty. Pt found completely soiled with B LE hanging over bed rail and telemetry off of pt. Pt's oxygen found off and pt satting at 86-87%. Pt's nasal cannula paced back on pt and pt quickly returned to 100%. Pt had dried stool on buttock and BLEs with redness noted to buttock. Pt disoriented to time and place but reoriendted by therapist. RN notified of how pt was found by therapist. Pt participate well in therapy session and appropriate for SNF placement in order to improve return to PLOF. Pt would benefit from continued skilled acute OT services in order to maximize independence and safety with ADLs and functional mobility to ensure safe return to PLOF in the least restrictive environment.      Rehab Prognosis:  Good; patient would benefit from acute skilled OT services to address these deficits and reach maximum level of function.       Plan:     Patient to be seen 4 x/week to address the above listed problems via self-care/home management, therapeutic activities, therapeutic exercises  · Plan of Care Expires: 06/18/20  · Plan of Care Reviewed with:  patient    Subjective     Pain/Comfort:  · Pain Rating 1: 0/10  · Pain Rating Post-Intervention 1: 0/10    Objective:     Communicated with: RN prior to session.  Patient found HOB elevated with all 4 bedrails up and pt with B LE hanging over bedrail  with telemetry, peripheral IV, oxygen, bed alarm(Telemetry found off of pt ) upon OT entry to room. Pt agreeable to therapy session. Rehab tech present.     General Precautions: Standard, fall, seizure   Orthopedic Precautions:N/A   Braces: N/A     Occupational Performance:     Bed Mobility:    · Patient completed Rolling/Turning to Left with  moderate assistance  · Patient completed Rolling/Turning to Right with minimum assistance  · Patient completed Scooting/Bridging with moderate assistance for seated scooting along EOB; once pt was higher in bed pt was able to scoot in long sitting towards HOB   · Patient completed Supine to Sit with moderate assistance due to way pt was positioned in bed   · Patient completed Sit to Supine with minimum assistance     Functional Mobility/Transfers:  · Patient completed Sit <> Stand Transfer from EOB with maximal assistance and of 2 persons  with  hand-held assist   · Increased forward flexion   · Facilitation provided at hips for full upright posture  · Hip flexion   · Functional Mobility: not performed    Activities of Daily Living:  · Feeding:  minimum assistance OT assisted wtih cutting up food and pt able to feed self using fork while sitting supported in bed   · Upper Body Dressing: maximal assistance to don gown while sitting EOB   · Lower Body Dressing: total assistance to don B  socks; pt attempted to bring R LE into figure 4 position but unable to complete. Increased forward flexion when attempting to don socks   · Toileting: total assistance while in supine via B rolling. Pt found soiled and unaware of having bowel movement. Dried feces noted to pt's buttock and B LEs.     [facility] 6 Click ADL: 10    Treatment &  Education:  - Pt educated on role of OT, POC, and goals for therapy.    - Increased time provided for pt's linens to be changed and hygiene after experiencing bowel movement in bed.   - RN notified of pt being soiled, oxygen off of pt, telemetry off, and pt's food out of reach for pt.   - daily orientation provided; pt initially oriented to self but AO x 4 after therapy session.   - Pt's SpO2 86-87% when first found with oxygen off of face. Nasal cannula placed back while on 5 L with SpO2 quickly returning to 100%.   - Time provided for therapeutic counseling and discussion of health disposition.   - Importance of OOB ax's with staff member assistance and sitting OOB majority of day.   - Pt completed ADLs and functional mobility for treatment session as noted above   - Pt verbalized understanding. Pt expressed no further concerns/questions.  - whiteboard updated       Patient left with bed in chair position with all lines intact, call button in reach, bed alarm on and RN notifiedEducation:      GOALS:   Multidisciplinary Problems     Occupational Therapy Goals        Problem: Occupational Therapy Goal    Goal Priority Disciplines Outcome Interventions   Occupational Therapy Goal     OT, PT/OT Ongoing, Progressing    Description:  Goals set on 5/19 with expiration date 6/2:  Patient will increase functional independence with ADLs by performing:    Supine <> Sit with Min Assistance.  Feeding with Min Assistance.  Grooming while standing at sink with Min Assistance.  UB Dressing with Min  Assistance.  LB Dressing with Min Assistance.  Step transfer with Min Assistance with DME as needed.  Pt will demonstrate understanding of education provided regarding energy conservation and task modification through teach-back method.                         Time Tracking:     OT Date of Treatment: 05/31/20  OT Start Time: 1406  OT Stop Time: 1440  OT Total Time (min): 34 min    Billable Minutes:Self Care/Home Management  15  Therapeutic Activity 17    Evelyn Wallace, OT  5/31/2020

## 2020-05-31 NOTE — NURSING
Pt has a BG of 56. Gave bolus of D10W 125 ml per prn order parameter Rechecked BG, it was 43. Gave another bolus of 250 ml per prn order parameters. Rechecked BG, it is 116.  Provider on call has been notified. Will continue to monitor.

## 2020-05-31 NOTE — PLAN OF CARE
Pt is alert and oriented x4. Blood glucose dropped during the night. Administered D10W boluses per order parameters. BG is back to normal range. IV is infusing.  Safety precautions maintained during this shift. Call light within reach. Bedside rails x 3. Bed alarm set. Received call from hematology with critical lab results. Hgb 5.3 and Hct 17.3. Paged provider on call. Will continue to monitor.

## 2020-05-31 NOTE — NURSING
Pts wife concerned about tomorrows procedure. Wife would like to speak with MD regarding procedures necessity. MD paged to make aware of wifes concerns.

## 2020-06-01 NOTE — PROGRESS NOTES
Upon assessment of patient at 20:50, patient was on the floor. Patient reports that he rolled OOB. (last hourly rounding was performed @ 20:21 by Helena Guzman RN). Helena and PARESH assisted patient back in bed. Patient reports no LOC or new onset of pain. VS taken at 21:06. All 4 side rails raised. Telesitter ordered and placed in room. Physician notified.

## 2020-06-01 NOTE — CARE UPDATE
Rapid Response Nurse Chart Check     Chart check completed, abnormal VS noted. Please call [phone] for further concerns or assistance.

## 2020-06-01 NOTE — PLAN OF CARE
Patient's wife reports equipment has been delivered to home. Spoke with Bedside nurse Lesly. Ambulance transport with Oxygen to be scheduled for 430PM. Updated patient's wife and [facility] on patient's transportation arrangements.

## 2020-06-01 NOTE — PROGRESS NOTES
Pt to discharge to hospice. HD cath removed. Nephrology will sign-off.     Thank you for allowing us to assist in Mr Ibanez's care.     CAIN Saunders, AGNP-C  Nephrology  Pager: [phone]

## 2020-06-01 NOTE — ASSESSMENT & PLAN NOTE
".Palliative Care Encounter / Goals of care discussion:     Narrative:   Kodi Ibanez Sr. is a 60 y.o. male patient with ESRD, complications of vascular access, CAD, s/p CABG, H/O NSTEMI with PCI, now admitted with fungemia and sepsis likely related to perm-a-cath. Pt. Has expressed wishes to not undergo any additional procedures.     1: Symptoms:   - pain assesment: denies pain   - dyspnea assessment: not short of breath   - anxiety assessment: not anxious   - depression assessment: unable to assess    2: Code Status:   - DNR    3: Psychosocial :   - Marital status:   - Children: one daughter and two sons  - POA: wife is next of kin and makes most medical decisions. She is a former EMT    3: Medicolegal:    - Advance directive: on file. No artificial life prolonging measures at the end of life    4: Support System:  - Spiritual: yes, nawaf mon  - Family: supportive     5: Goals of care Decisions / Recommendations / Plan:     05/25/2020    - wife now agreeable that hospice at home may be best going forward.   - we spoke about details of hospice care and what to expect.    - she understands that no further dialysis will be offered and that he is "too sick to do dialysis"   - She tells me that the pt. Is already DNR and has made arrangement for end of life.    - she feels she just wants to take him home now and make sure he is comfortable.      - [facility] already consulted.    - tried to arrange ZOOM conference with wife but unable to connect. She tells me "it's ok, I'll see him soon anyway".    - ready for discharge once HD cath removed.      - above d/w Dr. Feng.     6: Follow up plans:    Will sign off, pt being discharged. Thanks for the consult.     Thank you for your consult.. Please call [phone] with questions.           "

## 2020-06-01 NOTE — ASSESSMENT & PLAN NOTE
".Palliative Care Encounter / Goals of care discussion:     Narrative:   Kodi Ibanez Sr. is a 60 y.o. male patient with ESRD, complications of vascular access, CAD, s/p CABG, H/O NSTEMI with PCI, now admitted with fungemia and sepsis likely related to perm-a-cath. Pt. Has expressed wishes to not undergo any additional procedures.     1: Symptoms:   - pain assesment: denies pain   - dyspnea assessment: not short of breath   - anxiety assessment: not anxious   - depression assessment: unable to assess    2: Code Status:   - DNR    3: Psychosocial :   - Marital status:   - Children: one daughter and two sons  - POA: wife is next of kin and makes most medical decisions. She is a former EMT    3: Medicolegal:    - Advance directive: on file. No artificial life prolonging measures at the end of life    4: Support System:  - Spiritual: yes, nawaf mon  - Family: supportive     5: Goals of care Decisions / Recommendations / Plan:     06/01/2020   Initial Assessment:    Insight in Disease and Illness trajectory  - pt. Is very little responsive to medial questions and conversations. He engages when asked about his family.   - wife is very involved in medical care. She understands that there is a fungus infection and that likely the Perm-a-cath line is the culprit.   - she had a conversation with her children and feels that if the pt. Was to need a cath exchange the family would want him to get it.     Prognostication:  - overall poor long term prognosis due to many advanced co-morbidities and now acute fungal septicemia.     Goals of Care:  - discussed difficulty to treat or cure fungemia without catheter exchange  - family feels that "if he needs a exchange he should get one"  - pt. Has not been engaged in any discussion with me today, but the family expressed to respect his wishes should they be voiced  - will plan on teleconference tomorrow to discuss next steps  - awaiting ID evaluation and " recommendations  - Discussed with Dr. West.       Advanced Care Planning:   Advance Care Planning    Continue current care        Symptom Management:  - no sx. To palliate currently    Disposition:  -continue hospital based care.     6: Follow up plans:    Daily.     Thank you for your consult. I will follow along with you. Please call [phone] with questions.

## 2020-06-01 NOTE — PLAN OF CARE
[facility]  Palliative Care   Follow Up Note:    Patient Name: Kodi Ibanez Sr.  MRN: [id number]  Admission Date: 5/13/2020  Hospital Length of Stay: 19 days  Code Status: DNR   Attending Provider: Unruly Feng MD  Palliative Care Provider: Dr. Guy  Primary Care Physician: Melonie Elias MD  Principal Problem:Shock       Re-consulted Palliative Care. Dr. Guy spoke with pt's wife [place] and agreed to face time/skype with pt and wife. Per Dr. Guy, wife agreeable to home hospice with [facility].    Follow up with Primary  LACEY Roberts RN and informed her that wife still in agreement with comfort measures and hospice at home.    Elizabeth Morales LCSW, ACHP-SW

## 2020-06-01 NOTE — PLAN OF CARE
Spoke with Dr. Feng. Patient unable to tolerate dialysis due to hypotension. MD to remove central line and patient will be ready for discharge pending equipment delivery to home from [facility]. Spoke with Jeremy at Hospice, equipment of HB and Oxygen to arrive within the next 2 hours or less. Patient's wife and liaison to call CM when equipment arrives. Will continue to follow.

## 2020-06-01 NOTE — PLAN OF CARE
[facility]  Department of Hospital Medicine  [address] [place] [address]  [phone] after hours  [phone] fax    HOSPICE  ORDERS    06/01/2020    Admit to Hospice:  Home     Diagnoses:   Active Hospital Problems    Diagnosis  POA    *Shock [R57.9]  Unknown    Acute respiratory failure with hypoxia [J96.01]  Unknown    Moderate malnutrition [E44.0]  Clinically Undetermined    Candidemia [B37.7]  No    Palliative care encounter [Z51.5]  Not Applicable    Hypotension [I95.9]  No    Sepsis [A41.9]  Yes    Atrial fibrillation [I48.91]  Unknown    AV fistula infection [T82.7XXA]  Yes    Essential hypertension [I10]  Yes    SVT (supraventricular tachycardia) [I47.1]  Yes    History of MI (myocardial infarction) [I25.2]  Not Applicable     Chronic     2008  - followed by cardiology, Dr. Mccoy  - 4/29/2020 followed by Dr. Nelly HUNTLEY (hyperlipidemia) [E78.5]  Unknown     Chronic    Type 2 diabetes, uncontrolled, with neuropathy [E11.40, E11.65]  Yes     Chronic    ESRD on hemodialysis [N18.6, Z99.2]  Not Applicable     Dialysis on Tu, Th, Sa  [facility]  AV graft care by Dr. Garduno        Resolved Hospital Problems   No resolved problems to display.       Hospice Qualifying Diagnoses:       Patient has a life expectancy < 6 months due to:  1) Primary Hospice Diagnosis: ESRD   2) Comorbid Conditions Contributing to Decline:  Fungemia, anemia     Vital Signs: Routine per Hospice Protocol.    Code Status: DNR    Allergies:   Review of patient's allergies indicates:   Allergen Reactions    Reglan [metoclopramide hcl] Diarrhea    Lorazepam      Other reaction(s): heavy sedation       Diet: Diabetic/Renal     Activities: As tolerated    Nursing: Per Hospice Routine.     Routine Skin for Bedridden Patients: Apply moisture barrier cream to all skin folds and   wet areas in perineal area daily and after baths and all bowel movements.      Oxygen:  6L NC continuously    Medications:      Kodi Ibanez .   Home Medication Instructions RACHEL:[id number]    Printed on:06/01/20 3639   Medication Information                      acetaminophen (TYLENOL) 325 MG tablet  Take 2 tablets (650 mg total) by mouth every 6 (six) hours as needed.             aspirin (ECOTRIN) 81 MG EC tablet  Take 1 tablet (81 mg total) by mouth once daily.             blood-glucose meter kit  To check BG 2 times daily, to use with accuchek casa meter             clopidogrel (PLAVIX) 75 mg tablet  Take 1 tablet (75 mg total) by mouth once daily.             diltiaZEM (CARDIZEM CD) 180 MG 24 hr capsule  Take 1 capsule (180 mg total) by mouth once daily.             docusate sodium (COLACE) 100 MG capsule  Take 1 capsule (100 mg total) by mouth 2 (two) times daily.             fluconazole (DIFLUCAN) 200 MG Tab  Take 1 tablet (200 mg total) by mouth once daily.             insulin (LANTUS SOLOSTAR U-100 INSULIN) glargine 100 units/mL (3mL) SubQ pen  Inject 20 Units into the skin every evening.             insulin aspart U-100 (NOVOLOG FLEXPEN U-100 INSULIN) 100 unit/mL (3 mL) InPn pen  Inject 3 Units into the skin 2 (two) times daily with meals.             megestrol (MEGACE) 20 MG Tab  Take 1 tablet (20 mg total) by mouth once daily.             metoprolol tartrate (LOPRESSOR) 50 MG tablet  Take 1 tablet (50 mg total) by mouth 2 (two) times daily.             midodrine (PROAMATINE) 5 MG Tab  Take 1 tablet (5 mg total) by mouth 3 (three) times daily.             sevelamer carbonate (RENVELA) 800 mg Tab  Take 2 tablets (1,600 mg total) by mouth 3 (three) times daily with meals.                   DIABETES CARE: Nurse to perform and educate diabetic management with blood glucose monitoring:      Fingerstick blood sugar AC and HS     Fingerstick blood sugar every 6 hours if patient is unable to eat    Report CBG < 60 or > 350 to physician.         Insulin Sliding Scale         Glucose  Novolog  Insulin Subcutaneous        0 - 60   Orange juice or glucose tablet      No insulin   201-250  2 units   251-300  4 units   301-350  6 units   351-400  8 units   >400   10 units then call physician      Future Orders:  Hospice Medical Director may dictate new orders for comfortable care measures & sign death certificate.        _________________________________  Unruly Feng MD  06/01/2020

## 2020-06-01 NOTE — PROGRESS NOTES
"[facility]  Palliative Medicine  Progress Note    Patient Name: Kodi Ibanez Sr.  MRN: [id number]  Admission Date: 5/13/2020  Hospital Length of Stay: 19 days  Code Status: DNR   Attending Provider: Unruly Feng MD  Consulting Provider: Sukhdeep Guy MD  Primary Care Physician: Melonie Elias MD  Principal Problem:Shock    Patient information was obtained from patient, spouse/SO and past medical records.      Assessment/Plan:     Palliative care encounter  .Palliative Care Encounter / Goals of care discussion:     Narrative:   Kodi Ibanez Sr. is a 60 y.o. male patient with ESRD, complications of vascular access, CAD, s/p CABG, H/O NSTEMI with PCI, now admitted with fungemia and sepsis likely related to perm-a-cath. Pt. Has expressed wishes to not undergo any additional procedures.     1: Symptoms:   - pain assesment: denies pain   - dyspnea assessment: not short of breath   - anxiety assessment: not anxious   - depression assessment: unable to assess    2: Code Status:   - DNR    3: Psychosocial :   - Marital status:   - Children: one daughter and two sons  - POA: wife is next of kin and makes most medical decisions. She is a former EMT    3: Medicolegal:    - Advance directive: on file. No artificial life prolonging measures at the end of life    4: Support System:  - Spiritual: yes, haleigh'suleiman mon  - Family: supportive     5: Goals of care Decisions / Recommendations / Plan:     05/25/2020    - wife now agreeable that hospice at home may be best going forward.   - we spoke about details of hospice care and what to expect.    - she understands that no further dialysis will be offered and that he is "too sick to do dialysis"   - She tells me that the pt. Is already DNR and has made arrangement for end of life.    - she feels she just wants to take him home now and make sure he is comfortable.      - [facility] already [facility].    - tried to arrange ZOOM conference " "with wife but unable to connect. She tells me "it's ok, I'll see him soon anyway".    - ready for discharge once HD cath removed.      - above d/w Dr. Feng.     6: Follow up plans:    Will sign off, pt being discharged. Thanks for the consult.     Thank you for your consult.. Please call [phone] with questions.             ESRD on hemodialysis  .Palliative Care Encounter / Goals of care discussion:     Narrative:   Kodi Ibanez Sr. is a 60 y.o. male patient with ESRD, complications of vascular access, CAD, s/p CABG, H/O NSTEMI with PCI, now admitted with fungemia and sepsis likely related to perm-a-cath. Pt. Has expressed wishes to not undergo any additional procedures.     1: Symptoms:   - pain assesment: denies pain   - dyspnea assessment: not short of breath   - anxiety assessment: not anxious   - depression assessment: unable to assess    2: Code Status:   - DNR    3: Psychosocial :   - Marital status:   - Children: one daughter and two sons  - POA: wife is next of kin and makes most medical decisions. She is a former EMT    3: Medicolegal:    - Advance directive: on file. No artificial life prolonging measures at the end of life    4: Support System:  - Spiritual: yes, haleigh's elieser  - Family: supportive     5: Goals of care Decisions / Recommendations / Plan:     06/01/2020   Initial Assessment:    Insight in Disease and Illness trajectory  - pt. Is very little responsive to medial questions and conversations. He engages when asked about his family.   - wife is very involved in medical care. She understands that there is a fungus infection and that likely the Perm-a-cath line is the culprit.   - she had a conversation with her children and feels that if the pt. Was to need a cath exchange the family would want him to get it.     Prognostication:  - overall poor long term prognosis due to many advanced co-morbidities and now acute fungal septicemia.     Goals of Care:  - discussed difficulty " "to treat or cure fungemia without catheter exchange  - family feels that "if he needs a exchange he should get one"  - pt. Has not been engaged in any discussion with me today, but the family expressed to respect his wishes should they be voiced  - will plan on teleconference tomorrow to discuss next steps  - awaiting ID evaluation and recommendations  - Discussed with Dr. West.       Advanced Care Planning:   Advance Care Planning    Continue current care       Symptom Management:  - no sx. To palliate currently    Disposition:  -continue hospital based care.     6: Follow up plans:    Daily.     Thank you for your consult. I will follow along with you. Please call [phone] with questions.               I will sign off. Please contact us if you have any additional questions.    Subjective:     Chief Complaint:   Chief Complaint   Patient presents with    Altered Mental Status     worse today; elevated WBC; was at [facility] yesterday; dialysis pt; sugar read high at home       HPI:   59 y/o male with past medical history of CHF, CAD with recent NSTEMI [~4/29] (CABG 2009, FERCHO placed in 12/2019), diabetes, CVA, ESRD on dialysis Tuesday/Thursday/Saturday (last dialyzed morning of 5/12) presents to the ER by referral from infectious disease clinic after virtual visit today. The ID provider was concerned about the patients AMS and recent elevated WBC count. Recently,junior underwent ligation of LUE fistula on 2/27/20 with Dr Lopez ([facility], vascular surgery) and had ongoing issues afterwards. Patient was admitted on 4/10 for excision of his infected LUE mid and distal AVF with cultures and specimen obtained. Surgical cultures 4/10 grew 2 different prevotella species, fusobacterium, and strep anginosus. The patient was d/c'ed with home health wound care and po augmentin/clindamycin x 10 days. On 4/20 antibiotics were changed to Flagyl 500 mg p.o. q.8 and Cefazolin after dialysis 2 g on Tuesday and Thursday, 3 " g on Saturday. End date on antibiotics on 5/11.     Following these events, on 4/29 the patient was admitted for an NSTEMI, a LHC was performed where re-stenosis of a previous stent was found and balloon angioplasty was done. He was discharged on 5/2. He is now on ASA/plavix, but per wife has not been compliant.     Patients wife says that he has had poor PO intake for the last 2-3 weeks, has become progressively weak, and developed altered mental status today. Patient had 3 episodes of vomiting within the last 24 hours, which appeared brown-non bloody. He had a brown watery bowel movement today. Patient normally oliguric (urinating ~2-3 x per week). Patient was seen in [facility] ED on the evening of 5/12 due to palpitations after his normal dialysis and sent home after evaluation, for possible antibiotic side effect.     He presents to the ED with encephalopathy x 1 day, leukocytosis of 15, lactate 2.7, troponin 0.482, , procal 10.98, Cr 6.5, BUN 41, and hyperglycemia. Patient pan-cultured, currently only UA resulted with large amount of bacteria. Patient requiring high dose Levophed on admission. Broad spectrum antibiotics initiated. 1.5 L IVF given. Patient remains on RA. Wife was updated regarding patient's current status. Patient spoken to regarding code status and verbalized his wishes as a DNR. His wife confirmed he has an LAPost but it is not on file. Instructed to bring in.     Hospital/ICU Course:  The patient was admitted to MICU for shock likely 2/2 to sepsis on 5/13. Patient remains on low dose Levophed. Nephrology consulted for possible dialysis. Blood cx NGTD. Patient remains on Heparin gtt for paroxysmal A fib with CHADS Vasc score of 6. Previously hyperglycemic with BG now well controlled.     The pt had briefly stepped down from ICU level of care but quickly decompensated with concern for septic shock, fungemia possibly related to dailysis cath, and returned to the ICU.      In this  "setting, palliative medicine was consulted to help with medical decision making and aid in the formation of goals of care.          Hospital Course:  No notes on file    Interval History: Spoke with Dr. Feng. Pt. Had decline in health and required re-admission to ICU for hypotension and inability to tolerate HD.   Reached out to wife Yaima today. She is in tears but tells me she wants to take her  home on hospice now.     Past Medical History:   Diagnosis Date    Atherosclerosis of aorta     noted on VIKTORIA 2011    Blood transfusion     Cataracts, bilateral     CHF (congestive heart failure)     Clotting disorder     Cognitive deficits as late effect of cerebrovascular disease 6/26/2012    Coronary artery disease     NSTEMI s/p PCI with FERCHO to LCx (12/16/19),     Decreased appetite     DM (diabetes mellitus), type 2 with renal complications     Diabetic retinopathy    ESRD on hemodialysis     TUESDAY/ THURSDAY/SATURDAY    Hyperlipidemia     Hyperparathyroidism, unspecified     Hypertension     Prostate cancer 2016 7/16/19:  wife states "he doesn't have it anymore"    Seizures     Stroke 2011    PT HAS SHAKES     Unsteady gait     Uses roller walker       Past Surgical History:   Procedure Laterality Date    CARDIAC SURGERY      CABG    CATARACT EXTRACTION      ou    CATARACT EXTRACTION, BILATERAL      cataracts      CORONARY ANGIOGRAPHY INCLUDING BYPASS GRAFTS WITH CATHETERIZATION OF LEFT HEART N/A 12/16/2019    Procedure: ANGIOGRAM, CORONARY, INCLUDING BYPASS GRAFT, WITH LEFT HEART CATHETERIZATION, rcfa, 5fr;  Surgeon: David Villegas MD;  Location: [facility] CATH LAB;  Service: Cardiology;  Laterality: N/A;    CORONARY ANGIOGRAPHY INCLUDING BYPASS GRAFTS WITH CATHETERIZATION OF LEFT HEART N/A 4/30/2020    Procedure: ANGIOGRAM, CORONARY, INCLUDING BYPASS GRAFT, WITH LEFT HEART CATHETERIZATION, rcfa, 10 am start;  Surgeon: David Villegas MD;  Location: [facility] CATH LAB;  Service: " Cardiology;  Laterality: N/A;    CORONARY ARTERY BYPASS GRAFT  2009    bypass    EYE SURGERY      INSERTION OF TUNNELED CENTRAL VENOUS HEMODIALYSIS CATHETER N/A 5/28/2020    Procedure: Insertion, Catheter, Central Venous, Hemodialysis;  Surgeon: DAVE Mazariegos III, MD;  Location: [facility] CATH LAB;  Service: Peripheral Vascular;  Laterality: N/A;    Left Guera AVF  1/11/2012    PROSTATE BIOPSY      positive    retinal laser      REVISION OF ARTERIOVENOUS FISTULA Left 2/27/2020    Procedure: Ligation of left upper extremity fistula, Closure of left upper extremity skin defect x 2, Intraoperative ultrasound ;  Surgeon: Stephane Lopez MD;  Location: [facility] OR;  Service: Vascular;  Laterality: Left;    REVISION OF ARTERIOVENOUS FISTULA Left 4/10/2020    Procedure: AV fistula excision;  Surgeon: Stephane Lopez MD;  Location: [facility] OR [facility] FLR;  Service: Vascular;  Laterality: Left;    ROTATOR CUFF REPAIR  right, 2005    ULTRASOUND GUIDANCE  12/16/2019    Procedure: Ultrasound Guidance;  Surgeon: David Villegas MD;  Location: [facility] CATH LAB;  Service: Cardiology;;    VASCULAR SURGERY         Review of patient's allergies indicates:   Allergen Reactions    Reglan [metoclopramide hcl] Diarrhea    Lorazepam      Other reaction(s): heavy sedation       Medications:  Continuous Infusions:   heparin (porcine)       Scheduled Meds:   sodium chloride 0.9%   Intravenous Once    aspirin  81 mg Oral Daily    atorvastatin  80 mg Oral QHS    clopidogreL  75 mg Oral Daily    diltiaZEM  30 mg Oral Q6H    epoetin megan-epbx  10,000 Units Intravenous Every Tues, Thurs, Sat    fluconazole  200 mg Oral Daily    [START ON 6/2/2020] insulin detemir U-100  15 Units Subcutaneous Daily    metoprolol tartrate  50 mg Oral BID    midodrine  5 mg Oral TID    sevelamer carbonate  800 mg Oral TID WM     PRN Meds:Dextrose 10% Bolus, Dextrose 10% Bolus, glucagon (human recombinant), glucose, glucose, heparin (porcine),  hydrOXYzine HCL, insulin aspart U-100, sodium chloride 0.9%    Family History     Problem Relation (Age of Onset)    Cancer Mother, Paternal Grandmother, Paternal Uncle    Cataracts Mother    Diabetes Mother    Glaucoma Mother    Heart disease Father, Maternal Grandmother    Hypertension Brother    No Known Problems Sister, Maternal Grandfather, Paternal Grandfather, Sister, Sister    Prostate cancer Brother        Tobacco Use    Smoking status: Never Smoker    Smokeless tobacco: Never Used   Substance and Sexual Activity    Alcohol use: No    Drug use: No    Sexual activity: Not Currently       Review of Systems   Unable to perform ROS: Acuity of condition     Objective:     Vital Signs (Most Recent):  Temp: 97.6 °F (36.4 °C) (06/01/20 0807)  Pulse: (!) 114 (06/01/20 0807)  Resp: 18 (06/01/20 0807)  BP: (!) 99/58 (06/01/20 0807)  SpO2: 95 % (06/01/20 0807) Vital Signs (24h Range):  Temp:  [96.3 °F (35.7 °C)-97.6 °F (36.4 °C)] 97.6 °F (36.4 °C)  Pulse:  [113-126] 114  Resp:  [16-18] 18  SpO2:  [93 %-100 %] 95 %  BP: ()/(57-85) 99/58     Weight: 67.6 kg (149 lb)  Body mass index is 20.78 kg/m².    Review of Symptoms    Unable to assess due to acuity of condition  Symptom Assessment (ESAS 0-10 scale)   ESAS 0 1 2 3 4 5 6 7 8 9 10   Pain              Dyspnea              Anxiety              Nausea              Depression               Anorexia              Fatigue              Insomnia              Restlessness               Agitation              CAM / Delirium _x_ --  ___+   Constipation     _x_ --  ___+   Diarrhea           _x_ --  ___+    Bowel Management Plan (BMP): Yes    Comments: none    Pain Assessment: unable to assess; no nonverbal signs of pain    OME in 24 hours: 0    Performance Status: 50      Physical Exam  Constitutional: awake, withdrawn.   Elderly male  No distress  Cachectic, chronically ill appearing   HENT:   Mouth/Throat: Oropharynx is clear and moist. No oropharyngeal exudate.    Eyes: Conjunctivae are normal. No scleral icterus.   Neck: No tracheal deviation present.   Cardiovascular: Normal rate, regular rhythm and normal heart sounds.   Irregular tachycardia, rate > 100   Pulmonary/Chest: Effort normal and breath sounds normal. No respiratory distress.   Abdominal: He exhibits no distension.   Musculoskeletal: He exhibits no edema or tenderness.   Neurological: He is alert and oriented to person, place, and time. No cranial nerve deficit.   Lethargic. No gross focal deficits   Skin: Skin is warm and dry.   Nursing note and vitals reviewed.  Significant Labs:   LFT:   No results for input(s): AST, GGT, ALKPHOS, BILITOT in the last 48 hours.    Invalid input(s): ALST  CBC:   Recent Labs   Lab 06/01/20  0727   WBC 9.12   HGB 6.2*   HCT 21.4*   MCV 92        BMP:  Recent Labs   Lab 06/01/20  0727   *      K 5.7*      CO2 12*   BUN 54*   CREATININE 6.7*   CALCIUM 7.8*   MG 2.6     LFT:  Lab Results   Component Value Date    AST 37 05/29/2020    ALKPHOS 219 (H) 05/29/2020    BILITOT 0.5 05/29/2020     Albumin:   Albumin   Date Value Ref Range Status   05/29/2020 1.4 (L) 3.5 - 5.2 g/dL Final     Protein:   Total Protein   Date Value Ref Range Status   05/29/2020 6.5 6.0 - 8.4 g/dL Final     Lactic acid:   Lab Results   Component Value Date    LACTATE 1.3 05/24/2020    LACTATE 2.1 05/17/2020       Significant Imaging: I have reviewed all pertinent imaging results/findings within the past 24 hours.    Advance Care Planning   Advanced Directives::  Living Will: Yes. Copy on chart: Yes  LaPOST: No  Do Not Resuscitate Status: DNR since this admission  Medical Power of : No; wife is surrogate CHILANGO  Marcelle,Yaima Spouse [phone]         Decision-Making Capacity: Family answered questions, Patient unable to communicate due to disease severity/cognitive impairment       Living Arrangements: Lives with  spouse    Psychosocial/Cultural:    Spiritual:     F- Madai and Belief: Reuben mon    I - Importance: yes  .  C - Community: yes    A - Address in Care: wife to coordinate      > 50% of 35 min visit spent in chart review, face to face discussion of goals of care,  symptom assessment, coordination of care and emotional support.    Sukhdeep Guy MD  Palliative Medicine  [facility]

## 2020-06-01 NOTE — PLAN OF CARE
MARY spoke with Dr. Feng who reports patient's wife recognizes his consistent decline and would like to move forward with discharge  to Home hospice. CM called wife per MD request to discuss, Wife has chosen [facility] for patient's care and would like to have patient discharge home today if possible. MD notified, Referral placed to [facility] Right care. Spoke with Liaison Jeremy. Will continue to follow.

## 2020-06-01 NOTE — PLAN OF CARE
06/01/20 1301   Post-Acute Status   Post-Acute Authorization Hospice   Post-Acute Placement Status Set-up Complete   Hospice Status Set-up Complete   Patient discharging home with [facility]. Patient hospice set up complete at this time.   Ani Fischer LMSW  [facility]

## 2020-06-01 NOTE — PROGRESS NOTES
Unable to obtain axillary or oral temp. Dr. Feng aware, no new orders as pt is going on hospice care.

## 2020-06-01 NOTE — SUBJECTIVE & OBJECTIVE
"Interval History: Spoke with Dr. Feng. Pt. Had decline in health and required re-admission to ICU for hypotension and inability to tolerate HD.   Reached out to wife Yaima today. She is in tears but tells me she wants to take her  home on hospice now.     Past Medical History:   Diagnosis Date    Atherosclerosis of aorta     noted on VIKTORIA 2011    Blood transfusion     Cataracts, bilateral     CHF (congestive heart failure)     Clotting disorder     Cognitive deficits as late effect of cerebrovascular disease 6/26/2012    Coronary artery disease     NSTEMI s/p PCI with FERCHO to LCx (12/16/19),     Decreased appetite     DM (diabetes mellitus), type 2 with renal complications     Diabetic retinopathy    ESRD on hemodialysis     TUESDAY/ THURSDAY/SATURDAY    Hyperlipidemia     Hyperparathyroidism, unspecified     Hypertension     Prostate cancer 2016 7/16/19:  wife states "he doesn't have it anymore"    Seizures     Stroke 2011    PT HAS SHAKES     Unsteady gait     Uses roller walker       Past Surgical History:   Procedure Laterality Date    CARDIAC SURGERY      CABG    CATARACT EXTRACTION      ou    CATARACT EXTRACTION, BILATERAL      cataracts      CORONARY ANGIOGRAPHY INCLUDING BYPASS GRAFTS WITH CATHETERIZATION OF LEFT HEART N/A 12/16/2019    Procedure: ANGIOGRAM, CORONARY, INCLUDING BYPASS GRAFT, WITH LEFT HEART CATHETERIZATION, rcfa, 5fr;  Surgeon: David Villegas MD;  Location: [facility] CATH LAB;  Service: Cardiology;  Laterality: N/A;    CORONARY ANGIOGRAPHY INCLUDING BYPASS GRAFTS WITH CATHETERIZATION OF LEFT HEART N/A 4/30/2020    Procedure: ANGIOGRAM, CORONARY, INCLUDING BYPASS GRAFT, WITH LEFT HEART CATHETERIZATION, rcfa, 10 am start;  Surgeon: David Villegas MD;  Location: [facility] CATH LAB;  Service: Cardiology;  Laterality: N/A;    CORONARY ARTERY BYPASS GRAFT  2009    bypass    EYE SURGERY      INSERTION OF TUNNELED CENTRAL VENOUS HEMODIALYSIS CATHETER N/A 5/28/2020    " Procedure: Insertion, Catheter, Central Venous, Hemodialysis;  Surgeon: DAVE Mazariegos III, MD;  Location: [facility] CATH LAB;  Service: Peripheral Vascular;  Laterality: N/A;    Left Guera AVF  1/11/2012    PROSTATE BIOPSY      positive    retinal laser      REVISION OF ARTERIOVENOUS FISTULA Left 2/27/2020    Procedure: Ligation of left upper extremity fistula, Closure of left upper extremity skin defect x 2, Intraoperative ultrasound ;  Surgeon: Stephane Lopez MD;  Location: [facility] OR;  Service: Vascular;  Laterality: Left;    REVISION OF ARTERIOVENOUS FISTULA Left 4/10/2020    Procedure: AV fistula excision;  Surgeon: Stephane Lopez MD;  Location: [facility] OR 2ND FLR;  Service: Vascular;  Laterality: Left;    ROTATOR CUFF REPAIR  right, 2005    ULTRASOUND GUIDANCE  12/16/2019    Procedure: Ultrasound Guidance;  Surgeon: David Villegas MD;  Location: [facility] CATH LAB;  Service: Cardiology;;    VASCULAR SURGERY         Review of patient's allergies indicates:   Allergen Reactions    Reglan [metoclopramide hcl] Diarrhea    Lorazepam      Other reaction(s): heavy sedation       Medications:  Continuous Infusions:   heparin (porcine)       Scheduled Meds:   sodium chloride 0.9%   Intravenous Once    aspirin  81 mg Oral Daily    atorvastatin  80 mg Oral QHS    clopidogreL  75 mg Oral Daily    diltiaZEM  30 mg Oral Q6H    epoetin megan-epbx  10,000 Units Intravenous Every Tues, Thurs, Sat    fluconazole  200 mg Oral Daily    [START ON 6/2/2020] insulin detemir U-100  15 Units Subcutaneous Daily    metoprolol tartrate  50 mg Oral BID    midodrine  5 mg Oral TID    sevelamer carbonate  800 mg Oral TID WM     PRN Meds:Dextrose 10% Bolus, Dextrose 10% Bolus, glucagon (human recombinant), glucose, glucose, heparin (porcine), hydrOXYzine HCL, insulin aspart U-100, sodium chloride 0.9%    Family History     Problem Relation (Age of Onset)    Cancer Mother, Paternal Grandmother, Paternal Uncle    Cataracts  Mother    Diabetes Mother    Glaucoma Mother    Heart disease Father, Maternal Grandmother    Hypertension Brother    No Known Problems Sister, Maternal Grandfather, Paternal Grandfather, Sister, Sister    Prostate cancer Brother        Tobacco Use    Smoking status: Never Smoker    Smokeless tobacco: Never Used   Substance and Sexual Activity    Alcohol use: No    Drug use: No    Sexual activity: Not Currently       Review of Systems   Unable to perform ROS: Acuity of condition     Objective:     Vital Signs (Most Recent):  Temp: 97.6 °F (36.4 °C) (06/01/20 0807)  Pulse: (!) 114 (06/01/20 0807)  Resp: 18 (06/01/20 0807)  BP: (!) 99/58 (06/01/20 0807)  SpO2: 95 % (06/01/20 0807) Vital Signs (24h Range):  Temp:  [96.3 °F (35.7 °C)-97.6 °F (36.4 °C)] 97.6 °F (36.4 °C)  Pulse:  [113-126] 114  Resp:  [16-18] 18  SpO2:  [93 %-100 %] 95 %  BP: ()/(57-85) 99/58     Weight: 67.6 kg (149 lb)  Body mass index is 20.78 kg/m².    Review of Symptoms    Unable to assess due to acuity of condition  Symptom Assessment (ESAS 0-10 scale)   ESAS 0 1 2 3 4 5 6 7 8 9 10   Pain              Dyspnea              Anxiety              Nausea              Depression               Anorexia              Fatigue              Insomnia              Restlessness               Agitation              CAM / Delirium _x_ --  ___+   Constipation     _x_ --  ___+   Diarrhea           _x_ --  ___+    Bowel Management Plan (BMP): Yes    Comments: none    Pain Assessment: unable to assess; no nonverbal signs of pain    OME in 24 hours: 0    Performance Status: 50      Physical Exam  Constitutional: awake, withdrawn.   Elderly male  No distress  Cachectic, chronically ill appearing   HENT:   Mouth/Throat: Oropharynx is clear and moist. No oropharyngeal exudate.   Eyes: Conjunctivae are normal. No scleral icterus.   Neck: No tracheal deviation present.   Cardiovascular: Normal rate, regular rhythm and normal heart sounds.   Irregular tachycardia,  rate > 100   Pulmonary/Chest: Effort normal and breath sounds normal. No respiratory distress.   Abdominal: He exhibits no distension.   Musculoskeletal: He exhibits no edema or tenderness.   Neurological: He is alert and oriented to person, place, and time. No cranial nerve deficit.   Lethargic. No gross focal deficits   Skin: Skin is warm and dry.   Nursing note and vitals reviewed.  Significant Labs:   LFT:   No results for input(s): AST, GGT, ALKPHOS, BILITOT in the last 48 hours.    Invalid input(s): ALST  CBC:   Recent Labs   Lab 06/01/20  0727   WBC 9.12   HGB 6.2*   HCT 21.4*   MCV 92        BMP:  Recent Labs   Lab 06/01/20 0727   *      K 5.7*      CO2 12*   BUN 54*   CREATININE 6.7*   CALCIUM 7.8*   MG 2.6     LFT:  Lab Results   Component Value Date    AST 37 05/29/2020    ALKPHOS 219 (H) 05/29/2020    BILITOT 0.5 05/29/2020     Albumin:   Albumin   Date Value Ref Range Status   05/29/2020 1.4 (L) 3.5 - 5.2 g/dL Final     Protein:   Total Protein   Date Value Ref Range Status   05/29/2020 6.5 6.0 - 8.4 g/dL Final     Lactic acid:   Lab Results   Component Value Date    LACTATE 1.3 05/24/2020    LACTATE 2.1 05/17/2020       Significant Imaging: I have reviewed all pertinent imaging results/findings within the past 24 hours.    Advance Care Planning   Advanced Directives::  Living Will: Yes. Copy on chart: Yes  LaPOST: No  Do Not Resuscitate Status: DNR since this admission  Medical Power of : No; wife is surrogate CHILANGO  Yaima Ibanez Spouse [phone]         Decision-Making Capacity: Family answered questions, Patient unable to communicate due to disease severity/cognitive impairment       Living Arrangements: Lives with spouse    Psychosocial/Cultural:    Spiritual:     F- Madai and Belief: Reuben mon    I - Importance: yes  .  C - Community: yes    A - Address in Care: wife to coordinate

## 2020-06-01 NOTE — PROGRESS NOTES
Pt's BP 82/50. Pt asymptomatic. Dr. Feng paged and aware. Ok for pt to still go to dialysis per Dr. Feng. Dialysis nurse aware, will check with NP regarding pt getting dialysis today.    Dr. Feng canceled dialysis for today

## 2020-06-02 NOTE — PLAN OF CARE
Patient discharged home to care of spouse and [facility] on 6/1/20.     06/02/20 1410   Final Note   Assessment Type Final Discharge Note   Anticipated Discharge Disposition HospiceHome   What phone number can be called within the next 1-3 days to see how you are doing after discharge?   ([phone])   Hospital Follow Up  Appt(s) scheduled? Yes   Discharge plans and expectations educations in teach back method with documentation complete? Yes   Right Care Referral Info   Post Acute Recommendation Other   Referral Type Home Hospice   Facility Name [facility]

## 2020-06-06 ENCOUNTER — NURSE TRIAGE (OUTPATIENT)
Dept: ADMINISTRATIVE | Facility: CLINIC | Age: 60
End: 2020-06-06

## 2020-06-06 NOTE — TELEPHONE ENCOUNTER
Patients wife contacted per Post Procedural Symptom tracker. Per wife, patient passed away last night. Information passed on to chart corrections at this time.

## 2020-06-10 ENCOUNTER — NURSE TRIAGE (OUTPATIENT)
Dept: ADMINISTRATIVE | Facility: CLINIC | Age: 60
End: 2020-06-10

## 2020-06-10 NOTE — TELEPHONE ENCOUNTER
Contacted for post procedural symptom tracker. Spoke with pt's wife and pt is .    Reason for Disposition   Message left with person in household    Additional Information   Negative: Caller has already spoken with the PCP (or office), and has no further questions   Negative: Caller has already spoken with another triager and has no further questions   Negative: Caller has already spoken with another triager or PCP (or office), and has further questions and triager able to answer questions.   Negative: Busy signal.  First attempt to contact caller.  Follow-up call scheduled within 15 minutes.   Negative: No answer.  First attempt to contact caller.  Follow-up call scheduled within 15 minutes.   Negative: Message left on identified voicemail   Negative: Message left on unidentified voice mail. Phone number verified.    Protocols used: NO CONTACT OR DUPLICATE CONTACT CALL-A-OH

## 2020-06-12 LAB
ACID FAST MOD KINY STN SPEC: NORMAL
ACID FAST MOD KINY STN SPEC: NORMAL
MYCOBACTERIUM SPEC QL CULT: NORMAL
MYCOBACTERIUM SPEC QL CULT: NORMAL

## 2020-06-15 NOTE — HOSPITAL COURSE
Mr. Kodi Ibanez Sr. is a 60 y.o. male who presented to [facility] on 5/13/2020 with shock 2/2 sepsis.      The patient was admitted to MICU for shock likely 2/2 to sepsis on 5/13 requiring low dose Levophed. Weaned off on 5/14, but required to go back on vasopresors on   5/17/20. Started on Heparin gtt for paroxysmal A fib with CHADS Vasc score of 6. Started on empiric broad spectrum abx/ Patient with intermittent SVT- Home coreg and dilt restarted.  Grew candida albicans on blood cx x 2 on 5/15, 5/17, 5/20. Initially on micafungin, but switched to fluconazole on 5/20 per ID recs. Evaluated by ophtha. Tunneled cath removed by surgery on 5/21 for line holiday. Line placed 5/24 and underwent HD. Tachycardic since 3 am. EKG with sinus tach. BP borderline in afternoon. Received 250cc bolus w improvement. WBC uptrended to 15.8k, concerning for acute infection, so started on vanc/zosyn and cultured. Febrile the next morning with watery stools. cdiff sent and was negative. vanc stopped on 5/26.  Increased metoprolol to 50 mg BID and given 500 cc back during HD. H/H down to 6.0 from 6.5 however no signs of bleeding. 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 Discussed with nephrology, IV EPO 10k units initiated during HD. Patient never able to go to OR for permacath placement as too unstable, continued on IV fluconazole for candidemia. Patient progressively declining with hypotensive, tachycardia, anemia. Discussed with wife and re-consulted palliative care. Patient discharged with home hospice and trialysis catheter removed.

## 2020-06-15 NOTE — DISCHARGE SUMMARY
[facility] Hospital Medicine  Discharge Summary      Patient Name: Kodi Ibanez Sr.  MRN: [id number]  Admission Date: 5/13/2020  Hospital Length of Stay: 19 days  Discharge Date and Time: 6/1/2020  6:22 PM  Attending Physician: Marya att. providers found   Discharging Provider: Unruly Feng MD  Primary Care Provider: Melonie Elias MD  [facility] Medicine Team: [facility] [place] [facility] MED R Unruly Feng MD    HPI:   59 y/o male with past medical history of CHF, CAD with recent NSTEMI [~4/29] (CABG 2009, FERCHO placed in 12/2019), diabetes, CVA, ESRD on dialysis Tuesday/Thursday/Saturday (last dialyzed morning of 5/12) presents to the ER by referral from infectious disease clinic after virtual visit today. The ID provider was concerned about the patients AMS and recent elevated WBC count. Recently,atient underwent ligation of LUE fistula on 2/27/20 with Dr Lopez ([facility], vascular surgery) and had ongoing issues afterwards. Patient was admitted on 4/10 for excision of his infected LUE mid and distal AVF with cultures and specimen obtained. Surgical cultures 4/10 grew 2 different prevotella species, fusobacterium, and strep anginosus. The patient was d/c'ed with home health wound care and po augmentin/clindamycin x 10 days. On 4/20 antibiotics were changed to Flagyl 500 mg p.o. q.8 and Cefazolin after dialysis 2 g on Tuesday and Thursday, 3 g on Saturday. End date on antibiotics on 5/11.     Following these events, on 4/29 the patient was admitted for an NSTEMI, a LHC was performed where re-stenosis of a previous stent was found and balloon angioplasty was done. He was discharged on 5/2. He is now on ASA/plavix, but per wife has not been compliant.     Patients wife says that he has had poor PO intake for the last 2-3 weeks, has become progressively weak, and developed altered mental status today. Patient had 3 episodes of vomiting within the last 24 hours, which appeared brown-non bloody. He had a  brown watery bowel movement today. Patient normally oliguric (urinating ~2-3 x per week). Patient was seen in the [facility] ED on the evening of 5/12 due to palpitations after his normal dialysis and sent home after evaluation, for possible antibiotic side effect.     He presents to the ED with encephalopathy x 1 day, leukocytosis of 15, lactate 2.7, troponin 0.482, , procal 10.98, Cr 6.5, BUN 41, and hyperglycemia. Patient pan-cultured, currently only UA resulted with large amount of bacteria. Patient requiring high dose Levophed on admission. Broad spectrum antibiotics initiated. 1.5 L IVF given. Patient remains on RA. Wife was updated regarding patient's current status. Patient spoken to regarding code status and verbalized his wishes as a DNR. His wife confirmed he has an LAPost but it is not on file. Instructed to bring in.       Procedure(s) (LRB):  Insertion, Catheter, Central Venous, Hemodialysis (N/A)      Hospital Course:   Mr. Kodi Ibanez Sr. is a 60 y.o. male who presented to [facility] on 5/13/2020 with shock 2/2 sepsis.      The patient was admitted to MICU for shock likely 2/2 to sepsis on 5/13 requiring low dose Levophed. Weaned off on 5/14, but required to go back on vasopresors on   5/17/20. Started on Heparin gtt for paroxysmal A fib with CHADS Vasc score of 6. Started on empiric broad spectrum abx/ Patient with intermittent SVT- Home coreg and dilt restarted.  Grew candida albicans on blood cx x 2 on 5/15, 5/17, 5/20. Initially on micafungin, but switched to fluconazole on 5/20 per ID recs. Evaluated by ophtha. Tunneled cath removed by surgery on 5/21 for line holiday. Line placed 5/24 and underwent HD. Tachycardic since 3 am. EKG with sinus tach. BP borderline in afternoon. Received 250cc bolus w improvement. WBC uptrended to 15.8k, concerning for acute infection, so started on vanc/zosyn and cultured. Febrile the next morning with watery stools. cdiff sent and was negative. vanc stopped on  5/26.  Increased metoprolol to 50 mg BID and given 500 cc back during HD. H/H down to 6.0 from 6.5 however no signs of bleeding. 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 Discussed with nephrology, IV EPO 10k units initiated during HD. Patient never able to go to OR for permacath placement as too unstable, continued on IV fluconazole for candidemia. Patient progressively declining with hypotensive, tachycardia, anemia. Discussed with wife and re-consulted palliative care. Patient discharged with home hospice and trialysis catheter removed.      Consults:   Consults (From admission, onward)        Status Ordering Provider     Inpatient consult to Critical Care Medicine  Once     Provider:  (Not yet assigned)    Completed VIANCA HERNANDEZ     Inpatient consult to Critical Care Medicine  Once     Provider:  (Not yet assigned)    Completed LUCY RAMIREZ     Inpatient consult to Infectious Diseases  Once     Provider:  (Not yet assigned)    Completed VIK HIGH     Inpatient consult to Midline team  Once     Provider:  (Not yet assigned)    Completed JANICE MIRAMONTES     Inpatient consult to Midline team  Once     Provider:  (Not yet assigned)    Completed RUDOLPH SANTOS     Inpatient consult to Nephrology  Once     Provider:  (Not yet assigned)    Completed HEAVEN MAYER     Inpatient consult to Ophthalmology  Once     Provider:  (Not yet assigned)    Completed VIK HIGH     Inpatient consult to Palliative Care  Once     Provider:  (Not yet assigned)    Completed JANICE GRIJALVA     Inpatient consult to Palliative Care  Once     Provider:  (Not yet assigned)    Completed RUDOLPH SANTOS     Inpatient consult to Vascular Surgery  Once     Provider:  (Not yet assigned)    Completed RUDOLPH SANTOS     Inpatient consult to Vascular Surgery  Once     Provider:  (Not yet assigned)    Completed [place]          No new Assessment & Plan notes  have been filed under this hospital service since the last note was generated.  Service: Hospital Medicine    Final Active Diagnoses:    Diagnosis Date Noted POA    PRINCIPAL PROBLEM:  Shock [R57.9] 05/13/2020 Unknown    Acute respiratory failure with hypoxia [J96.01]  Yes    Moderate malnutrition [E44.0] 05/22/2020 Clinically Undetermined    Candidemia [B37.7] 05/19/2020 No    Palliative care encounter [Z51.5] 05/17/2020 Not Applicable    Hypotension [I95.9]  No    Sepsis [A41.9] 05/13/2020 Yes    Atrial fibrillation [I48.91] 05/13/2020 Unknown    AV fistula infection [T82.7XXA] 04/09/2020 Yes    Essential hypertension [I10] 02/27/2020 Yes    SVT (supraventricular tachycardia) [I47.1] 07/16/2019 Yes    History of MI (myocardial infarction) [I25.2] 08/04/2014 Not Applicable     Chronic    HLD (hyperlipidemia) [E78.5] 10/17/2013 Unknown     Chronic    Type 2 diabetes, uncontrolled, with neuropathy [E11.40, E11.65] 11/06/2012 Yes     Chronic    ESRD on hemodialysis [N18.6, Z99.2] 07/13/2012 Not Applicable      Problems Resolved During this Admission:       Discharged Condition: home with hospice    Disposition: Hospice/Home    Follow Up:    Patient Instructions:      Diet diabetic     Diet renal       Significant Diagnostic Studies: Labs: BMP: No results for input(s): GLU, NA, K, CL, CO2, BUN, CREATININE, CALCIUM, MG in the last 48 hours. and CBC No results for input(s): WBC, HGB, HCT, PLT in the last 48 hours.  Microbiology:   Blood Culture   Lab Results   Component Value Date    LABBLOO No growth after 5 days. 05/24/2020    LABBLOO No growth after 5 days. 05/24/2020       Pending Diagnostic Studies:     None         Medications:  Reconciled Home Medications:      Medication List      START taking these medications    fluconazole 200 MG Tab  Commonly known as: DIFLUCAN  Take 1 tablet (200 mg total) by mouth once daily.     metoprolol tartrate 50 MG tablet  Commonly known as: LOPRESSOR  Take 1 tablet  (50 mg total) by mouth 2 (two) times daily.     midodrine 5 MG Tab  Commonly known as: PROAMATINE  Take 1 tablet (5 mg total) by mouth 3 (three) times daily.        CHANGE how you take these medications    megestroL 20 MG Tab  Commonly known as: MEGACE  Take 1 tablet (20 mg total) by mouth once daily.  What changed: when to take this        CONTINUE taking these medications    acetaminophen 325 MG tablet  Commonly known as: TYLENOL  Take 2 tablets (650 mg total) by mouth every 6 (six) hours as needed.     aspirin 81 MG EC tablet  Commonly known as: ECOTRIN  Take 1 tablet (81 mg total) by mouth once daily.     blood-glucose meter kit  To check BG 2 times daily, to use with accuchek casa meter     clopidogreL 75 mg tablet  Commonly known as: PLAVIX  Take 1 tablet (75 mg total) by mouth once daily.     diltiaZEM 180 MG 24 hr capsule  Commonly known as: CARDIZEM CD  Take 1 capsule (180 mg total) by mouth once daily.     docusate sodium 100 MG capsule  Commonly known as: COLACE  Take 1 capsule (100 mg total) by mouth 2 (two) times daily.     insulin aspart U-100 100 unit/mL (3 mL) Inpn pen  Commonly known as: NovoLOG Flexpen U-100 Insulin  Inject 10 Units into the skin 2 (two) times daily with meals.     insulin glargine 100 units/mL (3mL) SubQ pen  Commonly known as: LANTUS SOLOSTAR U-100 INSULIN  Inject 20 Units into the skin every evening.     sevelamer carbonate 800 mg Tab  Commonly known as: RENVELA  Take 2 tablets (1,600 mg total) by mouth 3 (three) times daily with meals.        STOP taking these medications    atorvastatin 80 MG tablet  Commonly known as: LIPITOR     carvediloL 12.5 MG tablet  Commonly known as: COREG     furosemide 40 MG tablet  Commonly known as: LASIX     losartan 100 MG tablet  Commonly known as: COZAAR     metroNIDAZOLE 500 MG tablet  Commonly known as: FLAGYL     VITAMIN C 500 MG tablet  Generic drug: ascorbic acid (vitamin C)            Indwelling Lines/Drains at time of discharge:    Lines/Drains/Airways     None                 Time spent on the discharge of patient: > 30 minutes  Patient was seen and examined on the date of discharge and determined to be suitable for discharge.         Unruly Feng MD  Department of Hospital Medicine  [facility]

## 2020-06-15 NOTE — HPI
59 y/o male with past medical history of CHF, CAD with recent NSTEMI [~4/29] (CABG 2009, FERCHO placed in 12/2019), diabetes, CVA, ESRD on dialysis Tuesday/Thursday/Saturday (last dialyzed morning of 5/12) presents to the ER by referral from infectious disease clinic after virtual visit today. The ID provider was concerned about the patients AMS and recent elevated WBC count. Recently,junior underwent ligation of LUE fistula on 2/27/20 with Dr Lopez ([facility], vascular surgery) and had ongoing issues afterwards. Patient was admitted on 4/10 for excision of his infected LUE mid and distal AVF with cultures and specimen obtained. Surgical cultures 4/10 grew 2 different prevotella species, fusobacterium, and strep anginosus. The patient was d/c'ed with home health wound care and po augmentin/clindamycin x 10 days. On 4/20 antibiotics were changed to Flagyl 500 mg p.o. q.8 and Cefazolin after dialysis 2 g on Tuesday and Thursday, 3 g on Saturday. End date on antibiotics on 5/11.     Following these events, on 4/29 the patient was admitted for an NSTEMI, a LHC was performed where re-stenosis of a previous stent was found and balloon angioplasty was done. He was discharged on 5/2. He is now on ASA/plavix, but per wife has not been compliant.     Patients wife says that he has had poor PO intake for the last 2-3 weeks, has become progressively weak, and developed altered mental status today. Patient had 3 episodes of vomiting within the last 24 hours, which appeared brown-non bloody. He had a brown watery bowel movement today. Patient normally oliguric (urinating ~2-3 x per week). Patient was seen in the [facility] ED on the evening of 5/12 due to palpitations after his normal dialysis and sent home after evaluation, for possible antibiotic side effect.     He presents to the ED with encephalopathy x 1 day, leukocytosis of 15, lactate 2.7, troponin 0.482, , procal 10.98, Cr 6.5, BUN 41, and hyperglycemia. Patient  pan-cultured, currently only UA resulted with large amount of bacteria. Patient requiring high dose Levophed on admission. Broad spectrum antibiotics initiated. 1.5 L IVF given. Patient remains on RA. Wife was updated regarding patient's current status. Patient spoken to regarding code status and verbalized his wishes as a DNR. His wife confirmed he has an LAPost but it is not on file. Instructed to bring in.

## 2020-06-23 NOTE — ADDENDUM NOTE
Addendum  created 06/23/20 0844 by Seamus Wilkinson MD    Attestation recorded in Intraprocedure, Intraprocedure Attestations filed

## 2022-01-01 NOTE — PROGRESS NOTES
COMMENTS:  Now 9 days old, 32w 3d weeks corrected age.   Clinically stable    PLAN:  -Follow clinically   Pt to be d/c this afternoon to home hospice. Will cont to monitor.

## 2022-06-13 NOTE — SUBJECTIVE & OBJECTIVE
Interval History: No acute events overnight. Patient stepped down from ICU. Repeat cultures from 5/17 with yeast.    Review of Systems   Constitutional: Negative for chills and fever.   HENT: Negative for congestion, rhinorrhea and sore throat.    Respiratory: Negative for cough.    Cardiovascular: Negative for chest pain.   Gastrointestinal: Negative for abdominal pain, nausea and vomiting.   Genitourinary: Negative for dysuria and hematuria.   Musculoskeletal: Negative for back pain and neck pain.   Skin: Negative for rash.   Neurological: Negative for seizures and syncope.     Objective:     Vital Signs (Most Recent):  Temp: 98.1 °F (36.7 °C) (05/20/20 0822)  Pulse: 95 (05/20/20 0822)  Resp: 18 (05/20/20 0500)  BP: 126/75 (05/20/20 0822)  SpO2: (!) 94 % (05/20/20 0822) Vital Signs (24h Range):  Temp:  [96.8 °F (36 °C)-98.1 °F (36.7 °C)] 98.1 °F (36.7 °C)  Pulse:  [] 95  Resp:  [0-32] 18  SpO2:  [91 %-96 %] 94 %  BP: (109-143)/(52-80) 126/75  Arterial Line BP: (109-143)/(49-62) 131/52     Weight: 67.6 kg (149 lb)  Body mass index is 20.78 kg/m².    Estimated Creatinine Clearance: 17.9 mL/min (A) (based on SCr of 4.1 mg/dL (H)).    Physical Exam   Constitutional: He is oriented to person, place, and time. He appears well-developed. He appears ill.   HENT:   Head: Normocephalic and atraumatic.   Eyes: Pupils are equal, round, and reactive to light. Conjunctivae are normal.   Neck: Normal range of motion. Neck supple.   Cardiovascular: Normal rate.   Pulmonary/Chest: Effort normal.   Abdominal: Soft. Bowel sounds are normal.   Musculoskeletal: He exhibits no edema.    PIV on right in place   Neurological: He is alert and oriented to person, place, and time.   Skin: Skin is warm and dry.   R IJ tunnel cath noted to R chest wall    Psychiatric: He has a normal mood and affect. His behavior is normal.       Significant Labs:   Blood Culture:   Recent Labs   Lab 05/13/20  1202 05/15/20  1321 05/15/20  1330  05/17/20  0520 05/17/20  0630   LABBLOO No Growth after 4 days.  Gram stain aer bottle: budding yeast  Results called to and read back by: Jennifer Estevez RN  05/17/2020    05:22  DEB ALBICANS  ID consult required at [facility] and [facility].  For susceptibility see order #[id number]  * Gram stain aer bottle: budding yeast  Results called to and read back by: Jennifer Estevez RN  05/17/2020    05:22  DEB ALBICANS  Susceptibility pending  ID consult required at [facility] and [facility].  * Gram stain aer bottle: yeast  Positive results previously called  YEAST   Identification pending  ID consult required at [facility] and [facility].  * Gram stain aer bottle: yeast  Results called to and read back by:Brianda Vaz 05/19/2020  10:33  YEAST   Identification pending  ID consult required at [facility] and [facility].  *       Significant Imaging: None   knee pain/injury

## 2022-09-20 NOTE — PROGRESS NOTES
Neck , no lymphadenopathy Subjective:      Patient ID: Kodi Ibanez Vane is a 57 y.o. male.    Chief Complaint: Diabetes Mellitus (pcp Dr. Elias ); Diabetic Foot Exam; and Nail Care (both feet both 2nd toenails fall off)    Kodi is a 57 y.o. male who presents to the clinic for evaluation and treatment of high risk feet. Kodi has a past medical history of Allergy; Atherosclerosis of aorta; Blood transfusion; Cataracts, bilateral; CHF (congestive heart failure); Chronic kidney disease; Clotting disorder; Coronary artery disease; Decreased appetite; Diabetic retinopathy; DM (diabetes mellitus), type 2 with renal complications; ESRD on hemodialysis; Hyperlipidemia; Hyperparathyroidism, unspecified; Hypertension; Myocardial infarction; Prostate cancer; Seizures; and Stroke (2011). The patient's chief complaint is Onycholysis of left 2nd digit ~ 2 weeks ago.  Patient admits bleeding, no purulence . This patient has documented high risk feet requiring routine maintenance secondary to diabetes mellitis and those secondary complications of diabetes, as mentioned.    Patients wife states she has been inspecting feet daily as well as moisturizing and maintaining callus and nail, they have not followed up regularly due to copay.    PCP: Melonie Elias MD    Date Last Seen by PCP:   Chief Complaint   Patient presents with    Diabetes Mellitus     pcp Dr. Elias     Diabetic Foot Exam    Nail Care     both feet both 2nd toenails fall off        Current shoe gear:  Rx diabetic extra depth shoes and custom accommodative insoles    History of Trauma: negative  Sign of Infection: none    Hemoglobin A1C   Date Value Ref Range Status   12/04/2017 7.7 (H) 4.0 - 5.6 % Final     Comment:     According to ADA guidelines, hemoglobin A1c <7.0% represents  optimal control in non-pregnant diabetic patients. Different  metrics may apply to specific patient populations.   Standards of Medical Care in Diabetes-2016.  For the purpose of screening for the  presence of diabetes:  <5.7%     Consistent with the absence of diabetes  5.7-6.4%  Consistent with increasing risk for diabetes   (prediabetes)  >or=6.5%  Consistent with diabetes  Currently, no consensus exists for use of hemoglobin A1c  for diagnosis of diabetes for children.  This Hemoglobin A1c assay has significant interference with fetal   hemoglobin   (HbF). The results are invalid for patients with abnormal amounts of   HbF,   including those with known Hereditary Persistence   of Fetal Hemoglobin. Heterozygous hemoglobin variants (HbAS, HbAC,   HbAD, HbAE, HbA2) do not significantly interfere with this assay;   however, presence of multiple variants in a sample may impact the %   interference.     05/29/2017 7.9 (H) 4.5 - 6.2 % Final     Comment:     According to ADA guidelines, hemoglobin A1C <7.0% represents  optimal control in non-pregnant diabetic patients.  Different  metrics may apply to specific populations.   Standards of Medical Care in Diabetes - 2016.  For the purpose of screening for the presence of diabetes:  <5.7%     Consistent with the absence of diabetes  5.7-6.4%  Consistent with increasing risk for diabetes   (prediabetes)  >or=6.5%  Consistent with diabetes  Currently no consensus exists for use of hemoglobin A1C  for diagnosis of diabetes for children.     07/11/2016 9.7 (H) 4.5 - 6.2 % Final     Comment:     According to ADA guidelines, hemoglobin A1C <7.0% represents  optimal control in non-pregnant diabetic patients.  Different  metrics may apply to specific populations.   Standards of Medical Care in Diabetes - 2016.  For the purpose of screening for the presence of diabetes:  <5.7%     Consistent with the absence of diabetes  5.7-6.4%  Consistent with increasing risk for diabetes   (prediabetes)  >or=6.5%  Consistent with diabetes  Currently no consensus exists for use of hemoglobin A1C  for diagnosis of diabetes for children.       Patient Active Problem List   Diagnosis    ESRD  "on hemodialysis    History of stroke    Cognitive deficits as late effect of cerebrovascular disease    Congestive heart failure    Type 2 diabetes, uncontrolled, with retinopathy    Secondary renal hyperparathyroidism    Other extrapyramidal disease and abnormal movement disorder    Mononeuritis of lower limb, unspecified    Vascular dementia with depressed mood    Type 2 diabetes, uncontrolled, with neuropathy    CAD (coronary artery disease)    Seizure disorder    Hyperlipidemia LDL goal <100    Anemia of chronic disease    Hypertension    Uncontrolled type 2 diabetes mellitus with end-stage renal disease    History of MI (myocardial infarction)    Long-term insulin use    Atherosclerosis of aorta    S/P CABG (coronary artery bypass graft)    Hypertensive retinopathy of both eyes    Prostate cancer    Stenosis of arteriovenous dialysis fistula    ESRD (end stage renal disease) on dialysis    AV fistula stenosis    Uncontrolled type 2 diabetes mellitus with both eyes affected by proliferative retinopathy and macular edema, with long-term current use of insulin    Uncontrolled type 2 diabetes mellitus with right eye affected by severe nonproliferative retinopathy and macular edema, with long-term current use of insulin     Current Outpatient Prescriptions on File Prior to Visit   Medication Sig Dispense Refill    amlodipine (NORVASC) 10 MG tablet Take 1 tablet (10 mg total) by mouth once daily. 90 tablet 1    aspirin 81 MG chewable tablet Take 81 mg by mouth. 1 Tablet, Chewable Oral Every day      atorvastatin (LIPITOR) 40 MG tablet Take 1 tablet (40 mg total) by mouth once daily. 90 tablet 1    BD INSULIN PEN NEEDLE UF ORIG 29 gauge x 1/2" Ndle USE  1 EVERY EVENING 90 each 3    blood glucose control, normal Soln 1 drop by Misc.(Non-Drug; Combo Route) route as directed. 1 each 1    blood sugar diagnostic (TRUE METRIX GLUCOSE TEST STRIP) Strp 1 strip by Misc.(Non-Drug; Combo Route) " "route 2 (two) times daily. 100 strip 3    calcium acetate (PHOSLO) 667 mg capsule Take 1 capsule (667 mg total) by mouth 3 (three) times daily with meals. 90 capsule 11    carvedilol (COREG) 25 MG tablet Take 1 tablet (25 mg total) by mouth 2 (two) times daily with meals. 180 tablet 1    furosemide (LASIX) 40 MG tablet Take 1 tablet (40 mg total) by mouth once daily. 90 tablet 1    gabapentin (NEURONTIN) 100 MG capsule Take 1 capsule (100 mg total) by mouth once daily. 90 capsule 0    insulin aspart (NOVOLOG FLEXPEN) 100 unit/mL InPn pen Inject 5-10 units with meals and hs depending on blood sugar 15 mL 3    lancets (TRUEPLUS LANCETS) 33 gauge Misc 1 lancet by Misc.(Non-Drug; Combo Route) route 2 (two) times daily. 100 each 3    levetiracetam (KEPPRA) 500 MG Tab Take 1 tablet (500 mg total) by mouth 2 (two) times daily. 180 tablet 1    losartan (COZAAR) 50 MG tablet Take 1 tablet (50 mg total) by mouth once daily. 90 tablet 1    megestrol (MEGACE) 20 MG Tab Take 1 tablet (20 mg total) by mouth once daily. 30 tablet 5    NOVOFINE 30 30 x 1/3 " Ndle USE AS DIRECTED WITH SARA GREENESTAR 100 each 3    pantoprazole (PROTONIX) 40 MG tablet Take 1 tablet (40 mg total) by mouth daily as needed (heartburn). 90 tablet 0    promethazine (PHENERGAN) 25 MG tablet Take 1 tablet (25 mg total) by mouth every 6 (six) hours as needed for Nausea. 30 tablet 0    promethazine (PHENERGAN) 6.25 mg/5 mL syrup TAKE 20 MLS BY MOUTH EVERY 6 HOURS AS NEEDED FOR NAUSEA 118 mL 0    TRUE METRIX AIR GLUCOSE METER kit       nitroGLYCERIN (NITROSTAT) 0.4 MG SL tablet Place 1 tablet (0.4 mg total) under the tongue every 5 (five) minutes as needed for Chest pain. 30 tablet 0     No current facility-administered medications on file prior to visit.      Allergies   Allergen Reactions    Reglan [Metoclopramide Hcl] Diarrhea    Lorazepam      Other reaction(s): heavy sedation     Past Surgical History:   Procedure Laterality Date    " CARDIAC SURGERY      CABG    CATARACT EXTRACTION      ou    CATARACT EXTRACTION, BILATERAL      cataracts      CORONARY ARTERY BYPASS GRAFT  2009    bypass    EYE SURGERY      Left Guera AVF  1/11/2012    PROSTATE BIOPSY      positive    retinal laser      ROTATOR CUFF REPAIR  right, 2005    VASCULAR SURGERY       Family History   Problem Relation Age of Onset    Diabetes Mother     Glaucoma Mother     Cancer Mother     Cataracts Mother     Heart disease Father     Cancer Paternal Grandmother     Heart disease Maternal Grandmother     No Known Problems Sister     Prostate cancer Brother     No Known Problems Maternal Grandfather     No Known Problems Paternal Grandfather     No Known Problems Sister     No Known Problems Sister     Hypertension Brother     Cancer Paternal Uncle     Amblyopia Neg Hx     Blindness Neg Hx     Macular degeneration Neg Hx     Retinal detachment Neg Hx     Strabismus Neg Hx     Stroke Neg Hx     Thyroid disease Neg Hx     Anesthesia problems Neg Hx     Clotting disorder Neg Hx      problems Neg Hx     Kidney cancer Neg Hx     Kidney disease Neg Hx     Malignant hyperthermia Neg Hx     Sickle cell trait Neg Hx     Lupus Neg Hx     Urolithiasis Neg Hx     Sudden death Neg Hx      Social History     Social History    Marital status:      Spouse name: N/A    Number of children: N/A    Years of education: N/A     Occupational History    Not on file.     Social History Main Topics    Smoking status: Never Smoker    Smokeless tobacco: Never Used    Alcohol use No    Drug use: No    Sexual activity: Not Currently     Other Topics Concern    Not on file     Social History Narrative    No narrative on file       Review of Systems   Constitution: Negative for chills and fever.   Cardiovascular: Negative for leg swelling.   Respiratory: Negative for shortness of breath.    Skin: Positive for nail changes and poor wound healing.  "  Musculoskeletal: Negative for joint pain and joint swelling.   Gastrointestinal: Negative for nausea and vomiting.        Liver disease   Genitourinary:        Kidney disease   Neurological: Positive for focal weakness (history of stroke), numbness (in feet) and paresthesias (in feet).   Psychiatric/Behavioral: The patient is nervous/anxious.          Objective:       Vitals:    12/06/17 1338   BP: 135/74   Pulse: 87   Weight: 70.8 kg (156 lb)   Height: 5' 10" (1.778 m)   PainSc: 0-No pain       Physical Exam   Constitutional: He is oriented to person, place, and time. He appears well-developed and well-nourished. No distress.   Cardiovascular: Normal rate and intact distal pulses.    Dorsalis pedis and posterior tibial pulses are palpable Bilaterally. Toes are cool to touch. Feet are warm proximally.There is decreased digital hair. Skin is atrophic, slightly hyperpigmented, and mildly edematous.     Musculoskeletal: Normal range of motion. He exhibits edema (mild). He exhibits no tenderness.   Decreased stride, station of gait.  apropulsive toe off.  Increased angle and base of gait.    Patient has hammertoes of digits 1-5 bilateral partially reducible without symptom today.    Decreased first MPJ range of motion both weightbearing and nonweightbearing, no crepitus observed the first MP joint, + dorsal flag sign. Mild  bunion deformity is observed .     Neurological: He is alert and oriented to person, place, and time. He exhibits normal muscle tone.   Walsenburg-Rylee 5.07 monofilamant testing is absent Ralf feet. Sharp/dull sensation absent Bilaterally. Light touch absent Bilaterally.     Skin: Skin is warm, dry and intact. No rash noted. He is not diaphoretic. There is erythema. No pallor.   Ulcer Location: left 2nd digit nail bed  Measurements: 0.2 x 0.5 x 1 cm  Periwound: Intact  Drainage: None.  Pus: None.  Malodor: None.  Base:  Fibrinous, hyperpigmentation to digit  Signs of infection: None.    Onycholysis " of right 2nd digit nail    All other nails neatly trimmed   Psychiatric: He has a normal mood and affect. His behavior is normal. Judgment and thought content normal.   Nursing note and vitals reviewed.            Assessment:       Encounter Diagnoses   Name Primary?    Diabetic polyneuropathy associated with type 2 diabetes mellitus Yes    ESRD on hemodialysis     Pre-ulcerative calluses     Hallux malleus, unspecified laterality     Hallux limitus, acquired, left     Hallux limitus, acquired, right     Onychomycosis due to dermatophyte     Onycholysis of toenail - Left Foot     Gangrenous - Left Foot    Ulcer well healed without signs of infection. This patient has documented high risk feet requiring routine maintenance secondary to diabetes mellitis and those secondary complications of diabetes, as mentioned.        Plan:       Kodi was seen today for diabetes mellitus, diabetic foot exam and nail care.    Diagnoses and all orders for this visit:    Diabetic polyneuropathy associated with type 2 diabetes mellitus  -     WBVAS CAR Ankle Brachial Indices WO Stress W Toe Tracings; Future  -     DIABETIC SHOES FOR HOME USE    ESRD on hemodialysis    Pre-ulcerative calluses    Hallux malleus, unspecified laterality  -     DIABETIC SHOES FOR HOME USE    Hallux limitus, acquired, left  -     DIABETIC SHOES FOR HOME USE    Hallux limitus, acquired, right  -     DIABETIC SHOES FOR HOME USE    Onychomycosis due to dermatophyte    Onycholysis of toenail - Left Foot  -     WBVAS CAR Ankle Brachial Indices WO Stress W Toe Tracings; Future    Gangrenous - Left Foot  -     WBVAS CAR Ankle Brachial Indices WO Stress W Toe Tracings; Future    - Patient was given written and verbal instructions regarding foot condition.      - Shoe inspection. Diabetic Foot Education. Patient reminded of the importance of good nutrition and blood sugar control to help prevent podiatric complications of diabetes. Patient instructed on  proper foot hygeine. We discussed wearing proper shoe gear, daily foot inspections, never walking without protective shoe gear, never putting sharp instruments to feet    - Rx diabetic shoes for protection and support    Discussed wound healing cycle, skin integrity, ways to care for skin.Counseled patient on the effects of  PVD  high blood glucose on healing. He verbalizes understanding that it can increase the chances of delayed healing and this prolonged exposure leads to infection or progression of infection which subsequently can result in loss of limb.    Adequate vitamin supplementation, protein intake, and hydration - discussed with patient    Full workup to rule out possible CLI, DAX ordered    The wound is cleansed of foreign material as much as possible and the base inspected for bone or abscess. Base is firbrinous with hyperpigmentation, borderline necrotic periwound     Debridement: deferred  Dressings: iodosorb  Offloading: darco shoe    Follow-up: 1 week but should call [facility] immediately if any signs of infection, such as fever, chills, sweats, increased redness or pain.    Short-term goals include maintaining good offloading and minimizing bioburden, promoting granulation and epithelialization to healing.  Long-term goals include keeping the wound healed by good offloading and medical management under the direction of internist.    Procedures

## 2023-05-01 NOTE — ASSESSMENT & PLAN NOTE
2/2 probable sepsis likely r/t LUE AV fistula excision. Patient also with R tunneled catheter placed 2/2020.  Patient underwent ligation of LUE fistula on 2/27/20 with Dr Lopez ([facility], vascular surgery) and had ongoing issues afterwards. Patient was admitted on 4/10 for excision of his infected LUE mid and distal AVF with cultures and specimen obtained. Surgical cultures 4/10 grew 2 different prevotella species, fusobacterium, and strep anginosus. The patient was d/c'ed with home health wound care and po augmentin/clindamycin x 10 days. On 4/20 antibiotics were changed to Flagyl 500 mg p.o. q.8 and Cefazolin after dialysis 2 g on Tuesday and Thursday, 3 g on Saturday. End date on antibiotics on 5/11.    --WBC 15, lactate 2.7, procal 10.98  --1.5 L IVF given in ED  --broad spectrum abx initiated  --pan-cx'ed, plan to f/up results  --BC grew candida albicans  --Started on micafungin   --ID and opthalmology consulted   --off of vasopressors   From: Suzie Khalil  To: Cathleen Rodney  Sent: 5/1/2023 1:50 PM CDT  Subject: Pain in left breast    Hi Dr Rodney-     I started having pain to the touch on the left breast. When I push on the area, it doesn’t feel like a lump per say, but it does feel like a thicker rope like feel and it only hurts when it’s pushed on. It’s been going on for 3 days now and of course I just saw you last Friday and wasn’t having the problem then. I would say it’s at the base of the breast. Not sure if it’s something to concern myself with or not but thought I would run it by you. I have my yearly in august.   Thank you!     Suzie Khalil

## 2024-04-17 NOTE — PROGRESS NOTES
"Speech Therapy Re-evaluation    Rehabilitation Prognosis:Good rehab potential to reach the established goals    Assessments:   Completed 2/29/2024-   Comprehensive Evaluation of Language Fundamentals  - Third Edition  The Comprehensive Evaluation of Language Fundamentals - Third Edition (CELF-P3) comprehensively assesses the language and communication skills of children, ages 3:0 to 6:11.  Subtest Scores of the CELF-P3     Subtests Raw Score Scaled Score Percentile Rank   Sentence Structure 13 8 90   Word Structure 10 7 85   Expressive Vocabulary 28 11 105   (A scaled score between 7-13 and a percentile rank of 25 - 75 is within normal limits)  Composite Scores of the CELF-P3  Index Scores Raw Score Standard Score Percentile Rank   Core Language Index - 91 27   (A percentile rank of 25 - 75 is within normal limits)         Impressions/ Recommendations  Impressions: Jessie is making good progress towards her goals. She continues to present with a mixed receptive-expressive language disorder c/b decreased age-appropriate language skills for her age and gender. Jessie is able to follow general 2-step directions better w/ embedded prepositions, age-appropriate concepts (big/little, hot/cold, dry/wet, etc), and other color/quantity modifiers in >80% of opps. She does have difficulty with concept of pronoun usage. Jessie has also improved her overall expressive language output, but will have disorganized speech, reduced morphological endings, and other grammatical errors. She also has trouble with producing a 'question' vs a 'statement' in connected speech. Jessie has demonstrated ability to expressively ID emotions and actions in >80% of opps, however when asked, \"why\" she feels the way she is feeling she has trouble expressing herself. Noted to have some inconsistent emotional outbursts with dysregulation. It is recommended that she continue OP speech therapy to continue improving her functional " Pharmacokinetic Assessment Follow Up: IV Vancomycin    Vancomycin Regimen Assessment/Plan:    - Vancomycin random level with morning labs resulted as 11.5 mg/dl.  Patient received HD for 3 hours overnight.  - ESRD patient. Nephrology is following.  - Administer vancomycin 500 mg x 1 dose  - A random level is ordered with morning labs tomorrow.  Pharmacy to re-dose depending on level and RRT plans.     Drug levels (last 3 results):  Recent Labs   Lab Result Units 05/14/20  0223 05/15/20  0431 05/16/20  0342   Vancomycin, Random ug/mL 15.4 21.0 11.5       Pharmacy will continue to follow and monitor vancomycin.    Please contact pharmacy at extension [phone] for questions regarding this assessment.    Thank you for the consult,   Ketty Hollis, PharmD, [facility]       Patient brief summary:  Kodi Ibanez Sr. is a 60 y.o. male initiated on antimicrobial therapy with IV Vancomycin for treatment of sepsis    The patient's current regimen is pulse dosing.    Drug Allergies:   Review of patient's allergies indicates:   Allergen Reactions    Reglan [metoclopramide hcl] Diarrhea    Lorazepam      Other reaction(s): heavy sedation       Actual Body Weight:   67.6 kg    Renal Function:   Estimated Creatinine Clearance: 30.5 mL/min (A) (based on SCr of 2.4 mg/dL (H)).,     Dialysis Method (if applicable):  iHD    CBC (last 72 hours):  Recent Labs   Lab Result Units 05/13/20  1202 05/13/20  1804 05/13/20  1942 05/14/20 0223 05/15/20  0431 05/16/20  0342   WBC K/uL 15.06*  --  10.96 13.03*  13.03* 7.85 8.17   Hemoglobin g/dL 10.9*  --  9.4* 9.6*  9.6* 9.5* 8.9*   Hemoglobin A1C %  --  10.3*  --   --   --   --    Hematocrit % 34.1*  --  29.8* 31.2*  31.2* 29.2* 28.5*   Platelets K/uL 280  --  229 253  253 229 183   Gran% % 88.8*  --  89.5* 89.9*  89.9* 89.8* 90.6*   Lymph% % 3.9*  --  6.0* 5.6*  5.6* 3.9* 2.1*   Mono% % 6.5  --  3.7* 3.6*  3.6* 5.2 5.8   Eosinophil% % 0.0  --  0.0 0.2  0.2 0.1 0.0   Basophil% % 0.1  --   0.1 0.1  0.1 0.1 0.2   Differential Method  Automated  --  Automated Automated  Automated Automated Automated       Metabolic Panel (last 72 hours):  Recent Labs   Lab Result Units 05/13/20  1458 05/13/20  1804 05/13/20 2100 05/14/20  0223 05/14/20  1130 05/14/20  1358 05/14/20  2235 05/15/20  0432 05/15/20  0830 05/15/20  2100 05/15/20  2252 05/16/20  0342   Sodium mmol/L  --  131*  --  136 136 137 137 138  138 140 136 136 137   Potassium mmol/L  --  3.6 3.3* 2.9* 5.0 4.6 4.5 4.6  4.6 4.7 4.8 5.8* 3.7   Chloride mmol/L  --  99  --  104 106 106 99 99  99 101 99 99 107   CO2 mmol/L  --  16*  --  15* 14* 16* 26 26  26 28 23 21* 21*   Glucose mg/dL  --  666*  --  181* 218* 197* 184* 195*  195* 149* 191* 197* 111*   Glucose, UA  3+*  --   --   --   --   --   --   --   --   --   --   --    BUN, Bld mg/dL  --  49*  --  49* 53* 44* 15 17  17 19 23 25* 11   Creatinine mg/dL  --  7.1*  --  7.4* 7.5* 6.2* 3.2* 3.6*  3.6* 4.1* 4.9* 5.0* 2.4*   Albumin g/dL  --   --   --   --   --  1.9* 2.1* 2.0*  --   --  1.8* 1.8*   Magnesium mg/dL  --   --   --  2.0  --  1.9 2.1 2.0  2.0  --   --  2.0 1.9  1.9   Phosphorus mg/dL  --   --   --  5.1*  --  4.8* 2.7 5.1*  5.1*  --   --  6.1* 2.9  2.9       Vancomycin Administrations:  vancomycin given in the last 96 hours                   vancomycin in dextrose 5 % 1 gram/250 mL IVPB 1,000 mg (mg) 1,000 mg New Bag 05/13/20 1330                Microbiologic Results:  Microbiology Results (last 7 days)     Procedure Component Value Units Date/Time    Blood culture [[id number]] Collected:  05/15/20 1330    Order Status:  Completed Specimen:  Blood from Peripheral, Forearm, Left Updated:  05/15/20 2115     Blood Culture, Routine No Growth to date    Blood culture [[id number]] Collected:  05/15/20 1321    Order Status:  Completed Specimen:  Blood from Peripheral, Antecubital, Right Updated:  05/15/20 2115     Blood Culture, Routine No Growth to date    Blood culture x two cultures. Draw  language.     Recommendations:Speech/ language therapy  Frequency:1 x weekly  Duration: 4 months +     Intervention certification from:2024  Intervention certification to:2024  Intervention Comments: language based therapy, continue to be seen w/ OT, continue with developmental pediatrician        Speech Treatment Note    Today's date: 2024  Patient name: Jessie Bush  : 2021  MRN: [id number]  Referring provider: Wade Marie DO  Dx:   Encounter Diagnosis     ICD-10-CM    1. Mixed receptive-expressive language disorder  F80.2                             Start Time: 1300  Stop Time: 1345  Total time in clinic (min): 45 minutes    Authorization Tracking  POC/Progress Note Due Unit Limit Per Visit/Auth Auth Expiration Date PT/OT/ST + Visit Limit?   24                              Visit/Unit Tracking  Auth Status:   Visits Authorized: 10 Used 15   IE Date: 23  Re-Eval Due:     Intervention certification from: 23  Intervention certification to: 24  Testin24 Remaining 4     Visit Number: 15/[facility]    Subjective/Behavioral: Arrived to appt with dad today. Seen w/ OT x45 minutes. Jessie was able engage in 2/2 activities, but continues to have difficulty regulating her emotions- tough to ID the cause/activity/demand at this time as it is inconsistent. Dad reports this is occurring at home as well. She will reenact movie scenes with various emotions. He is going to try and decrease screen time or when they do watch movies, he can pause it intermittently to talk about the emotion and how it pertains to her 'real' life.     Short Term Goals:  1. Patient will follow 2 step verbally presented commands with 2+ age appropriate modifiers in /10 opps. GOAL MET   Followed direction with spatial command  'next to', 'on top', and 'bottom' with 75% acc during cup stacking games, given a visual to copy.      2. Patient will answer WH and yes/ no questions  prior to antibiotics. [[id number]] Collected:  05/13/20 1202    Order Status:  Completed Specimen:  Blood from Peripheral, Hand, Right Updated:  05/15/20 1412     Blood Culture, Routine No Growth to date      No Growth to date      No Growth to date    Narrative:       Aerobic and anaerobic    Urine culture [[id number]] Collected:  05/13/20 1458    Order Status:  Completed Specimen:  Urine Updated:  05/14/20 2352     Urine Culture, Routine Multiple organisms isolated. None in predominance.  Repeat if      clinically necessary.    Narrative:       Preferred Collection Type->Urine, Clean Catch    Respiratory Infection Panel (PCR), Nasopharyngeal [[id number]] Collected:  05/13/20 2238    Order Status:  Completed Specimen:  Nasopharyngeal Swab Updated:  05/14/20 1102     Respiratory Infection Panel Source NP Swab     Adenovirus Not Detected     Coronavirus 229E, Common Cold Virus Not Detected     Coronavirus HKU1, Common Cold Virus Not Detected     Coronavirus NL63, Common Cold Virus Not Detected     Coronavirus OC43, Common Cold Virus Not Detected     Comment: The Coronavirus strains detected in this test cause the common cold.  These strains are not the COVID-19 (novel Coronavirus)strain   associated with the respiratory disease outbreak.          Human Metapneumovirus Not Detected     Human Rhinovirus/Enterovirus Not Detected     Influenza A (subtypes H1, H1-2009,H3) Not Detected     Influenza B Not Detected     Parainfluenza Virus 1 Not Detected     Parainfluenza Virus 2 Not Detected     Parainfluenza Virus 3 Not Detected     Parainfluenza Virus 4 Not Detected     Respiratory Syncytial Virus Not Detected     Bordetella Parapertussis ([id number]) Not Detected     Bordetella pertussis (ptxP) Not Detected     Chlamydia pneumoniae Not Detected     Mycoplasma pneumoniae Not Detected     Comment: Respiratory Infection Panel testing performed by [facility] PCR.       Narrative:       For all other respiratory sources, order  without imitation in 8/10 opps.  incorporate conversational questions. GOAL MET   Answered yes/no questions with >90% acc with regards to what color cup she needed as well as to answer questions. WH questions provided to prompt/elicit answering and understanding of Jessie's emotions during her dysregulation; although inconsistent responses noted.      3. Patient will demonstrate use and understanding of age appropriate concepts and language structure (prepositions, pronouns, spatial concepts, plurals, present progressive, etc.). partially met- discharging goal   NDT; see other goals.     4. Patient will provide descriptions of actions, emotions, textures, etc. in pictures or those demonstrated on herself in 8/10 opps in order to better explain emotions and desires. Partially met - discharging goal   Able to ID 'how' she felt in 2/2 opps- but then was not demonstrating the actual emotion (e.g. crying while saying she is happy or excited).     5. Patient will use age appropriate sentence structure including use of nouns, verbs, etc. in a variety of activities in 8/10 opps when provided indirect models. Partially met- discharging goal   Ndt.     6. Patient will produce /s/ in isolation, syllables, and other simple word forms in 8/10 opps with 0-3 cues. Partially met   Prior data: Targeted /s/ in initial POW in salient vocab (liseth, summer, [place], square, see, etc). Cues provided for lingual placement w/o protrusion. 100% post model and verbal cue; but limited carryover into connected speech.     New STG:   Jessie will use appropriate pronoun w/ gender (all objective, possessive, subjective) when given a picture in 4/5 opps post model.   Jessie will produce a question vs use of statement in connected speech in 80% of given opps.   Jessie will answer varied WH questions w/o need of clarification of 'type' of WH question in 4/5 opps.   Jessie will be able to ID her own emotion or description to advocate for herself in  2/3 given opps.   Jessie will follow age-appropriate 2-step direction w/ modifier (specifically spatial concept) in 4/5 opps.     **Additional goals may be made the discretion of the treating therapist based on diagnotic therapy and speed of progress.    Long Term Goals:  1. Patient will demonstrate expressive language skills WFL for her age and gender  2. Patient will demonstrate receptive language skills WNL for her age and gender      Other:Patient was provided with home exercises/ activies to target goals in plan of care. and Discussed session and patient progress with caregiver/family member after today's session.     Recommendations:Continue with Plan of Care     [id number] -  Respiratory Viral Panel by PCR    Respiratory Infection Panel (PCR), Nasopharyngeal [[id number]]     Order Status:  Canceled Specimen:  Nasopharyngeal Swab     Respiratory Infection Panel (PCR), Nasopharyngeal [[id number]] Collected:  05/13/20 1835    Order Status:  Canceled Specimen:  Nasopharyngeal Swab     Blood culture x two cultures. Draw prior to antibiotics. [[id number]] Collected:  05/13/20 1202    Order Status:  Sent Specimen:  Blood from Peripheral, Antecubital, Right Updated:  05/13/20 1431

## 2024-04-22 NOTE — ASSESSMENT & PLAN NOTE
Continue home meds on discharge. Follow up with PCP.   What Type Of Note Output Would You Prefer (Optional)?: Bullet Format Hpi Title: Evaluation of Skin Lesions How Severe Are Your Spot(S)?: mild Have Your Spot(S) Been Treated In The Past?: has not been treated

## 2024-11-27 NOTE — PLAN OF CARE
Problem: Physical Therapy Goal  Goal: Physical Therapy Goal  Description  Goals to be met by: 2020     Patient will increase functional independence with mobility by performin. Supine to sit with Stand-by Assistance  2. Sit to supine with Stand-by Assistance  3. Sit to stand transfer with Minimal Assistance  4. Bed to chair transfer with Moderate Assistance using LRAD  5. Gait  x 10 feet with Moderate Assistance using LRAD.   6. Sitting at edge of bed x15 minutes with Stand-by Assistance while performing dynamic UE activities  7. Lower extremity exercise program x20 reps per handout, with independence     Outcome: Ongoing, Progressing     Patient progressing appropriately towards current goals and plan of care remains appropriate at this time. Patient demonstrates good motivation and decreased activity tolerance secondary to drowsiness and weakness.  Patient demonstrated decreased functional mobility secondary to weakness and fatigue.  Patient required some encouragement for participation and patient required between CGA and max A for bed mobility and transfers.  Patient tolerated stance ~ 15 sec x 2 secondary to LE weakness.  Patient continues to benefit from skilled PT for strengthening and mobility training.      Fermin Melgar PT, DPT  2020  Pager #: [phone]      [[phone]]

## 2024-12-10 NOTE — PROGRESS NOTES
Pt is AAOx4 and denies pain.  VSS.  Family member at bedside.  Admit questions completed.  Will continue to monitor.    no rash/no dryness

## (undated) DEVICE — SUT LIGACLIP SMALL XTRA

## (undated) DEVICE — Device

## (undated) DEVICE — CATH DXTERITY JL40 100CM 5FR

## (undated) DEVICE — INTRODUCER CATH 6F 11CM

## (undated) DEVICE — BLADE SURG CARBON STEEL SZ11

## (undated) DEVICE — SYR ONLY LUER LOCK 20CC

## (undated) DEVICE — SUT SILK 2-0 SH 18IN BLACK

## (undated) DEVICE — PACK UNIVERSAL SPLIT II

## (undated) DEVICE — SET DECANTER MEDICHOICE

## (undated) DEVICE — GLIDEPATH HEMODIALYSIS CATH, ALPHACURVE, DL, 14.5 FR. 19CM
Type: IMPLANTABLE DEVICE | Site: NECK | Status: NON-FUNCTIONAL
Brand: GLIDEPATH LONG-TERM HEMODIALYSIS CATHETER

## (undated) DEVICE — KIT SYR REUSABLE

## (undated) DEVICE — TRAY PICC CENTRAL LINE DRSNG

## (undated) DEVICE — SUT 2-0 12-18IN SILK

## (undated) DEVICE — SEE MEDLINE ITEM 146292

## (undated) DEVICE — SPONGE DERMACEA GAUZE 4X4

## (undated) DEVICE — SUT VICRYL PLUS 3-0 SH 18IN

## (undated) DEVICE — SEE MEDLINE ITEM 157173

## (undated) DEVICE — DRESSING TELFA STRL 4X3 LF

## (undated) DEVICE — ELECTRODE REM PLYHSV RETURN 9

## (undated) DEVICE — STOCKINET 4INX48

## (undated) DEVICE — SPONGE GAUZE 16PLY 4X4

## (undated) DEVICE — KIT INTRODUCER MICROPUNCTR 4F

## (undated) DEVICE — SEE MEDLINE ITEM 156894

## (undated) DEVICE — SEE MEDLINE ITEM 152487

## (undated) DEVICE — LOOP VESSEL BLUE MAXI

## (undated) DEVICE — INTRODUCER CATH 4F 11CM

## (undated) DEVICE — SUT PROLENE 6-0 24 BV-1

## (undated) DEVICE — CATH TREK RX 3.0MM X 15MM

## (undated) DEVICE — APPLICATOR CHLORAPREP ORN 26ML

## (undated) DEVICE — COVER INSTR ELASTIC BAND 40X20

## (undated) DEVICE — PACK CATH LAB

## (undated) DEVICE — PAD RADI FEMORAL

## (undated) DEVICE — SEE MEDLINE ITEM 157117

## (undated) DEVICE — CATH DXTERITY JR40 100CM 5FR

## (undated) DEVICE — DRAPE PLASTIC U 60X72

## (undated) DEVICE — TRAY MINOR GEN SURG

## (undated) DEVICE — GUIDEWIRE RUNTHROUGH EF 180CM

## (undated) DEVICE — GUIDE LAUNCHER 6FR EBU 4.0

## (undated) DEVICE — SOL NS 1000CC

## (undated) DEVICE — GAUZE SPONGE 4X4 12PLY

## (undated) DEVICE — KIT PROBE COVER WITH GEL

## (undated) DEVICE — CONTRAST VISIPAQUE 150ML

## (undated) DEVICE — DRAPE THYROID WITH ARMBOARD

## (undated) DEVICE — BOOT SUTURE AID

## (undated) DEVICE — SUT MONOCRYL 3-0 SH U/D

## (undated) DEVICE — KIT HAND CONTROL HIGH PRESSUR

## (undated) DEVICE — SUT MCRYL PLUS 4-0 PS2 27IN

## (undated) DEVICE — CATH IMA INFINITI 4FRX100CM

## (undated) DEVICE — TOWEL OR XRAY WHITE 17X26IN

## (undated) DEVICE — TAPE UMBILICAL 1/8X36IN WHITE

## (undated) DEVICE — DEVICE PERCLOSE SUT CLSR 6FR

## (undated) DEVICE — SEE MEDLINE ITEM 107746

## (undated) DEVICE — GOWN SURGICAL X-LARGE

## (undated) DEVICE — PAD DEFIB CADENCE ADULT R2

## (undated) DEVICE — KIT MANIFOLD LOW PRESS TUBING

## (undated) DEVICE — DRESSING ANTIMICROBIAL 1 INCH

## (undated) DEVICE — SUT VICRYL 3-0 27 SH

## (undated) DEVICE — INFLATOR ADVANTAGE ENCORE 26

## (undated) DEVICE — CATH NC TREK RX 3X15MM

## (undated) DEVICE — COVER OVERHEAD SURG LT BLUE

## (undated) DEVICE — CLIP MED TICALL

## (undated) DEVICE — ADHESIVE DERMABOND MINI HV

## (undated) DEVICE — BLANKET LOWER BODY 55.9X40.2IN

## (undated) DEVICE — CATH EMPULSE ANGLED 5FR PIGTAI

## (undated) DEVICE — SUT 7/0 24IN PROLENE BL MO

## (undated) DEVICE — GUIDEWIRE SAFE T J TIP 145X2.5

## (undated) DEVICE — SUT 3-0 12-18IN SILK

## (undated) DEVICE — CATH IV JELCO 22GAX1

## (undated) DEVICE — SEE MEDLINE ITEM 152622

## (undated) DEVICE — GLOVE BIOGEL ECLIPSE SZ 6

## (undated) DEVICE — COVERS PROBE NR-48 STERILE

## (undated) DEVICE — VALVE CONTROL COPILOT

## (undated) DEVICE — SUT MONOCRYL 4.0 PS2 CP496G

## (undated) DEVICE — BLLN SC EUPHORA RX 3.00MMX15MM

## (undated) DEVICE — KIT MICROINTRO 4F .018X40X7CM

## (undated) DEVICE — SUT SILK 3-0 SH 18IN BLACK

## (undated) DEVICE — HEMOSTAT SURGICEL 4X8IN

## (undated) DEVICE — INTRODUCER SHEATH 5FR W/.035

## (undated) DEVICE — SOL NACL 0.9% INJ 500ML BG

## (undated) DEVICE — SUT 2-0 VICRYL / SH (J417)

## (undated) DEVICE — CATH INFINITI 4F JL4 .042X100

## (undated) DEVICE — SEE MEDLINE ITEM 153688

## (undated) DEVICE — CATH GUID EBU4 LAUNCH 6FRX100

## (undated) DEVICE — SUT 0 18IN SILK BLK BRAIDE

## (undated) DEVICE — WIRE GUIDE SAFE-T-J .035 260CM

## (undated) DEVICE — GLOVE SURG BIOGEL LATEX SZ 7.5

## (undated) DEVICE — CATH INFINITI JUDKINS JR4

## (undated) DEVICE — DEVICE PICC SECURE SORBA VIEW

## (undated) DEVICE — CONTAINER SPECIMEN STRL 4OZ

## (undated) DEVICE — SYR 10CC LUER LOCK

## (undated) DEVICE — DEV-O-LOOPS MINI BLUE

## (undated) DEVICE — CATH IV INTROCAN 22G X 1

## (undated) DEVICE — NDL HYPO REG 25G X 1 1/2

## (undated) DEVICE — SOL 9P NACL IRR PIC IL

## (undated) DEVICE — CATH PIGTAIL 4F 8SH MICRO LOOP

## (undated) DEVICE — BLLN NC EUPHORA RX 30 X15

## (undated) DEVICE — DRESSING TRANS 4X4 TEGADERM

## (undated) DEVICE — GEL AQUASONIC 100 STERILE20GM

## (undated) DEVICE — DRAPE OPTIMA MAJOR PEDIATRIC

## (undated) DEVICE — CATH EAGLE EYE PLATINUM

## (undated) DEVICE — SYS LABLNG CORECT MED 4 FLG

## (undated) DEVICE — CATH INTROCAN SAFETY 20GX1.75

## (undated) DEVICE — COVER LIGHT HANDLE 80/CA

## (undated) DEVICE — SUT VICRYL+ 2-0 SH 18IN

## (undated) DEVICE — GOWN SURG 2XL DISP TIE BACK

## (undated) DEVICE — SUT PROLENE 6-0 24 C-1 C-1

## (undated) DEVICE — CATH JL5 4FR INFINITY

## (undated) DEVICE — DRESSING ADHESIVE ISLAND 3 X 6